# Patient Record
Sex: MALE | Race: WHITE | NOT HISPANIC OR LATINO | Employment: OTHER | URBAN - METROPOLITAN AREA
[De-identification: names, ages, dates, MRNs, and addresses within clinical notes are randomized per-mention and may not be internally consistent; named-entity substitution may affect disease eponyms.]

---

## 2017-01-05 ENCOUNTER — ONCOLOGY VISIT (OUTPATIENT)
Dept: ONCOLOGY | Facility: CLINIC | Age: 57
End: 2017-01-05
Attending: INTERNAL MEDICINE
Payer: MEDICARE

## 2017-01-05 VITALS
OXYGEN SATURATION: 96 % | WEIGHT: 188.8 LBS | HEART RATE: 89 BPM | DIASTOLIC BLOOD PRESSURE: 74 MMHG | TEMPERATURE: 97.9 F | RESPIRATION RATE: 17 BRPM | BODY MASS INDEX: 23.72 KG/M2 | SYSTOLIC BLOOD PRESSURE: 103 MMHG

## 2017-01-05 DIAGNOSIS — C34.90 NON-SMALL CELL CARCINOMA OF LUNG, STAGE 3, UNSPECIFIED LATERALITY (H): Primary | ICD-10-CM

## 2017-01-05 PROCEDURE — 99213 OFFICE O/P EST LOW 20 MIN: CPT | Performed by: INTERNAL MEDICINE

## 2017-01-05 PROCEDURE — 99211 OFF/OP EST MAY X REQ PHY/QHP: CPT

## 2017-01-05 ASSESSMENT — PAIN SCALES - GENERAL: PAINLEVEL: NO PAIN (0)

## 2017-01-05 NOTE — PATIENT INSTRUCTIONS
1.  RTC MD three months  2.  CT scan chest abd omen and pelvis before next visit.   3.  Every 4-6 week port flushes

## 2017-01-05 NOTE — PROGRESS NOTES
[facility] PHYSICIANS  HEMATOLOGY ONCOLOGY    ONCOLOGY FOLLOWUP NOTE      DIAGNOSIS:     1.  T4 NX M0 adenocarcinoma of the lung.   2.  History of stroke and alcohol abuse.      TREATMENT:  6/9/16 Concurrent chemoradiation with weekly carbo, Taxol. Finished radiation 7/30/16. Followed by 2 cycles of consolidation Carbo/Taxol.      SUBJECTIVE:  The patient was seen as a followup today. He has been feeling well. No new complaints today.     REVIEW OF SYSTEMS:  A complete review of systems was performed and found to be negative other than pertinent positives mentioned in history of present illness.     Past medical, social histories reviewed.    Meds- Reviewed.     PHYSICAL EXAMINATION:   VITAL SIGNS:/74 mmHg  Pulse 89  Temp(Src) 97.9  F (36.6  C) (Oral)  Resp 17  Wt 85.639 kg (188 lb 12.8 oz)  SpO2 96%  GENERAL: Sitting comfortably.   HEENT: Pupils are equal. Oropharynx is clear.   NECK: No cervical or supraclavicular lymphadenopathy.   LUNGS: Clear bilaterally.   HEART: S1, S2, regular.   ABDOMEN: Soft, nontender, nondistended, no hepatosplenomegaly.   EXTREMITIES: Warm, well perfused.   NEUROLOGIC: Alert, awake.   SKIN: No rash.   LYMPHATICS: No edema.      DATA:    Results for orders placed or performed during the hospital encounter of 12/29/16   CT Chest/Abdomen/Pelvis w Contrast    Narrative    CT CHEST/ABDOMEN/PELVIS WITH CONTRAST 12/29/2016 3:04 PM     HISTORY: Follow up lung cancer. Malignant neoplasm of unspecified part  of unspecified bronchus or lung.    COMPARISON: CT chest, abdomen and pelvis 9/8/2016.    TECHNIQUE: Axial images from the thoracic inlet to the symphysis are  performed with additional coronal reformatted images. 91 mL of Isovue  370 are given intravenously.  Radiation dose for this scan was reduced  using automated exposure control, adjustment of the mA and/or kV  according to patient size, or iterative reconstruction technique. Oral  contrast is also  given.    FINDINGS:     Chest: Scarring is noted at the right lung apex which is stable.  Paramediastinal infiltrate is noted bilaterally with the inflammation  in the anterior left upper lobe on prior exam appearing more fibrotic  in nature. New changes in the paramediastinal right upper and right  lower lung are also noted and probably reflect postradiation changes.  The remainder of each lung is clear. No mass or infiltrate is  otherwise evident. No pleural or pericardial fluid. Heart is normal in  size. The esophagus demonstrates mild wall thickening, possibly  therapy related. No obvious mass. Thyroid gland where imaged appears  normal in size. Right chest port is in good position with catheter tip  near the SVC right atrium junction. No enlarged axillary lymph nodes.  Previously noted subcarinal soft tissue mass is stable if not even  smaller measuring only 0.6 cm on image 27 of series 3. No additional  soft tissue mass or lymph node enlargement is appreciated. Coronary  artery calcifications are present. Thoracic aortic aneurysm measures 4  cm in diameter at the level of the right main pulmonary artery which  has diminished from 4.2 cm on the prior exam. No evidence of  dissection.    Abdomen: The liver, spleen, gallbladder, pancreas, adrenal glands and  kidneys are unremarkable. No hydronephrosis. There are no enlarged  abdominal lymph nodes. Aorta is calcified without aneurysm or  dissection. The bowel is normal in caliber without obstruction.  Appendix is normal. No diverticulitis. Mild stranding is noted in the  small bowel mesentery, but no enlarged lymph nodes or mass is  otherwise appreciated. Therapy-related changes are possible. Previous  mesenteritis  remains in the differential.    Pelvis: The bladder, prostate and rectum are unremarkable. No enlarged  pelvic lymph nodes or free fluid. Degenerative spine changes are  noted.      Impression    IMPRESSION:  1. Probable postradiation therapy changes  in the paramediastinal right  and left lung as described. This is predominantly in the mid to upper  chest. No enlarged lymph nodes or residual pericarinal mass. Lungs are  otherwise clear.  2. No evidence of metastatic disease in the abdomen or pelvis.  Haziness in the small bowel mesentery is stable. Findings could  represent therapy-related changes versus old bout of mesenteritis.    DANY ZACARIAS MD     ECOG PS: 2     ASSESSMENT:  Kt Rasmussen is a 55-year-old gentleman who initially presented with near obstructing large mass coming off the cheikh and likely the posterior wall on a bronchoscopy performed 05/18/2016.  He had a followup rigid bronchoscopy and corning out of endotracheal 05/05/2016.  Pathology result was consistent with invasive squamous cell carcinoma.  It was moderately differentiated and focally keratinizing.  He had significant bleeding during the procedure.  Tumor was completely debulked in both main stem bronchi and distal trachea.  PET/CT scan 05/23/2016, showed evidence of residual tumor decreased endobronchial mass.  There was indeterminate, mildly hypermetabolic mesentery with prominent lymph nodes and area of enhancement of the right hepatic lobe.  His case was discussed at the multidisciplinary tumor conference and was recommended to proceed with concurrent chemoradiation. He completed concurrent chemoradiation.  - CT scan 12/29/16 results were reviewed with the patient- stable findings and no convincing evidence of recurrence.     PLAN:   1.  RTC MD three months  2.  CT scan chest abd omen and pelvis before next visit.     OTIS GARY MD    1/5/2017    cc: Oneil Hayden MD

## 2017-01-05 NOTE — PROGRESS NOTES
"Kt Rasmussen is a 56 year old male who presents for:  Chief Complaint   Patient presents with     Oncology Clinic Visit     Ret Non small cell carcinoma of lung        Initial Vitals:  /74 mmHg  Pulse 89  Temp(Src) 97.9  F (36.6  C) (Oral)  Resp 17  Wt 85.639 kg (188 lb 12.8 oz)  SpO2 96% Estimated body mass index is 23.72 kg/(m^2) as calculated from the following:    Height as of 9/22/16: 1.9 m (6' 2.8\").    Weight as of this encounter: 85.639 kg (188 lb 12.8 oz).. There is no height on file to calculate BSA. BP completed using cuff size: regular  No Pain (0) No LMP for male patient. Allergies and medications reviewed.     Medications: Medication refills not needed today.  Pharmacy name entered into EPIC:    [facility], [place] - [address] [facility] [place], [place] - [address]    Comments: Takes a cholesterol med that he wants refilled but doesn't know the name and it is not on his med list.    6 minutes for nursing intake (face to face time)   Margaret Horton CMA    DISCHARGE PLAN:  Next appointments: See patient instruction section-- Gladys  Departure Mode: Ambulatory with cane  Accompanied by: friend  4 minutes for nursing discharge (face to face time)   Sandi Góemz, JAYASHREE    1.  RTC MD three months  2.  CT scan chest abd omen and pelvis before next visit.   3.  Every 4-6 week port flushes      2/2/2017 Thu   1:30 P 60 [facility] [[id number]] [facility] CHAIR 4 [[id number]] LEVEL 1 [663] Port Flush       3/2/2017 Thu   2:00 P 60 [facility] [[id number]] [facility] CHAIR 8 [[id number]] LEVEL 1 [663] Port Flush       4/4/2017 Tue   2:00 P 30 [facility]CT [[id number]] SHCT1 [[id number]] CT CHEST/ABDOMEN/PELVIS W [[id number]] Epic Order  Client has a port  [phone]  CT CHEST/ABDOMEN/PELVIS W CONTRAST [[id number]]     4/6/2017 Thu   2:00 P 60 [facility] [[id number]] [facility] 4 [[id number]] LEVEL 1 [663] Port Flush       4/6/2017 Thu   2:30 P 15 [facility] [[id number]] OTIS GARY " [[id number]] RETURN [657] Return- Non-small cell carcinoma of lung, stage 3, unspecified laterality. Schedule port flushes

## 2017-01-05 NOTE — MR AVS SNAPSHOT
After Visit Summary   1/5/2017    Kt Rasmussen    MRN: [id number]           Patient Information     Date Of Birth          1960        Visit Information        Provider Department      1/5/2017 2:30 PM Wiliam Gonzalez MD [facility]        Today's Diagnoses     Non-small cell carcinoma of lung, stage 3, unspecified laterality (H)    -  1       Care Instructions    1.  RTC MD three months  2.  CT scan chest abd omen and pelvis before next visit.         Follow-ups after your visit        Your next 10 appointments already scheduled     Feb 02, 2017  1:30 PM   Level 1 with NORTH CHAIR 4   [facility] ([facility])    [facility] [place]  [address]  [place] [address]   [phone]            Mar 02, 2017  2:00 PM   Level 1 with NORTH CHAIR 8   [facility]fusion [facility] ([facility])    [facility] [place]  [address]  [place] [address]   [phone]            Apr 04, 2017  2:00 PM   CT CHEST/ABDOMEN/PELVIS W CONTRAST with SHCT1   [facility] CT ([facility])    [address] [facility] [address]   [phone]           Please bring any scans or X-rays taken at other hospitals, if similar tests were done. Also bring a list of your medicines, including vitamins, minerals and over-the-counter drugs. It is safest to leave personal items at home.  Be sure to tell your doctor:   If you have any allergies.   If there s any chance you are pregnant.   If you are breastfeeding.   If you have any special needs.  You may have contrast for this exam. To prepare:   Do not eat or drink for 2 hours before your exam. If you need to take medicine, you may take it with small sips of water. (We may ask you to take liquid medicine as well.)   The day before your exam, drink extra fluids at least six 8-ounce glasses (unless your doctor  tells you to restrict your fluids).  Patients over 70 or patients with diabetes or kidney problems:   If you haven t had a blood test (creatinine test) within the last 30 days, go to your clinic or Diagnostic Imaging Department for this test.  If you have diabetes:   If your kidney function is normal, continue taking your metformin (Avandamet, Glucophage, Glucovance, Metaglip) on the day of your exam.   If your kidney function is abnormal, wait 48 hours before restarting this medicine.  You will have oral contrast for this exam:   You will drink the contrast at home. Get this from your clinic or Diagnostic Imaging Department. Please follow the directions given.  Please wear loose clothing, such as a sweat suit or jogging clothes. Avoid snaps, zippers and other metal. We may ask you to undress and put on a hospital gown.  If you have any questions, please call the Imaging Department where you will have your exam.            Apr 06, 2017  2:00 PM   Level 1 with NORTH CHAIR 4   [facility] ([facility])    [facility]  [address]  [facility] [address]   [phone]            Apr 06, 2017  2:30 PM   Return Visit with Wiliam Gonzalez MD   [facility] ([facility])    [facility]  [address]  [facility] [address]   [phone]              Future tests that were ordered for you today     Open Future Orders        Priority Expected Expires Ordered    CT Chest/Abdomen/Pelvis w Contrast Routine  1/5/2018 1/5/2017            Who to contact     If you have questions or need follow up information about today's clinic visit or your schedule please contact [facility] directly at [phone].  Normal or non-critical lab and imaging results will be communicated to you by MyChart, letter or phone within 4 business days after the clinic has received the results. If you do not hear  "from us within 7 days, please contact the clinic through [employer] or phone. If you have a critical or abnormal lab result, we will notify you by phone as soon as possible.  Submit refill requests through [employer] or call your pharmacy and they will forward the refill request to us. Please allow 3 business days for your refill to be completed.          Additional Information About Your Visit        [employer] Information     [employer] lets you send messages to your doctor, view your test results, renew your prescriptions, schedule appointments and more. To sign up, go to [email or web address] . Click on \"Log in\" on the left side of the screen, which will take you to the Welcome page. Then click on \"Sign up Now\" on the right side of the page.     You will be asked to enter the access code listed below, as well as some personal information. Please follow the directions to create your username and password.     Your access code is: [email or web address]  Expires: 2017  3:03 PM     Your access code will  in 90 days. If you need help or a new code, please call your [place] clinic or [phone].        Care EveryWhere ID     This is your Care EveryWhere ID. This could be used by other organizations to access your [place] medical records  [id number]        Your Vitals Were     Pulse Temperature Respirations Pulse Oximetry          89 97.9  F (36.6  C) (Oral) 17 96%         Blood Pressure from Last 3 Encounters:   17 103/74   16 106/61   16 106/61    Weight from Last 3 Encounters:   17 85.639 kg (188 lb 12.8 oz)   16 84.006 kg (185 lb 3.2 oz)   16 84.006 kg (185 lb 3.2 oz)               Primary Care Provider Office Phone # Fax #    Thomas Berry [phone]       [facility] [address] [place] [address]        Thank you!     Thank you for choosing [facility]  for your care. Our goal is always to provide you with excellent care. " Hearing back from our patients is one way we can continue to improve our services. Please take a few minutes to complete the written survey that you may receive in the mail after your visit with us. Thank you!             Your Updated Medication List - Protect others around you: Learn how to safely use, store and throw away your medicines at www.disposemymeds.org.          This list is accurate as of: 1/5/17  3:04 PM.  Always use your most recent med list.                   Brand Name Dispense Instructions for use    acetaminophen 325 MG tablet    TYLENOL    100 tablet    Take 2 tablets (650 mg) by mouth every 4 hours as needed for mild pain or fever       aspirin 81 MG tablet     100    ONE DAILY       folic acid 1 MG tablet    FOLVITE    100 tablet    Take 1 tablet (1 mg) by mouth daily Take 1 tablet by mouth daily. Will also need to continue for 21days after stopping Alimta.       ondansetron 8 MG tablet    ZOFRAN    10 tablet    Take 1 tablet (8 mg) by mouth every 8 hours as needed for nausea       prochlorperazine 10 MG tablet    COMPAZINE    30 tablet    Take 1 tablet (10 mg) by mouth every 6 hours as needed (Nausea/Vomiting)       ZOCOR 40 MG tablet   Generic drug:  simvastatin     3 MONTHS    ONE TABLET DAILY

## 2017-02-02 ENCOUNTER — INFUSION THERAPY VISIT (OUTPATIENT)
Dept: INFUSION THERAPY | Facility: CLINIC | Age: 57
End: 2017-02-02
Attending: INTERNAL MEDICINE
Payer: MEDICARE

## 2017-02-02 DIAGNOSIS — C34.90 NON-SMALL CELL CARCINOMA OF LUNG, STAGE 3, UNSPECIFIED LATERALITY (H): Primary | ICD-10-CM

## 2017-02-02 PROCEDURE — 96523 IRRIG DRUG DELIVERY DEVICE: CPT

## 2017-02-02 PROCEDURE — 25000128 H RX IP 250 OP 636: Performed by: INTERNAL MEDICINE

## 2017-02-02 RX ORDER — HEPARIN SODIUM (PORCINE) LOCK FLUSH IV SOLN 100 UNIT/ML 100 UNIT/ML
5 SOLUTION INTRAVENOUS ONCE
Status: COMPLETED | OUTPATIENT
Start: 2017-02-02 | End: 2017-02-02

## 2017-02-02 RX ADMIN — SODIUM CHLORIDE, PRESERVATIVE FREE 5 ML: 5 INJECTION INTRAVENOUS at 13:19

## 2017-02-02 NOTE — PROGRESS NOTES
Infusion Nursing Note:  Kt Rasmussen presents today for port flush.    Patient seen by provider today: No   present during visit today: Not Applicable.    Note: N/A.    Intravenous Access:  Implanted Port.    Treatment Conditions:  Not Applicable.      Post Infusion Assessment:  Patient tolerated injection without incident.  Site patent and intact, free from redness, edema or discomfort.  No evidence of extravasations.  Access discontinued per protocol.    Discharge Plan:   Patient declined prescription refills.  Discharge instructions reviewed with: Patient.  Patient and/or family verbalized understanding of discharge instructions and all questions answered.  Copy of AVS reviewed with patient and/or family.  Patient will return 1 months for next appointment.  Patient discharged in stable condition accompanied by: friend.  Departure Mode: Ambulatory.    Adore Day RN

## 2017-02-02 NOTE — MR AVS SNAPSHOT
After Visit Summary   2/2/2017    Kt Rasmussen    MRN: [id number]           Patient Information     Date Of Birth          1960        Visit Information        Provider Department      2/2/2017 1:30 PM NORTH CHAIR [address] [place] [address] [facility]        Today's Diagnoses     Non-small cell carcinoma of lung, stage 3, unspecified laterality (H)    -  1        Follow-ups after your visit        Your next 10 appointments already scheduled     Feb 02, 2017  1:30 PM   Level 1 with NORTH CHAIR 4   [facility] ([facility])    [facility]  [address]  [place] [address]   [phone]            Mar 02, 2017  2:00 PM   Level 1 with NORTH CHAIR 8   [facility] and Infusion Center ([facility])    [facility]  [address]  [place] [address]   [phone]            Apr 04, 2017  2:00 PM   CT CHEST/ABDOMEN/PELVIS W CONTRAST with SHCT1   [facility] CT ([facility])    [address] [facility] [address]   [phone]           Please bring any scans or X-rays taken at other hospitals, if similar tests were done. Also bring a list of your medicines, including vitamins, minerals and over-the-counter drugs. It is safest to leave personal items at home.  Be sure to tell your doctor:   If you have any allergies.   If there s any chance you are pregnant.   If you are breastfeeding.   If you have any special needs.  You may have contrast for this exam. To prepare:   Do not eat or drink for 2 hours before your exam. If you need to take medicine, you may take it with small sips of water. (We may ask you to take liquid medicine as well.)   The day before your exam, drink extra fluids at least six 8-ounce glasses (unless your doctor tells you to restrict your fluids).  Patients over 70 or patients with diabetes or kidney  problems:   If you haven t had a blood test (creatinine test) within the last 30 days, go to your clinic or Diagnostic Imaging Department for this test.  If you have diabetes:   If your kidney function is normal, continue taking your metformin (Avandamet, Glucophage, Glucovance, Metaglip) on the day of your exam.   If your kidney function is abnormal, wait 48 hours before restarting this medicine.  You will have oral contrast for this exam:   You will drink the contrast at home. Get this from your clinic or Diagnostic Imaging Department. Please follow the directions given.  Please wear loose clothing, such as a sweat suit or jogging clothes. Avoid snaps, zippers and other metal. We may ask you to undress and put on a hospital gown.  If you have any questions, please call the Imaging Department where you will have your exam.            Apr 06, 2017  2:00 PM   Level 1 with NORTH CHAIR 4   [facility] ([facility])    [facility]  [address]  [facility] [address]   [phone]            Apr 06, 2017  2:30 PM   Return Visit with Wiliam Gonzalez MD   [facility] ([facility])    [facility]  [address]  [facility] [address]   [phone]              Who to contact     If you have questions or need follow up information about today's clinic visit or your schedule please contact [facility] INF[facility]ION C[facility] directly at [phone].  Normal or non-critical lab and imaging results will be communicated to you by MyChart, letter or phone within 4 business days after the clinic has received the results. If you do not hear from us within 7 days, please contact the clinic through MyChart or phone. If you have a critical or abnormal lab result, we will notify you by phone as soon as possible.  Submit refill requests through [employer] or call your pharmacy and they will forward  "the refill request to us. Please allow 3 business days for your refill to be completed.          Additional Information About Your Visit        [employer] Information     [employer] lets you send messages to your doctor, view your test results, renew your prescriptions, schedule appointments and more. To sign up, go to [email or web address] . Click on \"Log in\" on the left side of the screen, which will take you to the Welcome page. Then click on \"Sign up Now\" on the right side of the page.     You will be asked to enter the access code listed below, as well as some personal information. Please follow the directions to create your username and password.     Your access code is: [email or web address]  Expires: 2017  3:03 PM     Your access code will  in 90 days. If you need help or a new code, please call your [place] clinic or [phone].        Care EveryWhere ID     This is your Care EveryWhere ID. This could be used by other organizations to access your [place] medical records  [id number]         Blood Pressure from Last 3 Encounters:   17 103/74   16 106/61   16 106/61    Weight from Last 3 Encounters:   17 85.639 kg (188 lb 12.8 oz)   16 84.006 kg (185 lb 3.2 oz)   16 84.006 kg (185 lb 3.2 oz)              Today, you had the following     No orders found for display       Primary Care Provider Office Phone # Fax #    Thomas Berry [phone]       [facility] [address] [place] [address]        Thank you!     Thank you for choosing [facility]  for your care. Our goal is always to provide you with excellent care. Hearing back from our patients is one way we can continue to improve our services. Please take a few minutes to complete the written survey that you may receive in the mail after your visit with us. Thank you!             Your Updated Medication List - Protect others around you: Learn how " to safely use, store and throw away your medicines at www.disposemymeds.org.          This list is accurate as of: 2/2/17  1:23 PM.  Always use your most recent med list.                   Brand Name Dispense Instructions for use    acetaminophen 325 MG tablet    TYLENOL    100 tablet    Take 2 tablets (650 mg) by mouth every 4 hours as needed for mild pain or fever       aspirin 81 MG tablet     100    ONE DAILY       folic acid 1 MG tablet    FOLVITE    100 tablet    Take 1 tablet (1 mg) by mouth daily Take 1 tablet by mouth daily. Will also need to continue for 21days after stopping Alimta.       ondansetron 8 MG tablet    ZOFRAN    10 tablet    Take 1 tablet (8 mg) by mouth every 8 hours as needed for nausea       prochlorperazine 10 MG tablet    COMPAZINE    30 tablet    Take 1 tablet (10 mg) by mouth every 6 hours as needed (Nausea/Vomiting)       ZOCOR 40 MG tablet   Generic drug:  simvastatin     3 MONTHS    ONE TABLET DAILY

## 2017-03-01 ENCOUNTER — INFUSION THERAPY VISIT (OUTPATIENT)
Dept: INFUSION THERAPY | Facility: CLINIC | Age: 57
End: 2017-03-01
Attending: INTERNAL MEDICINE
Payer: MEDICARE

## 2017-03-01 DIAGNOSIS — C34.90 NON-SMALL CELL CARCINOMA OF LUNG, STAGE 3, UNSPECIFIED LATERALITY (H): Primary | ICD-10-CM

## 2017-03-01 PROCEDURE — 25000128 H RX IP 250 OP 636: Performed by: INTERNAL MEDICINE

## 2017-03-01 PROCEDURE — 96523 IRRIG DRUG DELIVERY DEVICE: CPT

## 2017-03-01 RX ORDER — HEPARIN SODIUM (PORCINE) LOCK FLUSH IV SOLN 100 UNIT/ML 100 UNIT/ML
5 SOLUTION INTRAVENOUS ONCE
Status: CANCELLED
Start: 2017-03-01 | End: 2017-03-01

## 2017-03-01 RX ORDER — HEPARIN SODIUM (PORCINE) LOCK FLUSH IV SOLN 100 UNIT/ML 100 UNIT/ML
5 SOLUTION INTRAVENOUS ONCE
Status: COMPLETED | OUTPATIENT
Start: 2017-03-01 | End: 2017-03-01

## 2017-03-01 RX ADMIN — SODIUM CHLORIDE, PRESERVATIVE FREE 5 ML: 5 INJECTION INTRAVENOUS at 12:50

## 2017-03-01 NOTE — PROGRESS NOTES
Infusion Nursing Note:  Kt Rasmussen presents today for port flush.    Patient seen by provider today: No   present during visit today: Not Applicable.    Note: N/A.    Intravenous Access:  Implanted Port.    Treatment Conditions:  Not Applicable.      Post Infusion Assessment:  Patient tolerated port flush without incident.  Blood return noted pre and post infusion.  Site patent and intact, free from redness, edema or discomfort.  No evidence of extravasations.  Access discontinued per protocol.    Discharge Plan:   Patient declined prescription refills.  Discharge instructions reviewed with: Patient.  Patient and/or family verbalized understanding of discharge instructions and all questions answered.  Copy of AVS reviewed with patient and/or family.  Patient will return 4/6/17 for next appointment.  Patient discharged in stable condition accompanied by: self.  Departure Mode: Ambulatory.    Adore Day RN

## 2017-03-01 NOTE — MR AVS SNAPSHOT
After Visit Summary   3/1/2017    Kt Rasmussen    MRN: [id number]           Patient Information     Date Of Birth          1960        Visit Information        Provider Department      3/1/2017 1:00 PM  INFUSION CHAIR 11 [facility]        Today's Diagnoses     Non-small cell carcinoma of lung, stage 3, unspecified laterality (H)    -  1       Follow-ups after your visit        Your next 10 appointments already scheduled     Mar 01, 2017  1:00 PM CST   Level 1 with  INFUSION CHAIR 11   [facility] ([facility])    [facility]  [address]  [facility] [address]   [phone]            Apr 04, 2017  2:00 PM CDT   CT CHEST/ABDOMEN/PELVIS W CONTRAST with SHCT1   [facility] CT ([facility])    [address] [facility] [address] [phone]           Please bring any scans or X-rays taken at other hospitals, if similar tests were done. Also bring a list of your medicines, including vitamins, minerals and over-the-counter drugs. It is safest to leave personal items at home.  Be sure to tell your doctor:   If you have any allergies.   If there s any chance you are pregnant.   If you are breastfeeding.   If you have any special needs.  You may have contrast for this exam. To prepare:   Do not eat or drink for 2 hours before your exam. If you need to take medicine, you may take it with small sips of water. (We may ask you to take liquid medicine as well.)   The day before your exam, drink extra fluids at least six 8-ounce glasses (unless your doctor tells you to restrict your fluids).  Patients over 70 or patients with diabetes or kidney problems:   If you haven t had a blood test (creatinine test) within the last 30 days, go to your clinic or Diagnostic Imaging Department for this test.  If you have diabetes:   If your kidney function is normal,  continue taking your metformin (Avandamet, Glucophage, Glucovance, Metaglip) on the day of your exam.   If your kidney function is abnormal, wait 48 hours before restarting this medicine.  You will have oral contrast for this exam:   You will drink the contrast at home. Get this from your clinic or Diagnostic Imaging Department. Please follow the directions given.  Please wear loose clothing, such as a sweat suit or jogging clothes. Avoid snaps, zippers and other metal. We may ask you to undress and put on a hospital gown.  If you have any questions, please call the Imaging Department where you will have your exam.            Apr 06, 2017  2:00 PM CDT   Level 1 with  INFUSION CHAIR 4   [facility] ([facility])    [facility]  [address]  [facility] [address]   [phone]            Apr 06, 2017  2:30 PM CDT   Return Visit with Wiliam Gonzalez MD   [facility] ([facility])    [facility]  [address]  [facility] [address]   [phone]              Who to contact     If you have questions or need follow up information about today's clinic visit or your schedule please contact [facility] directly at [phone].  Normal or non-critical lab and imaging results will be communicated to you by AnTuTuhart, letter or phone within 4 business days after the clinic has received the results. If you do not hear from us within 7 days, please contact the clinic through Visible World[employer] or phone. If you have a critical or abnormal lab result, we will notify you by phone as soon as possible.  Submit refill requests through [employer] or call your pharmacy and they will forward the refill request to us. Please allow 3 business days for your refill to be completed.          Additional Information About Your Visit        [employer] Information     [employer] lets you send messages  "to your doctor, view your test results, renew your prescriptions, schedule appointments and more. To sign up, go to [email or web address] . Click on \"Log in\" on the left side of the screen, which will take you to the Welcome page. Then click on \"Sign up Now\" on the right side of the page.     You will be asked to enter the access code listed below, as well as some personal information. Please follow the directions to create your username and password.     Your access code is: [email or web address]  Expires: 2017  3:03 PM     Your access code will  in 90 days. If you need help or a new code, please call your [place] clinic or [phone].        Care EveryWhere ID     This is your Care EveryWhere ID. This could be used by other organizations to access your [place] medical records  [id number]         Blood Pressure from Last 3 Encounters:   17 103/74   16 106/61   16 106/61    Weight from Last 3 Encounters:   17 85.6 kg (188 lb 12.8 oz)   16 84 kg (185 lb 3.2 oz)   16 84 kg (185 lb 3.2 oz)              Today, you had the following     No orders found for display       Primary Care Provider Office Phone # Fax #    Thomas Berry [phone]       [facility] [address] [place] [address]        Thank you!     Thank you for choosing [facility]  for your care. Our goal is always to provide you with excellent care. Hearing back from our patients is one way we can continue to improve our services. Please take a few minutes to complete the written survey that you may receive in the mail after your visit with us. Thank you!             Your Updated Medication List - Protect others around you: Learn how to safely use, store and throw away your medicines at www.disposemymeds.org.          This list is accurate as of: 3/1/17 12:55 PM.  Always use your most recent med list.                   Brand Name Dispense " Instructions for use    acetaminophen 325 MG tablet    TYLENOL    100 tablet    Take 2 tablets (650 mg) by mouth every 4 hours as needed for mild pain or fever       aspirin 81 MG tablet     100    ONE DAILY       folic acid 1 MG tablet    FOLVITE    100 tablet    Take 1 tablet (1 mg) by mouth daily Take 1 tablet by mouth daily. Will also need to continue for 21days after stopping Alimta.       ondansetron 8 MG tablet    ZOFRAN    10 tablet    Take 1 tablet (8 mg) by mouth every 8 hours as needed for nausea       prochlorperazine 10 MG tablet    COMPAZINE    30 tablet    Take 1 tablet (10 mg) by mouth every 6 hours as needed (Nausea/Vomiting)       ZOCOR 40 MG tablet   Generic drug:  simvastatin     3 MONTHS    ONE TABLET DAILY

## 2017-04-04 ENCOUNTER — HOSPITAL ENCOUNTER (OUTPATIENT)
Facility: CLINIC | Age: 57
Discharge: HOME | End: 2017-04-04
Attending: INTERNAL MEDICINE | Admitting: INTERNAL MEDICINE
Payer: MEDICARE

## 2017-04-04 ENCOUNTER — HOSPITAL ENCOUNTER (OUTPATIENT)
Dept: CT IMAGING | Facility: CLINIC | Age: 57
End: 2017-04-04
Attending: INTERNAL MEDICINE | Admitting: INTERNAL MEDICINE
Payer: MEDICARE

## 2017-04-04 DIAGNOSIS — C34.90 NON-SMALL CELL CARCINOMA OF LUNG, STAGE 3, UNSPECIFIED LATERALITY (H): ICD-10-CM

## 2017-04-04 PROCEDURE — 40000863 ZZH STATISTIC RADIOLOGY XRAY, US, CT, MAR, NM

## 2017-04-04 PROCEDURE — 74177 CT ABD & PELVIS W/CONTRAST: CPT

## 2017-04-04 PROCEDURE — 25000125 ZZHC RX 250: Performed by: INTERNAL MEDICINE

## 2017-04-04 PROCEDURE — 25500064 ZZH RX 255 OP 636: Performed by: INTERNAL MEDICINE

## 2017-04-04 PROCEDURE — 25000128 H RX IP 250 OP 636: Performed by: RADIOLOGY

## 2017-04-04 PROCEDURE — 71260 CT THORAX DX C+: CPT

## 2017-04-04 RX ORDER — HEPARIN SODIUM,PORCINE 10 UNIT/ML
5-10 VIAL (ML) INTRAVENOUS EVERY 24 HOURS
Status: DISCONTINUED | OUTPATIENT
Start: 2017-04-04 | End: 2017-04-05 | Stop reason: HOSPADM

## 2017-04-04 RX ORDER — HEPARIN SODIUM (PORCINE) LOCK FLUSH IV SOLN 100 UNIT/ML 100 UNIT/ML
5 SOLUTION INTRAVENOUS
Status: DISCONTINUED | OUTPATIENT
Start: 2017-04-04 | End: 2017-04-05 | Stop reason: HOSPADM

## 2017-04-04 RX ORDER — IOPAMIDOL 755 MG/ML
92 INJECTION, SOLUTION INTRAVASCULAR ONCE
Status: COMPLETED | OUTPATIENT
Start: 2017-04-04 | End: 2017-04-04

## 2017-04-04 RX ORDER — HEPARIN SODIUM,PORCINE 10 UNIT/ML
5-10 VIAL (ML) INTRAVENOUS
Status: DISCONTINUED | OUTPATIENT
Start: 2017-04-04 | End: 2017-04-05 | Stop reason: HOSPADM

## 2017-04-04 RX ADMIN — SODIUM CHLORIDE 66 ML: 9 INJECTION, SOLUTION INTRAVENOUS at 13:58

## 2017-04-04 RX ADMIN — IOPAMIDOL 92 ML: 755 INJECTION, SOLUTION INTRAVENOUS at 13:58

## 2017-04-04 RX ADMIN — SODIUM CHLORIDE, PRESERVATIVE FREE 5 ML: 5 INJECTION INTRAVENOUS at 14:02

## 2017-04-06 ENCOUNTER — ONCOLOGY VISIT (OUTPATIENT)
Dept: ONCOLOGY | Facility: CLINIC | Age: 57
End: 2017-04-06
Attending: INTERNAL MEDICINE
Payer: MEDICARE

## 2017-04-06 ENCOUNTER — INFUSION THERAPY VISIT (OUTPATIENT)
Dept: INFUSION THERAPY | Facility: CLINIC | Age: 57
End: 2017-04-06
Attending: INTERNAL MEDICINE
Payer: MEDICARE

## 2017-04-06 VITALS
WEIGHT: 182.8 LBS | OXYGEN SATURATION: 98 % | DIASTOLIC BLOOD PRESSURE: 68 MMHG | BODY MASS INDEX: 22.97 KG/M2 | RESPIRATION RATE: 17 BRPM | SYSTOLIC BLOOD PRESSURE: 112 MMHG | HEART RATE: 81 BPM | TEMPERATURE: 98.5 F

## 2017-04-06 DIAGNOSIS — C34.90 NON-SMALL CELL CARCINOMA OF LUNG, STAGE 3, UNSPECIFIED LATERALITY (H): Primary | ICD-10-CM

## 2017-04-06 PROCEDURE — 25000128 H RX IP 250 OP 636: Performed by: INTERNAL MEDICINE

## 2017-04-06 PROCEDURE — 99214 OFFICE O/P EST MOD 30 MIN: CPT | Performed by: INTERNAL MEDICINE

## 2017-04-06 PROCEDURE — 96523 IRRIG DRUG DELIVERY DEVICE: CPT

## 2017-04-06 RX ORDER — HEPARIN SODIUM (PORCINE) LOCK FLUSH IV SOLN 100 UNIT/ML 100 UNIT/ML
5 SOLUTION INTRAVENOUS ONCE
Status: COMPLETED | OUTPATIENT
Start: 2017-04-06 | End: 2017-04-06

## 2017-04-06 RX ORDER — HEPARIN SODIUM (PORCINE) LOCK FLUSH IV SOLN 100 UNIT/ML 100 UNIT/ML
5 SOLUTION INTRAVENOUS ONCE
Status: CANCELLED
Start: 2017-04-06 | End: 2017-04-06

## 2017-04-06 RX ADMIN — SODIUM CHLORIDE, PRESERVATIVE FREE 5 ML: 5 INJECTION INTRAVENOUS at 13:45

## 2017-04-06 ASSESSMENT — PAIN SCALES - GENERAL: PAINLEVEL: NO PAIN (0)

## 2017-04-06 NOTE — MR AVS SNAPSHOT
After Visit Summary   4/6/2017    Kt Rasmussen    MRN: [id number]           Patient Information     Date Of Birth          1960        Visit Information        Provider Department      4/6/2017 2:30 PM Wiliam Gonzalez MD [facility]        Today's Diagnoses     Non-small cell carcinoma of lung, stage 3, unspecified laterality (H)    -  1      Care Instructions    1.  RTC MD three months  2.  CT scan chest before next visit.         Follow-ups after your visit        Your next 10 appointments already scheduled     May 04, 2017  1:30 PM CDT   Level 1 with  INFUSION CHAIR 16   [facility] ([facility])    [facility]  [address]  [facility] [address]   [phone]            Jun 08, 2017  1:30 PM CDT   Level 1 with  INFUSION CHAIR 6   [facility] ([facility])    [facility]  [address]  [facility] [address]   [phone]            Jul 11, 2017  2:00 PM CDT   CT CHEST W/O CONTRAST with SHCT1   [facility] CT ([facility])    [address] [facility] [address]   [phone]           Please bring any scans or X-rays taken at other hospitals, if similar tests were done. Also bring a list of your medicines, including vitamins, minerals and over-the-counter drugs. It is safest to leave personal items at home.  Be sure to tell your doctor:   If you have any allergies.   If there s any chance you are pregnant.   If you are breastfeeding.   If you have any special needs.  You do not need to do anything special to prepare.  Please wear loose clothing, such as a sweat suit or jogging clothes. Avoid snaps, zippers and other metal. We may ask you to undress and put on a hospital gown.            Jul 13, 2017  1:30 PM CDT   Return Visit with Wiliam Gonzalez MD   [facility] ([facility]  "[facility])    [facility] [place]  [address]  [place] [address]   [phone]            2017  2:00 PM CDT   Level 1 with  INFUSION CHAIR 17   [facility] ([facility])    [facility] [place]  [address]  [place] [address]   [phone]              Future tests that were ordered for you today     Open Future Orders        Priority Expected Expires Ordered    CT Chest w/o contrast Routine  2018            Who to contact     If you have questions or need follow up information about today's clinic visit or your schedule please contact [facility] directly at [phone].  Normal or non-critical lab and imaging results will be communicated to you by [employer], letter or phone within 4 business days after the clinic has received the results. If you do not hear from us within 7 days, please contact the clinic through [employer] or phone. If you have a critical or abnormal lab result, we will notify you by phone as soon as possible.  Submit refill requests through [employer]igo or call your pharmacy and they will forward the refill request to us. Please allow 3 business days for your refill to be completed.          Additional Information About Your Visit        gamigo Information     [employer]igo lets you send messages to your doctor, view your test results, renew your prescriptions, schedule appointments and more. To sign up, go to [email or web address] . Click on \"Log in\" on the left side of the screen, which will take you to the Welcome page. Then click on \"Sign up Now\" on the right side of the page.     You will be asked to enter the access code listed below, as well as some personal information. Please follow the directions to create your username and password.     Your access code is: [email or web address]  Expires: 2017  3:00 PM     Your access code will  in 90 days. If you need help or " a new code, please call your [place] clinic or [phone].        Care EveryWhere ID     This is your Care EveryWhere ID. This could be used by other organizations to access your [place] medical records  [id number]        Your Vitals Were     Pulse Temperature Respirations Pulse Oximetry BMI (Body Mass Index)       81 98.5  F (36.9  C) (Oral) 17 98% 22.97 kg/m2        Blood Pressure from Last 3 Encounters:   04/06/17 112/68   01/05/17 103/74   11/03/16 106/61    Weight from Last 3 Encounters:   04/06/17 82.9 kg (182 lb 12.8 oz)   01/05/17 85.6 kg (188 lb 12.8 oz)   11/03/16 84 kg (185 lb 3.2 oz)               Primary Care Provider Office Phone # Fax #    Thomas Berry [phone]       [facility] [address] [place] [address]        Thank you!     Thank you for choosing [facility]  for your care. Our goal is always to provide you with excellent care. Hearing back from our patients is one way we can continue to improve our services. Please take a few minutes to complete the written survey that you may receive in the mail after your visit with us. Thank you!             Your Updated Medication List - Protect others around you: Learn how to safely use, store and throw away your medicines at www.disposemymeds.org.          This list is accurate as of: 4/6/17  3:00 PM.  Always use your most recent med list.                   Brand Name Dispense Instructions for use    acetaminophen 325 MG tablet    TYLENOL    100 tablet    Take 2 tablets (650 mg) by mouth every 4 hours as needed for mild pain or fever       aspirin 81 MG tablet     100    ONE DAILY       folic acid 1 MG tablet    FOLVITE    100 tablet    Take 1 tablet (1 mg) by mouth daily Take 1 tablet by mouth daily. Will also need to continue for 21days after stopping Alimta.       ondansetron 8 MG tablet    ZOFRAN    10 tablet    Take 1 tablet (8 mg) by mouth every 8 hours as needed for nausea        prochlorperazine 10 MG tablet    COMPAZINE    30 tablet    Take 1 tablet (10 mg) by mouth every 6 hours as needed (Nausea/Vomiting)       ZOCOR 40 MG tablet   Generic drug:  simvastatin     3 MONTHS    ONE TABLET DAILY

## 2017-04-06 NOTE — PROGRESS NOTES
[facility] PHYSICIANS  HEMATOLOGY ONCOLOGY    ONCOLOGY FOLLOWUP NOTE      DIAGNOSIS:     1.  T4 NX M0 adenocarcinoma of the lung.   2.  History of stroke and alcohol abuse.      TREATMENT:  6/9/16 Concurrent chemoradiation with weekly carbo, Taxol. Finished radiation 7/30/16. Followed by 2 cycles of consolidation Carbo/Taxol.      SUBJECTIVE:  The patient was seen as a followup today. He is recovering from a URI. He had cough which is resolved. He is feeling better overall, he has gained weight. No other respiratory symptoms.     REVIEW OF SYSTEMS:  A complete review of systems was performed and found to be negative other than pertinent positives mentioned in history of present illness.     Past medical, social histories reviewed.    Meds- Reviewed.     PHYSICAL EXAMINATION:   VITAL SIGNS:/68  Pulse 81  Temp 98.5  F (36.9  C) (Oral)  Resp 17  Wt 82.9 kg (182 lb 12.8 oz)  SpO2 98%  BMI 22.97 kg/m2  GENERAL: Sitting comfortably.   HEENT: Pupils are equal. Oropharynx is clear.   NECK: No cervical or supraclavicular lymphadenopathy.   LUNGS: Clear bilaterally.   HEART: S1, S2, regular.   ABDOMEN: Soft, nontender, nondistended, no hepatosplenomegaly.   EXTREMITIES: Warm, well perfused.   NEUROLOGIC: Alert, awake.   SKIN: No rash.   LYMPHATICS: No edema.      DATA:  Recent Labs   Lab Test  11/03/16   1310  10/13/16   0845   08/17/16   1300   05/04/16 2027   NA  141  142   < >  143   < >   --    POTASSIUM  3.9  3.7   < >  3.8   < >  3.9   CHLORIDE  107  110*   < >  108   < >   --    CO2  27  26   < >  28   < >   --    ANIONGAP  7  6   < >  7   < >   --    BUN  13  13   < >  15   < >   --    CR  0.66  0.62*   < >  0.70   < >   --    GLC  96  106*   < >  117*   < >   --    BEVERLY  8.5  8.6   < >  8.9   < >   --    MAG   --   1.9   --   1.9   < >  2.3   PHOS   --    --    --    --    --   2.0*    < > = values in this interval not displayed.     Recent Labs   Lab Test  11/03/16   1310  10/13/16   0845   09/22/16   1110   WBC  4.2  3.9*  4.1   HGB  9.5*  10.3*  10.8*   PLT  99*  106*  126*   MCV  91  91  90   NEUTROPHIL  71.0  69.2  72.5     Recent Labs   Lab Test  11/03/16   1310  10/13/16   0845  09/22/16   1110   BILITOTAL  0.3  0.4  0.6   ALKPHOS  87  84  88   ALT  36  24  20   AST  23  16  14   ALBUMIN  3.5  3.3*  3.6         Results for orders placed or performed during the hospital encounter of 04/04/17   CT Chest/Abdomen/Pelvis w Contrast    Narrative    CT CHEST/ABDOMEN/PELVIS WITH CONTRAST   4/4/2017 2:02 PM     HISTORY: Follow up lung cancer. Malignant neoplasm of unspecified part  of unspecified bronchus or lung.    TECHNIQUE:  92 mL Isovue-370. Radiation dose for this scan was reduced  using automated exposure control, adjustment of the mA and/or kV  according to patient size, or iterative reconstruction technique.    COMPARISON: 12/29/2016.    FINDINGS:     Chest: Ascending thoracic aorta measures 4.1 cm, previously 4.0 cm.  Moderate coronary atherosclerosis is present. No pleural or  pericardial effusion. Hazy opacities present in the medial lungs along  the mediastinal borders bilaterally, similar to prior. There is a new  subsolid opacity in the left upper lobe that measures 10 mm (series 5,  image 31). No other pulmonary nodule or mass. Patient has a  right-sided Port-A-Cath with the tip in the superior vena cava.    Abdomen: The liver, gallbladder, spleen, pancreas, and adrenal glands  appear normal. There is no hydronephrosis or solid renal mass. Mild  haziness in the mesentery improved compared to the prior study. No  abdominal or retroperitoneal adenopathy.    Pelvis: There is sigmoid diverticulosis without evidence of  diverticulitis. No pelvic lymphadenopathy or mass.    No suspicious bony lesions.      Impression    IMPRESSION:  1. Borderline ascending thoracic aortic aneurysm.  2. Nonspecific 10 mm subsolid nodular density in the left upper lobe  medially could be related to infectious,  inflammatory, or neoplastic  etiology. Follow-up CT scan of the chest recommended within six  months.    DIANNE ARGUETA MD     ECOG PS: 2     ASSESSMENT:  Kt Rasmussen is a 55-year-old gentleman who initially presented with near obstructing large mass coming off the cheikh and likely the posterior wall on a bronchoscopy performed 05/18/2016.  He had a followup rigid bronchoscopy and corning out of endotracheal 05/05/2016.  Pathology result was consistent with invasive squamous cell carcinoma.  It was moderately differentiated and focally keratinizing.  He had significant bleeding during the procedure.  Tumor was completely debulked in both main stem bronchi and distal trachea.  PET/CT scan 05/23/2016, showed evidence of residual tumor decreased endobronchial mass.  There was indeterminate, mildly hypermetabolic mesentery with prominent lymph nodes and area of enhancement of the right hepatic lobe.  His case was discussed at the multidisciplinary tumor conference and was recommended to proceed with concurrent chemoradiation. He completed concurrent chemoradiation.  - CT scan 14/4/17 results were reviewed with the patient- I reviewed the images myself. There is a new 1 cm lesion in the left upper lobe. This is concerning, may represent an area of recurrence. He had URI symptoms lately which have resolved in last two days, so infection is a possibility as well, radiation related changes are another possibility. I think repeating another scan in three months is reasonable. If this area is growing then he will need a biopsy.      PLAN:   1.  RTC MD three months  2.  CT scan chest before next visit.     OTIS GARY MD    4/6/2017    cc: Oneil Hayden MD

## 2017-04-06 NOTE — PROGRESS NOTES
"Kt Rasmussen is a 56 year old male who presents for:  Chief Complaint   Patient presents with     Oncology Clinic Visit     Ret NSCLC        Initial Vitals:  /68  Pulse 81  Temp 98.5  F (36.9  C) (Oral)  Resp 17  Wt 82.9 kg (182 lb 12.8 oz)  SpO2 98%  BMI 22.97 kg/m2 Estimated body mass index is 22.97 kg/(m^2) as calculated from the following:    Height as of 9/22/16: 1.9 m (6' 2.8\").    Weight as of this encounter: 82.9 kg (182 lb 12.8 oz).. Body surface area is 2.09 meters squared. BP completed using cuff size: regular  No Pain (0) No LMP for male patient. Allergies and medications reviewed.     Medications: Medication refills not needed today.  Pharmacy name entered into EPIC:    [place] [place] [facility] [address] [facility] [place], [place] - [address]    Comments: Pt on cholesterol med doesn't know name.      6 minutes for nursing intake (face to face time)   Margaret Horton CMA    DISCHARGE PLAN:  Next appointments: See patient instruction section  Departure Mode: Ambulatory  Accompanied by: friend  9 minutes for nursing discharge (face to face time)   Sandi Gómez RN    1.  RTC MD three months  2.  CT scan chest before next visit.      S 5/4/2017 Thu  1:30 PM  1:30 P 60 SHCI [[id number]]  INFUSION CHAIR 16 [[id number]] LEVEL 1 [663] Port Flush      S 6/8/2017 Thu  1:30 PM  1:30 P 60 [facility]CI [[id number]] SH INFUSION CHAIR 6 [[id number]] LEVEL 1 [663] Port Flush       7/11/2017 Tue  2:00 PM  2:00 P 15 SHCT [[id number]] SHCT1 [[id number]] CT CHEST WO [[id number]] [facility] ORDER, sb [facility] [phone]  CT CHEST W/O CONTRAST [[id number]]     7/13/2017 Thu  1:30 PM  1:30 P 15 SHCC [[id number]] OTIS GARY [[id number]] RETURN [657] Return- Non-small cell carcinoma of lung, stage 3, unspecified laterality. Schedule port flushes      S 7/13/2017 Thu  2:00 PM  2:00 P 60 [facility] [[id number]]  INFUSION CHAIR 17 [[id number]] LEVEL 1 [663] Port Flush     "

## 2017-04-06 NOTE — MR AVS SNAPSHOT
After Visit Summary   4/6/2017    Kt Rasmussen    MRN: [id number]           Patient Information     Date Of Birth          1960        Visit Information        Provider Department      4/6/2017 2:00 PM  INFUSION CHAIR 4 [facility]        Today's Diagnoses     Non-small cell carcinoma of lung, stage 3, unspecified laterality (H)    -  1       Follow-ups after your visit        Your next 10 appointments already scheduled     May 04, 2017  1:30 PM CDT   Level 1 with  INFUSION CHAIR 16   [facility] ([facility])    [facility]  [address]  [place] [address]   [phone]            Jun 08, 2017  1:30 PM CDT   Level 1 with  INFUSION CHAIR 6   [facility] ([facility])    [facility]  [address]  [facility] [address]   [phone]            Jul 11, 2017  2:00 PM CDT   CT CHEST W/O CONTRAST with SHCT1   [facility] CT ([facility])    [address] [facility] [address]   [phone]           Please bring any scans or X-rays taken at other hospitals, if similar tests were done. Also bring a list of your medicines, including vitamins, minerals and over-the-counter drugs. It is safest to leave personal items at home.  Be sure to tell your doctor:   If you have any allergies.   If there s any chance you are pregnant.   If you are breastfeeding.   If you have any special needs.  You do not need to do anything special to prepare.  Please wear loose clothing, such as a sweat suit or jogging clothes. Avoid snaps, zippers and other metal. We may ask you to undress and put on a hospital gown.            Jul 13, 2017  1:30 PM CDT   Return Visit with Wiliam Gonzalez MD   [facility] ([facility])    [facility]  [address]  [place] [address]   [phone]            2017  2:00 PM CDT   Level 1 with  INFUSION CHAIR 17   [facility] ([facility])    [facility] [place]  [address] [address]   [phone]              Future tests that were ordered for you today     Open Future Orders        Priority Expected Expires Ordered    CT Chest w/o contrast Routine  2018            Who to contact     If you have questions or need follow up information about today's clinic visit or your schedule please contact [facility] C[facility] directly at [phone].  Normal or non-critical lab and imaging results will be communicated to you by Revis[employer]art, letter or phone within 4 business days after the clinic has received the results. If you do not hear from us within 7 days, please contact the clinic through Revis[employer] or phone. If you have a critical or abnormal lab result, we will notify you by phone as soon as possible.  Submit refill requests through [employer] or call your pharmacy and they will forward the refill request to us. Please allow 3 business days for your refill to be completed.          Additional Information About Your Visit        [employer] Information     [employer] lets you send messages to your doctor, view your test results, renew your prescriptions, schedule appointments and more. To sign up, go to [email or web address] . Click on \"Log in\" on the left side of the screen, which will take you to the Welcome page. Then click on \"Sign up Now\" on the right side of the page.     You will be asked to enter the access code listed below, as well as some personal information. Please follow the directions to create your username and password.     Your access code is: [email or web address]  Expires: 2017  3:00 PM     Your access code will  in 90 days. If you need help or a new code, please call your [place] clinic " or [phone].        Care EveryWhere ID     This is your Care EveryWhere ID. This could be used by other organizations to access your [place] medical records  [id number]         Blood Pressure from Last 3 Encounters:   04/06/17 112/68   01/05/17 103/74   11/03/16 106/61    Weight from Last 3 Encounters:   04/06/17 82.9 kg (182 lb 12.8 oz)   01/05/17 85.6 kg (188 lb 12.8 oz)   11/03/16 84 kg (185 lb 3.2 oz)              Today, you had the following     No orders found for display       Primary Care Provider Office Phone # Fax #    Thomas Berry [phone]       [facility] [address] [place] [address]        Thank you!     Thank you for choosing [facility] AND [facility]ENTER  for your care. Our goal is always to provide you with excellent care. Hearing back from our patients is one way we can continue to improve our services. Please take a few minutes to complete the written survey that you may receive in the mail after your visit with us. Thank you!             Your Updated Medication List - Protect others around you: Learn how to safely use, store and throw away your medicines at www.disposemymeds.org.          This list is accurate as of: 4/6/17  3:59 PM.  Always use your most recent med list.                   Brand Name Dispense Instructions for use    acetaminophen 325 MG tablet    TYLENOL    100 tablet    Take 2 tablets (650 mg) by mouth every 4 hours as needed for mild pain or fever       aspirin 81 MG tablet     100    ONE DAILY       folic acid 1 MG tablet    FOLVITE    100 tablet    Take 1 tablet (1 mg) by mouth daily Take 1 tablet by mouth daily. Will also need to continue for 21days after stopping Alimta.       ondansetron 8 MG tablet    ZOFRAN    10 tablet    Take 1 tablet (8 mg) by mouth every 8 hours as needed for nausea       prochlorperazine 10 MG tablet    COMPAZINE    30 tablet    Take 1 tablet (10 mg) by mouth every 6 hours as  needed (Nausea/Vomiting)       ZOCOR 40 MG tablet   Generic drug:  simvastatin     3 MONTHS    ONE TABLET DAILY

## 2017-04-06 NOTE — PROGRESS NOTES
Infusion Nursing Note:  Kt Rasmussen presents today for port flush.    Patient seen by provider today: No   present during visit today: Not Applicable.    Note: N/A.    Intravenous Access:  Implanted Port.    Treatment Conditions:  Not Applicable.      Post Infusion Assessment:  Patient tolerated infusion without incident.  Site patent and intact, free from redness, edema or discomfort.  No evidence of extravasations.  Access discontinued per protocol.    Discharge Plan:   Patient declined prescription refills.  Discharge instructions reviewed with: Patient.  Patient and/or family verbalized understanding of discharge instructions and all questions answered.  Copy of AVS reviewed with patient and/or family.  Patient will return tbd for next appointment.  Patient discharged in stable condition accompanied by: self.  Departure Mode: Ambulatory.    Adore Day RN

## 2017-05-04 ENCOUNTER — INFUSION THERAPY VISIT (OUTPATIENT)
Dept: INFUSION THERAPY | Facility: CLINIC | Age: 57
End: 2017-05-04
Attending: INTERNAL MEDICINE
Payer: MEDICARE

## 2017-05-04 DIAGNOSIS — C34.90 NON-SMALL CELL CARCINOMA OF LUNG, STAGE 3, UNSPECIFIED LATERALITY (H): Primary | ICD-10-CM

## 2017-05-04 PROCEDURE — 25000128 H RX IP 250 OP 636: Performed by: INTERNAL MEDICINE

## 2017-05-04 PROCEDURE — 96523 IRRIG DRUG DELIVERY DEVICE: CPT

## 2017-05-04 RX ORDER — HEPARIN SODIUM (PORCINE) LOCK FLUSH IV SOLN 100 UNIT/ML 100 UNIT/ML
5 SOLUTION INTRAVENOUS ONCE
Status: COMPLETED | OUTPATIENT
Start: 2017-05-04 | End: 2017-05-04

## 2017-05-04 RX ORDER — HEPARIN SODIUM (PORCINE) LOCK FLUSH IV SOLN 100 UNIT/ML 100 UNIT/ML
5 SOLUTION INTRAVENOUS ONCE
Status: CANCELLED
Start: 2017-05-04 | End: 2017-05-04

## 2017-05-04 RX ADMIN — SODIUM CHLORIDE, PRESERVATIVE FREE 5 ML: 5 INJECTION INTRAVENOUS at 13:32

## 2017-05-04 NOTE — MR AVS SNAPSHOT
After Visit Summary   5/4/2017    Kt Rasmussen    MRN: [id number]           Patient Information     Date Of Birth          1960        Visit Information        Provider Department      5/4/2017 1:30 PM  INFUSION CHAIR 16 [facility]        Today's Diagnoses     Non-small cell carcinoma of lung, stage 3, unspecified laterality (H)    -  1       Follow-ups after your visit        Your next 10 appointments already scheduled     Jun 08, 2017  1:30 PM CDT   Level 1 with  INFUSION CHAIR 6   [facility] [place] ([facility])    [place] [address]  [facility] [address]   [phone]            Jul 11, 2017  2:00 PM CDT   CT CHEST W/O CONTRAST with SHCT1   [facility] CT [facility])    [address] [facility] [address] [phone]           Please bring any scans or X-rays taken at other hospitals, if similar tests were done. Also bring a list of your medicines, including vitamins, minerals and over-the-counter drugs. It is safest to leave personal items at home.  Be sure to tell your doctor:   If you have any allergies.   If there s any chance you are pregnant.   If you are breastfeeding.   If you have any special needs.  You do not need to do anything special to prepare.  Please wear loose clothing, such as a sweat suit or jogging clothes. Avoid snaps, zippers and other metal. We may ask you to undress and put on a hospital gown.            Jul 13, 2017  1:30 PM CDT   Return Visit with Wiliam Gonzalez MD   [facility] ([facility])    [facility]  [address]  [facility] [address]   [phone]            Jul 13, 2017  2:00 PM CDT   Level 1 with  INFUSION CHAIR 17   [facility] [place] ([facility])    [place]  [address]  [address] [place] [address] [phone]              Who to contact     If you have questions or need follow up information about today's clinic visit or your schedule please contact [facility] AND [facility] CENTER directly at [phone].  Normal or non-critical lab and imaging results will be communicated to you by MyChart, letter or phone within 4 business days after the clinic has received the results. If you do not hear from us within 7 days, please contact the clinic through MyChart or phone. If you have a critical or abnormal lab result, we will notify you by phone as soon as possible.  Submit refill requests through [employer] or call your pharmacy and they will forward the refill request to us. Please allow 3 business days for your refill to be completed.          Additional Information About Your Visit        MyChart Information     [employer] lets you send messages to your doctor, view your test results, renew your prescriptions, schedule appointments and more. To sign up, go to [email or web address] . Click on \"Log in\" on the left side of the screen, which will take you to the Welcome page. Then click on \"Sign up Now\" on the right side of the page.     You will be asked to enter the access code listed below, as well as some personal information. Please follow the directions to create your username and password.     Your access code is: [email or web address]  Expires: 2017  3:00 PM     Your access code will  in 90 days. If you need help or a new code, please call your [place] clinic or [phone].        Care EveryWhere ID     This is your Care EveryWhere ID. This could be used by other organizations to access your [place] medical records  [id number]         Blood Pressure from Last 3 Encounters:   17 112/68   17 103/74   16 106/61    Weight from Last 3 Encounters:   17 82.9 kg (182 lb 12.8 oz)   17 85.6 kg (188 lb 12.8 oz)   16 84 kg (185 lb 3.2 oz) "              Today, you had the following     No orders found for display       Primary Care Provider Office Phone # Fax #    Thomas Berry [phone]       [facility] [address] [place] [address]        Thank you!     Thank you for choosing [facility] AND [facility]  for your care. Our goal is always to provide you with excellent care. Hearing back from our patients is one way we can continue to improve our services. Please take a few minutes to complete the written survey that you may receive in the mail after your visit with us. Thank you!             Your Updated Medication List - Protect others around you: Learn how to safely use, store and throw away your medicines at www.disposemymeds.org.          This list is accurate as of: 5/4/17  1:34 PM.  Always use your most recent med list.                   Brand Name Dispense Instructions for use    acetaminophen 325 MG tablet    TYLENOL    100 tablet    Take 2 tablets (650 mg) by mouth every 4 hours as needed for mild pain or fever       aspirin 81 MG tablet     100    ONE DAILY       folic acid 1 MG tablet    FOLVITE    100 tablet    Take 1 tablet (1 mg) by mouth daily Take 1 tablet by mouth daily. Will also need to continue for 21days after stopping Alimta.       ondansetron 8 MG tablet    ZOFRAN    10 tablet    Take 1 tablet (8 mg) by mouth every 8 hours as needed for nausea       prochlorperazine 10 MG tablet    COMPAZINE    30 tablet    Take 1 tablet (10 mg) by mouth every 6 hours as needed (Nausea/Vomiting)       ZOCOR 40 MG tablet   Generic drug:  simvastatin     3 MONTHS    ONE TABLET DAILY

## 2017-05-04 NOTE — PROGRESS NOTES
Infusion Nursing Note:  Kt Rasmussen presents today for port flush.    Patient seen by provider today: No   present during visit today: Not Applicable.    Note: N/A.    Intravenous Access:  Implanted Port.    Treatment Conditions:  Not Applicable.      Post Infusion Assessment:  Site patent and intact, free from redness, edema or discomfort.  No evidence of extravasations.  Access discontinued per protocol.    Discharge Plan:   Patient discharged in stable condition accompanied by: friend.  Departure Mode: Ambulatory.    Filipe Carr RN

## 2017-05-24 ENCOUNTER — TELEPHONE (OUTPATIENT)
Dept: ONCOLOGY | Facility: CLINIC | Age: 57
End: 2017-05-24

## 2017-06-08 ENCOUNTER — INFUSION THERAPY VISIT (OUTPATIENT)
Dept: INFUSION THERAPY | Facility: CLINIC | Age: 57
End: 2017-06-08
Attending: INTERNAL MEDICINE
Payer: MEDICARE

## 2017-06-08 DIAGNOSIS — C34.90 NON-SMALL CELL CARCINOMA OF LUNG, STAGE 3, UNSPECIFIED LATERALITY (H): Primary | ICD-10-CM

## 2017-06-08 PROCEDURE — 25000128 H RX IP 250 OP 636: Performed by: INTERNAL MEDICINE

## 2017-06-08 PROCEDURE — 96523 IRRIG DRUG DELIVERY DEVICE: CPT

## 2017-06-08 RX ORDER — HEPARIN SODIUM (PORCINE) LOCK FLUSH IV SOLN 100 UNIT/ML 100 UNIT/ML
5 SOLUTION INTRAVENOUS ONCE
Status: CANCELLED
Start: 2017-06-08 | End: 2017-06-08

## 2017-06-08 RX ORDER — HEPARIN SODIUM (PORCINE) LOCK FLUSH IV SOLN 100 UNIT/ML 100 UNIT/ML
5 SOLUTION INTRAVENOUS ONCE
Status: COMPLETED | OUTPATIENT
Start: 2017-06-08 | End: 2017-06-08

## 2017-06-08 RX ADMIN — SODIUM CHLORIDE, PRESERVATIVE FREE 5 ML: 5 INJECTION INTRAVENOUS at 13:45

## 2017-06-08 NOTE — MR AVS SNAPSHOT
After Visit Summary   6/8/2017    Kt Rasmussen    MRN: [id number]           Patient Information     Date Of Birth          1960        Visit Information        Provider Department      6/8/2017 1:30 PM  INFUSION CHAIR 6 [facility]        Today's Diagnoses     Non-small cell carcinoma of lung, stage 3, unspecified laterality (H)    -  1       Follow-ups after your visit        Your next 10 appointments already scheduled     Jul 11, 2017  2:00 PM CDT   CT CHEST W/O CONTRAST with SHCT1   [facility] CT ([facility])    [address] [facility] [address]   [phone]           Please bring any scans or X-rays taken at other hospitals, if similar tests were done. Also bring a list of your medicines, including vitamins, minerals and over-the-counter drugs. It is safest to leave personal items at home.  Be sure to tell your doctor:   If you have any allergies.   If there s any chance you are pregnant.   If you are breastfeeding.   If you have any special needs.  You do not need to do anything special to prepare.  Please wear loose clothing, such as a sweat suit or jogging clothes. Avoid snaps, zippers and other metal. We may ask you to undress and put on a hospital gown.            Jul 13, 2017  1:30 PM CDT   Return Visit with Wiliam Gonzalez MD   [facility] ([facility])    [facility]  [address]  [facility] [address]   [phone]            Jul 13, 2017  2:00 PM CDT   Level 1 with  INFUSION CHAIR 17   [facility] ([facility])    [facility]  [address]  [facility] [address]   [phone]              Who to contact     If you have questions or need follow up information about today's clinic visit or your schedule please contact [facility] directly at  "[phone].  Normal or non-critical lab and imaging results will be communicated to you by MyChart, letter or phone within 4 business days after the clinic has received the results. If you do not hear from us within 7 days, please contact the clinic through [employer] or phone. If you have a critical or abnormal lab result, we will notify you by phone as soon as possible.  Submit refill requests through [employer] or call your pharmacy and they will forward the refill request to us. Please allow 3 business days for your refill to be completed.          Additional Information About Your Visit        [employer] Information     [employer] lets you send messages to your doctor, view your test results, renew your prescriptions, schedule appointments and more. To sign up, go to [email or web address] . Click on \"Log in\" on the left side of the screen, which will take you to the Welcome page. Then click on \"Sign up Now\" on the right side of the page.     You will be asked to enter the access code listed below, as well as some personal information. Please follow the directions to create your username and password.     Your access code is: [email or web address]  Expires: 2017  3:00 PM     Your access code will  in 90 days. If you need help or a new code, please call your [place] clinic or [phone].        Care EveryWhere ID     This is your Care EveryWhere ID. This could be used by other organizations to access your [place] medical records  [id number]         Blood Pressure from Last 3 Encounters:   17 112/68   17 103/74   16 106/61    Weight from Last 3 Encounters:   17 82.9 kg (182 lb 12.8 oz)   17 85.6 kg (188 lb 12.8 oz)   16 84 kg (185 lb 3.2 oz)              Today, you had the following     No orders found for display       Primary Care Provider Office Phone # Fax #    Thomas WEAVER Jamal [phone]       [facility] [address] [place] " [address]        Thank you!     Thank you for choosing [facility] AND [facility] CENTER  for your care. Our goal is always to provide you with excellent care. Hearing back from our patients is one way we can continue to improve our services. Please take a few minutes to complete the written survey that you may receive in the mail after your visit with us. Thank you!             Your Updated Medication List - Protect others around you: Learn how to safely use, store and throw away your medicines at www.disposemymeds.org.          This list is accurate as of: 6/8/17  1:46 PM.  Always use your most recent med list.                   Brand Name Dispense Instructions for use    acetaminophen 325 MG tablet    TYLENOL    100 tablet    Take 2 tablets (650 mg) by mouth every 4 hours as needed for mild pain or fever       aspirin 81 MG tablet     100    ONE DAILY       folic acid 1 MG tablet    FOLVITE    100 tablet    Take 1 tablet (1 mg) by mouth daily Take 1 tablet by mouth daily. Will also need to continue for 21days after stopping Alimta.       ondansetron 8 MG tablet    ZOFRAN    10 tablet    Take 1 tablet (8 mg) by mouth every 8 hours as needed for nausea       prochlorperazine 10 MG tablet    COMPAZINE    30 tablet    Take 1 tablet (10 mg) by mouth every 6 hours as needed (Nausea/Vomiting)       ZOCOR 40 MG tablet   Generic drug:  simvastatin     3 MONTHS    ONE TABLET DAILY

## 2017-06-08 NOTE — PROGRESS NOTES
Infusion Nursing Note:  Kt Rasmussen presents today for port flush.    Patient seen by provider today: No   present during visit today: Not Applicable.    Note: N/A.    Intravenous Access:  Implanted Port.    Treatment Conditions:  Not Applicable.      Post Infusion Assessment:  Patient tolerated port flush without incident.  Blood return noted pre and post infusion.  Site patent and intact, free from redness, edema or discomfort.  No evidence of extravasations.  Access discontinued per protocol.    Discharge Plan:   Patient declined prescription refills.  Discharge instructions reviewed with: Patient.  Patient and/or family verbalized understanding of discharge instructions and all questions answered.  Copy of AVS reviewed with patient and/or family.  Patient will return July for next appointment.  Patient discharged in stable condition accompanied by: self.  Departure Mode: Ambulatory.    Adore Day RN

## 2017-07-11 ENCOUNTER — HOSPITAL ENCOUNTER (OUTPATIENT)
Dept: CT IMAGING | Facility: CLINIC | Age: 57
Discharge: HOME | End: 2017-07-11
Attending: INTERNAL MEDICINE | Admitting: INTERNAL MEDICINE
Payer: MEDICARE

## 2017-07-11 DIAGNOSIS — C34.90 NON-SMALL CELL CARCINOMA OF LUNG, STAGE 3, UNSPECIFIED LATERALITY (H): ICD-10-CM

## 2017-07-11 PROCEDURE — 71250 CT THORAX DX C-: CPT

## 2017-07-13 ENCOUNTER — INFUSION THERAPY VISIT (OUTPATIENT)
Dept: INFUSION THERAPY | Facility: CLINIC | Age: 57
End: 2017-07-13
Attending: INTERNAL MEDICINE
Payer: MEDICARE

## 2017-07-13 ENCOUNTER — ONCOLOGY VISIT (OUTPATIENT)
Dept: ONCOLOGY | Facility: CLINIC | Age: 57
End: 2017-07-13
Attending: INTERNAL MEDICINE
Payer: MEDICARE

## 2017-07-13 VITALS
WEIGHT: 187 LBS | DIASTOLIC BLOOD PRESSURE: 77 MMHG | OXYGEN SATURATION: 100 % | HEART RATE: 82 BPM | SYSTOLIC BLOOD PRESSURE: 128 MMHG | RESPIRATION RATE: 18 BRPM | TEMPERATURE: 98.3 F | BODY MASS INDEX: 23.5 KG/M2

## 2017-07-13 DIAGNOSIS — C34.90 NON-SMALL CELL CARCINOMA OF LUNG, STAGE 3, UNSPECIFIED LATERALITY (H): Primary | ICD-10-CM

## 2017-07-13 PROCEDURE — 25000128 H RX IP 250 OP 636: Performed by: INTERNAL MEDICINE

## 2017-07-13 PROCEDURE — 99211 OFF/OP EST MAY X REQ PHY/QHP: CPT

## 2017-07-13 PROCEDURE — 99213 OFFICE O/P EST LOW 20 MIN: CPT | Performed by: INTERNAL MEDICINE

## 2017-07-13 RX ORDER — HEPARIN SODIUM (PORCINE) LOCK FLUSH IV SOLN 100 UNIT/ML 100 UNIT/ML
5 SOLUTION INTRAVENOUS ONCE
Status: CANCELLED
Start: 2017-07-13 | End: 2017-07-13

## 2017-07-13 RX ORDER — HEPARIN SODIUM (PORCINE) LOCK FLUSH IV SOLN 100 UNIT/ML 100 UNIT/ML
5 SOLUTION INTRAVENOUS ONCE
Status: COMPLETED | OUTPATIENT
Start: 2017-07-13 | End: 2017-07-13

## 2017-07-13 RX ADMIN — SODIUM CHLORIDE, PRESERVATIVE FREE 5 ML: 5 INJECTION INTRAVENOUS at 13:55

## 2017-07-13 ASSESSMENT — PAIN SCALES - GENERAL: PAINLEVEL: NO PAIN (0)

## 2017-07-13 NOTE — MR AVS SNAPSHOT
After Visit Summary   7/13/2017    Kt Rasmussen    MRN: [id number]           Patient Information     Date Of Birth          1960        Visit Information        Provider Department      7/13/2017 1:30 PM Wiliam Gonzalez MD [facility]        Today's Diagnoses     Non-small cell carcinoma of lung, stage 3, unspecified laterality (H)    -  1      Care Instructions    1.  RTC MD 4 months  2.  CT scan chest before next visit.   3.  Schedule port removal          Follow-ups after your visit        Your next 10 appointments already scheduled     Jul 26, 2017 10:00 AM CDT   IR PORT REMOVAL RIGHT with SHIR1   [facility] Interventional Radiology ([facility])    [address] [place] [id number] [phone]           1. Your doctor will need to do a history and physical within 7 days before this procedure. 2. Your doctor decide will which medications should not be taken the morning of the exam. 3. Laboratory tests are to be obtained by your doctor prior to the exam (Basic Metabolic Panel, CBCP, PTT and INR) (No labs needed if you are having a tunneled catheter exchange or removal) 4. If you have allergies to x-ray contrast or iodine, contact your doctor or a Radiology nurse prior to the exam day for instructions. 5. Someone will need to drive you to and from the hospital. 6. If you are or may be pregnant, contact your doctor or a Radiology nurse prior to the day of the exam. 7. If you have diabetes, check with your doctor or a Radiology nurse to see if your insulin needs to be adjusted for the exam. 8. If you are taking a medication called Glucophage or Glucovance; these medications need to be held the day of the exam and for approximately 48 hours following. A blood sample must be drawn so your creatinine level can be checked before resuming this medication. 9. If you are taking Coumadin (to thin you blood) please contact your doctor or a Radiology nurse at  least 3 days before the exam for special instructions. 10. You should not have received contrast within 48 hours of this exam. 11. The day before your exam you may eat your regular diet and are encouraged to drink at least 2 quarts of clear liquids. Drink no alcoholic beverages for 24 hours prior to the exam. 12. If you have a colostomy you will need to irrigate it with tap water at 8PM the evening before and again at 6AM the morning of the exam. 13. Do not smoke for 24 hours prior to the procedure. 14. Birth to 4 years: - Breast feeding must be stopped 4 hours prior to exam - Solid food or formula must be stopped 6 hours prior to exam - Tube feedings must be stopped 6 hours prior to exam 15. 4-10 years old: - Nothing to eat or drink 6 hours prior to exam 16. 10+ years old: - Nothing to eat or drink 8 hours prior to exam 17. The morning of the exam you may brush your teeth and take medications as directed with a sip of water. 18. When discharged, you cannot drive until morning, and an adult must be with you until then. You should stay in the [facility] overnight. 19. Bring a list of all drugs you are taking; include supplements and over-the-counter medications. Wear comfortable clothes and leave your valuables at home.            Nov 08, 2017 12:45 PM CST   CT CHEST W/O CONTRAST with SHCT1   [facility] CT ([facility])    [address] [place] [address] [phone]           Please bring any scans or X-rays taken at other hospitals, if similar tests were done. Also bring a list of your medicines, including vitamins, minerals and over-the-counter drugs. It is safest to leave personal items at home.  Be sure to tell your doctor:   If you have any allergies.   If there s any chance you are pregnant.   If you are breastfeeding.   If you have any special needs.  You do not need to do anything special to prepare.  Please wear loose clothing, such as a sweat suit or jogging  "clothes. Avoid snaps, zippers and other metal. We may ask you to undress and put on a hospital gown.            Nov 15, 2017  1:30 PM CST   Return Visit with Wiliam Gonzalez MD   [facility] ([facility])    [facility]  [address]  [place] [address]   [phone]              Future tests that were ordered for you today     Open Future Orders        Priority Expected Expires Ordered    CT Chest w/o contrast Routine  2018    IR Port Removal Right Routine  2018            Who to contact     If you have questions or need follow up information about today's clinic visit or your schedule please contact [facility] directly at [phone].  Normal or non-critical lab and imaging results will be communicated to you by AIFOTEChart, letter or phone within 4 business days after the clinic has received the results. If you do not hear from us within 7 days, please contact the clinic through AIFOTEChart or phone. If you have a critical or abnormal lab result, we will notify you by phone as soon as possible.  Submit refill requests through MOGL or call your pharmacy and they will forward the refill request to us. Please allow 3 business days for your refill to be completed.          Additional Information About Your Visit        MOGL Information     MOGL lets you send messages to your doctor, view your test results, renew your prescriptions, schedule appointments and more. To sign up, go to [email or web address] . Click on \"Log in\" on the left side of the screen, which will take you to the Welcome page. Then click on \"Sign up Now\" on the right side of the page.     You will be asked to enter the access code listed below, as well as some personal information. Please follow the directions to create your username and password.     Your access code is: [email or web address]  Expires: 10/11/2017  2:01 PM     Your access code will  " in 90 days. If you need help or a new code, please call your [facility] clinic or [phone].        Care EveryWhere ID     This is your Care EveryWhere ID. This could be used by other organizations to access your [facility] medical records  [id number]        Your Vitals Were     Pulse Temperature Respirations Pulse Oximetry BMI (Body Mass Index)       82 98.3  F (36.8  C) (Oral) 18 100% 23.5 kg/m2        Blood Pressure from Last 3 Encounters:   07/13/17 128/77   04/06/17 112/68   01/05/17 103/74    Weight from Last 3 Encounters:   07/13/17 84.8 kg (187 lb)   04/06/17 82.9 kg (182 lb 12.8 oz)   01/05/17 85.6 kg (188 lb 12.8 oz)               Primary Care Provider Office Phone # Fax #    Thomas Berry [phone]       [facility] [address] [place] [address]        Equal Access to Services     KATHRYN ADAN : Hadii marco ku hadasho Soomaali, waaxda luqadaha, qaybta kaalmada adeegyada, claudia pedroza . So [facility] [phone].    ATENCIÓN: Si habla español, tiene a jung disposición servicios gratuitos de asistencia lingüística. Llame al [phone].    We comply with applicable federal civil rights laws and [place] laws. We do not discriminate on the basis of race, color, national origin, age, disability sex, sexual orientation or gender identity.            Thank you!     Thank you for choosing [facility]  for your care. Our goal is always to provide you with excellent care. Hearing back from our patients is one way we can continue to improve our services. Please take a few minutes to complete the written survey that you may receive in the mail after your visit with us. Thank you!             Your Updated Medication List - Protect others around you: Learn how to safely use, store and throw away your medicines at www.disposemymeds.org.          This list is accurate as of: 7/13/17  2:12 PM.  Always use your most recent med list.                    Brand Name Dispense Instructions for use Diagnosis    acetaminophen 325 MG tablet    TYLENOL    100 tablet    Take 2 tablets (650 mg) by mouth every 4 hours as needed for mild pain or fever    Acute post-operative pain       aspirin 81 MG tablet     100    ONE DAILY    Unspecified cardiovascular disease, Abdominal pain, other specified site       folic acid 1 MG tablet    FOLVITE    100 tablet    Take 1 tablet (1 mg) by mouth daily Take 1 tablet by mouth daily. Will also need to continue for 21days after stopping Alimta.    Non-small cell carcinoma of lung, stage 3, unspecified laterality (H)       ondansetron 8 MG tablet    ZOFRAN    10 tablet    Take 1 tablet (8 mg) by mouth every 8 hours as needed for nausea    Non-small cell carcinoma of lung, stage 3, unspecified laterality (H)       prochlorperazine 10 MG tablet    COMPAZINE    30 tablet    Take 1 tablet (10 mg) by mouth every 6 hours as needed (Nausea/Vomiting)    Non-small cell carcinoma of lung, stage 3, unspecified laterality (H)       ZOCOR 40 MG tablet   Generic drug:  simvastatin     3 MONTHS    ONE TABLET DAILY    Unspecified cardiovascular disease

## 2017-07-13 NOTE — MR AVS SNAPSHOT
"              After Visit Summary   7/13/2017    Kt Rasmussen    MRN: [id number]           Patient Information     Date Of Birth          1960        Visit Information        Provider Department      7/13/2017 2:00 PM  INFUSION CHAIR [facility]        Today's Diagnoses     Non-small cell carcinoma of lung, stage 3, unspecified laterality (H)    -  1       Follow-ups after your visit        Future tests that were ordered for you today     Open Future Orders        Priority Expected Expires Ordered    CT Chest w/o contrast Routine  7/13/2018 7/13/2017    IR Port Removal Right Routine  7/13/2018 7/13/2017            Who to contact     If you have questions or need follow up information about today's clinic visit or your schedule please contact [facility] directly at [phone].  Normal or non-critical lab and imaging results will be communicated to you by Detectenthart, letter or phone within 4 business days after the clinic has received the results. If you do not hear from us within 7 days, please contact the clinic through Detectenthart or phone. If you have a critical or abnormal lab result, we will notify you by phone as soon as possible.  Submit refill requests through Shots or call your pharmacy and they will forward the refill request to us. Please allow 3 business days for your refill to be completed.          Additional Information About Your Visit        MyChart Information     Shots lets you send messages to your doctor, view your test results, renew your prescriptions, schedule appointments and more. To sign up, go to [email or web address] . Click on \"Log in\" on the left side of the screen, which will take you to the Welcome page. Then click on \"Sign up Now\" on the right side of the page.     You will be asked to enter the access code listed below, as well as some personal information. Please follow the directions to create your " username and password.     Your access code is: [email or web address]  Expires: 10/11/2017  2:01 PM     Your access code will  in 90 days. If you need help or a new code, please call your [facility]] or [phone].        Care EveryWhere ID     This is your Care EveryWhere ID. This could be used by other organizations to access your [facility] medical records  [id number]         Blood Pressure from Last 3 Encounters:   17 128/77   17 112/68   17 103/74    Weight from Last 3 Encounters:   17 84.8 kg (187 lb)   17 82.9 kg (182 lb 12.8 oz)   17 85.6 kg (188 lb 12.8 oz)              Today, you had the following     No orders found for display       Primary Care Provider Office Phone # Fax #    Thomas Berry [phone]       [facility] [address] [place] [address]        Equal Access to Services     [facility]: Hadii aad ku hadasho Soomaali, waaxda luqadaha, qaybta kaalmada adeegyaranulfo, claudia pedroza . So [facility] [phone].    ATENCIÓN: Si habla español, tiene a jung disposición servicios gratuitos de asistencia lingüística. Llame al [phone].    We comply with applicable federal civil rights laws and [place] laws. We do not discriminate on the basis of race, color, national origin, age, disability sex, sexual orientation or gender identity.            Thank you!     Thank you for choosing [facility]  for your care. Our goal is always to provide you with excellent care. Hearing back from our patients is one way we can continue to improve our services. Please take a few minutes to complete the written survey that you may receive in the mail after your visit with us. Thank you!             Your Updated Medication List - Protect others around you: Learn how to safely use, store and throw away your medicines at www.disposemymeds.org.          This list is accurate as of: 17   2:01 PM.  Always use your most recent med list.                   Brand Name Dispense Instructions for use Diagnosis    acetaminophen 325 MG tablet    TYLENOL    100 tablet    Take 2 tablets (650 mg) by mouth every 4 hours as needed for mild pain or fever    Acute post-operative pain       aspirin 81 MG tablet     100    ONE DAILY    Unspecified cardiovascular disease, Abdominal pain, other specified site       folic acid 1 MG tablet    FOLVITE    100 tablet    Take 1 tablet (1 mg) by mouth daily Take 1 tablet by mouth daily. Will also need to continue for 21days after stopping Alimta.    Non-small cell carcinoma of lung, stage 3, unspecified laterality (H)       ondansetron 8 MG tablet    ZOFRAN    10 tablet    Take 1 tablet (8 mg) by mouth every 8 hours as needed for nausea    Non-small cell carcinoma of lung, stage 3, unspecified laterality (H)       prochlorperazine 10 MG tablet    COMPAZINE    30 tablet    Take 1 tablet (10 mg) by mouth every 6 hours as needed (Nausea/Vomiting)    Non-small cell carcinoma of lung, stage 3, unspecified laterality (H)       ZOCOR 40 MG tablet   Generic drug:  simvastatin     3 MONTHS    ONE TABLET DAILY    Unspecified cardiovascular disease

## 2017-07-13 NOTE — PROGRESS NOTES
[facility]  HEMATOLOGY ONCOLOGY    ONCOLOGY FOLLOWUP NOTE      DIAGNOSIS:     1.  T4 NX M0 adenocarcinoma of the lung.   2.  History of stroke and alcohol abuse.      TREATMENT:  6/9/16 Concurrent chemoradiation with weekly carbo, Taxol. Finished radiation 7/30/16. Followed by 2 cycles of consolidation Carbo/Taxol.      SUBJECTIVE:  The patient was seen as a followup today. He has been feeling well. No new complaints today.     REVIEW OF SYSTEMS:  A complete review of systems was performed and found to be negative other than pertinent positives mentioned in history of present illness.     Past medical, social histories reviewed.    Meds- Reviewed.     PHYSICAL EXAMINATION:   VITAL SIGNS:/77 (BP Location: Left arm, Patient Position: Chair, Cuff Size: Adult Regular)  Pulse 82  Temp 98.3  F (36.8  C) (Oral)  Resp 18  Wt 84.8 kg (187 lb)  SpO2 100%  BMI 23.5 kg/m2  CONSTITUTIONAL: Sitting comfortably.   HEENT: Pupils are equal. Oropharynx is clear.   NECK: No cervical or supraclavicular lymphadenopathy.   RESPIRATORY: Clear bilaterally.   CARD/VASC: S1, S2, regular.   GI: Soft, nontender, nondistended, no hepatosplenomegaly.   MUSKULOSKELETAL: Warm, well perfused.   NEUROLOGIC: Alert, awake.   INTEGUMENT: No rash.   LYMPHATICS: No edema.   PSYCH: Mood and affect was normal.     LABORATORY DATA AND IMAGING REVIEWED DURING THIS VISIT:  Recent Labs   Lab Test  11/03/16   1310  10/13/16   0845   08/17/16   1300   05/04/16 2027   NA  141  142   < >  143   < >   --    POTASSIUM  3.9  3.7   < >  3.8   < >  3.9   CHLORIDE  107  110*   < >  108   < >   --    CO2  27  26   < >  28   < >   --    ANIONGAP  7  6   < >  7   < >   --    BUN  13  13   < >  15   < >   --    CR  0.66  0.62*   < >  0.70   < >   --    GLC  96  106*   < >  117*   < >   --    BEVERLY  8.5  8.6   < >  8.9   < >   --    MAG   --   1.9   --   1.9   < >  2.3   PHOS   --    --    --    --    --   2.0*    < > = values in this  interval not displayed.     Recent Labs   Lab Test  11/03/16   1310  10/13/16   0845  09/22/16   1110   WBC  4.2  3.9*  4.1   HGB  9.5*  10.3*  10.8*   PLT  99*  106*  126*   MCV  91  91  90   NEUTROPHIL  71.0  69.2  72.5     Recent Labs   Lab Test  11/03/16   1310  10/13/16   0845  09/22/16   1110   BILITOTAL  0.3  0.4  0.6   ALKPHOS  87  84  88   ALT  36  24  20   AST  23  16  14   ALBUMIN  3.5  3.3*  3.6         Results for orders placed or performed during the hospital encounter of 07/11/17   CT Chest w/o contrast    Narrative    CT CHEST WITHOUT CONTRAST  7/11/2017 2:02 PM     HISTORY:  Follow up lung cancer.    TECHNIQUE:  Noncontrast images of the chest. Radiation dose for this  scan was reduced using automated exposure control, adjustment of the  mA and/or kV according to patient size, or iterative reconstruction  technique.    COMPARISON: 12/29/2016, 4/14/2017    FINDINGS: There is a right-sided Port-A-Cath, the tip of which  terminates in the proximal superior vena cava. Fibrotic changes are  noted in the medial right lung and to a lesser extent in the medial  left lung. These are similar to slightly improved compared to the  prior study. No residual soft tissue mass. Subsolid nodular opacity in  the left upper lobe identified on the prior study is no longer  present, likely indicating a resolved infectious abnormality. No new  pulmonary nodule or mass. No evidence of thoracic adenopathy. No  pleural or pericardial effusion. Aneurysmal dilation of the ascending  aorta measures 4.1 cm, unchanged. Visualized upper abdomen  unremarkable other than mildly dilated stomach.      Impression    IMPRESSION: Postradiation changes in the medial lungs similar to  prior. Subsolid nodular opacity in the left upper lobe on the prior  study has resolved.    DIANNE ARGUETA MD       ECOG PS: 2     ASSESSMENT:  Kt Rasmussen is a 55-year-old gentleman who initially presented with near obstructing large mass coming off the  cheikh and likely the posterior wall on a bronchoscopy performed 05/18/2016.  He had a followup rigid bronchoscopy and corning out of endotracheal 05/05/2016.  Pathology result was consistent with invasive squamous cell carcinoma.  It was moderately differentiated and focally keratinizing.  He had significant bleeding during the procedure.  Tumor was completely debulked in both main stem bronchi and distal trachea.  PET/CT scan 05/23/2016, showed evidence of residual tumor decreased endobronchial mass.  There was indeterminate, mildly hypermetabolic mesentery with prominent lymph nodes and area of enhancement of the right hepatic lobe.  His case was discussed at the multidisciplinary tumor conference and was recommended to proceed with concurrent chemoradiation. He completed concurrent chemoradiation.  - CT scan 7/11/17 results were reviewed with the patient- I reviewed the images myself. Stable findings.  - We will schedule for port removal.     PLAN:   1.  RTC MD 4 months  2.  CT scan chest before next visit.   3.  Schedule port removal    OTIS GARY MD    7/13/2017      cc: Oneil Hayden MD

## 2017-07-13 NOTE — PROGRESS NOTES
"Oncology Rooming Note    July 13, 2017 1:23 PM   Kt Rasmussen is a 56 year old male who presents for:    Chief Complaint   Patient presents with     Oncology Clinic Visit     Initial Vitals: /77 (BP Location: Left arm, Patient Position: Chair, Cuff Size: Adult Regular)  Pulse 82  Temp 98.3  F (36.8  C) (Oral)  Resp 18  Wt 84.8 kg (187 lb)  SpO2 100%  BMI 23.5 kg/m2 Estimated body mass index is 23.5 kg/(m^2) as calculated from the following:    Height as of 9/22/16: 1.9 m (6' 2.8\").    Weight as of this encounter: 84.8 kg (187 lb). Body surface area is 2.12 meters squared.  No Pain (0) Comment: Data Unavailable   No LMP for male patient.  Allergies reviewed: Yes  Medications reviewed: Yes    Medications: Medication refills not needed today.  Pharmacy name entered into EPIC:    [facility] [place], [place] - [address] [facility] [place], [place] - [address]    Clinical concerns: None     5 minutes for nursing intake (face to face time)     Holli Stokes CMA              "

## 2017-07-13 NOTE — PATIENT INSTRUCTIONS
1.  RTC MD 4 months   Done - Ruby  2.  CT scan chest before next visit.    Done - Ruby  3.  Schedule port removal   Done - Ruby    AVS given to patient - Ruby

## 2017-07-13 NOTE — PROGRESS NOTES
Infusion Nursing Note:  Kt Rasmussen presents today for port flush.    Patient seen by provider today: Yes: Dr. Gonzalez   present during visit today: Not Applicable.    Note: N/A.    Intravenous Access:  Implanted Port.    Treatment Conditions:  Not Applicable.      Post Infusion Assessment:  Site patent and intact, free from redness, edema or discomfort.  No evidence of extravasations.  Access discontinued per protocol.    Discharge Plan:   Copy of AVS reviewed with patient and/or family.  Patient will return as scheduled (pt heading to  now) for next appointment.  Patient discharged in stable condition accompanied by: sister.  Departure Mode: Ambulatory with cane.    Guzman Patel RN

## 2017-07-26 ENCOUNTER — HOSPITAL ENCOUNTER (OUTPATIENT)
Facility: CLINIC | Age: 57
Discharge: HOME | End: 2017-07-26
Attending: RADIOLOGY | Admitting: RADIOLOGY
Payer: MEDICARE

## 2017-07-26 ENCOUNTER — APPOINTMENT (OUTPATIENT)
Dept: INTERVENTIONAL RADIOLOGY/VASCULAR | Facility: CLINIC | Age: 57
End: 2017-07-26
Attending: INTERNAL MEDICINE
Payer: MEDICARE

## 2017-07-26 VITALS
BODY MASS INDEX: 23 KG/M2 | DIASTOLIC BLOOD PRESSURE: 66 MMHG | WEIGHT: 185 LBS | HEIGHT: 75 IN | HEART RATE: 56 BPM | SYSTOLIC BLOOD PRESSURE: 101 MMHG | RESPIRATION RATE: 16 BRPM | OXYGEN SATURATION: 96 %

## 2017-07-26 DIAGNOSIS — C34.90 NON-SMALL CELL CARCINOMA OF LUNG, STAGE 3, UNSPECIFIED LATERALITY (H): ICD-10-CM

## 2017-07-26 LAB
APTT PPP: 35 SEC (ref 22–37)
ERYTHROCYTE [DISTWIDTH] IN BLOOD BY AUTOMATED COUNT: 14.1 % (ref 10–15)
HCT VFR BLD AUTO: 37.7 % (ref 40–53)
HGB BLD-MCNC: 12.5 G/DL (ref 13.3–17.7)
INR PPP: 0.95 (ref 0.86–1.14)
MCH RBC QN AUTO: 29.7 PG (ref 26.5–33)
MCHC RBC AUTO-ENTMCNC: 33.2 G/DL (ref 31.5–36.5)
MCV RBC AUTO: 90 FL (ref 78–100)
PLATELET # BLD AUTO: 109 10E9/L (ref 150–450)
RBC # BLD AUTO: 4.21 10E12/L (ref 4.4–5.9)
WBC # BLD AUTO: 3.6 10E9/L (ref 4–11)

## 2017-07-26 PROCEDURE — 25000128 H RX IP 250 OP 636: Performed by: NURSE PRACTITIONER

## 2017-07-26 PROCEDURE — 27210905 ZZH KIT CR7

## 2017-07-26 PROCEDURE — 40000854 ZZH STATISTIC SIMPLE TUBE INSERTION/CHARGE, PORT, CATH, FISTULOGRAM

## 2017-07-26 PROCEDURE — 25000128 H RX IP 250 OP 636: Performed by: RADIOLOGY

## 2017-07-26 PROCEDURE — 85730 THROMBOPLASTIN TIME PARTIAL: CPT | Performed by: RADIOLOGY

## 2017-07-26 PROCEDURE — 99152 MOD SED SAME PHYS/QHP 5/>YRS: CPT

## 2017-07-26 PROCEDURE — 85027 COMPLETE CBC AUTOMATED: CPT | Performed by: RADIOLOGY

## 2017-07-26 PROCEDURE — 27210808 ZZH SHEATH CR7

## 2017-07-26 PROCEDURE — 25000125 ZZHC RX 250: Performed by: NURSE PRACTITIONER

## 2017-07-26 PROCEDURE — 85610 PROTHROMBIN TIME: CPT | Performed by: RADIOLOGY

## 2017-07-26 PROCEDURE — 36415 COLL VENOUS BLD VENIPUNCTURE: CPT | Performed by: RADIOLOGY

## 2017-07-26 PROCEDURE — 25000128 H RX IP 250 OP 636

## 2017-07-26 PROCEDURE — 27211193 ZZ H WOUND GLUE CR1

## 2017-07-26 RX ORDER — FLUMAZENIL 0.1 MG/ML
0.2 INJECTION, SOLUTION INTRAVENOUS
Status: DISCONTINUED | OUTPATIENT
Start: 2017-07-26 | End: 2017-07-26 | Stop reason: HOSPADM

## 2017-07-26 RX ORDER — FENTANYL CITRATE 50 UG/ML
25-50 INJECTION, SOLUTION INTRAMUSCULAR; INTRAVENOUS EVERY 5 MIN PRN
Status: DISCONTINUED | OUTPATIENT
Start: 2017-07-26 | End: 2017-07-26 | Stop reason: HOSPADM

## 2017-07-26 RX ORDER — FENTANYL CITRATE 50 UG/ML
INJECTION, SOLUTION INTRAMUSCULAR; INTRAVENOUS
Status: COMPLETED
Start: 2017-07-26 | End: 2017-07-26

## 2017-07-26 RX ORDER — CEFAZOLIN SODIUM 2 G/100ML
2 INJECTION, SOLUTION INTRAVENOUS
Status: COMPLETED | OUTPATIENT
Start: 2017-07-26 | End: 2017-07-26

## 2017-07-26 RX ORDER — LIDOCAINE HYDROCHLORIDE 10 MG/ML
1-30 INJECTION, SOLUTION EPIDURAL; INFILTRATION; INTRACAUDAL; PERINEURAL
Status: COMPLETED | OUTPATIENT
Start: 2017-07-26 | End: 2017-07-26

## 2017-07-26 RX ORDER — ACETAMINOPHEN 325 MG/1
650-975 TABLET ORAL
Status: DISCONTINUED | OUTPATIENT
Start: 2017-07-26 | End: 2017-07-26 | Stop reason: HOSPADM

## 2017-07-26 RX ORDER — LIDOCAINE 40 MG/G
CREAM TOPICAL
Status: DISCONTINUED | OUTPATIENT
Start: 2017-07-26 | End: 2017-07-26 | Stop reason: HOSPADM

## 2017-07-26 RX ORDER — NALOXONE HYDROCHLORIDE 0.4 MG/ML
.1-.4 INJECTION, SOLUTION INTRAMUSCULAR; INTRAVENOUS; SUBCUTANEOUS
Status: DISCONTINUED | OUTPATIENT
Start: 2017-07-26 | End: 2017-07-26 | Stop reason: HOSPADM

## 2017-07-26 RX ADMIN — FENTANYL CITRATE 50 MCG: 50 INJECTION, SOLUTION INTRAMUSCULAR; INTRAVENOUS at 10:15

## 2017-07-26 RX ADMIN — MIDAZOLAM HYDROCHLORIDE 1 MG: 1 INJECTION, SOLUTION INTRAMUSCULAR; INTRAVENOUS at 10:16

## 2017-07-26 RX ADMIN — FENTANYL CITRATE 25 MCG: 50 INJECTION, SOLUTION INTRAMUSCULAR; INTRAVENOUS at 10:27

## 2017-07-26 RX ADMIN — CEFAZOLIN SODIUM 2 G: 2 INJECTION, SOLUTION INTRAVENOUS at 10:00

## 2017-07-26 RX ADMIN — LIDOCAINE HYDROCHLORIDE 80 MG: 10 INJECTION, SOLUTION EPIDURAL; INFILTRATION; INTRACAUDAL; PERINEURAL at 10:36

## 2017-07-26 NOTE — PROGRESS NOTES
Interventional Radiology Pre-Procedure Sedation Assessment   Time of Assessment: 9:49 AM    Expected Level: Moderate Sedation    Indication: Sedation is required for the following type of Procedure: Right port a catheter removal     Sedation and procedural consent: Risks, benefits and alternatives were discussed with Patient     Diagnosis: T4 NX M0 adenocarcinoma of the lung (endobronchial invasive squamous cell carcinoma)     PO Intake: Appropriately NPO for procedure    ASA Class: Class 2 - MILD SYSTEMIC DISEASE, NO ACUTE PROBLEMS, NO FUNCTIONAL LIMITATIONS.    Mallampati: Grade 1:  Soft palate, uvula, tonsillar pillars, and posterior pharyngeal wall visible    Lungs: Clear on Right, Left distant    Heart: Normal heart sounds and rate    History and physical reviewed and no updates needed. I have reviewed the lab findings, diagnostic data, medications, and the plan for sedation. I have determined this patient to be an appropriate candidate for the planned sedation and procedure and have reassessed the patient IMMEDIATELY PRIOR to sedation and procedure.    Haylee Tillman, MADHAVI CNP

## 2017-07-26 NOTE — PROGRESS NOTES
Pt admitted for port removal. Procedure explained. Discharge instructions done with pt . States understanding.

## 2017-07-26 NOTE — PROGRESS NOTES
Patient sitting on edge of bed.  Ready for discharge.  Patient offered a wheelchair ride to the front door.  Patient would like to walk out to his friend Ari and be dismissed to his car in handicap.  No further questions at this time.

## 2017-07-26 NOTE — PROGRESS NOTES
Back to room post procedure. VSS. Denies pain. Right chest drsg D/I. No bleeding/swelling at site. Declines food or drink. Discharge instructions done with pt. States understanding.

## 2017-07-26 NOTE — IP AVS SNAPSHOT
[place] [address]    [place] [address]    Phone:  [phone]                                       After Visit Summary   7/26/2017    Kt Rasmussen    MRN: [id number]           After Visit Summary Signature Page     I have received my discharge instructions, and my questions have been answered. I have discussed any challenges I see with this plan with the nurse or doctor.    ..........................................................................................................................................  Patient/Patient Representative Signature      ..........................................................................................................................................  Patient Representative Print Name and Relationship to Patient    ..................................................               ................................................  Date                                            Time    ..........................................................................................................................................  Reviewed by Signature/Title    ...................................................              ..............................................  Date                                                            Time

## 2017-07-26 NOTE — DISCHARGE INSTRUCTIONS
Port Removal Discharge Instructions     After you go home:      Have an adult stay with you for the first 6 hours    You may resume your normal diet       For 24 hours - due to the sedation you received:    Relax and take it easy    Do NOT make any important or legal decisions    Do NOT drive or operate machines at home or at work    Do NOT drink alcohol    Care of Puncture Site:      Keep the dressings on your site clean & dry for 3 days. Change it only if it gets wet or dirty.    You may shower after the dressing comes off in 3 days    Do not remove the small white strips of tape, if present. Allow them to fall off on their own.     You may cover the wound with a bandaid after the dressing is removed if needed for comfort      Activity       Avoid heavy lifting (greater than 10 pounds) or the overuse of your shoulder for 3 days    Bleeding:      If you start bleeding from the incision site in your chest or have swelling in your neck, sit down and press on the site for 5-10 minutes.     If bleeding has not stopped after 10 minutes, call your provider.        Call 911 right away if you have heavy bleeding or bleeding that does not stop.      Medicines:      You may resume all medications    Resume your Warfarin/Coumadin at your regular dose today. Follow up with your provider to have your INR rechecked    Resume your Platelet Inhibitors and Aspirin tomorrow at your regular dose    For minor pain, you may take Acetaminophen (Tylenol) or Ibuprofen (Advil)          Call the provider who ordered this procedure if:      You have swelling in your neck or over your port site    The incision area is red, swollen, hot or tender    You have chills or a fever greater than 101 F (38 C)    Any questions or concerns    Call  911 or go to the Emergency Room if you have:      Severe chest pain or trouble breathing    Bleeding that you cannot control    If you have questions call:          [facility] @  [phone]    The provider who performed your procedure was Dr Chavez

## 2017-07-26 NOTE — IP AVS SNAPSHOT
MRN:[id number]                      After Visit Summary   7/26/2017    Kt Rasmussen    MRN: [id number]           Visit Information        Department      7/26/2017  9:18 AM [facility]          Review of your medicines      UNREVIEWED medicines. Ask your doctor about these medicines        Dose / Directions    acetaminophen 325 MG tablet   Commonly known as:  TYLENOL   Used for:  Acute post-operative pain        Dose:  650 mg   Take 2 tablets (650 mg) by mouth every 4 hours as needed for mild pain or fever   Quantity:  100 tablet   Refills:  0       aspirin 81 MG tablet   Used for:  Unspecified cardiovascular disease, Abdominal pain, other specified site        ONE DAILY   Quantity:  100   Refills:  3       folic acid 1 MG tablet   Commonly known as:  FOLVITE   Used for:  Non-small cell carcinoma of lung, stage 3, unspecified laterality (H)        Dose:  1 mg   Take 1 tablet (1 mg) by mouth daily Take 1 tablet by mouth daily. Will also need to continue for 21days after stopping Alimta.   Quantity:  100 tablet   Refills:  3       ondansetron 8 MG tablet   Commonly known as:  ZOFRAN   Used for:  Non-small cell carcinoma of lung, stage 3, unspecified laterality (H)        Dose:  8 mg   Take 1 tablet (8 mg) by mouth every 8 hours as needed for nausea   Quantity:  10 tablet   Refills:  3       prochlorperazine 10 MG tablet   Commonly known as:  COMPAZINE   Used for:  Non-small cell carcinoma of lung, stage 3, unspecified laterality (H)        Dose:  10 mg   Take 1 tablet (10 mg) by mouth every 6 hours as needed (Nausea/Vomiting)   Quantity:  30 tablet   Refills:  3       ZOCOR 40 MG tablet   Used for:  Unspecified cardiovascular disease   Generic drug:  simvastatin        ONE TABLET DAILY   Quantity:  3 MONTHS   Refills:  1 YEAR                Protect others around you: Learn how to safely use, store and throw away your medicines at www.disposemymeds.org.         Follow-ups after  your visit        Your next 10 appointments already scheduled     Jul 26, 2017 10:00 AM CDT   IR PORT REMOVAL RIGHT with SHIR1   [facility]iology ([facility])    [address] [place] [address] [phone]           1. Your doctor will need to do a history and physical within 7 days before this procedure. 2. Your doctor decide will which medications should not be taken the morning of the exam. 3. Laboratory tests are to be obtained by your doctor prior to the exam (Basic Metabolic Panel, CBCP, PTT and INR) (No labs needed if you are having a tunneled catheter exchange or removal) 4. If you have allergies to x-ray contrast or iodine, contact your doctor or a Radiology nurse prior to the exam day for instructions. 5. Someone will need to drive you to and from the hospital. 6. If you are or may be pregnant, contact your doctor or a Radiology nurse prior to the day of the exam. 7. If you have diabetes, check with your doctor or a Radiology nurse to see if your insulin needs to be adjusted for the exam. 8. If you are taking a medication called Glucophage or Glucovance; these medications need to be held the day of the exam and for approximately 48 hours following. A blood sample must be drawn so your creatinine level can be checked before resuming this medication. 9. If you are taking Coumadin (to thin you blood) please contact your doctor or a Radiology nurse at least 3 days before the exam for special instructions. 10. You should not have received contrast within 48 hours of this exam. 11. The day before your exam you may eat your regular diet and are encouraged to drink at least 2 quarts of clear liquids. Drink no alcoholic beverages for 24 hours prior to the exam. 12. If you have a colostomy you will need to irrigate it with tap water at 8PM the evening before and again at 6AM the morning of the exam. 13. Do not smoke for 24 hours prior to the procedure. 14.  Birth to 4 years: - Breast feeding must be stopped 4 hours prior to exam - Solid food or formula must be stopped 6 hours prior to exam - Tube feedings must be stopped 6 hours prior to exam 15. 4-10 years old: - Nothing to eat or drink 6 hours prior to exam 16. 10+ years old: - Nothing to eat or drink 8 hours prior to exam 17. The morning of the exam you may brush your teeth and take medications as directed with a sip of water. 18. When discharged, you cannot drive until morning, and an adult must be with you until then. You should stay in the [facility] overnight. 19. Bring a list of all drugs you are taking; include supplements and over-the-counter medications. Wear comfortable clothes and leave your valuables at home.            Nov 08, 2017 12:45 PM CST   CT CHEST W/O CONTRAST with SHCT1   [facility] CT ([facility])    [address] [facility] [address] [phone]           Please bring any scans or X-rays taken at other hospitals, if similar tests were done. Also bring a list of your medicines, including vitamins, minerals and over-the-counter drugs. It is safest to leave personal items at home.  Be sure to tell your doctor:   If you have any allergies.   If there s any chance you are pregnant.   If you are breastfeeding.   If you have any special needs.  You do not need to do anything special to prepare.  Please wear loose clothing, such as a sweat suit or jogging clothes. Avoid snaps, zippers and other metal. We may ask you to undress and put on a hospital gown.            Nov 15, 2017  1:30 PM CST   Return Visit with Wiliam Gonzalez MD   [facility] ([facility])    [facility]  [address]  [facility] [id number] [phone]               Care Instructions        Further instructions from your care team         Port Removal Discharge Instructions     After you go home:      Have an adult stay with you for  the first 6 hours    You may resume your normal diet       For 24 hours - due to the sedation you received:    Relax and take it easy    Do NOT make any important or legal decisions    Do NOT drive or operate machines at home or at work    Do NOT drink alcohol    Care of Puncture Site:      Keep the dressings on your site clean & dry for 3 days. Change it only if it gets wet or dirty.    You may shower after the dressing comes off in 3 days    Do not remove the small white strips of tape, if present. Allow them to fall off on their own.     You may cover the wound with a bandaid after the dressing is removed if needed for comfort      Activity       Avoid heavy lifting (greater than 10 pounds) or the overuse of your shoulder for 3 days    Bleeding:      If you start bleeding from the incision site in your chest or have swelling in your neck, sit down and press on the site for 5-10 minutes.     If bleeding has not stopped after 10 minutes, call your provider.        Call 911 right away if you have heavy bleeding or bleeding that does not stop.      Medicines:      You may resume all medications    Resume your Warfarin/Coumadin at your regular dose today. Follow up with your provider to have your INR rechecked    Resume your Platelet Inhibitors and Aspirin tomorrow at your regular dose    For minor pain, you may take Acetaminophen (Tylenol) or Ibuprofen (Advil)          Call the provider who ordered this procedure if:      You have swelling in your neck or over your port site    The incision area is red, swollen, hot or tender    You have chills or a fever greater than 101 F (38 C)    Any questions or concerns    Call  911 or go to the Emergency Room if you have:      Severe chest pain or trouble breathing    Bleeding that you cannot control    If you have questions call:          [facility] Radiology Dept @ [phone]    The provider who performed your procedure was Dr Chavez     Additional Information  "About Your Visit        [employer] Information     [employer] lets you send messages to your doctor, view your test results, renew your prescriptions, schedule appointments and more. To sign up, go to [email or web address] . Click on \"Log in\" on the left side of the screen, which will take you to the Welcome page. Then click on \"Sign up Now\" on the right side of the page.     You will be asked to enter the access code listed below, as well as some personal information. Please follow the directions to create your username and password.     Your access code is: [email or web address]  Expires: 10/11/2017  2:01 PM     Your access code will  in 90 days. If you need help or a new code, please call your [facility] clinic or [phone].        Care EveryWhere ID     This is your Care EveryWhere ID. This could be used by other organizations to access your [facility] medical records  [id number]        Your Vitals Were     Blood Pressure Pulse Respirations Height Weight Pulse Oximetry    123/79 (BP Location: Left arm) 65 18 1.905 m (6' 3\") 83.9 kg (185 lb) 100%    BMI (Body Mass Index)                   23.12 kg/m2            Primary Care Provider Office Phone # Fax #    Thomas Berry [phone]      Equal Access to Services     [facility]: Hadii aad ku hadasho Soomaali, waaxda luqadaha, qaybta kaalmada adeegyada, claudia pedroza . So [facility] [phone].    ATENCIÓN: Si habla español, tiene a jung disposición servicios gratuitos de asistencia lingüística. Llame al [phone].    We comply with applicable federal civil rights laws and [place] laws. We do not discriminate on the basis of race, color, national origin, age, disability sex, sexual orientation or gender identity.            Thank you!     Thank you for choosing [facility] for your care. Our goal is always to provide you with excellent care. Hearing back from our patients is one way we can continue to improve our services. Please " take a few minutes to complete the written survey that you may receive in the mail after you visit with us. Thank you!             Medication List: This is a list of all your medications and when to take them. Check marks below indicate your daily home schedule. Keep this list as a reference.      Medications           Morning Afternoon Evening Bedtime As Needed    acetaminophen 325 MG tablet   Commonly known as:  TYLENOL   Take 2 tablets (650 mg) by mouth every 4 hours as needed for mild pain or fever                                aspirin 81 MG tablet   ONE DAILY                                folic acid 1 MG tablet   Commonly known as:  FOLVITE   Take 1 tablet (1 mg) by mouth daily Take 1 tablet by mouth daily. Will also need to continue for 21days after stopping Alimta.                                ondansetron 8 MG tablet   Commonly known as:  ZOFRAN   Take 1 tablet (8 mg) by mouth every 8 hours as needed for nausea                                prochlorperazine 10 MG tablet   Commonly known as:  COMPAZINE   Take 1 tablet (10 mg) by mouth every 6 hours as needed (Nausea/Vomiting)                                ZOCOR 40 MG tablet   ONE TABLET DAILY   Generic drug:  simvastatin

## 2017-11-08 ENCOUNTER — HOSPITAL ENCOUNTER (OUTPATIENT)
Dept: CT IMAGING | Facility: CLINIC | Age: 57
Discharge: HOME | End: 2017-11-08
Attending: INTERNAL MEDICINE | Admitting: INTERNAL MEDICINE
Payer: MEDICARE

## 2017-11-08 DIAGNOSIS — C34.90 NON-SMALL CELL CARCINOMA OF LUNG, STAGE 3, UNSPECIFIED LATERALITY (H): ICD-10-CM

## 2017-11-08 PROCEDURE — 71250 CT THORAX DX C-: CPT

## 2017-11-09 ENCOUNTER — ONCOLOGY VISIT (OUTPATIENT)
Dept: ONCOLOGY | Facility: CLINIC | Age: 57
End: 2017-11-09
Attending: INTERNAL MEDICINE
Payer: MEDICARE

## 2017-11-09 VITALS
BODY MASS INDEX: 24.25 KG/M2 | SYSTOLIC BLOOD PRESSURE: 113 MMHG | DIASTOLIC BLOOD PRESSURE: 76 MMHG | WEIGHT: 194 LBS | OXYGEN SATURATION: 97 % | TEMPERATURE: 97.8 F | HEART RATE: 76 BPM

## 2017-11-09 DIAGNOSIS — C34.90 NON-SMALL CELL CARCINOMA OF LUNG, STAGE 3, UNSPECIFIED LATERALITY (H): Primary | ICD-10-CM

## 2017-11-09 PROCEDURE — 99211 OFF/OP EST MAY X REQ PHY/QHP: CPT

## 2017-11-09 PROCEDURE — 99213 OFFICE O/P EST LOW 20 MIN: CPT | Performed by: INTERNAL MEDICINE

## 2017-11-09 RX ORDER — ROSUVASTATIN CALCIUM 40 MG/1
40 TABLET, COATED ORAL AT BEDTIME
COMMUNITY
End: 2019-09-27

## 2017-11-09 ASSESSMENT — PAIN SCALES - GENERAL: PAINLEVEL: NO PAIN (0)

## 2017-11-09 NOTE — MR AVS SNAPSHOT
After Visit Summary   11/9/2017    Kt Rasmussen    MRN: [id number]           Patient Information     Date Of Birth          1960        Visit Information        Provider Department      11/9/2017 2:30 PM Wiliam Gonzalez MD [facility]        Today's Diagnoses     Non-small cell carcinoma of lung, stage 3, unspecified laterality (H)    -  1      Care Instructions    1.  RTC MD 4 months    Scheduled - Ruby  2.  CT scan chest before next visit.   Scheduled - Ruby    AVS given to patient          Follow-ups after your visit        Your next 10 appointments already scheduled     Mar 07, 2018  2:00 PM CST   CT CHEST W/O CONTRAST with SHCT1   [facility] CT ([facility])    [address] [facility] [id number] [phone]           Please bring any scans or X-rays taken at other hospitals, if similar tests were done. Also bring a list of your medicines, including vitamins, minerals and over-the-counter drugs. It is safest to leave personal items at home.  Be sure to tell your doctor:   If you have any allergies.   If there s any chance you are pregnant.   If you are breastfeeding.   If you have any special needs.  You do not need to do anything special to prepare.  Please wear loose clothing, such as a sweat suit or jogging clothes. Avoid snaps, zippers and other metal. We may ask you to undress and put on a hospital gown.            Mar 14, 2018  1:30 PM CDT   Return Visit with Wiliam Gonzalez MD   [facility] ([facility])    [facility]  [address] [facility] [address]  [facility] [address] [phone]              Future tests that were ordered for you today     Open Future Orders        Priority Expected Expires Ordered    CT Chest w/o contrast Routine  11/9/2018 11/9/2017            Who to contact     If you have questions or need follow up information about today's clinic visit or your schedule please contact  "[facility] directly at [phone].  Normal or non-critical lab and imaging results will be communicated to you by MyChart, letter or phone within 4 business days after the clinic has received the results. If you do not hear from us within 7 days, please contact the clinic through MyChart or phone. If you have a critical or abnormal lab result, we will notify you by phone as soon as possible.  Submit refill requests through [employer] or call your pharmacy and they will forward the refill request to us. Please allow 3 business days for your refill to be completed.          Additional Information About Your Visit        [employer] Information     [employer] lets you send messages to your doctor, view your test results, renew your prescriptions, schedule appointments and more. To sign up, go to [email or web address] . Click on \"Log in\" on the left side of the screen, which will take you to the Welcome page. Then click on \"Sign up Now\" on the right side of the page.     You will be asked to enter the access code listed below, as well as some personal information. Please follow the directions to create your username and password.     Your access code is: [email or web address]  Expires: 2018  3:01 PM     Your access code will  in 90 days. If you need help or a new code, please call your [place] clinic or [phone].        Care EveryWhere ID     This is your Care EveryWhere ID. This could be used by other organizations to access your [place] medical records  [id number]        Your Vitals Were     Pulse Temperature Pulse Oximetry BMI (Body Mass Index)          76 97.8  F (36.6  C) (Oral) 97% 24.25 kg/m2         Blood Pressure from Last 3 Encounters:   17 113/76   17 101/66   17 128/77    Weight from Last 3 Encounters:   17 88 kg (194 lb)   17 83.9 kg (185 lb)   17 84.8 kg (187 lb)                 Today's Medication Changes          These changes are accurate as of: 17 "  3:02 PM.  If you have any questions, ask your nurse or doctor.               Stop taking these medicines if you haven't already. Please contact your care team if you have questions.     acetaminophen 325 MG tablet   Commonly known as:  TYLENOL           aspirin 81 MG tablet           folic acid 1 MG tablet   Commonly known as:  FOLVITE           ondansetron 8 MG tablet   Commonly known as:  ZOFRAN           prochlorperazine 10 MG tablet   Commonly known as:  COMPAZINE           ZOCOR 40 MG tablet   Generic drug:  simvastatin                    Primary Care Provider Office Phone # Fax #    Thomas Berry [phone]       [facility] [address] [place] [address]        Equal Access to Services     KATHRYN ADAN : Hadii marco villalobos Soshun, waaxda luqadaha, qaybta kaalmada adeyasmineyada, claudia romero. So [facility] [phone].    ATENCIÓN: Si habla español, tiene a jung disposición servicios gratuitos de asistencia lingüística. Llame al [phone].    We comply with applicable federal civil rights laws and [place] laws. We do not discriminate on the basis of race, color, national origin, age, disability, sex, sexual orientation, or gender identity.            Thank you!     Thank you for choosing [facility]  for your care. Our goal is always to provide you with excellent care. Hearing back from our patients is one way we can continue to improve our services. Please take a few minutes to complete the written survey that you may receive in the mail after your visit with us. Thank you!             Your Updated Medication List - Protect others around you: Learn how to safely use, store and throw away your medicines at www.disposemymeds.org.          This list is accurate as of: 11/9/17  3:02 PM.  Always use your most recent med list.                   Brand Name Dispense Instructions for use Diagnosis    CRESTOR 40 MG tablet   Generic drug:   rosuvastatin      Take 40 mg by mouth daily

## 2017-11-09 NOTE — PROGRESS NOTES
[facility] PHYSICIANS  HEMATOLOGY ONCOLOGY    ONCOLOGY FOLLOWUP NOTE      DIAGNOSIS:     1.  T4 NX M0 adenocarcinoma of the lung.      TREATMENT:  6/9/16 Concurrent chemoradiation with weekly carbo, Taxol. Finished radiation 7/30/16. Followed by 2 cycles of consolidation Carbo/Taxol.      SUBJECTIVE:  The patient was seen as a followup today. He has been feeling well. No new complaints.     REVIEW OF SYSTEMS:  A complete review of systems was performed and found to be negative other than pertinent positives mentioned in history of present illness.     Past medical, social histories reviewed.    Meds- Reviewed.     PHYSICAL EXAMINATION:   VITAL SIGNS:/76 (BP Location: Left arm, Patient Position: Chair, Cuff Size: Adult Regular)  Pulse 76  Temp 97.8  F (36.6  C) (Oral)  Wt 88 kg (194 lb)  SpO2 97%  BMI 24.25 kg/m2  CONSTITUTIONAL: Sitting comfortably.   HEENT: Pupils are equal. Oropharynx is clear.   NECK: No cervical or supraclavicular lymphadenopathy.   RESPIRATORY: Clear bilaterally.   CARD/VASC: S1, S2, regular.   GI: Soft, nontender, nondistended, no hepatosplenomegaly.   MUSKULOSKELETAL: Warm, well perfused.   NEUROLOGIC: Alert, awake.   INTEGUMENT: No rash.   LYMPHATICS: No edema.   PSYCH: Mood and affect was normal.     LABORATORY DATA AND IMAGING REVIEWED DURING THIS VISIT:  Results for orders placed or performed during the hospital encounter of 11/08/17   CT Chest w/o contrast    Narrative    CT CHEST WITHOUT CONTRAST November 8, 2017 1:25 PM     HISTORY: Follow up lung cancer. Non-small cell carcinoma of lung,  stage 3, unspecified laterality (H).    TECHNIQUE: No IV contrast material. Radiation dose for this scan was  reduced using automated exposure control, adjustment of the mA and/or  kV according to patient size, or iterative reconstruction technique.    COMPARISON: Chest CT dated 7/11/2011.    FINDINGS: Since the previous exam, a right-sided port has been  removed. Again noted  are fibrotic changes in the medial right lung and  to a lesser extent in the medial left lung. These are not  significantly changed when compared to the previous exam. There is  scarring/fibrosis at the lung apices that is unchanged. There is no  pathologic, mediastinal or axillary lymphadenopathy. There is  osteopenia involving the right humeral head. Again noted is a 4.1 cm  ascending thoracic aortic aneurysm.      Impression    IMPRESSION:   1. No change. Probable postradiation fibrosis in the medial lungs  right greater than left.  2. 4.1 cm ascending thoracic aortic aneurysm.    SWEETIE RILEY MD       ECOG PS: 2     ASSESSMENT:  Kt Rasmussen is a 57-year-old gentleman who initially presented with near obstructing large mass coming off the cheikh and likely the posterior wall on a bronchoscopy performed 05/18/2016.  He had a followup rigid bronchoscopy and corning out of endotracheal 05/05/2016.  Pathology result was consistent with invasive squamous cell carcinoma.  It was moderately differentiated and focally keratinizing.  He had significant bleeding during the procedure.  Tumor was completely debulked in both main stem bronchi and distal trachea.  PET/CT scan 05/23/2016, showed evidence of residual tumor decreased endobronchial mass.  There was indeterminate, mildly hypermetabolic mesentery with prominent lymph nodes and area of enhancement of the right hepatic lobe.  His case was discussed at the multidisciplinary tumor conference and was recommended to proceed with concurrent chemoradiation. He completed concurrent chemoradiation.    - CT scan 11/8/17 results were reviewed with the patient- I reviewed the images myself. Stable findings.    PLAN:   1.  RTC MD 4 months  2.  CT scan chest before next visit.     OTIS GARY MD    11/9/2017    cc: Oneil Hayden MD

## 2017-11-09 NOTE — PROGRESS NOTES
"Oncology Rooming Note    November 9, 2017 2:24 PM   Kt Rasmussen is a 57 year old male who presents for:    Chief Complaint   Patient presents with     Oncology Clinic Visit     Initial Vitals: /76 (BP Location: Left arm, Patient Position: Chair, Cuff Size: Adult Regular)  Pulse 76  Temp 97.8  F (36.6  C) (Oral)  Wt 88 kg (194 lb)  SpO2 97%  BMI 24.25 kg/m2 Estimated body mass index is 24.25 kg/(m^2) as calculated from the following:    Height as of 7/26/17: 1.905 m (6' 3\").    Weight as of this encounter: 88 kg (194 lb). Body surface area is 2.16 meters squared.  No Pain (0) Comment: Data Unavailable   No LMP for male patient.  Allergies reviewed: Yes  Medications reviewed: Yes    Medications: Medication refills not needed today.  Pharmacy name entered into EPIC:    [facility] [place], [place] - [address] [facility] [place], [place] - [address]    Clinical concerns: None    5 minutes for nursing intake (face to face time)     Holli Stokes CMA              "

## 2017-11-09 NOTE — PATIENT INSTRUCTIONS
1.  RTC MD 4 months    Scheduled - Ruby  2.  CT scan chest before next visit.   Scheduled - Ruby    AVS given to patient

## 2017-11-09 NOTE — LETTER
11/9/2017         RE: Kt Rasmussen  [id number]  [place] [address]        Dear Colleague,    Thank you for referring your patient, Kt Rasmussen, to the [facility]CER [facility]. Please see a copy of my visit note below.    [facility] PHYSICIANS  HEMATOLOGY ONCOLOGY    ONCOLOGY FOLLOWUP NOTE      DIAGNOSIS:     1.  T4 NX M0 adenocarcinoma of the lung.      TREATMENT:  6/9/16 Concurrent chemoradiation with weekly carbo, Taxol. Finished radiation 7/30/16. Followed by 2 cycles of consolidation Carbo/Taxol.      SUBJECTIVE:  The patient was seen as a followup today. He has been feeling well. No new complaints.     REVIEW OF SYSTEMS:  A complete review of systems was performed and found to be negative other than pertinent positives mentioned in history of present illness.     Past medical, social histories reviewed.    Meds- Reviewed.     PHYSICAL EXAMINATION:   VITAL SIGNS:/76 (BP Location: Left arm, Patient Position: Chair, Cuff Size: Adult Regular)  Pulse 76  Temp 97.8  F (36.6  C) (Oral)  Wt 88 kg (194 lb)  SpO2 97%  BMI 24.25 kg/m2  CONSTITUTIONAL: Sitting comfortably.   HEENT: Pupils are equal. Oropharynx is clear.   NECK: No cervical or supraclavicular lymphadenopathy.   RESPIRATORY: Clear bilaterally.   CARD/VASC: S1, S2, regular.   GI: Soft, nontender, nondistended, no hepatosplenomegaly.   MUSKULOSKELETAL: Warm, well perfused.   NEUROLOGIC: Alert, awake.   INTEGUMENT: No rash.   LYMPHATICS: No edema.   PSYCH: Mood and affect was normal.     LABORATORY DATA AND IMAGING REVIEWED DURING THIS VISIT:  Results for orders placed or performed during the hospital encounter of 11/08/17   CT Chest w/o contrast    Narrative    CT CHEST WITHOUT CONTRAST November 8, 2017 1:25 PM     HISTORY: Follow up lung cancer. Non-small cell carcinoma of lung,  stage 3, unspecified laterality (H).    TECHNIQUE: No IV contrast material. Radiation dose for this scan was  reduced using automated exposure  control, adjustment of the mA and/or  kV according to patient size, or iterative reconstruction technique.    COMPARISON: Chest CT dated 7/11/2011.    FINDINGS: Since the previous exam, a right-sided port has been  removed. Again noted are fibrotic changes in the medial right lung and  to a lesser extent in the medial left lung. These are not  significantly changed when compared to the previous exam. There is  scarring/fibrosis at the lung apices that is unchanged. There is no  pathologic, mediastinal or axillary lymphadenopathy. There is  osteopenia involving the right humeral head. Again noted is a 4.1 cm  ascending thoracic aortic aneurysm.      Impression    IMPRESSION:   1. No change. Probable postradiation fibrosis in the medial lungs  right greater than left.  2. 4.1 cm ascending thoracic aortic aneurysm.    SWEETIE RILEY MD       ECOG PS: 2     ASSESSMENT:  Kt Rasmussen is a 57-year-old gentleman who initially presented with near obstructing large mass coming off the cheikh and likely the posterior wall on a bronchoscopy performed 05/18/2016.  He had a followup rigid bronchoscopy and corning out of endotracheal 05/05/2016.  Pathology result was consistent with invasive squamous cell carcinoma.  It was moderately differentiated and focally keratinizing.  He had significant bleeding during the procedure.  Tumor was completely debulked in both main stem bronchi and distal trachea.  PET/CT scan 05/23/2016, showed evidence of residual tumor decreased endobronchial mass.  There was indeterminate, mildly hypermetabolic mesentery with prominent lymph nodes and area of enhancement of the right hepatic lobe.  His case was discussed at the multidisciplinary tumor conference and was recommended to proceed with concurrent chemoradiation. He completed concurrent chemoradiation.    - CT scan 11/8/17 results were reviewed with the patient- I reviewed the images myself. Stable findings.    PLAN:   1.  RTC MD 4 months  2.   "CT scan chest before next visit.     WILIAM GONZALEZ MD    11/9/2017    cc: Oneil Hayden MD     Oncology Rooming Note    November 9, 2017 2:24 PM   Kt Rasmussen is a 57 year old male who presents for:    Chief Complaint   Patient presents with     Oncology Clinic Visit     Initial Vitals: /76 (BP Location: Left arm, Patient Position: Chair, Cuff Size: Adult Regular)  Pulse 76  Temp 97.8  F (36.6  C) (Oral)  Wt 88 kg (194 lb)  SpO2 97%  BMI 24.25 kg/m2 Estimated body mass index is 24.25 kg/(m^2) as calculated from the following:    Height as of 7/26/17: 1.905 m (6' 3\").    Weight as of this encounter: 88 kg (194 lb). Body surface area is 2.16 meters squared.  No Pain (0) Comment: Data Unavailable   No LMP for male patient.  Allergies reviewed: Yes  Medications reviewed: Yes    Medications: Medication refills not needed today.  Pharmacy name entered into EPIC:    [facility] [place] [address] [facility] [place], [place] - [address]    Clinical concerns: None    5 minutes for nursing intake (face to face time)     Holli Stokes [facility]                Again, thank you for allowing me to participate in the care of your patient.        Sincerely,        Wiliam Gonzalez MD    "

## 2018-03-07 ENCOUNTER — HOSPITAL ENCOUNTER (OUTPATIENT)
Dept: CT IMAGING | Facility: CLINIC | Age: 58
Discharge: HOME | End: 2018-03-07
Attending: INTERNAL MEDICINE | Admitting: INTERNAL MEDICINE
Payer: MEDICARE

## 2018-03-07 DIAGNOSIS — C34.90 NON-SMALL CELL CARCINOMA OF LUNG, STAGE 3, UNSPECIFIED LATERALITY (H): ICD-10-CM

## 2018-03-07 PROCEDURE — 71250 CT THORAX DX C-: CPT

## 2018-03-14 ENCOUNTER — ONCOLOGY VISIT (OUTPATIENT)
Dept: ONCOLOGY | Facility: CLINIC | Age: 58
End: 2018-03-14
Attending: INTERNAL MEDICINE
Payer: MEDICARE

## 2018-03-14 VITALS
WEIGHT: 192 LBS | RESPIRATION RATE: 16 BRPM | BODY MASS INDEX: 24 KG/M2 | SYSTOLIC BLOOD PRESSURE: 106 MMHG | OXYGEN SATURATION: 97 % | DIASTOLIC BLOOD PRESSURE: 69 MMHG | TEMPERATURE: 98.1 F | HEART RATE: 74 BPM

## 2018-03-14 DIAGNOSIS — C34.90 NON-SMALL CELL CARCINOMA OF LUNG, STAGE 3, UNSPECIFIED LATERALITY (H): Primary | ICD-10-CM

## 2018-03-14 PROCEDURE — 99213 OFFICE O/P EST LOW 20 MIN: CPT | Performed by: INTERNAL MEDICINE

## 2018-03-14 PROCEDURE — G0463 HOSPITAL OUTPT CLINIC VISIT: HCPCS

## 2018-03-14 ASSESSMENT — PAIN SCALES - GENERAL: PAINLEVEL: NO PAIN (0)

## 2018-03-14 NOTE — PROGRESS NOTES
[facility] PHYSICIANS  HEMATOLOGY ONCOLOGY    ONCOLOGY FOLLOWUP NOTE      DIAGNOSIS:     1.  T4 NX M0 adenocarcinoma of the lung.      TREATMENT:  6/9/16 Concurrent chemoradiation with weekly carbo, Taxol. Finished radiation 7/30/16. Followed by 2 cycles of consolidation Carbo/Taxol.      SUBJECTIVE:  The patient was seen as a followup today. He has been feeling well. No new complaints.     REVIEW OF SYSTEMS:  A complete review of systems was performed and found to be negative other than pertinent positives mentioned in history of present illness.     Past medical, social histories reviewed.    Meds- Reviewed.     PHYSICAL EXAMINATION:   VITAL SIGNS:/69 (BP Location: Left arm, Patient Position: Sitting, Cuff Size: Adult Regular)  Pulse 74  Temp 98.1  F (36.7  C) (Oral)  Resp 16  Wt 87.1 kg (192 lb)  SpO2 97%  BMI 24 kg/m2  CONSTITUTIONAL: Sitting comfortably.   HEENT: Pupils are equal. Oropharynx is clear.   NECK: No cervical or supraclavicular lymphadenopathy.   RESPIRATORY: Clear bilaterally.   CARD/VASC: S1, S2, regular.   GI: Soft, nontender, nondistended, no hepatosplenomegaly.   MUSKULOSKELETAL: Warm, well perfused.   NEUROLOGIC: Alert, awake.   INTEGUMENT: No rash.   LYMPHATICS: No edema.   PSYCH: Mood and affect was normal.     LABORATORY DATA AND IMAGING REVIEWED DURING THIS VISIT:    Results for orders placed or performed during the hospital encounter of 03/07/18   CT Chest w/o contrast    Narrative    CT CHEST WITHOUT CONTRAST   3/7/2018 1:41 PM     HISTORY: Follow up lung cancer. Non-small cell carcinoma of lung,  stage 3, unspecified laterality (H).    TECHNIQUE: No IV contrast material. Radiation dose for this scan was  reduced using automated exposure control, adjustment of the mA and/or  kV according to patient size, or iterative reconstruction technique.    COMPARISON: 11/8/2017    FINDINGS: Fibrosis in the medial lungs, right greater than left, is  unchanged from prior.  Apical fibrotic changes again noted. No new  pulmonary nodule or mass. No pleural or pericardial effusion. No  mediastinal, hilar, or axillary adenopathy. There is moderate coronary  atherosclerosis. Ascending aortic diameter of 4.1 cm is unchanged. The  adrenal glands are normal in appearance. No suspicious bony lesions.      Impression    IMPRESSION:  1. No evidence of progressive malignancy.  2. 4.1 cm ascending thoracic aortic aneurysm is unchanged.    DIANNE ARGUETA MD       ECOG PS: 2     ASSESSMENT:  Kt Rasmussen is a 57-year-old gentleman who initially presented with near obstructing large mass coming off the cheikh and likely the posterior wall on a bronchoscopy performed 05/18/2016.  He had a followup rigid bronchoscopy and corning out of endotracheal 05/05/2016.  Pathology result was consistent with invasive squamous cell carcinoma.  It was moderately differentiated and focally keratinizing.  He had significant bleeding during the procedure.  Tumor was completely debulked in both main stem bronchi and distal trachea.  PET/CT scan 05/23/2016, showed evidence of residual tumor decreased endobronchial mass.  There was indeterminate, mildly hypermetabolic mesentery with prominent lymph nodes and area of enhancement of the right hepatic lobe.  He completed concurrent chemoradiation.    - CT scan 3/7/18 results were reviewed with the patient- Stable findings.    PLAN:   1.  RTC MD 4 months  2.  CT scan chest before next visit.     OTIS GARY MD    3/14/2018    cc: Oneil Hayden MD

## 2018-03-14 NOTE — LETTER
"    3/14/2018         RE: Kt Rasmussen  [id number]  [place] [facility] [address]        Dear Colleague,    Thank you for referring your patient, Kt Rasmussen, to the [facility]. Please see a copy of my visit note below.    Oncology Rooming Note    March 14, 2018 1:32 PM   Kt Rasmussen is a 57 year old male who presents for:    Chief Complaint   Patient presents with     Oncology Clinic Visit     Non-small cell carcinoma of lung, stage 3, unspecified laterality      Initial Vitals: /69 (BP Location: Left arm, Patient Position: Sitting, Cuff Size: Adult Regular)  Pulse 74  Temp 98.1  F (36.7  C) (Oral)  Resp 16  Wt 87.1 kg (192 lb)  SpO2 97%  BMI 24 kg/m2 Estimated body mass index is 24 kg/(m^2) as calculated from the following:    Height as of 7/26/17: 1.905 m (6' 3\").    Weight as of this encounter: 87.1 kg (192 lb). Body surface area is 2.15 meters squared.  No Pain (0) Comment: Data Unavailable   No LMP for male patient.  Allergies reviewed: Yes  Medications reviewed: Yes    Medications: Medication refills not needed today.  Pharmacy name entered into EPIC:    [facility] [place] - [address] [facility] [place], [place] - [address]    Clinical concerns: no    5 minutes for nursing intake (face to face time)       Yaquelin Dowd MA                  [facility] PHYSICIANS  HEMATOLOGY ONCOLOGY    ONCOLOGY FOLLOWUP NOTE      DIAGNOSIS:     1.  T4 NX M0 adenocarcinoma of the lung.      TREATMENT:  6/9/16 Concurrent chemoradiation with weekly carbo, Taxol. Finished radiation 7/30/16. Followed by 2 cycles of consolidation Carbo/Taxol.      SUBJECTIVE:  The patient was seen as a followup today. He has been feeling well. No new complaints.     REVIEW OF SYSTEMS:  A complete review of systems was performed and found to be negative other than pertinent positives mentioned in history of present illness.     Past medical, " social histories reviewed.    Meds- Reviewed.     PHYSICAL EXAMINATION:   VITAL SIGNS:/69 (BP Location: Left arm, Patient Position: Sitting, Cuff Size: Adult Regular)  Pulse 74  Temp 98.1  F (36.7  C) (Oral)  Resp 16  Wt 87.1 kg (192 lb)  SpO2 97%  BMI 24 kg/m2  CONSTITUTIONAL: Sitting comfortably.   HEENT: Pupils are equal. Oropharynx is clear.   NECK: No cervical or supraclavicular lymphadenopathy.   RESPIRATORY: Clear bilaterally.   CARD/VASC: S1, S2, regular.   GI: Soft, nontender, nondistended, no hepatosplenomegaly.   MUSKULOSKELETAL: Warm, well perfused.   NEUROLOGIC: Alert, awake.   INTEGUMENT: No rash.   LYMPHATICS: No edema.   PSYCH: Mood and affect was normal.     LABORATORY DATA AND IMAGING REVIEWED DURING THIS VISIT:    Results for orders placed or performed during the hospital encounter of 03/07/18   CT Chest w/o contrast    Narrative    CT CHEST WITHOUT CONTRAST   3/7/2018 1:41 PM     HISTORY: Follow up lung cancer. Non-small cell carcinoma of lung,  stage 3, unspecified laterality (H).    TECHNIQUE: No IV contrast material. Radiation dose for this scan was  reduced using automated exposure control, adjustment of the mA and/or  kV according to patient size, or iterative reconstruction technique.    COMPARISON: 11/8/2017    FINDINGS: Fibrosis in the medial lungs, right greater than left, is  unchanged from prior. Apical fibrotic changes again noted. No new  pulmonary nodule or mass. No pleural or pericardial effusion. No  mediastinal, hilar, or axillary adenopathy. There is moderate coronary  atherosclerosis. Ascending aortic diameter of 4.1 cm is unchanged. The  adrenal glands are normal in appearance. No suspicious bony lesions.      Impression    IMPRESSION:  1. No evidence of progressive malignancy.  2. 4.1 cm ascending thoracic aortic aneurysm is unchanged.    DIANNE ARGUETA MD       ECOG PS: 2     ASSESSMENT:  Kt Rasmussen is a 57-year-old gentleman who initially presented with near  obstructing large mass coming off the cheikh and likely the posterior wall on a bronchoscopy performed 05/18/2016.  He had a followup rigid bronchoscopy and corning out of endotracheal 05/05/2016.  Pathology result was consistent with invasive squamous cell carcinoma.  It was moderately differentiated and focally keratinizing.  He had significant bleeding during the procedure.  Tumor was completely debulked in both main stem bronchi and distal trachea.  PET/CT scan 05/23/2016, showed evidence of residual tumor decreased endobronchial mass.  There was indeterminate, mildly hypermetabolic mesentery with prominent lymph nodes and area of enhancement of the right hepatic lobe.  He completed concurrent chemoradiation.    - CT scan 3/7/18 results were reviewed with the patient- Stable findings.    PLAN:   1.  RTC MD 4 months  2.  CT scan chest before next visit.     WILIAM GONZALEZ MD    3/14/2018    cc: Oneil Hayden MD     Again, thank you for allowing me to participate in the care of your patient.        Sincerely,        Wiliam Gonzalez MD

## 2018-03-14 NOTE — PATIENT INSTRUCTIONS
1.  RTC MD 4 months   Scheduled - Ruby  2.  CT scan chest before next visit.    Scheduled  For 7/11/18 - Ruby     AVS given to patient - Ruby

## 2018-03-14 NOTE — MR AVS SNAPSHOT
After Visit Summary   3/14/2018    Kt Rasmussen    MRN: [id number]           Patient Information     Date Of Birth          1960        Visit Information        Provider Department      3/14/2018 1:30 PM Wiliam Gonzalez MD [facility]        Today's Diagnoses     Non-small cell carcinoma of lung, stage 3, unspecified laterality (H)    -  1      Care Instructions    1.  RTC MD 4 months  2.  CT scan chest before next visit.           Follow-ups after your visit        Your next 10 appointments already scheduled     Jul 11, 2018  2:00 PM CDT   (Arrive by 1:45 PM)   CT CHEST W/O CONTRAST with SHCT1   [facility] CT ([facility])    [address] [facility] [id number] [phone]           Please bring any scans or X-rays taken at other hospitals, if similar tests were done. Also bring a list of your medicines, including vitamins, minerals and over-the-counter drugs. It is safest to leave personal items at home.  Be sure to tell your doctor:   If you have any allergies.   If there s any chance you are pregnant.   If you are breastfeeding.  You do not need to do anything special to prepare for this exam.  Please wear loose clothing, such as a sweat suit or jogging clothes. Avoid snaps, zippers and other metal. We may ask you to undress and put on a hospital gown.            Jul 18, 2018  1:30 PM CDT   Return Visit with Wiliam Gonzalez MD   [facility] ([facility])    [facility]  [address] [id number] [phone]              Future tests that were ordered for you today     Open Future Orders        Priority Expected Expires Ordered    CT Chest w/o contrast Routine  3/14/2019 3/14/2018            Who to contact     If you have questions or need follow up information about today's clinic visit or your schedule please contact [facility] directly at [phone].  Normal or  "non-critical lab and imaging results will be communicated to you by MyChart, letter or phone within 4 business days after the clinic has received the results. If you do not hear from us within 7 days, please contact the clinic through IIZ[employer] groupt or phone. If you have a critical or abnormal lab result, we will notify you by phone as soon as possible.  Submit refill requests through [employer] or call your pharmacy and they will forward the refill request to us. Please allow 3 business days for your refill to be completed.          Additional Information About Your Visit        SoCAT Information     SoCAT lets you send messages to your doctor, view your test results, renew your prescriptions, schedule appointments and more. To sign up, go to [email or web address] . Click on \"Log in\" on the left side of the screen, which will take you to the Welcome page. Then click on \"Sign up Now\" on the right side of the page.     You will be asked to enter the access code listed below, as well as some personal information. Please follow the directions to create your username and password.     Your access code is: [email or web address]  Expires: 2018  1:48 PM     Your access code will  in 90 days. If you need help or a new code, please call your [place] clinic or [phone].        Care EveryWhere ID     This is your Care EveryWhere ID. This could be used by other organizations to access your [place] medical records  [id number]        Your Vitals Were     Pulse Temperature Respirations Pulse Oximetry BMI (Body Mass Index)       74 98.1  F (36.7  C) (Oral) 16 97% 24 kg/m2        Blood Pressure from Last 3 Encounters:   18 106/69   17 113/76   17 101/66    Weight from Last 3 Encounters:   18 87.1 kg (192 lb)   17 88 kg (194 lb)   17 83.9 kg (185 lb)               Primary Care Provider Office Phone # Fax #    Thomas Berry [phone]       [facility] [address] [facility] [address]        Equal Access to Services     AMBERTANMAY ANTIONETTE : Hadii aad ku hadpatleslee Vieira, waevangelistada aki, qaronnellstu bojorquezvaibhavranulfo hutton, claudia hoppernasrasunitha romero. So [facility] [phone].    ATENCIÓN: Si habla español, tiene a jung disposición servicios gratuitos de asistencia lingüística. Llame al [phone].    We comply with applicable federal civil rights laws and [place] laws. We do not discriminate on the basis of race, color, national origin, age, disability, sex, sexual orientation, or gender identity.            Thank you!     Thank you for choosing [facility]  for your care. Our goal is always to provide you with excellent care. Hearing back from our patients is one way we can continue to improve our services. Please take a few minutes to complete the written survey that you may receive in the mail after your visit with us. Thank you!             Your Updated Medication List - Protect others around you: Learn how to safely use, store and throw away your medicines at www.disposemymeds.org.          This list is accurate as of 3/14/18  1:48 PM.  Always use your most recent med list.                   Brand Name Dispense Instructions for use Diagnosis    CRESTOR 40 MG tablet   Generic drug:  rosuvastatin      Take 40 mg by mouth daily

## 2018-07-11 ENCOUNTER — HOSPITAL ENCOUNTER (OUTPATIENT)
Dept: CT IMAGING | Facility: CLINIC | Age: 58
Discharge: HOME | End: 2018-07-11
Attending: INTERNAL MEDICINE | Admitting: INTERNAL MEDICINE
Payer: MEDICARE

## 2018-07-11 DIAGNOSIS — C34.90 NON-SMALL CELL CARCINOMA OF LUNG, STAGE 3, UNSPECIFIED LATERALITY (H): ICD-10-CM

## 2018-07-11 PROCEDURE — 71250 CT THORAX DX C-: CPT

## 2018-07-18 ENCOUNTER — ONCOLOGY VISIT (OUTPATIENT)
Dept: ONCOLOGY | Facility: CLINIC | Age: 58
End: 2018-07-18
Attending: INTERNAL MEDICINE
Payer: MEDICARE

## 2018-07-18 VITALS
WEIGHT: 185 LBS | DIASTOLIC BLOOD PRESSURE: 63 MMHG | RESPIRATION RATE: 16 BRPM | OXYGEN SATURATION: 98 % | BODY MASS INDEX: 23.12 KG/M2 | SYSTOLIC BLOOD PRESSURE: 97 MMHG | TEMPERATURE: 97.8 F | HEART RATE: 93 BPM

## 2018-07-18 DIAGNOSIS — I71.21 THORACIC ASCENDING AORTIC ANEURYSM (H): ICD-10-CM

## 2018-07-18 DIAGNOSIS — C34.90 NON-SMALL CELL CARCINOMA OF LUNG, STAGE 3, UNSPECIFIED LATERALITY (H): Primary | ICD-10-CM

## 2018-07-18 PROCEDURE — G0463 HOSPITAL OUTPT CLINIC VISIT: HCPCS

## 2018-07-18 PROCEDURE — 99213 OFFICE O/P EST LOW 20 MIN: CPT | Performed by: INTERNAL MEDICINE

## 2018-07-18 ASSESSMENT — PAIN SCALES - GENERAL: PAINLEVEL: NO PAIN (0)

## 2018-07-18 NOTE — MR AVS SNAPSHOT
After Visit Summary   7/18/2018    Kt Rasmussen    MRN: [id number]           Patient Information     Date Of Birth          1960        Visit Information        Provider Department      7/18/2018 1:30 PM Wilaim Gonzalez MD [facility]        Today's Diagnoses     Non-small cell carcinoma of lung, stage 3, unspecified laterality (H)    -  1    Thoracic ascending aortic aneurysm (H)          Care Instructions    1.  RTC MD 4 months  2.  CT scan chest before next visit.   3.  Refer to cardiology for ascending thoracic aneurysm          Follow-ups after your visit        Additional Services     CARDIOLOGY EVAL ADULT REFERRAL       Preferred location:  [facility] [phone]   [email or web address]    Please be aware that coverage of these services is subject to the terms and limitations of your health insurance plan.  Call member services at your health plan with any benefit or coverage questions.      Please bring the following to your appointment:  Any x-rays, CTs or MRIs which have been performed. Contact the facility where they were done to arrange for  prior to your scheduled appointment.    List of current medications  This referral request   Any documents/labs given to you for this referral                  Your next 10 appointments already scheduled     Nov 14, 2018 12:45 PM CST   CT CHEST W/O CONTRAST with SHCT1   [facility] CT ([facility])    [address] [place] [id number] [phone]           Please bring any scans or X-rays taken at other hospitals, if similar tests were done. Also bring a list of your medicines, including vitamins, minerals and over-the-counter drugs. It is safest to leave personal items at home.  Be sure to tell your doctor:   If you have any allergies.   If there s any chance you are pregnant.   If you are breastfeeding.  You do not need to  do anything special to prepare for this exam.  Please wear loose clothing, such as a sweat suit or jogging clothes. Avoid snaps, zippers and other metal. We may ask you to undress and put on a hospital gown.            Nov 21, 2018  1:00 PM CST   Return Visit with Wiliam Gonzalez MD   [facility] ([facility])    [facility] [place] [facility]  [address]  [place] [id number] [phone]              Future tests that were ordered for you today     Open Future Orders        Priority Expected Expires Ordered    CT Chest w/o contrast Routine  7/18/2019 7/18/2018            Who to contact     If you have questions or need follow up information about today's clinic visit or your schedule please contact [facility] directly at [phone].  Normal or non-critical lab and imaging results will be communicated to you by MyChart, letter or phone within 4 business days after the clinic has received the results. If you do not hear from us within 7 days, please contact the clinic through MyChart or phone. If you have a critical or abnormal lab result, we will notify you by phone as soon as possible.  Submit refill requests through [employer] or call your pharmacy and they will forward the refill request to us. Please allow 3 business days for your refill to be completed.          Additional Information About Your Visit        Care EveryWhere ID     This is your Care EveryWhere ID. This could be used by other organizations to access your [place] medical records  [id number]        Your Vitals Were     Pulse Temperature Respirations Pulse Oximetry BMI (Body Mass Index)       93 97.8  F (36.6  C) (Oral) 16 98% 23.12 kg/m2        Blood Pressure from Last 3 Encounters:   07/18/18 97/63   03/14/18 106/69   11/09/17 113/76    Weight from Last 3 Encounters:   07/18/18 83.9 kg (185 lb)   03/14/18 87.1 kg (192 lb)   11/09/17 88 kg (194 lb)              We Performed the Following      CARDIOLOGY AL ADULT REFERRAL        Primary Care Provider Office Phone # Fax #    Thomas Berry [phone]       [facility] [address] [place] [address]        Equal Access to Services     KATHRYN ADAN : Hadii aad ku hadpato Soomaali, waaxda luqadaha, qaybta kaalmada adeegyada, waxcm mkin kalynn orinyasmine seymour yovany romero. So [facility] [phone].    ATENCIÓN: Si habla español, tiene a jung disposición servicios gratuitos de asistencia lingüística. Llame al [phone].    We comply with applicable federal civil rights laws and [place] laws. We do not discriminate on the basis of race, color, national origin, age, disability, sex, sexual orientation, or gender identity.            Thank you!     Thank you for choosing [facility]  for your care. Our goal is always to provide you with excellent care. Hearing back from our patients is one way we can continue to improve our services. Please take a few minutes to complete the written survey that you may receive in the mail after your visit with us. Thank you!             Your Updated Medication List - Protect others around you: Learn how to safely use, store and throw away your medicines at www.disposemymeds.org.          This list is accurate as of 7/18/18  1:56 PM.  Always use your most recent med list.                   Brand Name Dispense Instructions for use Diagnosis    CRESTOR 40 MG tablet   Generic drug:  rosuvastatin      Take 40 mg by mouth daily

## 2018-07-18 NOTE — LETTER
"    7/18/2018         RE: Kt Rasmussen  [address]  [facility] [address]        Dear Colleague,    Thank you for referring your patient, Kt Rasmussen, to the [facility]. Please see a copy of my visit note below.    Oncology Rooming Note    July 18, 2018 1:29 PM   Kt Rasmussen is a 57 year old male who presents for:    Chief Complaint   Patient presents with     Oncology Clinic Visit     Lung tumor     Initial Vitals: BP 97/63 (BP Location: Left arm, Patient Position: Sitting, Cuff Size: Adult Regular)  Pulse 93  Temp 97.8  F (36.6  C) (Oral)  Resp 16  Wt 83.9 kg (185 lb)  SpO2 98%  BMI 23.12 kg/m2 Estimated body mass index is 23.12 kg/(m^2) as calculated from the following:    Height as of 7/26/17: 1.905 m (6' 3\").    Weight as of this encounter: 83.9 kg (185 lb). Body surface area is 2.11 meters squared.  No Pain (0) Comment: Data Unavailable   No LMP for male patient.  Allergies reviewed: Yes  Medications reviewed: Yes    Medications: Medication refills not needed today.  Pharmacy name entered into EPIC:    [facility] [place] [address] [facility] [place], [place] - [address]    Clinical concerns: no    5 minutes for nursing intake (face to face time)          Yaquelin Dowd MA                [facility] PHYSICIANS  HEMATOLOGY ONCOLOGY    ONCOLOGY FOLLOWUP NOTE      DIAGNOSIS:     1.  T4 NX M0 adenocarcinoma of the lung.      TREATMENT:  6/9/16 Concurrent chemoradiation with weekly carbo, Taxol. Finished radiation 7/30/16. Followed by 2 cycles of consolidation Carbo/Taxol.      SUBJECTIVE:  The patient was seen as a followup today. He has been at his baseline. No new symptoms.     REVIEW OF SYSTEMS:  A complete review of systems was performed and found to be negative other than pertinent positives mentioned in history of present illness.     Past medical, social histories reviewed.    Meds- Reviewed.   "   PHYSICAL EXAMINATION:   VITAL SIGNS:BP 97/63 (BP Location: Left arm, Patient Position: Sitting, Cuff Size: Adult Regular)  Pulse 93  Temp 97.8  F (36.6  C) (Oral)  Resp 16  Wt 83.9 kg (185 lb)  SpO2 98%  BMI 23.12 kg/m2  CONSTITUTIONAL: Sitting comfortably.   HEENT: Pupils are equal. Oropharynx is clear.   NECK: No cervical or supraclavicular lymphadenopathy.   RESPIRATORY: Clear bilaterally.   CARD/VASC: S1, S2, regular.   GI: Soft, nontender, nondistended, no hepatosplenomegaly.   MUSKULOSKELETAL: Warm, well perfused.   NEUROLOGIC: Alert, awake.   INTEGUMENT: No rash.   LYMPHATICS: No edema.   PSYCH: Mood and affect was normal.     LABORATORY DATA AND IMAGING REVIEWED DURING THIS VISIT:  Results for orders placed or performed during the hospital encounter of 07/11/18   CT Chest w/o contrast    Narrative    CT CHEST WITHOUT CONTRAST 7/11/2018 2:13 PM     HISTORY: Follow up lung cancer. Non-small cell carcinoma of lung,  stage III, unspecified laterality (H).    COMPARISON: 3/7/2018.    TECHNIQUE: Volumetric helical acquisition of CT images of the chest  from the clavicles to the kidneys were acquired without IV contrast.  Radiation dose for this scan was reduced using automated exposure  control, adjustment of the mA and/or kV according to patient size, or  iterative reconstruction technique.    FINDINGS:      Chest: No change in fibrosis of the medial lungs, right greater than  left. Apical fibrosis is also noted. No pleural effusion, pericardial  effusion or pneumothorax. No axillary or mediastinal lymphadenopathy.  Moderate atherosclerotic disease noted through the coronary arteries.  Prominence of the ascending aorta measuring 4.0 cm, not significantly  changed. No pulmonary nodules.    Upper abdomen: Limited evaluation of the upper abdomen shows no focal  abnormality. Adrenal glands are normal.    Bones: No suspicious bony lesions.      Impression    IMPRESSION:  1. No evidence of progressive  malignancy.  2. Aneurysmal dilatation of the ascending thoracic aorta measuring 4.0  cm, unchanged.    WAYNE MONTEZ DO     ECOG PS: 2     ASSESSMENT:  Kt Rasmussen is a 57-year-old gentleman who initially presented with near obstructing large mass coming off the cheikh and likely the posterior wall on a bronchoscopy performed 05/18/2016.  He had a followup rigid bronchoscopy and corning out of endotracheal 05/05/2016.  Pathology result was consistent with invasive squamous cell carcinoma.  It was moderately differentiated and focally keratinizing.  He had significant bleeding during the procedure.  Tumor was completely debulked in both main stem bronchi and distal trachea.  PET/CT scan 05/23/2016, showed evidence of residual tumor decreased endobronchial mass.  There was indeterminate, mildly hypermetabolic mesentery with prominent lymph nodes and area of enhancement of the right hepatic lobe.  He completed concurrent chemoradiation.    - CT scan 7/11/18 results were reviewed with the patient- Stable findings.  He has ascending thoracic aneurysm and will be referred to cardiology.    PLAN:   1.  RTC MD 4 months  2.  CT scan chest before next visit.   3.  Refer to cardiology for ascending thoracic aneurysm    WILIAM GONZALEZ MD    7/18/2018    cc: Oneil Hayden MD     Again, thank you for allowing me to participate in the care of your patient.        Sincerely,        Wiliam Gonzalez MD

## 2018-07-18 NOTE — PROGRESS NOTES
[facility] PHYSICIANS  HEMATOLOGY ONCOLOGY    ONCOLOGY FOLLOWUP NOTE      DIAGNOSIS:     1.  T4 NX M0 adenocarcinoma of the lung.      TREATMENT:  6/9/16 Concurrent chemoradiation with weekly carbo, Taxol. Finished radiation 7/30/16. Followed by 2 cycles of consolidation Carbo/Taxol.      SUBJECTIVE:  The patient was seen as a followup today. He has been at his baseline. No new symptoms.     REVIEW OF SYSTEMS:  A complete review of systems was performed and found to be negative other than pertinent positives mentioned in history of present illness.     Past medical, social histories reviewed.    Meds- Reviewed.     PHYSICAL EXAMINATION:   VITAL SIGNS:BP 97/63 (BP Location: Left arm, Patient Position: Sitting, Cuff Size: Adult Regular)  Pulse 93  Temp 97.8  F (36.6  C) (Oral)  Resp 16  Wt 83.9 kg (185 lb)  SpO2 98%  BMI 23.12 kg/m2  CONSTITUTIONAL: Sitting comfortably.   HEENT: Pupils are equal. Oropharynx is clear.   NECK: No cervical or supraclavicular lymphadenopathy.   RESPIRATORY: Clear bilaterally.   CARD/VASC: S1, S2, regular.   GI: Soft, nontender, nondistended, no hepatosplenomegaly.   MUSKULOSKELETAL: Warm, well perfused.   NEUROLOGIC: Alert, awake.   INTEGUMENT: No rash.   LYMPHATICS: No edema.   PSYCH: Mood and affect was normal.     LABORATORY DATA AND IMAGING REVIEWED DURING THIS VISIT:  Results for orders placed or performed during the hospital encounter of 07/11/18   CT Chest w/o contrast    Narrative    CT CHEST WITHOUT CONTRAST 7/11/2018 2:13 PM     HISTORY: Follow up lung cancer. Non-small cell carcinoma of lung,  stage III, unspecified laterality (H).    COMPARISON: 3/7/2018.    TECHNIQUE: Volumetric helical acquisition of CT images of the chest  from the clavicles to the kidneys were acquired without IV contrast.  Radiation dose for this scan was reduced using automated exposure  control, adjustment of the mA and/or kV according to patient size, or  iterative reconstruction  technique.    FINDINGS:      Chest: No change in fibrosis of the medial lungs, right greater than  left. Apical fibrosis is also noted. No pleural effusion, pericardial  effusion or pneumothorax. No axillary or mediastinal lymphadenopathy.  Moderate atherosclerotic disease noted through the coronary arteries.  Prominence of the ascending aorta measuring 4.0 cm, not significantly  changed. No pulmonary nodules.    Upper abdomen: Limited evaluation of the upper abdomen shows no focal  abnormality. Adrenal glands are normal.    Bones: No suspicious bony lesions.      Impression    IMPRESSION:  1. No evidence of progressive malignancy.  2. Aneurysmal dilatation of the ascending thoracic aorta measuring 4.0  cm, unchanged.    WAYNE MONTEZ DO     ECOG PS: 2     ASSESSMENT:  Kt Rasmussen is a 57-year-old gentleman who initially presented with near obstructing large mass coming off the cheikh and likely the posterior wall on a bronchoscopy performed 05/18/2016.  He had a followup rigid bronchoscopy and corning out of endotracheal 05/05/2016.  Pathology result was consistent with invasive squamous cell carcinoma.  It was moderately differentiated and focally keratinizing.  He had significant bleeding during the procedure.  Tumor was completely debulked in both main stem bronchi and distal trachea.  PET/CT scan 05/23/2016, showed evidence of residual tumor decreased endobronchial mass.  There was indeterminate, mildly hypermetabolic mesentery with prominent lymph nodes and area of enhancement of the right hepatic lobe.  He completed concurrent chemoradiation.    - CT scan 7/11/18 results were reviewed with the patient- Stable findings.  He has ascending thoracic aneurysm and will be referred to cardiology.    PLAN:   1.  RTC MD 4 months  2.  CT scan chest before next visit.   3.  Refer to cardiology for ascending thoracic aneurysm    OTIS GARY MD    7/18/2018    cc: Oneil Hayden MD

## 2018-07-18 NOTE — PATIENT INSTRUCTIONS
1.  RTC MD 4 months  Scheduled/janice  2.  CT scan chest before next visit.   Scheduled/janice  3.  Refer to cardiology for ascending thoracic aneurysm  Patient wants to see Cardiologist @ [facility].  He couldn't remember name, he will find information and call our clinic.  7/19/18- Kt called and stated he follows with Dr. Lakhani at [facility]- Thinks he was last seen 3/2018 and his next appt is 8/8/18. JAYASHREE Wiseman      AVS printed & given to patient/janice

## 2018-07-18 NOTE — PROGRESS NOTES
"Oncology Rooming Note    July 18, 2018 1:29 PM   Kt Rasmussen is a 57 year old male who presents for:    Chief Complaint   Patient presents with     Oncology Clinic Visit     Lung tumor     Initial Vitals: BP 97/63 (BP Location: Left arm, Patient Position: Sitting, Cuff Size: Adult Regular)  Pulse 93  Temp 97.8  F (36.6  C) (Oral)  Resp 16  Wt 83.9 kg (185 lb)  SpO2 98%  BMI 23.12 kg/m2 Estimated body mass index is 23.12 kg/(m^2) as calculated from the following:    Height as of 7/26/17: 1.905 m (6' 3\").    Weight as of this encounter: 83.9 kg (185 lb). Body surface area is 2.11 meters squared.  No Pain (0) Comment: Data Unavailable   No LMP for male patient.  Allergies reviewed: Yes  Medications reviewed: Yes    Medications: Medication refills not needed today.  Pharmacy name entered into EPIC:    [facility] [place] [place] [facility] [address] [facility] [place], [place] - [address]    Clinical concerns: no    5 minutes for nursing intake (face to face time)          Yaquelin Dowd MA              "

## 2018-08-08 ENCOUNTER — TRANSFERRED RECORDS (OUTPATIENT)
Dept: HEALTH INFORMATION MANAGEMENT | Facility: CLINIC | Age: 58
End: 2018-08-08

## 2018-08-22 ENCOUNTER — TRANSFERRED RECORDS (OUTPATIENT)
Dept: HEALTH INFORMATION MANAGEMENT | Facility: CLINIC | Age: 58
End: 2018-08-22

## 2018-11-14 ENCOUNTER — HOSPITAL ENCOUNTER (OUTPATIENT)
Dept: CT IMAGING | Facility: CLINIC | Age: 58
Discharge: HOME | End: 2018-11-14
Attending: INTERNAL MEDICINE | Admitting: INTERNAL MEDICINE
Payer: MEDICARE

## 2018-11-14 DIAGNOSIS — C34.90 NON-SMALL CELL CARCINOMA OF LUNG, STAGE 3, UNSPECIFIED LATERALITY (H): ICD-10-CM

## 2018-11-14 PROCEDURE — 71250 CT THORAX DX C-: CPT

## 2018-11-21 ENCOUNTER — ONCOLOGY VISIT (OUTPATIENT)
Dept: ONCOLOGY | Facility: CLINIC | Age: 58
End: 2018-11-21
Attending: INTERNAL MEDICINE
Payer: MEDICARE

## 2018-11-21 VITALS
TEMPERATURE: 97.6 F | OXYGEN SATURATION: 98 % | HEART RATE: 87 BPM | SYSTOLIC BLOOD PRESSURE: 113 MMHG | RESPIRATION RATE: 16 BRPM | WEIGHT: 185 LBS | DIASTOLIC BLOOD PRESSURE: 70 MMHG | BODY MASS INDEX: 23.12 KG/M2

## 2018-11-21 DIAGNOSIS — C34.90 NON-SMALL CELL CARCINOMA OF LUNG, STAGE 3, UNSPECIFIED LATERALITY (H): Primary | ICD-10-CM

## 2018-11-21 PROCEDURE — 99213 OFFICE O/P EST LOW 20 MIN: CPT | Performed by: INTERNAL MEDICINE

## 2018-11-21 PROCEDURE — G0463 HOSPITAL OUTPT CLINIC VISIT: HCPCS

## 2018-11-21 ASSESSMENT — PAIN SCALES - GENERAL: PAINLEVEL: NO PAIN (0)

## 2018-11-21 NOTE — MR AVS SNAPSHOT
After Visit Summary   11/21/2018    Kt Rasmussen    MRN: [id number]           Patient Information     Date Of Birth          1960        Visit Information        Provider Department      11/21/2018 1:00 PM Wiliam Gonzalez MD [facility]        Today's Diagnoses     Non-small cell carcinoma of lung, stage 3, unspecified laterality (H)    -  1      Care Instructions    1.  RTC MD 3 months scheduled/ tia  2.  CT scan chest before next visit. Scheduled /Tia        Avs printed and given to patient/ Tia          Follow-ups after your visit        Your next 10 appointments already scheduled     Feb 13, 2019 12:30 PM CST   CT CHEST W/O CONTRAST with SHCT1   [facility] CT ([facility])    [address] [place] [id number] [phone]           How do I prepare for my exam? (Food and drink instructions) No Food and Drink Restrictions.  How do I prepare for my exam? (Other instructions) You do not need to do anything special to prepare for this exam. For a sinus scan: Use your nose spray (nasal decongestant spray) as directed.  What should I wear: Please wear loose clothing, such as a sweat suit or jogging clothes. Avoid snaps, zippers and other metal. We may ask you to undress and put on a hospital gown.  How long does the exam take: Most scans take less than 20 minutes.  What should I bring: Please bring any scans or X-rays taken at other hospitals, if similar tests were done. Also bring a list of your medicines, including vitamins, minerals and over-the-counter drugs. It is safest to leave personal items at home.  Do I need a : No  is needed.  What do I need to tell my doctor? Be sure to tell your doctor: * If you have any allergies. * If there s any chance you are pregnant. * If you are breastfeeding.  What should I do after the exam: No restrictions, You may resume normal activities.  What is this test: A CT (computed tomography) scan is a  series of pictures that allows us to look inside your body. The scanner creates images of the body in cross sections, much like slices of bread. This helps us see any problems more clearly.  Who should I call with questions: If you have any questions, please call the Imaging Department where you will have your exam. Directions, parking instructions, and other information is available on our website, [email or web address].            Feb 20, 2019 12:30 PM CST   Return Visit with Wiliam Gonzalez MD   [facility] ([facility])    [facility]  [address]  [place] [id number] [phone]              Future tests that were ordered for you today     Open Future Orders        Priority Expected Expires Ordered    CT Chest w/o contrast Routine  11/21/2019 11/21/2018            Who to contact     If you have questions or need follow up information about today's clinic visit or your schedule please contact [facility] directly at [phone].  Normal or non-critical lab and imaging results will be communicated to you by MyChart, letter or phone within 4 business days after the clinic has received the results. If you do not hear from us within 7 days, please contact the clinic through MyChart or phone. If you have a critical or abnormal lab result, we will notify you by phone as soon as possible.  Submit refill requests through [employer] or call your pharmacy and they will forward the refill request to us. Please allow 3 business days for your refill to be completed.          Additional Information About Your Visit        Care EveryWhere ID     This is your Care EveryWhere ID. This could be used by other organizations to access your [place] medical records  [id number]        Your Vitals Were     Pulse Temperature Respirations Pulse Oximetry BMI (Body Mass Index)       87 97.6  F (36.4  C) (Oral) 16 98% 23.12 kg/m2        Blood Pressure from Last 3  Encounters:   11/21/18 113/70   07/18/18 97/63   03/14/18 106/69    Weight from Last 3 Encounters:   11/21/18 83.9 kg (185 lb)   07/18/18 83.9 kg (185 lb)   03/14/18 87.1 kg (192 lb)               Primary Care Provider Office Phone # Fax #    Thomas Berry [phone]       [facility] [address] [place] [address]        Equal Access to Services     KATHRYN ADAN : Hadii aad ku hadasho Soomaali, waaxda luqadaha, qaybta kaalmada adeegyada, waxcm alarcon haykaren pedroza . So [facility] [phone].    ATENCIÓN: Si habla español, tiene a jung disposición servicios gratuitos de asistencia lingüística. [facility] [phone].    We comply with applicable federal civil rights laws and [place] laws. We do not discriminate on the basis of race, color, national origin, age, disability, sex, sexual orientation, or gender identity.            Thank you!     Thank you for choosing [facility]  for your care. Our goal is always to provide you with excellent care. Hearing back from our patients is one way we can continue to improve our services. Please take a few minutes to complete the written survey that you may receive in the mail after your visit with us. Thank you!             Your Updated Medication List - Protect others around you: Learn how to safely use, store and throw away your medicines at www.disposemymeds.org.          This list is accurate as of 11/21/18  1:30 PM.  Always use your most recent med list.                   Brand Name Dispense Instructions for use Diagnosis    CRESTOR 40 MG tablet   Generic drug:  rosuvastatin      Take 40 mg by mouth daily

## 2018-11-21 NOTE — PROGRESS NOTES
"Oncology Rooming Note    November 21, 2018 12:49 PM   Kt Rasmussen is a 58 year old male who presents for:    Chief Complaint   Patient presents with     Oncology Clinic Visit     Non-small cell carcinoma of lung, stage 3, unspecified laterality (H)     Initial Vitals: /70 (BP Location: Left arm, Patient Position: Sitting, Cuff Size: Adult Regular)  Pulse 87  Temp 97.6  F (36.4  C) (Oral)  Resp 16  Wt 83.9 kg (185 lb)  SpO2 98%  BMI 23.12 kg/m2 Estimated body mass index is 23.12 kg/(m^2) as calculated from the following:    Height as of 7/26/17: 1.905 m (6' 3\").    Weight as of this encounter: 83.9 kg (185 lb). Body surface area is 2.11 meters squared.  No Pain (0) Comment: Data Unavailable   No LMP for male patient.  Allergies reviewed: Yes  Medications reviewed: Yes    Medications: Medication refills not needed today.  Pharmacy name entered into EPIC:    [facility] [place], [place] - [address] [facility] [place], [place] - [address]    Clinical concerns: no   5 minutes for nursing intake (face to face time)            Yaquelin Dowd MA        "

## 2018-11-21 NOTE — LETTER
"    11/21/2018         RE: Kt Rasmussen  [id number]  [facility] [address]        Dear Colleague,    Thank you for referring your patient, Kt Rasmussen, to the [facility]. Please see a copy of my visit note below.    Oncology Rooming Note    November 21, 2018 12:49 PM   Kt Rasmussen is a 58 year old male who presents for:    Chief Complaint   Patient presents with     Oncology Clinic Visit     Non-small cell carcinoma of lung, stage 3, unspecified laterality (H)     Initial Vitals: /70 (BP Location: Left arm, Patient Position: Sitting, Cuff Size: Adult Regular)  Pulse 87  Temp 97.6  F (36.4  C) (Oral)  Resp 16  Wt 83.9 kg (185 lb)  SpO2 98%  BMI 23.12 kg/m2 Estimated body mass index is 23.12 kg/(m^2) as calculated from the following:    Height as of 7/26/17: 1.905 m (6' 3\").    Weight as of this encounter: 83.9 kg (185 lb). Body surface area is 2.11 meters squared.  No Pain (0) Comment: Data Unavailable   No LMP for male patient.  Allergies reviewed: Yes  Medications reviewed: Yes    Medications: Medication refills not needed today.  Pharmacy name entered into EPIC:    [place], [place] - [address] [facility] [place], [place] - [address]    Clinical concerns: no   5 minutes for nursing intake (face to face time)            Yaquelin Dowd MA          [facility] PHYSICIANS  HEMATOLOGY ONCOLOGY    ONCOLOGY FOLLOWUP NOTE      DIAGNOSIS:     1.  T4 NX M0 adenocarcinoma of the lung.      TREATMENT:  6/9/16 Concurrent chemoradiation with weekly carbo, Taxol. Finished radiation 7/30/16. Followed by 2 cycles of consolidation Carbo/Taxol.      SUBJECTIVE:  The patient was seen as a followup today. He has been feeling well. No new symptoms. No fever or cough.     REVIEW OF SYSTEMS:  A complete review of systems was performed and found to be negative other than pertinent positives mentioned in history of present " illness.     Past medical, social histories reviewed.    Meds- Reviewed.     PHYSICAL EXAMINATION:   VITAL SIGNS:/70 (BP Location: Left arm, Patient Position: Sitting, Cuff Size: Adult Regular)  Pulse 87  Temp 97.6  F (36.4  C) (Oral)  Resp 16  Wt 83.9 kg (185 lb)  SpO2 98%  BMI 23.12 kg/m2  CONSTITUTIONAL: Sitting comfortably.   HEENT: Pupils are equal. Oropharynx is clear.   NECK: No cervical or supraclavicular lymphadenopathy.   RESPIRATORY: Clear bilaterally.   CARD/VASC: S1, S2, regular.   GI: Soft, nontender, nondistended, no hepatosplenomegaly.   MUSKULOSKELETAL: Warm, well perfused.   NEUROLOGIC: Alert, awake.   INTEGUMENT: No rash.   LYMPHATICS: No edema.   PSYCH: Mood and affect was normal.     LABORATORY DATA AND IMAGING REVIEWED DURING THIS VISIT:  Results for orders placed or performed during the hospital encounter of 11/14/18   CT Chest w/o contrast    Narrative    CT CHEST WITHOUT CONTRAST 11/14/2018 12:20 PM    HISTORY: 58-year-old patient with history of non-small cell carcinoma  of lung, stage III.    COMPARISON: July 11, 2018 and March 7, 2018.    TECHNIQUE: Axial and coronal noncontrast CT images obtained from the  lung apices through the lung bases. Radiation dose for this scan was  reduced using automated exposure control, adjustment of the mA and/or  kV according to patient size, or iterative reconstruction technique.    FINDINGS: The visible thyroid gland is unremarkable. No abnormally  enlarged mediastinal lymph nodes. Heart size is normal. No pericardial  or pleural effusion. The visible solid organs in the upper abdomen are  unremarkable. No acute osseous abnormality. Reticulointerstitial  pattern through the medial aspect of the right lung, especially in a  perihilar distribution. There are a cluster of nodules and opacity in  the medial aspect of the right lower lobe. One of the more prominent  of the nodules is 7 mm, though there are numerous nodules in this lung  segment,  not clearly identified on previous exam. This may relate to  further extent of radiation changes, though is a change from previous  exam, therefore warrants continued close surveillance. Remaining  reticulointerstitial pattern in a right perihilar distribution does  not appear significantly changed. Focal area of opacity, somewhat  groundglass is noted in the left lung in the lower lobe on image 31  series 4, not clearly identified on previous exam. This also warrants  close follow-up.      Impression    IMPRESSION:  1. Cluster of nodules in the medial aspect of the right lower lobe is  new since previous exam. Patchy opacity in the left lower lobe is also  new. Uncertain if these may be manifestations of evolution of scar  tissue, perhaps related to radiation, clinically correlate. However,  given is not clearly present on previous and relatively unchanged  reticulointerstitial pattern remaining segments of both lungs,  recommend short-term follow-up in three months.    JERI LINDSEY MD         ECOG PS: 2     ASSESSMENT:  Kt Rasmussen is a 58-year-old gentleman who initially presented with near obstructing large mass coming off the cheikh and likely the posterior wall on a bronchoscopy performed 05/18/2016.  He had a followup rigid bronchoscopy and corning out of endotracheal 05/05/2016.  Pathology result was consistent with invasive squamous cell carcinoma.  It was moderately differentiated and focally keratinizing.  He had significant bleeding during the procedure.  Tumor was completely debulked in both main stem bronchi and distal trachea.  PET/CT scan 05/23/2016, showed evidence of residual tumor decreased endobronchial mass.  There was indeterminate, mildly hypermetabolic mesentery with prominent lymph nodes and area of enhancement of the right hepatic lobe.  He completed concurrent chemoradiation.    - CT scan 7/11/18 results were reviewed with the patient- right lower lobe nodularity and a patchy opacity  in left lower lobe. These are subtle findings. I will repeat a CT scan in 3 months.     PLAN:    1.  RTC MD 3 months  2.  CT scan chest before next visit.     WILIAM GONZALEZ MD    11/21/2018      cc: Oneil Hayden MD     Again, thank you for allowing me to participate in the care of your patient.        Sincerely,        Wiliam Gonzalez MD

## 2018-11-21 NOTE — PATIENT INSTRUCTIONS
1.  RTC MD 3 months scheduled/ tia  2.  CT scan chest before next visit. Scheduled /Tia        Avs printed and given to patient/ Tia

## 2018-11-21 NOTE — PROGRESS NOTES
[facility] PHYSICIANS  HEMATOLOGY ONCOLOGY    ONCOLOGY FOLLOWUP NOTE      DIAGNOSIS:     1.  T4 NX M0 adenocarcinoma of the lung.      TREATMENT:  6/9/16 Concurrent chemoradiation with weekly carbo, Taxol. Finished radiation 7/30/16. Followed by 2 cycles of consolidation Carbo/Taxol.      SUBJECTIVE:  The patient was seen as a followup today. He has been feeling well. No new symptoms. No fever or cough.     REVIEW OF SYSTEMS:  A complete review of systems was performed and found to be negative other than pertinent positives mentioned in history of present illness.     Past medical, social histories reviewed.    Meds- Reviewed.     PHYSICAL EXAMINATION:   VITAL SIGNS:/70 (BP Location: Left arm, Patient Position: Sitting, Cuff Size: Adult Regular)  Pulse 87  Temp 97.6  F (36.4  C) (Oral)  Resp 16  Wt 83.9 kg (185 lb)  SpO2 98%  BMI 23.12 kg/m2  CONSTITUTIONAL: Sitting comfortably.   HEENT: Pupils are equal. Oropharynx is clear.   NECK: No cervical or supraclavicular lymphadenopathy.   RESPIRATORY: Clear bilaterally.   CARD/VASC: S1, S2, regular.   GI: Soft, nontender, nondistended, no hepatosplenomegaly.   MUSKULOSKELETAL: Warm, well perfused.   NEUROLOGIC: Alert, awake.   INTEGUMENT: No rash.   LYMPHATICS: No edema.   PSYCH: Mood and affect was normal.     LABORATORY DATA AND IMAGING REVIEWED DURING THIS VISIT:  Results for orders placed or performed during the hospital encounter of 11/14/18   CT Chest w/o contrast    Narrative    CT CHEST WITHOUT CONTRAST 11/14/2018 12:20 PM    HISTORY: 58-year-old patient with history of non-small cell carcinoma  of lung, stage III.    COMPARISON: July 11, 2018 and March 7, 2018.    TECHNIQUE: Axial and coronal noncontrast CT images obtained from the  lung apices through the lung bases. Radiation dose for this scan was  reduced using automated exposure control, adjustment of the mA and/or  kV according to patient size, or iterative reconstruction  technique.    FINDINGS: The visible thyroid gland is unremarkable. No abnormally  enlarged mediastinal lymph nodes. Heart size is normal. No pericardial  or pleural effusion. The visible solid organs in the upper abdomen are  unremarkable. No acute osseous abnormality. Reticulointerstitial  pattern through the medial aspect of the right lung, especially in a  perihilar distribution. There are a cluster of nodules and opacity in  the medial aspect of the right lower lobe. One of the more prominent  of the nodules is 7 mm, though there are numerous nodules in this lung  segment, not clearly identified on previous exam. This may relate to  further extent of radiation changes, though is a change from previous  exam, therefore warrants continued close surveillance. Remaining  reticulointerstitial pattern in a right perihilar distribution does  not appear significantly changed. Focal area of opacity, somewhat  groundglass is noted in the left lung in the lower lobe on image 31  series 4, not clearly identified on previous exam. This also warrants  close follow-up.      Impression    IMPRESSION:  1. Cluster of nodules in the medial aspect of the right lower lobe is  new since previous exam. Patchy opacity in the left lower lobe is also  new. Uncertain if these may be manifestations of evolution of scar  tissue, perhaps related to radiation, clinically correlate. However,  given is not clearly present on previous and relatively unchanged  reticulointerstitial pattern remaining segments of both lungs,  recommend short-term follow-up in three months.    JERI LINDSEY MD         ECOG PS: 2     ASSESSMENT:  Kt Rasmussen is a 58-year-old gentleman who initially presented with near obstructing large mass coming off the cheikh and likely the posterior wall on a bronchoscopy performed 05/18/2016.  He had a followup rigid bronchoscopy and corning out of endotracheal 05/05/2016.  Pathology result was consistent with invasive  squamous cell carcinoma.  It was moderately differentiated and focally keratinizing.  He had significant bleeding during the procedure.  Tumor was completely debulked in both main stem bronchi and distal trachea.  PET/CT scan 05/23/2016, showed evidence of residual tumor decreased endobronchial mass.  There was indeterminate, mildly hypermetabolic mesentery with prominent lymph nodes and area of enhancement of the right hepatic lobe.  He completed concurrent chemoradiation.    - CT scan 7/11/18 results were reviewed with the patient- right lower lobe nodularity and a patchy opacity in left lower lobe. These are subtle findings. I will repeat a CT scan in 3 months.     PLAN:    1.  RTC MD 3 months  2.  CT scan chest before next visit.     OTIS GARY MD    11/21/2018      cc: Oneil Hayden MD

## 2019-01-29 NOTE — PROCEDURES
RADIOLOGY PROCEDURE NOTE  Patient name: Kt Rasmussen  MRN: [id number]  : 1960    Pre-procedure diagnosis: completed treatment for lung cancer.   Post-procedure diagnosis: Same    Procedure Date/Time: 2017  10:30 AM  Procedure: right ij power port removal.  Estimated blood loss: None  Specimen(s) collected with description: none  The patient tolerated the procedure well with no immediate complications.  Significant findings:none    See imaging dictation for procedural details.    Provider name: Sandi Chavez  Assistant(s):None       Declines

## 2019-02-13 ENCOUNTER — HOSPITAL ENCOUNTER (OUTPATIENT)
Dept: CT IMAGING | Facility: CLINIC | Age: 59
Discharge: HOME | End: 2019-02-13
Attending: INTERNAL MEDICINE | Admitting: INTERNAL MEDICINE
Payer: MEDICARE

## 2019-02-13 DIAGNOSIS — C34.90 NON-SMALL CELL CARCINOMA OF LUNG, STAGE 3, UNSPECIFIED LATERALITY (H): ICD-10-CM

## 2019-02-13 PROCEDURE — 71250 CT THORAX DX C-: CPT

## 2019-02-28 ENCOUNTER — ONCOLOGY VISIT (OUTPATIENT)
Dept: ONCOLOGY | Facility: CLINIC | Age: 59
End: 2019-02-28
Attending: INTERNAL MEDICINE
Payer: MEDICARE

## 2019-02-28 VITALS
BODY MASS INDEX: 23.7 KG/M2 | HEART RATE: 84 BPM | WEIGHT: 194.6 LBS | HEIGHT: 76 IN | TEMPERATURE: 97.9 F | OXYGEN SATURATION: 98 % | DIASTOLIC BLOOD PRESSURE: 72 MMHG | SYSTOLIC BLOOD PRESSURE: 110 MMHG

## 2019-02-28 DIAGNOSIS — C34.90 NON-SMALL CELL CARCINOMA OF LUNG, STAGE 3, UNSPECIFIED LATERALITY (H): Primary | ICD-10-CM

## 2019-02-28 PROCEDURE — G0463 HOSPITAL OUTPT CLINIC VISIT: HCPCS

## 2019-02-28 PROCEDURE — 99213 OFFICE O/P EST LOW 20 MIN: CPT | Performed by: INTERNAL MEDICINE

## 2019-02-28 ASSESSMENT — PAIN SCALES - GENERAL: PAINLEVEL: NO PAIN (0)

## 2019-02-28 ASSESSMENT — MIFFLIN-ST. JEOR: SCORE: 1791.26

## 2019-02-28 NOTE — LETTER
"    2/28/2019         RE: Kt Rasmussen  [id number]  [place] [facility] [address]        Dear Colleague,    Thank you for referring your patient, Kt Rasmussen, to the [facility]. Please see a copy of my visit note below.    [facility] PHYSICIANS  HEMATOLOGY ONCOLOGY    ONCOLOGY FOLLOWUP NOTE      DIAGNOSIS:     1.  T4 NX M0 adenocarcinoma of the lung.      TREATMENT:  6/9/16 Concurrent chemoradiation with weekly carbo, Taxol. Finished radiation 7/30/16. Followed by 2 cycles of consolidation Carbo/Taxol.      SUBJECTIVE:  The patient was seen as a followup today. He has been feeling well. No respiratory symptoms.     REVIEW OF SYSTEMS:  A complete review of systems was performed and found to be negative other than pertinent positives mentioned in history of present illness.     Past medical, social histories reviewed.    Meds- Reviewed.     PHYSICAL EXAMINATION:   VITAL SIGNS:/72   Pulse 84   Temp 97.9  F (36.6  C) (Oral)   Ht 1.918 m (6' 3.5\")   Wt 88.3 kg (194 lb 9.6 oz)   SpO2 98%   BMI 24.00 kg/m     CONSTITUTIONAL: Sitting comfortably.   HEENT: Pupils are equal. Oropharynx is clear.   NECK: No cervical or supraclavicular lymphadenopathy.   RESPIRATORY: Clear bilaterally.   CARD/VASC: S1, S2, regular.   GI: Soft, nontender, nondistended, no hepatosplenomegaly.   MUSKULOSKELETAL: Warm, well perfused.   NEUROLOGIC: Alert, awake.   INTEGUMENT: No rash.   LYMPHATICS: No edema.   PSYCH: Mood and affect was normal.     LABORATORY DATA AND IMAGING REVIEWED DURING THIS VISIT:  Results for orders placed or performed during the hospital encounter of 02/13/19   CT Chest w/o contrast    Narrative    CT CHEST WITHOUT CONTRAST February 13, 2019 12:51 PM     HISTORY: Follow up lung cancer. Non-small cell carcinoma of lung,  stage 3, unspecified laterality (H).    COMPARISON: January 14, 2018.    TECHNIQUE: Volumetric helical acquisition of CT images of the chest  from the clavicles to the " kidneys were acquired without IV contrast.  Radiation dose for this scan was reduced using automated exposure  control, adjustment of the mA and/or kV according to patient size, or  iterative reconstruction technique.    FINDINGS: Similar-appearing right-sided perimediastinal fibrotic  change. Ground-glass changes in the central left lower lobe seen  previously have resolved. Tiny nodule in the medial right lower lobe  is stable. No definite new nodules. No pleural or pericardial  effusion. No acute findings in the visualized upper abdomen. There are  moderate coronary vascular calcifications consistent with coronary  artery disease. There are mild atherosclerotic changes of the  visualized aorta and its branches. There is no evidence of aortic  aneurysm. Survey of the visualized bony structures demonstrates no  destructive bony lesions.       Impression    IMPRESSION: Stable exam without progressive malignancy demonstrated in  the chest.    WENCESLAO OLSEN MD       ECOG PS: 2     ASSESSMENT:  Kt Rasmussen is a 58-year-old gentleman who initially presented with near obstructing large mass coming off the cheikh and likely the posterior wall on a bronchoscopy performed 05/18/2016.  He had a followup rigid bronchoscopy and corning out of endotracheal 05/05/2016.  Pathology result was consistent with invasive squamous cell carcinoma.  It was moderately differentiated and focally keratinizing.  He had significant bleeding during the procedure.  Tumor was completely debulked in both main stem bronchi and distal trachea.  PET/CT scan 05/23/2016, showed evidence of residual tumor decreased endobronchial mass.  There was indeterminate, mildly hypermetabolic mesentery with prominent lymph nodes and area of enhancement of the right hepatic lobe.  He completed concurrent chemoradiation.    - CT scan 2/13/19 results were reviewed with the patient- stable findings.     PLAN:    1.  RTC MD 6 months  2.  CT scan chest before next  visit.     WILIAM GONZALEZ MD    2/28/2019        cc: Oneil Hayden MD     Again, thank you for allowing me to participate in the care of your patient.        Sincerely,        Wiliam Gonzalez MD

## 2019-02-28 NOTE — PROGRESS NOTES
"[facility] PHYSICIANS  HEMATOLOGY ONCOLOGY    ONCOLOGY FOLLOWUP NOTE      DIAGNOSIS:     1.  T4 NX M0 adenocarcinoma of the lung.      TREATMENT:  6/9/16 Concurrent chemoradiation with weekly carbo, Taxol. Finished radiation 7/30/16. Followed by 2 cycles of consolidation Carbo/Taxol.      SUBJECTIVE:  The patient was seen as a followup today. He has been feeling well. No respiratory symptoms.     REVIEW OF SYSTEMS:  A complete review of systems was performed and found to be negative other than pertinent positives mentioned in history of present illness.     Past medical, social histories reviewed.    Meds- Reviewed.     PHYSICAL EXAMINATION:   VITAL SIGNS:/72   Pulse 84   Temp 97.9  F (36.6  C) (Oral)   Ht 1.918 m (6' 3.5\")   Wt 88.3 kg (194 lb 9.6 oz)   SpO2 98%   BMI 24.00 kg/m    CONSTITUTIONAL: Sitting comfortably.   HEENT: Pupils are equal. Oropharynx is clear.   NECK: No cervical or supraclavicular lymphadenopathy.   RESPIRATORY: Clear bilaterally.   CARD/VASC: S1, S2, regular.   GI: Soft, nontender, nondistended, no hepatosplenomegaly.   MUSKULOSKELETAL: Warm, well perfused.   NEUROLOGIC: Alert, awake.   INTEGUMENT: No rash.   LYMPHATICS: No edema.   PSYCH: Mood and affect was normal.     LABORATORY DATA AND IMAGING REVIEWED DURING THIS VISIT:  Results for orders placed or performed during the hospital encounter of 02/13/19   CT Chest w/o contrast    Narrative    CT CHEST WITHOUT CONTRAST February 13, 2019 12:51 PM     HISTORY: Follow up lung cancer. Non-small cell carcinoma of lung,  stage 3, unspecified laterality (H).    COMPARISON: January 14, 2018.    TECHNIQUE: Volumetric helical acquisition of CT images of the chest  from the clavicles to the kidneys were acquired without IV contrast.  Radiation dose for this scan was reduced using automated exposure  control, adjustment of the mA and/or kV according to patient size, or  iterative reconstruction technique.    FINDINGS: " Similar-appearing right-sided perimediastinal fibrotic  change. Ground-glass changes in the central left lower lobe seen  previously have resolved. Tiny nodule in the medial right lower lobe  is stable. No definite new nodules. No pleural or pericardial  effusion. No acute findings in the visualized upper abdomen. There are  moderate coronary vascular calcifications consistent with coronary  artery disease. There are mild atherosclerotic changes of the  visualized aorta and its branches. There is no evidence of aortic  aneurysm. Survey of the visualized bony structures demonstrates no  destructive bony lesions.       Impression    IMPRESSION: Stable exam without progressive malignancy demonstrated in  the chest.    WENCESLAO OLSEN MD       ECOG PS: 2     ASSESSMENT:  tK Rasmussen is a 58-year-old gentleman who initially presented with near obstructing large mass coming off the cheikh and likely the posterior wall on a bronchoscopy performed 05/18/2016.  He had a followup rigid bronchoscopy and corning out of endotracheal 05/05/2016.  Pathology result was consistent with invasive squamous cell carcinoma.  It was moderately differentiated and focally keratinizing.  He had significant bleeding during the procedure.  Tumor was completely debulked in both main stem bronchi and distal trachea.  PET/CT scan 05/23/2016, showed evidence of residual tumor decreased endobronchial mass.  There was indeterminate, mildly hypermetabolic mesentery with prominent lymph nodes and area of enhancement of the right hepatic lobe.  He completed concurrent chemoradiation.    - CT scan 2/13/19 results were reviewed with the patient- stable findings.     PLAN:    1.  RTC MD 6 months  2.  CT scan chest before next visit.     OTIS GARY MD    2/28/2019        cc: Oneil Hayden MD

## 2019-08-14 ENCOUNTER — HOSPITAL ENCOUNTER (OUTPATIENT)
Dept: CT IMAGING | Facility: CLINIC | Age: 59
Discharge: HOME | End: 2019-08-14
Attending: INTERNAL MEDICINE | Admitting: INTERNAL MEDICINE
Payer: MEDICARE

## 2019-08-14 DIAGNOSIS — C34.90 NON-SMALL CELL CARCINOMA OF LUNG, STAGE 3, UNSPECIFIED LATERALITY (H): ICD-10-CM

## 2019-08-14 PROCEDURE — 71250 CT THORAX DX C-: CPT

## 2019-08-22 ENCOUNTER — ONCOLOGY VISIT (OUTPATIENT)
Dept: ONCOLOGY | Facility: CLINIC | Age: 59
End: 2019-08-22
Attending: INTERNAL MEDICINE
Payer: MEDICARE

## 2019-08-22 DIAGNOSIS — R49.0 VOICE HOARSENESS: ICD-10-CM

## 2019-08-22 DIAGNOSIS — C34.90 NON-SMALL CELL CARCINOMA OF LUNG, STAGE 3, UNSPECIFIED LATERALITY (H): Primary | ICD-10-CM

## 2019-08-22 PROCEDURE — G0463 HOSPITAL OUTPT CLINIC VISIT: HCPCS

## 2019-08-22 PROCEDURE — 99214 OFFICE O/P EST MOD 30 MIN: CPT | Performed by: NURSE PRACTITIONER

## 2019-08-22 NOTE — LETTER
8/22/2019         RE: Kt Rasmussen  [id number] [address]  [facility] [address]        Dear Colleague,    Thank you for referring your patient, Kt Rasmussen, to the [facility]. Please see a copy of my visit note below.    Oncology/Hematology Visit Note  Aug 22, 2019    Reason for Visit: follow up of adenocarcinoma of the lung T4 NX M0     He was treated with chemoradiation carbo and Taxol which he finished on July 30, 2016 followed by 2 cycles of consolidation carbo and taxol   Currently on surveillance     Patient comes here today for follow-up of the CT scan    Interval History:  Patient reports he noticed voice hoarseness for about 3 to 4 months.  He denies difficulty swallowing denies mouth sores denies swollen neck cervical adenopathy, patient tolerates p.o. Well.  Denies fever chills sweats denies cough no shortness of breath denies nausea vomiting diarrhea denies abdominal pain    Review of Systems:  14 point ROS of systems including Constitutional, Eyes, Respiratory, Cardiovascular, Gastroenterology, Genitourinary, Integumentary, Muscularskeletal, Psychiatric were all negative except for pertinent positives noted in my HPI.      Current Outpatient Medications   Medication Sig Dispense Refill     rosuvastatin (CRESTOR) 40 MG tablet Take 40 mg by mouth daily         Physical Examination:  General: The patient is a pleasant male in no acute distress.  There were no vitals taken for this visit.  HEENT: EOMI, PERRL. Sclerae are anicteric. Oral mucosa is pink and moist with no lesions or thrush.   Lymph: Neck is supple with no lymphadenopathy in the cervical or supraclavicular areas.   Heart: Regular rate and rhythm.   Lungs: Clear to auscultation bilaterally.   GI: Bowel sounds present, soft, nontender with no palpable hepatosplenomegaly or masses.   Extremities: No lower extremity edema noted bilaterally.   Skin: No rashes, petechiae, or bruising noted on exposed skin.        Assessment and  Plan:    This is a 59-year-old male with    adenocarcinoma of the lung T4 NX M0   He was treated with chemoradiation carbo and Taxol which he finished on July 30, 2016 followed by 2 cycles of consolidation carbo and taxol   Currently on surveillance   -08/14/2019- Stable exam without progressive malignancy demonstrated in  the chest.  -Order CT chest in 6 months.  Schedule appointment as a new patient (previous patient of Dr. Gonzalez) with MD after the CT scan      Voice hoarseness  Refer  to ENT  Go to ER if worsening of symptoms       MADHAVI Moran CNP  Hem/Onc   [facility]     Chart documentation with Dragon Voice recognition Software. Although reviewed after completion, some words and grammatical errors may remain.          Again, thank you for allowing me to participate in the care of your patient.        Sincerely,        MADHAVI Moran CNP

## 2019-08-22 NOTE — PROGRESS NOTES
Oncology/Hematology Visit Note  Aug 22, 2019    Reason for Visit: follow up of adenocarcinoma of the lung T4 NX M0     He was treated with chemoradiation carbo and Taxol which he finished on July 30, 2016 followed by 2 cycles of consolidation carbo and taxol   Currently on surveillance     Patient comes here today for follow-up of the CT scan    Interval History:  Patient reports he noticed voice hoarseness for about 3 to 4 months.  He denies difficulty swallowing denies mouth sores denies swollen neck cervical adenopathy, patient tolerates p.o. Well.  Denies fever chills sweats denies cough no shortness of breath denies nausea vomiting diarrhea denies abdominal pain    Review of Systems:  14 point ROS of systems including Constitutional, Eyes, Respiratory, Cardiovascular, Gastroenterology, Genitourinary, Integumentary, Muscularskeletal, Psychiatric were all negative except for pertinent positives noted in my HPI.      Current Outpatient Medications   Medication Sig Dispense Refill     rosuvastatin (CRESTOR) 40 MG tablet Take 40 mg by mouth daily         Physical Examination:  General: The patient is a pleasant male in no acute distress.  There were no vitals taken for this visit.  HEENT: EOMI, PERRL. Sclerae are anicteric. Oral mucosa is pink and moist with no lesions or thrush.   Lymph: Neck is supple with no lymphadenopathy in the cervical or supraclavicular areas.   Heart: Regular rate and rhythm.   Lungs: Clear to auscultation bilaterally.   GI: Bowel sounds present, soft, nontender with no palpable hepatosplenomegaly or masses.   Extremities: No lower extremity edema noted bilaterally.   Skin: No rashes, petechiae, or bruising noted on exposed skin.        Assessment and Plan:    This is a 59-year-old male with    adenocarcinoma of the lung T4 NX M0   He was treated with chemoradiation carbo and Taxol which he finished on July 30, 2016 followed by 2 cycles of consolidation carbo and taxol   Currently on  surveillance   -08/14/2019- Stable exam without progressive malignancy demonstrated in  the chest.  -Order CT chest in 6 months.  Schedule appointment as a new patient (previous patient of Dr. Gonzalez) with MD after the CT scan      Voice hoarseness  Refer  to ENT  Go to ER if worsening of symptoms       MADHAVI Moran CNP  Hem/Onc   [facility]     Chart documentation with Dragon Voice recognition Software. Although reviewed after completion, some words and grammatical errors may remain.

## 2019-08-22 NOTE — PATIENT INSTRUCTIONS
You are scheduled with Dr Acharya  9-11-19 1:45pm  [facility] [place] [phone] [address] [facility] [address]    Please check in 15min early   Bring Insurance Card and Photo ID    Pt discharged by Shalom.  Unable to complete the rooming process, pt needed to be seen by Shalom.  Shari Schoenberger, CMA

## 2019-08-28 ENCOUNTER — DOCUMENTATION ONLY (OUTPATIENT)
Dept: OTHER | Facility: CLINIC | Age: 59
End: 2019-08-28

## 2019-09-11 ENCOUNTER — TELEPHONE (OUTPATIENT)
Dept: OTOLARYNGOLOGY | Facility: CLINIC | Age: 59
End: 2019-09-11

## 2019-09-11 NOTE — TELEPHONE ENCOUNTER
[facility] Call Center    Phone Message    May a detailed message be left on voicemail: yes    Reason for Call: Other: Pt's good friend, Ari, called in and was trying to schedule appointment for pt. Ptis being referred by Dr. Acharya at [facility] # [phone]. They are wanting him to have a biopsy. Please advise     Action Taken: Message routed to:  [facility] ([facility] ENT

## 2019-09-12 ENCOUNTER — TELEPHONE (OUTPATIENT)
Dept: OTOLARYNGOLOGY | Facility: CLINIC | Age: 59
End: 2019-09-12

## 2019-09-12 NOTE — PROGRESS NOTES
[facility]       Patient: Kt Rasmussen  MRN: [id number]    : 1960  Age/Gender: 59 year old male  Date of Service: 2019     PCP and Referring Provider(s):    PCP: Bertrand Bauer  Referring Physician: Juan Rausch    Reason for Consultation:   Dysphonia       History:    HISTORY OF PRESENT ILLNESS: Mr. Rasmussen is a 59 year old male who presents to us today with dysphonia for the past summer. It started gradually and now it is much more severe. He denies any dysphagia or dyspnea. He has a history of lung cancer for which he was treated and is in remission - he received chemoradiation carbo and Taxol which he finished on 2016. He is prior a smoker. He denies being a velarde or using his voice a lot during the day. Denies any lumps or bumps and new fevers, chills and night sweats. 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 He did not have any voice or swallow  problems at that time. He comes in with his friend today.     Currently on surveillance     PAST MEDICAL HISTORY:   Past Medical History:   Diagnosis Date     Alcohol abuse, unspecified      Cerebral infarction (H)      Lung cancer (H)      Unspecified essential hypertension          PAST SURGICAL HISTORY:   Past Surgical History:   Procedure Laterality Date     BRONCHOSCOPY FLEXIBLE AND RIGID N/A 2016    Procedure: BRONCHOSCOPY FLEXIBLE AND RIGID;  Surgeon: Tony Talbot MD;  Location:  OR      NONSPECIFIC PROCEDURE      tympanoplasty         CURRENT MEDICATIONS:   Current Outpatient Medications:      rosuvastatin (CRESTOR) 40 MG tablet, Take 40 mg by mouth daily, Disp: , Rfl:       ALLERGIES: No known drug allergies      SOCIAL HISTORY:    Social History     Socioeconomic History     Marital status: Single     Spouse name: Not on file     Number of children: Not on file     Years of education: Not on file     Highest  education level: Not on file   Occupational History     Not on file   Social Needs     Financial resource strain: Not on file     Food insecurity:     Worry: Not on file     Inability: Not on file     Transportation needs:     Medical: Not on file     Non-medical: Not on file   Tobacco Use     Smoking status: Former Smoker     Packs/day: 1.00     Types: Cigarettes     Smokeless tobacco: Never Used   Substance and Sexual Activity     Alcohol use: Yes     Drug use: No     Sexual activity: Not on file   Lifestyle     Physical activity:     Days per week: Not on file     Minutes per session: Not on file     Stress: Not on file   Relationships     Social connections:     Talks on phone: Not on file     Gets together: Not on file     Attends Jehov[employer]'s witness service: Not on file     Active member of club or organization: Not on file     Attends meetings of clubs or organizations: Not on file     Relationship status: Not on file     Intimate partner violence:     Fear of current or ex partner: Not on file     Emotionally abused: Not on file     Physically abused: Not on file     Forced sexual activity: Not on file   Other Topics Concern     Not on file   Social History Narrative     Not on file           FAMILY HISTORY: Non-contributory for problems with anesthesia      REVIEW OF SYSTEMS: The patient was asked a 14 point review of systems regarding constitutional symptoms, eye symptoms, ears, nose, mouth, throat symptoms, cardiovascular symptoms, respiratory symptoms, gastrointestinal symptoms, genitourinary symptoms, musculoskeletal symptoms, integumentary symptoms, neurological symptoms, psychiatric symptoms, endocrine symptoms, hematologic/lymphatic symptoms, and allergic/ immunologic symptoms.   The pertinent factors have been included in the HPI, and the complete list can be found in the patient's written chart.      Physical Examination:  The patient underwent a physical examination as described below. The pertinent  positive and negative findings are summarized after the description of the examination.    Constitutional: The patient's developmental and nutritional status was assessed. The patient's voice quality was assessed.  Head and Face: The head and face were inspected for deformities. The sinuses were palpated. The salivary glands were palpated. Facial muscle strength was assessed bilaterally.  Eyes: Extraocular movements and primary gaze alignment were assessed.  Ears, Nose, Mouth and Throat: The ears and nose were examined for deformities. The nasal septum, mucosa, and turbinates were inspected by anterior rhinoscopy. The lips, teeth, and gums were examined for abnormalities. The oral mucosa, tongue, palate, tonsils, lateral and posterior pharynx were inspected for the presence of asymmetry or mucosal lesions.    Neck: The tracheal position was noted, and the neck mass palpated to determine if there were any asymmetries, abnormal neck masses, thyromegally, or thyroid nodules.  Respiratory: The nature of the breathing and chest expansion/symmetry was observed.  Cardiovascular: The patient was examined to determine the presence of any edema or jugular venous distension.  Abdomen: The contour of the abdomen was noted.  Lymphatic: The patient was examined for infraclavicular lymphadenopathy.  Musculoskeletal: The patient was inspected for the presence of skeletal deformities.  Extremities: The extremities were examined for any clubbing or cyanosis.  Skin: The skin was examined for inflammatory or neoplastic conditions.  Neurologic: The patient's orientation, mood, and affect were noted. The cranial nerve  functions were examined.    Other pertinent positive and negative findings on physical examination:  Right arm paralysis  AU: clear, TM intact, no effusion  Anterior rhinoscopy: right septal deviation  OC/OP: soft palate elevates symmetrically, uvula midline, tongue midline, FOM/BOT soft  Neck: soft and flat, no LAD, no TH  space pain     All other physical examination findings were within normal limits and noncontributory.    Procedures:   Video Laryngoscopy with Stroboscopy (CPT 53348) and Behavioral & Qualitative Evaluation of Voice and Resonence (CPT 69672)     Preoperative Diagnosis:  Dysphonia and throat symptoms  Postoperative Diagnosis: Dysphonia and throat symptoms  Indication:  Patient has new or persistent dysphonia and throat symptoms.  This requires evaluation by stroboscopy to fully delineate the laryngeal functioning; especially mucosal wave assessment, ultrasharp visualization of lesions on the vocal folds, and overall functioning of the larynx.  Details of Procedure: A 70 degree rigid telescopic laryngoscope or a distal chip flexible scope was used. The lighting was obtained via a light cable connected to a stroboscopic unit and NBI at the end of the exam. The telescope was inserted either transorally or transnasally until the vocal folds could be visualized. The patient was instructed to sustain the vowel  ee  at a comfortable pitch and loudness as the voice was monitored by a microphone connected to a fundamental frequency tracker. This circuit tracked vocal periodicity, allowing the light to flash in synchrony with the glottal cycles. A setting on the stroboscope was set to change the phase of light strobing with relation to the vocal fundamental frequency, producing an image of 1 to 2 glottal cycles every second. The video images were recorded for analysis. Use of the variable speed, slow and stop scan allowed careful study of pertinent segments of laryngeal function to increase accuracy of clinical assessments of function and dysfunction.  In particular, features of glottal closure, mucosal wave on the vocal fold cover and laryngeal symmetry were analyzed. Lastly, the patient was asked to phonate speech samples and auditory/perceptual evaluation of voice and resonance were performed.  The vocal quality was  carefully evaluated for hoarseness, breathiness, loudness, phrase length and intelligibility to determine the source of dysphonia.    Findings:   A. BEHAVIORAL & QUALITATIVE EVALUATION OF VOICE AND RESONENCE   Comments: Vocal Quality: breathy and rough    Pitch Range:   severely reduced  Phrase Length: severely reduced  Vocal Loudness: severely reduced  Dysarthria no    B. LARYNGOVIDEOSTROBOSCOPY   Anatomic/Physiological Deviations:  Left vocal fold exophytic lesion extending from the vocal process towards the anterior commissure but not involving the anterior commissure    Mucosal wave: Right:  no restriction      Left: no mucosal wave observed  Bilateral Vocal Fold Vibration: asymmetric  Vocal Process: Right: no restriction   Left:  no restriction  Vocal Fold closure:  Complete     Complication(s): None  Disposition: Patient tolerated the procedure well        Laryngoscopy with Biopsy ([id number])      Pre-procedure diagnosis: Laryngeal lesion  Post-procedure diagnosis: Same  Indication for procedure: Mr. Rasmussen is a 59 year old male with a laryngeal lesion.Biopsy is being performed for diagnostic purposes.  Procedure(s): Flexible Laryngoscopy with Biopsy   Details of Procedure: After informed consent was obtained, laryngoscopy was performed transnasally. Then, topical anesthesia was achieved by spraying 4% topical lidocaine directly onto the larynx through a working channel in the endoscope or via a trans-cutaneous route.  After adequate anesthesia was achieved a biting forceps was threaded through the working channel and multiple samples of the area of concern was performed. Having completed this the operation was completed and the scope withdrawn atraumatically. The specimen was labeled and sent for pathologic examination.   Findings:   Anatomic/physiological deviations: left exophytic vocal fold lesion   Right vocal process: no restriction of mobility   Left vocal process:  no restriction of mobility  Glottal gap:   complete glottal closure   Supraglottic structures: questionable extension of the mass onto the left false vocal fold  Hypopharynx: normal    Estimated Blood Loss: minimal  Complications: none  Disposition: Patient tolerated the procedure well        Review of Relevant Clinical Data:    Radiology  CT chest 8/14/19  Stable exam without progressive malignancy demonstrated in  the chest.    Pathology:none    Labs:  Lab Results   Component Value Date    TSH 1.05 11/13/2007     Lab Results   Component Value Date     11/03/2016    CO2 27 11/03/2016    BUN 13 11/03/2016    PHOS 2.0 (L) 05/04/2016     Lab Results   Component Value Date    WBC 3.6 (L) 07/26/2017    HGB 12.5 (L) 07/26/2017    HCT 37.7 (L) 07/26/2017    MCV 90 07/26/2017     (L) 07/26/2017     No results found for: ELIZABET  No components found for: RHEUMATOIDFACTOR,  RF  Lab Results   Component Value Date    CRP 0.16 07/24/2003     No components found for: CKTOT, URICACID  No components found for: C3, C4, DSDNAAB, NDNAABIFA  No results found for: MPOAB       Impression/Plan:      IMPRESSION: Mr. Rasmussen is a 59 year old male who is being seen for dysphonia due to a left vocal fold lesion that is highly concerning for SCC. In office biopsy offered today and patient agreed. Understands that if biopsy is not diagnostic or negative will need further OR biopsy. Will call back after biopsy results.   Also called guardian with [facility]     RETURN VISIT: after biopsy results    Thank you for the kind referral and for allowing me to share in the care of Mr. Rasmussen. If you have any questions, please do not hesitate to contact me.        Lizzy Wray MD    of Otolaryngology - Head and Neck Surgery   Voice, Airway, and Swallowing Disorders   [facility] at the [facility] [address] [address]  Phone: [phone]  Fax: [phone]    Maggie Daigle  [address] [address]  Phone: [phone]  Fax: [phone]

## 2019-09-12 NOTE — TELEPHONE ENCOUNTER
FUTURE VISIT INFORMATION      FUTURE VISIT INFORMATION:    Date: 9/13/19    Time: 10:20AM    [place] [facility] [place] INFORMATION:    Referring provider:  Juan Rausch APRN CNP    Referring providers clinic:  [facility]     Reason for visit/diagnosis  neck mass and hoarseness     RECORDS REQUESTED FROM:       Clinic name Comments Records Status Imaging Status   [facility]  8/22/19 notes with Juan Rausch NP EPIC    [facility] 5/23/16 PET  5/31/16 MR [facility]ain  [facility] PACS   [facility] Office Visit-9/11/19-Dr. Bunny Acharya In drawer                        9/12/19 2:18PM sent a fax to [facility] for recs from Dr Arambula - riley   9/13/2019 8:10 am-Called clinic and they are faxing office note.  PB  9/13/2019 -8:55 AM Received records from [facility].  Put in drawer.  JULIAN

## 2019-09-13 ENCOUNTER — PRE VISIT (OUTPATIENT)
Dept: OTOLARYNGOLOGY | Facility: CLINIC | Age: 59
End: 2019-09-13

## 2019-09-13 ENCOUNTER — TELEPHONE (OUTPATIENT)
Dept: OTOLARYNGOLOGY | Facility: CLINIC | Age: 59
End: 2019-09-13

## 2019-09-13 ENCOUNTER — OFFICE VISIT (OUTPATIENT)
Dept: OTOLARYNGOLOGY | Facility: CLINIC | Age: 59
End: 2019-09-13
Payer: MEDICARE

## 2019-09-13 ENCOUNTER — OFFICE VISIT (OUTPATIENT)
Dept: OTOLARYNGOLOGY | Facility: CLINIC | Age: 59
End: 2019-09-13
Attending: NURSE PRACTITIONER
Payer: MEDICARE

## 2019-09-13 VITALS
DIASTOLIC BLOOD PRESSURE: 64 MMHG | SYSTOLIC BLOOD PRESSURE: 117 MMHG | WEIGHT: 187 LBS | HEART RATE: 74 BPM | BODY MASS INDEX: 23.06 KG/M2

## 2019-09-13 DIAGNOSIS — R49.0 DYSPHONIA: Primary | ICD-10-CM

## 2019-09-13 DIAGNOSIS — J38.3 LESION OF VOCAL FOLD: Primary | ICD-10-CM

## 2019-09-13 DIAGNOSIS — J38.3 LESION OF VOCAL FOLD: ICD-10-CM

## 2019-09-13 DIAGNOSIS — J38.3 VOCAL CORD LEUKOPLAKIA: ICD-10-CM

## 2019-09-13 DIAGNOSIS — R49.0 DYSPHONIA: ICD-10-CM

## 2019-09-13 PROCEDURE — 88305 TISSUE EXAM BY PATHOLOGIST: CPT | Performed by: OTOLARYNGOLOGY

## 2019-09-13 ASSESSMENT — PAIN SCALES - GENERAL: PAINLEVEL: NO PAIN (0)

## 2019-09-13 NOTE — PROGRESS NOTES
"[facility] VOICE CLINIC  Humberto Alvarado Jr., M.D., F.A.C.S.  Ibeth Bryan M.D., M.P.H.  Estelle Thurston, Ph.D., CCC/SLP  Jocelynn Warren M.M. (voice), M.A., CCC/SLP  Titi Romo M.M. (voice), M.A., CCC/SLP    Evaluation report    Clinician: Jocelynn Warren M.M. (voice), M.A., CCC/SLP  Seen in conjunction with: Dr. Wray  Referring physician:  Self/ Dr. Acharya  Patient: Kt Rasmussen  Date of Visit: 9/13/2019    HISTORY  Chief complaint: Kt Rasmussen is a 59 year old presenting today for evaluation of voice and throat issues.  Salient history: He has a history of T4NxM0 adenocarcinoma of the lung unsure of laterality status post chemoradiation in 2016.  He also has a Hx of tobacco use.    Onset: 1 month ago noted in ENT note; however, Mr. Rasmussen reports that his voice quality has been gradually worsening over the past 3 months.  Inciting incident: no inciting event    CURRENT SYMPTOMS INCLUDE  VOICE    Moderate hoarseness; worsening over the course of the day for the past month    Evaluated by Dr. Bunny Acharya yesterday at [facility].  Impressions were of a \"left true vocal cord with diffuse non-obstructing lesion, airway is patent\"    THROAT ISSUES    Denies significant cough, throat clearing, globus    SWALLOWING    Denies issues     RESPIRATION    Denies issues    OTHER PERTINENT HISTORY    Hx of stroke with right mary-paresis; noted some auditory comprehension issues today when provided with instruction.  However, this improved with rewording (ie: \"swallow your saliva\" vs \"swallow\")    Hx of smoking; smoked for 10 years.    Hx of ETOH    Complex medical history: please also refer to Dr. Wray's dictation.     Past Medical History:   Diagnosis Date     Alcohol abuse, unspecified      Cerebral infarction (H)      Lung cancer (H)      Unspecified essential hypertension      Past Surgical History:   Procedure Laterality Date     BRONCHOSCOPY FLEXIBLE AND RIGID N/A " "5/5/2016    Procedure: BRONCHOSCOPY FLEXIBLE AND RIGID;  Surgeon: Tony Talbot MD;  Location: [facility] OR     C NONSPECIFIC PROCEDURE      tympanoplasty       OBJECTIVE  PATIENT REPORTED MEASURE  Patient Supplied Answers To VHI Questionnaire  No flowsheet data found.    Patient Supplied Answers To CSI Questionnaire  No flowsheet data found.    Patient Supplied Answers To EAT Questionnaire  No flowsheet data found.      PERCEPTUAL EVALUATION (CPT 81663)  POSTURE / TENSION:     upper body     Right arm is limited in mobility due to Hx of stroke.    BREATHING:     appears within normal limits and adequate     LARYNGEAL PALPATION:     firm musculature    VOICE:    Roughness: Minimal    Breathiness: Severe    Strain: Severe    Loudness    Conversational speech:  Severely reduced    Projected speech:  Profoundly reduced    Pitch:    Conversational speech:  Moderately elevated    Pitch glide: limited throughout range    Resonance:    Conversational speech:  laryngeal pharyngeal resonance    Singing vs. Speech:  n/a    CAPE-V Overall Severity:  60/100    COUGH/THROAT CLEARING:    Not observed    LARYNGEAL FUNCTION STUDIES (CPT 07290)  Not completed today, but possibly helpful for perioperative care.    ORAL MOTOR EXAMINATION    Face: Normal/ Flat affect    Lip Strength: Intact    Jaw Strength: Intact    Tongue Strength: Intact    Buccal Strength: Intact    eeth:    Upper and lower intact    Alternating motion rates (AMRs): WNL    Sequential motion rates (SMRs): WNL  *It should be noted that he also had some difficulty following directions; cues were modified to facilitate accuracy during today's laryngeal exam.  For example: \"swallow your saliva\", rather than just swallow to initiate the action of a swallow.    LARYNGEAL EXAMINATION  Procedure: Flexible endoscopy with chip-tip technology with stroboscopy, left nostril; topical anesthesia with 3% Lidocaine and 0.25% phenylephrine was applied.   Performed by: Dr. Wray "   The laryngeal and pharyngeal structures were evaluated for gross appearance, mobility, function, and focal lesions / abnormalities of the associated mucosa.  Stroboscopy was warranted to evaluate closure, symmetry, and vibratory characteristics of the vocal folds.  All findings were within normal limits with the exception of the following salient features:   This exam shows:    Symmetrical elevation of the soft palate  Laryngeal and Vocal Fold Mucosa    No remarkable signs of reflux    Status of vocal fold mucosa:   o Right vocal fold appears WNL  o Left vocal fold is comprised of a hypertrophic leukoplakia mass, which does not extend to the anterior commissure.    The swellings result in an irregular glottis    Narrow Band Imaging yielded the following: highlighted the leukoplakia present.     Neurological and Functional Integrity of the Larynx    Vocal folds are mobile and meet at midline; movement is brisk and symmetric; exam is neurologically normal     Airway is adequate    Supraglottic Function and Therapy Probes    moderate four-way constriction of the supraglottic larynx during connected speech    The addition of stroboscopy allowed evaluation of the mucosal wave:    Amplitude: right: WNL; left: absent    Symmetry: associated asymmetry.    Closure pattern: irregular.     Closure plane: at glottic level.     Phase distribution: normal.       Abducted view shows that the left vocal fold mass does not extend to the anterior commissure.        Narrow band imaging view    The laryngeal exam was reviewed with Mr. Rasmussen, and I provided pertinent explanations, as well as written and oral information.    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  Dr. Wray has  discussed the highly irregular changes with Mr. Bonilla, and they agreed to proceed with an in-clinic biopsy today.    RECOMMENDATIONS:     Therapy was not warranted today, as a biopsy will be pursued.       He does not have significant amounts of talking as part of his daily activities.    He demonstrates mild difficulty following directions, which is likely related to his Hx of stroke.    Therapy should be pursued in the future, depending on the results of today's biopsy to optimize perioperative care.    A course of speech therapy is recommended to optimize vocal technique, improve voice quality, promote reduced discomfort, effort and fatigue, optimize surgical results and help reduce mucosal irritation.    He demonstrates a Good prognosis for improvement given adherence to therapeutic recommendations.     Positive indicators: diagnosis is known to respond to treatment    Negative indicators: none    DURATION / FREQUENCY: 3 bi-weekly one-hour sessions.  A total of 6-8 sessions may be necessary.    GOALS:  Patient goal:   1. To improve and maintain a healthy voice quality  2. To resolve the vocal fold lesions  3. To understand the problem and fix it as much as possible  4. To have a normal and acceptable voice quality    Short-term goal(s): Within the first 4 sessions, Mr. Rasmussen:  1. will demonstrate improved awareness of throat clearing / cough: acknowledging >75% of all cough events during session time with no clinician support  2. will be able to independently list key factors in maintenance of good vocal hygiene with 80% accuracy, and report on their use outside the therapy room.  3. will demonstrate semi-occluded vocal tract (SOVT) exercises with at least 80% accuracy with no clinician support  4. Initiate perioperative voice care    Long-term goal(s): In 6 months, Mr. Rasmussen will:  1. Report resolution of dysphonia, and use of optimal voice quality to meet personal, social, and professional needs, 80% of  the time during a typical week of vocal activities    Certification period: Certification date from: 9/13/19  Certification date to: 12/12/19    TOTAL SERVICE TIME: 60 minutes  EVALUATION OF VOICE AND RESONANCE (02414)  NO CHARGE FACILITY FEE (72023)    Jocelynn Warren M.M. (voice), M.A., CCC/SLP  Speech-Language Pathologist  [facility] Trained Vocologist  [facility]  [phone]  [email or web address]  Prounouns: she/her

## 2019-09-13 NOTE — NURSING NOTE
Chief Complaint   Patient presents with     Consult     Voice hoarseness     Blood pressure 117/64, pulse 74, weight 84.8 kg (187 lb).    Devorah Arellano EMT

## 2019-09-13 NOTE — LETTER
"9/13/2019       RE: Kt Rasmussen  [address]  [place] [address]     Dear Colleague,    Thank you for referring your patient, Kt Rasmussen, to the [facility]. Please see a copy of my visit note below.    [facility] VOICE CLINIC  Humberto Alvarado Jr., M.D., F.A.C.S.  Ibeth Bryan M.D., M.P.H.  Estelle Thurston, Ph.D., CCC/SLP  Jocelynn Warren M.M. (voice), M.A., CCC/SLP  Titi Romo M.M. (voice), M.A., CCC/SLP    Evaluation report    Clinician: Jocelynn Warren M.M. (voice), M.A., CCC/SLP  Seen in conjunction with: Dr. Wray  Referring physician:  Self/ Dr. Acharya  Patient: Kt Rasmussen  Date of Visit: 9/13/2019    HISTORY  Chief complaint: Kt Rasmussen is a 59 year old presenting today for evaluation of voice and throat issues.  Salient history: He has a history of T4NxM0 adenocarcinoma of the lung unsure of laterality status post chemoradiation in 2016.  He also has a Hx of tobacco use.    Onset: 1 month ago noted in ENT note; however, Mr. Rasmussen reports that his voice quality has been gradually worsening over the past 3 months.  Inciting incident: no inciting event    CURRENT SYMPTOMS INCLUDE  VOICE    Moderate hoarseness; worsening over the course of the day for the past month    Evaluated by Dr. Bunny Acharya yesterday at [facility].  Impressions were of a \"left true vocal cord with diffuse non-obstructing lesion, airway is patent\"    THROAT ISSUES    Denies significant cough, throat clearing, globus    SWALLOWING    Denies issues     RESPIRATION    Denies issues    OTHER PERTINENT HISTORY    Hx of stroke with right mary-paresis; noted some auditory comprehension issues today when provided with instruction.  However, this improved with rewording (ie: \"swallow your saliva\" vs \"swallow\")    Hx of smoking; smoked for 10 years.    Hx of ETOH    Complex medical history: please also refer to Dr. Wray's " "dictation.     Past Medical History:   Diagnosis Date     Alcohol abuse, unspecified      Cerebral infarction (H)      Lung cancer (H)      Unspecified essential hypertension      Past Surgical History:   Procedure Laterality Date     BRONCHOSCOPY FLEXIBLE AND RIGID N/A 5/5/2016    Procedure: BRONCHOSCOPY FLEXIBLE AND RIGID;  Surgeon: Tony Talbot MD;  Location: [facility] OR     C NONSPECIFIC PROCEDURE      tympanoplasty       OBJECTIVE  PATIENT REPORTED MEASURE  Patient Supplied Answers To VHI Questionnaire  No flowsheet data found.    Patient Supplied Answers To CSI Questionnaire  No flowsheet data found.    Patient Supplied Answers To EAT Questionnaire  No flowsheet data found.      PERCEPTUAL EVALUATION (CPT 62867)  POSTURE / TENSION:     upper body     Right arm is limited in mobility due to Hx of stroke.    BREATHING:     appears within normal limits and adequate     LARYNGEAL PALPATION:     firm musculature    VOICE:    Roughness: Minimal    Breathiness: Severe    Strain: Severe    Loudness    Conversational speech:  Severely reduced    Projected speech:  Profoundly reduced    Pitch:    Conversational speech:  Moderately elevated    Pitch glide: limited throughout range    Resonance:    Conversational speech:  laryngeal pharyngeal resonance    Singing vs. Speech:  n/a    CAPE-V Overall Severity:  60/100    COUGH/THROAT CLEARING:    Not observed    LARYNGEAL FUNCTION STUDIES (CPT 75731)  Not completed today, but possibly helpful for perioperative care.    ORAL MOTOR EXAMINATION    Face: Normal/ Flat affect    Lip Strength: Intact    Jaw Strength: Intact    Tongue Strength: Intact    Buccal Strength: Intact    eeth:    Upper and lower intact    Alternating motion rates (AMRs): WNL    Sequential motion rates (SMRs): WNL  *It should be noted that he also had some difficulty following directions; cues were modified to facilitate accuracy during today's laryngeal exam.  For example: \"swallow your saliva\", " rather than just swallow to initiate the action of a swallow.    LARYNGEAL EXAMINATION  Procedure: Flexible endoscopy with chip-tip technology with stroboscopy, left nostril; topical anesthesia with 3% Lidocaine and 0.25% phenylephrine was applied.   Performed by: Dr. Wray   The laryngeal and pharyngeal structures were evaluated for gross appearance, mobility, function, and focal lesions / abnormalities of the associated mucosa.  Stroboscopy was warranted to evaluate closure, symmetry, and vibratory characteristics of the vocal folds.  All findings were within normal limits with the exception of the following salient features:   This exam shows:    Symmetrical elevation of the soft palate  Laryngeal and Vocal Fold Mucosa    No remarkable signs of reflux    Status of vocal fold mucosa:   o Right vocal fold appears WNL  o Left vocal fold is comprised of a hypertrophic leukoplakia mass, which does not extend to the anterior commissure.    The swellings result in an irregular glottis    Narrow Band Imaging yielded the following: highlighted the leukoplakia present.     Neurological and Functional Integrity of the Larynx    Vocal folds are mobile and meet at midline; movement is brisk and symmetric; exam is neurologically normal     Airway is adequate    Supraglottic Function and Therapy Probes    moderate four-way constriction of the supraglottic larynx during connected speech    The addition of stroboscopy allowed evaluation of the mucosal wave:    Amplitude: right: WNL; left: absent    Symmetry: associated asymmetry.    Closure pattern: irregular.     Closure plane: at glottic level.     Phase distribution: normal.       Abducted view shows that the left vocal fold mass does not extend to the anterior commissure.        Narrow band imaging view    The laryngeal exam was reviewed with Mr. Rasmussen, and I provided pertinent explanations, as well as written and oral information.    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  Dr. Wray has discussed the highly irregular changes with Mr. Bonilla, and they agreed to proceed with an in-clinic biopsy today.    RECOMMENDATIONS:     Therapy was not warranted today, as a biopsy will be pursued.       He does not have significant amounts of talking as part of his daily activities.    He demonstrates mild difficulty following directions, which is likely related to his Hx of stroke.    Therapy should be pursued in the future, depending on the results of today's biopsy to optimize perioperative care.    TOTAL SERVICE TIME: 60 minutes  EVALUATION OF VOICE AND RESONANCE (34403)  NO CHARGE FACILITY FEE (96412)    Jocelynn Warren M.M. (voice), M.A., CCC/SLP  Speech-Language Pathologist  [facility] Trained Vocologist  [facility]  [phone]  [email or web address]  Prounouns: she/her                  Again, thank you for allowing me to participate in the care of your patient.      Sincerely,    Jocelynn Warren, SLP

## 2019-09-13 NOTE — LETTER
2019       RE: Kt Rasmussen  [address]  [facility] [address]     Dear Colleague,    Thank you for referring your patient, Kt Rasmussen, to the [facility] EAR NOSE AND THROAT at [facility]. Please see a copy of my visit note below.      [facility]       Patient: Kt Rasmussen  MRN: [id number]    : 1960  Age/Gender: 59 year old male  Date of Service: 2019     PCP and Referring Provider(s):    PCP: Bertrand Bauer  Referring Physician: Juan Rausch    Reason for Consultation:   Dysphonia       History:    HISTORY OF PRESENT ILLNESS: Mr. Rasmussen is a 59 year old male who presents to us today with dysphonia for the past summer. It started gradually and now it is much more severe. He denies any dysphagia or dyspnea. He has a history of lung cancer for which he was treated and is in remission - he received chemoradiation carbo and Taxol which he finished on 2016. He is prior a smoker. He denies being a velarde or using his voice a lot during the day. Denies any lumps or bumps and new fevers, chills and night sweats. 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 He did not have any voice or swallow  problems at that time. He comes in with his friend today.     Currently on surveillance     PAST MEDICAL HISTORY:   Past Medical History:   Diagnosis Date     Alcohol abuse, unspecified      Cerebral infarction (H)      Lung cancer (H)      Unspecified essential hypertension          PAST SURGICAL HISTORY:   Past Surgical History:   Procedure Laterality Date     BRONCHOSCOPY FLEXIBLE AND RIGID N/A 2016    Procedure: BRONCHOSCOPY FLEXIBLE AND RIGID;  Surgeon: Tony Talbot MD;  Location:  OR      NONSPECIFIC PROCEDURE      tympanoplasty         CURRENT MEDICATIONS:   Current Outpatient Medications:      rosuvastatin (CRESTOR) 40 MG tablet, Take 40  mg by mouth daily, Disp: , Rfl:       ALLERGIES: No known drug allergies      SOCIAL HISTORY:    Social History     Socioeconomic History     Marital status: Single     Spouse name: Not on file     Number of children: Not on file     Years of education: Not on file     Highest education level: Not on file   Occupational History     Not on file   Social Needs     Financial resource strain: Not on file     Food insecurity:     Worry: Not on file     Inability: Not on file     Transportation needs:     Medical: Not on file     Non-medical: Not on file   Tobacco Use     Smoking status: Former Smoker     Packs/day: 1.00     Types: Cigarettes     Smokeless tobacco: Never Used   Substance and Sexual Activity     Alcohol use: Yes     Drug use: No     Sexual activity: Not on file   Lifestyle     Physical activity:     Days per week: Not on file     Minutes per session: Not on file     Stress: Not on file   Relationships     Social connections:     Talks on phone: Not on file     Gets together: Not on file     Attends Mandaeism service: Not on file     Active member of club or organization: Not on file     Attends meetings of clubs or organizations: Not on file     Relationship status: Not on file     Intimate partner violence:     Fear of current or ex partner: Not on file     Emotionally abused: Not on file     Physically abused: Not on file     Forced sexual activity: Not on file   Other Topics Concern     Not on file   Social History Narrative     Not on file           FAMILY HISTORY: Non-contributory for problems with anesthesia      REVIEW OF SYSTEMS: The patient was asked a 14 point review of systems regarding constitutional symptoms, eye symptoms, ears, nose, mouth, throat symptoms, cardiovascular symptoms, respiratory symptoms, gastrointestinal symptoms, genitourinary symptoms, musculoskeletal symptoms, integumentary symptoms, neurological symptoms, psychiatric symptoms, endocrine symptoms, hematologic/lymphatic  symptoms, and allergic/ immunologic symptoms.   The pertinent factors have been included in the HPI, and the complete list can be found in the patient's written chart.      Physical Examination:  The patient underwent a physical examination as described below. The pertinent positive and negative findings are summarized after the description of the examination.    Constitutional: The patient's developmental and nutritional status was assessed. The patient's voice quality was assessed.  Head and Face: The head and face were inspected for deformities. The sinuses were palpated. The salivary glands were palpated. Facial muscle strength was assessed bilaterally.  Eyes: Extraocular movements and primary gaze alignment were assessed.  Ears, Nose, Mouth and Throat: The ears and nose were examined for deformities. The nasal septum, mucosa, and turbinates were inspected by anterior rhinoscopy. The lips, teeth, and gums were examined for abnormalities. The oral mucosa, tongue, palate, tonsils, lateral and posterior pharynx were inspected for the presence of asymmetry or mucosal lesions.    Neck: The tracheal position was noted, and the neck mass palpated to determine if there were any asymmetries, abnormal neck masses, thyromegally, or thyroid nodules.  Respiratory: The nature of the breathing and chest expansion/symmetry was observed.  Cardiovascular: The patient was examined to determine the presence of any edema or jugular venous distension.  Abdomen: The contour of the abdomen was noted.  Lymphatic: The patient was examined for infraclavicular lymphadenopathy.  Musculoskeletal: The patient was inspected for the presence of skeletal deformities.  Extremities: The extremities were examined for any clubbing or cyanosis.  Skin: The skin was examined for inflammatory or neoplastic conditions.  Neurologic: The patient's orientation, mood, and affect were noted. The cranial nerve  functions were examined.    Other pertinent  positive and negative findings on physical examination:  Right arm paralysis  AU: clear, TM intact, no effusion  Anterior rhinoscopy: right septal deviation  OC/OP: soft palate elevates symmetrically, uvula midline, tongue midline, FOM/BOT soft  Neck: soft and flat, no LAD, no TH space pain     All other physical examination findings were within normal limits and noncontributory.    Procedures:   Video Laryngoscopy with Stroboscopy (CPT 31731) and Behavioral & Qualitative Evaluation of Voice and Resonence (CPT 18997)     Preoperative Diagnosis:  Dysphonia and throat symptoms  Postoperative Diagnosis: Dysphonia and throat symptoms  Indication:  Patient has new or persistent dysphonia and throat symptoms.  This requires evaluation by stroboscopy to fully delineate the laryngeal functioning; especially mucosal wave assessment, ultrasharp visualization of lesions on the vocal folds, and overall functioning of the larynx.  Details of Procedure: A 70 degree rigid telescopic laryngoscope or a distal chip flexible scope was used. The lighting was obtained via a light cable connected to a stroboscopic unit and NBI at the end of the exam. The telescope was inserted either transorally or transnasally until the vocal folds could be visualized. The patient was instructed to sustain the vowel  ee  at a comfortable pitch and loudness as the voice was monitored by a microphone connected to a fundamental frequency tracker. This circuit tracked vocal periodicity, allowing the light to flash in synchrony with the glottal cycles. A setting on the stroboscope was set to change the phase of light strobing with relation to the vocal fundamental frequency, producing an image of 1 to 2 glottal cycles every second. The video images were recorded for analysis. Use of the variable speed, slow and stop scan allowed careful study of pertinent segments of laryngeal function to increase accuracy of clinical assessments of function and dysfunction.   In particular, features of glottal closure, mucosal wave on the vocal fold cover and laryngeal symmetry were analyzed. Lastly, the patient was asked to phonate speech samples and auditory/perceptual evaluation of voice and resonance were performed.  The vocal quality was carefully evaluated for hoarseness, breathiness, loudness, phrase length and intelligibility to determine the source of dysphonia.    Findings:   A. BEHAVIORAL & QUALITATIVE EVALUATION OF VOICE AND RESONENCE   Comments: Vocal Quality: breathy and rough    Pitch Range:   severely reduced  Phrase Length: severely reduced  Vocal Loudness: severely reduced  Dysarthria no    B. LARYNGOVIDEOSTROBOSCOPY   Anatomic/Physiological Deviations:  Left vocal fold exophytic lesion extending from the vocal process towards the anterior commissure but not involving the anterior commissure    Mucosal wave: Right:  no restriction      Left: no mucosal wave observed  Bilateral Vocal Fold Vibration: asymmetric  Vocal Process: Right: no restriction   Left:  no restriction  Vocal Fold closure:  Complete     Complication(s): None  Disposition: Patient tolerated the procedure well        Laryngoscopy with Biopsy ([id number])      Pre-procedure diagnosis: Laryngeal lesion  Post-procedure diagnosis: Same  Indication for procedure: Mr. Rasmussen is a 59 year old male with a laryngeal lesion.Biopsy is being performed for diagnostic purposes.  Procedure(s): Flexible Laryngoscopy with Biopsy   Details of Procedure: After informed consent was obtained, laryngoscopy was performed transnasally. Then, topical anesthesia was achieved by spraying 4% topical lidocaine directly onto the larynx through a working channel in the endoscope or via a trans-cutaneous route.  After adequate anesthesia was achieved a biting forceps was threaded through the working channel and multiple samples of the area of concern was performed. Having completed this the operation was completed and the scope withdrawn  atraumatically. The specimen was labeled and sent for pathologic examination.   Findings:   Anatomic/physiological deviations: left exophytic vocal fold lesion   Right vocal process: no restriction of mobility   Left vocal process:  no restriction of mobility  Glottal gap:  complete glottal closure   Supraglottic structures: questionable extension of the mass onto the left false vocal fold  Hypopharynx: normal    Estimated Blood Loss: minimal  Complications: none  Disposition: Patient tolerated the procedure well        Review of Relevant Clinical Data:    Radiology  CT chest 8/14/19  Stable exam without progressive malignancy demonstrated in  the chest.    Pathology:none    Labs:  Lab Results   Component Value Date    TSH 1.05 11/13/2007     Lab Results   Component Value Date     11/03/2016    CO2 27 11/03/2016    BUN 13 11/03/2016    PHOS 2.0 (L) 05/04/2016     Lab Results   Component Value Date    WBC 3.6 (L) 07/26/2017    HGB 12.5 (L) 07/26/2017    HCT 37.7 (L) 07/26/2017    MCV 90 07/26/2017     (L) 07/26/2017     No results found for: ELIZABET  No components found for: RHEUMATOIDFACTOR,  RF  Lab Results   Component Value Date    CRP 0.16 07/24/2003     No components found for: CKTOT, URICACID  No components found for: C3, C4, DSDNAAB, NDNAABIFA  No results found for: MPOAB       Impression/Plan:      IMPRESSION: Mr. Rasmussen is a 59 year old male who is being seen for dysphonia due to a left vocal fold lesion that is highly concerning for SCC. In office biopsy offered today and patient agreed. Understands that if biopsy is not diagnostic or negative will need further OR biopsy. Will call back after biopsy results.   Also called guardian with isaias Sanabria     RETURN VISIT: after biopsy results    Thank you for the kind referral and for allowing me to share in the care of Mr. Rasmussen. If you have any questions, please do not hesitate to contact me.        Lizzy Wray MD     of Otolaryngology - Head and Neck Surgery   Voice, Airway, and Swallowing Disorders   [facility] at [facility] [address] [address]  Phone: [phone]  Fax: [phone]    [address] [address]  Phone: [phone]  Fax: [phone]

## 2019-09-19 ENCOUNTER — TELEPHONE (OUTPATIENT)
Dept: OTOLARYNGOLOGY | Facility: CLINIC | Age: 59
End: 2019-09-19

## 2019-09-19 LAB — COPATH REPORT: NORMAL

## 2019-09-19 NOTE — TELEPHONE ENCOUNTER
Spoke with patient & his guardian about pathology results. 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  Discussed options of surgery vs radiation. Will set up radiation evaluation as well.

## 2019-09-20 ENCOUNTER — TELEPHONE (OUTPATIENT)
Dept: OTOLARYNGOLOGY | Facility: CLINIC | Age: 59
End: 2019-09-20

## 2019-09-20 NOTE — TELEPHONE ENCOUNTER
Spoke with patient about tumor board discussion that favored surgery. He is still unsure. Tried to reach guardian. Will set up radiation oncology appointment and attempt to reach guardian again to try to get a discussion treatment plan and decision going.

## 2019-09-23 ENCOUNTER — TELEPHONE (OUTPATIENT)
Dept: OTOLARYNGOLOGY | Facility: CLINIC | Age: 59
End: 2019-09-23

## 2019-09-23 DIAGNOSIS — J38.3 LESION OF VOCAL FOLD: Primary | ICD-10-CM

## 2019-09-23 NOTE — TELEPHONE ENCOUNTER
Spoke with the guardian   Ms Grace Sanabria  [phone]    She thinks Mr Rasmussen is in agreement for surgery. Will discuss with him tonight and will confirm tomorrow AM. Ok to proceed with scheduling    Procedure:   Microdirect laryngoscopy with CO2 laser excision of vocal fold lesion and steroid injection  Bronchoscopy  Esophagoscopy  Time: 60min    Location: ambulatory center  Needs anesthesia evaluation     Please call Ms Edwards for scheduling

## 2019-09-24 ENCOUNTER — PRE VISIT (OUTPATIENT)
Dept: SURGERY | Facility: CLINIC | Age: 59
End: 2019-09-24

## 2019-09-24 ENCOUNTER — PREP FOR PROCEDURE (OUTPATIENT)
Dept: OTOLARYNGOLOGY | Facility: CLINIC | Age: 59
End: 2019-09-24

## 2019-09-24 ENCOUNTER — HOSPITAL ENCOUNTER (OUTPATIENT)
Facility: AMBULATORY SURGERY CENTER | Age: 59
End: 2019-09-24
Attending: OTOLARYNGOLOGY
Payer: MEDICARE

## 2019-09-24 ENCOUNTER — TELEPHONE (OUTPATIENT)
Dept: OTOLARYNGOLOGY | Facility: CLINIC | Age: 59
End: 2019-09-24

## 2019-09-24 DIAGNOSIS — J38.3 LESION OF VOCAL FOLD: Primary | ICD-10-CM

## 2019-09-24 DIAGNOSIS — R49.0 DYSPHONIA: ICD-10-CM

## 2019-09-24 DIAGNOSIS — J38.3 LESION OF VOCAL FOLD: ICD-10-CM

## 2019-09-24 NOTE — TELEPHONE ENCOUNTER
FUTURE VISIT INFORMATION      SURGERY INFORMATION:    Date: 19    Location: [facility] OR    Surgeon:  Lizzy Johnson    Anesthesia Type:  General    RECORDS REQUESTED FROM:       Primary Care Provider: Bleet, Michael Joseph, MD- [facility]    Pertinent Medical History: lung tumor, airway obstruction    Most recent EKG+ Tracin16    Most recent ECHO: 2007    Most recent Cardiac Stress Test: 2007

## 2019-09-25 ENCOUNTER — ANESTHESIA EVENT (OUTPATIENT)
Dept: SURGERY | Facility: CLINIC | Age: 59
End: 2019-09-25
Payer: MEDICARE

## 2019-09-25 ENCOUNTER — DOCUMENTATION ONLY (OUTPATIENT)
Dept: CARE COORDINATION | Facility: CLINIC | Age: 59
End: 2019-09-25

## 2019-09-25 ENCOUNTER — OFFICE VISIT (OUTPATIENT)
Dept: SURGERY | Facility: CLINIC | Age: 59
End: 2019-09-25
Payer: MEDICARE

## 2019-09-25 VITALS
SYSTOLIC BLOOD PRESSURE: 110 MMHG | HEART RATE: 68 BPM | OXYGEN SATURATION: 97 % | TEMPERATURE: 97.1 F | DIASTOLIC BLOOD PRESSURE: 71 MMHG | WEIGHT: 190.6 LBS | RESPIRATION RATE: 20 BRPM | BODY MASS INDEX: 23.7 KG/M2 | HEIGHT: 75 IN

## 2019-09-25 DIAGNOSIS — Z01.818 PRE-OP EVALUATION: ICD-10-CM

## 2019-09-25 DIAGNOSIS — Z01.818 PRE-OP EVALUATION: Primary | ICD-10-CM

## 2019-09-25 DIAGNOSIS — J38.7 LARYNGEAL MASS: ICD-10-CM

## 2019-09-25 LAB
ANION GAP SERPL CALCULATED.3IONS-SCNC: 2 MMOL/L (ref 3–14)
BUN SERPL-MCNC: 16 MG/DL (ref 7–30)
CALCIUM SERPL-MCNC: 9 MG/DL (ref 8.5–10.1)
CHLORIDE SERPL-SCNC: 108 MMOL/L (ref 94–109)
CO2 SERPL-SCNC: 29 MMOL/L (ref 20–32)
CREAT SERPL-MCNC: 0.74 MG/DL (ref 0.66–1.25)
ERYTHROCYTE [DISTWIDTH] IN BLOOD BY AUTOMATED COUNT: 13.3 % (ref 10–15)
GFR SERPL CREATININE-BSD FRML MDRD: >90 ML/MIN/{1.73_M2}
GLUCOSE SERPL-MCNC: 115 MG/DL (ref 70–99)
HCT VFR BLD AUTO: 41.2 % (ref 40–53)
HGB BLD-MCNC: 12.9 G/DL (ref 13.3–17.7)
MCH RBC QN AUTO: 29.8 PG (ref 26.5–33)
MCHC RBC AUTO-ENTMCNC: 31.3 G/DL (ref 31.5–36.5)
MCV RBC AUTO: 95 FL (ref 78–100)
PLATELET # BLD AUTO: 123 10E9/L (ref 150–450)
POTASSIUM SERPL-SCNC: 4.5 MMOL/L (ref 3.4–5.3)
RBC # BLD AUTO: 4.33 10E12/L (ref 4.4–5.9)
SODIUM SERPL-SCNC: 140 MMOL/L (ref 133–144)
WBC # BLD AUTO: 4.6 10E9/L (ref 4–11)

## 2019-09-25 ASSESSMENT — MIFFLIN-ST. JEOR: SCORE: 1760.19

## 2019-09-25 ASSESSMENT — PAIN SCALES - GENERAL: PAINLEVEL: NO PAIN (0)

## 2019-09-25 ASSESSMENT — LIFESTYLE VARIABLES: TOBACCO_USE: 1

## 2019-09-25 NOTE — H&P
Pre-Operative H & P     CC:  Preoperative exam to assess for increased cardiopulmonary risk while undergoing surgery and anesthesia.    Date of Encounter: 9/25/2019  Primary Care Physician:  Bertrand Bauer  Associated diagnosis: laryngeal mass    HPI  Kt Rasmussen is a 59 year old male who presents for pre-operative H & P in preparation for Microdirect laryngoscopy with excision of laryngeal mass, possible open excision, CO2 laser, flexible bronchoscopy, flexible esophagoscopy, steroid injection with Dr. Johnson on 9/30/19 at [facility]. Patient is being evaluated for comorbid conditions of history of lung cancer s/p chemoradiation (2016) on surveillance, hypertension, dyslipidemia, GERD, ETOH abuse, h/o cerebral infarction with residual right sided weakness of upper extremity    Mr. Rasmussen has a history of lung cancer s/p chemoradiation in 2016. He has a 6 month history of dysphonia and was seen by ENT 9/13/19 for consultation.  At that time he reported gradually worsening dysphonia, but denied dysphagia or dyspnea. He underwent video laryngoscopy that showed left vocal exophonic lesion extending from vocal process towards the anterior commissure, but not involving anterior commissure. 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  Per records, patient and his guardian have agreed to upcoming procedure.     Guardian is not present today.  Patient presented with a friend.      History is obtained from the patient and chart review.    Past Medical History  Past Medical History:   Diagnosis Date     Alcohol abuse, unspecified      Cerebral infarction (H)      Dyslipidemia      GERD (gastroesophageal reflux disease)      Lung cancer (H)      Unspecified essential hypertension        Past Surgical History  Past Surgical History:   Procedure Laterality Date     BRONCHOSCOPY FLEXIBLE AND RIGID N/A 5/5/2016     Procedure: BRONCHOSCOPY FLEXIBLE AND RIGID;  Surgeon: Tony Talbot MD;  Location: [facility] OR     C NONSPECIFIC PROCEDURE      R tympanoplasty       Hx of Blood transfusions/reactions: denies     Hx of abnormal bleeding or anti-platelet use: denies    Steroid use in the last year: denies    Personal or FH with difficulty with Anesthesia:  Denies personal history; he is unsure of family history    Prior to Admission Medications  Current Outpatient Medications   Medication Sig Dispense Refill     rosuvastatin (CRESTOR) 40 MG tablet Take 40 mg by mouth At Bedtime          Allergies  Allergies   Allergen Reactions     No Known Drug Allergies        Social History  Social History     Socioeconomic History     Marital status: Single     Spouse name: Not on file     Number of children: Not on file     Years of education: Not on file     Highest education level: Not on file   Occupational History     Not on file   Social Needs     Financial resource strain: Not on file     Food insecurity:     Worry: Not on file     Inability: Not on file     Transportation needs:     Medical: Not on file     Non-medical: Not on file   Tobacco Use     Smoking status: Former Smoker     Packs/day: 1.00     Types: Cigarettes     Last attempt to quit: 9/25/2009     Years since quitting: 10.0     Smokeless tobacco: Never Used   Substance and Sexual Activity     Alcohol use: Not Currently     Drug use: No     Sexual activity: Not on file   Lifestyle     Physical activity:     Days per week: Not on file     Minutes per session: Not on file     Stress: Not on file   Relationships     Social connections:     Talks on phone: Not on file     Gets together: Not on file     Attends [employer] service: Not on file     Active member of club or organization: Not on file     Attends meetings of clubs or organizations: Not on file     Relationship status: Not on file     Intimate partner violence:     Fear of current or ex partner: Not on file      "Emotionally abused: Not on file     Physically abused: Not on file     Forced sexual activity: Not on file   Other Topics Concern     Not on file   Social History Narrative     Not on file       Family History  Family History   Problem Relation Age of Onset     Cancer Father        The complete review of systems is negative other than noted in the HPI or here.   ROS/MED HX  ENT/Pulmonary:     (+)tobacco use, Past use , . .    Neurologic:     (+)CVA date: 2009 with deficits- right upper extremity weakness, aphasia,     Cardiovascular:     (+) Dyslipidemia, ----. : . . . :. . Previous cardiac testing date:results:Stress Testdate:2012 results:No ischemia. Normal perfusion. EF 57%ECG reviewed date:5/2016 results:Sinus rhythm. Normal EKG date: results:         (-) taking anticoagulants/antiplatelets   METS/Exercise Tolerance: Comment: patient reports he walks everywhere.  Typically walks about 8 miles >4 METS   Hematologic:  - neg hematologic  ROS      (-) history of blood clots and History of Transfusion   Musculoskeletal:  - neg musculoskeletal ROS       GI/Hepatic:  - neg GI/hepatic ROS       Renal/Genitourinary:  - ROS Renal section negative       Endo:  - neg endo ROS       Psychiatric:     (+) psychiatric history other (comment) (h/o ETOH abuse; reports no use recently)      Infectious Disease:  - neg infectious disease ROS       Malignancy:   (+) Malignancy History of Lung  Lung CA Remission status post Chemo and Radiation.         Other:         Temp: 97.1  F (36.2  C) Temp src: Oral BP: 110/71 Pulse: 68   Resp: 20 SpO2: 97 %         190 lbs 9.6 oz  6' 3\"   Body mass index is 23.82 kg/m .       Physical Exam  Constitutional: Awake, alert, cooperative, no apparent distress, and appears stated age. Presents with friend.  Eyes: Pupils equal, round and reactive to light, extra ocular muscles intact, sclera clear, conjunctiva normal.  HENT: Normocephalic, oral pharynx with moist mucus membranes, good dentition. No " goiter appreciated.   Respiratory: Clear to auscultation bilaterally, no crackles or wheezing.  Cardiovascular: Regular rate and rhythm, normal S1 and S2, and no murmur noted.  Carotids +2, no bruits. No edema. Palpable pulses to radial arteries.   GI: Normal bowel sounds, soft, non-distended, non-tender, no masses palpated, no hepatosplenomegaly.    Lymph/Hematologic: No cervical lymphadenopathy and no supraclavicular lymphadenopathy.  Genitourinary:  Deferred  Skin: Warm and dry.   Musculoskeletal: Full ROM of neck. There is no redness, warmth, or swelling of the exposed joints. Right side weakness.  Neurologic: Awake, alert. Some delayed word finding. Cranial nerves II-XII are grossly intact.   Neuropsychiatric: Calm, cooperative. Normal affect.     Labs: (personally reviewed)  Component      Latest Ref Rng & Units 9/25/2019   Sodium      133 - 144 mmol/L 140   Potassium      3.4 - 5.3 mmol/L 4.5   Chloride      94 - 109 mmol/L 108   Carbon Dioxide      20 - 32 mmol/L 29   Anion Gap      3 - 14 mmol/L 2 (L)   Glucose      70 - 99 mg/dL 115 (H)   Urea Nitrogen      7 - 30 mg/dL 16   Creatinine      0.66 - 1.25 mg/dL 0.74   GFR Estimate      >60 mL/min/1.73:m2 >90   GFR Estimate If Black      >60 mL/min/1.73:m2 >90   Calcium      8.5 - 10.1 mg/dL 9.0   WBC      4.0 - 11.0 10e9/L 4.6   RBC Count      4.4 - 5.9 10e12/L 4.33 (L)   Hemoglobin      13.3 - 17.7 g/dL 12.9 (L)   Hematocrit      40.0 - 53.0 % 41.2   MCV      78 - 100 fl 95   MCH      26.5 - 33.0 pg 29.8   MCHC      31.5 - 36.5 g/dL 31.3 (L)   RDW      10.0 - 15.0 % 13.3   Platelet Count      150 - 450 10e9/L 123 (L)       CT chest 8/14/19  FINDINGS: Similar-appearing paramediastinal fibrotic changes. No  definite masslike area is demonstrated. Tiny nodule seen in the medial  right lower lobe previously is no longer demonstrated. No pleural or  pericardial effusion. There are extensive coronary vascular  calcifications and/or stents consistent with coronary  "artery disease.  Normal heart size. There are mild atherosclerotic changes of the  visualized aorta and its branches. There is no evidence of aortic  aneurysm. No acute findings in the visualized upper abdomen.    Imaging reviewed personally by this provider.    Outside records reviewed from: care everywhere    ASSESSMENT and PLAN  Kt Rasmussen is a 59 year old male scheduled for Microdirect laryngoscopy with excision of laryngeal mass, possible open excision, CO2 laser, flexible bronchoscopy, flexible esophagoscopy, steroid injection on 9/30/19 by Dr. Johnson in treatment of laryngeal mass.  PAC referral for risk assessment and optimization for anesthesia with comorbid conditions of history of lung cancer s/p chemoradiation (2016) on surveillance, dyslipidemia, ETOH abuse, h/o cerebral infarction with residual right sided weakness of upper extremity and aphasia:    Pre-operative considerations:  1.  Cardiac:  Functional status- METS >4- reports he walks 3-9 miles/ day. Reports history of stent placement in 2004. EKG 5/2016 showed NSR, normal EKG. Stress test 2012 showed no ischemia, normal perfusion and EF 57%. He denies recent chest pain, palpitations, edema or orthopnea.  Dyslipidemia taking statin (continue DOS).  Intermediate risk surgery with 6.6% (RCRI #- CVA and h/o ischemic heart disease) risk of major adverse cardiac event.   2.  Pulm:  Airway feasible.  MITZI risk: intermediate. History of lung cancer s/p chemoradiation (2016) on surveillance. Continuing to get chest CT every 6 months. Per oncology records \"08/14/2019- Stable exam without progressive malignancy demonstrated in the chest\". Denies any dyspnea with current mass.   3.  GI:  Risk of PONV score = 2.  If > 2, anti-emetic intervention recommended.  Dysphonia, but denies dysphagia.   4. Neuro: history of CVA in 2009 with residual R side upper extremity weakness and aphasia. Patient has legal guardian and lives in an assisted living facility. He " presented with a friend today.  5. Heme: VTE risk: 1.8% Hgb slightly low today- 12.9.  Repeat DOS    Patient would be an acceptable candidate for [facility].  Reviewing with anesthesia, we do not believe these procedures are typically completed at [facility].  I have reached out to Supriya Taveras RN from ENT, who will talk to Dr. Johnson's nurse to assess appropriate location for surgery. They will reach out to patients guardian, Rima, if the location needs to change.    Patient is optimized and is acceptable candidate for the proposed procedure.  No further diagnostic evaluation is needed.     Patient discussed with Dr. Resendiz.      Brigette Sanderson PA-C  Preoperative Assessment Center  [facility]  Clinic and Surgery Center  Phone: [phone]  Fax: [phone]

## 2019-09-25 NOTE — ANESTHESIA PREPROCEDURE EVALUATION
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]   Procedure(s):  Microdirect laryngoscopy with excision of laryngeal mass, CO2 laser, flexible bronchoscopy, flexible esophagoscopy, steroid injection     Past Medical History:   Diagnosis Date     Alcohol abuse, unspecified      Cerebral infarction (H)      Dyslipidemia      GERD (gastroesophageal reflux disease)      Lung cancer (H)      Unspecified essential hypertension       Past Surgical History:   Procedure Laterality Date     BRONCHOSCOPY FLEXIBLE AND RIGID N/A 2016    Procedure: BRONCHOSCOPY FLEXIBLE AND RIGID;  Surgeon: Tony Talbot MD;  Location: [facility] OR     C NONSPECIFIC PROCEDURE      tympanoplasty          Anesthesia Evaluation     . Pt has had prior anesthetic. Type: General    No history of anesthetic complications          ROS/MED HX    ENT/Pulmonary:     (+)tobacco use, Past use , . .    Neurologic:     (+)CVA date:  with deficits- right upper extremity weakness, aphasia,     Cardiovascular:     (+) Dyslipidemia, ----. : . . . :. . Previous cardiac testing date:results:Stress Testdate: results:No ischemia. Normal perfusion. EF 57%ECG reviewed date:2016 results:Sinus rhythm. Normal EKG date: results:         (-) taking anticoagulants/antiplatelets   METS/Exercise Tolerance: Comment: patient reports he walks everywhere.  Typically walks about 8 miles >4 METS   Hematologic:  - neg hematologic  ROS      (-) history of blood clots and History of Transfusion   Musculoskeletal:  - neg musculoskeletal ROS       GI/Hepatic:  - neg GI/hepatic ROS       Renal/Genitourinary:  - ROS Renal section negative       Endo:  - neg endo ROS       Psychiatric:     (+) psychiatric history other (comment) (h/o ETOH abuse; reports no use recently)      Infectious Disease:  - neg infectious disease ROS        Malignancy:   (+) Malignancy History of Lung  Lung CA Remission status post Chemo and Radiation.         Other:                         PHYSICAL EXAM:   Mental Status/Neuro:    Airway: Facies: Feasible  Mallampati: I  Mouth/Opening: Full  TM distance: > 6 cm  Neck ROM: Full   Respiratory: Auscultation: CTAB     Resp. Rate: Normal     Resp. Effort: Normal      CV: Rhythm: Regular  Heart: Normal Sounds  Edema: None  Pulses: Normal   Comments: Missing one tooth on lower right side     Dental: Details                LABS:  CBC:   Lab Results   Component Value Date    WBC 3.6 (L) 07/26/2017    WBC 4.2 11/03/2016    HGB 12.5 (L) 07/26/2017    HGB 9.5 (L) 11/03/2016    HCT 37.7 (L) 07/26/2017    HCT 29.3 (L) 11/03/2016     (L) 07/26/2017    PLT 99 (L) 11/03/2016     BMP:   Lab Results   Component Value Date     11/03/2016     10/13/2016    POTASSIUM 3.9 11/03/2016    POTASSIUM 3.7 10/13/2016    CHLORIDE 107 11/03/2016    CHLORIDE 110 (H) 10/13/2016    CO2 27 11/03/2016    CO2 26 10/13/2016    BUN 13 11/03/2016    BUN 13 10/13/2016    CR 0.66 11/03/2016    CR 0.62 (L) 10/13/2016    GLC 96 11/03/2016     (H) 10/13/2016     COAGS:   Lab Results   Component Value Date    PTT 35 07/26/2017    INR 0.95 07/26/2017    FIBR 761 (H) 05/04/2016     POC:   Lab Results   Component Value Date    BGM 78 05/23/2016     OTHER:   Lab Results   Component Value Date    BEVERLY 8.5 11/03/2016    PHOS 2.0 (L) 05/04/2016    MAG 1.9 10/13/2016    ALBUMIN 3.5 11/03/2016    PROTTOTAL 6.7 (L) 11/03/2016    ALT 36 11/03/2016    AST 23 11/03/2016    ALKPHOS 87 11/03/2016    BILITOTAL 0.3 11/03/2016    TSH 1.05 11/13/2007    T4 4.6 (L) 11/13/2007    CRP 0.16 07/24/2003    SED 8 11/13/2007        Preop Vitals    BP Readings from Last 3 Encounters:   09/13/19 117/64   02/28/19 110/72   11/21/18 113/70    Pulse Readings from Last 3 Encounters:   09/13/19 74   02/28/19 84   11/21/18 87      Resp Readings from Last 3 Encounters:  "  11/21/18 16   07/18/18 16   03/14/18 16    SpO2 Readings from Last 3 Encounters:   02/28/19 98%   11/21/18 98%   07/18/18 98%      Temp Readings from Last 1 Encounters:   02/28/19 97.9  F (36.6  C) (Oral)    Ht Readings from Last 1 Encounters:   02/28/19 1.918 m (6' 3.5\")      Wt Readings from Last 1 Encounters:   09/13/19 84.8 kg (187 lb)    Estimated body mass index is 23.06 kg/m  as calculated from the following:    Height as of 2/28/19: 1.918 m (6' 3.5\").    Weight as of 9/13/19: 84.8 kg (187 lb).     LDA:  Peripheral IV 05/04/16 Left;Posterior Lower forearm (Active)   Number of days: 1239       Peripheral IV 06/02/16 Left Upper forearm (Active)   Number of days: 1210        Assessment:   ASA SCORE: 3    H&P: History and physical reviewed and following examination; no interval change.   Smoking Status:  Non-Smoker/Unknown   NPO Status: NPO Appropriate     Plan:   Anes. Type:  General   Pre-Medication: None   Induction:  IV (Standard)   Airway: ETT; Oral   Access/Monitoring: PIV   Maintenance: Balanced     Postop Plan:   Postop Pain: Opioids  Postop Sedation/Airway: Not planned  Disposition: Inpatient/Admit     PONV Management:   Adult Risk Factors:, Non-Smoker, Postop Opioids     CONSENT: Direct conversation                      PAC Discussion and Assessment    ASA Classification: 2  Case is suitable for:   Anesthetic techniques and relevant risks discussed: GA  Invasive monitoring and risk discussed:   Types:   Possibility and Risk of blood transfusion discussed:   NPO instructions given:   Additional anesthetic preparation and risks discussed:   Needs early admission to pre-op area:   Other:     PAC Resident/NP Anesthesia Assessment:  Kt Rasmussen is a 59 year old male scheduled for Microdirect laryngoscopy with excision of laryngeal mass, possible open excision, CO2 laser, flexible bronchoscopy, flexible esophagoscopy, steroid injection on 9/30/19 by Dr. Johnson in treatment of laryngeal mass.  PAC referral " "for risk assessment and optimization for anesthesia with comorbid conditions of history of lung cancer s/p chemoradiation (2016) on surveillance, dyslipidemia, ETOH abuse, h/o cerebral infarction with residual right sided weakness of upper extremity and aphasia:    Pre-operative considerations:  1.  Cardiac:  Functional status- METS >4- reports he walks 3-9 miles/ day. Reports history of stent placement in 2004. EKG 5/2016 showed NSR, normal EKG. Stress test 2012 showed no ischemia, normal perfusion and EF 57%. He denies recent chest pain, palpitations, edema or orthopnea.  Dyslipidemia taking statin (continue DOS).  Intermediate risk surgery with 6.6% (RCRI #- CVA and h/o ischemic heart disease) risk of major adverse cardiac event.   2.  Pulm:  Airway feasible.  MITZI risk: intermediate. History of lung cancer s/p chemoradiation (2016) on surveillance. Continuing to get chest CT every 6 months. Per oncology records \"08/14/2019- Stable exam without progressive malignancy demonstrated in the chest\". Denies any dyspnea with current mass.   3.  GI:  Risk of PONV score = 2.  If > 2, anti-emetic intervention recommended.  Dysphonia, but denies dysphagia.   4. Neuro: history of CVA in 2009 with residual R side upper extremity weakness and aphasia. Patient has legal guardian and lives in an assisted living facility. He presented with a friend today.  5. Heme: VTE risk: 1.8%    Patient would be an acceptable candidate for [facility].  Reviewing with anesthesia, we do not believe these procedures are typically completed at [facility].  I have reached out to Supriya Taveras RN from ENT, who will talk to Dr. Johnson's nurse to assess appropriate location for surgery. They will reach out to patients guardian, Rima, if the location needs to change.    Patient is optimized and is acceptable candidate for the proposed procedure.  No further diagnostic evaluation is needed.     Patient discussed with Dr. Resendiz.    **For further details of " assessment, testing, and physical exam please see H and P completed on same date.      Brigette Sanderson PA-C        Mid-Level Provider/Resident:   Date:   Time:     Attending Anesthesiologist Anesthesia Assessment:        Anesthesiologist:   Date:   Time:   Pass/Fail:   Disposition:     PAC Pharmacist Assessment:        Pharmacist:   Date:   Time:    Brigette Sanderson PA-C

## 2019-09-25 NOTE — PATIENT INSTRUCTIONS
Preparing for Your Surgery      Name:  Kt Rasmussen   MRN:  [id number]   :  1960   Today's Date:  2019     Arriving for surgery:  Surgery date:  2019  Arrival time:  5:45 am  Please come to:     [facility]  [address] [place] [id number]     Parking is available in front of the Clinics and Surgery Center building from 5:30AM to 8:00PM.  -  Proceed to the 5th floor to check into the Ambulatory Surgery Center.              >> There will be patient concierges on the 1st and 5th floor, for assistance or an escort, if you would like.              >> Please call [phone] with any questions.    What can I eat or drink?  -  You may have solid food or milk products until 8 hours prior to your surgery.(19 at 11 pm)  -  You may have water, apple juice or 7up/Sprite until 2 hours prior to your surgery.(5 am)    Which medicines can I take?        Stop Aspirin, vitamins and supplements one week prior to surgery.      Hold Ibuprofen for 24 hours and/or Naproxen for 48 hours prior to surgery.     -  Please take these medications the day of surgery:  NONE      How do I prepare myself?  -  Take two showers: one the night before surgery; and one the morning of surgery.         Use Scrubcare or Hibiclens to wash from neck down, leave soap on your skin for up to one minute.  Do not get soap in your eyes or ears.  You may use your own shampoo and conditioner; no other hair products.   -  Do NOT use lotion, powder, deodorant, or antiperspirant the day of your surgery.  -  Do NOT wear any makeup, fingernail polish or jewelry.  - Do not bring your own medications to the hospital, except for inhalers and eye   drops.  -  Bring your ID and insurance card.      -If you are scheduled to go home the Same Day as surgery you must have a responsible adult as a  and to stay with you overnight the first 24 hours after surgery.     Questions or Concerns:    -The Ambulatory  [facility] at [phone].    -For questions after surgery please call your surgeons office.

## 2019-09-25 NOTE — PROGRESS NOTES
"[facility] VOICE CLINIC  THERAPY NOTE (CPT 40044)    Patient: Kt Rasmussen  Date of Service: 9/26/2019  Referring physician: Dr. 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  Dr. Wray has discussed the highly irregular changes with Mr. Bonilla, and they agreed to proceed with an in-clinic biopsy today.\"    INTERVAL HISTORY:  In office biopsy returned the following pathology: \"AT LEAST SQUAMOUS CELL CARCINOMA-IN SITU, cannot exclude superficial invasion (process transected at the base).\"    Patient was presented at tumor board and surgical intervention was recommended at this time.  Patient and caregiver agreed with this POC.  It was recommended that he pursue perioperative therapy in keeping with best practice.  He presents for this today.    OBJECTIVE:  ____________________________________________________________________  Laryngeal Function Studies   **Laryngeal function studies were warranted today to fully characterize baseline function prior to surgical intervention next week; however, given the patient's difficulty with accurately performing speech tasks, and concisely following direction it was not felt that they would be truly reflective.  A baseline audio recording was taken to use as a point of comparison moving forward **  ____________________________________________________________________    THERAPEUTIC ACTIVITIES    Post surgical protocol    4-6 days of total voice rest (potentially more as determined at time of surgery)    Strategies for implementation discussed    \"trial run\" recommended in advance of surgery to figure out hurdles before-hand    Use of gentle non-whispered voice, with emphasis on trying to maintain reduced effort rather than " "\"forcing\" a clear quality    Ramp up schedule following voice rest    Alternate forms of communcation    Wound-healing / pliability exercises    Semi-Occluded Vocal Tract (SOVT) exercises instructed to reduce laryngeal tension, promote vocal fold pliability, and coordinate respiration and phonation    Straw with water resistance was found to be most facilitating    Sustained phonation, and voice vs. voiceless productions used to promote easy voicing and raise awareness of laryngeal tension    Ascending and descending glides utilized to promote vocal fold pliability    A schedule for their use in conjunction with voice ramp up was presented.    Education regarding phonotraumatic behaviors and instruction of strategies and techniques to avoid their use    Coughing    Valsalva    Whispering    Voice use outside of ramp-up schedule    Vocal hygiene education    Systemic hydration    Topical hydration    A regimen for home practice was instructed.    I provided an audio recording and handouts of today's therapeutic activities to facilitate practice.    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 Mr. Rasmussen was extremely cooperative throughout the session, but did demonstrate challenges with accuracy of trials in absence of clinician support.  He was accompanied by a friend who reported understanding of therapeutic exercises, and a CD was made and handouts were provided to help bolster accuracy at home.  By the end of the session he reported good understanding of therapeutic rationales, and stated that he had no additional questions regarding postoperative therapeutic techniques.    PLAN: Mr. Rasmussen will be seen by " myself or one of my colleagues at his first postoperative evaluation with Dr. Wray.  He was also encouraged to schedule postoperative therapy sessions beginning approximately 1 week after that postoperative evaluation with approximately 2 to 3 weeks between each.  Given limited availability weight listing may be required for these sessions.      TOTAL SERVICE TIME: 60 minutes  TREATMENT (84989): 60 minutes  NO CHARGE FACILITY FEE (43400)    Ge Romo M.M., M.A., CCC-SLP  Speech-Language Pathologist  Certificate of Vocology  [phone]

## 2019-09-26 ENCOUNTER — TELEPHONE (OUTPATIENT)
Dept: OTOLARYNGOLOGY | Facility: CLINIC | Age: 59
End: 2019-09-26

## 2019-09-26 ENCOUNTER — OFFICE VISIT (OUTPATIENT)
Dept: OTOLARYNGOLOGY | Facility: CLINIC | Age: 59
End: 2019-09-26
Payer: MEDICARE

## 2019-09-26 DIAGNOSIS — R49.0 DYSPHONIA: ICD-10-CM

## 2019-09-26 DIAGNOSIS — J38.3 LESION OF VOCAL FOLD: ICD-10-CM

## 2019-09-26 NOTE — TELEPHONE ENCOUNTER
I met with patient after visit with Titi Romo, SLP.  I provided patient and Justin surgery teaching.  Patient stated he received the antiseptic soap yesterday during PAC appointment.  Per  ENT Adult Default Surgery Request.   I encouraged patient to review the check list and highlighted the following points:  1) Complete History and Physical within 30 days of surgery, either with PAC clinic or family clinic.   -PAC consult completed 09/25/2019  2) Discuss with primary care provider what medicines are safe before surgery.   Example, Coumadin, Plavix, Aspirin, Ibuprofen/Motrin, herbal products, Vitamin E and Fish oil may possibly be discontinued 7 days prior to surgery date.  3) Same-day surgery, must arrange for an adult to take you home and stay with you for the first 24 hours after surgery.  4) Stop drinking alcohol at least 24 hours before surgery.  5) Quit or at least cut down smoking as it higher your risks for infection after surgery. No chewing tobacco for at least 8 hours before surgery.  6) Take a bath or shower the night before and morning of surgery.   Use antiseptic soap.  7) No food or drink 8 hours before surgery. Only clear fluids 2 hours before surgery. Follow your surgeon s orders for eating and drinking.    Please following surgeon s instructions as if you don t, your surgery could be canceled.    I informed patient and Justin about surgery location and time change.    [facility]  Monday, September 30, 2019  2 PM, please arrive at 1200 PM.      I called and left a brief voice message encouraging Rima, guardian, to return call.  I informed Justin and patient that I will be off this Friday.  I will inform Justin in case if I am unable to get a hold of Rima.  Kt provided me verbal auth to speak with Justin.    Justin   Cell [phone]  Home [phone]

## 2019-09-26 NOTE — LETTER
"9/26/2019      RE: Kt Rasmussen  [id number]  [place] [address]       [facility] VOICE CLINIC  THERAPY NOTE (CPT 80931)    Patient: Kt Rasmussen  Date of Service: 9/26/2019  Referring physician: Dr. 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  Dr. Wray has discussed the highly irregular changes with Mr. Bonilla, and they agreed to proceed with an in-clinic biopsy today.\"    INTERVAL HISTORY:  In office biopsy returned the following pathology: \"AT LEAST SQUAMOUS CELL CARCINOMA-IN SITU, cannot exclude superficial invasion (process transected at the base).\"    Patient was presented at tumor board and surgical intervention was recommended at this time.  Patient and caregiver agreed with this POC.  It was recommended that he pursue perioperative therapy in keeping with best practice.  He presents for this today.    OBJECTIVE:  ____________________________________________________________________  Laryngeal Function Studies   **Laryngeal function studies were warranted today to fully characterize baseline function prior to surgical intervention next week; however, given the patient's difficulty with accurately performing speech tasks, and concisely following direction it was not felt that they would be truly reflective.  A baseline audio recording was taken to use as a point of comparison moving forward **  ____________________________________________________________________    THERAPEUTIC ACTIVITIES    Post surgical protocol    4-6 days of total voice rest (potentially more as determined at time of surgery)    Strategies for implementation discussed    \"trial run\" recommended in advance of surgery to figure out hurdles before-hand    Use of gentle " "non-whispered voice, with emphasis on trying to maintain reduced effort rather than \"forcing\" a clear quality    Ramp up schedule following voice rest    Alternate forms of communcation    Wound-healing / pliability exercises    Semi-Occluded Vocal Tract (SOVT) exercises instructed to reduce laryngeal tension, promote vocal fold pliability, and coordinate respiration and phonation    Straw with water resistance was found to be most facilitating    Sustained phonation, and voice vs. voiceless productions used to promote easy voicing and raise awareness of laryngeal tension    Ascending and descending glides utilized to promote vocal fold pliability    A schedule for their use in conjunction with voice ramp up was presented.    Education regarding phonotraumatic behaviors and instruction of strategies and techniques to avoid their use    Coughing    Valsalva    Whispering    Voice use outside of ramp-up schedule    Vocal hygiene education    Systemic hydration    Topical hydration    A regimen for home practice was instructed.    I provided an audio recording and handouts of today's therapeutic activities to facilitate practice.    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 Mr. Rasmussen was extremely cooperative throughout the session, but did demonstrate challenges with accuracy of trials in absence of clinician support.  He was accompanied by a friend who reported understanding of therapeutic exercises, and a CD was made and handouts were provided to help bolster accuracy at home.  By the end of the session he reported good understanding of therapeutic rationales, and stated that he had no additional questions " regarding postoperative therapeutic techniques.    PLAN: Mr. Rasmussen will be seen by myself or one of my colleagues at his first postoperative evaluation with Dr. Wray.  He was also encouraged to schedule postoperative therapy sessions beginning approximately 1 week after that postoperative evaluation with approximately 2 to 3 weeks between each.  Given limited availability weight listing may be required for these sessions.      TOTAL SERVICE TIME: 60 minutes  TREATMENT (11372): 60 minutes  NO CHARGE FACILITY FEE (24079)    Ge Romo M.M., M.A., CCC-SLP  Speech-Language Pathologist  Certificate of Vocology  [phone]

## 2019-09-27 RX ORDER — FOLIC ACID 1 MG/1
1 TABLET ORAL DAILY
COMMUNITY
End: 2020-08-20

## 2019-09-27 RX ORDER — LORAZEPAM 0.5 MG/1
0.5 TABLET ORAL EVERY 4 HOURS PRN
COMMUNITY
End: 2020-08-20

## 2019-09-27 RX ORDER — PROCHLORPERAZINE MALEATE 10 MG
10 TABLET ORAL EVERY 6 HOURS PRN
COMMUNITY
End: 2020-08-20

## 2019-09-27 RX ORDER — ONDANSETRON 8 MG/1
TABLET, FILM COATED ORAL EVERY 8 HOURS PRN
COMMUNITY
End: 2020-08-20

## 2019-09-27 NOTE — OR NURSING
Pt's living situation was not clear in chart demographics. I did pre-op call with pt who said his friend Justin will be bringing him and he asked me to call Justin. I called Justin, went though pre-op info and informed him that pt must have a resp adult to stay in the Assisted Living with him or he needs to stay with a responsible adult for 24 hrs after surgery. Justin said to call the guardian. I have tried calling the guardian, No answer. Message left on her phone that this case will not proceed unless we hear from her. I also spoke with the nurse Bety at the Assisted Living. She said the pt is planning to return home to his Assisted Living apt after surgery by himself and is refusing to stay with someone. I asked Bety to try and reach pt's guardian.

## 2019-09-27 NOTE — OR NURSING
I called Beatrice Bradley in ENT. She said she spoake with Elly Edwards, pt's legal guardian, who is aware of surgery on 9/30  and will come to hospital on DOS to sign consent per Beatrice Bradley in ENT. I told Beatrice pt has no one to stay with him 24 hrs after and Beatrice will let Dr Johnson know and she if she will admit pt.

## 2019-09-27 NOTE — OR NURSING
Beatrice Bradley from ENT called and said she Dr Johnson will change pt to a 23 hr obs status so he can stay overnight in the hospital since he has no one to stay with him. I called Bety, the nurse at Assisted Living and told her the pt will be admitted overnight but will need a ride back to Assisted Living on Tues am 10/1. Bety agreed to call Guardian and pt's friend Justin to arrange ride home.

## 2019-09-30 ENCOUNTER — ANESTHESIA (OUTPATIENT)
Dept: SURGERY | Facility: CLINIC | Age: 59
End: 2019-09-30
Payer: MEDICARE

## 2019-09-30 ENCOUNTER — HOSPITAL ENCOUNTER (OUTPATIENT)
Facility: CLINIC | Age: 59
Discharge: HOME | End: 2019-10-01
Attending: OTOLARYNGOLOGY | Admitting: OTOLARYNGOLOGY
Payer: MEDICARE

## 2019-09-30 DIAGNOSIS — C32.9 LARYNX CANCER (H): Primary | ICD-10-CM

## 2019-09-30 LAB — GLUCOSE BLDC GLUCOMTR-MCNC: 77 MG/DL (ref 70–99)

## 2019-09-30 PROCEDURE — 27210794 ZZH OR GENERAL SUPPLY STERILE: Performed by: OTOLARYNGOLOGY

## 2019-09-30 PROCEDURE — 25800030 ZZH RX IP 258 OP 636: Performed by: NURSE ANESTHETIST, CERTIFIED REGISTERED

## 2019-09-30 PROCEDURE — 40000170 ZZH STATISTIC PRE-PROCEDURE ASSESSMENT II: Performed by: OTOLARYNGOLOGY

## 2019-09-30 PROCEDURE — 25000565 ZZH ISOFLURANE, EA 15 MIN: Performed by: OTOLARYNGOLOGY

## 2019-09-30 PROCEDURE — 00000146 ZZHCL STATISTIC GLUCOSE BY METER IP

## 2019-09-30 PROCEDURE — 25000128 H RX IP 250 OP 636: Performed by: NURSE ANESTHETIST, CERTIFIED REGISTERED

## 2019-09-30 PROCEDURE — 25000128 H RX IP 250 OP 636: Performed by: STUDENT IN AN ORGANIZED HEALTH CARE EDUCATION/TRAINING PROGRAM

## 2019-09-30 PROCEDURE — 71000014 ZZH RECOVERY PHASE 1 LEVEL 2 FIRST HR: Performed by: OTOLARYNGOLOGY

## 2019-09-30 PROCEDURE — 25800030 ZZH RX IP 258 OP 636: Performed by: ANESTHESIOLOGY

## 2019-09-30 PROCEDURE — 71000015 ZZH RECOVERY PHASE 1 LEVEL 2 EA ADDTL HR: Performed by: OTOLARYNGOLOGY

## 2019-09-30 PROCEDURE — 36000062 ZZH SURGERY LEVEL 4 1ST 30 MIN - UMMC: Performed by: OTOLARYNGOLOGY

## 2019-09-30 PROCEDURE — 25000128 H RX IP 250 OP 636: Performed by: OTOLARYNGOLOGY

## 2019-09-30 PROCEDURE — 25000128 H RX IP 250 OP 636: Performed by: PHYSICIAN ASSISTANT

## 2019-09-30 PROCEDURE — 37000009 ZZH ANESTHESIA TECHNICAL FEE, EACH ADDTL 15 MIN: Performed by: OTOLARYNGOLOGY

## 2019-09-30 PROCEDURE — G0378 HOSPITAL OBSERVATION PER HR: HCPCS

## 2019-09-30 PROCEDURE — 25000125 ZZHC RX 250: Performed by: NURSE ANESTHETIST, CERTIFIED REGISTERED

## 2019-09-30 PROCEDURE — 88305 TISSUE EXAM BY PATHOLOGIST: CPT | Performed by: OTOLARYNGOLOGY

## 2019-09-30 PROCEDURE — 37000008 ZZH ANESTHESIA TECHNICAL FEE, 1ST 30 MIN: Performed by: OTOLARYNGOLOGY

## 2019-09-30 PROCEDURE — 25000125 ZZHC RX 250: Performed by: OTOLARYNGOLOGY

## 2019-09-30 PROCEDURE — 36000064 ZZH SURGERY LEVEL 4 EA 15 ADDTL MIN - UMMC: Performed by: OTOLARYNGOLOGY

## 2019-09-30 RX ORDER — PROPOFOL 10 MG/ML
INJECTION, EMULSION INTRAVENOUS PRN
Status: DISCONTINUED | OUTPATIENT
Start: 2019-09-30 | End: 2019-09-30

## 2019-09-30 RX ORDER — NALOXONE HYDROCHLORIDE 0.4 MG/ML
.1-.4 INJECTION, SOLUTION INTRAMUSCULAR; INTRAVENOUS; SUBCUTANEOUS
Status: DISCONTINUED | OUTPATIENT
Start: 2019-09-30 | End: 2019-10-01 | Stop reason: HOSPADM

## 2019-09-30 RX ORDER — DEXAMETHASONE SODIUM PHOSPHATE 4 MG/ML
INJECTION, SOLUTION INTRA-ARTICULAR; INTRALESIONAL; INTRAMUSCULAR; INTRAVENOUS; SOFT TISSUE PRN
Status: DISCONTINUED | OUTPATIENT
Start: 2019-09-30 | End: 2019-09-30

## 2019-09-30 RX ORDER — CEFAZOLIN SODIUM 1 G/3ML
1 INJECTION, POWDER, FOR SOLUTION INTRAMUSCULAR; INTRAVENOUS EVERY 8 HOURS
Status: DISCONTINUED | OUTPATIENT
Start: 2019-09-30 | End: 2019-10-01 | Stop reason: HOSPADM

## 2019-09-30 RX ORDER — ATORVASTATIN CALCIUM 40 MG/1
40 TABLET, FILM COATED ORAL DAILY
COMMUNITY
End: 2024-07-03

## 2019-09-30 RX ORDER — DEXAMETHASONE SODIUM PHOSPHATE 10 MG/ML
10 INJECTION, SOLUTION INTRAMUSCULAR; INTRAVENOUS ONCE
Status: COMPLETED | OUTPATIENT
Start: 2019-09-30 | End: 2019-09-30

## 2019-09-30 RX ORDER — ONDANSETRON 4 MG/1
4 TABLET, ORALLY DISINTEGRATING ORAL EVERY 6 HOURS PRN
Status: DISCONTINUED | OUTPATIENT
Start: 2019-09-30 | End: 2019-10-01 | Stop reason: HOSPADM

## 2019-09-30 RX ORDER — LIDOCAINE HYDROCHLORIDE AND EPINEPHRINE 10; 10 MG/ML; UG/ML
INJECTION, SOLUTION INFILTRATION; PERINEURAL PRN
Status: DISCONTINUED | OUTPATIENT
Start: 2019-09-30 | End: 2019-09-30 | Stop reason: HOSPADM

## 2019-09-30 RX ORDER — AMOXICILLIN 250 MG
1 CAPSULE ORAL 2 TIMES DAILY
Status: DISCONTINUED | OUTPATIENT
Start: 2019-09-30 | End: 2019-10-01 | Stop reason: HOSPADM

## 2019-09-30 RX ORDER — PROCHLORPERAZINE MALEATE 5 MG
10 TABLET ORAL EVERY 6 HOURS PRN
Status: DISCONTINUED | OUTPATIENT
Start: 2019-09-30 | End: 2019-10-01 | Stop reason: HOSPADM

## 2019-09-30 RX ORDER — ACETAMINOPHEN 325 MG/1
650 TABLET ORAL EVERY 4 HOURS PRN
Status: DISCONTINUED | OUTPATIENT
Start: 2019-09-30 | End: 2019-10-01 | Stop reason: HOSPADM

## 2019-09-30 RX ORDER — TRIAMCINOLONE ACETONIDE 40 MG/ML
INJECTION, SUSPENSION INTRA-ARTICULAR; INTRAMUSCULAR PRN
Status: DISCONTINUED | OUTPATIENT
Start: 2019-09-30 | End: 2019-09-30 | Stop reason: HOSPADM

## 2019-09-30 RX ORDER — OXYMETAZOLINE HYDROCHLORIDE 0.05 G/100ML
SPRAY NASAL PRN
Status: DISCONTINUED | OUTPATIENT
Start: 2019-09-30 | End: 2019-09-30 | Stop reason: HOSPADM

## 2019-09-30 RX ORDER — LABETALOL 20 MG/4 ML (5 MG/ML) INTRAVENOUS SYRINGE
PRN
Status: DISCONTINUED | OUTPATIENT
Start: 2019-09-30 | End: 2019-09-30

## 2019-09-30 RX ORDER — AMOXICILLIN 250 MG
2 CAPSULE ORAL 2 TIMES DAILY
Status: DISCONTINUED | OUTPATIENT
Start: 2019-09-30 | End: 2019-10-01 | Stop reason: HOSPADM

## 2019-09-30 RX ORDER — CEPHALEXIN 500 MG/1
500 CAPSULE ORAL 2 TIMES DAILY
Qty: 10 CAPSULE | Refills: 0 | Status: SHIPPED | OUTPATIENT
Start: 2019-09-30 | End: 2020-02-20

## 2019-09-30 RX ORDER — LIDOCAINE 40 MG/G
CREAM TOPICAL
Status: DISCONTINUED | OUTPATIENT
Start: 2019-09-30 | End: 2019-09-30 | Stop reason: HOSPADM

## 2019-09-30 RX ORDER — LIDOCAINE HYDROCHLORIDE 20 MG/ML
INJECTION, SOLUTION INFILTRATION; PERINEURAL PRN
Status: DISCONTINUED | OUTPATIENT
Start: 2019-09-30 | End: 2019-09-30

## 2019-09-30 RX ORDER — ACETAMINOPHEN 650 MG/1
650 SUPPOSITORY RECTAL EVERY 4 HOURS PRN
Status: DISCONTINUED | OUTPATIENT
Start: 2019-09-30 | End: 2019-10-01 | Stop reason: HOSPADM

## 2019-09-30 RX ORDER — FENTANYL CITRATE 50 UG/ML
INJECTION, SOLUTION INTRAMUSCULAR; INTRAVENOUS PRN
Status: DISCONTINUED | OUTPATIENT
Start: 2019-09-30 | End: 2019-09-30

## 2019-09-30 RX ORDER — PROCHLORPERAZINE 25 MG
25 SUPPOSITORY, RECTAL RECTAL EVERY 12 HOURS PRN
Status: DISCONTINUED | OUTPATIENT
Start: 2019-09-30 | End: 2019-10-01 | Stop reason: HOSPADM

## 2019-09-30 RX ORDER — AMPICILLIN AND SULBACTAM 2; 1 G/1; G/1
3 INJECTION, POWDER, FOR SOLUTION INTRAMUSCULAR; INTRAVENOUS
Status: COMPLETED | OUTPATIENT
Start: 2019-09-30 | End: 2019-09-30

## 2019-09-30 RX ORDER — ONDANSETRON 2 MG/ML
4 INJECTION INTRAMUSCULAR; INTRAVENOUS EVERY 6 HOURS PRN
Status: DISCONTINUED | OUTPATIENT
Start: 2019-09-30 | End: 2019-10-01 | Stop reason: HOSPADM

## 2019-09-30 RX ORDER — ONDANSETRON 2 MG/ML
INJECTION INTRAMUSCULAR; INTRAVENOUS PRN
Status: DISCONTINUED | OUTPATIENT
Start: 2019-09-30 | End: 2019-09-30

## 2019-09-30 RX ORDER — SODIUM CHLORIDE, SODIUM LACTATE, POTASSIUM CHLORIDE, CALCIUM CHLORIDE 600; 310; 30; 20 MG/100ML; MG/100ML; MG/100ML; MG/100ML
INJECTION, SOLUTION INTRAVENOUS CONTINUOUS
Status: DISCONTINUED | OUTPATIENT
Start: 2019-09-30 | End: 2019-09-30 | Stop reason: HOSPADM

## 2019-09-30 RX ADMIN — PROPOFOL 80 MG: 10 INJECTION, EMULSION INTRAVENOUS at 14:21

## 2019-09-30 RX ADMIN — ROCURONIUM BROMIDE 20 MG: 10 INJECTION INTRAVENOUS at 15:32

## 2019-09-30 RX ADMIN — DEXAMETHASONE SODIUM PHOSPHATE 10 MG: 10 INJECTION, SOLUTION INTRAMUSCULAR; INTRAVENOUS at 14:27

## 2019-09-30 RX ADMIN — LABETALOL 20 MG/4 ML (5 MG/ML) INTRAVENOUS SYRINGE 5 MG: at 15:09

## 2019-09-30 RX ADMIN — SODIUM CHLORIDE, POTASSIUM CHLORIDE, SODIUM LACTATE AND CALCIUM CHLORIDE: 600; 310; 30; 20 INJECTION, SOLUTION INTRAVENOUS at 14:18

## 2019-09-30 RX ADMIN — SODIUM CHLORIDE, POTASSIUM CHLORIDE, SODIUM LACTATE AND CALCIUM CHLORIDE: 600; 310; 30; 20 INJECTION, SOLUTION INTRAVENOUS at 17:04

## 2019-09-30 RX ADMIN — FENTANYL CITRATE 100 MCG: 50 INJECTION, SOLUTION INTRAMUSCULAR; INTRAVENOUS at 14:26

## 2019-09-30 RX ADMIN — LABETALOL 20 MG/4 ML (5 MG/ML) INTRAVENOUS SYRINGE 10 MG: at 14:46

## 2019-09-30 RX ADMIN — FENTANYL CITRATE 150 MCG: 50 INJECTION, SOLUTION INTRAMUSCULAR; INTRAVENOUS at 14:21

## 2019-09-30 RX ADMIN — LIDOCAINE HYDROCHLORIDE 100 MG: 20 INJECTION, SOLUTION INFILTRATION; PERINEURAL at 14:21

## 2019-09-30 RX ADMIN — ROCURONIUM BROMIDE 50 MG: 10 INJECTION INTRAVENOUS at 14:21

## 2019-09-30 RX ADMIN — PHENYLEPHRINE HYDROCHLORIDE 100 MCG: 10 INJECTION INTRAVENOUS at 14:21

## 2019-09-30 RX ADMIN — CEFAZOLIN 1 G: 1 INJECTION, POWDER, FOR SOLUTION INTRAMUSCULAR; INTRAVENOUS at 18:10

## 2019-09-30 RX ADMIN — AMPICILLIN SODIUM AND SULBACTAM SODIUM 1.5 G: 2; 1 INJECTION, POWDER, FOR SOLUTION INTRAMUSCULAR; INTRAVENOUS at 16:30

## 2019-09-30 RX ADMIN — ROCURONIUM BROMIDE 20 MG: 10 INJECTION INTRAVENOUS at 14:40

## 2019-09-30 RX ADMIN — ONDANSETRON 4 MG: 2 INJECTION INTRAMUSCULAR; INTRAVENOUS at 16:43

## 2019-09-30 RX ADMIN — HYDROMORPHONE HYDROCHLORIDE 0.5 MG: 1 INJECTION, SOLUTION INTRAMUSCULAR; INTRAVENOUS; SUBCUTANEOUS at 16:10

## 2019-09-30 RX ADMIN — FENTANYL CITRATE 100 MCG: 50 INJECTION, SOLUTION INTRAMUSCULAR; INTRAVENOUS at 14:39

## 2019-09-30 RX ADMIN — AMPICILLIN SODIUM AND SULBACTAM SODIUM 3 G: 2; 1 INJECTION, POWDER, FOR SOLUTION INTRAMUSCULAR; INTRAVENOUS at 14:30

## 2019-09-30 RX ADMIN — SUGAMMADEX 200 MG: 100 INJECTION, SOLUTION INTRAVENOUS at 16:56

## 2019-09-30 RX ADMIN — METHYLENE BLUE 2 ML: 5 INJECTION INTRAVENOUS at 14:25

## 2019-09-30 RX ADMIN — LABETALOL 20 MG/4 ML (5 MG/ML) INTRAVENOUS SYRINGE 10 MG: at 15:12

## 2019-09-30 RX ADMIN — FENTANYL CITRATE 50 MCG: 50 INJECTION, SOLUTION INTRAMUSCULAR; INTRAVENOUS at 15:38

## 2019-09-30 RX ADMIN — ROCURONIUM BROMIDE 10 MG: 10 INJECTION INTRAVENOUS at 16:24

## 2019-09-30 ASSESSMENT — MIFFLIN-ST. JEOR: SCORE: 1757.56

## 2019-09-30 NOTE — ANESTHESIA CARE TRANSFER NOTE
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    Anesthesia Type:   No value filed.     Note:  Airway :Face Mask  Patient transferred to:PACU  Handoff Report: Identifed the Patient, Identified the Reponsible Provider, Reviewed the pertinent medical history, Discussed the surgical course, Reviewed Intra-OP anesthesia mangement and issues during anesthesia, Set expectations for post-procedure period and Allowed opportunity for questions and acknowledgement of understanding      Vitals: (Last set prior to Anesthesia Care Transfer)    CRNA VITALS  9/30/2019 1639 - 9/30/2019 1712      9/30/2019             Resp Rate (observed): 12                Electronically Signed By: MADHAVI Kumar CRNA  September 30, 2019  5:12 PM

## 2019-09-30 NOTE — OR NURSING
Patient alert, oriented x4, has good understanding of procedure today. Reports he can sign his own consent. Pt with mild aphasia and right side residual from CVA in 2009. Shanna Finch, Legal Guardian, at bedside who says they can both sign consent.     Patient reports no ride home or 24hour adult supervision tonight. Notes in arranging for hospital stay tonight. Patient and guardian affirmed staying in hospital tonight plan.

## 2019-09-30 NOTE — DISCHARGE SUMMARY
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 After discussion of the risks and benefits of the above procedure,the patient elected to proceed.      Hospital Course: The patient was admitted to the hospital and underwent the above mentioned procedure. He tolerated the procedure without any intra- or leonidas-operative complications. Please see the operative report for full details of the procedure. The patient was admitted for post-operative monitoring. His postoperative course was uneventful. At discharge, the patient's pain was well controlled, the patient was voiding on his own, and he was ambulating and tolerating a regular diet.     Discharge Exam:  Vitals:    09/30/19 1208 09/30/19 1715 09/30/19 1718 09/30/19 1730   BP: 125/82 138/75 119/74 114/71   BP Location: Left arm      Pulse: 76  75 68   Resp: 19 10     Temp: 98.4  F (36.9  C) 97.5  F (36.4  C)     TempSrc: Oral Oral     SpO2: 99% 100% 100% 100%   Weight: 85.4 kg (188 lb 4.4 oz)      Height: 1.918 m (6' 3.5\")          General: A&O x 3, No acute distress, raspy voice  HEENT: PERRL, EOMI without spontaneous or gaze evoked nystagmus. Hoarse voice as expected. No neck pain or swelling.    Respiratory: Breathing non-labored on room air, no stridor, no accessory muscle use.     Discharge Medications:   Kt Rasmussen   Home Medication Instructions SAMIR:[id number]    Printed on:09/30/19 3315   Medication Information         "              atorvastatin (LIPITOR) 40 MG tablet  Take 40 mg by mouth daily             cephALEXin (KEFLEX) 500 MG capsule  Take 1 capsule (500 mg) by mouth 2 times daily for 5 days             folic acid (FOLVITE) 1 MG tablet  Take 1 mg by mouth daily             LORazepam (ATIVAN) 0.5 MG tablet  Take 0.5 mg by mouth every 4 hours as needed for anxiety             ondansetron (ZOFRAN) 8 MG tablet  Take by mouth every 8 hours as needed for nausea             prochlorperazine (COMPAZINE) 10 MG tablet  Take 10 mg by mouth every 6 hours as needed for nausea or vomiting                 Discharge Procedure Orders   Reason for your hospital stay   Order Comments: Vocal cord surgery     Activity   Order Comments: Your activity upon discharge: activity as tolerated  Voice Rest:   Try to talk as little as possible for the next 3 days.     Order Specific Question Answer Comments   Is discharge order? Yes      Discharge Instructions     When to contact your care team   Order Comments: Call the ENT clinic if you have any of the following:  increased shortness of breath, increased drainage, increased swelling or increased pain.     Diet   Order Comments: Follow this diet upon discharge: Regular     Order Specific Question Answer Comments   Is discharge order? Yes      Home with :   - Keflex 5 day course  - Tylenol pain   - Zofran prn for nausea    Follow-up:   - 10/10/19 with ENT Dr. Johnson    Dispo: To home in good condition. All of the patient's questions/concerns have been addressed at this time.     Pino Hassan, PGY-2  Otolaryngology-Head & Neck Surgery  Please contact ENT by dialing [phone] and entering job code [phone].

## 2019-09-30 NOTE — BRIEF OP NOTE
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]  Post-operative diagnosis * No post-op diagnosis entered *  Procedure: Procedure(s):  Microdirect laryngoscopy with excision of laryngeal mass, CO2 laser  flexible esophagoscopy  steroid injection  Surgeon: Surgeon(s) and Role:  Panel 1:     * Lizzy Johnson MD - Primary     * Pino Hassan MD - Resident - Assisting  Panel 2:     * Lizzy Johnson MD - Primary  Anesthesia: General   Estimated blood loss: Minimal  Drains: None  Specimens:   ID Type Source Tests Collected by Time Destination   A : left vocal fold lesion Tissue Other SURGICAL PATHOLOGY EXAM Lizzy Johnson MD 9/30/2019  4:28 PM    B : anterior margin Tissue Other SURGICAL PATHOLOGY EXAM Lizzy Johnson MD 9/30/2019  4:33 PM    C : posterior margin Tissue Other SURGICAL PATHOLOGY EXAM Lizzy Johnson MD 9/30/2019  4:34 PM    D : lateral margin Tissue Other SURGICAL PATHOLOGY EXAM Lizzy Johnson MD 9/30/2019  4:34 PM    E : deep margin Tissue Other SURGICAL PATHOLOGY EXAM Lizzy Johnson MD 9/30/2019  4:34 PM      Findings:   Mass near anterior commissure extending posteriorly to vocal process on left.   Complications: None.  Implants:  * No implants in log *

## 2019-09-30 NOTE — ANESTHESIA POSTPROCEDURE EVALUATION
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       Anesthesia Type:  Not documented  No value filed.    Reportable Event: NO     PAIN: Uncomplicated   Sign Out status: Comfortable, Well controlled pain     PONV: No PONV   Sign Out status:  No Nausea or Vomiting     Neuro/Psych: Uneventful perioperative course   Sign Out Status: Preoperative baseline; Age appropriate mentation     Airway/Resp.: Uneventful perioperative course   Sign Out Status: Non labored breathing, age appropriate RR; Resp. Status within EXPECTED Parameters     CV: Uneventful perioperative course   Sign Out status: Appropriate BP and perfusion indices; Appropriate HR/Rhythm     Disposition:   Sign Out in:  PACU  Disposition:  Floor  Recovery Course: Uneventful  Follow-Up: Not required           Last Anesthesia Record Vitals:  CRNA VITALS  2019 1639 - 2019 1739      2019             Resp Rate (observed):  (!) 1          Last PACU Vitals:  Vitals Value Taken Time   /70 2019  6:00 PM   Temp 36.4  C (97.5  F) 2019  5:15 PM   Pulse 68 2019  6:00 PM   Resp 10 2019  5:15 PM   SpO2 98 % 2019  6:07 PM   Temp src     NIBP     Pulse     SpO2     Resp     Temp     Ht Rate     Temp 2     Vitals shown include unvalidated device data.      Electronically Signed By: Ruthy Galo MD, 2019, 6:08 PM

## 2019-10-01 VITALS
HEART RATE: 67 BPM | OXYGEN SATURATION: 96 % | SYSTOLIC BLOOD PRESSURE: 104 MMHG | BODY MASS INDEX: 22.93 KG/M2 | DIASTOLIC BLOOD PRESSURE: 59 MMHG | WEIGHT: 188.27 LBS | TEMPERATURE: 98.1 F | RESPIRATION RATE: 16 BRPM | HEIGHT: 76 IN

## 2019-10-01 PROBLEM — Z98.890 POST-OPERATIVE STATE: Status: ACTIVE | Noted: 2019-10-01

## 2019-10-01 PROCEDURE — 25000128 H RX IP 250 OP 636: Performed by: STUDENT IN AN ORGANIZED HEALTH CARE EDUCATION/TRAINING PROGRAM

## 2019-10-01 PROCEDURE — G0378 HOSPITAL OBSERVATION PER HR: HCPCS

## 2019-10-01 RX ADMIN — CEFAZOLIN 1 G: 1 INJECTION, POWDER, FOR SOLUTION INTRAMUSCULAR; INTRAVENOUS at 09:06

## 2019-10-01 RX ADMIN — CEFAZOLIN 1 G: 1 INJECTION, POWDER, FOR SOLUTION INTRAMUSCULAR; INTRAVENOUS at 01:34

## 2019-10-01 NOTE — PROGRESS NOTES
Outpatient/Observation goals to be met before discharge home:    --Pain controlled with oral medications: YES     --Wean supplemental oxygen as tolerated: PENDING, 1L overnight    --Post-operative nausea/vomiting controlled: YES

## 2019-10-01 NOTE — PROVIDER NOTIFICATION
"Provider text paged RE: \"Please input observation goals when able. No need to call back thanks\"  "

## 2019-10-01 NOTE — PROGRESS NOTES
Patient seen and evaluated at bedside in 2 occasions. Monday @8pm and Tuesday at 8am    Doing well. Denies complaints. No sob. No neck pain. No tongue numbness. Tolerating Pi    NAD  AAOx3  Neck soft and flat  No crepitus     A/P: s/p MDL with CO2 laser cordectomy  1. Voice rest 3 days  2. Diet as tolerated   3. Tylenol for pain  4. Keflex abx  5. Ok for discharge

## 2019-10-01 NOTE — PROGRESS NOTES
Outpatient/Observation goals to be met before discharge home:     --Pain controlled with oral medications: YES     --Wean supplemental oxygen as tolerated: YES    --Post-operative nausea/vomiting controlled: YES

## 2019-10-01 NOTE — PROGRESS NOTES
"Pt denies pain at this time and is tolerating regular diet well. Voice is horse sounding but denies, SOB chest pain, and VSS. Patient resting comfortably at this time. Will continue to monitor.     /59 (BP Location: Left arm)   Pulse 67   Temp 98.1  F (36.7  C) (Oral)   Resp 16   Ht 1.918 m (6' 3.5\")   Wt 85.4 kg (188 lb 4.4 oz)   SpO2 96%   BMI 23.22 kg/m  '  "

## 2019-10-01 NOTE — PROGRESS NOTES
Discharge instruction reviewed. Patient verbalized understanding. PIV removed, patient ambulated to the main lobby. Friend awaiting at main lobby. Patient discharged.

## 2019-10-01 NOTE — OP NOTE
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 Transoral laser microsurgery with endoscopic frontolateral left vertical partial laryngectomy for squamous cell carcinoma of the larynx. (Tpye Type III - transmuscular cordectomy Resection per ELS classification).  2. Microsuspension direct laryngoscopy with Injection of steroid.  3. Esophagoscopy    ANESTHESIA  Type: General  ETT: 6.0 laser    SURGEON:   Lizzy Wray MD    ASSISTANT SURGEON(S):   Pino Hassan    INDICATIONS FOR PROCEDURE:   The patient is a 59 year old male with a recently diagnosed laryngeal mass. This was consistent with laryngeal cancer, and he is being brought to the Operating Room for resection of this cancer. Risks, benefits, alternatives, and rationale for the procedure(s) listed above were discussed with the patient, and patient & guardian wish to proceed with surgery and has signed an informed consent.     FINDINGS:   1. Exposure was achieved with an Ossof pilling and Ernesto laryngoscope due to minimal neck extension and anterior position of the larynx  2. Left vocal fold exophytic lesion with further posterior and exophytic growth since office visit extending from the vocal process close but not involving the anterior commissure superficially but much closer inferiorly  3. Esophagoscopy without lesions     DESCRIPTION OF PROCEDURE:   The patient was brought into the operating room and placed supine on the operating room table. A time-out was performed. General anesthesia was induced. The patient was easily mask ventilated. Then the patient was intubated with a glydescope and 6.0 laser ETT.     Then the patient was turned 90 degrees from the anesthesiologist. The head was drapped in the usual fashion. Then the dentition and mucosa were inspected  prior to start. A reinforced tooth guard was placed on the upper dentition. The dedo laryngoscope was carefully introduced into the oral cavity and gently passed to the oropharynx. Here the larynx was exposed and the patient was placed in suspension. The patient had minimal extension of the neck and a very anteriorly positioned larynx therefore multiple maneuvers were performed by placing a shoulder ramp and using the Ernesto laryngoscope to fully evaluate the anterior vocal folds. Placing anterior pressure to the thyroid cartilage did not help.    This revealed the tumor to be much larger and exophytic than the office evaluation. The contralateral vocal fold was not involved. Initial photodocumentation was performed with a 0 degree Roberts arturo telescope.    Laser time out was performed and the patient was appropriately protected, the safety protocols were confirmed and the FiO2 was brought down to a minimal setting. Saline pledgets were placed in the subglottis.    Subsequently the microscope was bought in and the CO2 laser micromanipulator was used to start the resection. The resection was started beginning posteriorly and the lesion was resected in one piece. Care was taken to ensure a 2-3 mm margin around the tumor. Anteriorly the tumor extended to close to the anterior commissure with a 2-3mm margin superiorly but inferiorly it was bulky and came up right to the anterior commissure. Posteriorly the tumor extended to vocal process. Laterally the tumor extended past the vocal ligament. Part of the false vocal fold was resected. The ventricle and false vocal fold were not involve. Inferiorly the tumor extended especially anteriorly close to the undersurface of the vocal fold. Hemostasis was achieved with afrin soaked pledgets and the pamela suction bovie.     Multiple margins were sent for permanent histopathologic evaluation.     Then 0.3 ml of kenalog 40 steroid  was injected to the anterior commissure to  reduce chance of web and scar formation.    Photodocumentation was again performed with a 0 and 70 degree Roberts arturo telescope.     Then the larynx was re-suspended using a laryngoscope to expose the posterior commissure.  A flexible esophagoscope was inserted through the oral cavity to the cricopharyngeus muscle.  The findings were as follows: no web or CP bar.  Subsequently the esophagoscope was inserted further to the body of the esophagus, then to the lower esophageal sphincter.  The stomach was visualized upon passing through the LES.  The findings were as follows: no lesions, Z line irregularity, shot segment Angel's or hiatal hernia. Esophagoscopy was performed one more time to ensure there was no complications such as a false tract.     This was the end of our procedure. Afrin soaked pledgets were applied to the surgical site for hemostasis. The surgical site was then inspected and no residual bleeding was seen. The patient was taken out of suspension. The instrumentation was carefully removed, examining the mucosa and dentition on the way it. The dental guard was removed, and the mucosa and dentition were seen to be in their preoperative state at the conclusion of the procedure. The patient was then handed back to the care of anesthesia who awoke and extubated the patient without complication.    COMPLICATIONS:   None.  .   ESTIMATED BLOOD LOSS:   Less than 10cc    DISPOSITION:   PACU.    SPECIMENS:  ID Type Source Tests Collected by Time Destination   A : left vocal fold lesion Tissue Other SURGICAL PATHOLOGY EXAM Lizzy Johnson MD 9/30/2019  4:28 PM    B : anterior margin Tissue Other SURGICAL PATHOLOGY EXAM Lizzy Johnson MD 9/30/2019  4:33 PM    C : posterior margin Tissue Other SURGICAL PATHOLOGY EXAM Lizzy Johnson MD 9/30/2019  4:34 PM    D : lateral margin Tissue Other SURGICAL PATHOLOGY EXAM Lizzy Johnson MD 9/30/2019  4:34 PM    E : deep margin Tissue Other SURGICAL PATHOLOGY EXAM  Lizzy Johnson MD 9/30/2019  4:34 PM           PHOTODOCUMENTATION:  After the posterior resection was completed:    Post resection      In the vestibule    Inferior anterior commissure

## 2019-10-03 ENCOUNTER — TELEPHONE (OUTPATIENT)
Dept: OTOLARYNGOLOGY | Facility: CLINIC | Age: 59
End: 2019-10-03

## 2019-10-03 LAB — COPATH REPORT: NORMAL

## 2019-10-03 NOTE — TELEPHONE ENCOUNTER
Spoke with patient about pathology results. Margins negative. Breathy on the phone. Will see him in follow up.

## 2019-10-10 ENCOUNTER — OFFICE VISIT (OUTPATIENT)
Dept: OTOLARYNGOLOGY | Facility: CLINIC | Age: 59
End: 2019-10-10
Payer: MEDICARE

## 2019-10-10 VITALS
BODY MASS INDEX: 23.06 KG/M2 | SYSTOLIC BLOOD PRESSURE: 118 MMHG | HEART RATE: 82 BPM | DIASTOLIC BLOOD PRESSURE: 63 MMHG | WEIGHT: 187 LBS

## 2019-10-10 DIAGNOSIS — J38.3 LESION OF VOCAL FOLD: Primary | ICD-10-CM

## 2019-10-10 ASSESSMENT — PAIN SCALES - GENERAL: PAINLEVEL: NO PAIN (0)

## 2019-10-10 NOTE — PATIENT INSTRUCTIONS
You were seen in the ENT Clinic today by Dr. Johnson  If you have any questions or concerns after your appointment, please call   -  ENT Clinic: [phone] scheduling option 1 and nurse advice option 3.  -  Please follow up with Dr. Johnson in approximately 1 month         Thank you for choosing [facility] and allowing us to be apart of your care team!  Monica Bull LPN

## 2019-10-10 NOTE — LETTER
10/10/2019       RE: Kt Rasmussen  [id number]  [facility] [address]     Dear Colleague,    Thank you for referring your patient, Kt Rasmussen, to the [facility] EAR [facility]ROAT at [facility]. Please see a copy of my visit note below.      [facility]       Patient: Kt Rasmussen  MRN: [id number]    : 1960  Age/Gender: 59 year old male  Date of Service: 2019     Chief Complaint   T1 Left vocal fold SCC s/p resection 19    Interval History     HISTORY OF PRESENT ILLNESS: Mr. Rasmussen is a 59 year old male is being followed for evaluation after left vocal fold resection of T1 lesion on 19. He denies any pain or dysphagia. Has dysphonia. No tongue numbness.      PAST MEDICAL HISTORY:   Past Medical History:   Diagnosis Date     Alcohol abuse, unspecified      Cerebral infarction (H)     , right side residual and aphasia     Dyslipidemia      GERD (gastroesophageal reflux disease)      Lung cancer (H)      Unspecified essential hypertension          PAST SURGICAL HISTORY:   Past Surgical History:   Procedure Laterality Date     BRONCHOSCOPY FLEXIBLE AND RIGID N/A 2016    Procedure: BRONCHOSCOPY FLEXIBLE AND RIGID;  Surgeon: Tony Talbot MD;  Location: [facility] OR     C NONSPECIFIC PROCEDURE      R tympanoplasty     ESOPHAGOSCOPY FLEXIBLE N/A 2019    Procedure: flexible esophagoscopy;  Surgeon: Lizzy Johnson MD;  Location: [facility] OR     INJECT STEROID (LOCATION) N/A 2019    Procedure: steroid injection;  Surgeon: Lizzy Johnson MD;  Location: [facility] OR     LASER CO2 LARYNGOSCOPY N/A 2019    Procedure: Microdirect laryngoscopy with excision of laryngeal mass, CO2 laser;  Surgeon: Lizzy Johnson MD;  Location: [facility] OR         CURRENT MEDICATIONS:   Current Outpatient Medications:      atorvastatin (LIPITOR) 40 MG tablet, Take 40 mg by mouth daily, Disp: , Rfl:       folic acid (FOLVITE) 1 MG tablet, Take 1 mg by mouth daily, Disp: , Rfl:      LORazepam (ATIVAN) 0.5 MG tablet, Take 0.5 mg by mouth every 4 hours as needed for anxiety, Disp: , Rfl:      ondansetron (ZOFRAN) 8 MG tablet, Take by mouth every 8 hours as needed for nausea, Disp: , Rfl:      prochlorperazine (COMPAZINE) 10 MG tablet, Take 10 mg by mouth every 6 hours as needed for nausea or vomiting, Disp: , Rfl:       ALLERGIES: No known drug allergies      SOCIAL HISTORY:    Social History     Socioeconomic History     Marital status: Single     Spouse name: Not on file     Number of children: Not on file     Years of education: Not on file     Highest education level: Not on file   Occupational History     Not on file   Social Needs     Financial resource strain: Not on file     Food insecurity:     Worry: Not on file     Inability: Not on file     Transportation needs:     Medical: Not on file     Non-medical: Not on file   Tobacco Use     Smoking status: Former Smoker     Packs/day: 1.00     Types: Cigarettes     Last attempt to quit: 9/25/2009     Years since quitting: 10.0     Smokeless tobacco: Never Used   Substance and Sexual Activity     Alcohol use: Not Currently     Drug use: No     Sexual activity: Not on file   Lifestyle     Physical activity:     Days per week: Not on file     Minutes per session: Not on file     Stress: Not on file   Relationships     Social connections:     Talks on phone: Not on file     Gets together: Not on file     Attends Oriental orthodox service: Not on file     Active member of club or organization: Not on file     Attends meetings of clubs or organizations: Not on file     Relationship status: Not on file     Intimate partner violence:     Fear of current or ex partner: Not on file     Emotionally abused: Not on file     Physically abused: Not on file     Forced sexual activity: Not on file   Other Topics Concern     Parent/sibling w/ CABG, MI or angioplasty before 65F 55M? Not  Asked   Social History Narrative     Not on file           FAMILY HISTORY: Non-contributory for problems with anesthesia      REVIEW OF SYSTEMS:   The patient was asked a 14 point review of systems regarding constitutional symptoms, eye symptoms, ears, nose, mouth, throat symptoms, cardiovascular symptoms, respiratory symptoms, gastrointestinal symptoms, genitourinary symptoms, musculoskeletal symptoms, integumentary symptoms, neurological symptoms, psychiatric symptoms, endocrine symptoms, hematologic/lymphatic symptoms, and allergic/ immunologic symptoms.   The pertinent factors have been included in the HPI and below.  Patient Supplied Answers to Review of Systems  No flowsheet data found.        Physical Examination     The patient underwent a physical examination as described below. The pertinent positive and negative findings are summarized after the description of the examination.    Constitutional: The patient's developmental and nutritional status was assessed. The patient's voice quality was assessed.  Head and Face: The head and face were inspected for deformities. The sinuses were palpated. The salivary glands were palpated. Facial muscle strength was assessed bilaterally.  Eyes: Extraocular movements and primary gaze alignment were assessed.  Ears, Nose, Mouth and Throat: The ears and nose were examined for deformities. The nasal septum, mucosa, and turbinates were inspected by anterior rhinoscopy. The lips, teeth, and gums were examined for abnormalities. The oral mucosa, tongue, palate, tonsils, lateral and posterior pharynx were inspected for the presence of asymmetry or mucosal lesions.    Neck: The tracheal position was noted, and the neck mass palpated to determine if there were any asymmetries, abnormal neck masses, thyromegally, or thyroid nodules.  Respiratory: The nature of the breathing and chest expansion/symmetry was observed.  Cardiovascular: The patient was examined to determine the  presence of any edema or jugular venous distension.  Abdomen: The contour of the abdomen was noted.  Lymphatic: The patient was examined for infraclavicular lymphadenopathy.  Musculoskeletal: The patient was inspected for the presence of skeletal deformities.  Extremities: The extremities were examined for any clubbing or cyanosis.  Skin: The skin was examined for inflammatory or neoplastic conditions.  Neurologic: The patient's orientation, mood, and affect were noted. The cranial nerve  functions were examined.    Other pertinent positive and negative findings on physical examination:   OC/OP: no lesions, uvula midline, soft palate elevates symmetrically, FOM/BOT soft  Neck: no lesions, no TH tenderness to palpation    All other physical examination findings were within normal limits and noncontributory.    Procedures   Video Laryngoscopy with Stroboscopy (CPT 56686) and Behavioral & Qualitative Evaluation of Voice and Resonence     Preoperative Diagnosis:  Dysphonia and throat symptoms  Postoperative Diagnosis: Dysphonia and throat symptoms  Indication:  Patient has new or persistent dysphonia and throat symptoms.  This requires evaluation by stroboscopy to fully delineate the laryngeal functioning; especially mucosal wave assessment, ultrasharp visualization of lesions on the vocal folds, and overall functioning of the larynx.  Details of Procedure: A 70 degree rigid telescopic laryngoscope or a distal chip flexible scope was used. The lighting was obtained via a light cable connected to a stroboscopic unit. The telescope was inserted either transorally or transnasally until the vocal folds could be visualized. The patient was instructed to sustain the vowel  ee  at a comfortable pitch and loudness as the voice was monitored by a microphone connected to a fundamental frequency tracker. This circuit tracked vocal periodicity, allowing the light to flash in synchrony with the glottal cycles. A setting on the  stroboscope was set to change the phase of light strobing with relation to the vocal fundamental frequency, producing an image of 1 to 2 glottal cycles every second. The video images were recorded for analysis. Use of the variable speed, slow and stop scan allowed careful study of pertinent segments of laryngeal function to increase accuracy of clinical assessments of function and dysfunction.  In particular, features of glottal closure, mucosal wave on the vocal fold cover and laryngeal symmetry were analyzed. Lastly, the patient was asked to phonate speech samples and auditory/perceptual evaluation of voice and resonance were performed.  The vocal quality was carefully evaluated for hoarseness, breathiness, loudness, phrase length and intelligibility to determine the source of dysphonia.    Findings:   A. BEHAVIORAL & QUALITATIVE EVALUATION OF VOICE AND RESONENCE   Comments: F0 100 MPT: 5sec  Vocal Quality: breathy    Pitch Range:  Severely reduced   Phrase Length:  Severely reduced  Vocal Loudness: Severely reduced  Dysarthria: No    B. LARYNGOVIDEOSTROBOSCOPY   Anatomic/Physiological Deviations:  Left resection healing well, no granulation tissue, no lesions     Mucosal wave: Right:  No restriction     Left: not visualized   Bilateral Vocal Fold Vibration: Asymmetric  Vocal Process: Right: No restriction    Left:  No restriction  Vocal Fold closure: {Complete glottal closure    Complication(s): None  Disposition: Patient tolerated the procedure well          Review of Relevant Clinical Data     Radiology: n/a    Pathology:   9/30/19   A. Left vocal cord mass, excision:   - Fragments of invasive squamous cell carcinoma, well differentiated and   keratinizing     B.  Anterior margin, excision:   - Benign fibrous tissue and minor salivary glands with chronic   inflammation   - Minute squamous mucosa with reactive change   - Negative for malignancy     C.  Posterior margin, excision:   - Benign respiratory mucosa  with minor salivary glands and chronic   inflammation   - Negative for malignancy     D. Lateral margin, excision:   - Benign fibrous tissue with chronic inflammation   - Negative for malignancy     E. Deep margin, excision:   - Benign fibrous tissue, negative for malignancy       Labs:  Lab Results   Component Value Date    TSH 1.05 11/13/2007     Lab Results   Component Value Date     09/25/2019    CO2 29 09/25/2019    BUN 16 09/25/2019    PHOS 2.0 (L) 05/04/2016     Lab Results   Component Value Date    WBC 4.6 09/25/2019    HGB 12.9 (L) 09/25/2019    HCT 41.2 09/25/2019    MCV 95 09/25/2019     (L) 09/25/2019     No results found for: ELIZABET  No components found for: RHEUMATOIDFACTOR,  RF  Lab Results   Component Value Date    CRP 0.16 07/24/2003     No components found for: CKTOT, URICACID  No components found for: C3, C4, DSDNAAB, NDNAABIFA  No results found for: MPOAB    Impression & Plan     IMPRESSION: Mr. Rasmussen is a 59 year old male who is s/p T1 SCC left TVF resection doing well postop. Return in 1 month.     RETURN VISIT: follow up in 1 month, or earlier as needed.     Thank you for the kind referral and for allowing me to share in the care of Mr. Rasmussen. If you have any questions, please do not hesitate to contact me.        Lizzy Wray MD    of Otolaryngology - Head and Neck Surgery   Voice, Airway, and Swallowing Disorders   [facility] at the [facility]    Clinics [place] [address]  Phone: [phone]  Fax: [phone]    [place] [address]  Phone: [phone]  Fax: [phone]        Again, thank you for allowing me to participate in the care of your patient.      Sincerely,    Lizzy Johnson MD

## 2019-10-11 NOTE — PROGRESS NOTES
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 Rasmussen is a 59 year old male is being followed for evaluation after left vocal fold resection of T1 lesion on 19. He denies any pain or dysphagia. Has dysphonia. No tongue numbness.      PAST MEDICAL HISTORY:   Past Medical History:   Diagnosis Date     Alcohol abuse, unspecified      Cerebral infarction (H)     , right side residual and aphasia     Dyslipidemia      GERD (gastroesophageal reflux disease)      Lung cancer (H)      Unspecified essential hypertension          PAST SURGICAL HISTORY:   Past Surgical History:   Procedure Laterality Date     BRONCHOSCOPY FLEXIBLE AND RIGID N/A 2016    Procedure: BRONCHOSCOPY FLEXIBLE AND RIGID;  Surgeon: Tony Talbot MD;  Location: [facility] OR     C NONSPECIFIC PROCEDURE      R tympanoplasty     ESOPHAGOSCOPY FLEXIBLE N/A 2019    Procedure: flexible esophagoscopy;  Surgeon: Lizzy Johnson MD;  Location: [facility] OR     INJECT STEROID (LOCATION) N/A 2019    Procedure: steroid injection;  Surgeon: Lizzy Johnson MD;  Location: [facility] OR     LASER CO2 LARYNGOSCOPY N/A 2019    Procedure: Microdirect laryngoscopy with excision of laryngeal mass, CO2 laser;  Surgeon: Lizzy Johnson MD;  Location: [facility] OR         CURRENT MEDICATIONS:   Current Outpatient Medications:      atorvastatin (LIPITOR) 40 MG tablet, Take 40 mg by mouth daily, Disp: , Rfl:      folic acid (FOLVITE) 1 MG tablet, Take 1 mg by mouth daily, Disp: , Rfl:      LORazepam (ATIVAN) 0.5 MG tablet, Take 0.5 mg by mouth every 4 hours as needed for anxiety, Disp: , Rfl:      ondansetron (ZOFRAN) 8 MG tablet, Take by mouth every 8 hours as needed for nausea, Disp: ,  Rfl:      prochlorperazine (COMPAZINE) 10 MG tablet, Take 10 mg by mouth every 6 hours as needed for nausea or vomiting, Disp: , Rfl:       ALLERGIES: No known drug allergies      SOCIAL HISTORY:    Social History     Socioeconomic History     Marital status: Single     Spouse name: Not on file     Number of children: Not on file     Years of education: Not on file     Highest education level: Not on file   Occupational History     Not on file   Social Needs     Financial resource strain: Not on file     Food insecurity:     Worry: Not on file     Inability: Not on file     Transportation needs:     Medical: Not on file     Non-medical: Not on file   Tobacco Use     Smoking status: Former Smoker     Packs/day: 1.00     Types: Cigarettes     Last attempt to quit: 9/25/2009     Years since quitting: 10.0     Smokeless tobacco: Never Used   Substance and Sexual Activity     Alcohol use: Not Currently     Drug use: No     Sexual activity: Not on file   Lifestyle     Physical activity:     Days per week: Not on file     Minutes per session: Not on file     Stress: Not on file   Relationships     Social connections:     Talks on phone: Not on file     Gets together: Not on file     Attends [employer] service: Not on file     Active member of club or organization: Not on file     Attends meetings of clubs or organizations: Not on file     Relationship status: Not on file     Intimate partner violence:     Fear of current or ex partner: Not on file     Emotionally abused: Not on file     Physically abused: Not on file     Forced sexual activity: Not on file   Other Topics Concern     Parent/sibling w/ CABG, MI or angioplasty before 65F 55M? Not Asked   Social History Narrative     Not on file           FAMILY HISTORY: Non-contributory for problems with anesthesia      REVIEW OF SYSTEMS:   The patient was asked a 14 point review of systems regarding constitutional symptoms, eye symptoms, ears, nose, mouth, throat symptoms,  cardiovascular symptoms, respiratory symptoms, gastrointestinal symptoms, genitourinary symptoms, musculoskeletal symptoms, integumentary symptoms, neurological symptoms, psychiatric symptoms, endocrine symptoms, hematologic/lymphatic symptoms, and allergic/ immunologic symptoms.   The pertinent factors have been included in the HPI and below.  Patient Supplied Answers to Review of Systems  No flowsheet data found.        Physical Examination     The patient underwent a physical examination as described below. The pertinent positive and negative findings are summarized after the description of the examination.    Constitutional: The patient's developmental and nutritional status was assessed. The patient's voice quality was assessed.  Head and Face: The head and face were inspected for deformities. The sinuses were palpated. The salivary glands were palpated. Facial muscle strength was assessed bilaterally.  Eyes: Extraocular movements and primary gaze alignment were assessed.  Ears, Nose, Mouth and Throat: The ears and nose were examined for deformities. The nasal septum, mucosa, and turbinates were inspected by anterior rhinoscopy. The lips, teeth, and gums were examined for abnormalities. The oral mucosa, tongue, palate, tonsils, lateral and posterior pharynx were inspected for the presence of asymmetry or mucosal lesions.    Neck: The tracheal position was noted, and the neck mass palpated to determine if there were any asymmetries, abnormal neck masses, thyromegally, or thyroid nodules.  Respiratory: The nature of the breathing and chest expansion/symmetry was observed.  Cardiovascular: The patient was examined to determine the presence of any edema or jugular venous distension.  Abdomen: The contour of the abdomen was noted.  Lymphatic: The patient was examined for infraclavicular lymphadenopathy.  Musculoskeletal: The patient was inspected for the presence of skeletal deformities.  Extremities: The  extremities were examined for any clubbing or cyanosis.  Skin: The skin was examined for inflammatory or neoplastic conditions.  Neurologic: The patient's orientation, mood, and affect were noted. The cranial nerve  functions were examined.    Other pertinent positive and negative findings on physical examination:   OC/OP: no lesions, uvula midline, soft palate elevates symmetrically, FOM/BOT soft  Neck: no lesions, no TH tenderness to palpation    All other physical examination findings were within normal limits and noncontributory.    Procedures   Video Laryngoscopy with Stroboscopy (CPT 21716) and Behavioral & Qualitative Evaluation of Voice and Resonence     Preoperative Diagnosis:  Dysphonia and throat symptoms  Postoperative Diagnosis: Dysphonia and throat symptoms  Indication:  Patient has new or persistent dysphonia and throat symptoms.  This requires evaluation by stroboscopy to fully delineate the laryngeal functioning; especially mucosal wave assessment, ultrasharp visualization of lesions on the vocal folds, and overall functioning of the larynx.  Details of Procedure: A 70 degree rigid telescopic laryngoscope or a distal chip flexible scope was used. The lighting was obtained via a light cable connected to a stroboscopic unit. The telescope was inserted either transorally or transnasally until the vocal folds could be visualized. The patient was instructed to sustain the vowel  ee  at a comfortable pitch and loudness as the voice was monitored by a microphone connected to a fundamental frequency tracker. This circuit tracked vocal periodicity, allowing the light to flash in synchrony with the glottal cycles. A setting on the stroboscope was set to change the phase of light strobing with relation to the vocal fundamental frequency, producing an image of 1 to 2 glottal cycles every second. The video images were recorded for analysis. Use of the variable speed, slow and stop scan allowed careful study of  pertinent segments of laryngeal function to increase accuracy of clinical assessments of function and dysfunction.  In particular, features of glottal closure, mucosal wave on the vocal fold cover and laryngeal symmetry were analyzed. Lastly, the patient was asked to phonate speech samples and auditory/perceptual evaluation of voice and resonance were performed.  The vocal quality was carefully evaluated for hoarseness, breathiness, loudness, phrase length and intelligibility to determine the source of dysphonia.    Findings:   A. BEHAVIORAL & QUALITATIVE EVALUATION OF VOICE AND RESONENCE   Comments: F0 100 MPT: 5sec  Vocal Quality: breathy    Pitch Range:  Severely reduced   Phrase Length:  Severely reduced  Vocal Loudness: Severely reduced  Dysarthria: No    B. LARYNGOVIDEOSTROBOSCOPY   Anatomic/Physiological Deviations:  Left resection healing well, no granulation tissue, no lesions     Mucosal wave: Right:  No restriction     Left: not visualized   Bilateral Vocal Fold Vibration: Asymmetric  Vocal Process: Right: No restriction    Left:  No restriction  Vocal Fold closure: {Complete glottal closure    Complication(s): None  Disposition: Patient tolerated the procedure well          Review of Relevant Clinical Data     Radiology: n/a    Pathology:   9/30/19   A. Left vocal cord mass, excision:   - Fragments of invasive squamous cell carcinoma, well differentiated and   keratinizing     B.  Anterior margin, excision:   - Benign fibrous tissue and minor salivary glands with chronic   inflammation   - Minute squamous mucosa with reactive change   - Negative for malignancy     C.  Posterior margin, excision:   - Benign respiratory mucosa with minor salivary glands and chronic   inflammation   - Negative for malignancy     D. Lateral margin, excision:   - Benign fibrous tissue with chronic inflammation   - Negative for malignancy     E. Deep margin, excision:   - Benign fibrous tissue, negative for malignancy        Labs:  Lab Results   Component Value Date    TSH 1.05 11/13/2007     Lab Results   Component Value Date     09/25/2019    CO2 29 09/25/2019    BUN 16 09/25/2019    PHOS 2.0 (L) 05/04/2016     Lab Results   Component Value Date    WBC 4.6 09/25/2019    HGB 12.9 (L) 09/25/2019    HCT 41.2 09/25/2019    MCV 95 09/25/2019     (L) 09/25/2019     No results found for: ELIZABET  No components found for: RHEUMATOIDFACTOR,  RF  Lab Results   Component Value Date    CRP 0.16 07/24/2003     No components found for: CKTOT, URICACID  No components found for: C3, C4, DSDNAAB, NDNAABIFA  No results found for: MPOAB    Impression & Plan     IMPRESSION: Mr. Rasmussen is a 59 year old male who is s/p T1 SCC left TVF resection doing well postop. Return in 1 month.     RETURN VISIT: follow up in 1 month, or earlier as needed.     Thank you for the kind referral and for allowing me to share in the care of Mr. Rasmussen. If you have any questions, please do not hesitate to contact me.        Lizzy Wray MD    of Otolaryngology - Head and Neck Surgery   Voice, Airway, and Swallowing Disorders   [facility]    Clinics & Surgery [address] [address]  Phone: [phone]  Fax: [phone]    [address] [address]  Phone: [phone]  Fax: [phone]

## 2019-10-15 ENCOUNTER — TELEPHONE (OUTPATIENT)
Dept: OTOLARYNGOLOGY | Facility: CLINIC | Age: 59
End: 2019-10-15

## 2019-11-05 NOTE — PROGRESS NOTES
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 Rasmussen is a 59 year old male is being followed after left vocal fold resection of T1 lesion on 19. He was healing well at the last visit on 10/10/19.      PAST MEDICAL HISTORY:   Past Medical History:   Diagnosis Date     Alcohol abuse, unspecified      Cerebral infarction (H)     , right side residual and aphasia     Dyslipidemia      GERD (gastroesophageal reflux disease)      Lung cancer (H)      Unspecified essential hypertension          PAST SURGICAL HISTORY:   Past Surgical History:   Procedure Laterality Date     BRONCHOSCOPY FLEXIBLE AND RIGID N/A 2016    Procedure: BRONCHOSCOPY FLEXIBLE AND RIGID;  Surgeon: Tony Talbot MD;  Location: [facility] OR     C NONSPECIFIC PROCEDURE      R tympanoplasty     ESOPHAGOSCOPY FLEXIBLE N/A 2019    Procedure: flexible esophagoscopy;  Surgeon: Lizzy Johnson MD;  Location: [facility] OR     INJECT STEROID (LOCATION) N/A 2019    Procedure: steroid injection;  Surgeon: Lizzy Johnson MD;  Location: [facility] OR     LASER CO2 LARYNGOSCOPY N/A 2019    Procedure: Microdirect laryngoscopy with excision of laryngeal mass, CO2 laser;  Surgeon: Lizzy Johnson MD;  Location: [facility] OR         CURRENT MEDICATIONS:   Current Outpatient Medications:      atorvastatin (LIPITOR) 40 MG tablet, Take 40 mg by mouth daily, Disp: , Rfl:      folic acid (FOLVITE) 1 MG tablet, Take 1 mg by mouth daily, Disp: , Rfl:      LORazepam (ATIVAN) 0.5 MG tablet, Take 0.5 mg by mouth every 4 hours as needed for anxiety, Disp: , Rfl:      ondansetron (ZOFRAN) 8 MG tablet, Take by mouth every 8 hours as needed for nausea, Disp: , Rfl:      prochlorperazine  (COMPAZINE) 10 MG tablet, Take 10 mg by mouth every 6 hours as needed for nausea or vomiting, Disp: , Rfl:       ALLERGIES: No known drug allergies      SOCIAL HISTORY:    Social History     Socioeconomic History     Marital status: Single     Spouse name: Not on file     Number of children: Not on file     Years of education: Not on file     Highest education level: Not on file   Occupational History     Not on file   Social Needs     Financial resource strain: Not on file     Food insecurity:     Worry: Not on file     Inability: Not on file     Transportation needs:     Medical: Not on file     Non-medical: Not on file   Tobacco Use     Smoking status: Former Smoker     Packs/day: 1.00     Types: Cigarettes     Last attempt to quit: 9/25/2009     Years since quitting: 10.1     Smokeless tobacco: Never Used   Substance and Sexual Activity     Alcohol use: Not Currently     Drug use: No     Sexual activity: Not on file   Lifestyle     Physical activity:     Days per week: Not on file     Minutes per session: Not on file     Stress: Not on file   Relationships     Social connections:     Talks on phone: Not on file     Gets together: Not on file     Attends [employer] service: Not on file     Active member of club or organization: Not on file     Attends meetings of clubs or organizations: Not on file     Relationship status: Not on file     Intimate partner violence:     Fear of current or ex partner: Not on file     Emotionally abused: Not on file     Physically abused: Not on file     Forced sexual activity: Not on file   Other Topics Concern     Parent/sibling w/ CABG, MI or angioplasty before 65F 55M? Not Asked   Social History Narrative     Not on file           FAMILY HISTORY: Non-contributory for problems with anesthesia      REVIEW OF SYSTEMS:   The patient was asked a 14 point review of systems regarding constitutional symptoms, eye symptoms, ears, nose, mouth, throat symptoms, cardiovascular symptoms,  respiratory symptoms, gastrointestinal symptoms, genitourinary symptoms, musculoskeletal symptoms, integumentary symptoms, neurological symptoms, psychiatric symptoms, endocrine symptoms, hematologic/lymphatic symptoms, and allergic/ immunologic symptoms.   The pertinent factors have been included in the HPI and below.  Patient Supplied Answers to Review of Systems  No flowsheet data found.        Physical Examination     The patient underwent a physical examination as described below. The pertinent positive and negative findings are summarized after the description of the examination.    Constitutional: The patient's developmental and nutritional status was assessed. The patient's voice quality was assessed.  Head and Face: The head and face were inspected for deformities. The sinuses were palpated. The salivary glands were palpated. Facial muscle strength was assessed bilaterally.  Eyes: Extraocular movements and primary gaze alignment were assessed.  Ears, Nose, Mouth and Throat: The ears and nose were examined for deformities. The nasal septum, mucosa, and turbinates were inspected by anterior rhinoscopy. The lips, teeth, and gums were examined for abnormalities. The oral mucosa, tongue, palate, tonsils, lateral and posterior pharynx were inspected for the presence of asymmetry or mucosal lesions.    Neck: The tracheal position was noted, and the neck mass palpated to determine if there were any asymmetries, abnormal neck masses, thyromegally, or thyroid nodules.  Respiratory: The nature of the breathing and chest expansion/symmetry was observed.  Cardiovascular: The patient was examined to determine the presence of any edema or jugular venous distension.  Abdomen: The contour of the abdomen was noted.  Lymphatic: The patient was examined for infraclavicular lymphadenopathy.  Musculoskeletal: The patient was inspected for the presence of skeletal deformities.  Extremities: The extremities were examined for any  clubbing or cyanosis.  Skin: The skin was examined for inflammatory or neoplastic conditions.  Neurologic: The patient's orientation, mood, and affect were noted. The cranial nerve  functions were examined.    Other pertinent positive and negative findings on physical examination:   OC/OP: no lesions, uvula midline, soft palate elevates symmetrically, FOM/BOT soft  Neck: no lesions, no TH tenderness to palpation    All other physical examination findings were within normal limits and noncontributory.    Procedures   Video Laryngoscopy with Stroboscopy (CPT 73091) and Behavioral & Qualitative Evaluation of Voice and Resonence     Preoperative Diagnosis:  Dysphonia and throat symptoms  Postoperative Diagnosis: Dysphonia and throat symptoms  Indication:  Patient has new or persistent dysphonia and throat symptoms.  This requires evaluation by stroboscopy to fully delineate the laryngeal functioning; especially mucosal wave assessment, ultrasharp visualization of lesions on the vocal folds, and overall functioning of the larynx.  Details of Procedure: A 70 degree rigid telescopic laryngoscope or a distal chip flexible scope was used. The lighting was obtained via a light cable connected to a stroboscopic unit. The telescope was inserted either transorally or transnasally until the vocal folds could be visualized. The patient was instructed to sustain the vowel  ee  at a comfortable pitch and loudness as the voice was monitored by a microphone connected to a fundamental frequency tracker. This circuit tracked vocal periodicity, allowing the light to flash in synchrony with the glottal cycles. A setting on the stroboscope was set to change the phase of light strobing with relation to the vocal fundamental frequency, producing an image of 1 to 2 glottal cycles every second. The video images were recorded for analysis. Use of the variable speed, slow and stop scan allowed careful study of pertinent segments of laryngeal  function to increase accuracy of clinical assessments of function and dysfunction.  In particular, features of glottal closure, mucosal wave on the vocal fold cover and laryngeal symmetry were analyzed. Lastly, the patient was asked to phonate speech samples and auditory/perceptual evaluation of voice and resonance were performed.  The vocal quality was carefully evaluated for hoarseness, breathiness, loudness, phrase length and intelligibility to determine the source of dysphonia.    Findings:   A. BEHAVIORAL & QUALITATIVE EVALUATION OF VOICE AND RESONENCE   Comments: F0 167 MPT: 3sec  Vocal Quality: breathy    Pitch Range:  Severely reduced   Phrase Length:  Severely reduced  Vocal Loudness: Severely reduced  Dysarthria: No    B. LARYNGOVIDEOSTROBOSCOPY   Anatomic/Physiological Deviations:  No recurrence at the left vocal fold, large gap  Mucosal wave: Right:  No restriction     Left: No wave  Bilateral Vocal Fold Vibration: asymmetric  Vocal Process: Right: No restriction    Left:  No restriction  Vocal Fold closure: Glottal gap    Complication(s): None  Disposition: Patient tolerated the procedure well            Fiberoptic Endoscopic Evaluation of Swallowing (CPT 62576)  and Interpretation of Swallowing (CPT 12703)    Indications: See above notes for complete history and physical. Patient with suggestion on history and endoscopic exam of the presence of dysphagia causing medical complaints.  Swallowing evaluation is being performed to assess the presence and degree of dysphagia, and to recommend a safe diet.     Pulmonary Status:  No PNA   Current Diet:              regular                                        thin liquids      Consistency Amounts:  Thin Liquid: 1 tsp   Puree: 1 tsp  Solid: cookies            Positioning: upright in a chair  Oral Peripheral Exam: See physical exam section.  Anatomic Notes: See Videostroboscopy report for assessment of anatomy and laryngeal functioning  Pharyngeal secretions  prior to administration of liquid or food: No  Oral Phase Abnormal Findings: No abnormal behavior observed  Behavioral Adaptations: No abnormal behavior observed  Pharyngeal Phase Abnormal Findings: penetration during swallow indicated by laryngeal vestibule residue with thin liquids and nectar thick liquids, improved with honey thick liquids       Recommended Diet:  regular                                        thick liquids         Review of Relevant Clinical Data     Notes: n/a    Radiology: n/a    Pathology: n/a    Procedures: n/a    Labs:  Lab Results   Component Value Date    TSH 1.05 11/13/2007     Lab Results   Component Value Date     09/25/2019    CO2 29 09/25/2019    BUN 16 09/25/2019    PHOS 2.0 (L) 05/04/2016     Lab Results   Component Value Date    WBC 4.6 09/25/2019    HGB 12.9 (L) 09/25/2019    HCT 41.2 09/25/2019    MCV 95 09/25/2019     (L) 09/25/2019     No results found for: ELIZABET  No components found for: RHEUMATOIDFACTOR,  RF  Lab Results   Component Value Date    CRP 0.16 07/24/2003     No components found for: CKTOT, URICACID  No components found for: C3, C4, DSDNAAB, NDNAABIFA  No results found for: MPOAB    Patient reported Quality of Life (QOL) Measures     Patient Supplied Answers To VHI Questionnaire  Voice Handicap Index (VHI-10) 11/7/2019   My voice makes it difficult for people to hear me 2   People have difficulty understanding me in a noisy room 2   My voice difficulties restrict my personal and social life.  2   I feel left out of conversations because of my voice 2   My voice problem causes me to lose income 0   I feel as though I have to strain to produce voice 2   The clarity of my voice is unpredictable 2   My voice problem upsets me 4   VHI-10 16         Patient Supplied Answers To EAT Questionnaire  Eating Assessment Tool (EAT-10) 11/7/2019   My swallowing problem has caused me to lose weight 0   My swallowing problem interferes with my ability to go out for  meals 1   Swallowing liquids takes extra effort 1   Swallowing solids takes extra effort 1   Swallowing pills takes extra effort 0   Swallowing is painful 0   The pleasure of eating is affected by my swallowing 1   When I swallow food sticks in my throat 2   I cough when I eat 2   Swallowing is stressful 3   EAT-10 11       Impression & Plan     IMPRESSION: Mr. Rasmussen is a 59 year old male who is being seen for the followin. 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 Dysphonia  - large gap with adduction due to resolution of swelling of swelling on the left side   - continue voice exercises  - discussed again expectation of at least 6 months for voice return     3. Dysphagia  - has penetration with thin liquids  - will start honey thick liquids   - swallow therapy     RETURN VISIT: follow up in 1 months, or earlier as needed.     Thank you for the kind referral and for allowing me to share in the care of Mr. Rasmussen. If you have any questions, please do not hesitate to contact me.        Lizzy Wray MD    of Otolaryngology - Head and Neck Surgery   Voice, Airway, and Swallowing Disorders   [facility] at [facility]    Clinics & Surgery Center  [address] [place] [address]  Phone: [phone]  Fax: [phone]    [place] [address]  Phone: [phone]  Fax: [phone]

## 2019-11-07 ENCOUNTER — OFFICE VISIT (OUTPATIENT)
Dept: OTOLARYNGOLOGY | Facility: CLINIC | Age: 59
End: 2019-11-07
Payer: MEDICARE

## 2019-11-07 VITALS — HEIGHT: 76 IN | WEIGHT: 188 LBS | BODY MASS INDEX: 22.89 KG/M2

## 2019-11-07 DIAGNOSIS — R13.10 DYSPHAGIA: ICD-10-CM

## 2019-11-07 DIAGNOSIS — R49.0 DYSPHONIA: Primary | ICD-10-CM

## 2019-11-07 DIAGNOSIS — Z85.21 HX OF LARYNGEAL CANCER: ICD-10-CM

## 2019-11-07 DIAGNOSIS — J38.3 GLOTTIC INSUFFICIENCY: ICD-10-CM

## 2019-11-07 DIAGNOSIS — R13.14 PHARYNGOESOPHAGEAL DYSPHAGIA: ICD-10-CM

## 2019-11-07 ASSESSMENT — PAIN SCALES - GENERAL: PAINLEVEL: NO PAIN (0)

## 2019-11-07 ASSESSMENT — MIFFLIN-ST. JEOR: SCORE: 1756.51

## 2019-11-07 NOTE — PATIENT INSTRUCTIONS
For the time being:  Good oral care!!!  - Brush your teeth well and often!  o Once when you get up, and then after each meal, and before bed too!  Thickened liquids  - Solid food went down ok, though it had some pieces that stuck around on the back of your throat  - Thin liquids (water, soda, coffee) had a tendency to sneak down the wrong pipe at the back of your throat  - Today on exam, we saw that happen with everything but HONEY THICK LIQUIDS.  For right now, until we feel like you are safe I want you to continue to use HONEY THICK LIQUIDS.  o If you use the powder that is 2 packets for 4 ounces of water  Exercises  - We aren t doing many of these at this point, but I want you to do a couple of them 3-5 times per day to try to reduce the pieces that got stuck near the base of your tongue and back of your throat with solid food  - Effortful swallow and Tongue Hold   o See the other handout for details!

## 2019-11-07 NOTE — LETTER
2019       RE: Kt Rasmussen  [address]  [facility] [address]     Dear Colleague,    Thank you for referring your patient, Kt Rasmussen, to the [facility] EAR NOSE AND THROAT at [facility]. Please see a copy of my visit note below.      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 Rasmussen is a 59 year old male is being followed after left vocal fold resection of T1 lesion on 19. He was healing well at the last visit on 10/10/19.      PAST MEDICAL HISTORY:   Past Medical History:   Diagnosis Date     Alcohol abuse, unspecified      Cerebral infarction (H)     , right side residual and aphasia     Dyslipidemia      GERD (gastroesophageal reflux disease)      Lung cancer (H)      Unspecified essential hypertension          PAST SURGICAL HISTORY:   Past Surgical History:   Procedure Laterality Date     BRONCHOSCOPY FLEXIBLE AND RIGID N/A 2016    Procedure: BRONCHOSCOPY FLEXIBLE AND RIGID;  Surgeon: Tony Talbot MD;  Location: [facility] OR     C NONSPECIFIC PROCEDURE      R tympanoplasty     ESOPHAGOSCOPY FLEXIBLE N/A 2019    Procedure: flexible esophagoscopy;  Surgeon: Lizzy Johnson MD;  Location: [facility] OR     INJECT STEROID (LOCATION) N/A 2019    Procedure: steroid injection;  Surgeon: Lizzy Johnson MD;  Location: [facility] OR     LASER CO2 LARYNGOSCOPY N/A 2019    Procedure: Microdirect laryngoscopy with excision of laryngeal mass, CO2 laser;  Surgeon: Lizzy Johnson MD;  Location: [facility] OR         CURRENT MEDICATIONS:   Current Outpatient Medications:      atorvastatin (LIPITOR) 40 MG tablet, Take 40 mg by mouth daily, Disp: , Rfl:      folic acid (FOLVITE) 1 MG  tablet, Take 1 mg by mouth daily, Disp: , Rfl:      LORazepam (ATIVAN) 0.5 MG tablet, Take 0.5 mg by mouth every 4 hours as needed for anxiety, Disp: , Rfl:      ondansetron (ZOFRAN) 8 MG tablet, Take by mouth every 8 hours as needed for nausea, Disp: , Rfl:      prochlorperazine (COMPAZINE) 10 MG tablet, Take 10 mg by mouth every 6 hours as needed for nausea or vomiting, Disp: , Rfl:       ALLERGIES: No known drug allergies      SOCIAL HISTORY:    Social History     Socioeconomic History     Marital status: Single     Spouse name: Not on file     Number of children: Not on file     Years of education: Not on file     Highest education level: Not on file   Occupational History     Not on file   Social Needs     Financial resource strain: Not on file     Food insecurity:     Worry: Not on file     Inability: Not on file     Transportation needs:     Medical: Not on file     Non-medical: Not on file   Tobacco Use     Smoking status: Former Smoker     Packs/day: 1.00     Types: Cigarettes     Last attempt to quit: 9/25/2009     Years since quitting: 10.1     Smokeless tobacco: Never Used   Substance and Sexual Activity     Alcohol use: Not Currently     Drug use: No     Sexual activity: Not on file   Lifestyle     Physical activity:     Days per week: Not on file     Minutes per session: Not on file     Stress: Not on file   Relationships     Social connections:     Talks on phone: Not on file     Gets together: Not on file     Attends Taoist service: Not on file     Active member of club or organization: Not on file     Attends meetings of clubs or organizations: Not on file     Relationship status: Not on file     Intimate partner violence:     Fear of current or ex partner: Not on file     Emotionally abused: Not on file     Physically abused: Not on file     Forced sexual activity: Not on file   Other Topics Concern     Parent/sibling w/ CABG, MI or angioplasty before 65F 55M? Not Asked   Social History  Narrative     Not on file           FAMILY HISTORY: Non-contributory for problems with anesthesia      REVIEW OF SYSTEMS:   The patient was asked a 14 point review of systems regarding constitutional symptoms, eye symptoms, ears, nose, mouth, throat symptoms, cardiovascular symptoms, respiratory symptoms, gastrointestinal symptoms, genitourinary symptoms, musculoskeletal symptoms, integumentary symptoms, neurological symptoms, psychiatric symptoms, endocrine symptoms, hematologic/lymphatic symptoms, and allergic/ immunologic symptoms.   The pertinent factors have been included in the HPI and below.  Patient Supplied Answers to Review of Systems  No flowsheet data found.        Physical Examination     The patient underwent a physical examination as described below. The pertinent positive and negative findings are summarized after the description of the examination.    Constitutional: The patient's developmental and nutritional status was assessed. The patient's voice quality was assessed.  Head and Face: The head and face were inspected for deformities. The sinuses were palpated. The salivary glands were palpated. Facial muscle strength was assessed bilaterally.  Eyes: Extraocular movements and primary gaze alignment were assessed.  Ears, Nose, Mouth and Throat: The ears and nose were examined for deformities. The nasal septum, mucosa, and turbinates were inspected by anterior rhinoscopy. The lips, teeth, and gums were examined for abnormalities. The oral mucosa, tongue, palate, tonsils, lateral and posterior pharynx were inspected for the presence of asymmetry or mucosal lesions.    Neck: The tracheal position was noted, and the neck mass palpated to determine if there were any asymmetries, abnormal neck masses, thyromegally, or thyroid nodules.  Respiratory: The nature of the breathing and chest expansion/symmetry was observed.  Cardiovascular: The patient was examined to determine the presence of any edema or  jugular venous distension.  Abdomen: The contour of the abdomen was noted.  Lymphatic: The patient was examined for infraclavicular lymphadenopathy.  Musculoskeletal: The patient was inspected for the presence of skeletal deformities.  Extremities: The extremities were examined for any clubbing or cyanosis.  Skin: The skin was examined for inflammatory or neoplastic conditions.  Neurologic: The patient's orientation, mood, and affect were noted. The cranial nerve  functions were examined.    Other pertinent positive and negative findings on physical examination:   OC/OP: no lesions, uvula midline, soft palate elevates symmetrically, FOM/BOT soft  Neck: no lesions, no TH tenderness to palpation    All other physical examination findings were within normal limits and noncontributory.    Procedures   Video Laryngoscopy with Stroboscopy (CPT 45631) and Behavioral & Qualitative Evaluation of Voice and Resonence     Preoperative Diagnosis:  Dysphonia and throat symptoms  Postoperative Diagnosis: Dysphonia and throat symptoms  Indication:  Patient has new or persistent dysphonia and throat symptoms.  This requires evaluation by stroboscopy to fully delineate the laryngeal functioning; especially mucosal wave assessment, ultrasharp visualization of lesions on the vocal folds, and overall functioning of the larynx.  Details of Procedure: A 70 degree rigid telescopic laryngoscope or a distal chip flexible scope was used. The lighting was obtained via a light cable connected to a stroboscopic unit. The telescope was inserted either transorally or transnasally until the vocal folds could be visualized. The patient was instructed to sustain the vowel  ee  at a comfortable pitch and loudness as the voice was monitored by a microphone connected to a fundamental frequency tracker. This circuit tracked vocal periodicity, allowing the light to flash in synchrony with the glottal cycles. A setting on the stroboscope was set to change  the phase of light strobing with relation to the vocal fundamental frequency, producing an image of 1 to 2 glottal cycles every second. The video images were recorded for analysis. Use of the variable speed, slow and stop scan allowed careful study of pertinent segments of laryngeal function to increase accuracy of clinical assessments of function and dysfunction.  In particular, features of glottal closure, mucosal wave on the vocal fold cover and laryngeal symmetry were analyzed. Lastly, the patient was asked to phonate speech samples and auditory/perceptual evaluation of voice and resonance were performed.  The vocal quality was carefully evaluated for hoarseness, breathiness, loudness, phrase length and intelligibility to determine the source of dysphonia.    Findings:   A. BEHAVIORAL & QUALITATIVE EVALUATION OF VOICE AND RESONENCE   Comments: F0 167 MPT: 3sec  Vocal Quality: breathy    Pitch Range:  Severely reduced   Phrase Length:  Severely reduced  Vocal Loudness: Severely reduced  Dysarthria: No    B. LARYNGOVIDEOSTROBOSCOPY   Anatomic/Physiological Deviations:  No recurrence at the left vocal fold, large gap  Mucosal wave: Right:  No restriction     Left: No wave  Bilateral Vocal Fold Vibration: asymmetric  Vocal Process: Right: No restriction    Left:  No restriction  Vocal Fold closure: Glottal gap    Complication(s): None  Disposition: Patient tolerated the procedure well            Fiberoptic Endoscopic Evaluation of Swallowing (CPT 30677)  and Interpretation of Swallowing (CPT 59194)    Indications: See above notes for complete history and physical. Patient with suggestion on history and endoscopic exam of the presence of dysphagia causing medical complaints.  Swallowing evaluation is being performed to assess the presence and degree of dysphagia, and to recommend a safe diet.     Pulmonary Status:  No PNA   Current Diet:              regular                                        thin liquids       Consistency Amounts:  Thin Liquid: 1 tsp   Puree: 1 tsp  Solid: cookies            Positioning: upright in a chair  Oral Peripheral Exam: See physical exam section.  Anatomic Notes: See Videostroboscopy report for assessment of anatomy and laryngeal functioning  Pharyngeal secretions prior to administration of liquid or food: No  Oral Phase Abnormal Findings: No abnormal behavior observed  Behavioral Adaptations: No abnormal behavior observed  Pharyngeal Phase Abnormal Findings: penetration during swallow indicated by laryngeal vestibule residue with thin liquids and nectar thick liquids, improved with honey thick liquids       Recommended Diet:  regular                                        thick liquids         Review of Relevant Clinical Data     Notes: n/a    Radiology: n/a    Pathology: n/a    Procedures: n/a    Labs:  Lab Results   Component Value Date    TSH 1.05 11/13/2007     Lab Results   Component Value Date     09/25/2019    CO2 29 09/25/2019    BUN 16 09/25/2019    PHOS 2.0 (L) 05/04/2016     Lab Results   Component Value Date    WBC 4.6 09/25/2019    HGB 12.9 (L) 09/25/2019    HCT 41.2 09/25/2019    MCV 95 09/25/2019     (L) 09/25/2019     No results found for: ELIZABET  No components found for: RHEUMATOIDFACTOR,  RF  Lab Results   Component Value Date    CRP 0.16 07/24/2003     No components found for: CKTOT, URICACID  No components found for: C3, C4, DSDNAAB, NDNAABIFA  No results found for: MPOAB    Patient reported Quality of Life (QOL) Measures     Patient Supplied Answers To VHI Questionnaire  Voice Handicap Index (VHI-10) 11/7/2019   My voice makes it difficult for people to hear me 2   People have difficulty understanding me in a noisy room 2   My voice difficulties restrict my personal and social life.  2   I feel left out of conversations because of my voice 2   My voice problem causes me to lose income 0   I feel as though I have to strain to produce voice 2   The clarity of my  voice is unpredictable 2   My voice problem upsets me 4   VHI-10 16         Patient Supplied Answers To EAT Questionnaire  Eating Assessment Tool (EAT-10) 2019   My swallowing problem has caused me to lose weight 0   My swallowing problem interferes with my ability to go out for meals 1   Swallowing liquids takes extra effort 1   Swallowing solids takes extra effort 1   Swallowing pills takes extra effort 0   Swallowing is painful 0   The pleasure of eating is affected by my swallowing 1   When I swallow food sticks in my throat 2   I cough when I eat 2   Swallowing is stressful 3   EAT-10 11       Impression & Plan     IMPRESSION: Mr. Rasmussen is a 59 year old male who is being seen for the followin. 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 Dysphonia  - large gap with adduction due to resolution of swelling of swelling on the left side   - continue voice exercises  - discussed again expectation of at least 6 months for voice return     3. Dysphagia  - has penetration with thin liquids  - will start honey thick liquids   - swallow therapy     RETURN VISIT: follow up in 1 months, or earlier as needed.     Thank you for the kind referral and for allowing me to share in the care of Mr. Rasmussen. If you have any questions, please do not hesitate to contact me.        Lizzy Wray MD    of Otolaryngology - Head and Neck Surgery   Voice, Airway, and Swallowing Disorders   [facility]    Clinics & Surgery Center  [address] [place] [address]  Phone: [phone]  Fax: [phone]    [place] [address]  Phone: [phone]  Fax: [phone]           Again, thank you for allowing me to participate in the care of your patient.      Sincerely,    Lizzy Johnson MD

## 2019-11-07 NOTE — NURSING NOTE
"Chief Complaint   Patient presents with     RECHECK     2 month follow up     Height 1.918 m (6' 3.51\"), weight 85.3 kg (188 lb).    Mala Champion, EMT  "

## 2019-11-07 NOTE — LETTER
11/7/2019       RE: Kt Rasmussen  [email or web address]  [facility] [address]     Dear Colleague,    Thank you for referring your patient, Kt Rasmussen, to the [facility] VOICE at [facility]. Please see a copy of my visit note below.    [facility]    Patient: Kt Rasmussen  Date of Visit: 11/7/2019    CHIEF COMPLAINT: Voice quality, Swallowing difficulties    HISTORY  Kt Rasmussen is a 59 year old year old gentleman, who was seen for follow-up evaluation in conjunction with a visit to Dr. Wray.  He has a history of stroke and subsequent right hemiparesis.  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  In clinic biopsy was performed, and based on those findings OR excisional biopsy was recommended.  Patient underwent a single session of preoperative speech therapy with myself on 9/26/2019.  Patient was cooperative throughout, but demonstrated difficulty with therapeutic recommendations within the session.  OR biopsy was performed on 9/30/2019 and pathology demonstrated squamous cell carcinoma which was able to be excised with negative margins.  He returned on 10/10/2019 with adequate but still ongoing healing as expected.  He returns today for one-month follow-up.    INTERIM HISTORY:    Did well with voice rest    Used therapy exercises initially, but stopped use after a time    Feels that his voice is improved but still weak, but continuing to get better    He reports coughing with eating    OBJECTIVE  PATIENT REPORTED MEASURES  Patient Supplied Answers To Last 2 VHI Questionnaires  Voice Handicap Index (VHI-10) 11/7/2019   My voice makes it difficult for people to hear me 2   People have difficulty understanding me in a noisy room 2   My voice difficulties restrict my personal and social life.  2   I feel left out of conversations because of my voice 2   My voice problem causes me to lose income 0   I feel as though I have  to strain to produce voice 2   The clarity of my voice is unpredictable 2   My voice problem upsets me 4   VHI-10 16      Patient Supplied Answers To EAT Questionnaire  Eating Assessment Tool (EAT-10) 11/7/2019   My swallowing problem has caused me to lose weight 0   My swallowing problem interferes with my ability to go out for meals 1   Swallowing liquids takes extra effort 1   Swallowing solids takes extra effort 1   Swallowing pills takes extra effort 0   Swallowing is painful 0   The pleasure of eating is affected by my swallowing 1   When I swallow food sticks in my throat 2   I cough when I eat 2   Swallowing is stressful 3   EAT-10 11     PERCEPTUAL EVALUATION (CPT 56861)  POSTURE / TENSION:     neck and shoulders    BREATHING:     appears within normal limits and adequate     VOICE:    Roughness: Mild Consistent    Breathiness: Moderate to severe Consistent    Strain: Moderate Consistent    Loudness    Conversational speech:  WNL    Pitch:    Conversational speech:  Moderately elevated    Pitch glide:     Improved voicing with elevated pitch    Resonance:    Conversational speech:  Difficult to accurately assess due to the degree of breathiness    Singing vs. Speech:  Consistent across contexts    CAPE-V Overall Severity:  77/100    COUGH/THROAT CLEARING:    Patient is unable to achieve crisp strong cough    THERAPY PROBES: Modest to no improvement was elicited with use of forward resonant stimuli, coordination of respiration and phonation and use of glottic coup to promote vocal fold closure    LARYNGEAL EXAMINATION  Procedure: Flexible endoscopy with chip-tip technology with stroboscopy, right nostril; topical anesthesia with 3% Lidocaine and 0.25% phenylephrine was applied.   Performed by: Dr. Wray   The laryngeal and pharyngeal structures were evaluated for gross appearance, mobility, function, and focal lesions / abnormalities of the associated mucosa.  Stroboscopy was warranted to evaluate closure,  symmetry, and vibratory characteristics of the vocal folds.  All findings were within normal limits with the exception of the following salient features:     Left true vocal fold vibratory margin is severely concave     No focal lesions or mucosal changes noted    With attempts at phonation there is severe supraglottic recruitment though expected left ventricular recruitment is absent    There is a large persistent glottal gap as a result of left vocal fold concavity.  This is present with all attempts at glottic closure both voice and cough    No improvement in glottic closure is seen with breath-hold    Vibratory function of the right true vocal fold appears intact though it is unable to be fully evaluated due to the degree of glottic insufficiency and limited airflow resistance      LTVF concavity with adequate healing      Glottic insufficiency during phonation    The laryngeal exam was reviewed with Mr. Rasmussen, and I provided pertinent explanations, as well as written and oral information.    FIBEROPTIC ENDOSCOPIC EVALUATION OF SWALLOWING (FEES)  Procedure: Flexible endoscopy with chip-tip technology without stroboscopy, right nostril; topical anesthesia with 3% Lidocaine and 0.25% phenylephrine was applied.  . Anesthetization spray was applied during laryngeal exam, but not reapplied during FEES.   Performed by: Dr. Wray  Indications for FEES:  See above notes for complete history. Patient complains of dysphagia and/or there is suggestion on history of the presence of dysphagia causing medical complaints. Therefore, Dr. Wray deemed it medically necessary to proceed with the FEES screening protocol. PO trials were evaluated under fiberoptic endoscopy completed by Dr. Wray.    I provided the PO trials, the protocol of instructions, and therapy probes for the patient. I also provided skilled evaluation of the swallow, and feedback/explanation to the patient.    Swallowing evaluation is being performed to assess  the presence and degree of dysphagia, and to recommend a safe diet.    Pre-Swallow Secretions:    Location and Amount: minimal to mild presence of bubbly secretions in the valleculae    Thin Liquid Trials:    Amount and Mode of Presentation: self-administered, cup and straw    Premature Spillage/Delayed Swallow: variable   - Trial 1 - Normal initiation with mildly reduced white-out  - Trial 2 - premature spillage to the laryngeal surface of the epiglottis    Penetration/Aspiration:   - Trial 1 - WFL  - Trial 2 - Posterior commissure contrast with immediate cough response after the initial swallow    Residual Location and Amount:   - Trial 1 - mild residue in the right valleculae, mild to moderate in the right piriform, mild post cricoid  - Trial 2 - moderate post-cricoid residue    Therapy Probes and Efficacy: Residue cleared with secondary swallow    Nectar Thick Liquid Trials:    Amount and Mode of Presentation: self-administered, cup and straw    Premature Spillage/Delayed Swallow: Normal initiation with adequate whiteout    Penetration/Aspiration: post cricoid contrast extending anteriorly to the right vocal process.  No cough.    Residual Location and Amount: mild residue in the valleculae and near the apex of the epiglottis    Therapy Probes and Efficacy: residue cleared with second swallow, minimal efficacy of left head turn    Honey Thick Trials:    Amount and Mode of Presentation: self-administered, cup and straw    Premature Spillage/Delayed Swallow: Normal initiation with adequate whiteout    Penetration/Aspiration: no signs of penetration or aspiration    Residual Location and Amount: scattered diffuse near the apex of the epiglottis and right posterior pharyngeal wall    Therapy Probes and Efficacy: minimal improvement in residue with left head turn or chin tuck    Puree Texture Trails:    Amount and Mode of Presentation: spoon, clinician administered    Premature Spillage/Delayed Swallow:   - Trial 1 -  WNL  - Trial 2 - normal timing, reduced whiteout     Penetration/Aspiration: no signs of penetration or aspiration    Residual Location and Amount:   - Trial 1 - WNL  - Trial 2 - mild to moderate bilaterally in the valleculae and right piriform    Therapy Probes and Efficacy: improved but not cleared with second swallow     Solid Texture Trials:    Amount and Mode of Presentation: self-administerd    Premature Spillage/Delayed Swallow: WNL    Penetration/Aspiration: no signs of penetration or aspiration    Residual Location and Amount: scattered residue throughout the posterior pharyngeal wall, apex of the epiglottis and BOT    Therapy Probes and Efficacy:  improved but not cleared with second swallow, no improvement with a left head turn    Diet Recommendations: Honey thick liquids, regular solids    Recommended Feeding/Eating Techniques: maintain upright posture for 30 minutes following a meal, avoid sequential swallow, good oral cares.    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  Laryngeal evaluation demonstrates adequate healing of the left true vocal fold following surgical excision, but with an increasing glottal gap as postoperative swelling resolved.  With regard to voicing at this point there is minimal concern for impact disrupting postoperative healing; however, there is similarly minimal response to behavioral probes to improve vocal fold entrainment as a result of the significant glottal insufficiency.  Brief FEES evaluation performed by Dr. Wray was concerning for aspiration on thin liquids with visible contrast in the posterior commissure extending down to the right vocal process.  Patient demonstrated modest response to basic therapeutic cues with regard to swallowing, but increased viscosity (honey thick liquids) was able  to eliminate signs and symptoms of aspiration for the time being.  As was noted in his preoperative voice therapy he had some difficulty accurately following instructions with physical tasks, and this likely contributed to modest response.     RECOMMENDATIONS:     Voice focused to speech therapy is no longer warranted at this time, though as postoperative healing continues there may be a role for optimizing through behavioral means.  Patient will be seen by myself or 1 of my colleagues in conjunction with future appointments to Dr. Wray to determine when and if this becomes needed. He is discharged from voice therapy at this time.    With regards to the patient's swallow formal FEES evaluation with 1 of my dysphagia specializing colleagues is recommended, and the patient was scheduled for this 1 week from today.    An informal session of swallowing focused education was completed today clarifying these recommendations, and provide basic strategies for reducing base of tongue and pharyngeal residue.  Please see associated note below.      Additional therapy may be required as recommended following the upcoming evaluation    In the interim a diet adjustment to honey thick liquids was recommended to reduce the potential for aspiration, with maintenance of good oral hygiene, and upright posture during and 30 minutes post mealtime.     This treatment plan was developed with the patient who agreed with the recommendations.    _______________________________________________________________________  Date of Service: 11/7/2019    SUBJECTIVE / OBJECTIVE:  Please refer to my evaluation report from today's encounter for full details regarding subjective data, patient reported measures, and diagnostic findings.    Counseling and Education    The patient and his friend asked many questions about the nature of his symptoms, and I answered all of these thoroughly.    Dietary recommendations    Education and instruction in the use  of thickened liquids was provided with the assistance of my colleague Manjula Jacobsen CFY-SLP    Recommendations and timing for good oral cares    Therapeutic regimen    Based on the position of pharyngeal and base of tongue residue a set of 2 strengthening exercises were instructed including the Sydnee maneuver and the effortful swallow    Patient was able to demonstrate these exercises with adequate accuracy following clinician instruction though intermittent cueing was required to maintain accuracy over the course of multiple repetitions    He was encouraged to perform these exercises in sets of 10, 3-5 times per day    Timing for when they could be completed based on his daily routines was discussed      I provided handouts of today's therapeutic activities to facilitate practice.    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 Mr. Rasmussen and his friend reported good understanding of therapeutic rationales and with practice he was able to demonstrate therapeutic exercises with adequate accuracy.    PLAN: Mr. Rasmussen will be seen by my dysphagia specializing colleague Tata Westfall during the upcoming week for formal FEES evaluation at which time the need and timing for further dysphagia specific intervention will be determined.  He was encouraged to maintain contact in the interim and reach out if he needs assistance coordinating care.     TOTAL SERVICE TIME: 90 minutes  EVALUATION OF VOICE AND RESONANCE (52307)  NO CHARGE FACILITY FEE (25560)    Ge Romo M.M., M.A., CCC-SLP  Speech-Language Pathologist  Certificate of Vocology  [phone]    *this report was created in part through the use of computerized dictation software, and though reviewed following completion, some typographic errors may persist.  If there is confusion regarding any of  this notes contents, please contact me for clarification.*      Again, thank you for allowing me to participate in the care of your patient.      Sincerely,    Titi Romo, SLP

## 2019-11-07 NOTE — PATIENT INSTRUCTIONS
You were seen in the ENT Clinic today by Dr. Wray  If you have any questions or concerns after your appointment, please call   -  ENT Clinic: [phone] scheduling option 1 and nurse advice option 3.    -  Recommendations: Use thickened liquids to honey consistency for now while you are healing.  Use good dental hygiene,  brush at least 2 times a day if not more.  Continue with speech therapy      -  Please follow up with Dr. Wray in approximately 1 month         Thank you for choosing [facility] and allowing us to be apart of your care team!  Monica Bull LPN        Patient Education   Thickening Your Liquids   When You Go Home   Your care team has asked you to thicken your liquids to reduce your risk for aspiration pneumonia. It is vital that you thicken all of your liquids using the consistency we gave you. Continue to thicken your liquids until your follow-up visit.   Below is a list of thickening agents and where to find them. Please follow directions on the package to ensure correct thickening of your liquids.  Thickening powders  Thick It: www.DelaGet.Game [employer]; [employer] and Chegong[employer]ang   Thicken Up: [employer] FX;   wwwThinkature; Chegongfang   Thick and Easy: TradeTools FX;   wwwFantazzle Fantasy Sports Games   Pre-packaged thickened coffee and tea powder for nectar and honey: www.Pixspan  Thickening gel  Simply Thick: www.[employer] Sound;   1-923.335.2643  Pre-thickened liquids  Resource (nectar and honey):   www.BestBoy Keyboard   Flavors: orange, apple, cranberry, milk (vanilla or original), lemon-flavored water  Thick & Easy (nectar and honey):   www.Pixspan; [employer]Tools FX; [employer]ire  Flavors: orange, kiwi strawberry, apple, cranberry, milk, hydrolyte water, coffee, tea, prune  Magic Cup: www.[employer]span  Flavors: chocolate, vanilla, sugar-free vanilla, wild berry, orange  Aquacare H20: [employer] FX; [employer]ire  A variety of pre-thickened liquids  includes coffee, juices (cranberry, apple, orange) and water that can be used in cooking  For informational purposes only. Not to replace the advice of your health care provider. Copyright   2010 [facility]. All rights reserved. [employer] [id number] - REV 11/17.

## 2019-11-07 NOTE — PROGRESS NOTES
[facility] VOICE CLINIC    Patient: Kt Rasmussen  Date of Visit: 11/7/2019    CHIEF COMPLAINT: Voice quality, Swallowing difficulties    HISTORY  Kt Rasmussen is a 59 year old year old gentleman, who was seen for follow-up evaluation in conjunction with a visit to Dr. Wray.  He has a history of stroke and subsequent right hemiparesis.  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  In clinic biopsy was performed, and based on those findings OR excisional biopsy was recommended.  Patient underwent a single session of preoperative speech therapy with myself on 9/26/2019.  Patient was cooperative throughout, but demonstrated difficulty with therapeutic recommendations within the session.  OR biopsy was performed on 9/30/2019 and pathology demonstrated squamous cell carcinoma which was able to be excised with negative margins.  He returned on 10/10/2019 with adequate but still ongoing healing as expected.  He returns today for one-month follow-up.    INTERIM HISTORY:    Did well with voice rest    Used therapy exercises initially, but stopped use after a time    Feels that his voice is improved but still weak, but continuing to get better    He reports coughing with eating    OBJECTIVE  PATIENT REPORTED MEASURES  Patient Supplied Answers To Last 2 VHI Questionnaires  Voice Handicap Index (VHI-10) 11/7/2019   My voice makes it difficult for people to hear me 2   People have difficulty understanding me in a noisy room 2   My voice difficulties restrict my personal and social life.  2   I feel left out of conversations because of my voice 2   My voice problem causes me to lose income 0   I feel as though I have to strain to produce voice 2   The clarity of my voice is unpredictable 2   My voice problem upsets me 4   VHI-10 16      Patient Supplied Answers To EAT Questionnaire  Eating Assessment Tool (EAT-10) 11/7/2019   My swallowing problem has caused me to lose weight 0   My  swallowing problem interferes with my ability to go out for meals 1   Swallowing liquids takes extra effort 1   Swallowing solids takes extra effort 1   Swallowing pills takes extra effort 0   Swallowing is painful 0   The pleasure of eating is affected by my swallowing 1   When I swallow food sticks in my throat 2   I cough when I eat 2   Swallowing is stressful 3   EAT-10 11     PERCEPTUAL EVALUATION (CPT 29307)  POSTURE / TENSION:     neck and shoulders    BREATHING:     appears within normal limits and adequate     VOICE:    Roughness: Mild Consistent    Breathiness: Moderate to severe Consistent    Strain: Moderate Consistent    Loudness    Conversational speech:  WNL    Pitch:    Conversational speech:  Moderately elevated    Pitch glide:     Improved voicing with elevated pitch    Resonance:    Conversational speech:  Difficult to accurately assess due to the degree of breathiness    Singing vs. Speech:  Consistent across contexts    CAPE-V Overall Severity:  77/100    COUGH/THROAT CLEARING:    Patient is unable to achieve crisp strong cough    THERAPY PROBES: Modest to no improvement was elicited with use of forward resonant stimuli, coordination of respiration and phonation and use of glottic coup to promote vocal fold closure    LARYNGEAL EXAMINATION  Procedure: Flexible endoscopy with chip-tip technology with stroboscopy, right nostril; topical anesthesia with 3% Lidocaine and 0.25% phenylephrine was applied.   Performed by: Dr. Wray   The laryngeal and pharyngeal structures were evaluated for gross appearance, mobility, function, and focal lesions / abnormalities of the associated mucosa.  Stroboscopy was warranted to evaluate closure, symmetry, and vibratory characteristics of the vocal folds.  All findings were within normal limits with the exception of the following salient features:     Left true vocal fold vibratory margin is severely concave     No focal lesions or mucosal changes noted    With  attempts at phonation there is severe supraglottic recruitment though expected left ventricular recruitment is absent    There is a large persistent glottal gap as a result of left vocal fold concavity.  This is present with all attempts at glottic closure both voice and cough    No improvement in glottic closure is seen with breath-hold    Vibratory function of the right true vocal fold appears intact though it is unable to be fully evaluated due to the degree of glottic insufficiency and limited airflow resistance      LTVF concavity with adequate healing      Glottic insufficiency during phonation    The laryngeal exam was reviewed with Mr. Rasmussen, and I provided pertinent explanations, as well as written and oral information.    FIBEROPTIC ENDOSCOPIC EVALUATION OF SWALLOWING (FEES)  Procedure: Flexible endoscopy with chip-tip technology without stroboscopy, right nostril; topical anesthesia with 3% Lidocaine and 0.25% phenylephrine was applied.  . Anesthetization spray was applied during laryngeal exam, but not reapplied during FEES.   Performed by: Dr. Wray  Indications for FEES:  See above notes for complete history. Patient complains of dysphagia and/or there is suggestion on history of the presence of dysphagia causing medical complaints. Therefore, Dr. Wray deemed it medically necessary to proceed with the FEES screening protocol. PO trials were evaluated under fiberoptic endoscopy completed by Dr. Wray.    I provided the PO trials, the protocol of instructions, and therapy probes for the patient. I also provided skilled evaluation of the swallow, and feedback/explanation to the patient.    Swallowing evaluation is being performed to assess the presence and degree of dysphagia, and to recommend a safe diet.    Pre-Swallow Secretions:    Location and Amount: minimal to mild presence of bubbly secretions in the valleculae    Thin Liquid Trials:    Amount and Mode of Presentation: self-administered, cup and  straw    Premature Spillage/Delayed Swallow: variable   - Trial 1 - Normal initiation with mildly reduced white-out  - Trial 2 - premature spillage to the laryngeal surface of the epiglottis    Penetration/Aspiration:   - Trial 1 - WFL  - Trial 2 - Posterior commissure contrast with immediate cough response after the initial swallow    Residual Location and Amount:   - Trial 1 - mild residue in the right valleculae, mild to moderate in the right piriform, mild post cricoid  - Trial 2 - moderate post-cricoid residue    Therapy Probes and Efficacy: Residue cleared with secondary swallow    Nectar Thick Liquid Trials:    Amount and Mode of Presentation: self-administered, cup and straw    Premature Spillage/Delayed Swallow: Normal initiation with adequate whiteout    Penetration/Aspiration: post cricoid contrast extending anteriorly to the right vocal process.  No cough.    Residual Location and Amount: mild residue in the valleculae and near the apex of the epiglottis    Therapy Probes and Efficacy: residue cleared with second swallow, minimal efficacy of left head turn    Honey Thick Trials:    Amount and Mode of Presentation: self-administered, cup and straw    Premature Spillage/Delayed Swallow: Normal initiation with adequate whiteout    Penetration/Aspiration: no signs of penetration or aspiration    Residual Location and Amount: scattered diffuse near the apex of the epiglottis and right posterior pharyngeal wall    Therapy Probes and Efficacy: minimal improvement in residue with left head turn or chin tuck    Puree Texture Trails:    Amount and Mode of Presentation: spoon, clinician administered    Premature Spillage/Delayed Swallow:   - Trial 1 - WNL  - Trial 2 - normal timing, reduced whiteout     Penetration/Aspiration: no signs of penetration or aspiration    Residual Location and Amount:   - Trial 1 - WNL  - Trial 2 - mild to moderate bilaterally in the valleculae and right piriform    Therapy Probes and  Efficacy: improved but not cleared with second swallow     Solid Texture Trials:    Amount and Mode of Presentation: self-administerd    Premature Spillage/Delayed Swallow: WNL    Penetration/Aspiration: no signs of penetration or aspiration    Residual Location and Amount: scattered residue throughout the posterior pharyngeal wall, apex of the epiglottis and BOT    Therapy Probes and Efficacy:  improved but not cleared with second swallow, no improvement with a left head turn    Diet Recommendations: Honey thick liquids, regular solids    Recommended Feeding/Eating Techniques: maintain upright posture for 30 minutes following a meal, avoid sequential swallow, good oral cares.    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  Laryngeal evaluation demonstrates adequate healing of the left true vocal fold following surgical excision, but with an increasing glottal gap as postoperative swelling resolved.  With regard to voicing at this point there is minimal concern for impact disrupting postoperative healing; however, there is similarly minimal response to behavioral probes to improve vocal fold entrainment as a result of the significant glottal insufficiency.  Brief FEES evaluation performed by Dr. Wray was concerning for aspiration on thin liquids with visible contrast in the posterior commissure extending down to the right vocal process.  Patient demonstrated modest response to basic therapeutic cues with regard to swallowing, but increased viscosity (honey thick liquids) was able to eliminate signs and symptoms of aspiration for the time being.  As was noted in his preoperative voice therapy he had some difficulty accurately following instructions with physical tasks, and this likely contributed to modest response.     RECOMMENDATIONS:      Voice focused to speech therapy is no longer warranted at this time, though as postoperative healing continues there may be a role for optimizing through behavioral means.  Patient will be seen by myself or 1 of my colleagues in conjunction with future appointments to Dr. Wray to determine when and if this becomes needed. He is discharged from voice therapy at this time.    With regards to the patient's swallow formal FEES evaluation with 1 of my dysphagia specializing colleagues is recommended, and the patient was scheduled for this 1 week from today.    An informal session of swallowing focused education was completed today clarifying these recommendations, and provide basic strategies for reducing base of tongue and pharyngeal residue.  Please see associated note below.      Additional therapy may be required as recommended following the upcoming evaluation    In the interim a diet adjustment to honey thick liquids was recommended to reduce the potential for aspiration, with maintenance of good oral hygiene, and upright posture during and 30 minutes post mealtime.     This treatment plan was developed with the patient who agreed with the recommendations.    _______________________________________________________________________  Date of Service: 11/7/2019    SUBJECTIVE / OBJECTIVE:  Please refer to my evaluation report from today's encounter for full details regarding subjective data, patient reported measures, and diagnostic findings.    Counseling and Education    The patient and his friend asked many questions about the nature of his symptoms, and I answered all of these thoroughly.    Dietary recommendations    Education and instruction in the use of thickened liquids was provided with the assistance of my colleague Manjula Jacobsen CFY-SLP    Recommendations and timing for good oral cares    Therapeutic regimen    Based on the position of pharyngeal and base of tongue residue a set of 2 strengthening  exercises were instructed including the Sydnee maneuver and the effortful swallow    Patient was able to demonstrate these exercises with adequate accuracy following clinician instruction though intermittent cueing was required to maintain accuracy over the course of multiple repetitions    He was encouraged to perform these exercises in sets of 10, 3-5 times per day    Timing for when they could be completed based on his daily routines was discussed      I provided handouts of today's therapeutic activities to facilitate practice.    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 Mr. Rasmussen and his friend reported good understanding of therapeutic rationales and with practice he was able to demonstrate therapeutic exercises with adequate accuracy.    PLAN: Mr. Rasmussen will be seen by my dysphagia specializing colleague Tata Westfall during the upcoming week for formal FEES evaluation at which time the need and timing for further dysphagia specific intervention will be determined.  He was encouraged to maintain contact in the interim and reach out if he needs assistance coordinating care.     TOTAL SERVICE TIME: 90 minutes  EVALUATION OF VOICE AND RESONANCE (05962)  NO CHARGE FACILITY FEE (86460)    Ge Romo M.M., M.A., CCC-SLP  Speech-Language Pathologist  Certificate of Vocology  [phone]    *this report was created in part through the use of computerized dictation software, and though reviewed following completion, some typographic errors may persist.  If there is confusion regarding any of this notes contents, please contact me for clarification.*

## 2019-11-14 ENCOUNTER — OFFICE VISIT (OUTPATIENT)
Dept: SPEECH THERAPY | Facility: CLINIC | Age: 59
End: 2019-11-14
Payer: MEDICARE

## 2019-11-14 DIAGNOSIS — R13.12 OROPHARYNGEAL DYSPHAGIA: Primary | ICD-10-CM

## 2019-11-14 DIAGNOSIS — R13.10 DYSPHAGIA: Primary | ICD-10-CM

## 2019-11-15 ENCOUNTER — TELEPHONE (OUTPATIENT)
Dept: OTOLARYNGOLOGY | Facility: CLINIC | Age: 59
End: 2019-11-15

## 2019-11-15 NOTE — PROGRESS NOTES
11/14/19 0900   General Information   Type Of Visit Initial   Start Of Care Date 11/14/19   Referring Physician Laura MCLEOD    Orders Evaluate And Treat   Orders Comment FEES    Medical Diagnosis Dysphagia    Precautions/limitations Swallowing Precautions   Hearing WFL for conversational speech production    Pertinent History of Current Problem/OT: Additional Occupational Profile Info Pt is a 58 y/o male with a PMH significant for stroke, lung CA (2016) s/p chemoradiation, and GERD.  Pt had a T1 excision of the L VF on 9/30/2019 due to a diagnosis of SCCa and was seen for f/u in ENT clinic in conjunction with [facility] on 11/7/2019.  At that time, it was noted that the pt was recovering well but was exhibiting intermittent coughing with thin liquids which prompted a FEES screening procedure.  Pt exhibited L VF concavity with a persistent, large glottal gap.  Pt was found to be at risk for aspiration with penetration observed on thin and nectar thick liquid consistencies and a modified diet of regular textures and honey thick liquids was recommended.  A referral for a full FEES evaluation was made by Titi Romo, [facility] provider, and Dr. Elizabeth Wray.  Pt presents this date for this evaluation with a friend.  Pt reported to be thickening his liquids to nectar consistencies since his ENT clinic f/u.  Pt reports no difficulty with swallow function and reports no sense of difficulty with swallow function.     Respiratory Status Room air   Prior Level Of Function Swallowing   Prior Level Of Function Comment Regular textures and thin liquids    Living Environment House/[facility]   General Observations Pt pleasant and cooperative; receptive aphasia noted.    Patient/family Goals Goals not formally stated at the time of evaluation.    Pain Assessment   Pain Reported No   Clinical Swallow Evaluation   Oral Musculature anomalies present  (s/p stroke )   Structural Abnormalities none present    Dentition present and adequate   Mucosal Quality good   Mandibular Strength and Mobility intact   Oral Labial Strength and Mobility impaired coordination;other (see comments)  (Flat nasolabial fold on the R )   Lingual Strength and Mobility impaired coordination;other (see comments)  (Lingual deviation to the R upon protrousion )   Buccal Strength and Mobility intact   Laryngeal Function Swallow  (Voice aphonic, non-productive cough and throat clear )   Additional Documentation Yes   Clinical Swallow Eval: Thin Liquid Texture Trial   Mode of Presentation, Thin Liquids cup;self-fed   Volume of Liquid or Food Presented 2 oz thin liquid    Oral Phase of Swallow WFL   Pharyngeal Phase of Swallow coughing/choking   Diagnostic Statement Intermittent coughing behavior.     FEES Evaluation   Additional Documentation Yes   FEES Eval   Signs/Symptoms Noted Aphonic voicing, FEES screening completed in clinic    Type of Scope Adult   Scope Serial Number [id number]   Nares Entry nostril entered using no topical anesthetic   Nares Passage Scope passed through left nares   Asymmetry of Anatomy upon entry;upon command  (L VF concavity consistent with anatomy s/p L VF excision )   Location of Secretions Valleculae and pyriforms   (Arytenoid tap: immediate response on R, absent response on L)   Amount of Secretions Minimal to mild    FEES Eval: Thin Liquid Texture Trial   Mode of Presentation, Thin Liquid cup;straw;self-fed   Pre-swallow secretions noted Valleculae;Pyriforms   Pre-spillage Yes   Location of Pre-spillage Unilaterally around epiglottis into left pyriforms  (Spillage on L side )   Penetration? Yes   Penetration Occurence During swallow;After swallow  (Assumed )   Location of Penetration Through interarytenoid space   Residuals   (Cleared with spontaneous second swallow )   Aspiration? Yes   Aspiration Occurrence During swallow  (Assumed )   Aspiration Location Evident in trachea  (Following swallow )   Patient  Response to Aspiration Immediate  (Pt eliciting weak cough response )   Epiglottic inversion Yes   Post swallow residuals Pyriforms;Other (see comments)  (Increased on R vs. L )   Residuals in laryngeal vestibule Yes   Location of residuals Spillage through interarytenoid space   FEES Eval: Nectar Thick Liquid Texture Trial   Mode of Presentation, Nectar cup;self-fed   Pre-swallow secretions noted Pyriforms;Valleculae;Laryngeal vestibule   Pre-spillage Yes   Location of Pre-spillage Valleculae   Penetration? No   Residuals Remain after patient cued to clear   Aspiration? No   Epiglottic inversion Yes   Post swallow residuals Pyriforms;Other (see comments)  (Minimal amounts )   Residuals in laryngeal vestibule No   FEES Eval: Honey Thick Liquid Texture Trial   Mode of Presentation, Honey cup;self-fed   Pre-spillage No   Penetration? No   Aspiration? No   Epiglottic inversion Yes   Post swallow residuals Pyriforms;Posterior pharygeal  (Mild amounts )   Residuals in laryngeal vestibule No   FEES Eval: Puree Solid Texture Trial   Mode of Presentation, Puree spoon;self-fed   Pre-swallow secretions noted Pyriforms   Pre-spillage Yes   Location of Pre-spillage Valleculae   Penetration? No   Residuals Remain after patient cued to clear   Aspiration? No   Epiglottic inversion Yes   Post swallow residuals Pyriforms;Other (see comments)  (Mild )   Residuals in laryngeal vestibule No   FEES Eval: Solid Texture Trial   Mode of Presentation, Solid. self-fed   Pre-spillage No   Penetration? No   Residuals Remain after patient cued to clear   Aspiration? No   Epiglottic inversion Yes   Post swallow residuals Pyriforms  (Mild; Increased on R vs. L )   Residuals in laryngeal vestibule No   Swallow Compensations   Swallow Compensations Pacing;Reduce amounts;Chin tuck   Educational Assessment   Barriers to Learning Language   General Therapy Interventions   Planned Therapy Interventions Dysphagia Treatment   Dysphagia treatment  Instruction of safe swallow strategies;Compensatory strategies for swallowing;Modified diet education;Oropharyngeal exercise training   Swallow Eval: Clinical Impressions   Skilled Criteria for Therapy Intervention Skilled criteria met.  Treatment indicated.   Functional Assessment Scale (FAS) 5   Treatment Diagnosis Mild oropharyngeal dysphagia    Diet texture recommendations Regular diet;Nectar thick liquids   Recommended Feeding/Eating Techniques alternate between small bites and sips of food/liquid;maintain upright posture during/after eating for 30 mins;small sips/bites;tuck chin during every swallow   Rehab Potential good, to achieve stated therapy goals   Therapy Frequency other (see comments)  (1-2x/month )   Predicted Duration of Therapy Intervention (days/wks) 3 months    Anticipated Discharge Disposition home   Risks and Benefits of Treatment have been explained. Yes   Patient, family and/or staff in agreement with Plan of Care Yes   Clinical Impression Comments Pt is presenting with mild oropharyngeal dysphagia characterized by weakness and reduced airway protection in the setting of recent VF excision for SCCa.  Oral mechanism examination marked for flat nasolabial fold on the R, lingual deviation to the R upon protrusion, and reduced coordination.    Scope passed uneventfully via L nare with no topical anesthetic applied.  Pt is exhibiting L VF concavity consistent with excision of VF.  A large, persistent glottal gap observed; closure was not achieved with phonation, coughing, throat clearing, or head turn behaviors.  Minimal to mild thin secretions were present upon entry.  Absent response noted on the L side upon arytenoid tap; immediate response on the R side.  Upon formal trials, oral skills were observed to be functional.  Pharyngeal swallow response marked for premature spillage/delayed swallow and pharyngeal residue.  Spillage to the L pyriform observed on thin liquid trials.  Spillage reduced  upon items of increased viscosity.  Intermittent penetration observed on thin liquid trials with likely aspiration during the swallow response with evidence of residue in the trachea x1.  Pt noted to elicit spontaneous cough response.  Residue remained in the pyriforms which spilled through the interarytenoid space to the laryngeal vestibule with pt eliciting no response.  No penetration or aspiration observed on items of increased viscosity; however, minimal to mild amounts of pharyngeal residue remained and was not consistently cleared with subsequent swallows.  Trials of the chin tuck and head turn to the L strategies were not effective in increasing glottal closure but were effective in reducing depth of spillage and penetration of thin liquids.  At this time, pt is at increased risk for aspiration before, during, and after the pharyngeal swallow response.  Pt would benefit from continuing to modify his liquids to nectar consistencies with the plan to train consistent implementation of the chin tuck strategy prior to liquid consistency advancement.  Regular textures are recommended.  Pt should also take small sips at a slow pace, swallow x2 per bite/sip, and alternate consistencies.  ST plans to f/u for strategy training, oropharyngeal strengthening exercises, and education.  Pt is in agreement with this plan for intervention.                   Swallow Goals   SLP Swallow Goals 1;2   Swallow Goal 1   Goal Identifier 1   Goal Description Pt will tolerate regular textures and thin liquids with <1 coughing episode per day when implementing aspiration precautions and safe swallow strategies    Target Date 02/12/20   Swallow Goal 2   Goal Identifier 2   Goal Description Pt will complete oropharyngeal strengthening exercises with independence as perscribed by the treating SLP.    Target Date 02/12/20   Total Session Time   SLP Eval: oral/pharyngeal swallow function, clinical minutes (60484) 15   SLP Eval: Flexible  Fiberoptic Endoscopic Evaluation of Swallowing by Cine or Video Recording Minutes (91827) 15   Total Evaluation Time 30   Therapy Certification   Certification date from 11/14/19   Certification date to 02/12/20   Medical Diagnosis Dysphagia    Certification I certify the need for these services furnished under this plan of treatment and while under my care.  (Physician co-signature of this document indicates review and certification of the therapy plan).     Thank you for the referral of Kt Rasmussen.  If you have any questions about this report, please contact me using the information below.       Tata Westfall M.S. CCC-SLP  Speech Language Pathologist   [facility]   Department of Otoolaryngology, D&T- 4th Floor / [id number]  Phone: [phone]  Pager: [phone]  Email: [email or web address]

## 2019-11-15 NOTE — PROGRESS NOTES
OUTPATIENT SWALLOW  EVALUATION  PLAN OF TREATMENT FOR OUTPATIENT REHABILITATION  (COMPLETE FOR INITIAL CLAIMS ONLY)  Patient's Last Name, First Name, M.I.  YOB: 1960  Kt Rasmussen     Provider's Name   Tata Westfall, SLP   Medical Record No.  [id number]     Start of Care Date:  11/14/19   Onset Date:  11/14/2019    Type:     ___PT   ____OT  ___X_SLP Medical Diagnosis:  Dysphagia      Treatment Diagnosis:  Mild oropharyngeal dysphagia  Visits from SOC:  1     _________________________________________________________________________________  Plan of Treatment/Functional Goals:  Planned Therapy Interventions: Dysphagia Treatment  Dysphagia treatment: Instruction of safe swallow strategies, Compensatory strategies for swallowing, Modified diet education, Oropharyngeal exercise training       Goals   1. Goal Identifier: 1       Goal Description: Pt will tolerate regular textures and thin liquids with <1 coughing episode per day when implementing aspiration precautions and safe swallow strategies        Target Date: 02/12/20           2. Goal Identifier: 2       Goal Description: Pt will complete oropharyngeal strengthening exercises with independence as perscribed by the treating SLP.        Target Date: 02/12/20                Therapy Frequency: other (see comments)(1-2x/month )  Predicted Duration of Therapy Intervention (days/wks): 3 months     Tata Westfall, SLP       I CERTIFY THE NEED FOR THESE SERVICES FURNISHED UNDER        THIS PLAN OF TREATMENT AND WHILE UNDER MY CARE     (Physician attestation of this document indicates review and certification of the therapy plan).                  Certification date from: 11/14/19 Certification date to: 02/12/20          Referring Physician: Laura CMLEOD     Initial Assessment        See Epic Evaluation Start Of Care Date: 11/14/19

## 2019-11-15 NOTE — TELEPHONE ENCOUNTER
Lizzy Johnson MD Yang, Der, RN; Monica Bull LPN             Can you call his guardian to let him know that thin liquids go the wrong way and he needs to thicken his liquids to prevent that while he is healing from the surgery.  This might need to happen for a couple weeks or even a couple months depending on how he feels.  He has no evidence of recurrence of the cancer the area is healing well he just lost the swelling that he had before so now there is a big gap that we have to wait for scar to fill in.  His guardian is Rima and she is super nice he can leave her voicemail if she does not .      I called to discuss above, left a brief voice message to return call.     Staff at [facility] patient is not having any swallowing problems noted at this time.  Staff will monitor and call us or Rima.

## 2019-11-20 NOTE — TELEPHONE ENCOUNTER
Called left a detailed message for gavino as it has been 5 days without a return call. Asked that she call me back on direct line to confirm she got message and to assist in answering any questions she may have.

## 2019-11-22 NOTE — TELEPHONE ENCOUNTER
Received a zainab back from Rima, we reviewed below information she said she had talked with patient last week and he told her about the thicken liquids and he assured her he was doing it.  She asked is that all he can have then, Informed her no he can eat as normal just the liquids need to be thicken to not go down the wrong tube and it is only temporarily while he is healing. She was under the impression that he had given him the thickener,  Informed her that is something he would purchase on his own, she said she will have to check with his friend who brings him to appointments to make sure they did in fact  needed supplies. Informed her good oral hygiene is also important during this time, she said patient had mentioned that he said was brushing 3-4 times a day. Rima said will call patient again and talk with him to make sure he has everything he needs and is using the thickener as prescribed.

## 2019-12-05 ENCOUNTER — OFFICE VISIT (OUTPATIENT)
Dept: OTOLARYNGOLOGY | Facility: CLINIC | Age: 59
End: 2019-12-05
Payer: MEDICARE

## 2019-12-05 ENCOUNTER — THERAPY VISIT (OUTPATIENT)
Dept: SPEECH THERAPY | Facility: CLINIC | Age: 59
End: 2019-12-05
Payer: MEDICARE

## 2019-12-05 DIAGNOSIS — Z85.21 HX OF LARYNGEAL CANCER: ICD-10-CM

## 2019-12-05 DIAGNOSIS — R13.14 PHARYNGOESOPHAGEAL DYSPHAGIA: Primary | ICD-10-CM

## 2019-12-05 DIAGNOSIS — R13.12 OROPHARYNGEAL DYSPHAGIA: Primary | ICD-10-CM

## 2019-12-05 DIAGNOSIS — J38.3 LESION OF VOCAL FOLD: ICD-10-CM

## 2019-12-05 DIAGNOSIS — J38.3 GLOTTIC INSUFFICIENCY: ICD-10-CM

## 2019-12-05 DIAGNOSIS — C32.9 LARYNX CANCER (H): ICD-10-CM

## 2019-12-05 DIAGNOSIS — R49.0 DYSPHONIA: ICD-10-CM

## 2019-12-05 ASSESSMENT — PAIN SCALES - GENERAL: PAINLEVEL: NO PAIN (0)

## 2019-12-05 NOTE — NURSING NOTE
Chief Complaint   Patient presents with     RECHECK     3 month follow up     Did not obtain vitals.    Mala Champion, EMT

## 2019-12-05 NOTE — LETTER
2019       RE: Kt Rasmussen  [id number] [address]  [facility] [address]     Dear Colleague,    Thank you for referring your patient, Kt Rasmussen, to the [facility] EAR NOSE AND THROAT at [facility]. Please see a copy of my visit note below.        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 Continues to report dysphonia. No neck pain.     Dysphagia: Patient reports dysphagia. This is stable   Had penetration with thin liquids at the last visit - was referred for FEES and therapy which he started. Has been doing the nectar thick liquids up until a few days ago when he stopped because he felt like he has been swallowing better.    Dyspnea: Patient denies dyspnea. This is stable     PAST MEDICAL HISTORY:   Past Medical History:   Diagnosis Date     Alcohol abuse, unspecified      Cerebral infarction (H)     , right side residual and aphasia     Dyslipidemia      GERD (gastroesophageal reflux disease)      Lung cancer (H)      Unspecified essential hypertension          PAST SURGICAL HISTORY:   Past Surgical History:   Procedure Laterality Date     BRONCHOSCOPY FLEXIBLE AND RIGID N/A 2016    Procedure: BRONCHOSCOPY FLEXIBLE AND RIGID;  Surgeon: Tony Talbot MD;  Location: [facility] OR     C NONSPECIFIC PROCEDURE      R tympanoplasty     ESOPHAGOSCOPY FLEXIBLE N/A 2019    Procedure: flexible esophagoscopy;  Surgeon: Lizzy Johnson MD;  Location: [facility] OR     INJECT STEROID (LOCATION) N/A 2019    Procedure: steroid injection;  Surgeon: Lizzy Johnson MD;   Location: [facility] OR     LASER CO2 LARYNGOSCOPY N/A 9/30/2019    Procedure: Microdirect laryngoscopy with excision of laryngeal mass, CO2 laser;  Surgeon: Lizzy Johnson MD;  Location:  OR         CURRENT MEDICATIONS:   Current Outpatient Medications:      atorvastatin (LIPITOR) 40 MG tablet, Take 40 mg by mouth daily, Disp: , Rfl:      folic acid (FOLVITE) 1 MG tablet, Take 1 mg by mouth daily, Disp: , Rfl:      LORazepam (ATIVAN) 0.5 MG tablet, Take 0.5 mg by mouth every 4 hours as needed for anxiety, Disp: , Rfl:      ondansetron (ZOFRAN) 8 MG tablet, Take by mouth every 8 hours as needed for nausea, Disp: , Rfl:      prochlorperazine (COMPAZINE) 10 MG tablet, Take 10 mg by mouth every 6 hours as needed for nausea or vomiting, Disp: , Rfl:       ALLERGIES: No known drug allergies      SOCIAL HISTORY:    Social History     Socioeconomic History     Marital status: Single     Spouse name: Not on file     Number of children: Not on file     Years of education: Not on file     Highest education level: Not on file   Occupational History     Not on file   Social Needs     Financial resource strain: Not on file     Food insecurity:     Worry: Not on file     Inability: Not on file     Transportation needs:     Medical: Not on file     Non-medical: Not on file   Tobacco Use     Smoking status: Former Smoker     Packs/day: 1.00     Types: Cigarettes     Last attempt to quit: 9/25/2009     Years since quitting: 10.2     Smokeless tobacco: Never Used   Substance and Sexual Activity     Alcohol use: Not Currently     Drug use: No     Sexual activity: Not on file   Lifestyle     Physical activity:     Days per week: Not on file     Minutes per session: Not on file     Stress: Not on file   Relationships     Social connections:     Talks on phone: Not on file     Gets together: Not on file     Attends [employer] service: Not on file     Active member of club or organization: Not on file     Attends meetings of clubs or  organizations: Not on file     Relationship status: Not on file     Intimate partner violence:     Fear of current or ex partner: Not on file     Emotionally abused: Not on file     Physically abused: Not on file     Forced sexual activity: Not on file   Other Topics Concern     Parent/sibling w/ CABG, MI or angioplasty before 65F 55M? Not Asked   Social History Narrative     Not on file           FAMILY HISTORY: Non-contributory for problems with anesthesia      REVIEW OF SYSTEMS:   The patient was asked a 14 point review of systems regarding constitutional symptoms, eye symptoms, ears, nose, mouth, throat symptoms, cardiovascular symptoms, respiratory symptoms, gastrointestinal symptoms, genitourinary symptoms, musculoskeletal symptoms, integumentary symptoms, neurological symptoms, psychiatric symptoms, endocrine symptoms, hematologic/lymphatic symptoms, and allergic/ immunologic symptoms.   The pertinent factors have been included in the HPI and below.  Patient Supplied Answers to Review of Systems  No flowsheet data found.        Physical Examination     The patient underwent a physical examination as described below. The pertinent positive and negative findings are summarized after the description of the examination.    Constitutional: The patient's developmental and nutritional status was assessed. The patient's voice quality was assessed.  Head and Face: The head and face were inspected for deformities. The sinuses were palpated. The salivary glands were palpated. Facial muscle strength was assessed bilaterally.  Eyes: Extraocular movements and primary gaze alignment were assessed.  Ears, Nose, Mouth and Throat: The ears and nose were examined for deformities. The nasal septum, mucosa, and turbinates were inspected by anterior rhinoscopy. The lips, teeth, and gums were examined for abnormalities. The oral mucosa, tongue, palate, tonsils, lateral and posterior pharynx were inspected for the presence of  asymmetry or mucosal lesions.    Neck: The tracheal position was noted, and the neck mass palpated to determine if there were any asymmetries, abnormal neck masses, thyromegally, or thyroid nodules.  Respiratory: The nature of the breathing and chest expansion/symmetry was observed.  Cardiovascular: The patient was examined to determine the presence of any edema or jugular venous distension.  Abdomen: The contour of the abdomen was noted.  Lymphatic: The patient was examined for infraclavicular lymphadenopathy.  Musculoskeletal: The patient was inspected for the presence of skeletal deformities.  Extremities: The extremities were examined for any clubbing or cyanosis.  Skin: The skin was examined for inflammatory or neoplastic conditions.  Neurologic: The patient's orientation, mood, and affect were noted. The cranial nerve  functions were examined.    Other pertinent positive and negative findings on physical examination:   OC/OP: no lesions, uvula midline, soft palate elevates symmetrically, FOM/BOT soft  Neck: no lesions, no TH tenderness to palpation  Left arm weakness    All other physical examination findings were within normal limits and noncontributory.    Procedures   Video Laryngoscopy with Stroboscopy (CPT 18194) and Behavioral & Qualitative Evaluation of Voice and Resonence     Preoperative Diagnosis:  Dysphonia and throat symptoms  Postoperative Diagnosis: Dysphonia and throat symptoms  Indication:  Patient has new or persistent dysphonia and throat symptoms.  This requires evaluation by stroboscopy to fully delineate the laryngeal functioning; especially mucosal wave assessment, ultrasharp visualization of lesions on the vocal folds, and overall functioning of the larynx.  Details of Procedure: A 70 degree rigid telescopic laryngoscope or a distal chip flexible scope was used. The lighting was obtained via a light cable connected to a stroboscopic unit. The telescope was inserted either transorally or  transnasally until the vocal folds could be visualized. The patient was instructed to sustain the vowel  ee  at a comfortable pitch and loudness as the voice was monitored by a microphone connected to a fundamental frequency tracker. This circuit tracked vocal periodicity, allowing the light to flash in synchrony with the glottal cycles. A setting on the stroboscope was set to change the phase of light strobing with relation to the vocal fundamental frequency, producing an image of 1 to 2 glottal cycles every second. The video images were recorded for analysis. Use of the variable speed, slow and stop scan allowed careful study of pertinent segments of laryngeal function to increase accuracy of clinical assessments of function and dysfunction.  In particular, features of glottal closure, mucosal wave on the vocal fold cover and laryngeal symmetry were analyzed. Lastly, the patient was asked to phonate speech samples and auditory/perceptual evaluation of voice and resonance were performed.  The vocal quality was carefully evaluated for hoarseness, breathiness, loudness, phrase length and intelligibility to determine the source of dysphonia.    Findings:   A. BEHAVIORAL & QUALITATIVE EVALUATION OF VOICE AND RESONENCE   Comments: F0 180 MPT: 4s  Vocal Quality: breathy    Pitch Range:  Severely reduced   Phrase Length:  Severely reduced  Vocal Loudness: Severely reduced  Dysarthria: No    B. LARYNGOVIDEOSTROBOSCOPY   Anatomic/Physiological Deviations:  No evidence of recurrence  Glottal gap  Mucosal wave: Right:  No restriction     Left: no wave   Bilateral Vocal Fold Vibration: Asymmetric  Vocal Process: Right: No restriction    Left:  No restriction  Vocal Fold closure: Glottal gap    Complication(s): None  Disposition: Patient tolerated the procedure well              Fiberoptic Endoscopic Evaluation of Swallowing (CPT 06983)  and Interpretation of Swallowing (CPT 98668)    Indications: See above notes for complete  history and physical. Patient complains of dysphagia to liquids and/or there is suggestion on history and endoscopic exam of the presence of dysphagia causing medical complaints.  Swallowing evaluation is being performed to assess the presence and degree of dysphagia, and to recommend a safe diet.     Pulmonary Status:  No PNA   Current Diet:              regular                                        thick liquids      Consistency Amounts:  Thin Liquid: 1 tsp + honey and nectar thick liquids  Puree: 1 tsp  Solid: cookies            Positioning: upright in a chair  Oral Peripheral Exam: See physical exam section.  Anatomic Notes: See Videostroboscopy report for assessment of anatomy and laryngeal functioning  Pharyngeal secretions prior to administration of liquid or food: No  Oral Phase Abnormal Findings: No abnormal behavior observed  Behavioral Adaptations: No abnormal behavior observed  Pharyngeal Phase Abnormal Findings: penetration and aspiration with thin and honey thick liquids, improved from prior FEES      Recommended Diet:  regular                                        thick liquids            Review of Relevant Clinical Data       Labs:  Lab Results   Component Value Date    TSH 1.05 11/13/2007     Lab Results   Component Value Date     09/25/2019    CO2 29 09/25/2019    BUN 16 09/25/2019    PHOS 2.0 (L) 05/04/2016     Lab Results   Component Value Date    WBC 4.6 09/25/2019    HGB 12.9 (L) 09/25/2019    HCT 41.2 09/25/2019    MCV 95 09/25/2019     (L) 09/25/2019     Lab Results   Component Value Date    PTT 35 07/26/2017    INR 0.95 07/26/2017     No results found for: ELIZABET  No components found for: RHEUMATOIDFACTOR,  RF  Lab Results   Component Value Date    CRP 0.16 07/24/2003     No components found for: CKTOT, URICACID  No components found for: C3, C4, DSDNAAB, NDNAABIFA  No results found for: MPOAB    Patient reported Quality of Life (QOL) Measures     Patient Supplied Answers To [facility]  "Questionnaire  Voice Handicap Index (VHI-10) 2019   My voice makes it difficult for people to hear me 0   People have difficulty understanding me in a noisy room 0   My voice difficulties restrict my personal and social life.  0   I feel left out of conversations because of my voice 0   My voice problem causes me to lose income 0   I feel as though I have to strain to produce voice 0   The clarity of my voice is unpredictable 0   My voice problem upsets me 0   My voice makes me feel handicapped 0   People ask, \"What's wrong with your voice?\" 0   VHI-10 0         Patient Supplied Answers To EAT Questionnaire  Eating Assessment Tool (EAT-10) 2019   My swallowing problem has caused me to lose weight 0   My swallowing problem interferes with my ability to go out for meals 0   Swallowing liquids takes extra effort 0   Swallowing solids takes extra effort 0   Swallowing pills takes extra effort 0   Swallowing is painful 0   The pleasure of eating is affected by my swallowing 0   When I swallow food sticks in my throat 0   I cough when I eat 0   Swallowing is stressful 0   EAT-10 0       Reflux Symptom Index  Within the last month, how did the following problems affect the patient?  0 = no problem; 5= severe problem  Hoarseness or a problem with your voice 3   Clearing your throat  0   Excess throat mucous or postnasal drip 0   Difficulty swallowing food, liquid or pills 0   Coughing after you ate or after lying down  1   Breathing difficulties or choking episodes 0   Troublesome or annoying cough 0   Sensations of something sticking in your throat or a lump in your throat  0   Heartburn, chest pain, indigestion, or stomach acid coming up 0         Total: 4     Impression & Plan     IMPRESSION: Mr. Rasmussen is a 59 year old male who is being seen for the followin. T1 Left vocal fold SCC s/p resection 19  - no evidence of recurrence   - return in 1 month for surveillance     2. Dysphonia  - large gap " with adduction due to resolution of swelling of swelling on the left side   - continue voice exercises  - discussed again expectation of at least 6 months for voice return   - will consider possibility of IL at the next visit    3. Dysphagia  - improved swallow today with less penetration and aspiration  - continue honey thick liquids and swallow therapy  with chin tuck    RETURN VISIT: follow up 1 month, or earlier as needed.     Thank you for the kind referral and for allowing me to share in the care of Mr. Rasmussen. If you have any questions, please do not hesitate to contact me.    Lizzy Wray MD    of Otolaryngology - Head and Neck Surgery   Voice, Airway, and Swallowing Disorders   [facility] at the [facility]    Clinics & Surgery [address] [address]  Phone: [phone]  Fax: [phone]    [address] [address]  Phone: [phone]  Fax: [phone]

## 2019-12-05 NOTE — PATIENT INSTRUCTIONS
You were seen in the ENT Clinic today by Dr. Wray  If you have any questions or concerns after your appointment, please call   -  ENT Clinic: [phone] scheduling option 1 and nurse advice option 3.    -  Recommendations: Continue to follow up with Speech therapy.   Use thicken liquids with Necator consistency and practice tucking chin d own when swallowing every time. Continue with exercises.     -  Please follow up with Dr. Wray in approximately 1st-2nd week in January.         Thank you for choosing [facility] and allowing us to be apart of your care team!  Monica Bull LPN

## 2019-12-05 NOTE — PROGRESS NOTES
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 Continues to report dysphonia. No neck pain.     Dysphagia: Patient reports dysphagia. This is stable   Had penetration with thin liquids at the last visit - was referred for FEES and therapy which he started. Has been doing the nectar thick liquids up until a few days ago when he stopped because he felt like he has been swallowing better.    Dyspnea: Patient denies dyspnea. This is stable     PAST MEDICAL HISTORY:   Past Medical History:   Diagnosis Date     Alcohol abuse, unspecified      Cerebral infarction (H)     , right side residual and aphasia     Dyslipidemia      GERD (gastroesophageal reflux disease)      Lung cancer (H)      Unspecified essential hypertension          PAST SURGICAL HISTORY:   Past Surgical History:   Procedure Laterality Date     BRONCHOSCOPY FLEXIBLE AND RIGID N/A 2016    Procedure: BRONCHOSCOPY FLEXIBLE AND RIGID;  Surgeon: Tony Talbot MD;  Location: [facility] OR     C NONSPECIFIC PROCEDURE      R tympanoplasty     ESOPHAGOSCOPY FLEXIBLE N/A 2019    Procedure: flexible esophagoscopy;  Surgeon: Lizzy Johnson MD;  Location: [facility] OR     INJECT STEROID (LOCATION) N/A 2019    Procedure: steroid injection;  Surgeon: Lizzy Johnson MD;  Location: [facility] OR     LASER CO2 LARYNGOSCOPY N/A 2019    Procedure: Microdirect laryngoscopy with excision of laryngeal mass, CO2 laser;  Surgeon: Lizzy Johnson MD;  Location: [facility] OR         CURRENT MEDICATIONS:   Current Outpatient Medications:      atorvastatin (LIPITOR) 40 MG  tablet, Take 40 mg by mouth daily, Disp: , Rfl:      folic acid (FOLVITE) 1 MG tablet, Take 1 mg by mouth daily, Disp: , Rfl:      LORazepam (ATIVAN) 0.5 MG tablet, Take 0.5 mg by mouth every 4 hours as needed for anxiety, Disp: , Rfl:      ondansetron (ZOFRAN) 8 MG tablet, Take by mouth every 8 hours as needed for nausea, Disp: , Rfl:      prochlorperazine (COMPAZINE) 10 MG tablet, Take 10 mg by mouth every 6 hours as needed for nausea or vomiting, Disp: , Rfl:       ALLERGIES: No known drug allergies      SOCIAL HISTORY:    Social History     Socioeconomic History     Marital status: Single     Spouse name: Not on file     Number of children: Not on file     Years of education: Not on file     Highest education level: Not on file   Occupational History     Not on file   Social Needs     Financial resource strain: Not on file     Food insecurity:     Worry: Not on file     Inability: Not on file     Transportation needs:     Medical: Not on file     Non-medical: Not on file   Tobacco Use     Smoking status: Former Smoker     Packs/day: 1.00     Types: Cigarettes     Last attempt to quit: 9/25/2009     Years since quitting: 10.2     Smokeless tobacco: Never Used   Substance and Sexual Activity     Alcohol use: Not Currently     Drug use: No     Sexual activity: Not on file   Lifestyle     Physical activity:     Days per week: Not on file     Minutes per session: Not on file     Stress: Not on file   Relationships     Social connections:     Talks on phone: Not on file     Gets together: Not on file     Attends Jain service: Not on file     Active member of club or organization: Not on file     Attends meetings of clubs or organizations: Not on file     Relationship status: Not on file     Intimate partner violence:     Fear of current or ex partner: Not on file     Emotionally abused: Not on file     Physically abused: Not on file     Forced sexual activity: Not on file   Other Topics Concern     Parent/sibling  w/ CABG, MI or angioplasty before 65F 55M? Not Asked   Social History Narrative     Not on file           FAMILY HISTORY: Non-contributory for problems with anesthesia      REVIEW OF SYSTEMS:   The patient was asked a 14 point review of systems regarding constitutional symptoms, eye symptoms, ears, nose, mouth, throat symptoms, cardiovascular symptoms, respiratory symptoms, gastrointestinal symptoms, genitourinary symptoms, musculoskeletal symptoms, integumentary symptoms, neurological symptoms, psychiatric symptoms, endocrine symptoms, hematologic/lymphatic symptoms, and allergic/ immunologic symptoms.   The pertinent factors have been included in the HPI and below.  Patient Supplied Answers to Review of Systems  No flowsheet data found.        Physical Examination     The patient underwent a physical examination as described below. The pertinent positive and negative findings are summarized after the description of the examination.    Constitutional: The patient's developmental and nutritional status was assessed. The patient's voice quality was assessed.  Head and Face: The head and face were inspected for deformities. The sinuses were palpated. The salivary glands were palpated. Facial muscle strength was assessed bilaterally.  Eyes: Extraocular movements and primary gaze alignment were assessed.  Ears, Nose, Mouth and Throat: The ears and nose were examined for deformities. The nasal septum, mucosa, and turbinates were inspected by anterior rhinoscopy. The lips, teeth, and gums were examined for abnormalities. The oral mucosa, tongue, palate, tonsils, lateral and posterior pharynx were inspected for the presence of asymmetry or mucosal lesions.    Neck: The tracheal position was noted, and the neck mass palpated to determine if there were any asymmetries, abnormal neck masses, thyromegally, or thyroid nodules.  Respiratory: The nature of the breathing and chest expansion/symmetry was observed.  Cardiovascular:  The patient was examined to determine the presence of any edema or jugular venous distension.  Abdomen: The contour of the abdomen was noted.  Lymphatic: The patient was examined for infraclavicular lymphadenopathy.  Musculoskeletal: The patient was inspected for the presence of skeletal deformities.  Extremities: The extremities were examined for any clubbing or cyanosis.  Skin: The skin was examined for inflammatory or neoplastic conditions.  Neurologic: The patient's orientation, mood, and affect were noted. The cranial nerve  functions were examined.    Other pertinent positive and negative findings on physical examination:   OC/OP: no lesions, uvula midline, soft palate elevates symmetrically, FOM/BOT soft  Neck: no lesions, no TH tenderness to palpation  Left arm weakness    All other physical examination findings were within normal limits and noncontributory.    Procedures   Video Laryngoscopy with Stroboscopy (CPT 72991) and Behavioral & Qualitative Evaluation of Voice and Resonence     Preoperative Diagnosis:  Dysphonia and throat symptoms  Postoperative Diagnosis: Dysphonia and throat symptoms  Indication:  Patient has new or persistent dysphonia and throat symptoms.  This requires evaluation by stroboscopy to fully delineate the laryngeal functioning; especially mucosal wave assessment, ultrasharp visualization of lesions on the vocal folds, and overall functioning of the larynx.  Details of Procedure: A 70 degree rigid telescopic laryngoscope or a distal chip flexible scope was used. The lighting was obtained via a light cable connected to a stroboscopic unit. The telescope was inserted either transorally or transnasally until the vocal folds could be visualized. The patient was instructed to sustain the vowel  ee  at a comfortable pitch and loudness as the voice was monitored by a microphone connected to a fundamental frequency tracker. This circuit tracked vocal periodicity, allowing the light to flash  in synchrony with the glottal cycles. A setting on the stroboscope was set to change the phase of light strobing with relation to the vocal fundamental frequency, producing an image of 1 to 2 glottal cycles every second. The video images were recorded for analysis. Use of the variable speed, slow and stop scan allowed careful study of pertinent segments of laryngeal function to increase accuracy of clinical assessments of function and dysfunction.  In particular, features of glottal closure, mucosal wave on the vocal fold cover and laryngeal symmetry were analyzed. Lastly, the patient was asked to phonate speech samples and auditory/perceptual evaluation of voice and resonance were performed.  The vocal quality was carefully evaluated for hoarseness, breathiness, loudness, phrase length and intelligibility to determine the source of dysphonia.    Findings:   A. BEHAVIORAL & QUALITATIVE EVALUATION OF VOICE AND RESONENCE   Comments: F0 180 MPT: 4s  Vocal Quality: breathy    Pitch Range:  Severely reduced   Phrase Length:  Severely reduced  Vocal Loudness: Severely reduced  Dysarthria: No    B. LARYNGOVIDEOSTROBOSCOPY   Anatomic/Physiological Deviations:  No evidence of recurrence  Glottal gap  Mucosal wave: Right:  No restriction     Left: no wave   Bilateral Vocal Fold Vibration: Asymmetric  Vocal Process: Right: No restriction    Left:  No restriction  Vocal Fold closure: Glottal gap    Complication(s): None  Disposition: Patient tolerated the procedure well              Fiberoptic Endoscopic Evaluation of Swallowing (CPT 63228)  and Interpretation of Swallowing (CPT 07264)    Indications: See above notes for complete history and physical. Patient complains of dysphagia to liquids and/or there is suggestion on history and endoscopic exam of the presence of dysphagia causing medical complaints.  Swallowing evaluation is being performed to assess the presence and degree of dysphagia, and to recommend a safe diet.      Pulmonary Status:  No PNA   Current Diet:              regular                                        thick liquids      Consistency Amounts:  Thin Liquid: 1 tsp + honey and nectar thick liquids  Puree: 1 tsp  Solid: cookies            Positioning: upright in a chair  Oral Peripheral Exam: See physical exam section.  Anatomic Notes: See Videostroboscopy report for assessment of anatomy and laryngeal functioning  Pharyngeal secretions prior to administration of liquid or food: No  Oral Phase Abnormal Findings: No abnormal behavior observed  Behavioral Adaptations: No abnormal behavior observed  Pharyngeal Phase Abnormal Findings: penetration and aspiration with thin and honey thick liquids, improved from prior FEES      Recommended Diet:  regular                                        thick liquids            Review of Relevant Clinical Data       Labs:  Lab Results   Component Value Date    TSH 1.05 11/13/2007     Lab Results   Component Value Date     09/25/2019    CO2 29 09/25/2019    BUN 16 09/25/2019    PHOS 2.0 (L) 05/04/2016     Lab Results   Component Value Date    WBC 4.6 09/25/2019    HGB 12.9 (L) 09/25/2019    HCT 41.2 09/25/2019    MCV 95 09/25/2019     (L) 09/25/2019     Lab Results   Component Value Date    PTT 35 07/26/2017    INR 0.95 07/26/2017     No results found for: ELIZABET  No components found for: RHEUMATOIDFACTOR,  RF  Lab Results   Component Value Date    CRP 0.16 07/24/2003     No components found for: CKTOT, URICACID  No components found for: C3, C4, DSDNAAB, NDNAABIFA  No results found for: MPOAB    Patient reported Quality of Life (QOL) Measures     Patient Supplied Answers To VHI Questionnaire  Voice Handicap Index (VHI-10) 12/5/2019   My voice makes it difficult for people to hear me 0   People have difficulty understanding me in a noisy room 0   My voice difficulties restrict my personal and social life.  0   I feel left out of conversations because of my voice 0   My voice  "problem causes me to lose income 0   I feel as though I have to strain to produce voice 0   The clarity of my voice is unpredictable 0   My voice problem upsets me 0   My voice makes me feel handicapped 0   People ask, \"What's wrong with your voice?\" 0   VHI-10 0         Patient Supplied Answers To EAT Questionnaire  Eating Assessment Tool (EAT-10) 2019   My swallowing problem has caused me to lose weight 0   My swallowing problem interferes with my ability to go out for meals 0   Swallowing liquids takes extra effort 0   Swallowing solids takes extra effort 0   Swallowing pills takes extra effort 0   Swallowing is painful 0   The pleasure of eating is affected by my swallowing 0   When I swallow food sticks in my throat 0   I cough when I eat 0   Swallowing is stressful 0   EAT-10 0       Reflux Symptom Index  Within the last month, how did the following problems affect the patient?  0 = no problem; 5= severe problem  Hoarseness or a problem with your voice 3   Clearing your throat  0   Excess throat mucous or postnasal drip 0   Difficulty swallowing food, liquid or pills 0   Coughing after you ate or after lying down  1   Breathing difficulties or choking episodes 0   Troublesome or annoying cough 0   Sensations of something sticking in your throat or a lump in your throat  0   Heartburn, chest pain, indigestion, or stomach acid coming up 0         Total: 4     Impression & Plan     IMPRESSION: Mr. Rasmussen is a 59 year old male who is being seen for the followin. T1 Left vocal fold SCC s/p resection 19  - no evidence of recurrence   - return in 1 month for surveillance     2. Dysphonia  - large gap with adduction due to resolution of swelling of swelling on the left side   - continue voice exercises  - discussed again expectation of at least 6 months for voice return   - will consider possibility of IL at the next visit    3. Dysphagia  - improved swallow today with less penetration and " aspiration  - continue honey thick liquids and swallow therapy with chin tuck    RETURN VISIT: follow up 1 month, or earlier as needed.     Thank you for the kind referral and for allowing me to share in the care of Mr. Rasmussen. If you have any questions, please do not hesitate to contact me.        Lizzy Wray MD    of Otolaryngology - Head and Neck Surgery   Voice, Airway, and Swallowing Disorders   [facility] at the [facility]    Clinics  Surgery [address] [address]  Phone: [phone]  Fax: [phone]    [address] [address]  Phone: [phone]  Fax: [phone]

## 2020-01-03 ENCOUNTER — PRE VISIT (OUTPATIENT)
Dept: OTOLARYNGOLOGY | Facility: CLINIC | Age: 60
End: 2020-01-03

## 2020-01-03 NOTE — PROGRESS NOTES
Outpatient Speech Language Therapy Evaluation  PLAN OF TREATMENT FOR OUTPATIENT REHABILITATION  (COMPLETE FOR INITIAL CLAIMS ONLY)  Patient's Last Name, First Name, M.I.  YOB: 1960  Kt Rasmussen                        Provider's Name  Jocelynn ZAK Warren, SLP Medical Record No.  [id number]                               Onset Date:  9/13/19   Start of Care Date: 9/13/19     Type: Speech Language Therapy Medical Diagnosis: Dysphonia [R49.0]                        Therapy Diagnosis:  Dysphonia [R49.0]   Visits from SOC:  1   _________________________________________________________________________________  Plan of Treatment:   Speech therapy    DURATION/FREQUENCY OF TREATMENT  Six weekly, one-hour sessions, with two monthly one-hour follow-up sessions    PROGNOSIS  Good prognosis for voice improvement with speech therapy and regular practice of therapeutic activities.    BARRIERS TO LEARNING/TEACHING AND LEARNING NEEDS  None/Unremarkable      GOALS:  Patient goal:   1. To improve and maintain a healthy voice quality  2. To resolve the vocal fold lesions  3. To understand the problem and fix it as much as possible  4. To have a normal and acceptable voice quality    Short-term goal(s): Within the first 4 sessions, Mr. Rasmussen:  1. will demonstrate improved awareness of throat clearing / cough: acknowledging >75% of all cough events during session time with no clinician support  2. will be able to independently list key factors in maintenance of good vocal hygiene with 80% accuracy, and report on their use outside the therapy room.  3. will demonstrate semi-occluded vocal tract (SOVT) exercises with at least 80% accuracy with no clinician support  4. Initiate perioperative voice care    Long-term goal(s): In 6 months, Mr. Rasmussen will:  1. Report resolution of dysphonia, and use of optimal voice quality to meet personal,  social, and professional needs, 80% of the time during a typical week of vocal activities  _________________________________________________________________________________    I CERTIFY THE NEED FOR THESE SERVICES FURNISHED UNDER        THIS PLAN OF TREATMENT AND WHILE UNDER MY CARE     (Physician attestation of this document indicates review and certification of the therapy plan).     Certification date from: 9/13/19  Certification date to: 12/12/19    Referring Provider: Referred Self

## 2020-01-03 NOTE — TELEPHONE ENCOUNTER
Previsit Call Details  Reason for previsit call: MyChart Activation  Number of days until appointment: 7  Call attempt number: 1  Call outcome: Unsuccessful - Patient Unavailable  Notes: LM requesting patient sign up for my chart, so they can then do echeck-in for appointments. Informed him does have an activation code but we can send a new activation code either through email or text messaging if needed, informed him if he would like to get signed up to give us a call or we can help him sign up in clinic next week.

## 2020-01-09 ENCOUNTER — OFFICE VISIT (OUTPATIENT)
Dept: OTOLARYNGOLOGY | Facility: CLINIC | Age: 60
End: 2020-01-09
Payer: MEDICARE

## 2020-01-09 ENCOUNTER — THERAPY VISIT (OUTPATIENT)
Dept: SPEECH THERAPY | Facility: CLINIC | Age: 60
End: 2020-01-09
Payer: MEDICARE

## 2020-01-09 VITALS — WEIGHT: 193 LBS | BODY MASS INDEX: 23.5 KG/M2 | HEIGHT: 76 IN

## 2020-01-09 DIAGNOSIS — R13.12 OROPHARYNGEAL DYSPHAGIA: Primary | ICD-10-CM

## 2020-01-09 DIAGNOSIS — R13.10 DYSPHAGIA, UNSPECIFIED TYPE: Primary | ICD-10-CM

## 2020-01-09 DIAGNOSIS — J38.3 LESION OF VOCAL FOLD: ICD-10-CM

## 2020-01-09 ASSESSMENT — PAIN SCALES - GENERAL: PAINLEVEL: NO PAIN (0)

## 2020-01-09 ASSESSMENT — MIFFLIN-ST. JEOR: SCORE: 1779.19

## 2020-01-09 NOTE — LETTER
2020       RE: Kt Rasmussen  [address] [address]  [facility] [address]     Dear Colleague,    Thank you for referring your patient, Kt Rasmussen, to the [facility] EAR NOSE AND THROAT at [facility]. Please see a copy of my visit note below.        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  His last visit was on 19 at which time he had no evidence of disease and continued dysphonia and dysphagia. Today he reports persistent dysphonia. He is able to manage and it does not bother him.     Dysphagia: Patient reports dysphagia. This is stable. He no longer wishes to continue with therapy and thinks his swallowing is much improved.    Dyspnea: Patient denies dyspnea. This is stable     PAST MEDICAL HISTORY:   Past Medical History:   Diagnosis Date     Alcohol abuse, unspecified      Cerebral infarction (H)     , right side residual and aphasia     Dyslipidemia      GERD (gastroesophageal reflux disease)      Lung cancer (H)      Unspecified essential hypertension          PAST SURGICAL HISTORY:   Past Surgical History:   Procedure Laterality Date     BRONCHOSCOPY FLEXIBLE AND RIGID N/A 2016    Procedure: BRONCHOSCOPY FLEXIBLE AND RIGID;  Surgeon: Tony Talbot MD;  Location: [facility] OR     C NONSPECIFIC PROCEDURE      R tympanoplasty     ESOPHAGOSCOPY FLEXIBLE N/A 2019    Procedure: flexible esophagoscopy;  Surgeon: Lizzy Johnson MD;  Location: [facility] OR     INJECT STEROID (LOCATION) N/A 2019    Procedure: steroid injection;  Surgeon:  Lizzy Johnson MD;  Location:  OR     LASER CO2 LARYNGOSCOPY N/A 9/30/2019    Procedure: Microdirect laryngoscopy with excision of laryngeal mass, CO2 laser;  Surgeon: Lizzy Johnson MD;  Location:  OR         CURRENT MEDICATIONS:   Current Outpatient Medications:      atorvastatin (LIPITOR) 40 MG tablet, Take 40 mg by mouth daily, Disp: , Rfl:      folic acid (FOLVITE) 1 MG tablet, Take 1 mg by mouth daily, Disp: , Rfl:      LORazepam (ATIVAN) 0.5 MG tablet, Take 0.5 mg by mouth every 4 hours as needed for anxiety, Disp: , Rfl:      ondansetron (ZOFRAN) 8 MG tablet, Take by mouth every 8 hours as needed for nausea, Disp: , Rfl:      prochlorperazine (COMPAZINE) 10 MG tablet, Take 10 mg by mouth every 6 hours as needed for nausea or vomiting, Disp: , Rfl:       ALLERGIES: No known drug allergies      SOCIAL HISTORY:    Social History     Socioeconomic History     Marital status: Single     Spouse name: Not on file     Number of children: Not on file     Years of education: Not on file     Highest education level: Not on file   Occupational History     Not on file   Social Needs     Financial resource strain: Not on file     Food insecurity:     Worry: Not on file     Inability: Not on file     Transportation needs:     Medical: Not on file     Non-medical: Not on file   Tobacco Use     Smoking status: Former Smoker     Packs/day: 1.00     Types: Cigarettes     Last attempt to quit: 9/25/2009     Years since quitting: 10.3     Smokeless tobacco: Never Used   Substance and Sexual Activity     Alcohol use: Not Currently     Drug use: No     Sexual activity: Not on file   Lifestyle     Physical activity:     Days per week: Not on file     Minutes per session: Not on file     Stress: Not on file   Relationships     Social connections:     Talks on phone: Not on file     Gets together: Not on file     Attends Mos[employer] service: Not on file     Active member of club or organization: Not on file     Attends meetings  of clubs or organizations: Not on file     Relationship status: Not on file     Intimate partner violence:     Fear of current or ex partner: Not on file     Emotionally abused: Not on file     Physically abused: Not on file     Forced sexual activity: Not on file   Other Topics Concern     Parent/sibling w/ CABG, MI or angioplasty before 65F 55M? Not Asked   Social History Narrative     Not on file           FAMILY HISTORY:   Family History   Problem Relation Age of Onset     Cancer Father       Non-contributory for problems with anesthesia      REVIEW OF SYSTEMS:   The patient was asked a 14 point review of systems regarding constitutional symptoms, eye symptoms, ears, nose, mouth, throat symptoms, cardiovascular symptoms, respiratory symptoms, gastrointestinal symptoms, genitourinary symptoms, musculoskeletal symptoms, integumentary symptoms, neurological symptoms, psychiatric symptoms, endocrine symptoms, hematologic/lymphatic symptoms, and allergic/ immunologic symptoms.   The pertinent factors have been included in the HPI and below.  Patient Supplied Answers to Review of Systems  No flowsheet data found.        Physical Examination     The patient underwent a physical examination as described below. The pertinent positive and negative findings are summarized after the description of the examination.    Constitutional: The patient's developmental and nutritional status was assessed. The patient's voice quality was assessed.  Head and Face: The head and face were inspected for deformities. The sinuses were palpated. The salivary glands were palpated. Facial muscle strength was assessed bilaterally.  Eyes: Extraocular movements and primary gaze alignment were assessed.  Ears, Nose, Mouth and Throat: The ears and nose were examined for deformities. The nasal septum, mucosa, and turbinates were inspected by anterior rhinoscopy. The lips, teeth, and gums were examined for abnormalities. The oral mucosa, tongue,  palate, tonsils, lateral and posterior pharynx were inspected for the presence of asymmetry or mucosal lesions.    Neck: The tracheal position was noted, and the neck mass palpated to determine if there were any asymmetries, abnormal neck masses, thyromegally, or thyroid nodules.  Respiratory: The nature of the breathing and chest expansion/symmetry was observed.  Cardiovascular: The patient was examined to determine the presence of any edema or jugular venous distension.  Abdomen: The contour of the abdomen was noted.  Lymphatic: The patient was examined for infraclavicular lymphadenopathy.  Musculoskeletal: The patient was inspected for the presence of skeletal deformities.  Extremities: The extremities were examined for any clubbing or cyanosis.  Skin: The skin was examined for inflammatory or neoplastic conditions.  Neurologic: The patient's orientation, mood, and affect were noted. The cranial nerve  functions were examined.    Other pertinent positive and negative findings on physical examination:   OC/OP: no lesions, uvula midline, soft palate elevates symmetrically, FOM/BOT soft  Neck: no lesions, no TH tenderness to palpation    All other physical examination findings were within normal limits and noncontributory.    Procedures     Reviewed scope exam done by Tata MORAES  No evidence of recurrence              Review of Relevant Clinical Data     Labs:  Lab Results   Component Value Date    TSH 1.05 11/13/2007     Lab Results   Component Value Date     09/25/2019    CO2 29 09/25/2019    BUN 16 09/25/2019    PHOS 2.0 (L) 05/04/2016     Lab Results   Component Value Date    WBC 4.6 09/25/2019    HGB 12.9 (L) 09/25/2019    HCT 41.2 09/25/2019    MCV 95 09/25/2019     (L) 09/25/2019     Lab Results   Component Value Date    PTT 35 07/26/2017    INR 0.95 07/26/2017     No results found for: ELIZABET  No components found for: RHEUMATOIDFACTOR,  RF  Lab Results   Component Value Date    CRP 0.16  2003     No components found for: CKTOT, URICACID  No components found for: C3, C4, DSDNAAB, NDNAABIFA  No results found for: MPOAB     Impression & Plan     IMPRESSION: Mr. Rasmussen is a 59 year old male who is being seen for the followin. 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 Dysphonia  - large gap with adduction due to resolution of swelling of swelling on the left side   - continue voice exercises  - discussed again expectation of at least 6 months for voice return   - discussed option of [place] but he is not interested at this pint     3. Dysphagia  - improved swallow today with less pharyngeal residue on evaluation of SLP performed FEES  - discussed reason for thickening liquids    RETURN VISIT: follow up in one month or earlier as needed.     I, Carmine Farnsworth, am serving as a scribe to document services personally performed by Lizzy Wray MD at this visit, based upon the provider's statements to me. All documentation has been reviewed by the aforementioned provider prior to being entered into the official medical record.  '    The documentation recorded by the scribe accurately reflects the services I personally performed and the decisions made by me.    Thank you for the kind referral and for allowing me to share in the care of Mr. Rasmussen. If you have any questions, please do not hesitate to contact me.        Lizzy Wray MD    of Otolaryngology - Head and Neck Surgery   Voice, Airway, and Swallowing Disorders   [facility] at the [facility]    Clinics & Surgery [address] [address]  Phone: [phone]  Fax: [phone]    [place] [address]  Phone: [phone]  Fax: [phone]

## 2020-01-09 NOTE — PROGRESS NOTES
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 His last visit was on 19 at which time he had no evidence of disease and continued dysphonia and dysphagia. Today he reports persistent dysphonia. He is able to manage and it does not bother him.     Dysphagia: Patient reports dysphagia. This is stable. He no longer wishes to continue with therapy and thinks his swallowing is much improved.    Dyspnea: Patient denies dyspnea. This is stable     PAST MEDICAL HISTORY:   Past Medical History:   Diagnosis Date     Alcohol abuse, unspecified      Cerebral infarction (H)     , right side residual and aphasia     Dyslipidemia      GERD (gastroesophageal reflux disease)      Lung cancer (H)      Unspecified essential hypertension          PAST SURGICAL HISTORY:   Past Surgical History:   Procedure Laterality Date     BRONCHOSCOPY FLEXIBLE AND RIGID N/A 2016    Procedure: BRONCHOSCOPY FLEXIBLE AND RIGID;  Surgeon: Tony Talbot MD;  Location: [facility] OR     C NONSPECIFIC PROCEDURE      R tympanoplasty     ESOPHAGOSCOPY FLEXIBLE N/A 2019    Procedure: flexible esophagoscopy;  Surgeon: Lizzy Johnson MD;  Location: [facility] OR     INJECT STEROID (LOCATION) N/A 2019    Procedure: steroid injection;  Surgeon: Lizzy Johnson MD;  Location: [facility] OR     LASER CO2 LARYNGOSCOPY N/A 2019    Procedure: Microdirect laryngoscopy with excision of laryngeal mass, CO2 laser;  Surgeon: Lizzy Johnson MD;  Location: [facility] OR         CURRENT MEDICATIONS:   Current Outpatient Medications:       atorvastatin (LIPITOR) 40 MG tablet, Take 40 mg by mouth daily, Disp: , Rfl:      folic acid (FOLVITE) 1 MG tablet, Take 1 mg by mouth daily, Disp: , Rfl:      LORazepam (ATIVAN) 0.5 MG tablet, Take 0.5 mg by mouth every 4 hours as needed for anxiety, Disp: , Rfl:      ondansetron (ZOFRAN) 8 MG tablet, Take by mouth every 8 hours as needed for nausea, Disp: , Rfl:      prochlorperazine (COMPAZINE) 10 MG tablet, Take 10 mg by mouth every 6 hours as needed for nausea or vomiting, Disp: , Rfl:       ALLERGIES: No known drug allergies      SOCIAL HISTORY:    Social History     Socioeconomic History     Marital status: Single     Spouse name: Not on file     Number of children: Not on file     Years of education: Not on file     Highest education level: Not on file   Occupational History     Not on file   Social Needs     Financial resource strain: Not on file     Food insecurity:     Worry: Not on file     Inability: Not on file     Transportation needs:     Medical: Not on file     Non-medical: Not on file   Tobacco Use     Smoking status: Former Smoker     Packs/day: 1.00     Types: Cigarettes     Last attempt to quit: 9/25/2009     Years since quitting: 10.3     Smokeless tobacco: Never Used   Substance and Sexual Activity     Alcohol use: Not Currently     Drug use: No     Sexual activity: Not on file   Lifestyle     Physical activity:     Days per week: Not on file     Minutes per session: Not on file     Stress: Not on file   Relationships     Social connections:     Talks on phone: Not on file     Gets together: Not on file     Attends [employer] service: Not on file     Active member of club or organization: Not on file     Attends meetings of clubs or organizations: Not on file     Relationship status: Not on file     Intimate partner violence:     Fear of current or ex partner: Not on file     Emotionally abused: Not on file     Physically abused: Not on file     Forced sexual activity: Not on file   Other  Topics Concern     Parent/sibling w/ CABG, MI or angioplasty before 65F 55M? Not Asked   Social History Narrative     Not on file           FAMILY HISTORY:   Family History   Problem Relation Age of Onset     Cancer Father       Non-contributory for problems with anesthesia      REVIEW OF SYSTEMS:   The patient was asked a 14 point review of systems regarding constitutional symptoms, eye symptoms, ears, nose, mouth, throat symptoms, cardiovascular symptoms, respiratory symptoms, gastrointestinal symptoms, genitourinary symptoms, musculoskeletal symptoms, integumentary symptoms, neurological symptoms, psychiatric symptoms, endocrine symptoms, hematologic/lymphatic symptoms, and allergic/ immunologic symptoms.   The pertinent factors have been included in the HPI and below.  Patient Supplied Answers to Review of Systems  No flowsheet data found.        Physical Examination     The patient underwent a physical examination as described below. The pertinent positive and negative findings are summarized after the description of the examination.    Constitutional: The patient's developmental and nutritional status was assessed. The patient's voice quality was assessed.  Head and Face: The head and face were inspected for deformities. The sinuses were palpated. The salivary glands were palpated. Facial muscle strength was assessed bilaterally.  Eyes: Extraocular movements and primary gaze alignment were assessed.  Ears, Nose, Mouth and Throat: The ears and nose were examined for deformities. The nasal septum, mucosa, and turbinates were inspected by anterior rhinoscopy. The lips, teeth, and gums were examined for abnormalities. The oral mucosa, tongue, palate, tonsils, lateral and posterior pharynx were inspected for the presence of asymmetry or mucosal lesions.    Neck: The tracheal position was noted, and the neck mass palpated to determine if there were any asymmetries, abnormal neck masses, thyromegally, or thyroid  nodules.  Respiratory: The nature of the breathing and chest expansion/symmetry was observed.  Cardiovascular: The patient was examined to determine the presence of any edema or jugular venous distension.  Abdomen: The contour of the abdomen was noted.  Lymphatic: The patient was examined for infraclavicular lymphadenopathy.  Musculoskeletal: The patient was inspected for the presence of skeletal deformities.  Extremities: The extremities were examined for any clubbing or cyanosis.  Skin: The skin was examined for inflammatory or neoplastic conditions.  Neurologic: The patient's orientation, mood, and affect were noted. The cranial nerve  functions were examined.    Other pertinent positive and negative findings on physical examination:   OC/OP: no lesions, uvula midline, soft palate elevates symmetrically, FOM/BOT soft  Neck: no lesions, no TH tenderness to palpation    All other physical examination findings were within normal limits and noncontributory.    Procedures     Reviewed scope exam done by Tata MORAES  No evidence of recurrence              Review of Relevant Clinical Data     Labs:  Lab Results   Component Value Date    TSH 1.05 2007     Lab Results   Component Value Date     2019    CO2 29 2019    BUN 16 2019    PHOS 2.0 (L) 2016     Lab Results   Component Value Date    WBC 4.6 2019    HGB 12.9 (L) 2019    HCT 41.2 2019    MCV 95 2019     (L) 2019     Lab Results   Component Value Date    PTT 35 2017    INR 0.95 2017     No results found for: ELIZABET  No components found for: RHEUMATOIDFACTOR,  RF  Lab Results   Component Value Date    CRP 0.16 2003     No components found for: CKTOT, URICACID  No components found for: C3, C4, DSDNAAB, NDNAABIFA  No results found for: MPOAB     Impression & Plan     IMPRESSION: Mr. Rasmussen is a 59 year old male who is being seen for the followin. T1 Left vocal fold  SCC s/p resection 9/30/19  - no evidence of recurrence   - return in 1 month for surveillance    2. Dysphonia  - large gap with adduction due to resolution of swelling of swelling on the left side   - continue voice exercises  - discussed again expectation of at least 6 months for voice return   - discussed option of [place] but he is not interested at this pint     3. Dysphagia  - improved swallow today with less pharyngeal residue on evaluation of SLP performed FEES  - discussed reason for thickening liquids    RETURN VISIT: follow up in one month or earlier as needed.     I, Carmine Farnsworth, am serving as a scribe to document services personally performed by Lizzy Wray MD at this visit, based upon the provider's statements to me. All documentation has been reviewed by the aforementioned provider prior to being entered into the official medical record. '    The documentation recorded by the scribe accurately reflects the services I personally performed and the decisions made by me.    Thank you for the kind referral and for allowing me to share in the care of Mr. Rasmussen. If you have any questions, please do not hesitate to contact me.        Lizzy Wray MD    of Otolaryngology - Head and Neck Surgery   Voice, Airway, and Swallowing Disorders   [facility] at the [facility]    Clinics & Surgery Center  [address] [place] [address]  Phone: [phone]  Fax: [phone]    [place] [address]  Phone: [phone]  Fax: [phone]

## 2020-01-09 NOTE — NURSING NOTE
"Chief Complaint   Patient presents with     RECHECK     Swallow check     Height 1.918 m (6' 3.51\"), weight 87.5 kg (193 lb).    Mala Champion, EMT  "

## 2020-01-09 NOTE — PATIENT INSTRUCTIONS
Thank you for choosing  [facility].  Please follow up with Dr. Wray in approximately 1 month or sooner if symptoms worsens or does not improve.    [phone] appointment scheduling option 1 and nurse advice option 3.

## 2020-01-13 NOTE — PROGRESS NOTES
OUTPATIENT SWALLOW  EVALUATION  PLAN OF TREATMENT FOR OUTPATIENT REHABILITATION  (COMPLETE FOR INITIAL CLAIMS ONLY)  Patient's Last Name, First Name, M.I.  YOB: 1960  Kt Rasmussen     Provider's Name   Tata Westfall, SLP   Medical Record No.  [id number]     Start of Care Date:  11/14/19   Onset Date:      Type:     ___PT   ____OT  ___X_SLP Medical Diagnosis:  Dysphagia      Treatment Diagnosis:  Mild oropharyngeal dysphagia  Visits from SOC:  1     _________________________________________________________________________________  Plan of Treatment/Functional Goals:  Planned Therapy Interventions: Dysphagia Treatment  Dysphagia treatment: Instruction of safe swallow strategies, Compensatory strategies for swallowing, Modified diet education, Oropharyngeal exercise training                     Goals   1. Goal Identifier: 1       Goal Description: Pt will tolerate regular textures and thin liquids with <1 coughing episode per day when implementing aspiration precautions and safe swallow strategies        Target Date: 02/12/20           2. Goal Identifier: 2       Goal Description: Pt will complete oropharyngeal strengthening exercises with independence as perscribed by the treating SLP.        Target Date: 02/12/20           Therapy Frequency: other (see comments)(F/u upon return to ENT clinic )  Predicted Duration of Therapy Intervention (days/wks): 3 months     Tata Westfall, SLP       I CERTIFY THE NEED FOR THESE SERVICES FURNISHED UNDER        THIS PLAN OF TREATMENT AND WHILE UNDER MY CARE     (Physician attestation of this document indicates review and certification of the therapy plan).                  Certification date from: 01/09/20 Certification date to: 04/08/20          Referring Physician: Laura MCLEOD     Initial Assessment        See Epic Evaluation Start Of Care Date: 11/14/19                   01/09/20 1130   General Information   Type Of Visit Initial   Start Of Care Date 11/14/19   Referring Physician Laura MCLEOD    Orders Evaluate And Treat   Orders Comment FEES    Medical Diagnosis Dysphagia    Precautions/limitations Swallowing Precautions   Hearing WFL for conversational speech production    Pertinent History of Current Problem/OT: Additional Occupational Profile Info Pt is a 60 y/o male with a PMH significant for stroke, lung CA (2016) s/p chemoradiation, and GERD.  Pt had a T1 excision of the L VF on 9/30/2019 due to a diagnosis of SCCa and was seen for f/u in ENT clinic in conjunction with on's Voice Team on 11/7/2019.  At that time, it was noted that the pt was recovering well but was exhibiting intermittent coughing with thin liquids which prompted a FEES screening procedure.  Pt exhibited L VF concavity with a persistent, large glottal gap.  Pt was found to be at risk for aspiration with penetration observed on thin and nectar thick liquid consistencies and a modified diet of regular textures and honey thick liquids was recommended.  A referral for a full FEES was completed on 11/14/2019 and resulted in the recommendation for a regular texture diet and nectar thick liquids.  Pt was seen in conjunction with his ENT clinic appointment on 12/5/2019; at that time demonstrated ongoing aspiration and penetration with liquids.  It was recommended that pt continue a regular texture diet/nectar thick liquids with chin tuck/head turn as well as oropharyngeal strengthening exercises and f/u with SLP.  Pt subsequently cancelled his f/u SLP appointment and presents this date to ENT clinic.  Pt reported to have completed his swallow exercises 3x over the course of the month and self-liberalized his liquids.  Pt reported to be 'all done' with thickened liquids and swallowing exercises.  Pt reported that he is having no difficulty with swallowing but endorses intermittent coughing and throat  clearing with liquid consistencies.  Dr. Wray in agreement with repeat FEES procedure to assess pharyngeal functioning and risk related to aspiration.     Respiratory Status Room air   Prior Level Of Function Swallowing   Prior Level Of Function Comment Regular textures and thin liquids    Living Environment House/[facility]   General Observations Pt pleasant and cooperative; receptive aphasia noted.    Patient/family Goals Goals not formally stated at the time of evaluation.    Pain Assessment   Pain Reported No   Clinical Swallow Evaluation   Oral Musculature anomalies present  (s/p stroke )   Structural Abnormalities none present   Dentition present and adequate   Mucosal Quality good   Mandibular Strength and Mobility intact   Oral Labial Strength and Mobility impaired coordination;other (see comments)  (Flat nasolabial fold on the R )   Lingual Strength and Mobility impaired coordination;other (see comments)  (Lingual deviation to the R upon protrousion )   Buccal Strength and Mobility intact   Laryngeal Function Swallow  (Voice aphonic, non-productive cough and throat clear )   FEES Evaluation   Additional Documentation Yes   FEES Eval   Signs/Symptoms Noted Aphonic voicing, concern for pharyngeal phase, concern for aspiration    Type of Scope Adult   Nares Entry nostril entered using no topical anesthetic   Nares Passage Scope passed through left nares   Asymmetry of Anatomy upon entry;upon command  (L VF concavity consistent with anatomy s/p L VF excision )   Amount of Secretions No excessive secretions   FEES Eval: Thin Liquid Texture Trial   Mode of Presentation, Thin Liquid cup;self-fed   Pre-spillage Yes   Location of Pre-spillage Superior epiglottis into posterior side   Penetration? Yes   Penetration Occurence During swallow;After swallow  (Assumed )   Location of Penetration   (Evident on cord )   Residuals   (Residue cleared with from pyriforms with second swallow )   Aspiration? Yes   Aspiration  Occurrence During swallow  (Assumed )   Aspiration Location Evident in trachea  (After larger sip via cup)   Patient Response to Aspiration Remains in trachea with attempt to clear   Epiglottic inversion Incomplete   Post swallow residuals Other (see comments);Pyriforms  (Cleared with second swallow )   Residuals in laryngeal vestibule Yes   Location of residuals   (Evidence on R cord and within the laryngeal vestibule )   FEES Eval: Nectar Thick Liquid Texture Trial   Mode of Presentation, Nectar cup;self-fed   Pre-spillage Yes   Location of Pre-spillage Valleculae   Penetration? Yes   Aspiration? Yes   Epiglottic inversion Yes   Post swallow residuals Pyriforms;Valleculae   Residuals in laryngeal vestibule No   Penetration Occurrence During swallow;After swallow  (Assumed )   Aspiration Location Evident in trachea  (Suspect residual from thin liquid trial )   FEES Eval: Puree Solid Texture Trial   Mode of Presentation, Puree spoon;self-fed   Pre-spillage Yes   Location of Pre-spillage Valleculae   Penetration? No   Residuals All residuals cleared with cueing  (Cleared with spontaneous second swallow )   Aspiration? No   Epiglottic inversion Incomplete   Post swallow residuals Pyriforms;Posterior pharygeal  (Mild; cleared with a second swallow )   Residuals in laryngeal vestibule No   FEES Eval: Solid Texture Trial   Mode of Presentation, Solid. self-fed   Pre-spillage No   Penetration? No   Residuals All residuals cleared with cueing  (Residue cleared with a spontaneous second swallow )   Aspiration? No   Epiglottic inversion Incomplete   Post swallow residuals Pyriforms  (Mild; Increased on R vs. L )   Residuals in laryngeal vestibule No   Swallow Compensations   Swallow Compensations Pacing;Reduce amounts;Chin tuck   Educational Assessment   Barriers to Learning Language   General Therapy Interventions   Planned Therapy Interventions Dysphagia Treatment   Dysphagia treatment Instruction of safe swallow  strategies;Compensatory strategies for swallowing;Modified diet education;Oropharyngeal exercise training   Swallow Eval: Clinical Impressions   Skilled Criteria for Therapy Intervention Skilled criteria met.  Treatment indicated.   Functional Assessment Scale (FAS) 5   Treatment Diagnosis Mild oropharyngeal dysphagia    Diet texture recommendations Regular diet;Thin liquids (per pt)   Recommended Feeding/Eating Techniques alternate between small bites and sips of food/liquid;maintain upright posture during/after eating for 30 mins;small sips/bites   Rehab Potential good, to achieve stated therapy goals   Therapy Frequency other (see comments)  (F/u upon return to ENT clinic )   Predicted Duration of Therapy Intervention (days/wks) 3 months    Anticipated Discharge Disposition home   Risks and Benefits of Treatment have been explained. Yes   Patient, family and/or staff in agreement with Plan of Care Yes   Clinical Impression Comments Pt is presenting with mild to moderate oropharyngeal dysphagia characterized by weakness and reduced airway protection in the setting of recent VF excision for SCCa.  Scope was passed uneventfully via L nare with no topical anesthetic applied.  Pt is exhibiting L VF concavity consistent with excision of VF.  Excessive secretions were not observed.  Premature spillage/delayed swallow response observed upon liquid consistencies and pureed items.  Epiglottic inversion was incomplete and the constriction of the pharyngeal wall was reduced.  Penetration observed on thin liquid trials with likely aspiration during the swallow response as evidence of blue dye was in the trachea.  Evidence of blue dye in the trachea was also present after nectar thick liquid trials; however, question if this was residuals from prior thin liquid trial.  Pt had no response to aspiration.  Residue remained in the pyriforms which was increased with nectar consistencies.  Pt cleared residue with a spontaneous  second swallow.  No penetration or aspiration observed on purees or solid textures.  Pt was unable to functionally implement chin tuck or head turn strategies due to baseline language/cognitive impairment.      From an efficiency standpoint, pt is exhibited improvement with less pharyngeal residuals remaining when compared to prior FEES procedures.  From a safety standpoint, pt continues to be at increased risk for aspiration with an open airway and evidence of aspiration.  Pt would benefit from continuing to modify his liquids to thickened consistencies; however, pt is opposed to this recommendation due to displeasure with taste.  Regular textures are recommended.      Pt instructed taking small sips, pacing self, and swallowing x2 with intake.  Pt educated on the risks related to aspiration and rationale for recommendations but is insistent on liberalizing diet and is not interested in returning for dysphagia intervention beyond that received at ENT clinic.  Further instruction provided on completing through oral cares.  Case discussed with treating MD for consideration of a VF injection; ST plans to f/u with pt upon return to ENT clinic in 1 months' time.     Swallow Goals   SLP Swallow Goals 1;2   Swallow Goal 1   Goal Identifier 1   Goal Description Pt will tolerate regular textures and thin liquids with <1 coughing episode per day when implementing aspiration precautions and safe swallow strategies    Target Date 02/12/20   Swallow Goal 2   Goal Identifier 2   Goal Description Pt will complete oropharyngeal strengthening exercises with independence as perscribed by the treating SLP.    Target Date 02/12/20   Total Session Time   SLP Eval: oral/pharyngeal swallow function, clinical minutes (99775) 15   SLP Eval: Flexible Fiberoptic Endoscopic Evaluation of Swallowing by Cine or Video Recording Minutes (25676) 15   Total Evaluation Time 30   Therapy Certification   Certification date from 01/09/20    Certification date to 04/08/20   Medical Diagnosis Dysphagia    Certification I certify the need for these services furnished under this plan of treatment and while under my care.  (Physician co-signature of this document indicates review and certification of the therapy plan).     Thank you for the referral of Kt Rasmussen.  If you have any questions about this report, please contact me using the information below.       Tata Westfall M.S. CCC-SLP  Speech Language Pathologist   [facility]   Department of Otoolaryngology, D&T- 4th Floor / [address]  Phone: [phone]  Pager: [phone]  Email: [email or web address]

## 2020-01-27 ENCOUNTER — TELEPHONE (OUTPATIENT)
Dept: OTOLARYNGOLOGY | Facility: CLINIC | Age: 60
End: 2020-01-27

## 2020-01-27 NOTE — TELEPHONE ENCOUNTER
Left message for guardian Shanna Edwards with update  No evidence of tumor recurrence  Recommend thickener of liquids to prevent pneumonia  Recommend improved dental hygiene

## 2020-02-13 ENCOUNTER — HOSPITAL ENCOUNTER (OUTPATIENT)
Dept: CT IMAGING | Facility: CLINIC | Age: 60
Discharge: HOME | End: 2020-02-13
Attending: NURSE PRACTITIONER | Admitting: NURSE PRACTITIONER
Payer: MEDICARE

## 2020-02-13 ENCOUNTER — TELEPHONE (OUTPATIENT)
Dept: OTOLARYNGOLOGY | Facility: CLINIC | Age: 60
End: 2020-02-13

## 2020-02-13 DIAGNOSIS — C34.90 NON-SMALL CELL CARCINOMA OF LUNG (H): ICD-10-CM

## 2020-02-13 PROCEDURE — 71250 CT THORAX DX C-: CPT

## 2020-02-13 NOTE — TELEPHONE ENCOUNTER
MADHAVI Frausto, CNP, called to inform me that patient's guardian Rima requested her to contact me.  She is unsure to the reasoning.  After reviewing patient's chart and discussion, she informs me that she is patient's primary care provider.  Patient established care with her February 4, 2020.    She provided me her contact information and encouraged Dr. Wray to call if she has any questions or concerns.    Will not update care team till confirming with patient or guardian.  I did not provide any confidential information at this time.    Will route to Dr. Wray to review.    MADHAVI Frausto, CNP  [facility]  [phone]

## 2020-02-20 ENCOUNTER — ONCOLOGY VISIT (OUTPATIENT)
Dept: ONCOLOGY | Facility: CLINIC | Age: 60
End: 2020-02-20
Attending: INTERNAL MEDICINE
Payer: MEDICARE

## 2020-02-20 VITALS
WEIGHT: 193.2 LBS | SYSTOLIC BLOOD PRESSURE: 111 MMHG | BODY MASS INDEX: 23.82 KG/M2 | RESPIRATION RATE: 18 BRPM | OXYGEN SATURATION: 94 % | DIASTOLIC BLOOD PRESSURE: 75 MMHG | TEMPERATURE: 97.6 F | HEART RATE: 88 BPM

## 2020-02-20 DIAGNOSIS — R13.12 OROPHARYNGEAL DYSPHAGIA: ICD-10-CM

## 2020-02-20 DIAGNOSIS — C34.90 NON-SMALL CELL CARCINOMA OF LUNG (H): Primary | ICD-10-CM

## 2020-02-20 DIAGNOSIS — R49.0 DYSPHONIA: ICD-10-CM

## 2020-02-20 DIAGNOSIS — C32.9 LARYNX CANCER (H): ICD-10-CM

## 2020-02-20 PROCEDURE — 99214 OFFICE O/P EST MOD 30 MIN: CPT | Performed by: INTERNAL MEDICINE

## 2020-02-20 PROCEDURE — G0463 HOSPITAL OUTPT CLINIC VISIT: HCPCS

## 2020-02-20 ASSESSMENT — PAIN SCALES - GENERAL: PAINLEVEL: NO PAIN (0)

## 2020-02-20 NOTE — PROGRESS NOTES
[facility]    Hematology/Oncology Established Patient Follow-up Note      Today's Date: 02/20/20    Reason for Follow-up: Lung squamous cell carcinoma.  Transfer of care. Previous care by Dr. Wiliam Gonzalez.    HISTORY OF PRESENT ILLNESS: Kt Rasmussen is a 59 year old male who presents with the following oncologic history:  1. 5/05/2016: Presented with near-obstructing large mass coming off the cheikh and likely the posterior wall, seen on bronchoscopy. Biopsy of the mass showed invasive squamous cell carcinoma, moderately differentiating and focally keratinizing.  Procedure was complicated by significant bleeding.  Tumor was completely debulked (via bronchoscopy) in both main stem bronchi and distal trachea. NGS showed no mutations in EGFR, KRAS, BRAF, NRAS, HRAS, PIK3CA, ERBB2, MET, or JAK2.  2. 5/23/2016: PET/CT scan showed evidence of residual tumor with decreased endobronchial mass. Indeterminate, mildly hypermetabolic mesentery with prominent lymph nodes and area of enhancement of the right hepatic lobe present.   Felt to have T4-NX-M0 disease.  3. 6/09/2016: Started concurrent chemoradiation with weekly paclitaxel and carboplatin.  4. 7/30/2016: Completed radiation.  Subsequently received 2 cycles of consolidation paclitaxel and carboplatin.   5. 9/13/2019: Presented with 6-month history of dysphonia and left vocal exophytic lesion. Left true vocal fold biopsy showed at least squamous cell carcinoma in situ, cannot exclude superficial invasion.  6. 9/30/2019: Excision of left vocal cord mass showed fragments of invasive squamous cell carcinoma, well differentiated and keratinizing.  Margins negative for malignancy.    INTERIM HISTORY:  Kt Rasmussen reports feeling overall relatively well.  He feels his swallowing is reasonable.  He denies any recent fevers, chills, night sweats.  He has no new shortness of breath or chest pain.  His dysphonia has been stable.      REVIEW OF SYSTEMS:   14  point ROS was reviewed and is negative other than as noted above in HPI.       HOME MEDICATIONS:  Current Outpatient Medications   Medication Sig Dispense Refill     atorvastatin (LIPITOR) 40 MG tablet Take 40 mg by mouth daily       folic acid (FOLVITE) 1 MG tablet Take 1 mg by mouth daily       LORazepam (ATIVAN) 0.5 MG tablet Take 0.5 mg by mouth every 4 hours as needed for anxiety       ondansetron (ZOFRAN) 8 MG tablet Take by mouth every 8 hours as needed for nausea       prochlorperazine (COMPAZINE) 10 MG tablet Take 10 mg by mouth every 6 hours as needed for nausea or vomiting           ALLERGIES:  Allergies   Allergen Reactions     No Known Drug Allergies          PAST MEDICAL HISTORY:  Past Medical History:   Diagnosis Date     Alcohol abuse, unspecified      Cerebral infarction (H)     2009, right side residual and aphasia     Dyslipidemia      GERD (gastroesophageal reflux disease)      Lung cancer (H)      Unspecified essential hypertension          PAST SURGICAL HISTORY:  Past Surgical History:   Procedure Laterality Date     BRONCHOSCOPY FLEXIBLE AND RIGID N/A 5/5/2016    Procedure: BRONCHOSCOPY FLEXIBLE AND RIGID;  Surgeon: Tony Talbot MD;  Location: [facility] OR     C NONSPECIFIC PROCEDURE      R tympanoplasty     ESOPHAGOSCOPY FLEXIBLE N/A 9/30/2019    Procedure: flexible esophagoscopy;  Surgeon: Lizzy Johnson MD;  Location: [facility] OR     INJECT STEROID (LOCATION) N/A 9/30/2019    Procedure: steroid injection;  Surgeon: Lizzy Johnson MD;  Location: [facility] OR     LASER CO2 LARYNGOSCOPY N/A 9/30/2019    Procedure: Microdirect laryngoscopy with excision of laryngeal mass, CO2 laser;  Surgeon: Lizzy Johnson MD;  Location: [facility] OR         SOCIAL HISTORY:  Social History     Socioeconomic History     Marital status: Single     Spouse name: Not on file     Number of children: Not on file     Years of education: Not on file     Highest education level: Not on file   Occupational History     Not on  file   Social Needs     Financial resource strain: Not on file     Food insecurity:     Worry: Not on file     Inability: Not on file     Transportation needs:     Medical: Not on file     Non-medical: Not on file   Tobacco Use     Smoking status: Former Smoker     Packs/day: 1.00     Types: Cigarettes     Last attempt to quit: 2009     Years since quitting: 10.4     Smokeless tobacco: Never Used   Substance and Sexual Activity     Alcohol use: Not Currently     Drug use: No     Sexual activity: Not on file   Lifestyle     Physical activity:     Days per week: Not on file     Minutes per session: Not on file     Stress: Not on file   Relationships     Social connections:     Talks on phone: Not on file     Gets together: Not on file     Attends Hoahaoism service: Not on file     Active member of club or organization: Not on file     Attends meetings of clubs or organizations: Not on file     Relationship status: Not on file     Intimate partner violence:     Fear of current or ex partner: Not on file     Emotionally abused: Not on file     Physically abused: Not on file     Forced sexual activity: Not on file   Other Topics Concern     Parent/sibling w/ CABG, MI or angioplasty before 65F 55M? Not Asked   Social History Narrative     Not on file         FAMILY HISTORY:  Family History   Problem Relation Age of Onset     Cancer Father          PHYSICAL EXAM:  Vital signs:  /75   Pulse 88   Temp 97.6  F (36.4  C) (Oral)   Resp 18   Wt 87.6 kg (193 lb 3.2 oz)   SpO2 94%   BMI 23.82 kg/m     ECO  GENERAL/CONSTITUTIONAL: No acute distress.  EYES: Extraocular movements intact.  No scleral icterus.  ENT/MOUTH: Neck supple. Oropharynx clear, no mucositis.  Uvula midline.  Tongue midline.  LYMPH: No cervical, supraclavicular, axillary  adenopathy.   RESPIRATORY: Clear to auscultation bilaterally. No crackles or wheezing.   CARDIOVASCULAR: Regular rate and rhythm without murmurs, gallops, or  rubs.  GASTROINTESTINAL: No hepatosplenomegaly, masses, or tenderness. No guarding.  No distention.  MUSCULOSKELETAL: Warm and well-perfused, no cyanosis, clubbing, or edema.  NEUROLOGIC: Cranial nerves II-XII are groslly intact. Alert, oriented, answers questions appropriately. No  strength on right. Decrease upper and lower extremity strength on right.  INTEGUMENTARY: No rashes or jaundice.  GAIT: Limp to right; uses cane.      LABS:  CBC RESULTS:   Recent Labs   Lab Test 19  1552   WBC 4.6   RBC 4.33*   HGB 12.9*   HCT 41.2   MCV 95   MCH 29.8   MCHC 31.3*   RDW 13.3   *       Recent Labs   Lab Test 19  1552 16  1310    141   POTASSIUM 4.5 3.9   CHLORIDE 108 107   CO2 29 27   ANIONGAP 2* 7   * 96   BUN 16 13   CR 0.74 0.66   BEVERLY 9.0 8.5         PATHOLOGY:  Reviewed as per HPI.    IMAGIN2020: Chest CT without contrast showed posttreatment changes in the paramediastinal lungs, no new pulmonary nodules or lymphadenopathy.    ASSESSMENT/PLAN:  Kt Rasmussen is a 59 year old male with the following issues:  1. 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  -I recommended a repeat chest CT scan in 6 months.    2. Left vocal cord squamous cell carcinoma, T1 lesion  3. Dysphonia  4. Dysphagia  -Kt has undergone excision of a left vocal cord SCC and has persistent dysphonia as a result of the lesion and excision.  He also has dysphagia but this is stable and swallowing is manageable.  -Last scope by ENT 2020 showed no evidence for recurrence.  -He will continue follow-up with ENT for ongoing surveillance.    Return in 6 months.    Sangita John MD  Hematology/Oncology  [facility]    I spent a total of 25 minutes with the patient, with greater than 50% of the time in counseling and coordination of care.

## 2020-02-20 NOTE — LETTER
2/20/2020         RE: Kt Rasmussen  [id number] [facility] [address]        Dear Colleague,    Thank you for referring your patient, Kt Rasmussen, to the [facility]. Please see a copy of my visit note below.    [facility]    Hematology/Oncology Established Patient Follow-up Note      Today's Date: 02/20/20    Reason for Follow-up: Lung squamous cell carcinoma.  Transfer of care. Previous care by Dr. Wiliam Gonzalez.    HISTORY OF PRESENT ILLNESS: Kt Rasmussen is a 59 year old male who presents with the following oncologic history:  1. 5/05/2016: Presented with near-obstructing large mass coming off the cheikh and likely the posterior wall, seen on bronchoscopy. Biopsy of the mass showed invasive squamous cell carcinoma, moderately differentiating and focally keratinizing.  Procedure was complicated by significant bleeding.  Tumor was completely debulked (via bronchoscopy) in both main stem bronchi and distal trachea. NGS showed no mutations in EGFR, KRAS, BRAF, NRAS, HRAS, PIK3CA, ERBB2, MET, or JAK2.  2. 5/23/2016: PET/CT scan showed evidence of residual tumor with decreased endobronchial mass. Indeterminate, mildly hypermetabolic mesentery with prominent lymph nodes and area of enhancement of the right hepatic lobe present.   Felt to have T4-NX-M0 disease.  3. 6/09/2016: Started concurrent chemoradiation with weekly paclitaxel and carboplatin.  4. 7/30/2016: Completed radiation.  Subsequently received 2 cycles of consolidation paclitaxel and carboplatin.   5. 9/13/2019: Presented with 6-month history of dysphonia and left vocal exophytic lesion. Left true vocal fold biopsy showed at least squamous cell carcinoma in situ, cannot exclude superficial invasion.  6. 9/30/2019: Excision of left vocal cord mass showed fragments of invasive squamous cell carcinoma, well differentiated and keratinizing.  Margins negative for malignancy.    INTERIM HISTORY:  Kt Rasmussen reports  feeling overall relatively well.  He feels his swallowing is reasonable.  He denies any recent fevers, chills, night sweats.  He has no new shortness of breath or chest pain.  His dysphonia has been stable.      REVIEW OF SYSTEMS:   14 point ROS was reviewed and is negative other than as noted above in HPI.       HOME MEDICATIONS:  Current Outpatient Medications   Medication Sig Dispense Refill     atorvastatin (LIPITOR) 40 MG tablet Take 40 mg by mouth daily       folic acid (FOLVITE) 1 MG tablet Take 1 mg by mouth daily       LORazepam (ATIVAN) 0.5 MG tablet Take 0.5 mg by mouth every 4 hours as needed for anxiety       ondansetron (ZOFRAN) 8 MG tablet Take by mouth every 8 hours as needed for nausea       prochlorperazine (COMPAZINE) 10 MG tablet Take 10 mg by mouth every 6 hours as needed for nausea or vomiting           ALLERGIES:  Allergies   Allergen Reactions     No Known Drug Allergies          PAST MEDICAL HISTORY:  Past Medical History:   Diagnosis Date     Alcohol abuse, unspecified      Cerebral infarction (H)     2009, right side residual and aphasia     Dyslipidemia      GERD (gastroesophageal reflux disease)      Lung cancer (H)      Unspecified essential hypertension          PAST SURGICAL HISTORY:  Past Surgical History:   Procedure Laterality Date     BRONCHOSCOPY FLEXIBLE AND RIGID N/A 5/5/2016    Procedure: BRONCHOSCOPY FLEXIBLE AND RIGID;  Surgeon: Tony Talbot MD;  Location: [facility]     C NONSPECIFIC PROCEDURE      R tympanoplasty     ESOPHAGOSCOPY FLEXIBLE N/A 9/30/2019    Procedure: flexible esophagoscopy;  Surgeon: Lizzy Johnson MD;  Location:  OR     INJECT STEROID (LOCATION) N/A 9/30/2019    Procedure: steroid injection;  Surgeon: Lizzy Johnson MD;  Location:  OR     LASER CO2 LARYNGOSCOPY N/A 9/30/2019    Procedure: Microdirect laryngoscopy with excision of laryngeal mass, CO2 laser;  Surgeon: Lizzy Johnson MD;  Location:  OR         SOCIAL HISTORY:  Social  History     Socioeconomic History     Marital status: Single     Spouse name: Not on file     Number of children: Not on file     Years of education: Not on file     Highest education level: Not on file   Occupational History     Not on file   Social Needs     Financial resource strain: Not on file     Food insecurity:     Worry: Not on file     Inability: Not on file     Transportation needs:     Medical: Not on file     Non-medical: Not on file   Tobacco Use     Smoking status: Former Smoker     Packs/day: 1.00     Types: Cigarettes     Last attempt to quit: 2009     Years since quitting: 10.4     Smokeless tobacco: Never Used   Substance and Sexual Activity     Alcohol use: Not Currently     Drug use: No     Sexual activity: Not on file   Lifestyle     Physical activity:     Days per week: Not on file     Minutes per session: Not on file     Stress: Not on file   Relationships     Social connections:     Talks on phone: Not on file     Gets together: Not on file     Attends Restorationist service: Not on file     Active member of club or organization: Not on file     Attends meetings of clubs or organizations: Not on file     Relationship status: Not on file     Intimate partner violence:     Fear of current or ex partner: Not on file     Emotionally abused: Not on file     Physically abused: Not on file     Forced sexual activity: Not on file   Other Topics Concern     Parent/sibling w/ CABG, MI or angioplasty before 65F 55M? Not Asked   Social History Narrative     Not on file         FAMILY HISTORY:  Family History   Problem Relation Age of Onset     Cancer Father          PHYSICAL EXAM:  Vital signs:  /75   Pulse 88   Temp 97.6  F (36.4  C) (Oral)   Resp 18   Wt 87.6 kg (193 lb 3.2 oz)   SpO2 94%   BMI 23.82 kg/m      ECO  GENERAL/CONSTITUTIONAL: No acute distress.  EYES: Extraocular movements intact.  No scleral icterus.  ENT/MOUTH: Neck supple. Oropharynx clear, no mucositis.  Uvula midline.   Tongue midline.  LYMPH: No cervical, supraclavicular, axillary  adenopathy.   RESPIRATORY: Clear to auscultation bilaterally. No crackles or wheezing.   CARDIOVASCULAR: Regular rate and rhythm without murmurs, gallops, or rubs.  GASTROINTESTINAL: No hepatosplenomegaly, masses, or tenderness. No guarding.  No distention.  MUSCULOSKELETAL: Warm and well-perfused, no cyanosis, clubbing, or edema.  NEUROLOGIC: Cranial nerves II-XII are groslly intact. Alert, oriented, answers questions appropriately. No  strength on right. Decrease upper and lower extremity strength on right.  INTEGUMENTARY: No rashes or jaundice.  GAIT: Limp to right; uses cane.      LABS:  CBC RESULTS:   Recent Labs   Lab Test 19  1552   WBC 4.6   RBC 4.33*   HGB 12.9*   HCT 41.2   MCV 95   MCH 29.8   MCHC 31.3*   RDW 13.3   *       Recent Labs   Lab Test 19  1552 16  1310    141   POTASSIUM 4.5 3.9   CHLORIDE 108 107   CO2 29 27   ANIONGAP 2* 7   * 96   BUN 16 13   CR 0.74 0.66   BEVERLY 9.0 8.5         PATHOLOGY:  Reviewed as per HPI.    IMAGIN2020: Chest CT without contrast showed posttreatment changes in the paramediastinal lungs, no new pulmonary nodules or lymphadenopathy.    ASSESSMENT/PLAN:  Kt Rasmussen is a 59 year old male with the following issues:  1. 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  -I recommended a repeat chest CT scan in 6 months.    2. Left vocal cord squamous cell carcinoma, T1 lesion  3. Dysphonia  4. Dysphagia  -Kt has undergone excision of a left vocal cord SCC and has persistent dysphonia as a result of the lesion and excision.  He also has dysphagia but this is stable and swallowing is manageable.  -Last  "scope by ENT 1/09/2020 showed no evidence for recurrence.  -He will continue follow-up with ENT for ongoing surveillance.    Return in 6 months.    Sangita John MD  Hematology/Oncology  [facility]    I spent a total of 25 minutes with the patient, with greater than 50% of the time in counseling and coordination of care.    Oncology Rooming Note    February 20, 2020 1:59 PM   Kt Rasmussen is a 59 year old male who presents for:    Chief Complaint   Patient presents with     Oncology Clinic Visit     Initial Vitals: There were no vitals taken for this visit. Estimated body mass index is 23.8 kg/m  as calculated from the following:    Height as of 1/9/20: 1.918 m (6' 3.51\").    Weight as of 1/9/20: 87.5 kg (193 lb). There is no height or weight on file to calculate BSA.  Data Unavailable Comment: Data Unavailable   No LMP for male patient.  Allergies reviewed: Yes  Medications reviewed: Yes    Medications: Medication refills not needed today.  [place]arm[place] into EPIC[place] [address] [facility] [place], [place] - [address] [address]    Clinical concerns: no       Shari J. Schoenberger, [facility]              Again, thank you for allowing me to participate in the care of your patient.        Sincerely,        Sangita John MD    "

## 2020-02-20 NOTE — PROGRESS NOTES
"Oncology Rooming Note    February 20, 2020 1:59 PM   Kt Rasmussen is a 59 year old male who presents for:    Chief Complaint   Patient presents with     Oncology Clinic Visit     Initial Vitals: There were no vitals taken for this visit. Estimated body mass index is 23.8 kg/m  as calculated from the following:    Height as of 1/9/20: 1.918 m (6' 3.51\").    Weight as of 1/9/20: 87.5 kg (193 lb). There is no height or weight on file to calculate BSA.  Data Unavailable Comment: Data Unavailable   No LMP for male patient.  Allergies reviewed: Yes  Medications reviewed: Yes    Medications: Medication refills not needed today.  Pharmacy name entered into EPIC:    [facility] [place] [facility] [address] [facility], [place] - [address] [address]    Clinical concerns: no       Shari J. Schoenberger, CMA            "

## 2020-02-27 ENCOUNTER — TELEPHONE (OUTPATIENT)
Dept: OTOLARYNGOLOGY | Facility: CLINIC | Age: 60
End: 2020-02-27

## 2020-02-27 NOTE — TELEPHONE ENCOUNTER
[facility] Call Center    Phone Message    May a detailed message be left on voicemail: yes     Reason for Call: Other: Ari called back again stating he'd like any Wed or Thursday.  He is also not able to take pt the week of March 9th thru the 13th.  If anything he can come next week or after he gets back.     Action Taken: Message routed to:  [facility] Ent    Travel Screening: Not Applicable

## 2020-02-27 NOTE — TELEPHONE ENCOUNTER
[facility] Call Center    Phone Message    May a detailed message be left on voicemail: yes     Reason for Call: Other: Ari, patient's transporation, called to reschedule patient's two appts of 3/20/20 to late morning or early afternoon.  (Writer was unsure due to time of 2nd appt - confusing.)  Please follow up with Ari at [phone] (cell) or [phone] (home).  Thank you!     Action Taken: Message routed to:  [facility]    Travel Screening: Not Applicable

## 2020-02-27 NOTE — TELEPHONE ENCOUNTER
Called Rima back.    Informed her that, unfortunately, Dr. Wray does not have availability those days. Scheduled patient for 4/20 with Dr. Wray and Manjula. She stated understanding.

## 2020-02-27 NOTE — TELEPHONE ENCOUNTER
"Message from Augusto RN: \"I received a voice message from di Abarca requesting a call back to re-schedule appointment.   Could you please contact patient's guardian to offer/reschedule patient's appointment?\"    Called and spoke with Rima. Offered 3/20 afternoon. She stated that they could make it in on morning of 3/5 or 3/19. Informed her that I will reach out to Dr. Wray's clinic team to see if she can see patient in am on one of these days. She stated understanding.    Message routed to Augusto Medeiros to see if Dr. Wray can make 3/5 or 3/19 am work.      "

## 2020-03-03 ENCOUNTER — DOCUMENTATION ONLY (OUTPATIENT)
Dept: CARE COORDINATION | Facility: CLINIC | Age: 60
End: 2020-03-03

## 2020-04-22 ENCOUNTER — TELEPHONE (OUTPATIENT)
Dept: OTOLARYNGOLOGY | Facility: CLINIC | Age: 60
End: 2020-04-22

## 2020-04-22 NOTE — TELEPHONE ENCOUNTER
Attempted to call guardian regarding the need to reschedule pts appointment, unable to leave a message as mailbox was full.

## 2020-07-03 ENCOUNTER — OFFICE VISIT (OUTPATIENT)
Dept: OTOLARYNGOLOGY | Facility: CLINIC | Age: 60
End: 2020-07-03
Payer: MEDICARE

## 2020-07-03 VITALS
HEIGHT: 76 IN | OXYGEN SATURATION: 98 % | HEART RATE: 69 BPM | WEIGHT: 185 LBS | TEMPERATURE: 98.1 F | BODY MASS INDEX: 22.53 KG/M2

## 2020-07-03 DIAGNOSIS — J38.3 GLOTTIC INSUFFICIENCY: ICD-10-CM

## 2020-07-03 DIAGNOSIS — R13.14 PHARYNGOESOPHAGEAL DYSPHAGIA: ICD-10-CM

## 2020-07-03 DIAGNOSIS — Z85.21 HX OF LARYNGEAL CANCER: ICD-10-CM

## 2020-07-03 DIAGNOSIS — R49.0 DYSPHONIA: Primary | ICD-10-CM

## 2020-07-03 ASSESSMENT — PAIN SCALES - GENERAL: PAINLEVEL: NO PAIN (0)

## 2020-07-03 ASSESSMENT — MIFFLIN-ST. JEOR: SCORE: 1742.71

## 2020-07-03 NOTE — LETTER
7/3/2020       RE: Kt Rasmussen  [id number]  [facility] [address]     Dear Colleague,    Thank you for referring your patient, Kt Rasmussen, to the [facility] EAR NOSE AND THROAT at [facility]. Please see a copy of my visit note below.              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 The following visit was on 19 at which time he had no evidence of disease and continued dysphonia and dysphagia.  The next visit was on 20 and he reports persistent dysphonia. He is able to manage and it does not bother him. He had no evidence of recurrence. Recommended to continue thickened liquid diet.       Today he reports he is doing well  His voice is stable  He has no new pain  He denies dysphagia. He states he is not using the thickener for his liquid. Denies PNA.  Dyspnea: Patient denies dyspnea. This is stable        PAST MEDICAL HISTORY:   Past Medical History:   Diagnosis Date     Alcohol abuse, unspecified      Cerebral infarction (H)     , right side residual and aphasia     Dyslipidemia      GERD (gastroesophageal reflux disease)      Lung cancer (H)      Unspecified essential hypertension        PAST SURGICAL HISTORY:   Past Surgical History:   Procedure Laterality Date     BRONCHOSCOPY FLEXIBLE AND RIGID N/A 2016    Procedure: BRONCHOSCOPY FLEXIBLE AND RIGID;  Surgeon: Tony Talbot MD;  Location: [facility] OR     C NONSPECIFIC PROCEDURE      R tympanoplasty     ESOPHAGOSCOPY FLEXIBLE N/A 2019    Procedure: flexible esophagoscopy;   Surgeon: Lizzy Johnson MD;  Location: [facility] OR     INJECT STEROID (LOCATION) N/A 9/30/2019    Procedure: steroid injection;  Surgeon: Lizzy Johnson MD;  Location: [facility] OR     LASER CO2 LARYNGOSCOPY N/A 9/30/2019    Procedure: Microdirect laryngoscopy with excision of laryngeal mass, CO2 laser;  Surgeon: Lizzy Johnson MD;  Location: [facility] OR       CURRENT MEDICATIONS:   Current Outpatient Medications:      atorvastatin (LIPITOR) 40 MG tablet, Take 40 mg by mouth daily, Disp: , Rfl:      folic acid (FOLVITE) 1 MG tablet, Take 1 mg by mouth daily, Disp: , Rfl:      LORazepam (ATIVAN) 0.5 MG tablet, Take 0.5 mg by mouth every 4 hours as needed for anxiety, Disp: , Rfl:      ondansetron (ZOFRAN) 8 MG tablet, Take by mouth every 8 hours as needed for nausea, Disp: , Rfl:      prochlorperazine (COMPAZINE) 10 MG tablet, Take 10 mg by mouth every 6 hours as needed for nausea or vomiting, Disp: , Rfl:     ALLERGIES: No known drug allergies    SOCIAL HISTORY:    Social History     Socioeconomic History     Marital status: Single     Spouse name: Not on file     Number of children: Not on file     Years of education: Not on file     Highest education level: Not on file   Occupational History     Not on file   Social Needs     Financial resource strain: Not on file     Food insecurity     Worry: Not on file     Inability: Not on file     Transportation needs     Medical: Not on file     Non-medical: Not on file   Tobacco Use     Smoking status: Former Smoker     Packs/day: 1.00     Types: Cigarettes     Last attempt to quit: 9/25/2009     Years since quitting: 10.7     Smokeless tobacco: Never Used   Substance and Sexual Activity     Alcohol use: Not Currently     Drug use: No     Sexual activity: Not on file   Lifestyle     Physical activity     Days per week: Not on file     Minutes per session: Not on file     Stress: Not on file   Relationships     Social connections     Talks on phone: Not on file     Gets together: Not  on file     Attends Jain service: Not on file     Active member of club or organization: Not on file     Attends meetings of clubs or organizations: Not on file     Relationship status: Not on file     Intimate partner violence     Fear of current or ex partner: Not on file     Emotionally abused: Not on file     Physically abused: Not on file     Forced sexual activity: Not on file   Other Topics Concern     Parent/sibling w/ CABG, MI or angioplasty before 65F 55M? Not Asked   Social History Narrative     Not on file         FAMILY HISTORY:   Family History   Problem Relation Age of Onset     Cancer Father       Non-contributory for problems with anesthesia    REVIEW OF SYSTEMS:   The patient was asked a 14 point review of systems regarding constitutional symptoms, eye symptoms, ears, nose, mouth, throat symptoms, cardiovascular symptoms, respiratory symptoms, gastrointestinal symptoms, genitourinary symptoms, musculoskeletal symptoms, integumentary symptoms, neurological symptoms, psychiatric symptoms, endocrine symptoms, hematologic/lymphatic symptoms, and allergic/ immunologic symptoms.   The pertinent factors have been included in the HPI and below.  Patient Supplied Answers to Review of Systems  No flowsheet data found.    Physical Examination   The patient underwent a physical examination as described below. The pertinent positive and negative findings are summarized after the description of the examination.  Constitutional: The patient's developmental and nutritional status was assessed. The patient's voice quality was assessed.  Head and Face: The head and face were inspected for deformities. The sinuses were palpated. The salivary glands were palpated. Facial muscle strength was assessed bilaterally.  Eyes: Extraocular movements and primary gaze alignment were assessed.  Ears, Nose, Mouth and Throat: The ears and nose were examined for deformities. The nasal septum, mucosa, and turbinates were  inspected by anterior rhinoscopy. The lips, teeth, and gums were examined for abnormalities. The oral mucosa, tongue, palate, tonsils, lateral and posterior pharynx were inspected for the presence of asymmetry or mucosal lesions.    Neck: The tracheal position was noted, and the neck mass palpated to determine if there were any asymmetries, abnormal neck masses, thyromegally, or thyroid nodules.  Respiratory: The nature of the breathing and chest expansion/symmetry was observed.  Cardiovascular: The patient was examined to determine the presence of any edema or jugular venous distension.  Abdomen: The contour of the abdomen was noted.  Lymphatic: The patient was examined for infraclavicular lymphadenopathy.  Musculoskeletal: The patient was inspected for the presence of skeletal deformities.  Extremities: The extremities were examined for any clubbing or cyanosis.  Skin: The skin was examined for inflammatory or neoplastic conditions.  Neurologic: The patient's orientation, mood, and affect were noted. The cranial nerve  functions were examined.  Other pertinent positive and negative findings on physical examination:   OC/OP: no lesions, uvula midline, soft palate elevates symmetrically, FOM/BOT soft  Neck: no lesions, no TH tenderness to palpation  Uses a cane  Has right hand weakness  All other physical examination findings were within normal limits and noncontributory.    Procedures   Flexible laryngoscopy (CPT 85151)      Pre-procedure diagnosis: history of left vocal fold scc  Post-procedure diagnosis: same as above  Indication for procedure: Mr. Rasmussen is a 59 year old male with see above  Procedure(s): Fiberoptic Laryngoscopy    Details of Procedure: After informed consent was obtained, the patient was seated in the examination chair.  The areas of the nasopharynx as well as the hypopharynx were anesthetized with topical 4% lidocaine with 0.25% phenylephrine atomizer.  Examination of the base of tongue was  performed first.  Attention was directed to any evidence of masses in the area or evidence of leukoplakia or candidal infection.  Attention was directed to the epiglottis where its size and position was determined and its movement on phonation of the vowel  e .  The piriform sinuses were then inspected for any mass lesions or pooling of secretions.  Attention was then directed to the larynx. The vocal folds were inspected for infection or any areas of leukoplakia, for masses, polypoid degeneration, or hemorrhage.  Having done this, the arytenoids and vocal processes were inspected for erythema or evidence of granuloma formation.  The posterior commissure was then inspected for evidence of inflammatory changes in the mucosa and heaping up of mucosal tissue. The patient was then instructed to say the vowel  e .  Adduction of vocal folds to the midline was observed for any evidence of paresis or paralysis of the larynx or asymmetry in rotation of the larynx to the left or right. The patient was asked to breathe and the degree of abduction was noted bilaterally.  Subglottic view of the larynx was obtained for any additional mass lesions or mucosal changes.  Finally the post cricoid was examined for evidence of pooling of secretions, as well as the pharyngeal wall mucosa.   Anesthesia type: 0.25% phenylephrine    Findings:  Anatomic/physiological deviations: left vocal fold scar, no recurrence, filled in more inferiorly though still with glottic gap   Right vocal process: No restriction of mobility   Left vocal process: No restriction of mobility  Glottal gap: Glottal gap  Supraglottic structures: Normal  Hypopharynx: Normal     Estimated Blood Loss: minimal  Complications: None  Disposition: Patient tolerated the procedure well              Fiberoptic Endoscopic Evaluation of Swallowing (CPT 69945)  and Interpretation of Swallowing (CPT 26675)    Indications: See above notes for complete history and physical. Patient  complains of dysphagia to liquids and/or there is suggestion on history and endoscopic exam of the presence of dysphagia causing medical complaints.  Swallowing evaluation is being performed to assess the presence and degree of dysphagia, and to recommend a safe diet.     Pulmonary Status:  No PNA   Current Diet:              regular                                        thin liquids      Consistency Amounts:  Thin Liquid: sip   Puree: n/a  Solid: n/a            Positioning: upright in a chair  Oral Peripheral Exam: See physical exam section.  Anatomic Notes: See Videostroboscopy report for assessment of anatomy and laryngeal functioning  Pharyngeal secretions prior to administration of liquid or food: No  Oral Phase Abnormal Findings: No abnormal behavior observed  Behavioral Adaptations: No abnormal behavior observed  Pharyngeal Phase Abnormal Findings: aspiration with thin liquids without cough    Recommended Diet:  regular                                        thick liquids         Review of Relevant Clinical Data     Labs:  Lab Results   Component Value Date    TSH 1.05 11/13/2007     Lab Results   Component Value Date     09/25/2019    CO2 29 09/25/2019    BUN 16 09/25/2019    PHOS 2.0 (L) 05/04/2016     Lab Results   Component Value Date    WBC 4.6 09/25/2019    HGB 12.9 (L) 09/25/2019    HCT 41.2 09/25/2019    MCV 95 09/25/2019     (L) 09/25/2019     Lab Results   Component Value Date    PTT 35 07/26/2017    INR 0.95 07/26/2017     No results found for: ELIZABET  No components found for: RHEUMATOIDFACTOR,  RF  Lab Results   Component Value Date    CRP 0.16 07/24/2003     No components found for: CKTOT, URICACID  No components found for: C3, C4, DSDNAAB, NDNAABIFA  No results found for: MPOAB    Patient reported Quality of Life (QOL) Measures   Patient Supplied Answers To VHI Questionnaire  Voice Handicap Index (VHI-10) 12/5/2019   My voice makes it difficult for people to hear me 0   People have  "difficulty understanding me in a noisy room 0   My voice difficulties restrict my personal and social life.  0   I feel left out of conversations because of my voice 0   My voice problem causes me to lose income 0   I feel as though I have to strain to produce voice 0   The clarity of my voice is unpredictable 0   My voice problem upsets me 0   My voice makes me feel handicapped 0   People ask, \"What's wrong with your voice?\" 0   VHI-10 0         Patient Supplied Answers To EAT Questionnaire  Eating Assessment Tool (EAT-10) 2019   My swallowing problem has caused me to lose weight 0   My swallowing problem interferes with my ability to go out for meals 0   Swallowing liquids takes extra effort 0   Swallowing solids takes extra effort 0   Swallowing pills takes extra effort 0   Swallowing is painful 0   The pleasure of eating is affected by my swallowing 0   When I swallow food sticks in my throat 0   I cough when I eat 0   Swallowing is stressful 0   EAT-10 0         Impression & Plan     IMPRESSION: Mr. Rasmussen is a 59 year old male who is being seen for the followin. T1 Left vocal fold SCC  -  s/p endoscopic CO2 laser resection 19  - no evidence of recurrence   Plan  - observation  - return in 6 weeks for office exam      2. 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 Dysphagia  - improved swallow today with less pharyngeal residue on evaluation of SLP performed FEES on 20 though continued recommendation of thickened liquids  - today screening FEES with liquids shows again aspiration  - he reports no PNAs, though he also states he is not thickening his liquids  - can consider IL for closure of glottic gap though unclear if much benefit with aspiration given he has no laryngeal sensation  - discussed risk of " aspiration and pneumonia   Plan  - discussed with Shanna - she will talk to group home about chin tuck vs thickening liquids  - discuss with SLP regarding IL benefit       RETURN VISIT: office visit without SLP in 6 weeks.    Lizzy Wray MD    Laryngology    [facility]  Department of  Otolaryngology - Head and Neck Surgery    [facility]  [address] [place] [id number]  Appointment line: [phone]  Fax: [phone]  [place] [id number]  Appointment line: [phone]  Fax: [phone]       Again, thank you for allowing me to participate in the care of your patient.      Sincerely,    Lizzy Wray MD

## 2020-07-03 NOTE — PROGRESS NOTES
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 The following visit was on 19 at which time he had no evidence of disease and continued dysphonia and dysphagia.  The next visit was on 20 and he reports persistent dysphonia. He is able to manage and it does not bother him. He had no evidence of recurrence. Recommended to continue thickened liquid diet.       Today he reports he is doing well  His voice is stable  He has no new pain  He denies dysphagia. He states he is not using the thickener for his liquid. Denies PNA.  Dyspnea: Patient denies dyspnea. This is stable        PAST MEDICAL HISTORY:   Past Medical History:   Diagnosis Date     Alcohol abuse, unspecified      Cerebral infarction (H)     , right side residual and aphasia     Dyslipidemia      GERD (gastroesophageal reflux disease)      Lung cancer (H)      Unspecified essential hypertension        PAST SURGICAL HISTORY:   Past Surgical History:   Procedure Laterality Date     BRONCHOSCOPY FLEXIBLE AND RIGID N/A 2016    Procedure: BRONCHOSCOPY FLEXIBLE AND RIGID;  Surgeon: Tony Talbot MD;  Location: [facility] OR     C NONSPECIFIC PROCEDURE      R tympanoplasty     ESOPHAGOSCOPY FLEXIBLE N/A 2019    Procedure: flexible esophagoscopy;  Surgeon: Lizzy Johnson MD;  Location:  OR     INJECT STEROID (LOCATION) N/A 2019    Procedure: steroid injection;  Surgeon: Lizzy Johnson MD;  Location: [facility] OR     LASER CO2 LARYNGOSCOPY N/A 2019    Procedure: Microdirect laryngoscopy with excision of laryngeal mass,  CO2 laser;  Surgeon: Lizzy Johnson MD;  Location: [facility] OR       CURRENT MEDICATIONS:   Current Outpatient Medications:      atorvastatin (LIPITOR) 40 MG tablet, Take 40 mg by mouth daily, Disp: , Rfl:      folic acid (FOLVITE) 1 MG tablet, Take 1 mg by mouth daily, Disp: , Rfl:      LORazepam (ATIVAN) 0.5 MG tablet, Take 0.5 mg by mouth every 4 hours as needed for anxiety, Disp: , Rfl:      ondansetron (ZOFRAN) 8 MG tablet, Take by mouth every 8 hours as needed for nausea, Disp: , Rfl:      prochlorperazine (COMPAZINE) 10 MG tablet, Take 10 mg by mouth every 6 hours as needed for nausea or vomiting, Disp: , Rfl:     ALLERGIES: No known drug allergies    SOCIAL HISTORY:    Social History     Socioeconomic History     Marital status: Single     Spouse name: Not on file     Number of children: Not on file     Years of education: Not on file     Highest education level: Not on file   Occupational History     Not on file   Social Needs     Financial resource strain: Not on file     Food insecurity     Worry: Not on file     Inability: Not on file     Transportation needs     Medical: Not on file     Non-medical: Not on file   Tobacco Use     Smoking status: Former Smoker     Packs/day: 1.00     Types: Cigarettes     Last attempt to quit: 9/25/2009     Years since quitting: 10.7     Smokeless tobacco: Never Used   Substance and Sexual Activity     Alcohol use: Not Currently     Drug use: No     Sexual activity: Not on file   Lifestyle     Physical activity     Days per week: Not on file     Minutes per session: Not on file     Stress: Not on file   Relationships     Social connections     Talks on phone: Not on file     Gets together: Not on file     Attends [employer] service: Not on file     Active member of club or organization: Not on file     Attends meetings of clubs or organizations: Not on file     Relationship status: Not on file     Intimate partner violence     Fear of current or ex partner: Not on file      Emotionally abused: Not on file     Physically abused: Not on file     Forced sexual activity: Not on file   Other Topics Concern     Parent/sibling w/ CABG, MI or angioplasty before 65F 55M? Not Asked   Social History Narrative     Not on file         FAMILY HISTORY:   Family History   Problem Relation Age of Onset     Cancer Father       Non-contributory for problems with anesthesia    REVIEW OF SYSTEMS:   The patient was asked a 14 point review of systems regarding constitutional symptoms, eye symptoms, ears, nose, mouth, throat symptoms, cardiovascular symptoms, respiratory symptoms, gastrointestinal symptoms, genitourinary symptoms, musculoskeletal symptoms, integumentary symptoms, neurological symptoms, psychiatric symptoms, endocrine symptoms, hematologic/lymphatic symptoms, and allergic/ immunologic symptoms.   The pertinent factors have been included in the HPI and below.  Patient Supplied Answers to Review of Systems  No flowsheet data found.    Physical Examination   The patient underwent a physical examination as described below. The pertinent positive and negative findings are summarized after the description of the examination.  Constitutional: The patient's developmental and nutritional status was assessed. The patient's voice quality was assessed.  Head and Face: The head and face were inspected for deformities. The sinuses were palpated. The salivary glands were palpated. Facial muscle strength was assessed bilaterally.  Eyes: Extraocular movements and primary gaze alignment were assessed.  Ears, Nose, Mouth and Throat: The ears and nose were examined for deformities. The nasal septum, mucosa, and turbinates were inspected by anterior rhinoscopy. The lips, teeth, and gums were examined for abnormalities. The oral mucosa, tongue, palate, tonsils, lateral and posterior pharynx were inspected for the presence of asymmetry or mucosal lesions.    Neck: The tracheal position was noted, and the neck mass  palpated to determine if there were any asymmetries, abnormal neck masses, thyromegally, or thyroid nodules.  Respiratory: The nature of the breathing and chest expansion/symmetry was observed.  Cardiovascular: The patient was examined to determine the presence of any edema or jugular venous distension.  Abdomen: The contour of the abdomen was noted.  Lymphatic: The patient was examined for infraclavicular lymphadenopathy.  Musculoskeletal: The patient was inspected for the presence of skeletal deformities.  Extremities: The extremities were examined for any clubbing or cyanosis.  Skin: The skin was examined for inflammatory or neoplastic conditions.  Neurologic: The patient's orientation, mood, and affect were noted. The cranial nerve  functions were examined.  Other pertinent positive and negative findings on physical examination:   OC/OP: no lesions, uvula midline, soft palate elevates symmetrically, FOM/BOT soft  Neck: no lesions, no TH tenderness to palpation  Uses a cane  Has right hand weakness  All other physical examination findings were within normal limits and noncontributory.    Procedures   Flexible laryngoscopy (CPT 89366)      Pre-procedure diagnosis: history of left vocal fold scc  Post-procedure diagnosis: same as above  Indication for procedure: Mr. Rasmussen is a 59 year old male with see above  Procedure(s): Fiberoptic Laryngoscopy    Details of Procedure: After informed consent was obtained, the patient was seated in the examination chair.  The areas of the nasopharynx as well as the hypopharynx were anesthetized with topical 4% lidocaine with 0.25% phenylephrine atomizer.  Examination of the base of tongue was performed first.  Attention was directed to any evidence of masses in the area or evidence of leukoplakia or candidal infection.  Attention was directed to the epiglottis where its size and position was determined and its movement on phonation of the vowel  e .  The piriform sinuses were  then inspected for any mass lesions or pooling of secretions.  Attention was then directed to the larynx. The vocal folds were inspected for infection or any areas of leukoplakia, for masses, polypoid degeneration, or hemorrhage.  Having done this, the arytenoids and vocal processes were inspected for erythema or evidence of granuloma formation.  The posterior commissure was then inspected for evidence of inflammatory changes in the mucosa and heaping up of mucosal tissue. The patient was then instructed to say the vowel  e .  Adduction of vocal folds to the midline was observed for any evidence of paresis or paralysis of the larynx or asymmetry in rotation of the larynx to the left or right. The patient was asked to breathe and the degree of abduction was noted bilaterally.  Subglottic view of the larynx was obtained for any additional mass lesions or mucosal changes.  Finally the post cricoid was examined for evidence of pooling of secretions, as well as the pharyngeal wall mucosa.   Anesthesia type: 0.25% phenylephrine    Findings:  Anatomic/physiological deviations: left vocal fold scar, no recurrence, filled in more inferiorly though still with glottic gap   Right vocal process: No restriction of mobility   Left vocal process: No restriction of mobility  Glottal gap: Glottal gap  Supraglottic structures: Normal  Hypopharynx: Normal     Estimated Blood Loss: minimal  Complications: None  Disposition: Patient tolerated the procedure well              Fiberoptic Endoscopic Evaluation of Swallowing (CPT 17018)  and Interpretation of Swallowing (CPT 33694)    Indications: See above notes for complete history and physical. Patient complains of dysphagia to liquids and/or there is suggestion on history and endoscopic exam of the presence of dysphagia causing medical complaints.  Swallowing evaluation is being performed to assess the presence and degree of dysphagia, and to recommend a safe diet.     Pulmonary Status:   No PNA   Current Diet:              regular                                        thin liquids      Consistency Amounts:  Thin Liquid: sip   Puree: n/a  Solid: n/a            Positioning: upright in a chair  Oral Peripheral Exam: See physical exam section.  Anatomic Notes: See Videostroboscopy report for assessment of anatomy and laryngeal functioning  Pharyngeal secretions prior to administration of liquid or food: No  Oral Phase Abnormal Findings: No abnormal behavior observed  Behavioral Adaptations: No abnormal behavior observed  Pharyngeal Phase Abnormal Findings: aspiration with thin liquids without cough    Recommended Diet:  regular                                        thick liquids         Review of Relevant Clinical Data     Labs:  Lab Results   Component Value Date    TSH 1.05 11/13/2007     Lab Results   Component Value Date     09/25/2019    CO2 29 09/25/2019    BUN 16 09/25/2019    PHOS 2.0 (L) 05/04/2016     Lab Results   Component Value Date    WBC 4.6 09/25/2019    HGB 12.9 (L) 09/25/2019    HCT 41.2 09/25/2019    MCV 95 09/25/2019     (L) 09/25/2019     Lab Results   Component Value Date    PTT 35 07/26/2017    INR 0.95 07/26/2017     No results found for: ELIZABET  No components found for: RHEUMATOIDFACTOR,  RF  Lab Results   Component Value Date    CRP 0.16 07/24/2003     No components found for: CKTOT, URICACID  No components found for: C3, C4, DSDNAAB, NDNAABIFA  No results found for: MPOAB    Patient reported Quality of Life (QOL) Measures   Patient Supplied Answers To VHI Questionnaire  Voice Handicap Index (VHI-10) 12/5/2019   My voice makes it difficult for people to hear me 0   People have difficulty understanding me in a noisy room 0   My voice difficulties restrict my personal and social life.  0   I feel left out of conversations because of my voice 0   My voice problem causes me to lose income 0   I feel as though I have to strain to produce voice 0   The clarity of my  "voice is unpredictable 0   My voice problem upsets me 0   My voice makes me feel handicapped 0   People ask, \"What's wrong with your voice?\" 0   VHI-10 0         Patient Supplied Answers To EAT Questionnaire  Eating Assessment Tool (EAT-10) 2019   My swallowing problem has caused me to lose weight 0   My swallowing problem interferes with my ability to go out for meals 0   Swallowing liquids takes extra effort 0   Swallowing solids takes extra effort 0   Swallowing pills takes extra effort 0   Swallowing is painful 0   The pleasure of eating is affected by my swallowing 0   When I swallow food sticks in my throat 0   I cough when I eat 0   Swallowing is stressful 0   EAT-10 0         Impression & Plan     IMPRESSION: Mr. Rasmussen is a 59 year old male who is being seen for the followin. T1 Left vocal fold SCC  -  s/p endoscopic CO2 laser resection 19  - no evidence of recurrence   Plan  - observation  - return in 6 weeks for office exam      2. 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 Dysphagia  - improved swallow today with less pharyngeal residue on evaluation of SLP performed FEES on 20 though continued recommendation of thickened liquids  - today screening FEES with liquids shows again aspiration  - he reports no PNAs, though he also states he is not thickening his liquids  - can consider IL for closure of glottic gap though unclear if much benefit with aspiration given he has no laryngeal sensation  - discussed risk of aspiration and pneumonia   Plan  - discussed with Shanna - she will talk to group home about chin tuck vs thickening liquids  - discuss with SLP regarding IL benefit       RETURN VISIT: office visit without SLP in 6 weeks.    Lizzy Wray MD    Laryngology    [facility] " Voice Clinic  Department of  Otolaryngology - Head and Neck Surgery    [facility]  [address] [place] [id number]  Appointment line: [phone]  Fax: [phone]  [address] [address]  Appointment line: [phone]  Fax: [phone]

## 2020-07-03 NOTE — NURSING NOTE
"Chief Complaint   Patient presents with     RECHECK     Swallowing check     Pulse 69, temperature 98.1  F (36.7  C), temperature source Temporal, height 1.918 m (6' 3.5\"), weight 83.9 kg (185 lb), SpO2 98 %.    Mala Champion, EMT  "

## 2020-07-03 NOTE — Clinical Note
Vito Payton  Remember Kt   He continues to aspirate with liquids  He has not gotten sick   He is not interested in following instructions with the chin tuck nor the thickener  I wonder ... if I inject him do you think he will have less aspiration? Problem is he has no sensation there.  What do you think? I've offered him injection for his voice but he is not interested. He is also almost at 1 year in September so I dont think he will close that gap any further.    Thank you for your help,  Lizzy

## 2020-07-03 NOTE — Clinical Note
Vito Medeiros  Not urgent at all.  He needs to follow up in office in 6 weeks. Should we give him a Thursday appt since we know that's what we want to work towards for office procedure day?  Let me know what your thoughts are. I want to do it right so we dont call and change and call and change. Dontae Ball

## 2020-07-07 ENCOUNTER — TELEPHONE (OUTPATIENT)
Dept: OTOLARYNGOLOGY | Facility: CLINIC | Age: 60
End: 2020-07-07

## 2020-07-07 NOTE — TELEPHONE ENCOUNTER
LVM letting pt guardian know 6 wk in-clinic f/u appt has been added on for 8/13. Left call center number of that date and time do not work.

## 2020-07-09 DIAGNOSIS — R13.14 PHARYNGOESOPHAGEAL DYSPHAGIA: Primary | ICD-10-CM

## 2020-07-16 ENCOUNTER — TELEPHONE (OUTPATIENT)
Dept: OTOLARYNGOLOGY | Facility: CLINIC | Age: 60
End: 2020-07-16

## 2020-07-16 NOTE — TELEPHONE ENCOUNTER
[facility] Call Center    Phone Message    May a detailed message be left on voicemail: yes     Reason for Call: Other: Pt's guardian Rima returning call from [facility] regarding appts on 8/13. She understands pt has visit with Nils at 8, and then a swallow study visit at 11. Rima states she is unable to attend appts this day, as she is out of town. She states pt cannot sit alone by himself for the several hours between the two appts, and wanted to know if there was a way pt could see Dr. Wray either right before or after the swallow study at 11. Please advise.    Action Taken: Message routed to:  [facility] ([facility] E[facility]    Travel Screening: Not Applicable

## 2020-07-16 NOTE — TELEPHONE ENCOUNTER
[facility] Call Center    Phone Message    May a detailed message be left on voicemail: yes     Reason for Call: Other: Pt's guardian Rima requesting call from Dr. Wray's nurse to discuss pt's upcoming swallow study (8/13) and some medical records questions. Please call Rima when available.    Action Taken: Message routed to:  [facility] ([facility] ENT    Travel Screening: Not Applicable

## 2020-07-16 NOTE — TELEPHONE ENCOUNTER
Spoke with Shanna regarding upcoming video swallow study and appointment with Dr. Wray on 8/13. Shanna stated that when she spoke with [facility], they are unable to provide thickened liquids for patient. AL recommended homecare SLP for further dietary recommendations but Shanna wanted to check with ENT clinic before ordering this service. Will discuss with Dr. Wray.    Shanna also voiced concern with clinic appointment schedule due to unavailability of private ride for the morning appointments. Requesting appointments closer together as patient would be attending these appointments alone and does not want patient waiting a couple hours in between. Will work on coordinating appointments closer together and will call Shanna back with schedule.    Kay Neves RN, DNP.  7/16/2020 11:10 AM

## 2020-07-30 ENCOUNTER — TELEPHONE (OUTPATIENT)
Dept: OTOLARYNGOLOGY | Facility: CLINIC | Age: 60
End: 2020-07-30

## 2020-07-30 NOTE — TELEPHONE ENCOUNTER
Spoke to Rima regarding setting up home speech therapy to be done at patient's assisted living. Rima was happy to hear that this may be a possibility but felt that this plan should be discussed with patient during 8/13 visit with Dr. Wray prior to ordering. Will discuss with care team prior to 8/13 appointment to facilitate ordering appropriately.    Kay Neves RN, DNP.  7/30/2020 12:50 PM

## 2020-08-13 ENCOUNTER — ANCILLARY PROCEDURE (OUTPATIENT)
Dept: GENERAL RADIOLOGY | Facility: CLINIC | Age: 60
End: 2020-08-13
Attending: OTOLARYNGOLOGY
Payer: MEDICARE

## 2020-08-13 ENCOUNTER — THERAPY VISIT (OUTPATIENT)
Dept: SPEECH THERAPY | Facility: CLINIC | Age: 60
End: 2020-08-13
Attending: OTOLARYNGOLOGY
Payer: MEDICARE

## 2020-08-13 ENCOUNTER — HOSPITAL ENCOUNTER (OUTPATIENT)
Dept: CT IMAGING | Facility: CLINIC | Age: 60
Discharge: HOME | End: 2020-08-13
Attending: INTERNAL MEDICINE | Admitting: INTERNAL MEDICINE
Payer: MEDICARE

## 2020-08-13 ENCOUNTER — OFFICE VISIT (OUTPATIENT)
Dept: OTOLARYNGOLOGY | Facility: CLINIC | Age: 60
End: 2020-08-13
Payer: MEDICARE

## 2020-08-13 VITALS
HEIGHT: 76 IN | WEIGHT: 187 LBS | OXYGEN SATURATION: 100 % | TEMPERATURE: 99 F | BODY MASS INDEX: 22.77 KG/M2 | HEART RATE: 63 BPM

## 2020-08-13 DIAGNOSIS — R49.0 DYSPHONIA: Primary | ICD-10-CM

## 2020-08-13 DIAGNOSIS — C34.90 NON-SMALL CELL CARCINOMA OF LUNG (H): ICD-10-CM

## 2020-08-13 DIAGNOSIS — R13.12 OROPHARYNGEAL DYSPHAGIA: Primary | ICD-10-CM

## 2020-08-13 DIAGNOSIS — J38.3 LESION OF VOCAL FOLD: ICD-10-CM

## 2020-08-13 DIAGNOSIS — R13.14 PHARYNGOESOPHAGEAL DYSPHAGIA: ICD-10-CM

## 2020-08-13 DIAGNOSIS — C32.9 LARYNX CANCER (H): ICD-10-CM

## 2020-08-13 PROCEDURE — 71250 CT THORAX DX C-: CPT

## 2020-08-13 RX ORDER — BARIUM SULFATE 400 MG/ML
30 SUSPENSION ORAL ONCE
Status: COMPLETED | OUTPATIENT
Start: 2020-08-13 | End: 2020-08-13

## 2020-08-13 RX ADMIN — BARIUM SULFATE 30 ML: 400 SUSPENSION ORAL at 11:17

## 2020-08-13 ASSESSMENT — PAIN SCALES - GENERAL: PAINLEVEL: NO PAIN (0)

## 2020-08-13 ASSESSMENT — MIFFLIN-ST. JEOR: SCORE: 1746.79

## 2020-08-13 NOTE — NURSING NOTE
"Chief Complaint   Patient presents with     RECHECK     6 week follow up     Pulse 63, temperature 99  F (37.2  C), temperature source Temporal, height 1.918 m (6' 3.5\"), weight 84.8 kg (187 lb), SpO2 100 %.    Mala Champion, EMT  "

## 2020-08-13 NOTE — PROGRESS NOTES
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 The follow up visit was on 19 at which time he had no evidence of disease and continued dysphonia and dysphagia.  The next visit was on 20 and he reports persistent dysphonia. He is able to manage and it does not bother him. He had no evidence of recurrence. Recommended to continue thickened liquid diet.      Today, he presents for follow up. He reports no new complaints. Voice stable. Denies trouble swallowing.     Dysphonia: Patient reports dysphonia. This is stable      Dysphagia: Patient reports dysphagia. This is stable     Dyspnea: Patient denies dyspnea. This is stable       PAST MEDICAL HISTORY:   Past Medical History:   Diagnosis Date     Alcohol abuse, unspecified      Cerebral infarction (H)     , right side residual and aphasia     Dyslipidemia      GERD (gastroesophageal reflux disease)      Lung cancer (H)      Unspecified essential hypertension        PAST SURGICAL HISTORY:   Past Surgical History:   Procedure Laterality Date     BRONCHOSCOPY FLEXIBLE AND RIGID N/A 2016    Procedure: BRONCHOSCOPY FLEXIBLE AND RIGID;  Surgeon: Tony Talbot MD;  Location: [facility] OR     C NONSPECIFIC PROCEDURE      R tympanoplasty     ESOPHAGOSCOPY FLEXIBLE N/A 2019    Procedure: flexible esophagoscopy;  Surgeon: Lizzy Johnson MD;  Location: [facility] OR     INJECT STEROID (LOCATION) N/A 2019    Procedure: steroid injection;  Surgeon: Lizzy Johnson MD;  Location: [facility] OR     LASER CO2 LARYNGOSCOPY N/A 2019    Procedure:  Microdirect laryngoscopy with excision of laryngeal mass, CO2 laser;  Surgeon: Lizzy Johnson MD;  Location: [facility] OR       CURRENT MEDICATIONS:   Current Outpatient Medications:      atorvastatin (LIPITOR) 40 MG tablet, Take 40 mg by mouth daily, Disp: , Rfl:      folic acid (FOLVITE) 1 MG tablet, Take 1 mg by mouth daily, Disp: , Rfl:      LORazepam (ATIVAN) 0.5 MG tablet, Take 0.5 mg by mouth every 4 hours as needed for anxiety, Disp: , Rfl:      ondansetron (ZOFRAN) 8 MG tablet, Take by mouth every 8 hours as needed for nausea, Disp: , Rfl:      prochlorperazine (COMPAZINE) 10 MG tablet, Take 10 mg by mouth every 6 hours as needed for nausea or vomiting, Disp: , Rfl:     ALLERGIES: No known drug allergies    SOCIAL HISTORY:    Social History     Socioeconomic History     Marital status: Single     Spouse name: Not on file     Number of children: Not on file     Years of education: Not on file     Highest education level: Not on file   Occupational History     Not on file   Social Needs     Financial resource strain: Not on file     Food insecurity     Worry: Not on file     Inability: Not on file     Transportation needs     Medical: Not on file     Non-medical: Not on file   Tobacco Use     Smoking status: Former Smoker     Packs/day: 1.00     Types: Cigarettes     Last attempt to quit: 9/25/2009     Years since quitting: 10.8     Smokeless tobacco: Never Used   Substance and Sexual Activity     Alcohol use: Not Currently     Drug use: No     Sexual activity: Not on file   Lifestyle     Physical activity     Days per week: Not on file     Minutes per session: Not on file     Stress: Not on file   Relationships     Social connections     Talks on phone: Not on file     Gets together: Not on file     Attends [employer] service: Not on file     Active member of club or organization: Not on file     Attends meetings of clubs or organizations: Not on file     Relationship status: Not on file     Intimate partner  violence     Fear of current or ex partner: Not on file     Emotionally abused: Not on file     Physically abused: Not on file     Forced sexual activity: Not on file   Other Topics Concern     Parent/sibling w/ CABG, MI or angioplasty before 65F 55M? Not Asked   Social History Narrative     Not on file         FAMILY HISTORY:   Family History   Problem Relation Age of Onset     Cancer Father       Non-contributory for problems with anesthesia    REVIEW OF SYSTEMS:   The patient was asked a 14 point review of systems regarding constitutional symptoms, eye symptoms, ears, nose, mouth, throat symptoms, cardiovascular symptoms, respiratory symptoms, gastrointestinal symptoms, genitourinary symptoms, musculoskeletal symptoms, integumentary symptoms, neurological symptoms, psychiatric symptoms, endocrine symptoms, hematologic/lymphatic symptoms, and allergic/ immunologic symptoms.   The pertinent factors have been included in the HPI and below.  Patient Supplied Answers to Review of Systems  No flowsheet data found.    Physical Examination   The patient underwent a physical examination as described below. The pertinent positive and negative findings are summarized after the description of the examination.  Constitutional: The patient's developmental and nutritional status was assessed. The patient's voice quality was assessed.  Head and Face: The head and face were inspected for deformities. The sinuses were palpated. The salivary glands were palpated. Facial muscle strength was assessed bilaterally.  Eyes: Extraocular movements and primary gaze alignment were assessed.  Ears, Nose, Mouth and Throat: The ears and nose were examined for deformities. The nasal septum, mucosa, and turbinates were inspected by anterior rhinoscopy. The lips, teeth, and gums were examined for abnormalities. The oral mucosa, tongue, palate, tonsils, lateral and posterior pharynx were inspected for the presence of asymmetry or mucosal lesions.     Neck: The tracheal position was noted, and the neck mass palpated to determine if there were any asymmetries, abnormal neck masses, thyromegally, or thyroid nodules.  Respiratory: The nature of the breathing and chest expansion/symmetry was observed.  Cardiovascular: The patient was examined to determine the presence of any edema or jugular venous distension.  Abdomen: The contour of the abdomen was noted.  Lymphatic: The patient was examined for infraclavicular lymphadenopathy.  Musculoskeletal: The patient was inspected for the presence of skeletal deformities.  Extremities: The extremities were examined for any clubbing or cyanosis.  Skin: The skin was examined for inflammatory or neoplastic conditions.  Neurologic: The patient's orientation, mood, and affect were noted. The cranial nerve  functions were examined.  Other pertinent positive and negative findings on physical examination:   OC/OP: no lesions, uvula midline, soft palate elevates symmetrically, FOM/BOT soft  Neck: no lesions, no TH tenderness to palpation  Uses a cane  Has right hand weakness  All other physical examination findings were within normal limits and noncontributory.    Procedures   Flexible laryngoscopy (CPT 12703)      Pre-procedure diagnosis: history of left vocal fold scc  Post-procedure diagnosis: same as above  Indication for procedure: Mr. Rasmussen is a 60 year old male with see above  Procedure(s): Fiberoptic Laryngoscopy    Details of Procedure: After informed consent was obtained, the patient was seated in the examination chair.  The areas of the nasopharynx as well as the hypopharynx were anesthetized with topical 4% lidocaine with 0.25% phenylephrine atomizer.  Examination of the base of tongue was performed first.  Attention was directed to any evidence of masses in the area or evidence of leukoplakia or candidal infection.  Attention was directed to the epiglottis where its size and position was determined and its movement  on phonation of the vowel  e .  The piriform sinuses were then inspected for any mass lesions or pooling of secretions.  Attention was then directed to the larynx. The vocal folds were inspected for infection or any areas of leukoplakia, for masses, polypoid degeneration, or hemorrhage.  Having done this, the arytenoids and vocal processes were inspected for erythema or evidence of granuloma formation.  The posterior commissure was then inspected for evidence of inflammatory changes in the mucosa and heaping up of mucosal tissue. The patient was then instructed to say the vowel  e .  Adduction of vocal folds to the midline was observed for any evidence of paresis or paralysis of the larynx or asymmetry in rotation of the larynx to the left or right. The patient was asked to breathe and the degree of abduction was noted bilaterally.  Subglottic view of the larynx was obtained for any additional mass lesions or mucosal changes.  Finally the post cricoid was examined for evidence of pooling of secretions, as well as the pharyngeal wall mucosa.   Anesthesia type: 0.25% phenylephrine    Findings:  Anatomic/physiological deviations: left vocal fold scar, no recurrence, filled in more inferiorly though still with glottic gap   Right vocal process: No restriction of mobility   Left vocal process: No restriction of mobility  Glottal gap: Glottal gap  Supraglottic structures: Normal  Hypopharynx: Normal     Estimated Blood Loss: minimal  Complications: None  Disposition: Patient tolerated the procedure well                    Review of Relevant Clinical Data     Labs:  Lab Results   Component Value Date    TSH 1.05 11/13/2007     Lab Results   Component Value Date     09/25/2019    CO2 29 09/25/2019    BUN 16 09/25/2019    PHOS 2.0 (L) 05/04/2016     Lab Results   Component Value Date    WBC 4.6 09/25/2019    HGB 12.9 (L) 09/25/2019    HCT 41.2 09/25/2019    MCV 95 09/25/2019     (L) 09/25/2019     Lab Results  "  Component Value Date    PTT 35 2017    INR 0.95 2017     No results found for: ELIZABET  No components found for: RHEUMATOIDFACTOR,  RF  Lab Results   Component Value Date    CRP 0.16 2003     No components found for: CKTOT, URICACID  No components found for: C3, C4, DSDNAAB, NDNAABIFA  No results found for: MPOAB    Patient reported Quality of Life (QOL) Measures   Patient Supplied Answers To VHI Questionnaire  Voice Handicap Index (VHI-10) 2019   My voice makes it difficult for people to hear me 0   People have difficulty understanding me in a noisy room 0   My voice difficulties restrict my personal and social life.  0   I feel left out of conversations because of my voice 0   My voice problem causes me to lose income 0   I feel as though I have to strain to produce voice 0   The clarity of my voice is unpredictable 0   My voice problem upsets me 0   My voice makes me feel handicapped 0   People ask, \"What's wrong with your voice?\" 0   VHI-10 0         Patient Supplied Answers To EAT Questionnaire  Eating Assessment Tool (EAT-10) 2019   My swallowing problem has caused me to lose weight 0   My swallowing problem interferes with my ability to go out for meals 0   Swallowing liquids takes extra effort 0   Swallowing solids takes extra effort 0   Swallowing pills takes extra effort 0   Swallowing is painful 0   The pleasure of eating is affected by my swallowing 0   When I swallow food sticks in my throat 0   I cough when I eat 0   Swallowing is stressful 0   EAT-10 0         Patient Supplied Answers To CSI Questionnaire  No flowsheet data found.      Patient Supplied Answers to Dyspnea Index Questionnaire:  No flowsheet data found.    Impression & Plan     IMPRESSION: Mr. Rasmussen is a 60 year old male who is being seen for the followin. T1 Left vocal fold SCC  -  s/p endoscopic CO2 laser resection 19  - no evidence of recurrence   Plan  - observation  - return in 4-6 weeks for " office exam and then can space out to 2 months given 1 year out from procedure on 9/30/20     2. 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 Dysphagia  - improved swallow today with less pharyngeal residue on evaluation of SLP performed FEES on 1/9/20 though continued recommendation of thickened liquids  - screening FEES with liquids shows again aspiration on 7/3/20  - he reports no PNAs, though he also states he is not thickening his liquids  - can consider IL for closure of glottic gap though unclear if much benefit with aspiration given he has no laryngeal sensation  - discussed risk of aspiration and pneumonia   Plan  - Xray Video Swallow Exam to assess safety    RETURN VISIT: office evaluation without SLP in in 4-6 weeks, or earlier as needed.     I spent a total of 15minutes face to face with Kt Rasmussen during today's office visit. Over 50% of this time was spent counseling the patient and/or coordinating care regarding the history of cancer.      Lizzy Wray MD    Laryngology    [facility]  Department of  Otolaryngology - Head and Neck Surgery    Clinics & Surgery Center  [address] [place] [id number]  Appointment line: [phone]  Fax: [phone]  [address] [address]  Appointment line: [phone]  Fax: [phone]

## 2020-08-17 NOTE — PROGRESS NOTES
Speech-Language Pathology Department   EVALUATION  [facility]  Video Swallow Study       08/13/20 1100   General Information   Type Of Visit Initial   Start Of Care Date 08/13/20   Referring Physician Dr. Lizzy Wray   Orders Evaluate And Treat   Orders Comment Video Swallow Study   Medical Diagnosis Dysphagia   Hearing WFL for conversational speech production   Pertinent History of Current Problem/OT: Additional Occupational Profile Info Kt Rasmussen has a PMH significant for stroke, lung CA (2016) s/p chemoradiation, and GERD.  Pt had an excision of the L VF on 9/30/2019 due to a T1 SCC tumor. In Nov 2019, it was noted that the pt was recovering well but was exhibiting intermittent coughing with thin liquids which prompted a FEES screening procedure.  Pt exhibited L VF concavity with a persistent, large glottal gap.  Pt was found to be at risk for aspiration with penetration observed on thin and nectar thick liquid consistencies and a modified diet of regular textures and honey thick liquids was recommended.  A referral for a full FEES was completed on 11/14/2019 and resulted in the recommendation for a regular texture diet and nectar thick liquids.  Pt was seen in conjunction with his ENT clinic appointment on 12/5/2019; at that time demonstrated ongoing aspiration and penetration with liquids.  It was recommended that pt continue a regular texture diet/nectar thick liquids with chin tuck/head turn as well as oropharyngeal strengthening exercises and f/u with SLP.  Pt subsequently cancelled his f/u SLP appointment and presents this date to ENT clinic.  Pt participated in repeat FEES in January 220. He was again recommended to adhere to modification of his liquids, honey thickened. He has not thickened his liquids at all since that time. He was seen for video swallow study today to further assess swallow function during the swallow and risk for aspiration moving  forward.    Respiratory Status Room air   Prior Level Of Function Swallowing   Prior Level Of Function Comment Regular texture solids and thin liquids   [facility]   General Observations Pt pleasant and cooperative. Pt demonstrates mixed aphasia   Patient/family Goals Pt did not formally state goals.    Fall Risk Screen   Fall screen completed by SLP   Fall screen comments Pt is at increased risk for falls due to his hemiparesis however he has seen PT multiple times since his stroke.    Clinical Swallow Evaluation   Oral Musculature anomalies present  (s/p stroke)   Structural Abnormalities none present   Dentition present and adequate   Mucosal Quality good   Mandibular Strength and Mobility intact   Oral Labial Strength and Mobility impaired retraction;impaired pursing;impaired coordination  (flat nasolabial fold on the right)   Lingual Strength and Mobility impaired coordination  (Lingual deviation to the right upon protrusion)   Buccal Strength and Mobility intact   Laryngeal Function Swallow   VFSS Eval: Radiology   Radiologist Resident   Views Taken left lateral;A/P   Physical Location of Procedure [facility]   VFSS Eval: Thin Liquid Texture Trial   Mode of Presentation, Thin Liquid cup;self-fed   Order of Presentation Series 1, 2, 5, 10 (A/P)   Preparatory Phase Poor bolus control   Oral Phase, Thin Liquid Premature pharyngeal entry   Pharyngeal Phase, Thin Liquid Delayed swallow reflex;Residue in pyriform sinus;Residue in valleculae   Rosenbek's Penetration Aspiration Scale: Thin Liquid Trial Results 2 - contrast enters airway, remains above the vocal cords, no residue remains (penetration)   Diagnostic Statement Single episode of flash penetration noted on thin liquid trials. Mild residue in the valleculae and pyriform sinus which is spontaneously cleared with subsequent swallows. Pt demonstrates greater weakness on the right side.    VFSS Eval: Nectar Thick Liquid Texture  Trial   Mode of Presentation, Nectar cup;self-fed   Order of Presentation Series 3   Preparatory Phase WFL   Oral Phase, Nectar Premature pharyngeal entry   Pharyngeal Phase, Nectar Residue in valleculae;Residue in pyriform sinus   Rosenbek's Penetration Aspiration Scale: Nectar-Thick Liquid Trial Results 1 - no aspiration, contrast does not enter airway   Diagnostic Statement No aspiration/penetration noted  on nectar thick liquid trial. Pt demonstrates slight delay in swallow with trace residue in valleculae and pyriform sinus. This is cleared with subsequent dry swallow.    VFSS Eval: Puree Solid Texture Trial   Mode of Presentation, Puree self-fed;spoon   Order of Presentation Series 4, 9 (A/P)   Preparatory Phase Poor bolus control  (Most notably on the right side during A/P)   Oral Phase, Puree Premature pharyngeal entry   Pharyngeal Phase, Puree Residue in pyriform sinus   Rosenbek's Penetration Aspiration Scale: Puree Food Trial Results 1 - no aspiration, contrast does not enter airway   Diagnostic Statement No aspiration or penetration noted on puree consistency. Pt demonstrates residual in the pyriform sinus on the right side which is cleared with sip of liquid.    VFSS Eval: Solid Food Texture Trial   Mode of Presentation, Solid self-fed   Order of Presentation Series 7, barium tablet series 8   Preparatory Phase Poor bolus control   Oral Phase, Solid Premature pharyngeal entry   Pharyngeal Phase, Solid WFL   Rosenbek's Penetration Aspiration Scale: Solid Food Trial Results 1 - no aspiration, contrast does not enter airway   Diagnostic Statement No pharyngeal residue noted on solid consistency trial. No aspiration/penetration noted either.    Swallow Compensations   Swallow Compensations Alternate viscosity of consistencies   Educational Assessment   Barriers to Learning Language  (Aphasia)   Esophageal Phase of Swallow   Patient reports or presents with symptoms of esophageal dysphagia No   Swallow Eval:  Clinical Impressions   Skilled Criteria for Therapy Intervention Skilled criteria met.  Treatment indicated.   Dysphagia Outcome Severity Scale (ESTEBAN) Level 6 - ESTEBAN   Treatment Diagnosis Minimal pharyngeal dysphagia   Diet texture recommendations Regular diet;Thin liquids   Recommended Feeding/Eating Techniques maintain upright posture during/after eating for 30 mins;hard swallow w/ each bite or sip;small sips/bites;alternate between small bites and sips of food/liquid   Therapy Frequency other (see comments)   Predicted Duration of Therapy Intervention (days/wks) Follow up in conjunction with ENT clinic visist as appropriate   Risks and Benefits of Treatment have been explained. Yes   Patient, family and/or staff in agreement with Plan of Care Yes   Clinical Impression Comments Kt Rasmussen demonstrates minimal pharyngeal dysphagia as characterized by mild residue on puree consistency on the right side, pyriform sinus, which is cleared with a second swallow or sip of liquid. No aspiration noted on any consistency presented. Flash penetration noted on thin liquid trial which was cleared with the force of the swallow. Delayed initaition of the swallow noted on all consitencies. Recommend regular solids and thin liquids. Sit upright during all po intake. Encouraged small bites/sips and slow rate. Alternate bites/sips. Recommend follow up during ENT clinic visits as appropriate.    Swallow Goal 1   Goal Identifier Diet   Goal Description 1. Pt will tolerate regular consistency solids and thin liquids without overt clinical s/sx of aspiration/penetration or pulmonary compromise over 6 months time.    Target Date 11/12/20   Total Session Time   SLP Eval: VideoFluoroscopic Swallow function Minutes (43473) 35   Total Evaluation Time 35   Therapy Certification   Certification date from 08/13/20   Certification date to 11/12/20   Medical Diagnosis Dysphagia    Certification I certify the need for these services furnished  under this plan of treatment and while under my care.  (Physician co-signature of this document indicates review and certification of the therapy plan).       Thank you for the referral of Kt Rasmussen. If you have any questions about this report, please contact me using the information below.      Krista Schmitz MS, CCC-SLP  Speech-Language Pathology  [facility] [place]  Departement of Otolaryngology/D&T - 4th floor  Pager: [phone]  Phone: [phone]  Email: [email or web address]

## 2020-08-17 NOTE — PROGRESS NOTES
OUTPATIENT SWALLOW  EVALUATION  PLAN OF TREATMENT FOR OUTPATIENT REHABILITATION  (COMPLETE FOR INITIAL CLAIMS ONLY)  Patient's Last Name, First Name, M.I.  YOB: 1960  ValeryKt  DAVID     Provider's Name   ALEISHA Greco   Medical Record No.  [id number]     Start of Care Date:  08/13/20   Onset Date:      Type:     ___PT   ____OT  ___X_SLP Medical Diagnosis:  Dysphagia      Treatment Diagnosis:  Minimal pharyngeal dysphagia Visits from SOC:  1     _________________________________________________________________________________  Plan of Treatment/Functional Goals: Follow up for ongoing tolerance of po intake.             Goals   1. Goal Identifier: Diet       Goal Description: 1. Pt will tolerate regular consistency solids and thin liquids without overt clinical s/sx of aspiration/penetration or pulmonary compromise over 6 months time.        Target Date: 11/12/20             Therapy Frequency: other (see comments)  Predicted Duration of Therapy Intervention (days/wks): Follow up in conjunction with ENT clinic visist as appropriate    ALEISHA Greco       I CERTIFY THE NEED FOR THESE SERVICES FURNISHED UNDER        THIS PLAN OF TREATMENT AND WHILE UNDER MY CARE     (Physician attestation of this document indicates review and certification of the therapy plan).                  Certification date from: 08/13/20 Certification date to: 11/12/20          Referring Physician: Dr. Lizzy Wray    Initial Assessment        See Epic Evaluation Start Of Care Date: 08/13/20

## 2020-08-20 ENCOUNTER — VIRTUAL VISIT (OUTPATIENT)
Dept: ONCOLOGY | Facility: CLINIC | Age: 60
End: 2020-08-20
Attending: INTERNAL MEDICINE
Payer: MEDICARE

## 2020-08-20 VITALS
HEIGHT: 76 IN | OXYGEN SATURATION: 100 % | WEIGHT: 186.2 LBS | TEMPERATURE: 97.7 F | HEART RATE: 68 BPM | SYSTOLIC BLOOD PRESSURE: 125 MMHG | DIASTOLIC BLOOD PRESSURE: 68 MMHG | RESPIRATION RATE: 16 BRPM | BODY MASS INDEX: 22.67 KG/M2

## 2020-08-20 DIAGNOSIS — C34.90 MALIGNANT NEOPLASM OF LUNG, UNSPECIFIED LATERALITY, UNSPECIFIED PART OF LUNG (H): ICD-10-CM

## 2020-08-20 DIAGNOSIS — C34.90 NON-SMALL CELL CARCINOMA OF LUNG, STAGE 3, UNSPECIFIED LATERALITY (H): Primary | ICD-10-CM

## 2020-08-20 PROCEDURE — 99214 OFFICE O/P EST MOD 30 MIN: CPT | Performed by: NURSE PRACTITIONER

## 2020-08-20 PROCEDURE — 40001009 ZZH VIDEO/TELEPHONE VISIT; NO CHARGE

## 2020-08-20 ASSESSMENT — PAIN SCALES - GENERAL: PAINLEVEL: NO PAIN (0)

## 2020-08-20 ASSESSMENT — MIFFLIN-ST. JEOR: SCORE: 1743.16

## 2020-08-20 NOTE — PROGRESS NOTES
"Oncology/Hematology Visit Note  Aug 20, 2020    Reason for Visit: follow up of adenocarcinoma of the lung T4 NX M0     He was treated with chemoradiation carbo and Taxol which he finished on July 30, 2016 followed by 2 cycles of consolidation carbo and taxol   Currently on surveillance     Patient comes here today for follow-up of the CT scan    Interval History:  Patient reports he is feeling well.  He denies fever chills sweats denies cough no shortness of breath denies chest pain denies nausea vomiting diarrhea denies abdominal pain denies bleeding    Review of Systems:  14 point ROS of systems including Constitutional, Eyes, Respiratory, Cardiovascular, Gastroenterology, Genitourinary, Integumentary, Muscularskeletal, Psychiatric were all negative except for pertinent positives noted in my HPI.      Current Outpatient Medications   Medication Sig Dispense Refill     atorvastatin (LIPITOR) 40 MG tablet Take 40 mg by mouth daily         Physical Examination:  General: The patient is a pleasant male in no acute distress.  /68   Pulse 68   Temp 97.7  F (36.5  C) (Oral)   Resp 16   Ht 1.918 m (6' 3.5\")   Wt 84.5 kg (186 lb 3.2 oz)   SpO2 100%   BMI 22.97 kg/m    HEENT: EOMI, PERRL. Sclerae are anicteric. Oral mucosa is pink and moist with no lesions or thrush.   Lymph: Neck is supple with no lymphadenopathy in the cervical or supraclavicular areas.   Heart: Regular rate and rhythm.   Lungs: Clear to auscultation bilaterally.   GI: Bowel sounds present, soft, nontender with no palpable hepatosplenomegaly or masses.   Extremities: No lower extremity edema noted bilaterally.   Skin: No rashes, petechiae, or bruising noted on exposed skin.        Assessment and Plan:    This is a 60-year-old male with    adenocarcinoma of the lung T4 NX M0   He was treated with chemoradiation carbo and Taxol which he finished on July 30, 2016 followed by 2 cycles of consolidation carbo and taxol   Currently on surveillance "   08/13-CT scan results reviewed with patient no evidence of recurrent or metastatic disease in the chest there is a 1 cm groundglass nodular density in the left lower lobe.  - plan is to repeat CT scan in 6 months  -Follow-up with Dr. 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 Although reviewed after completion, some words and grammatical errors may remain.

## 2020-08-20 NOTE — LETTER
"    8/20/2020         RE: Kt Rasmussen  [address] [facility] [address]        Dear Colleague,    Thank you for referring your patient, Kt Rasmussen, to the [facility]. Please see a copy of my visit note below.    Oncology/Hematology Visit Note  Aug 20, 2020    Reason for Visit: follow up of adenocarcinoma of the lung T4 NX M0     He was treated with chemoradiation carbo and Taxol which he finished on July 30, 2016 followed by 2 cycles of consolidation carbo and taxol   Currently on surveillance     Patient comes here today for follow-up of the CT scan    Interval History:  Patient reports he is feeling well.  He denies fever chills sweats denies cough no shortness of breath denies chest pain denies nausea vomiting diarrhea denies abdominal pain denies bleeding    Review of Systems:  14 point ROS of systems including Constitutional, Eyes, Respiratory, Cardiovascular, Gastroenterology, Genitourinary, Integumentary, Muscularskeletal, Psychiatric were all negative except for pertinent positives noted in my HPI.      Current Outpatient Medications   Medication Sig Dispense Refill     atorvastatin (LIPITOR) 40 MG tablet Take 40 mg by mouth daily         Physical Examination:  General: The patient is a pleasant male in no acute distress.  /68   Pulse 68   Temp 97.7  F (36.5  C) (Oral)   Resp 16   Ht 1.918 m (6' 3.5\")   Wt 84.5 kg (186 lb 3.2 oz)   SpO2 100%   BMI 22.97 kg/m    HEENT: EOMI, PERRL. Sclerae are anicteric. Oral mucosa is pink and moist with no lesions or thrush.   Lymph: Neck is supple with no lymphadenopathy in the cervical or supraclavicular areas.   Heart: Regular rate and rhythm.   Lungs: Clear to auscultation bilaterally.   GI: Bowel sounds present, soft, nontender with no palpable hepatosplenomegaly or masses.   Extremities: No lower extremity edema noted bilaterally.   Skin: No rashes, petechiae, or bruising noted on exposed skin.        Assessment and Plan:    This " is a 60-year-old male with    adenocarcinoma of the lung T4 NX M0   He was treated with chemoradiation carbo and Taxol which he finished on July 30, 2016 followed by 2 cycles of consolidation carbo and taxol   Currently on surveillance   08/13-CT scan results reviewed with patient no evidence of recurrent or metastatic disease in the chest there is a 1 cm groundglass nodular density in the left lower lobe.  - plan is to repeat CT scan in 6 months  -Follow-up with Dr. John in 6 months        MADHAVI Moran CNP  Hem/Onc   [facility]     Chart documentation with Dragon Voice recognition Software. Although reviewed after completion, some words and grammatical errors may remain.          Again, thank you for allowing me to participate in the care of your patient.        Sincerely,        MADHAVI Moran CNP

## 2020-08-20 NOTE — LETTER
"    8/20/2020         RE: Kt Rasmussen  [address] [facility] [address]        Dear Colleague,    Thank you for referring your patient, Kt Rasmussen, to the [facility]. Please see a copy of my visit note below.    Oncology/Hematology Visit Note  Aug 20, 2020    Reason for Visit: follow up of adenocarcinoma of the lung T4 NX M0     He was treated with chemoradiation carbo and Taxol which he finished on July 30, 2016 followed by 2 cycles of consolidation carbo and taxol   Currently on surveillance     Patient comes here today for follow-up of the CT scan    Interval History:  Patient reports he is feeling well.  He denies fever chills sweats denies cough no shortness of breath denies chest pain denies nausea vomiting diarrhea denies abdominal pain denies bleeding    Review of Systems:  14 point ROS of systems including Constitutional, Eyes, Respiratory, Cardiovascular, Gastroenterology, Genitourinary, Integumentary, Muscularskeletal, Psychiatric were all negative except for pertinent positives noted in my HPI.      Current Outpatient Medications   Medication Sig Dispense Refill     atorvastatin (LIPITOR) 40 MG tablet Take 40 mg by mouth daily         Physical Examination:  General: The patient is a pleasant male in no acute distress.  /68   Pulse 68   Temp 97.7  F (36.5  C) (Oral)   Resp 16   Ht 1.918 m (6' 3.5\")   Wt 84.5 kg (186 lb 3.2 oz)   SpO2 100%   BMI 22.97 kg/m    HEENT: EOMI, PERRL. Sclerae are anicteric. Oral mucosa is pink and moist with no lesions or thrush.   Lymph: Neck is supple with no lymphadenopathy in the cervical or supraclavicular areas.   Heart: Regular rate and rhythm.   Lungs: Clear to auscultation bilaterally.   GI: Bowel sounds present, soft, nontender with no palpable hepatosplenomegaly or masses.   Extremities: No lower extremity edema noted bilaterally.   Skin: No rashes, petechiae, or bruising noted on exposed skin.        Assessment and Plan:    This " is a 60-year-old male with    adenocarcinoma of the lung T4 NX M0   He was treated with chemoradiation carbo and Taxol which he finished on July 30, 2016 followed by 2 cycles of consolidation carbo and taxol   Currently on surveillance   08/13-CT scan results reviewed with patient no evidence of recurrent or metastatic disease in the chest there is a 1 cm groundglass nodular density in the left lower lobe.  - plan is to repeat CT scan in 6 months  -Follow-up with Dr. John in 6 months        MADHAVI Moran CNP  Hem/Onc   [facility]     Chart documentation with Dragon Voice recognition Software. Although reviewed after completion, some words and grammatical errors may remain.          Again, thank you for allowing me to participate in the care of your patient.        Sincerely,        MADHAVI Moran CNP

## 2020-08-25 ENCOUNTER — DOCUMENTATION ONLY (OUTPATIENT)
Dept: OTOLARYNGOLOGY | Facility: CLINIC | Age: 60
End: 2020-08-25

## 2020-08-25 NOTE — PROGRESS NOTES
Video Esophagram Review Rounds  Imaging Review of MBSS conducted with attending physician Lizzy Wray and reviewed/discussed with SLP Krista Schmitz, Chelsi Del Cid, Tata Westfall and/or Manjula Jacobsen    Relevant Background: prior SCC    MBSS Date: 8/13/20    Findings:  Pharyngeal Weakness:Yes  Epiglottic dysfunction: No  Penetration: No  Aspiration: No  UES dysfunction: No  Details: safe swallow, much improved      Recommendations:  Diet: current

## 2020-09-16 NOTE — PROGRESS NOTES
[facility]   at the [facility]   Otolaryngology Clinic     Patient: Kt Rasmussen    MRN: [id number]    : 1960    Age/Gender: 60 year old male  Date of Service: 2020  Rendering Provider:   Lizzy Wray MD     Chief Complaint   T1 left VF SCC s/p resection 19  Dysphonia  Dysphagia  Interval History   HISTORY OF PRESENT ILLNESS: Mr. 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 The follow up visit was on 19 at which time he had no evidence of disease and continued dysphonia and dysphagia.  The next visit was on 20 and he reports persistent dysphonia. He is able to manage and it does not bother him. He had no evidence of recurrence. Recommended to continue thickened liquid diet.       Today, he presents for follow up for his friend. He reports he is doing well. He does have issues with his voice. He is eating well    Dysphonia: Patient reports dysphonia. This is stable      Dysphagia: Patient reports dysphagia. This is better than usual    Dyspnea: Patient denies dyspnea. This is stable     PAST MEDICAL HISTORY:   Past Medical History:   Diagnosis Date     Alcohol abuse, unspecified      Cerebral infarction (H)     , right side residual and aphasia     Dyslipidemia      GERD (gastroesophageal reflux disease)      Lung cancer (H)      Unspecified essential hypertension        PAST SURGICAL HISTORY:   Past Surgical History:   Procedure Laterality Date     BRONCHOSCOPY FLEXIBLE AND RIGID N/A 2016    Procedure: BRONCHOSCOPY FLEXIBLE AND RIGID;  Surgeon: Tony Talbot MD;  Location: [facility] OR     C NONSPECIFIC PROCEDURE      R tympanoplasty     ESOPHAGOSCOPY FLEXIBLE N/A 2019    Procedure: flexible esophagoscopy;  Surgeon: Lizzy Johnson MD;  Location: [facility] OR     INJECT STEROID (LOCATION) N/A 2019    Procedure: steroid injection;  Surgeon: Lizzy Johnson MD;  Location: [facility] OR     LASER CO2  LARYNGOSCOPY N/A 9/30/2019    Procedure: Microdirect laryngoscopy with excision of laryngeal mass, CO2 laser;  Surgeon: Lizzy Johnson MD;  Location:  OR       CURRENT MEDICATIONS:   Current Outpatient Medications:      atorvastatin (LIPITOR) 40 MG tablet, Take 40 mg by mouth daily, Disp: , Rfl:     ALLERGIES: No known drug allergies    SOCIAL HISTORY:    Social History     Socioeconomic History     Marital status: Single     Spouse name: Not on file     Number of children: Not on file     Years of education: Not on file     Highest education level: Not on file   Occupational History     Not on file   Social Needs     Financial resource strain: Not on file     Food insecurity     Worry: Not on file     Inability: Not on file     Transportation needs     Medical: Not on file     Non-medical: Not on file   Tobacco Use     Smoking status: Former Smoker     Packs/day: 1.00     Types: Cigarettes     Last attempt to quit: 9/25/2009     Years since quitting: 10.9     Smokeless tobacco: Never Used   Substance and Sexual Activity     Alcohol use: Not Currently     Drug use: No     Sexual activity: Not on file   Lifestyle     Physical activity     Days per week: Not on file     Minutes per session: Not on file     Stress: Not on file   Relationships     Social connections     Talks on phone: Not on file     Gets together: Not on file     Attends Buddhism service: Not on file     Active member of club or organization: Not on file     Attends meetings of clubs or organizations: Not on file     Relationship status: Not on file     Intimate partner violence     Fear of current or ex partner: Not on file     Emotionally abused: Not on file     Physically abused: Not on file     Forced sexual activity: Not on file   Other Topics Concern     Parent/sibling w/ CABG, MI or angioplasty before 65F 55M? Not Asked   Social History Narrative     Not on file         FAMILY HISTORY:   Family History   Problem Relation Age of Onset      Cancer Father       Non-contributory for problems with anesthesia    REVIEW OF SYSTEMS:   The patient was asked a 14 point review of systems regarding constitutional symptoms, eye symptoms, ears, nose, mouth, throat symptoms, cardiovascular symptoms, respiratory symptoms, gastrointestinal symptoms, genitourinary symptoms, musculoskeletal symptoms, integumentary symptoms, neurological symptoms, psychiatric symptoms, endocrine symptoms, hematologic/lymphatic symptoms, and allergic/ immunologic symptoms.   The pertinent factors have been included in the HPI and below.  Patient Supplied Answers to Review of Systems  No flowsheet data found.    Physical Examination   The patient underwent a physical examination as described below. The pertinent positive and negative findings are summarized after the description of the examination.  Constitutional: The patient's developmental and nutritional status was assessed. The patient's voice quality was assessed.  Head and Face: The head and face were inspected for deformities. The sinuses were palpated. The salivary glands were palpated. Facial muscle strength was assessed bilaterally.  Eyes: Extraocular movements and primary gaze alignment were assessed.  Ears, Nose, Mouth and Throat: The ears and nose were examined for deformities. The nasal septum, mucosa, and turbinates were inspected by anterior rhinoscopy. The lips, teeth, and gums were examined for abnormalities. The oral mucosa, tongue, palate, tonsils, lateral and posterior pharynx were inspected for the presence of asymmetry or mucosal lesions.    Neck: The tracheal position was noted, and the neck mass palpated to determine if there were any asymmetries, abnormal neck masses, thyromegally, or thyroid nodules.  Respiratory: The nature of the breathing and chest expansion/symmetry was observed.  Cardiovascular: The patient was examined to determine the presence of any edema or jugular venous distension.  Abdomen: The  contour of the abdomen was noted.  Lymphatic: The patient was examined for infraclavicular lymphadenopathy.  Musculoskeletal: The patient was inspected for the presence of skeletal deformities.  Extremities: The extremities were examined for any clubbing or cyanosis.  Skin: The skin was examined for inflammatory or neoplastic conditions.  Neurologic: The patient's orientation, mood, and affect were noted. The cranial nerve  functions were examined.  Other pertinent positive and negative findings on physical examination:   OC/OP: no lesions, uvula midline, soft palate elevates symmetrically, FOM/BOT soft  Neck: no lesions, no TH tenderness to palpation  All other physical examination findings were within normal limits and noncontributory.    Procedures   Flexible laryngoscopy (CPT 39899)      Pre-procedure diagnosis: h/o larynx scc  Post-procedure diagnosis: same as above  Indication for procedure: Mr. Rasmussen is a 60 year old male with see above  Procedure(s): Fiberoptic Laryngoscopy    Details of Procedure: After informed consent was obtained, the patient was seated in the examination chair.  The areas of the nasopharynx as well as the hypopharynx were anesthetized with topical 4% lidocaine with 0.25% phenylephrine atomizer.  Examination of the base of tongue was performed first.  Attention was directed to any evidence of masses in the area or evidence of leukoplakia or candidal infection.  Attention was directed to the epiglottis where its size and position was determined and its movement on phonation of the vowel  e .  The piriform sinuses were then inspected for any mass lesions or pooling of secretions.  Attention was then directed to the larynx. The vocal folds were inspected for infection or any areas of leukoplakia, for masses, polypoid degeneration, or hemorrhage.  Having done this, the arytenoids and vocal processes were inspected for erythema or evidence of granuloma formation.  The posterior commissure  was then inspected for evidence of inflammatory changes in the mucosa and heaping up of mucosal tissue. The patient was then instructed to say the vowel  e .  Adduction of vocal folds to the midline was observed for any evidence of paresis or paralysis of the larynx or asymmetry in rotation of the larynx to the left or right. The patient was asked to breathe and the degree of abduction was noted bilaterally.  Subglottic view of the larynx was obtained for any additional mass lesions or mucosal changes.  Finally the post cricoid was examined for evidence of pooling of secretions, as well as the pharyngeal wall mucosa.   Anesthesia type: 0.25% phenylephrine    Findings:  Anatomic/physiological deviations: left vocal fold scar, no recurrence, filled in more inferiorly though still with glottic gap   Right vocal process: No restriction of mobility   Left vocal process: No restriction of mobility  Glottal gap: Glottal gap  Supraglottic structures: Normal  Hypopharynx: Normal     Estimated Blood Loss: minimal  Complications: None  Disposition: Patient tolerated the procedure well            Review of Relevant Clinical Data   Radiology: Xray Video Swallow Exam 8/13/20     Labs:  Lab Results   Component Value Date    TSH 1.05 11/13/2007     Lab Results   Component Value Date     09/25/2019    CO2 29 09/25/2019    BUN 16 09/25/2019    PHOS 2.0 (L) 05/04/2016     Lab Results   Component Value Date    WBC 4.6 09/25/2019    HGB 12.9 (L) 09/25/2019    HCT 41.2 09/25/2019    MCV 95 09/25/2019     (L) 09/25/2019     Lab Results   Component Value Date    PTT 35 07/26/2017    INR 0.95 07/26/2017     No results found for: ELIZABET  No components found for: RHEUMATOIDFACTOR,  RF  Lab Results   Component Value Date    CRP 0.16 07/24/2003     No components found for: CKTOT, URICACID  No components found for: C3, C4, DSDNAAB, NDNAABIFA  No results found for: MPOAB    Patient reported Quality of Life (QOL) Measures   Patient  "Supplied Answers To VHI Questionnaire  Voice Handicap Index (VHI-10) 2019   My voice makes it difficult for people to hear me 0   People have difficulty understanding me in a noisy room 0   My voice difficulties restrict my personal and social life.  0   I feel left out of conversations because of my voice 0   My voice problem causes me to lose income 0   I feel as though I have to strain to produce voice 0   The clarity of my voice is unpredictable 0   My voice problem upsets me 0   My voice makes me feel handicapped 0   People ask, \"What's wrong with your voice?\" 0   VHI-10 0         Patient Supplied Answers To EAT Questionnaire  Eating Assessment Tool (EAT-10) 2019   My swallowing problem has caused me to lose weight 0   My swallowing problem interferes with my ability to go out for meals 0   Swallowing liquids takes extra effort 0   Swallowing solids takes extra effort 0   Swallowing pills takes extra effort 0   Swallowing is painful 0   The pleasure of eating is affected by my swallowing 0   When I swallow food sticks in my throat 0   I cough when I eat 0   Swallowing is stressful 0   EAT-10 0         Patient Supplied Answers To CSI Questionnaire  No flowsheet data found.    Patient Supplied Answers to Dyspnea Index Questionnaire:  No flowsheet data found.      Impression & Plan     IMPRESSION: Mr. Rasmussen is a 60 year old male who is being seen for the followin. T1 Left vocal fold SCC  -  s/p endoscopic CO2 laser resection 19  - no evidence of recurrence   Plan  - observation  - [id number] evaluations during year 2     2. 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 Dysphagia  - improved swallow today with less pharyngeal residue on evaluation of SLP performed FEES on 20 though continued recommendation of " thickened liquids  - screening FEES with liquids shows again aspiration on 7/3/20  - no PNAs, no changes in weight  - Xray Video Swallow Exam 8/13/20 - safe swallow, much improved  Plan  - observation    RETURN VISIT: office evaluation without   SLP in 2 month(s), or earlier as needed.     Lizzy Wray MD    Laryngology    [facility]  Department of  Otolaryngology - Head and Neck Surgery    Clinics & Surgery Center  [address] [place] [address]  Appointment line: [phone]  Fax: [phone]

## 2020-09-17 ENCOUNTER — TELEPHONE (OUTPATIENT)
Dept: OTOLARYNGOLOGY | Facility: CLINIC | Age: 60
End: 2020-09-17

## 2020-09-17 ENCOUNTER — OFFICE VISIT (OUTPATIENT)
Dept: OTOLARYNGOLOGY | Facility: CLINIC | Age: 60
End: 2020-09-17
Payer: MEDICARE

## 2020-09-17 VITALS — BODY MASS INDEX: 22.41 KG/M2 | HEART RATE: 87 BPM | OXYGEN SATURATION: 98 % | WEIGHT: 184 LBS | HEIGHT: 76 IN

## 2020-09-17 DIAGNOSIS — R49.0 DYSPHONIA: Primary | ICD-10-CM

## 2020-09-17 ASSESSMENT — MIFFLIN-ST. JEOR: SCORE: 1733.18

## 2020-09-17 ASSESSMENT — PAIN SCALES - GENERAL: PAINLEVEL: NO PAIN (0)

## 2020-09-17 NOTE — LETTER
2020       RE: Kt Rasmussen  [id number] [address] [facility]  [facility] [address]     Dear Colleague,    Thank you for referring your patient, Kt Rasmussen, to the [facility] at [facility]. Please see a copy of my visit note below.        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 The follow up visit was on 19 at which time he had no evidence of disease and continued dysphonia and dysphagia.  The next visit was on 20 and he reports persistent dysphonia. He is able to manage and it does not bother him. He had no evidence of recurrence. Recommended to continue thickened liquid diet.       Today, he presents for follow up for his friend. He reports he is doing well. He does have issues with his voice. He is eating well    Dysphonia: Patient reports dysphonia. This is stable      Dysphagia: Patient reports dysphagia. This is better than usual    Dyspnea: Patient denies dyspnea. This is stable     PAST MEDICAL HISTORY:   Past Medical History:   Diagnosis Date     Alcohol abuse, unspecified      Cerebral infarction (H)     , right side residual and aphasia     Dyslipidemia      GERD (gastroesophageal reflux disease)      Lung cancer (H)      Unspecified essential hypertension        PAST SURGICAL HISTORY:   Past Surgical History:   Procedure Laterality Date     BRONCHOSCOPY FLEXIBLE AND RIGID N/A 2016    Procedure: BRONCHOSCOPY FLEXIBLE AND RIGID;  Surgeon: Tony Talbot MD;  Location: [facility] OR     C NONSPECIFIC PROCEDURE      R  tympanoplasty     ESOPHAGOSCOPY FLEXIBLE N/A 9/30/2019    Procedure: flexible esophagoscopy;  Surgeon: Lizzy Johnson MD;  Location: [facility] OR     INJECT STEROID (LOCATION) N/A 9/30/2019    Procedure: steroid injection;  Surgeon: Lizzy Johnson MD;  Location: [facility] OR     LASER CO2 LARYNGOSCOPY N/A 9/30/2019    Procedure: Microdirect laryngoscopy with excision of laryngeal mass, CO2 laser;  Surgeon: Lizzy Johnson MD;  Location: [facility] OR       CURRENT MEDICATIONS:   Current Outpatient Medications:      atorvastatin (LIPITOR) 40 MG tablet, Take 40 mg by mouth daily, Disp: , Rfl:     ALLERGIES: No known drug allergies    SOCIAL HISTORY:    Social History     Socioeconomic History     Marital status: Single     Spouse name: Not on file     Number of children: Not on file     Years of education: Not on file     Highest education level: Not on file   Occupational History     Not on file   Social Needs     Financial resource strain: Not on file     Food insecurity     Worry: Not on file     Inability: Not on file     Transportation needs     Medical: Not on file     Non-medical: Not on file   Tobacco Use     Smoking status: Former Smoker     Packs/day: 1.00     Types: Cigarettes     Last attempt to quit: 9/25/2009     Years since quitting: 10.9     Smokeless tobacco: Never Used   Substance and Sexual Activity     Alcohol use: Not Currently     Drug use: No     Sexual activity: Not on file   Lifestyle     Physical activity     Days per week: Not on file     Minutes per session: Not on file     Stress: Not on file   Relationships     Social connections     Talks on phone: Not on file     Gets together: Not on file     Attends Rastafari service: Not on file     Active member of club or organization: Not on file     Attends meetings of clubs or organizations: Not on file     Relationship status: Not on file     Intimate partner violence     Fear of current or ex partner: Not on file     Emotionally abused: Not on file      Physically abused: Not on file     Forced sexual activity: Not on file   Other Topics Concern     Parent/sibling w/ CABG, MI or angioplasty before 65F 55M? Not Asked   Social History Narrative     Not on file         FAMILY HISTORY:   Family History   Problem Relation Age of Onset     Cancer Father       Non-contributory for problems with anesthesia    REVIEW OF SYSTEMS:   The patient was asked a 14 point review of systems regarding constitutional symptoms, eye symptoms, ears, nose, mouth, throat symptoms, cardiovascular symptoms, respiratory symptoms, gastrointestinal symptoms, genitourinary symptoms, musculoskeletal symptoms, integumentary symptoms, neurological symptoms, psychiatric symptoms, endocrine symptoms, hematologic/lymphatic symptoms, and allergic/ immunologic symptoms.   The pertinent factors have been included in the HPI and below.  Patient Supplied Answers to Review of Systems  No flowsheet data found.    Physical Examination   The patient underwent a physical examination as described below. The pertinent positive and negative findings are summarized after the description of the examination.  Constitutional: The patient's developmental and nutritional status was assessed. The patient's voice quality was assessed.  Head and Face: The head and face were inspected for deformities. The sinuses were palpated. The salivary glands were palpated. Facial muscle strength was assessed bilaterally.  Eyes: Extraocular movements and primary gaze alignment were assessed.  Ears, Nose, Mouth and Throat: The ears and nose were examined for deformities. The nasal septum, mucosa, and turbinates were inspected by anterior rhinoscopy. The lips, teeth, and gums were examined for abnormalities. The oral mucosa, tongue, palate, tonsils, lateral and posterior pharynx were inspected for the presence of asymmetry or mucosal lesions.    Neck: The tracheal position was noted, and the neck mass palpated to determine if there were  any asymmetries, abnormal neck masses, thyromegally, or thyroid nodules.  Respiratory: The nature of the breathing and chest expansion/symmetry was observed.  Cardiovascular: The patient was examined to determine the presence of any edema or jugular venous distension.  Abdomen: The contour of the abdomen was noted.  Lymphatic: The patient was examined for infraclavicular lymphadenopathy.  Musculoskeletal: The patient was inspected for the presence of skeletal deformities.  Extremities: The extremities were examined for any clubbing or cyanosis.  Skin: The skin was examined for inflammatory or neoplastic conditions.  Neurologic: The patient's orientation, mood, and affect were noted. The cranial nerve  functions were examined.  Other pertinent positive and negative findings on physical examination:   OC/OP: no lesions, uvula midline, soft palate elevates symmetrically, FOM/BOT soft  Neck: no lesions, no TH tenderness to palpation  All other physical examination findings were within normal limits and noncontributory.    Procedures   Flexible laryngoscopy (CPT 11547)      Pre-procedure diagnosis: h/o larynx scc  Post-procedure diagnosis: same as above  Indication for procedure: Mr. Rasmussen is a 60 year old male with see above  Procedure(s): Fiberoptic Laryngoscopy    Details of Procedure: After informed consent was obtained, the patient was seated in the examination chair.  The areas of the nasopharynx as well as the hypopharynx were anesthetized with topical 4% lidocaine with 0.25% phenylephrine atomizer.  Examination of the base of tongue was performed first.  Attention was directed to any evidence of masses in the area or evidence of leukoplakia or candidal infection.  Attention was directed to the epiglottis where its size and position was determined and its movement on phonation of the vowel  e .  The piriform sinuses were then inspected for any mass lesions or pooling of secretions.  Attention was then directed  to the larynx. The vocal folds were inspected for infection or any areas of leukoplakia, for masses, polypoid degeneration, or hemorrhage.  Having done this, the arytenoids and vocal processes were inspected for erythema or evidence of granuloma formation.  The posterior commissure was then inspected for evidence of inflammatory changes in the mucosa and heaping up of mucosal tissue. The patient was then instructed to say the vowel  e .  Adduction of vocal folds to the midline was observed for any evidence of paresis or paralysis of the larynx or asymmetry in rotation of the larynx to the left or right. The patient was asked to breathe and the degree of abduction was noted bilaterally.  Subglottic view of the larynx was obtained for any additional mass lesions or mucosal changes.  Finally the post cricoid was examined for evidence of pooling of secretions, as well as the pharyngeal wall mucosa.   Anesthesia type: 0.25% phenylephrine    Findings:  Anatomic/physiological deviations: left vocal fold scar, no recurrence, filled in more inferiorly though still with glottic gap   Right vocal process: No restriction of mobility   Left vocal process: No restriction of mobility  Glottal gap: Glottal gap  Supraglottic structures: Normal  Hypopharynx: Normal     Estimated Blood Loss: minimal  Complications: None  Disposition: Patient tolerated the procedure well            Review of Relevant Clinical Data   Radiology: Xray Video Swallow Exam 8/13/20     Labs:  Lab Results   Component Value Date    TSH 1.05 11/13/2007     Lab Results   Component Value Date     09/25/2019    CO2 29 09/25/2019    BUN 16 09/25/2019    PHOS 2.0 (L) 05/04/2016     Lab Results   Component Value Date    WBC 4.6 09/25/2019    HGB 12.9 (L) 09/25/2019    HCT 41.2 09/25/2019    MCV 95 09/25/2019     (L) 09/25/2019     Lab Results   Component Value Date    PTT 35 07/26/2017    INR 0.95 07/26/2017     No results found for: ELIZABET  No components  "found for: RHEUMATOIDFACTOR,  RF  Lab Results   Component Value Date    CRP 0.16 2003     No components found for: CKTOT, URICACID  No components found for: C3, C4, DSDNAAB, NDNAABIFA  No results found for: MPOAB    Patient reported Quality of Life (QOL) Measures   Patient Supplied Answers To VHI Questionnaire  Voice Handicap Index (VHI-10) 2019   My voice makes it difficult for people to hear me 0   People have difficulty understanding me in a noisy room 0   My voice difficulties restrict my personal and social life.  0   I feel left out of conversations because of my voice 0   My voice problem causes me to lose income 0   I feel as though I have to strain to produce voice 0   The clarity of my voice is unpredictable 0   My voice problem upsets me 0   My voice makes me feel handicapped 0   People ask, \"What's wrong with your voice?\" 0   VHI-10 0         Patient Supplied Answers To EAT Questionnaire  Eating Assessment Tool (EAT-10) 2019   My swallowing problem has caused me to lose weight 0   My swallowing problem interferes with my ability to go out for meals 0   Swallowing liquids takes extra effort 0   Swallowing solids takes extra effort 0   Swallowing pills takes extra effort 0   Swallowing is painful 0   The pleasure of eating is affected by my swallowing 0   When I swallow food sticks in my throat 0   I cough when I eat 0   Swallowing is stressful 0   EAT-10 0         Patient Supplied Answers To CSI Questionnaire  No flowsheet data found.    Patient Supplied Answers to Dyspnea Index Questionnaire:  No flowsheet data found.      Impression & Plan     IMPRESSION: Mr. Rasmussen is a 60 year old male who is being seen for the followin. T1 Left vocal fold SCC  -  s/p endoscopic CO2 laser resection 19  - no evidence of recurrence   Plan  - observation  - [id number] evaluations during year 2     2. 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 Dysphagia  - improved swallow today with less pharyngeal residue on evaluation of SLP performed FEES on 1/9/20 though continued recommendation of thickened liquids  - screening FEES with liquids shows again aspiration on 7/3/20  - no PNAs, no changes in weight  - Xray Video Swallow Exam 8/13/20 - safe swallow, much improved  Plan  - observation    RETURN VISIT: office evaluation without   SLP in 2 month(s), or earlier as needed.     Lizzy Wray MD    Laryngology    [facility]  Department of  Otolaryngology - Head and Neck Surgery    [facility]  [address] [place] [address]  Appointment line: [phone]  Fax: [phone]      Again, thank you for allowing me to participate in the care of your patient.      Sincerely,    Lizzy Wray MD

## 2020-09-17 NOTE — NURSING NOTE
"Chief Complaint   Patient presents with     RECHECK     Follow up       Pulse 87, height 1.918 m (6' 3.5\"), weight 83.5 kg (184 lb), SpO2 98 %.    Mala Champion, EMT  "

## 2020-09-17 NOTE — TELEPHONE ENCOUNTER
Left message with guardian, Mitzy, by request of Dr. Wray. Dr. Wray saw patient today and he is doing well. No evidence of any recurrence. Patient's most recent swallow was much improved and he is liberalized to eat what he wants   Will need q2 month follow ups with Dr. Wray. Next scheduled visit is 11/19/2020 at 1:30 pm. Clinic contact information left on message for any further questions/concerns.    Kay Neves RN, DNP.  9/17/2020 3:42 PM

## 2020-09-17 NOTE — Clinical Note
HI  Would you be able to call his guardian Mitzy and let her know he is doing well. He is 1 year out from the surgery without any recurrence.  His most recent swallow was much improved and he is liberalized to eat what he wants  He needs q2 month follow ups with me in year 2    Thank you  Lizzy

## 2020-10-13 ENCOUNTER — TELEPHONE (OUTPATIENT)
Dept: ONCOLOGY | Facility: CLINIC | Age: 60
End: 2020-10-13

## 2020-10-13 NOTE — TELEPHONE ENCOUNTER
Pt ride service called to verify his appt on 2/18 but I informed him as it is a video appt there is no ride required.  He replied that this Pt does not have the technical requirements to complete a video appt.    Please contact the Pt to determine what type of appt this can be

## 2020-11-19 ENCOUNTER — OFFICE VISIT (OUTPATIENT)
Dept: OTOLARYNGOLOGY | Facility: CLINIC | Age: 60
End: 2020-11-19
Payer: MEDICARE

## 2020-11-19 VITALS
TEMPERATURE: 97.7 F | HEART RATE: 75 BPM | HEIGHT: 76 IN | OXYGEN SATURATION: 97 % | BODY MASS INDEX: 23.26 KG/M2 | WEIGHT: 191 LBS

## 2020-11-19 DIAGNOSIS — R49.0 DYSPHONIA: Primary | ICD-10-CM

## 2020-11-19 PROCEDURE — 99212 OFFICE O/P EST SF 10 MIN: CPT | Mod: 25 | Performed by: OTOLARYNGOLOGY

## 2020-11-19 PROCEDURE — 31575 DIAGNOSTIC LARYNGOSCOPY: CPT | Performed by: OTOLARYNGOLOGY

## 2020-11-19 ASSESSMENT — MIFFLIN-ST. JEOR: SCORE: 1764.93

## 2020-11-19 ASSESSMENT — PAIN SCALES - GENERAL: PAINLEVEL: NO PAIN (0)

## 2020-11-19 NOTE — NURSING NOTE
"Chief Complaint   Patient presents with     RECHECK     Follow up       Pulse 75, temperature 97.7  F (36.5  C), height 1.918 m (6' 3.5\"), weight 86.6 kg (191 lb), SpO2 97 %.    Mala Champion, EMT  "

## 2020-11-19 NOTE — PROGRESS NOTES
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 Rasmussen is a 60 year old male is being followed for history of left TVF SCC. he was initially seen on 19. Please refer to this note for full history.       Today, he presents for follow up. he reports:  - he is doing well   - no issues with the voice  - swallowing is going well  - no pain  - no complaints    Dysphonia: Patient reports dysphonia. This is stable      Dysphagia: Patient reports dysphagia. This is stable     Dyspnea: Patient denies dyspnea. This is stable     Throat clearing/Chronic cough: Patient denies throat clearing/chronic cough. This is stable     PAST MEDICAL HISTORY:   Past Medical History:   Diagnosis Date     Alcohol abuse, unspecified      Cerebral infarction (H)     , right side residual and aphasia     Dyslipidemia      GERD (gastroesophageal reflux disease)      Lung cancer (H)      Unspecified essential hypertension        PAST SURGICAL HISTORY:   Past Surgical History:   Procedure Laterality Date     BRONCHOSCOPY FLEXIBLE AND RIGID N/A 2016    Procedure: BRONCHOSCOPY FLEXIBLE AND RIGID;  Surgeon: Tony Talbot MD;  Location: [facility] OR     ESOPHAGOSCOPY FLEXIBLE N/A 2019    Procedure: flexible esophagoscopy;  Surgeon: Lizzy Johnson MD;  Location: [facility] OR     INJECT STEROID (LOCATION) N/A 2019    Procedure: steroid injection;  Surgeon: Lizzy Johnson MD;  Location: [facility] OR     LASER CO2 LARYNGOSCOPY N/A 2019    Procedure: Microdirect laryngoscopy with excision of laryngeal mass, CO2 laser;  Surgeon: Lizzy Johnson MD;  Location: [facility] OR     ZZC NONSPECIFIC PROCEDURE      R  tympanoplasty       CURRENT MEDICATIONS:   Current Outpatient Medications:      atorvastatin (LIPITOR) 40 MG tablet, Take 40 mg by mouth daily, Disp: , Rfl:     ALLERGIES: No known drug allergies    SOCIAL HISTORY:    Social History     Socioeconomic History     Marital status: Single     Spouse name: Not on file     Number of children: Not on file     Years of education: Not on file     Highest education level: Not on file   Occupational History     Not on file   Social Needs     Financial resource strain: Not on file     Food insecurity     Worry: Not on file     Inability: Not on file     Transportation needs     Medical: Not on file     Non-medical: Not on file   Tobacco Use     Smoking status: Former Smoker     Packs/day: 1.00     Types: Cigarettes     Quit date: 2009     Years since quittin.1     Smokeless tobacco: Never Used   Substance and Sexual Activity     Alcohol use: Not Currently     Drug use: No     Sexual activity: Not on file   Lifestyle     Physical activity     Days per week: Not on file     Minutes per session: Not on file     Stress: Not on file   Relationships     Social connections     Talks on phone: Not on file     Gets together: Not on file     Attends Mu-ism service: Not on file     Active member of club or organization: Not on file     Attends meetings of clubs or organizations: Not on file     Relationship status: Not on file     Intimate partner violence     Fear of current or ex partner: Not on file     Emotionally abused: Not on file     Physically abused: Not on file     Forced sexual activity: Not on file   Other Topics Concern     Parent/sibling w/ CABG, MI or angioplasty before 65F 55M? Not Asked   Social History Narrative     Not on file         FAMILY HISTORY:   Family History   Problem Relation Age of Onset     Cancer Father       Non-contributory for problems with anesthesia    REVIEW OF SYSTEMS:   The patient was asked a 14 point review of systems regarding  constitutional symptoms, eye symptoms, ears, nose, mouth, throat symptoms, cardiovascular symptoms, respiratory symptoms, gastrointestinal symptoms, genitourinary symptoms, musculoskeletal symptoms, integumentary symptoms, neurological symptoms, psychiatric symptoms, endocrine symptoms, hematologic/lymphatic symptoms, and allergic/ immunologic symptoms.   The pertinent factors have been included in the HPI and below.  Patient Supplied Answers to Review of Systems  No flowsheet data found.    Physical Examination   The patient underwent a physical examination as described below. The pertinent positive and negative findings are summarized after the description of the examination.  Constitutional: The patient's developmental and nutritional status was assessed. The patient's voice quality was assessed.  Head and Face: The head and face were inspected for deformities. The sinuses were palpated. The salivary glands were palpated. Facial muscle strength was assessed bilaterally.  Eyes: Extraocular movements and primary gaze alignment were assessed.  Ears, Nose, Mouth and Throat: The ears and nose were examined for deformities. The nasal septum, mucosa, and turbinates were inspected by anterior rhinoscopy. The lips, teeth, and gums were examined for abnormalities. The oral mucosa, tongue, palate, tonsils, lateral and posterior pharynx were inspected for the presence of asymmetry or mucosal lesions.    Neck: The tracheal position was noted, and the neck mass palpated to determine if there were any asymmetries, abnormal neck masses, thyromegally, or thyroid nodules.  Respiratory: The nature of the breathing and chest expansion/symmetry was observed.  Cardiovascular: The patient was examined to determine the presence of any edema or jugular venous distension.  Abdomen: The contour of the abdomen was noted.  Lymphatic: The patient was examined for infraclavicular lymphadenopathy.  Musculoskeletal: The patient was inspected for  the presence of skeletal deformities.  Extremities: The extremities were examined for any clubbing or cyanosis.  Skin: The skin was examined for inflammatory or neoplastic conditions.  Neurologic: The patient's orientation, mood, and affect were noted. The cranial nerve  functions were examined.  Other pertinent positive and negative findings on physical examination:   OC/OP: no lesions, uvula midline, soft palate elevates symmetrically, FOM/BOT soft  Neck: no lesions, no TH tenderness to palpation  All other physical examination findings were within normal limits and noncontributory.    Procedures   Flexible laryngoscopy (CPT 57241)      Pre-procedure diagnosis: history of larynx scc  Post-procedure diagnosis: same as above  Indication for procedure: Mr. Rasmussen is a 60 year old male with see above  Procedure(s): Fiberoptic Laryngoscopy    Details of Procedure: After informed consent was obtained, the patient was seated in the examination chair.  The areas of the nasopharynx as well as the hypopharynx were anesthetized with topical 4% lidocaine with 0.25% phenylephrine atomizer.  Examination of the base of tongue was performed first.  Attention was directed to any evidence of masses in the area or evidence of leukoplakia or candidal infection.  Attention was directed to the epiglottis where its size and position was determined and its movement on phonation of the vowel  e .  The piriform sinuses were then inspected for any mass lesions or pooling of secretions.  Attention was then directed to the larynx. The vocal folds were inspected for infection or any areas of leukoplakia, for masses, polypoid degeneration, or hemorrhage.  Having done this, the arytenoids and vocal processes were inspected for erythema or evidence of granuloma formation.  The posterior commissure was then inspected for evidence of inflammatory changes in the mucosa and heaping up of mucosal tissue. The patient was then instructed to say the  vowel  e .  Adduction of vocal folds to the midline was observed for any evidence of paresis or paralysis of the larynx or asymmetry in rotation of the larynx to the left or right. The patient was asked to breathe and the degree of abduction was noted bilaterally.  Subglottic view of the larynx was obtained for any additional mass lesions or mucosal changes.  Finally the post cricoid was examined for evidence of pooling of secretions, as well as the pharyngeal wall mucosa.   Anesthesia type: 0.25% phenylephrine    Findings:  Anatomic/physiological deviations: well healed left vocal fold no evidence of recurrence   Right vocal process: No restriction of mobility   Left vocal process: No restriction of mobility  Glottal gap: Glottal gap  Supraglottic structures: Normal  Hypopharynx: Normal     Estimated Blood Loss: minimal  Complications: None  Disposition: Patient tolerated the procedure well              Review of Relevant Clinical Data     Labs:  Lab Results   Component Value Date    TSH 1.05 11/13/2007     Lab Results   Component Value Date     09/25/2019    CO2 29 09/25/2019    BUN 16 09/25/2019    PHOS 2.0 (L) 05/04/2016     Lab Results   Component Value Date    WBC 4.6 09/25/2019    HGB 12.9 (L) 09/25/2019    HCT 41.2 09/25/2019    MCV 95 09/25/2019     (L) 09/25/2019     Lab Results   Component Value Date    PTT 35 07/26/2017    INR 0.95 07/26/2017     No results found for: ELIZABET  No components found for: RHEUMATOIDFACTOR,  RF  Lab Results   Component Value Date    CRP 0.16 07/24/2003     No components found for: CKTOT, URICACID  No components found for: C3, C4, DSDNAAB, NDNAABIFA  No results found for: MPOAB    Patient reported Quality of Life (QOL) Measures   Patient Supplied Answers To VHI Questionnaire  Voice Handicap Index (VHI-10) 12/5/2019   My voice makes it difficult for people to hear me 0   People have difficulty understanding me in a noisy room 0   My voice difficulties restrict my  "personal and social life.  0   I feel left out of conversations because of my voice 0   My voice problem causes me to lose income 0   I feel as though I have to strain to produce voice 0   The clarity of my voice is unpredictable 0   My voice problem upsets me 0   My voice makes me feel handicapped 0   People ask, \"What's wrong with your voice?\" 0   VHI-10 0         Patient Supplied Answers To EAT Questionnaire  Eating Assessment Tool (EAT-10) 2019   My swallowing problem has caused me to lose weight 0   My swallowing problem interferes with my ability to go out for meals 0   Swallowing liquids takes extra effort 0   Swallowing solids takes extra effort 0   Swallowing pills takes extra effort 0   Swallowing is painful 0   The pleasure of eating is affected by my swallowing 0   When I swallow food sticks in my throat 0   I cough when I eat 0   Swallowing is stressful 0   EAT-10 0         Patient Supplied Answers To CSI Questionnaire  No flowsheet data found.      Patient Supplied Answers to Dyspnea Index Questionnaire:  No flowsheet data found.    Impression & Plan     IMPRESSION: Mr. Rasmussen is a 60 year old male who is being seen for the followin. T1 Left vocal fold SCC  -  s/p endoscopic CO2 laser resection 19  - no evidence of recurrence   Plan  - observation  - f0cavmu evaluations during year 2     2. 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 Dysphagia  - improved swallow today with less pharyngeal residue on evaluation of SLP performed FEES on 20 though continued recommendation of thickened liquids  - screening FEES with liquids shows again aspiration on 7/3/20  - no PNAs, no changes in weight  - Xray Video Swallow Exam 20 - safe swallow, much improved  Plan  - observation    RETURN VISIT: office " evaluation without   SLP in 2  months, or earlier as needed.     Lizzy Wray MD    Laryngology    [facility]  Department of  Otolaryngology - Head and Neck Surgery  [facility]  [address] [place] [address]  Appointment line: [phone]  Fax: [phone]

## 2020-11-19 NOTE — LETTER
2020       RE: Kt Rasmussen  [id number] [email or web address]  [facility] [address]     Dear Colleague,    Thank you for referring your patient, Kt Rasmussen, to the [facility] [place] at [facility]. Please see a copy of my visit note below.        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 Rasmussen is a 60 year old male is being followed for history of left TVF SCC. he was initially seen on 19. Please refer to this note for full history.       Today, he presents for follow up. he reports:  - he is doing well   - no issues with the voice  - swallowing is going well  - no pain  - no complaints    Dysphonia: Patient reports dysphonia. This is stable      Dysphagia: Patient reports dysphagia. This is stable     Dyspnea: Patient denies dyspnea. This is stable     Throat clearing/Chronic cough: Patient denies throat clearing/chronic cough. This is stable     PAST MEDICAL HISTORY:   Past Medical History:   Diagnosis Date     Alcohol abuse, unspecified      Cerebral infarction (H)     , right side residual and aphasia     Dyslipidemia      GERD (gastroesophageal reflux disease)      Lung cancer (H)      Unspecified essential hypertension        PAST SURGICAL HISTORY:   Past Surgical History:   Procedure Laterality Date     BRONCHOSCOPY FLEXIBLE AND RIGID N/A 2016    Procedure: BRONCHOSCOPY FLEXIBLE AND RIGID;  Surgeon: Tony Talbot MD;  Location: [facility] OR     ESOPHAGOSCOPY FLEXIBLE N/A 2019    Procedure: flexible esophagoscopy;  Surgeon: Lizzy Johnson MD;  Location: [facility] OR     INJECT STEROID  (LOCATION) N/A 2019    Procedure: steroid injection;  Surgeon: Lizzy Johnson MD;  Location: [facility] OR     LASER CO2 LARYNGOSCOPY N/A 2019    Procedure: Microdirect laryngoscopy with excision of laryngeal mass, CO2 laser;  Surgeon: Lizzy Johnson MD;  Location: [facility] OR     ZC NONSPECIFIC PROCEDURE      R tympanoplasty       CURRENT MEDICATIONS:   Current Outpatient Medications:      atorvastatin (LIPITOR) 40 MG tablet, Take 40 mg by mouth daily, Disp: , Rfl:     ALLERGIES: No known drug allergies    SOCIAL HISTORY:    Social History     Socioeconomic History     Marital status: Single     Spouse name: Not on file     Number of children: Not on file     Years of education: Not on file     Highest education level: Not on file   Occupational History     Not on file   Social Needs     Financial resource strain: Not on file     Food insecurity     Worry: Not on file     Inability: Not on file     Transportation needs     Medical: Not on file     Non-medical: Not on file   Tobacco Use     Smoking status: Former Smoker     Packs/day: 1.00     Types: Cigarettes     Quit date: 2009     Years since quittin.1     Smokeless tobacco: Never Used   Substance and Sexual Activity     Alcohol use: Not Currently     Drug use: No     Sexual activity: Not on file   Lifestyle     Physical activity     Days per week: Not on file     Minutes per session: Not on file     Stress: Not on file   Relationships     Social connections     Talks on phone: Not on file     Gets together: Not on file     Attends Yazidi service: Not on file     Active member of club or organization: Not on file     Attends meetings of clubs or organizations: Not on file     Relationship status: Not on file     Intimate partner violence     Fear of current or ex partner: Not on file     Emotionally abused: Not on file     Physically abused: Not on file     Forced sexual activity: Not on file   Other Topics Concern     Parent/sibling w/ CABG, MI or  angioplasty before 65F 55M? Not Asked   Social History Narrative     Not on file         FAMILY HISTORY:   Family History   Problem Relation Age of Onset     Cancer Father       Non-contributory for problems with anesthesia    REVIEW OF SYSTEMS:   The patient was asked a 14 point review of systems regarding constitutional symptoms, eye symptoms, ears, nose, mouth, throat symptoms, cardiovascular symptoms, respiratory symptoms, gastrointestinal symptoms, genitourinary symptoms, musculoskeletal symptoms, integumentary symptoms, neurological symptoms, psychiatric symptoms, endocrine symptoms, hematologic/lymphatic symptoms, and allergic/ immunologic symptoms.   The pertinent factors have been included in the HPI and below.  Patient Supplied Answers to Review of Systems  No flowsheet data found.    Physical Examination   The patient underwent a physical examination as described below. The pertinent positive and negative findings are summarized after the description of the examination.  Constitutional: The patient's developmental and nutritional status was assessed. The patient's voice quality was assessed.  Head and Face: The head and face were inspected for deformities. The sinuses were palpated. The salivary glands were palpated. Facial muscle strength was assessed bilaterally.  Eyes: Extraocular movements and primary gaze alignment were assessed.  Ears, Nose, Mouth and Throat: The ears and nose were examined for deformities. The nasal septum, mucosa, and turbinates were inspected by anterior rhinoscopy. The lips, teeth, and gums were examined for abnormalities. The oral mucosa, tongue, palate, tonsils, lateral and posterior pharynx were inspected for the presence of asymmetry or mucosal lesions.    Neck: The tracheal position was noted, and the neck mass palpated to determine if there were any asymmetries, abnormal neck masses, thyromegally, or thyroid nodules.  Respiratory: The nature of the breathing and chest  expansion/symmetry was observed.  Cardiovascular: The patient was examined to determine the presence of any edema or jugular venous distension.  Abdomen: The contour of the abdomen was noted.  Lymphatic: The patient was examined for infraclavicular lymphadenopathy.  Musculoskeletal: The patient was inspected for the presence of skeletal deformities.  Extremities: The extremities were examined for any clubbing or cyanosis.  Skin: The skin was examined for inflammatory or neoplastic conditions.  Neurologic: The patient's orientation, mood, and affect were noted. The cranial nerve  functions were examined.  Other pertinent positive and negative findings on physical examination:   OC/OP: no lesions, uvula midline, soft palate elevates symmetrically, FOM/BOT soft  Neck: no lesions, no TH tenderness to palpation  All other physical examination findings were within normal limits and noncontributory.    Procedures   Flexible laryngoscopy (CPT 16568)      Pre-procedure diagnosis: history of larynx scc  Post-procedure diagnosis: same as above  Indication for procedure: Mr. Rasmussen is a 60 year old male with see above  Procedure(s): Fiberoptic Laryngoscopy    Details of Procedure: After informed consent was obtained, the patient was seated in the examination chair.  The areas of the nasopharynx as well as the hypopharynx were anesthetized with topical 4% lidocaine with 0.25% phenylephrine atomizer.  Examination of the base of tongue was performed first.  Attention was directed to any evidence of masses in the area or evidence of leukoplakia or candidal infection.  Attention was directed to the epiglottis where its size and position was determined and its movement on phonation of the vowel  e .  The piriform sinuses were then inspected for any mass lesions or pooling of secretions.  Attention was then directed to the larynx. The vocal folds were inspected for infection or any areas of leukoplakia, for masses, polypoid  degeneration, or hemorrhage.  Having done this, the arytenoids and vocal processes were inspected for erythema or evidence of granuloma formation.  The posterior commissure was then inspected for evidence of inflammatory changes in the mucosa and heaping up of mucosal tissue. The patient was then instructed to say the vowel  e .  Adduction of vocal folds to the midline was observed for any evidence of paresis or paralysis of the larynx or asymmetry in rotation of the larynx to the left or right. The patient was asked to breathe and the degree of abduction was noted bilaterally.  Subglottic view of the larynx was obtained for any additional mass lesions or mucosal changes.  Finally the post cricoid was examined for evidence of pooling of secretions, as well as the pharyngeal wall mucosa.   Anesthesia type: 0.25% phenylephrine    Findings:  Anatomic/physiological deviations: well healed left vocal fold no evidence of recurrence   Right vocal process: No restriction of mobility   Left vocal process: No restriction of mobility  Glottal gap: Glottal gap  Supraglottic structures: Normal  Hypopharynx: Normal     Estimated Blood Loss: minimal  Complications: None  Disposition: Patient tolerated the procedure well              Review of Relevant Clinical Data     Labs:  Lab Results   Component Value Date    TSH 1.05 11/13/2007     Lab Results   Component Value Date     09/25/2019    CO2 29 09/25/2019    BUN 16 09/25/2019    PHOS 2.0 (L) 05/04/2016     Lab Results   Component Value Date    WBC 4.6 09/25/2019    HGB 12.9 (L) 09/25/2019    HCT 41.2 09/25/2019    MCV 95 09/25/2019     (L) 09/25/2019     Lab Results   Component Value Date    PTT 35 07/26/2017    INR 0.95 07/26/2017     No results found for: ELIZABET  No components found for: RHEUMATOIDFACTOR,  RF  Lab Results   Component Value Date    CRP 0.16 07/24/2003     No components found for: CKTOT, URICACID  No components found for: C3, C4, DSDNAAB, NDNAABIFA  No  "results found for: MPOAB    Patient reported Quality of Life (QOL) Measures   Patient Supplied Answers To VHI Questionnaire  Voice Handicap Index (VHI-10) 2019   My voice makes it difficult for people to hear me 0   People have difficulty understanding me in a noisy room 0   My voice difficulties restrict my personal and social life.  0   I feel left out of conversations because of my voice 0   My voice problem causes me to lose income 0   I feel as though I have to strain to produce voice 0   The clarity of my voice is unpredictable 0   My voice problem upsets me 0   My voice makes me feel handicapped 0   People ask, \"What's wrong with your voice?\" 0   VHI-10 0         Patient Supplied Answers To EAT Questionnaire  Eating Assessment Tool (EAT-10) 2019   My swallowing problem has caused me to lose weight 0   My swallowing problem interferes with my ability to go out for meals 0   Swallowing liquids takes extra effort 0   Swallowing solids takes extra effort 0   Swallowing pills takes extra effort 0   Swallowing is painful 0   The pleasure of eating is affected by my swallowing 0   When I swallow food sticks in my throat 0   I cough when I eat 0   Swallowing is stressful 0   EAT-10 0         Patient Supplied Answers To CSI Questionnaire  No flowsheet data found.      Patient Supplied Answers to Dyspnea Index Questionnaire:  No flowsheet data found.    Impression & Plan     IMPRESSION: Mr. Rasmussen is a 60 year old male who is being seen for the followin. T1 Left vocal fold SCC  -  s/p endoscopic CO2 laser resection 19  - no evidence of recurrence   Plan  - observation  - [id number] evaluations during year 2     2. 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 Dysphagia  - improved swallow today with less pharyngeal " residue on evaluation of SLP performed FEES on 1/9/20 though continued recommendation of thickened liquids  - screening FEES with liquids shows again aspiration on 7/3/20  - no PNAs, no changes in weight  - Xray Video Swallow Exam 8/13/20 - safe swallow, much improved  Plan  - observation    RETURN VISIT: office evaluation without   SLP in 2  months, or earlier as needed.     Lizzy Wray MD    Laryngology    [facility]  Department of  Otolaryngology - Head and Neck Surgery  Clinics & [facility] Center  [address] [place] [address]  Appointment line: [phone]  Fax: [phone]        Again, thank you for allowing me to participate in the care of your patient.      Sincerely,    Lizzy Wray MD

## 2021-01-14 ENCOUNTER — OFFICE VISIT (OUTPATIENT)
Dept: OTOLARYNGOLOGY | Facility: CLINIC | Age: 61
End: 2021-01-14
Payer: MEDICARE

## 2021-01-14 ENCOUNTER — TELEPHONE (OUTPATIENT)
Dept: OTOLARYNGOLOGY | Facility: CLINIC | Age: 61
End: 2021-01-14

## 2021-01-14 VITALS — WEIGHT: 190 LBS | BODY MASS INDEX: 23.14 KG/M2 | HEIGHT: 76 IN | TEMPERATURE: 98.7 F

## 2021-01-14 DIAGNOSIS — R49.0 DYSPHONIA: Primary | ICD-10-CM

## 2021-01-14 PROCEDURE — 31575 DIAGNOSTIC LARYNGOSCOPY: CPT | Performed by: OTOLARYNGOLOGY

## 2021-01-14 PROCEDURE — 99213 OFFICE O/P EST LOW 20 MIN: CPT | Mod: 25 | Performed by: OTOLARYNGOLOGY

## 2021-01-14 ASSESSMENT — MIFFLIN-ST. JEOR: SCORE: 1760.39

## 2021-01-14 ASSESSMENT — PAIN SCALES - GENERAL: PAINLEVEL: NO PAIN (0)

## 2021-01-14 NOTE — LETTER
2021       RE: Kt Rasmussen  [id number]  [facility] [address]     Dear Colleague,    Thank you for referring your patient, Kt Rasmussen, to the [facility] E[facility] [place] at [facility]. Please see a copy of my visit note below.        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 Rasmussen is a 60 year old male is being followed for history of left TVF SCC. he was initially seen on 19. Please refer to this note for full history.    Today, he presents for follow up. he reports:  - no pain  - voice is ok  - swallow is ok  - breathing is ok  - no complaints    Dysphonia: Patient reports dysphonia. This is stable      Dysphagia: Patient reports dysphagia. This is stable     Dyspnea: Patient denies dyspnea. This is stable     Throat clearing/Chronic cough: Patient denies throat clearing/chronic cough. This is stable     LPRD/GERD: Patient denies LPRD/GERD symptoms. This is stable     PAST MEDICAL HISTORY:   Past Medical History:   Diagnosis Date     Alcohol abuse, unspecified      Cerebral infarction (H)     , right side residual and aphasia     Dyslipidemia      GERD (gastroesophageal reflux disease)      Lung cancer (H)      Unspecified essential hypertension        PAST SURGICAL HISTORY:   Past Surgical History:   Procedure Laterality Date     BRONCHOSCOPY FLEXIBLE AND RIGID N/A 2016    Procedure: BRONCHOSCOPY FLEXIBLE AND RIGID;  Surgeon: Tony Talbot MD;  Location: [facility] OR     ESOPHAGOSCOPY FLEXIBLE N/A 2019    Procedure: flexible esophagoscopy;  Surgeon: Lizzy Johnson MD;  Location:  [facility] OR     INJECT STEROID (LOCATION) N/A 2019    Procedure: steroid injection;  Surgeon: Lizzy Johnson MD;  Location: [facility] OR     LASER CO2 LARYNGOSCOPY N/A 2019    Procedure: Microdirect laryngoscopy with excision of laryngeal mass, CO2 laser;  Surgeon: Lizzy Johnson MD;  Location: [facility] OR     ZZC NONSPECIFIC PROCEDURE      R tympanoplasty       CURRENT MEDICATIONS:   Current Outpatient Medications:      atorvastatin (LIPITOR) 40 MG tablet, Take 40 mg by mouth daily, Disp: , Rfl:     ALLERGIES: No known drug allergies    SOCIAL HISTORY:    Social History     Socioeconomic History     Marital status: Single     Spouse name: Not on file     Number of children: Not on file     Years of education: Not on file     Highest education level: Not on file   Occupational History     Not on file   Social Needs     Financial resource strain: Not on file     Food insecurity     Worry: Not on file     Inability: Not on file     Transportation needs     Medical: Not on file     Non-medical: Not on file   Tobacco Use     Smoking status: Former Smoker     Packs/day: 1.00     Types: Cigarettes     Quit date: 2009     Years since quittin.3     Smokeless tobacco: Never Used   Substance and Sexual Activity     Alcohol use: Not Currently     Drug use: No     Sexual activity: Not on file   Lifestyle     Physical activity     Days per week: Not on file     Minutes per session: Not on file     Stress: Not on file   Relationships     Social connections     Talks on phone: Not on file     Gets together: Not on file     Attends Holiness service: Not on file     Active member of club or organization: Not on file     Attends meetings of clubs or organizations: Not on file     Relationship status: Not on file     Intimate partner violence     Fear of current or ex partner: Not on file     Emotionally abused: Not on file     Physically abused: Not on file     Forced sexual activity: Not on file   Other Topics Concern      Parent/sibling w/ CABG, MI or angioplasty before 65F 55M? Not Asked   Social History Narrative     Not on file         FAMILY HISTORY:   Family History   Problem Relation Age of Onset     Cancer Father       Non-contributory for problems with anesthesia    REVIEW OF SYSTEMS:   The patient was asked a 14 point review of systems regarding constitutional symptoms, eye symptoms, ears, nose, mouth, throat symptoms, cardiovascular symptoms, respiratory symptoms, gastrointestinal symptoms, genitourinary symptoms, musculoskeletal symptoms, integumentary symptoms, neurological symptoms, psychiatric symptoms, endocrine symptoms, hematologic/lymphatic symptoms, and allergic/ immunologic symptoms.   The pertinent factors have been included in the HPI and below.  Patient Supplied Answers to Review of Systems  No flowsheet data found.    Physical Examination   The patient underwent a physical examination as described below. The pertinent positive and negative findings are summarized after the description of the examination.  Constitutional: The patient's developmental and nutritional status was assessed. The patient's voice quality was assessed.  Head and Face: The head and face were inspected for deformities. The sinuses were palpated. The salivary glands were palpated. Facial muscle strength was assessed bilaterally.  Eyes: Extraocular movements and primary gaze alignment were assessed.  Ears, Nose, Mouth and Throat: The ears and nose were examined for deformities. The nasal septum, mucosa, and turbinates were inspected by anterior rhinoscopy. The lips, teeth, and gums were examined for abnormalities. The oral mucosa, tongue, palate, tonsils, lateral and posterior pharynx were inspected for the presence of asymmetry or mucosal lesions.    Neck: The tracheal position was noted, and the neck mass palpated to determine if there were any asymmetries, abnormal neck masses, thyromegally, or thyroid nodules.  Respiratory: The nature  of the breathing and chest expansion/symmetry was observed.  Cardiovascular: The patient was examined to determine the presence of any edema or jugular venous distension.  Abdomen: The contour of the abdomen was noted.  Lymphatic: The patient was examined for infraclavicular lymphadenopathy.  Musculoskeletal: The patient was inspected for the presence of skeletal deformities.  Extremities: The extremities were examined for any clubbing or cyanosis.  Skin: The skin was examined for inflammatory or neoplastic conditions.  Neurologic: The patient's orientation, mood, and affect were noted. The cranial nerve  functions were examined.  Other pertinent positive and negative findings on physical examination:   OC/OP: no lesions, uvula midline, soft palate elevates symmetrically, FOM/BOT soft  Neck: no lesions, no TH tenderness to palpation    All other physical examination findings were within normal limits and noncontributory.    Procedures   Flexible laryngoscopy (CPT 52589)      Pre-procedure diagnosis: larynx scc  Post-procedure diagnosis: same as above  Indication for procedure: Mr. Rasmussen is a 60 year old male with see above  Procedure(s): Fiberoptic Laryngoscopy    Details of Procedure: After informed consent was obtained, the patient was seated in the examination chair.  The areas of the nasopharynx as well as the hypopharynx were anesthetized with topical 4% lidocaine with 0.25% phenylephrine atomizer.  Examination of the base of tongue was performed first.  Attention was directed to any evidence of masses in the area or evidence of leukoplakia or candidal infection.  Attention was directed to the epiglottis where its size and position was determined and its movement on phonation of the vowel  e .  The piriform sinuses were then inspected for any mass lesions or pooling of secretions.  Attention was then directed to the larynx. The vocal folds were inspected for infection or any areas of leukoplakia, for masses,  polypoid degeneration, or hemorrhage.  Having done this, the arytenoids and vocal processes were inspected for erythema or evidence of granuloma formation.  The posterior commissure was then inspected for evidence of inflammatory changes in the mucosa and heaping up of mucosal tissue. The patient was then instructed to say the vowel  e .  Adduction of vocal folds to the midline was observed for any evidence of paresis or paralysis of the larynx or asymmetry in rotation of the larynx to the left or right. The patient was asked to breathe and the degree of abduction was noted bilaterally.  Subglottic view of the larynx was obtained for any additional mass lesions or mucosal changes.  Finally the post cricoid was examined for evidence of pooling of secretions, as well as the pharyngeal wall mucosa.   Anesthesia type: 0.25% phenylephrine    Findings:  Anatomic/physiological deviations: well healed left vocal fold no evidence of recurrence   Right vocal process: No restriction of mobility   Left vocal process: No restriction of mobility  Glottal gap: Glottal gap  Supraglottic structures: Normal  Hypopharynx: Normal     Estimated Blood Loss: minimal  Complications: None  Disposition: Patient tolerated the procedure well            Review of Relevant Clinical Data      Labs:  Lab Results   Component Value Date    TSH 1.05 11/13/2007     Lab Results   Component Value Date     09/25/2019    CO2 29 09/25/2019    BUN 16 09/25/2019    PHOS 2.0 (L) 05/04/2016     Lab Results   Component Value Date    WBC 4.6 09/25/2019    HGB 12.9 (L) 09/25/2019    HCT 41.2 09/25/2019    MCV 95 09/25/2019     (L) 09/25/2019     Lab Results   Component Value Date    PTT 35 07/26/2017    INR 0.95 07/26/2017     No results found for: ELIZABET  No components found for: RHEUMATOIDFACTOR,  RF  Lab Results   Component Value Date    CRP 0.16 07/24/2003     No components found for: CKTOT, URICACID  No components found for: C3, C4, DSDNAAB,  "NDNAABIFA  No results found for: MPOAB    Patient reported Quality of Life (QOL) Measures   Patient Supplied Answers To VHI Questionnaire  Voice Handicap Index (VHI-10) 2019   My voice makes it difficult for people to hear me 0   People have difficulty understanding me in a noisy room 0   My voice difficulties restrict my personal and social life.  0   I feel left out of conversations because of my voice 0   My voice problem causes me to lose income 0   I feel as though I have to strain to produce voice 0   The clarity of my voice is unpredictable 0   My voice problem upsets me 0   My voice makes me feel handicapped 0   People ask, \"What's wrong with your voice?\" 0   VHI-10 0         Patient Supplied Answers To EAT Questionnaire  Eating Assessment Tool (EAT-10) 2019   My swallowing problem has caused me to lose weight 0   My swallowing problem interferes with my ability to go out for meals 0   Swallowing liquids takes extra effort 0   Swallowing solids takes extra effort 0   Swallowing pills takes extra effort 0   Swallowing is painful 0   The pleasure of eating is affected by my swallowing 0   When I swallow food sticks in my throat 0   I cough when I eat 0   Swallowing is stressful 0   EAT-10 0         Patient Supplied Answers To CSI Questionnaire  No flowsheet data found.      Patient Supplied Answers to Dyspnea Index Questionnaire:  No flowsheet data found.    Impression & Plan     IMPRESSION: Mr. Rasmussen is a 60 year old male who is being seen for the following:    IMPRESSION: Mr. Rasmussen is a 60 year old male who is being seen for the followin. T1 Left vocal fold SCC  -  s/p endoscopic CO2 laser resection 19  - no evidence of recurrence   Plan  - observation  - [id number] evaluations during year 2       2. 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 Dysphagia  - improved swallow today with less pharyngeal residue on evaluation of SLP performed FEES on 1/9/20 though continued recommendation of thickened liquids  - screening FEES with liquids shows again aspiration on 7/3/20  - no PNAs, no changes in weight  - Xray Video Swallow Exam 8/13/20 - safe swallow, much improved  Plan  - observation         RETURN VISIT: office evaluation without SLP 2 months  Lizzy Wray MD    Laryngology    [facility]  Department of  Otolaryngology - Head and Neck Surgery  [facility] [place] [address]  Appointment line: [phone]  Fax: [phone]          Again, thank you for allowing me to participate in the care of your patient.      Sincerely,    Lizzy Wray MD

## 2021-01-14 NOTE — TELEPHONE ENCOUNTER
LVM for patient informing that Dr Wray will be out this afternoon as she has an emergency surgery and so we will be cancelling his appointment today at 12:45pm. Informed patient that we will call patient when we find a time to get him back in and left a call back number for patient in case he has any questions.    Mala Champion, EMT

## 2021-01-14 NOTE — LETTER
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 Rasmussen is a 60 year old male is being followed for history of left TVF SCC. he was initially seen on 19. Please refer to this note for full history.    Today, he presents for follow up. he reports:  - no pain  - voice is ok  - swallow is ok  - breathing is ok  - no complaints    Dysphonia: Patient reports dysphonia. This is stable      Dysphagia: Patient reports dysphagia. This is stable     Dyspnea: Patient denies dyspnea. This is stable     Throat clearing/Chronic cough: Patient denies throat clearing/chronic cough. This is stable     LPRD/GERD: Patient denies LPRD/GERD symptoms. This is stable     PAST MEDICAL HISTORY:   Past Medical History:   Diagnosis Date     Alcohol abuse, unspecified      Cerebral infarction (H)     , right side residual and aphasia     Dyslipidemia      GERD (gastroesophageal reflux disease)      Lung cancer (H)      Unspecified essential hypertension        PAST SURGICAL HISTORY:   Past Surgical History:   Procedure Laterality Date     BRONCHOSCOPY FLEXIBLE AND RIGID N/A 2016    Procedure: BRONCHOSCOPY FLEXIBLE AND RIGID;  Surgeon: Tony Talbot MD;  Location: [facility] OR     ESOPHAGOSCOPY FLEXIBLE N/A 2019    Procedure: flexible esophagoscopy;  Surgeon: Lizzy Johnson MD;  Location: [facility] OR     INJECT STEROID (LOCATION) N/A 2019    Procedure: steroid injection;  Surgeon: Lizzy Johnson MD;  Location: [facility] OR     LASER CO2 LARYNGOSCOPY N/A 2019    Procedure: Microdirect laryngoscopy with excision of  laryngeal mass, CO2 laser;  Surgeon: Lizzy Johnson MD;  Location:  OR     [facility] NONSPECIFIC PROCEDURE      R tympanoplasty       CURRENT MEDICATIONS:   Current Outpatient Medications:      atorvastatin (LIPITOR) 40 MG tablet, Take 40 mg by mouth daily, Disp: , Rfl:     ALLERGIES: No known drug allergies    SOCIAL HISTORY:    Social History     Socioeconomic History     Marital status: Single     Spouse name: Not on file     Number of children: Not on file     Years of education: Not on file     Highest education level: Not on file   Occupational History     Not on file   Social Needs     Financial resource strain: Not on file     Food insecurity     Worry: Not on file     Inability: Not on file     Transportation needs     Medical: Not on file     Non-medical: Not on file   Tobacco Use     Smoking status: Former Smoker     Packs/day: 1.00     Types: Cigarettes     Quit date: 2009     Years since quittin.3     Smokeless tobacco: Never Used   Substance and Sexual Activity     Alcohol use: Not Currently     Drug use: No     Sexual activity: Not on file   Lifestyle     Physical activity     Days per week: Not on file     Minutes per session: Not on file     Stress: Not on file   Relationships     Social connections     Talks on phone: Not on file     Gets together: Not on file     Attends Scientologist service: Not on file     Active member of club or organization: Not on file     Attends meetings of clubs or organizations: Not on file     Relationship status: Not on file     Intimate partner violence     Fear of current or ex partner: Not on file     Emotionally abused: Not on file     Physically abused: Not on file     Forced sexual activity: Not on file   Other Topics Concern     Parent/sibling w/ CABG, MI or angioplasty before 65F 55M? Not Asked   Social History Narrative     Not on file         FAMILY HISTORY:   Family History   Problem Relation Age of Onset     Cancer Father       Non-contributory for  problems with anesthesia    REVIEW OF SYSTEMS:   The patient was asked a 14 point review of systems regarding constitutional symptoms, eye symptoms, ears, nose, mouth, throat symptoms, cardiovascular symptoms, respiratory symptoms, gastrointestinal symptoms, genitourinary symptoms, musculoskeletal symptoms, integumentary symptoms, neurological symptoms, psychiatric symptoms, endocrine symptoms, hematologic/lymphatic symptoms, and allergic/ immunologic symptoms.   The pertinent factors have been included in the HPI and below.  Patient Supplied Answers to Review of Systems  No flowsheet data found.    Physical Examination   The patient underwent a physical examination as described below. The pertinent positive and negative findings are summarized after the description of the examination.  Constitutional: The patient's developmental and nutritional status was assessed. The patient's voice quality was assessed.  Head and Face: The head and face were inspected for deformities. The sinuses were palpated. The salivary glands were palpated. Facial muscle strength was assessed bilaterally.  Eyes: Extraocular movements and primary gaze alignment were assessed.  Ears, Nose, Mouth and Throat: The ears and nose were examined for deformities. The nasal septum, mucosa, and turbinates were inspected by anterior rhinoscopy. The lips, teeth, and gums were examined for abnormalities. The oral mucosa, tongue, palate, tonsils, lateral and posterior pharynx were inspected for the presence of asymmetry or mucosal lesions.    Neck: The tracheal position was noted, and the neck mass palpated to determine if there were any asymmetries, abnormal neck masses, thyromegally, or thyroid nodules.  Respiratory: The nature of the breathing and chest expansion/symmetry was observed.  Cardiovascular: The patient was examined to determine the presence of any edema or jugular venous distension.  Abdomen: The contour of the abdomen was noted.  Lymphatic:  The patient was examined for infraclavicular lymphadenopathy.  Musculoskeletal: The patient was inspected for the presence of skeletal deformities.  Extremities: The extremities were examined for any clubbing or cyanosis.  Skin: The skin was examined for inflammatory or neoplastic conditions.  Neurologic: The patient's orientation, mood, and affect were noted. The cranial nerve  functions were examined.  Other pertinent positive and negative findings on physical examination:   OC/OP: no lesions, uvula midline, soft palate elevates symmetrically, FOM/BOT soft  Neck: no lesions, no TH tenderness to palpation    All other physical examination findings were within normal limits and noncontributory.    Procedures   Flexible laryngoscopy (CPT 26182)      Pre-procedure diagnosis: larynx scc  Post-procedure diagnosis: same as above  Indication for procedure: Mr. Rasmussen is a 60 year old male with see above  Procedure(s): Fiberoptic Laryngoscopy    Details of Procedure: After informed consent was obtained, the patient was seated in the examination chair.  The areas of the nasopharynx as well as the hypopharynx were anesthetized with topical 4% lidocaine with 0.25% phenylephrine atomizer.  Examination of the base of tongue was performed first.  Attention was directed to any evidence of masses in the area or evidence of leukoplakia or candidal infection.  Attention was directed to the epiglottis where its size and position was determined and its movement on phonation of the vowel  e .  The piriform sinuses were then inspected for any mass lesions or pooling of secretions.  Attention was then directed to the larynx. The vocal folds were inspected for infection or any areas of leukoplakia, for masses, polypoid degeneration, or hemorrhage.  Having done this, the arytenoids and vocal processes were inspected for erythema or evidence of granuloma formation.  The posterior commissure was then inspected for evidence of inflammatory  changes in the mucosa and heaping up of mucosal tissue. The patient was then instructed to say the vowel  e .  Adduction of vocal folds to the midline was observed for any evidence of paresis or paralysis of the larynx or asymmetry in rotation of the larynx to the left or right. The patient was asked to breathe and the degree of abduction was noted bilaterally.  Subglottic view of the larynx was obtained for any additional mass lesions or mucosal changes.  Finally the post cricoid was examined for evidence of pooling of secretions, as well as the pharyngeal wall mucosa.   Anesthesia type: 0.25% phenylephrine    Findings:  Anatomic/physiological deviations: well healed left vocal fold no evidence of recurrence   Right vocal process: No restriction of mobility   Left vocal process: No restriction of mobility  Glottal gap: Glottal gap  Supraglottic structures: Normal  Hypopharynx: Normal     Estimated Blood Loss: minimal  Complications: None  Disposition: Patient tolerated the procedure well            Review of Relevant Clinical Data      Labs:  Lab Results   Component Value Date    TSH 1.05 11/13/2007     Lab Results   Component Value Date     09/25/2019    CO2 29 09/25/2019    BUN 16 09/25/2019    PHOS 2.0 (L) 05/04/2016     Lab Results   Component Value Date    WBC 4.6 09/25/2019    HGB 12.9 (L) 09/25/2019    HCT 41.2 09/25/2019    MCV 95 09/25/2019     (L) 09/25/2019     Lab Results   Component Value Date    PTT 35 07/26/2017    INR 0.95 07/26/2017     No results found for: ELIZABET  No components found for: RHEUMATOIDFACTOR,  RF  Lab Results   Component Value Date    CRP 0.16 07/24/2003     No components found for: CKTOT, URICACID  No components found for: C3, C4, DSDNAAB, NDNAABIFA  No results found for: MPOAB    Patient reported Quality of Life (QOL) Measures   Patient Supplied Answers To VHI Questionnaire  Voice Handicap Index (VHI-10) 12/5/2019   My voice makes it difficult for people to hear me 0  "  People have difficulty understanding me in a noisy room 0   My voice difficulties restrict my personal and social life.  0   I feel left out of conversations because of my voice 0   My voice problem causes me to lose income 0   I feel as though I have to strain to produce voice 0   The clarity of my voice is unpredictable 0   My voice problem upsets me 0   My voice makes me feel handicapped 0   People ask, \"What's wrong with your voice?\" 0   VHI-10 0         Patient Supplied Answers To EAT Questionnaire  Eating Assessment Tool (EAT-10) 2019   My swallowing problem has caused me to lose weight 0   My swallowing problem interferes with my ability to go out for meals 0   Swallowing liquids takes extra effort 0   Swallowing solids takes extra effort 0   Swallowing pills takes extra effort 0   Swallowing is painful 0   The pleasure of eating is affected by my swallowing 0   When I swallow food sticks in my throat 0   I cough when I eat 0   Swallowing is stressful 0   EAT-10 0         Patient Supplied Answers To CSI Questionnaire  No flowsheet data found.      Patient Supplied Answers to Dyspnea Index Questionnaire:  No flowsheet data found.    Impression & Plan     IMPRESSION: Mr. Rasmussen is a 60 year old male who is being seen for the following:    IMPRESSION: Mr. Rasmussen is a 60 year old male who is being seen for the followin. T1 Left vocal fold SCC  -  s/p endoscopic CO2 laser resection 19  - no evidence of recurrence   Plan  - observation  - [id number] evaluations during year 2       2. 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 Dysphagia  - improved swallow today with less pharyngeal residue on evaluation of SLP performed FEES on 20 though continued recommendation of thickened liquids  - screening FEES with " liquids shows again aspiration on 7/3/20  - no PNAs, no changes in weight  - Xray Video Swallow Exam 8/13/20 - safe swallow, much improved  Plan  - observation         RETURN VISIT: office evaluation without SLP 2 months  Lizzy Wray MD    Laryngology    [facility]  Department of  Otolaryngology - Head and Neck Surgery  Clinics [facility] Surgery Center  [address] [place] [id number]  Appointment line: [phone]  Fax: [phone]          Lizzy Wray MD

## 2021-01-14 NOTE — PATIENT INSTRUCTIONS
1.  You were seen in the ENT Clinic today by . If you have any questions or concerns after your appointment, please call [phone]. Press option #1 for scheduling related needs. Press option #3 for Nurse advice.    2.   Plan is to return to clinic in 2 months      Kay Sommers LPN  [facility]

## 2021-02-13 NOTE — PROGRESS NOTES
"[facility]    Hematology/Oncology Established Patient Follow-up Note      Today's Date: 02/18/2021    Reason for Follow-up: Lung squamous cell carcinoma.    Kt Rasmussen is a 60 year old male who is being evaluated via a billable telephone visit.      The patient has been notified of following:     \"This telephone visit will be conducted via a call between you and your physician/provider. We have found that certain health care needs can be provided without the need for a physical exam.  This service lets us provide the care you need with a short phone conversation during the COVID-19 pandemic.  If a prescription is necessary we can send it directly to your pharmacy.  If lab work is needed we can place an order for that and you can then stop by our lab to have the test done at a later time.    Telephone visits are billed at different rates depending on your insurance coverage. During this emergency period, for some insurers they may be billed the same as an in-person visit.  Please reach out to your insurance provider with any questions.    If during the course of the call the physician/provider feels a telephone visit is not appropriate, you will not be charged for this service.\"    Patient has given verbal consent for Telephone visit?  Yes    How would you like to obtain your AVS? Mail a copy    Phone visit duration: 8 minutes     HISTORY OF PRESENT ILLNESS: Kt Rasmussen is a 60 year old male who presents with the following oncologic history:  1. 5/05/2016: Presented with near-obstructing large mass coming off the cheikh and likely the posterior wall, seen on bronchoscopy. Biopsy of the mass showed invasive squamous cell carcinoma, moderately differentiating and focally keratinizing.  Procedure was complicated by significant bleeding.  Tumor was completely debulked (via bronchoscopy) in both main stem bronchi and distal trachea. NGS showed no mutations in EGFR, KRAS, BRAF, NRAS, HRAS, PIK3CA, " ERBB2, MET, or JAK2.  2. 5/23/2016: PET/CT scan showed evidence of residual tumor with decreased endobronchial mass. Indeterminate, mildly hypermetabolic mesentery with prominent lymph nodes and area of enhancement of the right hepatic lobe present.   Felt to have T4-NX-M0 disease.  3. 6/09/2016: Started concurrent chemoradiation with weekly paclitaxel and carboplatin.  4. 7/30/2016: Completed radiation.  Subsequently received 2 cycles of consolidation paclitaxel and carboplatin.   5. 9/13/2019: Presented with 6-month history of dysphonia and left vocal exophytic lesion. Left true vocal fold biopsy showed at least squamous cell carcinoma in situ, cannot exclude superficial invasion.  6. 9/30/2019: Excision of left vocal cord mass showed fragments of invasive squamous cell carcinoma, well differentiated and keratinizing.  Margins negative for malignancy.  7. 2/16/2021: CT chest w/o contrast showed thin-walled cavity in left lower lobe with 2 solid peripheral nodules measuring 9 mm each. No lymphadenopathy.    INTERIM HISTORY:  Kt reports feeling well.  He feels his swallowing is reasonable.  He denies any recent fevers, chills, night sweats.  He has no new shortness of breath, hemoptysis, or chest pain.  His dysphonia has been stable.      REVIEW OF SYSTEMS:   14 point ROS was reviewed and is negative other than as noted above in HPI.       HOME MEDICATIONS:  Current Outpatient Medications   Medication Sig Dispense Refill     atorvastatin (LIPITOR) 40 MG tablet Take 40 mg by mouth daily           ALLERGIES:  Allergies   Allergen Reactions     No Known Drug Allergies          PAST MEDICAL HISTORY:  Past Medical History:   Diagnosis Date     Alcohol abuse, unspecified      Cerebral infarction (H)     2009, right side residual and aphasia     Dyslipidemia      GERD (gastroesophageal reflux disease)      Lung cancer (H)      Unspecified essential hypertension          PAST SURGICAL HISTORY:  Past Surgical History:    Procedure Laterality Date     BRONCHOSCOPY FLEXIBLE AND RIGID N/A 2016    Procedure: BRONCHOSCOPY FLEXIBLE AND RIGID;  Surgeon: Tony Talbot MD;  Location: [facility] OR     ESOPHAGOSCOPY FLEXIBLE N/A 2019    Procedure: flexible esophagoscopy;  Surgeon: Lizzy Johnson MD;  Location: [facility] OR     INJECT STEROID (LOCATION) N/A 2019    Procedure: steroid injection;  Surgeon: Lizzy Johnson MD;  Location: [facility] OR     LASER CO2 LARYNGOSCOPY N/A 2019    Procedure: Microdirect laryngoscopy with excision of laryngeal mass, CO2 laser;  Surgeon: Lizzy Johnson MD;  Location: [facility] OR     ZZC NONSPECIFIC PROCEDURE      R tympanoplasty         SOCIAL HISTORY:  Social History     Socioeconomic History     Marital status: Single     Spouse name: Not on file     Number of children: Not on file     Years of education: Not on file     Highest education level: Not on file   Occupational History     Not on file   Social Needs     Financial resource strain: Not on file     Food insecurity     Worry: Not on file     Inability: Not on file     Transportation needs     Medical: Not on file     Non-medical: Not on file   Tobacco Use     Smoking status: Former Smoker     Packs/day: 1.00     Types: Cigarettes     Quit date: 2009     Years since quittin.3     Smokeless tobacco: Never Used   Substance and Sexual Activity     Alcohol use: Not Currently     Drug use: No     Sexual activity: Not on file   Lifestyle     Physical activity     Days per week: Not on file     Minutes per session: Not on file     Stress: Not on file   Relationships     Social connections     Talks on phone: Not on file     Gets together: Not on file     Attends [employer] service: Not on file     Active member of club or organization: Not on file     Attends meetings of clubs or organizations: Not on file     Relationship status: Not on file     Intimate partner violence     Fear of current or ex partner: Not on file     Emotionally  abused: Not on file     Physically abused: Not on file     Forced sexual activity: Not on file   Other Topics Concern     Parent/sibling w/ CABG, MI or angioplasty before 65F 55M? Not Asked   Social History Narrative     Not on file         FAMILY HISTORY:  Family History   Problem Relation Age of Onset     Cancer Father          PHYSICAL EXAM:  Vital signs:  There were no vitals taken for this visit.   Not performed as this was a telephone visit.      LABS:  CBC RESULTS:   Recent Labs   Lab Test 09/25/19  1552   WBC 4.6   RBC 4.33*   HGB 12.9*   HCT 41.2   MCV 95   MCH 29.8   MCHC 31.3*   RDW 13.3   *       Recent Labs   Lab Test 09/25/19  1552 11/03/16  1310    141   POTASSIUM 4.5 3.9   CHLORIDE 108 107   CO2 29 27   ANIONGAP 2* 7   * 96   BUN 16 13   CR 0.74 0.66   BEVERLY 9.0 8.5         PATHOLOGY:  None new since prior visit.    IMAGING:  Reviewed as per HPI.    ASSESSMENT/PLAN:  Kt Rasmussen is a 60 year old male with the following issues:  1. 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 New left lung nodules  -I personally reviewed the chest CT scan from 2/16/2021 and discussed with Kt that unfortunately he has 2 new nodules and a thin walled cavity in the left lower lobe of the lung that have developed and concerning for recurrent malignancy.  -I recommended a PET/CT scan for further evaluation -- if there are no signs of distant metastatic disease and the nodules are FDG avid, I would recommend referral to thoracic surgery for consideration of resection.  Discussed that if he is not a candidate for resection, that I would recommend biopsy and if confirmed lung cancer, consider stereotactic radiation.    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 Dysphonia  5. Dysphagia  -Kt underwent excision of a left vocal cord SCC on 9/30/2019 and has persistent dysphonia as a result of the lesion and  excision.  He also has dysphagia but this is stable and swallowing is manageable.  -Last scope by ENT 1/14/2021 showed no evidence for recurrence.  -He will continue follow-up with ENT for ongoing surveillance.    Sangita John MD  Hematology/Oncology  [facility]

## 2021-02-16 ENCOUNTER — HOSPITAL ENCOUNTER (OUTPATIENT)
Dept: CT IMAGING | Facility: CLINIC | Age: 61
Discharge: HOME | End: 2021-02-16
Attending: NURSE PRACTITIONER | Admitting: NURSE PRACTITIONER
Payer: MEDICARE

## 2021-02-16 DIAGNOSIS — C34.90 NON-SMALL CELL CARCINOMA OF LUNG, STAGE 3, UNSPECIFIED LATERALITY (H): ICD-10-CM

## 2021-02-16 PROCEDURE — 71250 CT THORAX DX C-: CPT | Mod: MG

## 2021-02-18 ENCOUNTER — VIRTUAL VISIT (OUTPATIENT)
Dept: ONCOLOGY | Facility: CLINIC | Age: 61
End: 2021-02-18
Attending: INTERNAL MEDICINE
Payer: MEDICARE

## 2021-02-18 DIAGNOSIS — R49.0 DYSPHONIA: ICD-10-CM

## 2021-02-18 DIAGNOSIS — C32.9 LARYNX CANCER (H): ICD-10-CM

## 2021-02-18 DIAGNOSIS — C34.90 NON-SMALL CELL CARCINOMA OF LUNG, STAGE 3, UNSPECIFIED LATERALITY (H): Primary | ICD-10-CM

## 2021-02-18 DIAGNOSIS — R13.12 OROPHARYNGEAL DYSPHAGIA: ICD-10-CM

## 2021-02-18 DIAGNOSIS — R91.8 PULMONARY NODULES: ICD-10-CM

## 2021-02-18 PROCEDURE — 999N001193 HC VIDEO/TELEPHONE VISIT; NO CHARGE

## 2021-02-18 PROCEDURE — 99442 PR PHYSICIAN TELEPHONE EVALUATION 11-20 MIN: CPT | Mod: 95 | Performed by: INTERNAL MEDICINE

## 2021-02-18 ASSESSMENT — PAIN SCALES - GENERAL: PAINLEVEL: NO PAIN (0)

## 2021-02-18 NOTE — LETTER
"    2/18/2021         RE: Kt Rasmussen  [id number]  [facility] [address]        Dear Colleague,    Thank you for referring your patient, Kt Rasmussen, to the [facility]. Please see a copy of my visit note below.    [facility] [place] [facility]    Hematology/Oncology Established Patient Follow-up Note      Today's Date: 02/18/2021    Reason for Follow-up: Lung squamous cell carcinoma.    Kt Rasmussen is a 60 year old male who is being evaluated via a billable telephone visit.      The patient has been notified of following:     \"This telephone visit will be conducted via a call between you and your physician/provider. We have found that certain health care needs can be provided without the need for a physical exam.  This service lets us provide the care you need with a short phone conversation during the COVID-19 pandemic.  If a prescription is necessary we can send it directly to your pharmacy.  If lab work is needed we can place an order for that and you can then stop by our lab to have the test done at a later time.    Telephone visits are billed at different rates depending on your insurance coverage. During this emergency period, for some insurers they may be billed the same as an in-person visit.  Please reach out to your insurance provider with any questions.    If during the course of the call the physician/provider feels a telephone visit is not appropriate, you will not be charged for this service.\"    Patient has given verbal consent for Telephone visit?  Yes    How would you like to obtain your AVS? Mail a copy    Phone visit duration: 8 minutes     HISTORY OF PRESENT ILLNESS: Kt Rasmussen is a 60 year old male who presents with the following oncologic history:  1. 5/05/2016: Presented with near-obstructing large mass coming off the cheikh and likely the posterior wall, seen on bronchoscopy. Biopsy of the mass showed invasive squamous cell carcinoma, moderately " differentiating and focally keratinizing.  Procedure was complicated by significant bleeding.  Tumor was completely debulked (via bronchoscopy) in both main stem bronchi and distal trachea. NGS showed no mutations in EGFR, KRAS, BRAF, NRAS, HRAS, PIK3CA, ERBB2, MET, or JAK2.  2. 5/23/2016: PET/CT scan showed evidence of residual tumor with decreased endobronchial mass. Indeterminate, mildly hypermetabolic mesentery with prominent lymph nodes and area of enhancement of the right hepatic lobe present.   Felt to have T4-NX-M0 disease.  3. 6/09/2016: Started concurrent chemoradiation with weekly paclitaxel and carboplatin.  4. 7/30/2016: Completed radiation.  Subsequently received 2 cycles of consolidation paclitaxel and carboplatin.   5. 9/13/2019: Presented with 6-month history of dysphonia and left vocal exophytic lesion. Left true vocal fold biopsy showed at least squamous cell carcinoma in situ, cannot exclude superficial invasion.  6. 9/30/2019: Excision of left vocal cord mass showed fragments of invasive squamous cell carcinoma, well differentiated and keratinizing.  Margins negative for malignancy.  7. 2/16/2021: CT chest w/o contrast showed thin-walled cavity in left lower lobe with 2 solid peripheral nodules measuring 9 mm each. No lymphadenopathy.    INTERIM HISTORY:  Kt reports feeling well.  He feels his swallowing is reasonable.  He denies any recent fevers, chills, night sweats.  He has no new shortness of breath, hemoptysis, or chest pain.  His dysphonia has been stable.      REVIEW OF SYSTEMS:   14 point ROS was reviewed and is negative other than as noted above in HPI.       HOME MEDICATIONS:  Current Outpatient Medications   Medication Sig Dispense Refill     atorvastatin (LIPITOR) 40 MG tablet Take 40 mg by mouth daily           ALLERGIES:  Allergies   Allergen Reactions     No Known Drug Allergies          PAST MEDICAL HISTORY:  Past Medical History:   Diagnosis Date     Alcohol abuse,  unspecified      Cerebral infarction (H)     2009, right side residual and aphasia     Dyslipidemia      GERD (gastroesophageal reflux disease)      Lung cancer (H)      Unspecified essential hypertension          PAST SURGICAL HISTORY:  Past Surgical History:   Procedure Laterality Date     BRONCHOSCOPY FLEXIBLE AND RIGID N/A 2016    Procedure: BRONCHOSCOPY FLEXIBLE AND RIGID;  Surgeon: Tony Talbot MD;  Location: [facility]     ESOPHAGOSCOPY FLEXIBLE N/A 2019    Procedure: flexible esophagoscopy;  Surgeon: Lizzy Johnson MD;  Location:  OR     INJECT STEROID (LOCATION) N/A 2019    Procedure: steroid injection;  Surgeon: Lizzy Johnson MD;  [facility]     LASER CO2 LARYNGOSCOPY N/A 2019    Procedure: Microdirect laryngoscopy with excision of laryngeal mass, CO2 laser;  Surgeon: Lizzy Johnson MD;  Location:  OR     ZZC NONSPECIFIC PROCEDURE      R tympanoplasty         SOCIAL HISTORY:  Social History     Socioeconomic History     Marital status: Single     Spouse name: Not on file     Number of children: Not on file     Years of education: Not on file     Highest education level: Not on file   Occupational History     Not on file   Social Needs     Financial resource strain: Not on file     Food insecurity     Worry: Not on file     Inability: Not on file     Transportation needs     Medical: Not on file     Non-medical: Not on file   Tobacco Use     Smoking status: Former Smoker     Packs/day: 1.00     Types: Cigarettes     Quit date: 2009     Years since quittin.3     Smokeless tobacco: Never Used   Substance and Sexual Activity     Alcohol use: Not Currently     Drug use: No     Sexual activity: Not on file   Lifestyle     Physical activity     Days per week: Not on file     Minutes per session: Not on file     Stress: Not on file   Relationships     Social connections     Talks on phone: Not on file     Gets together: Not on file     Attends Hoahaoism service:  Not on file     Active member of club or organization: Not on file     Attends meetings of clubs or organizations: Not on file     Relationship status: Not on file     Intimate partner violence     Fear of current or ex partner: Not on file     Emotionally abused: Not on file     Physically abused: Not on file     Forced sexual activity: Not on file   Other Topics Concern     Parent/sibling w/ CABG, MI or angioplasty before 65F 55M? Not Asked   Social History Narrative     Not on file         FAMILY HISTORY:  Family History   Problem Relation Age of Onset     Cancer Father          PHYSICAL EXAM:  Vital signs:  There were no vitals taken for this visit.   Not performed as this was a telephone visit.      LABS:  CBC RESULTS:   Recent Labs   Lab Test 09/25/19  1552   WBC 4.6   RBC 4.33*   HGB 12.9*   HCT 41.2   MCV 95   MCH 29.8   MCHC 31.3*   RDW 13.3   *       Recent Labs   Lab Test 09/25/19  1552 11/03/16  1310    141   POTASSIUM 4.5 3.9   CHLORIDE 108 107   CO2 29 27   ANIONGAP 2* 7   * 96   BUN 16 13   CR 0.74 0.66   BEVERLY 9.0 8.5         PATHOLOGY:  None new since prior visit.    IMAGING:  Reviewed as per HPI.    ASSESSMENT/PLAN:  Kt Rasmussen is a 60 year old male with the following issues:  1. 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 New left lung nodules  -I personally reviewed the chest CT scan from 2/16/2021 and discussed with Kt that unfortunately he has 2 new nodules and a thin walled cavity in the left lower lobe of the lung that have developed and concerning for recurrent malignancy.  -I recommended a PET/CT scan for further evaluation -- if there are no signs of distant metastatic disease and the nodules are FDG avid, I would recommend referral to thoracic surgery for consideration of resection.  Discussed that if he is not a candidate for resection, that I would recommend biopsy and if confirmed  lung cancer, consider stereotactic radiation.    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 Dysphonia  5. Dysphagia  -Kt underwent excision of a left vocal cord SCC on 9/30/2019 and has persistent dysphonia as a result of the lesion and excision.  He also has dysphagia but this is stable and swallowing is manageable.  -Last scope by ENT 1/14/2021 showed no evidence for recurrence.  -He will continue follow-up with ENT for ongoing surveillance.    Sangita John MD  Hematology/Oncology  [facility]    Kt is a 60 year old who is being evaluated via a billable telephone visit.      What phone number would you like to be contacted at? [phone]    How would you like to obtain your AVS? Mail a copy  Phone call duration: 5 minutes          Again, thank you for allowing me to participate in the care of your patient.        Sincerely,        Sangita John MD

## 2021-02-18 NOTE — PROGRESS NOTES
Kt is a 60 year old who is being evaluated via a billable telephone visit.      What phone number would you like to be contacted at? [phone]    How would you like to obtain your AVS? Mail a copy  Phone call duration: 5 minutes

## 2021-02-18 NOTE — LETTER
"    2/18/2021         RE: Kt Rasmussen  [id number]  [facility] [address]        Dear Colleague,    Thank you for referring your patient, Kt Rasmussen, to the [facility] CANCER [facility]. Please see a copy of my visit note below.    [facility]    Hematology/Oncology Established Patient Follow-up Note      Today's Date: 02/18/2021    Reason for Follow-up: Lung squamous cell carcinoma.    Kt Rasmussen is a 60 year old male who is being evaluated via a billable telephone visit.      The patient has been notified of following:     \"This telephone visit will be conducted via a call between you and your physician/provider. We have found that certain health care needs can be provided without the need for a physical exam.  This service lets us provide the care you need with a short phone conversation during the COVID-19 pandemic.  If a prescription is necessary we can send it directly to your pharmacy.  If lab work is needed we can place an order for that and you can then stop by our lab to have the test done at a later time.    Telephone visits are billed at different rates depending on your insurance coverage. During this emergency period, for some insurers they may be billed the same as an in-person visit.  Please reach out to your insurance provider with any questions.    If during the course of the call the physician/provider feels a telephone visit is not appropriate, you will not be charged for this service.\"    Patient has given verbal consent for Telephone visit?  Yes    How would you like to obtain your AVS? Mail a copy    Phone visit duration: 8 minutes     HISTORY OF PRESENT ILLNESS: Kt Rasmussen is a 60 year old male who presents with the following oncologic history:  1. 5/05/2016: Presented with near-obstructing large mass coming off the cheikh and likely the posterior wall, seen on bronchoscopy. Biopsy of the mass showed invasive squamous cell carcinoma, moderately " differentiating and focally keratinizing.  Procedure was complicated by significant bleeding.  Tumor was completely debulked (via bronchoscopy) in both main stem bronchi and distal trachea. NGS showed no mutations in EGFR, KRAS, BRAF, NRAS, HRAS, PIK3CA, ERBB2, MET, or JAK2.  2. 5/23/2016: PET/CT scan showed evidence of residual tumor with decreased endobronchial mass. Indeterminate, mildly hypermetabolic mesentery with prominent lymph nodes and area of enhancement of the right hepatic lobe present.   Felt to have T4-NX-M0 disease.  3. 6/09/2016: Started concurrent chemoradiation with weekly paclitaxel and carboplatin.  4. 7/30/2016: Completed radiation.  Subsequently received 2 cycles of consolidation paclitaxel and carboplatin.   5. 9/13/2019: Presented with 6-month history of dysphonia and left vocal exophytic lesion. Left true vocal fold biopsy showed at least squamous cell carcinoma in situ, cannot exclude superficial invasion.  6. 9/30/2019: Excision of left vocal cord mass showed fragments of invasive squamous cell carcinoma, well differentiated and keratinizing.  Margins negative for malignancy.  7. 2/16/2021: CT chest w/o contrast showed thin-walled cavity in left lower lobe with 2 solid peripheral nodules measuring 9 mm each. No lymphadenopathy.    INTERIM HISTORY:  Kt reports feeling well.  He feels his swallowing is reasonable.  He denies any recent fevers, chills, night sweats.  He has no new shortness of breath, hemoptysis, or chest pain.  His dysphonia has been stable.      REVIEW OF SYSTEMS:   14 point ROS was reviewed and is negative other than as noted above in HPI.       HOME MEDICATIONS:  Current Outpatient Medications   Medication Sig Dispense Refill     atorvastatin (LIPITOR) 40 MG tablet Take 40 mg by mouth daily           ALLERGIES:  Allergies   Allergen Reactions     No Known Drug Allergies          PAST MEDICAL HISTORY:  Past Medical History:   Diagnosis Date     Alcohol abuse,  unspecified      Cerebral infarction (H)     2009, right side residual and aphasia     Dyslipidemia      GERD (gastroesophageal reflux disease)      Lung cancer (H)      Unspecified essential hypertension          PAST SURGICAL HISTORY:  Past Surgical History:   Procedure Laterality Date     BRONCHOSCOPY FLEXIBLE AND RIGID N/A 2016    Procedure: BRONCHOSCOPY FLEXIBLE AND RIGID;  Surgeon: Tony Talbot MD;  Location: [facility] OR     ESOPHAGOSCOPY FLEXIBLE N/A 2019    Procedure: flexible esophagoscopy;  Surgeon: Lizzy Johnson MD;  Location:  OR     INJECT STEROID (LOCATION) N/A 2019    Procedure: steroid injection;  Surgeon: Lizzy Johnson MD;  [facility]     LASER CO2 LARYNGOSCOPY N/A 2019    Procedure: Microdirect laryngoscopy with excision of laryngeal mass, CO2 laser;  Surgeon: Lizzy Johnson MD;  Location:  OR     ZZC NONSPECIFIC PROCEDURE      R tympanoplasty         SOCIAL HISTORY:  Social History     Socioeconomic History     Marital status: Single     Spouse name: Not on file     Number of children: Not on file     Years of education: Not on file     Highest education level: Not on file   Occupational History     Not on file   Social Needs     Financial resource strain: Not on file     Food insecurity     Worry: Not on file     Inability: Not on file     Transportation needs     Medical: Not on file     Non-medical: Not on file   Tobacco Use     Smoking status: Former Smoker     Packs/day: 1.00     Types: Cigarettes     Quit date: 2009     Years since quittin.3     Smokeless tobacco: Never Used   Substance and Sexual Activity     Alcohol use: Not Currently     Drug use: No     Sexual activity: Not on file   Lifestyle     Physical activity     Days per week: Not on file     Minutes per session: Not on file     Stress: Not on file   Relationships     Social connections     Talks on phone: Not on file     Gets together: Not on file     Attends Religion service:  Not on file     Active member of club or organization: Not on file     Attends meetings of clubs or organizations: Not on file     Relationship status: Not on file     Intimate partner violence     Fear of current or ex partner: Not on file     Emotionally abused: Not on file     Physically abused: Not on file     Forced sexual activity: Not on file   Other Topics Concern     Parent/sibling w/ CABG, MI or angioplasty before 65F 55M? Not Asked   Social History Narrative     Not on file         FAMILY HISTORY:  Family History   Problem Relation Age of Onset     Cancer Father          PHYSICAL EXAM:  Vital signs:  There were no vitals taken for this visit.   Not performed as this was a telephone visit.      LABS:  CBC RESULTS:   Recent Labs   Lab Test 09/25/19  1552   WBC 4.6   RBC 4.33*   HGB 12.9*   HCT 41.2   MCV 95   MCH 29.8   MCHC 31.3*   RDW 13.3   *       Recent Labs   Lab Test 09/25/19  1552 11/03/16  1310    141   POTASSIUM 4.5 3.9   CHLORIDE 108 107   CO2 29 27   ANIONGAP 2* 7   * 96   BUN 16 13   CR 0.74 0.66   BEVERLY 9.0 8.5         PATHOLOGY:  None new since prior visit.    IMAGING:  Reviewed as per HPI.    ASSESSMENT/PLAN:  Kt Rasmussen is a 60 year old male with the following issues:  1. 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 New left lung nodules  -I personally reviewed the chest CT scan from 2/16/2021 and discussed with Kt that unfortunately he has 2 new nodules and a thin walled cavity in the left lower lobe of the lung that have developed and concerning for recurrent malignancy.  -I recommended a PET/CT scan for further evaluation -- if there are no signs of distant metastatic disease and the nodules are FDG avid, I would recommend referral to thoracic surgery for consideration of resection.  Discussed that if he is not a candidate for resection, that I would recommend biopsy and if confirmed  lung cancer, consider stereotactic radiation.    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 Dysphonia  5. Dysphagia  -Kt underwent excision of a left vocal cord SCC on 9/30/2019 and has persistent dysphonia as a result of the lesion and excision.  He also has dysphagia but this is stable and swallowing is manageable.  -Last scope by ENT 1/14/2021 showed no evidence for recurrence.  -He will continue follow-up with ENT for ongoing surveillance.    Sangita John MD  Hematology/Oncology  [facility]    Kt is a 60 year old who is being evaluated via a billable telephone visit.      What phone number would you like to be contacted at? [phone]    How would you like to obtain your AVS? Mail a copy  Phone call duration: 5 minutes          Again, thank you for allowing me to participate in the care of your patient.        Sincerely,        Sangita John MD

## 2021-02-27 NOTE — PROGRESS NOTES
"[facility]    Hematology/Oncology Established Patient Follow-up Note      Today's Date: 3/4/2021    Reason for Follow-up: Lung squamous cell carcinoma.    Kt Rasmussen is a 60 year old male who is being evaluated via a billable telephone visit.      The patient has been notified of following:     \"This telephone visit will be conducted via a call between you and your physician/provider. We have found that certain health care needs can be provided without the need for a physical exam.  This service lets us provide the care you need with a short phone conversation during the COVID-19 pandemic.  If a prescription is necessary we can send it directly to your pharmacy.  If lab work is needed we can place an order for that and you can then stop by our lab to have the test done at a later time.    Telephone visits are billed at different rates depending on your insurance coverage. During this emergency period, for some insurers they may be billed the same as an in-person visit.  Please reach out to your insurance provider with any questions.    If during the course of the call the physician/provider feels a telephone visit is not appropriate, you will not be charged for this service.\"    Patient has given verbal consent for Telephone visit?  Yes    How would you like to obtain your AVS? Mail a copy    Phone visit duration: 7 minutes     HISTORY OF PRESENT ILLNESS: Kt Rasmussen is a 60 year old male who presents with the following oncologic history:  1. 5/05/2016: Presented with near-obstructing large mass coming off the cheikh and likely the posterior wall, seen on bronchoscopy. Biopsy of the mass showed invasive squamous cell carcinoma, moderately differentiating and focally keratinizing.  Procedure was complicated by significant bleeding.  Tumor was completely debulked (via bronchoscopy) in both main stem bronchi and distal trachea. NGS showed no mutations in EGFR, KRAS, BRAF, NRAS, HRAS, PIK3CA, " ERBB2, MET, or JAK2.  2. 5/23/2016: PET/CT scan showed evidence of residual tumor with decreased endobronchial mass. Indeterminate, mildly hypermetabolic mesentery with prominent lymph nodes and area of enhancement of the right hepatic lobe present.   Felt to have T4-NX-M0 disease.  3. 6/09/2016: Started concurrent chemoradiation with weekly paclitaxel and carboplatin.  4. 7/30/2016: Completed radiation.  Subsequently received 2 cycles of consolidation paclitaxel and carboplatin.   5. 9/13/2019: Presented with 6-month history of dysphonia and left vocal exophytic lesion. Left true vocal fold biopsy showed at least squamous cell carcinoma in situ, cannot exclude superficial invasion.  6. 9/30/2019: Excision of left vocal cord mass showed fragments of invasive squamous cell carcinoma, well differentiated and keratinizing.  Margins negative for malignancy.  7. 2/16/2021: CT chest w/o contrast showed thin-walled cavity in left lower lobe with 2 solid peripheral nodules measuring 9 mm each. No lymphadenopathy.  8. 3/01/2021 PET scan showed hypermetabolic nodules in left lower lobe and right lung, consistent with metastases; hypermetabolic adenopathy in chest, abdomen, pelvis; nonspecific uptake at tongue; hypermetabolic intraosseous lesions in spine and pelvis consistent with metastatic disease; left axillary hypermetabolic lymph nodes related to COVID-19 vaccination.    INTERIM HISTORY:  Kt has no new complaints since last recent visit.    REVIEW OF SYSTEMS:   14 point ROS was reviewed and is negative other than as noted above in HPI.       HOME MEDICATIONS:  Current Outpatient Medications   Medication Sig Dispense Refill     atorvastatin (LIPITOR) 40 MG tablet Take 40 mg by mouth daily           ALLERGIES:  Allergies   Allergen Reactions     No Known Drug Allergies          PAST MEDICAL HISTORY:  Past Medical History:   Diagnosis Date     Alcohol abuse, unspecified      Cerebral infarction (H)     2009, right side  residual and aphasia     Dyslipidemia      GERD (gastroesophageal reflux disease)      Lung cancer (H)      Unspecified essential hypertension          PAST SURGICAL HISTORY:  Past Surgical History:   Procedure Laterality Date     BRONCHOSCOPY FLEXIBLE AND RIGID N/A 2016    Procedure: BRONCHOSCOPY FLEXIBLE AND RIGID;  Surgeon: Tony Talbot MD;  Location: [facility]     ESOPHAGOSCOPY FLEXIBLE N/A 2019    Procedure: flexible esophagoscopy;  Surgeon: Lizzy Johnson MD;  Location: [facility] OR     INJECT STEROID (LOCATION) N/A 2019    Procedure: steroid injection;  Surgeon: Lizzy Johnson MD;  Location: [facility] OR     LASER CO2 LARYNGOSCOPY N/A 2019    Procedure: Microdirect laryngoscopy with excision of laryngeal mass, CO2 laser;  Surgeon: Lizzy Johnson MD;  [facility]     ZZC NONSPECIFIC PROCEDURE      R tympanoplasty         SOCIAL HISTORY:  Social History     Socioeconomic History     Marital status: Single     Spouse name: Not on file     Number of children: Not on file     Years of education: Not on file     Highest education level: Not on file   Occupational History     Not on file   Social Needs     Financial resource strain: Not on file     Food insecurity     Worry: Not on file     Inability: Not on file     Transportation needs     Medical: Not on file     Non-medical: Not on file   Tobacco Use     Smoking status: Former Smoker     Packs/day: 1.00     Types: Cigarettes     Quit date: 2009     Years since quittin.4     Smokeless tobacco: Never Used   Substance and Sexual Activity     Alcohol use: Not Currently     Drug use: No     Sexual activity: Not on file   Lifestyle     Physical activity     Days per week: Not on file     Minutes per session: Not on file     Stress: Not on file   Relationships     Social connections     Talks on phone: Not on file     Gets together: Not on file     Attends Church service: Not on file     Active member of club or organization: Not on  file     Attends meetings of clubs or organizations: Not on file     Relationship status: Not on file     Intimate partner violence     Fear of current or ex partner: Not on file     Emotionally abused: Not on file     Physically abused: Not on file     Forced sexual activity: Not on file   Other Topics Concern     Parent/sibling w/ CABG, MI or angioplasty before 65F 55M? Not Asked   Social History Narrative     Not on file         FAMILY HISTORY:  Family History   Problem Relation Age of Onset     Cancer Father          PHYSICAL EXAM:  Vital signs:  There were no vitals taken for this visit.   Not performed as this was a telephone visit.      LABS:  None new since last visit.    PATHOLOGY:  None new since prior visit.    IMAGING:  Reviewed as per HPI.    ASSESSMENT/PLAN:  Kt Rasmussen is a 60 year old male with the following issues:  1. 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  -Ultimately this represents incurable metastatic disease but likely treatable.  He appears to be remarkably currently asymptomatic with respect to his widely metastatic disease.  It is not yet clear if this is recurrent metastatic disease from his lung cancer or larynx cancer but could also be from a new cancer from unknown primary.  -I recommended obtaining biopsy from an accessible FDG-avid lesion that will allow us enough tissue to do specialized testing if needed.  Will arrange for IR-guided biopsy. Discussed that possible types of systemic treatment would be chemotherapy +/- immunotherapy.    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 Dysphonia  5. Dysphagia  -Kt underwent excision of a left vocal cord SCC on 9/30/2019 and has persistent dysphonia as a result of the lesion and excision.  He also has dysphagia but this is stable and swallowing is manageable.  -Last scope by ENT 1/14/2021 showed no evidence for recurrence.    Return after biopsy.    Sangita John MD  Hematology/Oncology  [facility]

## 2021-03-01 ENCOUNTER — HOSPITAL ENCOUNTER (OUTPATIENT)
Dept: PET IMAGING | Facility: CLINIC | Age: 61
Discharge: HOME | End: 2021-03-01
Attending: INTERNAL MEDICINE | Admitting: INTERNAL MEDICINE
Payer: MEDICARE

## 2021-03-01 DIAGNOSIS — C34.90 NON-SMALL CELL CARCINOMA OF LUNG, STAGE 3, UNSPECIFIED LATERALITY (H): ICD-10-CM

## 2021-03-01 DIAGNOSIS — R91.8 PULMONARY NODULES: ICD-10-CM

## 2021-03-01 DIAGNOSIS — C32.9 LARYNX CANCER (H): ICD-10-CM

## 2021-03-01 PROCEDURE — 999N000040 HC STATISTIC CONSULT NO CHARGE VASC ACCESS

## 2021-03-01 PROCEDURE — 999N000128 HC STATISTIC PERIPHERAL IV START W/O US GUIDANCE

## 2021-03-01 PROCEDURE — 71260 CT THORAX DX C+: CPT | Mod: 26 | Performed by: RADIOLOGY

## 2021-03-01 PROCEDURE — 78816 PET IMAGE W/CT FULL BODY: CPT | Mod: PS,ME

## 2021-03-01 PROCEDURE — 74177 CT ABD & PELVIS W/CONTRAST: CPT | Mod: 26 | Performed by: RADIOLOGY

## 2021-03-01 PROCEDURE — 71260 CT THORAX DX C+: CPT

## 2021-03-01 PROCEDURE — 250N000011 HC RX IP 250 OP 636: Performed by: INTERNAL MEDICINE

## 2021-03-01 PROCEDURE — A9552 F18 FDG: HCPCS | Performed by: INTERNAL MEDICINE

## 2021-03-01 PROCEDURE — 343N000001 HC RX 343: Performed by: INTERNAL MEDICINE

## 2021-03-01 PROCEDURE — G1004 CDSM NDSC: HCPCS | Mod: GC | Performed by: RADIOLOGY

## 2021-03-01 PROCEDURE — 78816 PET IMAGE W/CT FULL BODY: CPT | Mod: 26 | Performed by: RADIOLOGY

## 2021-03-01 RX ORDER — IOPAMIDOL 755 MG/ML
10-135 INJECTION, SOLUTION INTRAVASCULAR ONCE
Status: COMPLETED | OUTPATIENT
Start: 2021-03-01 | End: 2021-03-01

## 2021-03-01 RX ADMIN — IOPAMIDOL 116 ML: 755 INJECTION, SOLUTION INTRAVENOUS at 11:52

## 2021-03-01 RX ADMIN — FLUDEOXYGLUCOSE F-18 10.39 MCI.: 500 INJECTION, SOLUTION INTRAVENOUS at 11:33

## 2021-03-04 ENCOUNTER — DOCUMENTATION ONLY (OUTPATIENT)
Dept: CARE COORDINATION | Facility: CLINIC | Age: 61
End: 2021-03-04

## 2021-03-04 ENCOUNTER — VIRTUAL VISIT (OUTPATIENT)
Dept: ONCOLOGY | Facility: CLINIC | Age: 61
End: 2021-03-04
Attending: INTERNAL MEDICINE
Payer: MEDICARE

## 2021-03-04 VITALS — BODY MASS INDEX: 23.8 KG/M2 | WEIGHT: 193 LBS

## 2021-03-04 DIAGNOSIS — C78.02 MALIGNANT NEOPLASM METASTATIC TO BOTH LUNGS (H): ICD-10-CM

## 2021-03-04 DIAGNOSIS — C34.90 NON-SMALL CELL CARCINOMA OF LUNG, STAGE 3, UNSPECIFIED LATERALITY (H): ICD-10-CM

## 2021-03-04 DIAGNOSIS — R59.1 LYMPHADENOPATHY: Primary | ICD-10-CM

## 2021-03-04 DIAGNOSIS — C78.01 MALIGNANT NEOPLASM METASTATIC TO BOTH LUNGS (H): ICD-10-CM

## 2021-03-04 DIAGNOSIS — C32.9 LARYNX CANCER (H): ICD-10-CM

## 2021-03-04 DIAGNOSIS — C79.51 CANCER, METASTATIC TO BONE (H): ICD-10-CM

## 2021-03-04 PROCEDURE — 999N001193 HC VIDEO/TELEPHONE VISIT; NO CHARGE

## 2021-03-04 PROCEDURE — 99214 OFFICE O/P EST MOD 30 MIN: CPT | Mod: 95 | Performed by: INTERNAL MEDICINE

## 2021-03-04 ASSESSMENT — PAIN SCALES - GENERAL: PAINLEVEL: NO PAIN (0)

## 2021-03-04 NOTE — LETTER
"    3/4/2021         RE: Kt Rasmussen  [id number]  [place] [address]        Dear Colleague,    Thank you for referring your patient, Kt Rasmussen, to the [facility]. Please see a copy of my visit note below.    [facility]    Hematology/Oncology Established Patient Follow-up Note      Today's Date: 3/4/2021    Reason for Follow-up: Lung squamous cell carcinoma.    Kt Rasmussen is a 60 year old male who is being evaluated via a billable telephone visit.      The patient has been notified of following:     \"This telephone visit will be conducted via a call between you and your physician/provider. We have found that certain health care needs can be provided without the need for a physical exam.  This service lets us provide the care you need with a short phone conversation during the COVID-19 pandemic.  If a prescription is necessary we can send it directly to your pharmacy.  If lab work is needed we can place an order for that and you can then stop by our lab to have the test done at a later time.    Telephone visits are billed at different rates depending on your insurance coverage. During this emergency period, for some insurers they may be billed the same as an in-person visit.  Please reach out to your insurance provider with any questions.    If during the course of the call the physician/provider feels a telephone visit is not appropriate, you will not be charged for this service.\"    Patient has given verbal consent for Telephone visit?  Yes    How would you like to obtain your AVS? Mail a copy    Phone visit duration: 7 minutes     HISTORY OF PRESENT ILLNESS: Kt Rasmussen is a 60 year old male who presents with the following oncologic history:  1. 5/05/2016: Presented with near-obstructing large mass coming off the cheikh and likely the posterior wall, seen on bronchoscopy. Biopsy of the mass showed invasive squamous cell carcinoma, moderately " differentiating and focally keratinizing.  Procedure was complicated by significant bleeding.  Tumor was completely debulked (via bronchoscopy) in both main stem bronchi and distal trachea. NGS showed no mutations in EGFR, KRAS, BRAF, NRAS, HRAS, PIK3CA, ERBB2, MET, or JAK2.  2. 5/23/2016: PET/CT scan showed evidence of residual tumor with decreased endobronchial mass. Indeterminate, mildly hypermetabolic mesentery with prominent lymph nodes and area of enhancement of the right hepatic lobe present.   Felt to have T4-NX-M0 disease.  3. 6/09/2016: Started concurrent chemoradiation with weekly paclitaxel and carboplatin.  4. 7/30/2016: Completed radiation.  Subsequently received 2 cycles of consolidation paclitaxel and carboplatin.   5. 9/13/2019: Presented with 6-month history of dysphonia and left vocal exophytic lesion. Left true vocal fold biopsy showed at least squamous cell carcinoma in situ, cannot exclude superficial invasion.  6. 9/30/2019: Excision of left vocal cord mass showed fragments of invasive squamous cell carcinoma, well differentiated and keratinizing.  Margins negative for malignancy.  7. 2/16/2021: CT chest w/o contrast showed thin-walled cavity in left lower lobe with 2 solid peripheral nodules measuring 9 mm each. No lymphadenopathy.  8. 3/01/2021 PET scan showed hypermetabolic nodules in left lower lobe and right lung, consistent with metastases; hypermetabolic adenopathy in chest, abdomen, pelvis; nonspecific uptake at tongue; hypermetabolic intraosseous lesions in spine and pelvis consistent with metastatic disease; left axillary hypermetabolic lymph nodes related to COVID-19 vaccination.    INTERIM HISTORY:  Kt has no new complaints since last recent visit.    REVIEW OF SYSTEMS:   14 point ROS was reviewed and is negative other than as noted above in HPI.       HOME MEDICATIONS:  Current Outpatient Medications   Medication Sig Dispense Refill     atorvastatin (LIPITOR) 40 MG tablet Take  40 mg by mouth daily           ALLERGIES:  Allergies   Allergen Reactions     No Known Drug Allergies          PAST MEDICAL HISTORY:  Past Medical History:   Diagnosis Date     Alcohol abuse, unspecified      Cerebral infarction (H)     2009, right side residual and aphasia     Dyslipidemia      GERD (gastroesophageal reflux disease)      Lung cancer (H)      Unspecified essential hypertension          PAST SURGICAL HISTORY:  Past Surgical History:   Procedure Laterality Date     BRONCHOSCOPY FLEXIBLE AND RIGID N/A 2016    Procedure: BRONCHOSCOPY FLEXIBLE AND RIGID;  Surgeon: Tony Talbot MD;  Location: [facility] OR     ESOPHAGOSCOPY FLEXIBLE N/A 2019    Procedure: flexible esophagoscopy;  Surgeon: Lizzy Johnson MD;  Location: [facility]     INJECT STEROID (LOCATION) N/A 2019    Procedure: steroid injection;  Surgeon: Lizzy Johnson MD;  Location: [facility]     LASER CO2 LARYNGOSCOPY N/A 2019    Procedure: Microdirect laryngoscopy with excision of laryngeal mass, CO2 laser;  Surgeon: Lizzy Johnson MD;  Location: [facility] OR     ZZC NONSPECIFIC PROCEDURE      R tympanoplasty         SOCIAL HISTORY:  Social History     Socioeconomic History     Marital status: Single     Spouse name: Not on file     Number of children: Not on file     Years of education: Not on file     Highest education level: Not on file   Occupational History     Not on file   Social Needs     Financial resource strain: Not on file     Food insecurity     Worry: Not on file     Inability: Not on file     Transportation needs     Medical: Not on file     Non-medical: Not on file   Tobacco Use     Smoking status: Former Smoker     Packs/day: 1.00     Types: Cigarettes     Quit date: 2009     Years since quittin.4     Smokeless tobacco: Never Used   Substance and Sexual Activity     Alcohol use: Not Currently     Drug use: No     Sexual activity: Not on file   Lifestyle     Physical activity     Days per week: Not on file      Minutes per session: Not on file     Stress: Not on file   Relationships     Social connections     Talks on phone: Not on file     Gets together: Not on file     Attends Taoism service: Not on file     Active member of club or organization: Not on file     Attends meetings of clubs or organizations: Not on file     Relationship status: Not on file     Intimate partner violence     Fear of current or ex partner: Not on file     Emotionally abused: Not on file     Physically abused: Not on file     Forced sexual activity: Not on file   Other Topics Concern     Parent/sibling w/ CABG, MI or angioplasty before 65F 55M? Not Asked   Social History Narrative     Not on file         FAMILY HISTORY:  Family History   Problem Relation Age of Onset     Cancer Father          PHYSICAL EXAM:  Vital signs:  There were no vitals taken for this visit.   Not performed as this was a telephone visit.      LABS:  None new since last visit.    PATHOLOGY:  None new since prior visit.    IMAGING:  Reviewed as per HPI.    ASSESSMENT/PLAN:  Kt Rasmussen is a 60 year old male with the following issues:  1. 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  -Ultimately this represents incurable metastatic disease but likely treatable.  He appears to be remarkably currently asymptomatic with respect to his widely metastatic disease.  It is not yet clear if this is  recurrent metastatic disease from his lung cancer or larynx cancer but could also be from a new cancer from unknown primary.  -I recommended obtaining biopsy from an accessible FDG-avid lesion that will allow us enough tissue to do specialized testing if needed. Will arrange for IR-guided biopsy. Discussed that possible types of systemic treatment would be chemotherapy +/- immunotherapy.    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 Dysphonia  5. Dysphagia  -Kt underwent excision of a left vocal cord SCC on 9/30/2019 and has persistent dysphonia as a result of the lesion and excision.  He also has dysphagia but this is stable and swallowing is manageable.  -Last scope by ENT 1/14/2021 showed no evidence for recurrence.    Return after biopsy.    Sangita John MD  Hematology/Oncology  [facility]    Kt is a 60 year old who is being evaluated via a billable telephone visit.      What phone number would you like to be contacted at? [phone]  How would you like to obtain your AVS? Mail a copy       Subjective   Kt is a 60 year old who presents for the following health issues      Objective    Vitals - Patient Reported  Pain Score: No Pain (0)         Phone call duration: 5    Yaquelin Dowd CMA        Again, thank you for allowing me to participate in the care of your patient.        Sincerely,        Sangita John MD

## 2021-03-04 NOTE — LETTER
"    3/4/2021         RE: Kt Rasmussen  [id number]  [facility] [address]        Dear Colleague,    Thank you for referring your patient, Kt Rasmussen, to the [facility]. Please see a copy of my visit note below.    [facility]    Hematology/Oncology Established Patient Follow-up Note      Today's Date: 3/4/2021    Reason for Follow-up: Lung squamous cell carcinoma.    Kt Rasmussen is a 60 year old male who is being evaluated via a billable telephone visit.      The patient has been notified of following:     \"This telephone visit will be conducted via a call between you and your physician/provider. We have found that certain health care needs can be provided without the need for a physical exam.  This service lets us provide the care you need with a short phone conversation during the COVID-19 pandemic.  If a prescription is necessary we can send it directly to your pharmacy.  If lab work is needed we can place an order for that and you can then stop by our lab to have the test done at a later time.    Telephone visits are billed at different rates depending on your insurance coverage. During this emergency period, for some insurers they may be billed the same as an in-person visit.  Please reach out to your insurance provider with any questions.    If during the course of the call the physician/provider feels a telephone visit is not appropriate, you will not be charged for this service.\"    Patient has given verbal consent for Telephone visit?  Yes    How would you like to obtain your AVS? Mail a copy    Phone visit duration: 7 minutes     HISTORY OF PRESENT ILLNESS: Kt Rasmussen is a 60 year old male who presents with the following oncologic history:  1. 5/05/2016: Presented with near-obstructing large mass coming off the cheikh and likely the posterior wall, seen on bronchoscopy. Biopsy of the mass showed invasive squamous cell carcinoma, moderately " differentiating and focally keratinizing.  Procedure was complicated by significant bleeding.  Tumor was completely debulked (via bronchoscopy) in both main stem bronchi and distal trachea. NGS showed no mutations in EGFR, KRAS, BRAF, NRAS, HRAS, PIK3CA, ERBB2, MET, or JAK2.  2. 5/23/2016: PET/CT scan showed evidence of residual tumor with decreased endobronchial mass. Indeterminate, mildly hypermetabolic mesentery with prominent lymph nodes and area of enhancement of the right hepatic lobe present.   Felt to have T4-NX-M0 disease.  3. 6/09/2016: Started concurrent chemoradiation with weekly paclitaxel and carboplatin.  4. 7/30/2016: Completed radiation.  Subsequently received 2 cycles of consolidation paclitaxel and carboplatin.   5. 9/13/2019: Presented with 6-month history of dysphonia and left vocal exophytic lesion. Left true vocal fold biopsy showed at least squamous cell carcinoma in situ, cannot exclude superficial invasion.  6. 9/30/2019: Excision of left vocal cord mass showed fragments of invasive squamous cell carcinoma, well differentiated and keratinizing.  Margins negative for malignancy.  7. 2/16/2021: CT chest w/o contrast showed thin-walled cavity in left lower lobe with 2 solid peripheral nodules measuring 9 mm each. No lymphadenopathy.  8. 3/01/2021 PET scan showed hypermetabolic nodules in left lower lobe and right lung, consistent with metastases; hypermetabolic adenopathy in chest, abdomen, pelvis; nonspecific uptake at tongue; hypermetabolic intraosseous lesions in spine and pelvis consistent with metastatic disease; left axillary hypermetabolic lymph nodes related to COVID-19 vaccination.    INTERIM HISTORY:  Kt has no new complaints since last recent visit.    REVIEW OF SYSTEMS:   14 point ROS was reviewed and is negative other than as noted above in HPI.       HOME MEDICATIONS:  Current Outpatient Medications   Medication Sig Dispense Refill     atorvastatin (LIPITOR) 40 MG tablet Take  40 mg by mouth daily           ALLERGIES:  Allergies   Allergen Reactions     No Known Drug Allergies          PAST MEDICAL HISTORY:  Past Medical History:   Diagnosis Date     Alcohol abuse, unspecified      Cerebral infarction (H)     2009, right side residual and aphasia     Dyslipidemia      GERD (gastroesophageal reflux disease)      Lung cancer (H)      Unspecified essential hypertension          PAST SURGICAL HISTORY:  Past Surgical History:   Procedure Laterality Date     BRONCHOSCOPY FLEXIBLE AND RIGID N/A 2016    Procedure: BRONCHOSCOPY FLEXIBLE AND RIGID;  Surgeon: Tony Talbot MD;  Location: [facility] OR     ESOPHAGOSCOPY FLEXIBLE N/A 2019    Procedure: flexible esophagoscopy;  Surgeon: Lizzy Johnson MD;  Location: [facility] OR     INJECT STEROID (LOCATION) N/A 2019    Procedure: steroid injection;  Surgeon: Lizzy Johnson MD;  Location: [facility] OR     LASER CO2 LARYNGOSCOPY N/A 2019    Procedure: Microdirect laryngoscopy with excision of laryngeal mass, CO2 laser;  Surgeon: Lizzy Johnson MD;  Location: [facility] OR     ZZC NONSPECIFIC PROCEDURE      R tympanoplasty         SOCIAL HISTORY:  Social History     Socioeconomic History     Marital status: Single     Spouse name: Not on file     Number of children: Not on file     Years of education: Not on file     Highest education level: Not on file   Occupational History     Not on file   Social Needs     Financial resource strain: Not on file     Food insecurity     Worry: Not on file     Inability: Not on file     Transportation needs     Medical: Not on file     Non-medical: Not on file   Tobacco Use     Smoking status: Former Smoker     Packs/day: 1.00     Types: Cigarettes     Quit date: 2009     Years since quittin.4     Smokeless tobacco: Never Used   Substance and Sexual Activity     Alcohol use: Not Currently     Drug use: No     Sexual activity: Not on file   Lifestyle     Physical activity     Days per week: Not on file      Minutes per session: Not on file     Stress: Not on file   Relationships     Social connections     Talks on phone: Not on file     Gets together: Not on file     Attends Holiness service: Not on file     Active member of club or organization: Not on file     Attends meetings of clubs or organizations: Not on file     Relationship status: Not on file     Intimate partner violence     Fear of current or ex partner: Not on file     Emotionally abused: Not on file     Physically abused: Not on file     Forced sexual activity: Not on file   Other Topics Concern     Parent/sibling w/ CABG, MI or angioplasty before 65F 55M? Not Asked   Social History Narrative     Not on file         FAMILY HISTORY:  Family History   Problem Relation Age of Onset     Cancer Father          PHYSICAL EXAM:  Vital signs:  There were no vitals taken for this visit.   Not performed as this was a telephone visit.      LABS:  None new since last visit.    PATHOLOGY:  None new since prior visit.    IMAGING:  Reviewed as per HPI.    ASSESSMENT/PLAN:  Kt Rasmussen is a 60 year old male with the following issues:  1. 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  -Ultimately this represents incurable metastatic disease but likely treatable.  He appears to be remarkably currently asymptomatic with respect to his widely metastatic disease.  It is not yet clear if this is  recurrent metastatic disease from his lung cancer or larynx cancer but could also be from a new cancer from unknown primary.  -I recommended obtaining biopsy from an accessible FDG-avid lesion that will allow us enough tissue to do specialized testing if needed. Will arrange for IR-guided biopsy. Discussed that possible types of systemic treatment would be chemotherapy +/- immunotherapy.    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 Dysphonia  5. Dysphagia  -Kt underwent excision of a left vocal cord SCC on 9/30/2019 and has persistent dysphonia as a result of the lesion and excision.  He also has dysphagia but this is stable and swallowing is manageable.  -Last scope by ENT 1/14/2021 showed no evidence for recurrence.    Return after biopsy.    Sangita John MD  Hematology/Oncology  [facility]    Kt is a 60 year old who is being evaluated via a billable telephone visit.      What phone number would you like to be contacted at? [phone]  How would you like to obtain your AVS? Mail a copy       Subjective   Kt is a 60 year old who presents for the following health issues      Objective    Vitals - Patient Reported  Pain Score: No Pain (0)         Phone call duration: 5    Yaquelin Dowd CMA        Again, thank you for allowing me to participate in the care of your patient.        Sincerely,        Sangita John MD

## 2021-03-04 NOTE — PROGRESS NOTES
Kt is a 60 year old who is being evaluated via a billable telephone visit.      What phone number would you like to be contacted at? [phone]  How would you like to obtain your AVS? Mail a copy       Subjective   Kt is a 60 year old who presents for the following health issues      Objective    Vitals - Patient Reported  Pain Score: No Pain (0)         Phone call duration: 5    Yaquelin Dowd [facility]

## 2021-03-05 ENCOUNTER — TELEPHONE (OUTPATIENT)
Dept: ONCOLOGY | Facility: CLINIC | Age: 61
End: 2021-03-05

## 2021-03-05 ENCOUNTER — TELEPHONE (OUTPATIENT)
Dept: INTERVENTIONAL RADIOLOGY/VASCULAR | Facility: CLINIC | Age: 61
End: 2021-03-05

## 2021-03-05 DIAGNOSIS — C34.90 NON-SMALL CELL CARCINOMA OF LUNG, STAGE 3, UNSPECIFIED LATERALITY (H): Primary | ICD-10-CM

## 2021-03-05 NOTE — TELEPHONE ENCOUNTER
Dr Whitmore approved a CT guided left lower lobe lung biopsy to be done at [facility]. Needs labs drawn and will require sedation. See order please. Pt had several virtual visits which are in [facility], will need heart and lung assessment on day of procedure.

## 2021-03-05 NOTE — PATIENT INSTRUCTIONS
Telephone call made to [facility] schedulers - Biopsy has been sent for review; they will call patient to schedule- Martha will watch for scheduled bx and schedule video return with Dr John.

## 2021-03-05 NOTE — TELEPHONE ENCOUNTER
Spoke with patient, informed of biopsy review process. He will be expecting a call from Radiology Schedulers when approved.  I will call patient to schedule follow up once biopsy scheduled.

## 2021-03-08 DIAGNOSIS — Z11.59 ENCOUNTER FOR SCREENING FOR OTHER VIRAL DISEASES: Primary | ICD-10-CM

## 2021-03-08 DIAGNOSIS — Z11.59 ENCOUNTER FOR SCREENING FOR OTHER VIRAL DISEASES: ICD-10-CM

## 2021-03-08 LAB
SARS-COV-2 RNA RESP QL NAA+PROBE: NORMAL
SPECIMEN SOURCE: NORMAL

## 2021-03-08 PROCEDURE — U0003 INFECTIOUS AGENT DETECTION BY NUCLEIC ACID (DNA OR RNA); SEVERE ACUTE RESPIRATORY SYNDROME CORONAVIRUS 2 (SARS-COV-2) (CORONAVIRUS DISEASE [COVID-19]), AMPLIFIED PROBE TECHNIQUE, MAKING USE OF HIGH THROUGHPUT TECHNOLOGIES AS DESCRIBED BY CMS-2020-01-R: HCPCS | Performed by: SPECIALIST

## 2021-03-08 PROCEDURE — U0005 INFEC AGEN DETEC AMPLI PROBE: HCPCS | Performed by: SPECIALIST

## 2021-03-09 LAB
LABORATORY COMMENT REPORT: NORMAL
SARS-COV-2 RNA RESP QL NAA+PROBE: NEGATIVE
SPECIMEN SOURCE: NORMAL

## 2021-03-10 ENCOUNTER — TELEPHONE (OUTPATIENT)
Dept: MEDSURG UNIT | Facility: CLINIC | Age: 61
End: 2021-03-10

## 2021-03-10 NOTE — TELEPHONE ENCOUNTER
Pre-Procedure Negative COVID Test Results    Results Reviewed  The patient has a negative COVID test result within the required timeframe for the scheduled procedure.     No COVID pre-call needed.

## 2021-03-11 ENCOUNTER — PATIENT OUTREACH (OUTPATIENT)
Dept: ONCOLOGY | Facility: CLINIC | Age: 61
End: 2021-03-11

## 2021-03-11 ENCOUNTER — TELEPHONE (OUTPATIENT)
Dept: CT IMAGING | Facility: CLINIC | Age: 61
End: 2021-03-11

## 2021-03-11 NOTE — PROGRESS NOTES
Consulted with Dr. Harper in clinic as Dr. John not available regarding biopsy of bone rather than lung. Dr. Harper stated that lung biopsy would be preferred secondary to possible need of next generation sequencing that would possibly need to be done which cannot be done on a bone biopsy. He stated that if lung biopsy is not safe then a bone biopsy can be done.     Called Monica back in radiology with this information. Aylin Us RN,BSN,OCN

## 2021-03-11 NOTE — TELEPHONE ENCOUNTER
Left message with Dr. John's office that Dr. Chavez would like to change biopsy from a lung biopsy to a left iliac bone biopsy, will await return phone call.

## 2021-03-11 NOTE — PROGRESS NOTES
Writer received a call from Monica of  radiology calling on behalf od radiologist Dr. Chavez regarding changing lung biopsy from lung to left ileac bone biopsy due to the risk of procedure.      Please call Monica back to confirm change of plan is approved by Dr. John.     Monica rad  [phone]    Message routed.     Sujey Augustin RN

## 2021-03-12 ENCOUNTER — HOSPITAL ENCOUNTER (OUTPATIENT)
Facility: CLINIC | Age: 61
Discharge: HOME | End: 2021-03-12
Admitting: RADIOLOGY
Payer: MEDICARE

## 2021-03-12 ENCOUNTER — HOSPITAL ENCOUNTER (OUTPATIENT)
Dept: CT IMAGING | Facility: CLINIC | Age: 61
End: 2021-03-12
Attending: INTERNAL MEDICINE
Payer: MEDICARE

## 2021-03-12 ENCOUNTER — APPOINTMENT (OUTPATIENT)
Dept: GENERAL RADIOLOGY | Facility: CLINIC | Age: 61
End: 2021-03-12
Payer: MEDICARE

## 2021-03-12 VITALS
TEMPERATURE: 96.7 F | HEART RATE: 51 BPM | DIASTOLIC BLOOD PRESSURE: 71 MMHG | SYSTOLIC BLOOD PRESSURE: 116 MMHG | RESPIRATION RATE: 16 BRPM | OXYGEN SATURATION: 99 %

## 2021-03-12 DIAGNOSIS — C34.90 NON-SMALL CELL CARCINOMA OF LUNG, STAGE 3, UNSPECIFIED LATERALITY (H): ICD-10-CM

## 2021-03-12 LAB
INR PPP: 1.02 (ref 0.86–1.14)
PLATELET # BLD AUTO: 153 10E9/L (ref 150–450)

## 2021-03-12 PROCEDURE — 88172 CYTP DX EVAL FNA 1ST EA SITE: CPT | Mod: 26 | Performed by: PATHOLOGY

## 2021-03-12 PROCEDURE — 88173 CYTOPATH EVAL FNA REPORT: CPT | Mod: TC | Performed by: RADIOLOGY

## 2021-03-12 PROCEDURE — 85610 PROTHROMBIN TIME: CPT

## 2021-03-12 PROCEDURE — 272N000431 CT LUNG MEDIASTINUM BIOPSY: Mod: MG

## 2021-03-12 PROCEDURE — 88341 IMHCHEM/IMCYTCHM EA ADD ANTB: CPT | Mod: 26 | Performed by: PATHOLOGY

## 2021-03-12 PROCEDURE — 250N000011 HC RX IP 250 OP 636: Performed by: PHYSICIAN ASSISTANT

## 2021-03-12 PROCEDURE — 88305 TISSUE EXAM BY PATHOLOGIST: CPT | Mod: 26 | Performed by: PATHOLOGY

## 2021-03-12 PROCEDURE — 36415 COLL VENOUS BLD VENIPUNCTURE: CPT

## 2021-03-12 PROCEDURE — 999N001020 HC STATISTIC H-SEND OUTS PREP: Performed by: RADIOLOGY

## 2021-03-12 PROCEDURE — 88172 CYTP DX EVAL FNA 1ST EA SITE: CPT | Mod: TC,XU | Performed by: RADIOLOGY

## 2021-03-12 PROCEDURE — 88342 IMHCHEM/IMCYTCHM 1ST ANTB: CPT | Mod: TC,XU | Performed by: RADIOLOGY

## 2021-03-12 PROCEDURE — 272N000518 CT LUNG MEDIASTINUM BIOPSY: Mod: MG

## 2021-03-12 PROCEDURE — 88360 TUMOR IMMUNOHISTOCHEM/MANUAL: CPT | Mod: 26 | Performed by: PATHOLOGY

## 2021-03-12 PROCEDURE — 88341 IMHCHEM/IMCYTCHM EA ADD ANTB: CPT | Mod: TC | Performed by: RADIOLOGY

## 2021-03-12 PROCEDURE — 85049 AUTOMATED PLATELET COUNT: CPT

## 2021-03-12 PROCEDURE — 88161 CYTOPATH SMEAR OTHER SOURCE: CPT | Mod: TC | Performed by: RADIOLOGY

## 2021-03-12 PROCEDURE — 999N000065 XR CHEST 1 VW

## 2021-03-12 PROCEDURE — 88360 TUMOR IMMUNOHISTOCHEM/MANUAL: CPT | Mod: TC | Performed by: RADIOLOGY

## 2021-03-12 PROCEDURE — 999N000154 HC STATISTIC RADIOLOGY XRAY, US, CT, MAR, NM

## 2021-03-12 PROCEDURE — 250N000009 HC RX 250: Performed by: PHYSICIAN ASSISTANT

## 2021-03-12 PROCEDURE — 88305 TISSUE EXAM BY PATHOLOGIST: CPT | Mod: TC | Performed by: RADIOLOGY

## 2021-03-12 PROCEDURE — 88342 IMHCHEM/IMCYTCHM 1ST ANTB: CPT | Mod: 26 | Performed by: PATHOLOGY

## 2021-03-12 PROCEDURE — 88161 CYTOPATH SMEAR OTHER SOURCE: CPT | Mod: 26 | Performed by: PATHOLOGY

## 2021-03-12 PROCEDURE — 88173 CYTOPATH EVAL FNA REPORT: CPT | Mod: 26 | Performed by: PATHOLOGY

## 2021-03-12 RX ORDER — NALOXONE HYDROCHLORIDE 0.4 MG/ML
0.2 INJECTION, SOLUTION INTRAMUSCULAR; INTRAVENOUS; SUBCUTANEOUS
Status: DISCONTINUED | OUTPATIENT
Start: 2021-03-12 | End: 2021-03-12 | Stop reason: HOSPADM

## 2021-03-12 RX ORDER — LIDOCAINE 40 MG/G
CREAM TOPICAL
Status: DISCONTINUED | OUTPATIENT
Start: 2021-03-12 | End: 2021-03-12 | Stop reason: HOSPADM

## 2021-03-12 RX ORDER — NALOXONE HYDROCHLORIDE 0.4 MG/ML
0.4 INJECTION, SOLUTION INTRAMUSCULAR; INTRAVENOUS; SUBCUTANEOUS
Status: DISCONTINUED | OUTPATIENT
Start: 2021-03-12 | End: 2021-03-12 | Stop reason: HOSPADM

## 2021-03-12 RX ORDER — FLUMAZENIL 0.1 MG/ML
0.2 INJECTION, SOLUTION INTRAVENOUS
Status: DISCONTINUED | OUTPATIENT
Start: 2021-03-12 | End: 2021-03-12 | Stop reason: HOSPADM

## 2021-03-12 RX ORDER — FENTANYL CITRATE 50 UG/ML
25-50 INJECTION, SOLUTION INTRAMUSCULAR; INTRAVENOUS EVERY 5 MIN PRN
Status: DISCONTINUED | OUTPATIENT
Start: 2021-03-12 | End: 2021-03-12 | Stop reason: HOSPADM

## 2021-03-12 RX ADMIN — LIDOCAINE HYDROCHLORIDE 6 ML: 10 INJECTION, SOLUTION EPIDURAL; INFILTRATION; INTRACAUDAL; PERINEURAL at 09:30

## 2021-03-12 RX ADMIN — MIDAZOLAM HYDROCHLORIDE 1 MG: 1 INJECTION, SOLUTION INTRAMUSCULAR; INTRAVENOUS at 09:40

## 2021-03-12 RX ADMIN — FENTANYL CITRATE 50 MCG: 50 INJECTION, SOLUTION INTRAMUSCULAR; INTRAVENOUS at 09:40

## 2021-03-12 NOTE — PROCEDURES
[facility]    Procedure: Left lung biopsy.    Date/Time: 3/12/2021 9:57 AM  Performed by: Sandi Chavez DO  Authorized by: Sandi Chavez DO     UNIVERSAL PROTOCOL   Site Marked: Yes  Prior Images Obtained and Reviewed:  Yes  Required items: Required blood products, implants, devices and special equipment available    Patient identity confirmed:  Verbally with patient, arm band, provided demographic data and hospital-assigned identification number  Patient was reevaluated immediately before administering moderate or deep sedation or anesthesia  Confirmation Checklist:  Patient's identity using two indicators, relevant allergies, procedure was appropriate and matched the consent or emergent situation and correct equipment/implants were available  Time out: Immediately prior to the procedure a time out was called    Universal Protocol: the Joint Commission Universal Protocol was followed    Preparation: Patient was prepped and draped in usual sterile fashion           ANESTHESIA    Anesthesia: Local infiltration  Local Anesthetic:  Lidocaine 1% without epinephrine      SEDATION    Patient Sedated: Yes    Sedation Type:  Moderate (conscious) sedation  Vital signs: Vital signs monitored during sedation    See dictated procedure note for full details.  Findings: Left lung biopsy.     Specimens: none and core needle biopsy specimens sent for pathological analysis    Complications: None    Condition: Stable    PROCEDURE   Patient Tolerance:  Patient tolerated the procedure well with no immediate complications    Length of time physician/provider present for 1:1 monitoring during sedation: 15

## 2021-03-12 NOTE — PROGRESS NOTES
Care Suites Discharge Nursing Note    Patient Information  Name: tK Rasmussen  Age: 60 year old    Discharge Education:  Discharge instructions reviewed: Yes  Additional education/resources provided: No   Patient/patient representative verbalizes understanding: Yes  Patient discharging on new medications: No  Medication education completed: N/A    Discharge Plans:   Discharge location: home  Discharge ride contacted: Yes  Approximate discharge time: 1310    Discharge Criteria:  Discharge criteria met and vital signs stable: Yes    Patient Belongs:  Patient belongings returned to patient: Yes    Melissa Phipps RN

## 2021-03-12 NOTE — PROGRESS NOTES
Care Suites Procedure Nursing Note    Patient Information  Name: Kt Rasmussen  Age: 60 year old    Procedure: Biopsy: PA obtained consent and completed sedation exam. Patient taken via cart to CT for image guided lung biopsy. MD arrive to explain procedure to pt and answer any questions. Time Out done at 0938. Pt was monitored with BP and O2 sats throughout procedure. Total sedation given - 50 mcg Fentanyl and 1 mg Versed. O2 2L NC in place while sedated. Multiple cores obtained & sent to lab for Quick Path results. Bandaid applied to site after cleaning- C/D/I. 1000- Pt back to Care Suites 10 per cart.  Procedure start time: 0910  Procedure complete time: 0953  Concerns/abnormal assessment: small pneumo, CXR ordered for 2 hours post procedure  If abnormal assessment, provider notified: Yes  Plan/Other: obtain results of CXR before discharge    Melissa Phipps RN

## 2021-03-12 NOTE — PRE-PROCEDURE
GENERAL PRE-PROCEDURE:   Procedure:  Image guided lung biopsy with procedural sedation  Date/Time:  3/12/2021 8:59 AM    Written consent obtained?: Yes    Risks and benefits: Risks, benefits and alternatives were discussed    Consent given by:  Patient  Patient states understanding of procedure being performed: Yes    Patient's understanding of procedure matches consent: Yes    Procedure consent matches procedure scheduled: Yes    Expected level of sedation:  Moderate  Appropriately NPO:  Yes  ASA Class:  Class 2- mild systemic disease, no acute problems, no functional limitations  Mallampati  :  Grade 2- soft palate, base of uvula, tonsillar pillars, and portion of posterior pharyngeal wall visible  Lungs:  Lungs clear with good breath sounds bilaterally  Heart:  Normal heart sounds and rate  History & Physical reviewed:  History and physical reviewed and no updates needed  Statement of review:  I have reviewed the lab findings, diagnostic data, medications, and the plan for sedation

## 2021-03-12 NOTE — PROGRESS NOTES
Care Suites Admission Nursing Note    Patient Information  Name: Kt Rasmussen  Age: 60 year old  Reason for admission: Biopsy  Care Suites arrival time: 0725    Patient Admission/Assessment   Pre-procedure assessment complete: Yes  If abnormal assessment/labs, provider notified: N/A  NPO: Yes  Medications held per instructions/orders: Yes  Consent: deferred  If applicable, pregnancy test status: deferred  Patient oriented to room: Yes  Education/questions answered: Yes  Plan/other: To CT for planned biopsy    Discharge Planning  Discharge name/phone number: Ari [phone]  Overnight post sedation caregiver: same  Discharge location: home    PATIENT/VISITOR WELLNESS SCREENING    Step 1 Patient Screening    1. In the last month, have you been in contact with someone who was confirmed or suspected to have Coronavirus/COVID-19? No    2. Do you have the following symptoms?  Fever/Chills? No   Cough? No   Shortness of breath? No   New loss of taste or smell? No  Sore throat? No  Muscle or body aches? No  Headaches? No  Fatigue? No  Vomiting or diarrhea? No    Melissa Phipps, JAYASHREE

## 2021-03-12 NOTE — DISCHARGE INSTRUCTIONS
Lung Biopsy Discharge Instructions     After you go home:      You may resume your normal diet    Have an adult stay with you for 6 hours if you received sedation       For 24 hours - due to the sedation you received:    Relax and take it easy    Do NOT make any important or legal decisions    Do NOT drive or operate machines at home or at work    Do NOT drink alcohol    Care of Puncture Site:      For the first 48 hrs, check your puncture site every couple hours while you are awake     You may remove/change the bandaid tomorrow    You may shower tomorrow    No tub baths, whirlpools or swimming until your puncture site has fully healed    Activity       You may go back to normal activity in 24 hours     Wait 48 hours before lifting, straining, exercise or other strenuous activity    Medicines:      You may resume all medications    For minor pain, you may take Acetaminophen (Tylenol) or Ibuprofen (Advil)            Call the provider who ordered this test if:      Increased pain or a large or growing hard lump around the site    Blood or fluid is draining from the site    The site is red, swollen, hot or tender    Chills or a fever greater than 101 F (38 C)    Pain that is getting worse    Shortness of breath    Any questions or concerns    Call  911 or go to the Emergency Room if:      Severe chest pain or trouble breathing    Increased blood in your sputum (phlegm)    Bleeding that you cannot control        If you have questions call:          [facility] Radiology Dept @ [phone]      The provider who performed your procedure was _Dr Chavez________________.

## 2021-03-17 ENCOUNTER — TELEPHONE (OUTPATIENT)
Dept: ONCOLOGY | Facility: CLINIC | Age: 61
End: 2021-03-17

## 2021-03-17 NOTE — TELEPHONE ENCOUNTER
Kt's pathology is not back yet. Dr. John contacted pathologist Dr. Lewis and pathology is expected back later this afternoon. Per Dr. Alvaro Meraz's virtual appointment to discuss pathology will be moved to 3/18/21 at 0110 PM. Called Kt and he is aware. Message will be forwarded to scheduling so that appointment can be changed.

## 2021-03-18 ENCOUNTER — VIRTUAL VISIT (OUTPATIENT)
Dept: ONCOLOGY | Facility: CLINIC | Age: 61
End: 2021-03-18
Attending: INTERNAL MEDICINE
Payer: MEDICARE

## 2021-03-18 DIAGNOSIS — C79.51 CANCER, METASTATIC TO BONE (H): ICD-10-CM

## 2021-03-18 DIAGNOSIS — C78.02 MALIGNANT NEOPLASM METASTATIC TO BOTH LUNGS (H): ICD-10-CM

## 2021-03-18 DIAGNOSIS — C34.90 NON-SMALL CELL CARCINOMA OF LUNG, STAGE 3, UNSPECIFIED LATERALITY (H): Primary | ICD-10-CM

## 2021-03-18 DIAGNOSIS — R59.1 LYMPHADENOPATHY: ICD-10-CM

## 2021-03-18 DIAGNOSIS — C78.01 MALIGNANT NEOPLASM METASTATIC TO BOTH LUNGS (H): ICD-10-CM

## 2021-03-18 DIAGNOSIS — C32.9 LARYNX CANCER (H): ICD-10-CM

## 2021-03-18 PROCEDURE — 99213 OFFICE O/P EST LOW 20 MIN: CPT | Mod: 95 | Performed by: INTERNAL MEDICINE

## 2021-03-18 PROCEDURE — 999N001193 HC VIDEO/TELEPHONE VISIT; NO CHARGE

## 2021-03-18 ASSESSMENT — PAIN SCALES - GENERAL: PAINLEVEL: NO PAIN (0)

## 2021-03-18 NOTE — LETTER
"    3/18/2021         RE: Kt Rasmussen  [id number]  [facility] [address]        Dear Colleague,    Thank you for referring your patient, Kt Rasmussen, to the [facility]. Please see a copy of my visit note below.    Kt is a 60 year old who is being evaluated via a billable telephone visit.      What phone number would you like to be contacted at? [phone]    How would you like to obtain your AVS? Mail a copy  Phone call duration: 5 minutes      [facility]    Hematology/Oncology Established Patient Follow-up Note      Today's Date: 3/18/2021    Reason for Follow-up: Lung squamous cell carcinoma.    Kt Rasmussen is a 60 year old male who is being evaluated via a billable telephone visit.      The patient has been notified of following:     \"This telephone visit will be conducted via a call between you and your physician/provider. We have found that certain health care needs can be provided without the need for a physical exam.  This service lets us provide the care you need with a short phone conversation during the COVID-19 pandemic.  If a prescription is necessary we can send it directly to your pharmacy.  If lab work is needed we can place an order for that and you can then stop by our lab to have the test done at a later time.    Telephone visits are billed at different rates depending on your insurance coverage. During this emergency period, for some insurers they may be billed the same as an in-person visit.  Please reach out to your insurance provider with any questions.    If during the course of the call the physician/provider feels a telephone visit is not appropriate, you will not be charged for this service.\"    Patient has given verbal consent for Telephone visit?  Yes    How would you like to obtain your AVS? Mail a copy    Phone visit duration: 6 minutes     HISTORY OF PRESENT ILLNESS: Kt Rasmussen is a 60 year old male who presents with " the following oncologic history:  1. 5/05/2016: Presented with near-obstructing large mass coming off the cheikh and likely the posterior wall, seen on bronchoscopy. Biopsy of the mass showed invasive squamous cell carcinoma, moderately differentiating and focally keratinizing.  Procedure was complicated by significant bleeding.  Tumor was completely debulked (via bronchoscopy) in both main stem bronchi and distal trachea. NGS showed no mutations in EGFR, KRAS, BRAF, NRAS, HRAS, PIK3CA, ERBB2, MET, or JAK2.  2. 5/23/2016: PET/CT scan showed evidence of residual tumor with decreased endobronchial mass. Indeterminate, mildly hypermetabolic mesentery with prominent lymph nodes and area of enhancement of the right hepatic lobe present.   Felt to have T4-NX-M0 disease.  3. 6/09/2016: Started concurrent chemoradiation with weekly paclitaxel and carboplatin.  4. 7/30/2016: Completed radiation.  Subsequently received 2 cycles of consolidation paclitaxel and carboplatin.   5. 9/13/2019: Presented with 6-month history of dysphonia and left vocal exophytic lesion. Left true vocal fold biopsy showed at least squamous cell carcinoma in situ, cannot exclude superficial invasion.  6. 9/30/2019: Excision of left vocal cord mass showed fragments of invasive squamous cell carcinoma, well differentiated and keratinizing.  Margins negative for malignancy.  7. 2/16/2021: CT chest w/o contrast showed thin-walled cavity in left lower lobe with 2 solid peripheral nodules measuring 9 mm each. No lymphadenopathy.  8. 3/01/2021: PET scan showed hypermetabolic nodules in left lower lobe and right lung, consistent with metastases; hypermetabolic adenopathy in chest, abdomen, pelvis; nonspecific uptake at tongue; hypermetabolic intraosseous lesions in spine and pelvis consistent with metastatic disease; left axillary hypermetabolic lymph nodes related to COVID-19 vaccination.  9. 3/12/2021: CT-guided lung biopsy showed preliminary primary lung  carcinoma (not squamous), possibly neuroendocrine carcinoma, stains pending.    INTERIM HISTORY:  Kt has no new complaints since last recent visit. He coughs once in a while, but this is per his usual.    REVIEW OF SYSTEMS:   14 point ROS was reviewed and is negative other than as noted above in HPI.       HOME MEDICATIONS:  Current Outpatient Medications   Medication Sig Dispense Refill     atorvastatin (LIPITOR) 40 MG tablet Take 40 mg by mouth daily           ALLERGIES:  Allergies   Allergen Reactions     No Known Drug Allergies          PAST MEDICAL HISTORY:  Past Medical History:   Diagnosis Date     Alcohol abuse, unspecified      Cerebral infarction (H)     2009, right side residual and aphasia     Dyslipidemia      GERD (gastroesophageal reflux disease)      Lung cancer (H)      Unspecified essential hypertension          PAST SURGICAL HISTORY:  Past Surgical History:   Procedure Laterality Date     BRONCHOSCOPY FLEXIBLE AND RIGID N/A 5/5/2016    Procedure: BRONCHOSCOPY FLEXIBLE AND RIGID;  Surgeon: Tony Talbot MD;  Location: [facility] OR     ESOPHAGOSCOPY FLEXIBLE N/A 9/30/2019    Procedure: flexible esophagoscopy;  Surgeon: Lizzy Johnson MD;  Location: [facility] OR     INJECT STEROID (LOCATION) N/A 9/30/2019    Procedure: steroid injection;  Surgeon: Lizzy Johnson MD;  Location: [facility] OR     LASER CO2 LARYNGOSCOPY N/A 9/30/2019    Procedure: Microdirect laryngoscopy with excision of laryngeal mass, CO2 laser;  Surgeon: Lizzy Johnson MD;  Location: [facility] OR     ZZC NONSPECIFIC PROCEDURE      R tympanoplasty         SOCIAL HISTORY:  Social History     Socioeconomic History     Marital status: Single     Spouse name: Not on file     Number of children: Not on file     Years of education: Not on file     Highest education level: Not on file   Occupational History     Not on file   Social Needs     Financial resource strain: Not on file     Food insecurity     Worry: Not on file     Inability: Not on file      Transportation needs     Medical: Not on file     Non-medical: Not on file   Tobacco Use     Smoking status: Former Smoker     Packs/day: 1.00     Types: Cigarettes     Quit date: 2009     Years since quittin.4     Smokeless tobacco: Never Used   Substance and Sexual Activity     Alcohol use: Not Currently     Drug use: No     Sexual activity: Not on file   Lifestyle     Physical activity     Days per week: Not on file     Minutes per session: Not on file     Stress: Not on file   Relationships     Social connections     Talks on phone: Not on file     Gets together: Not on file     Attends Restorationism service: Not on file     Active member of club or organization: Not on file     Attends meetings of clubs or organizations: Not on file     Relationship status: Not on file     Intimate partner violence     Fear of current or ex partner: Not on file     Emotionally abused: Not on file     Physically abused: Not on file     Forced sexual activity: Not on file   Other Topics Concern     Parent/sibling w/ CABG, MI or angioplasty before 65F 55M? Not Asked   Social History Narrative     Not on file         FAMILY HISTORY:  Family History   Problem Relation Age of Onset     Cancer Father          PHYSICAL EXAM:  Vital signs:  There were no vitals taken for this visit.   Not performed as this was a telephone visit.      LABS:  None new since last visit.    PATHOLOGY:  Reviewed as per HPI.    IMAGING:  None new since prior visit.    ASSESSMENT/PLAN:  Kt Rasmussen is a 60 year old male with the following issues:  1. 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 Metastases to lymph nodes, bones, and bilateral lungs, suspect lung primary  -PET scan  from 3/1/2021 showed hypermetabolic nodules in left lower lobe and right lung, consistent with metastases; hypermetabolic adenopathy in chest, abdomen, pelvis; nonspecific uptake at tongue;  hypermetabolic intraosseous lesions in spine and pelvis consistent with metastatic disease; left axillary hypermetabolic lymph nodes related to COVID-19 vaccination.  -I reiterated to Kt today that this represents incurable metastatic disease but likely treatable.  He is remarkably currently asymptomatic with respect to his widely metastatic disease.    -After multiple discussions with pathologist, I discussed that his preliminary biopsy pathology is likely a primary lung tumor, favor neuroendocrine carcinoma, but not yet definitive.  This was already forwarded to pathology at the  and no definite consensus yet on the histologic subtype.  Therefore, the case has been forwarded to [facility] pathology.  Discussed with Kt that I would recommend waiting for the [place] pathology read before proceeding with any therapy.  In addition, if confirmed a primary lung cancer, then I would recommend biomarker testing for EGFR, ALK, ROS1, BRAF, NTRK1/2/3, METex14 skipping, RET, and PD-L1. Discussed that if any of these biomarkers come back positive, that he would be a candidate for effective targeted therapy or immunotherapy.    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 Dysphonia  5. Dysphagia  -Kt underwent excision of a left vocal cord SCC on 9/30/2019 and has persistent dysphonia as a result of the lesion and excision.  He also has dysphagia but this is stable and swallowing is manageable.  -Last scope by ENT 1/14/2021 showed no evidence for recurrence.    Sangita John MD  Hematology/Oncology  [facility]      Again, thank you for allowing me to participate in the care of your patient.        Sincerely,        Sangita John MD

## 2021-03-18 NOTE — PROGRESS NOTES
"[facility]    Hematology/Oncology Established Patient Follow-up Note      Today's Date: 3/18/2021    Reason for Follow-up: Lung squamous cell carcinoma.    Kt Rasmussen is a 60 year old male who is being evaluated via a billable telephone visit.      The patient has been notified of following:     \"This telephone visit will be conducted via a call between you and your physician/provider. We have found that certain health care needs can be provided without the need for a physical exam.  This service lets us provide the care you need with a short phone conversation during the COVID-19 pandemic.  If a prescription is necessary we can send it directly to your pharmacy.  If lab work is needed we can place an order for that and you can then stop by our lab to have the test done at a later time.    Telephone visits are billed at different rates depending on your insurance coverage. During this emergency period, for some insurers they may be billed the same as an in-person visit.  Please reach out to your insurance provider with any questions.    If during the course of the call the physician/provider feels a telephone visit is not appropriate, you will not be charged for this service.\"    Patient has given verbal consent for Telephone visit?  Yes    How would you like to obtain your AVS? Mail a copy    Phone visit duration: 6 minutes     HISTORY OF PRESENT ILLNESS: Kt Rasmussen is a 60 year old male who presents with the following oncologic history:  1. 5/05/2016: Presented with near-obstructing large mass coming off the cheikh and likely the posterior wall, seen on bronchoscopy. Biopsy of the mass showed invasive squamous cell carcinoma, moderately differentiating and focally keratinizing.  Procedure was complicated by significant bleeding.  Tumor was completely debulked (via bronchoscopy) in both main stem bronchi and distal trachea. NGS showed no mutations in EGFR, KRAS, BRAF, NRAS, HRAS, PIK3CA, " ERBB2, MET, or JAK2.  2. 5/23/2016: PET/CT scan showed evidence of residual tumor with decreased endobronchial mass. Indeterminate, mildly hypermetabolic mesentery with prominent lymph nodes and area of enhancement of the right hepatic lobe present.   Felt to have T4-NX-M0 disease.  3. 6/09/2016: Started concurrent chemoradiation with weekly paclitaxel and carboplatin.  4. 7/30/2016: Completed radiation.  Subsequently received 2 cycles of consolidation paclitaxel and carboplatin.   5. 9/13/2019: Presented with 6-month history of dysphonia and left vocal exophytic lesion. Left true vocal fold biopsy showed at least squamous cell carcinoma in situ, cannot exclude superficial invasion.  6. 9/30/2019: Excision of left vocal cord mass showed fragments of invasive squamous cell carcinoma, well differentiated and keratinizing.  Margins negative for malignancy.  7. 2/16/2021: CT chest w/o contrast showed thin-walled cavity in left lower lobe with 2 solid peripheral nodules measuring 9 mm each. No lymphadenopathy.  8. 3/01/2021: PET scan showed hypermetabolic nodules in left lower lobe and right lung, consistent with metastases; hypermetabolic adenopathy in chest, abdomen, pelvis; nonspecific uptake at tongue; hypermetabolic intraosseous lesions in spine and pelvis consistent with metastatic disease; left axillary hypermetabolic lymph nodes related to COVID-19 vaccination.  9. 3/12/2021: CT-guided lung biopsy showed preliminary primary lung carcinoma (not squamous), possibly neuroendocrine carcinoma, stains pending.    INTERIM HISTORY:  Kt has no new complaints since last recent visit. He coughs once in a while, but this is per his usual.    REVIEW OF SYSTEMS:   14 point ROS was reviewed and is negative other than as noted above in HPI.       HOME MEDICATIONS:  Current Outpatient Medications   Medication Sig Dispense Refill     atorvastatin (LIPITOR) 40 MG tablet Take 40 mg by mouth daily           ALLERGIES:  Allergies    Allergen Reactions     No Known Drug Allergies          PAST MEDICAL HISTORY:  Past Medical History:   Diagnosis Date     Alcohol abuse, unspecified      Cerebral infarction (H)     , right side residual and aphasia     Dyslipidemia      GERD (gastroesophageal reflux disease)      Lung cancer (H)      Unspecified essential hypertension          PAST SURGICAL HISTORY:  Past Surgical History:   Procedure Laterality Date     BRONCHOSCOPY FLEXIBLE AND RIGID N/A 2016    Procedure: BRONCHOSCOPY FLEXIBLE AND RIGID;  Surgeon: Tony Talbot MD;  Location: [facility] OR     ESOPHAGOSCOPY FLEXIBLE N/A 2019    Procedure: flexible esophagoscopy;  Surgeon: Lizzy Johnson MD;  Location: [facility] OR     INJECT STEROID (LOCATION) N/A 2019    Procedure: steroid injection;  Surgeon: Lizzy Johnson MD;  Location: [facility] OR     LASER CO2 LARYNGOSCOPY N/A 2019    Procedure: Microdirect laryngoscopy with excision of laryngeal mass, CO2 laser;  Surgeon: Lizzy Johnson MD;  Location: [facility] OR     ZZC NONSPECIFIC PROCEDURE      R tympanoplasty         SOCIAL HISTORY:  Social History     Socioeconomic History     Marital status: Single     Spouse name: Not on file     Number of children: Not on file     Years of education: Not on file     Highest education level: Not on file   Occupational History     Not on file   Social Needs     Financial resource strain: Not on file     Food insecurity     Worry: Not on file     Inability: Not on file     Transportation needs     Medical: Not on file     Non-medical: Not on file   Tobacco Use     Smoking status: Former Smoker     Packs/day: 1.00     Types: Cigarettes     Quit date: 2009     Years since quittin.4     Smokeless tobacco: Never Used   Substance and Sexual Activity     Alcohol use: Not Currently     Drug use: No     Sexual activity: Not on file   Lifestyle     Physical activity     Days per week: Not on file     Minutes per session: Not on file     Stress: Not  on file   Relationships     Social connections     Talks on phone: Not on file     Gets together: Not on file     Attends Latter-day service: Not on file     Active member of club or organization: Not on file     Attends meetings of clubs or organizations: Not on file     Relationship status: Not on file     Intimate partner violence     Fear of current or ex partner: Not on file     Emotionally abused: Not on file     Physically abused: Not on file     Forced sexual activity: Not on file   Other Topics Concern     Parent/sibling w/ CABG, MI or angioplasty before 65F 55M? Not Asked   Social History Narrative     Not on file         FAMILY HISTORY:  Family History   Problem Relation Age of Onset     Cancer Father          PHYSICAL EXAM:  Vital signs:  There were no vitals taken for this visit.   Not performed as this was a telephone visit.      LABS:  None new since last visit.    PATHOLOGY:  Reviewed as per HPI.    IMAGING:  None new since prior visit.    ASSESSMENT/PLAN:  Kt Rasmussen is a 60 year old male with the following issues:  1. 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 Metastases to lymph nodes, bones, and bilateral lungs, suspect lung primary  -PET scan  from 3/1/2021 showed hypermetabolic nodules in left lower lobe and right lung, consistent with metastases; hypermetabolic adenopathy in chest, abdomen, pelvis; nonspecific uptake at tongue; hypermetabolic intraosseous lesions in spine and pelvis consistent with metastatic disease; left axillary hypermetabolic lymph nodes related to COVID-19 vaccination.  -I reiterated to Kt today that this represents incurable metastatic disease but likely treatable.  He is remarkably currently asymptomatic with respect to his widely metastatic disease.    -After multiple discussions with pathologist, I discussed that his preliminary biopsy pathology is likely a primary lung tumor,  favor neuroendocrine carcinoma, but not yet definitive.  This was already forwarded to pathology at the  and no definite consensus yet on the histologic subtype.  Therefore, the case has been forwarded to [facility] pathology.  Discussed with Kt that I would recommend waiting for the [place] pathology read before proceeding with any therapy.  In addition, if confirmed a primary lung cancer, then I would recommend biomarker testing for EGFR, ALK, ROS1, BRAF, NTRK1/2/3, METex14 skipping, RET, and PD-L1. Discussed that if any of these biomarkers come back positive, that he would be a candidate for effective targeted therapy or immunotherapy.    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 Dysphonia  5. Dysphagia  -Kt underwent excision of a left vocal cord SCC on 9/30/2019 and has persistent dysphonia as a result of the lesion and excision.  He also has dysphagia but this is stable and swallowing is manageable.  -Last scope by ENT 1/14/2021 showed no evidence for recurrence.    Sangita John MD  Hematology/Oncology  [facility]

## 2021-03-18 NOTE — LETTER
"    3/18/2021         RE: Kt Rasmussen  [id number]  [facility] [address]        Dear Colleague,    Thank you for referring your patient, Kt Rasmussen, to the [facility]. Please see a copy of my visit note below.    Kt is a 60 year old who is being evaluated via a billable telephone visit.      What phone number would you like to be contacted at? [phone]    How would you like to obtain your AVS? Mail a copy  Phone call duration: 5 minutes      [facility]    Hematology/Oncology Established Patient Follow-up Note      Today's Date: 3/18/2021    Reason for Follow-up: Lung squamous cell carcinoma.    Kt Rasmussen is a 60 year old male who is being evaluated via a billable telephone visit.      The patient has been notified of following:     \"This telephone visit will be conducted via a call between you and your physician/provider. We have found that certain health care needs can be provided without the need for a physical exam.  This service lets us provide the care you need with a short phone conversation during the COVID-19 pandemic.  If a prescription is necessary we can send it directly to your pharmacy.  If lab work is needed we can place an order for that and you can then stop by our lab to have the test done at a later time.    Telephone visits are billed at different rates depending on your insurance coverage. During this emergency period, for some insurers they may be billed the same as an in-person visit.  Please reach out to your insurance provider with any questions.    If during the course of the call the physician/provider feels a telephone visit is not appropriate, you will not be charged for this service.\"    Patient has given verbal consent for Telephone visit?  Yes    How would you like to obtain your AVS? Mail a copy    Phone visit duration: 6 minutes     HISTORY OF PRESENT ILLNESS: Kt Rasmussen is a 60 year old male who presents with " the following oncologic history:  1. 5/05/2016: Presented with near-obstructing large mass coming off the cheikh and likely the posterior wall, seen on bronchoscopy. Biopsy of the mass showed invasive squamous cell carcinoma, moderately differentiating and focally keratinizing.  Procedure was complicated by significant bleeding.  Tumor was completely debulked (via bronchoscopy) in both main stem bronchi and distal trachea. NGS showed no mutations in EGFR, KRAS, BRAF, NRAS, HRAS, PIK3CA, ERBB2, MET, or JAK2.  2. 5/23/2016: PET/CT scan showed evidence of residual tumor with decreased endobronchial mass. Indeterminate, mildly hypermetabolic mesentery with prominent lymph nodes and area of enhancement of the right hepatic lobe present.   Felt to have T4-NX-M0 disease.  3. 6/09/2016: Started concurrent chemoradiation with weekly paclitaxel and carboplatin.  4. 7/30/2016: Completed radiation.  Subsequently received 2 cycles of consolidation paclitaxel and carboplatin.   5. 9/13/2019: Presented with 6-month history of dysphonia and left vocal exophytic lesion. Left true vocal fold biopsy showed at least squamous cell carcinoma in situ, cannot exclude superficial invasion.  6. 9/30/2019: Excision of left vocal cord mass showed fragments of invasive squamous cell carcinoma, well differentiated and keratinizing.  Margins negative for malignancy.  7. 2/16/2021: CT chest w/o contrast showed thin-walled cavity in left lower lobe with 2 solid peripheral nodules measuring 9 mm each. No lymphadenopathy.  8. 3/01/2021: PET scan showed hypermetabolic nodules in left lower lobe and right lung, consistent with metastases; hypermetabolic adenopathy in chest, abdomen, pelvis; nonspecific uptake at tongue; hypermetabolic intraosseous lesions in spine and pelvis consistent with metastatic disease; left axillary hypermetabolic lymph nodes related to COVID-19 vaccination.  9. 3/12/2021: CT-guided lung biopsy showed preliminary primary lung  carcinoma (not squamous), possibly neuroendocrine carcinoma, stains pending.    INTERIM HISTORY:  Kt has no new complaints since last recent visit. He coughs once in a while, but this is per his usual.    REVIEW OF SYSTEMS:   14 point ROS was reviewed and is negative other than as noted above in HPI.       HOME MEDICATIONS:  Current Outpatient Medications   Medication Sig Dispense Refill     atorvastatin (LIPITOR) 40 MG tablet Take 40 mg by mouth daily           ALLERGIES:  Allergies   Allergen Reactions     No Known Drug Allergies          PAST MEDICAL HISTORY:  Past Medical History:   Diagnosis Date     Alcohol abuse, unspecified      Cerebral infarction (H)     2009, right side residual and aphasia     Dyslipidemia      GERD (gastroesophageal reflux disease)      Lung cancer (H)      Unspecified essential hypertension          PAST SURGICAL HISTORY:  Past Surgical History:   Procedure Laterality Date     BRONCHOSCOPY FLEXIBLE AND RIGID N/A 5/5/2016    Procedure: BRONCHOSCOPY FLEXIBLE AND RIGID;  Surgeon: Tony Talbot MD;  Location: [facility] OR     ESOPHAGOSCOPY FLEXIBLE N/A 9/30/2019    Procedure: flexible esophagoscopy;  Surgeon: Lizzy Johnson MD;  Location: [facility] OR     INJECT STEROID (LOCATION) N/A 9/30/2019    Procedure: steroid injection;  Surgeon: Lizzy Johnson MD;  Location: [facility] OR     LASER CO2 LARYNGOSCOPY N/A 9/30/2019    Procedure: Microdirect laryngoscopy with excision of laryngeal mass, CO2 laser;  Surgeon: Lizzy Johnson MD;  Location: [facility] OR     ZZC NONSPECIFIC PROCEDURE      R tympanoplasty         SOCIAL HISTORY:  Social History     Socioeconomic History     Marital status: Single     Spouse name: Not on file     Number of children: Not on file     Years of education: Not on file     Highest education level: Not on file   Occupational History     Not on file   Social Needs     Financial resource strain: Not on file     Food insecurity     Worry: Not on file     Inability: Not on file      Transportation needs     Medical: Not on file     Non-medical: Not on file   Tobacco Use     Smoking status: Former Smoker     Packs/day: 1.00     Types: Cigarettes     Quit date: 2009     Years since quittin.4     Smokeless tobacco: Never Used   Substance and Sexual Activity     Alcohol use: Not Currently     Drug use: No     Sexual activity: Not on file   Lifestyle     Physical activity     Days per week: Not on file     Minutes per session: Not on file     Stress: Not on file   Relationships     Social connections     Talks on phone: Not on file     Gets together: Not on file     Attends [employer] service: Not on file     Active member of club or organization: Not on file     Attends meetings of clubs or organizations: Not on file     Relationship status: Not on file     Intimate partner violence     Fear of current or ex partner: Not on file     Emotionally abused: Not on file     Physically abused: Not on file     Forced sexual activity: Not on file   Other Topics Concern     Parent/sibling w/ CABG, MI or angioplasty before 65F 55M? Not Asked   Social History Narrative     Not on file         FAMILY HISTORY:  Family History   Problem Relation Age of Onset     Cancer Father          PHYSICAL EXAM:  Vital signs:  There were no vitals taken for this visit.   Not performed as this was a telephone visit.      LABS:  None new since last visit.    PATHOLOGY:  Reviewed as per HPI.    IMAGING:  None new since prior visit.    ASSESSMENT/PLAN:  Kt Rasmussen is a 60 year old male with the following issues:  1. 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 Metastases to lymph nodes, bones, and bilateral lungs, suspect lung primary  -PET scan  from 3/1/2021 showed hypermetabolic nodules in left lower lobe and right lung, consistent with metastases; hypermetabolic adenopathy in chest, abdomen, pelvis; nonspecific uptake at tongue;  hypermetabolic intraosseous lesions in spine and pelvis consistent with metastatic disease; left axillary hypermetabolic lymph nodes related to COVID-19 vaccination.  -I reiterated to Kt today that this represents incurable metastatic disease but likely treatable.  He is remarkably currently asymptomatic with respect to his widely metastatic disease.    -After multiple discussions with pathologist, I discussed that his preliminary biopsy pathology is likely a primary lung tumor, favor neuroendocrine carcinoma, but not yet definitive.  This was already forwarded to pathology at the  and no definite consensus yet on the histologic subtype.  Therefore, the case has been forwarded to [facility] pathology.  Discussed with Kt that I would recommend waiting for the [place] pathology read before proceeding with any therapy.  In addition, if confirmed a primary lung cancer, then I would recommend biomarker testing for EGFR, ALK, ROS1, BRAF, NTRK1/2/3, METex14 skipping, RET, and PD-L1. Discussed that if any of these biomarkers come back positive, that he would be a candidate for effective targeted therapy or immunotherapy.    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 Dysphonia  5. Dysphagia  -Kt underwent excision of a left vocal cord SCC on 9/30/2019 and has persistent dysphonia as a result of the lesion and excision.  He also has dysphagia but this is stable and swallowing is manageable.  -Last scope by ENT 1/14/2021 showed no evidence for recurrence.    Sangita John MD  Hematology/Oncology  [facility]      Again, thank you for allowing me to participate in the care of your patient.        Sincerely,        Sangita John MD

## 2021-03-18 NOTE — PROGRESS NOTES
Kt is a 60 year old who is being evaluated via a billable telephone visit.      What phone number would you like to be contacted at? [phone]    How would you like to obtain your AVS? Mail a copy  Phone call duration: 5 minutes

## 2021-03-31 ENCOUNTER — TELEPHONE (OUTPATIENT)
Dept: ONCOLOGY | Facility: CLINIC | Age: 61
End: 2021-03-31

## 2021-03-31 DIAGNOSIS — C34.90 STAGE 4 MALIGNANT NEOPLASM OF LUNG, UNSPECIFIED LATERALITY (H): Primary | ICD-10-CM

## 2021-03-31 NOTE — TELEPHONE ENCOUNTER
Sangita John MD Woods, Carrie, RN             Let's reschedule Kt for the following week or 2 and in meantime also obtain brain MRI to complete his staging.     Thanks!   Sangita    Previous Messages    ----- Message -----   From: Trini Morales RN   Sent: 3/31/2021   1:37 PM CDT   To: Sangita John MD   Subject: PDL-1 and  ngs/ Appointment tomorrow             Hello-   I just spoke to pathology. They are hoping that the PDL-1 will be resulted tomorrow, The other will not be.   Would you like Kt to keep his appointment?     Trini

## 2021-04-01 PROCEDURE — 81445 SO NEO GSAP 5-50DNA/DNA&RNA: CPT | Mod: GZ | Performed by: INTERNAL MEDICINE

## 2021-04-01 PROCEDURE — G0452 MOLECULAR PATHOLOGY INTERPR: HCPCS | Mod: 26 | Performed by: PATHOLOGY

## 2021-04-01 NOTE — TELEPHONE ENCOUNTER
Patient has been scheduled for a brain MRI on 4/7 and follow-up with Dr. John rescheduled on 4/15.    NAOMI SmithN, RN, OCN  Oncology Care Coordinator  [facility]

## 2021-04-06 LAB — COPATH REPORT: NORMAL

## 2021-04-07 ENCOUNTER — HOSPITAL ENCOUNTER (OUTPATIENT)
Dept: MRI IMAGING | Facility: CLINIC | Age: 61
Discharge: HOME | End: 2021-04-07
Attending: INTERNAL MEDICINE | Admitting: INTERNAL MEDICINE
Payer: MEDICARE

## 2021-04-07 DIAGNOSIS — C34.90 STAGE 4 MALIGNANT NEOPLASM OF LUNG, UNSPECIFIED LATERALITY (H): ICD-10-CM

## 2021-04-07 LAB
COPATH REPORT: NORMAL
PD-L1 BY IMMUNOHISTOCHEMISTRY: NORMAL

## 2021-04-07 PROCEDURE — 70553 MRI BRAIN STEM W/O & W/DYE: CPT

## 2021-04-07 PROCEDURE — A9585 GADOBUTROL INJECTION: HCPCS | Performed by: INTERNAL MEDICINE

## 2021-04-07 PROCEDURE — 255N000002 HC RX 255 OP 636: Performed by: INTERNAL MEDICINE

## 2021-04-07 RX ORDER — GADOBUTROL 604.72 MG/ML
9 INJECTION INTRAVENOUS ONCE
Status: COMPLETED | OUTPATIENT
Start: 2021-04-07 | End: 2021-04-07

## 2021-04-07 RX ADMIN — GADOBUTROL 9 ML: 604.72 INJECTION INTRAVENOUS at 18:42

## 2021-04-09 LAB — COPATH REPORT: NORMAL

## 2021-04-09 NOTE — PROGRESS NOTES
"[facility]    Hematology/Oncology Established Patient Follow-up Note      Today's Date: 4/15/2021    Reason for Follow-up: Metastatic non-small cell lung carcinoma.    Kt Rasmussen is a 60 year old male who is being evaluated via a billable telephone visit.      The patient has been notified of following:     \"This telephone visit will be conducted via a call between you and your physician/provider. We have found that certain health care needs can be provided without the need for a physical exam.  This service lets us provide the care you need with a short phone conversation during the COVID-19 pandemic.  If a prescription is necessary we can send it directly to your pharmacy.  If lab work is needed we can place an order for that and you can then stop by our lab to have the test done at a later time.    Telephone visits are billed at different rates depending on your insurance coverage. During this emergency period, for some insurers they may be billed the same as an in-person visit.  Please reach out to your insurance provider with any questions.    If during the course of the call the physician/provider feels a telephone visit is not appropriate, you will not be charged for this service.\"    Patient has given verbal consent for Telephone visit?  Yes    How would you like to obtain your AVS? Mail a copy    Phone visit duration: 10 minutes     HISTORY OF PRESENT ILLNESS: Kt Rasmussen is a 60 year old male who presents with the following oncologic history:  1. 5/05/2016: Presented with near-obstructing large mass coming off the cheikh and likely the posterior wall, seen on bronchoscopy. Biopsy of the mass showed invasive squamous cell carcinoma, moderately differentiating and focally keratinizing.  Procedure was complicated by significant bleeding.  Tumor was completely debulked (via bronchoscopy) in both main stem bronchi and distal trachea. NGS showed no mutations in EGFR, KRAS, BRAF, NRAS, " HRAS, PIK3CA, ERBB2, MET, or JAK2.  2. 5/23/2016: PET/CT scan showed evidence of residual tumor with decreased endobronchial mass. Indeterminate, mildly hypermetabolic mesentery with prominent lymph nodes and area of enhancement of the right hepatic lobe present.   Felt to have T4-NX-M0 disease.  3. 6/09/2016: Started concurrent chemoradiation with weekly paclitaxel and carboplatin.  4. 7/30/2016: Completed radiation.  Subsequently received 2 cycles of consolidation paclitaxel and carboplatin.   5. 9/13/2019: Presented with 6-month history of dysphonia and left vocal exophytic lesion. Left true vocal fold biopsy showed at least squamous cell carcinoma in situ, cannot exclude superficial invasion.  6. 9/30/2019: Excision of left vocal cord mass showed fragments of invasive squamous cell carcinoma, well differentiated and keratinizing.  Margins negative for malignancy.  7. 2/16/2021: CT chest w/o contrast showed thin-walled cavity in left lower lobe with 2 solid peripheral nodules measuring 9 mm each. No lymphadenopathy.  8. 3/01/2021: PET scan showed hypermetabolic nodules in left lower lobe and right lung, consistent with metastases; hypermetabolic adenopathy in chest, abdomen, pelvis; nonspecific uptake at tongue; hypermetabolic intraosseous lesions in spine and pelvis consistent with metastatic disease; left axillary hypermetabolic lymph nodes related to COVID-19 vaccination.  9. 3/12/2021: CT-guided lung biopsy showed non-small cell carcinoma, not otherwise specified -- with opinion by [facility] pathologist.  10. 3/18/2021: Lung NGS panel negative for mutations in BRAF, EGFR, ERBB2, IDH1, IDH2, KRAS, MET, NRAS, RET. PD-L1 expression negative (TPS <1%). Due to minimal amount of DNA obtained from specimen, other biomarkers could not be analyzed.  11. 4/7/2021: MRI brain negative for brain metastases.    INTERIM HISTORY:  Kt has no new complaints since last visit.    REVIEW OF SYSTEMS:   14 point ROS was  reviewed and is negative other than as noted above in HPI.       HOME MEDICATIONS:  Current Outpatient Medications   Medication Sig Dispense Refill     atorvastatin (LIPITOR) 40 MG tablet Take 40 mg by mouth daily           ALLERGIES:  Allergies   Allergen Reactions     No Known Drug Allergies          PAST MEDICAL HISTORY:  Past Medical History:   Diagnosis Date     Alcohol abuse, unspecified      Cerebral infarction (H)     , right side residual and aphasia     Dyslipidemia      GERD (gastroesophageal reflux disease)      Lung cancer (H)      Unspecified essential hypertension          PAST SURGICAL HISTORY:  Past Surgical History:   Procedure Laterality Date     BRONCHOSCOPY FLEXIBLE AND RIGID N/A 2016    Procedure: BRONCHOSCOPY FLEXIBLE AND RIGID;  Surgeon: Tony Talbot MD;  Location: [facility] OR     ESOPHAGOSCOPY FLEXIBLE N/A 2019    Procedure: flexible esophagoscopy;  Surgeon: Lizzy Johnson MD;  Location: [facility] OR     INJECT STEROID (LOCATION) N/A 2019    Procedure: steroid injection;  Surgeon: Lizzy Johnson MD;  Location: [facility] OR     LASER CO2 LARYNGOSCOPY N/A 2019    Procedure: Microdirect laryngoscopy with excision of laryngeal mass, CO2 laser;  Surgeon: Lizzy Johnson MD;  Location: [facility] OR     ZZC NONSPECIFIC PROCEDURE      R tympanoplasty         SOCIAL HISTORY:  Social History     Socioeconomic History     Marital status: Single     Spouse name: Not on file     Number of children: Not on file     Years of education: Not on file     Highest education level: Not on file   Occupational History     Not on file   Social Needs     Financial resource strain: Not on file     Food insecurity     Worry: Not on file     Inability: Not on file     Transportation needs     Medical: Not on file     Non-medical: Not on file   Tobacco Use     Smoking status: Former Smoker     Packs/day: 1.00     Types: Cigarettes     Quit date: 2009     Years since quittin.5     Smokeless  tobacco: Never Used   Substance and Sexual Activity     Alcohol use: Not Currently     Drug use: No     Sexual activity: Not on file   Lifestyle     Physical activity     Days per week: Not on file     Minutes per session: Not on file     Stress: Not on file   Relationships     Social connections     Talks on phone: Not on file     Gets together: Not on file     Attends Religion service: Not on file     Active member of club or organization: Not on file     Attends meetings of clubs or organizations: Not on file     Relationship status: Not on file     Intimate partner violence     Fear of current or ex partner: Not on file     Emotionally abused: Not on file     Physically abused: Not on file     Forced sexual activity: Not on file   Other Topics Concern     Parent/sibling w/ CABG, MI or angioplasty before 65F 55M? Not Asked   Social History Narrative     Not on file         FAMILY HISTORY:  Family History   Problem Relation Age of Onset     Cancer Father          PHYSICAL EXAM:  Vital signs:  There were no vitals taken for this visit.   Not performed as this was a telephone visit.      LABS:  None new since last visit.    PATHOLOGY:  Reviewed as per HPI.    IMAGING:  Reviewed as per HPI.    ASSESSMENT/PLAN:  Kt Rasmussen is a 60 year old male with the following issues:  1. Metastatic non-small cell lung carcinoma with metastases to lymph nodes, bones, and bilateral lungs  2. History of locally advanced endobronchial invasive squamous cell carcinoma diagnosed 5/2016, status post debulking and chemoradiation   -PET scan from 3/1/2021 showed hypermetabolic nodules in left lower lobe and right lung, consistent with metastases; hypermetabolic adenopathy in chest, abdomen, pelvis; nonspecific uptake at tongue; hypermetabolic intraosseous lesions in spine and pelvis consistent with metastatic disease.  -Discussed with Kt that the second opinion read of the lung biopsy pathology by [facility] showed metastatic  non-small cell lung carcinoma.   -Further discussed that his lung NGS biomarker testing were all negative and PD-L1 expression was negative. ROS1 and NTRK biomarker analysis could not be performed due to limited amount of DNA extracted from the specimen.  Will consider a repeat biopsy in the future to obtain testing for these biomarkers after 1st line therapy.  -Brain MRI was negative for metastases.  -Discussed 1st line metastatic therapy with paclitaxel, carboplatin, bevacizumab, and atezolizumab and side effects, including but not limited to: pancytopenia, alopecia, peripheral neuropathy, fatigue, fevers, chills, increased risk for infection, weakness, blood clots, bleeding, GI perforation, autoimmune toxicities on any organ in the body from immunotherapy, diarrhea, constipation, high blood pressure, infusion reactions.  Kt agrees to proceed.  Will provide full drug adverse effect information to him.  -Plan to repeat PET scan after first 4 cycles of chemotherapy.    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 Dysphonia  5. Dysphagia  -Kt underwent excision of a left vocal cord SCC on 9/30/2019 and has persistent dysphonia as a result of the lesion and excision.  He also has dysphagia but this is stable and swallowing is manageable.  -Last scope by ENT 1/14/2021 showed no evidence for recurrence.    Sangita John MD  Hematology/Oncology  [facility]    Total time spent: 30 minutes in review of labs, patient evaluation, discussion of plan of care, chemo orders, and documentation.

## 2021-04-15 ENCOUNTER — TELEPHONE (OUTPATIENT)
Dept: ONCOLOGY | Facility: CLINIC | Age: 61
End: 2021-04-15

## 2021-04-15 ENCOUNTER — VIRTUAL VISIT (OUTPATIENT)
Dept: ONCOLOGY | Facility: CLINIC | Age: 61
End: 2021-04-15
Attending: INTERNAL MEDICINE
Payer: MEDICARE

## 2021-04-15 DIAGNOSIS — C78.01 MALIGNANT NEOPLASM METASTATIC TO BOTH LUNGS (H): ICD-10-CM

## 2021-04-15 DIAGNOSIS — C78.02 MALIGNANT NEOPLASM METASTATIC TO BOTH LUNGS (H): ICD-10-CM

## 2021-04-15 DIAGNOSIS — R59.1 LYMPHADENOPATHY: ICD-10-CM

## 2021-04-15 DIAGNOSIS — Z51.11 ENCOUNTER FOR ANTINEOPLASTIC CHEMOTHERAPY: ICD-10-CM

## 2021-04-15 DIAGNOSIS — C79.51 NON-SMALL CELL LUNG CANCER METASTATIC TO BONE (H): Primary | ICD-10-CM

## 2021-04-15 DIAGNOSIS — C34.90 NON-SMALL CELL LUNG CANCER METASTATIC TO BONE (H): Primary | ICD-10-CM

## 2021-04-15 PROCEDURE — 99443 PR PHYSICIAN TELEPHONE EVALUATION 21-30 MIN: CPT | Mod: 95 | Performed by: INTERNAL MEDICINE

## 2021-04-15 ASSESSMENT — PAIN SCALES - GENERAL: PAINLEVEL: NO PAIN (0)

## 2021-04-15 NOTE — PROGRESS NOTES
Kt is a 60 year old who is being evaluated via a billable telephone visit.      What phone number would you like to be contacted at? [phone]    How would you like to obtain your AVS? Mail a copy  Phone call duration: 5 minutes

## 2021-04-15 NOTE — TELEPHONE ENCOUNTER
Kt had a visit with Dr. John to review his next generation sequencing results. Dr. John is recommending port placement, and chemotherapy/Immunotherapy medication  Tecentiq, Avastin, Taxol, and Carboplatin.     Writer contacted patient's guardian Rima Edwards at [phone] and also contacted Kt at his home number listed to get e mail address so that chemotherapy/immunotherapy information,  port information can  E mailed and a time set up to do chemotherapy education. Requested call back at both phone numbers. Writer will follow up on education.

## 2021-04-15 NOTE — PATIENT INSTRUCTIONS
Please mail AVS. Shari Schoenberger, NASIMA    1. Arrange for port placement and lab draw.  2. Arrange for paclitaxel, carboplatin, bevacizumab, atezolizumab every 3 weeks x 5 cycles with lab draw each cycle.  3. RTC NP with cycles 1, 3 , 4  4. RTC MD with cycles 2 and 5.  5. Arrange for PET scan prior to cycle 5 (prior to MD visit).

## 2021-04-15 NOTE — LETTER
"    4/15/2021         RE: Kt Rasmussen  [id number]  [facility] [address]        Dear Colleague,    Thank you for referring your patient, Kt Rasmussen, to the [facility]CER [facility]. Please see a copy of my visit note below.    [facility]    Hematology/Oncology Established Patient Follow-up Note      Today's Date: 4/15/2021    Reason for Follow-up: Metastatic non-small cell lung carcinoma.    Kt Rasmussen is a 60 year old male who is being evaluated via a billable telephone visit.      The patient has been notified of following:     \"This telephone visit will be conducted via a call between you and your physician/provider. We have found that certain health care needs can be provided without the need for a physical exam.  This service lets us provide the care you need with a short phone conversation during the COVID-19 pandemic.  If a prescription is necessary we can send it directly to your pharmacy.  If lab work is needed we can place an order for that and you can then stop by our lab to have the test done at a later time.    Telephone visits are billed at different rates depending on your insurance coverage. During this emergency period, for some insurers they may be billed the same as an in-person visit.  Please reach out to your insurance provider with any questions.    If during the course of the call the physician/provider feels a telephone visit is not appropriate, you will not be charged for this service.\"    Patient has given verbal consent for Telephone visit?  Yes    How would you like to obtain your AVS? Mail a copy    Phone visit duration: 10 minutes     HISTORY OF PRESENT ILLNESS: Kt Rasmussen is a 60 year old male who presents with the following oncologic history:  1. 5/05/2016: Presented with near-obstructing large mass coming off the cheikh and likely the posterior wall, seen on bronchoscopy. Biopsy of the mass showed invasive squamous cell carcinoma, " moderately differentiating and focally keratinizing.  Procedure was complicated by significant bleeding.  Tumor was completely debulked (via bronchoscopy) in both main stem bronchi and distal trachea. NGS showed no mutations in EGFR, KRAS, BRAF, NRAS, HRAS, PIK3CA, ERBB2, MET, or JAK2.  2. 5/23/2016: PET/CT scan showed evidence of residual tumor with decreased endobronchial mass. Indeterminate, mildly hypermetabolic mesentery with prominent lymph nodes and area of enhancement of the right hepatic lobe present.   Felt to have T4-NX-M0 disease.  3. 6/09/2016: Started concurrent chemoradiation with weekly paclitaxel and carboplatin.  4. 7/30/2016: Completed radiation.  Subsequently received 2 cycles of consolidation paclitaxel and carboplatin.   5. 9/13/2019: Presented with 6-month history of dysphonia and left vocal exophytic lesion. Left true vocal fold biopsy showed at least squamous cell carcinoma in situ, cannot exclude superficial invasion.  6. 9/30/2019: Excision of left vocal cord mass showed fragments of invasive squamous cell carcinoma, well differentiated and keratinizing.  Margins negative for malignancy.  7. 2/16/2021: CT chest w/o contrast showed thin-walled cavity in left lower lobe with 2 solid peripheral nodules measuring 9 mm each. No lymphadenopathy.  8. 3/01/2021: PET scan showed hypermetabolic nodules in left lower lobe and right lung, consistent with metastases; hypermetabolic adenopathy in chest, abdomen, pelvis; nonspecific uptake at tongue; hypermetabolic intraosseous lesions in spine and pelvis consistent with metastatic disease; left axillary hypermetabolic lymph nodes related to COVID-19 vaccination.  9. 3/12/2021: CT-guided lung biopsy showed non-small cell carcinoma, not otherwise specified -- [facility].  10. 3/18/2021: Lung NGS panel negative for mutations in BRAF, EGFR, ERBB2, IDH1, IDH2, KRAS, MET, NRAS, RET. PD-L1 expression negative (TPS <1%). Due to  minimal amount of DNA obtained from specimen, other biomarkers could not be analyzed.  11. 4/7/2021: MRI brain negative for brain metastases.    INTERIM HISTORY:  Kt has no new complaints since last visit.    REVIEW OF SYSTEMS:   14 point ROS was reviewed and is negative other than as noted above in HPI.       HOME MEDICATIONS:  Current Outpatient Medications   Medication Sig Dispense Refill     atorvastatin (LIPITOR) 40 MG tablet Take 40 mg by mouth daily           ALLERGIES:  Allergies   Allergen Reactions     No Known Drug Allergies          PAST MEDICAL HISTORY:  Past Medical History:   Diagnosis Date     Alcohol abuse, unspecified      Cerebral infarction (H)     2009, right side residual and aphasia     Dyslipidemia      GERD (gastroesophageal reflux disease)      Lung cancer (H)      Unspecified essential hypertension          PAST SURGICAL HISTORY:  Past Surgical History:   Procedure Laterality Date     BRONCHOSCOPY FLEXIBLE AND RIGID N/A 5/5/2016    Procedure: BRONCHOSCOPY FLEXIBLE AND RIGID;  Surgeon: Tony Talbot MD;  Location: [facility] OR     ESOPHAGOSCOPY FLEXIBLE N/A 9/30/2019    Procedure: flexible esophagoscopy;  Surgeon: Lizzy Johnson MD;  Location: [facility] OR     INJECT STEROID (LOCATION) N/A 9/30/2019    Procedure: steroid injection;  Surgeon: Lizzy Johnson MD;  Location: [facility] OR     LASER CO2 LARYNGOSCOPY N/A 9/30/2019    Procedure: Microdirect laryngoscopy with excision of laryngeal mass, CO2 laser;  Surgeon: Lizzy Johnson MD;  Location: [facility] OR     ZZC NONSPECIFIC PROCEDURE      R tympanoplasty         SOCIAL HISTORY:  Social History     Socioeconomic History     Marital status: Single     Spouse name: Not on file     Number of children: Not on file     Years of education: Not on file     Highest education level: Not on file   Occupational History     Not on file   Social Needs     Financial resource strain: Not on file     Food insecurity     Worry: Not on file     Inability: Not on  file     Transportation needs     Medical: Not on file     Non-medical: Not on file   Tobacco Use     Smoking status: Former Smoker     Packs/day: 1.00     Types: Cigarettes     Quit date: 2009     Years since quittin.5     Smokeless tobacco: Never Used   Substance and Sexual Activity     Alcohol use: Not Currently     Drug use: No     Sexual activity: Not on file   Lifestyle     Physical activity     Days per week: Not on file     Minutes per session: Not on file     Stress: Not on file   Relationships     Social connections     Talks on phone: Not on file     Gets together: Not on file     Attends Anabaptist service: Not on file     Active member of club or organization: Not on file     Attends meetings of clubs or organizations: Not on file     Relationship status: Not on file     Intimate partner violence     Fear of current or ex partner: Not on file     Emotionally abused: Not on file     Physically abused: Not on file     Forced sexual activity: Not on file   Other Topics Concern     Parent/sibling w/ CABG, MI or angioplasty before 65F 55M? Not Asked   Social History Narrative     Not on file         FAMILY HISTORY:  Family History   Problem Relation Age of Onset     Cancer Father          PHYSICAL EXAM:  Vital signs:  There were no vitals taken for this visit.   Not performed as this was a telephone visit.      LABS:  None new since last visit.    PATHOLOGY:  Reviewed as per HPI.    IMAGING:  Reviewed as per HPI.    ASSESSMENT/PLAN:  Kt Rasmussen is a 60 year old male with the following issues:  1. Metastatic non-small cell lung carcinoma with metastases to lymph nodes, bones, and bilateral lungs  2. History of locally advanced endobronchial invasive squamous cell carcinoma diagnosed 2016, status post debulking and chemoradiation   -PET scan from 3/1/2021 showed hypermetabolic nodules in left lower lobe and right lung, consistent with metastases; hypermetabolic adenopathy in chest, abdomen,  pelvis; nonspecific uptake at tongue; hypermetabolic intraosseous lesions in spine and pelvis consistent with metastatic disease.  -Discussed with Kt that the second opinion read of the lung biopsy pathology by [facility] showed metastatic non-small cell lung carcinoma.   -Further discussed that his lung NGS biomarker testing were all negative and PD-L1 expression was negative. ROS1 and NTRK biomarker analysis could not be performed due to limited amount of DNA extracted from the specimen.  Will consider a repeat biopsy in the future to obtain testing for these biomarkers after 1st line therapy.  -Brain MRI was negative for metastases.  -Discussed 1st line metastatic therapy with paclitaxel, carboplatin, bevacizumab, and atezolizumab and side effects, including but not limited to: pancytopenia, alopecia, peripheral neuropathy, fatigue, fevers, chills, increased risk for infection, weakness, blood clots, bleeding, GI perforation, autoimmune toxicities on any organ in the body from immunotherapy, diarrhea, constipation, high blood pressure, infusion reactions.  Kt agrees to proceed.  Will provide full drug adverse effect information to him.  -Plan to repeat PET scan after first 4 cycles of chemotherapy.    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 Dysphonia  5. Dysphagia  -Kt underwent excision of a left vocal cord SCC on 9/30/2019 and has persistent dysphonia as a result of the lesion and excision.  He also has dysphagia but this is stable and swallowing is manageable.  -Last scope by ENT 1/14/2021 showed no evidence for recurrence.    Sangita John MD  Hematology/Oncology  [facility]    Total time spent: 30 minutes in review of labs, patient evaluation, discussion of plan of care, chemo orders, and documentation.    Kt is a 60 year old who is being evaluated via a billable telephone visit.      What phone number would you like to be contacted at? [phone]    How  would you like to obtain your AVS? Mail a copy  Phone call duration: 5 minutes        Again, thank you for allowing me to participate in the care of your patient.        Sincerely,        Sangita John MD

## 2021-04-15 NOTE — LETTER
"    4/15/2021         RE: Kt Rasmussen  [id number]  [facility] [id number]        Dear Colleague,    Thank you for referring your patient, Kt Rasmussen, to the [facility]. Please see a copy of my visit note below.    [facility]    Hematology/Oncology Established Patient Follow-up Note      Today's Date: 4/15/2021    Reason for Follow-up: Metastatic non-small cell lung carcinoma.    Kt Rasmussen is a 60 year old male who is being evaluated via a billable telephone visit.      The patient has been notified of following:     \"This telephone visit will be conducted via a call between you and your physician/provider. We have found that certain health care needs can be provided without the need for a physical exam.  This service lets us provide the care you need with a short phone conversation during the COVID-19 pandemic.  If a prescription is necessary we can send it directly to your pharmacy.  If lab work is needed we can place an order for that and you can then stop by our lab to have the test done at a later time.    Telephone visits are billed at different rates depending on your insurance coverage. During this emergency period, for some insurers they may be billed the same as an in-person visit.  Please reach out to your insurance provider with any questions.    If during the course of the call the physician/provider feels a telephone visit is not appropriate, you will not be charged for this service.\"    Patient has given verbal consent for Telephone visit?  Yes    How would you like to obtain your AVS? Mail a copy    Phone visit duration: 10 minutes     HISTORY OF PRESENT ILLNESS: Kt Rasmussen is a 60 year old male who presents with the following oncologic history:  1. 5/05/2016: Presented with near-obstructing large mass coming off the cheikh and likely the posterior wall, seen on bronchoscopy. Biopsy of the mass showed invasive squamous cell carcinoma, " moderately differentiating and focally keratinizing.  Procedure was complicated by significant bleeding.  Tumor was completely debulked (via bronchoscopy) in both main stem bronchi and distal trachea. NGS showed no mutations in EGFR, KRAS, BRAF, NRAS, HRAS, PIK3CA, ERBB2, MET, or JAK2.  2. 5/23/2016: PET/CT scan showed evidence of residual tumor with decreased endobronchial mass. Indeterminate, mildly hypermetabolic mesentery with prominent lymph nodes and area of enhancement of the right hepatic lobe present.   Felt to have T4-NX-M0 disease.  3. 6/09/2016: Started concurrent chemoradiation with weekly paclitaxel and carboplatin.  4. 7/30/2016: Completed radiation.  Subsequently received 2 cycles of consolidation paclitaxel and carboplatin.   5. 9/13/2019: Presented with 6-month history of dysphonia and left vocal exophytic lesion. Left true vocal fold biopsy showed at least squamous cell carcinoma in situ, cannot exclude superficial invasion.  6. 9/30/2019: Excision of left vocal cord mass showed fragments of invasive squamous cell carcinoma, well differentiated and keratinizing.  Margins negative for malignancy.  7. 2/16/2021: CT chest w/o contrast showed thin-walled cavity in left lower lobe with 2 solid peripheral nodules measuring 9 mm each. No lymphadenopathy.  8. 3/01/2021: PET scan showed hypermetabolic nodules in left lower lobe and right lung, consistent with metastases; hypermetabolic adenopathy in chest, abdomen, pelvis; nonspecific uptake at tongue; hypermetabolic intraosseous lesions in spine and pelvis consistent with metastatic disease; left axillary hypermetabolic lymph nodes related to COVID-19 vaccination.  9. 3/12/2021: CT-guided lung biopsy showed non-small cell carcinoma, not otherwise specified -- with opinion by [facility] pathologist.  10. 3/18/2021: Lung NGS panel negative for mutations in BRAF, EGFR, ERBB2, IDH1, IDH2, KRAS, MET, NRAS, RET. PD-L1 expression negative (TPS <1%). Due to  minimal amount of DNA obtained from specimen, other biomarkers could not be analyzed.  11. 4/7/2021: MRI brain negative for brain metastases.    INTERIM HISTORY:  Kt has no new complaints since last visit.    REVIEW OF SYSTEMS:   14 point ROS was reviewed and is negative other than as noted above in HPI.       HOME MEDICATIONS:  Current Outpatient Medications   Medication Sig Dispense Refill     atorvastatin (LIPITOR) 40 MG tablet Take 40 mg by mouth daily           ALLERGIES:  Allergies   Allergen Reactions     No Known Drug Allergies          PAST MEDICAL HISTORY:  Past Medical History:   Diagnosis Date     Alcohol abuse, unspecified      Cerebral infarction (H)     2009, right side residual and aphasia     Dyslipidemia      GERD (gastroesophageal reflux disease)      Lung cancer (H)      Unspecified essential hypertension          PAST SURGICAL HISTORY:  Past Surgical History:   Procedure Laterality Date     BRONCHOSCOPY FLEXIBLE AND RIGID N/A 5/5/2016    Procedure: BRONCHOSCOPY FLEXIBLE AND RIGID;  Surgeon: Tony Talbot MD;  Location: [facility] OR     ESOPHAGOSCOPY FLEXIBLE N/A 9/30/2019    Procedure: flexible esophagoscopy;  Surgeon: Lizzy Johnson MD;  Location: [facility] OR     INJECT STEROID (LOCATION) N/A 9/30/2019    Procedure: steroid injection;  Surgeon: Lizzy Johnson MD;  Location: [facility] OR     LASER CO2 LARYNGOSCOPY N/A 9/30/2019    Procedure: Microdirect laryngoscopy with excision of laryngeal mass, CO2 laser;  Surgeon: Lizzy Johnson MD;  Location: [facility] OR     ZZC NONSPECIFIC PROCEDURE      R tympanoplasty         SOCIAL HISTORY:  Social History     Socioeconomic History     Marital status: Single     Spouse name: Not on file     Number of children: Not on file     Years of education: Not on file     Highest education level: Not on file   Occupational History     Not on file   Social Needs     Financial resource strain: Not on file     Food insecurity     Worry: Not on file     Inability: Not on  file     Transportation needs     Medical: Not on file     Non-medical: Not on file   Tobacco Use     Smoking status: Former Smoker     Packs/day: 1.00     Types: Cigarettes     Quit date: 2009     Years since quittin.5     Smokeless tobacco: Never Used   Substance and Sexual Activity     Alcohol use: Not Currently     Drug use: No     Sexual activity: Not on file   Lifestyle     Physical activity     Days per week: Not on file     Minutes per session: Not on file     Stress: Not on file   Relationships     Social connections     Talks on phone: Not on file     Gets together: Not on file     Attends Bahai service: Not on file     Active member of club or organization: Not on file     Attends meetings of clubs or organizations: Not on file     Relationship status: Not on file     Intimate partner violence     Fear of current or ex partner: Not on file     Emotionally abused: Not on file     Physically abused: Not on file     Forced sexual activity: Not on file   Other Topics Concern     Parent/sibling w/ CABG, MI or angioplasty before 65F 55M? Not Asked   Social History Narrative     Not on file         FAMILY HISTORY:  Family History   Problem Relation Age of Onset     Cancer Father          PHYSICAL EXAM:  Vital signs:  There were no vitals taken for this visit.   Not performed as this was a telephone visit.      LABS:  None new since last visit.    PATHOLOGY:  Reviewed as per HPI.    IMAGING:  Reviewed as per HPI.    ASSESSMENT/PLAN:  Kt Rasmussen is a 60 year old male with the following issues:  1. Metastatic non-small cell lung carcinoma with metastases to lymph nodes, bones, and bilateral lungs  2. History of locally advanced endobronchial invasive squamous cell carcinoma diagnosed 2016, status post debulking and chemoradiation   -PET scan from 3/1/2021 showed hypermetabolic nodules in left lower lobe and right lung, consistent with metastases; hypermetabolic adenopathy in chest, abdomen,  pelvis; nonspecific uptake at tongue; hypermetabolic intraosseous lesions in spine and pelvis consistent with metastatic disease.  -Discussed with Kt that the second opinion read of the lung biopsy pathology by [facility] showed metastatic non-small cell lung carcinoma.   -Further discussed that his lung NGS biomarker testing were all negative and PD-L1 expression was negative. ROS1 and NTRK biomarker analysis could not be performed due to limited amount of DNA extracted from the specimen.  Will consider a repeat biopsy in the future to obtain testing for these biomarkers after 1st line therapy.  -Brain MRI was negative for metastases.  -Discussed 1st line metastatic therapy with paclitaxel, carboplatin, bevacizumab, and atezolizumab and side effects, including but not limited to: pancytopenia, alopecia, peripheral neuropathy, fatigue, fevers, chills, increased risk for infection, weakness, blood clots, bleeding, GI perforation, autoimmune toxicities on any organ in the body from immunotherapy, diarrhea, constipation, high blood pressure, infusion reactions.  Kt agrees to proceed.  Will provide full drug adverse effect information to him.  -Plan to repeat PET scan after first 4 cycles of chemotherapy.    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 Dysphonia  5. Dysphagia  -Kt underwent excision of a left vocal cord SCC on 9/30/2019 and has persistent dysphonia as a result of the lesion and excision.  He also has dysphagia but this is stable and swallowing is manageable.  -Last scope by ENT 1/14/2021 showed no evidence for recurrence.    Sangita John MD  Hematology/Oncology  [facility]    Total time spent: 30 minutes in review of labs, patient evaluation, discussion of plan of care, chemo orders, and documentation.    Kt is a 60 year old who is being evaluated via a billable telephone visit.      What phone number would you like to be contacted at? [phone]    How  would you like to obtain your AVS? Mail a copy  Phone call duration: 5 minutes        Again, thank you for allowing me to participate in the care of your patient.        Sincerely,        Sangita John MD

## 2021-04-16 NOTE — TELEPHONE ENCOUNTER
Writer called to both Rima (guardian) and Kt to discuss where to send chemo teaching sheets, answer questions and set up teaching time. Have not received call back at this time.      Tari Clarke  Clinic & Infusion RN  [facility]

## 2021-04-19 ENCOUNTER — TELEPHONE (OUTPATIENT)
Dept: ONCOLOGY | Facility: CLINIC | Age: 61
End: 2021-04-19

## 2021-04-19 DIAGNOSIS — C79.51 NON-SMALL CELL LUNG CANCER METASTATIC TO BONE (H): Primary | ICD-10-CM

## 2021-04-19 DIAGNOSIS — C34.90 NON-SMALL CELL LUNG CANCER METASTATIC TO BONE (H): Primary | ICD-10-CM

## 2021-04-19 RX ORDER — LORAZEPAM 0.5 MG/1
0.5 TABLET ORAL EVERY 4 HOURS PRN
Qty: 30 TABLET | Refills: 3 | Status: SHIPPED | OUTPATIENT
Start: 2021-04-21 | End: 2021-10-13

## 2021-04-19 RX ORDER — DEXAMETHASONE 4 MG/1
4 TABLET ORAL 2 TIMES DAILY WITH MEALS
Qty: 6 TABLET | Refills: 3 | Status: SHIPPED | OUTPATIENT
Start: 2021-04-21 | End: 2021-10-13

## 2021-04-19 RX ORDER — PROCHLORPERAZINE MALEATE 10 MG
10 TABLET ORAL EVERY 6 HOURS PRN
Qty: 30 TABLET | Refills: 3 | Status: SHIPPED | OUTPATIENT
Start: 2021-04-21 | End: 2021-10-13

## 2021-04-19 NOTE — TELEPHONE ENCOUNTER
Chemo Teach  Re: Tecentriq, Avastin,Taxol, Carboplatin  treatment plan for diagnosis: lung cancer   -See Pt Education documentation - Chemo Effects (Adult)  -Reviewed treatment schedule including cycle length, lab monitoring and take home medications.    Verbal and written instruction provided using:     [facility] Tecentriq, avastin, taxol. Carboplatin  Drug Information   -Reviewed What Immunotherapy/chemotherapy Is Used For, How Immunotherapy /chemotherapy  is Given, Side Effects, When to Contact Your Doctor of Health Care Provider and Self Care Tips sections.    -Patient was mailed information to his home address on Tecentriq,Avastin. Taxol,Carboplatin. Patient did not wish to have it e mailed to him.   [place] literature:  Information  will be place in folder and patient will be given folder with his blood draw   -Reviewed Getting Ready for Chemotherapy: What to Expect Before, During and After Your Treatment   -Reviewed Tips for Increasing Protein and Calories  -Reviewed Vascular Access Port Implantation with Power Port teaching book/model

## 2021-04-19 NOTE — TELEPHONE ENCOUNTER
Contacted Kt, he requested that his chemotherapy education be mailed to him rather than e mail. Medication information will be mailed to his home address. Kt will be contacted at 0300 PM for chemotherapy education. Aylin Us RN,BSN,OCN

## 2021-04-19 NOTE — TELEPHONE ENCOUNTER
-take home medications were sent to [facility] in [place]. Patient was instructed to  medications and bring them on his first day of chemotherapy.     Aylin Us RN,BSN,OCN

## 2021-04-21 ENCOUNTER — TELEPHONE (OUTPATIENT)
Dept: ONCOLOGY | Facility: CLINIC | Age: 61
End: 2021-04-21

## 2021-04-21 NOTE — TELEPHONE ENCOUNTER
Patient called back and I understood him to say he was aware of appointments. His voice was very hoarse and difficult to understand. He hung up. I did mention the port placement for tomorrow 4/22. Mailed an AVS to patient.

## 2021-04-22 ENCOUNTER — HOSPITAL ENCOUNTER (OUTPATIENT)
Facility: CLINIC | Age: 61
Discharge: HOME | End: 2021-04-22
Attending: RADIOLOGY | Admitting: RADIOLOGY
Payer: MEDICARE

## 2021-04-22 ENCOUNTER — DOCUMENTATION ONLY (OUTPATIENT)
Dept: OTHER | Facility: CLINIC | Age: 61
End: 2021-04-22

## 2021-04-22 ENCOUNTER — APPOINTMENT (OUTPATIENT)
Dept: INTERVENTIONAL RADIOLOGY/VASCULAR | Facility: CLINIC | Age: 61
End: 2021-04-22
Attending: INTERNAL MEDICINE
Payer: MEDICARE

## 2021-04-22 VITALS
TEMPERATURE: 96.3 F | RESPIRATION RATE: 16 BRPM | SYSTOLIC BLOOD PRESSURE: 123 MMHG | OXYGEN SATURATION: 95 % | DIASTOLIC BLOOD PRESSURE: 74 MMHG | HEART RATE: 82 BPM

## 2021-04-22 DIAGNOSIS — C79.51 NON-SMALL CELL LUNG CANCER METASTATIC TO BONE (H): ICD-10-CM

## 2021-04-22 DIAGNOSIS — C34.90 NON-SMALL CELL LUNG CANCER METASTATIC TO BONE (H): ICD-10-CM

## 2021-04-22 LAB
ERYTHROCYTE [DISTWIDTH] IN BLOOD BY AUTOMATED COUNT: 14.1 % (ref 10–15)
HCT VFR BLD AUTO: 39.2 % (ref 40–53)
HGB BLD-MCNC: 12.2 G/DL (ref 13.3–17.7)
INR PPP: 1.02 (ref 0.86–1.14)
LABORATORY COMMENT REPORT: NORMAL
MCH RBC QN AUTO: 28.1 PG (ref 26.5–33)
MCHC RBC AUTO-ENTMCNC: 31.1 G/DL (ref 31.5–36.5)
MCV RBC AUTO: 90 FL (ref 78–100)
PLATELET # BLD AUTO: 141 10E9/L (ref 150–450)
RBC # BLD AUTO: 4.34 10E12/L (ref 4.4–5.9)
SARS-COV-2 RNA RESP QL NAA+PROBE: NEGATIVE
SPECIMEN SOURCE: NORMAL
WBC # BLD AUTO: 4.2 10E9/L (ref 4–11)

## 2021-04-22 PROCEDURE — 250N000011 HC RX IP 250 OP 636: Performed by: RADIOLOGY

## 2021-04-22 PROCEDURE — 87635 SARS-COV-2 COVID-19 AMP PRB: CPT | Performed by: PHYSICIAN ASSISTANT

## 2021-04-22 PROCEDURE — 99152 MOD SED SAME PHYS/QHP 5/>YRS: CPT

## 2021-04-22 PROCEDURE — 36415 COLL VENOUS BLD VENIPUNCTURE: CPT

## 2021-04-22 PROCEDURE — 999N000163 HC STATISTIC SIMPLE TUBE INSERTION/CHARGE, PORT, CATH, FISTULOGRAM

## 2021-04-22 PROCEDURE — 85610 PROTHROMBIN TIME: CPT | Mod: GZ | Performed by: PHYSICIAN ASSISTANT

## 2021-04-22 PROCEDURE — 250N000009 HC RX 250: Performed by: PHYSICIAN ASSISTANT

## 2021-04-22 PROCEDURE — 85027 COMPLETE CBC AUTOMATED: CPT | Performed by: PHYSICIAN ASSISTANT

## 2021-04-22 PROCEDURE — 272N000602 HC WOUND GLUE CR1

## 2021-04-22 PROCEDURE — 272N000196 HC ACCESSORY CR5

## 2021-04-22 PROCEDURE — C1788 PORT, INDWELLING, IMP: HCPCS

## 2021-04-22 PROCEDURE — 36561 INSERT TUNNELED CV CATH: CPT

## 2021-04-22 PROCEDURE — 250N000009 HC RX 250: Performed by: RADIOLOGY

## 2021-04-22 PROCEDURE — 250N000011 HC RX IP 250 OP 636: Performed by: PHYSICIAN ASSISTANT

## 2021-04-22 RX ORDER — LIDOCAINE 40 MG/G
CREAM TOPICAL
Status: DISCONTINUED | OUTPATIENT
Start: 2021-04-22 | End: 2021-04-22 | Stop reason: HOSPADM

## 2021-04-22 RX ORDER — FLUMAZENIL 0.1 MG/ML
0.2 INJECTION, SOLUTION INTRAVENOUS
Status: DISCONTINUED | OUTPATIENT
Start: 2021-04-22 | End: 2021-04-22 | Stop reason: HOSPADM

## 2021-04-22 RX ORDER — NALOXONE HYDROCHLORIDE 0.4 MG/ML
0.4 INJECTION, SOLUTION INTRAMUSCULAR; INTRAVENOUS; SUBCUTANEOUS
Status: DISCONTINUED | OUTPATIENT
Start: 2021-04-22 | End: 2021-04-22 | Stop reason: HOSPADM

## 2021-04-22 RX ORDER — NALOXONE HYDROCHLORIDE 0.4 MG/ML
0.2 INJECTION, SOLUTION INTRAMUSCULAR; INTRAVENOUS; SUBCUTANEOUS
Status: DISCONTINUED | OUTPATIENT
Start: 2021-04-22 | End: 2021-04-22 | Stop reason: HOSPADM

## 2021-04-22 RX ORDER — CEFAZOLIN SODIUM 2 G/100ML
2 INJECTION, SOLUTION INTRAVENOUS
Status: COMPLETED | OUTPATIENT
Start: 2021-04-22 | End: 2021-04-22

## 2021-04-22 RX ORDER — HEPARIN SODIUM (PORCINE) LOCK FLUSH IV SOLN 100 UNIT/ML 100 UNIT/ML
500 SOLUTION INTRAVENOUS ONCE
Status: COMPLETED | OUTPATIENT
Start: 2021-04-22 | End: 2021-04-22

## 2021-04-22 RX ORDER — FENTANYL CITRATE 50 UG/ML
25-50 INJECTION, SOLUTION INTRAMUSCULAR; INTRAVENOUS EVERY 5 MIN PRN
Status: DISCONTINUED | OUTPATIENT
Start: 2021-04-22 | End: 2021-04-22 | Stop reason: HOSPADM

## 2021-04-22 RX ORDER — HEPARIN SODIUM 200 [USP'U]/100ML
1 INJECTION, SOLUTION INTRAVENOUS CONTINUOUS PRN
Status: DISCONTINUED | OUTPATIENT
Start: 2021-04-22 | End: 2021-04-22 | Stop reason: HOSPADM

## 2021-04-22 RX ADMIN — HEPARIN SODIUM (PORCINE) LOCK FLUSH IV SOLN 100 UNIT/ML 400 UNITS: 100 SOLUTION at 16:40

## 2021-04-22 RX ADMIN — FENTANYL CITRATE 50 MCG: 50 INJECTION, SOLUTION INTRAMUSCULAR; INTRAVENOUS at 16:33

## 2021-04-22 RX ADMIN — LIDOCAINE HYDROCHLORIDE 9 ML: 10 INJECTION, SOLUTION INFILTRATION; PERINEURAL at 16:36

## 2021-04-22 RX ADMIN — MIDAZOLAM HYDROCHLORIDE 1 MG: 1 INJECTION, SOLUTION INTRAMUSCULAR; INTRAVENOUS at 16:34

## 2021-04-22 RX ADMIN — CEFAZOLIN SODIUM 2 G: 2 INJECTION, SOLUTION INTRAVENOUS at 15:59

## 2021-04-22 RX ADMIN — LIDOCAINE HYDROCHLORIDE 7 ML: 10; .005 INJECTION, SOLUTION EPIDURAL; INFILTRATION; INTRACAUDAL; PERINEURAL at 16:36

## 2021-04-22 RX ADMIN — SODIUM CHLORIDE 500 ML: 9 INJECTION, SOLUTION INTRAVENOUS at 16:39

## 2021-04-22 NOTE — PROCEDURES
[facility]    Procedure: Port Placement    Date/Time: 4/22/2021 4:53 PM  Performed by: Koko Whitmore MD  Authorized by: Koko Whitmore MD     UNIVERSAL PROTOCOL   Site Marked: Yes  Prior Images Obtained and Reviewed:  Yes  Required items: Required blood products, implants, devices and special equipment available    Patient identity confirmed:  Verbally with patient  Patient was reevaluated immediately before administering moderate or deep sedation or anesthesia  Confirmation Checklist:  Patient's identity using two indicators, relevant allergies, procedure was appropriate and matched the consent or emergent situation and correct equipment/implants were available  Time out: Immediately prior to the procedure a time out was called    Universal Protocol: the Joint Commission Universal Protocol was followed    Preparation: Patient was prepped and draped in usual sterile fashion    ESBL (mL):  5         ANESTHESIA    Anesthesia: Local infiltration  Local Anesthetic:  Lidocaine 1% without epinephrine and lidocaine 1% with epinephrine  Anesthetic Total (mL):  16      SEDATION    Patient Sedated: Yes    Sedation Type:  Moderate (conscious) sedation  Sedation:  Fentanyl, midazolam and see MAR for details  Vital signs: Vital signs monitored during sedation    See dictated procedure note for full details.  PROCEDURE   Patient Tolerance:  Patient tolerated the procedure well with no immediate complications  Describe Procedure: Successful port placement via right IJV.  Port is flushed with heparin and tip is at the cavoatrial junction.  Port is ready to use.    Length of time physician/provider present for 1:1 monitoring during sedation: 20

## 2021-04-22 NOTE — DISCHARGE INSTRUCTIONS
Port Insertion Discharge Instructions     After you go home:      Have an adult stay with you for the first 6 hours    You may resume your normal diet       For 24 hours - due to the sedation you received:    Relax and take it easy    Do NOT make any important or legal decisions    Do NOT drive or operate machines at home or at work    Do NOT drink alcohol    Care of Incision sites:      You have a liquid adhesive covering on your incisions. Do not remove the adhesive residue. It will come off on it's own.    You may shower tomorrow.    You may cover the wound with a bandaid if needed for comfort.      Activity       Avoid heavy lifting (greater than 10 pounds) or the overuse of your shoulder for 3 days    Bleeding:      If you start bleeding from the incision sites in your chest or neck - or have swelling in your neck, sit down and press on the site for 5-10 minutes.     If bleeding has not stopped after 10 minutes, call your provider.        Call 911 right away if you have heavy bleeding or bleeding that does not stop.      Medicines:      You may resume all medications    Resume your Warfarin/Coumadin at your regular dose today. Follow up with your provider to have your INR rechecked    Resume your Platelet Inhibitors and Aspirin tomorrow at your regular dose    For minor pain, you may take Acetaminophen (Tylenol) or Ibuprofen (Advil)    Call the provider who ordered this procedure if:      You have swelling in your neck or over your port site    The incision area is red, swollen, hot or tender    You have chills or a fever greater than 101 F (38 C)    Any questions or concerns    Call  911 or go to the Emergency Room if you have:      Severe chest pain or trouble breathing    Bleeding that you cannot control    Additional Information:      Your port may be accessed right away.     You will need to have your port flushed every 30 days or after each use.      If you have questions call:          [facility]  Radiology Dept @ [phone]      The provider who performed your procedure was _________________.

## 2021-04-22 NOTE — PROGRESS NOTES
Care Suites Post Procedure Note    Patient Information  Name: Kt Rasmussen  Age: 60 year old    Post Procedure  Time patient returned to Care Suites: 1700  Concerns/abnormal assessment: No immediate  If abnormal assessment, provider notified: N/A  Plan/Other: Continue post procedure plan of care.  Port a cath site: dressing CDI.  VS stable.  Taking po fluids well.  Anticipate discharge after 1800 today.  Reinforce discharge instruction, all questions are answered.  Report given to Angelique to monitor pt prior to discharge.    Michael Medina RN     Care Suites Discharge Nursing Note    Patient Information  Name: Kt Rasmussen  Age: 60 year old    Discharge Education:  Discharge instructions reviewed: Yes  Additional education/resources provided: NA  Patient/patient representative verbalizes understanding: Yes  Patient discharging on new medications: No  Medication education completed: N/A    Discharge Plans:   Discharge location: assisted living.  Discharge ride contacted: Yes  Approximate discharge time: 1800     Discharge Criteria:  Discharge criteria met and vital signs stable: Yes    Patient Belongs:  Patient belongings returned to patient: Yes    Michael Medina RN

## 2021-04-22 NOTE — IR NOTE
Interventional Radiology Intra-procedural Nursing Note    Patient Name: Kt Rasmussen  Medical Record Number: [id number]  Today's Date: April 22, 2021    Start Time: 1632  End of procedure time: 1650  Procedure: Port Placement  Report given to: ELVIA Lopez RN  Time pt departs:  1652  : no    Other Notes:  Pt came to IR suite 1 on a cart.  Pt was moved over to the table and was placed in supine position.  Pt was draped and prepped with chlorhexidine soap.  Pt was given fentanyl and versed for comfort.      MEDICATIONS: Last Dose 1634    Fentanyl 50 mcg  Versed 1 mg  Lidocaine 9 ml's  Lido w/EPI 7 ml's  Heparin 500 units    Anatoliy Craig RN

## 2021-04-22 NOTE — PROGRESS NOTES
PATIENT/VISITOR WELLNESS SCREENING    Step 1 Patient Screening    1. In the last month, have you been in contact with someone who was confirmed or suspected to have Coronavirus/COVID-19? No    2. Do you have the following symptoms?  Fever/Chills? No   Cough? No   Shortness of breath? No   New loss of taste or smell? No  Sore throat? No  Muscle or body aches? No  Headaches? No  Fatigue? No  Vomiting or diarrhea? No      Sent rapid Covid test

## 2021-04-22 NOTE — TELEPHONE ENCOUNTER
Called Kt to reming him to  his take home medications at [facility]. He stated that he will pick them up tomorrow. He is having his port placed today. Writer will follow up with him on Monday 4/26/21 to give him his chemotherapy packet. Aylin Us RN,BSN,OCN

## 2021-04-22 NOTE — PROGRESS NOTES
Care Suites Admission Nursing Note    Patient Information  Name: Kt Rasmussen  Age: 60 year old  Reason for admission: Port placement.  Attempting to contact legal guardian who will need to do consent.   Care Suites arrival time: 1230      Patient Admission/Assessment   Pre-procedure assessment complete: Yes  If abnormal assessment/labs, provider notified: pending  NPO: Yes  Medications held per instructions/orders: N/A  Consent: deferred  If applicable, pregnancy test status: n/a  Patient oriented to room: Yes  Education/questions answered: Yes  Plan/other: waiting labs    Discharge Planning  Discharge name/phone number: Ari-friend, confirmed that someone will be with pt after d/c [phone]   post sedation caregiver: Ari  Discharge location: home--assisted living    Ciara Kam RN

## 2021-04-26 ENCOUNTER — VIRTUAL VISIT (OUTPATIENT)
Dept: ONCOLOGY | Facility: CLINIC | Age: 61
End: 2021-04-26
Attending: INTERNAL MEDICINE
Payer: MEDICARE

## 2021-04-26 ENCOUNTER — HOSPITAL ENCOUNTER (OUTPATIENT)
Facility: CLINIC | Age: 61
Setting detail: SPECIMEN
Discharge: HOME | End: 2021-04-26
Attending: INTERNAL MEDICINE | Admitting: INTERNAL MEDICINE
Payer: MEDICARE

## 2021-04-26 ENCOUNTER — INFUSION THERAPY VISIT (OUTPATIENT)
Dept: INFUSION THERAPY | Facility: CLINIC | Age: 61
End: 2021-04-26
Attending: NURSE PRACTITIONER
Payer: MEDICARE

## 2021-04-26 ENCOUNTER — TELEPHONE (OUTPATIENT)
Dept: ONCOLOGY | Facility: CLINIC | Age: 61
End: 2021-04-26

## 2021-04-26 DIAGNOSIS — C78.02 MALIGNANT NEOPLASM METASTATIC TO BOTH LUNGS (H): Primary | ICD-10-CM

## 2021-04-26 DIAGNOSIS — C79.51 NON-SMALL CELL LUNG CANCER METASTATIC TO BONE (H): ICD-10-CM

## 2021-04-26 DIAGNOSIS — Z51.11 ENCOUNTER FOR ANTINEOPLASTIC CHEMOTHERAPY: ICD-10-CM

## 2021-04-26 DIAGNOSIS — C34.90 NON-SMALL CELL CARCINOMA OF LUNG, STAGE 3, UNSPECIFIED LATERALITY (H): ICD-10-CM

## 2021-04-26 DIAGNOSIS — C34.90 NON-SMALL CELL LUNG CANCER METASTATIC TO BONE (H): Primary | ICD-10-CM

## 2021-04-26 DIAGNOSIS — C34.90 NON-SMALL CELL LUNG CANCER METASTATIC TO BONE (H): ICD-10-CM

## 2021-04-26 DIAGNOSIS — C78.01 MALIGNANT NEOPLASM METASTATIC TO BOTH LUNGS (H): Primary | ICD-10-CM

## 2021-04-26 DIAGNOSIS — C79.51 NON-SMALL CELL LUNG CANCER METASTATIC TO BONE (H): Primary | ICD-10-CM

## 2021-04-26 LAB
ALBUMIN SERPL-MCNC: 3.7 G/DL (ref 3.4–5)
ALP SERPL-CCNC: 76 U/L (ref 40–150)
ALT SERPL W P-5'-P-CCNC: 27 U/L (ref 0–70)
ANION GAP SERPL CALCULATED.3IONS-SCNC: 5 MMOL/L (ref 3–14)
AST SERPL W P-5'-P-CCNC: 23 U/L (ref 0–45)
BASOPHILS # BLD AUTO: 0 10E9/L (ref 0–0.2)
BASOPHILS NFR BLD AUTO: 0.5 %
BILIRUB SERPL-MCNC: 0.9 MG/DL (ref 0.2–1.3)
BUN SERPL-MCNC: 18 MG/DL (ref 7–30)
CALCIUM SERPL-MCNC: 8.7 MG/DL (ref 8.5–10.1)
CHLORIDE SERPL-SCNC: 107 MMOL/L (ref 94–109)
CO2 SERPL-SCNC: 28 MMOL/L (ref 20–32)
CREAT SERPL-MCNC: 0.85 MG/DL (ref 0.66–1.25)
DIFFERENTIAL METHOD BLD: ABNORMAL
EOSINOPHIL # BLD AUTO: 0.3 10E9/L (ref 0–0.7)
EOSINOPHIL NFR BLD AUTO: 5.8 %
ERYTHROCYTE [DISTWIDTH] IN BLOOD BY AUTOMATED COUNT: 14.3 % (ref 10–15)
GFR SERPL CREATININE-BSD FRML MDRD: >90 ML/MIN/{1.73_M2}
GLUCOSE SERPL-MCNC: 93 MG/DL (ref 70–99)
HCT VFR BLD AUTO: 40.6 % (ref 40–53)
HGB BLD-MCNC: 12.9 G/DL (ref 13.3–17.7)
IMM GRANULOCYTES # BLD: 0 10E9/L (ref 0–0.4)
IMM GRANULOCYTES NFR BLD: 0.2 %
LYMPHOCYTES # BLD AUTO: 0.9 10E9/L (ref 0.8–5.3)
LYMPHOCYTES NFR BLD AUTO: 20.9 %
MCH RBC QN AUTO: 28.6 PG (ref 26.5–33)
MCHC RBC AUTO-ENTMCNC: 31.8 G/DL (ref 31.5–36.5)
MCV RBC AUTO: 90 FL (ref 78–100)
MONOCYTES # BLD AUTO: 0.4 10E9/L (ref 0–1.3)
MONOCYTES NFR BLD AUTO: 10 %
NEUTROPHILS # BLD AUTO: 2.7 10E9/L (ref 1.6–8.3)
NEUTROPHILS NFR BLD AUTO: 62.6 %
NRBC # BLD AUTO: 0 10*3/UL
NRBC BLD AUTO-RTO: 0 /100
PLATELET # BLD AUTO: 141 10E9/L (ref 150–450)
POTASSIUM SERPL-SCNC: 4 MMOL/L (ref 3.4–5.3)
PROT SERPL-MCNC: 6.9 G/DL (ref 6.8–8.8)
RBC # BLD AUTO: 4.51 10E12/L (ref 4.4–5.9)
SODIUM SERPL-SCNC: 140 MMOL/L (ref 133–144)
TSH SERPL DL<=0.005 MIU/L-ACNC: 2.04 MU/L (ref 0.4–4)
WBC # BLD AUTO: 4.3 10E9/L (ref 4–11)

## 2021-04-26 PROCEDURE — 99443 PR PHYSICIAN TELEPHONE EVALUATION 21-30 MIN: CPT | Mod: 95 | Performed by: NURSE PRACTITIONER

## 2021-04-26 PROCEDURE — 80053 COMPREHEN METABOLIC PANEL: CPT | Performed by: INTERNAL MEDICINE

## 2021-04-26 PROCEDURE — 85025 COMPLETE CBC W/AUTO DIFF WBC: CPT | Performed by: INTERNAL MEDICINE

## 2021-04-26 PROCEDURE — 84443 ASSAY THYROID STIM HORMONE: CPT | Mod: GZ | Performed by: INTERNAL MEDICINE

## 2021-04-26 PROCEDURE — 250N000011 HC RX IP 250 OP 636: Performed by: INTERNAL MEDICINE

## 2021-04-26 PROCEDURE — 999N001193 HC VIDEO/TELEPHONE VISIT; NO CHARGE

## 2021-04-26 PROCEDURE — 36591 DRAW BLOOD OFF VENOUS DEVICE: CPT

## 2021-04-26 RX ORDER — SODIUM CHLORIDE 9 MG/ML
1000 INJECTION, SOLUTION INTRAVENOUS CONTINUOUS PRN
Status: CANCELLED
Start: 2021-04-26

## 2021-04-26 RX ORDER — PALONOSETRON 0.05 MG/ML
0.25 INJECTION, SOLUTION INTRAVENOUS ONCE
Status: CANCELLED
Start: 2021-04-26

## 2021-04-26 RX ORDER — LORAZEPAM 2 MG/ML
0.5 INJECTION INTRAMUSCULAR EVERY 4 HOURS PRN
Status: CANCELLED
Start: 2021-04-26

## 2021-04-26 RX ORDER — EPINEPHRINE 1 MG/ML
0.3 INJECTION, SOLUTION, CONCENTRATE INTRAVENOUS EVERY 5 MIN PRN
Status: CANCELLED | OUTPATIENT
Start: 2021-04-26

## 2021-04-26 RX ORDER — NALOXONE HYDROCHLORIDE 0.4 MG/ML
.1-.4 INJECTION, SOLUTION INTRAMUSCULAR; INTRAVENOUS; SUBCUTANEOUS
Status: CANCELLED | OUTPATIENT
Start: 2021-04-26

## 2021-04-26 RX ORDER — HEPARIN SODIUM,PORCINE 10 UNIT/ML
5 VIAL (ML) INTRAVENOUS
Status: CANCELLED | OUTPATIENT
Start: 2021-04-26

## 2021-04-26 RX ORDER — ALBUTEROL SULFATE 0.83 MG/ML
2.5 SOLUTION RESPIRATORY (INHALATION)
Status: CANCELLED | OUTPATIENT
Start: 2021-04-26

## 2021-04-26 RX ORDER — HEPARIN SODIUM,PORCINE 10 UNIT/ML
5 VIAL (ML) INTRAVENOUS
Status: DISCONTINUED | OUTPATIENT
Start: 2021-04-26 | End: 2021-04-26 | Stop reason: HOSPADM

## 2021-04-26 RX ORDER — ALBUTEROL SULFATE 90 UG/1
1-2 AEROSOL, METERED RESPIRATORY (INHALATION)
Status: CANCELLED
Start: 2021-04-26

## 2021-04-26 RX ORDER — MEPERIDINE HYDROCHLORIDE 25 MG/ML
25 INJECTION INTRAMUSCULAR; INTRAVENOUS; SUBCUTANEOUS EVERY 30 MIN PRN
Status: CANCELLED | OUTPATIENT
Start: 2021-04-26

## 2021-04-26 RX ORDER — HEPARIN SODIUM (PORCINE) LOCK FLUSH IV SOLN 100 UNIT/ML 100 UNIT/ML
5 SOLUTION INTRAVENOUS
Status: CANCELLED | OUTPATIENT
Start: 2021-04-26

## 2021-04-26 RX ORDER — DIPHENHYDRAMINE HYDROCHLORIDE 50 MG/ML
50 INJECTION INTRAMUSCULAR; INTRAVENOUS
Status: CANCELLED
Start: 2021-04-26

## 2021-04-26 RX ORDER — HEPARIN SODIUM (PORCINE) LOCK FLUSH IV SOLN 100 UNIT/ML 100 UNIT/ML
5 SOLUTION INTRAVENOUS
Status: DISCONTINUED | OUTPATIENT
Start: 2021-04-26 | End: 2021-04-26 | Stop reason: HOSPADM

## 2021-04-26 RX ADMIN — Medication 5 ML: at 07:37

## 2021-04-26 NOTE — PROGRESS NOTES
Nursing Note:  Kt Rasmussen presents today for port labs.    Patient seen by provider today: Yes: Juan   present during visit today: Not Applicable.    Note: N/A.    Intravenous Access:  Labs drawn without difficulty.  Implanted Port.    Discharge Plan:   Patient was sent to [facility] for clinic appointment.    Chelsi Doe RN

## 2021-04-26 NOTE — LETTER
"    4/26/2021         RE: Kt Rasmussen  [id number]  [place] [address]        Dear Colleague,    Thank you for referring your patient, Kt Rasmussen, to the [facility]. Please see a copy of my visit note below.    Kt is a 60 year old who is being evaluated via a billable telephone visit.      What phone number would you like to be contacted at?     [phone]    How would you like to obtain your AVS? Mail a copy      The patient has been notified of following:      \"This telephone visit will be conducted via a call between you and your physician/provider. We have found that certain health care needs can be provided without the need for a physical exam.  This service lets us provide the care you need with a short phone conversation during the COVID-19 pandemic.  If a prescription is necessary we can send it directly to your pharmacy.  If lab work is needed we can place an order for that and you can then stop by our lab to have the test done at a later time.     Telephone visits are billed at different rates depending on your insurance coverage. During this emergency period, for some insurers they may be billed the same as an in-person visit.  Please reach out to your insurance provider with any questions.     If during the course of the call the physician/provider feels a telephone visit is not appropriate, you will not be charged for this service.\"     Patient has given verbal consent for Telephone visit?  Yes       Telephone visit 25 minutes        Oncology/Hematology Visit Note  Apr 26, 2021    Reason for Visit: follow up of metastatic non-small cell lung carcinoma  Metastatic to lymph nodes bones in bilateral lungs  Brain MRI negative for mets    Patient met with Dr. John who recommended treatment with palliative intent with paclitaxel carboplatin Avastin and atezolizumab-treatment to start on 04/27      Interval History:  Patient reports he is feeling well.  He denies " fever chills sweats cough shortness of breath chest pain nausea vomiting diarrhea abdominal pain bleeding  Patient reports he has  difficulty swallowing however his reports is stable and swallowing is manageable      Review of Systems:  14 point ROS of systems including Constitutional, Eyes, Respiratory, Cardiovascular, Gastroenterology, Genitourinary, Integumentary, Muscularskeletal, Psychiatric were all negative except for pertinent positives noted in my HPI.    Physical Examination:  Telephone visit.  Patient answers all questions appropriately no audible wheezing or cough    Laboratory Data:  Results for orders placed or performed in visit on 04/26/21 (from the past 24 hour(s))   CBC with platelets differential   Result Value Ref Range    WBC 4.3 4.0 - 11.0 10e9/L    RBC Count 4.51 4.4 - 5.9 10e12/L    Hemoglobin 12.9 (L) 13.3 - 17.7 g/dL    Hematocrit 40.6 40.0 - 53.0 %    MCV 90 78 - 100 fl    MCH 28.6 26.5 - 33.0 pg    MCHC 31.8 31.5 - 36.5 g/dL    RDW 14.3 10.0 - 15.0 %    Platelet Count 141 (L) 150 - 450 10e9/L    Diff Method Automated Method     % Neutrophils 62.6 %    % Lymphocytes 20.9 %    % Monocytes 10.0 %    % Eosinophils 5.8 %    % Basophils 0.5 %    % Immature Granulocytes 0.2 %    Nucleated RBCs 0 0 /100    Absolute Neutrophil 2.7 1.6 - 8.3 10e9/L    Absolute Lymphocytes 0.9 0.8 - 5.3 10e9/L    Absolute Monocytes 0.4 0.0 - 1.3 10e9/L    Absolute Eosinophils 0.3 0.0 - 0.7 10e9/L    Absolute Basophils 0.0 0.0 - 0.2 10e9/L    Abs Immature Granulocytes 0.0 0 - 0.4 10e9/L    Absolute Nucleated RBC 0.0    Comprehensive metabolic panel   Result Value Ref Range    Sodium 140 133 - 144 mmol/L    Potassium 4.0 3.4 - 5.3 mmol/L    Chloride 107 94 - 109 mmol/L    Carbon Dioxide 28 20 - 32 mmol/L    Anion Gap 5 3 - 14 mmol/L    Glucose 93 70 - 99 mg/dL    Urea Nitrogen 18 7 - 30 mg/dL    Creatinine 0.85 0.66 - 1.25 mg/dL    GFR Estimate >90 >60 mL/min/[1.73_m2]    GFR Estimate If Black >90 >60 mL/min/[1.73_m2]     Calcium 8.7 8.5 - 10.1 mg/dL    Bilirubin Total 0.9 0.2 - 1.3 mg/dL    Albumin 3.7 3.4 - 5.0 g/dL    Protein Total 6.9 6.8 - 8.8 g/dL    Alkaline Phosphatase 76 40 - 150 U/L    ALT 27 0 - 70 U/L    AST 23 0 - 45 U/L   TSH with free T4 reflex   Result Value Ref Range    TSH 2.04 0.40 - 4.00 mU/L         Assessment and Plan:      This is a 60-year-old male with    metastatic non-small cell lung carcinoma  Metastatic to lymph nodes bones in bilateral lungs  Brain MRI negative for mets  Patient met with Dr. John who recommended treatment with palliative intent with paclitaxel carboplatin Avastin and atezolizumab-treatment to start on 04/27  Regimen side effects of treatment discussed in detail.  Patient verbalized understanding and agrees to proceed with treatment  Labs reviewed okay to proceed with treatment  Patient has follow-up appointment with Dr. John next cycle  Plan for PET scan after 4 cycles of treatment      Bone mets  We will ask Dr. 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 Although reviewed after completion, some words and grammatical errors may remain.            Again, thank you for allowing me to participate in the care of your patient.        Sincerely,        MADHAVI Moran CNP

## 2021-04-26 NOTE — PROGRESS NOTES
Kt is a 60 year old who is being evaluated via a billable telephone visit.      What phone number would you like to be contacted at?     [phone]    How would you like to obtain your AVS? Mail a copy

## 2021-04-26 NOTE — PROGRESS NOTES
"The patient has been notified of following:      \"This telephone visit will be conducted via a call between you and your physician/provider. We have found that certain health care needs can be provided without the need for a physical exam.  This service lets us provide the care you need with a short phone conversation during the COVID-19 pandemic.  If a prescription is necessary we can send it directly to your pharmacy.  If lab work is needed we can place an order for that and you can then stop by our lab to have the test done at a later time.     Telephone visits are billed at different rates depending on your insurance coverage. During this emergency period, for some insurers they may be billed the same as an in-person visit.  Please reach out to your insurance provider with any questions.     If during the course of the call the physician/provider feels a telephone visit is not appropriate, you will not be charged for this service.\"     Patient has given verbal consent for Telephone visit?  Yes       Telephone visit 25 minutes        Oncology/Hematology Visit Note  Apr 26, 2021    Reason for Visit: follow up of metastatic non-small cell lung carcinoma  Metastatic to lymph nodes bones in bilateral lungs  Brain MRI negative for mets    Patient met with Dr. John who recommended treatment with palliative intent with paclitaxel carboplatin Avastin and atezolizumab-treatment to start on 04/27      Interval History:  Patient reports he is feeling well.  He denies fever chills sweats cough shortness of breath chest pain nausea vomiting diarrhea abdominal pain bleeding  Patient reports he has  difficulty swallowing however his reports is stable and swallowing is manageable      Review of Systems:  14 point ROS of systems including Constitutional, Eyes, Respiratory, Cardiovascular, Gastroenterology, Genitourinary, Integumentary, Muscularskeletal, Psychiatric were all negative except for pertinent positives noted in my " HPI.    Physical Examination:  Telephone visit.  Patient answers all questions appropriately no audible wheezing or cough    Laboratory Data:  Results for orders placed or performed in visit on 04/26/21 (from the past 24 hour(s))   CBC with platelets differential   Result Value Ref Range    WBC 4.3 4.0 - 11.0 10e9/L    RBC Count 4.51 4.4 - 5.9 10e12/L    Hemoglobin 12.9 (L) 13.3 - 17.7 g/dL    Hematocrit 40.6 40.0 - 53.0 %    MCV 90 78 - 100 fl    MCH 28.6 26.5 - 33.0 pg    MCHC 31.8 31.5 - 36.5 g/dL    RDW 14.3 10.0 - 15.0 %    Platelet Count 141 (L) 150 - 450 10e9/L    Diff Method Automated Method     % Neutrophils 62.6 %    % Lymphocytes 20.9 %    % Monocytes 10.0 %    % Eosinophils 5.8 %    % Basophils 0.5 %    % Immature Granulocytes 0.2 %    Nucleated RBCs 0 0 /100    Absolute Neutrophil 2.7 1.6 - 8.3 10e9/L    Absolute Lymphocytes 0.9 0.8 - 5.3 10e9/L    Absolute Monocytes 0.4 0.0 - 1.3 10e9/L    Absolute Eosinophils 0.3 0.0 - 0.7 10e9/L    Absolute Basophils 0.0 0.0 - 0.2 10e9/L    Abs Immature Granulocytes 0.0 0 - 0.4 10e9/L    Absolute Nucleated RBC 0.0    Comprehensive metabolic panel   Result Value Ref Range    Sodium 140 133 - 144 mmol/L    Potassium 4.0 3.4 - 5.3 mmol/L    Chloride 107 94 - 109 mmol/L    Carbon Dioxide 28 20 - 32 mmol/L    Anion Gap 5 3 - 14 mmol/L    Glucose 93 70 - 99 mg/dL    Urea Nitrogen 18 7 - 30 mg/dL    Creatinine 0.85 0.66 - 1.25 mg/dL    GFR Estimate >90 >60 mL/min/[1.73_m2]    GFR Estimate If Black >90 >60 mL/min/[1.73_m2]    Calcium 8.7 8.5 - 10.1 mg/dL    Bilirubin Total 0.9 0.2 - 1.3 mg/dL    Albumin 3.7 3.4 - 5.0 g/dL    Protein Total 6.9 6.8 - 8.8 g/dL    Alkaline Phosphatase 76 40 - 150 U/L    ALT 27 0 - 70 U/L    AST 23 0 - 45 U/L   TSH with free T4 reflex   Result Value Ref Range    TSH 2.04 0.40 - 4.00 mU/L         Assessment and Plan:      This is a 60-year-old male with    metastatic non-small cell lung carcinoma  Metastatic to lymph nodes bones in bilateral  lungs  Brain MRI negative for mets  Patient met with Dr. John who recommended treatment with palliative intent with paclitaxel carboplatin Avastin and atezolizumab-treatment to start on 04/27  Regimen side effects of treatment discussed in detail.  Patient verbalized understanding and agrees to proceed with treatment  Labs reviewed okay to proceed with treatment  Patient has follow-up appointment with Dr. John next cycle  Plan for PET scan after 4 cycles of treatment      Bone mets  We will ask Dr. 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 John -no Zometa to high risk for jaw osteonecrosis       Patient is advised to call our clinic or go to ER in the event of fever chills sweats cough shortness of breath chest pain nausea vomiting diarrhea abdominal pain bleeding edema or any changes in health    MADHAVI Moran CNP  [facility]     Chart documentation with Dragon Voice recognition Software. Although reviewed after completion, some words and grammatical errors may remain.

## 2021-04-27 ENCOUNTER — INFUSION THERAPY VISIT (OUTPATIENT)
Dept: INFUSION THERAPY | Facility: CLINIC | Age: 61
End: 2021-04-27
Attending: INTERNAL MEDICINE
Payer: MEDICARE

## 2021-04-27 VITALS
HEIGHT: 76 IN | DIASTOLIC BLOOD PRESSURE: 66 MMHG | TEMPERATURE: 97.6 F | HEART RATE: 69 BPM | BODY MASS INDEX: 23.5 KG/M2 | SYSTOLIC BLOOD PRESSURE: 102 MMHG | RESPIRATION RATE: 16 BRPM | WEIGHT: 193 LBS

## 2021-04-27 DIAGNOSIS — C34.90 NON-SMALL CELL LUNG CANCER METASTATIC TO BONE (H): ICD-10-CM

## 2021-04-27 DIAGNOSIS — C79.51 NON-SMALL CELL LUNG CANCER METASTATIC TO BONE (H): ICD-10-CM

## 2021-04-27 DIAGNOSIS — Z51.11 ENCOUNTER FOR ANTINEOPLASTIC CHEMOTHERAPY: Primary | ICD-10-CM

## 2021-04-27 PROCEDURE — 96413 CHEMO IV INFUSION 1 HR: CPT

## 2021-04-27 PROCEDURE — 96417 CHEMO IV INFUS EACH ADDL SEQ: CPT

## 2021-04-27 PROCEDURE — 96415 CHEMO IV INFUSION ADDL HR: CPT

## 2021-04-27 PROCEDURE — 250N000011 HC RX IP 250 OP 636: Performed by: INTERNAL MEDICINE

## 2021-04-27 PROCEDURE — 96375 TX/PRO/DX INJ NEW DRUG ADDON: CPT

## 2021-04-27 PROCEDURE — 96367 TX/PROPH/DG ADDL SEQ IV INF: CPT

## 2021-04-27 PROCEDURE — 258N000003 HC RX IP 258 OP 636: Performed by: INTERNAL MEDICINE

## 2021-04-27 RX ORDER — PALONOSETRON 0.05 MG/ML
0.25 INJECTION, SOLUTION INTRAVENOUS ONCE
Status: COMPLETED | OUTPATIENT
Start: 2021-04-27 | End: 2021-04-27

## 2021-04-27 RX ORDER — HEPARIN SODIUM,PORCINE 10 UNIT/ML
5 VIAL (ML) INTRAVENOUS
Status: DISCONTINUED | OUTPATIENT
Start: 2021-04-27 | End: 2021-04-27 | Stop reason: HOSPADM

## 2021-04-27 RX ORDER — HEPARIN SODIUM (PORCINE) LOCK FLUSH IV SOLN 100 UNIT/ML 100 UNIT/ML
5 SOLUTION INTRAVENOUS
Status: DISCONTINUED | OUTPATIENT
Start: 2021-04-27 | End: 2021-04-27 | Stop reason: HOSPADM

## 2021-04-27 RX ADMIN — SODIUM CHLORIDE 250 ML: 9 INJECTION, SOLUTION INTRAVENOUS at 07:49

## 2021-04-27 RX ADMIN — PACLITAXEL 432 MG: 6 INJECTION, SOLUTION INTRAVENOUS at 10:23

## 2021-04-27 RX ADMIN — CARBOPLATIN 800 MG: 10 INJECTION, SOLUTION INTRAVENOUS at 13:30

## 2021-04-27 RX ADMIN — Medication 5 ML: at 14:22

## 2021-04-27 RX ADMIN — BEVACIZUMAB-AWWB 1300 MG: 400 INJECTION, SOLUTION INTRAVENOUS at 08:45

## 2021-04-27 RX ADMIN — PALONOSETRON 0.25 MG: 0.05 INJECTION, SOLUTION INTRAVENOUS at 07:49

## 2021-04-27 RX ADMIN — FOSAPREPITANT: 150 INJECTION, POWDER, LYOPHILIZED, FOR SOLUTION INTRAVENOUS at 08:13

## 2021-04-27 RX ADMIN — ATEZOLIZUMAB 1200 MG: 1200 INJECTION, SOLUTION INTRAVENOUS at 09:19

## 2021-04-27 ASSESSMENT — MIFFLIN-ST. JEOR: SCORE: 1774

## 2021-04-27 NOTE — PROGRESS NOTES
Infusion Nursing Note:  Kt Rasmussen presents today for taxol, carbo, tecentriq, MVASI.    Patient seen by provider: Yes: VV with Juan yesterday   present during visit today: Not Applicable.    Note: C1D1 carbo/taxol/mvasi/tecentriq today, though pt has had carbo/taxol prior. Today is lifetime carbo dose #10.  Patient did not meet criteria for an asymptomatic covid-19 PCR test in infusion today.  Intravenous Access:  Implanted Port.    Treatment Conditions:  Lab Results   Component Value Date    HGB 12.9 04/26/2021     Lab Results   Component Value Date    WBC 4.3 04/26/2021      Lab Results   Component Value Date    ANEU 2.7 04/26/2021     Lab Results   Component Value Date     04/26/2021      Lab Results   Component Value Date     04/26/2021                   Lab Results   Component Value Date    POTASSIUM 4.0 04/26/2021           Lab Results   Component Value Date    MAG 1.9 10/13/2016            Lab Results   Component Value Date    CR 0.85 04/26/2021                   Lab Results   Component Value Date    BEVERLY 8.7 04/26/2021                Lab Results   Component Value Date    BILITOTAL 0.9 04/26/2021           Lab Results   Component Value Date    ALBUMIN 3.7 04/26/2021                    Lab Results   Component Value Date    ALT 27 04/26/2021           Lab Results   Component Value Date    AST 23 04/26/2021       Results reviewed, labs MET treatment parameters, ok to proceed with treatment.      Post Infusion Assessment:  Patient tolerated infusion without incident.  Blood return noted pre and post infusion.  Site patent and intact, free from redness, edema or discomfort.  No evidence of extravasations.  Access discontinued per protocol.       Discharge Plan:   Discharge instructions reviewed with: Patient.  Patient and/or family verbalized understanding of discharge instructions and all questions answered.  Copy of AVS reviewed with patient and/or family.  Patient will return in 3  weeks for next appointment.  Patient discharged in stable condition accompanied by: self.  Departure Mode: Ambulatory.    Chelsi Doe RN

## 2021-04-28 ENCOUNTER — TELEPHONE (OUTPATIENT)
Dept: ONCOLOGY | Facility: CLINIC | Age: 61
End: 2021-04-28

## 2021-04-28 NOTE — TELEPHONE ENCOUNTER
Called Kt to see how he is feeling post-chemotherapy .     Nausea/Vomiting-He denies any nausea/vomiting and states that he is eating/drinking.  He is aware to take his oral Decadron twice daily for the next 3 days.     Diarrhea-Kt denies any diarrhea at this time.    Shortness of Breath-Informed Kt to call clinic right away if he experiences any shortness of breath.     Fatigue-Denies at this time, stated that taking steroids may give him some energy but make it harder to sleep.     Aylin Us RN,BSN,OCN

## 2021-05-04 NOTE — TELEPHONE ENCOUNTER
Called Kt, he stated that he is feeling well. Denies any nausea,vomiting, diarrhea, shortness of breath. He is aware to call clinic if he experiences any issues. Aylin Us RN,BSN,OCN

## 2021-05-11 NOTE — PROGRESS NOTES
"[facility]    Hematology/Oncology Established Patient Follow-up Note      Today's Date: 5/17/2021    Reason for Follow-up: Metastatic non-small cell lung carcinoma.    Kt Rasmussen is a 60 year old male who is being evaluated via a billable telephone visit.      The patient has been notified of following:     \"This telephone visit will be conducted via a call between you and your physician/provider. We have found that certain health care needs can be provided without the need for a physical exam.  This service lets us provide the care you need with a short phone conversation during the COVID-19 pandemic.  If a prescription is necessary we can send it directly to your pharmacy.  If lab work is needed we can place an order for that and you can then stop by our lab to have the test done at a later time.    Telephone visits are billed at different rates depending on your insurance coverage. During this emergency period, for some insurers they may be billed the same as an in-person visit.  Please reach out to your insurance provider with any questions.    If during the course of the call the physician/provider feels a telephone visit is not appropriate, you will not be charged for this service.\"    Patient has given verbal consent for Telephone visit?  Yes    How would you like to obtain your AVS? Mail a copy    Phone visit duration: 7 minutes     HISTORY OF PRESENT ILLNESS: Kt Rasmussen is a 60 year old male who presents with the following oncologic history:  1. 5/05/2016: Presented with near-obstructing large mass coming off the cheikh and likely the posterior wall, seen on bronchoscopy. Biopsy of the mass showed invasive squamous cell carcinoma, moderately differentiating and focally keratinizing.  Procedure was complicated by significant bleeding.  Tumor was completely debulked (via bronchoscopy) in both main stem bronchi and distal trachea. NGS showed no mutations in EGFR, KRAS, BRAF, NRAS, " HRAS, PIK3CA, ERBB2, MET, or JAK2.  2. 5/23/2016: PET/CT scan showed evidence of residual tumor with decreased endobronchial mass. Indeterminate, mildly hypermetabolic mesentery with prominent lymph nodes and area of enhancement of the right hepatic lobe present.   Felt to have T4-NX-M0 disease.  3. 6/09/2016: Started concurrent chemoradiation with weekly paclitaxel and carboplatin.  4. 7/30/2016: Completed radiation.  Subsequently received 2 cycles of consolidation paclitaxel and carboplatin.   5. 9/13/2019: Presented with 6-month history of dysphonia and left vocal exophytic lesion. Left true vocal fold biopsy showed at least squamous cell carcinoma in situ, cannot exclude superficial invasion.  6. 9/30/2019: Excision of left vocal cord mass showed fragments of invasive squamous cell carcinoma, well differentiated and keratinizing.  Margins negative for malignancy.  7. 2/16/2021: CT chest w/o contrast showed thin-walled cavity in left lower lobe with 2 solid peripheral nodules measuring 9 mm each. No lymphadenopathy.  8. 3/01/2021: PET scan showed hypermetabolic nodules in left lower lobe and right lung, consistent with metastases; hypermetabolic adenopathy in chest, abdomen, pelvis; nonspecific uptake at tongue; hypermetabolic intraosseous lesions in spine and pelvis consistent with metastatic disease; left axillary hypermetabolic lymph nodes related to COVID-19 vaccination.  9. 3/12/2021: CT-guided lung biopsy showed non-small cell carcinoma, not otherwise specified -- with opinion by [facility] pathologist.  10. 3/18/2021: Lung NGS panel negative for mutations in BRAF, EGFR, ERBB2, IDH1, IDH2, KRAS, MET, NRAS, RET. PD-L1 expression negative (TPS <1%). Due to minimal amount of DNA obtained from specimen, other biomarkers could not be analyzed.  11. 4/7/2021: MRI brain negative for brain metastases.  12. 4/27/2021: Started 1st line metastatic therapy with carboplatin, paclitaxel, bevacizumab, and atezolizumab  for metastatic non-small cell lung carcinoma.    INTERIM HISTORY:  Kt reports good energy levels and denies any paresthesias or nausea. He denies any epistaxis, hematochezia, or melena. He denies rashes.      REVIEW OF SYSTEMS:   14 point ROS was reviewed and is negative other than as noted above in HPI.       HOME MEDICATIONS:  Current Outpatient Medications   Medication Sig Dispense Refill     atorvastatin (LIPITOR) 40 MG tablet Take 40 mg by mouth daily       dexamethasone (DECADRON) 4 MG tablet Take 1 tablet (4 mg) by mouth 2 times daily (with meals) Take 2 tablets (8 mg) by mouth daily for three days. Start on Day 2 of each cycle. 6 tablet 3     LORazepam (ATIVAN) 0.5 MG tablet Take 1 tablet (0.5 mg) by mouth every 4 hours as needed (Anxiety, Nausea/Vomiting or Sleep) 30 tablet 3     prochlorperazine (COMPAZINE) 10 MG tablet Take 1 tablet (10 mg) by mouth every 6 hours as needed (Nausea/Vomiting) 30 tablet 3         ALLERGIES:  Allergies   Allergen Reactions     No Known Drug Allergies          PAST MEDICAL HISTORY:  Past Medical History:   Diagnosis Date     Alcohol abuse, unspecified      Cerebral infarction (H)     2009, right side residual and aphasia     Dyslipidemia      GERD (gastroesophageal reflux disease)      Lung cancer (H)      Unspecified essential hypertension          PAST SURGICAL HISTORY:  Past Surgical History:   Procedure Laterality Date     BRONCHOSCOPY FLEXIBLE AND RIGID N/A 5/5/2016    Procedure: BRONCHOSCOPY FLEXIBLE AND RIGID;  Surgeon: Tony Talbot MD;  Location: [facility] OR     ESOPHAGOSCOPY FLEXIBLE N/A 9/30/2019    Procedure: flexible esophagoscopy;  Surgeon: Lizzy Johnson MD;  Location: [facility] OR     INJECT STEROID (LOCATION) N/A 9/30/2019    Procedure: steroid injection;  Surgeon: Lizzy Johnson MD;  Location: [facility] OR     IR CHEST PORT PLACEMENT > 5 YRS OF AGE  4/22/2021     LASER CO2 LARYNGOSCOPY N/A 9/30/2019    Procedure: Microdirect laryngoscopy with excision of  laryngeal mass, CO2 laser;  Surgeon: Lizzy Johnson MD;  [facility]     [facility] NONSPECIFIC PROCEDURE      R tympanoplasty         SOCIAL HISTORY:  Social History     Socioeconomic History     Marital status: Single     Spouse name: Not on file     Number of children: Not on file     Years of education: Not on file     Highest education level: Not on file   Occupational History     Not on file   Social Needs     Financial resource strain: Not on file     Food insecurity     Worry: Not on file     Inability: Not on file     Transportation needs     Medical: Not on file     Non-medical: Not on file   Tobacco Use     Smoking status: Former Smoker     Packs/day: 1.00     Types: Cigarettes     Quit date: 2009     Years since quittin.6     Smokeless tobacco: Never Used   Substance and Sexual Activity     Alcohol use: Not Currently     Drug use: No     Sexual activity: Not on file   Lifestyle     Physical activity     Days per week: Not on file     Minutes per session: Not on file     Stress: Not on file   Relationships     Social connections     Talks on phone: Not on file     Gets together: Not on file     Attends Rest[employer] service: Not on file     Active member of club or organization: Not on file     Attends meetings of clubs or organizations: Not on file     Relationship status: Not on file     Intimate partner violence     Fear of current or ex partner: Not on file     Emotionally abused: Not on file     Physically abused: Not on file     Forced sexual activity: Not on file   Other Topics Concern     Parent/sibling w/ CABG, MI or angioplasty before 65F 55M? Not Asked   Social History Narrative     Not on file         FAMILY HISTORY:  Family History   Problem Relation Age of Onset     Cancer Father          PHYSICAL EXAM:  Vital signs:  There were no vitals taken for this visit.   Not performed as this was a telephone visit.      LABS:  None new since last visit.    PATHOLOGY:  Reviewed as per  HPI.    IMAGING:  Reviewed as per HPI.    ASSESSMENT/PLAN:  Kt Rasmussen is a 60 year old male with the following issues:  1. Metastatic non-small cell lung carcinoma with metastases to lymph nodes, bones, and bilateral lungs  2. History of locally advanced endobronchial invasive squamous cell carcinoma diagnosed 5/2016, status post debulking and chemoradiation   -PET scan from 3/1/2021 showed hypermetabolic nodules in left lower lobe and right lung, consistent with metastases; hypermetabolic adenopathy in chest, abdomen, pelvis; nonspecific uptake at tongue; hypermetabolic intraosseous lesions in spine and pelvis consistent with metastatic disease.  -Lung NGS biomarker testing were all negative and PD-L1 expression was negative. ROS1 and NTRK biomarker analysis could not be performed due to limited amount of DNA extracted from the specimen.  Will consider a repeat biopsy in the future to obtain testing for these biomarkers if he progresses on 1st line therapy.  -Brain MRI was negative for metastases.  -He is currently on 1st line metastatic therapy with paclitaxel, carboplatin, bevacizumab, and atezolizumab, tolerating this regimen relatively well with exception of thrombocytopenia after his first cycle.  -Given his thrombocytopenia, I recommended going ahead and reducing his carboplatin dose to AUC = 4 from cycle 2 through 6 but may need to omit carboplatin altogether sooner than that due to anticipated marginal blood count recovery.  -Plan to repeat PET scan after first 4 cycles of chemotherapy.    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 Dysphonia  5. Dysphagia  -Kt underwent excision of a left vocal cord SCC on 9/30/2019 and has persistent dysphonia as a result of the lesion and excision.  He also has dysphagia but this is stable and swallowing is manageable.  -Last scope by ENT 1/14/2021 showed no evidence for recurrence.    Sangita John MD  Hematology/Oncology  [facility]  Physicians

## 2021-05-14 RX ORDER — LORAZEPAM 2 MG/ML
0.5 INJECTION INTRAMUSCULAR EVERY 4 HOURS PRN
Status: CANCELLED
Start: 2021-05-14

## 2021-05-14 RX ORDER — PALONOSETRON 0.05 MG/ML
0.25 INJECTION, SOLUTION INTRAVENOUS ONCE
Status: CANCELLED
Start: 2021-05-14

## 2021-05-14 RX ORDER — ALBUTEROL SULFATE 90 UG/1
1-2 AEROSOL, METERED RESPIRATORY (INHALATION)
Status: CANCELLED
Start: 2021-05-14

## 2021-05-14 RX ORDER — DIPHENHYDRAMINE HYDROCHLORIDE 50 MG/ML
50 INJECTION INTRAMUSCULAR; INTRAVENOUS
Status: CANCELLED
Start: 2021-05-14

## 2021-05-14 RX ORDER — ALBUTEROL SULFATE 0.83 MG/ML
2.5 SOLUTION RESPIRATORY (INHALATION)
Status: CANCELLED | OUTPATIENT
Start: 2021-05-14

## 2021-05-14 RX ORDER — MEPERIDINE HYDROCHLORIDE 25 MG/ML
25 INJECTION INTRAMUSCULAR; INTRAVENOUS; SUBCUTANEOUS EVERY 30 MIN PRN
Status: CANCELLED | OUTPATIENT
Start: 2021-05-14

## 2021-05-14 RX ORDER — NALOXONE HYDROCHLORIDE 0.4 MG/ML
.1-.4 INJECTION, SOLUTION INTRAMUSCULAR; INTRAVENOUS; SUBCUTANEOUS
Status: CANCELLED | OUTPATIENT
Start: 2021-05-14

## 2021-05-14 RX ORDER — SODIUM CHLORIDE 9 MG/ML
1000 INJECTION, SOLUTION INTRAVENOUS CONTINUOUS PRN
Status: CANCELLED
Start: 2021-05-14

## 2021-05-14 RX ORDER — EPINEPHRINE 1 MG/ML
0.3 INJECTION, SOLUTION, CONCENTRATE INTRAVENOUS EVERY 5 MIN PRN
Status: CANCELLED | OUTPATIENT
Start: 2021-05-14

## 2021-05-14 RX ORDER — HEPARIN SODIUM,PORCINE 10 UNIT/ML
5 VIAL (ML) INTRAVENOUS
Status: CANCELLED | OUTPATIENT
Start: 2021-05-14

## 2021-05-14 RX ORDER — HEPARIN SODIUM (PORCINE) LOCK FLUSH IV SOLN 100 UNIT/ML 100 UNIT/ML
5 SOLUTION INTRAVENOUS
Status: CANCELLED | OUTPATIENT
Start: 2021-05-14

## 2021-05-17 ENCOUNTER — VIRTUAL VISIT (OUTPATIENT)
Dept: ONCOLOGY | Facility: CLINIC | Age: 61
End: 2021-05-17
Attending: INTERNAL MEDICINE
Payer: MEDICARE

## 2021-05-17 ENCOUNTER — INFUSION THERAPY VISIT (OUTPATIENT)
Dept: INFUSION THERAPY | Facility: CLINIC | Age: 61
End: 2021-05-17
Attending: NURSE PRACTITIONER
Payer: MEDICARE

## 2021-05-17 ENCOUNTER — HOSPITAL ENCOUNTER (OUTPATIENT)
Facility: CLINIC | Age: 61
Setting detail: SPECIMEN
Discharge: HOME | End: 2021-05-17
Attending: NURSE PRACTITIONER | Admitting: INTERNAL MEDICINE
Payer: MEDICARE

## 2021-05-17 DIAGNOSIS — C34.90 NON-SMALL CELL LUNG CANCER METASTATIC TO BONE (H): Primary | ICD-10-CM

## 2021-05-17 DIAGNOSIS — C34.90 NON-SMALL CELL CARCINOMA OF LUNG, STAGE 3, UNSPECIFIED LATERALITY (H): ICD-10-CM

## 2021-05-17 DIAGNOSIS — C78.01 MALIGNANT NEOPLASM METASTATIC TO BOTH LUNGS (H): ICD-10-CM

## 2021-05-17 DIAGNOSIS — Z51.11 ENCOUNTER FOR ANTINEOPLASTIC CHEMOTHERAPY: ICD-10-CM

## 2021-05-17 DIAGNOSIS — C78.02 MALIGNANT NEOPLASM METASTATIC TO BOTH LUNGS (H): ICD-10-CM

## 2021-05-17 DIAGNOSIS — C79.51 NON-SMALL CELL LUNG CANCER METASTATIC TO BONE (H): Primary | ICD-10-CM

## 2021-05-17 DIAGNOSIS — R49.0 DYSPHONIA: ICD-10-CM

## 2021-05-17 LAB
ALBUMIN SERPL-MCNC: 3.3 G/DL (ref 3.4–5)
ALP SERPL-CCNC: 74 U/L (ref 40–150)
ALT SERPL W P-5'-P-CCNC: 39 U/L (ref 0–70)
ANION GAP SERPL CALCULATED.3IONS-SCNC: 1 MMOL/L (ref 3–14)
AST SERPL W P-5'-P-CCNC: 27 U/L (ref 0–45)
BASOPHILS # BLD AUTO: 0 10E9/L (ref 0–0.2)
BASOPHILS NFR BLD AUTO: 0.2 %
BILIRUB SERPL-MCNC: 0.3 MG/DL (ref 0.2–1.3)
BUN SERPL-MCNC: 24 MG/DL (ref 7–30)
CALCIUM SERPL-MCNC: 9 MG/DL (ref 8.5–10.1)
CHLORIDE SERPL-SCNC: 112 MMOL/L (ref 94–109)
CO2 SERPL-SCNC: 29 MMOL/L (ref 20–32)
CREAT SERPL-MCNC: 0.98 MG/DL (ref 0.66–1.25)
DIFFERENTIAL METHOD BLD: ABNORMAL
EOSINOPHIL # BLD AUTO: 0.1 10E9/L (ref 0–0.7)
EOSINOPHIL NFR BLD AUTO: 2.1 %
ERYTHROCYTE [DISTWIDTH] IN BLOOD BY AUTOMATED COUNT: 13.9 % (ref 10–15)
GFR SERPL CREATININE-BSD FRML MDRD: 82 ML/MIN/{1.73_M2}
GLUCOSE SERPL-MCNC: 99 MG/DL (ref 70–99)
HCT VFR BLD AUTO: 34.1 % (ref 40–53)
HGB BLD-MCNC: 11.2 G/DL (ref 13.3–17.7)
IMM GRANULOCYTES # BLD: 0 10E9/L (ref 0–0.4)
IMM GRANULOCYTES NFR BLD: 0.9 %
LYMPHOCYTES # BLD AUTO: 0.8 10E9/L (ref 0.8–5.3)
LYMPHOCYTES NFR BLD AUTO: 18 %
MCH RBC QN AUTO: 28.9 PG (ref 26.5–33)
MCHC RBC AUTO-ENTMCNC: 32.8 G/DL (ref 31.5–36.5)
MCV RBC AUTO: 88 FL (ref 78–100)
MONOCYTES # BLD AUTO: 0.5 10E9/L (ref 0–1.3)
MONOCYTES NFR BLD AUTO: 10.5 %
NEUTROPHILS # BLD AUTO: 3.2 10E9/L (ref 1.6–8.3)
NEUTROPHILS NFR BLD AUTO: 68.3 %
NRBC # BLD AUTO: 0 10*3/UL
NRBC BLD AUTO-RTO: 0 /100
PLATELET # BLD AUTO: 92 10E9/L (ref 150–450)
POTASSIUM SERPL-SCNC: 4.2 MMOL/L (ref 3.4–5.3)
PROT SERPL-MCNC: 6.6 G/DL (ref 6.8–8.8)
RBC # BLD AUTO: 3.88 10E12/L (ref 4.4–5.9)
SODIUM SERPL-SCNC: 142 MMOL/L (ref 133–144)
TSH SERPL DL<=0.005 MIU/L-ACNC: 2.18 MU/L (ref 0.4–4)
WBC # BLD AUTO: 4.7 10E9/L (ref 4–11)

## 2021-05-17 PROCEDURE — 80053 COMPREHEN METABOLIC PANEL: CPT | Performed by: INTERNAL MEDICINE

## 2021-05-17 PROCEDURE — 99441 PR PHYSICIAN TELEPHONE EVALUATION 5-10 MIN: CPT | Mod: 95 | Performed by: INTERNAL MEDICINE

## 2021-05-17 PROCEDURE — 84443 ASSAY THYROID STIM HORMONE: CPT | Performed by: INTERNAL MEDICINE

## 2021-05-17 PROCEDURE — 250N000011 HC RX IP 250 OP 636: Performed by: INTERNAL MEDICINE

## 2021-05-17 PROCEDURE — 36593 DECLOT VASCULAR DEVICE: CPT

## 2021-05-17 PROCEDURE — 85025 COMPLETE CBC W/AUTO DIFF WBC: CPT | Performed by: INTERNAL MEDICINE

## 2021-05-17 PROCEDURE — 250N000011 HC RX IP 250 OP 636: Performed by: NURSE PRACTITIONER

## 2021-05-17 RX ORDER — HEPARIN SODIUM (PORCINE) LOCK FLUSH IV SOLN 100 UNIT/ML 100 UNIT/ML
5 SOLUTION INTRAVENOUS
Status: DISCONTINUED | OUTPATIENT
Start: 2021-05-17 | End: 2021-05-17 | Stop reason: HOSPADM

## 2021-05-17 RX ORDER — HEPARIN SODIUM,PORCINE 10 UNIT/ML
5 VIAL (ML) INTRAVENOUS
Status: CANCELLED | OUTPATIENT
Start: 2021-05-17

## 2021-05-17 RX ORDER — HEPARIN SODIUM (PORCINE) LOCK FLUSH IV SOLN 100 UNIT/ML 100 UNIT/ML
5 SOLUTION INTRAVENOUS
Status: CANCELLED | OUTPATIENT
Start: 2021-05-17

## 2021-05-17 RX ADMIN — ALTEPLASE 2 MG: 2.2 INJECTION, POWDER, LYOPHILIZED, FOR SOLUTION INTRAVENOUS at 13:59

## 2021-05-17 RX ADMIN — Medication 5 ML: at 15:05

## 2021-05-17 NOTE — LETTER
"    5/17/2021         RE: Kt Rasmussen  [id number]  [place] [address]        Dear Colleague,    Thank you for referring your patient, Kt Rasmussen, to the [facility]. Please see a copy of my visit note below.    [facility]    Hematology/Oncology Established Patient Follow-up Note      Today's Date: 5/17/2021    Reason for Follow-up: Metastatic non-small cell lung carcinoma.    Kt Rasmussen is a 60 year old male who is being evaluated via a billable telephone visit.      The patient has been notified of following:     \"This telephone visit will be conducted via a call between you and your physician/provider. We have found that certain health care needs can be provided without the need for a physical exam.  This service lets us provide the care you need with a short phone conversation during the COVID-19 pandemic.  If a prescription is necessary we can send it directly to your pharmacy.  If lab work is needed we can place an order for that and you can then stop by our lab to have the test done at a later time.    Telephone visits are billed at different rates depending on your insurance coverage. During this emergency period, for some insurers they may be billed the same as an in-person visit.  Please reach out to your insurance provider with any questions.    If during the course of the call the physician/provider feels a telephone visit is not appropriate, you will not be charged for this service.\"    Patient has given verbal consent for Telephone visit?  Yes    How would you like to obtain your AVS? Mail a copy    Phone visit duration: 7 minutes     HISTORY OF PRESENT ILLNESS: Kt Rasmussen is a 60 year old male who presents with the following oncologic history:  1. 5/05/2016: Presented with near-obstructing large mass coming off the cheikh and likely the posterior wall, seen on bronchoscopy. Biopsy of the mass showed invasive squamous cell carcinoma, " moderately differentiating and focally keratinizing.  Procedure was complicated by significant bleeding.  Tumor was completely debulked (via bronchoscopy) in both main stem bronchi and distal trachea. NGS showed no mutations in EGFR, KRAS, BRAF, NRAS, HRAS, PIK3CA, ERBB2, MET, or JAK2.  2. 5/23/2016: PET/CT scan showed evidence of residual tumor with decreased endobronchial mass. Indeterminate, mildly hypermetabolic mesentery with prominent lymph nodes and area of enhancement of the right hepatic lobe present.   Felt to have T4-NX-M0 disease.  3. 6/09/2016: Started concurrent chemoradiation with weekly paclitaxel and carboplatin.  4. 7/30/2016: Completed radiation.  Subsequently received 2 cycles of consolidation paclitaxel and carboplatin.   5. 9/13/2019: Presented with 6-month history of dysphonia and left vocal exophytic lesion. Left true vocal fold biopsy showed at least squamous cell carcinoma in situ, cannot exclude superficial invasion.  6. 9/30/2019: Excision of left vocal cord mass showed fragments of invasive squamous cell carcinoma, well differentiated and keratinizing.  Margins negative for malignancy.  7. 2/16/2021: CT chest w/o contrast showed thin-walled cavity in left lower lobe with 2 solid peripheral nodules measuring 9 mm each. No lymphadenopathy.  8. 3/01/2021: PET scan showed hypermetabolic nodules in left lower lobe and right lung, consistent with metastases; hypermetabolic adenopathy in chest, abdomen, pelvis; nonspecific uptake at tongue; hypermetabolic intraosseous lesions in spine and pelvis consistent with metastatic disease; left axillary hypermetabolic lymph nodes related to COVID-19 vaccination.  9. 3/12/2021: CT-guided lung biopsy showed non-small cell carcinoma, not otherwise specified -- with opinion by [facility] pathologist.  10. 3/18/2021: Lung NGS panel negative for mutations in BRAF, EGFR, ERBB2, IDH1, IDH2, KRAS, MET, NRAS, RET. PD-L1 expression negative (TPS <1%). Due to  minimal amount of DNA obtained from specimen, other biomarkers could not be analyzed.  11. 4/7/2021: MRI brain negative for brain metastases.  12. 4/27/2021: Started 1st line metastatic therapy with carboplatin, paclitaxel, bevacizumab, and atezolizumab for metastatic non-small cell lung carcinoma.    INTERIM HISTORY:  Kt reports good energy levels and denies any paresthesias or nausea. He denies any epistaxis, hematochezia, or melena. He denies rashes.      REVIEW OF SYSTEMS:   14 point ROS was reviewed and is negative other than as noted above in HPI.       HOME MEDICATIONS:  Current Outpatient Medications   Medication Sig Dispense Refill     atorvastatin (LIPITOR) 40 MG tablet Take 40 mg by mouth daily       dexamethasone (DECADRON) 4 MG tablet Take 1 tablet (4 mg) by mouth 2 times daily (with meals) Take 2 tablets (8 mg) by mouth daily for three days. Start on Day 2 of each cycle. 6 tablet 3     LORazepam (ATIVAN) 0.5 MG tablet Take 1 tablet (0.5 mg) by mouth every 4 hours as needed (Anxiety, Nausea/Vomiting or Sleep) 30 tablet 3     prochlorperazine (COMPAZINE) 10 MG tablet Take 1 tablet (10 mg) by mouth every 6 hours as needed (Nausea/Vomiting) 30 tablet 3         ALLERGIES:  Allergies   Allergen Reactions     No Known Drug Allergies          PAST MEDICAL HISTORY:  Past Medical History:   Diagnosis Date     Alcohol abuse, unspecified      Cerebral infarction (H)     2009, right side residual and aphasia     Dyslipidemia      GERD (gastroesophageal reflux disease)      Lung cancer (H)      Unspecified essential hypertension          PAST SURGICAL HISTORY:  Past Surgical History:   Procedure Laterality Date     BRONCHOSCOPY FLEXIBLE AND RIGID N/A 5/5/2016    Procedure: BRONCHOSCOPY FLEXIBLE AND RIGID;  Surgeon: Tony Talbot MD;  Location: [facility] OR     ESOPHAGOSCOPY FLEXIBLE N/A 9/30/2019    Procedure: flexible esophagoscopy;  Surgeon: Lizzy Johnson MD;  Location: [facility] OR     INJECT STEROID  (LOCATION) N/A 2019    Procedure: steroid injection;  Surgeon: Lizzy Johnson MD;  Location: [facility] OR     IR CHEST PORT PLACEMENT > 5 YRS OF AGE  2021     LASER CO2 LARYNGOSCOPY N/A 2019    Procedure: Microdirect laryngoscopy with excision of laryngeal mass, CO2 laser;  Surgeon: Lizzy Johnson MD;  Location: [facility] OR     [facility] NONSPECIFIC PROCEDURE      R tympanoplasty         SOCIAL HISTORY:  Social History     Socioeconomic History     Marital status: Single     Spouse name: Not on file     Number of children: Not on file     Years of education: Not on file     Highest education level: Not on file   Occupational History     Not on file   Social Needs     Financial resource strain: Not on file     Food insecurity     Worry: Not on file     Inability: Not on file     Transportation needs     Medical: Not on file     Non-medical: Not on file   Tobacco Use     Smoking status: Former Smoker     Packs/day: 1.00     Types: Cigarettes     Quit date: 2009     Years since quittin.6     Smokeless tobacco: Never Used   Substance and Sexual Activity     Alcohol use: Not Currently     Drug use: No     Sexual activity: Not on file   Lifestyle     Physical activity     Days per week: Not on file     Minutes per session: Not on file     Stress: Not on file   Relationships     Social connections     Talks on phone: Not on file     Gets together: Not on file     Attends Jehovah's witness service: Not on file     Active member of club or organization: Not on file     Attends meetings of clubs or organizations: Not on file     Relationship status: Not on file     Intimate partner violence     Fear of current or ex partner: Not on file     Emotionally abused: Not on file     Physically abused: Not on file     Forced sexual activity: Not on file   Other Topics Concern     Parent/sibling w/ CABG, MI or angioplasty before 65F 55M? Not Asked   Social History Narrative     Not on file         FAMILY HISTORY:  Family History    Problem Relation Age of Onset     Cancer Father          PHYSICAL EXAM:  Vital signs:  There were no vitals taken for this visit.   Not performed as this was a telephone visit.      LABS:  None new since last visit.    PATHOLOGY:  Reviewed as per HPI.    IMAGING:  Reviewed as per HPI.    ASSESSMENT/PLAN:  Kt Rasmussen is a 60 year old male with the following issues:  1. Metastatic non-small cell lung carcinoma with metastases to lymph nodes, bones, and bilateral lungs  2. History of locally advanced endobronchial invasive squamous cell carcinoma diagnosed 5/2016, status post debulking and chemoradiation   -PET scan from 3/1/2021 showed hypermetabolic nodules in left lower lobe and right lung, consistent with metastases; hypermetabolic adenopathy in chest, abdomen, pelvis; nonspecific uptake at tongue; hypermetabolic intraosseous lesions in spine and pelvis consistent with metastatic disease.  -Lung NGS biomarker testing were all negative and PD-L1 expression was negative. ROS1 and NTRK biomarker analysis could not be performed due to limited amount of DNA extracted from the specimen.  Will consider a repeat biopsy in the future to obtain testing for these biomarkers if he progresses on 1st line therapy.  -Brain MRI was negative for metastases.  -He is currently on 1st line metastatic therapy with paclitaxel, carboplatin, bevacizumab, and atezolizumab, tolerating this regimen relatively well with exception of thrombocytopenia after his first cycle.  -Given his thrombocytopenia, I recommended going ahead and reducing his carboplatin dose to AUC = 4 from cycle 2 through 6 but may need to omit carboplatin altogether sooner than that due to anticipated marginal blood count recovery.  -Plan to repeat PET scan after first 4 cycles of chemotherapy.    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 Dysphonia  5. Dysphagia  -Kt underwent excision of a left vocal cord SCC on 9/30/2019 and has persistent  dysphonia as a result of the lesion and excision.  He also has dysphagia but this is stable and swallowing is manageable.  -Last scope by ENT 1/14/2021 showed no evidence for recurrence.    Sangita John MD  Hematology/Oncology  [facility]    Kt is a 60 year old who is being evaluated via a billable telephone visit.      What phone number would you like to be contacted at? [phone]  How would you like to obtain your AVS? MyChart  Phone call duration: 5 minutes      Again, thank you for allowing me to participate in the care of your patient.        Sincerely,        Sangita John MD

## 2021-05-17 NOTE — PROGRESS NOTES
Infusion Nursing Note:  Kt Rasmussen presents today for port labs.    Patient seen by provider today: No   present during visit today: Not Applicable.    Note: Unable to obtain blood return from port despite multiple position changes and NS flushes. Patient agreeable to stay for TPA instillation. TPA ordered and instilled for 1 hour per protocol. Excellent blood return noted after instillation, labs drawn with ease.    Intravenous Access:  Labs drawn without difficulty.  Implanted Port.    Treatment Conditions:  Not Applicable. Labs pending at time of discharge.      Post Infusion Assessment:  Site patent and intact, free from redness, edema or discomfort.  No evidence of extravasations.  Access discontinued per protocol.       Discharge Plan:   Patient discharged in stable condition accompanied by: self.  Departure Mode: Ambulatory.  Face to Face time: 20 minutes.    Mackenzie Campa RN

## 2021-05-17 NOTE — PROGRESS NOTES
Kt is a 60 year old who is being evaluated via a billable telephone visit.      What phone number would you like to be contacted at? [phone]  How would you like to obtain your AVS? Misael  Phone call duration: 5 minutes

## 2021-05-17 NOTE — LETTER
"    5/17/2021         RE: Kt Rasmussen  [address]  [facility] [address]        Dear Colleague,    Thank you for referring your patient, Kt Rasmussen, to the [facility]. Please see a copy of my visit note below.    [facility]    Hematology/Oncology Established Patient Follow-up Note      Today's Date: 5/17/2021    Reason for Follow-up: Metastatic non-small cell lung carcinoma.    Kt Rasmussen is a 60 year old male who is being evaluated via a billable telephone visit.      The patient has been notified of following:     \"This telephone visit will be conducted via a call between you and your physician/provider. We have found that certain health care needs can be provided without the need for a physical exam.  This service lets us provide the care you need with a short phone conversation during the COVID-19 pandemic.  If a prescription is necessary we can send it directly to your pharmacy.  If lab work is needed we can place an order for that and you can then stop by our lab to have the test done at a later time.    Telephone visits are billed at different rates depending on your insurance coverage. During this emergency period, for some insurers they may be billed the same as an in-person visit.  Please reach out to your insurance provider with any questions.    If during the course of the call the physician/provider feels a telephone visit is not appropriate, you will not be charged for this service.\"    Patient has given verbal consent for Telephone visit?  Yes    How would you like to obtain your AVS? Mail a copy    Phone visit duration: 7 minutes     HISTORY OF PRESENT ILLNESS: Kt Rasmussen is a 60 year old male who presents with the following oncologic history:  1. 5/05/2016: Presented with near-obstructing large mass coming off the cheikh and likely the posterior wall, seen on bronchoscopy. Biopsy of the mass showed invasive squamous cell carcinoma, " moderately differentiating and focally keratinizing.  Procedure was complicated by significant bleeding.  Tumor was completely debulked (via bronchoscopy) in both main stem bronchi and distal trachea. NGS showed no mutations in EGFR, KRAS, BRAF, NRAS, HRAS, PIK3CA, ERBB2, MET, or JAK2.  2. 5/23/2016: PET/CT scan showed evidence of residual tumor with decreased endobronchial mass. Indeterminate, mildly hypermetabolic mesentery with prominent lymph nodes and area of enhancement of the right hepatic lobe present.   Felt to have T4-NX-M0 disease.  3. 6/09/2016: Started concurrent chemoradiation with weekly paclitaxel and carboplatin.  4. 7/30/2016: Completed radiation.  Subsequently received 2 cycles of consolidation paclitaxel and carboplatin.   5. 9/13/2019: Presented with 6-month history of dysphonia and left vocal exophytic lesion. Left true vocal fold biopsy showed at least squamous cell carcinoma in situ, cannot exclude superficial invasion.  6. 9/30/2019: Excision of left vocal cord mass showed fragments of invasive squamous cell carcinoma, well differentiated and keratinizing.  Margins negative for malignancy.  7. 2/16/2021: CT chest w/o contrast showed thin-walled cavity in left lower lobe with 2 solid peripheral nodules measuring 9 mm each. No lymphadenopathy.  8. 3/01/2021: PET scan showed hypermetabolic nodules in left lower lobe and right lung, consistent with metastases; hypermetabolic adenopathy in chest, abdomen, pelvis; nonspecific uptake at tongue; hypermetabolic intraosseous lesions in spine and pelvis consistent with metastatic disease; left axillary hypermetabolic lymph nodes related to COVID-19 vaccination.  9. 3/12/2021: CT-guided lung biopsy showed non-small cell carcinoma, not otherwise specified -- with opinion by [facility] pathologist.  10. 3/18/2021: Lung NGS panel negative for mutations in BRAF, EGFR, ERBB2, IDH1, IDH2, KRAS, MET, NRAS, RET. PD-L1 expression negative (TPS <1%). Due to  minimal amount of DNA obtained from specimen, other biomarkers could not be analyzed.  11. 4/7/2021: MRI brain negative for brain metastases.  12. 4/27/2021: Started 1st line metastatic therapy with carboplatin, paclitaxel, bevacizumab, and atezolizumab for metastatic non-small cell lung carcinoma.    INTERIM HISTORY:  Kt reports good energy levels and denies any paresthesias or nausea. He denies any epistaxis, hematochezia, or melena. He denies rashes.      REVIEW OF SYSTEMS:   14 point ROS was reviewed and is negative other than as noted above in HPI.       HOME MEDICATIONS:  Current Outpatient Medications   Medication Sig Dispense Refill     atorvastatin (LIPITOR) 40 MG tablet Take 40 mg by mouth daily       dexamethasone (DECADRON) 4 MG tablet Take 1 tablet (4 mg) by mouth 2 times daily (with meals) Take 2 tablets (8 mg) by mouth daily for three days. Start on Day 2 of each cycle. 6 tablet 3     LORazepam (ATIVAN) 0.5 MG tablet Take 1 tablet (0.5 mg) by mouth every 4 hours as needed (Anxiety, Nausea/Vomiting or Sleep) 30 tablet 3     prochlorperazine (COMPAZINE) 10 MG tablet Take 1 tablet (10 mg) by mouth every 6 hours as needed (Nausea/Vomiting) 30 tablet 3         ALLERGIES:  Allergies   Allergen Reactions     No Known Drug Allergies          PAST MEDICAL HISTORY:  Past Medical History:   Diagnosis Date     Alcohol abuse, unspecified      Cerebral infarction (H)     2009, right side residual and aphasia     Dyslipidemia      GERD (gastroesophageal reflux disease)      Lung cancer (H)      Unspecified essential hypertension          PAST SURGICAL HISTORY:  Past Surgical History:   Procedure Laterality Date     BRONCHOSCOPY FLEXIBLE AND RIGID N/A 5/5/2016    Procedure: BRONCHOSCOPY FLEXIBLE AND RIGID;  Surgeon: Tony Talbot MD;  Location: [facility] OR     ESOPHAGOSCOPY FLEXIBLE N/A 9/30/2019    Procedure: flexible esophagoscopy;  Surgeon: Lizzy Johnson MD;  Location: [facility] OR     INJECT STEROID  (LOCATION) N/A 2019    Procedure: steroid injection;  Surgeon: Lizzy Johnson MD;  Location: [facility] OR     IR CHEST PORT PLACEMENT > 5 YRS OF AGE  2021     LASER CO2 LARYNGOSCOPY N/A 2019    Procedure: Microdirect laryngoscopy with excision of laryngeal mass, CO2 laser;  Surgeon: Lizzy Johnson MD;  Location: [facility] OR     ZZC NONSPECIFIC PROCEDURE      R tympanoplasty         SOCIAL HISTORY:  Social History     Socioeconomic History     Marital status: Single     Spouse name: Not on file     Number of children: Not on file     Years of education: Not on file     Highest education level: Not on file   Occupational History     Not on file   Social Needs     Financial resource strain: Not on file     Food insecurity     Worry: Not on file     Inability: Not on file     Transportation needs     Medical: Not on file     Non-medical: Not on file   Tobacco Use     Smoking status: Former Smoker     Packs/day: 1.00     Types: Cigarettes     Quit date: 2009     Years since quittin.6     Smokeless tobacco: Never Used   Substance and Sexual Activity     Alcohol use: Not Currently     Drug use: No     Sexual activity: Not on file   Lifestyle     Physical activity     Days per week: Not on file     Minutes per session: Not on file     Stress: Not on file   Relationships     Social connections     Talks on phone: Not on file     Gets together: Not on file     Attends Shinto service: Not on file     Active member of club or organization: Not on file     Attends meetings of clubs or organizations: Not on file     Relationship status: Not on file     Intimate partner violence     Fear of current or ex partner: Not on file     Emotionally abused: Not on file     Physically abused: Not on file     Forced sexual activity: Not on file   Other Topics Concern     Parent/sibling w/ CABG, MI or angioplasty before 65F 55M? Not Asked   Social History Narrative     Not on file         FAMILY HISTORY:  Family History    Problem Relation Age of Onset     Cancer Father          PHYSICAL EXAM:  Vital signs:  There were no vitals taken for this visit.   Not performed as this was a telephone visit.      LABS:  None new since last visit.    PATHOLOGY:  Reviewed as per HPI.    IMAGING:  Reviewed as per HPI.    ASSESSMENT/PLAN:  Kt Rasmussen is a 60 year old male with the following issues:  1. Metastatic non-small cell lung carcinoma with metastases to lymph nodes, bones, and bilateral lungs  2. History of locally advanced endobronchial invasive squamous cell carcinoma diagnosed 5/2016, status post debulking and chemoradiation   -PET scan from 3/1/2021 showed hypermetabolic nodules in left lower lobe and right lung, consistent with metastases; hypermetabolic adenopathy in chest, abdomen, pelvis; nonspecific uptake at tongue; hypermetabolic intraosseous lesions in spine and pelvis consistent with metastatic disease.  -Lung NGS biomarker testing were all negative and PD-L1 expression was negative. ROS1 and NTRK biomarker analysis could not be performed due to limited amount of DNA extracted from the specimen.  Will consider a repeat biopsy in the future to obtain testing for these biomarkers if he progresses on 1st line therapy.  -Brain MRI was negative for metastases.  -He is currently on 1st line metastatic therapy with paclitaxel, carboplatin, bevacizumab, and atezolizumab, tolerating this regimen relatively well with exception of thrombocytopenia after his first cycle.  -Given his thrombocytopenia, I recommended going ahead and reducing his carboplatin dose to AUC = 4 from cycle 2 through 6 but may need to omit carboplatin altogether sooner than that due to anticipated marginal blood count recovery.  -Plan to repeat PET scan after first 4 cycles of chemotherapy.    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 Dysphonia  5. Dysphagia  -Kt underwent excision of a left vocal cord SCC on 9/30/2019 and has persistent  dysphonia as a result of the lesion and excision.  He also has dysphagia but this is stable and swallowing is manageable.  -Last scope by ENT 1/14/2021 showed no evidence for recurrence.    Sangita John MD  Hematology/Oncology  [facility]    Kt is a 60 year old who is being evaluated via a billable telephone visit.      What phone number would you like to be contacted at? [phone]  How would you like to obtain your AVS? MyChart  Phone call duration: 5 minutes      Again, thank you for allowing me to participate in the care of your patient.        Sincerely,        Sangita John MD

## 2021-05-18 ENCOUNTER — INFUSION THERAPY VISIT (OUTPATIENT)
Dept: INFUSION THERAPY | Facility: CLINIC | Age: 61
End: 2021-05-18
Attending: INTERNAL MEDICINE
Payer: MEDICARE

## 2021-05-18 ENCOUNTER — HOSPITAL ENCOUNTER (OUTPATIENT)
Facility: CLINIC | Age: 61
Setting detail: SPECIMEN
Discharge: HOME | End: 2021-05-18
Attending: INTERNAL MEDICINE | Admitting: INTERNAL MEDICINE
Payer: MEDICARE

## 2021-05-18 VITALS
BODY MASS INDEX: 23.68 KG/M2 | TEMPERATURE: 97.5 F | DIASTOLIC BLOOD PRESSURE: 88 MMHG | WEIGHT: 192 LBS | SYSTOLIC BLOOD PRESSURE: 148 MMHG | RESPIRATION RATE: 18 BRPM | HEART RATE: 68 BPM

## 2021-05-18 DIAGNOSIS — C34.90 NON-SMALL CELL LUNG CANCER METASTATIC TO BONE (H): ICD-10-CM

## 2021-05-18 DIAGNOSIS — Z51.11 ENCOUNTER FOR ANTINEOPLASTIC CHEMOTHERAPY: Primary | ICD-10-CM

## 2021-05-18 DIAGNOSIS — C79.51 NON-SMALL CELL LUNG CANCER METASTATIC TO BONE (H): ICD-10-CM

## 2021-05-18 LAB — ALBUMIN UR-MCNC: NEGATIVE MG/DL

## 2021-05-18 PROCEDURE — 96367 TX/PROPH/DG ADDL SEQ IV INF: CPT

## 2021-05-18 PROCEDURE — 96415 CHEMO IV INFUSION ADDL HR: CPT

## 2021-05-18 PROCEDURE — 81003 URINALYSIS AUTO W/O SCOPE: CPT | Performed by: INTERNAL MEDICINE

## 2021-05-18 PROCEDURE — 258N000003 HC RX IP 258 OP 636: Performed by: INTERNAL MEDICINE

## 2021-05-18 PROCEDURE — 96375 TX/PRO/DX INJ NEW DRUG ADDON: CPT

## 2021-05-18 PROCEDURE — 250N000011 HC RX IP 250 OP 636: Performed by: INTERNAL MEDICINE

## 2021-05-18 PROCEDURE — 96413 CHEMO IV INFUSION 1 HR: CPT

## 2021-05-18 PROCEDURE — 96417 CHEMO IV INFUS EACH ADDL SEQ: CPT

## 2021-05-18 RX ORDER — PALONOSETRON 0.05 MG/ML
0.25 INJECTION, SOLUTION INTRAVENOUS ONCE
Status: COMPLETED | OUTPATIENT
Start: 2021-05-18 | End: 2021-05-18

## 2021-05-18 RX ORDER — HEPARIN SODIUM (PORCINE) LOCK FLUSH IV SOLN 100 UNIT/ML 100 UNIT/ML
5 SOLUTION INTRAVENOUS
Status: DISCONTINUED | OUTPATIENT
Start: 2021-05-18 | End: 2021-05-18 | Stop reason: HOSPADM

## 2021-05-18 RX ADMIN — PALONOSETRON 0.25 MG: 0.05 INJECTION, SOLUTION INTRAVENOUS at 08:35

## 2021-05-18 RX ADMIN — Medication 5 ML: at 14:01

## 2021-05-18 RX ADMIN — FOSAPREPITANT DIMEGLUMINE: 150 INJECTION, POWDER, LYOPHILIZED, FOR SOLUTION INTRAVENOUS at 08:37

## 2021-05-18 RX ADMIN — CARBOPLATIN 500 MG: 10 INJECTION, SOLUTION INTRAVENOUS at 12:51

## 2021-05-18 RX ADMIN — PACLITAXEL 432 MG: 6 INJECTION, SOLUTION INTRAVENOUS at 09:45

## 2021-05-18 RX ADMIN — SODIUM CHLORIDE 250 ML: 9 INJECTION, SOLUTION INTRAVENOUS at 08:35

## 2021-05-18 RX ADMIN — ATEZOLIZUMAB 1200 MG: 1200 INJECTION, SOLUTION INTRAVENOUS at 09:12

## 2021-05-18 RX ADMIN — BEVACIZUMAB-AWWB 1300 MG: 400 INJECTION, SOLUTION INTRAVENOUS at 13:31

## 2021-05-18 ASSESSMENT — PAIN SCALES - GENERAL: PAINLEVEL: NO PAIN (0)

## 2021-05-18 NOTE — PROGRESS NOTES
Infusion Nursing Note:  Kt DAVID Rasmussen presents today for C2D.   Taxol, carbo, tecentriq, avastin  Patient seen by provider today: No VV with Dr Sow yesterday   present during visit today: Not Applicable.    Note: N/A.  Patient  did/did not meet criteria for an asymptomatic covid-19 PCR test in infusion today. Patient  accepted/declined the covid-19 test.    Intravenous Access:  Implanted Port.    Treatment Conditions:  Lab Results   Component Value Date    HGB 11.2 05/17/2021     Lab Results   Component Value Date    WBC 4.7 05/17/2021      Lab Results   Component Value Date    ANEU 3.2 05/17/2021     Lab Results   Component Value Date    PLT 92 05/17/2021      Lab Results   Component Value Date     05/17/2021                   Lab Results   Component Value Date    POTASSIUM 4.2 05/17/2021           Lab Results   Component Value Date    MAG 1.9 10/13/2016            Lab Results   Component Value Date    CR 0.98 05/17/2021                   Lab Results   Component Value Date    BEVERLY 9.0 05/17/2021                Lab Results   Component Value Date    BILITOTAL 0.3 05/17/2021           Lab Results   Component Value Date    ALBUMIN 3.3 05/17/2021                    Lab Results   Component Value Date    ALT 39 05/17/2021           Lab Results   Component Value Date    AST 27 05/17/2021       Results reviewed, labs MET treatment parameters, ok to proceed with treatment.  Urine protein negative.      Post Infusion Assessment:  Patient tolerated infusion without incident.  Blood return noted pre and post infusion.  Site patent and intact, free from redness, edema or discomfort.  No evidence of extravasations.  Access discontinued per protocol.       Discharge Plan:   Discharge instructions reviewed with: Patient.  Patient and/or family verbalized understanding of discharge instructions and all questions answered.  Patient discharged in stable condition accompanied by: self.  Departure Mode: Ambulatory w  shar.    Swetha Parkinson RN

## 2021-05-19 NOTE — PROGRESS NOTES
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 Rasmussen is a 60 year old male is being followed for history of left TVF SCC. he was initially seen on 19. Please refer to this note for full history.        Today, he presents for follow up. he reports:  - no issues, voice and swallow is stable  - does report he started chemo    Dysphonia: Patient reports dysphonia. This is stable       Dysphagia: Patient reports dysphagia. This is stable      Dyspnea: Patient denies dyspnea. This is stable      Throat clearing/Chronic cough: Patient denies throat clearing/chronic cough. This is stable      LPRD/GERD: Patient denies LPRD/GERD symptoms. This is stable   PAST MEDICAL HISTORY:   Past Medical History:   Diagnosis Date     Alcohol abuse, unspecified      Cerebral infarction (H)     , right side residual and aphasia     Dyslipidemia      GERD (gastroesophageal reflux disease)      Lung cancer (H)      Unspecified essential hypertension        PAST SURGICAL HISTORY:   Past Surgical History:   Procedure Laterality Date     BRONCHOSCOPY FLEXIBLE AND RIGID N/A 2016    Procedure: BRONCHOSCOPY FLEXIBLE AND RIGID;  Surgeon: Tony Talbot MD;  Location: [facility] OR     ESOPHAGOSCOPY FLEXIBLE N/A 2019    Procedure: flexible esophagoscopy;  Surgeon: Lizzy Johnson MD;  Location: [facility] OR     INJECT STEROID (LOCATION) N/A 2019    Procedure: steroid injection;  Surgeon: Lizzy Johnson MD;  Location: [facility] OR     IR CHEST PORT PLACEMENT > 5 YRS OF AGE  2021     LASER CO2 LARYNGOSCOPY N/A 2019    Procedure: Microdirect laryngoscopy with excision of laryngeal mass, CO2 laser;   Surgeon: Lizzy Johnson MD;  Location:  OR     [facility] NONSPECIFIC PROCEDURE      R tympanoplasty       CURRENT MEDICATIONS:   Current Outpatient Medications:      atorvastatin (LIPITOR) 40 MG tablet, Take 40 mg by mouth daily, Disp: , Rfl:      dexamethasone (DECADRON) 4 MG tablet, Take 1 tablet (4 mg) by mouth 2 times daily (with meals) Take 2 tablets (8 mg) by mouth daily for three days. Start on Day 2 of each cycle., Disp: 6 tablet, Rfl: 3     LORazepam (ATIVAN) 0.5 MG tablet, Take 1 tablet (0.5 mg) by mouth every 4 hours as needed (Anxiety, Nausea/Vomiting or Sleep), Disp: 30 tablet, Rfl: 3     prochlorperazine (COMPAZINE) 10 MG tablet, Take 1 tablet (10 mg) by mouth every 6 hours as needed (Nausea/Vomiting), Disp: 30 tablet, Rfl: 3  No current facility-administered medications for this visit.     ALLERGIES: No known drug allergies    SOCIAL HISTORY:    Social History     Socioeconomic History     Marital status: Single     Spouse name: Not on file     Number of children: Not on file     Years of education: Not on file     Highest education level: Not on file   Occupational History     Not on file   Social Needs     Financial resource strain: Not on file     Food insecurity     Worry: Not on file     Inability: Not on file     Transportation needs     Medical: Not on file     Non-medical: Not on file   Tobacco Use     Smoking status: Former Smoker     Packs/day: 1.00     Types: Cigarettes     Quit date: 2009     Years since quittin.6     Smokeless tobacco: Never Used   Substance and Sexual Activity     Alcohol use: Not Currently     Drug use: No     Sexual activity: Not on file   Lifestyle     Physical activity     Days per week: Not on file     Minutes per session: Not on file     Stress: Not on file   Relationships     Social connections     Talks on phone: Not on file     Gets together: Not on file     Attends Gnosticist service: Not on file     Active member of club or organization: Not on file      Attends meetings of clubs or organizations: Not on file     Relationship status: Not on file     Intimate partner violence     Fear of current or ex partner: Not on file     Emotionally abused: Not on file     Physically abused: Not on file     Forced sexual activity: Not on file   Other Topics Concern     Parent/sibling w/ CABG, MI or angioplasty before 65F 55M? Not Asked   Social History Narrative     Not on file         FAMILY HISTORY:   Family History   Problem Relation Age of Onset     Cancer Father       Non-contributory for problems with anesthesia    REVIEW OF SYSTEMS:   The patient was asked a 14 point review of systems regarding constitutional symptoms, eye symptoms, ears, nose, mouth, throat symptoms, cardiovascular symptoms, respiratory symptoms, gastrointestinal symptoms, genitourinary symptoms, musculoskeletal symptoms, integumentary symptoms, neurological symptoms, psychiatric symptoms, endocrine symptoms, hematologic/lymphatic symptoms, and allergic/ immunologic symptoms.   The pertinent factors have been included in the HPI and below.  Patient Supplied Answers to Review of Systems  No flowsheet data found.    Physical Examination   The patient underwent a physical examination as described below. The pertinent positive and negative findings are summarized after the description of the examination.  Constitutional: The patient's developmental and nutritional status was assessed. The patient's voice quality was assessed.  Head and Face: The head and face were inspected for deformities. The sinuses were palpated. The salivary glands were palpated. Facial muscle strength was assessed bilaterally.  Eyes: Extraocular movements and primary gaze alignment were assessed.  Ears, Nose, Mouth and Throat: The ears and nose were examined for deformities. The nasal septum, mucosa, and turbinates were inspected by anterior rhinoscopy. The lips, teeth, and gums were examined for abnormalities. The oral mucosa, tongue,  palate, tonsils, lateral and posterior pharynx were inspected for the presence of asymmetry or mucosal lesions.    Neck: The tracheal position was noted, and the neck mass palpated to determine if there were any asymmetries, abnormal neck masses, thyromegally, or thyroid nodules.  Respiratory: The nature of the breathing and chest expansion/symmetry was observed.  Cardiovascular: The patient was examined to determine the presence of any edema or jugular venous distension.  Abdomen: The contour of the abdomen was noted.  Lymphatic: The patient was examined for infraclavicular lymphadenopathy.  Musculoskeletal: The patient was inspected for the presence of skeletal deformities.  Extremities: The extremities were examined for any clubbing or cyanosis.  Skin: The skin was examined for inflammatory or neoplastic conditions.  Neurologic: The patient's orientation, mood, and affect were noted. The cranial nerve  functions were examined.  Other pertinent positive and negative findings on physical examination:   OC/OP: no lesions, uvula midline, soft palate elevates symmetrically, FOM/BOT soft  Neck: no lesions, no TH tenderness to palpation    All other physical examination findings were within normal limits and noncontributory.    Procedures   Flexible laryngoscopy (CPT 28505)      Pre-procedure diagnosis: dysphonia  Post-procedure diagnosis: same as above  Indication for procedure: Mr. Rasmussen is a 60 year old male with see above  Procedure(s): Fiberoptic Laryngoscopy    Details of Procedure: After informed consent was obtained, the patient was seated in the examination chair.  The areas of the nasopharynx as well as the hypopharynx were anesthetized with topical 4% lidocaine with 0.25% phenylephrine atomizer.  Examination of the base of tongue was performed first.  Attention was directed to any evidence of masses in the area or evidence of leukoplakia or candidal infection.  Attention was directed to the epiglottis where  its size and position was determined and its movement on phonation of the vowel  e .  The piriform sinuses were then inspected for any mass lesions or pooling of secretions.  Attention was then directed to the larynx. The vocal folds were inspected for infection or any areas of leukoplakia, for masses, polypoid degeneration, or hemorrhage.  Having done this, the arytenoids and vocal processes were inspected for erythema or evidence of granuloma formation.  The posterior commissure was then inspected for evidence of inflammatory changes in the mucosa and heaping up of mucosal tissue. The patient was then instructed to say the vowel  e .  Adduction of vocal folds to the midline was observed for any evidence of paresis or paralysis of the larynx or asymmetry in rotation of the larynx to the left or right. The patient was asked to breathe and the degree of abduction was noted bilaterally.  Subglottic view of the larynx was obtained for any additional mass lesions or mucosal changes.  Finally the post cricoid was examined for evidence of pooling of secretions, as well as the pharyngeal wall mucosa.   Anesthesia type: 0.25% phenylephrine    Findings:  Anatomic/physiological deviations: well healed left vocal fold no evidence of recurrence   Right vocal process: No restriction of mobility   Left vocal process: No restriction of mobility  Glottal gap: Glottal gap  Supraglottic structures: Normal  Hypopharynx: Normal     Estimated Blood Loss: minimal  Complications: None  Disposition: Patient tolerated the procedure well                  Review of Relevant Clinical Data   Notes: Sangita John 5/17/21  -PET scan from 3/1/2021 showed hypermetabolic nodules in left lower lobe and right lung, consistent with metastases; hypermetabolic adenopathy in chest, abdomen, pelvis; nonspecific uptake at tongue; hypermetabolic intraosseous lesions in spine and pelvis consistent with metastatic disease.  -Plan to repeat PET scan after  "first 4 cycles of chemotherapy.     Labs:  Lab Results   Component Value Date    TSH 2.18 05/17/2021     Lab Results   Component Value Date     05/17/2021    CO2 29 05/17/2021    BUN 24 05/17/2021    PHOS 2.0 (L) 05/04/2016     Lab Results   Component Value Date    WBC 4.7 05/17/2021    HGB 11.2 (L) 05/17/2021    HCT 34.1 (L) 05/17/2021    MCV 88 05/17/2021    PLT 92 (L) 05/17/2021     Lab Results   Component Value Date    PTT 35 07/26/2017    INR 1.02 04/22/2021     No results found for: ELIZABET  No components found for: RHEUMATOIDFACTOR,  RF  Lab Results   Component Value Date    CRP 0.16 07/24/2003     No components found for: CKTOT, URICACID  No components found for: C3, C4, DSDNAAB, NDNAABIFA  No results found for: MPOAB    Patient reported Quality of Life (QOL) Measures   Patient Supplied Answers To VHI Questionnaire  Voice Handicap Index (VHI-10) 12/5/2019   My voice makes it difficult for people to hear me 0   People have difficulty understanding me in a noisy room 0   My voice difficulties restrict my personal and social life.  0   I feel left out of conversations because of my voice 0   My voice problem causes me to lose income 0   I feel as though I have to strain to produce voice 0   The clarity of my voice is unpredictable 0   My voice problem upsets me 0   My voice makes me feel handicapped 0   People ask, \"What's wrong with your voice?\" 0   VHI-10 0         Patient Supplied Answers To EAT Questionnaire  Eating Assessment Tool (EAT-10) 12/5/2019   My swallowing problem has caused me to lose weight 0   My swallowing problem interferes with my ability to go out for meals 0   Swallowing liquids takes extra effort 0   Swallowing solids takes extra effort 0   Swallowing pills takes extra effort 0   Swallowing is painful 0   The pleasure of eating is affected by my swallowing 0   When I swallow food sticks in my throat 0   I cough when I eat 0   Swallowing is stressful 0   EAT-10 0         Patient Supplied " Answers To CSI Questionnaire  No flowsheet data found.      Patient Supplied Answers to Dyspnea Index Questionnaire:  No flowsheet data found.    Impression & Plan     IMPRESSION: Mr. Rasmussen is a 60 year old male who is being seen for the followin. T1 Left vocal fold SCC  -  s/p endoscopic CO2 laser resection 19  - no evidence of recurrence   - now has metastatic nonsmall cell lung cancer undergoing treatment with Dr Sangita John  - stable laryngeal exam,  VIKRAM  Plan  - observation  - [id number] evaluations during year 2 until 2021        2. 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 Dysphagia  - improved swallow today with less pharyngeal residue on evaluation of SLP performed FEES on 20 though continued recommendation of thickened liquids  - screening FEES with liquids shows again aspiration on 7/3/20  - no PNAs, no changes in weight  - Xray Video Swallow Exam 20 - safe swallow, much improved  Plan  - observation      RETURN VISIT: 2 months    Lizzy Wray MD    Laryngology    [facility]  Department of  Otolaryngology - Head and Neck Surgery  Clinics & Surgery Center  [address] [place] [address]  Appointment line: [phone]  Fax: [phone]  [email or web address]

## 2021-05-20 ENCOUNTER — OFFICE VISIT (OUTPATIENT)
Dept: OTOLARYNGOLOGY | Facility: CLINIC | Age: 61
End: 2021-05-20
Payer: MEDICARE

## 2021-05-20 VITALS — TEMPERATURE: 99.3 F | WEIGHT: 193.4 LBS | OXYGEN SATURATION: 97 % | BODY MASS INDEX: 23.85 KG/M2 | HEART RATE: 99 BPM

## 2021-05-20 DIAGNOSIS — R49.0 DYSPHONIA: Primary | ICD-10-CM

## 2021-05-20 PROCEDURE — 31575 DIAGNOSTIC LARYNGOSCOPY: CPT | Performed by: OTOLARYNGOLOGY

## 2021-05-20 PROCEDURE — 99207 PR NO CHARGE LOS: CPT | Performed by: OTOLARYNGOLOGY

## 2021-05-20 ASSESSMENT — PAIN SCALES - GENERAL: PAINLEVEL: NO PAIN (0)

## 2021-05-20 NOTE — LETTER
2021       RE: Kt Rasmussen  [id number] [address]  [facility] [address]     Dear Colleague,    Thank you for referring your patient, Kt Rasmussen, to the [facility] [place] [facility]. Please see a copy of my visit note below.        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 Rasmussen is a 60 year old male is being followed for history of left TVF SCC. he was initially seen on 19. Please refer to this note for full history.        Today, he presents for follow up. he reports:  - no issues, voice and swallow is stable  - does report he started chemo    Dysphonia: Patient reports dysphonia. This is stable       Dysphagia: Patient reports dysphagia. This is stable      Dyspnea: Patient denies dyspnea. This is stable      Throat clearing/Chronic cough: Patient denies throat clearing/chronic cough. This is stable      LPRD/GERD: Patient denies LPRD/GERD symptoms. This is stable   PAST MEDICAL HISTORY:   Past Medical History:   Diagnosis Date     Alcohol abuse, unspecified      Cerebral infarction (H)     , right side residual and aphasia     Dyslipidemia      GERD (gastroesophageal reflux disease)      Lung cancer (H)      Unspecified essential hypertension        PAST SURGICAL HISTORY:   Past Surgical History:   Procedure Laterality Date     BRONCHOSCOPY FLEXIBLE AND RIGID N/A 2016    Procedure: BRONCHOSCOPY FLEXIBLE AND RIGID;  Surgeon: Tony Talbot MD;  Location: [facility] OR     ESOPHAGOSCOPY FLEXIBLE N/A 2019    Procedure: flexible esophagoscopy;  Surgeon: Lizzy Johnson MD;   Location: [facility] OR     INJECT STEROID (LOCATION) N/A 2019    Procedure: steroid injection;  Surgeon: Lizzy Johnson MD;  Location: [facility] OR     IR CHEST PORT PLACEMENT > 5 YRS OF AGE  2021     LASER CO2 LARYNGOSCOPY N/A 2019    Procedure: Microdirect laryngoscopy with excision of laryngeal mass, CO2 laser;  Surgeon: Lizzy Johnson MD;  Location: [facility] OR     ZZC NONSPECIFIC PROCEDURE      R tympanoplasty       CURRENT MEDICATIONS:   Current Outpatient Medications:      atorvastatin (LIPITOR) 40 MG tablet, Take 40 mg by mouth daily, Disp: , Rfl:      dexamethasone (DECADRON) 4 MG tablet, Take 1 tablet (4 mg) by mouth 2 times daily (with meals) Take 2 tablets (8 mg) by mouth daily for three days. Start on Day 2 of each cycle., Disp: 6 tablet, Rfl: 3     LORazepam (ATIVAN) 0.5 MG tablet, Take 1 tablet (0.5 mg) by mouth every 4 hours as needed (Anxiety, Nausea/Vomiting or Sleep), Disp: 30 tablet, Rfl: 3     prochlorperazine (COMPAZINE) 10 MG tablet, Take 1 tablet (10 mg) by mouth every 6 hours as needed (Nausea/Vomiting), Disp: 30 tablet, Rfl: 3  No current facility-administered medications for this visit.     ALLERGIES: No known drug allergies    SOCIAL HISTORY:    Social History     Socioeconomic History     Marital status: Single     Spouse name: Not on file     Number of children: Not on file     Years of education: Not on file     Highest education level: Not on file   Occupational History     Not on file   Social Needs     Financial resource strain: Not on file     Food insecurity     Worry: Not on file     Inability: Not on file     Transportation needs     Medical: Not on file     Non-medical: Not on file   Tobacco Use     Smoking status: Former Smoker     Packs/day: 1.00     Types: Cigarettes     Quit date: 2009     Years since quittin.6     Smokeless tobacco: Never Used   Substance and Sexual Activity     Alcohol use: Not Currently     Drug use: No     Sexual activity: Not on file    Lifestyle     Physical activity     Days per week: Not on file     Minutes per session: Not on file     Stress: Not on file   Relationships     Social connections     Talks on phone: Not on file     Gets together: Not on file     Attends Orthodox service: Not on file     Active member of club or organization: Not on file     Attends meetings of clubs or organizations: Not on file     Relationship status: Not on file     Intimate partner violence     Fear of current or ex partner: Not on file     Emotionally abused: Not on file     Physically abused: Not on file     Forced sexual activity: Not on file   Other Topics Concern     Parent/sibling w/ CABG, MI or angioplasty before 65F 55M? Not Asked   Social History Narrative     Not on file         FAMILY HISTORY:   Family History   Problem Relation Age of Onset     Cancer Father       Non-contributory for problems with anesthesia    REVIEW OF SYSTEMS:   The patient was asked a 14 point review of systems regarding constitutional symptoms, eye symptoms, ears, nose, mouth, throat symptoms, cardiovascular symptoms, respiratory symptoms, gastrointestinal symptoms, genitourinary symptoms, musculoskeletal symptoms, integumentary symptoms, neurological symptoms, psychiatric symptoms, endocrine symptoms, hematologic/lymphatic symptoms, and allergic/ immunologic symptoms.   The pertinent factors have been included in the HPI and below.  Patient Supplied Answers to Review of Systems  No flowsheet data found.    Physical Examination   The patient underwent a physical examination as described below. The pertinent positive and negative findings are summarized after the description of the examination.  Constitutional: The patient's developmental and nutritional status was assessed. The patient's voice quality was assessed.  Head and Face: The head and face were inspected for deformities. The sinuses were palpated. The salivary glands were palpated. Facial muscle strength was  assessed bilaterally.  Eyes: Extraocular movements and primary gaze alignment were assessed.  Ears, Nose, Mouth and Throat: The ears and nose were examined for deformities. The nasal septum, mucosa, and turbinates were inspected by anterior rhinoscopy. The lips, teeth, and gums were examined for abnormalities. The oral mucosa, tongue, palate, tonsils, lateral and posterior pharynx were inspected for the presence of asymmetry or mucosal lesions.    Neck: The tracheal position was noted, and the neck mass palpated to determine if there were any asymmetries, abnormal neck masses, thyromegally, or thyroid nodules.  Respiratory: The nature of the breathing and chest expansion/symmetry was observed.  Cardiovascular: The patient was examined to determine the presence of any edema or jugular venous distension.  Abdomen: The contour of the abdomen was noted.  Lymphatic: The patient was examined for infraclavicular lymphadenopathy.  Musculoskeletal: The patient was inspected for the presence of skeletal deformities.  Extremities: The extremities were examined for any clubbing or cyanosis.  Skin: The skin was examined for inflammatory or neoplastic conditions.  Neurologic: The patient's orientation, mood, and affect were noted. The cranial nerve  functions were examined.  Other pertinent positive and negative findings on physical examination:   OC/OP: no lesions, uvula midline, soft palate elevates symmetrically, FOM/BOT soft  Neck: no lesions, no TH tenderness to palpation    All other physical examination findings were within normal limits and noncontributory.    Procedures   Flexible laryngoscopy (CPT 13263)      Pre-procedure diagnosis: dysphonia  Post-procedure diagnosis: same as above  Indication for procedure: Mr. Rasmussen is a 60 year old male with see above  Procedure(s): Fiberoptic Laryngoscopy    Details of Procedure: After informed consent was obtained, the patient was seated in the examination chair.  The areas of  the nasopharynx as well as the hypopharynx were anesthetized with topical 4% lidocaine with 0.25% phenylephrine atomizer.  Examination of the base of tongue was performed first.  Attention was directed to any evidence of masses in the area or evidence of leukoplakia or candidal infection.  Attention was directed to the epiglottis where its size and position was determined and its movement on phonation of the vowel  e .  The piriform sinuses were then inspected for any mass lesions or pooling of secretions.  Attention was then directed to the larynx. The vocal folds were inspected for infection or any areas of leukoplakia, for masses, polypoid degeneration, or hemorrhage.  Having done this, the arytenoids and vocal processes were inspected for erythema or evidence of granuloma formation.  The posterior commissure was then inspected for evidence of inflammatory changes in the mucosa and heaping up of mucosal tissue. The patient was then instructed to say the vowel  e .  Adduction of vocal folds to the midline was observed for any evidence of paresis or paralysis of the larynx or asymmetry in rotation of the larynx to the left or right. The patient was asked to breathe and the degree of abduction was noted bilaterally.  Subglottic view of the larynx was obtained for any additional mass lesions or mucosal changes.  Finally the post cricoid was examined for evidence of pooling of secretions, as well as the pharyngeal wall mucosa.   Anesthesia type: 0.25% phenylephrine    Findings:  Anatomic/physiological deviations: well healed left vocal fold no evidence of recurrence   Right vocal process: No restriction of mobility   Left vocal process: No restriction of mobility  Glottal gap: Glottal gap  Supraglottic structures: Normal  Hypopharynx: Normal     Estimated Blood Loss: minimal  Complications: None  Disposition: Patient tolerated the procedure well                  Review of Relevant Clinical Data   Notes: Sangita John  "5/17/21  -PET scan from 3/1/2021 showed hypermetabolic nodules in left lower lobe and right lung, consistent with metastases; hypermetabolic adenopathy in chest, abdomen, pelvis; nonspecific uptake at tongue; hypermetabolic intraosseous lesions in spine and pelvis consistent with metastatic disease.  -Plan to repeat PET scan after first 4 cycles of chemotherapy.     Labs:  Lab Results   Component Value Date    TSH 2.18 05/17/2021     Lab Results   Component Value Date     05/17/2021    CO2 29 05/17/2021    BUN 24 05/17/2021    PHOS 2.0 (L) 05/04/2016     Lab Results   Component Value Date    WBC 4.7 05/17/2021    HGB 11.2 (L) 05/17/2021    HCT 34.1 (L) 05/17/2021    MCV 88 05/17/2021    PLT 92 (L) 05/17/2021     Lab Results   Component Value Date    PTT 35 07/26/2017    INR 1.02 04/22/2021     No results found for: ELIZABET  No components found for: RHEUMATOIDFACTOR,  RF  Lab Results   Component Value Date    CRP 0.16 07/24/2003     No components found for: CKTOT, URICACID  No components found for: C3, C4, DSDNAAB, NDNAABIFA  No results found for: MPOAB    Patient reported Quality of Life (QOL) Measures   Patient Supplied Answers To VHI Questionnaire  Voice Handicap Index (VHI-10) 12/5/2019   My voice makes it difficult for people to hear me 0   People have difficulty understanding me in a noisy room 0   My voice difficulties restrict my personal and social life.  0   I feel left out of conversations because of my voice 0   My voice problem causes me to lose income 0   I feel as though I have to strain to produce voice 0   The clarity of my voice is unpredictable 0   My voice problem upsets me 0   My voice makes me feel handicapped 0   People ask, \"What's wrong with your voice?\" 0   VHI-10 0         Patient Supplied Answers To EAT Questionnaire  Eating Assessment Tool (EAT-10) 12/5/2019   My swallowing problem has caused me to lose weight 0   My swallowing problem interferes with my ability to go out for meals 0 "   Swallowing liquids takes extra effort 0   Swallowing solids takes extra effort 0   Swallowing pills takes extra effort 0   Swallowing is painful 0   The pleasure of eating is affected by my swallowing 0   When I swallow food sticks in my throat 0   I cough when I eat 0   Swallowing is stressful 0   EAT-10 0         Patient Supplied Answers To CSI Questionnaire  No flowsheet data found.      Patient Supplied Answers to Dyspnea Index Questionnaire:  No flowsheet data found.    Impression & Plan     IMPRESSION: Mr. Rasmussen is a 60 year old male who is being seen for the followin. T1 Left vocal fold SCC  -  s/p endoscopic CO2 laser resection 19  - no evidence of recurrence   - now has metastatic nonsmall cell lung cancer undergoing treatment with Dr Sangita John  - stable laryngeal exam,  VIKRAM  Plan  - observation  - [id number] evaluations during year 2 until 2021        2. 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 Dysphagia  - improved swallow today with less pharyngeal residue on evaluation of SLP performed FEES on 20 though continued recommendation of thickened liquids  - screening FEES with liquids shows again aspiration on 7/3/20  - no PNAs, no changes in weight  - Xray Video Swallow Exam 20 - safe swallow, much improved  Plan  - observation      RETURN VISIT: 2 months    Lizzy Wray MD    Laryngology    [facility]  Department of  Otolaryngology - Head and Neck Surgery  Clinics & Surgery Center  [address] [place] [id number]  Appointment line: [phone]  Fax: [phone]  [email or web address]

## 2021-05-20 NOTE — PATIENT INSTRUCTIONS
1.  You were seen in the ENT Clinic today by . If you have any questions or concerns after your appointment, please call [phone]. Press option #1 for scheduling related needs. Press option #3 for Nurse advice.    2.  Plan is to return to clinic in 2 months      Kay Sommers LPN  [phone]  [facility] Otolaryngology

## 2021-06-07 RX ORDER — MEPERIDINE HYDROCHLORIDE 25 MG/ML
25 INJECTION INTRAMUSCULAR; INTRAVENOUS; SUBCUTANEOUS EVERY 30 MIN PRN
Status: CANCELLED | OUTPATIENT
Start: 2021-06-08

## 2021-06-07 RX ORDER — DIPHENHYDRAMINE HYDROCHLORIDE 50 MG/ML
50 INJECTION INTRAMUSCULAR; INTRAVENOUS
Status: CANCELLED
Start: 2021-06-08

## 2021-06-07 RX ORDER — PALONOSETRON 0.05 MG/ML
0.25 INJECTION, SOLUTION INTRAVENOUS ONCE
Status: CANCELLED
Start: 2021-06-08

## 2021-06-07 RX ORDER — SODIUM CHLORIDE 9 MG/ML
1000 INJECTION, SOLUTION INTRAVENOUS CONTINUOUS PRN
Status: CANCELLED
Start: 2021-06-08

## 2021-06-07 RX ORDER — EPINEPHRINE 1 MG/ML
0.3 INJECTION, SOLUTION, CONCENTRATE INTRAVENOUS EVERY 5 MIN PRN
Status: CANCELLED | OUTPATIENT
Start: 2021-06-08

## 2021-06-07 RX ORDER — ALBUTEROL SULFATE 90 UG/1
1-2 AEROSOL, METERED RESPIRATORY (INHALATION)
Status: CANCELLED
Start: 2021-06-08

## 2021-06-07 RX ORDER — LORAZEPAM 2 MG/ML
0.5 INJECTION INTRAMUSCULAR EVERY 4 HOURS PRN
Status: CANCELLED
Start: 2021-06-08

## 2021-06-07 RX ORDER — ALBUTEROL SULFATE 0.83 MG/ML
2.5 SOLUTION RESPIRATORY (INHALATION)
Status: CANCELLED | OUTPATIENT
Start: 2021-06-08

## 2021-06-07 RX ORDER — NALOXONE HYDROCHLORIDE 0.4 MG/ML
.1-.4 INJECTION, SOLUTION INTRAMUSCULAR; INTRAVENOUS; SUBCUTANEOUS
Status: CANCELLED | OUTPATIENT
Start: 2021-06-08

## 2021-06-07 RX ORDER — HEPARIN SODIUM (PORCINE) LOCK FLUSH IV SOLN 100 UNIT/ML 100 UNIT/ML
5 SOLUTION INTRAVENOUS
Status: CANCELLED | OUTPATIENT
Start: 2021-06-08

## 2021-06-07 RX ORDER — HEPARIN SODIUM,PORCINE 10 UNIT/ML
5 VIAL (ML) INTRAVENOUS
Status: CANCELLED | OUTPATIENT
Start: 2021-06-08

## 2021-06-08 ENCOUNTER — INFUSION THERAPY VISIT (OUTPATIENT)
Dept: INFUSION THERAPY | Facility: CLINIC | Age: 61
End: 2021-06-08
Attending: INTERNAL MEDICINE
Payer: MEDICARE

## 2021-06-08 ENCOUNTER — VIRTUAL VISIT (OUTPATIENT)
Dept: ONCOLOGY | Facility: CLINIC | Age: 61
End: 2021-06-08
Attending: INTERNAL MEDICINE
Payer: MEDICARE

## 2021-06-08 ENCOUNTER — HOSPITAL ENCOUNTER (OUTPATIENT)
Facility: CLINIC | Age: 61
Setting detail: SPECIMEN
Discharge: HOME | End: 2021-06-08
Attending: INTERNAL MEDICINE | Admitting: INTERNAL MEDICINE
Payer: MEDICARE

## 2021-06-08 DIAGNOSIS — C79.51 NON-SMALL CELL LUNG CANCER METASTATIC TO BONE (H): Primary | ICD-10-CM

## 2021-06-08 DIAGNOSIS — C34.90 NON-SMALL CELL LUNG CANCER METASTATIC TO BONE (H): Primary | ICD-10-CM

## 2021-06-08 DIAGNOSIS — Z51.11 ENCOUNTER FOR ANTINEOPLASTIC CHEMOTHERAPY: ICD-10-CM

## 2021-06-08 DIAGNOSIS — C34.90 NON-SMALL CELL CARCINOMA OF LUNG, STAGE 3, UNSPECIFIED LATERALITY (H): ICD-10-CM

## 2021-06-08 LAB
ALBUMIN SERPL-MCNC: 3.6 G/DL (ref 3.4–5)
ALP SERPL-CCNC: 74 U/L (ref 40–150)
ALT SERPL W P-5'-P-CCNC: 28 U/L (ref 0–70)
ANION GAP SERPL CALCULATED.3IONS-SCNC: 3 MMOL/L (ref 3–14)
AST SERPL W P-5'-P-CCNC: 21 U/L (ref 0–45)
BASOPHILS # BLD AUTO: 0 10E9/L (ref 0–0.2)
BASOPHILS NFR BLD AUTO: 0.4 %
BILIRUB SERPL-MCNC: 0.4 MG/DL (ref 0.2–1.3)
BUN SERPL-MCNC: 23 MG/DL (ref 7–30)
CALCIUM SERPL-MCNC: 9 MG/DL (ref 8.5–10.1)
CHLORIDE SERPL-SCNC: 111 MMOL/L (ref 94–109)
CO2 SERPL-SCNC: 26 MMOL/L (ref 20–32)
CREAT SERPL-MCNC: 0.93 MG/DL (ref 0.66–1.25)
DIFFERENTIAL METHOD BLD: ABNORMAL
EOSINOPHIL # BLD AUTO: 0.1 10E9/L (ref 0–0.7)
EOSINOPHIL NFR BLD AUTO: 1.3 %
ERYTHROCYTE [DISTWIDTH] IN BLOOD BY AUTOMATED COUNT: 14.5 % (ref 10–15)
GFR SERPL CREATININE-BSD FRML MDRD: 88 ML/MIN/{1.73_M2}
GLUCOSE SERPL-MCNC: 83 MG/DL (ref 70–99)
HCT VFR BLD AUTO: 33.9 % (ref 40–53)
HGB BLD-MCNC: 11.1 G/DL (ref 13.3–17.7)
IMM GRANULOCYTES # BLD: 0 10E9/L (ref 0–0.4)
IMM GRANULOCYTES NFR BLD: 0.4 %
LYMPHOCYTES # BLD AUTO: 1.2 10E9/L (ref 0.8–5.3)
LYMPHOCYTES NFR BLD AUTO: 24.2 %
MCH RBC QN AUTO: 29.2 PG (ref 26.5–33)
MCHC RBC AUTO-ENTMCNC: 32.7 G/DL (ref 31.5–36.5)
MCV RBC AUTO: 89 FL (ref 78–100)
MONOCYTES # BLD AUTO: 0.6 10E9/L (ref 0–1.3)
MONOCYTES NFR BLD AUTO: 12.6 %
NEUTROPHILS # BLD AUTO: 2.9 10E9/L (ref 1.6–8.3)
NEUTROPHILS NFR BLD AUTO: 61.1 %
NRBC # BLD AUTO: 0 10*3/UL
NRBC BLD AUTO-RTO: 0 /100
PLATELET # BLD AUTO: 137 10E9/L (ref 150–450)
POTASSIUM SERPL-SCNC: 4.3 MMOL/L (ref 3.4–5.3)
PROT SERPL-MCNC: 7.3 G/DL (ref 6.8–8.8)
RBC # BLD AUTO: 3.8 10E12/L (ref 4.4–5.9)
SODIUM SERPL-SCNC: 140 MMOL/L (ref 133–144)
TSH SERPL DL<=0.005 MIU/L-ACNC: 1.95 MU/L (ref 0.4–4)
WBC # BLD AUTO: 4.8 10E9/L (ref 4–11)

## 2021-06-08 PROCEDURE — 250N000011 HC RX IP 250 OP 636: Performed by: INTERNAL MEDICINE

## 2021-06-08 PROCEDURE — 84443 ASSAY THYROID STIM HORMONE: CPT | Performed by: INTERNAL MEDICINE

## 2021-06-08 PROCEDURE — 99443 PR PHYSICIAN TELEPHONE EVALUATION 21-30 MIN: CPT | Mod: 95 | Performed by: NURSE PRACTITIONER

## 2021-06-08 PROCEDURE — 80053 COMPREHEN METABOLIC PANEL: CPT | Performed by: INTERNAL MEDICINE

## 2021-06-08 PROCEDURE — 85025 COMPLETE CBC W/AUTO DIFF WBC: CPT | Performed by: INTERNAL MEDICINE

## 2021-06-08 PROCEDURE — 36591 DRAW BLOOD OFF VENOUS DEVICE: CPT

## 2021-06-08 RX ORDER — HEPARIN SODIUM,PORCINE 10 UNIT/ML
5 VIAL (ML) INTRAVENOUS
Status: CANCELLED | OUTPATIENT
Start: 2021-06-08

## 2021-06-08 RX ORDER — HEPARIN SODIUM (PORCINE) LOCK FLUSH IV SOLN 100 UNIT/ML 100 UNIT/ML
5 SOLUTION INTRAVENOUS
Status: CANCELLED | OUTPATIENT
Start: 2021-06-08

## 2021-06-08 RX ORDER — HEPARIN SODIUM (PORCINE) LOCK FLUSH IV SOLN 100 UNIT/ML 100 UNIT/ML
5 SOLUTION INTRAVENOUS
Status: DISCONTINUED | OUTPATIENT
Start: 2021-06-08 | End: 2021-06-08 | Stop reason: HOSPADM

## 2021-06-08 RX ADMIN — Medication 5 ML: at 14:01

## 2021-06-08 ASSESSMENT — PAIN SCALES - GENERAL: PAINLEVEL: NO PAIN (0)

## 2021-06-08 NOTE — PROGRESS NOTES
Nursing Note:  Kt Rasmussen presents today for port labs.    Patient seen by provider today: No   present during visit today: Not Applicable.    Note: N/A.    Intravenous Access:  Labs drawn without difficulty.  Implanted Port.    Discharge Plan:   Patient was sent to lobby for discharge.     Guzman Patel RN

## 2021-06-08 NOTE — LETTER
"    6/8/2021         RE: Kt Rasmussen  [address]  [facility] [address]        Dear Colleague,    Thank you for referring your patient, Kt Rasmussen, to the [facility] [place]. Please see a copy of my visit note below.    Kt is a 60 year old who is being evaluated via a billable telephone visit.      What phone number would you like to be contacted at? [phone]    How would you like to obtain your AVS? Mail a copy  Phone call duration: 5 minutes      The patient has been notified of following:      \"This telephone visit will be conducted via a call between you and your physician/provider. We have found that certain health care needs can be provided without the need for a physical exam.  This service lets us provide the care you need with a short phone conversation during the COVID-19 pandemic.  If a prescription is necessary we can send it directly to your pharmacy.  If lab work is needed we can place an order for that and you can then stop by our lab to have the test done at a later time.     Telephone visits are billed at different rates depending on your insurance coverage. During this emergency period, for some insurers they may be billed the same as an in-person visit.  Please reach out to your insurance provider with any questions.     If during the course of the call the physician/provider feels a telephone visit is not appropriate, you will not be charged for this service.\"     Patient has given verbal consent for Telephone visit?  Yes       Telephone visit 25 minutes      The patient has been notified of following:      \"This telephone visit will be conducted via a call between you and your physician/provider. We have found that certain health care needs can be provided without the need for a physical exam.  This service lets us provide the care you need with a short phone conversation during the COVID-19 pandemic.  If a prescription is necessary we can send it directly to " "your pharmacy.  If lab work is needed we can place an order for that and you can then stop by our lab to have the test done at a later time.     Telephone visits are billed at different rates depending on your insurance coverage. During this emergency period, for some insurers they may be billed the same as an in-person visit.  Please reach out to your insurance provider with any questions.     If during the course of the call the physician/provider feels a telephone visit is not appropriate, you will not be charged for this service.\"     Patient has given verbal consent for Telephone visit?  Yes        Telephone visit 20 min         Oncology/Hematology Visit Note  Jun 8, 2021    Reason for Visit: follow up of metastatic non-small cell lung carcinoma  Metastatic to lymph nodes bones in bilateral lungs  Brain MRI negative for mets    Patient met with Dr. John who recommended treatment with palliative intent with paclitaxel carboplatin Avastin and atezolizumab-treatment started on April 27, 2021  -Due to thrombocytopenia carboplatin AUC reduced to 4    Interval History:  Reports he is feeling well.  Reports he has been tolerating treatment well no side effects.  Denies fever chills sweats cough shortness of breath chest pain nausea vomiting diarrhea abdominal pain bleeding  Good energy      Review of Systems:  14 point ROS of systems including Constitutional, Eyes, Respiratory, Cardiovascular, Gastroenterology, Genitourinary, Integumentary, Muscularskeletal, Psychiatric were all negative except for pertinent positives noted in my HPI.    Physical Examination:  Telephone visit.  Patient answers all questions appropriately no audible wheezing or cough    Laboratory Data:  Results for orders placed or performed in visit on 06/08/21 (from the past 24 hour(s))   CBC with platelets differential   Result Value Ref Range    WBC 4.8 4.0 - 11.0 10e9/L    RBC Count 3.80 (L) 4.4 - 5.9 10e12/L    Hemoglobin 11.1 (L) 13.3 - 17.7 " g/dL    Hematocrit 33.9 (L) 40.0 - 53.0 %    MCV 89 78 - 100 fl    MCH 29.2 26.5 - 33.0 pg    MCHC 32.7 31.5 - 36.5 g/dL    RDW 14.5 10.0 - 15.0 %    Platelet Count 137 (L) 150 - 450 10e9/L    Diff Method Automated Method     % Neutrophils 61.1 %    % Lymphocytes 24.2 %    % Monocytes 12.6 %    % Eosinophils 1.3 %    % Basophils 0.4 %    % Immature Granulocytes 0.4 %    Nucleated RBCs 0 0 /100    Absolute Neutrophil 2.9 1.6 - 8.3 10e9/L    Absolute Lymphocytes 1.2 0.8 - 5.3 10e9/L    Absolute Monocytes 0.6 0.0 - 1.3 10e9/L    Absolute Eosinophils 0.1 0.0 - 0.7 10e9/L    Absolute Basophils 0.0 0.0 - 0.2 10e9/L    Abs Immature Granulocytes 0.0 0 - 0.4 10e9/L    Absolute Nucleated RBC 0.0    Comprehensive metabolic panel   Result Value Ref Range    Sodium 140 133 - 144 mmol/L    Potassium 4.3 3.4 - 5.3 mmol/L    Chloride 111 (H) 94 - 109 mmol/L    Carbon Dioxide 26 20 - 32 mmol/L    Anion Gap 3 3 - 14 mmol/L    Glucose 83 70 - 99 mg/dL    Urea Nitrogen 23 7 - 30 mg/dL    Creatinine 0.93 0.66 - 1.25 mg/dL    GFR Estimate 88 >60 mL/min/[1.73_m2]    GFR Estimate If Black >90 >60 mL/min/[1.73_m2]    Calcium 9.0 8.5 - 10.1 mg/dL    Bilirubin Total 0.4 0.2 - 1.3 mg/dL    Albumin 3.6 3.4 - 5.0 g/dL    Protein Total 7.3 6.8 - 8.8 g/dL    Alkaline Phosphatase 74 40 - 150 U/L    ALT 28 0 - 70 U/L    AST 21 0 - 45 U/L   TSH with free T4 reflex   Result Value Ref Range    TSH 1.95 0.40 - 4.00 mU/L         Assessment and Plan:      This is a 60-year-old male with    metastatic non-small cell lung carcinoma  Metastatic to lymph nodes bones in bilateral lungs  Brain MRI negative for mets  Patient met with Dr. John who recommended treatment with palliative intent with paclitaxel carboplatin Avastin and atezolizumab-treatment to start on 04/27  -Due to thrombocytopenia carboplatin dose reduced to AUC 4  So far patient has been tolerating treatment well, labs reviewed abnormalities discussed  Okay to proceed with treatment  Patient has  follow-up appointment with me with next cycle of treatment  Plan for PET scan after 4 cycles of treatment      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  01/2021-scope done by ENT no evidence of recurrence  Continue close follow-up by ENT  Patient has dysphonia and some dysphagia           Patient is advised to call our clinic or go to ER in the event of fever chills sweats cough shortness of breath chest pain nausea vomiting diarrhea abdominal pain bleeding edema or any changes in health    MADHAVI Moran CNP  [facility]     Chart documentation with Dragon Voice recognition Software. Although reviewed after completion, some words and grammatical errors may remain.            Again, thank you for allowing me to participate in the care of your patient.        Sincerely,        MADHAVI Moran CNP

## 2021-06-08 NOTE — PROGRESS NOTES
Kt is a 60 year old who is being evaluated via a billable telephone visit.      What phone number would you like to be contacted at? [phone]    How would you like to obtain your AVS? Mail a copy  Phone call duration: 5 minutes

## 2021-06-08 NOTE — LETTER
"    6/8/2021         RE: Kt Rasmussen  [id number] [employer]  [facility] [address]        Dear Colleague,    Thank you for referring your patient, Kt Rasmussen, to the [facility] [place]. Please see a copy of my visit note below.    Kt is a 60 year old who is being evaluated via a billable telephone visit.      What phone number would you like to be contacted at? [phone]    How would you like to obtain your AVS? Mail a copy  Phone call duration: 5 minutes      The patient has been notified of following:      \"This telephone visit will be conducted via a call between you and your physician/provider. We have found that certain health care needs can be provided without the need for a physical exam.  This service lets us provide the care you need with a short phone conversation during the COVID-19 pandemic.  If a prescription is necessary we can send it directly to your pharmacy.  If lab work is needed we can place an order for that and you can then stop by our lab to have the test done at a later time.     Telephone visits are billed at different rates depending on your insurance coverage. During this emergency period, for some insurers they may be billed the same as an in-person visit.  Please reach out to your insurance provider with any questions.     If during the course of the call the physician/provider feels a telephone visit is not appropriate, you will not be charged for this service.\"     Patient has given verbal consent for Telephone visit?  Yes       Telephone visit 25 minutes      The patient has been notified of following:      \"This telephone visit will be conducted via a call between you and your physician/provider. We have found that certain health care needs can be provided without the need for a physical exam.  This service lets us provide the care you need with a short phone conversation during the COVID-19 pandemic.  If a prescription is necessary we can send it directly to " "your pharmacy.  If lab work is needed we can place an order for that and you can then stop by our lab to have the test done at a later time.     Telephone visits are billed at different rates depending on your insurance coverage. During this emergency period, for some insurers they may be billed the same as an in-person visit.  Please reach out to your insurance provider with any questions.     If during the course of the call the physician/provider feels a telephone visit is not appropriate, you will not be charged for this service.\"     Patient has given verbal consent for Telephone visit?  Yes        Telephone visit 20 min         Oncology/Hematology Visit Note  Jun 8, 2021    Reason for Visit: follow up of metastatic non-small cell lung carcinoma  Metastatic to lymph nodes bones in bilateral lungs  Brain MRI negative for mets    Patient met with Dr. John who recommended treatment with palliative intent with paclitaxel carboplatin Avastin and atezolizumab-treatment started on April 27, 2021  -Due to thrombocytopenia carboplatin AUC reduced to 4    Interval History:  Reports he is feeling well.  Reports he has been tolerating treatment well no side effects.  Denies fever chills sweats cough shortness of breath chest pain nausea vomiting diarrhea abdominal pain bleeding  Good energy      Review of Systems:  14 point ROS of systems including Constitutional, Eyes, Respiratory, Cardiovascular, Gastroenterology, Genitourinary, Integumentary, Muscularskeletal, Psychiatric were all negative except for pertinent positives noted in my HPI.    Physical Examination:  Telephone visit.  Patient answers all questions appropriately no audible wheezing or cough    Laboratory Data:  Results for orders placed or performed in visit on 06/08/21 (from the past 24 hour(s))   CBC with platelets differential   Result Value Ref Range    WBC 4.8 4.0 - 11.0 10e9/L    RBC Count 3.80 (L) 4.4 - 5.9 10e12/L    Hemoglobin 11.1 (L) 13.3 - 17.7 " g/dL    Hematocrit 33.9 (L) 40.0 - 53.0 %    MCV 89 78 - 100 fl    MCH 29.2 26.5 - 33.0 pg    MCHC 32.7 31.5 - 36.5 g/dL    RDW 14.5 10.0 - 15.0 %    Platelet Count 137 (L) 150 - 450 10e9/L    Diff Method Automated Method     % Neutrophils 61.1 %    % Lymphocytes 24.2 %    % Monocytes 12.6 %    % Eosinophils 1.3 %    % Basophils 0.4 %    % Immature Granulocytes 0.4 %    Nucleated RBCs 0 0 /100    Absolute Neutrophil 2.9 1.6 - 8.3 10e9/L    Absolute Lymphocytes 1.2 0.8 - 5.3 10e9/L    Absolute Monocytes 0.6 0.0 - 1.3 10e9/L    Absolute Eosinophils 0.1 0.0 - 0.7 10e9/L    Absolute Basophils 0.0 0.0 - 0.2 10e9/L    Abs Immature Granulocytes 0.0 0 - 0.4 10e9/L    Absolute Nucleated RBC 0.0    Comprehensive metabolic panel   Result Value Ref Range    Sodium 140 133 - 144 mmol/L    Potassium 4.3 3.4 - 5.3 mmol/L    Chloride 111 (H) 94 - 109 mmol/L    Carbon Dioxide 26 20 - 32 mmol/L    Anion Gap 3 3 - 14 mmol/L    Glucose 83 70 - 99 mg/dL    Urea Nitrogen 23 7 - 30 mg/dL    Creatinine 0.93 0.66 - 1.25 mg/dL    GFR Estimate 88 >60 mL/min/[1.73_m2]    GFR Estimate If Black >90 >60 mL/min/[1.73_m2]    Calcium 9.0 8.5 - 10.1 mg/dL    Bilirubin Total 0.4 0.2 - 1.3 mg/dL    Albumin 3.6 3.4 - 5.0 g/dL    Protein Total 7.3 6.8 - 8.8 g/dL    Alkaline Phosphatase 74 40 - 150 U/L    ALT 28 0 - 70 U/L    AST 21 0 - 45 U/L   TSH with free T4 reflex   Result Value Ref Range    TSH 1.95 0.40 - 4.00 mU/L         Assessment and Plan:      This is a 60-year-old male with    metastatic non-small cell lung carcinoma  Metastatic to lymph nodes bones in bilateral lungs  Brain MRI negative for mets  Patient met with Dr. 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 Although reviewed after completion, some words and grammatical errors may remain.            Again, thank you for allowing me to participate in the care of your patient.        Sincerely,        MADHAVI Moran CNP

## 2021-06-08 NOTE — PROGRESS NOTES
"The patient has been notified of following:      \"This telephone visit will be conducted via a call between you and your physician/provider. We have found that certain health care needs can be provided without the need for a physical exam.  This service lets us provide the care you need with a short phone conversation during the COVID-19 pandemic.  If a prescription is necessary we can send it directly to your pharmacy.  If lab work is needed we can place an order for that and you can then stop by our lab to have the test done at a later time.     Telephone visits are billed at different rates depending on your insurance coverage. During this emergency period, for some insurers they may be billed the same as an in-person visit.  Please reach out to your insurance provider with any questions.     If during the course of the call the physician/provider feels a telephone visit is not appropriate, you will not be charged for this service.\"     Patient has given verbal consent for Telephone visit?  Yes       Telephone visit 25 minutes      The patient has been notified of following:      \"This telephone visit will be conducted via a call between you and your physician/provider. We have found that certain health care needs can be provided without the need for a physical exam.  This service lets us provide the care you need with a short phone conversation during the COVID-19 pandemic.  If a prescription is necessary we can send it directly to your pharmacy.  If lab work is needed we can place an order for that and you can then stop by our lab to have the test done at a later time.     Telephone visits are billed at different rates depending on your insurance coverage. During this emergency period, for some insurers they may be billed the same as an in-person visit.  Please reach out to your insurance provider with any questions.     If during the course of the call the physician/provider feels a telephone visit is not " "appropriate, you will not be charged for this service.\"     Patient has given verbal consent for Telephone visit?  Yes        Telephone visit 20 min         Oncology/Hematology Visit Note  Jun 8, 2021    Reason for Visit: follow up of metastatic non-small cell lung carcinoma  Metastatic to lymph nodes bones in bilateral lungs  Brain MRI negative for mets    Patient met with Dr. John who recommended treatment with palliative intent with paclitaxel carboplatin Avastin and atezolizumab-treatment started on April 27, 2021  -Due to thrombocytopenia carboplatin AUC reduced to 4    Interval History:  Reports he is feeling well.  Reports he has been tolerating treatment well no side effects.  Denies fever chills sweats cough shortness of breath chest pain nausea vomiting diarrhea abdominal pain bleeding  Good energy      Review of Systems:  14 point ROS of systems including Constitutional, Eyes, Respiratory, Cardiovascular, Gastroenterology, Genitourinary, Integumentary, Muscularskeletal, Psychiatric were all negative except for pertinent positives noted in my HPI.    Physical Examination:  Telephone visit.  Patient answers all questions appropriately no audible wheezing or cough    Laboratory Data:  Results for orders placed or performed in visit on 06/08/21 (from the past 24 hour(s))   CBC with platelets differential   Result Value Ref Range    WBC 4.8 4.0 - 11.0 10e9/L    RBC Count 3.80 (L) 4.4 - 5.9 10e12/L    Hemoglobin 11.1 (L) 13.3 - 17.7 g/dL    Hematocrit 33.9 (L) 40.0 - 53.0 %    MCV 89 78 - 100 fl    MCH 29.2 26.5 - 33.0 pg    MCHC 32.7 31.5 - 36.5 g/dL    RDW 14.5 10.0 - 15.0 %    Platelet Count 137 (L) 150 - 450 10e9/L    Diff Method Automated Method     % Neutrophils 61.1 %    % Lymphocytes 24.2 %    % Monocytes 12.6 %    % Eosinophils 1.3 %    % Basophils 0.4 %    % Immature Granulocytes 0.4 %    Nucleated RBCs 0 0 /100    Absolute Neutrophil 2.9 1.6 - 8.3 10e9/L    Absolute Lymphocytes 1.2 0.8 - 5.3 10e9/L "    Absolute Monocytes 0.6 0.0 - 1.3 10e9/L    Absolute Eosinophils 0.1 0.0 - 0.7 10e9/L    Absolute Basophils 0.0 0.0 - 0.2 10e9/L    Abs Immature Granulocytes 0.0 0 - 0.4 10e9/L    Absolute Nucleated RBC 0.0    Comprehensive metabolic panel   Result Value Ref Range    Sodium 140 133 - 144 mmol/L    Potassium 4.3 3.4 - 5.3 mmol/L    Chloride 111 (H) 94 - 109 mmol/L    Carbon Dioxide 26 20 - 32 mmol/L    Anion Gap 3 3 - 14 mmol/L    Glucose 83 70 - 99 mg/dL    Urea Nitrogen 23 7 - 30 mg/dL    Creatinine 0.93 0.66 - 1.25 mg/dL    GFR Estimate 88 >60 mL/min/[1.73_m2]    GFR Estimate If Black >90 >60 mL/min/[1.73_m2]    Calcium 9.0 8.5 - 10.1 mg/dL    Bilirubin Total 0.4 0.2 - 1.3 mg/dL    Albumin 3.6 3.4 - 5.0 g/dL    Protein Total 7.3 6.8 - 8.8 g/dL    Alkaline Phosphatase 74 40 - 150 U/L    ALT 28 0 - 70 U/L    AST 21 0 - 45 U/L   TSH with free T4 reflex   Result Value Ref Range    TSH 1.95 0.40 - 4.00 mU/L         Assessment and Plan:      This is a 60-year-old male with    metastatic non-small cell lung carcinoma  Metastatic to lymph nodes bones in bilateral lungs  Brain MRI negative for mets  Patient met with Dr. 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 Although reviewed after completion, some words and grammatical errors may remain.

## 2021-06-09 ENCOUNTER — HOSPITAL ENCOUNTER (OUTPATIENT)
Facility: CLINIC | Age: 61
Setting detail: SPECIMEN
Discharge: HOME | End: 2021-06-09
Attending: INTERNAL MEDICINE | Admitting: INTERNAL MEDICINE
Payer: MEDICARE

## 2021-06-09 ENCOUNTER — INFUSION THERAPY VISIT (OUTPATIENT)
Dept: INFUSION THERAPY | Facility: CLINIC | Age: 61
End: 2021-06-09
Attending: INTERNAL MEDICINE
Payer: MEDICARE

## 2021-06-09 VITALS
DIASTOLIC BLOOD PRESSURE: 86 MMHG | TEMPERATURE: 97.6 F | HEART RATE: 69 BPM | WEIGHT: 185.6 LBS | OXYGEN SATURATION: 99 % | SYSTOLIC BLOOD PRESSURE: 121 MMHG | RESPIRATION RATE: 20 BRPM | BODY MASS INDEX: 22.89 KG/M2

## 2021-06-09 DIAGNOSIS — C79.51 NON-SMALL CELL LUNG CANCER METASTATIC TO BONE (H): Primary | ICD-10-CM

## 2021-06-09 DIAGNOSIS — C34.90 NON-SMALL CELL LUNG CANCER METASTATIC TO BONE (H): Primary | ICD-10-CM

## 2021-06-09 DIAGNOSIS — Z51.11 ENCOUNTER FOR ANTINEOPLASTIC CHEMOTHERAPY: ICD-10-CM

## 2021-06-09 LAB — ALBUMIN UR-MCNC: NEGATIVE MG/DL

## 2021-06-09 PROCEDURE — 250N000011 HC RX IP 250 OP 636: Performed by: INTERNAL MEDICINE

## 2021-06-09 PROCEDURE — 96367 TX/PROPH/DG ADDL SEQ IV INF: CPT

## 2021-06-09 PROCEDURE — 258N000003 HC RX IP 258 OP 636: Performed by: INTERNAL MEDICINE

## 2021-06-09 PROCEDURE — 96417 CHEMO IV INFUS EACH ADDL SEQ: CPT

## 2021-06-09 PROCEDURE — 96413 CHEMO IV INFUSION 1 HR: CPT

## 2021-06-09 PROCEDURE — 81003 URINALYSIS AUTO W/O SCOPE: CPT | Performed by: INTERNAL MEDICINE

## 2021-06-09 PROCEDURE — 96415 CHEMO IV INFUSION ADDL HR: CPT

## 2021-06-09 PROCEDURE — 96375 TX/PRO/DX INJ NEW DRUG ADDON: CPT

## 2021-06-09 RX ORDER — PALONOSETRON 0.05 MG/ML
0.25 INJECTION, SOLUTION INTRAVENOUS ONCE
Status: COMPLETED | OUTPATIENT
Start: 2021-06-09 | End: 2021-06-09

## 2021-06-09 RX ORDER — HEPARIN SODIUM (PORCINE) LOCK FLUSH IV SOLN 100 UNIT/ML 100 UNIT/ML
5 SOLUTION INTRAVENOUS
Status: DISCONTINUED | OUTPATIENT
Start: 2021-06-09 | End: 2021-06-09 | Stop reason: HOSPADM

## 2021-06-09 RX ADMIN — PALONOSETRON 0.25 MG: 0.05 INJECTION, SOLUTION INTRAVENOUS at 08:30

## 2021-06-09 RX ADMIN — Medication 5 ML: at 13:38

## 2021-06-09 RX ADMIN — FOSAPREPITANT: 150 INJECTION, POWDER, LYOPHILIZED, FOR SOLUTION INTRAVENOUS at 08:33

## 2021-06-09 RX ADMIN — ATEZOLIZUMAB 1200 MG: 1200 INJECTION, SOLUTION INTRAVENOUS at 09:33

## 2021-06-09 RX ADMIN — BEVACIZUMAB-AWWB 1300 MG: 400 INJECTION, SOLUTION INTRAVENOUS at 08:56

## 2021-06-09 RX ADMIN — SODIUM CHLORIDE 250 ML: 9 INJECTION, SOLUTION INTRAVENOUS at 08:30

## 2021-06-09 RX ADMIN — PACLITAXEL 432 MG: 6 INJECTION, SOLUTION INTRAVENOUS at 10:08

## 2021-06-09 RX ADMIN — CARBOPLATIN 500 MG: 10 INJECTION, SOLUTION INTRAVENOUS at 13:02

## 2021-06-09 ASSESSMENT — PAIN SCALES - GENERAL: PAINLEVEL: NO PAIN (0)

## 2021-06-09 NOTE — PROGRESS NOTES
Infusion Nursing Note:  Kt Rasmussen presents today for Cycle 3 Day 1 MVASI, Taxol, Carboplatin, Tecentriq.  Patient seen by provider today: No   present during visit today: Not Applicable.    Note: N/A.    Intravenous Access:  Implanted Port.    Treatment Conditions:  Lab Results   Component Value Date    HGB 11.1 06/08/2021     Lab Results   Component Value Date    WBC 4.8 06/08/2021      Lab Results   Component Value Date    ANEU 2.9 06/08/2021     Lab Results   Component Value Date     06/08/2021      Lab Results   Component Value Date     06/08/2021                   Lab Results   Component Value Date    POTASSIUM 4.3 06/08/2021           Lab Results   Component Value Date    MAG 1.9 10/13/2016            Lab Results   Component Value Date    CR 0.93 06/08/2021                   Lab Results   Component Value Date    BEVERLY 9.0 06/08/2021                Lab Results   Component Value Date    BILITOTAL 0.4 06/08/2021           Lab Results   Component Value Date    ALBUMIN 3.6 06/08/2021                    Lab Results   Component Value Date    ALT 28 06/08/2021           Lab Results   Component Value Date    AST 21 06/08/2021       Urine Protein: Negative.      Post Infusion Assessment:  Patient tolerated infusion without incident.  Blood return noted pre and post infusion.  Site patent and intact, free from redness, edema or discomfort.  No evidence of extravasations.  Access discontinued per protocol.       Discharge Plan:   Patient declined prescription refills.  Discharge instructions reviewed with: Patient.  Patient verbalized understanding of discharge instructions and all questions answered.  Copy of AVS reviewed with patient.  Patient will return 6/30/21 for next appointment.  Patient discharged in stable condition accompanied by: self.  Departure Mode: Ambulatory.      Sandi Sanches RN

## 2021-06-27 RX ORDER — LORAZEPAM 2 MG/ML
0.5 INJECTION INTRAMUSCULAR EVERY 4 HOURS PRN
Status: CANCELLED
Start: 2021-06-29

## 2021-06-27 RX ORDER — HEPARIN SODIUM,PORCINE 10 UNIT/ML
5 VIAL (ML) INTRAVENOUS
Status: CANCELLED | OUTPATIENT
Start: 2021-06-29

## 2021-06-27 RX ORDER — PALONOSETRON 0.05 MG/ML
0.25 INJECTION, SOLUTION INTRAVENOUS ONCE
Status: CANCELLED
Start: 2021-06-29

## 2021-06-27 RX ORDER — EPINEPHRINE 1 MG/ML
0.3 INJECTION, SOLUTION, CONCENTRATE INTRAVENOUS EVERY 5 MIN PRN
Status: CANCELLED | OUTPATIENT
Start: 2021-06-29

## 2021-06-27 RX ORDER — ALBUTEROL SULFATE 0.83 MG/ML
2.5 SOLUTION RESPIRATORY (INHALATION)
Status: CANCELLED | OUTPATIENT
Start: 2021-06-29

## 2021-06-27 RX ORDER — NALOXONE HYDROCHLORIDE 0.4 MG/ML
.1-.4 INJECTION, SOLUTION INTRAMUSCULAR; INTRAVENOUS; SUBCUTANEOUS
Status: CANCELLED | OUTPATIENT
Start: 2021-06-29

## 2021-06-27 RX ORDER — SODIUM CHLORIDE 9 MG/ML
1000 INJECTION, SOLUTION INTRAVENOUS CONTINUOUS PRN
Status: CANCELLED
Start: 2021-06-29

## 2021-06-27 RX ORDER — MEPERIDINE HYDROCHLORIDE 25 MG/ML
25 INJECTION INTRAMUSCULAR; INTRAVENOUS; SUBCUTANEOUS EVERY 30 MIN PRN
Status: CANCELLED | OUTPATIENT
Start: 2021-06-29

## 2021-06-27 RX ORDER — ALBUTEROL SULFATE 90 UG/1
1-2 AEROSOL, METERED RESPIRATORY (INHALATION)
Status: CANCELLED
Start: 2021-06-29

## 2021-06-27 RX ORDER — HEPARIN SODIUM (PORCINE) LOCK FLUSH IV SOLN 100 UNIT/ML 100 UNIT/ML
5 SOLUTION INTRAVENOUS
Status: CANCELLED | OUTPATIENT
Start: 2021-06-29

## 2021-06-27 RX ORDER — DIPHENHYDRAMINE HYDROCHLORIDE 50 MG/ML
50 INJECTION INTRAMUSCULAR; INTRAVENOUS
Status: CANCELLED
Start: 2021-06-29

## 2021-06-29 ENCOUNTER — INFUSION THERAPY VISIT (OUTPATIENT)
Dept: INFUSION THERAPY | Facility: CLINIC | Age: 61
End: 2021-06-29
Attending: INTERNAL MEDICINE
Payer: MEDICARE

## 2021-06-29 ENCOUNTER — ONCOLOGY VISIT (OUTPATIENT)
Dept: ONCOLOGY | Facility: CLINIC | Age: 61
End: 2021-06-29
Attending: NURSE PRACTITIONER
Payer: MEDICARE

## 2021-06-29 ENCOUNTER — HOSPITAL ENCOUNTER (OUTPATIENT)
Facility: CLINIC | Age: 61
Setting detail: SPECIMEN
Discharge: HOME | End: 2021-06-29
Attending: INTERNAL MEDICINE | Admitting: INTERNAL MEDICINE
Payer: MEDICARE

## 2021-06-29 VITALS
DIASTOLIC BLOOD PRESSURE: 90 MMHG | BODY MASS INDEX: 22.72 KG/M2 | RESPIRATION RATE: 18 BRPM | HEART RATE: 83 BPM | WEIGHT: 184.2 LBS | OXYGEN SATURATION: 99 % | SYSTOLIC BLOOD PRESSURE: 144 MMHG

## 2021-06-29 DIAGNOSIS — D70.1 CHEMOTHERAPY-INDUCED NEUTROPENIA (H): Primary | ICD-10-CM

## 2021-06-29 DIAGNOSIS — T45.1X5A CHEMOTHERAPY-INDUCED NEUTROPENIA (H): Primary | ICD-10-CM

## 2021-06-29 DIAGNOSIS — Z51.11 ENCOUNTER FOR ANTINEOPLASTIC CHEMOTHERAPY: ICD-10-CM

## 2021-06-29 DIAGNOSIS — C34.90 NON-SMALL CELL CARCINOMA OF LUNG, STAGE 3, UNSPECIFIED LATERALITY (H): ICD-10-CM

## 2021-06-29 DIAGNOSIS — C34.90 NON-SMALL CELL LUNG CANCER METASTATIC TO BONE (H): Primary | ICD-10-CM

## 2021-06-29 DIAGNOSIS — C79.51 NON-SMALL CELL LUNG CANCER METASTATIC TO BONE (H): Primary | ICD-10-CM

## 2021-06-29 LAB
ALBUMIN SERPL-MCNC: 3.5 G/DL (ref 3.4–5)
ALP SERPL-CCNC: 73 U/L (ref 40–150)
ALT SERPL W P-5'-P-CCNC: 34 U/L (ref 0–70)
ANION GAP SERPL CALCULATED.3IONS-SCNC: 4 MMOL/L (ref 3–14)
AST SERPL W P-5'-P-CCNC: 26 U/L (ref 0–45)
BASOPHILS # BLD AUTO: 0 10E9/L (ref 0–0.2)
BASOPHILS NFR BLD AUTO: 0.6 %
BILIRUB SERPL-MCNC: 0.3 MG/DL (ref 0.2–1.3)
BUN SERPL-MCNC: 17 MG/DL (ref 7–30)
CALCIUM SERPL-MCNC: 8.7 MG/DL (ref 8.5–10.1)
CHLORIDE SERPL-SCNC: 111 MMOL/L (ref 94–109)
CO2 SERPL-SCNC: 25 MMOL/L (ref 20–32)
CREAT SERPL-MCNC: 0.84 MG/DL (ref 0.66–1.25)
DIFFERENTIAL METHOD BLD: ABNORMAL
EOSINOPHIL # BLD AUTO: 0.1 10E9/L (ref 0–0.7)
EOSINOPHIL NFR BLD AUTO: 1.5 %
ERYTHROCYTE [DISTWIDTH] IN BLOOD BY AUTOMATED COUNT: 15.4 % (ref 10–15)
GFR SERPL CREATININE-BSD FRML MDRD: >90 ML/MIN/{1.73_M2}
GLUCOSE SERPL-MCNC: 85 MG/DL (ref 70–99)
HCT VFR BLD AUTO: 34.5 % (ref 40–53)
HGB BLD-MCNC: 11.1 G/DL (ref 13.3–17.7)
IMM GRANULOCYTES # BLD: 0 10E9/L (ref 0–0.4)
IMM GRANULOCYTES NFR BLD: 0.6 %
LYMPHOCYTES # BLD AUTO: 1 10E9/L (ref 0.8–5.3)
LYMPHOCYTES NFR BLD AUTO: 31.4 %
MCH RBC QN AUTO: 28.5 PG (ref 26.5–33)
MCHC RBC AUTO-ENTMCNC: 32.2 G/DL (ref 31.5–36.5)
MCV RBC AUTO: 89 FL (ref 78–100)
MONOCYTES # BLD AUTO: 0.6 10E9/L (ref 0–1.3)
MONOCYTES NFR BLD AUTO: 19.1 %
NEUTROPHILS # BLD AUTO: 1.5 10E9/L (ref 1.6–8.3)
NEUTROPHILS NFR BLD AUTO: 46.8 %
NRBC # BLD AUTO: 0 10*3/UL
NRBC BLD AUTO-RTO: 0 /100
PLATELET # BLD AUTO: 166 10E9/L (ref 150–450)
POTASSIUM SERPL-SCNC: 4.3 MMOL/L (ref 3.4–5.3)
PROT SERPL-MCNC: 7.1 G/DL (ref 6.8–8.8)
RBC # BLD AUTO: 3.9 10E12/L (ref 4.4–5.9)
SODIUM SERPL-SCNC: 140 MMOL/L (ref 133–144)
TSH SERPL DL<=0.005 MIU/L-ACNC: 2.66 MU/L (ref 0.4–4)
WBC # BLD AUTO: 3.3 10E9/L (ref 4–11)

## 2021-06-29 PROCEDURE — 99214 OFFICE O/P EST MOD 30 MIN: CPT | Performed by: NURSE PRACTITIONER

## 2021-06-29 PROCEDURE — G0463 HOSPITAL OUTPT CLINIC VISIT: HCPCS

## 2021-06-29 PROCEDURE — 84443 ASSAY THYROID STIM HORMONE: CPT | Performed by: INTERNAL MEDICINE

## 2021-06-29 PROCEDURE — 80053 COMPREHEN METABOLIC PANEL: CPT | Performed by: INTERNAL MEDICINE

## 2021-06-29 PROCEDURE — 85025 COMPLETE CBC W/AUTO DIFF WBC: CPT | Performed by: INTERNAL MEDICINE

## 2021-06-29 PROCEDURE — 81003 URINALYSIS AUTO W/O SCOPE: CPT | Performed by: INTERNAL MEDICINE

## 2021-06-29 PROCEDURE — 250N000011 HC RX IP 250 OP 636: Performed by: INTERNAL MEDICINE

## 2021-06-29 PROCEDURE — 36591 DRAW BLOOD OFF VENOUS DEVICE: CPT

## 2021-06-29 RX ORDER — HEPARIN SODIUM (PORCINE) LOCK FLUSH IV SOLN 100 UNIT/ML 100 UNIT/ML
5 SOLUTION INTRAVENOUS
Status: CANCELLED | OUTPATIENT
Start: 2021-06-29

## 2021-06-29 RX ORDER — HEPARIN SODIUM (PORCINE) LOCK FLUSH IV SOLN 100 UNIT/ML 100 UNIT/ML
5 SOLUTION INTRAVENOUS
Status: DISCONTINUED | OUTPATIENT
Start: 2021-06-29 | End: 2021-06-29 | Stop reason: HOSPADM

## 2021-06-29 RX ORDER — HEPARIN SODIUM,PORCINE 10 UNIT/ML
5 VIAL (ML) INTRAVENOUS
Status: CANCELLED | OUTPATIENT
Start: 2021-06-29

## 2021-06-29 RX ADMIN — Medication 5 ML: at 09:36

## 2021-06-29 ASSESSMENT — PAIN SCALES - GENERAL: PAINLEVEL: NO PAIN (0)

## 2021-06-29 NOTE — PROGRESS NOTES
"Oncology Rooming Note    June 29, 2021 10:17 AM   Kt Rasmussen is a 60 year old male who presents for:    Chief Complaint   Patient presents with     Oncology Clinic Visit     Initial Vitals: BP (!) 144/90   Pulse 83   Resp 18   Wt 83.6 kg (184 lb 3.2 oz)   SpO2 99%   BMI 22.72 kg/m   Estimated body mass index is 22.72 kg/m  as calculated from the following:    Height as of 4/27/21: 1.918 m (6' 3.5\").    Weight as of this encounter: 83.6 kg (184 lb 3.2 oz). Body surface area is 2.11 meters squared.  No Pain (0) Comment: Data Unavailable   No LMP for male patient.  Allergies reviewed: Yes  Medications reviewed: Yes    Medications: Medication refills not needed today.  Pharmacy name entered into EPIC:    [facility] [place] [address] [facility] [place], [place] - [address] [id number] - [facility] [address] [facility]    Clinical concerns: no      Keke More CMA            "

## 2021-06-29 NOTE — PROGRESS NOTES
Oncology/Hematology Visit Note  Jun 29, 2021    Reason for Visit: follow up of metastatic non-small cell lung carcinoma  Metastatic to lymph nodes bones in bilateral lungs  Brain MRI negative for mets    Patient met with Dr. John who recommended treatment with palliative intent with paclitaxel carboplatin Avastin and atezolizumab-treatment started on April 27, 2021  -Due to thrombocytopenia carboplatin AUC reduced to 4 with cycle 2      Interval History:  Patient reports so far he has been tolerating treatment well.  He denies fever, chills, sweats, cough ,shortness of breath, chest pain. Denies  nausea ,vomiting ,diarrhea, abdominal pain or  Bleeding.         Review of Systems:  14 point ROS of systems including Constitutional, Eyes, Respiratory, Cardiovascular, Gastroenterology, Genitourinary, Integumentary, Muscularskeletal, Psychiatric were all negative except for pertinent positives noted in my HPI.    Physical Examination:  Physical Exam  HENT:      Head: Normocephalic.      Nose: Nose normal.      Mouth/Throat:      Mouth: Mucous membranes are moist.   Eyes:      Pupils: Pupils are equal, round, and reactive to light.   Neck:      Musculoskeletal: Normal range of motion.   Cardiovascular:      Rate and Rhythm: Normal rate.   Pulmonary:      Effort: Pulmonary effort is normal.   Musculoskeletal: Normal range of motion.   Skin:     General: Skin is warm.   Neurological:      General: No focal deficit present.      Mental Status: He is alert.   Psychiatric:         Mood and Affect: Mood normal.       Laboratory Data:  Results for orders placed or performed in visit on 06/29/21 (from the past 24 hour(s))   CBC with platelets differential   Result Value Ref Range    WBC 3.3 (L) 4.0 - 11.0 10e9/L    RBC Count 3.90 (L) 4.4 - 5.9 10e12/L    Hemoglobin 11.1 (L) 13.3 - 17.7 g/dL    Hematocrit 34.5 (L) 40.0 - 53.0 %    MCV 89 78 - 100 fl    MCH 28.5 26.5 - 33.0 pg    MCHC 32.2 31.5 - 36.5 g/dL    RDW 15.4 (H) 10.0 -  15.0 %    Platelet Count 166 150 - 450 10e9/L    Diff Method Automated Method     % Neutrophils 46.8 %    % Lymphocytes 31.4 %    % Monocytes 19.1 %    % Eosinophils 1.5 %    % Basophils 0.6 %    % Immature Granulocytes 0.6 %    Nucleated RBCs 0 0 /100    Absolute Neutrophil 1.5 (L) 1.6 - 8.3 10e9/L    Absolute Lymphocytes 1.0 0.8 - 5.3 10e9/L    Absolute Monocytes 0.6 0.0 - 1.3 10e9/L    Absolute Eosinophils 0.1 0.0 - 0.7 10e9/L    Absolute Basophils 0.0 0.0 - 0.2 10e9/L    Abs Immature Granulocytes 0.0 0 - 0.4 10e9/L    Absolute Nucleated RBC 0.0    Comprehensive metabolic panel   Result Value Ref Range    Sodium 140 133 - 144 mmol/L    Potassium 4.3 3.4 - 5.3 mmol/L    Chloride 111 (H) 94 - 109 mmol/L    Carbon Dioxide 25 20 - 32 mmol/L    Anion Gap 4 3 - 14 mmol/L    Glucose 85 70 - 99 mg/dL    Urea Nitrogen 17 7 - 30 mg/dL    Creatinine 0.84 0.66 - 1.25 mg/dL    GFR Estimate >90 >60 mL/min/[1.73_m2]    GFR Estimate If Black >90 >60 mL/min/[1.73_m2]    Calcium 8.7 8.5 - 10.1 mg/dL    Bilirubin Total 0.3 0.2 - 1.3 mg/dL    Albumin 3.5 3.4 - 5.0 g/dL    Protein Total 7.1 6.8 - 8.8 g/dL    Alkaline Phosphatase 73 40 - 150 U/L    ALT 34 0 - 70 U/L    AST 26 0 - 45 U/L   TSH with free T4 reflex   Result Value Ref Range    TSH 2.66 0.40 - 4.00 mU/L         Assessment and Plan:      This is a 60-year-old male with    metastatic non-small cell lung carcinoma  Metastatic to lymph nodes bones in bilateral lungs  Brain MRI negative for mets  Patient met with Dr. John who recommended treatment with palliative intent with paclitaxel carboplatin Avastin and atezolizumab-treatment to start on 04/27  -Due to thrombocytopenia carboplatin dose reduced to AUC 4 with cycle 2  Labs reviewed with patient abnormalities discussed  For mild neutropenia of ANC 1.5 I will add Neulasta with each cycle okay to proceed with treatment tomorrow,  Patient is followed appointment with Dr. 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 Although reviewed after completion, some words and grammatical errors may remain.

## 2021-06-29 NOTE — LETTER
"    6/29/2021         RE: Kt Rasmussen  [id number]  [facility] [address]        Dear Colleague,    Thank you for referring your patient, Kt Rasmussen, to the [facility]. Please see a copy of my visit note below.    Oncology Rooming Note    June 29, 2021 10:17 AM   Kt Rasmussen is a 60 year old male who presents for:    Chief Complaint   Patient presents with     Oncology Clinic Visit     Initial Vitals: BP (!) 144/90   Pulse 83   Resp 18   Wt 83.6 kg (184 lb 3.2 oz)   SpO2 99%   BMI 22.72 kg/m   Estimated body mass index is 22.72 kg/m  as calculated from the following:    Height as of 4/27/21: 1.918 m (6' 3.5\").    Weight as of this encounter: 83.6 kg (184 lb 3.2 oz). Body surface area is 2.11 meters squared.  No Pain (0) Comment: Data Unavailable   No LMP for male patient.  Allergies reviewed: Yes  Medications reviewed: Yes    Medications: Medication refills not needed today.  Pharmacy name entered into EPIC:    [place] - [address] [facility] [place], [place] - [address] [id number] - [place] [id number] AT [facility] [address]    Clinical concerns: no      Keke More CMA                Oncology/Hematology Visit Note  Jun 29, 2021    Reason for Visit: follow up of metastatic non-small cell lung carcinoma  Metastatic to lymph nodes bones in bilateral lungs  Brain MRI negative for mets    Patient met with Dr. John who recommended treatment with palliative intent with paclitaxel carboplatin Avastin and atezolizumab-treatment started on April 27, 2021  -Due to thrombocytopenia carboplatin AUC reduced to 4 with cycle 2      Interval History:  Patient reports so far he has been tolerating treatment well.  He denies fever, chills, sweats, cough ,shortness of breath, chest pain. Denies  nausea ,vomiting ,diarrhea, abdominal pain or  Bleeding.   "       Review of Systems:  14 point ROS of systems including Constitutional, Eyes, Respiratory, Cardiovascular, Gastroenterology, Genitourinary, Integumentary, Muscularskeletal, Psychiatric were all negative except for pertinent positives noted in my HPI.    Physical Examination:  Physical Exam  HENT:      Head: Normocephalic.      Nose: Nose normal.      Mouth/Throat:      Mouth: Mucous membranes are moist.   Eyes:      Pupils: Pupils are equal, round, and reactive to light.   Neck:      Musculoskeletal: Normal range of motion.   Cardiovascular:      Rate and Rhythm: Normal rate.   Pulmonary:      Effort: Pulmonary effort is normal.   Musculoskeletal: Normal range of motion.   Skin:     General: Skin is warm.   Neurological:      General: No focal deficit present.      Mental Status: He is alert.   Psychiatric:         Mood and Affect: Mood normal.       Laboratory Data:  Results for orders placed or performed in visit on 06/29/21 (from the past 24 hour(s))   CBC with platelets differential   Result Value Ref Range    WBC 3.3 (L) 4.0 - 11.0 10e9/L    RBC Count 3.90 (L) 4.4 - 5.9 10e12/L    Hemoglobin 11.1 (L) 13.3 - 17.7 g/dL    Hematocrit 34.5 (L) 40.0 - 53.0 %    MCV 89 78 - 100 fl    MCH 28.5 26.5 - 33.0 pg    MCHC 32.2 31.5 - 36.5 g/dL    RDW 15.4 (H) 10.0 - 15.0 %    Platelet Count 166 150 - 450 10e9/L    Diff Method Automated Method     % Neutrophils 46.8 %    % Lymphocytes 31.4 %    % Monocytes 19.1 %    % Eosinophils 1.5 %    % Basophils 0.6 %    % Immature Granulocytes 0.6 %    Nucleated RBCs 0 0 /100    Absolute Neutrophil 1.5 (L) 1.6 - 8.3 10e9/L    Absolute Lymphocytes 1.0 0.8 - 5.3 10e9/L    Absolute Monocytes 0.6 0.0 - 1.3 10e9/L    Absolute Eosinophils 0.1 0.0 - 0.7 10e9/L    Absolute Basophils 0.0 0.0 - 0.2 10e9/L    Abs Immature Granulocytes 0.0 0 - 0.4 10e9/L    Absolute Nucleated RBC 0.0    Comprehensive metabolic panel   Result Value Ref Range    Sodium 140 133 - 144 mmol/L    Potassium 4.3 3.4 -  5.3 mmol/L    Chloride 111 (H) 94 - 109 mmol/L    Carbon Dioxide 25 20 - 32 mmol/L    Anion Gap 4 3 - 14 mmol/L    Glucose 85 70 - 99 mg/dL    Urea Nitrogen 17 7 - 30 mg/dL    Creatinine 0.84 0.66 - 1.25 mg/dL    GFR Estimate >90 >60 mL/min/[1.73_m2]    GFR Estimate If Black >90 >60 mL/min/[1.73_m2]    Calcium 8.7 8.5 - 10.1 mg/dL    Bilirubin Total 0.3 0.2 - 1.3 mg/dL    Albumin 3.5 3.4 - 5.0 g/dL    Protein Total 7.1 6.8 - 8.8 g/dL    Alkaline Phosphatase 73 40 - 150 U/L    ALT 34 0 - 70 U/L    AST 26 0 - 45 U/L   TSH with free T4 reflex   Result Value Ref Range    TSH 2.66 0.40 - 4.00 mU/L         Assessment and Plan:      This is a 60-year-old male with    metastatic non-small cell lung carcinoma  Metastatic to lymph nodes bones in bilateral lungs  Brain MRI negative for mets  Patient met with Dr. John who recommended treatment with palliative intent with paclitaxel carboplatin Avastin and atezolizumab-treatment to start on 04/27  -Due to thrombocytopenia carboplatin dose reduced to AUC 4 with cycle 2  Labs reviewed with patient abnormalities discussed  For mild neutropenia of ANC 1.5 I will add Neulasta with each cycle okay to proceed with treatment tomorrow,  Patient is followed appointment with Dr. 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 Although reviewed after completion, some words and grammatical errors may remain.            Again, thank you for allowing me to participate in the care of your patient.        Sincerely,        MADHAVI Moran CNP

## 2021-06-29 NOTE — PROGRESS NOTES
Nursing Note:  Kt Rasmussen presents today for port labs+urine for tx 6/30.    Patient seen by provider today: Yes: Juan Rausch NP   present during visit today: Not Applicable.    Note: N/A.    Intravenous Access:  Labs drawn without difficulty.  Implanted Port.    Discharge Plan:   Patient was sent to [facility] for RUSSEL Martinez appointment.    Adore Day RN

## 2021-06-30 ENCOUNTER — INFUSION THERAPY VISIT (OUTPATIENT)
Dept: INFUSION THERAPY | Facility: CLINIC | Age: 61
End: 2021-06-30
Attending: INTERNAL MEDICINE
Payer: MEDICARE

## 2021-06-30 VITALS
BODY MASS INDEX: 22.73 KG/M2 | RESPIRATION RATE: 20 BRPM | OXYGEN SATURATION: 97 % | DIASTOLIC BLOOD PRESSURE: 94 MMHG | WEIGHT: 184.3 LBS | TEMPERATURE: 97.6 F | HEART RATE: 72 BPM | SYSTOLIC BLOOD PRESSURE: 136 MMHG

## 2021-06-30 DIAGNOSIS — C79.51 NON-SMALL CELL LUNG CANCER METASTATIC TO BONE (H): ICD-10-CM

## 2021-06-30 DIAGNOSIS — D70.1 CHEMOTHERAPY-INDUCED NEUTROPENIA (H): Primary | ICD-10-CM

## 2021-06-30 DIAGNOSIS — T45.1X5A CHEMOTHERAPY-INDUCED NEUTROPENIA (H): Primary | ICD-10-CM

## 2021-06-30 DIAGNOSIS — Z51.11 ENCOUNTER FOR ANTINEOPLASTIC CHEMOTHERAPY: ICD-10-CM

## 2021-06-30 DIAGNOSIS — C34.90 NON-SMALL CELL LUNG CANCER METASTATIC TO BONE (H): ICD-10-CM

## 2021-06-30 LAB — ALBUMIN UR-MCNC: NEGATIVE MG/DL

## 2021-06-30 PROCEDURE — 258N000003 HC RX IP 258 OP 636: Performed by: INTERNAL MEDICINE

## 2021-06-30 PROCEDURE — 96415 CHEMO IV INFUSION ADDL HR: CPT

## 2021-06-30 PROCEDURE — 96367 TX/PROPH/DG ADDL SEQ IV INF: CPT

## 2021-06-30 PROCEDURE — 250N000011 HC RX IP 250 OP 636: Performed by: INTERNAL MEDICINE

## 2021-06-30 PROCEDURE — 96413 CHEMO IV INFUSION 1 HR: CPT

## 2021-06-30 PROCEDURE — 96375 TX/PRO/DX INJ NEW DRUG ADDON: CPT

## 2021-06-30 PROCEDURE — 96417 CHEMO IV INFUS EACH ADDL SEQ: CPT

## 2021-06-30 RX ORDER — PALONOSETRON 0.05 MG/ML
0.25 INJECTION, SOLUTION INTRAVENOUS ONCE
Status: COMPLETED | OUTPATIENT
Start: 2021-06-30 | End: 2021-06-30

## 2021-06-30 RX ORDER — HEPARIN SODIUM (PORCINE) LOCK FLUSH IV SOLN 100 UNIT/ML 100 UNIT/ML
5 SOLUTION INTRAVENOUS
Status: DISCONTINUED | OUTPATIENT
Start: 2021-06-30 | End: 2021-06-30 | Stop reason: HOSPADM

## 2021-06-30 RX ADMIN — ATEZOLIZUMAB 1200 MG: 1200 INJECTION, SOLUTION INTRAVENOUS at 09:47

## 2021-06-30 RX ADMIN — FOSAPREPITANT: 150 INJECTION, POWDER, LYOPHILIZED, FOR SOLUTION INTRAVENOUS at 08:46

## 2021-06-30 RX ADMIN — PALONOSETRON 0.25 MG: 0.05 INJECTION, SOLUTION INTRAVENOUS at 08:44

## 2021-06-30 RX ADMIN — SODIUM CHLORIDE 250 ML: 9 INJECTION, SOLUTION INTRAVENOUS at 08:30

## 2021-06-30 RX ADMIN — Medication 5 ML: at 14:02

## 2021-06-30 RX ADMIN — CARBOPLATIN 500 MG: 10 INJECTION, SOLUTION INTRAVENOUS at 13:29

## 2021-06-30 RX ADMIN — SODIUM CHLORIDE 1300 MG: 9 INJECTION, SOLUTION INTRAVENOUS at 09:09

## 2021-06-30 RX ADMIN — PACLITAXEL 432 MG: 6 INJECTION, SOLUTION INTRAVENOUS at 10:24

## 2021-06-30 ASSESSMENT — PAIN SCALES - GENERAL: PAINLEVEL: NO PAIN (0)

## 2021-06-30 NOTE — PROGRESS NOTES
Infusion Nursing Note:  Kt Rasmussen presents today for Cycle 4 Day 1 MVASI, Taxol, Carboplatin, Tecentriq.    Patient seen by provider today: Yes: Juan Rausch NP on 6/29/21.   present during visit today: Not Applicable.    Note: N/A.    Intravenous Access:  Implanted Port.    Treatment Conditions:  Lab Results   Component Value Date    HGB 11.1 06/29/2021     Lab Results   Component Value Date    WBC 3.3 06/29/2021      Lab Results   Component Value Date    ANEU 1.5 06/29/2021     Lab Results   Component Value Date     06/29/2021      Lab Results   Component Value Date     06/29/2021                   Lab Results   Component Value Date    POTASSIUM 4.3 06/29/2021           Lab Results   Component Value Date    MAG 1.9 10/13/2016            Lab Results   Component Value Date    CR 0.84 06/29/2021                   Lab Results   Component Value Date    BEVERLY 8.7 06/29/2021                Lab Results   Component Value Date    BILITOTAL 0.3 06/29/2021           Lab Results   Component Value Date    ALBUMIN 3.5 06/29/2021                    Lab Results   Component Value Date    ALT 34 06/29/2021           Lab Results   Component Value Date    AST 26 06/29/2021       Results reviewed, labs MET treatment parameters, ok to proceed with treatment.  Urine Protein: Negative.      Post Infusion Assessment:  Patient tolerated infusion without incident.  Blood return noted pre and post infusion.  Site patent and intact, free from redness, edema or discomfort.  No evidence of extravasations.  Access discontinued per protocol.       Discharge Plan:   Patient declined prescription refills.  Discharge instructions reviewed with: Patient.  Patient verbalized understanding of discharge instructions and all questions answered.  Copy of AVS reviewed with patient.  Patient will return 7/1/21 for next appointment.  Patient discharged in stable condition accompanied by: self.  Departure Mode:  Ambulatory.      Sandi Sanches RN

## 2021-07-01 ENCOUNTER — INFUSION THERAPY VISIT (OUTPATIENT)
Dept: INFUSION THERAPY | Facility: CLINIC | Age: 61
End: 2021-07-01
Attending: INTERNAL MEDICINE
Payer: MEDICARE

## 2021-07-01 VITALS
RESPIRATION RATE: 20 BRPM | TEMPERATURE: 97.9 F | DIASTOLIC BLOOD PRESSURE: 79 MMHG | SYSTOLIC BLOOD PRESSURE: 149 MMHG | OXYGEN SATURATION: 100 % | HEART RATE: 73 BPM

## 2021-07-01 DIAGNOSIS — C79.51 NON-SMALL CELL LUNG CANCER METASTATIC TO BONE (H): ICD-10-CM

## 2021-07-01 DIAGNOSIS — D70.1 CHEMOTHERAPY-INDUCED NEUTROPENIA (H): Primary | ICD-10-CM

## 2021-07-01 DIAGNOSIS — T45.1X5A CHEMOTHERAPY-INDUCED NEUTROPENIA (H): Primary | ICD-10-CM

## 2021-07-01 DIAGNOSIS — C34.90 NON-SMALL CELL LUNG CANCER METASTATIC TO BONE (H): ICD-10-CM

## 2021-07-01 PROCEDURE — 250N000011 HC RX IP 250 OP 636: Performed by: NURSE PRACTITIONER

## 2021-07-01 PROCEDURE — 96372 THER/PROPH/DIAG INJ SC/IM: CPT | Performed by: NURSE PRACTITIONER

## 2021-07-01 RX ADMIN — PEGFILGRASTIM 6 MG: 6 INJECTION SUBCUTANEOUS at 15:50

## 2021-07-01 ASSESSMENT — PAIN SCALES - GENERAL: PAINLEVEL: NO PAIN (0)

## 2021-07-01 NOTE — PROGRESS NOTES
Nursing Note:  Kt Rasmussen presents today for Neulasta.    Patient seen by provider today: No   present during visit today: Not Applicable.    Note: Patient tolerated injection left arm without issue.     Intravenous Access:  No Intravenous access/labs at this visit.    Discharge Plan:   Patient was discharged to home.    Sandi Sanches RN

## 2021-07-08 ENCOUNTER — HOSPITAL ENCOUNTER (OUTPATIENT)
Facility: CLINIC | Age: 61
Setting detail: SPECIMEN
Discharge: HOME | End: 2021-07-08
Attending: INTERNAL MEDICINE | Admitting: NURSE PRACTITIONER
Payer: MEDICARE

## 2021-07-08 ENCOUNTER — INFUSION THERAPY VISIT (OUTPATIENT)
Dept: INFUSION THERAPY | Facility: CLINIC | Age: 61
End: 2021-07-08
Attending: INTERNAL MEDICINE
Payer: MEDICARE

## 2021-07-08 DIAGNOSIS — C79.51 NON-SMALL CELL LUNG CANCER METASTATIC TO BONE (H): Primary | ICD-10-CM

## 2021-07-08 DIAGNOSIS — C34.90 NON-SMALL CELL CARCINOMA OF LUNG, STAGE 3, UNSPECIFIED LATERALITY (H): ICD-10-CM

## 2021-07-08 DIAGNOSIS — C34.90 NON-SMALL CELL LUNG CANCER METASTATIC TO BONE (H): Primary | ICD-10-CM

## 2021-07-08 LAB
BASOPHILS # BLD AUTO: 0.1 10E9/L (ref 0–0.2)
BASOPHILS NFR BLD AUTO: 1 %
DIFFERENTIAL METHOD BLD: ABNORMAL
EOSINOPHIL # BLD AUTO: 0 10E9/L (ref 0–0.7)
EOSINOPHIL NFR BLD AUTO: 0 %
ERYTHROCYTE [DISTWIDTH] IN BLOOD BY AUTOMATED COUNT: 14.8 % (ref 10–15)
HCT VFR BLD AUTO: 32.9 % (ref 40–53)
HGB BLD-MCNC: 10.5 G/DL (ref 13.3–17.7)
LYMPHOCYTES # BLD AUTO: 2.2 10E9/L (ref 0.8–5.3)
LYMPHOCYTES NFR BLD AUTO: 20 %
MCH RBC QN AUTO: 28.8 PG (ref 26.5–33)
MCHC RBC AUTO-ENTMCNC: 31.9 G/DL (ref 31.5–36.5)
MCV RBC AUTO: 90 FL (ref 78–100)
METAMYELOCYTES # BLD: 0.2 10E9/L
METAMYELOCYTES NFR BLD MANUAL: 2 %
MONOCYTES # BLD AUTO: 0.8 10E9/L (ref 0–1.3)
MONOCYTES NFR BLD AUTO: 7 %
MYELOCYTES # BLD: 0.1 10E9/L
MYELOCYTES NFR BLD MANUAL: 1 %
NEUTROPHILS # BLD AUTO: 7.7 10E9/L (ref 1.6–8.3)
NEUTROPHILS NFR BLD AUTO: 69 %
OVALOCYTES BLD QL SMEAR: SLIGHT
PLATELET # BLD AUTO: 65 10E9/L (ref 150–450)
PLATELET # BLD EST: ABNORMAL 10*3/UL
RBC # BLD AUTO: 3.65 10E12/L (ref 4.4–5.9)
WBC # BLD AUTO: 11.1 10E9/L (ref 4–11)

## 2021-07-08 PROCEDURE — 85025 COMPLETE CBC W/AUTO DIFF WBC: CPT | Performed by: NURSE PRACTITIONER

## 2021-07-08 PROCEDURE — 250N000011 HC RX IP 250 OP 636: Performed by: INTERNAL MEDICINE

## 2021-07-08 PROCEDURE — 36591 DRAW BLOOD OFF VENOUS DEVICE: CPT

## 2021-07-08 RX ORDER — HEPARIN SODIUM (PORCINE) LOCK FLUSH IV SOLN 100 UNIT/ML 100 UNIT/ML
5 SOLUTION INTRAVENOUS
Status: DISCONTINUED | OUTPATIENT
Start: 2021-07-08 | End: 2021-07-08 | Stop reason: HOSPADM

## 2021-07-08 RX ORDER — HEPARIN SODIUM (PORCINE) LOCK FLUSH IV SOLN 100 UNIT/ML 100 UNIT/ML
5 SOLUTION INTRAVENOUS
Status: CANCELLED | OUTPATIENT
Start: 2021-07-08

## 2021-07-08 RX ORDER — HEPARIN SODIUM,PORCINE 10 UNIT/ML
5 VIAL (ML) INTRAVENOUS
Status: CANCELLED | OUTPATIENT
Start: 2021-07-08

## 2021-07-08 RX ADMIN — Medication 5 ML: at 13:14

## 2021-07-12 ENCOUNTER — HOSPITAL ENCOUNTER (OUTPATIENT)
Dept: PET IMAGING | Facility: CLINIC | Age: 61
Discharge: HOME | End: 2021-07-12
Attending: INTERNAL MEDICINE | Admitting: INTERNAL MEDICINE
Payer: MEDICARE

## 2021-07-12 DIAGNOSIS — C78.02 MALIGNANT NEOPLASM METASTATIC TO BOTH LUNGS (H): ICD-10-CM

## 2021-07-12 DIAGNOSIS — C34.90 NON-SMALL CELL LUNG CANCER METASTATIC TO BONE (H): ICD-10-CM

## 2021-07-12 DIAGNOSIS — C79.51 NON-SMALL CELL LUNG CANCER METASTATIC TO BONE (H): ICD-10-CM

## 2021-07-12 DIAGNOSIS — C78.01 MALIGNANT NEOPLASM METASTATIC TO BOTH LUNGS (H): ICD-10-CM

## 2021-07-12 PROCEDURE — 78816 PET IMAGE W/CT FULL BODY: CPT | Mod: 26 | Performed by: RADIOLOGY

## 2021-07-12 PROCEDURE — G1004 CDSM NDSC: HCPCS | Mod: GC | Performed by: RADIOLOGY

## 2021-07-12 PROCEDURE — A9552 F18 FDG: HCPCS | Performed by: INTERNAL MEDICINE

## 2021-07-12 PROCEDURE — 78816 PET IMAGE W/CT FULL BODY: CPT | Mod: PS,MG

## 2021-07-12 PROCEDURE — 74177 CT ABD & PELVIS W/CONTRAST: CPT | Mod: 26 | Performed by: RADIOLOGY

## 2021-07-12 PROCEDURE — 71260 CT THORAX DX C+: CPT | Mod: 26 | Performed by: RADIOLOGY

## 2021-07-12 PROCEDURE — 343N000001 HC RX 343: Performed by: INTERNAL MEDICINE

## 2021-07-12 RX ADMIN — FLUDEOXYGLUCOSE F-18 11.15 MCI.: 500 INJECTION, SOLUTION INTRAVENOUS at 10:18

## 2021-07-13 NOTE — PROGRESS NOTES
[facility]    Hematology/Oncology Established Patient Follow-up Note      Today's Date: 7/21/2021    Reason for Follow-up: Metastatic non-small cell lung carcinoma.    HISTORY OF PRESENT ILLNESS: Kt Rasmussen is a 60 year old male who presents with the following oncologic history:  1. 5/05/2016: Presented with near-obstructing large mass coming off the cheikh and likely the posterior wall, seen on bronchoscopy. Biopsy of the mass showed invasive squamous cell carcinoma, moderately differentiating and focally keratinizing.  Procedure was complicated by significant bleeding.  Tumor was completely debulked (via bronchoscopy) in both main stem bronchi and distal trachea. NGS showed no mutations in EGFR, KRAS, BRAF, NRAS, HRAS, PIK3CA, ERBB2, MET, or JAK2.  2. 5/23/2016: PET/CT scan showed evidence of residual tumor with decreased endobronchial mass. Indeterminate, mildly hypermetabolic mesentery with prominent lymph nodes and area of enhancement of the right hepatic lobe present.   Felt to have T4-NX-M0 disease.  3. 6/09/2016: Started concurrent chemoradiation with weekly paclitaxel and carboplatin.  4. 7/30/2016: Completed radiation.  Subsequently received 2 cycles of consolidation paclitaxel and carboplatin.   5. 9/13/2019: Presented with 6-month history of dysphonia and left vocal exophytic lesion. Left true vocal fold biopsy showed at least squamous cell carcinoma in situ, cannot exclude superficial invasion.  6. 9/30/2019: Excision of left vocal cord mass showed fragments of invasive squamous cell carcinoma, well differentiated and keratinizing.  Margins negative for malignancy.  7. 2/16/2021: CT chest w/o contrast showed thin-walled cavity in left lower lobe with 2 solid peripheral nodules measuring 9 mm each. No lymphadenopathy.  8. 3/01/2021: PET scan showed hypermetabolic nodules in left lower lobe and right lung, consistent with metastases; hypermetabolic adenopathy in chest, abdomen,  pelvis; nonspecific uptake at tongue; hypermetabolic intraosseous lesions in spine and pelvis consistent with metastatic disease; left axillary hypermetabolic lymph nodes related to COVID-19 vaccination.  9. 3/12/2021: CT-guided lung biopsy showed non-small cell carcinoma, not otherwise specified -- with opinion by [facility] pathologist.  10. 3/18/2021: Lung NGS panel negative for mutations in BRAF, EGFR, ERBB2, IDH1, IDH2, KRAS, MET, NRAS, RET. PD-L1 expression negative (TPS <1%). Due to minimal amount of DNA obtained from specimen, other biomarkers could not be analyzed.  11. 4/7/2021: MRI brain negative for brain metastases.  12. 4/27/2021: Started 1st line metastatic therapy with carboplatin, paclitaxel, bevacizumab, and atezolizumab for metastatic non-small cell lung carcinoma.  13. 7/12/2021: PET/CT showed resolved mural nodules with increased cystic cavity in left lower lobe with no significant FDG uptake, less likely metastases; no enlarged hypermetabolic mediastinal, hilar, or axillary lymph nodes, decreased metabolic activity of intraosseous lesion in spine and pelvis; no new bone lesions.    INTERIM HISTORY:  Kt reports no new paresthesias.  He denies any nausea, epistaxis, hematochezia, or melena. He has no chronic diarrhea. He has occasional cough but denies shortness of breath.      REVIEW OF SYSTEMS:   14 point ROS was reviewed and is negative other than as noted above in HPI.       HOME MEDICATIONS:  Current Outpatient Medications   Medication Sig Dispense Refill     atorvastatin (LIPITOR) 40 MG tablet Take 40 mg by mouth daily       dexamethasone (DECADRON) 4 MG tablet Take 1 tablet (4 mg) by mouth 2 times daily (with meals) Take 2 tablets (8 mg) by mouth daily for three days. Start on Day 2 of each cycle. 6 tablet 3     LORazepam (ATIVAN) 0.5 MG tablet Take 1 tablet (0.5 mg) by mouth every 4 hours as needed (Anxiety, Nausea/Vomiting or Sleep) (Patient not taking: Reported on 6/29/2021) 30  tablet 3     prochlorperazine (COMPAZINE) 10 MG tablet Take 1 tablet (10 mg) by mouth every 6 hours as needed (Nausea/Vomiting) (Patient not taking: Reported on 2021) 30 tablet 3         ALLERGIES:  Allergies   Allergen Reactions     No Known Drug Allergies          PAST MEDICAL HISTORY:  Past Medical History:   Diagnosis Date     Alcohol abuse, unspecified      Cerebral infarction (H)     , right side residual and aphasia     Dyslipidemia      GERD (gastroesophageal reflux disease)      Lung cancer (H)      Unspecified essential hypertension          PAST SURGICAL HISTORY:  Past Surgical History:   Procedure Laterality Date     BRONCHOSCOPY FLEXIBLE AND RIGID N/A 2016    Procedure: BRONCHOSCOPY FLEXIBLE AND RIGID;  Surgeon: Tony Talbot MD;  Location: [facility] OR     ESOPHAGOSCOPY FLEXIBLE N/A 2019    Procedure: flexible esophagoscopy;  Surgeon: Lizzy Johnson MD;  Location: [facility] OR     INJECT STEROID (LOCATION) N/A 2019    Procedure: steroid injection;  Surgeon: Lizzy Johnson MD;  Location: [facility] OR     IR CHEST PORT PLACEMENT > 5 YRS OF AGE  2021     LASER CO2 LARYNGOSCOPY N/A 2019    Procedure: Microdirect laryngoscopy with excision of laryngeal mass, CO2 laser;  Surgeon: Lizzy Johnson MD;  Location: [facility] OR     ZZC NONSPECIFIC PROCEDURE      R tympanoplasty         SOCIAL HISTORY:  Social History     Socioeconomic History     Marital status: Single     Spouse name: Not on file     Number of children: Not on file     Years of education: Not on file     Highest education level: Not on file   Occupational History     Not on file   Tobacco Use     Smoking status: Former Smoker     Packs/day: 1.00     Types: Cigarettes     Quit date: 2009     Years since quittin.8     Smokeless tobacco: Never Used   Substance and Sexual Activity     Alcohol use: Not Currently     Drug use: No     Sexual activity: Not on file   Other Topics Concern     Parent/sibling w/ CABG, MI or  "angioplasty before 65F 55M? Not Asked   Social History Narrative     Not on file     Social Determinants of Health     Financial Resource Strain:      Difficulty of Paying Living Expenses:    Food Insecurity:      Worried About Running Out of Food in the Last Year:      Ran Out of Food in the Last Year:    Transportation Needs:      Lack of Transportation (Medical):      Lack of Transportation (Non-Medical):    Physical Activity:      Days of Exercise per Week:      Minutes of Exercise per Session:    Stress:      Feeling of Stress :    Social Connections:      Frequency of Communication with Friends and Family:      Frequency of Social Gatherings with Friends and Family:      Attends Christianity Services:      Active Member of Clubs or Organizations:      Attends Club or Organization Meetings:      Marital Status:    Intimate Partner Violence:      Fear of Current or Ex-Partner:      Emotionally Abused:      Physically Abused:      Sexually Abused:          FAMILY HISTORY:  Family History   Problem Relation Age of Onset     Cancer Father          PHYSICAL EXAM:  Vital signs:  BP (!) 143/82   Pulse 81   Temp 97.5  F (36.4  C)   Resp 16   Ht 1.918 m (6' 3.5\")   Wt 82.3 kg (181 lb 6.4 oz)   SpO2 99%   BMI 22.37 kg/m     ECO  GENERAL/CONSTITUTIONAL: No acute distress.  EYES: No erythema or scleral icterus.  LYMPH: No cervical, supraclavicular, axillary adenopathy.   RESPIRATORY: Clear to auscultation bilaterally. No crackles or wheezing.   CARDIOVASCULAR: Regular rate and rhythm without murmurs.  GASTROINTESTINAL: No hepatosplenomegaly, masses, or tenderness. No guarding.  No distention.  MUSCULOSKELETAL: Warm and well-perfused, no cyanosis, clubbing, or edema.  NEUROLOGIC: Residual right arm and right leg reduced strength due to prior stroke. Alert, oriented, answers questions appropriately. Dysphonia present.  INTEGUMENTARY: No rashes or jaundice.  GAIT: Limping present; uses cane.      LABS:  CBC RESULTS: " Recent Labs   Lab Test 07/20/21  1303   WBC 7.0   RBC 3.61*   HGB 10.2*   HCT 32.8*   MCV 91   MCH 28.3   MCHC 31.1*   RDW 15.7*        Last Comprehensive Metabolic Panel:  Sodium   Date Value Ref Range Status   07/20/2021 142 133 - 144 mmol/L Final   06/29/2021 140 133 - 144 mmol/L Final     Potassium   Date Value Ref Range Status   07/20/2021 4.2 3.4 - 5.3 mmol/L Final   06/29/2021 4.3 3.4 - 5.3 mmol/L Final     Chloride   Date Value Ref Range Status   07/20/2021 112 (H) 94 - 109 mmol/L Final   06/29/2021 111 (H) 94 - 109 mmol/L Final     Carbon Dioxide   Date Value Ref Range Status   06/29/2021 25 20 - 32 mmol/L Final     Carbon Dioxide (CO2)   Date Value Ref Range Status   07/20/2021 25 20 - 32 mmol/L Final     Anion Gap   Date Value Ref Range Status   07/20/2021 5 3 - 14 mmol/L Final   06/29/2021 4 3 - 14 mmol/L Final     Glucose   Date Value Ref Range Status   07/20/2021 88 70 - 99 mg/dL Final   06/29/2021 85 70 - 99 mg/dL Final     Urea Nitrogen   Date Value Ref Range Status   07/20/2021 19 7 - 30 mg/dL Final   06/29/2021 17 7 - 30 mg/dL Final     Creatinine   Date Value Ref Range Status   07/20/2021 0.77 0.66 - 1.25 mg/dL Final   06/29/2021 0.84 0.66 - 1.25 mg/dL Final     GFR Estimate   Date Value Ref Range Status   07/20/2021 >90 >60 mL/min/1.73m2 Final     Comment:     As of July 11, 2021, eGFR is calculated by the CKD-EPI creatinine equation, without race adjustment. eGFR can be influenced by muscle mass, exercise, and diet. The reported eGFR is an estimation only and is only applicable if the renal function is stable.   06/29/2021 >90 >60 mL/min/[1.73_m2] Final     Comment:     Non  GFR Calc  Starting 12/18/2018, serum creatinine based estimated GFR (eGFR) will be   calculated using the Chronic Kidney Disease Epidemiology Collaboration   (CKD-EPI) equation.       Calcium   Date Value Ref Range Status   07/20/2021 8.9 8.5 - 10.1 mg/dL Final   06/29/2021 8.7 8.5 - 10.1 mg/dL Final      Bilirubin Total   Date Value Ref Range Status   07/20/2021 0.5 0.2 - 1.3 mg/dL Final   06/29/2021 0.3 0.2 - 1.3 mg/dL Final     Alkaline Phosphatase   Date Value Ref Range Status   07/20/2021 85 40 - 150 U/L Final   06/29/2021 73 40 - 150 U/L Final     ALT   Date Value Ref Range Status   07/20/2021 26 0 - 70 U/L Final   06/29/2021 34 0 - 70 U/L Final     AST   Date Value Ref Range Status   07/20/2021 19 0 - 45 U/L Final   06/29/2021 26 0 - 45 U/L Final       PATHOLOGY:  None new.    IMAGING:  Reviewed as per HPI.    ASSESSMENT/PLAN:  Kt Rasmussen is a 60 year old male with the following issues:  1. Metastatic non-small cell lung carcinoma with metastases to lymph nodes, bones, and bilateral lungs  2. History of locally advanced endobronchial invasive squamous cell carcinoma diagnosed 5/2016, status post debulking and chemoradiation   --I reviewed PET scan from 7/12/2021 which showed excellent partial response. Discussed with Kt.   --Prior lung NGS biomarker testing were all negative and PD-L1 expression was negative. ROS1 and NTRK biomarker analysis could not be performed due to limited amount of DNA extracted from the specimen.  Will consider a repeat biopsy in the future to obtain testing for these biomarkers if he progresses on 1st line therapy.  --I recommended to Kt he continue with 1st line metastatic therapy with paclitaxel, bevacizumab, and atezolizumab, and omitting the carboplatin going forward. Omitting this should help improve his anemia, thrombocytopenia and neutropenia.  --Plan to repeat PET scan in 3 months in 10/2021.    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 Dysphonia  5. Dysphagia  -Kt underwent excision of a left vocal cord SCC on 9/30/2019 and has persistent dysphonia as a result of the lesion and excision.  He also has dysphagia but this is stable and swallowing is manageable.  -Last scope by ENT 1/14/2021 showed no evidence for recurrence.    Sangita John,  MD  Hematology/Oncology  [facility]

## 2021-07-20 ENCOUNTER — HOSPITAL ENCOUNTER (OUTPATIENT)
Facility: CLINIC | Age: 61
Discharge: HOME | End: 2021-07-20
Attending: INTERNAL MEDICINE | Admitting: INTERNAL MEDICINE
Payer: MEDICARE

## 2021-07-20 ENCOUNTER — TELEPHONE (OUTPATIENT)
Dept: OTOLARYNGOLOGY | Facility: CLINIC | Age: 61
End: 2021-07-20

## 2021-07-20 ENCOUNTER — LAB (OUTPATIENT)
Dept: INFUSION THERAPY | Facility: CLINIC | Age: 61
End: 2021-07-20
Attending: INTERNAL MEDICINE
Payer: MEDICARE

## 2021-07-20 DIAGNOSIS — C34.90 NON-SMALL CELL CARCINOMA OF LUNG, STAGE 3, UNSPECIFIED LATERALITY (H): Primary | ICD-10-CM

## 2021-07-20 DIAGNOSIS — C34.90 NON-SMALL CELL LUNG CANCER METASTATIC TO BONE (H): ICD-10-CM

## 2021-07-20 DIAGNOSIS — Z51.11 ENCOUNTER FOR ANTINEOPLASTIC CHEMOTHERAPY: ICD-10-CM

## 2021-07-20 DIAGNOSIS — T45.1X5A CHEMOTHERAPY-INDUCED NEUTROPENIA (H): ICD-10-CM

## 2021-07-20 DIAGNOSIS — D70.1 CHEMOTHERAPY-INDUCED NEUTROPENIA (H): ICD-10-CM

## 2021-07-20 DIAGNOSIS — C79.51 NON-SMALL CELL LUNG CANCER METASTATIC TO BONE (H): ICD-10-CM

## 2021-07-20 LAB
ALBUMIN SERPL-MCNC: 3.5 G/DL (ref 3.4–5)
ALP SERPL-CCNC: 85 U/L (ref 40–150)
ALT SERPL W P-5'-P-CCNC: 26 U/L (ref 0–70)
ANION GAP SERPL CALCULATED.3IONS-SCNC: 5 MMOL/L (ref 3–14)
AST SERPL W P-5'-P-CCNC: 19 U/L (ref 0–45)
BASOPHILS # BLD AUTO: 0 10E3/UL (ref 0–0.2)
BASOPHILS NFR BLD AUTO: 0 %
BILIRUB SERPL-MCNC: 0.5 MG/DL (ref 0.2–1.3)
BUN SERPL-MCNC: 19 MG/DL (ref 7–30)
CALCIUM SERPL-MCNC: 8.9 MG/DL (ref 8.5–10.1)
CHLORIDE BLD-SCNC: 112 MMOL/L (ref 94–109)
CO2 SERPL-SCNC: 25 MMOL/L (ref 20–32)
CREAT SERPL-MCNC: 0.77 MG/DL (ref 0.66–1.25)
EOSINOPHIL # BLD AUTO: 0.1 10E3/UL (ref 0–0.7)
EOSINOPHIL NFR BLD AUTO: 1 %
ERYTHROCYTE [DISTWIDTH] IN BLOOD BY AUTOMATED COUNT: 15.7 % (ref 10–15)
GFR SERPL CREATININE-BSD FRML MDRD: >90 ML/MIN/1.73M2
GLUCOSE BLD-MCNC: 88 MG/DL (ref 70–99)
HCT VFR BLD AUTO: 32.8 % (ref 40–53)
HGB BLD-MCNC: 10.2 G/DL (ref 13.3–17.7)
IMM GRANULOCYTES # BLD: 0 10E3/UL
IMM GRANULOCYTES NFR BLD: 1 %
LYMPHOCYTES # BLD AUTO: 1.2 10E3/UL (ref 0.8–5.3)
LYMPHOCYTES NFR BLD AUTO: 17 %
MCH RBC QN AUTO: 28.3 PG (ref 26.5–33)
MCHC RBC AUTO-ENTMCNC: 31.1 G/DL (ref 31.5–36.5)
MCV RBC AUTO: 91 FL (ref 78–100)
MONOCYTES # BLD AUTO: 0.7 10E3/UL (ref 0–1.3)
MONOCYTES NFR BLD AUTO: 10 %
NEUTROPHILS # BLD AUTO: 4.9 10E3/UL (ref 1.6–8.3)
NEUTROPHILS NFR BLD AUTO: 71 %
NRBC # BLD AUTO: 0 10E3/UL
NRBC BLD AUTO-RTO: 0 /100
PLATELET # BLD AUTO: 171 10E3/UL (ref 150–450)
POTASSIUM BLD-SCNC: 4.2 MMOL/L (ref 3.4–5.3)
PROT SERPL-MCNC: 7.2 G/DL (ref 6.8–8.8)
RBC # BLD AUTO: 3.61 10E6/UL (ref 4.4–5.9)
SODIUM SERPL-SCNC: 142 MMOL/L (ref 133–144)
TSH SERPL DL<=0.005 MIU/L-ACNC: 2.24 MU/L (ref 0.4–4)
WBC # BLD AUTO: 7 10E3/UL (ref 4–11)

## 2021-07-20 PROCEDURE — 250N000011 HC RX IP 250 OP 636: Performed by: INTERNAL MEDICINE

## 2021-07-20 PROCEDURE — 80053 COMPREHEN METABOLIC PANEL: CPT | Performed by: INTERNAL MEDICINE

## 2021-07-20 PROCEDURE — 85025 COMPLETE CBC W/AUTO DIFF WBC: CPT | Performed by: INTERNAL MEDICINE

## 2021-07-20 PROCEDURE — 84443 ASSAY THYROID STIM HORMONE: CPT | Performed by: INTERNAL MEDICINE

## 2021-07-20 PROCEDURE — 36591 DRAW BLOOD OFF VENOUS DEVICE: CPT | Performed by: INTERNAL MEDICINE

## 2021-07-20 RX ORDER — HEPARIN SODIUM,PORCINE 10 UNIT/ML
5 VIAL (ML) INTRAVENOUS
Status: CANCELLED | OUTPATIENT
Start: 2021-07-20

## 2021-07-20 RX ORDER — HEPARIN SODIUM (PORCINE) LOCK FLUSH IV SOLN 100 UNIT/ML 100 UNIT/ML
5 SOLUTION INTRAVENOUS
Status: CANCELLED | OUTPATIENT
Start: 2021-07-20

## 2021-07-20 RX ORDER — HEPARIN SODIUM (PORCINE) LOCK FLUSH IV SOLN 100 UNIT/ML 100 UNIT/ML
5 SOLUTION INTRAVENOUS
Status: DISCONTINUED | OUTPATIENT
Start: 2021-07-20 | End: 2021-07-20 | Stop reason: HOSPADM

## 2021-07-20 RX ADMIN — Medication 5 ML: at 13:14

## 2021-07-20 NOTE — PROGRESS NOTES
Nursing Note:  Kt HERNANDEZ Valery presents today for port labs for tx 7/21.    Patient seen by provider today: No   present during visit today: Not Applicable.    Note: Patient unable to leave urine sample today, will collect tomorrow morning prior to chemo.    Intravenous Access:  Labs drawn without difficulty.  Implanted Port.    Discharge Plan:   Patient was sent home.    Filipe Carr RN

## 2021-07-20 NOTE — PROGRESS NOTES
[facility]   at the [facility]   Otolaryngology Clinic     Patient: Kt Rasmussen    MRN: [id number]    : 1960    Age/Gender: 60 year old male  Date of Service: 2021  Rendering Provider:   Lizzy Wray MD     Chief Complaint   T1 left TVF SCC s/p resection 19  Dysphonia  Dysphagia  Interval History   HISTORY OF PRESENT ILLNESS: Mr. Rasmussen is a 60 year old male is being followed for history of left TVF SCC. he was initially seen on 19. Please refer to this note for full history.     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 he reports:  - stable symptoms   PAST MEDICAL HISTORY:   Past Medical History:   Diagnosis Date     Alcohol abuse, unspecified      Cerebral infarction (H)     , right side residual and aphasia     Dyslipidemia      GERD (gastroesophageal reflux disease)      Lung cancer (H)      Unspecified essential hypertension        PAST SURGICAL HISTORY:   Past Surgical History:   Procedure Laterality Date     BRONCHOSCOPY FLEXIBLE AND RIGID N/A 2016    Procedure: BRONCHOSCOPY FLEXIBLE AND RIGID;  Surgeon: Tony Talbot MD;  Location: [facility] OR     ESOPHAGOSCOPY FLEXIBLE N/A 2019    Procedure: flexible esophagoscopy;  Surgeon: Lizzy Johnson MD;  Location: [facility] OR     INJECT STEROID (LOCATION) N/A 2019    Procedure: steroid injection;  Surgeon: Lizzy Johnson MD;  Location: [facility] OR     IR CHEST PORT PLACEMENT > 5 YRS OF AGE  2021     LASER CO2 LARYNGOSCOPY N/A 2019    Procedure: Microdirect laryngoscopy with excision of laryngeal mass, CO2 laser;  Surgeon: Lizzy Johnson MD;  Location:  OR     [facility] NONSPECIFIC PROCEDURE      R tympanoplasty       CURRENT MEDICATIONS:   Current Outpatient Medications:      atorvastatin (LIPITOR) 40 MG tablet, Take 40 mg by mouth daily, Disp: , Rfl:       dexamethasone (DECADRON) 4 MG tablet, Take 1 tablet (4 mg) by mouth 2 times daily (with meals) Take 2 tablets (8 mg) by mouth daily for three days. Start on Day 2 of each cycle., Disp: 6 tablet, Rfl: 3     LORazepam (ATIVAN) 0.5 MG tablet, Take 1 tablet (0.5 mg) by mouth every 4 hours as needed (Anxiety, Nausea/Vomiting or Sleep) (Patient not taking: Reported on 2021), Disp: 30 tablet, Rfl: 3     prochlorperazine (COMPAZINE) 10 MG tablet, Take 1 tablet (10 mg) by mouth every 6 hours as needed (Nausea/Vomiting) (Patient not taking: Reported on 2021), Disp: 30 tablet, Rfl: 3    ALLERGIES: No known drug allergies    SOCIAL HISTORY:    Social History     Socioeconomic History     Marital status: Single     Spouse name: Not on file     Number of children: Not on file     Years of education: Not on file     Highest education level: Not on file   Occupational History     Not on file   Tobacco Use     Smoking status: Former Smoker     Packs/day: 1.00     Types: Cigarettes     Quit date: 2009     Years since quittin.8     Smokeless tobacco: Never Used   Substance and Sexual Activity     Alcohol use: Not Currently     Drug use: No     Sexual activity: Not on file   Other Topics Concern     Parent/sibling w/ CABG, MI or angioplasty before 65F 55M? Not Asked   Social History Narrative     Not on file     Social Determinants of Health     Financial Resource Strain:      Difficulty of Paying Living Expenses:    Food Insecurity:      Worried About Running Out of Food in the Last Year:      Ran Out of Food in the Last Year:    Transportation Needs:      Lack of Transportation (Medical):      Lack of Transportation (Non-Medical):    Physical Activity:      Days of Exercise per Week:      Minutes of Exercise per Session:    Stress:      Feeling of Stress :    Social Connections:      Frequency of Communication with Friends and Family:      Frequency of Social Gatherings with Friends and Family:      Attends  Orthodoxy Services:      Active Member of Clubs or Organizations:      Attends Club or Organization Meetings:      Marital Status:    Intimate Partner Violence:      Fear of Current or Ex-Partner:      Emotionally Abused:      Physically Abused:      Sexually Abused:          FAMILY HISTORY:   Family History   Problem Relation Age of Onset     Cancer Father       Non-contributory for problems with anesthesia    REVIEW OF SYSTEMS:   The patient was asked a 14 point review of systems regarding constitutional symptoms, eye symptoms, ears, nose, mouth, throat symptoms, cardiovascular symptoms, respiratory symptoms, gastrointestinal symptoms, genitourinary symptoms, musculoskeletal symptoms, integumentary symptoms, neurological symptoms, psychiatric symptoms, endocrine symptoms, hematologic/lymphatic symptoms, and allergic/ immunologic symptoms.   The pertinent factors have been included in the HPI and below.  Patient Supplied Answers to Review of Systems  No flowsheet data found.    Physical Examination   The patient underwent a physical examination as described below. The pertinent positive and negative findings are summarized after the description of the examination.  Constitutional: The patient's developmental and nutritional status was assessed. The patient's voice quality was assessed.  Head and Face: The head and face were inspected for deformities. The sinuses were palpated. The salivary glands were palpated. Facial muscle strength was assessed bilaterally.  Eyes: Extraocular movements and primary gaze alignment were assessed.  Ears, Nose, Mouth and Throat: The ears and nose were examined for deformities. The nasal septum, mucosa, and turbinates were inspected by anterior rhinoscopy. The lips, teeth, and gums were examined for abnormalities. The oral mucosa, tongue, palate, tonsils, lateral and posterior pharynx were inspected for the presence of asymmetry or mucosal lesions.    Neck: The tracheal position was  noted, and the neck mass palpated to determine if there were any asymmetries, abnormal neck masses, thyromegally, or thyroid nodules.  Respiratory: The nature of the breathing and chest expansion/symmetry was observed.  Cardiovascular: The patient was examined to determine the presence of any edema or jugular venous distension.  Abdomen: The contour of the abdomen was noted.  Lymphatic: The patient was examined for infraclavicular lymphadenopathy.  Musculoskeletal: The patient was inspected for the presence of skeletal deformities.  Extremities: The extremities were examined for any clubbing or cyanosis.  Skin: The skin was examined for inflammatory or neoplastic conditions.  Neurologic: The patient's orientation, mood, and affect were noted. The cranial nerve  functions were examined.  Other pertinent positive and negative findings on physical examination:   OC/OP: no lesions, uvula midline, soft palate elevates symmetrically, FOM/BOT soft  Neck: no lesions, no TH tenderness to palpation    All other physical examination findings were within normal limits and noncontributory.    Procedures   Flexible laryngoscopy (CPT 89872)      Pre-procedure diagnosis: dysphonia  Post-procedure diagnosis: same as above  Indication for procedure: Mr. Rasmussen is a 60 year old male with see above  Procedure(s): Fiberoptic Laryngoscopy    Details of Procedure: After informed consent was obtained, the patient was seated in the examination chair.  The areas of the nasopharynx as well as the hypopharynx were anesthetized with topical 4% lidocaine with 0.25% phenylephrine atomizer.  Examination of the base of tongue was performed first.  Attention was directed to any evidence of masses in the area or evidence of leukoplakia or candidal infection.  Attention was directed to the epiglottis where its size and position was determined and its movement on phonation of the vowel  e .  The piriform sinuses were then inspected for any mass  lesions or pooling of secretions.  Attention was then directed to the larynx. The vocal folds were inspected for infection or any areas of leukoplakia, for masses, polypoid degeneration, or hemorrhage.  Having done this, the arytenoids and vocal processes were inspected for erythema or evidence of granuloma formation.  The posterior commissure was then inspected for evidence of inflammatory changes in the mucosa and heaping up of mucosal tissue. The patient was then instructed to say the vowel  e .  Adduction of vocal folds to the midline was observed for any evidence of paresis or paralysis of the larynx or asymmetry in rotation of the larynx to the left or right. The patient was asked to breathe and the degree of abduction was noted bilaterally.  Subglottic view of the larynx was obtained for any additional mass lesions or mucosal changes.  Finally the post cricoid was examined for evidence of pooling of secretions, as well as the pharyngeal wall mucosa.   Anesthesia type: 0.25% phenylephrine    Findings:  Anatomic/physiological deviations: well healed left vocal fold no evidence of recurrence   Right vocal process: No restriction of mobility   Left vocal process: No restriction of mobility  Glottal gap: Glottal gap  Supraglottic structures: Normal  Hypopharynx: Normal     Estimated Blood Loss: minimal  Complications: None  Disposition: Patient tolerated the procedure well              Review of Relevant Clinical Data   Notes: Juan Rausch 6/29/21  Radiology: PET-CT 7/12/21 -      Labs:  Lab Results   Component Value Date    TSH 2.24 07/20/2021     Lab Results   Component Value Date     07/20/2021    CO2 25 07/20/2021    BUN 19 07/20/2021    PHOS 2.0 (L) 05/04/2016     Lab Results   Component Value Date    WBC 7.0 07/20/2021    HGB 10.2 (L) 07/20/2021    HCT 32.8 (L) 07/20/2021    MCV 91 07/20/2021     07/20/2021     Lab Results   Component Value Date    PTT 35 07/26/2017    INR 1.02 04/22/2021     No  "results found for: ELIZABET  No components found for: RHEUMATOIDFACTOR,  RF  Lab Results   Component Value Date    CRP 0.16 2003     No components found for: CKTOT, URICACID  No components found for: C3, C4, DSDNAAB, NDNAABIFA  No results found for: MPOAB    Patient reported Quality of Life (QOL) Measures   Patient Supplied Answers To VHI Questionnaire  Voice Handicap Index (VHI-10) 2019   My voice makes it difficult for people to hear me 0   People have difficulty understanding me in a noisy room 0   My voice difficulties restrict my personal and social life.  0   I feel left out of conversations because of my voice 0   My voice problem causes me to lose income 0   I feel as though I have to strain to produce voice 0   The clarity of my voice is unpredictable 0   My voice problem upsets me 0   My voice makes me feel handicapped 0   People ask, \"What's wrong with your voice?\" 0   VHI-10 0         Patient Supplied Answers To EAT Questionnaire  Eating Assessment Tool (EAT-10) 2019   My swallowing problem has caused me to lose weight 0   My swallowing problem interferes with my ability to go out for meals 0   Swallowing liquids takes extra effort 0   Swallowing solids takes extra effort 0   Swallowing pills takes extra effort 0   Swallowing is painful 0   The pleasure of eating is affected by my swallowing 0   When I swallow food sticks in my throat 0   I cough when I eat 0   Swallowing is stressful 0   EAT-10 0         Patient Supplied Answers To CSI Questionnaire  No flowsheet data found.      Patient Supplied Answers to Dyspnea Index Questionnaire:  No flowsheet data found.    Impression & Plan     IMPRESSION: Mr. Rasmussen is a 60 year old male who is being seen for the followin. T1 Left vocal fold SCC  -  s/p endoscopic CO2 laser resection 19  - no evidence of recurrence   - now has metastatic nonsmall cell lung cancer undergoing treatment with Dr Sangita John  - stable laryngeal exam,  " VIKRAM  Plan  - observation  - [id number] evaluations during year 2 until 9/30/2021        2. 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 Dysphagia  - improved swallow today with less pharyngeal residue on evaluation of SLP performed FEES on 1/9/20 though continued recommendation of thickened liquids  - screening FEES with liquids shows again aspiration on 7/3/20  - no PNAs, no changes in weight  - Xray Video Swallow Exam 8/13/20 - safe swallow, much improved  Plan  - observation     RETURN VISIT:2 months    Lizzy Wray MD    Laryngology    [facility] Voice [facility]  Department of  Otolaryngology - Head and Neck Surgery  Clinics & Surgery Center  [address] [place] [address]  Appointment line: [phone]  Fax: [phone]  [email or web address]      independent

## 2021-07-20 NOTE — TELEPHONE ENCOUNTER
Writer called patients current place of residence ( [facility] ). Writer left a voicemail with the nursing staff regarding the confirmation of the patients appointment 7//22/21 10:30 AM.     Nick Vidales, EMT

## 2021-07-21 ENCOUNTER — INFUSION THERAPY VISIT (OUTPATIENT)
Dept: INFUSION THERAPY | Facility: CLINIC | Age: 61
End: 2021-07-21
Attending: INTERNAL MEDICINE
Payer: MEDICARE

## 2021-07-21 ENCOUNTER — ONCOLOGY VISIT (OUTPATIENT)
Dept: ONCOLOGY | Facility: CLINIC | Age: 61
End: 2021-07-21
Attending: INTERNAL MEDICINE
Payer: MEDICARE

## 2021-07-21 ENCOUNTER — HOSPITAL ENCOUNTER (OUTPATIENT)
Facility: CLINIC | Age: 61
Discharge: HOME | End: 2021-07-21
Attending: INTERNAL MEDICINE | Admitting: INTERNAL MEDICINE
Payer: MEDICARE

## 2021-07-21 VITALS
SYSTOLIC BLOOD PRESSURE: 143 MMHG | OXYGEN SATURATION: 99 % | DIASTOLIC BLOOD PRESSURE: 82 MMHG | BODY MASS INDEX: 22.09 KG/M2 | HEIGHT: 76 IN | TEMPERATURE: 97.5 F | RESPIRATION RATE: 16 BRPM | HEART RATE: 81 BPM | WEIGHT: 181.4 LBS

## 2021-07-21 DIAGNOSIS — C78.01 MALIGNANT NEOPLASM METASTATIC TO BOTH LUNGS (H): ICD-10-CM

## 2021-07-21 DIAGNOSIS — C34.90 NON-SMALL CELL LUNG CANCER METASTATIC TO BONE (H): ICD-10-CM

## 2021-07-21 DIAGNOSIS — Z51.11 ENCOUNTER FOR ANTINEOPLASTIC CHEMOTHERAPY: ICD-10-CM

## 2021-07-21 DIAGNOSIS — C34.90 NON-SMALL CELL LUNG CANCER METASTATIC TO BONE (H): Primary | ICD-10-CM

## 2021-07-21 DIAGNOSIS — D69.6 THROMBOCYTOPENIA (H): ICD-10-CM

## 2021-07-21 DIAGNOSIS — C78.02 MALIGNANT NEOPLASM METASTATIC TO BOTH LUNGS (H): ICD-10-CM

## 2021-07-21 DIAGNOSIS — C79.51 NON-SMALL CELL LUNG CANCER METASTATIC TO BONE (H): Primary | ICD-10-CM

## 2021-07-21 DIAGNOSIS — D70.1 CHEMOTHERAPY-INDUCED NEUTROPENIA (H): ICD-10-CM

## 2021-07-21 DIAGNOSIS — T45.1X5A CHEMOTHERAPY-INDUCED NEUTROPENIA (H): ICD-10-CM

## 2021-07-21 DIAGNOSIS — C79.51 NON-SMALL CELL LUNG CANCER METASTATIC TO BONE (H): ICD-10-CM

## 2021-07-21 DIAGNOSIS — C34.90 NON-SMALL CELL CARCINOMA OF LUNG, STAGE 3, UNSPECIFIED LATERALITY (H): Primary | ICD-10-CM

## 2021-07-21 LAB — ALBUMIN UR-MCNC: 10 MG/DL

## 2021-07-21 PROCEDURE — 96413 CHEMO IV INFUSION 1 HR: CPT

## 2021-07-21 PROCEDURE — 96415 CHEMO IV INFUSION ADDL HR: CPT

## 2021-07-21 PROCEDURE — G0463 HOSPITAL OUTPT CLINIC VISIT: HCPCS | Mod: 25

## 2021-07-21 PROCEDURE — 250N000011 HC RX IP 250 OP 636: Performed by: INTERNAL MEDICINE

## 2021-07-21 PROCEDURE — 258N000003 HC RX IP 258 OP 636: Performed by: INTERNAL MEDICINE

## 2021-07-21 PROCEDURE — 99215 OFFICE O/P EST HI 40 MIN: CPT | Performed by: INTERNAL MEDICINE

## 2021-07-21 PROCEDURE — 96367 TX/PROPH/DG ADDL SEQ IV INF: CPT

## 2021-07-21 PROCEDURE — 96375 TX/PRO/DX INJ NEW DRUG ADDON: CPT

## 2021-07-21 PROCEDURE — G0463 HOSPITAL OUTPT CLINIC VISIT: HCPCS

## 2021-07-21 PROCEDURE — 96417 CHEMO IV INFUS EACH ADDL SEQ: CPT

## 2021-07-21 PROCEDURE — 81003 URINALYSIS AUTO W/O SCOPE: CPT

## 2021-07-21 RX ORDER — MEPERIDINE HYDROCHLORIDE 25 MG/ML
25 INJECTION INTRAMUSCULAR; INTRAVENOUS; SUBCUTANEOUS EVERY 30 MIN PRN
Status: CANCELLED | OUTPATIENT
Start: 2021-07-21

## 2021-07-21 RX ORDER — ALBUTEROL SULFATE 0.83 MG/ML
2.5 SOLUTION RESPIRATORY (INHALATION)
Status: CANCELLED | OUTPATIENT
Start: 2021-07-21

## 2021-07-21 RX ORDER — HEPARIN SODIUM,PORCINE 10 UNIT/ML
5 VIAL (ML) INTRAVENOUS
Status: CANCELLED | OUTPATIENT
Start: 2021-07-21

## 2021-07-21 RX ORDER — NALOXONE HYDROCHLORIDE 0.4 MG/ML
.1-.4 INJECTION, SOLUTION INTRAMUSCULAR; INTRAVENOUS; SUBCUTANEOUS
Status: CANCELLED | OUTPATIENT
Start: 2021-07-21

## 2021-07-21 RX ORDER — PALONOSETRON 0.05 MG/ML
0.25 INJECTION, SOLUTION INTRAVENOUS ONCE
Status: CANCELLED
Start: 2021-07-21

## 2021-07-21 RX ORDER — METHYLPREDNISOLONE SODIUM SUCCINATE 125 MG/2ML
125 INJECTION, POWDER, LYOPHILIZED, FOR SOLUTION INTRAMUSCULAR; INTRAVENOUS
Status: CANCELLED
Start: 2021-07-21

## 2021-07-21 RX ORDER — HEPARIN SODIUM (PORCINE) LOCK FLUSH IV SOLN 100 UNIT/ML 100 UNIT/ML
5 SOLUTION INTRAVENOUS
Status: DISCONTINUED | OUTPATIENT
Start: 2021-07-21 | End: 2021-07-21 | Stop reason: HOSPADM

## 2021-07-21 RX ORDER — ALBUTEROL SULFATE 90 UG/1
1-2 AEROSOL, METERED RESPIRATORY (INHALATION)
Status: CANCELLED
Start: 2021-07-21

## 2021-07-21 RX ORDER — PALONOSETRON 0.05 MG/ML
0.25 INJECTION, SOLUTION INTRAVENOUS ONCE
Status: COMPLETED | OUTPATIENT
Start: 2021-07-21 | End: 2021-07-21

## 2021-07-21 RX ORDER — EPINEPHRINE 1 MG/ML
0.3 INJECTION, SOLUTION INTRAMUSCULAR; SUBCUTANEOUS EVERY 5 MIN PRN
Status: CANCELLED | OUTPATIENT
Start: 2021-07-21

## 2021-07-21 RX ORDER — SODIUM CHLORIDE 9 MG/ML
1000 INJECTION, SOLUTION INTRAVENOUS CONTINUOUS PRN
Status: CANCELLED
Start: 2021-07-21

## 2021-07-21 RX ORDER — DIPHENHYDRAMINE HYDROCHLORIDE 50 MG/ML
50 INJECTION INTRAMUSCULAR; INTRAVENOUS
Status: CANCELLED
Start: 2021-07-21

## 2021-07-21 RX ORDER — HEPARIN SODIUM (PORCINE) LOCK FLUSH IV SOLN 100 UNIT/ML 100 UNIT/ML
5 SOLUTION INTRAVENOUS
Status: CANCELLED | OUTPATIENT
Start: 2021-07-21

## 2021-07-21 RX ORDER — LORAZEPAM 2 MG/ML
0.5 INJECTION INTRAMUSCULAR EVERY 4 HOURS PRN
Status: CANCELLED
Start: 2021-07-21

## 2021-07-21 RX ADMIN — SODIUM CHLORIDE 250 ML: 9 INJECTION, SOLUTION INTRAVENOUS at 09:47

## 2021-07-21 RX ADMIN — SODIUM CHLORIDE 1300 MG: 9 INJECTION, SOLUTION INTRAVENOUS at 10:34

## 2021-07-21 RX ADMIN — PALONOSETRON 0.25 MG: 0.25 INJECTION, SOLUTION INTRAVENOUS at 09:47

## 2021-07-21 RX ADMIN — PACLITAXEL 432 MG: 6 INJECTION, SOLUTION INTRAVENOUS at 11:43

## 2021-07-21 RX ADMIN — FOSAPREPITANT: 150 INJECTION, POWDER, LYOPHILIZED, FOR SOLUTION INTRAVENOUS at 10:12

## 2021-07-21 RX ADMIN — Medication 5 ML: at 14:54

## 2021-07-21 RX ADMIN — ATEZOLIZUMAB 1200 MG: 1200 INJECTION, SOLUTION INTRAVENOUS at 11:09

## 2021-07-21 ASSESSMENT — PAIN SCALES - GENERAL: PAINLEVEL: NO PAIN (0)

## 2021-07-21 ASSESSMENT — MIFFLIN-ST. JEOR: SCORE: 1721.39

## 2021-07-21 NOTE — PATIENT INSTRUCTIONS
1. Paclitaxel, bevacizumab, atezolizumab every 3 weeks with lab draw every 3 weeks for next 3 months.  2. RTC NP in 3 weeks, 6 weeks, and 9 weeks.  3. RTC MD in 3 months with PET scan prior to visit.

## 2021-07-21 NOTE — LETTER
7/21/2021         RE: Kt Rasmussen  [id number]  [facility] [address]        Dear Colleague,    Thank you for referring your patient, Kt Rasmussen, to the [facility]. Please see a copy of my visit note below.    [facility]    Hematology/Oncology Established Patient Follow-up Note      Today's Date: 7/21/2021    Reason for Follow-up: Metastatic non-small cell lung carcinoma.    HISTORY OF PRESENT ILLNESS: Kt Rasmussen is a 60 year old male who presents with the following oncologic history:  1. 5/05/2016: Presented with near-obstructing large mass coming off the cheikh and likely the posterior wall, seen on bronchoscopy. Biopsy of the mass showed invasive squamous cell carcinoma, moderately differentiating and focally keratinizing.  Procedure was complicated by significant bleeding.  Tumor was completely debulked (via bronchoscopy) in both main stem bronchi and distal trachea. NGS showed no mutations in EGFR, KRAS, BRAF, NRAS, HRAS, PIK3CA, ERBB2, MET, or JAK2.  2. 5/23/2016: PET/CT scan showed evidence of residual tumor with decreased endobronchial mass. Indeterminate, mildly hypermetabolic mesentery with prominent lymph nodes and area of enhancement of the right hepatic lobe present.   Felt to have T4-NX-M0 disease.  3. 6/09/2016: Started concurrent chemoradiation with weekly paclitaxel and carboplatin.  4. 7/30/2016: Completed radiation.  Subsequently received 2 cycles of consolidation paclitaxel and carboplatin.   5. 9/13/2019: Presented with 6-month history of dysphonia and left vocal exophytic lesion. Left true vocal fold biopsy showed at least squamous cell carcinoma in situ, cannot exclude superficial invasion.  6. 9/30/2019: Excision of left vocal cord mass showed fragments of invasive squamous cell carcinoma, well differentiated and keratinizing.  Margins negative for malignancy.  7. 2/16/2021: CT chest w/o contrast showed thin-walled cavity in left  lower lobe with 2 solid peripheral nodules measuring 9 mm each. No lymphadenopathy.  8. 3/01/2021: PET scan showed hypermetabolic nodules in left lower lobe and right lung, consistent with metastases; hypermetabolic adenopathy in chest, abdomen, pelvis; nonspecific uptake at tongue; hypermetabolic intraosseous lesions in spine and pelvis consistent with metastatic disease; left axillary hypermetabolic lymph nodes related to COVID-19 vaccination.  9. 3/12/2021: CT-guided lung biopsy showed non-small cell carcinoma, not otherwise specified -- with opinion by [facility] pathologist.  10. 3/18/2021: Lung NGS panel negative for mutations in BRAF, EGFR, ERBB2, IDH1, IDH2, KRAS, MET, NRAS, RET. PD-L1 expression negative (TPS <1%). Due to minimal amount of DNA obtained from specimen, other biomarkers could not be analyzed.  11. 4/7/2021: MRI brain negative for brain metastases.  12. 4/27/2021: Started 1st line metastatic therapy with carboplatin, paclitaxel, bevacizumab, and atezolizumab for metastatic non-small cell lung carcinoma.  13. 7/12/2021: PET/CT showed resolved mural nodules with increased cystic cavity in left lower lobe with no significant FDG uptake, less likely metastases; no enlarged hypermetabolic mediastinal, hilar, or axillary lymph nodes, decreased metabolic activity of intraosseous lesion in spine and pelvis; no new bone lesions.    INTERIM HISTORY:  Kt reports no new paresthesias.  He denies any nausea, epistaxis, hematochezia, or melena. He has no chronic diarrhea. He has occasional cough but denies shortness of breath.      REVIEW OF SYSTEMS:   14 point ROS was reviewed and is negative other than as noted above in HPI.       HOME MEDICATIONS:  Current Outpatient Medications   Medication Sig Dispense Refill     atorvastatin (LIPITOR) 40 MG tablet Take 40 mg by mouth daily       dexamethasone (DECADRON) 4 MG tablet Take 1 tablet (4 mg) by mouth 2 times daily (with meals) Take 2 tablets (8 mg) by  mouth daily for three days. Start on Day 2 of each cycle. 6 tablet 3     LORazepam (ATIVAN) 0.5 MG tablet Take 1 tablet (0.5 mg) by mouth every 4 hours as needed (Anxiety, Nausea/Vomiting or Sleep) (Patient not taking: Reported on 6/29/2021) 30 tablet 3     prochlorperazine (COMPAZINE) 10 MG tablet Take 1 tablet (10 mg) by mouth every 6 hours as needed (Nausea/Vomiting) (Patient not taking: Reported on 6/29/2021) 30 tablet 3         ALLERGIES:  Allergies   Allergen Reactions     No Known Drug Allergies          PAST MEDICAL HISTORY:  Past Medical History:   Diagnosis Date     Alcohol abuse, unspecified      Cerebral infarction (H)     2009, right side residual and aphasia     Dyslipidemia      GERD (gastroesophageal reflux disease)      Lung cancer (H)      Unspecified essential hypertension          PAST SURGICAL HISTORY:  Past Surgical History:   Procedure Laterality Date     BRONCHOSCOPY FLEXIBLE AND RIGID N/A 5/5/2016    Procedure: BRONCHOSCOPY FLEXIBLE AND RIGID;  Surgeon: Tony Talbot MD;  Location: [facility] OR     ESOPHAGOSCOPY FLEXIBLE N/A 9/30/2019    Procedure: flexible esophagoscopy;  Surgeon: Lizzy Johnson MD;  Location: [facility] OR     INJECT STEROID (LOCATION) N/A 9/30/2019    Procedure: steroid injection;  Surgeon: Lizzy Johnson MD;  Location: [facility] OR     IR CHEST PORT PLACEMENT > 5 YRS OF AGE  4/22/2021     LASER CO2 LARYNGOSCOPY N/A 9/30/2019    Procedure: Microdirect laryngoscopy with excision of laryngeal mass, CO2 laser;  Surgeon: Lizzy Johnson MD;  Location: [facility] OR     ZZC NONSPECIFIC PROCEDURE      R tympanoplasty         SOCIAL HISTORY:  Social History     Socioeconomic History     Marital status: Single     Spouse name: Not on file     Number of children: Not on file     Years of education: Not on file     Highest education level: Not on file   Occupational History     Not on file   Tobacco Use     Smoking status: Former Smoker     Packs/day: 1.00     Types: Cigarettes     Quit date:  "2009     Years since quittin.8     Smokeless tobacco: Never Used   Substance and Sexual Activity     Alcohol use: Not Currently     Drug use: No     Sexual activity: Not on file   Other Topics Concern     Parent/sibling w/ CABG, MI or angioplasty before 65F 55M? Not Asked   Social History Narrative     Not on file     Social Determinants of Health     Financial Resource Strain:      Difficulty of Paying Living Expenses:    Food Insecurity:      Worried About Running Out of Food in the Last Year:      Ran Out of Food in the Last Year:    Transportation Needs:      Lack of Transportation (Medical):      Lack of Transportation (Non-Medical):    Physical Activity:      Days of Exercise per Week:      Minutes of Exercise per Session:    Stress:      Feeling of Stress :    Social Connections:      Frequency of Communication with Friends and Family:      Frequency of Social Gatherings with Friends and Family:      Attends Shinto Services:      Active Member of Clubs or Organizations:      Attends Club or Organization Meetings:      Marital Status:    Intimate Partner Violence:      Fear of Current or Ex-Partner:      Emotionally Abused:      Physically Abused:      Sexually Abused:          FAMILY HISTORY:  Family History   Problem Relation Age of Onset     Cancer Father          PHYSICAL EXAM:  Vital signs:  BP (!) 143/82   Pulse 81   Temp 97.5  F (36.4  C)   Resp 16   Ht 1.918 m (6' 3.5\")   Wt 82.3 kg (181 lb 6.4 oz)   SpO2 99%   BMI 22.37 kg/m     ECO  GENERAL/CONSTITUTIONAL: No acute distress.  EYES: No erythema or scleral icterus.  LYMPH: No cervical, supraclavicular, axillary adenopathy.   RESPIRATORY: Clear to auscultation bilaterally. No crackles or wheezing.   CARDIOVASCULAR: Regular rate and rhythm without murmurs.  GASTROINTESTINAL: No hepatosplenomegaly, masses, or tenderness. No guarding.  No distention.  MUSCULOSKELETAL: Warm and well-perfused, no cyanosis, clubbing, or " edema.  NEUROLOGIC: Residual right arm and right leg reduced strength due to prior stroke. Alert, oriented, answers questions appropriately. Dysphonia present.  INTEGUMENTARY: No rashes or jaundice.  GAIT: Limping present; uses cane.      LABS:  CBC RESULTS: Recent Labs   Lab Test 07/20/21  1303   WBC 7.0   RBC 3.61*   HGB 10.2*   HCT 32.8*   MCV 91   MCH 28.3   MCHC 31.1*   RDW 15.7*        Last Comprehensive Metabolic Panel:  Sodium   Date Value Ref Range Status   07/20/2021 142 133 - 144 mmol/L Final   06/29/2021 140 133 - 144 mmol/L Final     Potassium   Date Value Ref Range Status   07/20/2021 4.2 3.4 - 5.3 mmol/L Final   06/29/2021 4.3 3.4 - 5.3 mmol/L Final     Chloride   Date Value Ref Range Status   07/20/2021 112 (H) 94 - 109 mmol/L Final   06/29/2021 111 (H) 94 - 109 mmol/L Final     Carbon Dioxide   Date Value Ref Range Status   06/29/2021 25 20 - 32 mmol/L Final     Carbon Dioxide (CO2)   Date Value Ref Range Status   07/20/2021 25 20 - 32 mmol/L Final     Anion Gap   Date Value Ref Range Status   07/20/2021 5 3 - 14 mmol/L Final   06/29/2021 4 3 - 14 mmol/L Final     Glucose   Date Value Ref Range Status   07/20/2021 88 70 - 99 mg/dL Final   06/29/2021 85 70 - 99 mg/dL Final     Urea Nitrogen   Date Value Ref Range Status   07/20/2021 19 7 - 30 mg/dL Final   06/29/2021 17 7 - 30 mg/dL Final     Creatinine   Date Value Ref Range Status   07/20/2021 0.77 0.66 - 1.25 mg/dL Final   06/29/2021 0.84 0.66 - 1.25 mg/dL Final     GFR Estimate   Date Value Ref Range Status   07/20/2021 >90 >60 mL/min/1.73m2 Final     Comment:     As of July 11, 2021, eGFR is calculated by the CKD-EPI creatinine equation, without race adjustment. eGFR can be influenced by muscle mass, exercise, and diet. The reported eGFR is an estimation only and is only applicable if the renal function is stable.   06/29/2021 >90 >60 mL/min/[1.73_m2] Final     Comment:     Non  GFR Calc  Starting 12/18/2018, serum  creatinine based estimated GFR (eGFR) will be   calculated using the Chronic Kidney Disease Epidemiology Collaboration   (CKD-EPI) equation.       Calcium   Date Value Ref Range Status   07/20/2021 8.9 8.5 - 10.1 mg/dL Final   06/29/2021 8.7 8.5 - 10.1 mg/dL Final     Bilirubin Total   Date Value Ref Range Status   07/20/2021 0.5 0.2 - 1.3 mg/dL Final   06/29/2021 0.3 0.2 - 1.3 mg/dL Final     Alkaline Phosphatase   Date Value Ref Range Status   07/20/2021 85 40 - 150 U/L Final   06/29/2021 73 40 - 150 U/L Final     ALT   Date Value Ref Range Status   07/20/2021 26 0 - 70 U/L Final   06/29/2021 34 0 - 70 U/L Final     AST   Date Value Ref Range Status   07/20/2021 19 0 - 45 U/L Final   06/29/2021 26 0 - 45 U/L Final       PATHOLOGY:  None new.    IMAGING:  Reviewed as per HPI.    ASSESSMENT/PLAN:  Kt Rasmussen is a 60 year old male with the following issues:  1. Metastatic non-small cell lung carcinoma with metastases to lymph nodes, bones, and bilateral lungs  2. History of locally advanced endobronchial invasive squamous cell carcinoma diagnosed 5/2016, status post debulking and chemoradiation   --I reviewed PET scan from 7/12/2021 which showed excellent partial response. Discussed with Kt.   --Prior lung NGS biomarker testing were all negative and PD-L1 expression was negative. ROS1 and NTRK biomarker analysis could not be performed due to limited amount of DNA extracted from the specimen.  Will consider a repeat biopsy in the future to obtain testing for these biomarkers if he progresses on 1st line therapy.  --I recommended to Kt he continue with 1st line metastatic therapy with paclitaxel, bevacizumab, and atezolizumab, and omitting the carboplatin going forward. Omitting this should help improve his anemia, thrombocytopenia and neutropenia.  --Plan to repeat PET scan in 3 months in 10/2021.    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 Dysphonia  5. Dysphagia  -Kt underwent excision of  "a left vocal cord SCC on 9/30/2019 and has persistent dysphonia as a result of the lesion and excision.  He also has dysphagia but this is stable and swallowing is manageable.  -Last scope by ENT 1/14/2021 showed no evidence for recurrence.    Sangita John MD  Hematology/Oncology  [facility]    Oncology Rooming Note    July 21, 2021 8:35 AM   Kt Rasmussen is a 60 year old male who presents for:    Chief Complaint   Patient presents with     Oncology Clinic Visit     Initial Vitals: There were no vitals taken for this visit. Estimated body mass index is 22.73 kg/m  as calculated from the following:    Height as of 4/27/21: 1.918 m (6' 3.5\").    Weight as of 6/30/21: 83.6 kg (184 lb 4.9 oz). There is no height or weight on file to calculate BSA.  Data Unavailable Comment: Data Unavailable   No LMP for male patient.  Allergies reviewed: Yes  Medications reviewed: Yes    Medications: Medication refills not needed today.  Pharmacy name entered into EPIC:    [place] [facility], [place] - [address] [facility], [place] - [address] [id number] [address] [address] AT [place] [address]    Clinical concerns:  doctor was notified.      Floridalma Nieves MA                Again, thank you for allowing me to participate in the care of your patient.        Sincerely,        Sangita John MD    "

## 2021-07-21 NOTE — PROGRESS NOTES
"Oncology Rooming Note    July 21, 2021 8:35 AM   Kt Rasmussen is a 60 year old male who presents for:    Chief Complaint   Patient presents with     Oncology Clinic Visit     Initial Vitals: There were no vitals taken for this visit. Estimated body mass index is 22.73 kg/m  as calculated from the following:    Height as of 4/27/21: 1.918 m (6' 3.5\").    Weight as of 6/30/21: 83.6 kg (184 lb 4.9 oz). There is no height or weight on file to calculate BSA.  Data Unavailable Comment: Data Unavailable   No LMP for male patient.  Allergies reviewed: Yes  Medications reviewed: Yes    Medications: Medication refills not needed today.  Pharmacy name entered into EPIC:    [place] [facility], [place] - [address] [facility] [place], [place] - [address] [id number] - [facility] [address] [facility]    Clinical concerns:  doctor was notified.      Floridalma Nieves MA            "

## 2021-07-21 NOTE — PROGRESS NOTES
Infusion Nursing Note:  Kt HERNANDEZ Rasmussen presents today for Cycle 5 Day 1 .    Patient seen by provider today: Yes: Dr. John   present during visit today: Not Applicable.    Note: N/A.    Intravenous Access:  Implanted Port.    Treatment Conditions:  Lab Results   Component Value Date    HGB 10.2 07/20/2021    HGB 10.5 07/08/2021     Lab Results   Component Value Date    WBC 7.0 07/20/2021    WBC 11.1 07/08/2021      Lab Results   Component Value Date    ANEU 7.7 07/08/2021     Lab Results   Component Value Date     07/20/2021    PLT 65 07/08/2021      Lab Results   Component Value Date     07/20/2021     06/29/2021                   Lab Results   Component Value Date    POTASSIUM 4.2 07/20/2021    POTASSIUM 4.3 06/29/2021           Lab Results   Component Value Date    MAG 1.9 10/13/2016            Lab Results   Component Value Date    CR 0.77 07/20/2021    CR 0.84 06/29/2021                   Lab Results   Component Value Date    BEVERLY 8.9 07/20/2021    BEVERLY 8.7 06/29/2021                Lab Results   Component Value Date    BILITOTAL 0.5 07/20/2021    BILITOTAL 0.3 06/29/2021           Lab Results   Component Value Date    ALBUMIN 3.5 07/20/2021    ALBUMIN 3.5 06/29/2021                    Lab Results   Component Value Date    ALT 26 07/20/2021    ALT 34 06/29/2021           Lab Results   Component Value Date    AST 19 07/20/2021    AST 26 06/29/2021       Results reviewed, labs MET treatment parameters, ok to proceed with treatment.  Urine Protein: Negative.      Post Infusion Assessment:  Patient tolerated infusion without incident.  Blood return noted pre and post infusion.  Site patent and intact, free from redness, edema or discomfort.  No evidence of extravasations.  Access discontinued per protocol.       Discharge Plan:   Patient declined prescription refills.  Discharge instructions reviewed with: Patient.  Patient verbalized understanding of discharge instructions and all  questions answered.  AVS to patient via [place].  Patient will return 7/22/21 for next appointment.   Patient discharged in stable condition accompanied by: self.  Departure Mode: Ambulatory.      Sandi Sanches RN

## 2021-07-22 ENCOUNTER — INFUSION THERAPY VISIT (OUTPATIENT)
Dept: INFUSION THERAPY | Facility: CLINIC | Age: 61
End: 2021-07-22
Attending: INTERNAL MEDICINE
Payer: MEDICARE

## 2021-07-22 ENCOUNTER — OFFICE VISIT (OUTPATIENT)
Dept: OTOLARYNGOLOGY | Facility: CLINIC | Age: 61
End: 2021-07-22
Payer: MEDICARE

## 2021-07-22 VITALS
HEIGHT: 75 IN | TEMPERATURE: 97.9 F | HEART RATE: 96 BPM | BODY MASS INDEX: 22.5 KG/M2 | OXYGEN SATURATION: 98 % | WEIGHT: 181 LBS

## 2021-07-22 VITALS
WEIGHT: 182.4 LBS | RESPIRATION RATE: 16 BRPM | DIASTOLIC BLOOD PRESSURE: 79 MMHG | SYSTOLIC BLOOD PRESSURE: 139 MMHG | BODY MASS INDEX: 22.8 KG/M2 | TEMPERATURE: 98.1 F | HEART RATE: 82 BPM

## 2021-07-22 DIAGNOSIS — C34.90 NON-SMALL CELL LUNG CANCER METASTATIC TO BONE (H): ICD-10-CM

## 2021-07-22 DIAGNOSIS — D70.1 CHEMOTHERAPY-INDUCED NEUTROPENIA (H): Primary | ICD-10-CM

## 2021-07-22 DIAGNOSIS — Z51.11 ENCOUNTER FOR ANTINEOPLASTIC CHEMOTHERAPY: ICD-10-CM

## 2021-07-22 DIAGNOSIS — C79.51 NON-SMALL CELL LUNG CANCER METASTATIC TO BONE (H): ICD-10-CM

## 2021-07-22 DIAGNOSIS — R49.0 DYSPHONIA: Primary | ICD-10-CM

## 2021-07-22 DIAGNOSIS — T45.1X5A CHEMOTHERAPY-INDUCED NEUTROPENIA (H): Primary | ICD-10-CM

## 2021-07-22 PROCEDURE — 31575 DIAGNOSTIC LARYNGOSCOPY: CPT | Performed by: OTOLARYNGOLOGY

## 2021-07-22 PROCEDURE — 99212 OFFICE O/P EST SF 10 MIN: CPT | Mod: 25 | Performed by: OTOLARYNGOLOGY

## 2021-07-22 PROCEDURE — 250N000011 HC RX IP 250 OP 636: Performed by: INTERNAL MEDICINE

## 2021-07-22 PROCEDURE — 96372 THER/PROPH/DIAG INJ SC/IM: CPT | Performed by: INTERNAL MEDICINE

## 2021-07-22 RX ADMIN — PEGFILGRASTIM 6 MG: 6 INJECTION SUBCUTANEOUS at 15:05

## 2021-07-22 ASSESSMENT — MIFFLIN-ST. JEOR: SCORE: 1711.64

## 2021-07-22 ASSESSMENT — PAIN SCALES - GENERAL: PAINLEVEL: NO PAIN (0)

## 2021-07-22 NOTE — NURSING NOTE
"Chief Complaint   Patient presents with     RECHECK     2 month follow up       Pulse 96, temperature 97.9  F (36.6  C), temperature source Temporal, height 1.905 m (6' 3\"), weight 82.1 kg (181 lb), SpO2 98 %.    Nick Vidales, EMT  "

## 2021-07-22 NOTE — LETTER
2021       RE: Kt Rasmussen  [id number] [address]  [facility] [address]     Dear Colleague,    Thank you for referring your patient, Kt Rasmussen, to the [facility] E[facility]SE AND [facility]ROAT [facility]INIC [place] at [facility]. Please see a copy of my visit note below.        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 Rasmussen is a 60 year old male is being followed for history of left TVF SCC. he was initially seen on 19. Please refer to this note for full history.     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 he reports:  - stable symptoms   PAST MEDICAL HISTORY:   Past Medical History:   Diagnosis Date     Alcohol abuse, unspecified      Cerebral infarction (H)     , right side residual and aphasia     Dyslipidemia      GERD (gastroesophageal reflux disease)      Lung cancer (H)      Unspecified essential hypertension        PAST SURGICAL HISTORY:   Past Surgical History:   Procedure Laterality Date     BRONCHOSCOPY FLEXIBLE AND RIGID N/A 2016    Procedure: BRONCHOSCOPY FLEXIBLE AND RIGID;  Surgeon: Tony Talbot MD;  Location: [facility] OR     ESOPHAGOSCOPY FLEXIBLE N/A 2019    Procedure: flexible esophagoscopy;  Surgeon: Lizzy Johnson MD;  Location: [facility] OR     INJECT STEROID (LOCATION) N/A 2019    Procedure: steroid injection;  Surgeon: Lizzy Johnson MD;  Location: [facility] OR     IR CHEST PORT PLACEMENT > 5 YRS OF AGE  2021     LASER CO2 LARYNGOSCOPY N/A 2019    Procedure:  Microdirect laryngoscopy with excision of laryngeal mass, CO2 laser;  Surgeon: Lizzy Johnson MD;  Location:  OR     [facility] NONSPECIFIC PROCEDURE      R tympanoplasty       CURRENT MEDICATIONS:   Current Outpatient Medications:      atorvastatin (LIPITOR) 40 MG tablet, Take 40 mg by mouth daily, Disp: , Rfl:      dexamethasone (DECADRON) 4 MG tablet, Take 1 tablet (4 mg) by mouth 2 times daily (with meals) Take 2 tablets (8 mg) by mouth daily for three days. Start on Day 2 of each cycle., Disp: 6 tablet, Rfl: 3     LORazepam (ATIVAN) 0.5 MG tablet, Take 1 tablet (0.5 mg) by mouth every 4 hours as needed (Anxiety, Nausea/Vomiting or Sleep) (Patient not taking: Reported on 2021), Disp: 30 tablet, Rfl: 3     prochlorperazine (COMPAZINE) 10 MG tablet, Take 1 tablet (10 mg) by mouth every 6 hours as needed (Nausea/Vomiting) (Patient not taking: Reported on 2021), Disp: 30 tablet, Rfl: 3    ALLERGIES: No known drug allergies    SOCIAL HISTORY:    Social History     Socioeconomic History     Marital status: Single     Spouse name: Not on file     Number of children: Not on file     Years of education: Not on file     Highest education level: Not on file   Occupational History     Not on file   Tobacco Use     Smoking status: Former Smoker     Packs/day: 1.00     Types: Cigarettes     Quit date: 2009     Years since quittin.8     Smokeless tobacco: Never Used   Substance and Sexual Activity     Alcohol use: Not Currently     Drug use: No     Sexual activity: Not on file   Other Topics Concern     Parent/sibling w/ CABG, MI or angioplasty before 65F 55M? Not Asked   Social History Narrative     Not on file     Social Determinants of Health     Financial Resource Strain:      Difficulty of Paying Living Expenses:    Food Insecurity:      Worried About Running Out of Food in the Last Year:      Ran Out of Food in the Last Year:    Transportation Needs:      Lack of Transportation (Medical):      Lack of  Transportation (Non-Medical):    Physical Activity:      Days of Exercise per Week:      Minutes of Exercise per Session:    Stress:      Feeling of Stress :    Social Connections:      Frequency of Communication with Friends and Family:      Frequency of Social Gatherings with Friends and Family:      Attends Restorationism Services:      Active Member of Clubs or Organizations:      Attends Club or Organization Meetings:      Marital Status:    Intimate Partner Violence:      Fear of Current or Ex-Partner:      Emotionally Abused:      Physically Abused:      Sexually Abused:          FAMILY HISTORY:   Family History   Problem Relation Age of Onset     Cancer Father       Non-contributory for problems with anesthesia    REVIEW OF SYSTEMS:   The patient was asked a 14 point review of systems regarding constitutional symptoms, eye symptoms, ears, nose, mouth, throat symptoms, cardiovascular symptoms, respiratory symptoms, gastrointestinal symptoms, genitourinary symptoms, musculoskeletal symptoms, integumentary symptoms, neurological symptoms, psychiatric symptoms, endocrine symptoms, hematologic/lymphatic symptoms, and allergic/ immunologic symptoms.   The pertinent factors have been included in the HPI and below.  Patient Supplied Answers to Review of Systems  No flowsheet data found.    Physical Examination   The patient underwent a physical examination as described below. The pertinent positive and negative findings are summarized after the description of the examination.  Constitutional: The patient's developmental and nutritional status was assessed. The patient's voice quality was assessed.  Head and Face: The head and face were inspected for deformities. The sinuses were palpated. The salivary glands were palpated. Facial muscle strength was assessed bilaterally.  Eyes: Extraocular movements and primary gaze alignment were assessed.  Ears, Nose, Mouth and Throat: The ears and nose were examined for deformities.  The nasal septum, mucosa, and turbinates were inspected by anterior rhinoscopy. The lips, teeth, and gums were examined for abnormalities. The oral mucosa, tongue, palate, tonsils, lateral and posterior pharynx were inspected for the presence of asymmetry or mucosal lesions.    Neck: The tracheal position was noted, and the neck mass palpated to determine if there were any asymmetries, abnormal neck masses, thyromegally, or thyroid nodules.  Respiratory: The nature of the breathing and chest expansion/symmetry was observed.  Cardiovascular: The patient was examined to determine the presence of any edema or jugular venous distension.  Abdomen: The contour of the abdomen was noted.  Lymphatic: The patient was examined for infraclavicular lymphadenopathy.  Musculoskeletal: The patient was inspected for the presence of skeletal deformities.  Extremities: The extremities were examined for any clubbing or cyanosis.  Skin: The skin was examined for inflammatory or neoplastic conditions.  Neurologic: The patient's orientation, mood, and affect were noted. The cranial nerve  functions were examined.  Other pertinent positive and negative findings on physical examination:   OC/OP: no lesions, uvula midline, soft palate elevates symmetrically, FOM/BOT soft  Neck: no lesions, no TH tenderness to palpation    All other physical examination findings were within normal limits and noncontributory.    Procedures   Flexible laryngoscopy (CPT 35158)      Pre-procedure diagnosis: dysphonia  Post-procedure diagnosis: same as above  Indication for procedure: Mr. Rasmussen is a 60 year old male with see above  Procedure(s): Fiberoptic Laryngoscopy    Details of Procedure: After informed consent was obtained, the patient was seated in the examination chair.  The areas of the nasopharynx as well as the hypopharynx were anesthetized with topical 4% lidocaine with 0.25% phenylephrine atomizer.  Examination of the base of tongue was performed  first.  Attention was directed to any evidence of masses in the area or evidence of leukoplakia or candidal infection.  Attention was directed to the epiglottis where its size and position was determined and its movement on phonation of the vowel  e .  The piriform sinuses were then inspected for any mass lesions or pooling of secretions.  Attention was then directed to the larynx. The vocal folds were inspected for infection or any areas of leukoplakia, for masses, polypoid degeneration, or hemorrhage.  Having done this, the arytenoids and vocal processes were inspected for erythema or evidence of granuloma formation.  The posterior commissure was then inspected for evidence of inflammatory changes in the mucosa and heaping up of mucosal tissue. The patient was then instructed to say the vowel  e .  Adduction of vocal folds to the midline was observed for any evidence of paresis or paralysis of the larynx or asymmetry in rotation of the larynx to the left or right. The patient was asked to breathe and the degree of abduction was noted bilaterally.  Subglottic view of the larynx was obtained for any additional mass lesions or mucosal changes.  Finally the post cricoid was examined for evidence of pooling of secretions, as well as the pharyngeal wall mucosa.   Anesthesia type: 0.25% phenylephrine    Findings:  Anatomic/physiological deviations: well healed left vocal fold no evidence of recurrence   Right vocal process: No restriction of mobility   Left vocal process: No restriction of mobility  Glottal gap: Glottal gap  Supraglottic structures: Normal  Hypopharynx: Normal     Estimated Blood Loss: minimal  Complications: None  Disposition: Patient tolerated the procedure well              Review of Relevant Clinical Data   Notes: Juan Rausch 6/29/21  Radiology: PET-CT 7/12/21 -      Labs:  Lab Results   Component Value Date    TSH 2.24 07/20/2021     Lab Results   Component Value Date     07/20/2021    CO2 25  "07/20/2021    BUN 19 07/20/2021    PHOS 2.0 (L) 05/04/2016     Lab Results   Component Value Date    WBC 7.0 07/20/2021    HGB 10.2 (L) 07/20/2021    HCT 32.8 (L) 07/20/2021    MCV 91 07/20/2021     07/20/2021     Lab Results   Component Value Date    PTT 35 07/26/2017    INR 1.02 04/22/2021     No results found for: ELIZABET  No components found for: RHEUMATOIDFACTOR,  RF  Lab Results   Component Value Date    CRP 0.16 07/24/2003     No components found for: CKTOT, URICACID  No components found for: C3, C4, DSDNAAB, NDNAABIFA  No results found for: MPOAB    Patient reported Quality of Life (QOL) Measures   Patient Supplied Answers To VHI Questionnaire  Voice Handicap Index (VHI-10) 12/5/2019   My voice makes it difficult for people to hear me 0   People have difficulty understanding me in a noisy room 0   My voice difficulties restrict my personal and social life.  0   I feel left out of conversations because of my voice 0   My voice problem causes me to lose income 0   I feel as though I have to strain to produce voice 0   The clarity of my voice is unpredictable 0   My voice problem upsets me 0   My voice makes me feel handicapped 0   People ask, \"What's wrong with your voice?\" 0   VHI-10 0         Patient Supplied Answers To EAT Questionnaire  Eating Assessment Tool (EAT-10) 12/5/2019   My swallowing problem has caused me to lose weight 0   My swallowing problem interferes with my ability to go out for meals 0   Swallowing liquids takes extra effort 0   Swallowing solids takes extra effort 0   Swallowing pills takes extra effort 0   Swallowing is painful 0   The pleasure of eating is affected by my swallowing 0   When I swallow food sticks in my throat 0   I cough when I eat 0   Swallowing is stressful 0   EAT-10 0         Patient Supplied Answers To CSI Questionnaire  No flowsheet data found.      Patient Supplied Answers to Dyspnea Index Questionnaire:  No flowsheet data found.    Impression & Plan "     IMPRESSION: Mr. Rasmussen is a 60 year old male who is being seen for the followin. T1 Left vocal fold SCC  -  s/p endoscopic CO2 laser resection 19  - no evidence of recurrence   - now has metastatic nonsmall cell lung cancer undergoing treatment with Dr Sangita John  - stable laryngeal exam,  VIKRAM  Plan  - observation  - [id number] evaluations during year 2 until 2021        2. 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 Dysphagia  - improved swallow today with less pharyngeal residue on evaluation of SLP performed FEES on 20 though continued recommendation of thickened liquids  - screening FEES with liquids shows again aspiration on 7/3/20  - no PNAs, no changes in weight  - Xray Video Swallow Exam 20 - safe swallow, much improved  Plan  - observation     RETURN VISIT:2 months    Lizzy Wray MD    Laryngology    [facility]  Department of  Otolaryngology - Head and Neck Surgery  [facility] [place] [address]  Appointment line: [phone]  Fax: [phone]  [email or web address]         Again, thank you for allowing me to participate in the care of your patient.      Sincerely,    Lizzy Wray MD

## 2021-07-22 NOTE — PROGRESS NOTES
Infusion Nursing Note:  Kt Rasmussen presents today for neulasta.    Patient seen by provider today: No   present during visit today: Not Applicable.    Note: N/A.    Intravenous Access:  No Intravenous access/labs at this visit.    Treatment Conditions:  Not Applicable.      Post Infusion Assessment:  Patient tolerated injection without incident.       Discharge Plan:   Discharge instructions reviewed with: Patient.  Patient and/or family verbalized understanding of discharge instructions and all questions answered.  Patient discharged in stable condition accompanied by: self.  Departure Mode: Ambulatory.      Chelsi Doe RN

## 2021-07-22 NOTE — PATIENT INSTRUCTIONS
1.  You were seen in the ENT Clinic today by . If you have any questions or concerns after your appointment, please call [phone]. Press option #1 for scheduling related needs. Press option #3 for Nurse advice.    2. Plan is to return to clinic in 2 months      Kay Sommers LPN  [phone]  [facility] Otolaryngology

## 2021-08-10 ENCOUNTER — LAB (OUTPATIENT)
Dept: INFUSION THERAPY | Facility: CLINIC | Age: 61
End: 2021-08-10
Attending: INTERNAL MEDICINE
Payer: MEDICARE

## 2021-08-10 DIAGNOSIS — Z51.11 ENCOUNTER FOR ANTINEOPLASTIC CHEMOTHERAPY: ICD-10-CM

## 2021-08-10 DIAGNOSIS — C79.51 NON-SMALL CELL LUNG CANCER METASTATIC TO BONE (H): ICD-10-CM

## 2021-08-10 DIAGNOSIS — D70.1 CHEMOTHERAPY-INDUCED NEUTROPENIA (H): Primary | ICD-10-CM

## 2021-08-10 DIAGNOSIS — C34.90 NON-SMALL CELL CARCINOMA OF LUNG, STAGE 3, UNSPECIFIED LATERALITY (H): ICD-10-CM

## 2021-08-10 DIAGNOSIS — T45.1X5A CHEMOTHERAPY-INDUCED NEUTROPENIA (H): Primary | ICD-10-CM

## 2021-08-10 DIAGNOSIS — C34.90 NON-SMALL CELL LUNG CANCER METASTATIC TO BONE (H): ICD-10-CM

## 2021-08-10 LAB
ALBUMIN SERPL-MCNC: 3.7 G/DL (ref 3.4–5)
ALP SERPL-CCNC: 83 U/L (ref 40–150)
ALT SERPL W P-5'-P-CCNC: 29 U/L (ref 0–70)
ANION GAP SERPL CALCULATED.3IONS-SCNC: 3 MMOL/L (ref 3–14)
AST SERPL W P-5'-P-CCNC: 20 U/L (ref 0–45)
BASOPHILS # BLD AUTO: 0 10E3/UL (ref 0–0.2)
BASOPHILS NFR BLD AUTO: 0 %
BILIRUB SERPL-MCNC: 0.4 MG/DL (ref 0.2–1.3)
BUN SERPL-MCNC: 17 MG/DL (ref 7–30)
CALCIUM SERPL-MCNC: 9.1 MG/DL (ref 8.5–10.1)
CHLORIDE BLD-SCNC: 112 MMOL/L (ref 94–109)
CO2 SERPL-SCNC: 25 MMOL/L (ref 20–32)
CREAT SERPL-MCNC: 0.79 MG/DL (ref 0.66–1.25)
EOSINOPHIL # BLD AUTO: 0.1 10E3/UL (ref 0–0.7)
EOSINOPHIL NFR BLD AUTO: 1 %
ERYTHROCYTE [DISTWIDTH] IN BLOOD BY AUTOMATED COUNT: 16.1 % (ref 10–15)
GFR SERPL CREATININE-BSD FRML MDRD: >90 ML/MIN/1.73M2
GLUCOSE BLD-MCNC: 88 MG/DL (ref 70–99)
HCT VFR BLD AUTO: 34.2 % (ref 40–53)
HGB BLD-MCNC: 10.8 G/DL (ref 13.3–17.7)
IMM GRANULOCYTES # BLD: 0 10E3/UL
IMM GRANULOCYTES NFR BLD: 0 %
LYMPHOCYTES # BLD AUTO: 1.3 10E3/UL (ref 0.8–5.3)
LYMPHOCYTES NFR BLD AUTO: 17 %
MCH RBC QN AUTO: 29.2 PG (ref 26.5–33)
MCHC RBC AUTO-ENTMCNC: 31.6 G/DL (ref 31.5–36.5)
MCV RBC AUTO: 92 FL (ref 78–100)
MONOCYTES # BLD AUTO: 0.6 10E3/UL (ref 0–1.3)
MONOCYTES NFR BLD AUTO: 8 %
NEUTROPHILS # BLD AUTO: 5.7 10E3/UL (ref 1.6–8.3)
NEUTROPHILS NFR BLD AUTO: 74 %
NRBC # BLD AUTO: 0 10E3/UL
NRBC BLD AUTO-RTO: 0 /100
PLATELET # BLD AUTO: 129 10E3/UL (ref 150–450)
POTASSIUM BLD-SCNC: 4.2 MMOL/L (ref 3.4–5.3)
PROT SERPL-MCNC: 7.2 G/DL (ref 6.8–8.8)
RBC # BLD AUTO: 3.7 10E6/UL (ref 4.4–5.9)
SODIUM SERPL-SCNC: 140 MMOL/L (ref 133–144)
TSH SERPL DL<=0.005 MIU/L-ACNC: 1.73 MU/L (ref 0.4–4)
WBC # BLD AUTO: 7.7 10E3/UL (ref 4–11)

## 2021-08-10 PROCEDURE — 85025 COMPLETE CBC W/AUTO DIFF WBC: CPT | Performed by: INTERNAL MEDICINE

## 2021-08-10 PROCEDURE — 82040 ASSAY OF SERUM ALBUMIN: CPT | Performed by: INTERNAL MEDICINE

## 2021-08-10 PROCEDURE — 84443 ASSAY THYROID STIM HORMONE: CPT | Performed by: INTERNAL MEDICINE

## 2021-08-10 PROCEDURE — 36591 DRAW BLOOD OFF VENOUS DEVICE: CPT

## 2021-08-10 PROCEDURE — 250N000011 HC RX IP 250 OP 636: Performed by: INTERNAL MEDICINE

## 2021-08-10 RX ORDER — HEPARIN SODIUM (PORCINE) LOCK FLUSH IV SOLN 100 UNIT/ML 100 UNIT/ML
5 SOLUTION INTRAVENOUS
Status: CANCELLED | OUTPATIENT
Start: 2021-08-10

## 2021-08-10 RX ORDER — HEPARIN SODIUM (PORCINE) LOCK FLUSH IV SOLN 100 UNIT/ML 100 UNIT/ML
5 SOLUTION INTRAVENOUS
Status: ACTIVE | OUTPATIENT
Start: 2021-08-10

## 2021-08-10 RX ORDER — HEPARIN SODIUM,PORCINE 10 UNIT/ML
5 VIAL (ML) INTRAVENOUS
Status: CANCELLED | OUTPATIENT
Start: 2021-08-10

## 2021-08-10 RX ORDER — HEPARIN SODIUM,PORCINE 10 UNIT/ML
5 VIAL (ML) INTRAVENOUS
Status: ACTIVE | OUTPATIENT
Start: 2021-08-10

## 2021-08-10 RX ADMIN — Medication 5 ML: at 13:46

## 2021-08-10 NOTE — PROGRESS NOTES
Nursing Note:  Kt Rasmussen presents today for port labs.    Patient seen by provider today: No   present during visit today: Not Applicable.    Note: Unable to urinate. Pt sent with sterile cup. Instructed to refrigerate and give to MAs upon arrival tomorrow.    Intravenous Access:  Labs drawn without difficulty.  Implanted Port.    Discharge Plan:   Patient was sent to home for tomorrow's appointment.    Chelsi Doe RN

## 2021-08-11 ENCOUNTER — INFUSION THERAPY VISIT (OUTPATIENT)
Dept: INFUSION THERAPY | Facility: CLINIC | Age: 61
End: 2021-08-11
Attending: INTERNAL MEDICINE
Payer: MEDICARE

## 2021-08-11 ENCOUNTER — ONCOLOGY VISIT (OUTPATIENT)
Dept: ONCOLOGY | Facility: CLINIC | Age: 61
End: 2021-08-11
Attending: NURSE PRACTITIONER
Payer: MEDICARE

## 2021-08-11 VITALS
SYSTOLIC BLOOD PRESSURE: 142 MMHG | HEART RATE: 80 BPM | OXYGEN SATURATION: 99 % | WEIGHT: 177 LBS | RESPIRATION RATE: 16 BRPM | DIASTOLIC BLOOD PRESSURE: 87 MMHG | BODY MASS INDEX: 22.12 KG/M2 | TEMPERATURE: 97.5 F

## 2021-08-11 DIAGNOSIS — D70.1 CHEMOTHERAPY-INDUCED NEUTROPENIA (H): ICD-10-CM

## 2021-08-11 DIAGNOSIS — C34.90 NON-SMALL CELL LUNG CANCER METASTATIC TO BONE (H): ICD-10-CM

## 2021-08-11 DIAGNOSIS — C79.51 NON-SMALL CELL LUNG CANCER METASTATIC TO BONE (H): ICD-10-CM

## 2021-08-11 DIAGNOSIS — T45.1X5A CHEMOTHERAPY-INDUCED NEUTROPENIA (H): ICD-10-CM

## 2021-08-11 DIAGNOSIS — Z51.11 ENCOUNTER FOR ANTINEOPLASTIC CHEMOTHERAPY: Primary | ICD-10-CM

## 2021-08-11 LAB — ALBUMIN UR-MCNC: NEGATIVE MG/DL

## 2021-08-11 PROCEDURE — 96415 CHEMO IV INFUSION ADDL HR: CPT

## 2021-08-11 PROCEDURE — 96375 TX/PRO/DX INJ NEW DRUG ADDON: CPT

## 2021-08-11 PROCEDURE — 81003 URINALYSIS AUTO W/O SCOPE: CPT | Performed by: INTERNAL MEDICINE

## 2021-08-11 PROCEDURE — 96413 CHEMO IV INFUSION 1 HR: CPT

## 2021-08-11 PROCEDURE — 258N000003 HC RX IP 258 OP 636: Performed by: NURSE PRACTITIONER

## 2021-08-11 PROCEDURE — 96367 TX/PROPH/DG ADDL SEQ IV INF: CPT

## 2021-08-11 PROCEDURE — G0463 HOSPITAL OUTPT CLINIC VISIT: HCPCS | Mod: 25

## 2021-08-11 PROCEDURE — 99214 OFFICE O/P EST MOD 30 MIN: CPT | Performed by: NURSE PRACTITIONER

## 2021-08-11 PROCEDURE — 250N000011 HC RX IP 250 OP 636: Performed by: NURSE PRACTITIONER

## 2021-08-11 PROCEDURE — 96417 CHEMO IV INFUS EACH ADDL SEQ: CPT

## 2021-08-11 RX ORDER — ALBUTEROL SULFATE 0.83 MG/ML
2.5 SOLUTION RESPIRATORY (INHALATION)
Status: CANCELLED | OUTPATIENT
Start: 2021-08-11

## 2021-08-11 RX ORDER — DIPHENHYDRAMINE HYDROCHLORIDE 50 MG/ML
50 INJECTION INTRAMUSCULAR; INTRAVENOUS
Status: CANCELLED
Start: 2021-08-11

## 2021-08-11 RX ORDER — METHYLPREDNISOLONE SODIUM SUCCINATE 125 MG/2ML
125 INJECTION, POWDER, LYOPHILIZED, FOR SOLUTION INTRAMUSCULAR; INTRAVENOUS
Status: CANCELLED
Start: 2021-08-11

## 2021-08-11 RX ORDER — HEPARIN SODIUM (PORCINE) LOCK FLUSH IV SOLN 100 UNIT/ML 100 UNIT/ML
5 SOLUTION INTRAVENOUS
Status: DISCONTINUED | OUTPATIENT
Start: 2021-08-11 | End: 2021-08-11 | Stop reason: HOSPADM

## 2021-08-11 RX ORDER — EPINEPHRINE 1 MG/ML
0.3 INJECTION, SOLUTION INTRAMUSCULAR; SUBCUTANEOUS EVERY 5 MIN PRN
Status: CANCELLED | OUTPATIENT
Start: 2021-08-11

## 2021-08-11 RX ORDER — HEPARIN SODIUM,PORCINE 10 UNIT/ML
5 VIAL (ML) INTRAVENOUS
Status: CANCELLED | OUTPATIENT
Start: 2021-08-11

## 2021-08-11 RX ORDER — NALOXONE HYDROCHLORIDE 0.4 MG/ML
.1-.4 INJECTION, SOLUTION INTRAMUSCULAR; INTRAVENOUS; SUBCUTANEOUS
Status: CANCELLED | OUTPATIENT
Start: 2021-08-11

## 2021-08-11 RX ORDER — ALBUTEROL SULFATE 90 UG/1
1-2 AEROSOL, METERED RESPIRATORY (INHALATION)
Status: CANCELLED
Start: 2021-08-11

## 2021-08-11 RX ORDER — MEPERIDINE HYDROCHLORIDE 25 MG/ML
25 INJECTION INTRAMUSCULAR; INTRAVENOUS; SUBCUTANEOUS EVERY 30 MIN PRN
Status: CANCELLED | OUTPATIENT
Start: 2021-08-11

## 2021-08-11 RX ORDER — PALONOSETRON 0.05 MG/ML
0.25 INJECTION, SOLUTION INTRAVENOUS ONCE
Status: CANCELLED
Start: 2021-08-11

## 2021-08-11 RX ORDER — LORAZEPAM 2 MG/ML
0.5 INJECTION INTRAMUSCULAR EVERY 4 HOURS PRN
Status: CANCELLED
Start: 2021-08-11

## 2021-08-11 RX ORDER — PALONOSETRON 0.05 MG/ML
0.25 INJECTION, SOLUTION INTRAVENOUS ONCE
Status: COMPLETED | OUTPATIENT
Start: 2021-08-11 | End: 2021-08-11

## 2021-08-11 RX ORDER — HEPARIN SODIUM (PORCINE) LOCK FLUSH IV SOLN 100 UNIT/ML 100 UNIT/ML
5 SOLUTION INTRAVENOUS
Status: CANCELLED | OUTPATIENT
Start: 2021-08-11

## 2021-08-11 RX ORDER — SODIUM CHLORIDE 9 MG/ML
1000 INJECTION, SOLUTION INTRAVENOUS CONTINUOUS PRN
Status: CANCELLED
Start: 2021-08-11

## 2021-08-11 RX ADMIN — SODIUM CHLORIDE 250 ML: 9 INJECTION, SOLUTION INTRAVENOUS at 11:09

## 2021-08-11 RX ADMIN — SODIUM CHLORIDE 1300 MG: 9 INJECTION, SOLUTION INTRAVENOUS at 11:55

## 2021-08-11 RX ADMIN — PACLITAXEL 432 MG: 6 INJECTION, SOLUTION INTRAVENOUS at 13:07

## 2021-08-11 RX ADMIN — PALONOSETRON 0.25 MG: 0.25 INJECTION, SOLUTION INTRAVENOUS at 11:25

## 2021-08-11 RX ADMIN — Medication 5 ML: at 16:16

## 2021-08-11 RX ADMIN — ATEZOLIZUMAB 1200 MG: 1200 INJECTION, SOLUTION INTRAVENOUS at 12:33

## 2021-08-11 RX ADMIN — FOSAPREPITANT: 150 INJECTION, POWDER, LYOPHILIZED, FOR SOLUTION INTRAVENOUS at 11:27

## 2021-08-11 ASSESSMENT — PAIN SCALES - GENERAL: PAINLEVEL: NO PAIN (0)

## 2021-08-11 NOTE — PROGRESS NOTES
Oncology/Hematology Visit Note  Aug 11, 2021    Reason for Visit: follow up of metastatic non-small cell lung carcinoma  Metastatic to lymph nodes bones in bilateral lungs  Brain MRI negative for mets    Patient met with Dr. John who recommended treatment with palliative intent with paclitaxel carboplatin Avastin and atezolizumab-treatment started on April 27, 2021  -Due to thrombocytopenia carboplatin AUC reduced to 4 with cycle 2  Due to pancytopenia carboplatin discontinued with cycle 5    7/12/2021: PET/CT showed resolved mural nodules with increased cystic cavity in left lower lobe with no significant FDG uptake, less likely metastases; no enlarged hypermetabolic mediastinal, hilar, or axillary lymph nodes, decreased metabolic activity of intraosseous lesion in spine and pelvis; no new bone lesions    -Patient now getting treatment with paclitaxel, Avastin and atezolizumab.        Interval History:  Reports he is tolerating treatment well denies fever chills sweats cough shortness of breath chest pain nausea vomiting diarrhea abdominal pain bleeding.  Reports good energy and appetite   patient reports he is feeling well.         Review of Systems:  14 point ROS of systems including Constitutional, Eyes, Respiratory, Cardiovascular, Gastroenterology, Genitourinary, Integumentary, Muscularskeletal, Psychiatric were all negative except for pertinent positives noted in my HPI.    Physical Examination:  Physical Exam  HENT:      Head: Normocephalic.      Nose: Nose normal.      Mouth/Throat:      Mouth: Mucous membranes are moist.   Eyes:      Pupils: Pupils are equal, round, and reactive to light.   Cardiovascular:      Rate and Rhythm: Normal rate.   Pulmonary:      Effort: Pulmonary effort is normal.   Musculoskeletal:         General: Normal range of motion.      Cervical back: Normal range of motion.   Skin:     General: Skin is warm.   Neurological:      General: No focal deficit present.      Mental  Status: He is alert.   Psychiatric:         Mood and Affect: Mood normal.       Laboratory Data:  CBC CMP results reviewed    Assessment and Plan:      This is a 61-year-old male with    metastatic non-small cell lung carcinoma  Metastatic to lymph nodes bones in bilateral lungs  Brain MRI negative for mets  Patient met with Dr. John who recommended treatment with palliative intent with paclitaxel carboplatin Avastin and atezolizumab-treatment to start on 04/27  Due to pancytopenia carboplatin discontinued with cycle 5  Patient now getting palliative treatment with paclitaxel, Avastin and atezolizumab.  07/2021-PET scan reveals partial response  Per Dr. John's note continue with the same treatment for now plan for PET scan in 10/2021  Labs reviewed abnormalities discussed okay to proceed with treatment    Anemia  Patient denies bleeding  Overall stable hemoglobin  Continue to monitor    Thrombocytopenia secondary to treatment  Patient denies bleeding  Patient advised to go to ER in event of bleeding fall or injury    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  01/2021-scope done by ENT no evidence of recurrence  Continue close follow-up by ENT  Patient has dysphonia and some dysphagia       Hypertension  Asymptomatic  Follow-up with primary care physician      Patient is advised to call our clinic or go to ER in the event of fever chills sweats cough shortness of breath chest pain nausea vomiting diarrhea abdominal pain bleeding edema or any changes in health    MADHAVI Moran [facility]     Chart documentation with Dragon Voice recognition Software. Although reviewed after completion, some words and grammatical errors may remain.

## 2021-08-11 NOTE — LETTER
"    8/11/2021         RE: Kt Rasmussen  [id number]  [facility] [address]        Dear Colleague,    Thank you for referring your patient, Kt Rasmussen, to the [facility]. Please see a copy of my visit note below.    Oncology Rooming Note    August 11, 2021 10:41 AM   Kt Rasmussen is a 61 year old male who presents for:    Chief Complaint   Patient presents with     Oncology Clinic Visit     Initial Vitals: Wt 80.3 kg (177 lb)   BMI 22.12 kg/m   Estimated body mass index is 22.12 kg/m  as calculated from the following:    Height as of 7/22/21: 1.905 m (6' 3\").    Weight as of this encounter: 80.3 kg (177 lb). Body surface area is 2.06 meters squared.  No Pain (0) Comment: Data Unavailable   No LMP for male patient.  Allergies reviewed: Yes  Medications reviewed: Yes    Medications: Medication refills not needed today.  Pharmacy name entered into EPIC:    [place] [address] [facility] [place], [place] - [address] [id number] - [place], [place] - [address] [facility] [address]    Clinical concerns:  NP was notified.      Julisa Whelan CMA                Oncology/Hematology Visit Note  Aug 11, 2021    Reason for Visit: follow up of metastatic non-small cell lung carcinoma  Metastatic to lymph nodes bones in bilateral lungs  Brain MRI negative for mets    Patient met with Dr. John who recommended treatment with palliative intent with paclitaxel carboplatin Avastin and atezolizumab-treatment started on April 27, 2021  -Due to thrombocytopenia carboplatin AUC reduced to 4 with cycle 2  Due to pancytopenia carboplatin discontinued with cycle 5    7/12/2021: PET/CT showed resolved mural nodules with increased cystic cavity in left lower lobe with no significant FDG uptake, less likely metastases; no enlarged hypermetabolic mediastinal, hilar, or axillary lymph nodes, " decreased metabolic activity of intraosseous lesion in spine and pelvis; no new bone lesions    -Patient now getting treatment with paclitaxel, Avastin and atezolizumab.        Interval History:  Reports he is tolerating treatment well denies fever chills sweats cough shortness of breath chest pain nausea vomiting diarrhea abdominal pain bleeding.  Reports good energy and appetite   patient reports he is feeling well.         Review of Systems:  14 point ROS of systems including Constitutional, Eyes, Respiratory, Cardiovascular, Gastroenterology, Genitourinary, Integumentary, Muscularskeletal, Psychiatric were all negative except for pertinent positives noted in my HPI.    Physical Examination:  Physical Exam  HENT:      Head: Normocephalic.      Nose: Nose normal.      Mouth/Throat:      Mouth: Mucous membranes are moist.   Eyes:      Pupils: Pupils are equal, round, and reactive to light.   Cardiovascular:      Rate and Rhythm: Normal rate.   Pulmonary:      Effort: Pulmonary effort is normal.   Musculoskeletal:         General: Normal range of motion.      Cervical back: Normal range of motion.   Skin:     General: Skin is warm.   Neurological:      General: No focal deficit present.      Mental Status: He is alert.   Psychiatric:         Mood and Affect: Mood normal.       Laboratory Data:  CBC CMP results reviewed    Assessment and Plan:      This is a 61-year-old male with    metastatic non-small cell lung carcinoma  Metastatic to lymph nodes bones in bilateral lungs  Brain MRI negative for mets  Patient met with Dr. John who recommended treatment with palliative intent with paclitaxel carboplatin Avastin and atezolizumab-treatment to start on 04/27  Due to pancytopenia carboplatin discontinued with cycle 5  Patient now getting palliative treatment with paclitaxel, Avastin and atezolizumab.  07/2021-PET scan reveals partial response  Per Dr. John's note continue with the same treatment for now plan for PET  scan in 10/2021  Labs reviewed abnormalities discussed okay to proceed with treatment    Anemia  Patient denies bleeding  Overall stable hemoglobin  Continue to monitor    Thrombocytopenia secondary to treatment  Patient denies bleeding  Patient advised to go to ER in event of bleeding fall or injury    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  01/2021-scope done by ENT no evidence of recurrence  Continue close follow-up by ENT  Patient has dysphonia and some dysphagia       Hypertension  Asymptomatic  Follow-up with primary care physician      Patient is advised to call our clinic or go to ER in the event of fever chills sweats cough shortness of breath chest pain nausea vomiting diarrhea abdominal pain bleeding edema or any changes in health    MADHAVI Moran CNP  [facility]     Chart documentation with Dragon Voice recognition Software. Although reviewed after completion, some words and grammatical errors may remain.            Again, thank you for allowing me to participate in the care of your patient.        Sincerely,        MADHAVI Moran CNP

## 2021-08-11 NOTE — PROGRESS NOTES
Infusion Nursing Note:  Kt HERNANDEZ Rasmussen presents today for C6D1 zirabev, taxol and tecentriq   Patient seen by provider today: Yes: RUSSEL Martinez   present during visit today: Not Applicable.    Note: N/A.      Intravenous Access:  Implanted Port.    Treatment Conditions:  Lab Results   Component Value Date    HGB 10.8 08/10/2021    HGB 10.5 07/08/2021     Lab Results   Component Value Date    WBC 7.7 08/10/2021    WBC 11.1 07/08/2021      Lab Results   Component Value Date    ANEU 7.7 07/08/2021     Lab Results   Component Value Date     08/10/2021    PLT 65 07/08/2021      Lab Results   Component Value Date     08/10/2021     06/29/2021                   Lab Results   Component Value Date    POTASSIUM 4.2 08/10/2021    POTASSIUM 4.3 06/29/2021           Lab Results   Component Value Date    MAG 1.9 10/13/2016            Lab Results   Component Value Date    CR 0.79 08/10/2021    CR 0.84 06/29/2021                   Lab Results   Component Value Date    BEVERLY 9.1 08/10/2021    BEVERLY 8.7 06/29/2021                Lab Results   Component Value Date    BILITOTAL 0.4 08/10/2021    BILITOTAL 0.3 06/29/2021           Lab Results   Component Value Date    ALBUMIN 3.7 08/10/2021    ALBUMIN 3.5 06/29/2021                    Lab Results   Component Value Date    ALT 29 08/10/2021    ALT 34 06/29/2021           Lab Results   Component Value Date    AST 20 08/10/2021    AST 26 06/29/2021       Results reviewed, labs MET treatment parameters, ok to proceed with treatment.  Urine negative.      Post Infusion Assessment:  Patient tolerated infusion without incident.  Blood return noted pre and post infusion.  Site patent and intact, free from redness, edema or discomfort.  No evidence of extravasations.  Access discontinued per protocol.       Discharge Plan:   AVS to patient via MYCHART.  Patient will return as [facility] for next appointment.   Patient discharged in stable condition accompanied by:  self.  Departure Mode: Ambulatory.      Guzman Patel RN

## 2021-08-11 NOTE — PROGRESS NOTES
"Oncology Rooming Note    August 11, 2021 10:41 AM   Kt Rasmussen is a 61 year old male who presents for:    Chief Complaint   Patient presents with     Oncology Clinic Visit     Initial Vitals: Wt 80.3 kg (177 lb)   BMI 22.12 kg/m   Estimated body mass index is 22.12 kg/m  as calculated from the following:    Height as of 7/22/21: 1.905 m (6' 3\").    Weight as of this encounter: 80.3 kg (177 lb). Body surface area is 2.06 meters squared.  No Pain (0) Comment: Data Unavailable   No LMP for male patient.  Allergies reviewed: Yes  Medications reviewed: Yes    Medications: Medication refills not needed today.  Pharmacy name entered into EPIC:    [facility] [place] [facility] [facility] [place] [address] [facility] [place], [place] - [address] [id number] - [facility] [address] AT [facility] & [facility] 7    Clinical concerns:  NP was notified.      Julisa Whelan CMA            "

## 2021-08-12 ENCOUNTER — INFUSION THERAPY VISIT (OUTPATIENT)
Dept: INFUSION THERAPY | Facility: CLINIC | Age: 61
End: 2021-08-12
Attending: INTERNAL MEDICINE
Payer: MEDICARE

## 2021-08-12 VITALS
OXYGEN SATURATION: 99 % | SYSTOLIC BLOOD PRESSURE: 127 MMHG | HEART RATE: 78 BPM | TEMPERATURE: 97.7 F | DIASTOLIC BLOOD PRESSURE: 62 MMHG | RESPIRATION RATE: 16 BRPM

## 2021-08-12 DIAGNOSIS — Z51.11 ENCOUNTER FOR ANTINEOPLASTIC CHEMOTHERAPY: ICD-10-CM

## 2021-08-12 DIAGNOSIS — C79.51 NON-SMALL CELL LUNG CANCER METASTATIC TO BONE (H): ICD-10-CM

## 2021-08-12 DIAGNOSIS — D70.1 CHEMOTHERAPY-INDUCED NEUTROPENIA (H): Primary | ICD-10-CM

## 2021-08-12 DIAGNOSIS — T45.1X5A CHEMOTHERAPY-INDUCED NEUTROPENIA (H): Primary | ICD-10-CM

## 2021-08-12 DIAGNOSIS — C34.90 NON-SMALL CELL LUNG CANCER METASTATIC TO BONE (H): ICD-10-CM

## 2021-08-12 PROCEDURE — 250N000011 HC RX IP 250 OP 636: Performed by: NURSE PRACTITIONER

## 2021-08-12 PROCEDURE — 96372 THER/PROPH/DIAG INJ SC/IM: CPT | Performed by: NURSE PRACTITIONER

## 2021-08-12 RX ADMIN — PEGFILGRASTIM 6 MG: 6 INJECTION SUBCUTANEOUS at 14:16

## 2021-08-12 ASSESSMENT — PAIN SCALES - GENERAL: PAINLEVEL: NO PAIN (0)

## 2021-08-12 NOTE — PROGRESS NOTES
Infusion Nursing Note:  Kt Rasmussen presents today for neulasta.    Patient seen by provider today: No   present during visit today: Not Applicable.    Note: N/A.      Intravenous Access:  No Intravenous access/labs at this visit.    Treatment Conditions:  Not Applicable.      Post Infusion Assessment:  Patient tolerated injection without incident.  Site patent and intact, free from redness, edema or discomfort.       Discharge Plan:   Discharge instructions reviewed with: Patient.  Patient and/or family verbalized understanding of discharge instructions and all questions answered.  Patient discharged in stable condition accompanied by: self.  Departure Mode: Ambulatory with personal cane.      Magdalena Davila RN

## 2021-08-31 ENCOUNTER — LAB (OUTPATIENT)
Dept: INFUSION THERAPY | Facility: CLINIC | Age: 61
End: 2021-08-31
Attending: INTERNAL MEDICINE
Payer: MEDICARE

## 2021-08-31 DIAGNOSIS — C34.90 NON-SMALL CELL CARCINOMA OF LUNG, STAGE 3, UNSPECIFIED LATERALITY (H): ICD-10-CM

## 2021-08-31 DIAGNOSIS — T45.1X5A CHEMOTHERAPY-INDUCED NEUTROPENIA (H): Primary | ICD-10-CM

## 2021-08-31 DIAGNOSIS — D70.1 CHEMOTHERAPY-INDUCED NEUTROPENIA (H): Primary | ICD-10-CM

## 2021-08-31 DIAGNOSIS — Z51.11 ENCOUNTER FOR ANTINEOPLASTIC CHEMOTHERAPY: ICD-10-CM

## 2021-08-31 DIAGNOSIS — C34.90 NON-SMALL CELL LUNG CANCER METASTATIC TO BONE (H): ICD-10-CM

## 2021-08-31 DIAGNOSIS — C79.51 NON-SMALL CELL LUNG CANCER METASTATIC TO BONE (H): ICD-10-CM

## 2021-08-31 LAB
ALBUMIN SERPL-MCNC: 3.6 G/DL (ref 3.4–5)
ALP SERPL-CCNC: 84 U/L (ref 40–150)
ALT SERPL W P-5'-P-CCNC: 30 U/L (ref 0–70)
ANION GAP SERPL CALCULATED.3IONS-SCNC: 3 MMOL/L (ref 3–14)
AST SERPL W P-5'-P-CCNC: 23 U/L (ref 0–45)
BILIRUB SERPL-MCNC: 0.3 MG/DL (ref 0.2–1.3)
BUN SERPL-MCNC: 18 MG/DL (ref 7–30)
CALCIUM SERPL-MCNC: 9.1 MG/DL (ref 8.5–10.1)
CHLORIDE BLD-SCNC: 111 MMOL/L (ref 94–109)
CO2 SERPL-SCNC: 26 MMOL/L (ref 20–32)
CREAT SERPL-MCNC: 0.71 MG/DL (ref 0.66–1.25)
GFR SERPL CREATININE-BSD FRML MDRD: >90 ML/MIN/1.73M2
GLUCOSE BLD-MCNC: 87 MG/DL (ref 70–99)
POTASSIUM BLD-SCNC: 4.2 MMOL/L (ref 3.4–5.3)
PROT SERPL-MCNC: 7 G/DL (ref 6.8–8.8)
SODIUM SERPL-SCNC: 140 MMOL/L (ref 133–144)
TSH SERPL DL<=0.005 MIU/L-ACNC: 2.29 MU/L (ref 0.4–4)

## 2021-08-31 PROCEDURE — 250N000011 HC RX IP 250 OP 636: Performed by: INTERNAL MEDICINE

## 2021-08-31 PROCEDURE — 80053 COMPREHEN METABOLIC PANEL: CPT | Performed by: INTERNAL MEDICINE

## 2021-08-31 PROCEDURE — 84443 ASSAY THYROID STIM HORMONE: CPT | Performed by: INTERNAL MEDICINE

## 2021-08-31 PROCEDURE — 36591 DRAW BLOOD OFF VENOUS DEVICE: CPT

## 2021-08-31 PROCEDURE — 81003 URINALYSIS AUTO W/O SCOPE: CPT | Performed by: INTERNAL MEDICINE

## 2021-08-31 RX ORDER — NALOXONE HYDROCHLORIDE 0.4 MG/ML
.1-.4 INJECTION, SOLUTION INTRAMUSCULAR; INTRAVENOUS; SUBCUTANEOUS
Status: CANCELLED | OUTPATIENT
Start: 2021-09-01

## 2021-08-31 RX ORDER — MEPERIDINE HYDROCHLORIDE 25 MG/ML
25 INJECTION INTRAMUSCULAR; INTRAVENOUS; SUBCUTANEOUS EVERY 30 MIN PRN
Status: CANCELLED | OUTPATIENT
Start: 2021-09-01

## 2021-08-31 RX ORDER — HEPARIN SODIUM (PORCINE) LOCK FLUSH IV SOLN 100 UNIT/ML 100 UNIT/ML
5 SOLUTION INTRAVENOUS
Status: CANCELLED | OUTPATIENT
Start: 2021-09-01

## 2021-08-31 RX ORDER — HEPARIN SODIUM (PORCINE) LOCK FLUSH IV SOLN 100 UNIT/ML 100 UNIT/ML
5 SOLUTION INTRAVENOUS
Status: DISCONTINUED | OUTPATIENT
Start: 2021-08-31 | End: 2021-08-31 | Stop reason: HOSPADM

## 2021-08-31 RX ORDER — HEPARIN SODIUM,PORCINE 10 UNIT/ML
5 VIAL (ML) INTRAVENOUS
Status: CANCELLED | OUTPATIENT
Start: 2021-08-31

## 2021-08-31 RX ORDER — EPINEPHRINE 1 MG/ML
0.3 INJECTION, SOLUTION INTRAMUSCULAR; SUBCUTANEOUS EVERY 5 MIN PRN
Status: CANCELLED | OUTPATIENT
Start: 2021-09-01

## 2021-08-31 RX ORDER — ALBUTEROL SULFATE 0.83 MG/ML
2.5 SOLUTION RESPIRATORY (INHALATION)
Status: CANCELLED | OUTPATIENT
Start: 2021-09-01

## 2021-08-31 RX ORDER — HEPARIN SODIUM,PORCINE 10 UNIT/ML
5 VIAL (ML) INTRAVENOUS
Status: CANCELLED | OUTPATIENT
Start: 2021-09-01

## 2021-08-31 RX ORDER — ALBUTEROL SULFATE 90 UG/1
1-2 AEROSOL, METERED RESPIRATORY (INHALATION)
Status: CANCELLED
Start: 2021-09-01

## 2021-08-31 RX ORDER — LORAZEPAM 2 MG/ML
0.5 INJECTION INTRAMUSCULAR EVERY 4 HOURS PRN
Status: CANCELLED
Start: 2021-09-01

## 2021-08-31 RX ORDER — DIPHENHYDRAMINE HYDROCHLORIDE 50 MG/ML
50 INJECTION INTRAMUSCULAR; INTRAVENOUS
Status: CANCELLED
Start: 2021-09-01

## 2021-08-31 RX ORDER — SODIUM CHLORIDE 9 MG/ML
1000 INJECTION, SOLUTION INTRAVENOUS CONTINUOUS PRN
Status: CANCELLED
Start: 2021-09-01

## 2021-08-31 RX ORDER — HEPARIN SODIUM (PORCINE) LOCK FLUSH IV SOLN 100 UNIT/ML 100 UNIT/ML
5 SOLUTION INTRAVENOUS
Status: CANCELLED | OUTPATIENT
Start: 2021-08-31

## 2021-08-31 RX ORDER — METHYLPREDNISOLONE SODIUM SUCCINATE 125 MG/2ML
125 INJECTION, POWDER, LYOPHILIZED, FOR SOLUTION INTRAMUSCULAR; INTRAVENOUS
Status: CANCELLED
Start: 2021-09-01

## 2021-08-31 RX ADMIN — Medication 5 ML: at 10:00

## 2021-08-31 NOTE — PROGRESS NOTES
Nursing Note:  Kt Rasmussen presents today for port labs.    Patient seen by provider today: No   present during visit today: Not Applicable.    Note: N/A.    Intravenous Access:  Implanted Port.    Discharge Plan:   Patient was sent home for tomorrow's appointment.    Arin Keith RN

## 2021-09-01 ENCOUNTER — INFUSION THERAPY VISIT (OUTPATIENT)
Dept: INFUSION THERAPY | Facility: CLINIC | Age: 61
End: 2021-09-01
Attending: INTERNAL MEDICINE
Payer: MEDICARE

## 2021-09-01 ENCOUNTER — ONCOLOGY VISIT (OUTPATIENT)
Dept: ONCOLOGY | Facility: CLINIC | Age: 61
End: 2021-09-01
Attending: INTERNAL MEDICINE
Payer: MEDICARE

## 2021-09-01 VITALS — BODY MASS INDEX: 21.32 KG/M2 | WEIGHT: 175.04 LBS | HEIGHT: 76 IN

## 2021-09-01 VITALS
SYSTOLIC BLOOD PRESSURE: 128 MMHG | HEART RATE: 86 BPM | DIASTOLIC BLOOD PRESSURE: 85 MMHG | WEIGHT: 175 LBS | RESPIRATION RATE: 14 BRPM | TEMPERATURE: 97.6 F | BODY MASS INDEX: 21.87 KG/M2 | OXYGEN SATURATION: 99 %

## 2021-09-01 DIAGNOSIS — C79.51 NON-SMALL CELL LUNG CANCER METASTATIC TO BONE (H): ICD-10-CM

## 2021-09-01 DIAGNOSIS — C34.90 NON-SMALL CELL CARCINOMA OF LUNG, STAGE 3, UNSPECIFIED LATERALITY (H): Primary | ICD-10-CM

## 2021-09-01 DIAGNOSIS — D70.1 CHEMOTHERAPY-INDUCED NEUTROPENIA (H): ICD-10-CM

## 2021-09-01 DIAGNOSIS — C34.90 NON-SMALL CELL LUNG CANCER METASTATIC TO BONE (H): ICD-10-CM

## 2021-09-01 DIAGNOSIS — T45.1X5A CHEMOTHERAPY-INDUCED NEUTROPENIA (H): ICD-10-CM

## 2021-09-01 DIAGNOSIS — Z51.11 ENCOUNTER FOR ANTINEOPLASTIC CHEMOTHERAPY: Primary | ICD-10-CM

## 2021-09-01 LAB
ALBUMIN UR-MCNC: NEGATIVE MG/DL
BASOPHILS # BLD AUTO: 0 10E3/UL (ref 0–0.2)
BASOPHILS NFR BLD AUTO: 0 %
EOSINOPHIL # BLD AUTO: 0.1 10E3/UL (ref 0–0.7)
EOSINOPHIL NFR BLD AUTO: 1 %
ERYTHROCYTE [DISTWIDTH] IN BLOOD BY AUTOMATED COUNT: 15.8 % (ref 10–15)
HCT VFR BLD AUTO: 34.1 % (ref 40–53)
HGB BLD-MCNC: 10.6 G/DL (ref 13.3–17.7)
IMM GRANULOCYTES # BLD: 0 10E3/UL
IMM GRANULOCYTES NFR BLD: 0 %
LYMPHOCYTES # BLD AUTO: 1.4 10E3/UL (ref 0.8–5.3)
LYMPHOCYTES NFR BLD AUTO: 20 %
MCH RBC QN AUTO: 29 PG (ref 26.5–33)
MCHC RBC AUTO-ENTMCNC: 31.1 G/DL (ref 31.5–36.5)
MCV RBC AUTO: 93 FL (ref 78–100)
MONOCYTES # BLD AUTO: 0.6 10E3/UL (ref 0–1.3)
MONOCYTES NFR BLD AUTO: 8 %
NEUTROPHILS # BLD AUTO: 5 10E3/UL (ref 1.6–8.3)
NEUTROPHILS NFR BLD AUTO: 71 %
NRBC # BLD AUTO: 0 10E3/UL
NRBC BLD AUTO-RTO: 0 /100
PLATELET # BLD AUTO: 132 10E3/UL (ref 150–450)
RBC # BLD AUTO: 3.65 10E6/UL (ref 4.4–5.9)
WBC # BLD AUTO: 7 10E3/UL (ref 4–11)

## 2021-09-01 PROCEDURE — 85025 COMPLETE CBC W/AUTO DIFF WBC: CPT | Performed by: NURSE PRACTITIONER

## 2021-09-01 PROCEDURE — 258N000003 HC RX IP 258 OP 636: Performed by: NURSE PRACTITIONER

## 2021-09-01 PROCEDURE — 96417 CHEMO IV INFUS EACH ADDL SEQ: CPT

## 2021-09-01 PROCEDURE — 96367 TX/PROPH/DG ADDL SEQ IV INF: CPT

## 2021-09-01 PROCEDURE — 96413 CHEMO IV INFUSION 1 HR: CPT

## 2021-09-01 PROCEDURE — 96415 CHEMO IV INFUSION ADDL HR: CPT

## 2021-09-01 PROCEDURE — 99214 OFFICE O/P EST MOD 30 MIN: CPT | Performed by: NURSE PRACTITIONER

## 2021-09-01 PROCEDURE — 250N000011 HC RX IP 250 OP 636: Performed by: NURSE PRACTITIONER

## 2021-09-01 PROCEDURE — G0463 HOSPITAL OUTPT CLINIC VISIT: HCPCS | Mod: 25

## 2021-09-01 RX ORDER — HEPARIN SODIUM (PORCINE) LOCK FLUSH IV SOLN 100 UNIT/ML 100 UNIT/ML
5 SOLUTION INTRAVENOUS
Status: DISCONTINUED | OUTPATIENT
Start: 2021-09-01 | End: 2021-09-01 | Stop reason: HOSPADM

## 2021-09-01 RX ADMIN — Medication 5 ML: at 15:30

## 2021-09-01 RX ADMIN — SODIUM CHLORIDE 1300 MG: 9 INJECTION, SOLUTION INTRAVENOUS at 10:43

## 2021-09-01 RX ADMIN — PACLITAXEL 432 MG: 6 INJECTION, SOLUTION INTRAVENOUS at 11:57

## 2021-09-01 RX ADMIN — SODIUM CHLORIDE 250 ML: 9 INJECTION, SOLUTION INTRAVENOUS at 10:43

## 2021-09-01 RX ADMIN — ATEZOLIZUMAB 1200 MG: 1200 INJECTION, SOLUTION INTRAVENOUS at 11:21

## 2021-09-01 ASSESSMENT — PAIN SCALES - GENERAL: PAINLEVEL: NO PAIN (0)

## 2021-09-01 ASSESSMENT — MIFFLIN-ST. JEOR: SCORE: 1687.75

## 2021-09-01 NOTE — PROGRESS NOTES
Infusion Nursing Note:  Kt Rasmussen presents today for C7D1 Zirabev, Taxol, Tecentriq.    Patient seen by provider today: Yes: Juan Rausch NP   present during visit today: Not Applicable.    Note: N/A.      Intravenous Access:  Implanted Port.    Treatment Conditions:  Lab Results   Component Value Date    HGB 10.6 (L) 09/01/2021    WBC 7.0 09/01/2021    ANEU 7.7 07/08/2021    ANEUTAUTO 5.0 09/01/2021     (L) 09/01/2021      Lab Results   Component Value Date     08/31/2021    POTASSIUM 4.2 08/31/2021    MAG 1.9 10/13/2016    CR 0.71 08/31/2021    BEVERLY 9.1 08/31/2021    BILITOTAL 0.3 08/31/2021    ALBUMIN 3.6 08/31/2021    ALT 30 08/31/2021    AST 23 08/31/2021     Results reviewed, labs MET treatment parameters, ok to proceed with treatment.  Urine protein negative.  BSA 2.06.      Post Infusion Assessment:  Patient tolerated infusion without incident.  Blood return noted pre and post infusion.  Site patent and intact, free from redness, edema or discomfort.  No evidence of extravasations.  Access discontinued per protocol.       Discharge Plan:   Discharge instructions reviewed with: Patient.  Patient and/or family verbalized understanding of discharge instructions and all questions answered.  Copy of AVS reviewed with patient and/or family.  Patient will return tomorrow for next appointment.  Patient discharged in stable condition accompanied by: self.  Departure Mode: Ambulatory with cane.      Olga Rahman RN

## 2021-09-01 NOTE — PROGRESS NOTES
Oncology/Hematology Visit Note  Sep 1, 2021    Reason for Visit: follow up of metastatic non-small cell lung carcinoma  Metastatic to lymph nodes bones in bilateral lungs  Brain MRI negative for mets    Patient met with Dr. Jhon who recommended treatment with palliative intent with paclitaxel carboplatin Avastin and atezolizumab-treatment started on April 27, 2021  -Due to thrombocytopenia carboplatin AUC reduced to 4 with cycle 2  Due to pancytopenia carboplatin discontinued with cycle 5    7/12/2021: PET/CT showed resolved mural nodules with increased cystic cavity in left lower lobe with no significant FDG uptake, less likely metastases; no enlarged hypermetabolic mediastinal, hilar, or axillary lymph nodes, decreased metabolic activity of intraosseous lesion in spine and pelvis; no new bone lesions    -Patient now getting treatment with paclitaxel, Avastin and atezolizumab.        Interval History:  Patient reports he is feeling well.  Reports he has been tolerating treatment well denies any side effects from the treatment   he denies fever chills sweats cough shortness of breath nausea vomiting diarrhea abdominal pain bleeding      Review of Systems:  14 point ROS of systems including Constitutional, Eyes, Respiratory, Cardiovascular, Gastroenterology, Genitourinary, Integumentary, Muscularskeletal, Psychiatric were all negative except for pertinent positives noted in my HPI.    Physical Examination:    Vital Signs 9/1/2021   Systolic 128   Diastolic 85   Pulse 86   Temperature 97.6   Respirations 14   Weight (LB) 175 lb       Physical Exam  HENT:      Head: Normocephalic.      Nose: Nose normal.      Mouth/Throat:      Mouth: Mucous membranes are moist.   Eyes:      Pupils: Pupils are equal, round, and reactive to light.   Cardiovascular:      Rate and Rhythm: Normal rate.   Pulmonary:      Effort: Pulmonary effort is normal.   Musculoskeletal:         General: Normal range of motion.      Cervical back:  Normal range of motion.   Skin:     General: Skin is warm.   Neurological:      General: No focal deficit present.      Mental Status: He is alert.   Psychiatric:         Mood and Affect: Mood normal.       Laboratory Data:  CBC CMP results reviewed    Assessment and Plan:      This is a 61-year-old male with    metastatic non-small cell lung carcinoma  Metastatic to lymph nodes bones in bilateral lungs  Brain MRI negative for mets  Patient met with Dr. John who recommended treatment with palliative intent with paclitaxel carboplatin Avastin and atezolizumab-treatment to start on 04/27  Due to pancytopenia carboplatin discontinued with cycle 5  Patient now getting palliative treatment with paclitaxel, Avastin and atezolizumab.  07/2021-PET scan reveals partial response  Per Dr. 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 Although reviewed after completion, some words and grammatical errors may remain.

## 2021-09-01 NOTE — PROGRESS NOTES
"Oncology Rooming Note    September 1, 2021 8:49 AM   Kt Rasmussen is a 61 year old male who presents for:    Chief Complaint   Patient presents with     Oncology Clinic Visit     Initial Vitals: /85   Pulse 86   Temp 97.6  F (36.4  C) (Oral)   Resp 14   Wt 79.4 kg (175 lb)   SpO2 99%   BMI 21.87 kg/m   Estimated body mass index is 21.87 kg/m  as calculated from the following:    Height as of 7/22/21: 1.905 m (6' 3\").    Weight as of this encounter: 79.4 kg (175 lb). Body surface area is 2.05 meters squared.  No Pain (0) Comment: Data Unavailable   No LMP for male patient.  Allergies reviewed: Yes  Medications reviewed: Yes    Medications: Medication refills not needed today.  Pharmacy name entered into EPIC:    [place] - [address] [facility], [place] - [address] [id number] - [place], [place] - [address] AT [facility] 7    Clinical concerns: no       Shari J. Schoenberger, CMA            "

## 2021-09-01 NOTE — LETTER
"    9/1/2021         RE: Kt Rasmussen  [id number]  [place] [address]        Dear Colleague,    Thank you for referring your patient, Kt Rasmussen, to the [facility]. Please see a copy of my visit note below.    Oncology Rooming Note    September 1, 2021 8:49 AM   Kt Rasmussen is a 61 year old male who presents for:    Chief Complaint   Patient presents with     Oncology Clinic Visit     Initial Vitals: /85   Pulse 86   Temp 97.6  F (36.4  C) (Oral)   Resp 14   Wt 79.4 kg (175 lb)   SpO2 99%   BMI 21.87 kg/m   Estimated body mass index is 21.87 kg/m  as calculated from the following:    Height as of 7/22/21: 1.905 m (6' 3\").    Weight as of this encounter: 79.4 kg (175 lb). Body surface area is 2.05 meters squared.  No Pain (0) Comment: Data Unavailable   No LMP for male patient.  Allergies reviewed: Yes  Medications reviewed: Yes    Medications: Medication refills not needed today.  Pharm[facility] entered into EPIC:    [facility] [place] [facility] [address] [facility], [place] - [address] [facility][id number] [place], [place] - [address] [facility]    Clinical concerns: no       Shari J. Schoenberger, CMA                Oncology/Hematology Visit Note  Sep 1, 2021    Reason for Visit: follow up of metastatic non-small cell lung carcinoma  Metastatic to lymph nodes bones in bilateral lungs  Brain MRI negative for mets    Patient met with Dr. John who recommended treatment with palliative intent with paclitaxel carboplatin Avastin and atezolizumab-treatment started on April 27, 2021  -Due to thrombocytopenia carboplatin AUC reduced to 4 with cycle 2  Due to pancytopenia carboplatin discontinued with cycle 5    7/12/2021: PET/CT showed resolved mural nodules with increased cystic cavity in left lower lobe with no significant FDG uptake, less likely metastases; no " enlarged hypermetabolic mediastinal, hilar, or axillary lymph nodes, decreased metabolic activity of intraosseous lesion in spine and pelvis; no new bone lesions    -Patient now getting treatment with paclitaxel, Avastin and atezolizumab.        Interval History:  Patient reports he is feeling well.  Reports he has been tolerating treatment well denies any side effects from the treatment   he denies fever chills sweats cough shortness of breath nausea vomiting diarrhea abdominal pain bleeding      Review of Systems:  14 point ROS of systems including Constitutional, Eyes, Respiratory, Cardiovascular, Gastroenterology, Genitourinary, Integumentary, Muscularskeletal, Psychiatric were all negative except for pertinent positives noted in my HPI.    Physical Examination:    Vital Signs 9/1/2021   Systolic 128   Diastolic 85   Pulse 86   Temperature 97.6   Respirations 14   Weight (LB) 175 lb       Physical Exam  HENT:      Head: Normocephalic.      Nose: Nose normal.      Mouth/Throat:      Mouth: Mucous membranes are moist.   Eyes:      Pupils: Pupils are equal, round, and reactive to light.   Cardiovascular:      Rate and Rhythm: Normal rate.   Pulmonary:      Effort: Pulmonary effort is normal.   Musculoskeletal:         General: Normal range of motion.      Cervical back: Normal range of motion.   Skin:     General: Skin is warm.   Neurological:      General: No focal deficit present.      Mental Status: He is alert.   Psychiatric:         Mood and Affect: Mood normal.       Laboratory Data:  CBC CMP results reviewed    Assessment and Plan:      This is a 61-year-old male with    metastatic non-small cell lung carcinoma  Metastatic to lymph nodes bones in bilateral lungs  Brain MRI negative for mets  Patient met with Dr. John who recommended treatment with palliative intent with paclitaxel carboplatin Avastin and atezolizumab-treatment to start on 04/27  Due to pancytopenia carboplatin discontinued with cycle  5  Patient now getting palliative treatment with paclitaxel, Avastin and atezolizumab.  07/2021-PET scan reveals partial response  Per Dr. 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 Although reviewed after completion, some words and grammatical errors may remain.            Again, thank you for allowing me to participate in the care of your patient.        Sincerely,        MADHAVI Moran CNP

## 2021-09-02 ENCOUNTER — INFUSION THERAPY VISIT (OUTPATIENT)
Dept: INFUSION THERAPY | Facility: CLINIC | Age: 61
End: 2021-09-02
Attending: INTERNAL MEDICINE
Payer: MEDICARE

## 2021-09-02 VITALS
SYSTOLIC BLOOD PRESSURE: 135 MMHG | DIASTOLIC BLOOD PRESSURE: 74 MMHG | OXYGEN SATURATION: 100 % | RESPIRATION RATE: 16 BRPM | HEART RATE: 84 BPM | TEMPERATURE: 97.8 F

## 2021-09-02 DIAGNOSIS — C34.90 NON-SMALL CELL LUNG CANCER METASTATIC TO BONE (H): ICD-10-CM

## 2021-09-02 DIAGNOSIS — D70.1 CHEMOTHERAPY-INDUCED NEUTROPENIA (H): Primary | ICD-10-CM

## 2021-09-02 DIAGNOSIS — T45.1X5A CHEMOTHERAPY-INDUCED NEUTROPENIA (H): Primary | ICD-10-CM

## 2021-09-02 DIAGNOSIS — C79.51 NON-SMALL CELL LUNG CANCER METASTATIC TO BONE (H): ICD-10-CM

## 2021-09-02 DIAGNOSIS — Z51.11 ENCOUNTER FOR ANTINEOPLASTIC CHEMOTHERAPY: ICD-10-CM

## 2021-09-02 PROCEDURE — 96372 THER/PROPH/DIAG INJ SC/IM: CPT | Performed by: NURSE PRACTITIONER

## 2021-09-02 PROCEDURE — 250N000011 HC RX IP 250 OP 636: Performed by: NURSE PRACTITIONER

## 2021-09-02 RX ADMIN — PEGFILGRASTIM 6 MG: 6 INJECTION SUBCUTANEOUS at 14:03

## 2021-09-02 ASSESSMENT — PAIN SCALES - GENERAL: PAINLEVEL: NO PAIN (0)

## 2021-09-02 NOTE — PROGRESS NOTES
Infusion Nursing Note:  Kt Rasmussen presents today for Neulasta injection.    Patient seen by provider today: No   present during visit today: Not Applicable.    Note: N/A.      Intravenous Access:  No Intravenous access/labs at this visit.    Treatment Conditions:  Not Applicable.      Post Infusion Assessment:  Patient tolerated injection without incident.       Discharge Plan:   Discharge instructions reviewed with: Patient.  Patient and/or family verbalized understanding of discharge instructions and all questions answered.  Patient discharged in stable condition accompanied by: self.  Departure Mode: Ambulatory.      Olga Rahman RN

## 2021-09-14 ENCOUNTER — PATIENT OUTREACH (OUTPATIENT)
Dept: ONCOLOGY | Facility: CLINIC | Age: 61
End: 2021-09-14

## 2021-09-14 NOTE — PROGRESS NOTES
Writer received a call from Primary Care Team, Varsha JOSEPH, who  Would aida a return call to make sure when it is ok for setting up a 3rd COVID vaccine      Please call Varsha Lowery back at [phone]    Sujey Augustin RN

## 2021-09-14 NOTE — PROGRESS NOTES
Called Varsha back to inform her that he can receive the third booster covid shot . Stated that it would be best to give it early next week just prior to his next chemotherapy. Aylin Us RN,BSN,OCN

## 2021-09-21 ENCOUNTER — LAB (OUTPATIENT)
Dept: INFUSION THERAPY | Facility: CLINIC | Age: 61
End: 2021-09-21
Attending: INTERNAL MEDICINE
Payer: MEDICARE

## 2021-09-21 DIAGNOSIS — T45.1X5A CHEMOTHERAPY-INDUCED NEUTROPENIA (H): Primary | ICD-10-CM

## 2021-09-21 DIAGNOSIS — D70.1 CHEMOTHERAPY-INDUCED NEUTROPENIA (H): Primary | ICD-10-CM

## 2021-09-21 DIAGNOSIS — Z51.11 ENCOUNTER FOR ANTINEOPLASTIC CHEMOTHERAPY: ICD-10-CM

## 2021-09-21 DIAGNOSIS — C34.90 NON-SMALL CELL CARCINOMA OF LUNG, STAGE 3, UNSPECIFIED LATERALITY (H): ICD-10-CM

## 2021-09-21 DIAGNOSIS — C34.90 NON-SMALL CELL LUNG CANCER METASTATIC TO BONE (H): ICD-10-CM

## 2021-09-21 DIAGNOSIS — C79.51 NON-SMALL CELL LUNG CANCER METASTATIC TO BONE (H): ICD-10-CM

## 2021-09-21 LAB
ALBUMIN SERPL-MCNC: 3.4 G/DL (ref 3.4–5)
ALP SERPL-CCNC: 80 U/L (ref 40–150)
ALT SERPL W P-5'-P-CCNC: 24 U/L (ref 0–70)
ANION GAP SERPL CALCULATED.3IONS-SCNC: 4 MMOL/L (ref 3–14)
AST SERPL W P-5'-P-CCNC: 21 U/L (ref 0–45)
BASOPHILS # BLD AUTO: 0 10E3/UL (ref 0–0.2)
BASOPHILS NFR BLD AUTO: 0 %
BILIRUB SERPL-MCNC: 0.9 MG/DL (ref 0.2–1.3)
BUN SERPL-MCNC: 18 MG/DL (ref 7–30)
CALCIUM SERPL-MCNC: 8.8 MG/DL (ref 8.5–10.1)
CHLORIDE BLD-SCNC: 110 MMOL/L (ref 94–109)
CO2 SERPL-SCNC: 26 MMOL/L (ref 20–32)
CREAT SERPL-MCNC: 0.69 MG/DL (ref 0.66–1.25)
EOSINOPHIL # BLD AUTO: 0.2 10E3/UL (ref 0–0.7)
EOSINOPHIL NFR BLD AUTO: 3 %
ERYTHROCYTE [DISTWIDTH] IN BLOOD BY AUTOMATED COUNT: 15.5 % (ref 10–15)
GFR SERPL CREATININE-BSD FRML MDRD: >90 ML/MIN/1.73M2
GLUCOSE BLD-MCNC: 91 MG/DL (ref 70–99)
HCT VFR BLD AUTO: 34 % (ref 40–53)
HGB BLD-MCNC: 10.6 G/DL (ref 13.3–17.7)
IMM GRANULOCYTES # BLD: 0 10E3/UL
IMM GRANULOCYTES NFR BLD: 0 %
LYMPHOCYTES # BLD AUTO: 1.4 10E3/UL (ref 0.8–5.3)
LYMPHOCYTES NFR BLD AUTO: 22 %
MCH RBC QN AUTO: 29 PG (ref 26.5–33)
MCHC RBC AUTO-ENTMCNC: 31.2 G/DL (ref 31.5–36.5)
MCV RBC AUTO: 93 FL (ref 78–100)
MONOCYTES # BLD AUTO: 0.5 10E3/UL (ref 0–1.3)
MONOCYTES NFR BLD AUTO: 8 %
NEUTROPHILS # BLD AUTO: 4.1 10E3/UL (ref 1.6–8.3)
NEUTROPHILS NFR BLD AUTO: 67 %
NRBC # BLD AUTO: 0 10E3/UL
NRBC BLD AUTO-RTO: 0 /100
PLATELET # BLD AUTO: 159 10E3/UL (ref 150–450)
POTASSIUM BLD-SCNC: 4.5 MMOL/L (ref 3.4–5.3)
PROT SERPL-MCNC: 6.9 G/DL (ref 6.8–8.8)
RBC # BLD AUTO: 3.66 10E6/UL (ref 4.4–5.9)
SODIUM SERPL-SCNC: 140 MMOL/L (ref 133–144)
TSH SERPL DL<=0.005 MIU/L-ACNC: 2.26 MU/L (ref 0.4–4)
WBC # BLD AUTO: 6.1 10E3/UL (ref 4–11)

## 2021-09-21 PROCEDURE — 250N000011 HC RX IP 250 OP 636: Performed by: INTERNAL MEDICINE

## 2021-09-21 PROCEDURE — 84443 ASSAY THYROID STIM HORMONE: CPT | Performed by: INTERNAL MEDICINE

## 2021-09-21 PROCEDURE — 82040 ASSAY OF SERUM ALBUMIN: CPT | Performed by: INTERNAL MEDICINE

## 2021-09-21 PROCEDURE — 85004 AUTOMATED DIFF WBC COUNT: CPT | Performed by: INTERNAL MEDICINE

## 2021-09-21 RX ORDER — MEPERIDINE HYDROCHLORIDE 25 MG/ML
25 INJECTION INTRAMUSCULAR; INTRAVENOUS; SUBCUTANEOUS EVERY 30 MIN PRN
Status: CANCELLED | OUTPATIENT
Start: 2021-09-22

## 2021-09-21 RX ORDER — HEPARIN SODIUM,PORCINE 10 UNIT/ML
5 VIAL (ML) INTRAVENOUS
Status: CANCELLED | OUTPATIENT
Start: 2021-09-21

## 2021-09-21 RX ORDER — DIPHENHYDRAMINE HYDROCHLORIDE 50 MG/ML
50 INJECTION INTRAMUSCULAR; INTRAVENOUS
Status: CANCELLED
Start: 2021-09-22

## 2021-09-21 RX ORDER — HEPARIN SODIUM (PORCINE) LOCK FLUSH IV SOLN 100 UNIT/ML 100 UNIT/ML
5 SOLUTION INTRAVENOUS
Status: DISCONTINUED | OUTPATIENT
Start: 2021-09-21 | End: 2021-09-21 | Stop reason: HOSPADM

## 2021-09-21 RX ORDER — LORAZEPAM 2 MG/ML
0.5 INJECTION INTRAMUSCULAR EVERY 4 HOURS PRN
Status: CANCELLED
Start: 2021-09-22

## 2021-09-21 RX ORDER — SODIUM CHLORIDE 9 MG/ML
1000 INJECTION, SOLUTION INTRAVENOUS CONTINUOUS PRN
Status: CANCELLED
Start: 2021-09-22

## 2021-09-21 RX ORDER — HEPARIN SODIUM (PORCINE) LOCK FLUSH IV SOLN 100 UNIT/ML 100 UNIT/ML
5 SOLUTION INTRAVENOUS
Status: CANCELLED | OUTPATIENT
Start: 2021-09-22

## 2021-09-21 RX ORDER — EPINEPHRINE 1 MG/ML
0.3 INJECTION, SOLUTION INTRAMUSCULAR; SUBCUTANEOUS EVERY 5 MIN PRN
Status: CANCELLED | OUTPATIENT
Start: 2021-09-22

## 2021-09-21 RX ORDER — HEPARIN SODIUM,PORCINE 10 UNIT/ML
5 VIAL (ML) INTRAVENOUS
Status: CANCELLED | OUTPATIENT
Start: 2021-09-22

## 2021-09-21 RX ORDER — ALBUTEROL SULFATE 0.83 MG/ML
2.5 SOLUTION RESPIRATORY (INHALATION)
Status: CANCELLED | OUTPATIENT
Start: 2021-09-22

## 2021-09-21 RX ORDER — ALBUTEROL SULFATE 90 UG/1
1-2 AEROSOL, METERED RESPIRATORY (INHALATION)
Status: CANCELLED
Start: 2021-09-22

## 2021-09-21 RX ORDER — NALOXONE HYDROCHLORIDE 0.4 MG/ML
.1-.4 INJECTION, SOLUTION INTRAMUSCULAR; INTRAVENOUS; SUBCUTANEOUS
Status: CANCELLED | OUTPATIENT
Start: 2021-09-22

## 2021-09-21 RX ORDER — HEPARIN SODIUM (PORCINE) LOCK FLUSH IV SOLN 100 UNIT/ML 100 UNIT/ML
5 SOLUTION INTRAVENOUS
Status: CANCELLED | OUTPATIENT
Start: 2021-09-21

## 2021-09-21 RX ORDER — METHYLPREDNISOLONE SODIUM SUCCINATE 125 MG/2ML
125 INJECTION, POWDER, LYOPHILIZED, FOR SOLUTION INTRAMUSCULAR; INTRAVENOUS
Status: CANCELLED
Start: 2021-09-22

## 2021-09-21 RX ADMIN — Medication 5 ML: at 10:23

## 2021-09-21 NOTE — PROGRESS NOTES
Nursing Note:  Kt Rasmussen presents today for port labs+ urine.    Patient seen by provider today: No   present during visit today: Not Applicable.    Note: N/A.    Intravenous Access:  Labs drawn without difficulty.  Implanted Port.    Discharge Plan:   Patient was sent to home for next appointment 9/22/21.    Adore Day RN

## 2021-09-22 ENCOUNTER — INFUSION THERAPY VISIT (OUTPATIENT)
Dept: INFUSION THERAPY | Facility: CLINIC | Age: 61
End: 2021-09-22
Attending: INTERNAL MEDICINE
Payer: MEDICARE

## 2021-09-22 ENCOUNTER — ONCOLOGY VISIT (OUTPATIENT)
Dept: ONCOLOGY | Facility: CLINIC | Age: 61
End: 2021-09-22
Attending: INTERNAL MEDICINE
Payer: MEDICARE

## 2021-09-22 VITALS
RESPIRATION RATE: 18 BRPM | SYSTOLIC BLOOD PRESSURE: 122 MMHG | WEIGHT: 173.6 LBS | HEART RATE: 75 BPM | TEMPERATURE: 96.8 F | DIASTOLIC BLOOD PRESSURE: 83 MMHG | BODY MASS INDEX: 21.41 KG/M2 | OXYGEN SATURATION: 97 %

## 2021-09-22 DIAGNOSIS — C79.51 NON-SMALL CELL LUNG CANCER METASTATIC TO BONE (H): ICD-10-CM

## 2021-09-22 DIAGNOSIS — C34.90 NON-SMALL CELL CARCINOMA OF LUNG, STAGE 3, UNSPECIFIED LATERALITY (H): ICD-10-CM

## 2021-09-22 DIAGNOSIS — Z51.11 ENCOUNTER FOR ANTINEOPLASTIC CHEMOTHERAPY: Primary | ICD-10-CM

## 2021-09-22 DIAGNOSIS — C32.9 LARYNX CANCER (H): Primary | ICD-10-CM

## 2021-09-22 DIAGNOSIS — C34.90 NON-SMALL CELL LUNG CANCER METASTATIC TO BONE (H): ICD-10-CM

## 2021-09-22 DIAGNOSIS — D70.1 CHEMOTHERAPY-INDUCED NEUTROPENIA (H): ICD-10-CM

## 2021-09-22 DIAGNOSIS — T45.1X5A CHEMOTHERAPY-INDUCED NEUTROPENIA (H): ICD-10-CM

## 2021-09-22 LAB — ALBUMIN UR-MCNC: NEGATIVE MG/DL

## 2021-09-22 PROCEDURE — 96367 TX/PROPH/DG ADDL SEQ IV INF: CPT

## 2021-09-22 PROCEDURE — 96415 CHEMO IV INFUSION ADDL HR: CPT

## 2021-09-22 PROCEDURE — 96413 CHEMO IV INFUSION 1 HR: CPT

## 2021-09-22 PROCEDURE — 258N000003 HC RX IP 258 OP 636: Performed by: NURSE PRACTITIONER

## 2021-09-22 PROCEDURE — 96417 CHEMO IV INFUS EACH ADDL SEQ: CPT

## 2021-09-22 PROCEDURE — G0463 HOSPITAL OUTPT CLINIC VISIT: HCPCS

## 2021-09-22 PROCEDURE — 250N000011 HC RX IP 250 OP 636: Performed by: NURSE PRACTITIONER

## 2021-09-22 PROCEDURE — 99214 OFFICE O/P EST MOD 30 MIN: CPT | Performed by: NURSE PRACTITIONER

## 2021-09-22 PROCEDURE — 81003 URINALYSIS AUTO W/O SCOPE: CPT | Performed by: NURSE PRACTITIONER

## 2021-09-22 RX ORDER — HEPARIN SODIUM (PORCINE) LOCK FLUSH IV SOLN 100 UNIT/ML 100 UNIT/ML
5 SOLUTION INTRAVENOUS
Status: DISCONTINUED | OUTPATIENT
Start: 2021-09-22 | End: 2021-09-22 | Stop reason: HOSPADM

## 2021-09-22 RX ADMIN — SODIUM CHLORIDE 250 ML: 9 INJECTION, SOLUTION INTRAVENOUS at 10:39

## 2021-09-22 RX ADMIN — PACLITAXEL 432 MG: 6 INJECTION, SOLUTION INTRAVENOUS at 11:17

## 2021-09-22 RX ADMIN — Medication 5 ML: at 14:57

## 2021-09-22 RX ADMIN — ATEZOLIZUMAB 1200 MG: 1200 INJECTION, SOLUTION INTRAVENOUS at 14:23

## 2021-09-22 RX ADMIN — SODIUM CHLORIDE 1200 MG: 9 INJECTION, SOLUTION INTRAVENOUS at 10:40

## 2021-09-22 ASSESSMENT — PAIN SCALES - GENERAL: PAINLEVEL: NO PAIN (0)

## 2021-09-22 NOTE — PROGRESS NOTES
"Oncology Rooming Note    September 22, 2021 8:58 AM   Kt Rasmussen is a 61 year old male who presents for:    Chief Complaint   Patient presents with     Oncology Clinic Visit     Initial Vitals: /83   Pulse 75   Temp 96.8  F (36  C) (Axillary)   Resp 18   Wt 78.7 kg (173 lb 9.6 oz)   SpO2 97%   BMI 21.41 kg/m   Estimated body mass index is 21.41 kg/m  as calculated from the following:    Height as of 9/1/21: 1.918 m (6' 3.51\").    Weight as of this encounter: 78.7 kg (173 lb 9.6 oz). Body surface area is 2.05 meters squared.  No Pain (0) Comment: Data Unavailable   No LMP for male patient.  Allergies reviewed: Yes  Medications reviewed: Yes    Medications: Medication refills not needed today.  Pharmacy name entered into EPIC:    [facility] [place] [facility] - [address] [facility], [place] - [address] [facility][id number] - [place], [place] - [address] AT [facility] 7    Clinical concerns: How many more txs left? GK was notified.      Shari J. Schoenberger, [facility]            "

## 2021-09-22 NOTE — LETTER
"    9/22/2021         RE: Kt Rasmussen  [address]  [place] [address]        Dear Colleague,    Thank you for referring your patient, Kt Rasmussen, to the [facility]. Please see a copy of my visit note below.    Oncology Rooming Note    September 22, 2021 8:58 AM   Kt Rasmussen is a 61 year old male who presents for:    Chief Complaint   Patient presents with     Oncology Clinic Visit     Initial Vitals: /83   Pulse 75   Temp 96.8  F (36  C) (Axillary)   Resp 18   Wt 78.7 kg (173 lb 9.6 oz)   SpO2 97%   BMI 21.41 kg/m   Estimated body mass index is 21.41 kg/m  as calculated from the following:    Height as of 9/1/21: 1.918 m (6' 3.51\").    Weight as of this encounter: 78.7 kg (173 lb 9.6 oz). Body surface area is 2.05 meters squared.  No Pain (0) Comment: Data Unavailable   No LMP for male patient.  Allergies reviewed: Yes  Medications reviewed: Yes    Medications: Medication refills not needed today.  Pharmacy name entered into EPIC:    [facility], [place] - [address] [facility], [place] - [address] [id number] - [place], [place] - [address] [facility]    Clinical concerns: How many more txs left?  was notified.      Shari J. Schoenberger, CMA                Oncology/Hematology Visit Note  Sep 22, 2021    Reason for Visit: follow up of metastatic non-small cell lung carcinoma  Metastatic to lymph nodes bones in bilateral lungs  Brain MRI negative for mets    Patient met with Dr. John who recommended treatment with palliative intent with paclitaxel carboplatin Avastin and atezolizumab-treatment started on April 27, 2021  -Due to thrombocytopenia carboplatin AUC reduced to 4 with cycle 2  Due to pancytopenia carboplatin discontinued with cycle 5    7/12/2021: PET/CT showed resolved mural nodules with increased cystic cavity in left lower lobe " with no significant FDG uptake, less likely metastases; no enlarged hypermetabolic mediastinal, hilar, or axillary lymph nodes, decreased metabolic activity of intraosseous lesion in spine and pelvis; no new bone lesions    -Patient now getting treatment with paclitaxel, Avastin and atezolizumab.        Interval History:  Patient reports he has been tolerating treatment well.  Denies fever chills sweats cough shortness of breath chest pain nausea vomiting diarrhea abdominal pain bleeding        Review of Systems:  14 point ROS of systems including Constitutional, Eyes, Respiratory, Cardiovascular, Gastroenterology, Genitourinary, Integumentary, Muscularskeletal, Psychiatric were all negative except for pertinent positives noted in my HPI.    Physical Examination:  /83   Pulse 75   Temp 96.8  F (36  C) (Axillary)   Resp 18   Wt 78.7 kg (173 lb 9.6 oz)   SpO2 97%   BMI 21.41 kg/m        Physical Exam  HENT:      Head: Normocephalic.      Nose: Nose normal.      Mouth/Throat:      Mouth: Mucous membranes are moist.   Eyes:      Pupils: Pupils are equal, round, and reactive to light.   Cardiovascular:      Rate and Rhythm: Normal rate.   Pulmonary:      Effort: Pulmonary effort is normal.   Musculoskeletal:         General: Normal range of motion.      Cervical back: Normal range of motion.   Skin:     General: Skin is warm.   Neurological:      General: No focal deficit present.      Mental Status: He is alert.   Psychiatric:         Mood and Affect: Mood normal.       Laboratory Data:  CBC CMP results reviewed    Assessment and Plan:      This is a 61-year-old male with    metastatic non-small cell lung carcinoma  Metastatic to lymph nodes bones in bilateral lungs  Brain MRI negative for mets  Patient met with Dr. John who recommended treatment with palliative intent with paclitaxel carboplatin Avastin and atezolizumab-treatment to start on 04/27  Due to pancytopenia carboplatin discontinued with cycle  5  Patient now getting palliative treatment with paclitaxel, Avastin and atezolizumab.  07/2021-PET scan reveals partial response  Per Dr. 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 Although reviewed after completion, some words and grammatical errors may remain.            Again, thank you for allowing me to participate in the care of your patient.        Sincerely,        MADHAVI Moran CNP

## 2021-09-22 NOTE — PROGRESS NOTES
Infusion Nursing Note:  Kt Rasmussen presents today for C8D1 Bevacizumab-bvzr, Taxol, Tecentriq .    Patient seen by provider today: Yes, Juan Rausch   present during visit today: Not Applicable.    Note: Patient tolerated infusion without issues today.      Intravenous Access:  Implanted Port.    Treatment Conditions:  Lab Results   Component Value Date    HGB 10.6 (L) 09/21/2021    WBC 6.1 09/21/2021    ANEU 7.7 07/08/2021    ANEUTAUTO 4.1 09/21/2021     09/21/2021      Lab Results   Component Value Date     09/21/2021    POTASSIUM 4.5 09/21/2021    MAG 1.9 10/13/2016    CR 0.69 09/21/2021    BEVERLY 8.8 09/21/2021    BILITOTAL 0.9 09/21/2021    ALBUMIN 3.4 09/21/2021    ALT 24 09/21/2021    AST 21 09/21/2021     Results reviewed, labs MET treatment parameters, ok to proceed with treatment.      Post Infusion Assessment:  Patient tolerated infusion without incident.  Blood return noted pre and post infusion.  Site patent and intact, free from redness, edema or discomfort.  No evidence of extravasations.  Access discontinued per protocol.       Discharge Plan:   Patient declined prescription refills.  Discharge instructions reviewed with: Patient.  Patient and/or family verbalized understanding of discharge instructions and all questions answered.  Copy of AVS reviewed with patient and/or family.  Patient will return 9/23/21 for Neulasta injection for next appointment.  Patient discharged in stable condition accompanied by: self.  Departure Mode: Ambulatory with cane.      Devorah Stovall RN

## 2021-09-22 NOTE — PROGRESS NOTES
Oncology/Hematology Visit Note  Sep 22, 2021    Reason for Visit: follow up of metastatic non-small cell lung carcinoma  Metastatic to lymph nodes bones in bilateral lungs  Brain MRI negative for mets    Patient met with Dr. John who recommended treatment with palliative intent with paclitaxel carboplatin Avastin and atezolizumab-treatment started on April 27, 2021  -Due to thrombocytopenia carboplatin AUC reduced to 4 with cycle 2  Due to pancytopenia carboplatin discontinued with cycle 5    7/12/2021: PET/CT showed resolved mural nodules with increased cystic cavity in left lower lobe with no significant FDG uptake, less likely metastases; no enlarged hypermetabolic mediastinal, hilar, or axillary lymph nodes, decreased metabolic activity of intraosseous lesion in spine and pelvis; no new bone lesions    -Patient now getting treatment with paclitaxel, Avastin and atezolizumab.        Interval History:  Patient reports he has been tolerating treatment well.  Denies fever chills sweats cough shortness of breath chest pain nausea vomiting diarrhea abdominal pain bleeding        Review of Systems:  14 point ROS of systems including Constitutional, Eyes, Respiratory, Cardiovascular, Gastroenterology, Genitourinary, Integumentary, Muscularskeletal, Psychiatric were all negative except for pertinent positives noted in my HPI.    Physical Examination:  /83   Pulse 75   Temp 96.8  F (36  C) (Axillary)   Resp 18   Wt 78.7 kg (173 lb 9.6 oz)   SpO2 97%   BMI 21.41 kg/m        Physical Exam  HENT:      Head: Normocephalic.      Nose: Nose normal.      Mouth/Throat:      Mouth: Mucous membranes are moist.   Eyes:      Pupils: Pupils are equal, round, and reactive to light.   Cardiovascular:      Rate and Rhythm: Normal rate.   Pulmonary:      Effort: Pulmonary effort is normal.   Musculoskeletal:         General: Normal range of motion.      Cervical back: Normal range of motion.   Skin:     General: Skin is  warm.   Neurological:      General: No focal deficit present.      Mental Status: He is alert.   Psychiatric:         Mood and Affect: Mood normal.       Laboratory Data:  CBC CMP results reviewed    Assessment and Plan:      This is a 61-year-old male with    metastatic non-small cell lung carcinoma  Metastatic to lymph nodes bones in bilateral lungs  Brain MRI negative for mets  Patient met with Dr. John who recommended treatment with palliative intent with paclitaxel carboplatin Avastin and atezolizumab-treatment to start on 04/27  Due to pancytopenia carboplatin discontinued with cycle 5  Patient now getting palliative treatment with paclitaxel, Avastin and atezolizumab.  07/2021-PET scan reveals partial response  Per Dr. 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 Although reviewed after completion, some words and grammatical errors may remain.

## 2021-09-22 NOTE — PROGRESS NOTES
[facility]   at the [facility]   Otolaryngology Clinic     Patient: Kt Rasmussen    MRN: [id number]    : 1960    Age/Gender: 61 year old male  Date of Service: 2021  Rendering Provider:   Lizzy Wray MD     Chief Complaint   T1 left TVF SCC s/p resection 19  Dysphonia  Dysphagia  Interval History   HISTORY OF PRESENT ILLNESS:Mr. Rasmussen is a 60 year old male is being followed for history of left TVF SCC. he was initially seen on 19. Please refer to this note for full history.      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 he reports:  - doing well     PAST MEDICAL HISTORY:   Past Medical History:   Diagnosis Date     Alcohol abuse, unspecified      Cerebral infarction (H)     , right side residual and aphasia     Dyslipidemia      GERD (gastroesophageal reflux disease)      Lung cancer (H)      Unspecified essential hypertension        PAST SURGICAL HISTORY:   Past Surgical History:   Procedure Laterality Date     BRONCHOSCOPY FLEXIBLE AND RIGID N/A 2016    Procedure: BRONCHOSCOPY FLEXIBLE AND RIGID;  Surgeon: Tony Talbot MD;  Location: [facility] OR     ESOPHAGOSCOPY FLEXIBLE N/A 2019    Procedure: flexible esophagoscopy;  Surgeon: Lizzy Johnson MD;  Location: [facility] OR     INJECT STEROID (LOCATION) N/A 2019    Procedure: steroid injection;  Surgeon: Lizzy Johnson MD;  Location: [facility] OR     IR CHEST PORT PLACEMENT > 5 YRS OF AGE  2021     LASER CO2 LARYNGOSCOPY N/A 2019    Procedure: Microdirect laryngoscopy with excision of laryngeal mass, CO2 laser;  Surgeon: Lizzy Johnson MD;  Location:  OR     [facility] NONSPECIFIC PROCEDURE      R tympanoplasty       CURRENT MEDICATIONS:   Current Outpatient Medications:      atorvastatin (LIPITOR) 40 MG tablet, Take 40 mg by mouth daily, Disp: , Rfl:      dexamethasone  (DECADRON) 4 MG tablet, Take 1 tablet (4 mg) by mouth 2 times daily (with meals) Take 2 tablets (8 mg) by mouth daily for three days. Start on Day 2 of each cycle., Disp: 6 tablet, Rfl: 3     LORazepam (ATIVAN) 0.5 MG tablet, Take 1 tablet (0.5 mg) by mouth every 4 hours as needed (Anxiety, Nausea/Vomiting or Sleep), Disp: 30 tablet, Rfl: 3     prochlorperazine (COMPAZINE) 10 MG tablet, Take 1 tablet (10 mg) by mouth every 6 hours as needed (Nausea/Vomiting), Disp: 30 tablet, Rfl: 3  No current facility-administered medications for this visit.    Facility-Administered Medications Ordered in Other Visits:      atezolizumab (TECENTRIQ) 1,200 mg in sodium chloride 0.9 % 295 mL infusion, 1,200 mg, Intravenous, Once, Juan Rausch APRN CNP     bevacizumab-bvzr (ZIRABEV) 1,200 mg in sodium chloride 0.9 % 158 mL infusion, 15 mg/kg (Treatment Plan Recorded), Intravenous, Once, Juan Rausch APRN CNP, Last Rate: 316 mL/hr at 09/22/21 1040, 1,200 mg at 09/22/21 1040     heparin 100 UNIT/ML injection 5 mL, 5 mL, Intracatheter, Once PRNShalom Goran, APRN CNP     heparin 100 UNIT/ML injection 5 mL, 5 mL, Intracatheter, Once PRN, Sangita John MD, 5 mL at 08/10/21 1346     heparin lock flush 10 UNIT/ML injection 5 mL, 5 mL, Intracatheter, Q1H PRNAlvaro Jocelin, MD     PACLitaxel (TAXOL) 432 mg in sodium chloride 0.9 % 622 mL infusion, 200 mg/m2 (Treatment Plan Recorded), Intravenous, Once, Juan Rausch APRN CNP     sodium chloride (PF) 0.9% PF flush 3-20 mL, 3-20 mL, Intracatheter, Q1H PRNShalom Goran, APRN CNP     sodium chloride (PF) 0.9% PF flush 3-20 mL, 3-20 mL, Intracatheter, Q1H PRNAlvaro Jocelin, MD, 20 mL at 08/10/21 1346    ALLERGIES: No known drug allergies    SOCIAL HISTORY:    Social History     Socioeconomic History     Marital status: Single     Spouse name: Not on file     Number of children: Not on file     Years of education: Not on file     Highest education level: Not on file   Occupational History      Not on file   Tobacco Use     Smoking status: Former Smoker     Packs/day: 1.00     Types: Cigarettes     Quit date: 2009     Years since quittin.0     Smokeless tobacco: Never Used   Substance and Sexual Activity     Alcohol use: Not Currently     Drug use: No     Sexual activity: Not on file   Other Topics Concern     Parent/sibling w/ CABG, MI or angioplasty before 65F 55M? Not Asked   Social History Narrative     Not on file     Social Determinants of Health     Financial Resource Strain:      Difficulty of Paying Living Expenses:    Food Insecurity:      Worried About Running Out of Food in the Last Year:      Ran Out of Food in the Last Year:    Transportation Needs:      Lack of Transportation (Medical):      Lack of Transportation (Non-Medical):    Physical Activity:      Days of Exercise per Week:      Minutes of Exercise per Session:    Stress:      Feeling of Stress :    Social Connections:      Frequency of Communication with Friends and Family:      Frequency of Social Gatherings with Friends and Family:      Attends [employer] Services:      Active Member of Clubs or Organizations:      Attends Club or Organization Meetings:      Marital Status:    Intimate Partner Violence:      Fear of Current or Ex-Partner:      Emotionally Abused:      Physically Abused:      Sexually Abused:          FAMILY HISTORY:   Family History   Problem Relation Age of Onset     Cancer Father       Non-contributory for problems with anesthesia    REVIEW OF SYSTEMS:   The patient was asked a 14 point review of systems regarding constitutional symptoms, eye symptoms, ears, nose, mouth, throat symptoms, cardiovascular symptoms, respiratory symptoms, gastrointestinal symptoms, genitourinary symptoms, musculoskeletal symptoms, integumentary symptoms, neurological symptoms, psychiatric symptoms, endocrine symptoms, hematologic/lymphatic symptoms, and allergic/ immunologic symptoms.   The pertinent factors have been  included in the HPI and below.  Patient Supplied Answers to Review of Systems  No flowsheet data found.    Physical Examination   The patient underwent a physical examination as described below. The pertinent positive and negative findings are summarized after the description of the examination.  Constitutional: The patient's developmental and nutritional status was assessed. The patient's voice quality was assessed.  Head and Face: The head and face were inspected for deformities. The sinuses were palpated. The salivary glands were palpated. Facial muscle strength was assessed bilaterally.  Eyes: Extraocular movements and primary gaze alignment were assessed.  Ears, Nose, Mouth and Throat: The ears and nose were examined for deformities. The nasal septum, mucosa, and turbinates were inspected by anterior rhinoscopy. The lips, teeth, and gums were examined for abnormalities. The oral mucosa, tongue, palate, tonsils, lateral and posterior pharynx were inspected for the presence of asymmetry or mucosal lesions.    Neck: The tracheal position was noted, and the neck mass palpated to determine if there were any asymmetries, abnormal neck masses, thyromegally, or thyroid nodules.  Respiratory: The nature of the breathing and chest expansion/symmetry was observed.  Cardiovascular: The patient was examined to determine the presence of any edema or jugular venous distension.  Abdomen: The contour of the abdomen was noted.  Lymphatic: The patient was examined for infraclavicular lymphadenopathy.  Musculoskeletal: The patient was inspected for the presence of skeletal deformities.  Extremities: The extremities were examined for any clubbing or cyanosis.  Skin: The skin was examined for inflammatory or neoplastic conditions.  Neurologic: The patient's orientation, mood, and affect were noted. The cranial nerve  functions were examined.  Other pertinent positive and negative findings on physical examination:   OC/OP: no lesions    Neck: no lesions   All other physical examination findings were within normal limits and noncontributory.    Procedures   Flexible laryngoscopy (CPT 99792)      Pre-procedure diagnosis: larynx scc  Post-procedure diagnosis: same as above  Indication for procedure: Mr. Rasmussen is a 61 year old male with see above  Procedure(s): Fiberoptic Laryngoscopy    Details of Procedure: After informed consent was obtained, the patient was seated in the examination chair.  The areas of the nasopharynx as well as the hypopharynx were anesthetized with topical 4% lidocaine with 0.25% phenylephrine atomizer.  Examination of the base of tongue was performed first.  Attention was directed to any evidence of masses in the area or evidence of leukoplakia or candidal infection.  Attention was directed to the epiglottis where its size and position was determined and its movement on phonation of the vowel  e .  The piriform sinuses were then inspected for any mass lesions or pooling of secretions.  Attention was then directed to the larynx. The vocal folds were inspected for infection or any areas of leukoplakia, for masses, polypoid degeneration, or hemorrhage.  Having done this, the arytenoids and vocal processes were inspected for erythema or evidence of granuloma formation.  The posterior commissure was then inspected for evidence of inflammatory changes in the mucosa and heaping up of mucosal tissue. The patient was then instructed to say the vowel  e .  Adduction of vocal folds to the midline was observed for any evidence of paresis or paralysis of the larynx or asymmetry in rotation of the larynx to the left or right. The patient was asked to breathe and the degree of abduction was noted bilaterally.  Subglottic view of the larynx was obtained for any additional mass lesions or mucosal changes.  Finally the post cricoid was examined for evidence of pooling of secretions, as well as the pharyngeal wall mucosa.   Anesthesia type:  "0.25% phenylephrine    Findings:  Anatomic/physiological deviations: well healed left vocal fold no evidence of recurrence   Right vocal process: No restriction of mobility   Left vocal process: No restriction of mobility  Glottal gap: Complete glottal closure  Supraglottic structures: Normal  Hypopharynx: Normal     Estimated Blood Loss: minimal  Complications: None  Disposition: Patient tolerated the procedure well                    Review of Relevant Clinical Data      Labs:  Lab Results   Component Value Date    TSH 2.26 09/21/2021     Lab Results   Component Value Date     09/21/2021    CO2 26 09/21/2021    BUN 18 09/21/2021    PHOS 2.0 (L) 05/04/2016     Lab Results   Component Value Date    WBC 6.1 09/21/2021    HGB 10.6 (L) 09/21/2021    HCT 34.0 (L) 09/21/2021    MCV 93 09/21/2021     09/21/2021     Lab Results   Component Value Date    PTT 35 07/26/2017    INR 1.02 04/22/2021     No results found for: ELIZABET  No components found for: RHEUMATOIDFACTOR,  RF  Lab Results   Component Value Date    CRP 0.16 07/24/2003     No components found for: CKTOT, URICACID  No components found for: C3, C4, DSDNAAB, NDNAABIFA  No results found for: MPOAB    Patient reported Quality of Life (QOL) Measures   Patient Supplied Answers To VHI Questionnaire  Voice Handicap Index (VHI-10) 12/5/2019   My voice makes it difficult for people to hear me 0   People have difficulty understanding me in a noisy room 0   My voice difficulties restrict my personal and social life.  0   I feel left out of conversations because of my voice 0   My voice problem causes me to lose income 0   I feel as though I have to strain to produce voice 0   The clarity of my voice is unpredictable 0   My voice problem upsets me 0   My voice makes me feel handicapped 0   People ask, \"What's wrong with your voice?\" 0   VHI-10 0         Patient Supplied Answers To EAT Questionnaire  Eating Assessment Tool (EAT-10) 12/5/2019   My swallowing problem " has caused me to lose weight 0   My swallowing problem interferes with my ability to go out for meals 0   Swallowing liquids takes extra effort 0   Swallowing solids takes extra effort 0   Swallowing pills takes extra effort 0   Swallowing is painful 0   The pleasure of eating is affected by my swallowing 0   When I swallow food sticks in my throat 0   I cough when I eat 0   Swallowing is stressful 0   EAT-10 0         Patient Supplied Answers To CSI Questionnaire  No flowsheet data found.      Patient Supplied Answers to Dyspnea Index Questionnaire:  No flowsheet data found.    Impression & Plan     IMPRESSION: Mr. Rasmussen is a 61 year old male who is being seen for the followin. T1 Left vocal fold SCC  -  s/p endoscopic CO2 laser resection 19  - no evidence of recurrence   - now has metastatic nonsmall cell lung cancer undergoing treatment with Dr Sangita John  - stable laryngeal exam,  VIKRAM  Plan  - observation  - [id number] evaluations during year 3 until 2022        2. 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 Dysphagia  - improved swallow today with less pharyngeal residue on evaluation of SLP performed FEES on 20 though continued recommendation of thickened liquids  - screening FEES with liquids shows again aspiration on 7/3/20  - no PNAs, no changes in weight  - Xray Video Swallow Exam 20 - safe swallow, much improved  Plan  - observation        RETURN VISIT: 3 months - January    [facility] Marysol Wray MD    Laryngology    [facility]  Department of  Otolaryngology - Head and Neck Surgery  Clinics & Surgery Center  [address] [place] [address]  Appointment line: [phone]  Fax: [phone]  [email or web address]

## 2021-09-23 ENCOUNTER — INFUSION THERAPY VISIT (OUTPATIENT)
Dept: INFUSION THERAPY | Facility: CLINIC | Age: 61
End: 2021-09-23
Attending: INTERNAL MEDICINE
Payer: MEDICARE

## 2021-09-23 ENCOUNTER — OFFICE VISIT (OUTPATIENT)
Dept: OTOLARYNGOLOGY | Facility: CLINIC | Age: 61
End: 2021-09-23
Payer: MEDICARE

## 2021-09-23 VITALS
OXYGEN SATURATION: 98 % | HEART RATE: 85 BPM | TEMPERATURE: 98.2 F | SYSTOLIC BLOOD PRESSURE: 128 MMHG | RESPIRATION RATE: 16 BRPM | DIASTOLIC BLOOD PRESSURE: 76 MMHG

## 2021-09-23 VITALS
TEMPERATURE: 98.2 F | BODY MASS INDEX: 21.51 KG/M2 | OXYGEN SATURATION: 100 % | WEIGHT: 173 LBS | HEIGHT: 75 IN | HEART RATE: 105 BPM

## 2021-09-23 DIAGNOSIS — C34.90 NON-SMALL CELL LUNG CANCER METASTATIC TO BONE (H): ICD-10-CM

## 2021-09-23 DIAGNOSIS — C79.51 NON-SMALL CELL LUNG CANCER METASTATIC TO BONE (H): ICD-10-CM

## 2021-09-23 DIAGNOSIS — Z51.11 ENCOUNTER FOR ANTINEOPLASTIC CHEMOTHERAPY: ICD-10-CM

## 2021-09-23 DIAGNOSIS — R49.0 DYSPHONIA: Primary | ICD-10-CM

## 2021-09-23 DIAGNOSIS — T45.1X5A CHEMOTHERAPY-INDUCED NEUTROPENIA (H): Primary | ICD-10-CM

## 2021-09-23 DIAGNOSIS — Z85.21 HX OF LARYNGEAL CANCER: ICD-10-CM

## 2021-09-23 DIAGNOSIS — D70.1 CHEMOTHERAPY-INDUCED NEUTROPENIA (H): Primary | ICD-10-CM

## 2021-09-23 PROCEDURE — 250N000011 HC RX IP 250 OP 636: Performed by: NURSE PRACTITIONER

## 2021-09-23 PROCEDURE — 31575 DIAGNOSTIC LARYNGOSCOPY: CPT | Performed by: OTOLARYNGOLOGY

## 2021-09-23 PROCEDURE — 96372 THER/PROPH/DIAG INJ SC/IM: CPT | Performed by: NURSE PRACTITIONER

## 2021-09-23 RX ADMIN — PEGFILGRASTIM 6 MG: 6 INJECTION SUBCUTANEOUS at 14:51

## 2021-09-23 ASSESSMENT — MIFFLIN-ST. JEOR: SCORE: 1670.35

## 2021-09-23 ASSESSMENT — PAIN SCALES - GENERAL: PAINLEVEL: NO PAIN (0)

## 2021-09-23 NOTE — PROGRESS NOTES
Infusion Nursing Note:  Kt Rasmussen presents today for neulasta.    Patient seen by provider today: No   present during visit today: Not Applicable.    Note: Patient reports feeling well after his infusion yesterday and denies any new concerns today.      Intravenous Access:  No Intravenous access/labs at this visit.    Treatment Conditions:  Not Applicable.      Post Infusion Assessment:  Patient tolerated one Neulasta injection without incident to his right arm.       Discharge Plan:   Patient declined prescription refills.  Discharge instructions reviewed with: Patient.  Patient and/or family verbalized understanding of discharge instructions and all questions answered.  Copy of AVS reviewed with patient and/or family.  Patient will return 10/12/21 for labs for next appointment.  Patient discharged in stable condition accompanied by: self.  Departure Mode: Ambulatory.      Devorah Stovall RN

## 2021-09-23 NOTE — NURSING NOTE
"Chief Complaint   Patient presents with     RECHECK     2 month follow up       Pulse 105, temperature 98.2  F (36.8  C), temperature source Temporal, height 1.905 m (6' 3\"), weight 78.5 kg (173 lb), SpO2 100 %.    Nick Vidales, EMT  "

## 2021-09-23 NOTE — PATIENT INSTRUCTIONS
1.  You were seen in the ENT Clinic today by . If you have any questions or concerns after your appointment, please call [phone]. Press option #1 for scheduling related needs. Press option #3 for Nurse advice.    2.  Plan is to return to clinic 1/6/22 at 1:00 pm      Kay Sommers LPN  [phone]  [facility] - Otolaryngology

## 2021-09-23 NOTE — LETTER
2021       RE: Kt Rasmussen  [id number] [place]  [facility] [address]     Dear Colleague,    Thank you for referring your patient, Kt Rasmussen, to the [facility] EAR NOSE AND THROAT CLINIC [place] at [facility]. Please see a copy of my visit note below.        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 Rasmussen is a 60 year old male is being followed for history of left TVF SCC. he was initially seen on 19. Please refer to this note for full history.      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 he reports:  - doing well     PAST MEDICAL HISTORY:   Past Medical History:   Diagnosis Date     Alcohol abuse, unspecified      Cerebral infarction (H)     , right side residual and aphasia     Dyslipidemia      GERD (gastroesophageal reflux disease)      Lung cancer (H)      Unspecified essential hypertension        PAST SURGICAL HISTORY:   Past Surgical History:   Procedure Laterality Date     BRONCHOSCOPY FLEXIBLE AND RIGID N/A 2016    Procedure: BRONCHOSCOPY FLEXIBLE AND RIGID;  Surgeon: Tony Talbot MD;  Location: [facility] OR     ESOPHAGOSCOPY FLEXIBLE N/A 2019    Procedure: flexible esophagoscopy;  Surgeon: Lizzy Johnson MD;  Location: [facility] OR     INJECT STEROID (LOCATION) N/A 2019    Procedure: steroid injection;  Surgeon: Lizzy Johnson MD;  Location: [facility] OR     IR CHEST PORT PLACEMENT > 5 YRS OF AGE  2021     LASER CO2 LARYNGOSCOPY N/A 2019    Procedure:  Microdirect laryngoscopy with excision of laryngeal mass, CO2 laser;  Surgeon: Lizzy Johnson MD;  Location:  OR     [facility] NONSPECIFIC PROCEDURE      R tympanoplasty       CURRENT MEDICATIONS:   Current Outpatient Medications:      atorvastatin (LIPITOR) 40 MG tablet, Take 40 mg by mouth daily, Disp: , Rfl:      dexamethasone (DECADRON) 4 MG tablet, Take 1 tablet (4 mg) by mouth 2 times daily (with meals) Take 2 tablets (8 mg) by mouth daily for three days. Start on Day 2 of each cycle., Disp: 6 tablet, Rfl: 3     LORazepam (ATIVAN) 0.5 MG tablet, Take 1 tablet (0.5 mg) by mouth every 4 hours as needed (Anxiety, Nausea/Vomiting or Sleep), Disp: 30 tablet, Rfl: 3     prochlorperazine (COMPAZINE) 10 MG tablet, Take 1 tablet (10 mg) by mouth every 6 hours as needed (Nausea/Vomiting), Disp: 30 tablet, Rfl: 3  No current facility-administered medications for this visit.    Facility-Administered Medications Ordered in Other Visits:      atezolizumab (TECENTRIQ) 1,200 mg in sodium chloride 0.9 % 295 mL infusion, 1,200 mg, Intravenous, Once, Juan Rausch APRN CNP     bevacizumab-bvzr (ZIRABEV) 1,200 mg in sodium chloride 0.9 % 158 mL infusion, 15 mg/kg (Treatment Plan Recorded), Intravenous, Once, Juan Rausch APRN CNP, Last Rate: 316 mL/hr at 09/22/21 1040, 1,200 mg at 09/22/21 1040     heparin 100 UNIT/ML injection 5 mL, 5 mL, Intracatheter, Once PRNShalom Goran, APRN CNP     heparin 100 UNIT/ML injection 5 mL, 5 mL, Intracatheter, Once PRN, Sangita John MD, 5 mL at 08/10/21 1346     heparin lock flush 10 UNIT/ML injection 5 mL, 5 mL, Intracatheter, Q1H PRNAlvaro Jocelin, MD     PACLitaxel (TAXOL) 432 mg in sodium chloride 0.9 % 622 mL infusion, 200 mg/m2 (Treatment Plan Recorded), Intravenous, Once, Juan Rausch APRN CNP     sodium chloride (PF) 0.9% PF flush 3-20 mL, 3-20 mL, Intracatheter, Q1H PRN, Juan Rausch, MADHAVI CNP     sodium chloride (PF) 0.9% PF flush 3-20 mL, 3-20 mL, Intracatheter, Q1H PRN, Alvaro  MD Sangita, 20 mL at 08/10/21 1346    ALLERGIES: No known drug allergies    SOCIAL HISTORY:    Social History     Socioeconomic History     Marital status: Single     Spouse name: Not on file     Number of children: Not on file     Years of education: Not on file     Highest education level: Not on file   Occupational History     Not on file   Tobacco Use     Smoking status: Former Smoker     Packs/day: 1.00     Types: Cigarettes     Quit date: 2009     Years since quittin.0     Smokeless tobacco: Never Used   Substance and Sexual Activity     Alcohol use: Not Currently     Drug use: No     Sexual activity: Not on file   Other Topics Concern     Parent/sibling w/ CABG, MI or angioplasty before 65F 55M? Not Asked   Social History Narrative     Not on file     Social Determinants of Health     Financial Resource Strain:      Difficulty of Paying Living Expenses:    Food Insecurity:      Worried About Running Out of Food in the Last Year:      Ran Out of Food in the Last Year:    Transportation Needs:      Lack of Transportation (Medical):      Lack of Transportation (Non-Medical):    Physical Activity:      Days of Exercise per Week:      Minutes of Exercise per Session:    Stress:      Feeling of Stress :    Social Connections:      Frequency of Communication with Friends and Family:      Frequency of Social Gatherings with Friends and Family:      Attends Gnosticist Services:      Active Member of Clubs or Organizations:      Attends Club or Organization Meetings:      Marital Status:    Intimate Partner Violence:      Fear of Current or Ex-Partner:      Emotionally Abused:      Physically Abused:      Sexually Abused:          FAMILY HISTORY:   Family History   Problem Relation Age of Onset     Cancer Father       Non-contributory for problems with anesthesia    REVIEW OF SYSTEMS:   The patient was asked a 14 point review of systems regarding constitutional symptoms, eye symptoms, ears, nose, mouth,  throat symptoms, cardiovascular symptoms, respiratory symptoms, gastrointestinal symptoms, genitourinary symptoms, musculoskeletal symptoms, integumentary symptoms, neurological symptoms, psychiatric symptoms, endocrine symptoms, hematologic/lymphatic symptoms, and allergic/ immunologic symptoms.   The pertinent factors have been included in the HPI and below.  Patient Supplied Answers to Review of Systems  No flowsheet data found.    Physical Examination   The patient underwent a physical examination as described below. The pertinent positive and negative findings are summarized after the description of the examination.  Constitutional: The patient's developmental and nutritional status was assessed. The patient's voice quality was assessed.  Head and Face: The head and face were inspected for deformities. The sinuses were palpated. The salivary glands were palpated. Facial muscle strength was assessed bilaterally.  Eyes: Extraocular movements and primary gaze alignment were assessed.  Ears, Nose, Mouth and Throat: The ears and nose were examined for deformities. The nasal septum, mucosa, and turbinates were inspected by anterior rhinoscopy. The lips, teeth, and gums were examined for abnormalities. The oral mucosa, tongue, palate, tonsils, lateral and posterior pharynx were inspected for the presence of asymmetry or mucosal lesions.    Neck: The tracheal position was noted, and the neck mass palpated to determine if there were any asymmetries, abnormal neck masses, thyromegally, or thyroid nodules.  Respiratory: The nature of the breathing and chest expansion/symmetry was observed.  Cardiovascular: The patient was examined to determine the presence of any edema or jugular venous distension.  Abdomen: The contour of the abdomen was noted.  Lymphatic: The patient was examined for infraclavicular lymphadenopathy.  Musculoskeletal: The patient was inspected for the presence of skeletal deformities.  Extremities: The  extremities were examined for any clubbing or cyanosis.  Skin: The skin was examined for inflammatory or neoplastic conditions.  Neurologic: The patient's orientation, mood, and affect were noted. The cranial nerve  functions were examined.  Other pertinent positive and negative findings on physical examination:   OC/OP: no lesions   Neck: no lesions   All other physical examination findings were within normal limits and noncontributory.    Procedures   Flexible laryngoscopy (CPT 02010)      Pre-procedure diagnosis: larynx scc  Post-procedure diagnosis: same as above  Indication for procedure: Mr. Rasmussen is a 61 year old male with see above  Procedure(s): Fiberoptic Laryngoscopy    Details of Procedure: After informed consent was obtained, the patient was seated in the examination chair.  The areas of the nasopharynx as well as the hypopharynx were anesthetized with topical 4% lidocaine with 0.25% phenylephrine atomizer.  Examination of the base of tongue was performed first.  Attention was directed to any evidence of masses in the area or evidence of leukoplakia or candidal infection.  Attention was directed to the epiglottis where its size and position was determined and its movement on phonation of the vowel  e .  The piriform sinuses were then inspected for any mass lesions or pooling of secretions.  Attention was then directed to the larynx. The vocal folds were inspected for infection or any areas of leukoplakia, for masses, polypoid degeneration, or hemorrhage.  Having done this, the arytenoids and vocal processes were inspected for erythema or evidence of granuloma formation.  The posterior commissure was then inspected for evidence of inflammatory changes in the mucosa and heaping up of mucosal tissue. The patient was then instructed to say the vowel  e .  Adduction of vocal folds to the midline was observed for any evidence of paresis or paralysis of the larynx or asymmetry in rotation of the larynx to  the left or right. The patient was asked to breathe and the degree of abduction was noted bilaterally.  Subglottic view of the larynx was obtained for any additional mass lesions or mucosal changes.  Finally the post cricoid was examined for evidence of pooling of secretions, as well as the pharyngeal wall mucosa.   Anesthesia type: 0.25% phenylephrine    Findings:  Anatomic/physiological deviations: well healed left vocal fold no evidence of recurrence   Right vocal process: No restriction of mobility   Left vocal process: No restriction of mobility  Glottal gap: Complete glottal closure  Supraglottic structures: Normal  Hypopharynx: Normal     Estimated Blood Loss: minimal  Complications: None  Disposition: Patient tolerated the procedure well                    Review of Relevant Clinical Data      Labs:  Lab Results   Component Value Date    TSH 2.26 09/21/2021     Lab Results   Component Value Date     09/21/2021    CO2 26 09/21/2021    BUN 18 09/21/2021    PHOS 2.0 (L) 05/04/2016     Lab Results   Component Value Date    WBC 6.1 09/21/2021    HGB 10.6 (L) 09/21/2021    HCT 34.0 (L) 09/21/2021    MCV 93 09/21/2021     09/21/2021     Lab Results   Component Value Date    PTT 35 07/26/2017    INR 1.02 04/22/2021     No results found for: ELIZABET  No components found for: RHEUMATOIDFACTOR,  RF  Lab Results   Component Value Date    CRP 0.16 07/24/2003     No components found for: CKTOT, URICACID  No components found for: C3, C4, DSDNAAB, NDNAABIFA  No results found for: MPOAB    Patient reported Quality of Life (QOL) Measures   Patient Supplied Answers To VHI Questionnaire  Voice Handicap Index (VHI-10) 12/5/2019   My voice makes it difficult for people to hear me 0   People have difficulty understanding me in a noisy room 0   My voice difficulties restrict my personal and social life.  0   I feel left out of conversations because of my voice 0   My voice problem causes me to lose income 0   I feel as  "though I have to strain to produce voice 0   The clarity of my voice is unpredictable 0   My voice problem upsets me 0   My voice makes me feel handicapped 0   People ask, \"What's wrong with your voice?\" 0   VHI-10 0         Patient Supplied Answers To EAT Questionnaire  Eating Assessment Tool (EAT-10) 2019   My swallowing problem has caused me to lose weight 0   My swallowing problem interferes with my ability to go out for meals 0   Swallowing liquids takes extra effort 0   Swallowing solids takes extra effort 0   Swallowing pills takes extra effort 0   Swallowing is painful 0   The pleasure of eating is affected by my swallowing 0   When I swallow food sticks in my throat 0   I cough when I eat 0   Swallowing is stressful 0   EAT-10 0         Patient Supplied Answers To CSI Questionnaire  No flowsheet data found.      Patient Supplied Answers to Dyspnea Index Questionnaire:  No flowsheet data found.    Impression & Plan     IMPRESSION: Mr. Rasmussen is a 61 year old male who is being seen for the followin. T1 Left vocal fold SCC  -  s/p endoscopic CO2 laser resection 19  - no evidence of recurrence   - now has metastatic nonsmall cell lung cancer undergoing treatment with Dr Sangita John  - stable laryngeal exam,  VIKRAM  Plan  - observation  - [id number] evaluations during year 3 until 2022        2. 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 Dysphagia  - improved swallow today with less pharyngeal residue on evaluation of SLP performed FEES on 20 though continued recommendation of thickened liquids  - screening FEES with liquids shows again aspiration on 7/3/20  - no PNAs, no changes in weight  - Xray Video Swallow Exam 20 - safe swallow, much improved  Plan  - observation        RETURN VISIT: 3 " months - January    Lizzy Wray MD    Laryngology    [facility]  Department of  Otolaryngology - Head and Neck Surgery  [facility] [place] [address]  Appointment line: [phone]  Fax: [phone]  [email or web address]

## 2021-10-04 NOTE — PROGRESS NOTES
"Oncology Rooming Note    March 14, 2018 1:32 PM   Kt Rasmussen is a 57 year old male who presents for:    Chief Complaint   Patient presents with     Oncology Clinic Visit     Non-small cell carcinoma of lung, stage 3, unspecified laterality      Initial Vitals: /69 (BP Location: Left arm, Patient Position: Sitting, Cuff Size: Adult Regular)  Pulse 74  Temp 98.1  F (36.7  C) (Oral)  Resp 16  Wt 87.1 kg (192 lb)  SpO2 97%  BMI 24 kg/m2 Estimated body mass index is 24 kg/(m^2) as calculated from the following:    Height as of 7/26/17: 1.905 m (6' 3\").    Weight as of this encounter: 87.1 kg (192 lb). Body surface area is 2.15 meters squared.  No Pain (0) Comment: Data Unavailable   No LMP for male patient.  Allergies reviewed: Yes  Medications reviewed: Yes    Medications: Medication refills not needed today.  Pharmacy name entered into EPIC:    [facility] [place], [place] - [address] [facility] [place], [place] - [address]    Clinical concerns: no    5 minutes for nursing intake (face to face time)       Yaquelin Dowd MA                " Patient presents for IOLMaster for cataract surgery scheduled for right eye 10- or left eye 10- with Dr. March at [facility].  Testing done without complication.  Difference in Axial length corresponds to chart notes, as does the high cylinder readings in both eyes.  Patient has Alternating ET which makes getting scleral reading difficult.  Consents read, reviewed with patient and wife (Elenita), patient signed all consents.  Results given to Dr. March for review and Calcs for IOL each eye.

## 2021-10-10 NOTE — PROGRESS NOTES
[facility]    Hematology/Oncology Established Patient Follow-up Note      Today's Date: 10/13/2021    Reason for Follow-up: Metastatic non-small cell lung carcinoma.    HISTORY OF PRESENT ILLNESS: Kt Rasmussen is a 61 year old male who presents with the following oncologic history:  1. 5/05/2016: Presented with near-obstructing large mass coming off the cheikh and likely the posterior wall, seen on bronchoscopy. Biopsy of the mass showed invasive squamous cell carcinoma, moderately differentiating and focally keratinizing.  Procedure was complicated by significant bleeding.  Tumor was completely debulked (via bronchoscopy) in both main stem bronchi and distal trachea. NGS showed no mutations in EGFR, KRAS, BRAF, NRAS, HRAS, PIK3CA, ERBB2, MET, or JAK2.  2. 5/23/2016: PET/CT scan showed evidence of residual tumor with decreased endobronchial mass. Indeterminate, mildly hypermetabolic mesentery with prominent lymph nodes and area of enhancement of the right hepatic lobe present.   Felt to have T4-NX-M0 disease.  3. 6/09/2016: Started concurrent chemoradiation with weekly paclitaxel and carboplatin.  4. 7/30/2016: Completed radiation.  Subsequently received 2 cycles of consolidation paclitaxel and carboplatin.   5. 9/13/2019: Presented with 6-month history of dysphonia and left vocal exophytic lesion. Left true vocal fold biopsy showed at least squamous cell carcinoma in situ, cannot exclude superficial invasion.  6. 9/30/2019: Excision of left vocal cord mass showed fragments of invasive squamous cell carcinoma, well differentiated and keratinizing.  Margins negative for malignancy.  7. 2/16/2021: CT chest w/o contrast showed thin-walled cavity in left lower lobe with 2 solid peripheral nodules measuring 9 mm each. No lymphadenopathy.  8. 3/01/2021: PET scan showed hypermetabolic nodules in left lower lobe and right lung, consistent with metastases; hypermetabolic adenopathy in chest, abdomen,  pelvis; nonspecific uptake at tongue; hypermetabolic intraosseous lesions in spine and pelvis consistent with metastatic disease; left axillary hypermetabolic lymph nodes related to COVID-19 vaccination.  9. 3/12/2021: CT-guided lung biopsy showed non-small cell carcinoma, not otherwise specified -- with opinion by [facility] pathologist.  10. 3/18/2021: Lung NGS panel negative for mutations in BRAF, EGFR, ERBB2, IDH1, IDH2, KRAS, MET, NRAS, RET. PD-L1 expression negative (TPS <1%). Due to minimal amount of DNA obtained from specimen, other biomarkers could not be analyzed.  11. 4/7/2021: MRI brain negative for brain metastases.  12. 4/27/2021: Started 1st line metastatic therapy with carboplatin, paclitaxel, bevacizumab, and atezolizumab for metastatic non-small cell lung carcinoma.  13. 7/12/2021: PET/CT showed resolved mural nodules with increased cystic cavity in left lower lobe with no significant FDG uptake, less likely metastases; no enlarged hypermetabolic mediastinal, hilar, or axillary lymph nodes, decreased metabolic activity of intraosseous lesion in spine and pelvis; no new bone lesions.  14. 10/11/2021: PET/CT showed slight interval increase in intensity of metabolic activity of right pubic bone and left ischium with new focal uptake in L2 spinous process; resolved uptake at previous ground glass opacity along right medial lower lobe; unchanged cystic cavities/nodules in left lower lobe and right apical upper lobe; no pathologically enlarged hypermetabolic mediastinal, hilar, or axillary lymph nodes.    INTERIM HISTORY:  Kt reports mild peripheral neuropathy.  He denies any nausea, epistaxis, hematochezia, or melena. He has occasional cough but denies shortness of breath. He has lost about 10-11 pounds since starting chemo.      REVIEW OF SYSTEMS:   14 point ROS was reviewed and is negative other than as noted above in HPI.       HOME MEDICATIONS:  Current Outpatient Medications   Medication Sig  Dispense Refill     atorvastatin (LIPITOR) 40 MG tablet Take 40 mg by mouth daily       dexamethasone (DECADRON) 4 MG tablet Take 1 tablet (4 mg) by mouth 2 times daily (with meals) Take 2 tablets (8 mg) by mouth daily for three days. Start on Day 2 of each cycle. 6 tablet 3     LORazepam (ATIVAN) 0.5 MG tablet Take 1 tablet (0.5 mg) by mouth every 4 hours as needed (Anxiety, Nausea/Vomiting or Sleep) 30 tablet 3     prochlorperazine (COMPAZINE) 10 MG tablet Take 1 tablet (10 mg) by mouth every 6 hours as needed (Nausea/Vomiting) 30 tablet 3         ALLERGIES:  Allergies   Allergen Reactions     No Known Drug Allergies          PAST MEDICAL HISTORY:  Past Medical History:   Diagnosis Date     Alcohol abuse, unspecified      Cerebral infarction (H)     2009, right side residual and aphasia     Dyslipidemia      GERD (gastroesophageal reflux disease)      Lung cancer (H)      Unspecified essential hypertension          PAST SURGICAL HISTORY:  Past Surgical History:   Procedure Laterality Date     BRONCHOSCOPY FLEXIBLE AND RIGID N/A 5/5/2016    Procedure: BRONCHOSCOPY FLEXIBLE AND RIGID;  Surgeon: Tony Talbot MD;  Location: [facility] OR     ESOPHAGOSCOPY FLEXIBLE N/A 9/30/2019    Procedure: flexible esophagoscopy;  Surgeon: Lizzy Johnson MD;  Location: [facility] OR     INJECT STEROID (LOCATION) N/A 9/30/2019    Procedure: steroid injection;  Surgeon: Lizzy Johnson MD;  Location: [facility] OR     IR CHEST PORT PLACEMENT > 5 YRS OF AGE  4/22/2021     LASER CO2 LARYNGOSCOPY N/A 9/30/2019    Procedure: Microdirect laryngoscopy with excision of laryngeal mass, CO2 laser;  Surgeon: Lizzy Johnson MD;  Location: [facility] OR     [facility] NONSPECIFIC PROCEDURE      R tympanoplasty         SOCIAL HISTORY:  Social History     Socioeconomic History     Marital status: Single     Spouse name: Not on file     Number of children: Not on file     Years of education: Not on file     Highest education level: Not on file   Occupational History      Not on file   Tobacco Use     Smoking status: Former Smoker     Packs/day: 1.00     Types: Cigarettes     Quit date: 2009     Years since quittin.0     Smokeless tobacco: Never Used   Substance and Sexual Activity     Alcohol use: Not Currently     Drug use: No     Sexual activity: Not on file   Other Topics Concern     Parent/sibling w/ CABG, MI or angioplasty before 65F 55M? Not Asked   Social History Narrative     Not on file     Social Determinants of Health     Financial Resource Strain:      Difficulty of Paying Living Expenses:    Food Insecurity:      Worried About Running Out of Food in the Last Year:      Ran Out of Food in the Last Year:    Transportation Needs:      Lack of Transportation (Medical):      Lack of Transportation (Non-Medical):    Physical Activity:      Days of Exercise per Week:      Minutes of Exercise per Session:    Stress:      Feeling of Stress :    Social Connections:      Frequency of Communication with Friends and Family:      Frequency of Social Gatherings with Friends and Family:      Attends Faith Services:      Active Member of Clubs or Organizations:      Attends Club or Organization Meetings:      Marital Status:    Intimate Partner Violence:      Fear of Current or Ex-Partner:      Emotionally Abused:      Physically Abused:      Sexually Abused:          FAMILY HISTORY:  Family History   Problem Relation Age of Onset     Cancer Father          PHYSICAL EXAM:  Vital signs:  /80   Pulse 89   Temp 97.9  F (36.6  C) (Oral)   Resp 14   Wt 76.9 kg (169 lb 9.6 oz)   SpO2 100%   BMI 21.20 kg/m     ECO  GENERAL/CONSTITUTIONAL: No acute distress.  EYES: No erythema or scleral icterus.  LYMPH: No cervical, supraclavicular, axillary adenopathy.   RESPIRATORY: Clear to auscultation bilaterally. No crackles or wheezing.   CARDIOVASCULAR: Regular rate and rhythm without murmurs.  GASTROINTESTINAL: No hepatosplenomegaly, masses, or tenderness. No guarding.   No distention.  MUSCULOSKELETAL: Warm and well-perfused, no cyanosis, clubbing, or edema.  NEUROLOGIC: Residual right arm and right leg reduced strength due to prior stroke. Alert, oriented, answers questions appropriately. Dysphonia present.  INTEGUMENTARY: No rashes or jaundice.  GAIT: Limping present; uses cane.      LABS:  CBC RESULTS: Recent Labs   Lab Test 10/12/21  1028   WBC 7.1   RBC 3.79*   HGB 10.9*   HCT 35.1*   MCV 93   MCH 28.8   MCHC 31.1*   RDW 15.5*        Last Comprehensive Metabolic Panel:  Sodium   Date Value Ref Range Status   10/12/2021 139 133 - 144 mmol/L Final   06/29/2021 140 133 - 144 mmol/L Final     Potassium   Date Value Ref Range Status   10/12/2021 4.5 3.4 - 5.3 mmol/L Final   06/29/2021 4.3 3.4 - 5.3 mmol/L Final     Chloride   Date Value Ref Range Status   10/12/2021 106 94 - 109 mmol/L Final   06/29/2021 111 (H) 94 - 109 mmol/L Final     Carbon Dioxide   Date Value Ref Range Status   06/29/2021 25 20 - 32 mmol/L Final     Carbon Dioxide (CO2)   Date Value Ref Range Status   10/12/2021 26 20 - 32 mmol/L Final     Anion Gap   Date Value Ref Range Status   10/12/2021 7 3 - 14 mmol/L Final   06/29/2021 4 3 - 14 mmol/L Final     Glucose   Date Value Ref Range Status   10/12/2021 90 70 - 99 mg/dL Final   06/29/2021 85 70 - 99 mg/dL Final     Urea Nitrogen   Date Value Ref Range Status   10/12/2021 24 7 - 30 mg/dL Final   06/29/2021 17 7 - 30 mg/dL Final     Creatinine   Date Value Ref Range Status   10/12/2021 0.85 0.66 - 1.25 mg/dL Final   06/29/2021 0.84 0.66 - 1.25 mg/dL Final     GFR Estimate   Date Value Ref Range Status   10/12/2021 >90 >60 mL/min/1.73m2 Final     Comment:     As of July 11, 2021, eGFR is calculated by the CKD-EPI creatinine equation, without race adjustment. eGFR can be influenced by muscle mass, exercise, and diet. The reported eGFR is an estimation only and is only applicable if the renal function is stable.   06/29/2021 >90 >60 mL/min/[1.73_m2] Final      Comment:     Non  GFR Calc  Starting 12/18/2018, serum creatinine based estimated GFR (eGFR) will be   calculated using the Chronic Kidney Disease Epidemiology Collaboration   (CKD-EPI) equation.       Calcium   Date Value Ref Range Status   10/12/2021 9.1 8.5 - 10.1 mg/dL Final   06/29/2021 8.7 8.5 - 10.1 mg/dL Final     Bilirubin Total   Date Value Ref Range Status   10/12/2021 0.4 0.2 - 1.3 mg/dL Final   06/29/2021 0.3 0.2 - 1.3 mg/dL Final     Alkaline Phosphatase   Date Value Ref Range Status   10/12/2021 88 40 - 150 U/L Final   06/29/2021 73 40 - 150 U/L Final     ALT   Date Value Ref Range Status   10/12/2021 26 0 - 70 U/L Final   06/29/2021 34 0 - 70 U/L Final     AST   Date Value Ref Range Status   10/12/2021 22 0 - 45 U/L Final   06/29/2021 26 0 - 45 U/L Final       PATHOLOGY:  None new.    IMAGING:  Reviewed as per HPI.    ASSESSMENT/PLAN:  Kt Rasmussen is a 61 year old male with the following issues:  1. Metastatic non-small cell lung carcinoma with metastases to lymph nodes, bones, and bilateral lungs  2. History of locally advanced endobronchial invasive squamous cell carcinoma diagnosed 5/2016, status post debulking and chemoradiation   --I reviewed PET scan from 10/11/2021 which showed slight interval increase in couple pelvic bony areas and new focal uptake at L2 but remaining scan shows no residual or progressive disease in lungs or lymph nodes. Discussed in detail with Kt. If more definite or significant disease progression on next scan, will discuss switching to 2nd metastatic therapy.  --Prior lung NGS biomarker testing were all negative and PD-L1 expression was negative. ROS1 and NTRK biomarker analysis could not be performed due to limited amount of DNA extracted from the specimen.  Will consider a repeat biopsy in the future to obtain testing for these biomarkers if he progresses on 1st line therapy.  --I recommended to Kt he continue with 1st line metastatic therapy  with paclitaxel, bevacizumab, and atezolizumab.  --Plan to repeat PET scan in 3 months in 1/2022.    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 Dysphonia  5. Dysphagia  -Kt underwent excision of a left vocal cord SCC on 9/30/2019 and has persistent dysphonia as a result of the lesion and excision.  He also has dysphagia but this is stable and swallowing is manageable.  -Last scope by ENT 9/23/2021 showed no evidence for recurrence.    6. Weight loss  --I offered nutrition consult and he declined this.  --I recommended increasing his intake of higher caloric nutrient dense foods and beverages.    Sangita John MD  Hematology/Oncology  [facility]    Total time spent: 50 minutes in patient evaluation, counseling, documentation, and coordination of care.

## 2021-10-11 ENCOUNTER — HOSPITAL ENCOUNTER (OUTPATIENT)
Dept: PET IMAGING | Facility: CLINIC | Age: 61
Discharge: HOME | End: 2021-10-11
Attending: INTERNAL MEDICINE | Admitting: INTERNAL MEDICINE
Payer: MEDICARE

## 2021-10-11 DIAGNOSIS — C79.51 NON-SMALL CELL LUNG CANCER METASTATIC TO BONE (H): ICD-10-CM

## 2021-10-11 DIAGNOSIS — C78.01 MALIGNANT NEOPLASM METASTATIC TO BOTH LUNGS (H): ICD-10-CM

## 2021-10-11 DIAGNOSIS — C78.02 MALIGNANT NEOPLASM METASTATIC TO BOTH LUNGS (H): ICD-10-CM

## 2021-10-11 DIAGNOSIS — C34.90 NON-SMALL CELL LUNG CANCER METASTATIC TO BONE (H): ICD-10-CM

## 2021-10-11 PROCEDURE — 78816 PET IMAGE W/CT FULL BODY: CPT | Mod: 26 | Performed by: RADIOLOGY

## 2021-10-11 PROCEDURE — 78816 PET IMAGE W/CT FULL BODY: CPT | Mod: PI,MG

## 2021-10-11 PROCEDURE — 343N000001 HC RX 343: Performed by: INTERNAL MEDICINE

## 2021-10-11 PROCEDURE — G1004 CDSM NDSC: HCPCS | Mod: GC | Performed by: RADIOLOGY

## 2021-10-11 PROCEDURE — A9552 F18 FDG: HCPCS | Performed by: INTERNAL MEDICINE

## 2021-10-11 RX ADMIN — FLUDEOXYGLUCOSE F-18 12.02 MCI.: 500 INJECTION, SOLUTION INTRAVENOUS at 10:50

## 2021-10-12 ENCOUNTER — LAB (OUTPATIENT)
Dept: INFUSION THERAPY | Facility: CLINIC | Age: 61
End: 2021-10-12
Attending: INTERNAL MEDICINE
Payer: MEDICARE

## 2021-10-12 DIAGNOSIS — C79.51 NON-SMALL CELL LUNG CANCER METASTATIC TO BONE (H): ICD-10-CM

## 2021-10-12 DIAGNOSIS — D70.1 CHEMOTHERAPY-INDUCED NEUTROPENIA (H): Primary | ICD-10-CM

## 2021-10-12 DIAGNOSIS — Z51.11 ENCOUNTER FOR ANTINEOPLASTIC CHEMOTHERAPY: ICD-10-CM

## 2021-10-12 DIAGNOSIS — C34.90 NON-SMALL CELL CARCINOMA OF LUNG, STAGE 3, UNSPECIFIED LATERALITY (H): ICD-10-CM

## 2021-10-12 DIAGNOSIS — C34.90 NON-SMALL CELL LUNG CANCER METASTATIC TO BONE (H): ICD-10-CM

## 2021-10-12 DIAGNOSIS — T45.1X5A CHEMOTHERAPY-INDUCED NEUTROPENIA (H): Primary | ICD-10-CM

## 2021-10-12 LAB
ALBUMIN SERPL-MCNC: 3.7 G/DL (ref 3.4–5)
ALP SERPL-CCNC: 88 U/L (ref 40–150)
ALT SERPL W P-5'-P-CCNC: 26 U/L (ref 0–70)
ANION GAP SERPL CALCULATED.3IONS-SCNC: 7 MMOL/L (ref 3–14)
AST SERPL W P-5'-P-CCNC: 22 U/L (ref 0–45)
BASOPHILS # BLD AUTO: 0 10E3/UL (ref 0–0.2)
BASOPHILS NFR BLD AUTO: 0 %
BILIRUB SERPL-MCNC: 0.4 MG/DL (ref 0.2–1.3)
BUN SERPL-MCNC: 24 MG/DL (ref 7–30)
CALCIUM SERPL-MCNC: 9.1 MG/DL (ref 8.5–10.1)
CHLORIDE BLD-SCNC: 106 MMOL/L (ref 94–109)
CO2 SERPL-SCNC: 26 MMOL/L (ref 20–32)
CREAT SERPL-MCNC: 0.85 MG/DL (ref 0.66–1.25)
EOSINOPHIL # BLD AUTO: 0.1 10E3/UL (ref 0–0.7)
EOSINOPHIL NFR BLD AUTO: 2 %
ERYTHROCYTE [DISTWIDTH] IN BLOOD BY AUTOMATED COUNT: 15.5 % (ref 10–15)
GFR SERPL CREATININE-BSD FRML MDRD: >90 ML/MIN/1.73M2
GLUCOSE BLD-MCNC: 90 MG/DL (ref 70–99)
HCT VFR BLD AUTO: 35.1 % (ref 40–53)
HGB BLD-MCNC: 10.9 G/DL (ref 13.3–17.7)
IMM GRANULOCYTES # BLD: 0 10E3/UL
IMM GRANULOCYTES NFR BLD: 0 %
LYMPHOCYTES # BLD AUTO: 1.4 10E3/UL (ref 0.8–5.3)
LYMPHOCYTES NFR BLD AUTO: 20 %
MCH RBC QN AUTO: 28.8 PG (ref 26.5–33)
MCHC RBC AUTO-ENTMCNC: 31.1 G/DL (ref 31.5–36.5)
MCV RBC AUTO: 93 FL (ref 78–100)
MONOCYTES # BLD AUTO: 0.5 10E3/UL (ref 0–1.3)
MONOCYTES NFR BLD AUTO: 8 %
NEUTROPHILS # BLD AUTO: 4.9 10E3/UL (ref 1.6–8.3)
NEUTROPHILS NFR BLD AUTO: 70 %
NRBC # BLD AUTO: 0 10E3/UL
NRBC BLD AUTO-RTO: 0 /100
PLATELET # BLD AUTO: 179 10E3/UL (ref 150–450)
POTASSIUM BLD-SCNC: 4.5 MMOL/L (ref 3.4–5.3)
PROT SERPL-MCNC: 7.4 G/DL (ref 6.8–8.8)
RBC # BLD AUTO: 3.79 10E6/UL (ref 4.4–5.9)
SODIUM SERPL-SCNC: 139 MMOL/L (ref 133–144)
TSH SERPL DL<=0.005 MIU/L-ACNC: 2.7 MU/L (ref 0.4–4)
WBC # BLD AUTO: 7.1 10E3/UL (ref 4–11)

## 2021-10-12 PROCEDURE — 250N000011 HC RX IP 250 OP 636: Performed by: INTERNAL MEDICINE

## 2021-10-12 PROCEDURE — 84443 ASSAY THYROID STIM HORMONE: CPT | Performed by: INTERNAL MEDICINE

## 2021-10-12 PROCEDURE — 85025 COMPLETE CBC W/AUTO DIFF WBC: CPT | Performed by: INTERNAL MEDICINE

## 2021-10-12 PROCEDURE — 80053 COMPREHEN METABOLIC PANEL: CPT | Performed by: INTERNAL MEDICINE

## 2021-10-12 PROCEDURE — 36591 DRAW BLOOD OFF VENOUS DEVICE: CPT

## 2021-10-12 RX ORDER — HEPARIN SODIUM (PORCINE) LOCK FLUSH IV SOLN 100 UNIT/ML 100 UNIT/ML
5 SOLUTION INTRAVENOUS
Status: DISCONTINUED | OUTPATIENT
Start: 2021-10-12 | End: 2021-10-12 | Stop reason: HOSPADM

## 2021-10-12 RX ORDER — HEPARIN SODIUM,PORCINE 10 UNIT/ML
5 VIAL (ML) INTRAVENOUS
Status: CANCELLED | OUTPATIENT
Start: 2021-10-12

## 2021-10-12 RX ORDER — HEPARIN SODIUM (PORCINE) LOCK FLUSH IV SOLN 100 UNIT/ML 100 UNIT/ML
5 SOLUTION INTRAVENOUS
Status: CANCELLED | OUTPATIENT
Start: 2021-10-12

## 2021-10-12 RX ADMIN — Medication 5 ML: at 10:39

## 2021-10-12 NOTE — PROGRESS NOTES
Nursing Note:  Kt Rasmussen presents today for port labs for tx 10/13.    Patient seen by provider today: No   present during visit today: Not Applicable.    Note: Patient unable to leave urine specimen today.  Patient sent home with urine cup and will bring sample to his appt tomorrow.    Intravenous Access:  Labs drawn without difficulty.  Implanted Port.    Discharge Plan:   Patient was sent home.    Filipe Carr RN

## 2021-10-13 ENCOUNTER — INFUSION THERAPY VISIT (OUTPATIENT)
Dept: INFUSION THERAPY | Facility: CLINIC | Age: 61
End: 2021-10-13
Attending: INTERNAL MEDICINE
Payer: MEDICARE

## 2021-10-13 ENCOUNTER — ONCOLOGY VISIT (OUTPATIENT)
Dept: ONCOLOGY | Facility: CLINIC | Age: 61
End: 2021-10-13
Attending: INTERNAL MEDICINE
Payer: MEDICARE

## 2021-10-13 VITALS
DIASTOLIC BLOOD PRESSURE: 80 MMHG | RESPIRATION RATE: 14 BRPM | HEART RATE: 89 BPM | TEMPERATURE: 97.9 F | SYSTOLIC BLOOD PRESSURE: 117 MMHG | BODY MASS INDEX: 21.2 KG/M2 | OXYGEN SATURATION: 100 % | WEIGHT: 169.6 LBS

## 2021-10-13 DIAGNOSIS — T45.1X5A CHEMOTHERAPY-INDUCED NEUTROPENIA (H): ICD-10-CM

## 2021-10-13 DIAGNOSIS — C78.02 MALIGNANT NEOPLASM METASTATIC TO BOTH LUNGS (H): ICD-10-CM

## 2021-10-13 DIAGNOSIS — C79.51 NON-SMALL CELL LUNG CANCER METASTATIC TO BONE (H): ICD-10-CM

## 2021-10-13 DIAGNOSIS — C34.90 NON-SMALL CELL LUNG CANCER METASTATIC TO BONE (H): ICD-10-CM

## 2021-10-13 DIAGNOSIS — C32.9 LARYNX CANCER (H): ICD-10-CM

## 2021-10-13 DIAGNOSIS — D70.1 CHEMOTHERAPY-INDUCED NEUTROPENIA (H): ICD-10-CM

## 2021-10-13 DIAGNOSIS — C78.01 MALIGNANT NEOPLASM METASTATIC TO BOTH LUNGS (H): ICD-10-CM

## 2021-10-13 DIAGNOSIS — Z51.11 ENCOUNTER FOR ANTINEOPLASTIC CHEMOTHERAPY: Primary | ICD-10-CM

## 2021-10-13 DIAGNOSIS — Z51.11 ENCOUNTER FOR ANTINEOPLASTIC CHEMOTHERAPY: ICD-10-CM

## 2021-10-13 DIAGNOSIS — C79.51 NON-SMALL CELL LUNG CANCER METASTATIC TO BONE (H): Primary | ICD-10-CM

## 2021-10-13 DIAGNOSIS — C34.90 NON-SMALL CELL LUNG CANCER METASTATIC TO BONE (H): Primary | ICD-10-CM

## 2021-10-13 LAB — ALBUMIN UR-MCNC: NEGATIVE MG/DL

## 2021-10-13 PROCEDURE — G0463 HOSPITAL OUTPT CLINIC VISIT: HCPCS

## 2021-10-13 PROCEDURE — 96417 CHEMO IV INFUS EACH ADDL SEQ: CPT

## 2021-10-13 PROCEDURE — 99215 OFFICE O/P EST HI 40 MIN: CPT | Performed by: INTERNAL MEDICINE

## 2021-10-13 PROCEDURE — 81003 URINALYSIS AUTO W/O SCOPE: CPT | Performed by: INTERNAL MEDICINE

## 2021-10-13 PROCEDURE — 250N000011 HC RX IP 250 OP 636: Performed by: INTERNAL MEDICINE

## 2021-10-13 PROCEDURE — 258N000003 HC RX IP 258 OP 636: Performed by: INTERNAL MEDICINE

## 2021-10-13 PROCEDURE — 96413 CHEMO IV INFUSION 1 HR: CPT

## 2021-10-13 PROCEDURE — 96367 TX/PROPH/DG ADDL SEQ IV INF: CPT

## 2021-10-13 RX ORDER — HEPARIN SODIUM (PORCINE) LOCK FLUSH IV SOLN 100 UNIT/ML 100 UNIT/ML
5 SOLUTION INTRAVENOUS
Status: DISCONTINUED | OUTPATIENT
Start: 2021-10-13 | End: 2021-10-13 | Stop reason: HOSPADM

## 2021-10-13 RX ORDER — ALBUTEROL SULFATE 0.83 MG/ML
2.5 SOLUTION RESPIRATORY (INHALATION)
Status: CANCELLED | OUTPATIENT
Start: 2021-10-13

## 2021-10-13 RX ORDER — DIPHENHYDRAMINE HYDROCHLORIDE 50 MG/ML
50 INJECTION INTRAMUSCULAR; INTRAVENOUS
Status: CANCELLED
Start: 2021-10-13

## 2021-10-13 RX ORDER — LORAZEPAM 2 MG/ML
0.5 INJECTION INTRAMUSCULAR EVERY 4 HOURS PRN
Status: CANCELLED
Start: 2021-10-13

## 2021-10-13 RX ORDER — EPINEPHRINE 1 MG/ML
0.3 INJECTION, SOLUTION INTRAMUSCULAR; SUBCUTANEOUS EVERY 5 MIN PRN
Status: CANCELLED | OUTPATIENT
Start: 2021-10-13

## 2021-10-13 RX ORDER — HEPARIN SODIUM (PORCINE) LOCK FLUSH IV SOLN 100 UNIT/ML 100 UNIT/ML
5 SOLUTION INTRAVENOUS
Status: CANCELLED | OUTPATIENT
Start: 2021-10-13

## 2021-10-13 RX ORDER — MEPERIDINE HYDROCHLORIDE 25 MG/ML
25 INJECTION INTRAMUSCULAR; INTRAVENOUS; SUBCUTANEOUS EVERY 30 MIN PRN
Status: CANCELLED | OUTPATIENT
Start: 2021-10-13

## 2021-10-13 RX ORDER — NALOXONE HYDROCHLORIDE 0.4 MG/ML
.1-.4 INJECTION, SOLUTION INTRAMUSCULAR; INTRAVENOUS; SUBCUTANEOUS
Status: CANCELLED | OUTPATIENT
Start: 2021-10-13

## 2021-10-13 RX ORDER — HEPARIN SODIUM,PORCINE 10 UNIT/ML
5 VIAL (ML) INTRAVENOUS
Status: CANCELLED | OUTPATIENT
Start: 2021-10-13

## 2021-10-13 RX ORDER — SODIUM CHLORIDE 9 MG/ML
1000 INJECTION, SOLUTION INTRAVENOUS CONTINUOUS PRN
Status: CANCELLED
Start: 2021-10-13

## 2021-10-13 RX ORDER — METHYLPREDNISOLONE SODIUM SUCCINATE 125 MG/2ML
125 INJECTION, POWDER, LYOPHILIZED, FOR SOLUTION INTRAMUSCULAR; INTRAVENOUS
Status: CANCELLED
Start: 2021-10-13

## 2021-10-13 RX ORDER — ALBUTEROL SULFATE 90 UG/1
1-2 AEROSOL, METERED RESPIRATORY (INHALATION)
Status: CANCELLED
Start: 2021-10-13

## 2021-10-13 RX ADMIN — SODIUM CHLORIDE 250 ML: 9 INJECTION, SOLUTION INTRAVENOUS at 12:36

## 2021-10-13 RX ADMIN — Medication 5 ML: at 16:52

## 2021-10-13 RX ADMIN — PACLITAXEL 410 MG: 6 INJECTION, SOLUTION INTRAVENOUS at 13:48

## 2021-10-13 RX ADMIN — ATEZOLIZUMAB 1200 MG: 1200 INJECTION, SOLUTION INTRAVENOUS at 12:36

## 2021-10-13 RX ADMIN — SODIUM CHLORIDE 1200 MG: 9 INJECTION, SOLUTION INTRAVENOUS at 13:14

## 2021-10-13 ASSESSMENT — PAIN SCALES - GENERAL: PAINLEVEL: NO PAIN (0)

## 2021-10-13 NOTE — PROGRESS NOTES
"Oncology Rooming Note    October 13, 2021 11:01 AM   Kt Rasmussen is a 61 year old male who presents for:    Chief Complaint   Patient presents with     Oncology Clinic Visit     Initial Vitals: /80   Pulse 89   Temp 97.9  F (36.6  C) (Oral)   Resp 14   Wt 76.9 kg (169 lb 9.6 oz)   SpO2 100%   BMI 21.20 kg/m   Estimated body mass index is 21.2 kg/m  as calculated from the following:    Height as of 9/23/21: 1.905 m (6' 3\").    Weight as of this encounter: 76.9 kg (169 lb 9.6 oz). Body surface area is 2.02 meters squared.  No Pain (0) Comment: Data Unavailable   No LMP for male patient.  Allergies reviewed: Yes  Medications reviewed: Yes    Medications: Medication refills not needed today.  Pharmacy name entered into EPIC:    [facility] [place] [address] [facility] [place] - [address] [id number] - [place], [place] - [address] AT [facility] [address]    Clinical concerns: no       Shari J. Schoenberger, CMA            "

## 2021-10-13 NOTE — PATIENT INSTRUCTIONS
1. Proceed with chemo today.  2. Arrange for Taxol, atezolizumab, and bevacizumab every 3 weeks with labs every 3 weeks.  3. RTC NP every 3 weeks x 3.  4. RTC MD in 12 weeks with PET/CT prior to visit.

## 2021-10-13 NOTE — LETTER
10/13/2021         RE: Kt Rasmussen  [id number]  [facility] [address]        Dear Colleague,    Thank you for referring your patient, Kt Rasmussen, to the [facility]. Please see a copy of my visit note below.    [facility]    Hematology/Oncology Established Patient Follow-up Note      Today's Date: 10/13/2021    Reason for Follow-up: Metastatic non-small cell lung carcinoma.    HISTORY OF PRESENT ILLNESS: Kt Rasmussen is a 61 year old male who presents with the following oncologic history:  1. 5/05/2016: Presented with near-obstructing large mass coming off the cheikh and likely the posterior wall, seen on bronchoscopy. Biopsy of the mass showed invasive squamous cell carcinoma, moderately differentiating and focally keratinizing.  Procedure was complicated by significant bleeding.  Tumor was completely debulked (via bronchoscopy) in both main stem bronchi and distal trachea. NGS showed no mutations in EGFR, KRAS, BRAF, NRAS, HRAS, PIK3CA, ERBB2, MET, or JAK2.  2. 5/23/2016: PET/CT scan showed evidence of residual tumor with decreased endobronchial mass. Indeterminate, mildly hypermetabolic mesentery with prominent lymph nodes and area of enhancement of the right hepatic lobe present.   Felt to have T4-NX-M0 disease.  3. 6/09/2016: Started concurrent chemoradiation with weekly paclitaxel and carboplatin.  4. 7/30/2016: Completed radiation.  Subsequently received 2 cycles of consolidation paclitaxel and carboplatin.   5. 9/13/2019: Presented with 6-month history of dysphonia and left vocal exophytic lesion. Left true vocal fold biopsy showed at least squamous cell carcinoma in situ, cannot exclude superficial invasion.  6. 9/30/2019: Excision of left vocal cord mass showed fragments of invasive squamous cell carcinoma, well differentiated and keratinizing.  Margins negative for malignancy.  7. 2/16/2021: CT chest w/o contrast showed thin-walled cavity in  left lower lobe with 2 solid peripheral nodules measuring 9 mm each. No lymphadenopathy.  8. 3/01/2021: PET scan showed hypermetabolic nodules in left lower lobe and right lung, consistent with metastases; hypermetabolic adenopathy in chest, abdomen, pelvis; nonspecific uptake at tongue; hypermetabolic intraosseous lesions in spine and pelvis consistent with metastatic disease; left axillary hypermetabolic lymph nodes related to COVID-19 vaccination.  9. 3/12/2021: CT-guided lung biopsy showed non-small cell carcinoma, not otherwise specified -- with opinion by [facility] pathologist.  10. 3/18/2021: Lung NGS panel negative for mutations in BRAF, EGFR, ERBB2, IDH1, IDH2, KRAS, MET, NRAS, RET. PD-L1 expression negative (TPS <1%). Due to minimal amount of DNA obtained from specimen, other biomarkers could not be analyzed.  11. 4/7/2021: MRI brain negative for brain metastases.  12. 4/27/2021: Started 1st line metastatic therapy with carboplatin, paclitaxel, bevacizumab, and atezolizumab for metastatic non-small cell lung carcinoma.  13. 7/12/2021: PET/CT showed resolved mural nodules with increased cystic cavity in left lower lobe with no significant FDG uptake, less likely metastases; no enlarged hypermetabolic mediastinal, hilar, or axillary lymph nodes, decreased metabolic activity of intraosseous lesion in spine and pelvis; no new bone lesions.  14. 10/11/2021: PET/CT showed slight interval increase in intensity of metabolic activity of right pubic bone and left ischium with new focal uptake in L2 spinous process; resolved uptake at previous ground glass opacity along right medial lower lobe; unchanged cystic cavities/nodules in left lower lobe and right apical upper lobe; no pathologically enlarged hypermetabolic mediastinal, hilar, or axillary lymph nodes.    INTERIM HISTORY:  Kt reports mild peripheral neuropathy.  He denies any nausea, epistaxis, hematochezia, or melena. He has occasional cough but denies  shortness of breath. He has lost about 10-11 pounds since starting chemo.      REVIEW OF SYSTEMS:   14 point ROS was reviewed and is negative other than as noted above in HPI.       HOME MEDICATIONS:  Current Outpatient Medications   Medication Sig Dispense Refill     atorvastatin (LIPITOR) 40 MG tablet Take 40 mg by mouth daily       dexamethasone (DECADRON) 4 MG tablet Take 1 tablet (4 mg) by mouth 2 times daily (with meals) Take 2 tablets (8 mg) by mouth daily for three days. Start on Day 2 of each cycle. 6 tablet 3     LORazepam (ATIVAN) 0.5 MG tablet Take 1 tablet (0.5 mg) by mouth every 4 hours as needed (Anxiety, Nausea/Vomiting or Sleep) 30 tablet 3     prochlorperazine (COMPAZINE) 10 MG tablet Take 1 tablet (10 mg) by mouth every 6 hours as needed (Nausea/Vomiting) 30 tablet 3         ALLERGIES:  Allergies   Allergen Reactions     No Known Drug Allergies          PAST MEDICAL HISTORY:  Past Medical History:   Diagnosis Date     Alcohol abuse, unspecified      Cerebral infarction (H)     2009, right side residual and aphasia     Dyslipidemia      GERD (gastroesophageal reflux disease)      Lung cancer (H)      Unspecified essential hypertension          PAST SURGICAL HISTORY:  Past Surgical History:   Procedure Laterality Date     BRONCHOSCOPY FLEXIBLE AND RIGID N/A 5/5/2016    Procedure: BRONCHOSCOPY FLEXIBLE AND RIGID;  Surgeon: Tony Talbot MD;  Location: [facility] OR     ESOPHAGOSCOPY FLEXIBLE N/A 9/30/2019    Procedure: flexible esophagoscopy;  Surgeon: Lizzy Johnson MD;  Location: [facility] OR     INJECT STEROID (LOCATION) N/A 9/30/2019    Procedure: steroid injection;  Surgeon: Lizzy Johnson MD;  Location: [facility] OR     IR CHEST PORT PLACEMENT > 5 YRS OF AGE  4/22/2021     LASER CO2 LARYNGOSCOPY N/A 9/30/2019    Procedure: Microdirect laryngoscopy with excision of laryngeal mass, CO2 laser;  Surgeon: Lizzy Johnson MD;  Location:  OR     [facility] NONSPECIFIC PROCEDURE      R tympanoplasty          SOCIAL HISTORY:  Social History     Socioeconomic History     Marital status: Single     Spouse name: Not on file     Number of children: Not on file     Years of education: Not on file     Highest education level: Not on file   Occupational History     Not on file   Tobacco Use     Smoking status: Former Smoker     Packs/day: 1.00     Types: Cigarettes     Quit date: 2009     Years since quittin.0     Smokeless tobacco: Never Used   Substance and Sexual Activity     Alcohol use: Not Currently     Drug use: No     Sexual activity: Not on file   Other Topics Concern     Parent/sibling w/ CABG, MI or angioplasty before 65F 55M? Not Asked   Social History Narrative     Not on file     Social Determinants of Health     Financial Resource Strain:      Difficulty of Paying Living Expenses:    Food Insecurity:      Worried About Running Out of Food in the Last Year:      Ran Out of Food in the Last Year:    Transportation Needs:      Lack of Transportation (Medical):      Lack of Transportation (Non-Medical):    Physical Activity:      Days of Exercise per Week:      Minutes of Exercise per Session:    Stress:      Feeling of Stress :    Social Connections:      Frequency of Communication with Friends and Family:      Frequency of Social Gatherings with Friends and Family:      Attends [employer] Services:      Active Member of Clubs or Organizations:      Attends Club or Organization Meetings:      Marital Status:    Intimate Partner Violence:      Fear of Current or Ex-Partner:      Emotionally Abused:      Physically Abused:      Sexually Abused:          FAMILY HISTORY:  Family History   Problem Relation Age of Onset     Cancer Father          PHYSICAL EXAM:  Vital signs:  /80   Pulse 89   Temp 97.9  F (36.6  C) (Oral)   Resp 14   Wt 76.9 kg (169 lb 9.6 oz)   SpO2 100%   BMI 21.20 kg/m     ECO  GENERAL/CONSTITUTIONAL: No acute distress.  EYES: No erythema or scleral icterus.  LYMPH: No  cervical, supraclavicular, axillary adenopathy.   RESPIRATORY: Clear to auscultation bilaterally. No crackles or wheezing.   CARDIOVASCULAR: Regular rate and rhythm without murmurs.  GASTROINTESTINAL: No hepatosplenomegaly, masses, or tenderness. No guarding.  No distention.  MUSCULOSKELETAL: Warm and well-perfused, no cyanosis, clubbing, or edema.  NEUROLOGIC: Residual right arm and right leg reduced strength due to prior stroke. Alert, oriented, answers questions appropriately. Dysphonia present.  INTEGUMENTARY: No rashes or jaundice.  GAIT: Limping present; uses cane.      LABS:  CBC RESULTS: Recent Labs   Lab Test 10/12/21  1028   WBC 7.1   RBC 3.79*   HGB 10.9*   HCT 35.1*   MCV 93   MCH 28.8   MCHC 31.1*   RDW 15.5*        Last Comprehensive Metabolic Panel:  Sodium   Date Value Ref Range Status   10/12/2021 139 133 - 144 mmol/L Final   06/29/2021 140 133 - 144 mmol/L Final     Potassium   Date Value Ref Range Status   10/12/2021 4.5 3.4 - 5.3 mmol/L Final   06/29/2021 4.3 3.4 - 5.3 mmol/L Final     Chloride   Date Value Ref Range Status   10/12/2021 106 94 - 109 mmol/L Final   06/29/2021 111 (H) 94 - 109 mmol/L Final     Carbon Dioxide   Date Value Ref Range Status   06/29/2021 25 20 - 32 mmol/L Final     Carbon Dioxide (CO2)   Date Value Ref Range Status   10/12/2021 26 20 - 32 mmol/L Final     Anion Gap   Date Value Ref Range Status   10/12/2021 7 3 - 14 mmol/L Final   06/29/2021 4 3 - 14 mmol/L Final     Glucose   Date Value Ref Range Status   10/12/2021 90 70 - 99 mg/dL Final   06/29/2021 85 70 - 99 mg/dL Final     Urea Nitrogen   Date Value Ref Range Status   10/12/2021 24 7 - 30 mg/dL Final   06/29/2021 17 7 - 30 mg/dL Final     Creatinine   Date Value Ref Range Status   10/12/2021 0.85 0.66 - 1.25 mg/dL Final   06/29/2021 0.84 0.66 - 1.25 mg/dL Final     GFR Estimate   Date Value Ref Range Status   10/12/2021 >90 >60 mL/min/1.73m2 Final     Comment:     As of July 11, 2021, eGFR is calculated by  the CKD-EPI creatinine equation, without race adjustment. eGFR can be influenced by muscle mass, exercise, and diet. The reported eGFR is an estimation only and is only applicable if the renal function is stable.   06/29/2021 >90 >60 mL/min/[1.73_m2] Final     Comment:     Non  GFR Calc  Starting 12/18/2018, serum creatinine based estimated GFR (eGFR) will be   calculated using the Chronic Kidney Disease Epidemiology Collaboration   (CKD-EPI) equation.       Calcium   Date Value Ref Range Status   10/12/2021 9.1 8.5 - 10.1 mg/dL Final   06/29/2021 8.7 8.5 - 10.1 mg/dL Final     Bilirubin Total   Date Value Ref Range Status   10/12/2021 0.4 0.2 - 1.3 mg/dL Final   06/29/2021 0.3 0.2 - 1.3 mg/dL Final     Alkaline Phosphatase   Date Value Ref Range Status   10/12/2021 88 40 - 150 U/L Final   06/29/2021 73 40 - 150 U/L Final     ALT   Date Value Ref Range Status   10/12/2021 26 0 - 70 U/L Final   06/29/2021 34 0 - 70 U/L Final     AST   Date Value Ref Range Status   10/12/2021 22 0 - 45 U/L Final   06/29/2021 26 0 - 45 U/L Final       PATHOLOGY:  None new.    IMAGING:  Reviewed as per HPI.    ASSESSMENT/PLAN:  Kt Rasmussen is a 61 year old male with the following issues:  1. Metastatic non-small cell lung carcinoma with metastases to lymph nodes, bones, and bilateral lungs  2. History of locally advanced endobronchial invasive squamous cell carcinoma diagnosed 5/2016, status post debulking and chemoradiation   --I reviewed PET scan from 10/11/2021 which showed slight interval increase in couple pelvic bony areas and new focal uptake at L2 but remaining scan shows no residual or progressive disease in lungs or lymph nodes. Discussed in detail with Kt. If more definite or significant disease progression on next scan, will discuss switching to 2nd metastatic therapy.  --Prior lung NGS biomarker testing were all negative and PD-L1 expression was negative. ROS1 and NTRK biomarker analysis could not be  "performed due to limited amount of DNA extracted from the specimen.  Will consider a repeat biopsy in the future to obtain testing for these biomarkers if he progresses on 1st line therapy.  --I recommended to Kt he continue with 1st line metastatic therapy with paclitaxel, bevacizumab, and atezolizumab.  --Plan to repeat PET scan in 3 months in 1/2022.    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 Dysphonia  5. Dysphagia  -Kt underwent excision of a left vocal cord SCC on 9/30/2019 and has persistent dysphonia as a result of the lesion and excision.  He also has dysphagia but this is stable and swallowing is manageable.  -Last scope by ENT 9/23/2021 showed no evidence for recurrence.    6. Weight loss  --I offered nutrition consult and he declined this.  --I recommended increasing his intake of higher caloric nutrient dense foods and beverages.    Sangita John MD  Hematology/Oncology  [facility]    Total time spent: 50 minutes in patient evaluation, counseling, documentation, and coordination of care.    Oncology Rooming Note    October 13, 2021 11:01 AM   Kt Rasmussen is a 61 year old male who presents for:    Chief Complaint   Patient presents with     Oncology Clinic Visit     Initial Vitals: /80   Pulse 89   Temp 97.9  F (36.6  C) (Oral)   Resp 14   Wt 76.9 kg (169 lb 9.6 oz)   SpO2 100%   BMI 21.20 kg/m   Estimated body mass index is 21.2 kg/m  as calculated from the following:    Height as of 9/23/21: 1.905 m (6' 3\").    Weight as of this encounter: 76.9 kg (169 lb 9.6 oz). Body surface area is 2.02 meters squared.  No Pain (0) Comment: Data Unavailable   No LMP for male patient.  Allergies reviewed: Yes  Medications reviewed: Yes    Medications: Medication refills not needed today.  Pharmacy name entered into EPIC:    [facility] [place], [place] - [address] [facility] [place] - [place], [place] - [id number] - [place], [place] - [address] [facility] [address]    Clinical concerns: no       Shari J. Schoenberger, [facility]                Again, thank you for allowing me to participate in the care of your patient.        Sincerely,        Sangita John MD

## 2021-10-13 NOTE — PROGRESS NOTES
Infusion Nursing Note:  Kt DAVID Rasmussen presents today for C9D1 Zirabev, Taxol, Tecentriq    Patient seen by provider today: Yes: Dr John   present during visit today: Not Applicable.    Note: N/A.      Intravenous Access:  Implanted Port.    Treatment Conditions:  Lab Results   Component Value Date    HGB 10.9 (L) 10/12/2021    WBC 7.1 10/12/2021    ANEU 7.7 07/08/2021    ANEUTAUTO 4.9 10/12/2021     10/12/2021      Lab Results   Component Value Date     10/12/2021    POTASSIUM 4.5 10/12/2021    MAG 1.9 10/13/2016    CR 0.85 10/12/2021    BEVERLY 9.1 10/12/2021    BILITOTAL 0.4 10/12/2021    ALBUMIN 3.7 10/12/2021    ALT 26 10/12/2021    AST 22 10/12/2021     Results reviewed, labs MET treatment parameters, ok to proceed with treatment.  Urine protein negative.  BSA 2.02.      Post Infusion Assessment:  Patient tolerated infusion without incident.  Blood return noted pre and post infusion.  Site patent and intact, free from redness, edema or discomfort.  No evidence of extravasations.  Access discontinued per protocol.       Discharge Plan:   Discharge instructions reviewed with: Patient.  Patient and/or family verbalized understanding of discharge instructions and all questions answered.  Copy of AVS reviewed with patient and/or family.  Patient will return 10/14/21 for next appointment.  Patient discharged in stable condition accompanied by: self.  Departure Mode: Ambulatory.      Olga Rahman RN

## 2021-10-14 ENCOUNTER — ALLIED HEALTH/NURSE VISIT (OUTPATIENT)
Dept: INFUSION THERAPY | Facility: CLINIC | Age: 61
End: 2021-10-14
Attending: INTERNAL MEDICINE
Payer: MEDICARE

## 2021-10-14 VITALS
HEART RATE: 99 BPM | DIASTOLIC BLOOD PRESSURE: 71 MMHG | TEMPERATURE: 97.4 F | OXYGEN SATURATION: 100 % | RESPIRATION RATE: 18 BRPM | SYSTOLIC BLOOD PRESSURE: 119 MMHG

## 2021-10-14 DIAGNOSIS — C79.51 NON-SMALL CELL LUNG CANCER METASTATIC TO BONE (H): ICD-10-CM

## 2021-10-14 DIAGNOSIS — D70.1 CHEMOTHERAPY-INDUCED NEUTROPENIA (H): Primary | ICD-10-CM

## 2021-10-14 DIAGNOSIS — T45.1X5A CHEMOTHERAPY-INDUCED NEUTROPENIA (H): Primary | ICD-10-CM

## 2021-10-14 DIAGNOSIS — Z51.11 ENCOUNTER FOR ANTINEOPLASTIC CHEMOTHERAPY: ICD-10-CM

## 2021-10-14 DIAGNOSIS — C34.90 NON-SMALL CELL LUNG CANCER METASTATIC TO BONE (H): ICD-10-CM

## 2021-10-14 PROCEDURE — 250N000011 HC RX IP 250 OP 636: Performed by: INTERNAL MEDICINE

## 2021-10-14 PROCEDURE — 96372 THER/PROPH/DIAG INJ SC/IM: CPT | Performed by: INTERNAL MEDICINE

## 2021-10-14 RX ADMIN — PEGFILGRASTIM 6 MG: 6 INJECTION SUBCUTANEOUS at 13:50

## 2021-10-14 NOTE — PROGRESS NOTES
Nursing Note:  Kt Rasmussen presents today for Neulasta.    Patient seen by provider today: No   present during visit today: Not Applicable.    Note: Patient reported some increase in fatigue since his treatment yesterday.  No other new concerns to report.  Tolerated one Neulasta injection to left arm without issue.    Intravenous Access:  No Intravenous access/labs at this visit.    Discharge Plan:   Patient was sent to home in stable condition.    Devorah Stovall RN

## 2021-11-02 ENCOUNTER — LAB (OUTPATIENT)
Dept: INFUSION THERAPY | Facility: CLINIC | Age: 61
End: 2021-11-02
Attending: INTERNAL MEDICINE
Payer: MEDICARE

## 2021-11-02 DIAGNOSIS — C79.51 NON-SMALL CELL LUNG CANCER METASTATIC TO BONE (H): ICD-10-CM

## 2021-11-02 DIAGNOSIS — C34.90 NON-SMALL CELL CARCINOMA OF LUNG, STAGE 3, UNSPECIFIED LATERALITY (H): ICD-10-CM

## 2021-11-02 DIAGNOSIS — C34.90 NON-SMALL CELL LUNG CANCER METASTATIC TO BONE (H): ICD-10-CM

## 2021-11-02 DIAGNOSIS — T45.1X5A CHEMOTHERAPY-INDUCED NEUTROPENIA (H): Primary | ICD-10-CM

## 2021-11-02 DIAGNOSIS — Z51.11 ENCOUNTER FOR ANTINEOPLASTIC CHEMOTHERAPY: ICD-10-CM

## 2021-11-02 DIAGNOSIS — D70.1 CHEMOTHERAPY-INDUCED NEUTROPENIA (H): Primary | ICD-10-CM

## 2021-11-02 LAB
ALBUMIN SERPL-MCNC: 3.2 G/DL (ref 3.4–5)
ALP SERPL-CCNC: 85 U/L (ref 40–150)
ALT SERPL W P-5'-P-CCNC: 26 U/L (ref 0–70)
ANION GAP SERPL CALCULATED.3IONS-SCNC: 2 MMOL/L (ref 3–14)
AST SERPL W P-5'-P-CCNC: 23 U/L (ref 0–45)
BASOPHILS # BLD AUTO: 0 10E3/UL (ref 0–0.2)
BASOPHILS NFR BLD AUTO: 0 %
BILIRUB SERPL-MCNC: 0.4 MG/DL (ref 0.2–1.3)
BUN SERPL-MCNC: 20 MG/DL (ref 7–30)
CALCIUM SERPL-MCNC: 8.4 MG/DL (ref 8.5–10.1)
CHLORIDE BLD-SCNC: 110 MMOL/L (ref 94–109)
CO2 SERPL-SCNC: 26 MMOL/L (ref 20–32)
CREAT SERPL-MCNC: 0.66 MG/DL (ref 0.66–1.25)
EOSINOPHIL # BLD AUTO: 0.1 10E3/UL (ref 0–0.7)
EOSINOPHIL NFR BLD AUTO: 2 %
ERYTHROCYTE [DISTWIDTH] IN BLOOD BY AUTOMATED COUNT: 15.6 % (ref 10–15)
GFR SERPL CREATININE-BSD FRML MDRD: >90 ML/MIN/1.73M2
GLUCOSE BLD-MCNC: 132 MG/DL (ref 70–99)
HCT VFR BLD AUTO: 30.6 % (ref 40–53)
HGB BLD-MCNC: 9.5 G/DL (ref 13.3–17.7)
IMM GRANULOCYTES # BLD: 0 10E3/UL
IMM GRANULOCYTES NFR BLD: 0 %
LYMPHOCYTES # BLD AUTO: 1.3 10E3/UL (ref 0.8–5.3)
LYMPHOCYTES NFR BLD AUTO: 20 %
MCH RBC QN AUTO: 28.7 PG (ref 26.5–33)
MCHC RBC AUTO-ENTMCNC: 31 G/DL (ref 31.5–36.5)
MCV RBC AUTO: 92 FL (ref 78–100)
MONOCYTES # BLD AUTO: 0.5 10E3/UL (ref 0–1.3)
MONOCYTES NFR BLD AUTO: 7 %
NEUTROPHILS # BLD AUTO: 4.8 10E3/UL (ref 1.6–8.3)
NEUTROPHILS NFR BLD AUTO: 71 %
NRBC # BLD AUTO: 0 10E3/UL
NRBC BLD AUTO-RTO: 0 /100
PLATELET # BLD AUTO: 191 10E3/UL (ref 150–450)
POTASSIUM BLD-SCNC: 4.1 MMOL/L (ref 3.4–5.3)
PROT SERPL-MCNC: 7 G/DL (ref 6.8–8.8)
RBC # BLD AUTO: 3.31 10E6/UL (ref 4.4–5.9)
SODIUM SERPL-SCNC: 138 MMOL/L (ref 133–144)
TSH SERPL DL<=0.005 MIU/L-ACNC: 2.28 MU/L (ref 0.4–4)
WBC # BLD AUTO: 6.8 10E3/UL (ref 4–11)

## 2021-11-02 PROCEDURE — 84075 ASSAY ALKALINE PHOSPHATASE: CPT | Performed by: INTERNAL MEDICINE

## 2021-11-02 PROCEDURE — 85004 AUTOMATED DIFF WBC COUNT: CPT | Performed by: INTERNAL MEDICINE

## 2021-11-02 PROCEDURE — 81003 URINALYSIS AUTO W/O SCOPE: CPT | Performed by: INTERNAL MEDICINE

## 2021-11-02 PROCEDURE — 250N000011 HC RX IP 250 OP 636: Performed by: INTERNAL MEDICINE

## 2021-11-02 PROCEDURE — 84450 TRANSFERASE (AST) (SGOT): CPT | Performed by: INTERNAL MEDICINE

## 2021-11-02 PROCEDURE — 84443 ASSAY THYROID STIM HORMONE: CPT | Performed by: INTERNAL MEDICINE

## 2021-11-02 PROCEDURE — 36591 DRAW BLOOD OFF VENOUS DEVICE: CPT

## 2021-11-02 RX ORDER — HEPARIN SODIUM (PORCINE) LOCK FLUSH IV SOLN 100 UNIT/ML 100 UNIT/ML
5 SOLUTION INTRAVENOUS
Status: DISCONTINUED | OUTPATIENT
Start: 2021-11-02 | End: 2021-11-02 | Stop reason: HOSPADM

## 2021-11-02 RX ORDER — HEPARIN SODIUM,PORCINE 10 UNIT/ML
5 VIAL (ML) INTRAVENOUS
Status: CANCELLED | OUTPATIENT
Start: 2021-11-02

## 2021-11-02 RX ORDER — HEPARIN SODIUM (PORCINE) LOCK FLUSH IV SOLN 100 UNIT/ML 100 UNIT/ML
5 SOLUTION INTRAVENOUS
Status: CANCELLED | OUTPATIENT
Start: 2021-11-02

## 2021-11-02 RX ADMIN — Medication 5 ML: at 13:21

## 2021-11-02 NOTE — PROGRESS NOTES
Nursing Note:  Kt Rasmussen presents today for port labs+ urine.    Patient seen by provider today: No   present during visit today: Not Applicable.    Note: pt unable to leave urine specimen today- will return tomorrow.    Intravenous Access:  Labs drawn without difficulty.  Implanted Port.    Discharge Plan:   Patient was sent to home for 11/3 appointment.    Adore Day RN

## 2021-11-03 ENCOUNTER — ONCOLOGY VISIT (OUTPATIENT)
Dept: ONCOLOGY | Facility: CLINIC | Age: 61
End: 2021-11-03
Attending: NURSE PRACTITIONER
Payer: MEDICARE

## 2021-11-03 ENCOUNTER — INFUSION THERAPY VISIT (OUTPATIENT)
Dept: INFUSION THERAPY | Facility: CLINIC | Age: 61
End: 2021-11-03
Attending: NURSE PRACTITIONER
Payer: MEDICARE

## 2021-11-03 VITALS
BODY MASS INDEX: 20.7 KG/M2 | WEIGHT: 170 LBS | OXYGEN SATURATION: 99 % | SYSTOLIC BLOOD PRESSURE: 124 MMHG | DIASTOLIC BLOOD PRESSURE: 83 MMHG | TEMPERATURE: 97.7 F | HEIGHT: 76 IN | HEART RATE: 90 BPM | RESPIRATION RATE: 16 BRPM

## 2021-11-03 VITALS — SYSTOLIC BLOOD PRESSURE: 160 MMHG | TEMPERATURE: 97.7 F | DIASTOLIC BLOOD PRESSURE: 77 MMHG | HEART RATE: 84 BPM

## 2021-11-03 DIAGNOSIS — C79.51 NON-SMALL CELL LUNG CANCER METASTATIC TO BONE (H): ICD-10-CM

## 2021-11-03 DIAGNOSIS — C79.51 NON-SMALL CELL LUNG CANCER METASTATIC TO BONE (H): Primary | ICD-10-CM

## 2021-11-03 DIAGNOSIS — C34.90 NON-SMALL CELL LUNG CANCER METASTATIC TO BONE (H): ICD-10-CM

## 2021-11-03 DIAGNOSIS — C34.90 NON-SMALL CELL LUNG CANCER METASTATIC TO BONE (H): Primary | ICD-10-CM

## 2021-11-03 DIAGNOSIS — C34.90 NON-SMALL CELL CARCINOMA OF LUNG, STAGE 3, UNSPECIFIED LATERALITY (H): ICD-10-CM

## 2021-11-03 DIAGNOSIS — D70.1 CHEMOTHERAPY-INDUCED NEUTROPENIA (H): ICD-10-CM

## 2021-11-03 DIAGNOSIS — Z51.11 ENCOUNTER FOR ANTINEOPLASTIC CHEMOTHERAPY: Primary | ICD-10-CM

## 2021-11-03 DIAGNOSIS — T45.1X5A CHEMOTHERAPY-INDUCED NEUTROPENIA (H): ICD-10-CM

## 2021-11-03 LAB — ALBUMIN UR-MCNC: NEGATIVE MG/DL

## 2021-11-03 PROCEDURE — 96417 CHEMO IV INFUS EACH ADDL SEQ: CPT

## 2021-11-03 PROCEDURE — 96413 CHEMO IV INFUSION 1 HR: CPT

## 2021-11-03 PROCEDURE — 250N000011 HC RX IP 250 OP 636: Performed by: INTERNAL MEDICINE

## 2021-11-03 PROCEDURE — 258N000003 HC RX IP 258 OP 636: Performed by: NURSE PRACTITIONER

## 2021-11-03 PROCEDURE — 96415 CHEMO IV INFUSION ADDL HR: CPT

## 2021-11-03 PROCEDURE — 99214 OFFICE O/P EST MOD 30 MIN: CPT | Performed by: NURSE PRACTITIONER

## 2021-11-03 PROCEDURE — 250N000011 HC RX IP 250 OP 636: Performed by: NURSE PRACTITIONER

## 2021-11-03 PROCEDURE — 96367 TX/PROPH/DG ADDL SEQ IV INF: CPT

## 2021-11-03 PROCEDURE — G0463 HOSPITAL OUTPT CLINIC VISIT: HCPCS | Mod: 25

## 2021-11-03 PROCEDURE — 96375 TX/PRO/DX INJ NEW DRUG ADDON: CPT

## 2021-11-03 RX ORDER — NALOXONE HYDROCHLORIDE 0.4 MG/ML
.1-.4 INJECTION, SOLUTION INTRAMUSCULAR; INTRAVENOUS; SUBCUTANEOUS
Status: CANCELLED | OUTPATIENT
Start: 2021-11-03

## 2021-11-03 RX ORDER — HEPARIN SODIUM (PORCINE) LOCK FLUSH IV SOLN 100 UNIT/ML 100 UNIT/ML
5 SOLUTION INTRAVENOUS
Status: DISCONTINUED | OUTPATIENT
Start: 2021-11-03 | End: 2021-11-03 | Stop reason: HOSPADM

## 2021-11-03 RX ORDER — EPINEPHRINE 1 MG/ML
0.3 INJECTION, SOLUTION INTRAMUSCULAR; SUBCUTANEOUS EVERY 5 MIN PRN
Status: CANCELLED | OUTPATIENT
Start: 2021-11-03

## 2021-11-03 RX ORDER — ONDANSETRON 2 MG/ML
8 INJECTION INTRAMUSCULAR; INTRAVENOUS ONCE
Status: CANCELLED
Start: 2021-11-03 | End: 2021-11-03

## 2021-11-03 RX ORDER — ALBUTEROL SULFATE 0.83 MG/ML
2.5 SOLUTION RESPIRATORY (INHALATION)
Status: CANCELLED | OUTPATIENT
Start: 2021-11-03

## 2021-11-03 RX ORDER — HEPARIN SODIUM,PORCINE 10 UNIT/ML
5 VIAL (ML) INTRAVENOUS
Status: CANCELLED | OUTPATIENT
Start: 2021-11-03

## 2021-11-03 RX ORDER — HEPARIN SODIUM (PORCINE) LOCK FLUSH IV SOLN 100 UNIT/ML 100 UNIT/ML
5 SOLUTION INTRAVENOUS
Status: CANCELLED | OUTPATIENT
Start: 2021-11-03

## 2021-11-03 RX ORDER — MEPERIDINE HYDROCHLORIDE 25 MG/ML
25 INJECTION INTRAMUSCULAR; INTRAVENOUS; SUBCUTANEOUS EVERY 30 MIN PRN
Status: CANCELLED | OUTPATIENT
Start: 2021-11-03

## 2021-11-03 RX ORDER — ALBUTEROL SULFATE 90 UG/1
1-2 AEROSOL, METERED RESPIRATORY (INHALATION)
Status: CANCELLED
Start: 2021-11-03

## 2021-11-03 RX ORDER — SODIUM CHLORIDE 9 MG/ML
1000 INJECTION, SOLUTION INTRAVENOUS CONTINUOUS PRN
Status: CANCELLED
Start: 2021-11-03

## 2021-11-03 RX ORDER — METHYLPREDNISOLONE SODIUM SUCCINATE 125 MG/2ML
125 INJECTION, POWDER, LYOPHILIZED, FOR SOLUTION INTRAMUSCULAR; INTRAVENOUS
Status: CANCELLED
Start: 2021-11-03

## 2021-11-03 RX ORDER — ONDANSETRON 2 MG/ML
8 INJECTION INTRAMUSCULAR; INTRAVENOUS ONCE
Status: COMPLETED | OUTPATIENT
Start: 2021-11-03 | End: 2021-11-03

## 2021-11-03 RX ORDER — LORAZEPAM 2 MG/ML
0.5 INJECTION INTRAMUSCULAR EVERY 4 HOURS PRN
Status: CANCELLED
Start: 2021-11-03

## 2021-11-03 RX ORDER — DIPHENHYDRAMINE HYDROCHLORIDE 50 MG/ML
50 INJECTION INTRAMUSCULAR; INTRAVENOUS
Status: CANCELLED
Start: 2021-11-03

## 2021-11-03 RX ADMIN — Medication 5 ML: at 16:28

## 2021-11-03 RX ADMIN — SODIUM CHLORIDE 250 ML: 9 INJECTION, SOLUTION INTRAVENOUS at 11:52

## 2021-11-03 RX ADMIN — ONDANSETRON 8 MG: 2 INJECTION INTRAMUSCULAR; INTRAVENOUS at 15:08

## 2021-11-03 RX ADMIN — SODIUM CHLORIDE 1200 MG: 9 INJECTION, SOLUTION INTRAVENOUS at 11:52

## 2021-11-03 RX ADMIN — PACLITAXEL 410 MG: 6 INJECTION, SOLUTION INTRAVENOUS at 13:03

## 2021-11-03 RX ADMIN — ATEZOLIZUMAB 1200 MG: 1200 INJECTION, SOLUTION INTRAVENOUS at 12:29

## 2021-11-03 ASSESSMENT — MIFFLIN-ST. JEOR: SCORE: 1664.67

## 2021-11-03 ASSESSMENT — PAIN SCALES - GENERAL: PAINLEVEL: NO PAIN (0)

## 2021-11-03 NOTE — PROGRESS NOTES
"  Oncology/Hematology Visit Note  Nov 3, 2021    Reason for Visit: follow up of metastatic non-small cell lung carcinoma  Metastatic to lymph nodes bones in bilateral lungs  Brain MRI negative for mets    Patient met with Dr. John who recommended treatment with palliative intent with paclitaxel carboplatin Avastin and atezolizumab-treatment started on April 27, 2021  -Due to thrombocytopenia carboplatin AUC reduced to 4 with cycle 2  Due to pancytopenia carboplatin discontinued with cycle 5    7/12/2021: PET/CT showed resolved mural nodules with increased cystic cavity in left lower lobe with no significant FDG uptake, less likely metastases; no enlarged hypermetabolic mediastinal, hilar, or axillary lymph nodes, decreased metabolic activity of intraosseous lesion in spine and pelvis; no new bone lesions    -Patient now getting treatment with paclitaxel, Avastin and atezolizumab.  10/11/2021-PET scan revealed   slight interval increase in couple pelvic bony areas and new focal uptake at L2 but remaining scan shows no residual or progressive disease in lungs or lymph nodes  Per Dr. John note continue with the same treatment for now    Interval History:  Patient reports overall he has been feeling well.  His friend with like me to refer to discuss therapy patient and nutritionist.  Patient denies fever chills sweats cough shortness of breath chest pain nausea vomiting diarrhea abdominal pain bleeding denies bone pain    Review of Systems:  14 point ROS of systems including Constitutional, Eyes, Respiratory, Cardiovascular, Gastroenterology, Genitourinary, Integumentary, Muscularskeletal, Psychiatric were all negative except for pertinent positives noted in my HPI.    Physical Examination:  /83   Pulse 90   Temp 97.7  F (36.5  C)   Resp 16   Ht 1.918 m (6' 3.5\")   Wt 77.1 kg (170 lb)   SpO2 99%   BMI 20.97 kg/m        Physical Exam  HENT:      Head: Normocephalic.      Nose: Nose normal.      " Mouth/Throat:      Mouth: Mucous membranes are moist.   Eyes:      Pupils: Pupils are equal, round, and reactive to light.   Cardiovascular:      Rate and Rhythm: Normal rate.   Pulmonary:      Effort: Pulmonary effort is normal.   Musculoskeletal:         General: Normal range of motion.      Cervical back: Normal range of motion.   Skin:     General: Skin is warm.   Neurological:      General: No focal deficit present.      Mental Status: He is alert.   Psychiatric:         Mood and Affect: Mood normal.       Laboratory Data:  CBC CMP results reviewed    Assessment and Plan:      This is a 61-year-old male with    metastatic non-small cell lung carcinoma  Metastatic to lymph nodes bones in bilateral lungs  Brain MRI negative for mets  Patient met with Dr. John who recommended treatment with palliative intent with paclitaxel carboplatin Avastin and atezolizumab-treatment to start on 04/27  Due to pancytopenia carboplatin discontinued with cycle 5  Patient now getting palliative treatment with paclitaxel, Avastin and atezolizumab.  07/2021-PET scan reveals partial response  10/11/2021- PET slight interval increase in couple pelvic bony areas and new focal uptake at L2 but remaining scan shows no residual or progressive disease in lungs or lymph nodes  Per Dr. John's note continue with the same treatment paclitaxel Avastin and atezolizumab  Plan for repeat PET scan in January 2022  Labs reviewed okay to proceed with treatment  Schedule chemo throughout January  Schedule with me with next cycle of treatment  Schedule with Dr. John in January after the PET scan to review the results      Anemia  Patient denies bleeding  Overall stable hemoglobin  Continue to monitor      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  01/2021-scope done by ENT no evidence of recurrence  Continue close follow-up by ENT  Patient has dysphonia and some dysphagia       Weight loss  Refer patient to nutritionist  High caloric food  discussed    Weakness  History of stroke  Refer to physical therapy      Patient is advised to call our clinic or go to ER in the event of fever chills sweats cough shortness of breath chest pain nausea vomiting diarrhea abdominal pain bleeding edema or any changes in health    MADHAVI Moran CNP [facility]     Chart documentation with Dragon Voice recognition Software. Although reviewed after completion, some words and grammatical errors may remain.

## 2021-11-03 NOTE — PROGRESS NOTES
"Oncology Rooming Note    November 3, 2021 10:24 AM   Kt Rasmussen is a 61 year old male who presents for:    Chief Complaint   Patient presents with     Oncology Clinic Visit     Initial Vitals: There were no vitals taken for this visit. Estimated body mass index is 21.2 kg/m  as calculated from the following:    Height as of 9/23/21: 1.905 m (6' 3\").    Weight as of 10/13/21: 76.9 kg (169 lb 9.6 oz). There is no height or weight on file to calculate BSA.  Data Unavailable Comment: Data Unavailable   No LMP for male patient.  Allergies reviewed: Yes  Medications reviewed: Yes    Medications: Medication refills not needed today.  Pharmacy name entered into EPIC:    [facility] [place] [address] [facility] [place], [place] - [address] [id number] - [facility] [address] AT [facility]    Clinical concerns:  doctor was notified.      Floridalma Nieves MA            "

## 2021-11-03 NOTE — PROGRESS NOTES
1445: Patient called RN over stating he was going throw up, had one episode of moderate-large emesis. Taxol infusion stopped, NS free flowing. BP mildly elevated, otherwise vital signs wnl. Patient's infusion RN updated and took over care.     Mackenzie Campa, RN

## 2021-11-03 NOTE — LETTER
"    11/3/2021         RE: Kt Rasmussen  [id number]  [facility] [address]        Dear Colleague,    Thank you for referring your patient, Kt Rasmussen, to the [facility]. Please see a copy of my visit note below.    Oncology Rooming Note    November 3, 2021 10:24 AM   Kt Rasmussen is a 61 year old male who presents for:    Chief Complaint   Patient presents with     Oncology Clinic Visit     Initial Vitals: There were no vitals taken for this visit. Estimated body mass index is 21.2 kg/m  as calculated from the following:    Height as of 9/23/21: 1.905 m (6' 3\").    Weight as of 10/13/21: 76.9 kg (169 lb 9.6 oz). There is no height or weight on file to calculate BSA.  Data Unavailable Comment: Data Unavailable   No LMP for male patient.  Allergies reviewed: Yes  Medications reviewed: Yes    Medications: Medication refills not needed today.  Pharmacy name entered into EPIC:    [place], [place] - [address] [facility] [place], [place] - [address] [id number] - [facility] [address] AT [facility] & [facility] 7    Clinical concerns:  doctor was notified.      Floridalma Nieves MA                Oncology/Hematology Visit Note  Nov 3, 2021    Reason for Visit: follow up of metastatic non-small cell lung carcinoma  Metastatic to lymph nodes bones in bilateral lungs  Brain MRI negative for mets    Patient met with Dr. John who recommended treatment with palliative intent with paclitaxel carboplatin Avastin and atezolizumab-treatment started on April 27, 2021  -Due to thrombocytopenia carboplatin AUC reduced to 4 with cycle 2  Due to pancytopenia carboplatin discontinued with cycle 5    7/12/2021: PET/CT showed resolved mural nodules with increased cystic cavity in left lower lobe with no significant FDG uptake, less likely metastases; no enlarged hypermetabolic mediastinal, hilar, or " "axillary lymph nodes, decreased metabolic activity of intraosseous lesion in spine and pelvis; no new bone lesions    -Patient now getting treatment with paclitaxel, Avastin and atezolizumab.  10/11/2021-PET scan revealed   slight interval increase in couple pelvic bony areas and new focal uptake at L2 but remaining scan shows no residual or progressive disease in lungs or lymph nodes  Per Dr. John note continue with the same treatment for now    Interval History:  Patient reports overall he has been feeling well.  His friend with like me to refer to discuss therapy patient and nutritionist.  Patient denies fever chills sweats cough shortness of breath chest pain nausea vomiting diarrhea abdominal pain bleeding denies bone pain    Review of Systems:  14 point ROS of systems including Constitutional, Eyes, Respiratory, Cardiovascular, Gastroenterology, Genitourinary, Integumentary, Muscularskeletal, Psychiatric were all negative except for pertinent positives noted in my HPI.    Physical Examination:  /83   Pulse 90   Temp 97.7  F (36.5  C)   Resp 16   Ht 1.918 m (6' 3.5\")   Wt 77.1 kg (170 lb)   SpO2 99%   BMI 20.97 kg/m        Physical Exam  HENT:      Head: Normocephalic.      Nose: Nose normal.      Mouth/Throat:      Mouth: Mucous membranes are moist.   Eyes:      Pupils: Pupils are equal, round, and reactive to light.   Cardiovascular:      Rate and Rhythm: Normal rate.   Pulmonary:      Effort: Pulmonary effort is normal.   Musculoskeletal:         General: Normal range of motion.      Cervical back: Normal range of motion.   Skin:     General: Skin is warm.   Neurological:      General: No focal deficit present.      Mental Status: He is alert.   Psychiatric:         Mood and Affect: Mood normal.       Laboratory Data:  CBC CMP results reviewed    Assessment and Plan:      This is a 61-year-old male with    metastatic non-small cell lung carcinoma  Metastatic to lymph nodes bones in bilateral " lungs  Brain MRI negative for mets  Patient met with Dr. John who recommended treatment with palliative intent with paclitaxel carboplatin Avastin and atezolizumab-treatment to start on 04/27  Due to pancytopenia carboplatin discontinued with cycle 5  Patient now getting palliative treatment with paclitaxel, Avastin and atezolizumab.  07/2021-PET scan reveals partial response  10/11/2021- PET slight interval increase in couple pelvic bony areas and new focal uptake at L2 but remaining scan shows no residual or progressive disease in lungs or lymph nodes  Per Dr. John's note continue with the same treatment paclitaxel Avastin and atezolizumab  Plan for repeat PET scan in January 2022  Labs reviewed okay to proceed with treatment  Schedule chemo throughout January  Schedule with me with next cycle of treatment  Schedule with Dr. 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 Although reviewed after completion, some words and grammatical errors may remain.            Again, thank you for allowing me to participate in the care of your patient.        Sincerely,        MADHAVI Moran CNP

## 2021-11-03 NOTE — PROGRESS NOTES
Infusion Nursing Note:  Kt Rasmussen presents today for C10D1 Bevacizumab-bvzr, Taxol, Tecentriq.    Patient seen by provider today: Yes: Juan Rausch NP   present during visit today: Not Applicable.    Note: Patient reports feeling well and offers up no new issues since seeing Juan Rausch NP today.     1 hour and 45 minutes into patient's Taxol infusion, he reported sudden onset nausea with followed by one large emesis.  Taxol infusion stopped, vitals collected.  Patient denied experiencing any other symptoms with the nausea. Juan Rausch NP made aware, assessed patient in infusion and ordered patient to receive 8 mg IV Zofran and to proceed with Taxol infusion. Taxol restarted and able to be competed without further issue.      Intravenous Access:  Implanted Port.    Treatment Conditions:  Lab Results   Component Value Date    HGB 9.5 (L) 11/02/2021    WBC 6.8 11/02/2021    ANEU 7.7 07/08/2021    ANEUTAUTO 4.8 11/02/2021     11/02/2021      Lab Results   Component Value Date     11/02/2021    POTASSIUM 4.1 11/02/2021    MAG 1.9 10/13/2016    CR 0.66 11/02/2021    BEVERYL 8.4 (L) 11/02/2021    BILITOTAL 0.4 11/02/2021    ALBUMIN 3.2 (L) 11/02/2021    ALT 26 11/02/2021    AST 23 11/02/2021     Results reviewed, labs MET treatment parameters, ok to proceed with treatment.  Urine protein: negative.      Post Infusion Assessment:  Patient tolerated infusion without incident.  Blood return noted pre and post infusion.  Site patent and intact, free from redness, edema or discomfort.  No evidence of extravasations.  Access discontinued per protocol.       Discharge Plan:   Patient declined prescription refills.  Discharge instructions reviewed with: Patient.  Patient and/or family verbalized understanding of discharge instructions and all questions answered.  Copy of AVS reviewed with patient and/or family.  Patient will return 11/4/21 for Neulasta for next appointment.  Patient discharged in stable  condition accompanied by: self.  Departure Mode: Ambulatory.      Devorah Stovall RN

## 2021-11-04 ENCOUNTER — INFUSION THERAPY VISIT (OUTPATIENT)
Dept: INFUSION THERAPY | Facility: CLINIC | Age: 61
End: 2021-11-04
Attending: NURSE PRACTITIONER
Payer: MEDICARE

## 2021-11-04 VITALS
TEMPERATURE: 97.5 F | SYSTOLIC BLOOD PRESSURE: 138 MMHG | HEART RATE: 90 BPM | RESPIRATION RATE: 18 BRPM | DIASTOLIC BLOOD PRESSURE: 78 MMHG

## 2021-11-04 DIAGNOSIS — D70.1 CHEMOTHERAPY-INDUCED NEUTROPENIA (H): Primary | ICD-10-CM

## 2021-11-04 DIAGNOSIS — Z51.11 ENCOUNTER FOR ANTINEOPLASTIC CHEMOTHERAPY: ICD-10-CM

## 2021-11-04 DIAGNOSIS — T45.1X5A CHEMOTHERAPY-INDUCED NEUTROPENIA (H): Primary | ICD-10-CM

## 2021-11-04 DIAGNOSIS — C79.51 NON-SMALL CELL LUNG CANCER METASTATIC TO BONE (H): ICD-10-CM

## 2021-11-04 DIAGNOSIS — C34.90 NON-SMALL CELL LUNG CANCER METASTATIC TO BONE (H): ICD-10-CM

## 2021-11-04 PROCEDURE — 250N000011 HC RX IP 250 OP 636: Performed by: NURSE PRACTITIONER

## 2021-11-04 PROCEDURE — 96372 THER/PROPH/DIAG INJ SC/IM: CPT | Performed by: NURSE PRACTITIONER

## 2021-11-04 RX ADMIN — PEGFILGRASTIM 6 MG: 6 INJECTION SUBCUTANEOUS at 14:10

## 2021-11-04 NOTE — PROGRESS NOTES
"Infusion Nursing Note:  Kt DAVID Rasmussen presents today for neulsata.    Patient seen by provider today: No   present during visit today: Not Applicable.    Note: Pt reports vomiting. Says he has antiemetics at home and does not need refills. Denies need for IVF or intervention. Says he's eating and drinking ok. States \"everything's all good.\"    Intravenous Access:  No Intravenous access/labs at this visit.    Treatment Conditions:  Not Applicable.      Post Infusion Assessment:  Patient tolerated injection without incident.       Discharge Plan:   Discharge instructions reviewed with: Patient.  Patient and/or family verbalized understanding of discharge instructions and all questions answered.  Patient discharged in stable condition accompanied by: self.  Departure Mode: Ambulatory.      Chelsi Doe RN                      "

## 2021-11-29 RX ORDER — LORAZEPAM 2 MG/ML
0.5 INJECTION INTRAMUSCULAR EVERY 4 HOURS PRN
Status: CANCELLED
Start: 2021-12-01

## 2021-11-29 RX ORDER — ALBUTEROL SULFATE 90 UG/1
1-2 AEROSOL, METERED RESPIRATORY (INHALATION)
Status: CANCELLED
Start: 2021-12-01

## 2021-11-29 RX ORDER — ALBUTEROL SULFATE 0.83 MG/ML
2.5 SOLUTION RESPIRATORY (INHALATION)
Status: CANCELLED | OUTPATIENT
Start: 2021-12-01

## 2021-11-29 RX ORDER — MEPERIDINE HYDROCHLORIDE 25 MG/ML
25 INJECTION INTRAMUSCULAR; INTRAVENOUS; SUBCUTANEOUS EVERY 30 MIN PRN
Status: CANCELLED | OUTPATIENT
Start: 2021-12-01

## 2021-11-29 RX ORDER — HEPARIN SODIUM,PORCINE 10 UNIT/ML
5 VIAL (ML) INTRAVENOUS
Status: CANCELLED | OUTPATIENT
Start: 2021-12-01

## 2021-11-29 RX ORDER — SODIUM CHLORIDE 9 MG/ML
1000 INJECTION, SOLUTION INTRAVENOUS CONTINUOUS PRN
Status: CANCELLED
Start: 2021-12-01

## 2021-11-29 RX ORDER — NALOXONE HYDROCHLORIDE 0.4 MG/ML
.1-.4 INJECTION, SOLUTION INTRAMUSCULAR; INTRAVENOUS; SUBCUTANEOUS
Status: CANCELLED | OUTPATIENT
Start: 2021-12-01

## 2021-11-29 RX ORDER — DIPHENHYDRAMINE HYDROCHLORIDE 50 MG/ML
50 INJECTION INTRAMUSCULAR; INTRAVENOUS
Status: CANCELLED
Start: 2021-12-01

## 2021-11-29 RX ORDER — HEPARIN SODIUM (PORCINE) LOCK FLUSH IV SOLN 100 UNIT/ML 100 UNIT/ML
5 SOLUTION INTRAVENOUS
Status: CANCELLED | OUTPATIENT
Start: 2021-12-01

## 2021-11-29 RX ORDER — EPINEPHRINE 1 MG/ML
0.3 INJECTION, SOLUTION, CONCENTRATE INTRAVENOUS EVERY 5 MIN PRN
Status: CANCELLED | OUTPATIENT
Start: 2021-12-01

## 2021-11-30 ENCOUNTER — LAB (OUTPATIENT)
Dept: INFUSION THERAPY | Facility: CLINIC | Age: 61
End: 2021-11-30
Attending: INTERNAL MEDICINE
Payer: MEDICARE

## 2021-11-30 DIAGNOSIS — C34.90 NON-SMALL CELL CARCINOMA OF LUNG, STAGE 3, UNSPECIFIED LATERALITY (H): ICD-10-CM

## 2021-11-30 DIAGNOSIS — D70.1 CHEMOTHERAPY-INDUCED NEUTROPENIA (H): Primary | ICD-10-CM

## 2021-11-30 DIAGNOSIS — C34.90 NON-SMALL CELL LUNG CANCER METASTATIC TO BONE (H): ICD-10-CM

## 2021-11-30 DIAGNOSIS — C79.51 NON-SMALL CELL LUNG CANCER METASTATIC TO BONE (H): ICD-10-CM

## 2021-11-30 DIAGNOSIS — Z51.11 ENCOUNTER FOR ANTINEOPLASTIC CHEMOTHERAPY: ICD-10-CM

## 2021-11-30 DIAGNOSIS — T45.1X5A CHEMOTHERAPY-INDUCED NEUTROPENIA (H): Primary | ICD-10-CM

## 2021-11-30 LAB
ALBUMIN SERPL-MCNC: 3.6 G/DL (ref 3.4–5)
ALP SERPL-CCNC: 85 U/L (ref 40–150)
ALT SERPL W P-5'-P-CCNC: 29 U/L (ref 0–70)
ANION GAP SERPL CALCULATED.3IONS-SCNC: 5 MMOL/L (ref 3–14)
AST SERPL W P-5'-P-CCNC: 23 U/L (ref 0–45)
BASOPHILS # BLD AUTO: 0 10E3/UL (ref 0–0.2)
BASOPHILS NFR BLD AUTO: 0 %
BILIRUB SERPL-MCNC: 0.5 MG/DL (ref 0.2–1.3)
BUN SERPL-MCNC: 23 MG/DL (ref 7–30)
CALCIUM SERPL-MCNC: 9.1 MG/DL (ref 8.5–10.1)
CHLORIDE BLD-SCNC: 111 MMOL/L (ref 94–109)
CO2 SERPL-SCNC: 26 MMOL/L (ref 20–32)
CREAT SERPL-MCNC: 0.77 MG/DL (ref 0.66–1.25)
EOSINOPHIL # BLD AUTO: 0.3 10E3/UL (ref 0–0.7)
EOSINOPHIL NFR BLD AUTO: 4 %
ERYTHROCYTE [DISTWIDTH] IN BLOOD BY AUTOMATED COUNT: 15.9 % (ref 10–15)
GFR SERPL CREATININE-BSD FRML MDRD: >90 ML/MIN/1.73M2
GLUCOSE BLD-MCNC: 119 MG/DL (ref 70–99)
HCT VFR BLD AUTO: 33.5 % (ref 40–53)
HGB BLD-MCNC: 10 G/DL (ref 13.3–17.7)
IMM GRANULOCYTES # BLD: 0 10E3/UL
IMM GRANULOCYTES NFR BLD: 1 %
LYMPHOCYTES # BLD AUTO: 1 10E3/UL (ref 0.8–5.3)
LYMPHOCYTES NFR BLD AUTO: 17 %
MCH RBC QN AUTO: 27.5 PG (ref 26.5–33)
MCHC RBC AUTO-ENTMCNC: 29.9 G/DL (ref 31.5–36.5)
MCV RBC AUTO: 92 FL (ref 78–100)
MONOCYTES # BLD AUTO: 0.4 10E3/UL (ref 0–1.3)
MONOCYTES NFR BLD AUTO: 7 %
NEUTROPHILS # BLD AUTO: 4.3 10E3/UL (ref 1.6–8.3)
NEUTROPHILS NFR BLD AUTO: 71 %
NRBC # BLD AUTO: 0 10E3/UL
NRBC BLD AUTO-RTO: 0 /100
PLATELET # BLD AUTO: 174 10E3/UL (ref 150–450)
POTASSIUM BLD-SCNC: 4.2 MMOL/L (ref 3.4–5.3)
PROT SERPL-MCNC: 7.2 G/DL (ref 6.8–8.8)
RBC # BLD AUTO: 3.64 10E6/UL (ref 4.4–5.9)
SODIUM SERPL-SCNC: 142 MMOL/L (ref 133–144)
TSH SERPL DL<=0.005 MIU/L-ACNC: 2.19 MU/L (ref 0.4–4)
WBC # BLD AUTO: 6.1 10E3/UL (ref 4–11)

## 2021-11-30 PROCEDURE — 250N000011 HC RX IP 250 OP 636: Performed by: INTERNAL MEDICINE

## 2021-11-30 PROCEDURE — 84443 ASSAY THYROID STIM HORMONE: CPT | Performed by: INTERNAL MEDICINE

## 2021-11-30 PROCEDURE — 85025 COMPLETE CBC W/AUTO DIFF WBC: CPT | Performed by: INTERNAL MEDICINE

## 2021-11-30 PROCEDURE — 36591 DRAW BLOOD OFF VENOUS DEVICE: CPT

## 2021-11-30 PROCEDURE — 80053 COMPREHEN METABOLIC PANEL: CPT | Performed by: INTERNAL MEDICINE

## 2021-11-30 RX ORDER — HEPARIN SODIUM (PORCINE) LOCK FLUSH IV SOLN 100 UNIT/ML 100 UNIT/ML
5 SOLUTION INTRAVENOUS
Status: CANCELLED | OUTPATIENT
Start: 2021-11-30

## 2021-11-30 RX ORDER — HEPARIN SODIUM,PORCINE 10 UNIT/ML
5 VIAL (ML) INTRAVENOUS
Status: CANCELLED | OUTPATIENT
Start: 2021-11-30

## 2021-11-30 RX ORDER — HEPARIN SODIUM (PORCINE) LOCK FLUSH IV SOLN 100 UNIT/ML 100 UNIT/ML
5 SOLUTION INTRAVENOUS
Status: DISCONTINUED | OUTPATIENT
Start: 2021-11-30 | End: 2021-11-30 | Stop reason: HOSPADM

## 2021-11-30 RX ADMIN — Medication 5 ML: at 13:42

## 2021-11-30 NOTE — PROGRESS NOTES
Nursing Note:  Kt Rasmussen presents today for port labs for tx 12/1.    Patient seen by provider today: No   present during visit today: Not Applicable.    Note: Patient unable to leave urine specimen today.  Patient given urine cup to take home and bring back tomorrow for his infusion visit.    Intravenous Access:  Labs drawn without difficulty.  Implanted Port.    Discharge Plan:   Patient was sent home.    Filipe Carr RN

## 2021-12-01 ENCOUNTER — INFUSION THERAPY VISIT (OUTPATIENT)
Dept: INFUSION THERAPY | Facility: CLINIC | Age: 61
End: 2021-12-01
Attending: INTERNAL MEDICINE
Payer: MEDICARE

## 2021-12-01 VITALS
DIASTOLIC BLOOD PRESSURE: 74 MMHG | RESPIRATION RATE: 18 BRPM | OXYGEN SATURATION: 98 % | BODY MASS INDEX: 20.72 KG/M2 | TEMPERATURE: 97.5 F | HEART RATE: 85 BPM | WEIGHT: 168 LBS | SYSTOLIC BLOOD PRESSURE: 113 MMHG

## 2021-12-01 DIAGNOSIS — T45.1X5A CHEMOTHERAPY-INDUCED NEUTROPENIA (H): ICD-10-CM

## 2021-12-01 DIAGNOSIS — C34.90 NON-SMALL CELL LUNG CANCER METASTATIC TO BONE (H): ICD-10-CM

## 2021-12-01 DIAGNOSIS — C79.51 NON-SMALL CELL LUNG CANCER METASTATIC TO BONE (H): ICD-10-CM

## 2021-12-01 DIAGNOSIS — Z51.11 ENCOUNTER FOR ANTINEOPLASTIC CHEMOTHERAPY: Primary | ICD-10-CM

## 2021-12-01 DIAGNOSIS — D70.1 CHEMOTHERAPY-INDUCED NEUTROPENIA (H): ICD-10-CM

## 2021-12-01 LAB — ALBUMIN UR-MCNC: NEGATIVE MG/DL

## 2021-12-01 PROCEDURE — 250N000011 HC RX IP 250 OP 636: Performed by: INTERNAL MEDICINE

## 2021-12-01 PROCEDURE — 81003 URINALYSIS AUTO W/O SCOPE: CPT | Performed by: INTERNAL MEDICINE

## 2021-12-01 PROCEDURE — 96413 CHEMO IV INFUSION 1 HR: CPT

## 2021-12-01 PROCEDURE — 258N000003 HC RX IP 258 OP 636: Performed by: INTERNAL MEDICINE

## 2021-12-01 PROCEDURE — 96417 CHEMO IV INFUS EACH ADDL SEQ: CPT

## 2021-12-01 PROCEDURE — 96415 CHEMO IV INFUSION ADDL HR: CPT

## 2021-12-01 PROCEDURE — 96367 TX/PROPH/DG ADDL SEQ IV INF: CPT

## 2021-12-01 RX ORDER — HEPARIN SODIUM (PORCINE) LOCK FLUSH IV SOLN 100 UNIT/ML 100 UNIT/ML
5 SOLUTION INTRAVENOUS
Status: DISCONTINUED | OUTPATIENT
Start: 2021-12-01 | End: 2021-12-01 | Stop reason: HOSPADM

## 2021-12-01 RX ADMIN — ATEZOLIZUMAB 1200 MG: 1200 INJECTION, SOLUTION INTRAVENOUS at 09:25

## 2021-12-01 RX ADMIN — Medication 5 ML: at 13:39

## 2021-12-01 RX ADMIN — SODIUM CHLORIDE 1200 MG: 9 INJECTION, SOLUTION INTRAVENOUS at 09:58

## 2021-12-01 RX ADMIN — PACLITAXEL 410 MG: 6 INJECTION, SOLUTION INTRAVENOUS at 10:33

## 2021-12-01 RX ADMIN — SODIUM CHLORIDE 250 ML: 9 INJECTION, SOLUTION INTRAVENOUS at 09:25

## 2021-12-01 ASSESSMENT — PAIN SCALES - GENERAL: PAINLEVEL: NO PAIN (0)

## 2021-12-01 NOTE — PROGRESS NOTES
Infusion Nursing Note:  Kt Rasmussen presents today for C11 D1 Zirabev/Taxol/Tecentriq.    Patient seen by provider today: No   present during visit today: Not Applicable.    Note: no new concerns.      Intravenous Access:  Implanted Port.    Treatment Conditions:  Lab Results   Component Value Date    HGB 10.0 (L) 11/30/2021    WBC 6.1 11/30/2021    ANEU 7.7 07/08/2021    ANEUTAUTO 4.3 11/30/2021     11/30/2021      Lab Results   Component Value Date     11/30/2021    POTASSIUM 4.2 11/30/2021    MAG 1.9 10/13/2016    CR 0.77 11/30/2021    BEVERLY 9.1 11/30/2021    BILITOTAL 0.5 11/30/2021    ALBUMIN 3.6 11/30/2021    ALT 29 11/30/2021    AST 23 11/30/2021     Results reviewed, labs MET treatment parameters, ok to proceed with treatment.  Urine protein Negative.      Post Infusion Assessment:  Patient tolerated infusion without incident.  Blood return noted pre and post infusion.  Site patent and intact, free from redness, edema or discomfort.  No evidence of extravasations.  Access discontinued per protocol.       Discharge Plan:   Discharge instructions reviewed with: Patient.  Patient and/or family verbalized understanding of discharge instructions and all questions answered.  AVS to patient via [employer].  Patient will return 12/2 for next appointment.   Patient discharged in stable condition accompanied by: self.  Departure Mode: Ambulatory.      Cass Shoemaker RN

## 2021-12-02 ENCOUNTER — INFUSION THERAPY VISIT (OUTPATIENT)
Dept: INFUSION THERAPY | Facility: CLINIC | Age: 61
End: 2021-12-02
Attending: INTERNAL MEDICINE
Payer: MEDICARE

## 2021-12-02 VITALS
TEMPERATURE: 98 F | DIASTOLIC BLOOD PRESSURE: 78 MMHG | OXYGEN SATURATION: 99 % | RESPIRATION RATE: 16 BRPM | SYSTOLIC BLOOD PRESSURE: 120 MMHG | HEART RATE: 88 BPM

## 2021-12-02 DIAGNOSIS — C34.90 NON-SMALL CELL LUNG CANCER METASTATIC TO BONE (H): ICD-10-CM

## 2021-12-02 DIAGNOSIS — D70.1 CHEMOTHERAPY-INDUCED NEUTROPENIA (H): Primary | ICD-10-CM

## 2021-12-02 DIAGNOSIS — C79.51 NON-SMALL CELL LUNG CANCER METASTATIC TO BONE (H): ICD-10-CM

## 2021-12-02 DIAGNOSIS — Z51.11 ENCOUNTER FOR ANTINEOPLASTIC CHEMOTHERAPY: ICD-10-CM

## 2021-12-02 DIAGNOSIS — T45.1X5A CHEMOTHERAPY-INDUCED NEUTROPENIA (H): Primary | ICD-10-CM

## 2021-12-02 PROCEDURE — 96372 THER/PROPH/DIAG INJ SC/IM: CPT | Performed by: INTERNAL MEDICINE

## 2021-12-02 PROCEDURE — 250N000011 HC RX IP 250 OP 636: Performed by: INTERNAL MEDICINE

## 2021-12-02 RX ADMIN — PEGFILGRASTIM 6 MG: 6 INJECTION SUBCUTANEOUS at 14:28

## 2021-12-02 ASSESSMENT — PAIN SCALES - GENERAL: PAINLEVEL: NO PAIN (0)

## 2021-12-02 NOTE — PROGRESS NOTES
Infusion Nursing Note:  Kt Rasmussen presents today for Neulasta injection.    Patient seen by provider today: No   present during visit today: Not Applicable.    Note: no concerns today.      Intravenous Access:  No Intravenous access/labs at this visit.    Treatment Conditions:  Not Applicable.      Post Infusion Assessment:  Patient tolerated injection without incident.       Discharge Plan:   Discharge instructions reviewed with: Patient.  Patient and/or family verbalized understanding of discharge instructions and all questions answered.  AVS to patient via [employer].  Patient will return 12/22 for next appointment.   Patient discharged in stable condition accompanied by: self.  Departure Mode: Ambulatory.      Cass Shoemaker RN

## 2021-12-21 ENCOUNTER — LAB (OUTPATIENT)
Dept: INFUSION THERAPY | Facility: CLINIC | Age: 61
End: 2021-12-21
Attending: INTERNAL MEDICINE
Payer: MEDICARE

## 2021-12-21 DIAGNOSIS — C79.51 NON-SMALL CELL LUNG CANCER METASTATIC TO BONE (H): ICD-10-CM

## 2021-12-21 DIAGNOSIS — Z51.11 ENCOUNTER FOR ANTINEOPLASTIC CHEMOTHERAPY: ICD-10-CM

## 2021-12-21 DIAGNOSIS — C34.90 NON-SMALL CELL CARCINOMA OF LUNG, STAGE 3, UNSPECIFIED LATERALITY (H): ICD-10-CM

## 2021-12-21 DIAGNOSIS — C34.90 NON-SMALL CELL LUNG CANCER METASTATIC TO BONE (H): ICD-10-CM

## 2021-12-21 DIAGNOSIS — D70.1 CHEMOTHERAPY-INDUCED NEUTROPENIA (H): Primary | ICD-10-CM

## 2021-12-21 DIAGNOSIS — T45.1X5A CHEMOTHERAPY-INDUCED NEUTROPENIA (H): Primary | ICD-10-CM

## 2021-12-21 LAB
ALBUMIN SERPL-MCNC: 3.2 G/DL (ref 3.4–5)
ALP SERPL-CCNC: 81 U/L (ref 40–150)
ALT SERPL W P-5'-P-CCNC: 23 U/L (ref 0–70)
ANION GAP SERPL CALCULATED.3IONS-SCNC: 3 MMOL/L (ref 3–14)
AST SERPL W P-5'-P-CCNC: 14 U/L (ref 0–45)
BASOPHILS # BLD AUTO: 0 10E3/UL (ref 0–0.2)
BASOPHILS NFR BLD AUTO: 0 %
BILIRUB SERPL-MCNC: 0.4 MG/DL (ref 0.2–1.3)
BUN SERPL-MCNC: 19 MG/DL (ref 7–30)
CALCIUM SERPL-MCNC: 9.3 MG/DL (ref 8.5–10.1)
CHLORIDE BLD-SCNC: 107 MMOL/L (ref 94–109)
CO2 SERPL-SCNC: 27 MMOL/L (ref 20–32)
CREAT SERPL-MCNC: 0.71 MG/DL (ref 0.66–1.25)
EOSINOPHIL # BLD AUTO: 0.2 10E3/UL (ref 0–0.7)
EOSINOPHIL NFR BLD AUTO: 2 %
ERYTHROCYTE [DISTWIDTH] IN BLOOD BY AUTOMATED COUNT: 16.1 % (ref 10–15)
GFR SERPL CREATININE-BSD FRML MDRD: >90 ML/MIN/1.73M2
GLUCOSE BLD-MCNC: 154 MG/DL (ref 70–99)
HCT VFR BLD AUTO: 30.9 % (ref 40–53)
HGB BLD-MCNC: 9 G/DL (ref 13.3–17.7)
IMM GRANULOCYTES # BLD: 0 10E3/UL
IMM GRANULOCYTES NFR BLD: 0 %
LYMPHOCYTES # BLD AUTO: 1.1 10E3/UL (ref 0.8–5.3)
LYMPHOCYTES NFR BLD AUTO: 15 %
MCH RBC QN AUTO: 26.9 PG (ref 26.5–33)
MCHC RBC AUTO-ENTMCNC: 29.1 G/DL (ref 31.5–36.5)
MCV RBC AUTO: 92 FL (ref 78–100)
MONOCYTES # BLD AUTO: 0.4 10E3/UL (ref 0–1.3)
MONOCYTES NFR BLD AUTO: 6 %
NEUTROPHILS # BLD AUTO: 5.7 10E3/UL (ref 1.6–8.3)
NEUTROPHILS NFR BLD AUTO: 77 %
NRBC # BLD AUTO: 0 10E3/UL
NRBC BLD AUTO-RTO: 0 /100
PLATELET # BLD AUTO: 196 10E3/UL (ref 150–450)
POTASSIUM BLD-SCNC: 3.9 MMOL/L (ref 3.4–5.3)
PROT SERPL-MCNC: 7 G/DL (ref 6.8–8.8)
RBC # BLD AUTO: 3.35 10E6/UL (ref 4.4–5.9)
SODIUM SERPL-SCNC: 137 MMOL/L (ref 133–144)
TSH SERPL DL<=0.005 MIU/L-ACNC: 1.64 MU/L (ref 0.4–4)
WBC # BLD AUTO: 7.4 10E3/UL (ref 4–11)

## 2021-12-21 PROCEDURE — 250N000011 HC RX IP 250 OP 636: Performed by: INTERNAL MEDICINE

## 2021-12-21 PROCEDURE — 84443 ASSAY THYROID STIM HORMONE: CPT | Performed by: INTERNAL MEDICINE

## 2021-12-21 PROCEDURE — 80053 COMPREHEN METABOLIC PANEL: CPT | Performed by: INTERNAL MEDICINE

## 2021-12-21 PROCEDURE — 36591 DRAW BLOOD OFF VENOUS DEVICE: CPT

## 2021-12-21 PROCEDURE — 85025 COMPLETE CBC W/AUTO DIFF WBC: CPT | Performed by: INTERNAL MEDICINE

## 2021-12-21 PROCEDURE — 96523 IRRIG DRUG DELIVERY DEVICE: CPT

## 2021-12-21 RX ORDER — HEPARIN SODIUM (PORCINE) LOCK FLUSH IV SOLN 100 UNIT/ML 100 UNIT/ML
5 SOLUTION INTRAVENOUS
Status: DISCONTINUED | OUTPATIENT
Start: 2021-12-21 | End: 2021-12-21 | Stop reason: HOSPADM

## 2021-12-21 RX ORDER — HEPARIN SODIUM (PORCINE) LOCK FLUSH IV SOLN 100 UNIT/ML 100 UNIT/ML
5 SOLUTION INTRAVENOUS
Status: CANCELLED | OUTPATIENT
Start: 2021-12-21

## 2021-12-21 RX ORDER — HEPARIN SODIUM,PORCINE 10 UNIT/ML
5 VIAL (ML) INTRAVENOUS
Status: CANCELLED | OUTPATIENT
Start: 2021-12-21

## 2021-12-21 RX ADMIN — Medication 5 ML: at 13:39

## 2021-12-21 NOTE — PROGRESS NOTES
Nursing Note:  Kt Rasmussen presents today for port labs and urine.    Patient seen by provider today: No   present during visit today: Not Applicable.    Note: Pt unable to provide urine sample. Provided supplies for pt to collect at home tmrw prior to provider and infusion appts.     Intravenous Access:  Implanted Port.    Discharge Plan:   Patient was sent to home for 12/22/21 appointment.    Tari Clarke RN

## 2021-12-22 ENCOUNTER — ONCOLOGY VISIT (OUTPATIENT)
Dept: ONCOLOGY | Facility: CLINIC | Age: 61
End: 2021-12-22
Attending: INTERNAL MEDICINE
Payer: MEDICARE

## 2021-12-22 ENCOUNTER — INFUSION THERAPY VISIT (OUTPATIENT)
Dept: INFUSION THERAPY | Facility: CLINIC | Age: 61
End: 2021-12-22
Attending: INTERNAL MEDICINE
Payer: MEDICARE

## 2021-12-22 VITALS
WEIGHT: 170 LBS | OXYGEN SATURATION: 99 % | HEART RATE: 97 BPM | BODY MASS INDEX: 20.97 KG/M2 | RESPIRATION RATE: 16 BRPM | SYSTOLIC BLOOD PRESSURE: 126 MMHG | TEMPERATURE: 98 F | DIASTOLIC BLOOD PRESSURE: 69 MMHG

## 2021-12-22 DIAGNOSIS — C34.90 NON-SMALL CELL LUNG CANCER METASTATIC TO BONE (H): Primary | ICD-10-CM

## 2021-12-22 DIAGNOSIS — Z51.11 ENCOUNTER FOR ANTINEOPLASTIC CHEMOTHERAPY: Primary | ICD-10-CM

## 2021-12-22 DIAGNOSIS — C34.90 NON-SMALL CELL LUNG CANCER METASTATIC TO BONE (H): ICD-10-CM

## 2021-12-22 DIAGNOSIS — D70.1 CHEMOTHERAPY-INDUCED NEUTROPENIA (H): ICD-10-CM

## 2021-12-22 DIAGNOSIS — C79.51 NON-SMALL CELL LUNG CANCER METASTATIC TO BONE (H): ICD-10-CM

## 2021-12-22 DIAGNOSIS — C79.51 NON-SMALL CELL LUNG CANCER METASTATIC TO BONE (H): Primary | ICD-10-CM

## 2021-12-22 DIAGNOSIS — T45.1X5A CHEMOTHERAPY-INDUCED NEUTROPENIA (H): ICD-10-CM

## 2021-12-22 LAB — ALBUMIN UR-MCNC: NEGATIVE MG/DL

## 2021-12-22 PROCEDURE — 96415 CHEMO IV INFUSION ADDL HR: CPT

## 2021-12-22 PROCEDURE — 250N000011 HC RX IP 250 OP 636: Performed by: NURSE PRACTITIONER

## 2021-12-22 PROCEDURE — G0463 HOSPITAL OUTPT CLINIC VISIT: HCPCS | Mod: 25

## 2021-12-22 PROCEDURE — 81003 URINALYSIS AUTO W/O SCOPE: CPT | Performed by: INTERNAL MEDICINE

## 2021-12-22 PROCEDURE — 258N000003 HC RX IP 258 OP 636: Performed by: NURSE PRACTITIONER

## 2021-12-22 PROCEDURE — 99214 OFFICE O/P EST MOD 30 MIN: CPT | Performed by: NURSE PRACTITIONER

## 2021-12-22 PROCEDURE — 96413 CHEMO IV INFUSION 1 HR: CPT

## 2021-12-22 PROCEDURE — 96417 CHEMO IV INFUS EACH ADDL SEQ: CPT

## 2021-12-22 RX ORDER — ALBUTEROL SULFATE 0.83 MG/ML
2.5 SOLUTION RESPIRATORY (INHALATION)
Status: CANCELLED | OUTPATIENT
Start: 2021-12-22

## 2021-12-22 RX ORDER — HEPARIN SODIUM (PORCINE) LOCK FLUSH IV SOLN 100 UNIT/ML 100 UNIT/ML
5 SOLUTION INTRAVENOUS
Status: DISCONTINUED | OUTPATIENT
Start: 2021-12-22 | End: 2021-12-22 | Stop reason: HOSPADM

## 2021-12-22 RX ORDER — LORAZEPAM 2 MG/ML
0.5 INJECTION INTRAMUSCULAR EVERY 4 HOURS PRN
Status: CANCELLED
Start: 2021-12-22

## 2021-12-22 RX ORDER — HEPARIN SODIUM (PORCINE) LOCK FLUSH IV SOLN 100 UNIT/ML 100 UNIT/ML
5 SOLUTION INTRAVENOUS
Status: CANCELLED | OUTPATIENT
Start: 2021-12-22

## 2021-12-22 RX ORDER — METHYLPREDNISOLONE SODIUM SUCCINATE 125 MG/2ML
125 INJECTION, POWDER, LYOPHILIZED, FOR SOLUTION INTRAMUSCULAR; INTRAVENOUS
Status: CANCELLED
Start: 2021-12-22

## 2021-12-22 RX ORDER — DIPHENHYDRAMINE HYDROCHLORIDE 50 MG/ML
50 INJECTION INTRAMUSCULAR; INTRAVENOUS
Status: CANCELLED
Start: 2021-12-22

## 2021-12-22 RX ORDER — ALBUTEROL SULFATE 90 UG/1
1-2 AEROSOL, METERED RESPIRATORY (INHALATION)
Status: CANCELLED
Start: 2021-12-22

## 2021-12-22 RX ORDER — NALOXONE HYDROCHLORIDE 0.4 MG/ML
.1-.4 INJECTION, SOLUTION INTRAMUSCULAR; INTRAVENOUS; SUBCUTANEOUS
Status: CANCELLED | OUTPATIENT
Start: 2021-12-22

## 2021-12-22 RX ORDER — EPINEPHRINE 1 MG/ML
0.3 INJECTION, SOLUTION INTRAMUSCULAR; SUBCUTANEOUS EVERY 5 MIN PRN
Status: CANCELLED | OUTPATIENT
Start: 2021-12-22

## 2021-12-22 RX ORDER — MEPERIDINE HYDROCHLORIDE 25 MG/ML
25 INJECTION INTRAMUSCULAR; INTRAVENOUS; SUBCUTANEOUS EVERY 30 MIN PRN
Status: CANCELLED | OUTPATIENT
Start: 2021-12-22

## 2021-12-22 RX ORDER — HEPARIN SODIUM,PORCINE 10 UNIT/ML
5 VIAL (ML) INTRAVENOUS
Status: CANCELLED | OUTPATIENT
Start: 2021-12-22

## 2021-12-22 RX ORDER — SODIUM CHLORIDE 9 MG/ML
1000 INJECTION, SOLUTION INTRAVENOUS CONTINUOUS PRN
Status: CANCELLED
Start: 2021-12-22

## 2021-12-22 RX ADMIN — SODIUM CHLORIDE 250 ML: 9 INJECTION, SOLUTION INTRAVENOUS at 11:21

## 2021-12-22 RX ADMIN — Medication 5 ML: at 15:36

## 2021-12-22 RX ADMIN — ATEZOLIZUMAB 1200 MG: 1200 INJECTION, SOLUTION INTRAVENOUS at 11:21

## 2021-12-22 RX ADMIN — PACLITAXEL 410 MG: 6 INJECTION, SOLUTION INTRAVENOUS at 12:35

## 2021-12-22 RX ADMIN — SODIUM CHLORIDE 1200 MG: 9 INJECTION, SOLUTION INTRAVENOUS at 11:56

## 2021-12-22 ASSESSMENT — PAIN SCALES - GENERAL: PAINLEVEL: NO PAIN (0)

## 2021-12-22 NOTE — PROGRESS NOTES
Infusion Nursing Note:  Kt HERNANDEZ Rasmussen presents today for C12D1 tecentriq, zirabev, and taxol   Patient seen by provider today: Yes: RUSSEL Martinez   present during visit today: Not Applicable.    Note: N/A.      Intravenous Access:  Implanted Port.    Treatment Conditions:  Lab Results   Component Value Date    HGB 9.0 (L) 12/21/2021    WBC 7.4 12/21/2021    ANEU 7.7 07/08/2021    ANEUTAUTO 5.7 12/21/2021     12/21/2021      Lab Results   Component Value Date     12/21/2021    POTASSIUM 3.9 12/21/2021    MAG 1.9 10/13/2016    CR 0.71 12/21/2021    BEVERLY 9.3 12/21/2021    BILITOTAL 0.4 12/21/2021    ALBUMIN 3.2 (L) 12/21/2021    ALT 23 12/21/2021    AST 14 12/21/2021     Results reviewed, labs MET treatment parameters, ok to proceed with treatment.      Post Infusion Assessment:  Patient tolerated infusion without incident.  Blood return noted pre and post infusion.  Site patent and intact, free from redness, edema or discomfort.  No evidence of extravasations.  Access discontinued per protocol.       Discharge Plan:   Patient declined prescription refills.  Discharge instructions reviewed with: Patient.  Patient and/or family verbalized understanding of discharge instructions and all questions answered.  Copy of AVS reviewed with patient and/or family.  Patient will return 12/23/21 for next appointment.  Patient discharged in stable condition accompanied by: self.  Departure Mode: Ambulatory.      Guzman Patel RN

## 2021-12-22 NOTE — PROGRESS NOTES
"Oncology Rooming Note    December 22, 2021 10:06 AM   Kt Rasmussen is a 61 year old male who presents for:    Chief Complaint   Patient presents with     Oncology Clinic Visit     Initial Vitals: There were no vitals taken for this visit. Estimated body mass index is 20.72 kg/m  as calculated from the following:    Height as of 11/3/21: 1.918 m (6' 3.5\").    Weight as of 12/1/21: 76.2 kg (168 lb). There is no height or weight on file to calculate BSA.  Data Unavailable Comment: Data Unavailable   No LMP for male patient.  Allergies reviewed: Yes  Medications reviewed: Yes    Medications: Medication refills not needed today.  Pharmacy name entered into EPIC:    [place] [facility], [place] - [address] [facility], [place] - [address] [id number] [place], [place] - [address] [facility]    Clinical concerns: feeling dizzy      Keke More, NASIMA            "

## 2021-12-22 NOTE — PROGRESS NOTES
Oncology/Hematology Visit Note  Dec 22, 2021    Reason for Visit: follow up of metastatic non-small cell lung carcinoma  Metastatic to lymph nodes bones in bilateral lungs  Brain MRI negative for mets    Patient met with Dr. John who recommended treatment with palliative intent with paclitaxel carboplatin Avastin and atezolizumab-treatment started on April 27, 2021  -Due to thrombocytopenia carboplatin AUC reduced to 4 with cycle 2  Due to pancytopenia carboplatin discontinued with cycle 5    7/12/2021: PET/CT showed resolved mural nodules with increased cystic cavity in left lower lobe with no significant FDG uptake, less likely metastases; no enlarged hypermetabolic mediastinal, hilar, or axillary lymph nodes, decreased metabolic activity of intraosseous lesion in spine and pelvis; no new bone lesions    -Patient now getting treatment with paclitaxel, Avastin and atezolizumab.  10/11/2021-PET scan revealed   slight interval increase in couple pelvic bony areas and new focal uptake at L2 but remaining scan shows no residual or progressive disease in lungs or lymph nodes  Per Dr. John note continue with the same treatment for now    Interval History:  Patient reports he has been tolerating treatment well.  Denies fever chills sweats cough shortness of breath chest pain nausea vomiting diarrhea abdominal pain bleeding    Review of Systems:  14 point ROS of systems including Constitutional, Eyes, Respiratory, Cardiovascular, Gastroenterology, Genitourinary, Integumentary, Muscularskeletal, Psychiatric were all negative except for pertinent positives noted in my HPI.    Physical Examination:  /69   Pulse 97   Temp 98  F (36.7  C) (Oral)   Resp 16   Wt 77.1 kg (170 lb)   SpO2 99%   BMI 20.97 kg/m        Physical Exam  HENT:      Head: Normocephalic.      Nose: Nose normal.      Mouth/Throat:      Mouth: Mucous membranes are moist.   Eyes:      Pupils: Pupils are equal, round, and reactive to light.    Cardiovascular:      Rate and Rhythm: Normal rate.   Pulmonary:      Effort: Pulmonary effort is normal.   Musculoskeletal:         General: Normal range of motion.      Cervical back: Normal range of motion.   Skin:     General: Skin is warm.   Neurological:      General: No focal deficit present.      Mental Status: He is alert.   Psychiatric:         Mood and Affect: Mood normal.       Laboratory Data:  CBC CMP results reviewed    Assessment and Plan:      This is a 61-year-old male with    metastatic non-small cell lung carcinoma  Metastatic to lymph nodes bones in bilateral lungs  Brain MRI negative for mets  Patient met with Dr. John who recommended treatment with palliative intent with paclitaxel carboplatin Avastin and atezolizumab-treatment to start on 04/27  Due to pancytopenia carboplatin discontinued with cycle 5  Patient now getting palliative treatment with paclitaxel, Avastin and atezolizumab.  07/2021-PET scan reveals partial response  10/11/2021- PET slight interval increase in couple pelvic bony areas and new focal uptake at L2 but remaining scan shows no residual or progressive disease in lungs or lymph nodes  Per Dr. John's note continue with the same treatment paclitaxel Avastin and atezolizumab  Plan for repeat PET scan in January 2022  Labs reviewed okay to proceed with treatment  Schedule chemo throughout January  Schedule with Dr. 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     Chart documentation with Dragon Voice recognition Software. Although reviewed after completion, some words and grammatical errors may remain.

## 2021-12-22 NOTE — LETTER
"    12/22/2021         RE: Kt Rasmussen  [address]  [facility] [address]        Dear Colleague,    Thank you for referring your patient, Kt Rasmussen, to the [facility]. Please see a copy of my visit note below.    Oncology Rooming Note    December 22, 2021 10:06 AM   Kt Rasmussen is a 61 year old male who presents for:    Chief Complaint   Patient presents with     Oncology Clinic Visit     Initial Vitals: There were no vitals taken for this visit. Estimated body mass index is 20.72 kg/m  as calculated from the following:    Height as of 11/3/21: 1.918 m (6' 3.5\").    Weight as of 12/1/21: 76.2 kg (168 lb). There is no height or weight on file to calculate BSA.  Data Unavailable Comment: Data Unavailable   No LMP for male patient.  Allergies reviewed: Yes  Medications reviewed: Yes    Medications: Medication refills not needed today.  Pharmacy name entered into EPIC:    [facility], [place] - [address] [facility], [place] - [address] [id number] - [place], [place] - [facility] [address] [facility]    Clinical concerns: feeling dizzy      Keke More [facility]                Oncology/Hematology Visit Note  Dec 22, 2021    Reason for Visit: follow up of metastatic non-small cell lung carcinoma  Metastatic to lymph nodes bones in bilateral lungs  Brain MRI negative for mets    Patient met with Dr. John who recommended treatment with palliative intent with paclitaxel carboplatin Avastin and atezolizumab-treatment started on April 27, 2021  -Due to thrombocytopenia carboplatin AUC reduced to 4 with cycle 2  Due to pancytopenia carboplatin discontinued with cycle 5    7/12/2021: PET/CT showed resolved mural nodules with increased cystic cavity in left lower lobe with no significant FDG uptake, less likely metastases; no enlarged hypermetabolic mediastinal, hilar, or axillary " lymph nodes, decreased metabolic activity of intraosseous lesion in spine and pelvis; no new bone lesions    -Patient now getting treatment with paclitaxel, Avastin and atezolizumab.  10/11/2021-PET scan revealed   slight interval increase in couple pelvic bony areas and new focal uptake at L2 but remaining scan shows no residual or progressive disease in lungs or lymph nodes  Per Dr. John note continue with the same treatment for now    Interval History:  Patient reports he has been tolerating treatment well.  Denies fever chills sweats cough shortness of breath chest pain nausea vomiting diarrhea abdominal pain bleeding    Review of Systems:  14 point ROS of systems including Constitutional, Eyes, Respiratory, Cardiovascular, Gastroenterology, Genitourinary, Integumentary, Muscularskeletal, Psychiatric were all negative except for pertinent positives noted in my HPI.    Physical Examination:  /69   Pulse 97   Temp 98  F (36.7  C) (Oral)   Resp 16   Wt 77.1 kg (170 lb)   SpO2 99%   BMI 20.97 kg/m        Physical Exam  HENT:      Head: Normocephalic.      Nose: Nose normal.      Mouth/Throat:      Mouth: Mucous membranes are moist.   Eyes:      Pupils: Pupils are equal, round, and reactive to light.   Cardiovascular:      Rate and Rhythm: Normal rate.   Pulmonary:      Effort: Pulmonary effort is normal.   Musculoskeletal:         General: Normal range of motion.      Cervical back: Normal range of motion.   Skin:     General: Skin is warm.   Neurological:      General: No focal deficit present.      Mental Status: He is alert.   Psychiatric:         Mood and Affect: Mood normal.       Laboratory Data:  CBC CMP results reviewed    Assessment and Plan:      This is a 61-year-old male with    metastatic non-small cell lung carcinoma  Metastatic to lymph nodes bones in bilateral lungs  Brain MRI negative for mets  Patient met with Dr. John who recommended treatment with palliative intent with paclitaxel  carboplatin Avastin and atezolizumab-treatment to start on 04/27  Due to pancytopenia carboplatin discontinued with cycle 5  Patient now getting palliative treatment with paclitaxel, Avastin and atezolizumab.  07/2021-PET scan reveals partial response  10/11/2021- PET slight interval increase in couple pelvic bony areas and new focal uptake at L2 but remaining scan shows no residual or progressive disease in lungs or lymph nodes  Per Dr. John's note continue with the same treatment paclitaxel Avastin and atezolizumab  Plan for repeat PET scan in January 2022  Labs reviewed okay to proceed with treatment  Schedule chemo throughout January  Schedule with Dr. 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 Although reviewed after completion, some words and grammatical errors may remain.            Again, thank you for allowing me to participate in the care of your patient.        Sincerely,        MADHAVI Moran CNP

## 2021-12-23 ENCOUNTER — ALLIED HEALTH/NURSE VISIT (OUTPATIENT)
Dept: INFUSION THERAPY | Facility: CLINIC | Age: 61
End: 2021-12-23
Attending: INTERNAL MEDICINE
Payer: MEDICARE

## 2021-12-23 VITALS
SYSTOLIC BLOOD PRESSURE: 139 MMHG | HEART RATE: 99 BPM | TEMPERATURE: 98.3 F | RESPIRATION RATE: 16 BRPM | DIASTOLIC BLOOD PRESSURE: 70 MMHG

## 2021-12-23 DIAGNOSIS — C79.51 NON-SMALL CELL LUNG CANCER METASTATIC TO BONE (H): ICD-10-CM

## 2021-12-23 DIAGNOSIS — T45.1X5A CHEMOTHERAPY-INDUCED NEUTROPENIA (H): Primary | ICD-10-CM

## 2021-12-23 DIAGNOSIS — Z51.11 ENCOUNTER FOR ANTINEOPLASTIC CHEMOTHERAPY: ICD-10-CM

## 2021-12-23 DIAGNOSIS — C34.90 NON-SMALL CELL LUNG CANCER METASTATIC TO BONE (H): ICD-10-CM

## 2021-12-23 DIAGNOSIS — D70.1 CHEMOTHERAPY-INDUCED NEUTROPENIA (H): Primary | ICD-10-CM

## 2021-12-23 PROCEDURE — 96372 THER/PROPH/DIAG INJ SC/IM: CPT | Performed by: NURSE PRACTITIONER

## 2021-12-23 PROCEDURE — 96377 APPLICATON ON-BODY INJECTOR: CPT

## 2021-12-23 PROCEDURE — 250N000011 HC RX IP 250 OP 636: Performed by: NURSE PRACTITIONER

## 2021-12-23 RX ADMIN — PEGFILGRASTIM 6 MG: 6 INJECTION SUBCUTANEOUS at 14:29

## 2021-12-23 ASSESSMENT — PAIN SCALES - GENERAL: PAINLEVEL: NO PAIN (0)

## 2021-12-23 NOTE — PROGRESS NOTES
Infusion Nursing Note:  Kt Rasmussen presents today for neulasta.    Patient seen by provider today: No   present during visit today: Not Applicable.    Note: N/A.      Intravenous Access:  No Intravenous access/labs at this visit.    Treatment Conditions:  Not Applicable.      Post Infusion Assessment:  Patient tolerated injection without incident.       Discharge Plan:   Patient declined prescription refills.  Discharge instructions reviewed with: Patient.  Patient and/or family verbalized understanding of discharge instructions and all questions answered.  Copy of AVS reviewed with patient and/or family.  Patient will return 1/10/22 for next appointment.  Patient discharged in stable condition accompanied by: self.  Departure Mode: Ambulatory.      Adore Day RN

## 2022-01-04 NOTE — PROGRESS NOTES
[facility]    Hematology/Oncology Established Patient Follow-up Note      Today's Date: 1/12/2022    Reason for Follow-up: Metastatic non-small cell lung carcinoma.    HISTORY OF PRESENT ILLNESS: Kt Rasmussen is a 61 year old male who presents with the following oncologic history:  1. 5/05/2016: Presented with near-obstructing large mass coming off the cheikh and likely the posterior wall, seen on bronchoscopy. Biopsy of the mass showed invasive squamous cell carcinoma, moderately differentiating and focally keratinizing.  Procedure was complicated by significant bleeding.  Tumor was completely debulked (via bronchoscopy) in both main stem bronchi and distal trachea. NGS showed no mutations in EGFR, KRAS, BRAF, NRAS, HRAS, PIK3CA, ERBB2, MET, or JAK2.  2. 5/23/2016: PET/CT scan showed evidence of residual tumor with decreased endobronchial mass. Indeterminate, mildly hypermetabolic mesentery with prominent lymph nodes and area of enhancement of the right hepatic lobe present.   Felt to have T4-NX-M0 disease.  3. 6/09/2016: Started concurrent chemoradiation with weekly paclitaxel and carboplatin.  4. 7/30/2016: Completed radiation.  Subsequently received 2 cycles of consolidation paclitaxel and carboplatin.   5. 9/13/2019: Presented with 6-month history of dysphonia and left vocal exophytic lesion. Left true vocal fold biopsy showed at least squamous cell carcinoma in situ, cannot exclude superficial invasion.  6. 9/30/2019: Excision of left vocal cord mass showed fragments of invasive squamous cell carcinoma, well differentiated and keratinizing.  Margins negative for malignancy.  7. 2/16/2021: CT chest w/o contrast showed thin-walled cavity in left lower lobe with 2 solid peripheral nodules measuring 9 mm each. No lymphadenopathy.  8. 3/01/2021: PET scan showed hypermetabolic nodules in left lower lobe and right lung, consistent with metastases; hypermetabolic adenopathy in chest, abdomen,  pelvis; nonspecific uptake at tongue; hypermetabolic intraosseous lesions in spine and pelvis consistent with metastatic disease; left axillary hypermetabolic lymph nodes related to COVID-19 vaccination.  9. 3/12/2021: CT-guided lung biopsy showed non-small cell carcinoma, not otherwise specified -- with opinion by [facility] pathologist.  10. 3/18/2021: Lung NGS panel negative for mutations in BRAF, EGFR, ERBB2, IDH1, IDH2, KRAS, MET, NRAS, RET. PD-L1 expression negative (TPS <1%). Due to minimal amount of DNA obtained from specimen, other biomarkers could not be analyzed.  11. 4/7/2021: MRI brain negative for brain metastases.  12. 4/27/2021: Started 1st line metastatic therapy with carboplatin, paclitaxel, bevacizumab, and atezolizumab for metastatic non-small cell lung carcinoma, NOS.  13. 7/12/2021: PET/CT showed resolved mural nodules with increased cystic cavity in left lower lobe with no significant FDG uptake, less likely metastases; no enlarged hypermetabolic mediastinal, hilar, or axillary lymph nodes, decreased metabolic activity of intraosseous lesion in spine and pelvis; no new bone lesions.  14. 10/11/2021: PET/CT showed slight interval increase in intensity of metabolic activity of right pubic bone and left ischium with new focal uptake in L2 spinous process; resolved uptake at previous ground glass opacity along right medial lower lobe; unchanged cystic cavities/nodules in left lower lobe and right apical upper lobe; no pathologically enlarged hypermetabolic mediastinal, hilar, or axillary lymph nodes.  15. 1/10/2022: PET/CT showed new foci of abnormal skeletal FGD uptake in the thoracic and lumbar spine. Mild interval increase in intensity of previously demonstrated hypermetabolic skeletal lesions involving the pelvis and lumbar spine. Findings are consistent with progressive osseous metastatic disease.    INTERIM HISTORY:  Kt reports dizziness and having had 5 falls. He reports nausea  throughout all of chemotherapy, resulting in 10-20 pound weight loss.  He denies nausea today.      REVIEW OF SYSTEMS:   14 point ROS was reviewed and is negative other than as noted above in HPI.       HOME MEDICATIONS:  Current Outpatient Medications   Medication Sig Dispense Refill     atorvastatin (LIPITOR) 40 MG tablet Take 40 mg by mouth daily           ALLERGIES:  Allergies   Allergen Reactions     No Known Drug Allergies          PAST MEDICAL HISTORY:  Past Medical History:   Diagnosis Date     Alcohol abuse, unspecified      Cerebral infarction (H)     , right side residual and aphasia     Dyslipidemia      GERD (gastroesophageal reflux disease)      Lung cancer (H)      Unspecified essential hypertension          PAST SURGICAL HISTORY:  Past Surgical History:   Procedure Laterality Date     BRONCHOSCOPY FLEXIBLE AND RIGID N/A 2016    Procedure: BRONCHOSCOPY FLEXIBLE AND RIGID;  Surgeon: Tony Talbot MD;  Location: [facility] OR     ESOPHAGOSCOPY FLEXIBLE N/A 2019    Procedure: flexible esophagoscopy;  Surgeon: Lizzy Johnson MD;  Location: [facility] OR     INJECT STEROID (LOCATION) N/A 2019    Procedure: steroid injection;  Surgeon: Lizzy Johnson MD;  Location: [facility] OR     IR CHEST PORT PLACEMENT > 5 YRS OF AGE  2021     LASER CO2 LARYNGOSCOPY N/A 2019    Procedure: Microdirect laryngoscopy with excision of laryngeal mass, CO2 laser;  Surgeon: Lizzy Johnson MD;  Location: [facility] OR     ZC NONSPECIFIC PROCEDURE      R tympanoplasty         SOCIAL HISTORY:  Social History     Socioeconomic History     Marital status: Single     Spouse name: Not on file     Number of children: Not on file     Years of education: Not on file     Highest education level: Not on file   Occupational History     Not on file   Tobacco Use     Smoking status: Former Smoker     Packs/day: 1.00     Types: Cigarettes     Quit date: 2009     Years since quittin.2     Smokeless tobacco: Never Used    Substance and Sexual Activity     Alcohol use: Not Currently     Drug use: No     Sexual activity: Not on file   Other Topics Concern     Parent/sibling w/ CABG, MI or angioplasty before 65F 55M? Not Asked   Social History Narrative     Not on file     Social Determinants of Health     Financial Resource Strain: Not on file   Food Insecurity: Not on file   Transportation Needs: Not on file   Physical Activity: Not on file   Stress: Not on file   Social Connections: Not on file   Intimate Partner Violence: Not on file   Housing Stability: Not on file   Resides at assisted living.      FAMILY HISTORY:  Family History   Problem Relation Age of Onset     Cancer Father          PHYSICAL EXAM:  Vital signs:  /72   Pulse 95   Temp 97.7  F (36.5  C) (Oral)   Resp 16   SpO2 97%    ECO  GENERAL/CONSTITUTIONAL: No acute distress.  EYES: No erythema or scleral icterus.  LYMPH: No cervical, supraclavicular, axillary adenopathy.   RESPIRATORY: Clear to auscultation bilaterally. No crackles or wheezing.   CARDIOVASCULAR: Regular rate and rhythm without murmurs.  GASTROINTESTINAL: No hepatosplenomegaly, masses, or tenderness. No guarding.  No distention.  MUSCULOSKELETAL: Warm and well-perfused, no cyanosis, clubbing, or edema.  NEUROLOGIC: Residual right arm and right leg reduced strength due to prior stroke. Alert, oriented, answers questions appropriately. Dysphonia present.  INTEGUMENTARY: No rashes or jaundice.  GAIT: Limping present; uses cane.       LABS:  CBC RESULTS: Recent Labs   Lab Test 22  1003   WBC 7.1   RBC 3.52*   HGB 9.5*   HCT 32.2*   MCV 92   MCH 27.0   MCHC 29.5*   RDW 17.4*        Last Comprehensive Metabolic Panel:  Sodium   Date Value Ref Range Status   2022 140 133 - 144 mmol/L Final   2021 140 133 - 144 mmol/L Final     Potassium   Date Value Ref Range Status   2022 4.3 3.4 - 5.3 mmol/L Final   2021 4.3 3.4 - 5.3 mmol/L Final     Chloride   Date Value  Ref Range Status   01/12/2022 109 94 - 109 mmol/L Final   06/29/2021 111 (H) 94 - 109 mmol/L Final     Carbon Dioxide   Date Value Ref Range Status   06/29/2021 25 20 - 32 mmol/L Final     Carbon Dioxide (CO2)   Date Value Ref Range Status   01/12/2022 27 20 - 32 mmol/L Final     Anion Gap   Date Value Ref Range Status   01/12/2022 4 3 - 14 mmol/L Final   06/29/2021 4 3 - 14 mmol/L Final     Glucose   Date Value Ref Range Status   01/12/2022 91 70 - 99 mg/dL Final   06/29/2021 85 70 - 99 mg/dL Final     Urea Nitrogen   Date Value Ref Range Status   01/12/2022 21 7 - 30 mg/dL Final   06/29/2021 17 7 - 30 mg/dL Final     Creatinine   Date Value Ref Range Status   01/12/2022 0.68 0.66 - 1.25 mg/dL Final   06/29/2021 0.84 0.66 - 1.25 mg/dL Final     GFR Estimate   Date Value Ref Range Status   01/12/2022 >90 >60 mL/min/1.73m2 Final     Comment:     Effective December 21, 2021 eGFRcr in adults is calculated using the 2021 CKD-EPI creatinine equation which includes age and gender (Ceci et al., NE, DOI: 10.1056/UPKWcd4919193)   06/29/2021 >90 >60 mL/min/[1.73_m2] Final     Comment:     Non  GFR Calc  Starting 12/18/2018, serum creatinine based estimated GFR (eGFR) will be   calculated using the Chronic Kidney Disease Epidemiology Collaboration   (CKD-EPI) equation.       Calcium   Date Value Ref Range Status   01/12/2022 9.1 8.5 - 10.1 mg/dL Final   06/29/2021 8.7 8.5 - 10.1 mg/dL Final     Bilirubin Total   Date Value Ref Range Status   01/12/2022 0.4 0.2 - 1.3 mg/dL Final   06/29/2021 0.3 0.2 - 1.3 mg/dL Final     Alkaline Phosphatase   Date Value Ref Range Status   01/12/2022 88 40 - 150 U/L Final   06/29/2021 73 40 - 150 U/L Final     ALT   Date Value Ref Range Status   01/12/2022 37 0 - 70 U/L Final   06/29/2021 34 0 - 70 U/L Final     AST   Date Value Ref Range Status   01/12/2022 27 0 - 45 U/L Final   06/29/2021 26 0 - 45 U/L Final       PATHOLOGY:  None new.    IMAGING:  Reviewed as per  HPI.    ASSESSMENT/PLAN:  Kt Rasmussen is a 61 year old male with the following issues:  1. Metastatic non-small cell lung carcinoma with metastases to lymph nodes, bones, and bilateral lungs  2. History of locally advanced endobronchial invasive squamous cell carcinoma diagnosed 5/2016, status post debulking and chemoradiation   --I reviewed PET scan from 1/10/2022 which showed new progressive disease in the thoracic and lumbar spine. Discussed that asymmetric lateral tongue enhancement is likely related to some inflammation from last week's laryngoscopy, which showed no evidence for recurrent larynx cancer.  --Due to worsening performance status, I recommended a break from chemotherapy for 1 month.  He does not feel he could endure chemotherapy right now but would like to be considered for 2nd line therapy after recovering further.  I discussed switching to 2nd metastatic therapy with pemetrexed and carboplatin every 3 weeks if his performance status improves.  --Prior lung NGS biomarker testing were all negative and PD-L1 expression was negative. ROS1 and NTRK biomarker analysis could not be performed due to limited amount of DNA extracted from the specimen.  Will consider a repeat biopsy in the future to obtain testing for these biomarkers.    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 Dysphonia  5. Dysphagia  -Kt underwent excision of a left vocal cord SCC on 9/30/2019 and has persistent dysphonia as a result of the lesion and excision.  He also has dysphagia but this is stable and swallowing is manageable.  -Last scope by ENT 9/23/2021 showed no evidence for recurrence.    6. Weight loss  --I offered nutrition consult and he declined this.  --I recommended increasing his intake of higher caloric nutrient dense foods and beverages.    7. Multiple falls and dizziness  --Today's CBC shows improvement in anemia, normal WBC and platelets, normal creatinine, electrolytes, and liver enzymes.  --I  discussed sending him to ED for admission for further assessment.  The hospital is not accepting direct admissions due to limited bed availability.  He declines going to the ED or hospitalization.  I offered IV fluid hydration.  He denies this.  He wishes to return home.  I advised him to go to ED if his overall condition worsens.  --Will notify social work for follow-up on assisted living situation given his falls.    Return in 1 month.    Sangita John MD  Hematology/Oncology  [facility]    Total time spent: 40 minutes in patient evaluation, counseling, documentation, and coordination of care.

## 2022-01-06 ENCOUNTER — OFFICE VISIT (OUTPATIENT)
Dept: OTOLARYNGOLOGY | Facility: CLINIC | Age: 62
End: 2022-01-06
Payer: MEDICARE

## 2022-01-06 VITALS — HEART RATE: 101 BPM | WEIGHT: 168 LBS | BODY MASS INDEX: 20.89 KG/M2 | OXYGEN SATURATION: 99 % | HEIGHT: 75 IN

## 2022-01-06 DIAGNOSIS — Z85.21 HX OF LARYNGEAL CANCER: ICD-10-CM

## 2022-01-06 DIAGNOSIS — R49.0 DYSPHONIA: Primary | ICD-10-CM

## 2022-01-06 PROCEDURE — 31575 DIAGNOSTIC LARYNGOSCOPY: CPT | Performed by: OTOLARYNGOLOGY

## 2022-01-06 ASSESSMENT — MIFFLIN-ST. JEOR: SCORE: 1647.67

## 2022-01-06 ASSESSMENT — PAIN SCALES - GENERAL: PAINLEVEL: NO PAIN (0)

## 2022-01-06 NOTE — NURSING NOTE
"Chief Complaint   Patient presents with     RECHECK     Follow up       Pulse 101, height 1.905 m (6' 3\"), weight 76.2 kg (168 lb), SpO2 99 %.    Nick Vidales, EMT  "

## 2022-01-06 NOTE — LETTER
2022       RE: Kt Rasmussen  [id number] [address]  [facility] [address]     Dear Colleague,    Thank you for referring your patient, Kt Rasmussen, to the [facility] [place] at [facility]. Please see a copy of my visit note below.        [facility]   at the [facility]   Otolaryngology Clinic     Patient: Kt Rasmussen    MRN: [id number]    : 1960    Age/Gender: 61 year old male  Date of Service: 2022  Rendering Provider:   Lizzy Wray MD     Chief Complaint   T1 left TVF SCC s/p resection 19  Dysphonia  Dysphagia  Interval History   HISTORY OF PRESENT ILLNESS: Mr. Rasmussen is a 61 year old male is being followed for history of left TVF SCC. he was initially seen on 19. Please refer to this note for full history.      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 he reports:  - doing well     PAST MEDICAL HISTORY:   Past Medical History:   Diagnosis Date     Alcohol abuse, unspecified      Cerebral infarction (H)     , right side residual and aphasia     Dyslipidemia      GERD (gastroesophageal reflux disease)      Lung cancer (H)      Unspecified essential hypertension        PAST SURGICAL HISTORY:   Past Surgical History:   Procedure Laterality Date     BRONCHOSCOPY FLEXIBLE AND RIGID N/A 2016    Procedure: BRONCHOSCOPY FLEXIBLE AND RIGID;  Surgeon: Tony Talbot MD;  Location: [facility] OR     ESOPHAGOSCOPY FLEXIBLE N/A 2019    Procedure: flexible esophagoscopy;  Surgeon: Lizzy Johnson MD;  Location: [facility] OR     INJECT STEROID (LOCATION) N/A 2019    Procedure: steroid injection;  Surgeon: Lizzy Johnson MD;  Location: [facility] OR     IR CHEST PORT PLACEMENT > 5 YRS OF AGE  2021     LASER CO2 LARYNGOSCOPY N/A 2019    Procedure: Microdirect  laryngoscopy with excision of laryngeal mass, CO2 laser;  Surgeon: Lizzy Johnson MD;  Location:  OR     [facility] NONSPECIFIC PROCEDURE      R tympanoplasty       CURRENT MEDICATIONS:   Current Outpatient Medications:      atorvastatin (LIPITOR) 40 MG tablet, Take 40 mg by mouth daily, Disp: , Rfl:   No current facility-administered medications for this visit.    Facility-Administered Medications Ordered in Other Visits:      heparin 100 UNIT/ML injection 5 mL, 5 mL, Intracatheter, Once PRN, Sangita John MD, 5 mL at 08/10/21 1346     heparin lock flush 10 UNIT/ML injection 5 mL, 5 mL, Intracatheter, Q1H PRN, Sangita John MD     sodium chloride (PF) 0.9% PF flush 3-20 mL, 3-20 mL, Intracatheter, Q1H PRN, Sangita John MD, 20 mL at 08/10/21 1346    ALLERGIES: No known drug allergies    SOCIAL HISTORY:    Social History     Socioeconomic History     Marital status: Single     Spouse name: Not on file     Number of children: Not on file     Years of education: Not on file     Highest education level: Not on file   Occupational History     Not on file   Tobacco Use     Smoking status: Former Smoker     Packs/day: 1.00     Types: Cigarettes     Quit date: 2009     Years since quittin.2     Smokeless tobacco: Never Used   Substance and Sexual Activity     Alcohol use: Not Currently     Drug use: No     Sexual activity: Not on file   Other Topics Concern     Parent/sibling w/ CABG, MI or angioplasty before 65F 55M? Not Asked   Social History Narrative     Not on file     Social Determinants of Health     Financial Resource Strain: Not on file   Food Insecurity: Not on file   Transportation Needs: Not on file   Physical Activity: Not on file   Stress: Not on file   Social Connections: Not on file   Intimate Partner Violence: Not on file   Housing Stability: Not on file         FAMILY HISTORY:   Family History   Problem Relation Age of Onset     Cancer Father       Non-contributory for problems with  anesthesia    REVIEW OF SYSTEMS:   The patient was asked a 14 point review of systems regarding constitutional symptoms, eye symptoms, ears, nose, mouth, throat symptoms, cardiovascular symptoms, respiratory symptoms, gastrointestinal symptoms, genitourinary symptoms, musculoskeletal symptoms, integumentary symptoms, neurological symptoms, psychiatric symptoms, endocrine symptoms, hematologic/lymphatic symptoms, and allergic/ immunologic symptoms.   The pertinent factors have been included in the HPI and below.  Patient Supplied Answers to Review of Systems  No flowsheet data found.    Physical Examination   The patient underwent a physical examination as described below. The pertinent positive and negative findings are summarized after the description of the examination.  Constitutional: The patient's developmental and nutritional status was assessed. The patient's voice quality was assessed.  Head and Face: The head and face were inspected for deformities. The sinuses were palpated. The salivary glands were palpated. Facial muscle strength was assessed bilaterally.  Eyes: Extraocular movements and primary gaze alignment were assessed.  Ears, Nose, Mouth and Throat: The ears and nose were examined for deformities. The nasal septum, mucosa, and turbinates were inspected by anterior rhinoscopy. The lips, teeth, and gums were examined for abnormalities. The oral mucosa, tongue, palate, tonsils, lateral and posterior pharynx were inspected for the presence of asymmetry or mucosal lesions.    Neck: The tracheal position was noted, and the neck mass palpated to determine if there were any asymmetries, abnormal neck masses, thyromegally, or thyroid nodules.  Respiratory: The nature of the breathing and chest expansion/symmetry was observed.  Cardiovascular: The patient was examined to determine the presence of any edema or jugular venous distension.  Abdomen: The contour of the abdomen was noted.  Lymphatic: The patient  was examined for infraclavicular lymphadenopathy.  Musculoskeletal: The patient was inspected for the presence of skeletal deformities.  Extremities: The extremities were examined for any clubbing or cyanosis.  Skin: The skin was examined for inflammatory or neoplastic conditions.  Neurologic: The patient's orientation, mood, and affect were noted. The cranial nerve  functions were examined.  Other pertinent positive and negative findings on physical examination:   OC/OP: no lesions, uvula midline, soft palate elevates symmetrically  Neck: no lesions, no TH tenderness to palpation     All other physical examination findings were within normal limits and noncontributory.    Procedures   Flexible laryngoscopy (CPT 34907)      Pre-procedure diagnosis: larynx scc   Post-procedure diagnosis: same as above  Indication for procedure: Mr. Rasmussen is a 61 year old male with see above  Procedure(s): Fiberoptic Laryngoscopy    Details of Procedure: After informed consent was obtained, the patient was seated in the examination chair.  The areas of the nasopharynx as well as the hypopharynx were anesthetized with topical 4% lidocaine with 0.25% phenylephrine atomizer.  Examination of the base of tongue was performed first.  Attention was directed to any evidence of masses in the area or evidence of leukoplakia or candidal infection.  Attention was directed to the epiglottis where its size and position was determined and its movement on phonation of the vowel  e .  The piriform sinuses were then inspected for any mass lesions or pooling of secretions.  Attention was then directed to the larynx. The vocal folds were inspected for infection or any areas of leukoplakia, for masses, polypoid degeneration, or hemorrhage.  Having done this, the arytenoids and vocal processes were inspected for erythema or evidence of granuloma formation.  The posterior commissure was then inspected for evidence of inflammatory changes in the mucosa and  heaping up of mucosal tissue. The patient was then instructed to say the vowel  e .  Adduction of vocal folds to the midline was observed for any evidence of paresis or paralysis of the larynx or asymmetry in rotation of the larynx to the left or right. The patient was asked to breathe and the degree of abduction was noted bilaterally.  Subglottic view of the larynx was obtained for any additional mass lesions or mucosal changes.  Finally the post cricoid was examined for evidence of pooling of secretions, as well as the pharyngeal wall mucosa.   Anesthesia type: 0.25% phenylephrine    Findings:  Anatomic/physiological deviations: via L NC, well healed left vocal fold no evidence of recurrence   Right vocal process: No restriction of mobility   Left vocal process: No restriction of mobility  Glottal gap: Complete glottal closure  Supraglottic structures: Normal  Hypopharynx: Normal     Estimated Blood Loss: minimal  Complications: None  Disposition: Patient tolerated the procedure well              Review of Relevant Clinical Data   I personally reviewed:  Radiology: PET oncology whole body 10/11/2021  In this patient with a history of squamous cell carcinoma  of the larynx and lung, continued evidence of partial response to  treatment:  1. Slight interval increase in intensity of metabolic activity of  right pubic bone and left ischium and new focal uptake in L2 spinous  process since PET/CT 7/12/2021, suspicious for progressed osseous  metastatic disease.   2. Resolved abnormal FDG uptake to ground glass opacity along the  right medial lower lobe, likely represented acute post-radiation  changes versus infectious/inflammatory in origin.   3. Unchanged cystic cavities/nodules in the left lower lobe and right  apical upper lobe without suspicious mural nodules and abnormal FDG  uptake.  4. No pathologically enlarged hypermetabolic mediastinal, hilar or  axillary lymph nodes.  Labs:  Lab Results   Component Value  "Date    TSH 1.64 12/21/2021     Lab Results   Component Value Date     12/21/2021    CO2 27 12/21/2021    BUN 19 12/21/2021    PHOS 2.0 (L) 05/04/2016     Lab Results   Component Value Date    WBC 7.4 12/21/2021    HGB 9.0 (L) 12/21/2021    HCT 30.9 (L) 12/21/2021    MCV 92 12/21/2021     12/21/2021     Lab Results   Component Value Date    PTT 35 07/26/2017    INR 1.02 04/22/2021     No results found for: ELIZABET  No components found for: RHEUMATOIDFACTOR,  RF  Lab Results   Component Value Date    CRP 0.16 07/24/2003     No components found for: CKTOT, URICACID  No components found for: C3, C4, DSDNAAB, NDNAABIFA  No results found for: MPOAB    Patient reported Quality of Life (QOL) Measures   Patient Supplied Answers To VHI Questionnaire  Voice Handicap Index (VHI-10) 12/5/2019   My voice makes it difficult for people to hear me 0   People have difficulty understanding me in a noisy room 0   My voice difficulties restrict my personal and social life.  0   I feel left out of conversations because of my voice 0   My voice problem causes me to lose income 0   I feel as though I have to strain to produce voice 0   The clarity of my voice is unpredictable 0   My voice problem upsets me 0   My voice makes me feel handicapped 0   People ask, \"What's wrong with your voice?\" 0   VHI-10 0         Patient Supplied Answers To EAT Questionnaire  Eating Assessment Tool (EAT-10) 12/5/2019   My swallowing problem has caused me to lose weight 0   My swallowing problem interferes with my ability to go out for meals 0   Swallowing liquids takes extra effort 0   Swallowing solids takes extra effort 0   Swallowing pills takes extra effort 0   Swallowing is painful 0   The pleasure of eating is affected by my swallowing 0   When I swallow food sticks in my throat 0   I cough when I eat 0   Swallowing is stressful 0   EAT-10 0         Patient Supplied Answers To CSI Questionnaire  No flowsheet data found.      Patient Supplied " Answers to Dyspnea Index Questionnaire:  No flowsheet data found.    Impression & Plan     IMPRESSION: Mr. Rasmussen is a 61 year old male who is being seen for the followin. T1 Left vocal fold SCC  -  s/p endoscopic CO2 laser resection 19  - no evidence of recurrence   - now has metastatic nonsmall cell lung cancer undergoing treatment with Dr Sangita John  - stable laryngeal exam,  VIKRAM  - symptoms today 2022 are stable  - scope today 2022 shows well healed left vocal fold no evidence of recurrence  Plan  - observation  - [id number] evaluations during year 3 until 2022  - recommended nasal saline spray for moisture        2. 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 Dysphagia  - improved swallow today with less pharyngeal residue on evaluation of SLP performed FEES on 20 though continued recommendation of thickened liquids  - screening FEES with liquids shows again aspiration on 7/3/20  - no PNAs, no changes in weight  - Xray Video Swallow Exam 20 - safe swallow, much improved  Plan  - observation      RETURN VISIT: 3 months    Lizzy Wray MD    Laryngology    [facility]  Department of  Otolaryngology - Head and Neck Surgery  Clinics & Surgery Center  [address] [place] [address]  Appointment line: [phone]  Fax: [phone]  [email or web address]    Scribe Disclosure:  POLO, Jeanna Martinez am serving as a scribe to document services personally performed by Lizzy Wray MD at this visit, based upon the provider's statements to me. All documentation has been reviewed by the aforementioned provider prior to being entered into the official medical record.       Again, thank you for allowing me to participate in  the care of your patient.      Sincerely,    Lizzy Wray MD

## 2022-01-06 NOTE — PATIENT INSTRUCTIONS
1.  You were seen in the ENT Clinic today by . If you have any questions or concerns after your appointment, please call [phone]. Press option #1 for scheduling related needs. Press option #3 for Nurse advice.    2.  Plan is to return to clinic 4/7/22 at 1:00 pm      Kay Sommers LPN  [phone]  [facility] - Otolaryngology

## 2022-01-06 NOTE — PROGRESS NOTES
[facility]   at the [facility]   Otolaryngology Clinic     Patient: Kt Rasmussen    MRN: [id number]    : 1960    Age/Gender: 61 year old male  Date of Service: 2022  Rendering Provider:   Lizzy Wray MD     Chief Complaint   T1 left TVF SCC s/p resection 19  Dysphonia  Dysphagia  Interval History   HISTORY OF PRESENT ILLNESS: Mr. Rasmussen is a 61 year old male is being followed for history of left TVF SCC. he was initially seen on 19. Please refer to this note for full history.      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 he reports:  - doing well     PAST MEDICAL HISTORY:   Past Medical History:   Diagnosis Date     Alcohol abuse, unspecified      Cerebral infarction (H)     , right side residual and aphasia     Dyslipidemia      GERD (gastroesophageal reflux disease)      Lung cancer (H)      Unspecified essential hypertension        PAST SURGICAL HISTORY:   Past Surgical History:   Procedure Laterality Date     BRONCHOSCOPY FLEXIBLE AND RIGID N/A 2016    Procedure: BRONCHOSCOPY FLEXIBLE AND RIGID;  Surgeon: Tony Talbot MD;  Location: [facility] OR     ESOPHAGOSCOPY FLEXIBLE N/A 2019    Procedure: flexible esophagoscopy;  Surgeon: Lizzy Johnson MD;  Location: [facility] OR     INJECT STEROID (LOCATION) N/A 2019    Procedure: steroid injection;  Surgeon: Lizzy Johnson MD;  Location: [facility] OR     IR CHEST PORT PLACEMENT > 5 YRS OF AGE  2021     LASER CO2 LARYNGOSCOPY N/A 2019    Procedure: Microdirect laryngoscopy with excision of laryngeal mass, CO2 laser;  Surgeon: Lizzy Johnson MD;  Location:  OR     [facility] NONSPECIFIC PROCEDURE      R tympanoplasty       CURRENT MEDICATIONS:   Current Outpatient Medications:      atorvastatin (LIPITOR) 40 MG tablet, Take 40 mg by mouth daily, Disp: , Rfl:   No current  facility-administered medications for this visit.    Facility-Administered Medications Ordered in Other Visits:      heparin 100 UNIT/ML injection 5 mL, 5 mL, Intracatheter, Once PRN, Sangita John MD, 5 mL at 08/10/21 1346     heparin lock flush 10 UNIT/ML injection 5 mL, 5 mL, Intracatheter, Q1H PRN, Sangita John MD     sodium chloride (PF) 0.9% PF flush 3-20 mL, 3-20 mL, Intracatheter, Q1H PRN, Sangita John MD, 20 mL at 08/10/21 1346    ALLERGIES: No known drug allergies    SOCIAL HISTORY:    Social History     Socioeconomic History     Marital status: Single     Spouse name: Not on file     Number of children: Not on file     Years of education: Not on file     Highest education level: Not on file   Occupational History     Not on file   Tobacco Use     Smoking status: Former Smoker     Packs/day: 1.00     Types: Cigarettes     Quit date: 2009     Years since quittin.2     Smokeless tobacco: Never Used   Substance and Sexual Activity     Alcohol use: Not Currently     Drug use: No     Sexual activity: Not on file   Other Topics Concern     Parent/sibling w/ CABG, MI or angioplasty before 65F 55M? Not Asked   Social History Narrative     Not on file     Social Determinants of Health     Financial Resource Strain: Not on file   Food Insecurity: Not on file   Transportation Needs: Not on file   Physical Activity: Not on file   Stress: Not on file   Social Connections: Not on file   Intimate Partner Violence: Not on file   Housing Stability: Not on file         FAMILY HISTORY:   Family History   Problem Relation Age of Onset     Cancer Father       Non-contributory for problems with anesthesia    REVIEW OF SYSTEMS:   The patient was asked a 14 point review of systems regarding constitutional symptoms, eye symptoms, ears, nose, mouth, throat symptoms, cardiovascular symptoms, respiratory symptoms, gastrointestinal symptoms, genitourinary symptoms, musculoskeletal symptoms, integumentary symptoms,  neurological symptoms, psychiatric symptoms, endocrine symptoms, hematologic/lymphatic symptoms, and allergic/ immunologic symptoms.   The pertinent factors have been included in the HPI and below.  Patient Supplied Answers to Review of Systems  No flowsheet data found.    Physical Examination   The patient underwent a physical examination as described below. The pertinent positive and negative findings are summarized after the description of the examination.  Constitutional: The patient's developmental and nutritional status was assessed. The patient's voice quality was assessed.  Head and Face: The head and face were inspected for deformities. The sinuses were palpated. The salivary glands were palpated. Facial muscle strength was assessed bilaterally.  Eyes: Extraocular movements and primary gaze alignment were assessed.  Ears, Nose, Mouth and Throat: The ears and nose were examined for deformities. The nasal septum, mucosa, and turbinates were inspected by anterior rhinoscopy. The lips, teeth, and gums were examined for abnormalities. The oral mucosa, tongue, palate, tonsils, lateral and posterior pharynx were inspected for the presence of asymmetry or mucosal lesions.    Neck: The tracheal position was noted, and the neck mass palpated to determine if there were any asymmetries, abnormal neck masses, thyromegally, or thyroid nodules.  Respiratory: The nature of the breathing and chest expansion/symmetry was observed.  Cardiovascular: The patient was examined to determine the presence of any edema or jugular venous distension.  Abdomen: The contour of the abdomen was noted.  Lymphatic: The patient was examined for infraclavicular lymphadenopathy.  Musculoskeletal: The patient was inspected for the presence of skeletal deformities.  Extremities: The extremities were examined for any clubbing or cyanosis.  Skin: The skin was examined for inflammatory or neoplastic conditions.  Neurologic: The patient's  orientation, mood, and affect were noted. The cranial nerve  functions were examined.  Other pertinent positive and negative findings on physical examination:   OC/OP: no lesions, uvula midline, soft palate elevates symmetrically  Neck: no lesions, no TH tenderness to palpation     All other physical examination findings were within normal limits and noncontributory.    Procedures   Flexible laryngoscopy (CPT 93568)      Pre-procedure diagnosis: larynx scc   Post-procedure diagnosis: same as above  Indication for procedure: Mr. Rasmussen is a 61 year old male with see above  Procedure(s): Fiberoptic Laryngoscopy    Details of Procedure: After informed consent was obtained, the patient was seated in the examination chair.  The areas of the nasopharynx as well as the hypopharynx were anesthetized with topical 4% lidocaine with 0.25% phenylephrine atomizer.  Examination of the base of tongue was performed first.  Attention was directed to any evidence of masses in the area or evidence of leukoplakia or candidal infection.  Attention was directed to the epiglottis where its size and position was determined and its movement on phonation of the vowel  e .  The piriform sinuses were then inspected for any mass lesions or pooling of secretions.  Attention was then directed to the larynx. The vocal folds were inspected for infection or any areas of leukoplakia, for masses, polypoid degeneration, or hemorrhage.  Having done this, the arytenoids and vocal processes were inspected for erythema or evidence of granuloma formation.  The posterior commissure was then inspected for evidence of inflammatory changes in the mucosa and heaping up of mucosal tissue. The patient was then instructed to say the vowel  e .  Adduction of vocal folds to the midline was observed for any evidence of paresis or paralysis of the larynx or asymmetry in rotation of the larynx to the left or right. The patient was asked to breathe and the degree of  abduction was noted bilaterally.  Subglottic view of the larynx was obtained for any additional mass lesions or mucosal changes.  Finally the post cricoid was examined for evidence of pooling of secretions, as well as the pharyngeal wall mucosa.   Anesthesia type: 0.25% phenylephrine    Findings:  Anatomic/physiological deviations: via L NC, well healed left vocal fold no evidence of recurrence   Right vocal process: No restriction of mobility   Left vocal process: No restriction of mobility  Glottal gap: Complete glottal closure  Supraglottic structures: Normal  Hypopharynx: Normal     Estimated Blood Loss: minimal  Complications: None  Disposition: Patient tolerated the procedure well              Review of Relevant Clinical Data   I personally reviewed:  Radiology: PET oncology whole body 10/11/2021  In this patient with a history of squamous cell carcinoma  of the larynx and lung, continued evidence of partial response to  treatment:  1. Slight interval increase in intensity of metabolic activity of  right pubic bone and left ischium and new focal uptake in L2 spinous  process since PET/CT 7/12/2021, suspicious for progressed osseous  metastatic disease.   2. Resolved abnormal FDG uptake to ground glass opacity along the  right medial lower lobe, likely represented acute post-radiation  changes versus infectious/inflammatory in origin.   3. Unchanged cystic cavities/nodules in the left lower lobe and right  apical upper lobe without suspicious mural nodules and abnormal FDG  uptake.  4. No pathologically enlarged hypermetabolic mediastinal, hilar or  axillary lymph nodes.  Labs:  Lab Results   Component Value Date    TSH 1.64 12/21/2021     Lab Results   Component Value Date     12/21/2021    CO2 27 12/21/2021    BUN 19 12/21/2021    PHOS 2.0 (L) 05/04/2016     Lab Results   Component Value Date    WBC 7.4 12/21/2021    HGB 9.0 (L) 12/21/2021    HCT 30.9 (L) 12/21/2021    MCV 92 12/21/2021      "2021     Lab Results   Component Value Date    PTT 35 2017    INR 1.02 2021     No results found for: ELIZABET  No components found for: RHEUMATOIDFACTOR,  RF  Lab Results   Component Value Date    CRP 0.16 2003     No components found for: CKTOT, URICACID  No components found for: C3, C4, DSDNAAB, NDNAABIFA  No results found for: MPOAB    Patient reported Quality of Life (QOL) Measures   Patient Supplied Answers To VHI Questionnaire  Voice Handicap Index (VHI-10) 2019   My voice makes it difficult for people to hear me 0   People have difficulty understanding me in a noisy room 0   My voice difficulties restrict my personal and social life.  0   I feel left out of conversations because of my voice 0   My voice problem causes me to lose income 0   I feel as though I have to strain to produce voice 0   The clarity of my voice is unpredictable 0   My voice problem upsets me 0   My voice makes me feel handicapped 0   People ask, \"What's wrong with your voice?\" 0   VHI-10 0         Patient Supplied Answers To EAT Questionnaire  Eating Assessment Tool (EAT-10) 2019   My swallowing problem has caused me to lose weight 0   My swallowing problem interferes with my ability to go out for meals 0   Swallowing liquids takes extra effort 0   Swallowing solids takes extra effort 0   Swallowing pills takes extra effort 0   Swallowing is painful 0   The pleasure of eating is affected by my swallowing 0   When I swallow food sticks in my throat 0   I cough when I eat 0   Swallowing is stressful 0   EAT-10 0         Patient Supplied Answers To CSI Questionnaire  No flowsheet data found.      Patient Supplied Answers to Dyspnea Index Questionnaire:  No flowsheet data found.    Impression & Plan     IMPRESSION: Mr. Rasmussen is a 61 year old male who is being seen for the followin. T1 Left vocal fold SCC  -  s/p endoscopic CO2 laser resection 19  - no evidence of recurrence   - now has metastatic " nonsmall cell lung cancer undergoing treatment with Dr Sangita John  - stable laryngeal exam,  VIKRAM  - symptoms today 01/06/2022 are stable  - scope today 01/06/2022 shows well healed left vocal fold no evidence of recurrence  Plan  - observation  - [id number] evaluations during year 3 until 9/30/2022  - recommended nasal saline spray for moisture        2. 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 Dysphagia  - improved swallow today with less pharyngeal residue on evaluation of SLP performed FEES on 1/9/20 though continued recommendation of thickened liquids  - screening FEES with liquids shows again aspiration on 7/3/20  - no PNAs, no changes in weight  - Xray Video Swallow Exam 8/13/20 - safe swallow, much improved  Plan  - observation      RETURN VISIT: 3 months    Lizzy Wray MD    Laryngology    [facility]  Department of  Otolaryngology - Head and Neck Surgery  Clinics & Surgery Center  [address] [place] [address]  Appointment line: [phone]  Fax: [phone]  [email or web address]    Scribe Disclosure:  Jeanna CUELLAR am serving as a scribe to document services personally performed by Lizzy Wray MD at this visit, based upon the provider's statements to me. All documentation has been reviewed by the aforementioned provider prior to being entered into the official medical record.

## 2022-01-10 ENCOUNTER — HOSPITAL ENCOUNTER (OUTPATIENT)
Dept: PET IMAGING | Facility: CLINIC | Age: 62
Discharge: HOME | End: 2022-01-10
Attending: INTERNAL MEDICINE | Admitting: INTERNAL MEDICINE
Payer: MEDICARE

## 2022-01-10 DIAGNOSIS — C78.01 MALIGNANT NEOPLASM METASTATIC TO BOTH LUNGS (H): ICD-10-CM

## 2022-01-10 DIAGNOSIS — C34.90 NON-SMALL CELL LUNG CANCER METASTATIC TO BONE (H): ICD-10-CM

## 2022-01-10 DIAGNOSIS — C79.51 NON-SMALL CELL LUNG CANCER METASTATIC TO BONE (H): ICD-10-CM

## 2022-01-10 DIAGNOSIS — C78.02 MALIGNANT NEOPLASM METASTATIC TO BOTH LUNGS (H): ICD-10-CM

## 2022-01-10 PROCEDURE — 250N000011 HC RX IP 250 OP 636: Performed by: INTERNAL MEDICINE

## 2022-01-10 PROCEDURE — A9552 F18 FDG: HCPCS | Performed by: INTERNAL MEDICINE

## 2022-01-10 PROCEDURE — 71260 CT THORAX DX C+: CPT | Mod: 26 | Performed by: RADIOLOGY

## 2022-01-10 PROCEDURE — G1004 CDSM NDSC: HCPCS | Mod: GC | Performed by: RADIOLOGY

## 2022-01-10 PROCEDURE — 343N000001 HC RX 343: Performed by: INTERNAL MEDICINE

## 2022-01-10 PROCEDURE — 74177 CT ABD & PELVIS W/CONTRAST: CPT | Mod: 26 | Performed by: RADIOLOGY

## 2022-01-10 PROCEDURE — 78816 PET IMAGE W/CT FULL BODY: CPT | Mod: 26 | Performed by: RADIOLOGY

## 2022-01-10 PROCEDURE — 74177 CT ABD & PELVIS W/CONTRAST: CPT | Mod: 59,MG,PS

## 2022-01-10 RX ORDER — HEPARIN SODIUM (PORCINE) LOCK FLUSH IV SOLN 100 UNIT/ML 100 UNIT/ML
500 SOLUTION INTRAVENOUS ONCE
Status: COMPLETED | OUTPATIENT
Start: 2022-01-10 | End: 2022-01-10

## 2022-01-10 RX ORDER — IOPAMIDOL 755 MG/ML
50-135 INJECTION, SOLUTION INTRAVASCULAR ONCE
Status: COMPLETED | OUTPATIENT
Start: 2022-01-10 | End: 2022-01-10

## 2022-01-10 RX ADMIN — IOPAMIDOL 103 ML: 755 INJECTION, SOLUTION INTRAVENOUS at 10:41

## 2022-01-10 RX ADMIN — Medication 500 UNITS: at 10:42

## 2022-01-10 RX ADMIN — FLUDEOXYGLUCOSE F-18 15.4 MCI.: 500 INJECTION, SOLUTION INTRAVENOUS at 10:23

## 2022-01-11 ENCOUNTER — TELEPHONE (OUTPATIENT)
Dept: ONCOLOGY | Facility: CLINIC | Age: 62
End: 2022-01-11
Payer: MEDICARE

## 2022-01-11 LAB — RADIOLOGIST FLAGS: ABNORMAL

## 2022-01-11 NOTE — TELEPHONE ENCOUNTER
Urgent finding from [facility] received at 12:58.    Urgent Finding: from PET scan on 1/10    neck CT or pelvic MRI is recommended for  indeterminate asymmetric uptake in the right lateral tongue.    Provider notified at 1314, and is aware.    Per Dr. John: findings are likely from inflammation due to ENT scope from 1/6. No evidence of disease.    Pt has apt with Dr. John tomorrow 1/12 with labs.

## 2022-01-12 ENCOUNTER — LAB (OUTPATIENT)
Dept: INFUSION THERAPY | Facility: CLINIC | Age: 62
End: 2022-01-12
Attending: INTERNAL MEDICINE
Payer: MEDICARE

## 2022-01-12 ENCOUNTER — ONCOLOGY VISIT (OUTPATIENT)
Dept: ONCOLOGY | Facility: CLINIC | Age: 62
End: 2022-01-12
Attending: INTERNAL MEDICINE
Payer: MEDICARE

## 2022-01-12 VITALS
SYSTOLIC BLOOD PRESSURE: 106 MMHG | RESPIRATION RATE: 16 BRPM | DIASTOLIC BLOOD PRESSURE: 72 MMHG | OXYGEN SATURATION: 97 % | HEART RATE: 95 BPM | TEMPERATURE: 97.7 F

## 2022-01-12 DIAGNOSIS — C78.02 MALIGNANT NEOPLASM METASTATIC TO BOTH LUNGS (H): ICD-10-CM

## 2022-01-12 DIAGNOSIS — C78.01 MALIGNANT NEOPLASM METASTATIC TO BOTH LUNGS (H): ICD-10-CM

## 2022-01-12 DIAGNOSIS — C79.51 NON-SMALL CELL LUNG CANCER METASTATIC TO BONE (H): ICD-10-CM

## 2022-01-12 DIAGNOSIS — C32.9 LARYNX CANCER (H): ICD-10-CM

## 2022-01-12 DIAGNOSIS — T45.1X5A CHEMOTHERAPY-INDUCED NEUTROPENIA (H): Primary | ICD-10-CM

## 2022-01-12 DIAGNOSIS — C34.90 NON-SMALL CELL LUNG CANCER METASTATIC TO BONE (H): ICD-10-CM

## 2022-01-12 DIAGNOSIS — C34.90 NON-SMALL CELL LUNG CANCER METASTATIC TO BONE (H): Primary | ICD-10-CM

## 2022-01-12 DIAGNOSIS — D70.1 CHEMOTHERAPY-INDUCED NEUTROPENIA (H): Primary | ICD-10-CM

## 2022-01-12 DIAGNOSIS — Z51.11 ENCOUNTER FOR ANTINEOPLASTIC CHEMOTHERAPY: ICD-10-CM

## 2022-01-12 DIAGNOSIS — C79.51 BONE METASTASIS: ICD-10-CM

## 2022-01-12 DIAGNOSIS — C79.51 NON-SMALL CELL LUNG CANCER METASTATIC TO BONE (H): Primary | ICD-10-CM

## 2022-01-12 LAB
ALBUMIN SERPL-MCNC: 3.4 G/DL (ref 3.4–5)
ALP SERPL-CCNC: 88 U/L (ref 40–150)
ALT SERPL W P-5'-P-CCNC: 37 U/L (ref 0–70)
ANION GAP SERPL CALCULATED.3IONS-SCNC: 4 MMOL/L (ref 3–14)
AST SERPL W P-5'-P-CCNC: 27 U/L (ref 0–45)
BASOPHILS # BLD AUTO: 0 10E3/UL (ref 0–0.2)
BASOPHILS NFR BLD AUTO: 0 %
BILIRUB SERPL-MCNC: 0.4 MG/DL (ref 0.2–1.3)
BUN SERPL-MCNC: 21 MG/DL (ref 7–30)
CALCIUM SERPL-MCNC: 9.1 MG/DL (ref 8.5–10.1)
CHLORIDE BLD-SCNC: 109 MMOL/L (ref 94–109)
CO2 SERPL-SCNC: 27 MMOL/L (ref 20–32)
CREAT SERPL-MCNC: 0.68 MG/DL (ref 0.66–1.25)
EOSINOPHIL # BLD AUTO: 0.2 10E3/UL (ref 0–0.7)
EOSINOPHIL NFR BLD AUTO: 2 %
ERYTHROCYTE [DISTWIDTH] IN BLOOD BY AUTOMATED COUNT: 17.4 % (ref 10–15)
GFR SERPL CREATININE-BSD FRML MDRD: >90 ML/MIN/1.73M2
GLUCOSE BLD-MCNC: 91 MG/DL (ref 70–99)
HCT VFR BLD AUTO: 32.2 % (ref 40–53)
HGB BLD-MCNC: 9.5 G/DL (ref 13.3–17.7)
IMM GRANULOCYTES # BLD: 0 10E3/UL
IMM GRANULOCYTES NFR BLD: 0 %
LYMPHOCYTES # BLD AUTO: 1 10E3/UL (ref 0.8–5.3)
LYMPHOCYTES NFR BLD AUTO: 13 %
MCH RBC QN AUTO: 27 PG (ref 26.5–33)
MCHC RBC AUTO-ENTMCNC: 29.5 G/DL (ref 31.5–36.5)
MCV RBC AUTO: 92 FL (ref 78–100)
MONOCYTES # BLD AUTO: 0.5 10E3/UL (ref 0–1.3)
MONOCYTES NFR BLD AUTO: 8 %
NEUTROPHILS # BLD AUTO: 5.4 10E3/UL (ref 1.6–8.3)
NEUTROPHILS NFR BLD AUTO: 77 %
NRBC # BLD AUTO: 0 10E3/UL
NRBC BLD AUTO-RTO: 0 /100
PLATELET # BLD AUTO: 221 10E3/UL (ref 150–450)
POTASSIUM BLD-SCNC: 4.3 MMOL/L (ref 3.4–5.3)
PROT SERPL-MCNC: 7.2 G/DL (ref 6.8–8.8)
RBC # BLD AUTO: 3.52 10E6/UL (ref 4.4–5.9)
SODIUM SERPL-SCNC: 140 MMOL/L (ref 133–144)
WBC # BLD AUTO: 7.1 10E3/UL (ref 4–11)

## 2022-01-12 PROCEDURE — 99215 OFFICE O/P EST HI 40 MIN: CPT | Performed by: INTERNAL MEDICINE

## 2022-01-12 PROCEDURE — 36591 DRAW BLOOD OFF VENOUS DEVICE: CPT | Performed by: INTERNAL MEDICINE

## 2022-01-12 PROCEDURE — G0463 HOSPITAL OUTPT CLINIC VISIT: HCPCS

## 2022-01-12 PROCEDURE — 85025 COMPLETE CBC W/AUTO DIFF WBC: CPT | Performed by: INTERNAL MEDICINE

## 2022-01-12 PROCEDURE — 82040 ASSAY OF SERUM ALBUMIN: CPT | Performed by: INTERNAL MEDICINE

## 2022-01-12 PROCEDURE — 80053 COMPREHEN METABOLIC PANEL: CPT | Performed by: INTERNAL MEDICINE

## 2022-01-12 ASSESSMENT — PAIN SCALES - GENERAL: PAINLEVEL: NO PAIN (0)

## 2022-01-12 NOTE — PROGRESS NOTES
"Oncology Rooming Note    January 12, 2022 9:07 AM   Kt Rasmussen is a 61 year old male who presents for:    Chief Complaint   Patient presents with     Oncology Clinic Visit     Larynx cancer (H)     Initial Vitals: /72   Pulse 95   Temp 97.7  F (36.5  C) (Oral)   Resp 16   SpO2 97%  Estimated body mass index is 21 kg/m  as calculated from the following:    Height as of 1/6/22: 1.905 m (6' 3\").    Weight as of 1/6/22: 76.2 kg (168 lb). There is no height or weight on file to calculate BSA.  No Pain (0) Comment: Data Unavailable   No LMP for male patient.  Allergies reviewed: Yes  Medications reviewed: Yes    Medications: Medication refills not needed today.  Pharmacy name entered into EPIC:    [facility] [place] [facility] [address] [facility] - [place], [place] - [address] [id number] - [place], [place] - [address] [facility] [address]    Clinical concerns: no       Shari J. Schoenberger, NASIMA            "

## 2022-01-12 NOTE — PROGRESS NOTES
LABS NOT DRAWN, PT REFUSED.  Patient sent to clinic to see Dr. John.

## 2022-01-12 NOTE — LETTER
1/12/2022         RE: Kt Rasmussen  [id number] [address]  [facility] [address]        Dear Colleague,    Thank you for referring your patient, Kt Rasmussen, to the [facility]. Please see a copy of my visit note below.    [facility]    Hematology/Oncology Established Patient Follow-up Note      Today's Date: 1/12/2022    Reason for Follow-up: Metastatic non-small cell lung carcinoma.    HISTORY OF PRESENT ILLNESS: Kt Rasmussen is a 61 year old male who presents with the following oncologic history:  1. 5/05/2016: Presented with near-obstructing large mass coming off the cheikh and likely the posterior wall, seen on bronchoscopy. Biopsy of the mass showed invasive squamous cell carcinoma, moderately differentiating and focally keratinizing.  Procedure was complicated by significant bleeding.  Tumor was completely debulked (via bronchoscopy) in both main stem bronchi and distal trachea. NGS showed no mutations in EGFR, KRAS, BRAF, NRAS, HRAS, PIK3CA, ERBB2, MET, or JAK2.  2. 5/23/2016: PET/CT scan showed evidence of residual tumor with decreased endobronchial mass. Indeterminate, mildly hypermetabolic mesentery with prominent lymph nodes and area of enhancement of the right hepatic lobe present.   Felt to have T4-NX-M0 disease.  3. 6/09/2016: Started concurrent chemoradiation with weekly paclitaxel and carboplatin.  4. 7/30/2016: Completed radiation.  Subsequently received 2 cycles of consolidation paclitaxel and carboplatin.   5. 9/13/2019: Presented with 6-month history of dysphonia and left vocal exophytic lesion. Left true vocal fold biopsy showed at least squamous cell carcinoma in situ, cannot exclude superficial invasion.  6. 9/30/2019: Excision of left vocal cord mass showed fragments of invasive squamous cell carcinoma, well differentiated and keratinizing.  Margins negative for malignancy.  7. 2/16/2021: CT chest w/o contrast showed thin-walled cavity in left  lower lobe with 2 solid peripheral nodules measuring 9 mm each. No lymphadenopathy.  8. 3/01/2021: PET scan showed hypermetabolic nodules in left lower lobe and right lung, consistent with metastases; hypermetabolic adenopathy in chest, abdomen, pelvis; nonspecific uptake at tongue; hypermetabolic intraosseous lesions in spine and pelvis consistent with metastatic disease; left axillary hypermetabolic lymph nodes related to COVID-19 vaccination.  9. 3/12/2021: CT-guided lung biopsy showed non-small cell carcinoma, not otherwise specified -- with opinion by [facility] pathologist.  10. 3/18/2021: Lung NGS panel negative for mutations in BRAF, EGFR, ERBB2, IDH1, IDH2, KRAS, MET, NRAS, RET. PD-L1 expression negative (TPS <1%). Due to minimal amount of DNA obtained from specimen, other biomarkers could not be analyzed.  11. 4/7/2021: MRI brain negative for brain metastases.  12. 4/27/2021: Started 1st line metastatic therapy with carboplatin, paclitaxel, bevacizumab, and atezolizumab for metastatic non-small cell lung carcinoma, NOS.  13. 7/12/2021: PET/CT showed resolved mural nodules with increased cystic cavity in left lower lobe with no significant FDG uptake, less likely metastases; no enlarged hypermetabolic mediastinal, hilar, or axillary lymph nodes, decreased metabolic activity of intraosseous lesion in spine and pelvis; no new bone lesions.  14. 10/11/2021: PET/CT showed slight interval increase in intensity of metabolic activity of right pubic bone and left ischium with new focal uptake in L2 spinous process; resolved uptake at previous ground glass opacity along right medial lower lobe; unchanged cystic cavities/nodules in left lower lobe and right apical upper lobe; no pathologically enlarged hypermetabolic mediastinal, hilar, or axillary lymph nodes.  15. 1/10/2022: PET/CT showed new foci of abnormal skeletal FGD uptake in the thoracic and lumbar spine. Mild interval increase in intensity of previously  demonstrated hypermetabolic skeletal lesions involving the pelvis and lumbar spine. Findings are consistent with progressive osseous metastatic disease.    INTERIM HISTORY:  Kt reports dizziness and having had 5 falls. He reports nausea throughout all of chemotherapy, resulting in 10-20 pound weight loss.  He denies nausea today.      REVIEW OF SYSTEMS:   14 point ROS was reviewed and is negative other than as noted above in HPI.       HOME MEDICATIONS:  Current Outpatient Medications   Medication Sig Dispense Refill     atorvastatin (LIPITOR) 40 MG tablet Take 40 mg by mouth daily           ALLERGIES:  Allergies   Allergen Reactions     No Known Drug Allergies          PAST MEDICAL HISTORY:  Past Medical History:   Diagnosis Date     Alcohol abuse, unspecified      Cerebral infarction (H)     2009, right side residual and aphasia     Dyslipidemia      GERD (gastroesophageal reflux disease)      Lung cancer (H)      Unspecified essential hypertension          PAST SURGICAL HISTORY:  Past Surgical History:   Procedure Laterality Date     BRONCHOSCOPY FLEXIBLE AND RIGID N/A 5/5/2016    Procedure: BRONCHOSCOPY FLEXIBLE AND RIGID;  Surgeon: Tony Talbot MD;  Location: [facility] OR     ESOPHAGOSCOPY FLEXIBLE N/A 9/30/2019    Procedure: flexible esophagoscopy;  Surgeon: Lizzy Johnson MD;  Location: [facility] OR     INJECT STEROID (LOCATION) N/A 9/30/2019    Procedure: steroid injection;  Surgeon: Lizzy Johnson MD;  Location: [facility] OR     IR CHEST PORT PLACEMENT > 5 YRS OF AGE  4/22/2021     LASER CO2 LARYNGOSCOPY N/A 9/30/2019    Procedure: Microdirect laryngoscopy with excision of laryngeal mass, CO2 laser;  Surgeon: Lizzy Johnson MD;  Location: [facility] OR     [facility] NONSPECIFIC PROCEDURE      R tympanoplasty         SOCIAL HISTORY:  Social History     Socioeconomic History     Marital status: Single     Spouse name: Not on file     Number of children: Not on file     Years of education: Not on file     Highest education  level: Not on file   Occupational History     Not on file   Tobacco Use     Smoking status: Former Smoker     Packs/day: 1.00     Types: Cigarettes     Quit date: 2009     Years since quittin.2     Smokeless tobacco: Never Used   Substance and Sexual Activity     Alcohol use: Not Currently     Drug use: No     Sexual activity: Not on file   Other Topics Concern     Parent/sibling w/ CABG, MI or angioplasty before 65F 55M? Not Asked   Social History Narrative     Not on file     Social Determinants of Health     Financial Resource Strain: Not on file   Food Insecurity: Not on file   Transportation Needs: Not on file   Physical Activity: Not on file   Stress: Not on file   Social Connections: Not on file   Intimate Partner Violence: Not on file   Housing Stability: Not on file   Resides at assisted living.      FAMILY HISTORY:  Family History   Problem Relation Age of Onset     Cancer Father          PHYSICAL EXAM:  Vital signs:  /72   Pulse 95   Temp 97.7  F (36.5  C) (Oral)   Resp 16   SpO2 97%    ECO  GENERAL/CONSTITUTIONAL: No acute distress.  EYES: No erythema or scleral icterus.  LYMPH: No cervical, supraclavicular, axillary adenopathy.   RESPIRATORY: Clear to auscultation bilaterally. No crackles or wheezing.   CARDIOVASCULAR: Regular rate and rhythm without murmurs.  GASTROINTESTINAL: No hepatosplenomegaly, masses, or tenderness. No guarding.  No distention.  MUSCULOSKELETAL: Warm and well-perfused, no cyanosis, clubbing, or edema.  NEUROLOGIC: Residual right arm and right leg reduced strength due to prior stroke. Alert, oriented, answers questions appropriately. Dysphonia present.  INTEGUMENTARY: No rashes or jaundice.  GAIT: Limping present; uses cane.       LABS:  CBC RESULTS: Recent Labs   Lab Test 22  1003   WBC 7.1   RBC 3.52*   HGB 9.5*   HCT 32.2*   MCV 92   MCH 27.0   MCHC 29.5*   RDW 17.4*        Last Comprehensive Metabolic Panel:  Sodium   Date Value Ref Range  Status   01/12/2022 140 133 - 144 mmol/L Final   06/29/2021 140 133 - 144 mmol/L Final     Potassium   Date Value Ref Range Status   01/12/2022 4.3 3.4 - 5.3 mmol/L Final   06/29/2021 4.3 3.4 - 5.3 mmol/L Final     Chloride   Date Value Ref Range Status   01/12/2022 109 94 - 109 mmol/L Final   06/29/2021 111 (H) 94 - 109 mmol/L Final     Carbon Dioxide   Date Value Ref Range Status   06/29/2021 25 20 - 32 mmol/L Final     Carbon Dioxide (CO2)   Date Value Ref Range Status   01/12/2022 27 20 - 32 mmol/L Final     Anion Gap   Date Value Ref Range Status   01/12/2022 4 3 - 14 mmol/L Final   06/29/2021 4 3 - 14 mmol/L Final     Glucose   Date Value Ref Range Status   01/12/2022 91 70 - 99 mg/dL Final   06/29/2021 85 70 - 99 mg/dL Final     Urea Nitrogen   Date Value Ref Range Status   01/12/2022 21 7 - 30 mg/dL Final   06/29/2021 17 7 - 30 mg/dL Final     Creatinine   Date Value Ref Range Status   01/12/2022 0.68 0.66 - 1.25 mg/dL Final   06/29/2021 0.84 0.66 - 1.25 mg/dL Final     GFR Estimate   Date Value Ref Range Status   01/12/2022 >90 >60 mL/min/1.73m2 Final     Comment:     Effective December 21, 2021 eGFRcr in adults is calculated using the 2021 CKD-EPI creatinine equation which includes age and gender (Ceci et al., NEJM, DOI: 10.1056/IYQGhj3050821)   06/29/2021 >90 >60 mL/min/[1.73_m2] Final     Comment:     Non  GFR Calc  Starting 12/18/2018, serum creatinine based estimated GFR (eGFR) will be   calculated using the Chronic Kidney Disease Epidemiology Collaboration   (CKD-EPI) equation.       Calcium   Date Value Ref Range Status   01/12/2022 9.1 8.5 - 10.1 mg/dL Final   06/29/2021 8.7 8.5 - 10.1 mg/dL Final     Bilirubin Total   Date Value Ref Range Status   01/12/2022 0.4 0.2 - 1.3 mg/dL Final   06/29/2021 0.3 0.2 - 1.3 mg/dL Final     Alkaline Phosphatase   Date Value Ref Range Status   01/12/2022 88 40 - 150 U/L Final   06/29/2021 73 40 - 150 U/L Final     ALT   Date Value Ref Range Status    01/12/2022 37 0 - 70 U/L Final   06/29/2021 34 0 - 70 U/L Final     AST   Date Value Ref Range Status   01/12/2022 27 0 - 45 U/L Final   06/29/2021 26 0 - 45 U/L Final       PATHOLOGY:  None new.    IMAGING:  Reviewed as per HPI.    ASSESSMENT/PLAN:  Kt Rasmussen is a 61 year old male with the following issues:  1. Metastatic non-small cell lung carcinoma with metastases to lymph nodes, bones, and bilateral lungs  2. History of locally advanced endobronchial invasive squamous cell carcinoma diagnosed 5/2016, status post debulking and chemoradiation   --I reviewed PET scan from 1/10/2022 which showed new progressive disease in the thoracic and lumbar spine. Discussed that asymmetric lateral tongue enhancement is likely related to some inflammation from last week's laryngoscopy, which showed no evidence for recurrent larynx cancer.  --Due to worsening performance status, I recommended a break from chemotherapy for 1 month.  He does not feel he could endure chemotherapy right now but would like to be considered for 2nd line therapy after recovering further.  I discussed switching to 2nd metastatic therapy with pemetrexed and carboplatin every 3 weeks if his performance status improves.  --Prior lung NGS biomarker testing were all negative and PD-L1 expression was negative. ROS1 and NTRK biomarker analysis could not be performed due to limited amount of DNA extracted from the specimen.  Will consider a repeat biopsy in the future to obtain testing for these biomarkers.    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 Dysphonia  5. Dysphagia  -Kt underwent excision of a left vocal cord SCC on 9/30/2019 and has persistent dysphonia as a result of the lesion and excision.  He also has dysphagia but this is stable and swallowing is manageable.  -Last scope by ENT 9/23/2021 showed no evidence for recurrence.    6. Weight loss  --I offered nutrition consult and he declined this.  --I recommended increasing  "his intake of higher caloric nutrient dense foods and beverages.    7. Multiple falls and dizziness  --Today's CBC shows improvement in anemia, normal WBC and platelets, normal creatinine, electrolytes, and liver enzymes.  --I discussed sending him to ED for admission for further assessment.  The hospital is not accepting direct admissions due to limited bed availability.  He declines going to the ED or hospitalization.  I offered IV fluid hydration.  He denies this.  He wishes to return home.  I advised him to go to ED if his overall condition worsens.  --Will notify social work for follow-up on assisted living situation given his falls.    Return in 1 month.    Sangita John MD  Hematology/Oncology  [facility]    Total time spent: 40 minutes in patient evaluation, counseling, documentation, and coordination of care.    Oncology Rooming Note    January 12, 2022 9:07 AM   Kt Rasmussen is a 61 year old male who presents for:    Chief Complaint   Patient presents with     Oncology Clinic Visit     Larynx cancer (H)     Initial Vitals: /72   Pulse 95   Temp 97.7  F (36.5  C) (Oral)   Resp 16   SpO2 97%  Estimated body mass index is 21 kg/m  as calculated from the following:    Height as of 1/6/22: 1.905 m (6' 3\").    Weight as of 1/6/22: 76.2 kg (168 lb). There is no height or weight on file to calculate BSA.  No Pain (0) Comment: Data Unavailable   No LMP for male patient.  Allergies reviewed: Yes  Medications reviewed: Yes    Medications: Medication refills not needed today.  Pharmacy name entered into EPIC:    [facility] [place] [facility] [place] [address] [facility] [place], [place] - [address] [id number] - [facility] [address] [facility] [address]    Clinical concerns: no       Shari J. Schoenberger, [facility]                Again, thank you for allowing me to participate in the care of " your patient.        Sincerely,        Sangita John MD

## 2022-01-13 ENCOUNTER — TELEPHONE (OUTPATIENT)
Dept: ONCOLOGY | Facility: CLINIC | Age: 62
End: 2022-01-13
Payer: MEDICARE

## 2022-01-13 ENCOUNTER — PATIENT OUTREACH (OUTPATIENT)
Dept: ONCOLOGY | Facility: CLINIC | Age: 62
End: 2022-01-13
Payer: MEDICARE

## 2022-01-13 ENCOUNTER — DOCUMENTATION ONLY (OUTPATIENT)
Dept: ONCOLOGY | Facility: CLINIC | Age: 62
End: 2022-01-13
Payer: MEDICARE

## 2022-01-13 DIAGNOSIS — R42 DIZZINESS: ICD-10-CM

## 2022-01-13 DIAGNOSIS — C34.90 NON-SMALL CELL LUNG CANCER METASTATIC TO BONE (H): Primary | ICD-10-CM

## 2022-01-13 DIAGNOSIS — C79.51 NON-SMALL CELL LUNG CANCER METASTATIC TO BONE (H): Primary | ICD-10-CM

## 2022-01-13 NOTE — TELEPHONE ENCOUNTER
Left message for nurse at the [facility] ( voicemail said Melissa Liu).  Requesting call back regarding patient's appointment yesterday 1/12/22 with Dr. John.

## 2022-01-13 NOTE — PROGRESS NOTES
I called Sky Arce, staff at Kt's assisted living facility.  I discussed that Kt has had his friend Justin accompany him at his appointments but a guardian has not been present.  Sky stated to me that he and his co-worker, Josep Kaufman, would typically accompany their residents to medical appointments but that Mr. Kaufman was out sick due to COVID-19 infection, and that they have 85 residents to attend to.  I told Sky that I was informed by our  Gabriela that a guardian needs to be present in meeting, in person, or by phone, OR be called immediately after medical appointments to formulate plan of care.  Sky stated to me that he would be unable to be present in-person or by phone for every medical appointment.  I replied to him that I and other medical providers who are seeing Kt can call him after each appointment to discuss plan of care.    I further discussed with Sky that Kt had a visit with me yesterday at the clinic and that Kt has not been doing well due to continuous nausea from chemotherapy, resulting weight loss, dizziness, and had 5 falls.  Sky stated to me that he was not aware of reports of repeated falls.  He informed me that his family members visited Kt last month and expressed concern about his dizziness and had a discussion about a comfort care approach.  I told Sky that Kt agreed and preferred a one month break from chemotherapy and that Kt expressed his desire for more palliative chemotherapy if he is able to recuperate over the next month.  I told Sky that I would update him after Kt sees me again in 1 month.    Sangita John MD  Hematology/Oncology  [facility]    
Patent

## 2022-01-13 NOTE — TELEPHONE ENCOUNTER
Spoke to Kt to see how he was doing and to ask him who came with him to his appointment yesterday 1/12/22 with Dr. John.  He said his friend Justin typically brings him to his appointments since he is out of work after having a stroke and that is who was with him yesterday.  I asked Kt about his authorized guardians Sky Arce and Josep Kaufman and if he recognized either of those names?  He said he knows Sky Arce.  I asked the last time he had seen Sky and he thought it had been at least a few months.  I asked Kt if he had everything he needed where he is living or if there was anything I could help him with but he said he felt he had what he needed.  I am waiting to hear back from the nursing staff from the [facility] to inquire about Kt's recent 5 falls and weight loss.

## 2022-01-13 NOTE — PROGRESS NOTES
Social Work Progress Note      Data/Intervention:  Patient Name:  Kt Rasmussen  /Age:  1960 (61 year old)    Reason for Follow-Up:  Kt is a 61-year-old gentleman with a diagnosis of metastatic non-small cell lung carcinoma who is followed by Dr. John at [facility]. This clinician received referral from MD/RNCC yesterday with safety concerns given multiple falls.     Intervention:   This clinician reviewed medical chart, and learned the following information:    This patient has guardianship paperwork on file, which was put into effect 2020, that designates that [facility] (an agency) as Kt s guardian. In this paperwork, it clearly communicates that Kt does not have the capacity to,  provide for his basic necessities: food, clothing, shelter, medical care etc  and is incapable,  to give necessary consent to enable, or withhold consent for the Respondent to receive necessary medical or other professional care, , treatment, or service.     Upon further review, I noticed that Kt had completed Consent for Services paperwork (2022) and Consent to Communicate paperwork (10/29/21) independently, which is illegal for him to do so given the guardianship paperwork in effect.    This clinician collaborated with JAYASHREE Blankenship, who reported that Kt was accompanied by friend Justin yesterday, who CMA Oly reports typically helps Kt get to his appointments. Per documentation in chart, Justin is not a legal decision maker for Kt. Per Pattie, she asked Kt if he recognized the names Sky Arce or Josep Kaufman (workers at [facility] designated 3/29/21 as able to speak with medical providers on Kt's behalf), and he endorsed that he did recognize the name Sky. Per Pattie's report, Kt reported he last remembered contact with Sky a few months ago.     This clinician spoke with Pattie about findings from chart review, and  wrote message to Dr. John (oncologist), Juan Rausch (nurse practitioner), Pattie Marino (RNCC), and Pedrito Vázquez (nurse supervisor) that whenever a decision as to Kt's medical care/treatment is being made, his guardian must be 1) Present in the meeting, in person or by phone OR 2) Called immediately afterwards, to formulate a plan for care.    This clinician made the following recommendations of treatment team:  1) Dr. John call the patient s guardian and discuss the content of discussion yesterday ([facility], staff Sky Arce or Josep Kaufman at [phone]). Please document this engagement after you speak with them.  2) Pattie update nurse at Assisted Living Facility with concerns about falls, and to inquire about additional steps that can be taken to safeguard the patient.  3) Moving forward, we explicitly document guardianship involvement in all encounters where decisions are being made, who was present in meeting, who consented.    This clinician felt necessary to file a vulnerable adult case with the state [facility] as there appears to be a lack of medical oversight in this patient's serious medical condition. [facility] #[id number], Thu Jan 13 2022 14:05:47     Plan:  1) This clinician routed this note Dr. Lizzy Wray (assigned surgical provider) for their awareness and to update their team as to appropriate policy to take to most safely support this patient with a guardian in place.   2) Oncology social worker remains available for ongoing psychosocial concerns or needs.     Please call or page if needs or concerns arise.     KEVIN Charlton, [facility]  Direct Phone: [phone]  Pager: [phone]

## 2022-01-14 ENCOUNTER — DOCUMENTATION ONLY (OUTPATIENT)
Dept: OTHER | Facility: CLINIC | Age: 62
End: 2022-01-14
Payer: MEDICARE

## 2022-01-14 ENCOUNTER — PATIENT OUTREACH (OUTPATIENT)
Dept: ONCOLOGY | Facility: CLINIC | Age: 62
End: 2022-01-14
Payer: MEDICARE

## 2022-01-14 NOTE — PROGRESS NOTES
Received call from [employer] regarding case filed. CAROLE discussed history of engagement with our clinic. [employer] worker advised that this clinician call PCP office at [facility] and inform of guardianship paperwork, which this clinician has done. [facility] aware and has Sky as listed contact in their system.     CAROLE discussed with [employer] ease of seeing guardian in chart. Chart updated.     KEVIN Charlton, LICSW  Phone: [phone]  [facility]: M, Thu  *every other Tue, 8am-4:30pm  [facility]: W, F, *every other Tue, 8am-4:30pm

## 2022-01-18 ENCOUNTER — PATIENT OUTREACH (OUTPATIENT)
Dept: ONCOLOGY | Facility: CLINIC | Age: 62
End: 2022-01-18
Payer: MEDICARE

## 2022-01-18 NOTE — PROGRESS NOTES
"This clinician received update from my RNCC Pattie Marino that our clinic had received a call from Melissa (RN at [facility] clinician spoke with Melissa today who reported that a resident came to a staff member reporting that they thought Kt had fallen 1/17/22, and that he was \"blocking the entrance\" and was seen by the LPN that day and sent to [facility] for evaluation. Melissa reports that he had a fracture of his leg, and is scheduled for surgery today (ORIF). Per Melissa, she had received call from my nurse Pattie with concerns 1/13/22 about his recent falls and she had changed the status of his condition within their system requesting additional safety checks and a call light/pendent. Melissa reported that she called the guardian (who is different than the guardian that we have on file in our system), she called someone named Rima Edwards to update as to status of fall. This clinician updated Melissa with guardianship paperwork that [place] has on file designating Sky Arce as guardian, and provided her with their phone number. Melissa reported that she would call Sky today to update. This clinician then called [facility] and transferred to ortho floor and spoke with Peter. This clinician updated Peter that Kt has a guardian on file and that communication should be had with Sky. Peter reported that team has been in touch with Sky today.      Despite safety updates that had been made within assisted living community as of 1/14/22, Kt still had a fall that resulted in a leg fracture. Due to this, this clinician felt an additional Vulnerable Adult case filing was warranted.     [facility] # [id number] Tue Jan 18 2022 16:09:14    KEVIN Charlton, [facility] CenterSW  Phone: [phone]  [place] [facility]: M, Thu  *every other Tue, 8am-4:30pm  [facility] [place]: W, F, *every other Tue, 8am-4:30pm [facility], [phone]) that Kt had a fall 1/17/22.       "

## 2022-01-25 ENCOUNTER — TELEPHONE (OUTPATIENT)
Dept: ONCOLOGY | Facility: CLINIC | Age: 62
End: 2022-01-25
Payer: MEDICARE

## 2022-01-25 NOTE — TELEPHONE ENCOUNTER
Nurse from [facility] called regarding this patient. Nurse states patient fell and broke his Femur. He is in patient right now. MRI for 1/27/22 cancelled.

## 2022-02-03 ENCOUNTER — TELEPHONE (OUTPATIENT)
Dept: ONCOLOGY | Facility: CLINIC | Age: 62
End: 2022-02-03
Payer: MEDICARE

## 2022-02-03 NOTE — TELEPHONE ENCOUNTER
Kt is staying in Room 111 at the [facility] ( main number [phone]) .  I have requested Dr. Paul Bloomberg's last note from visit with Kt to be faxed over to our clinic [phone].  Kt's brother Don has been to visit him and there is concern that his speech has worsened significantly - very garbled.  Prior to Kt breaking his femur we had him scheduled for MRI of his brain to rule out any cancer spread to the brain.  Explained to Rakesh that this is what I am trying to get rescheduled and to ensure [facility] can be set up for transportation from the TCU.  Currently at [facility] first available MRI is Sunday 2/13 checking in at 1030 am for 11 am MRI.  Rakesh will be following up on any discharge planning they have on Kt to see if he will still be at the TCU at that time or he will be back to his normal living facility.  Will follow up with Rakesh to make good follow up plan.  I also spoke to Sky - one of Kt's guardians to inform him this is the plan so that at [facility]'s follow up appt with Dr. John on 2/16 they can discuss the MRI and if treatment versus hospice would be appropriate.

## 2022-02-04 NOTE — TELEPHONE ENCOUNTER
Left message for Rakesh the discharge coordinator at [facility] ([phone]) that Kt's brain MRI has been moved to Monday 2/14 checking in at 3:30 pm so that they can set up transportation and he also has appt with Dr. John in clinic on 2/16 at noon that he will need transportation for.  I am able to fax over these appointments just need fax number for the [facility]- requested call back

## 2022-02-14 ENCOUNTER — HOSPITAL ENCOUNTER (OUTPATIENT)
Dept: MRI IMAGING | Facility: CLINIC | Age: 62
Discharge: HOME | End: 2022-02-14
Attending: INTERNAL MEDICINE | Admitting: INTERNAL MEDICINE
Payer: MEDICARE

## 2022-02-14 DIAGNOSIS — C34.90 NON-SMALL CELL LUNG CANCER METASTATIC TO BONE (H): ICD-10-CM

## 2022-02-14 DIAGNOSIS — R42 DIZZINESS: ICD-10-CM

## 2022-02-14 DIAGNOSIS — C79.51 NON-SMALL CELL LUNG CANCER METASTATIC TO BONE (H): ICD-10-CM

## 2022-02-14 PROCEDURE — G1004 CDSM NDSC: HCPCS

## 2022-02-16 ENCOUNTER — VIRTUAL VISIT (OUTPATIENT)
Dept: ONCOLOGY | Facility: CLINIC | Age: 62
End: 2022-02-16
Attending: INTERNAL MEDICINE
Payer: MEDICARE

## 2022-02-16 DIAGNOSIS — C79.51 NON-SMALL CELL LUNG CANCER METASTATIC TO BONE (H): ICD-10-CM

## 2022-02-16 DIAGNOSIS — C78.01 MALIGNANT NEOPLASM METASTATIC TO BOTH LUNGS (H): ICD-10-CM

## 2022-02-16 DIAGNOSIS — C34.90 NON-SMALL CELL LUNG CANCER METASTATIC TO BONE (H): ICD-10-CM

## 2022-02-16 DIAGNOSIS — C78.02 MALIGNANT NEOPLASM METASTATIC TO BOTH LUNGS (H): ICD-10-CM

## 2022-02-16 PROCEDURE — 99214 OFFICE O/P EST MOD 30 MIN: CPT | Mod: 95 | Performed by: INTERNAL MEDICINE

## 2022-02-16 NOTE — LETTER
2/16/2022         RE: Kt Rasmussen  [id number]  [place] [id number]        Dear Colleague,    Thank you for referring your patient, Kt Rasmussen, to the [facility]. Please see a copy of my visit note below.    Kt is a 61 year old who is being evaluated via a billable telephone visit.      What phone number would you like to be contacted at? [phone]  How would you like to obtain your AVS? Mail a copy     Rima Juaquin MOISE    Phone call duration: 18 minutes    [facility]    Hematology/Oncology Established Patient Follow-up Note      Today's Date: 2/16/2022    Reason for Follow-up: Metastatic non-small cell lung carcinoma.    HISTORY OF PRESENT ILLNESS: Kt Rasmussen is a 61 year old male who presents with the following oncologic history:  1. 5/05/2016: Presented with near-obstructing large mass coming off the cheikh and likely the posterior wall, seen on bronchoscopy. Biopsy of the mass showed invasive squamous cell carcinoma, moderately differentiating and focally keratinizing.  Procedure was complicated by significant bleeding.  Tumor was completely debulked (via bronchoscopy) in both main stem bronchi and distal trachea. NGS showed no mutations in EGFR, KRAS, BRAF, NRAS, HRAS, PIK3CA, ERBB2, MET, or JAK2.  2. 5/23/2016: PET/CT scan showed evidence of residual tumor with decreased endobronchial mass. Indeterminate, mildly hypermetabolic mesentery with prominent lymph nodes and area of enhancement of the right hepatic lobe present.   Felt to have T4-NX-M0 disease.  3. 6/09/2016: Started concurrent chemoradiation with weekly paclitaxel and carboplatin.  4. 7/30/2016: Completed radiation.  Subsequently received 2 cycles of consolidation paclitaxel and carboplatin.   5. 9/13/2019: Presented with 6-month history of dysphonia and left vocal exophytic lesion. Left true vocal fold biopsy showed at least squamous cell carcinoma in situ, cannot exclude  superficial invasion.  6. 9/30/2019: Excision of left vocal cord mass showed fragments of invasive squamous cell carcinoma, well differentiated and keratinizing.  Margins negative for malignancy.  7. 2/16/2021: CT chest w/o contrast showed thin-walled cavity in left lower lobe with 2 solid peripheral nodules measuring 9 mm each. No lymphadenopathy.  8. 3/01/2021: PET scan showed hypermetabolic nodules in left lower lobe and right lung, consistent with metastases; hypermetabolic adenopathy in chest, abdomen, pelvis; nonspecific uptake at tongue; hypermetabolic intraosseous lesions in spine and pelvis consistent with metastatic disease; left axillary hypermetabolic lymph nodes related to COVID-19 vaccination.  9. 3/12/2021: CT-guided lung biopsy showed non-small cell carcinoma, not otherwise specified -- with opinion by [facility] pathologist.  10. 3/18/2021: Lung NGS panel negative for mutations in BRAF, EGFR, ERBB2, IDH1, IDH2, KRAS, MET, NRAS, RET. PD-L1 expression negative (TPS <1%). Due to minimal amount of DNA obtained from specimen, other biomarkers could not be analyzed.  11. 4/7/2021: MRI brain negative for brain metastases.  12. 4/27/2021: Started 1st line metastatic therapy with carboplatin, paclitaxel, bevacizumab, and atezolizumab for metastatic non-small cell lung carcinoma, NOS.  13. 7/12/2021: PET/CT showed resolved mural nodules with increased cystic cavity in left lower lobe with no significant FDG uptake, less likely metastases; no enlarged hypermetabolic mediastinal, hilar, or axillary lymph nodes, decreased metabolic activity of intraosseous lesion in spine and pelvis; no new bone lesions.  14. 10/11/2021: PET/CT showed slight interval increase in intensity of metabolic activity of right pubic bone and left ischium with new focal uptake in L2 spinous process; resolved uptake at previous ground glass opacity along right medial lower lobe; unchanged cystic cavities/nodules in left lower lobe and  right apical upper lobe; no pathologically enlarged hypermetabolic mediastinal, hilar, or axillary lymph nodes.  15. 1/10/2022: PET/CT showed new foci of abnormal skeletal FGD uptake in the thoracic and lumbar spine. Mild interval increase in intensity of previously demonstrated hypermetabolic skeletal lesions involving the pelvis and lumbar spine. Findings are consistent with progressive osseous metastatic disease. Subsequently off chemo for 1 month due to poor performance status.  16. 1/25/2022: Patient fell and fractured his femur. This was surgically repaired and he was subsequently cared for at a rehab unit.  17. 2/14/2022: Brain MRI without contrast (contrast not given due to inability to obtain IV access) showed no brain metastases.    INTERIM HISTORY:  Kt had a fall sustaining a femoral fracture and underwent rehab.  He denies dizziness and states feeling fine. He is using a wheelchair.    REVIEW OF SYSTEMS:   14 point ROS was reviewed and is negative other than as noted above in HPI.       HOME MEDICATIONS:  Current Outpatient Medications   Medication Sig Dispense Refill     atorvastatin (LIPITOR) 40 MG tablet Take 40 mg by mouth daily           ALLERGIES:  Allergies   Allergen Reactions     No Known Drug Allergies          PAST MEDICAL HISTORY:  Past Medical History:   Diagnosis Date     Alcohol abuse, unspecified      Cerebral infarction (H)     2009, right side residual and aphasia     Dyslipidemia      GERD (gastroesophageal reflux disease)      Lung cancer (H)      Unspecified essential hypertension          PAST SURGICAL HISTORY:  Past Surgical History:   Procedure Laterality Date     BRONCHOSCOPY FLEXIBLE AND RIGID N/A 5/5/2016    Procedure: BRONCHOSCOPY FLEXIBLE AND RIGID;  Surgeon: Tony Talbot MD;  Location: [facility] OR     ESOPHAGOSCOPY FLEXIBLE N/A 9/30/2019    Procedure: flexible esophagoscopy;  Surgeon: Lizzy Johnson MD;  Location: [facility] OR     INJECT STEROID (LOCATION) N/A 9/30/2019     Procedure: steroid injection;  Surgeon: Lizzy Johnson MD;  Location: [facility] OR     IR CHEST PORT PLACEMENT > 5 YRS OF AGE  2021     LASER CO2 LARYNGOSCOPY N/A 2019    Procedure: Microdirect laryngoscopy with excision of laryngeal mass, CO2 laser;  Surgeon: Lizzy Johnson MD;  Location:  OR     [facility] NONSPECIFIC PROCEDURE      R tympanoplasty         SOCIAL HISTORY:  Social History     Socioeconomic History     Marital status: Single     Spouse name: Not on file     Number of children: Not on file     Years of education: Not on file     Highest education level: Not on file   Occupational History     Not on file   Tobacco Use     Smoking status: Former Smoker     Packs/day: 1.00     Types: Cigarettes     Quit date: 2009     Years since quittin.4     Smokeless tobacco: Never Used   Substance and Sexual Activity     Alcohol use: Not Currently     Drug use: No     Sexual activity: Not on file   Other Topics Concern     Parent/sibling w/ CABG, MI or angioplasty before 65F 55M? Not Asked   Social History Narrative     Not on file     Social Determinants of Health     Financial Resource Strain: Not on file   Food Insecurity: Not on file   Transportation Needs: Not on file   Physical Activity: Not on file   Stress: Not on file   Social Connections: Not on file   Intimate Partner Violence: Not on file   Housing Stability: Not on file   Resides at assisted living.      FAMILY HISTORY:  Family History   Problem Relation Age of Onset     Cancer Father          PHYSICAL EXAM:  Vital signs:  There were no vitals taken for this visit.   Not performed as this was a telephone visit.      LABS:  CBC RESULTS: Recent Labs   Lab Test 22  1003   WBC 7.1   RBC 3.52*   HGB 9.5*   HCT 32.2*   MCV 92   MCH 27.0   MCHC 29.5*   RDW 17.4*        Last Comprehensive Metabolic Panel:  Sodium   Date Value Ref Range Status   2022 140 133 - 144 mmol/L Final   2021 140 133 - 144 mmol/L Final      Potassium   Date Value Ref Range Status   01/12/2022 4.3 3.4 - 5.3 mmol/L Final   06/29/2021 4.3 3.4 - 5.3 mmol/L Final     Chloride   Date Value Ref Range Status   01/12/2022 109 94 - 109 mmol/L Final   06/29/2021 111 (H) 94 - 109 mmol/L Final     Carbon Dioxide   Date Value Ref Range Status   06/29/2021 25 20 - 32 mmol/L Final     Carbon Dioxide (CO2)   Date Value Ref Range Status   01/12/2022 27 20 - 32 mmol/L Final     Anion Gap   Date Value Ref Range Status   01/12/2022 4 3 - 14 mmol/L Final   06/29/2021 4 3 - 14 mmol/L Final     Glucose   Date Value Ref Range Status   01/12/2022 91 70 - 99 mg/dL Final   06/29/2021 85 70 - 99 mg/dL Final     Urea Nitrogen   Date Value Ref Range Status   01/12/2022 21 7 - 30 mg/dL Final   06/29/2021 17 7 - 30 mg/dL Final     Creatinine   Date Value Ref Range Status   01/12/2022 0.68 0.66 - 1.25 mg/dL Final   06/29/2021 0.84 0.66 - 1.25 mg/dL Final     GFR Estimate   Date Value Ref Range Status   01/12/2022 >90 >60 mL/min/1.73m2 Final     Comment:     Effective December 21, 2021 eGFRcr in adults is calculated using the 2021 CKD-EPI creatinine equation which includes age and gender (Ceci brown al., [place], DOI: 10.1056/KVDAfu0554716)   06/29/2021 >90 >60 mL/min/[1.73_m2] Final     Comment:     Non  GFR Calc  Starting 12/18/2018, serum creatinine based estimated GFR (eGFR) will be   calculated using the Chronic Kidney Disease Epidemiology Collaboration   (CKD-EPI) equation.       Calcium   Date Value Ref Range Status   01/12/2022 9.1 8.5 - 10.1 mg/dL Final   06/29/2021 8.7 8.5 - 10.1 mg/dL Final     Bilirubin Total   Date Value Ref Range Status   01/12/2022 0.4 0.2 - 1.3 mg/dL Final   06/29/2021 0.3 0.2 - 1.3 mg/dL Final     Alkaline Phosphatase   Date Value Ref Range Status   01/12/2022 88 40 - 150 U/L Final   06/29/2021 73 40 - 150 U/L Final     ALT   Date Value Ref Range Status   01/12/2022 37 0 - 70 U/L Final   06/29/2021 34 0 - 70 U/L Final     AST   Date Value  Ref Range Status   01/12/2022 27 0 - 45 U/L Final   06/29/2021 26 0 - 45 U/L Final       PATHOLOGY:  None new.    IMAGING:  Reviewed as per HPI.    ASSESSMENT/PLAN:  Kt Rasmussen is a 61 year old male with the following issues:  1. Metastatic non-small (non-squamous) cell lung carcinoma with metastases to lymph nodes, bones, and bilateral lungs  2. History of locally advanced endobronchial invasive squamous cell carcinoma diagnosed 5/2016, status post debulking and chemoradiation   --PET scan from 1/10/2022 showed new progressive disease in the thoracic and lumbar spine.   --Due to worsening performance status, Kt took 1 month break from chemotherapy and was unable to resume for past 2 months due to a fall sustaining fractured in his femur.  --Prior lung NGS biomarker testing were all negative and PD-L1 expression was negative. ROS1 and NTRK biomarker analysis could not be performed due to limited amount of DNA extracted from the specimen.  Will check [employer] by blood sample in attempt to obtain testing for these biomarkers.  -- I discussed switching to 2nd metastatic therapy with every 3-week pemetrexed and carboplatin if his biomarkers are negative.    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 Dysphonia  5. Dysphagia  -Kt underwent excision of a left vocal cord SCC on 9/30/2019 and has persistent dysphonia as a result of the lesion and excision.  He also has dysphagia but this is stable and swallowing is manageable.  -Last scope by ENT 9/23/2021 showed no evidence for recurrence.    6. Weight loss  --I offered nutrition consult and he declined this.  --I recommended increasing his intake of higher caloric nutrient dense foods and beverages.    7. Multiple falls and dizziness  --SW and guardian were notified of multiple falls.  --Brain MRI required rescheduling multiple times. This was done on 2/14 and showed no brain metastases. However this was a suboptimal exam due to inability to  administer contrast.    Sangita John MD  Hematology/Oncology  [facility]    Total time spent: 30 minutes in patient evaluation, counseling, documentation, and coordination of care.      Again, thank you for allowing me to participate in the care of your patient.        Sincerely,        Sangita John MD

## 2022-02-16 NOTE — LETTER
2/16/2022         RE: Kt Rasmussen  [id number]  [facility] [id number]        Dear Colleague,    Thank you for referring your patient, Kt Rasmussen, to the [facility]. Please see a copy of my visit note below.    Kt is a 61 year old who is being evaluated via a billable telephone visit.      What phone number would you like to be contacted at? [phone]  How would you like to obtain your AVS? Mail a copy     Irma Juaquin MOISE    Phone call duration: 18 minutes    [facility]    Hematology/Oncology Established Patient Follow-up Note      Today's Date: 2/16/2022    Reason for Follow-up: Metastatic non-small cell lung carcinoma.    HISTORY OF PRESENT ILLNESS: Kt Rasmussen is a 61 year old male who presents with the following oncologic history:  1. 5/05/2016: Presented with near-obstructing large mass coming off the cheikh and likely the posterior wall, seen on bronchoscopy. Biopsy of the mass showed invasive squamous cell carcinoma, moderately differentiating and focally keratinizing.  Procedure was complicated by significant bleeding.  Tumor was completely debulked (via bronchoscopy) in both main stem bronchi and distal trachea. NGS showed no mutations in EGFR, KRAS, BRAF, NRAS, HRAS, PIK3CA, ERBB2, MET, or JAK2.  2. 5/23/2016: PET/CT scan showed evidence of residual tumor with decreased endobronchial mass. Indeterminate, mildly hypermetabolic mesentery with prominent lymph nodes and area of enhancement of the right hepatic lobe present.   Felt to have T4-NX-M0 disease.  3. 6/09/2016: Started concurrent chemoradiation with weekly paclitaxel and carboplatin.  4. 7/30/2016: Completed radiation.  Subsequently received 2 cycles of consolidation paclitaxel and carboplatin.   5. 9/13/2019: Presented with 6-month history of dysphonia and left vocal exophytic lesion. Left true vocal fold biopsy showed at least squamous cell carcinoma in situ, cannot exclude  superficial invasion.  6. 9/30/2019: Excision of left vocal cord mass showed fragments of invasive squamous cell carcinoma, well differentiated and keratinizing.  Margins negative for malignancy.  7. 2/16/2021: CT chest w/o contrast showed thin-walled cavity in left lower lobe with 2 solid peripheral nodules measuring 9 mm each. No lymphadenopathy.  8. 3/01/2021: PET scan showed hypermetabolic nodules in left lower lobe and right lung, consistent with metastases; hypermetabolic adenopathy in chest, abdomen, pelvis; nonspecific uptake at tongue; hypermetabolic intraosseous lesions in spine and pelvis consistent with metastatic disease; left axillary hypermetabolic lymph nodes related to COVID-19 vaccination.  9. 3/12/2021: CT-guided lung biopsy showed non-small cell carcinoma, not otherwise specified -- with opinion by [facility] pathologist.  10. 3/18/2021: Lung NGS panel negative for mutations in BRAF, EGFR, ERBB2, IDH1, IDH2, KRAS, MET, NRAS, RET. PD-L1 expression negative (TPS <1%). Due to minimal amount of DNA obtained from specimen, other biomarkers could not be analyzed.  11. 4/7/2021: MRI brain negative for brain metastases.  12. 4/27/2021: Started 1st line metastatic therapy with carboplatin, paclitaxel, bevacizumab, and atezolizumab for metastatic non-small cell lung carcinoma, NOS.  13. 7/12/2021: PET/CT showed resolved mural nodules with increased cystic cavity in left lower lobe with no significant FDG uptake, less likely metastases; no enlarged hypermetabolic mediastinal, hilar, or axillary lymph nodes, decreased metabolic activity of intraosseous lesion in spine and pelvis; no new bone lesions.  14. 10/11/2021: PET/CT showed slight interval increase in intensity of metabolic activity of right pubic bone and left ischium with new focal uptake in L2 spinous process; resolved uptake at previous ground glass opacity along right medial lower lobe; unchanged cystic cavities/nodules in left lower lobe and  right apical upper lobe; no pathologically enlarged hypermetabolic mediastinal, hilar, or axillary lymph nodes.  15. 1/10/2022: PET/CT showed new foci of abnormal skeletal FGD uptake in the thoracic and lumbar spine. Mild interval increase in intensity of previously demonstrated hypermetabolic skeletal lesions involving the pelvis and lumbar spine. Findings are consistent with progressive osseous metastatic disease. Subsequently off chemo for 1 month due to poor performance status.  16. 1/25/2022: Patient fell and fractured his femur. This was surgically repaired and he was subsequently cared for at a rehab unit.  17. 2/14/2022: Brain MRI without contrast (contrast not given due to inability to obtain IV access) showed no brain metastases.    INTERIM HISTORY:  Kt had a fall sustaining a femoral fracture and underwent rehab.  He denies dizziness and states feeling fine. He is using a wheelchair.    REVIEW OF SYSTEMS:   14 point ROS was reviewed and is negative other than as noted above in HPI.       HOME MEDICATIONS:  Current Outpatient Medications   Medication Sig Dispense Refill     atorvastatin (LIPITOR) 40 MG tablet Take 40 mg by mouth daily           ALLERGIES:  Allergies   Allergen Reactions     No Known Drug Allergies          PAST MEDICAL HISTORY:  Past Medical History:   Diagnosis Date     Alcohol abuse, unspecified      Cerebral infarction (H)     2009, right side residual and aphasia     Dyslipidemia      GERD (gastroesophageal reflux disease)      Lung cancer (H)      Unspecified essential hypertension          PAST SURGICAL HISTORY:  Past Surgical History:   Procedure Laterality Date     BRONCHOSCOPY FLEXIBLE AND RIGID N/A 5/5/2016    Procedure: BRONCHOSCOPY FLEXIBLE AND RIGID;  Surgeon: Tony Talbot MD;  Location: [facility]     ESOPHAGOSCOPY FLEXIBLE N/A 9/30/2019    Procedure: flexible esophagoscopy;  Surgeon: Lizzy Johnson MD;  Location: [facility] OR     INJECT STEROID (LOCATION) N/A 9/30/2019     Procedure: steroid injection;  Surgeon: Lizzy Johnson MD;  Location: [facility] OR     IR CHEST PORT PLACEMENT > 5 YRS OF AGE  2021     LASER CO2 LARYNGOSCOPY N/A 2019    Procedure: Microdirect laryngoscopy with excision of laryngeal mass, CO2 laser;  Surgeon: Lizzy Johnson MD;  Location:  OR     [facility] NONSPECIFIC PROCEDURE      R tympanoplasty         SOCIAL HISTORY:  Social History     Socioeconomic History     Marital status: Single     Spouse name: Not on file     Number of children: Not on file     Years of education: Not on file     Highest education level: Not on file   Occupational History     Not on file   Tobacco Use     Smoking status: Former Smoker     Packs/day: 1.00     Types: Cigarettes     Quit date: 2009     Years since quittin.4     Smokeless tobacco: Never Used   Substance and Sexual Activity     Alcohol use: Not Currently     Drug use: No     Sexual activity: Not on file   Other Topics Concern     Parent/sibling w/ CABG, MI or angioplasty before 65F 55M? Not Asked   Social History Narrative     Not on file     Social Determinants of Health     Financial Resource Strain: Not on file   Food Insecurity: Not on file   Transportation Needs: Not on file   Physical Activity: Not on file   Stress: Not on file   Social Connections: Not on file   Intimate Partner Violence: Not on file   Housing Stability: Not on file   Resides at assisted living.      FAMILY HISTORY:  Family History   Problem Relation Age of Onset     Cancer Father          PHYSICAL EXAM:  Vital signs:  There were no vitals taken for this visit.   Not performed as this was a telephone visit.      LABS:  CBC RESULTS: Recent Labs   Lab Test 22  1003   WBC 7.1   RBC 3.52*   HGB 9.5*   HCT 32.2*   MCV 92   MCH 27.0   MCHC 29.5*   RDW 17.4*        Last Comprehensive Metabolic Panel:  Sodium   Date Value Ref Range Status   2022 140 133 - 144 mmol/L Final   2021 140 133 - 144 mmol/L Final      Potassium   Date Value Ref Range Status   01/12/2022 4.3 3.4 - 5.3 mmol/L Final   06/29/2021 4.3 3.4 - 5.3 mmol/L Final     Chloride   Date Value Ref Range Status   01/12/2022 109 94 - 109 mmol/L Final   06/29/2021 111 (H) 94 - 109 mmol/L Final     Carbon Dioxide   Date Value Ref Range Status   06/29/2021 25 20 - 32 mmol/L Final     Carbon Dioxide (CO2)   Date Value Ref Range Status   01/12/2022 27 20 - 32 mmol/L Final     Anion Gap   Date Value Ref Range Status   01/12/2022 4 3 - 14 mmol/L Final   06/29/2021 4 3 - 14 mmol/L Final     Glucose   Date Value Ref Range Status   01/12/2022 91 70 - 99 mg/dL Final   06/29/2021 85 70 - 99 mg/dL Final     Urea Nitrogen   Date Value Ref Range Status   01/12/2022 21 7 - 30 mg/dL Final   06/29/2021 17 7 - 30 mg/dL Final     Creatinine   Date Value Ref Range Status   01/12/2022 0.68 0.66 - 1.25 mg/dL Final   06/29/2021 0.84 0.66 - 1.25 mg/dL Final     GFR Estimate   Date Value Ref Range Status   01/12/2022 >90 >60 mL/min/1.73m2 Final     Comment:     Effective December 21, 2021 eGFRcr in adults is calculated using the 2021 CKD-EPI creatinine equation which includes age and gender (Ceci brown al., NE, DOI: 10.1056/LGTGyj3077030)   06/29/2021 >90 >60 mL/min/[1.73_m2] Final     Comment:     Non  GFR Calc  Starting 12/18/2018, serum creatinine based estimated GFR (eGFR) will be   calculated using the Chronic Kidney Disease Epidemiology Collaboration   (CKD-EPI) equation.       Calcium   Date Value Ref Range Status   01/12/2022 9.1 8.5 - 10.1 mg/dL Final   06/29/2021 8.7 8.5 - 10.1 mg/dL Final     Bilirubin Total   Date Value Ref Range Status   01/12/2022 0.4 0.2 - 1.3 mg/dL Final   06/29/2021 0.3 0.2 - 1.3 mg/dL Final     Alkaline Phosphatase   Date Value Ref Range Status   01/12/2022 88 40 - 150 U/L Final   06/29/2021 73 40 - 150 U/L Final     ALT   Date Value Ref Range Status   01/12/2022 37 0 - 70 U/L Final   06/29/2021 34 0 - 70 U/L Final     AST   Date Value  Ref Range Status   01/12/2022 27 0 - 45 U/L Final   06/29/2021 26 0 - 45 U/L Final       PATHOLOGY:  None new.    IMAGING:  Reviewed as per HPI.    ASSESSMENT/PLAN:  Kt Rasmussen is a 61 year old male with the following issues:  1. Metastatic non-small (non-squamous) cell lung carcinoma with metastases to lymph nodes, bones, and bilateral lungs  2. History of locally advanced endobronchial invasive squamous cell carcinoma diagnosed 5/2016, status post debulking and chemoradiation   --PET scan from 1/10/2022 showed new progressive disease in the thoracic and lumbar spine.   --Due to worsening performance status, Kt took 1 month break from chemotherapy and was unable to resume for past 2 months due to a fall sustaining fractured in his femur.  --Prior lung NGS biomarker testing were all negative and PD-L1 expression was negative. ROS1 and NTRK biomarker analysis could not be performed due to limited amount of DNA extracted from the specimen.  Will check Guardant 360 by blood sample in attempt to obtain testing for these biomarkers.  -- I discussed switching to 2nd metastatic therapy with every 3-week pemetrexed and carboplatin if his biomarkers are negative.    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 Dysphonia  5. Dysphagia  -Kt underwent excision of a left vocal cord SCC on 9/30/2019 and has persistent dysphonia as a result of the lesion and excision.  He also has dysphagia but this is stable and swallowing is manageable.  -Last scope by ENT 9/23/2021 showed no evidence for recurrence.    6. Weight loss  --I offered nutrition consult and he declined this.  --I recommended increasing his intake of higher caloric nutrient dense foods and beverages.    7. Multiple falls and dizziness  --SW and guardian were notified of multiple falls.  --Brain MRI required rescheduling multiple times. This was done on 2/14 and showed no brain metastases. However this was a suboptimal exam due to inability to  administer contrast.    Sangita John MD  Hematology/Oncology  [facility]    Total time spent: 30 minutes in patient evaluation, counseling, documentation, and coordination of care.      Again, thank you for allowing me to participate in the care of your patient.        Sincerely,        Sangita John MD

## 2022-02-16 NOTE — PROGRESS NOTES
Kt is a 61 year old who is being evaluated via a billable telephone visit.      What phone number would you like to be contacted at? [phone]  How would you like to obtain your AVS? Mail a copy     Rima MOISE    Phone call duration: 18 minutes    [facility]    Hematology/Oncology Established Patient Follow-up Note      Today's Date: 2/16/2022    Reason for Follow-up: Metastatic non-small cell lung carcinoma.    HISTORY OF PRESENT ILLNESS: Kt Rasmussen is a 61 year old male who presents with the following oncologic history:  1. 5/05/2016: Presented with near-obstructing large mass coming off the cheikh and likely the posterior wall, seen on bronchoscopy. Biopsy of the mass showed invasive squamous cell carcinoma, moderately differentiating and focally keratinizing.  Procedure was complicated by significant bleeding.  Tumor was completely debulked (via bronchoscopy) in both main stem bronchi and distal trachea. NGS showed no mutations in EGFR, KRAS, BRAF, NRAS, HRAS, PIK3CA, ERBB2, MET, or JAK2.  2. 5/23/2016: PET/CT scan showed evidence of residual tumor with decreased endobronchial mass. Indeterminate, mildly hypermetabolic mesentery with prominent lymph nodes and area of enhancement of the right hepatic lobe present.   Felt to have T4-NX-M0 disease.  3. 6/09/2016: Started concurrent chemoradiation with weekly paclitaxel and carboplatin.  4. 7/30/2016: Completed radiation.  Subsequently received 2 cycles of consolidation paclitaxel and carboplatin.   5. 9/13/2019: Presented with 6-month history of dysphonia and left vocal exophytic lesion. Left true vocal fold biopsy showed at least squamous cell carcinoma in situ, cannot exclude superficial invasion.  6. 9/30/2019: Excision of left vocal cord mass showed fragments of invasive squamous cell carcinoma, well differentiated and keratinizing.  Margins negative for malignancy.  7. 2/16/2021: CT chest w/o contrast showed thin-walled  cavity in left lower lobe with 2 solid peripheral nodules measuring 9 mm each. No lymphadenopathy.  8. 3/01/2021: PET scan showed hypermetabolic nodules in left lower lobe and right lung, consistent with metastases; hypermetabolic adenopathy in chest, abdomen, pelvis; nonspecific uptake at tongue; hypermetabolic intraosseous lesions in spine and pelvis consistent with metastatic disease; left axillary hypermetabolic lymph nodes related to COVID-19 vaccination.  9. 3/12/2021: CT-guided lung biopsy showed non-small cell carcinoma, not otherwise specified -- with opinion by [facility] pathologist.  10. 3/18/2021: Lung NGS panel negative for mutations in BRAF, EGFR, ERBB2, IDH1, IDH2, KRAS, MET, NRAS, RET. PD-L1 expression negative (TPS <1%). Due to minimal amount of DNA obtained from specimen, other biomarkers could not be analyzed.  11. 4/7/2021: MRI brain negative for brain metastases.  12. 4/27/2021: Started 1st line metastatic therapy with carboplatin, paclitaxel, bevacizumab, and atezolizumab for metastatic non-small cell lung carcinoma, NOS.  13. 7/12/2021: PET/CT showed resolved mural nodules with increased cystic cavity in left lower lobe with no significant FDG uptake, less likely metastases; no enlarged hypermetabolic mediastinal, hilar, or axillary lymph nodes, decreased metabolic activity of intraosseous lesion in spine and pelvis; no new bone lesions.  14. 10/11/2021: PET/CT showed slight interval increase in intensity of metabolic activity of right pubic bone and left ischium with new focal uptake in L2 spinous process; resolved uptake at previous ground glass opacity along right medial lower lobe; unchanged cystic cavities/nodules in left lower lobe and right apical upper lobe; no pathologically enlarged hypermetabolic mediastinal, hilar, or axillary lymph nodes.  15. 1/10/2022: PET/CT showed new foci of abnormal skeletal FGD uptake in the thoracic and lumbar spine. Mild interval increase in intensity  of previously demonstrated hypermetabolic skeletal lesions involving the pelvis and lumbar spine. Findings are consistent with progressive osseous metastatic disease. Subsequently off chemo for 1 month due to poor performance status.  16. 1/25/2022: Patient fell and fractured his femur. This was surgically repaired and he was subsequently cared for at a rehab unit.  17. 2/14/2022: Brain MRI without contrast (contrast not given due to inability to obtain IV access) showed no brain metastases.    INTERIM HISTORY:  Kt had a fall sustaining a femoral fracture and underwent rehab.  He denies dizziness and states feeling fine. He is using a wheelchair.    REVIEW OF SYSTEMS:   14 point ROS was reviewed and is negative other than as noted above in HPI.       HOME MEDICATIONS:  Current Outpatient Medications   Medication Sig Dispense Refill     atorvastatin (LIPITOR) 40 MG tablet Take 40 mg by mouth daily           ALLERGIES:  Allergies   Allergen Reactions     No Known Drug Allergies          PAST MEDICAL HISTORY:  Past Medical History:   Diagnosis Date     Alcohol abuse, unspecified      Cerebral infarction (H)     2009, right side residual and aphasia     Dyslipidemia      GERD (gastroesophageal reflux disease)      Lung cancer (H)      Unspecified essential hypertension          PAST SURGICAL HISTORY:  Past Surgical History:   Procedure Laterality Date     BRONCHOSCOPY FLEXIBLE AND RIGID N/A 5/5/2016    Procedure: BRONCHOSCOPY FLEXIBLE AND RIGID;  Surgeon: Tony Talbot MD;  Location: [facility] OR     ESOPHAGOSCOPY FLEXIBLE N/A 9/30/2019    Procedure: flexible esophagoscopy;  Surgeon: Lizzy Johnson MD;  Location: [facility] OR     INJECT STEROID (LOCATION) N/A 9/30/2019    Procedure: steroid injection;  Surgeon: Lizzy Johnson MD;  Location: [facility] OR     IR CHEST PORT PLACEMENT > 5 YRS OF AGE  4/22/2021     LASER CO2 LARYNGOSCOPY N/A 9/30/2019    Procedure: Microdirect laryngoscopy with excision of laryngeal mass, CO2  laser;  Surgeon: Lizzy Johnson MD;  Location:  OR     [facility] NONSPECIFIC PROCEDURE      R tympanoplasty         SOCIAL HISTORY:  Social History     Socioeconomic History     Marital status: Single     Spouse name: Not on file     Number of children: Not on file     Years of education: Not on file     Highest education level: Not on file   Occupational History     Not on file   Tobacco Use     Smoking status: Former Smoker     Packs/day: 1.00     Types: Cigarettes     Quit date: 2009     Years since quittin.4     Smokeless tobacco: Never Used   Substance and Sexual Activity     Alcohol use: Not Currently     Drug use: No     Sexual activity: Not on file   Other Topics Concern     Parent/sibling w/ CABG, MI or angioplasty before 65F 55M? Not Asked   Social History Narrative     Not on file     Social Determinants of Health     Financial Resource Strain: Not on file   Food Insecurity: Not on file   Transportation Needs: Not on file   Physical Activity: Not on file   Stress: Not on file   Social Connections: Not on file   Intimate Partner Violence: Not on file   Housing Stability: Not on file   Resides at assisted living.      FAMILY HISTORY:  Family History   Problem Relation Age of Onset     Cancer Father          PHYSICAL EXAM:  Vital signs:  There were no vitals taken for this visit.   Not performed as this was a telephone visit.      LABS:  CBC RESULTS: Recent Labs   Lab Test 22  1003   WBC 7.1   RBC 3.52*   HGB 9.5*   HCT 32.2*   MCV 92   MCH 27.0   MCHC 29.5*   RDW 17.4*        Last Comprehensive Metabolic Panel:  Sodium   Date Value Ref Range Status   2022 140 133 - 144 mmol/L Final   2021 140 133 - 144 mmol/L Final     Potassium   Date Value Ref Range Status   2022 4.3 3.4 - 5.3 mmol/L Final   2021 4.3 3.4 - 5.3 mmol/L Final     Chloride   Date Value Ref Range Status   2022 109 94 - 109 mmol/L Final   2021 111 (H) 94 - 109 mmol/L Final     Carbon  Dioxide   Date Value Ref Range Status   06/29/2021 25 20 - 32 mmol/L Final     Carbon Dioxide (CO2)   Date Value Ref Range Status   01/12/2022 27 20 - 32 mmol/L Final     Anion Gap   Date Value Ref Range Status   01/12/2022 4 3 - 14 mmol/L Final   06/29/2021 4 3 - 14 mmol/L Final     Glucose   Date Value Ref Range Status   01/12/2022 91 70 - 99 mg/dL Final   06/29/2021 85 70 - 99 mg/dL Final     Urea Nitrogen   Date Value Ref Range Status   01/12/2022 21 7 - 30 mg/dL Final   06/29/2021 17 7 - 30 mg/dL Final     Creatinine   Date Value Ref Range Status   01/12/2022 0.68 0.66 - 1.25 mg/dL Final   06/29/2021 0.84 0.66 - 1.25 mg/dL Final     GFR Estimate   Date Value Ref Range Status   01/12/2022 >90 >60 mL/min/1.73m2 Final     Comment:     Effective December 21, 2021 eGFRcr in adults is calculated using the 2021 CKD-EPI creatinine equation which includes age and gender (Ceci et al., NEJM, DOI: 10.1056/HHAKze3503467)   06/29/2021 >90 >60 mL/min/[1.73_m2] Final     Comment:     Non  GFR Calc  Starting 12/18/2018, serum creatinine based estimated GFR (eGFR) will be   calculated using the Chronic Kidney Disease Epidemiology Collaboration   (CKD-EPI) equation.       Calcium   Date Value Ref Range Status   01/12/2022 9.1 8.5 - 10.1 mg/dL Final   06/29/2021 8.7 8.5 - 10.1 mg/dL Final     Bilirubin Total   Date Value Ref Range Status   01/12/2022 0.4 0.2 - 1.3 mg/dL Final   06/29/2021 0.3 0.2 - 1.3 mg/dL Final     Alkaline Phosphatase   Date Value Ref Range Status   01/12/2022 88 40 - 150 U/L Final   06/29/2021 73 40 - 150 U/L Final     ALT   Date Value Ref Range Status   01/12/2022 37 0 - 70 U/L Final   06/29/2021 34 0 - 70 U/L Final     AST   Date Value Ref Range Status   01/12/2022 27 0 - 45 U/L Final   06/29/2021 26 0 - 45 U/L Final       PATHOLOGY:  None new.    IMAGING:  Reviewed as per HPI.    ASSESSMENT/PLAN:  Kt Rasmussen is a 61 year old male with the following issues:  1. Metastatic non-small  (non-squamous) cell lung carcinoma with metastases to lymph nodes, bones, and bilateral lungs  2. History of locally advanced endobronchial invasive squamous cell carcinoma diagnosed 5/2016, status post debulking and chemoradiation   --PET scan from 1/10/2022 showed new progressive disease in the thoracic and lumbar spine.   --Due to worsening performance status, Kt took 1 month break from chemotherapy and was unable to resume for past 2 months due to a fall sustaining fractured in his femur.  --Prior lung NGS biomarker testing were all negative and PD-L1 expression was negative. ROS1 and NTRK biomarker analysis could not be performed due to limited amount of DNA extracted from the specimen.  Will check [employer] 360 by blood sample in attempt to obtain testing for these biomarkers.  -- I discussed switching to 2nd metastatic therapy with every 3-week pemetrexed and carboplatin if his biomarkers are negative.    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 Dysphonia  5. Dysphagia  -Kt underwent excision of a left vocal cord SCC on 9/30/2019 and has persistent dysphonia as a result of the lesion and excision.  He also has dysphagia but this is stable and swallowing is manageable.  -Last scope by ENT 9/23/2021 showed no evidence for recurrence.    6. Weight loss  --I offered nutrition consult and he declined this.  --I recommended increasing his intake of higher caloric nutrient dense foods and beverages.    7. Multiple falls and dizziness  --SW and guardian were notified of multiple falls.  --Brain MRI required rescheduling multiple times. This was done on 2/14 and showed no brain metastases. However this was a suboptimal exam due to inability to administer contrast.    Sangita John MD  Hematology/Oncology  [facility]    Total time spent: 30 minutes in patient evaluation, counseling, documentation, and coordination of care.

## 2022-02-18 ENCOUNTER — TELEPHONE (OUTPATIENT)
Dept: ONCOLOGY | Facility: CLINIC | Age: 62
End: 2022-02-18
Payer: MEDICARE

## 2022-02-18 NOTE — TELEPHONE ENCOUNTER
Left message for Melissa BLANC at assisted living that Kt is set up for lab draw on Wed 2/23 at 1130 am so to call back and confirm this will work and he can be set up for a ride.

## 2022-02-21 ENCOUNTER — TELEPHONE (OUTPATIENT)
Dept: ONCOLOGY | Facility: CLINIC | Age: 62
End: 2022-02-21
Payer: MEDICARE

## 2022-02-21 NOTE — TELEPHONE ENCOUNTER
Left 2nd message for Melissa RN at [facility] assisted living about needing to get Kt in for lab draw this Wed 2/23 at 1130 am then have him come back to clinic once those results are in.  I will now call Kt directly to convey this plan and see how to ensure he can get a ride to these appointments.

## 2022-02-22 ENCOUNTER — PATIENT OUTREACH (OUTPATIENT)
Dept: ONCOLOGY | Facility: CLINIC | Age: 62
End: 2022-02-22
Payer: MEDICARE

## 2022-02-22 NOTE — TELEPHONE ENCOUNTER
Left message for Kt to let him know I am working with Social work and we have left a message for Sky his guardian to get him set up for a ride to get labs done at the [facility]. Once we have figured it out I will call him back with the details.

## 2022-02-22 NOTE — PROGRESS NOTES
"Social Work Progress Note      Data/Intervention:  Patient Name:  Kt Rasmussen  /Age:  1960 (61 year old)    Reason for Follow-Up:  Kt is a 61-year-old gentleman with a diagnosis of metastatic non-small cell lung carcinoma who is followed by Dr. John at [facility]. This clinician received referral from JAYASHREE Blankenship with transportation concern for upcoming appointment 22 at 1pm.     Intervention:   This clinician spoke briefly with Kt on phone, informing him that he likely has transportation support available through [place] health [place]. He appeared to be very confused about what this clinician was informing him of, continuing to repeat, \"I'm not sure\" and \"I can't remember.\" CAROLE reported to Kt that I would be in touch with Assisted Living and ensure that someone able to assist with setting up needed medical ride.     This clinician spoke with di Swanson ([phone]). Sky reported that he understood that plan was to have Kt transition from TCU to Assisted Living yesterday. Sky in agreement that Kt cannot organize his own medical appointments and reported that he would outreach today to see if Kt is medical stable enough to come in for lab appointment Thursday, and will let our team know if that needs to be cancelled. Sky reported that he will coordinate with Assisted Living facility and formalize a process moving forward with them to be more involved in scheduling of appointments as he perceives, \"Kt is really declining and not able to do that at this time.\" Sky feels that Assisted Living is at best position to set-up transportation as they have best understanding of Kt's physical needs. Sky reports that they have used [employer] in the past for transportation, and that Kt has  services available to him. Sky to explore if  services are available for 22 appointment.      CAROLE spoke with Melissa ([phone]) at Assisted Living " Facility. CAROLE detailed appointment 2/24/22 and that he will have another appointment in 2-3 weeks. Melissa reported that she will coordinate with Sky, and is happy to help schedule all transportation for Kt moving forward. Encouraged team to contact her when appointment in 2-3 weeks is formalized.     Plan:  This clinician forwarded note to update RNGUIDO Marino of transportation plan moving forward.     Please call or page if needs or concerns arise.     KEVIN Charlton, [facility] CenterSW  Direct Phone: [phone]  Pager: [phone]

## 2022-02-24 ENCOUNTER — LAB (OUTPATIENT)
Dept: INFUSION THERAPY | Facility: CLINIC | Age: 62
End: 2022-02-24
Attending: INTERNAL MEDICINE
Payer: MEDICARE

## 2022-02-24 DIAGNOSIS — C78.01 MALIGNANT NEOPLASM METASTATIC TO BOTH LUNGS (H): ICD-10-CM

## 2022-02-24 DIAGNOSIS — C78.02 MALIGNANT NEOPLASM METASTATIC TO BOTH LUNGS (H): ICD-10-CM

## 2022-02-24 DIAGNOSIS — C34.90 NON-SMALL CELL LUNG CANCER METASTATIC TO BONE (H): ICD-10-CM

## 2022-02-24 DIAGNOSIS — C79.51 NON-SMALL CELL LUNG CANCER METASTATIC TO BONE (H): ICD-10-CM

## 2022-02-24 LAB
ALBUMIN SERPL-MCNC: 3.7 G/DL (ref 3.4–5)
ALP SERPL-CCNC: 95 U/L (ref 40–150)
ALT SERPL W P-5'-P-CCNC: 32 U/L (ref 0–70)
ANION GAP SERPL CALCULATED.3IONS-SCNC: 3 MMOL/L (ref 3–14)
AST SERPL W P-5'-P-CCNC: 24 U/L (ref 0–45)
BASOPHILS # BLD AUTO: 0 10E3/UL (ref 0–0.2)
BASOPHILS NFR BLD AUTO: 0 %
BILIRUB SERPL-MCNC: 0.5 MG/DL (ref 0.2–1.3)
BUN SERPL-MCNC: 17 MG/DL (ref 7–30)
CALCIUM SERPL-MCNC: 9.5 MG/DL (ref 8.5–10.1)
CHLORIDE BLD-SCNC: 107 MMOL/L (ref 94–109)
CO2 SERPL-SCNC: 27 MMOL/L (ref 20–32)
CREAT SERPL-MCNC: 0.6 MG/DL (ref 0.66–1.25)
EOSINOPHIL # BLD AUTO: 0.1 10E3/UL (ref 0–0.7)
EOSINOPHIL NFR BLD AUTO: 3 %
ERYTHROCYTE [DISTWIDTH] IN BLOOD BY AUTOMATED COUNT: 17 % (ref 10–15)
GFR SERPL CREATININE-BSD FRML MDRD: >90 ML/MIN/1.73M2
GLUCOSE BLD-MCNC: 91 MG/DL (ref 70–99)
HCT VFR BLD AUTO: 35.6 % (ref 40–53)
HGB BLD-MCNC: 10.5 G/DL (ref 13.3–17.7)
IMM GRANULOCYTES # BLD: 0 10E3/UL
IMM GRANULOCYTES NFR BLD: 0 %
LYMPHOCYTES # BLD AUTO: 0.9 10E3/UL (ref 0.8–5.3)
LYMPHOCYTES NFR BLD AUTO: 17 %
MCH RBC QN AUTO: 26.9 PG (ref 26.5–33)
MCHC RBC AUTO-ENTMCNC: 29.5 G/DL (ref 31.5–36.5)
MCV RBC AUTO: 91 FL (ref 78–100)
MONOCYTES # BLD AUTO: 0.4 10E3/UL (ref 0–1.3)
MONOCYTES NFR BLD AUTO: 7 %
NEUTROPHILS # BLD AUTO: 3.8 10E3/UL (ref 1.6–8.3)
NEUTROPHILS NFR BLD AUTO: 73 %
NRBC # BLD AUTO: 0 10E3/UL
NRBC BLD AUTO-RTO: 0 /100
PLATELET # BLD AUTO: 135 10E3/UL (ref 150–450)
POTASSIUM BLD-SCNC: 4.1 MMOL/L (ref 3.4–5.3)
PROT SERPL-MCNC: 7 G/DL (ref 6.8–8.8)
RBC # BLD AUTO: 3.9 10E6/UL (ref 4.4–5.9)
SODIUM SERPL-SCNC: 137 MMOL/L (ref 133–144)
TSH SERPL DL<=0.005 MIU/L-ACNC: 1.79 MU/L (ref 0.4–4)
WBC # BLD AUTO: 5.3 10E3/UL (ref 4–11)

## 2022-02-24 PROCEDURE — 85025 COMPLETE CBC W/AUTO DIFF WBC: CPT | Performed by: INTERNAL MEDICINE

## 2022-02-24 PROCEDURE — 80053 COMPREHEN METABOLIC PANEL: CPT | Performed by: INTERNAL MEDICINE

## 2022-02-24 PROCEDURE — 84443 ASSAY THYROID STIM HORMONE: CPT | Performed by: INTERNAL MEDICINE

## 2022-02-24 PROCEDURE — 36591 DRAW BLOOD OFF VENOUS DEVICE: CPT

## 2022-02-24 NOTE — PROGRESS NOTES
Medical Assistant Note:  Kt Rasmussen presents today for Lab draw.    Patient seen by provider today: No.   present during visit today: Not Applicable.    Concerns: No Concerns.    Procedure:  Lab draw site: LAC, Needle type: Butterfly, Gauge: 23.    Post Assessment:  Labs drawn without difficulty: Yes.    Discharge Plan:  Departure Mode: Ambulatory.    Face to Face Time: 4 min.    Labs drawn by Adore Day RN

## 2022-03-04 ENCOUNTER — TELEPHONE (OUTPATIENT)
Dept: ONCOLOGY | Facility: CLINIC | Age: 62
End: 2022-03-04
Payer: MEDICARE

## 2022-03-04 NOTE — TELEPHONE ENCOUNTER
Representative from [facility] left message concerning Kt's testing. Just needed to update them with Dr. John's information- they will fax over report should be ready this weekend

## 2022-03-14 LAB — SCANNED LAB RESULT: NORMAL

## 2022-03-17 ENCOUNTER — TELEPHONE (OUTPATIENT)
Dept: ONCOLOGY | Facility: CLINIC | Age: 62
End: 2022-03-17
Payer: MEDICARE

## 2022-03-17 NOTE — TELEPHONE ENCOUNTER
Dorina olsen [facility] transportation  For 3/24 appointment will be through [employer]  at 10:30 with return at 1pm  Phone [phone]

## 2022-03-23 NOTE — PROGRESS NOTES
[facility]    Hematology/Oncology Established Patient Follow-up Note    Treatment Summary:      Today's Date: 3/24/22    Reason for Follow-up: Metastatic non-small cell lung carcinoma.      HISTORY OF PRESENT ILLNESS:Kt Rasmussen is a 61 year old male who presents with the following oncologic history:  1. 5/05/2016: Presented with near-obstructing large mass coming off the cheikh and likely the posterior wall, seen on bronchoscopy. Biopsy of the mass showed invasive squamous cell carcinoma, moderately differentiating and focally keratinizing.  Procedure was complicated by significant bleeding.  Tumor was completely debulked (via bronchoscopy) in both main stem bronchi and distal trachea. NGS showed no mutations in EGFR, KRAS, BRAF, NRAS, HRAS, PIK3CA, ERBB2, MET, or JAK2.  2. 5/23/2016: PET/CT scan showed evidence of residual tumor with decreased endobronchial mass. Indeterminate, mildly hypermetabolic mesentery with prominent lymph nodes and area of enhancement of the right hepatic lobe present.   Felt to have T4-NX-M0 disease.  3. 6/09/2016: Started concurrent chemoradiation with weekly paclitaxel and carboplatin.  4. 7/30/2016: Completed radiation.  Subsequently received 2 cycles of consolidation paclitaxel and carboplatin.   5. 9/13/2019: Presented with 6-month history of dysphonia and left vocal exophytic lesion. Left true vocal fold biopsy showed at least squamous cell carcinoma in situ, cannot exclude superficial invasion.  6. 9/30/2019: Excision of left vocal cord mass showed fragments of invasive squamous cell carcinoma, well differentiated and keratinizing.  Margins negative for malignancy.  7. 2/16/2021: CT chest w/o contrast showed thin-walled cavity in left lower lobe with 2 solid peripheral nodules measuring 9 mm each. No lymphadenopathy.  8. 3/01/2021: PET scan showed hypermetabolic nodules in left lower lobe and right lung, consistent with metastases; hypermetabolic  adenopathy in chest, abdomen, pelvis; nonspecific uptake at tongue; hypermetabolic intraosseous lesions in spine and pelvis consistent with metastatic disease; left axillary hypermetabolic lymph nodes related to COVID-19 vaccination.  9. 3/12/2021: CT-guided lung biopsy showed non-small cell carcinoma, not otherwise specified -- with opinion by [facility] pathologist.  10. 3/18/2021: Lung NGS panel negative for mutations in BRAF, EGFR, ERBB2, IDH1, IDH2, KRAS, MET, NRAS, RET. PD-L1 expression negative (TPS <1%). Due to minimal amount of DNA obtained from specimen, other biomarkers could not be analyzed.  11. 4/7/2021: MRI brain negative for brain metastases.  12. 4/27/2021: Started 1st line metastatic therapy with carboplatin, paclitaxel, bevacizumab, and atezolizumab for metastatic non-small cell lung carcinoma, NOS.  13. 7/12/2021: PET/CT showed resolved mural nodules with increased cystic cavity in left lower lobe with no significant FDG uptake, less likely metastases; no enlarged hypermetabolic mediastinal, hilar, or axillary lymph nodes, decreased metabolic activity of intraosseous lesion in spine and pelvis; no new bone lesions.  14. 10/11/2021: PET/CT showed slight interval increase in intensity of metabolic activity of right pubic bone and left ischium with new focal uptake in L2 spinous process; resolved uptake at previous ground glass opacity along right medial lower lobe; unchanged cystic cavities/nodules in left lower lobe and right apical upper lobe; no pathologically enlarged hypermetabolic mediastinal, hilar, or axillary lymph nodes.  15. 1/10/2022: PET/CT showed new foci of abnormal skeletal FGD uptake in the thoracic and lumbar spine. Mild interval increase in intensity of previously demonstrated hypermetabolic skeletal lesions involving the pelvis and lumbar spine. Findings are consistent with progressive osseous metastatic disease. Subsequently off chemo for 1 month due to poor performance  status.  16. 1/25/2022: Patient fell and fractured his femur. This was surgically repaired and he was subsequently cared for at a rehab unit.  17. 2/14/2022: Brain MRI without contrast (contrast not given due to inability to obtain IV access) showed no brain metastases.    INTERIM HISTORY:  Last seen on 2/16/22: Kt had a fall sustaining a femoral fracture and underwent rehab.     Stable appetite, energy levels, and weight. No recent falls. Still getting physical therapy in the assisted living facility.    REVIEW OF SYSTEMS:   A 14 point ROS was reviewed with pertinent positives and negatives in the HPI.       HOME MEDICATIONS:  Current Outpatient Medications   Medication Sig Dispense Refill     atorvastatin (LIPITOR) 40 MG tablet Take 40 mg by mouth daily           ALLERGIES:  Allergies   Allergen Reactions     No Known Drug Allergies          PAST MEDICAL HISTORY:  Past Medical History:   Diagnosis Date     Alcohol abuse, unspecified      Cerebral infarction (H)     2009, right side residual and aphasia     Dyslipidemia      GERD (gastroesophageal reflux disease)      Lung cancer (H)      Unspecified essential hypertension          PAST SURGICAL HISTORY:  Past Surgical History:   Procedure Laterality Date     BRONCHOSCOPY FLEXIBLE AND RIGID N/A 5/5/2016    Procedure: BRONCHOSCOPY FLEXIBLE AND RIGID;  Surgeon: Tony Talbot MD;  Location: [facility] OR     ESOPHAGOSCOPY FLEXIBLE N/A 9/30/2019    Procedure: flexible esophagoscopy;  Surgeon: Lizzy Johnson MD;  [facility]     INJECT STEROID (LOCATION) N/A 9/30/2019    Procedure: steroid injection;  Surgeon: Lizzy Johnson MD;  Location: [facility] OR     IR CHEST PORT PLACEMENT > 5 YRS OF AGE  4/22/2021     LASER CO2 LARYNGOSCOPY N/A 9/30/2019    Procedure: Microdirect laryngoscopy with excision of laryngeal mass, CO2 laser;  Surgeon: Lizzy Johnson MD;  Location:  OR     [facility] NONSPECIFIC PROCEDURE      R tympanoplasty         SOCIAL HISTORY:  Social History      Socioeconomic History     Marital status: Single     Spouse name: Not on file     Number of children: Not on file     Years of education: Not on file     Highest education level: Not on file   Occupational History     Not on file   Tobacco Use     Smoking status: Former Smoker     Packs/day: 1.00     Types: Cigarettes     Quit date: 2009     Years since quittin.4     Smokeless tobacco: Never Used   Substance and Sexual Activity     Alcohol use: Not Currently     Drug use: No     Sexual activity: Not on file   Other Topics Concern     Parent/sibling w/ CABG, MI or angioplasty before 65F 55M? Not Asked   Social History Narrative     Not on file     Social Determinants of Health     Financial Resource Strain: Not on file   Food Insecurity: Not on file   Transportation Needs: Not on file   Physical Activity: Not on file   Stress: Not on file   Social Connections: Not on file   Intimate Partner Violence: Not on file   Housing Stability: Not on file         FAMILY HISTORY:  Family History   Problem Relation Age of Onset     Cancer Father          PHYSICAL EXAM:  Vital signs:  /75   Pulse 82   Temp 97.6  F (36.4  C) (Oral)   Resp 16   Wt 79.4 kg (175 lb)   SpO2 99%   BMI 21.87 kg/m     ECO-1.5  GENERAL/CONSTITUTIONAL: No acute distress.  EYES: Pupils are equal, round, and react to light and accommodation. Extraocular movements intact.  No scleral icterus.  ENT/MOUTH: Neck supple. Oropharynx clear, no mucositis.  LYMPH: No anterior cervical, posterior cervical, supraclavicular, axillary or inguinal adenopathy.   RESPIRATORY: Clear to auscultation bilaterally. No crackles or wheezing.   CARDIOVASCULAR: Regular rate and rhythm without murmurs, gallops, or rubs.  GASTROINTESTINAL: No hepatosplenomegaly, masses, or tenderness. The patient has normal bowel sounds. No guarding.  No distention.  MUSCULOSKELETAL: Warm and well-perfused, no cyanosis, clubbing, or edema.  NEUROLOGIC: Cranial nerves  II-XII are intact. Alert, oriented, answers questions appropriately.  INTEGUMENTARY: No rashes or jaundice.  GAIT: Steady, does not use assistive device      LABS:  CBC RESULTS:   Recent Labs   Lab Test 02/24/22  1401   WBC 5.3   RBC 3.90*   HGB 10.5*   HCT 35.6*   MCV 91   MCH 26.9   MCHC 29.5*   RDW 17.0*   *       Recent Labs   Lab Test 02/24/22  1401 01/12/22  1003    140   POTASSIUM 4.1 4.3   CHLORIDE 107 109   CO2 27 27   ANIONGAP 3 4   GLC 91 91   BUN 17 21   CR 0.60* 0.68   BEVERLY 9.5 9.1         PATHOLOGY:  Guardant 360 3/4/22:   Detected alterations in EZH2 Y646N (0.1%), TP53 R283P (0.3%), NRAS A146T (0.1%), TP53 R248W 1.8%), MSI-high not detected.     Not Detected- EGFR T790M and others, ALK, ROS1, BRARF, MET, ERBB2(HER2), RET, NTRK, KRAS.    IMAGING:      ASSESSMENT/PLAN:  Kt Rasmussen is a 61 year old male with the following issues:  1. Metastatic non-small (non-squamous) cell lung carcinoma with metastases to lymph nodes, bones, and bilateral lungs  2. History of locally advanced endobronchial invasive squamous cell carcinoma diagnosed 5/2016, status post debulking and chemoradiation   --PET scan from 1/10/2022 showed new progressive disease in the thoracic and lumbar spine.   --Due to worsening performance status, Kt took 1 month break from chemotherapy and was unable to resume for past 2 months due to a fall sustaining fractured in his femur.  --Prior lung NGS biomarker testing were all negative and PD-L1 expression was negative. ROS1 and NTRK biomarker analysis could not be performed due to limited amount of DNA extracted from the specimen. Guardant 360 negative for targetable mutations.   -- I discussed pemetrezed and carboplatin every 21 day cycles until intolerance or disease progression with the patient. We discussed the need for dexamethasone as pre-medication, as well as Vitamin B12 and folic acid. We discussed possible side effect with treatment including fatigue,  nausea/vomiting, marrow suppression with possible need for hospitalization for neutropenic fever, blood/platelet transfusion, and MARIAA/dehydration. Patient with ECOG 1-1.5. Will use carboplatin AUC 5 instead of AUC 6.    21 day cycle until disease progression or intolerance:  -Pemetrexed 500 mg/m2 IV on Day 1  -Carboplatin AUC 5 on Day 1    -B12 injection given 3/24/22, then as per protocol.  -Premeds including dexamethasone, folic acid sent to pharmacy.      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 Dysphonia  5. Dysphagia  -Kt underwent excision of a left vocal cord SCC on 9/30/2019 and has persistent dysphonia as a result of the lesion and excision.  He also has dysphagia but this is stable and swallowing is manageable.  -Last scope by ENT 9/23/2021 showed no evidence for recurrence.     6. Weight loss  --I offered nutrition consult and he declined this.  --I recommended increasing his intake of higher caloric nutrient dense foods and beverages.     7. Multiple falls and dizziness  --SW and guardian were notified of multiple falls.  --Brain MRI required rescheduling multiple times. This was done on 2/14 and showed no brain metastases. However this was a suboptimal exam due to inability to administer contrast.    8. Follow up in clinic in 1 week with Juan Rausch for C1D1 of therapy.     Julissa Shoemaker DO  Hematology/Oncology  [facility]

## 2022-03-24 ENCOUNTER — PATIENT OUTREACH (OUTPATIENT)
Dept: ONCOLOGY | Facility: CLINIC | Age: 62
End: 2022-03-24

## 2022-03-24 ENCOUNTER — ONCOLOGY VISIT (OUTPATIENT)
Dept: ONCOLOGY | Facility: CLINIC | Age: 62
End: 2022-03-24
Attending: INTERNAL MEDICINE
Payer: MEDICARE

## 2022-03-24 VITALS
BODY MASS INDEX: 21.87 KG/M2 | TEMPERATURE: 97.6 F | RESPIRATION RATE: 16 BRPM | HEART RATE: 82 BPM | SYSTOLIC BLOOD PRESSURE: 118 MMHG | OXYGEN SATURATION: 99 % | WEIGHT: 175 LBS | DIASTOLIC BLOOD PRESSURE: 75 MMHG

## 2022-03-24 DIAGNOSIS — C79.51 NON-SMALL CELL LUNG CANCER METASTATIC TO BONE (H): Primary | ICD-10-CM

## 2022-03-24 DIAGNOSIS — D70.1 CHEMOTHERAPY-INDUCED NEUTROPENIA (H): ICD-10-CM

## 2022-03-24 DIAGNOSIS — T45.1X5A CHEMOTHERAPY-INDUCED NEUTROPENIA (H): ICD-10-CM

## 2022-03-24 DIAGNOSIS — C34.90 NON-SMALL CELL LUNG CANCER METASTATIC TO BONE (H): Primary | ICD-10-CM

## 2022-03-24 DIAGNOSIS — Z51.11 ENCOUNTER FOR ANTINEOPLASTIC CHEMOTHERAPY: ICD-10-CM

## 2022-03-24 PROCEDURE — 96372 THER/PROPH/DIAG INJ SC/IM: CPT | Performed by: INTERNAL MEDICINE

## 2022-03-24 PROCEDURE — G0463 HOSPITAL OUTPT CLINIC VISIT: HCPCS | Mod: 25

## 2022-03-24 PROCEDURE — 250N000011 HC RX IP 250 OP 636: Performed by: INTERNAL MEDICINE

## 2022-03-24 PROCEDURE — 99215 OFFICE O/P EST HI 40 MIN: CPT | Performed by: INTERNAL MEDICINE

## 2022-03-24 RX ORDER — MEPERIDINE HYDROCHLORIDE 25 MG/ML
25 INJECTION INTRAMUSCULAR; INTRAVENOUS; SUBCUTANEOUS EVERY 30 MIN PRN
Status: CANCELLED | OUTPATIENT
Start: 2022-04-21

## 2022-03-24 RX ORDER — CYANOCOBALAMIN 1000 UG/ML
1000 INJECTION, SOLUTION INTRAMUSCULAR; SUBCUTANEOUS ONCE
Status: COMPLETED | OUTPATIENT
Start: 2022-03-24 | End: 2022-03-24

## 2022-03-24 RX ORDER — HEPARIN SODIUM,PORCINE 10 UNIT/ML
5 VIAL (ML) INTRAVENOUS
Status: CANCELLED | OUTPATIENT
Start: 2022-04-21

## 2022-03-24 RX ORDER — ALBUTEROL SULFATE 90 UG/1
1-2 AEROSOL, METERED RESPIRATORY (INHALATION)
Status: CANCELLED
Start: 2022-04-21

## 2022-03-24 RX ORDER — LORAZEPAM 2 MG/ML
0.5 INJECTION INTRAMUSCULAR EVERY 4 HOURS PRN
Status: CANCELLED | OUTPATIENT
Start: 2022-04-21

## 2022-03-24 RX ORDER — EPINEPHRINE 1 MG/ML
0.3 INJECTION, SOLUTION INTRAMUSCULAR; SUBCUTANEOUS EVERY 5 MIN PRN
Status: CANCELLED | OUTPATIENT
Start: 2022-04-21

## 2022-03-24 RX ORDER — FOLIC ACID 1 MG/1
1000 TABLET ORAL DAILY
Qty: 30 TABLET | Refills: 3 | Status: SHIPPED | OUTPATIENT
Start: 2022-03-24 | End: 2022-06-02

## 2022-03-24 RX ORDER — HEPARIN SODIUM (PORCINE) LOCK FLUSH IV SOLN 100 UNIT/ML 100 UNIT/ML
5 SOLUTION INTRAVENOUS
Status: CANCELLED | OUTPATIENT
Start: 2022-04-21

## 2022-03-24 RX ORDER — METHYLPREDNISOLONE SODIUM SUCCINATE 125 MG/2ML
125 INJECTION, POWDER, LYOPHILIZED, FOR SOLUTION INTRAMUSCULAR; INTRAVENOUS
Status: CANCELLED
Start: 2022-04-21

## 2022-03-24 RX ORDER — PROCHLORPERAZINE MALEATE 10 MG
10 TABLET ORAL EVERY 6 HOURS PRN
Qty: 30 TABLET | Refills: 3 | Status: SHIPPED | OUTPATIENT
Start: 2022-03-24 | End: 2022-07-25

## 2022-03-24 RX ORDER — ONDANSETRON 8 MG/1
8 TABLET, FILM COATED ORAL EVERY 8 HOURS PRN
Qty: 10 TABLET | Refills: 3 | Status: SHIPPED | OUTPATIENT
Start: 2022-03-24 | End: 2022-07-25

## 2022-03-24 RX ORDER — DIPHENHYDRAMINE HYDROCHLORIDE 50 MG/ML
50 INJECTION INTRAMUSCULAR; INTRAVENOUS
Status: CANCELLED
Start: 2022-04-21

## 2022-03-24 RX ORDER — NALOXONE HYDROCHLORIDE 0.4 MG/ML
0.2 INJECTION, SOLUTION INTRAMUSCULAR; INTRAVENOUS; SUBCUTANEOUS
Status: CANCELLED | OUTPATIENT
Start: 2022-04-21

## 2022-03-24 RX ORDER — DEXAMETHASONE 4 MG/1
4 TABLET ORAL 2 TIMES DAILY WITH MEALS
Qty: 6 TABLET | Refills: 3 | Status: SHIPPED | OUTPATIENT
Start: 2022-03-30 | End: 2022-10-26

## 2022-03-24 RX ORDER — ALBUTEROL SULFATE 0.83 MG/ML
2.5 SOLUTION RESPIRATORY (INHALATION)
Status: CANCELLED | OUTPATIENT
Start: 2022-04-21

## 2022-03-24 RX ADMIN — CYANOCOBALAMIN 1000 MCG: 1000 INJECTION, SOLUTION INTRAMUSCULAR at 12:32

## 2022-03-24 ASSESSMENT — PAIN SCALES - GENERAL: PAINLEVEL: NO PAIN (0)

## 2022-03-24 NOTE — LETTER
3/24/2022         RE: Kt Rasmussen  [id number]  [facility] [id number]        Dear Colleague,    Thank you for referring your patient, Kt Rasmussen, to the [facility]. Please see a copy of my visit note below.    [facility]    Hematology/Oncology Established Patient Follow-up Note    Treatment Summary:      Today's Date: 3/24/22    Reason for Follow-up: Metastatic non-small cell lung carcinoma.      HISTORY OF PRESENT ILLNESS:Kt Rasmussen is a 61 year old male who presents with the following oncologic history:  1. 5/05/2016: Presented with near-obstructing large mass coming off the cheikh and likely the posterior wall, seen on bronchoscopy. Biopsy of the mass showed invasive squamous cell carcinoma, moderately differentiating and focally keratinizing.  Procedure was complicated by significant bleeding.  Tumor was completely debulked (via bronchoscopy) in both main stem bronchi and distal trachea. NGS showed no mutations in EGFR, KRAS, BRAF, NRAS, HRAS, PIK3CA, ERBB2, MET, or JAK2.  2. 5/23/2016: PET/CT scan showed evidence of residual tumor with decreased endobronchial mass. Indeterminate, mildly hypermetabolic mesentery with prominent lymph nodes and area of enhancement of the right hepatic lobe present.   Felt to have T4-NX-M0 disease.  3. 6/09/2016: Started concurrent chemoradiation with weekly paclitaxel and carboplatin.  4. 7/30/2016: Completed radiation.  Subsequently received 2 cycles of consolidation paclitaxel and carboplatin.   5. 9/13/2019: Presented with 6-month history of dysphonia and left vocal exophytic lesion. Left true vocal fold biopsy showed at least squamous cell carcinoma in situ, cannot exclude superficial invasion.  6. 9/30/2019: Excision of left vocal cord mass showed fragments of invasive squamous cell carcinoma, well differentiated and keratinizing.  Margins negative for malignancy.  7. 2/16/2021: CT chest w/o contrast showed  thin-walled cavity in left lower lobe with 2 solid peripheral nodules measuring 9 mm each. No lymphadenopathy.  8. 3/01/2021: PET scan showed hypermetabolic nodules in left lower lobe and right lung, consistent with metastases; hypermetabolic adenopathy in chest, abdomen, pelvis; nonspecific uptake at tongue; hypermetabolic intraosseous lesions in spine and pelvis consistent with metastatic disease; left axillary hypermetabolic lymph nodes related to COVID-19 vaccination.  9. 3/12/2021: CT-guided lung biopsy showed non-small cell carcinoma, not otherwise specified -- with opinion by [facility] pathologist.  10. 3/18/2021: Lung NGS panel negative for mutations in BRAF, EGFR, ERBB2, IDH1, IDH2, KRAS, MET, NRAS, RET. PD-L1 expression negative (TPS <1%). Due to minimal amount of DNA obtained from specimen, other biomarkers could not be analyzed.  11. 4/7/2021: MRI brain negative for brain metastases.  12. 4/27/2021: Started 1st line metastatic therapy with carboplatin, paclitaxel, bevacizumab, and atezolizumab for metastatic non-small cell lung carcinoma, NOS.  13. 7/12/2021: PET/CT showed resolved mural nodules with increased cystic cavity in left lower lobe with no significant FDG uptake, less likely metastases; no enlarged hypermetabolic mediastinal, hilar, or axillary lymph nodes, decreased metabolic activity of intraosseous lesion in spine and pelvis; no new bone lesions.  14. 10/11/2021: PET/CT showed slight interval increase in intensity of metabolic activity of right pubic bone and left ischium with new focal uptake in L2 spinous process; resolved uptake at previous ground glass opacity along right medial lower lobe; unchanged cystic cavities/nodules in left lower lobe and right apical upper lobe; no pathologically enlarged hypermetabolic mediastinal, hilar, or axillary lymph nodes.  15. 1/10/2022: PET/CT showed new foci of abnormal skeletal FGD uptake in the thoracic and lumbar spine. Mild interval increase in  intensity of previously demonstrated hypermetabolic skeletal lesions involving the pelvis and lumbar spine. Findings are consistent with progressive osseous metastatic disease. Subsequently off chemo for 1 month due to poor performance status.  16. 1/25/2022: Patient fell and fractured his femur. This was surgically repaired and he was subsequently cared for at a rehab unit.  17. 2/14/2022: Brain MRI without contrast (contrast not given due to inability to obtain IV access) showed no brain metastases.    INTERIM HISTORY:  Last seen on 2/16/22: Kt had a fall sustaining a femoral fracture and underwent rehab.     Stable appetite, energy levels, and weight. No recent falls. Still getting physical therapy in the assisted living facility.    REVIEW OF SYSTEMS:   A 14 point ROS was reviewed with pertinent positives and negatives in the HPI.       HOME MEDICATIONS:  Current Outpatient Medications   Medication Sig Dispense Refill     atorvastatin (LIPITOR) 40 MG tablet Take 40 mg by mouth daily           ALLERGIES:  Allergies   Allergen Reactions     No Known Drug Allergies          PAST MEDICAL HISTORY:  Past Medical History:   Diagnosis Date     Alcohol abuse, unspecified      Cerebral infarction (H)     2009, right side residual and aphasia     Dyslipidemia      GERD (gastroesophageal reflux disease)      Lung cancer (H)      Unspecified essential hypertension          PAST SURGICAL HISTORY:  Past Surgical History:   Procedure Laterality Date     BRONCHOSCOPY FLEXIBLE AND RIGID N/A 5/5/2016    Procedure: BRONCHOSCOPY FLEXIBLE AND RIGID;  Surgeon: Tony Talbot MD;  Location: [facility] OR     ESOPHAGOSCOPY FLEXIBLE N/A 9/30/2019    Procedure: flexible esophagoscopy;  Surgeon: Lizzy Johnson MD;  Location: [facility] OR     INJECT STEROID (LOCATION) N/A 9/30/2019    Procedure: steroid injection;  Surgeon: Lizzy Johnson MD;  Location: [facility] OR     IR CHEST PORT PLACEMENT > 5 YRS OF AGE  4/22/2021     LASER CO2 LARYNGOSCOPY  N/A 2019    Procedure: Microdirect laryngoscopy with excision of laryngeal mass, CO2 laser;  Surgeon: Lizzy Johnson MD;  Location:  OR     [facility] NONSPECIFIC PROCEDURE      R tympanoplasty         SOCIAL HISTORY:  Social History     Socioeconomic History     Marital status: Single     Spouse name: Not on file     Number of children: Not on file     Years of education: Not on file     Highest education level: Not on file   Occupational History     Not on file   Tobacco Use     Smoking status: Former Smoker     Packs/day: 1.00     Types: Cigarettes     Quit date: 2009     Years since quittin.4     Smokeless tobacco: Never Used   Substance and Sexual Activity     Alcohol use: Not Currently     Drug use: No     Sexual activity: Not on file   Other Topics Concern     Parent/sibling w/ CABG, MI or angioplasty before 65F 55M? Not Asked   Social History Narrative     Not on file     Social Determinants of Health     Financial Resource Strain: Not on file   Food Insecurity: Not on file   Transportation Needs: Not on file   Physical Activity: Not on file   Stress: Not on file   Social Connections: Not on file   Intimate Partner Violence: Not on file   Housing Stability: Not on file         FAMILY HISTORY:  Family History   Problem Relation Age of Onset     Cancer Father          PHYSICAL EXAM:  Vital signs:  /75   Pulse 82   Temp 97.6  F (36.4  C) (Oral)   Resp 16   Wt 79.4 kg (175 lb)   SpO2 99%   BMI 21.87 kg/m     ECO-1.5  GENERAL/CONSTITUTIONAL: No acute distress.  EYES: Pupils are equal, round, and react to light and accommodation. Extraocular movements intact.  No scleral icterus.  ENT/MOUTH: Neck supple. Oropharynx clear, no mucositis.  LYMPH: No anterior cervical, posterior cervical, supraclavicular, axillary or inguinal adenopathy.   RESPIRATORY: Clear to auscultation bilaterally. No crackles or wheezing.   CARDIOVASCULAR: Regular rate and rhythm without murmurs, gallops, or  rubs.  GASTROINTESTINAL: No hepatosplenomegaly, masses, or tenderness. The patient has normal bowel sounds. No guarding.  No distention.  MUSCULOSKELETAL: Warm and well-perfused, no cyanosis, clubbing, or edema.  NEUROLOGIC: Cranial nerves II-XII are intact. Alert, oriented, answers questions appropriately.  INTEGUMENTARY: No rashes or jaundice.  GAIT: Steady, does not use assistive device      LABS:  CBC RESULTS:   Recent Labs   Lab Test 02/24/22  1401   WBC 5.3   RBC 3.90*   HGB 10.5*   HCT 35.6*   MCV 91   MCH 26.9   MCHC 29.5*   RDW 17.0*   *       Recent Labs   Lab Test 02/24/22  1401 01/12/22  1003    140   POTASSIUM 4.1 4.3   CHLORIDE 107 109   CO2 27 27   ANIONGAP 3 4   GLC 91 91   BUN 17 21   CR 0.60* 0.68   BEVERLY 9.5 9.1         PATHOLOGY:  Guardant 360 3/4/22:   Detected alterations in EZH2 Y646N (0.1%), TP53 R283P (0.3%), NRAS A146T (0.1%), TP53 R248W 1.8%), MSI-high not detected.     Not Detected- EGFR T790M and others, ALK, ROS1, BRARF, MET, ERBB2(HER2), RET, NTRK, KRAS.    IMAGING:      ASSESSMENT/PLAN:  Kt Rasmussen is a 61 year old male with the following issues:  1. Metastatic non-small (non-squamous) cell lung carcinoma with metastases to lymph nodes, bones, and bilateral lungs  2. History of locally advanced endobronchial invasive squamous cell carcinoma diagnosed 5/2016, status post debulking and chemoradiation   --PET scan from 1/10/2022 showed new progressive disease in the thoracic and lumbar spine.   --Due to worsening performance status, Kt took 1 month break from chemotherapy and was unable to resume for past 2 months due to a fall sustaining fractured in his femur.  --Prior lung NGS biomarker testing were all negative and PD-L1 expression was negative. ROS1 and NTRK biomarker analysis could not be performed due to limited amount of DNA extracted from the specimen. Guardant 360 negative for targetable mutations.   -- I discussed pemetrezed and carboplatin every 21 day cycles  until intolerance or disease progression with the patient. We discussed the need for dexamethasone as pre-medication, as well as Vitamin B12 and folic acid. We discussed possible side effect with treatment including fatigue, nausea/vomiting, marrow suppression with possible need for hospitalization for neutropenic fever, blood/platelet transfusion, and MARIAA/dehydration. Patient with ECOG 1-1.5. Will use carboplatin AUC 5 instead of AUC 6.    21 day cycle until disease progression or intolerance:  -Pemetrexed 500 mg/m2 IV on Day 1  -Carboplatin AUC 5 on Day 1    -B12 injection given 3/24/22, then as per protocol.  -Premeds including dexamethasone, folic acid sent to pharmacy.      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 Dysphonia  5. Dysphagia  -Kt underwent excision of a left vocal cord SCC on 9/30/2019 and has persistent dysphonia as a result of the lesion and excision.  He also has dysphagia but this is stable and swallowing is manageable.  -Last scope by ENT 9/23/2021 showed no evidence for recurrence.     6. Weight loss  --I offered nutrition consult and he declined this.  --I recommended increasing his intake of higher caloric nutrient dense foods and beverages.     7. Multiple falls and dizziness  --SW and guardian were notified of multiple falls.  --Brain MRI required rescheduling multiple times. This was done on 2/14 and showed no brain metastases. However this was a suboptimal exam due to inability to administer contrast.    8. Follow up in clinic in 1 week with Juan Rausch for C1D1 of therapy.     Julissa Shoemaker DO  Hematology/Oncology  [facility]        Oncology Rooming Note    March 24, 2022 11:23 AM   Kt DAVID Rasmussen is a 61 year old male who presents for:    Chief Complaint   Patient presents with     Oncology Clinic Visit     Initial Vitals: Wt 79.4 kg (175 lb)   BMI 21.87 kg/m   Estimated body mass index is 21.87 kg/m  as calculated from the following:     "Height as of 1/6/22: 1.905 m (6' 3\").    Weight as of this encounter: 79.4 kg (175 lb). Body surface area is 2.05 meters squared.  No Pain (0) Comment: Data Unavailable   No LMP for male patient.  Allergies reviewed: Yes  Medications reviewed: Yes    Medications: Medication refills not needed today.  Pharmacy name entered into EPIC:    [facility] [place] [address] [facility] [place], [place] - [address] [id number] [address] [facility]    Clinical concerns:  doctor was notified.      Julisa Whelan [facility]                Again, thank you for allowing me to participate in the care of your patient.        Sincerely,        Julissa Shoemaker, DO    "

## 2022-03-24 NOTE — PROGRESS NOTES
"Oncology Rooming Note    March 24, 2022 11:23 AM   Kt Rasmussen is a 61 year old male who presents for:    Chief Complaint   Patient presents with     Oncology Clinic Visit     Initial Vitals: Wt 79.4 kg (175 lb)   BMI 21.87 kg/m   Estimated body mass index is 21.87 kg/m  as calculated from the following:    Height as of 1/6/22: 1.905 m (6' 3\").    Weight as of this encounter: 79.4 kg (175 lb). Body surface area is 2.05 meters squared.  No Pain (0) Comment: Data Unavailable   No LMP for male patient.  Allergies reviewed: Yes  Medications reviewed: Yes    Medications: Medication refills not needed today.  Pharmacy name entered into EPIC:    [facility] [place] - [address] [facility] [place], [place] - [address] [facility] [id number] - [facility] [address] AT [facility] & [facility] 7    Clinical concerns:  doctor was notified.      Julisa Whelan CMA            "

## 2022-03-24 NOTE — PROGRESS NOTES
Chemo Teach  re: Alimta/Carboplatin  treatment plan for diagnosis: Lung Cancer   -See Pt Education documentation - Chemo Effects (Adult)  -Reviewed treatment schedule including cycle length, lab monitoring and take home medications.    Verbal and written instruction provided using:     [facility] Carboplatin/Alimta  Drug Information   -Reviewed What Chemotherapy/Immunotherapy Is Used For, How Chemotherapy/Immunotherapy  is Given, Side Effects, When to Contact Your Doctor of Health Care Provider and Self Care Tips sections.      [place] literature: -Patient has been given before did not give packet again   -Reviewed Getting Ready for Chemotherapy: What to Expect Before, During and After Your Treatment   -Reviewed Tips for Increasing Protein and Calories  -Patient already has port     Reviewed with Kt that he will receive vitamin B12 shots q 9 weeks. He will need to take daily Folic Acid, and Decadron day prior, day off, and day after Alimta/Carboplatin treatments.   Called left message with RN at [facility] of change in Kt's treatment plan.   Aylin Us RN,BSN,OCN,CBCN

## 2022-03-29 ENCOUNTER — TELEPHONE (OUTPATIENT)
Dept: ONCOLOGY | Facility: CLINIC | Age: 62
End: 2022-03-29
Payer: MEDICARE

## 2022-03-29 NOTE — TELEPHONE ENCOUNTER
Left voicemail message for Dorina detailing scheduled appointments for Kt. I asked her to call back to confirm.    ----- Message from Cristina Marino RN sent at 3/28/2022  4:06 PM CDT -----  Regarding: RE: Urgent - with in week- new chemo  Great Thanks - I think 4/6/22 is actually good then Dr. John will be back that day - Can someone maybe reach out to Dorina BLANC to arrange rides for Kt but can be done later this week?    Thanks    Pattie Marino RN  ----- Message -----  From: Pepper Fulton  Sent: 3/28/2022   3:54 PM CDT  To: Cristina Marino RN  Subject: RE: Urgent - with in week- new chemo             Hi Pattie,      This patient is scheduled for 4/6/22, first available infusion date and time. The Charge Nurse tomorrow will look at possible openings for this week, but currently we have no infusion chair time this week. I will keep you informed.    Thank you  Pepper    ----- Message -----  From: Cristina Marino RN  Sent: 3/28/2022   2:46 PM CDT  To: [facility]   Subject: Urgent - with in week- new chemo                 Hi-    This patient saw Dr. Shoemaker on 3/24 and her recommendations are to start C1D1 Carbo Alimta which would require labs - appt with Juan and infusion.    Patient also has port that is not working so I will place order to have chest port evaluated and possibly replaced.    Please see what we can set up in the next week to get labs - Juan and infusion - keep in mind he lives in assisted living and needs to get transportation set up through them - Dorina BLANC who is who set up transportation call her directly at [phone]     Let me know what we can do    Thanks    Pattie Marino RN

## 2022-04-04 ENCOUNTER — TELEPHONE (OUTPATIENT)
Dept: INTERVENTIONAL RADIOLOGY/VASCULAR | Facility: CLINIC | Age: 62
End: 2022-04-04
Payer: MEDICARE

## 2022-04-04 ENCOUNTER — DOCUMENTATION ONLY (OUTPATIENT)
Dept: PHARMACY | Facility: CLINIC | Age: 62
End: 2022-04-04
Payer: MEDICARE

## 2022-04-06 ENCOUNTER — TELEPHONE (OUTPATIENT)
Dept: ONCOLOGY | Facility: CLINIC | Age: 62
End: 2022-04-06
Payer: MEDICARE

## 2022-04-06 NOTE — PROGRESS NOTES
[facility]   at the [facility]   Otolaryngology Clinic     Patient: Kt Rasmussen    MRN: [id number]    : 1960    Age/Gender: 61 year old male  Date of Service: 2022  Rendering Provider:   Lizzy Wray MD     Chief Complaint   T1 left TVF SCC s/p resection 19  Interval History   HISTORY OF PRESENT ILLNESS: Mr. Rasmussen is a 61 year old male is being followed for history of left TVF SCC. he was initially seen on 19. Please refer to this note for full history.      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 he reports:  - doing well     PAST MEDICAL HISTORY:   Past Medical History:   Diagnosis Date     Alcohol abuse, unspecified      Cerebral infarction (H)     , right side residual and aphasia     Dyslipidemia      GERD (gastroesophageal reflux disease)      Lung cancer (H)      Unspecified essential hypertension        PAST SURGICAL HISTORY:   Past Surgical History:   Procedure Laterality Date     BRONCHOSCOPY FLEXIBLE AND RIGID N/A 2016    Procedure: BRONCHOSCOPY FLEXIBLE AND RIGID;  Surgeon: Tony Talbot MD;  Location: [facility] OR     ESOPHAGOSCOPY FLEXIBLE N/A 2019    Procedure: flexible esophagoscopy;  Surgeon: Lizzy Johnson MD;  Location: [facility] OR     INJECT STEROID (LOCATION) N/A 2019    Procedure: steroid injection;  Surgeon: Lizzy Johnson MD;  Location: [facility] OR     IR CHEST PORT PLACEMENT > 5 YRS OF AGE  2021     LASER CO2 LARYNGOSCOPY N/A 2019    Procedure: Microdirect laryngoscopy with excision of laryngeal mass, CO2 laser;  Surgeon: Lizzy Johnson MD;  Location:  OR     [facility] NONSPECIFIC PROCEDURE      R tympanoplasty       CURRENT MEDICATIONS:   Current Outpatient Medications:      atorvastatin (LIPITOR) 40 MG tablet, Take 40 mg by mouth daily, Disp: , Rfl:      dexamethasone (DECADRON) 4 MG tablet,  Take 1 tablet (4 mg) by mouth 2 times daily (with meals) for 3 days Start one day prior to Pemetrexed (Alimta), continue on the day treatment, and the day following Pemetrexed., Disp: 6 tablet, Rfl: 3     folic acid (FOLVITE) 1 MG tablet, Take 1 tablet (1,000 mcg) by mouth daily Begin 7 days before Pemetrexed (Alimta) starts and continue for 21 days after Pemetrexed is discontinued., Disp: 30 tablet, Rfl: 3     ondansetron (ZOFRAN) 8 MG tablet, Take 1 tablet (8 mg) by mouth every 8 hours as needed for nausea, Disp: 10 tablet, Rfl: 3     prochlorperazine (COMPAZINE) 10 MG tablet, Take 1 tablet (10 mg) by mouth every 6 hours as needed (Nausea/Vomiting), Disp: 30 tablet, Rfl: 3  No current facility-administered medications for this visit.    Facility-Administered Medications Ordered in Other Visits:      heparin 100 UNIT/ML injection 5 mL, 5 mL, Intracatheter, Once PRN, Sangita John MD, 5 mL at 08/10/21 1346     heparin lock flush 10 UNIT/ML injection 5 mL, 5 mL, Intracatheter, Q1H PRN, Sangita John MD     sodium chloride (PF) 0.9% PF flush 3-20 mL, 3-20 mL, Intracatheter, Q1H PRN, Sangita John MD, 20 mL at 08/10/21 1346    ALLERGIES: No known drug allergies    SOCIAL HISTORY:    Social History     Socioeconomic History     Marital status: Single     Spouse name: Not on file     Number of children: Not on file     Years of education: Not on file     Highest education level: Not on file   Occupational History     Not on file   Tobacco Use     Smoking status: Former Smoker     Packs/day: 1.00     Types: Cigarettes     Quit date: 2009     Years since quittin.5     Smokeless tobacco: Never Used   Substance and Sexual Activity     Alcohol use: Not Currently     Drug use: No     Sexual activity: Not on file   Other Topics Concern     Parent/sibling w/ CABG, MI or angioplasty before 65F 55M? Not Asked   Social History Narrative     Not on file     Social Determinants of Health     Financial Resource Strain:  Not on file   Food Insecurity: Not on file   Transportation Needs: Not on file   Physical Activity: Not on file   Stress: Not on file   Social Connections: Not on file   Intimate Partner Violence: Not on file   Housing Stability: Not on file         FAMILY HISTORY:   Family History   Problem Relation Age of Onset     Cancer Father       Non-contributory for problems with anesthesia    REVIEW OF SYSTEMS:   The patient was asked a 14 point review of systems regarding constitutional symptoms, eye symptoms, ears, nose, mouth, throat symptoms, cardiovascular symptoms, respiratory symptoms, gastrointestinal symptoms, genitourinary symptoms, musculoskeletal symptoms, integumentary symptoms, neurological symptoms, psychiatric symptoms, endocrine symptoms, hematologic/lymphatic symptoms, and allergic/ immunologic symptoms.   The pertinent factors have been included in the HPI and below.  Patient Supplied Answers to Review of Systems  No flowsheet data found.    Physical Examination   The patient underwent a physical examination as described below. The pertinent positive and negative findings are summarized after the description of the examination.  Constitutional: The patient's developmental and nutritional status was assessed. The patient's voice quality was assessed.  Head and Face: The head and face were inspected for deformities. The sinuses were palpated. The salivary glands were palpated. Facial muscle strength was assessed bilaterally.  Eyes: Extraocular movements and primary gaze alignment were assessed.  Ears, Nose, Mouth and Throat: The ears and nose were examined for deformities. The nasal septum, mucosa, and turbinates were inspected by anterior rhinoscopy. The lips, teeth, and gums were examined for abnormalities. The oral mucosa, tongue, palate, tonsils, lateral and posterior pharynx were inspected for the presence of asymmetry or mucosal lesions.    Neck: The tracheal position was noted, and the neck mass  palpated to determine if there were any asymmetries, abnormal neck masses, thyromegally, or thyroid nodules.  Respiratory: The nature of the breathing and chest expansion/symmetry was observed.  Cardiovascular: The patient was examined to determine the presence of any edema or jugular venous distension.  Abdomen: The contour of the abdomen was noted.  Lymphatic: The patient was examined for infraclavicular lymphadenopathy.  Musculoskeletal: The patient was inspected for the presence of skeletal deformities.  Extremities: The extremities were examined for any clubbing or cyanosis.  Skin: The skin was examined for inflammatory or neoplastic conditions.  Neurologic: The patient's orientation, mood, and affect were noted. The cranial nerve  functions were examined.  Other pertinent positive and negative findings on physical examination:   OC/OP: no lesions, uvula midline, soft palate elevates symmetrically   Neck: no lesions, no TH tenderness to palpation     All other physical examination findings were within normal limits and noncontributory.    Procedures   Flexible laryngoscopy (CPT 09295)      Pre-procedure diagnosis: dysphonia  Post-procedure diagnosis: same as above  Indication for procedure: Mr. Rasmussen is a 61 year old male with see above  Procedure(s): Fiberoptic Laryngoscopy    Details of Procedure: After informed consent was obtained, the patient was seated in the examination chair.  The areas of the nasopharynx as well as the hypopharynx were anesthetized with topical 4% lidocaine with 0.25% phenylephrine atomizer.  Examination of the base of tongue was performed first.  Attention was directed to any evidence of masses in the area or evidence of leukoplakia or candidal infection.  Attention was directed to the epiglottis where its size and position was determined and its movement on phonation of the vowel  e .  The piriform sinuses were then inspected for any mass lesions or pooling of secretions.   Attention was then directed to the larynx. The vocal folds were inspected for infection or any areas of leukoplakia, for masses, polypoid degeneration, or hemorrhage.  Having done this, the arytenoids and vocal processes were inspected for erythema or evidence of granuloma formation.  The posterior commissure was then inspected for evidence of inflammatory changes in the mucosa and heaping up of mucosal tissue. The patient was then instructed to say the vowel  e .  Adduction of vocal folds to the midline was observed for any evidence of paresis or paralysis of the larynx or asymmetry in rotation of the larynx to the left or right. The patient was asked to breathe and the degree of abduction was noted bilaterally.  Subglottic view of the larynx was obtained for any additional mass lesions or mucosal changes.  Finally the post cricoid was examined for evidence of pooling of secretions, as well as the pharyngeal wall mucosa.   Anesthesia type: 0.25% phenylephrine    Findings:  Anatomic/physiological deviations: via L NC, well healed left vocal fold no evidence of recurrence   Right vocal process: No restriction of mobility   Left vocal process: No restriction of mobility  Glottal gap: Glottal gap  Supraglottic structures: Normal  Hypopharynx: Normal     Estimated Blood Loss: minimal  Complications: None  Disposition: Patient tolerated the procedure well                Review of Relevant Clinical Data   I personally reviewed:  Notes: Julissa Shoemaker 3/24/22- --PET scan from 1/10/2022 showed new progressive disease in the thoracic and lumbar spine.      Labs:  Lab Results   Component Value Date    TSH 1.79 02/24/2022     Lab Results   Component Value Date     02/24/2022    CO2 27 02/24/2022    BUN 17 02/24/2022    PHOS 2.0 (L) 05/04/2016     Lab Results   Component Value Date    WBC 5.3 02/24/2022    HGB 10.5 (L) 02/24/2022    HCT 35.6 (L) 02/24/2022    MCV 91 02/24/2022     (L) 02/24/2022     Lab Results  "  Component Value Date    PTT 35 2017    INR 1.02 2021     No results found for: ELIZABET  No components found for: RHEUMATOIDFACTOR,  RF  Lab Results   Component Value Date    CRP 0.16 2003     No components found for: CKTOT, URICACID  No components found for: C3, C4, DSDNAAB, NDNAABIFA  No results found for: MPOAB    Patient reported Quality of Life (QOL) Measures   Patient Supplied Answers To VHI Questionnaire  Voice Handicap Index (VHI-10) 2019   My voice makes it difficult for people to hear me 0   People have difficulty understanding me in a noisy room 0   My voice difficulties restrict my personal and social life.  0   I feel left out of conversations because of my voice 0   My voice problem causes me to lose income 0   I feel as though I have to strain to produce voice 0   The clarity of my voice is unpredictable 0   My voice problem upsets me 0   My voice makes me feel handicapped 0   People ask, \"What's wrong with your voice?\" 0   VHI-10 0         Patient Supplied Answers To EAT Questionnaire  Eating Assessment Tool (EAT-10) 2019   My swallowing problem has caused me to lose weight 0   My swallowing problem interferes with my ability to go out for meals 0   Swallowing liquids takes extra effort 0   Swallowing solids takes extra effort 0   Swallowing pills takes extra effort 0   Swallowing is painful 0   The pleasure of eating is affected by my swallowing 0   When I swallow food sticks in my throat 0   I cough when I eat 0   Swallowing is stressful 0   EAT-10 0         Patient Supplied Answers To CSI Questionnaire  No flowsheet data found.      Patient Supplied Answers to Dyspnea Index Questionnaire:  No flowsheet data found.    Impression & Plan     IMPRESSION: Mr. Rasmussen is a 61 year old male who is being seen for the followin. T1 Left vocal fold SCC  -  s/p endoscopic CO2 laser resection 19  - no evidence of recurrence   - now has metastatic nonsmall cell lung cancer " undergoing treatment with Dr Sangita John  - stable laryngeal exam,  VIKRAM  - symptoms today 01/06/2022 are stable  - scope today 01/06/2022 shows well healed left vocal fold no evidence of recurrence  - VIKRAM and given active chemo, will stretch follow up to his 3rd year anniversary in September   Plan  - observation  - follow up in 9/30/2022  - recommended nasal saline spray for moisture      RETURN VISIT: September, 2022    Lizzy Wray MD    Laryngology    [facility]  Department of  Otolaryngology - Head and Neck Surgery  Clinics & Surgery Center  [address] [place] [address]  Appointment line: [phone]  Fax: [phone]  [email or web address]

## 2022-04-07 ENCOUNTER — OFFICE VISIT (OUTPATIENT)
Dept: OTOLARYNGOLOGY | Facility: CLINIC | Age: 62
End: 2022-04-07
Payer: MEDICARE

## 2022-04-07 VITALS — HEIGHT: 75 IN | WEIGHT: 175 LBS | BODY MASS INDEX: 21.76 KG/M2 | TEMPERATURE: 98.4 F

## 2022-04-07 DIAGNOSIS — R49.0 DYSPHONIA: Primary | ICD-10-CM

## 2022-04-07 PROCEDURE — 99207 PR NO CHARGE LOS: CPT | Performed by: OTOLARYNGOLOGY

## 2022-04-07 PROCEDURE — 31575 DIAGNOSTIC LARYNGOSCOPY: CPT | Performed by: OTOLARYNGOLOGY

## 2022-04-07 ASSESSMENT — PAIN SCALES - GENERAL: PAINLEVEL: NO PAIN (0)

## 2022-04-07 NOTE — NURSING NOTE
"Chief Complaint   Patient presents with     RECHECK     Follow up       Temperature 98.4  F (36.9  C), temperature source Temporal, height 1.905 m (6' 3\"), weight 79.4 kg (175 lb).    Nick Vidales, EMT  "

## 2022-04-07 NOTE — PATIENT INSTRUCTIONS
1.  You were seen in the ENT Clinic today by . If you have any questions or concerns after your appointment, please call [phone]. Press option #1 for scheduling related needs. Press option #3 for Nurse advice.    2.    Plan is to return to clinic in sept.      Kay Sommers LPN  [phone]  [facility] - Otolaryngology

## 2022-04-07 NOTE — LETTER
2022       RE: Kt Rasmussen  [address]  [facility] [address]     Dear Colleague,    Thank you for referring your patient, Kt Rasmussen, to the [facility] EAR NOSE AND THROAT CLINIC [place] at [facility]. Please see a copy of my visit note below.        [facility]   at the [facility]   Otolaryngology Clinic     Patient: Kt Rasmussen    MRN: [id number]    : 1960    Age/Gender: 61 year old male  Date of Service: 2022  Rendering Provider:   Lizzy Wray MD     Chief Complaint   T1 left TVF SCC s/p resection 19  Interval History   HISTORY OF PRESENT ILLNESS: Mr. Rasmussen is a 61 year old male is being followed for history of left TVF SCC. he was initially seen on 19. Please refer to this note for full history.      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 he reports:  - doing well     PAST MEDICAL HISTORY:   Past Medical History:   Diagnosis Date     Alcohol abuse, unspecified      Cerebral infarction (H)     , right side residual and aphasia     Dyslipidemia      GERD (gastroesophageal reflux disease)      Lung cancer (H)      Unspecified essential hypertension        PAST SURGICAL HISTORY:   Past Surgical History:   Procedure Laterality Date     BRONCHOSCOPY FLEXIBLE AND RIGID N/A 2016    Procedure: BRONCHOSCOPY FLEXIBLE AND RIGID;  Surgeon: Tony Talbot MD;  Location: [facility] OR     ESOPHAGOSCOPY FLEXIBLE N/A 2019    Procedure: flexible esophagoscopy;  Surgeon: Lizzy Johnson MD;  Location: [facility] OR     INJECT STEROID (LOCATION) N/A 2019    Procedure: steroid injection;  Surgeon: Lizzy Johnson MD;  Location: [facility] OR     IR CHEST PORT PLACEMENT > 5 YRS OF AGE  2021     LASER CO2 LARYNGOSCOPY N/A 2019    Procedure: Microdirect laryngoscopy with  excision of laryngeal mass, CO2 laser;  Surgeon: Lizzy Johnson MD;  Location:  OR     [facility] NONSPECIFIC PROCEDURE      R tympanoplasty       CURRENT MEDICATIONS:   Current Outpatient Medications:      atorvastatin (LIPITOR) 40 MG tablet, Take 40 mg by mouth daily, Disp: , Rfl:      dexamethasone (DECADRON) 4 MG tablet, Take 1 tablet (4 mg) by mouth 2 times daily (with meals) for 3 days Start one day prior to Pemetrexed (Alimta), continue on the day treatment, and the day following Pemetrexed., Disp: 6 tablet, Rfl: 3     folic acid (FOLVITE) 1 MG tablet, Take 1 tablet (1,000 mcg) by mouth daily Begin 7 days before Pemetrexed (Alimta) starts and continue for 21 days after Pemetrexed is discontinued., Disp: 30 tablet, Rfl: 3     ondansetron (ZOFRAN) 8 MG tablet, Take 1 tablet (8 mg) by mouth every 8 hours as needed for nausea, Disp: 10 tablet, Rfl: 3     prochlorperazine (COMPAZINE) 10 MG tablet, Take 1 tablet (10 mg) by mouth every 6 hours as needed (Nausea/Vomiting), Disp: 30 tablet, Rfl: 3  No current facility-administered medications for this visit.    Facility-Administered Medications Ordered in Other Visits:      heparin 100 UNIT/ML injection 5 mL, 5 mL, Intracatheter, Once PRN, Sangita John MD, 5 mL at 08/10/21 1346     heparin lock flush 10 UNIT/ML injection 5 mL, 5 mL, Intracatheter, Q1H PRN, Sangita John MD     sodium chloride (PF) 0.9% PF flush 3-20 mL, 3-20 mL, Intracatheter, Q1H PRN, Sangita John MD, 20 mL at 08/10/21 1346    ALLERGIES: No known drug allergies    SOCIAL HISTORY:    Social History     Socioeconomic History     Marital status: Single     Spouse name: Not on file     Number of children: Not on file     Years of education: Not on file     Highest education level: Not on file   Occupational History     Not on file   Tobacco Use     Smoking status: Former Smoker     Packs/day: 1.00     Types: Cigarettes     Quit date: 2009     Years since quittin.5     Smokeless tobacco:  Never Used   Substance and Sexual Activity     Alcohol use: Not Currently     Drug use: No     Sexual activity: Not on file   Other Topics Concern     Parent/sibling w/ CABG, MI or angioplasty before 65F 55M? Not Asked   Social History Narrative     Not on file     Social Determinants of Health     Financial Resource Strain: Not on file   Food Insecurity: Not on file   Transportation Needs: Not on file   Physical Activity: Not on file   Stress: Not on file   Social Connections: Not on file   Intimate Partner Violence: Not on file   Housing Stability: Not on file         FAMILY HISTORY:   Family History   Problem Relation Age of Onset     Cancer Father       Non-contributory for problems with anesthesia    REVIEW OF SYSTEMS:   The patient was asked a 14 point review of systems regarding constitutional symptoms, eye symptoms, ears, nose, mouth, throat symptoms, cardiovascular symptoms, respiratory symptoms, gastrointestinal symptoms, genitourinary symptoms, musculoskeletal symptoms, integumentary symptoms, neurological symptoms, psychiatric symptoms, endocrine symptoms, hematologic/lymphatic symptoms, and allergic/ immunologic symptoms.   The pertinent factors have been included in the HPI and below.  Patient Supplied Answers to Review of Systems  No flowsheet data found.    Physical Examination   The patient underwent a physical examination as described below. The pertinent positive and negative findings are summarized after the description of the examination.  Constitutional: The patient's developmental and nutritional status was assessed. The patient's voice quality was assessed.  Head and Face: The head and face were inspected for deformities. The sinuses were palpated. The salivary glands were palpated. Facial muscle strength was assessed bilaterally.  Eyes: Extraocular movements and primary gaze alignment were assessed.  Ears, Nose, Mouth and Throat: The ears and nose were examined for deformities. The nasal  septum, mucosa, and turbinates were inspected by anterior rhinoscopy. The lips, teeth, and gums were examined for abnormalities. The oral mucosa, tongue, palate, tonsils, lateral and posterior pharynx were inspected for the presence of asymmetry or mucosal lesions.    Neck: The tracheal position was noted, and the neck mass palpated to determine if there were any asymmetries, abnormal neck masses, thyromegally, or thyroid nodules.  Respiratory: The nature of the breathing and chest expansion/symmetry was observed.  Cardiovascular: The patient was examined to determine the presence of any edema or jugular venous distension.  Abdomen: The contour of the abdomen was noted.  Lymphatic: The patient was examined for infraclavicular lymphadenopathy.  Musculoskeletal: The patient was inspected for the presence of skeletal deformities.  Extremities: The extremities were examined for any clubbing or cyanosis.  Skin: The skin was examined for inflammatory or neoplastic conditions.  Neurologic: The patient's orientation, mood, and affect were noted. The cranial nerve  functions were examined.  Other pertinent positive and negative findings on physical examination:   OC/OP: no lesions, uvula midline, soft palate elevates symmetrically   Neck: no lesions, no TH tenderness to palpation     All other physical examination findings were within normal limits and noncontributory.    Procedures   Flexible laryngoscopy (CPT 13107)      Pre-procedure diagnosis: dysphonia  Post-procedure diagnosis: same as above  Indication for procedure: Mr. Rasmussen is a 61 year old male with see above  Procedure(s): Fiberoptic Laryngoscopy    Details of Procedure: After informed consent was obtained, the patient was seated in the examination chair.  The areas of the nasopharynx as well as the hypopharynx were anesthetized with topical 4% lidocaine with 0.25% phenylephrine atomizer.  Examination of the base of tongue was performed first.  Attention was  directed to any evidence of masses in the area or evidence of leukoplakia or candidal infection.  Attention was directed to the epiglottis where its size and position was determined and its movement on phonation of the vowel  e .  The piriform sinuses were then inspected for any mass lesions or pooling of secretions.  Attention was then directed to the larynx. The vocal folds were inspected for infection or any areas of leukoplakia, for masses, polypoid degeneration, or hemorrhage.  Having done this, the arytenoids and vocal processes were inspected for erythema or evidence of granuloma formation.  The posterior commissure was then inspected for evidence of inflammatory changes in the mucosa and heaping up of mucosal tissue. The patient was then instructed to say the vowel  e .  Adduction of vocal folds to the midline was observed for any evidence of paresis or paralysis of the larynx or asymmetry in rotation of the larynx to the left or right. The patient was asked to breathe and the degree of abduction was noted bilaterally.  Subglottic view of the larynx was obtained for any additional mass lesions or mucosal changes.  Finally the post cricoid was examined for evidence of pooling of secretions, as well as the pharyngeal wall mucosa.   Anesthesia type: 0.25% phenylephrine    Findings:  Anatomic/physiological deviations: via L NC, well healed left vocal fold no evidence of recurrence   Right vocal process: No restriction of mobility   Left vocal process: No restriction of mobility  Glottal gap: Glottal gap  Supraglottic structures: Normal  Hypopharynx: Normal     Estimated Blood Loss: minimal  Complications: None  Disposition: Patient tolerated the procedure well                Review of Relevant Clinical Data   I personally reviewed:  Notes: Julissa Shoemaker 3/24/22- --PET scan from 1/10/2022 showed new progressive disease in the thoracic and lumbar spine.      Labs:  Lab Results   Component Value Date    TSH 1.79  "02/24/2022     Lab Results   Component Value Date     02/24/2022    CO2 27 02/24/2022    BUN 17 02/24/2022    PHOS 2.0 (L) 05/04/2016     Lab Results   Component Value Date    WBC 5.3 02/24/2022    HGB 10.5 (L) 02/24/2022    HCT 35.6 (L) 02/24/2022    MCV 91 02/24/2022     (L) 02/24/2022     Lab Results   Component Value Date    PTT 35 07/26/2017    INR 1.02 04/22/2021     No results found for: ELIZABET  No components found for: RHEUMATOIDFACTOR,  RF  Lab Results   Component Value Date    CRP 0.16 07/24/2003     No components found for: CKTOT, URICACID  No components found for: C3, C4, DSDNAAB, NDNAABIFA  No results found for: MPOAB    Patient reported Quality of Life (QOL) Measures   Patient Supplied Answers To VHI Questionnaire  Voice Handicap Index (VHI-10) 12/5/2019   My voice makes it difficult for people to hear me 0   People have difficulty understanding me in a noisy room 0   My voice difficulties restrict my personal and social life.  0   I feel left out of conversations because of my voice 0   My voice problem causes me to lose income 0   I feel as though I have to strain to produce voice 0   The clarity of my voice is unpredictable 0   My voice problem upsets me 0   My voice makes me feel handicapped 0   People ask, \"What's wrong with your voice?\" 0   VHI-10 0         Patient Supplied Answers To EAT Questionnaire  Eating Assessment Tool (EAT-10) 12/5/2019   My swallowing problem has caused me to lose weight 0   My swallowing problem interferes with my ability to go out for meals 0   Swallowing liquids takes extra effort 0   Swallowing solids takes extra effort 0   Swallowing pills takes extra effort 0   Swallowing is painful 0   The pleasure of eating is affected by my swallowing 0   When I swallow food sticks in my throat 0   I cough when I eat 0   Swallowing is stressful 0   EAT-10 0         Patient Supplied Answers To CSI Questionnaire  No flowsheet data found.      Patient Supplied Answers to " Dyspnea Index Questionnaire:  No flowsheet data found.    Impression & Plan     IMPRESSION: Mr. Rasmussen is a 61 year old male who is being seen for the followin. T1 Left vocal fold SCC  -  s/p endoscopic CO2 laser resection 19  - no evidence of recurrence   - now has metastatic nonsmall cell lung cancer undergoing treatment with Dr Sangita John  - stable laryngeal exam,  VIKRAM  - symptoms today 2022 are stable  - scope today 2022 shows well healed left vocal fold no evidence of recurrence  - VIKRAM and given active chemo, will stretch follow up to his 3rd year anniversary in September   Plan  - observation  - follow up in 2022  - recommended nasal saline spray for moisture      RETURN VISIT:     Lizzy Wray MD    Laryngology    [facility]  Department of  Otolaryngology - Head and Neck Surgery  Clinics & Surgery Center  [address] [place] [id number]  Appointment line: [phone]  Fax: [phone]  [email or web address]

## 2022-04-11 ENCOUNTER — APPOINTMENT (OUTPATIENT)
Dept: INTERVENTIONAL RADIOLOGY/VASCULAR | Facility: CLINIC | Age: 62
End: 2022-04-11
Attending: INTERNAL MEDICINE
Payer: MEDICARE

## 2022-04-11 ENCOUNTER — HOSPITAL ENCOUNTER (OUTPATIENT)
Facility: CLINIC | Age: 62
Discharge: HOME | End: 2022-04-11
Admitting: RADIOLOGY
Payer: MEDICARE

## 2022-04-11 VITALS
DIASTOLIC BLOOD PRESSURE: 66 MMHG | RESPIRATION RATE: 16 BRPM | OXYGEN SATURATION: 99 % | SYSTOLIC BLOOD PRESSURE: 118 MMHG | TEMPERATURE: 97.2 F | HEART RATE: 70 BPM

## 2022-04-11 DIAGNOSIS — C79.51 NON-SMALL CELL LUNG CANCER METASTATIC TO BONE (H): ICD-10-CM

## 2022-04-11 DIAGNOSIS — C34.90 NON-SMALL CELL LUNG CANCER METASTATIC TO BONE (H): ICD-10-CM

## 2022-04-11 PROCEDURE — 999N000163 HC STATISTIC SIMPLE TUBE INSERTION/CHARGE, PORT, CATH, FISTULOGRAM

## 2022-04-11 PROCEDURE — C1788 PORT, INDWELLING, IMP: HCPCS

## 2022-04-11 PROCEDURE — 272N000196 HC ACCESSORY CR5

## 2022-04-11 PROCEDURE — 272N000602 HC WOUND GLUE CR1

## 2022-04-11 PROCEDURE — 36590 REMOVAL TUNNELED CV CATH: CPT

## 2022-04-11 PROCEDURE — 250N000011 HC RX IP 250 OP 636: Performed by: PHYSICIAN ASSISTANT

## 2022-04-11 PROCEDURE — 250N000009 HC RX 250: Performed by: PHYSICIAN ASSISTANT

## 2022-04-11 PROCEDURE — 36598 INJ W/FLUOR EVAL CV DEVICE: CPT

## 2022-04-11 PROCEDURE — 250N000011 HC RX IP 250 OP 636: Performed by: RADIOLOGY

## 2022-04-11 PROCEDURE — C1769 GUIDE WIRE: HCPCS

## 2022-04-11 PROCEDURE — 99152 MOD SED SAME PHYS/QHP 5/>YRS: CPT

## 2022-04-11 RX ORDER — FENTANYL CITRATE 50 UG/ML
25-50 INJECTION, SOLUTION INTRAMUSCULAR; INTRAVENOUS EVERY 5 MIN PRN
Status: DISCONTINUED | OUTPATIENT
Start: 2022-04-11 | End: 2022-04-11 | Stop reason: HOSPADM

## 2022-04-11 RX ORDER — FLUMAZENIL 0.1 MG/ML
0.2 INJECTION, SOLUTION INTRAVENOUS
Status: DISCONTINUED | OUTPATIENT
Start: 2022-04-11 | End: 2022-04-11 | Stop reason: HOSPADM

## 2022-04-11 RX ORDER — NALOXONE HYDROCHLORIDE 0.4 MG/ML
0.2 INJECTION, SOLUTION INTRAMUSCULAR; INTRAVENOUS; SUBCUTANEOUS
Status: DISCONTINUED | OUTPATIENT
Start: 2022-04-11 | End: 2022-04-11 | Stop reason: HOSPADM

## 2022-04-11 RX ORDER — NALOXONE HYDROCHLORIDE 0.4 MG/ML
0.4 INJECTION, SOLUTION INTRAMUSCULAR; INTRAVENOUS; SUBCUTANEOUS
Status: DISCONTINUED | OUTPATIENT
Start: 2022-04-11 | End: 2022-04-11 | Stop reason: HOSPADM

## 2022-04-11 RX ORDER — CEFAZOLIN SODIUM 2 G/100ML
2 INJECTION, SOLUTION INTRAVENOUS
Status: COMPLETED | OUTPATIENT
Start: 2022-04-11 | End: 2022-04-11

## 2022-04-11 RX ORDER — HEPARIN SODIUM 200 [USP'U]/100ML
1 INJECTION, SOLUTION INTRAVENOUS CONTINUOUS PRN
Status: DISCONTINUED | OUTPATIENT
Start: 2022-04-11 | End: 2022-04-11 | Stop reason: HOSPADM

## 2022-04-11 RX ORDER — HEPARIN SODIUM (PORCINE) LOCK FLUSH IV SOLN 100 UNIT/ML 100 UNIT/ML
500 SOLUTION INTRAVENOUS ONCE
Status: COMPLETED | OUTPATIENT
Start: 2022-04-11 | End: 2022-04-11

## 2022-04-11 RX ORDER — LIDOCAINE HYDROCHLORIDE AND EPINEPHRINE 10; 10 MG/ML; UG/ML
1-20 INJECTION, SOLUTION INFILTRATION; PERINEURAL ONCE
Status: DISCONTINUED | OUTPATIENT
Start: 2022-04-11 | End: 2022-04-11 | Stop reason: HOSPADM

## 2022-04-11 RX ADMIN — HEPARIN SODIUM (PORCINE) LOCK FLUSH IV SOLN 100 UNIT/ML 500 UNITS: 100 SOLUTION at 09:59

## 2022-04-11 RX ADMIN — CEFAZOLIN SODIUM 2 G: 2 INJECTION, SOLUTION INTRAVENOUS at 08:40

## 2022-04-11 RX ADMIN — FENTANYL CITRATE 25 MCG: 50 INJECTION, SOLUTION INTRAMUSCULAR; INTRAVENOUS at 09:22

## 2022-04-11 RX ADMIN — MIDAZOLAM HYDROCHLORIDE 0.5 MG: 1 INJECTION, SOLUTION INTRAMUSCULAR; INTRAVENOUS at 09:21

## 2022-04-11 RX ADMIN — LIDOCAINE HYDROCHLORIDE 13 ML: 10 INJECTION, SOLUTION INFILTRATION; PERINEURAL at 09:47

## 2022-04-11 RX ADMIN — FENTANYL CITRATE 25 MCG: 50 INJECTION, SOLUTION INTRAMUSCULAR; INTRAVENOUS at 09:57

## 2022-04-11 NOTE — DISCHARGE SUMMARY
Care Suites Discharge Nursing Note    Patient Information  Name: Kt Rasmussen  Age: 61 year old    Discharge Education:  Discharge instructions reviewed: Yes  Additional education/resources provided: avs  Patient/patient representative verbalizes understanding: Yes  Patient discharging on new medications: N/A  Medication education completed: N/A    Discharge Plans:   Discharge location: to NH  Discharge ride contacted: Yes  Approximate discharge time: 1300    Discharge Criteria:  Discharge criteria met and vital signs stable: Yes    Patient Belongs:  Patient belongings returned to patient: Yes    Frederic Flynn RN

## 2022-04-11 NOTE — PROGRESS NOTES
Care Suites Admission Nursing Note    Patient Information  Name: Kt Rasmussen  Age: 61 year old  Reason for admission: Port Removal/Placement  Care Suites arrival time: 0730        Patient Admission/Assessment   Pre-procedure assessment complete: Yes  If abnormal assessment/labs, provider notified: N/A  NPO: Yes  Medications held per instructions/orders: Yes  Consent: deferred  If applicable, pregnancy test status: deferred  Patient oriented to room: Yes  Education/questions answered: Yes  Plan/other: Prep for procedure    Discharge Planning  Discharge name/phone number: [facility] [phone]  Overnight post sedation caregiver:[facility]  Discharge location:[facility]    Brandon Roe RN

## 2022-04-11 NOTE — DISCHARGE INSTRUCTIONS
Port Insertion Discharge Instructions     After you go home:    Have an adult stay with you for the first 6 hours  You may resume your normal diet       For 24 hours - due to the sedation you received:  Relax and take it easy  Do NOT make any important or legal decisions  Do NOT drive or operate machines at home or at work  Do NOT drink alcohol    Care of Incision sites:    You have a liquid adhesive covering on your incisions. Do not remove the adhesive residue. It will come off on it's own.  You may shower tomorrow.  You may cover the wound with a bandaid if needed for comfort.      Activity     Avoid heavy lifting (greater than 10 pounds) or the overuse of your shoulder for 3 days    Bleeding:    If you start bleeding from the incision sites in your chest or neck - or have swelling in your neck, sit down and press on the site for 5-10 minutes.   If bleeding has not stopped after 10 minutes, call your provider.        Call 911 right away if you have heavy bleeding or bleeding that does not stop.      Medicines:    You may resume all medications  Resume your Warfarin/Coumadin at your regular dose today. Follow up with your provider to have your INR rechecked  Resume your Platelet Inhibitors and Aspirin tomorrow at your regular dose  For minor pain, you may take Acetaminophen (Tylenol) or Ibuprofen (Advil)    Call the provider who ordered this procedure if:    You have swelling in your neck or over your port site  The incision area is red, swollen, hot or tender  You have chills or a fever greater than 101 F (38 C)  Any questions or concerns    Call  911 or go to the Emergency Room if you have:    Severe chest pain or trouble breathing  Bleeding that you cannot control    Additional Information:    Your port may be accessed right away.   You will need to have your port flushed every 30 days or after each use.      If you have questions call:          [facility] Radiology Dept @ [phone]

## 2022-04-11 NOTE — PRE-PROCEDURE
GENERAL PRE-PROCEDURE:   Procedure:  Right port a catheter check, possible removal and new port a catheter placement with intravenous moderate sedation   Date/Time:  4/11/2022 8:29 AM    Written consent obtained?: Yes    Risks and benefits: Risks, benefits and alternatives were discussed    Consent given by:  Patient  Patient states understanding of procedure being performed: Yes    Patient's understanding of procedure matches consent: Yes    Procedure consent matches procedure scheduled: Yes    Expected level of sedation:  Moderate  Appropriately NPO:  Yes  ASA Class:  3  Mallampati  :  Grade 2- soft palate, base of uvula, tonsillar pillars, and portion of posterior pharyngeal wall visible  Lungs:  Lungs clear with good breath sounds bilaterally  Heart:  Normal heart sounds and rate  History & Physical reviewed:  History and physical reviewed and no updates needed  Statement of review:  I have reviewed the lab findings, diagnostic data, medications, and the plan for sedation

## 2022-04-11 NOTE — PROGRESS NOTES
Care Suites Post Procedure Note    Patient Information  Name: Kt Rasmussen  Age: 61 year old    Post Procedure  Time patient returned to Care Suites: 1010  Concerns/abnormal assessment: None at this time  If abnormal assessment, provider notified: N/A  Plan/Other: Prep for discontinue.  Monitor vitals, site and sedation    Brandon Roe RN

## 2022-04-11 NOTE — PROGRESS NOTES
Care Suites Discharge Nursing Note    Patient Information  Name: Kt Rasmussen  Age: 61 year old    Discharge Education:  Discharge instructions reviewed: Yes.  Additional education/resources provided: Per radiology  Patient/patient representative verbalizes understanding: Yes  Patient discharging on new medications: No  Medication education completed: Yes    Discharge Plans:   Discharge location: home  Discharge ride contacted: Yes  Approximate discharge time: 1200    Discharge Criteria:  Discharge criteria met and vital signs stable: Yes.  CDI, Vitals stable.      Report To NH RN ( Winter)  Patient Belongs:  Patient belongings returned to patient: Yes    Brandon Roe RN

## 2022-04-11 NOTE — IR NOTE
Interventional Radiology Intra-procedural Nursing Note    Patient Name: Kt Rasmussen  Medical Record Number: [id number]  Today's Date: April 11, 2022    Procedure: Right port a catheter check, possible removal and placement with iv moderate sedation  Start time: 0939  End time: 1004  Report provided to: ELVIA RN  Patient depart time and location: 1011 to CS10    Note: Patient entered Interventional Radiology Suite number 2 via cart. Patient awake, alert and orientated. Assisted onto procedural table in supine position. Prepped and draped.  Dr. Painter in room. Time out and procedure started. Vital signs stable. Telemetry reading SR.    Procedure well tolerated by patient without complications. Procedure end with debrief by Dr. Painter.   sutured and dermabond applied to right chest interventional procedure access site.    Administered medication totals:  Lidocaine 1% 13 mL Intradermal  Heparin 500 Units packed in port  Versed 0.5 mg IVP  Fentanyl 50 mcg IVP    Last dose of sedation administered at 0921.

## 2022-04-11 NOTE — PROCEDURES
[facility]    Procedure: IR Procedure Note    Date/Time: 4/11/2022 10:09 AM  Performed by: Douglas Painter MD  Authorized by: Douglas Painter MD       UNIVERSAL PROTOCOL   Site Marked: NA  Prior Images Obtained and Reviewed:  Yes  Required items: Required blood products, implants, devices and special equipment available    Patient identity confirmed:  Verbally with patient, arm band, provided demographic data and hospital-assigned identification number  Patient was reevaluated immediately before administering moderate or deep sedation or anesthesia  Confirmation Checklist:  Patient's identity using two indicators, relevant allergies, procedure was appropriate and matched the consent or emergent situation and correct equipment/implants were available  Time out: Immediately prior to the procedure a time out was called    Universal Protocol: the Joint Commission Universal Protocol was followed    Preparation: Patient was prepped and draped in usual sterile fashion       ANESTHESIA    Anesthesia: Local infiltration  Local Anesthetic:  Lidocaine 1% without epinephrine  Anesthetic Total (mL):  15      SEDATION  Patient Sedated: Yes    Sedation Type:  Moderate (conscious) sedation  Vital signs: Vital signs monitored during sedation    Fluoroscopy Time: 1 minute(s)  See dictated procedure note for full details.  Findings: .    Specimens: none    Complications: None    Condition: Stable    Plan: Successful Replacement of right chest port.  Discharge in 1 hour.      PROCEDURE  Describe Procedure: .  Patient Tolerance:  Patient tolerated the procedure well with no immediate complications  Length of time physician/provider present for 1:1 monitoring during sedation: 30

## 2022-04-19 ENCOUNTER — TELEPHONE (OUTPATIENT)
Dept: ONCOLOGY | Facility: CLINIC | Age: 62
End: 2022-04-19
Payer: MEDICARE

## 2022-04-19 NOTE — TELEPHONE ENCOUNTER
Called and left voicemail for Sky Arce at [facility] ph. [phone] fax [phone]. Explained upcoming date of 4/21/22, time and address. Per JAYASHREE LINDA, faxed copy of appointments to Sky as he arranges for all transportation needs for patient. Called Kt back to explain Sky has been given the schedule.

## 2022-04-21 ENCOUNTER — LAB (OUTPATIENT)
Dept: LAB | Facility: CLINIC | Age: 62
End: 2022-04-21
Payer: MEDICARE

## 2022-04-21 ENCOUNTER — TELEPHONE (OUTPATIENT)
Dept: ONCOLOGY | Facility: CLINIC | Age: 62
End: 2022-04-21

## 2022-04-21 ENCOUNTER — INFUSION THERAPY VISIT (OUTPATIENT)
Dept: ONCOLOGY | Facility: CLINIC | Age: 62
End: 2022-04-21
Payer: MEDICARE

## 2022-04-21 VITALS
WEIGHT: 174.5 LBS | DIASTOLIC BLOOD PRESSURE: 69 MMHG | BODY MASS INDEX: 21.81 KG/M2 | RESPIRATION RATE: 12 BRPM | SYSTOLIC BLOOD PRESSURE: 107 MMHG | OXYGEN SATURATION: 98 % | HEART RATE: 78 BPM | TEMPERATURE: 97.7 F

## 2022-04-21 DIAGNOSIS — Z51.11 ENCOUNTER FOR ANTINEOPLASTIC CHEMOTHERAPY: ICD-10-CM

## 2022-04-21 DIAGNOSIS — C34.90 NON-SMALL CELL LUNG CANCER METASTATIC TO BONE (H): ICD-10-CM

## 2022-04-21 DIAGNOSIS — D70.1 CHEMOTHERAPY-INDUCED NEUTROPENIA (H): ICD-10-CM

## 2022-04-21 DIAGNOSIS — C78.02 MALIGNANT NEOPLASM METASTATIC TO BOTH LUNGS (H): ICD-10-CM

## 2022-04-21 DIAGNOSIS — C79.51 NON-SMALL CELL LUNG CANCER METASTATIC TO BONE (H): ICD-10-CM

## 2022-04-21 DIAGNOSIS — D70.1 CHEMOTHERAPY-INDUCED NEUTROPENIA (H): Primary | ICD-10-CM

## 2022-04-21 DIAGNOSIS — T45.1X5A CHEMOTHERAPY-INDUCED NEUTROPENIA (H): Primary | ICD-10-CM

## 2022-04-21 DIAGNOSIS — C34.90 NON-SMALL CELL LUNG CANCER METASTATIC TO BONE (H): Primary | ICD-10-CM

## 2022-04-21 DIAGNOSIS — C79.51 NON-SMALL CELL LUNG CANCER METASTATIC TO BONE (H): Primary | ICD-10-CM

## 2022-04-21 DIAGNOSIS — C78.01 MALIGNANT NEOPLASM METASTATIC TO BOTH LUNGS (H): ICD-10-CM

## 2022-04-21 DIAGNOSIS — T45.1X5A CHEMOTHERAPY-INDUCED NEUTROPENIA (H): ICD-10-CM

## 2022-04-21 LAB
ALBUMIN SERPL-MCNC: 3.8 G/DL (ref 3.4–5)
ALP SERPL-CCNC: 83 U/L (ref 40–150)
ALT SERPL W P-5'-P-CCNC: 37 U/L (ref 0–70)
ANION GAP SERPL CALCULATED.3IONS-SCNC: 9 MMOL/L (ref 3–14)
AST SERPL W P-5'-P-CCNC: 25 U/L (ref 0–45)
BASOPHILS # BLD AUTO: 0 10E3/UL (ref 0–0.2)
BASOPHILS NFR BLD AUTO: 0 %
BILIRUB SERPL-MCNC: 0.6 MG/DL (ref 0.2–1.3)
BUN SERPL-MCNC: 14 MG/DL (ref 7–30)
CALCIUM SERPL-MCNC: 9.6 MG/DL (ref 8.5–10.1)
CHLORIDE BLD-SCNC: 107 MMOL/L (ref 94–109)
CO2 SERPL-SCNC: 27 MMOL/L (ref 20–32)
CREAT SERPL-MCNC: 0.64 MG/DL (ref 0.66–1.25)
EOSINOPHIL # BLD AUTO: 0.2 10E3/UL (ref 0–0.7)
EOSINOPHIL NFR BLD AUTO: 3 %
ERYTHROCYTE [DISTWIDTH] IN BLOOD BY AUTOMATED COUNT: 16.5 % (ref 10–15)
GFR SERPL CREATININE-BSD FRML MDRD: >90 ML/MIN/1.73M2
GLUCOSE BLD-MCNC: 88 MG/DL (ref 70–99)
HCT VFR BLD AUTO: 37.3 % (ref 40–53)
HGB BLD-MCNC: 11.5 G/DL (ref 13.3–17.7)
IMM GRANULOCYTES # BLD: 0 10E3/UL
IMM GRANULOCYTES NFR BLD: 0 %
LYMPHOCYTES # BLD AUTO: 1.1 10E3/UL (ref 0.8–5.3)
LYMPHOCYTES NFR BLD AUTO: 20 %
MCH RBC QN AUTO: 26.6 PG (ref 26.5–33)
MCHC RBC AUTO-ENTMCNC: 30.8 G/DL (ref 31.5–36.5)
MCV RBC AUTO: 86 FL (ref 78–100)
MONOCYTES # BLD AUTO: 0.6 10E3/UL (ref 0–1.3)
MONOCYTES NFR BLD AUTO: 10 %
NEUTROPHILS # BLD AUTO: 3.7 10E3/UL (ref 1.6–8.3)
NEUTROPHILS NFR BLD AUTO: 67 %
NRBC # BLD AUTO: 0 10E3/UL
NRBC BLD AUTO-RTO: 0 /100
PLATELET # BLD AUTO: 110 10E3/UL (ref 150–450)
POTASSIUM BLD-SCNC: 3.9 MMOL/L (ref 3.4–5.3)
PROT SERPL-MCNC: 7.2 G/DL (ref 6.8–8.8)
RBC # BLD AUTO: 4.33 10E6/UL (ref 4.4–5.9)
SODIUM SERPL-SCNC: 143 MMOL/L (ref 133–144)
WBC # BLD AUTO: 5.5 10E3/UL (ref 4–11)

## 2022-04-21 PROCEDURE — 250N000011 HC RX IP 250 OP 636: Performed by: NURSE PRACTITIONER

## 2022-04-21 PROCEDURE — 96411 CHEMO IV PUSH ADDL DRUG: CPT

## 2022-04-21 PROCEDURE — 250N000011 HC RX IP 250 OP 636: Performed by: INTERNAL MEDICINE

## 2022-04-21 PROCEDURE — 258N000003 HC RX IP 258 OP 636: Performed by: NURSE PRACTITIONER

## 2022-04-21 PROCEDURE — 80053 COMPREHEN METABOLIC PANEL: CPT

## 2022-04-21 PROCEDURE — 258N000003 HC RX IP 258 OP 636: Performed by: INTERNAL MEDICINE

## 2022-04-21 PROCEDURE — 85025 COMPLETE CBC W/AUTO DIFF WBC: CPT | Performed by: INTERNAL MEDICINE

## 2022-04-21 PROCEDURE — 96413 CHEMO IV INFUSION 1 HR: CPT

## 2022-04-21 PROCEDURE — 36591 DRAW BLOOD OFF VENOUS DEVICE: CPT

## 2022-04-21 PROCEDURE — 96375 TX/PRO/DX INJ NEW DRUG ADDON: CPT

## 2022-04-21 RX ORDER — HEPARIN SODIUM,PORCINE 10 UNIT/ML
5 VIAL (ML) INTRAVENOUS
Status: DISCONTINUED | OUTPATIENT
Start: 2022-04-21 | End: 2022-04-21 | Stop reason: HOSPADM

## 2022-04-21 RX ORDER — HEPARIN SODIUM (PORCINE) LOCK FLUSH IV SOLN 100 UNIT/ML 100 UNIT/ML
5 SOLUTION INTRAVENOUS
Status: DISCONTINUED | OUTPATIENT
Start: 2022-04-21 | End: 2022-04-21 | Stop reason: HOSPADM

## 2022-04-21 RX ADMIN — SODIUM CHLORIDE 250 ML: 9 INJECTION, SOLUTION INTRAVENOUS at 14:50

## 2022-04-21 RX ADMIN — ONDANSETRON: 2 INJECTION INTRAMUSCULAR; INTRAVENOUS at 14:50

## 2022-04-21 RX ADMIN — Medication 5 ML: at 15:28

## 2022-04-21 RX ADMIN — SODIUM CHLORIDE 1000 MG: 9 INJECTION, SOLUTION INTRAVENOUS at 15:24

## 2022-04-21 RX ADMIN — CARBOPLATIN 750 MG: 10 INJECTION, SOLUTION INTRAVENOUS at 15:39

## 2022-04-21 ASSESSMENT — PAIN SCALES - GENERAL: PAINLEVEL: NO PAIN (0)

## 2022-04-21 NOTE — PATIENT INSTRUCTIONS
April 2022 Sunday Monday Tuesday Wednesday Thursday Friday Saturday                            1     2       3     4     5     6    INFUSION 3 HR (180 MIN)   1:00 PM   (180 min.)    CANCER INFUSION NURSE   [facility] 7    UMP RETURN VOICE  12:45 PM   (30 min.)   Lizzy Wray MD   [facility] [place]    PRE-PROCEDURE COVID PCR   2:00 PM   (15 min.)   [facility] COVID LAB   [facility] 8     9       10     11    Admission   7:24 AM   Douglas Painter MD   [facility]   (Discharge: 4/11/2022)    IR CHEST PORT PLACEMENT >5 YRS   7:30 AM   (90 min.)   [address] Interventional Radiology    IR PORT REMOVAL RIGHT   7:30 AM   (60 min.)   [address] Interventional Radiology    IR PORT CHECK RIGHT   7:30 AM   (30 min.)   [address] Interventional Radiology 12     13     14     15     16       17     18     19     20     21    TELEPHONE VISIT RETURN  12:30 PM   (60 min.)   Juan Rausch APRN CNP   [facility]    LAB PERIPHERAL   1:45 PM   (15 min.)    [facility] LAB DRAW   [facility]    ONC INFUSION 3 HR (180 MIN)   2:00 PM   (180 min.)    ONC INFUSION NURSE   [facility] 22     23       24     25     26     27    LAB PERIPHERAL   8:30 AM   (15 min.)    PIV LAB   [facility]    RETURN   9:30 AM   (60 min.)   Juan Rausch APRN CNP   [facility] 28     29     30                   May 2022      [place] Monday Tuesday Wednesday Thursday Friday Saturday   1     2     3     4     5     6     7       8     9     10     11     12    INFUSION 3 HR (180 MIN)  10:00 AM   (180 min.)    CANCER INFUSION NURSE   [facility]    RETURN  11:00 AM   (60 min.)   Juan Rausch APRN CNP   [facility]  [facility] 13     14       15     16     17     18     19     20     21       22     23     24     25     26     27     28       29     30     31                                            Recent Results (from the past 24 hour(s))   Comprehensive metabolic panel (BMP + Alb, Alk Phos, ALT, AST, Total. Bili, TP)    Collection Time: 04/21/22  2:00 PM   Result Value Ref Range    Sodium 143 133 - 144 mmol/L    Potassium 3.9 3.4 - 5.3 mmol/L    Chloride 107 94 - 109 mmol/L    Carbon Dioxide (CO2) 27 20 - 32 mmol/L    Anion Gap 9 3 - 14 mmol/L    Urea Nitrogen 14 7 - 30 mg/dL    Creatinine 0.64 (L) 0.66 - 1.25 mg/dL    Calcium 9.6 8.5 - 10.1 mg/dL    Glucose 88 70 - 99 mg/dL    Alkaline Phosphatase 83 40 - 150 U/L    AST 25 0 - 45 U/L    ALT 37 0 - 70 U/L    Protein Total 7.2 6.8 - 8.8 g/dL    Albumin 3.8 3.4 - 5.0 g/dL    Bilirubin Total 0.6 0.2 - 1.3 mg/dL    GFR Estimate >90 >60 mL/min/1.73m2   CBC with platelets and differential    Collection Time: 04/21/22  2:00 PM   Result Value Ref Range    WBC Count 5.5 4.0 - 11.0 10e3/uL    RBC Count 4.33 (L) 4.40 - 5.90 10e6/uL    Hemoglobin 11.5 (L) 13.3 - 17.7 g/dL    Hematocrit 37.3 (L) 40.0 - 53.0 %    MCV 86 78 - 100 fL    MCH 26.6 26.5 - 33.0 pg    MCHC 30.8 (L) 31.5 - 36.5 g/dL    RDW 16.5 (H) 10.0 - 15.0 %    Platelet Count 110 (L) 150 - 450 10e3/uL    % Neutrophils 67 %    % Lymphocytes 20 %    % Monocytes 10 %    % Eosinophils 3 %    % Basophils 0 %    % Immature Granulocytes 0 %    NRBCs per 100 WBC 0 <1 /100    Absolute Neutrophils 3.7 1.6 - 8.3 10e3/uL    Absolute Lymphocytes 1.1 0.8 - 5.3 10e3/uL    Absolute Monocytes 0.6 0.0 - 1.3 10e3/uL    Absolute Eosinophils 0.2 0.0 - 0.7 10e3/uL    Absolute Basophils 0.0 0.0 - 0.2 10e3/uL    Absolute Immature Granulocytes 0.0 <=0.4 10e3/uL    Absolute NRBCs 0.0 10e3/uL

## 2022-04-21 NOTE — PROGRESS NOTES
Infusion Nursing Note:  Kt Rasmussen presents today for C1D1 Alimta-Carboplatin (#15).    Patient seen by provider today: Yes: Juan Rausch NP   present during visit today: Not Applicable.    Note: Pt had provider visit prior to infusion, ok for treatment.      TORB 4/21/22 @1400 Juan Rausch NP-Maria Eugenia Echeverria RN  --OK to administer chemotherapy today  --Pattie Marino RNCC will follow up with pt tomorrow regarding at home meds    Pt did not take DEX Prep. Juan Rausch NP ok with pharmacy recommendation for IV DEX.  Pharmacy stating 12 mg IV DEX is ok and does not need to be increased.    Vitamin B12@ given 3/24/22      Intravenous Access:  Implanted Port.    Treatment Conditions:  Lab Results   Component Value Date    HGB 11.5 (L) 04/21/2022    WBC 5.5 04/21/2022    ANEU 7.7 07/08/2021    ANEUTAUTO 3.7 04/21/2022     (L) 04/21/2022      Lab Results   Component Value Date     04/21/2022    POTASSIUM 3.9 04/21/2022    MAG 1.9 10/13/2016    CR 0.64 (L) 04/21/2022    BEVERLY 9.6 04/21/2022    BILITOTAL 0.6 04/21/2022    ALBUMIN 3.8 04/21/2022    ALT 37 04/21/2022    AST 25 04/21/2022     Results reviewed, labs MET treatment parameters, ok to proceed with treatment.      Post Infusion Assessment:  Patient tolerated infusion without incident.  Blood return noted pre and post infusion.  Site patent and intact, free from redness, edema or discomfort.  No evidence of extravasations.  Access discontinued per protocol.       Discharge Plan:   Patient declined prescription refills.  Discharge instructions reviewed with: Patient.  Patient and/or family verbalized understanding of discharge instructions and all questions answered.  Copy of AVS reviewed with patient and/or family.  Patient will return 4/27 to [facility] for labs/provider visit.  Patient discharged in stable condition accompanied by: self and [place] [employer].  Departure Mode: Wheelchair.    Maria Eugenia Echeverria RN

## 2022-04-27 ENCOUNTER — PATIENT OUTREACH (OUTPATIENT)
Dept: CARE COORDINATION | Facility: CLINIC | Age: 62
End: 2022-04-27
Payer: MEDICARE

## 2022-04-27 NOTE — PROGRESS NOTES
Social Work Progress Note      Data/Intervention:  Patient Name:  Kt Rasmussen  /Age:  1960 (61 year old)    Reason for Follow-Up:  Kt is a 61-year-old gentleman with a diagnosis of metastatic non-small cell lung carcinoma who is followed by Dr. John at [facility]. This clinician received referral from RNCC and clinic supervisor regarding missed appointments.     Intervention:   Per RNCC Pattie, Kt had been scheduled last week for treatment and was taken to incorrect location. Pattie had faxed over rides to Melissa at [facility], and Sky, Kt's guardian. Kt called to cancel appointment today due to not having transportation.     This clinician called and LVM with guardian Sky ([phone]) and JAYASHREE Torres (Director of Health Services at the [facility] [address] [facility], [phone]), requesting set-up for care conference to discuss transportation plan moving forward and issues with transportation to recent appointments.     This clinician received VM from Melissa  who reported that she is responsible for setting up all rides for Kt and contacted [facility] regarding missed ride this morning to file complaint. Melissa was able to reschedule transportation, and confirmed that he will have transportation for appointment tomorrow 22 at 1:00pm.     Transit Trip: [phone]  Pickup from home 12:30pm  Will-call to return home     Melissa also confirmed in message that she had the following appointments and will schedule rides for the followin22, 22, 22.     This clinician called and LVM for Sky that Melissa has agreed to continue to schedule transportation for all appointments.     Plan:  1) RNCC Tari to continue to fax all appointments to Sky and Melissa. Melissa to schedule transportation in ongoing way. If there are any barriers to Kt's missing appointments, SW will be notified.   2) Ongoing collaboration with multidisciplinary care team.     Please  call or page if needs or concerns arise.     KEVIN Charlton, [facility] Hospital[facility]  Direct Phone: [phone]  Pager: [phone]

## 2022-04-27 NOTE — Clinical Note
Vito Marc!  Please let me know moving forward if he misses transportation for any reason. Thanks! Please see status update from today.  Sofía

## 2022-04-28 ENCOUNTER — ONCOLOGY VISIT (OUTPATIENT)
Dept: ONCOLOGY | Facility: CLINIC | Age: 62
End: 2022-04-28
Attending: INTERNAL MEDICINE
Payer: MEDICARE

## 2022-04-28 ENCOUNTER — LAB (OUTPATIENT)
Dept: INFUSION THERAPY | Facility: CLINIC | Age: 62
End: 2022-04-28
Attending: INTERNAL MEDICINE
Payer: MEDICARE

## 2022-04-28 VITALS
RESPIRATION RATE: 16 BRPM | DIASTOLIC BLOOD PRESSURE: 71 MMHG | HEART RATE: 81 BPM | OXYGEN SATURATION: 97 % | SYSTOLIC BLOOD PRESSURE: 109 MMHG | HEIGHT: 75 IN | TEMPERATURE: 98.4 F | BODY MASS INDEX: 21.81 KG/M2

## 2022-04-28 DIAGNOSIS — C78.02 MALIGNANT NEOPLASM METASTATIC TO BOTH LUNGS (H): ICD-10-CM

## 2022-04-28 DIAGNOSIS — C34.90 NON-SMALL CELL CARCINOMA OF LUNG, STAGE 3, UNSPECIFIED LATERALITY (H): ICD-10-CM

## 2022-04-28 DIAGNOSIS — Z51.11 ENCOUNTER FOR ANTINEOPLASTIC CHEMOTHERAPY: ICD-10-CM

## 2022-04-28 DIAGNOSIS — T45.1X5A CHEMOTHERAPY-INDUCED NEUTROPENIA (H): ICD-10-CM

## 2022-04-28 DIAGNOSIS — C34.90 NON-SMALL CELL LUNG CANCER METASTATIC TO BONE (H): Primary | ICD-10-CM

## 2022-04-28 DIAGNOSIS — D70.1 CHEMOTHERAPY-INDUCED NEUTROPENIA (H): ICD-10-CM

## 2022-04-28 DIAGNOSIS — C79.51 NON-SMALL CELL LUNG CANCER METASTATIC TO BONE (H): Primary | ICD-10-CM

## 2022-04-28 DIAGNOSIS — C78.01 MALIGNANT NEOPLASM METASTATIC TO BOTH LUNGS (H): ICD-10-CM

## 2022-04-28 DIAGNOSIS — C34.90 NON-SMALL CELL LUNG CANCER METASTATIC TO BONE (H): ICD-10-CM

## 2022-04-28 DIAGNOSIS — C79.51 NON-SMALL CELL LUNG CANCER METASTATIC TO BONE (H): ICD-10-CM

## 2022-04-28 LAB
ALBUMIN SERPL-MCNC: 3.5 G/DL (ref 3.4–5)
ALP SERPL-CCNC: 71 U/L (ref 40–150)
ALT SERPL W P-5'-P-CCNC: 37 U/L (ref 0–70)
ANION GAP SERPL CALCULATED.3IONS-SCNC: 3 MMOL/L (ref 3–14)
AST SERPL W P-5'-P-CCNC: 28 U/L (ref 0–45)
BASOPHILS # BLD MANUAL: 0 10E3/UL (ref 0–0.2)
BASOPHILS NFR BLD MANUAL: 0 %
BILIRUB SERPL-MCNC: 0.5 MG/DL (ref 0.2–1.3)
BUN SERPL-MCNC: 31 MG/DL (ref 7–30)
CALCIUM SERPL-MCNC: 8.9 MG/DL (ref 8.5–10.1)
CHLORIDE BLD-SCNC: 105 MMOL/L (ref 94–109)
CO2 SERPL-SCNC: 29 MMOL/L (ref 20–32)
CREAT SERPL-MCNC: 0.98 MG/DL (ref 0.66–1.25)
ELLIPTOCYTES BLD QL SMEAR: SLIGHT
EOSINOPHIL # BLD MANUAL: 0.1 10E3/UL (ref 0–0.7)
EOSINOPHIL NFR BLD MANUAL: 7 %
ERYTHROCYTE [DISTWIDTH] IN BLOOD BY AUTOMATED COUNT: 15.9 % (ref 10–15)
GFR SERPL CREATININE-BSD FRML MDRD: 87 ML/MIN/1.73M2
GLUCOSE BLD-MCNC: 108 MG/DL (ref 70–99)
HCT VFR BLD AUTO: 33.9 % (ref 40–53)
HGB BLD-MCNC: 10.5 G/DL (ref 13.3–17.7)
LYMPHOCYTES # BLD MANUAL: 0.6 10E3/UL (ref 0.8–5.3)
LYMPHOCYTES NFR BLD MANUAL: 44 %
MCH RBC QN AUTO: 27.1 PG (ref 26.5–33)
MCHC RBC AUTO-ENTMCNC: 31 G/DL (ref 31.5–36.5)
MCV RBC AUTO: 87 FL (ref 78–100)
MONOCYTES # BLD MANUAL: 0 10E3/UL (ref 0–1.3)
MONOCYTES NFR BLD MANUAL: 1 %
NEUTROPHILS # BLD MANUAL: 0.6 10E3/UL (ref 1.6–8.3)
NEUTROPHILS NFR BLD MANUAL: 48 %
PLAT MORPH BLD: ABNORMAL
PLATELET # BLD AUTO: 57 10E3/UL (ref 150–450)
POTASSIUM BLD-SCNC: 4.1 MMOL/L (ref 3.4–5.3)
PROT SERPL-MCNC: 6.6 G/DL (ref 6.8–8.8)
RBC # BLD AUTO: 3.88 10E6/UL (ref 4.4–5.9)
RBC MORPH BLD: ABNORMAL
SODIUM SERPL-SCNC: 137 MMOL/L (ref 133–144)
WBC # BLD AUTO: 1.3 10E3/UL (ref 4–11)

## 2022-04-28 PROCEDURE — 99215 OFFICE O/P EST HI 40 MIN: CPT | Performed by: NURSE PRACTITIONER

## 2022-04-28 PROCEDURE — 85027 COMPLETE CBC AUTOMATED: CPT | Performed by: INTERNAL MEDICINE

## 2022-04-28 PROCEDURE — 80053 COMPREHEN METABOLIC PANEL: CPT | Performed by: INTERNAL MEDICINE

## 2022-04-28 PROCEDURE — 250N000011 HC RX IP 250 OP 636: Performed by: NURSE PRACTITIONER

## 2022-04-28 PROCEDURE — 36415 COLL VENOUS BLD VENIPUNCTURE: CPT

## 2022-04-28 PROCEDURE — 96372 THER/PROPH/DIAG INJ SC/IM: CPT | Performed by: NURSE PRACTITIONER

## 2022-04-28 PROCEDURE — G0463 HOSPITAL OUTPT CLINIC VISIT: HCPCS | Mod: 25

## 2022-04-28 RX ORDER — HEPARIN SODIUM,PORCINE 10 UNIT/ML
5 VIAL (ML) INTRAVENOUS
Status: CANCELLED | OUTPATIENT
Start: 2022-04-28

## 2022-04-28 RX ORDER — HEPARIN SODIUM (PORCINE) LOCK FLUSH IV SOLN 100 UNIT/ML 100 UNIT/ML
5 SOLUTION INTRAVENOUS
Status: CANCELLED | OUTPATIENT
Start: 2022-04-28

## 2022-04-28 RX ORDER — CYANOCOBALAMIN 1000 UG/ML
1000 INJECTION, SOLUTION INTRAMUSCULAR; SUBCUTANEOUS ONCE
Status: COMPLETED | OUTPATIENT
Start: 2022-04-28 | End: 2022-04-28

## 2022-04-28 RX ORDER — CYANOCOBALAMIN 1000 UG/ML
1000 INJECTION, SOLUTION INTRAMUSCULAR; SUBCUTANEOUS ONCE
Status: CANCELLED
Start: 2022-04-28 | End: 2022-04-28

## 2022-04-28 RX ADMIN — PEGFILGRASTIM 6 MG: 6 INJECTION SUBCUTANEOUS at 14:40

## 2022-04-28 RX ADMIN — CYANOCOBALAMIN 1000 MCG: 1000 INJECTION, SOLUTION INTRAMUSCULAR at 14:36

## 2022-04-28 ASSESSMENT — PAIN SCALES - GENERAL: PAINLEVEL: NO PAIN (0)

## 2022-04-28 NOTE — PROGRESS NOTES
"Oncology Rooming Note    April 28, 2022 1:25 PM   Kt Rasmussen is a 61 year old male who presents for:    Chief Complaint   Patient presents with     Oncology Clinic Visit     Initial Vitals: There were no vitals taken for this visit. Estimated body mass index is 21.81 kg/m  as calculated from the following:    Height as of 4/7/22: 1.905 m (6' 3\").    Weight as of 4/21/22: 79.2 kg (174 lb 8 oz). There is no height or weight on file to calculate BSA.  Data Unavailable Comment: Data Unavailable   No LMP for male patient.  Allergies reviewed: Yes  Medications reviewed: Yes    Medications: Medication refills not needed today.  Pharmacy name entered into EPIC:    [facility] [place] [address] [facility], [place] - [address] [id number]", [place] - [address] [facility]    Clinical concerns:  NP was notified.      Floridalma Nieves MA            "

## 2022-04-28 NOTE — PROGRESS NOTES
Medical Assistant Note:  Kt Rasmussen presents today for lab draw.    Patient seen by provider today: Yes: GK.   present during visit today: Not Applicable.    Concerns: No Concerns.    Procedure:  Lab draw site: Left Arm, Needle type: Butterfly, Gauge: 23.    Post Assessment:  Labs drawn without difficulty: Yes.    Discharge Plan:  Departure Mode: Wheelchair.    Face to Face Time: 4 min    Labs drawn by Floridalma Nieves CMA      Witnessed by:  Shari Schoenberger, CMA

## 2022-04-28 NOTE — LETTER
"    4/28/2022         RE: Kt Rasmussen  [id number]  [place] [address]        Dear Colleague,    Thank you for referring your patient, Kt Rasmussen, to the [facility]. Please see a copy of my visit note below.    Oncology Rooming Note    April 28, 2022 1:25 PM   Kt Rasmussne is a 61 year old male who presents for:    Chief Complaint   Patient presents with     Oncology Clinic Visit     Initial Vitals: There were no vitals taken for this visit. Estimated body mass index is 21.81 kg/m  as calculated from the following:    Height as of 4/7/22: 1.905 m (6' 3\").    Weight as of 4/21/22: 79.2 kg (174 lb 8 oz). There is no height or weight on file to calculate BSA.  Data Unavailable Comment: Data Unavailable   No LMP for male patient.  Allergies reviewed: Yes  Medications reviewed: Yes    Medications: Medication refills not needed today.  Pharmacy name entered into EPIC:    [place] [place] [facility] [address] [facility], [place] - [address] [id number] - [place], [place] - [address] [facility]    Clinical concerns:  NP was notified.      Floridalma Nieves MA              Oncology/Hematology Visit Note  Apr 28, 2022    Reason for Visit: follow up of metastatic non-small cell lung carcinoma  Metastatic to lymph nodes bones in bilateral lungs  Brain MRI negative for mets    Patient met with Dr. John who recommended treatment with palliative intent with paclitaxel carboplatin Avastin and atezolizumab-treatment started on April 27, 2021  -Due to thrombocytopenia carboplatin AUC reduced to 4 with cycle 2  Due to pancytopenia carboplatin discontinued with cycle 5    7/12/2021: PET/CT showed resolved mural nodules with increased cystic cavity in left lower lobe with no significant FDG uptake, less likely metastases; no enlarged hypermetabolic mediastinal, hilar, or axillary " lymph nodes, decreased metabolic activity of intraosseous lesion in spine and pelvis; no new bone lesions    Treated with paclitaxel, Avastin and atezolizumab.-Last treatment given in  12/22/2021-cycle 12    1/10/2022: PET/CT showed new foci of abnormal skeletal FGD uptake in the thoracic and lumbar spine. Mild interval increase in intensity of previously demonstrated hypermetabolic skeletal lesions involving the pelvis and lumbar spine. Findings are consistent with progressive osseous metastatic disease. Subsequently off chemo for 1 month due to poor performance status.  16. 1/25/2022: Patient fell and fractured his femur. This was surgically repaired and he was subsequently cared for at a rehab unit.  17. 2/14/2022: Brain MRI without contrast (contrast not given due to inability to obtain IV access) showed no brain metastases.    Met with Dr. Shoemaker-in Dr. John's absence  -Recommendation is to change therapy   04/21/2022 -palliative Carboplatin Alimta started         Interval History:  Patient reports overall he has been tolerating carboplatin and Alimta well.  He denies nausea vomiting abdominal pain.  Denies fever chills sweats cough shortness of breath chest pain denies urinary symptoms.  Denies bleeding or bruising fall injury  Denies recent falls      Review of Systems:  14 point ROS of systems including Constitutional, Eyes, Respiratory, Cardiovascular, Gastroenterology, Genitourinary, Integumentary, Muscularskeletal, Psychiatric were all negative except for pertinent positives noted in my HPI.    Physical Examination:  Physical Exam  HENT:      Right Ear: Tympanic membrane normal.      Nose: Nose normal.      Mouth/Throat:      Mouth: Mucous membranes are moist.   Eyes:      Pupils: Pupils are equal, round, and reactive to light.   Cardiovascular:      Rate and Rhythm: Normal rate.      Pulses: Normal pulses.   Pulmonary:      Effort: Pulmonary effort is normal.   Abdominal:      General: Abdomen is flat.    Musculoskeletal:         General: Normal range of motion.      Cervical back: Normal range of motion.   Skin:     General: Skin is warm.   Neurological:      General: No focal deficit present.      Mental Status: He is alert.   Psychiatric:         Mood and Affect: Mood normal.           Laboratory Data:  CBC and CMP results reviewed    Assessment and Plan:  This is a 61-year-old male with    metastatic non-small cell lung cancer   Patient of Dr. John   Progressed on  different treatments as above  04/21/2022 started palliative treatment with carboplatin AUC 5  and Alimta  -So far patient reports he has been tolerating treatment well  Labs reviewed abnormalities discussed with patient  Pancytopenia noted  Check labs and follow-up with me next week  I will check with Dr. John if we can reduce carboplatin dose  05/12-patient is scheduled for cycle 2 of treatment and follow-up with me  Continue with folic acid  Continue with vitamin B12 every 9 weeks-patient wants vitamin B12 injection today-I am okay with giving him vitamin B12 injection sooner      Neutropenia secondary to treatment  Neutropenic precautions discussed  Complications of neutropenia discussed.  Signs and symptoms of infection reviewed  -Check temperature frequently in the event of fever chills sweats or any signs symptoms of infection patient advised to go to ER  -Give Neulasta shot today (due to transportation issues patient cannot get daily Neupogen)  Plan for Neulasta on pro with future cycles of treatment    Thrombocytopenia secondary to treatment  Complications of low platelet count reviewed  Advised patient to go to ER in the event of bleeding fall injury bruising or edema  -Patient is not on blood thinners  Transfuse for platelet count 20 or below or bleeding  Recheck platelet count next week    Anemia secondary to treatment  Patient is asymptomatic  Transfuse for hemoglobin less than 8 or symptomatic      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 Although reviewed after completion, some words and grammatical errors may remain.            Again, thank you for allowing me to participate in the care of your patient.        Sincerely,        MADHAVI Moran CNP

## 2022-04-28 NOTE — PROGRESS NOTES
Oncology/Hematology Visit Note  Apr 28, 2022    Reason for Visit: follow up of metastatic non-small cell lung carcinoma  Metastatic to lymph nodes bones in bilateral lungs  Brain MRI negative for mets    Patient met with Dr. John who recommended treatment with palliative intent with paclitaxel carboplatin Avastin and atezolizumab-treatment started on April 27, 2021  -Due to thrombocytopenia carboplatin AUC reduced to 4 with cycle 2  Due to pancytopenia carboplatin discontinued with cycle 5    7/12/2021: PET/CT showed resolved mural nodules with increased cystic cavity in left lower lobe with no significant FDG uptake, less likely metastases; no enlarged hypermetabolic mediastinal, hilar, or axillary lymph nodes, decreased metabolic activity of intraosseous lesion in spine and pelvis; no new bone lesions    Treated with paclitaxel, Avastin and atezolizumab.-Last treatment given in  12/22/2021-cycle 12    1/10/2022: PET/CT showed new foci of abnormal skeletal FGD uptake in the thoracic and lumbar spine. Mild interval increase in intensity of previously demonstrated hypermetabolic skeletal lesions involving the pelvis and lumbar spine. Findings are consistent with progressive osseous metastatic disease. Subsequently off chemo for 1 month due to poor performance status.  16. 1/25/2022: Patient fell and fractured his femur. This was surgically repaired and he was subsequently cared for at a rehab unit.  17. 2/14/2022: Brain MRI without contrast (contrast not given due to inability to obtain IV access) showed no brain metastases.    Met with Dr. Shoemaker-in Dr. John's absence  -Recommendation is to change therapy   04/21/2022 -palliative Carboplatin Alimta started         Interval History:  Patient reports overall he has been tolerating carboplatin and Alimta well.  He denies nausea vomiting abdominal pain.  Denies fever chills sweats cough shortness of breath chest pain denies urinary symptoms.  Denies bleeding or bruising  fall injury  Denies recent falls      Review of Systems:  14 point ROS of systems including Constitutional, Eyes, Respiratory, Cardiovascular, Gastroenterology, Genitourinary, Integumentary, Muscularskeletal, Psychiatric were all negative except for pertinent positives noted in my HPI.    Physical Examination:  Physical Exam  HENT:      Right Ear: Tympanic membrane normal.      Nose: Nose normal.      Mouth/Throat:      Mouth: Mucous membranes are moist.   Eyes:      Pupils: Pupils are equal, round, and reactive to light.   Cardiovascular:      Rate and Rhythm: Normal rate.      Pulses: Normal pulses.   Pulmonary:      Effort: Pulmonary effort is normal.   Abdominal:      General: Abdomen is flat.   Musculoskeletal:         General: Normal range of motion.      Cervical back: Normal range of motion.   Skin:     General: Skin is warm.   Neurological:      General: No focal deficit present.      Mental Status: He is alert.   Psychiatric:         Mood and Affect: Mood normal.           Laboratory Data:  CBC and CMP results reviewed    Assessment and Plan:  This is a 61-year-old male with    metastatic non-small cell lung cancer   Patient of Dr. John   Progressed on  different treatments as above  04/21/2022 started palliative treatment with carboplatin AUC 5  and Alimta  -So far patient reports he has been tolerating treatment well  Labs reviewed abnormalities discussed with patient  Pancytopenia noted  Check labs and follow-up with me next week  I will check with Dr. John if we can reduce carboplatin dose  05/12-patient is scheduled for cycle 2 of treatment and follow-up with me  Continue with folic acid  Continue with vitamin B12 every 9 weeks-patient wants vitamin B12 injection today-I am okay with giving him vitamin B12 injection sooner      Neutropenia secondary to treatment  Neutropenic precautions discussed  Complications of neutropenia discussed.  Signs and symptoms of infection reviewed  -Check temperature  frequently in the event of fever chills sweats or any signs symptoms of infection patient advised to go to ER  -Give Neulasta shot today (due to transportation issues patient cannot get daily Neupogen)  Plan for Neulasta on pro with future cycles of treatment    Thrombocytopenia secondary to treatment  Complications of low platelet count reviewed  Advised patient to go to ER in the event of bleeding fall injury bruising or edema  -Patient is not on blood thinners  Transfuse for platelet count 20 or below or bleeding  Recheck platelet count next week    Anemia secondary to treatment  Patient is asymptomatic  Transfuse for hemoglobin less than 8 or symptomatic      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 Alvaro Flanagan to reduce carboplatin to AUC 4          Patient is advised to call our clinic or go to ER in the event of fever chills sweats cough shortness of breath chest pain nausea vomiting diarrhea abdominal pain bleeding edema or any changes in health    MADHAVI Moran CNP  [facility]     Chart documentation with Dragon Voice recognition Software. Although reviewed after completion, some words and grammatical errors may remain.

## 2022-05-09 ENCOUNTER — TELEPHONE (OUTPATIENT)
Dept: ONCOLOGY | Facility: CLINIC | Age: 62
End: 2022-05-09
Payer: MEDICARE

## 2022-05-09 DIAGNOSIS — C79.51 NON-SMALL CELL LUNG CANCER METASTATIC TO BONE (H): ICD-10-CM

## 2022-05-09 DIAGNOSIS — T45.1X5A CHEMOTHERAPY-INDUCED NEUTROPENIA (H): Primary | ICD-10-CM

## 2022-05-09 DIAGNOSIS — D70.1 CHEMOTHERAPY-INDUCED NEUTROPENIA (H): Primary | ICD-10-CM

## 2022-05-09 DIAGNOSIS — Z51.11 ENCOUNTER FOR ANTINEOPLASTIC CHEMOTHERAPY: ICD-10-CM

## 2022-05-09 DIAGNOSIS — C34.90 NON-SMALL CELL LUNG CANCER METASTATIC TO BONE (H): ICD-10-CM

## 2022-05-09 RX ORDER — EPINEPHRINE 1 MG/ML
0.3 INJECTION, SOLUTION, CONCENTRATE INTRAVENOUS EVERY 5 MIN PRN
Status: CANCELLED | OUTPATIENT
Start: 2022-05-09

## 2022-05-09 RX ORDER — DIPHENHYDRAMINE HYDROCHLORIDE 50 MG/ML
50 INJECTION INTRAMUSCULAR; INTRAVENOUS
Status: CANCELLED
Start: 2022-05-09

## 2022-05-09 RX ORDER — MEPERIDINE HYDROCHLORIDE 25 MG/ML
25 INJECTION INTRAMUSCULAR; INTRAVENOUS; SUBCUTANEOUS EVERY 30 MIN PRN
Status: CANCELLED | OUTPATIENT
Start: 2022-05-09

## 2022-05-09 RX ORDER — LORAZEPAM 2 MG/ML
0.5 INJECTION INTRAMUSCULAR EVERY 4 HOURS PRN
Status: CANCELLED | OUTPATIENT
Start: 2022-05-09

## 2022-05-09 RX ORDER — ALBUTEROL SULFATE 0.83 MG/ML
2.5 SOLUTION RESPIRATORY (INHALATION)
Status: CANCELLED | OUTPATIENT
Start: 2022-05-09

## 2022-05-09 RX ORDER — ALBUTEROL SULFATE 90 UG/1
1-2 AEROSOL, METERED RESPIRATORY (INHALATION)
Status: CANCELLED
Start: 2022-05-09

## 2022-05-09 RX ORDER — NALOXONE HYDROCHLORIDE 0.4 MG/ML
0.2 INJECTION, SOLUTION INTRAMUSCULAR; INTRAVENOUS; SUBCUTANEOUS
Status: CANCELLED | OUTPATIENT
Start: 2022-05-09

## 2022-05-09 RX ORDER — HEPARIN SODIUM (PORCINE) LOCK FLUSH IV SOLN 100 UNIT/ML 100 UNIT/ML
5 SOLUTION INTRAVENOUS
Status: CANCELLED | OUTPATIENT
Start: 2022-05-09

## 2022-05-09 RX ORDER — HEPARIN SODIUM,PORCINE 10 UNIT/ML
5 VIAL (ML) INTRAVENOUS
Status: CANCELLED | OUTPATIENT
Start: 2022-05-09

## 2022-05-09 NOTE — TELEPHONE ENCOUNTER
- 8:47AM: PT called and stated he does not have transportation for his infusion on Thur, 05/12/22 at 10:00AM. Informed PT I'd reach out to the transportation company per the permanent comments on PT's registration.     - 8:50AM: Called Sky Arce at [email or web address] (guardianship firm) per notes, and got their voicemail. Sky is out-of-office and left alternative contact, Josep Mathew [phone].     - 8:52AM: Called Jospe Mathew and left a voicemail regarding PT's phone call for no transportation.     - 8:57AM: Called [employer] and talked to an agent. They confirmed that PT will be receiving a ride from [employer].    - 9:04AM: Called [employer] at [phone] and they stated there will NOT be transportation for PT. I inquired about it, they stated that I need to reach out to PT's insurance, and hung up.     - 9:07AM: Called [employer] back and explained the situation. The agent reached out to [employer] and put me on hold. We got disconnected while I was on hold.    - 9:17AM: Josep Mathew returned my call. I explained the situation, and he said he'd reach out to [employer] to get PT's transportation figured out. Josep requested our permanent comment regarding Sky Arce arranging all transportation for PT be removed, as the care coordinator at [facility] (PT's assisted living [place]) should make those appointments. Updated the permanent comments per Josep's request.     - 9:36AM: Josep called stating that transportation for PT is confirmed for Thur, 05/12/22 at 10:00AM.       - 9:40AM: Called PT to update him on the situation; confirmed that he will be receiving transportation for his infusion on Thur, 05/12/22 at 10:00AM

## 2022-05-12 ENCOUNTER — ONCOLOGY VISIT (OUTPATIENT)
Dept: ONCOLOGY | Facility: CLINIC | Age: 62
End: 2022-05-12
Attending: INTERNAL MEDICINE
Payer: MEDICARE

## 2022-05-12 ENCOUNTER — INFUSION THERAPY VISIT (OUTPATIENT)
Dept: INFUSION THERAPY | Facility: CLINIC | Age: 62
End: 2022-05-12
Attending: INTERNAL MEDICINE
Payer: MEDICARE

## 2022-05-12 VITALS
OXYGEN SATURATION: 100 % | BODY MASS INDEX: 21.33 KG/M2 | WEIGHT: 170.64 LBS | TEMPERATURE: 97.8 F | DIASTOLIC BLOOD PRESSURE: 78 MMHG | HEART RATE: 78 BPM | RESPIRATION RATE: 16 BRPM | SYSTOLIC BLOOD PRESSURE: 123 MMHG

## 2022-05-12 VITALS
TEMPERATURE: 97.8 F | HEART RATE: 78 BPM | WEIGHT: 170.6 LBS | OXYGEN SATURATION: 100 % | DIASTOLIC BLOOD PRESSURE: 78 MMHG | SYSTOLIC BLOOD PRESSURE: 123 MMHG | BODY MASS INDEX: 21.32 KG/M2 | RESPIRATION RATE: 16 BRPM

## 2022-05-12 DIAGNOSIS — C79.51 NON-SMALL CELL LUNG CANCER METASTATIC TO BONE (H): Primary | ICD-10-CM

## 2022-05-12 DIAGNOSIS — C79.51 NON-SMALL CELL LUNG CANCER METASTATIC TO BONE (H): ICD-10-CM

## 2022-05-12 DIAGNOSIS — D70.1 CHEMOTHERAPY-INDUCED NEUTROPENIA (H): Primary | ICD-10-CM

## 2022-05-12 DIAGNOSIS — C34.90 NON-SMALL CELL LUNG CANCER METASTATIC TO BONE (H): ICD-10-CM

## 2022-05-12 DIAGNOSIS — C34.90 NON-SMALL CELL LUNG CANCER METASTATIC TO BONE (H): Primary | ICD-10-CM

## 2022-05-12 DIAGNOSIS — T45.1X5A CHEMOTHERAPY-INDUCED NEUTROPENIA (H): Primary | ICD-10-CM

## 2022-05-12 DIAGNOSIS — Z51.11 ENCOUNTER FOR ANTINEOPLASTIC CHEMOTHERAPY: ICD-10-CM

## 2022-05-12 LAB
BASOPHILS # BLD AUTO: 0 10E3/UL (ref 0–0.2)
BASOPHILS NFR BLD AUTO: 0 %
CREAT SERPL-MCNC: 0.94 MG/DL (ref 0.66–1.25)
EOSINOPHIL # BLD AUTO: 0.1 10E3/UL (ref 0–0.7)
EOSINOPHIL NFR BLD AUTO: 2 %
ERYTHROCYTE [DISTWIDTH] IN BLOOD BY AUTOMATED COUNT: 16.7 % (ref 10–15)
GFR SERPL CREATININE-BSD FRML MDRD: >90 ML/MIN/1.73M2
HCT VFR BLD AUTO: 31.5 % (ref 40–53)
HGB BLD-MCNC: 9.7 G/DL (ref 13.3–17.7)
IMM GRANULOCYTES # BLD: 0.2 10E3/UL
IMM GRANULOCYTES NFR BLD: 2 %
LYMPHOCYTES # BLD AUTO: 1.1 10E3/UL (ref 0.8–5.3)
LYMPHOCYTES NFR BLD AUTO: 12 %
MCH RBC QN AUTO: 26.9 PG (ref 26.5–33)
MCHC RBC AUTO-ENTMCNC: 30.8 G/DL (ref 31.5–36.5)
MCV RBC AUTO: 88 FL (ref 78–100)
MONOCYTES # BLD AUTO: 0.8 10E3/UL (ref 0–1.3)
MONOCYTES NFR BLD AUTO: 8 %
NEUTROPHILS # BLD AUTO: 7.1 10E3/UL (ref 1.6–8.3)
NEUTROPHILS NFR BLD AUTO: 76 %
NRBC # BLD AUTO: 0 10E3/UL
NRBC BLD AUTO-RTO: 0 /100
PLATELET # BLD AUTO: 75 10E3/UL (ref 150–450)
RBC # BLD AUTO: 3.6 10E6/UL (ref 4.4–5.9)
WBC # BLD AUTO: 9.3 10E3/UL (ref 4–11)

## 2022-05-12 PROCEDURE — 96367 TX/PROPH/DG ADDL SEQ IV INF: CPT

## 2022-05-12 PROCEDURE — 85025 COMPLETE CBC W/AUTO DIFF WBC: CPT | Performed by: INTERNAL MEDICINE

## 2022-05-12 PROCEDURE — 96377 APPLICATON ON-BODY INJECTOR: CPT | Mod: XS

## 2022-05-12 PROCEDURE — 96372 THER/PROPH/DIAG INJ SC/IM: CPT | Performed by: INTERNAL MEDICINE

## 2022-05-12 PROCEDURE — 258N000003 HC RX IP 258 OP 636: Performed by: INTERNAL MEDICINE

## 2022-05-12 PROCEDURE — 82565 ASSAY OF CREATININE: CPT | Performed by: INTERNAL MEDICINE

## 2022-05-12 PROCEDURE — 96411 CHEMO IV PUSH ADDL DRUG: CPT

## 2022-05-12 PROCEDURE — 96413 CHEMO IV INFUSION 1 HR: CPT

## 2022-05-12 PROCEDURE — 250N000011 HC RX IP 250 OP 636: Performed by: INTERNAL MEDICINE

## 2022-05-12 PROCEDURE — 99214 OFFICE O/P EST MOD 30 MIN: CPT | Performed by: NURSE PRACTITIONER

## 2022-05-12 PROCEDURE — G0463 HOSPITAL OUTPT CLINIC VISIT: HCPCS | Mod: 25

## 2022-05-12 RX ORDER — HEPARIN SODIUM (PORCINE) LOCK FLUSH IV SOLN 100 UNIT/ML 100 UNIT/ML
5 SOLUTION INTRAVENOUS
Status: DISCONTINUED | OUTPATIENT
Start: 2022-05-12 | End: 2022-05-12 | Stop reason: HOSPADM

## 2022-05-12 RX ADMIN — PEGFILGRASTIM 6 MG: KIT SUBCUTANEOUS at 12:17

## 2022-05-12 RX ADMIN — Medication 5 ML: at 12:44

## 2022-05-12 RX ADMIN — SODIUM CHLORIDE 1000 MG: 9 INJECTION, SOLUTION INTRAVENOUS at 11:54

## 2022-05-12 RX ADMIN — SODIUM CHLORIDE 250 ML: 9 INJECTION, SOLUTION INTRAVENOUS at 11:27

## 2022-05-12 RX ADMIN — CARBOPLATIN 450 MG: 10 INJECTION, SOLUTION INTRAVENOUS at 12:09

## 2022-05-12 NOTE — PROGRESS NOTES
"Oncology Rooming Note    May 12, 2022 11:20 AM   Kt Rasmussen is a 61 year old male who presents for:    Chief Complaint   Patient presents with     Oncology Clinic Visit     Initial Vitals: /78   Pulse 78   Temp 97.8  F (36.6  C) (Oral)   Resp 16   Wt 77.4 kg (170 lb 10.2 oz)   SpO2 100%   BMI 21.33 kg/m   Estimated body mass index is 21.33 kg/m  as calculated from the following:    Height as of 4/28/22: 1.905 m (6' 3\").    Weight as of this encounter: 77.4 kg (170 lb 10.2 oz). Body surface area is 2.02 meters squared.  Data Unavailable Comment: Data Unavailable   No LMP for male patient.  Allergies reviewed: Yes  Medications reviewed: Yes    Medications: Medication refills not needed today.  Pharmacy name entered into EPIC:    [facility] [place] [address] [facility], [place] - [address] [id number] - [place], [place] - [address] AT [facility] & [facility] 7    Clinical concerns:  NP was notified.      Julisa Whelan CMA            "

## 2022-05-12 NOTE — PROGRESS NOTES
Oncology/Hematology Visit Note  May 12, 2022    Reason for Visit: follow up of metastatic non-small cell lung carcinoma  Metastatic to lymph nodes bones in bilateral lungs  Brain MRI negative for mets    Patient met with Dr. John who recommended treatment with palliative intent with paclitaxel carboplatin Avastin and atezolizumab-treatment started on April 27, 2021  -Due to thrombocytopenia carboplatin AUC reduced to 4 with cycle 2  Due to pancytopenia carboplatin discontinued with cycle 5    7/12/2021: PET/CT showed resolved mural nodules with increased cystic cavity in left lower lobe with no significant FDG uptake, less likely metastases; no enlarged hypermetabolic mediastinal, hilar, or axillary lymph nodes, decreased metabolic activity of intraosseous lesion in spine and pelvis; no new bone lesions    Treated with paclitaxel, Avastin and atezolizumab.-Last treatment given in  12/22/2021-cycle 12    1/10/2022: PET/CT showed new foci of abnormal skeletal FGD uptake in the thoracic and lumbar spine. Mild interval increase in intensity of previously demonstrated hypermetabolic skeletal lesions involving the pelvis and lumbar spine. Findings are consistent with progressive osseous metastatic disease. Subsequently off chemo for 1 month due to poor performance status.  16. 1/25/2022: Patient fell and fractured his femur. This was surgically repaired and he was subsequently cared for at a rehab unit.  17. 2/14/2022: Brain MRI without contrast (contrast not given due to inability to obtain IV access) showed no brain metastases.    Met with Dr. Shoemaker-in Dr. John's absence  -Recommendation is to change therapy   04/21/2022 -palliative Carboplatin Alimta started           Interval History:  Patient reports he has been tolerating treatment well.  He denies fever chills sweats cough shortness of breath chest pain nausea vomiting diarrhea abdominal pain bleeding bruising.  Denies fall injury denies headache  dizziness      Review of Systems:  14 point ROS of systems including Constitutional, Eyes, Respiratory, Cardiovascular, Gastroenterology, Genitourinary, Integumentary, Muscularskeletal, Psychiatric were all negative except for pertinent positives noted in my HPI.    Physical Examination:  Physical Exam  HENT:      Right Ear: Tympanic membrane normal.      Nose: Nose normal.      Mouth/Throat:      Mouth: Mucous membranes are moist.   Eyes:      Pupils: Pupils are equal, round, and reactive to light.   Cardiovascular:      Rate and Rhythm: Normal rate.      Pulses: Normal pulses.   Pulmonary:      Effort: Pulmonary effort is normal.   Abdominal:      General: Abdomen is flat.   Musculoskeletal:         General: Normal range of motion.      Cervical back: Normal range of motion.   Skin:     General: Skin is warm.   Neurological:      General: No focal deficit present.      Mental Status: He is alert.   Psychiatric:         Mood and Affect: Mood normal.           Laboratory Data:  CBC and CMP results reviewed    Assessment and Plan:  This is a 61-year-old male with    metastatic non-small cell lung cancer   Patient of Dr. John   Progressed on  different treatments as above  04/21/2022 started palliative treatment with carboplatin AUC 5  and Alimta  -Due to cytopenia reduce AUC to 4-okay per Dr John   -So far patient reports he has been tolerating treatment well  Labs reviewed abnormalities discussed with patient  Patient meets parameters for treatment today  Schedule for labs and follow-up with me next week  Patient is scheduled with Dr. 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 Although reviewed after completion, some words and grammatical errors may remain.

## 2022-05-12 NOTE — LETTER
"    5/12/2022         RE: Kt Rasmussen  [address]  [place] [address]        Dear Colleague,    Thank you for referring your patient, Kt Rasmussen, to the [facility]. Please see a copy of my visit note below.    Oncology Rooming Note    May 12, 2022 11:20 AM   Kt Rasmussen is a 61 year old male who presents for:    Chief Complaint   Patient presents with     Oncology Clinic Visit     Initial Vitals: /78   Pulse 78   Temp 97.8  F (36.6  C) (Oral)   Resp 16   Wt 77.4 kg (170 lb 10.2 oz)   SpO2 100%   BMI 21.33 kg/m   Estimated body mass index is 21.33 kg/m  as calculated from the following:    Height as of 4/28/22: 1.905 m (6' 3\").    Weight as of this encounter: 77.4 kg (170 lb 10.2 oz). Body surface area is 2.02 meters squared.  Data Unavailable Comment: Data Unavailable   No LMP for male patient.  Allergies reviewed: Yes  Medications reviewed: Yes    Medications: Medication refills not needed today.  Pharmacy name entered into EPIC:    [facility], [place] - [address] [facility] [place], [place] - [address] [id number] - [place], [place] - [facility] [facility] & [facility] 7    Clinical concerns:  NP was notified.      Julisa Whelan [facility]              Oncology/Hematology Visit Note  May 12, 2022    Reason for Visit: follow up of metastatic non-small cell lung carcinoma  Metastatic to lymph nodes bones in bilateral lungs  Brain MRI negative for mets    Patient met with Dr. John who recommended treatment with palliative intent with paclitaxel carboplatin Avastin and atezolizumab-treatment started on April 27, 2021  -Due to thrombocytopenia carboplatin AUC reduced to 4 with cycle 2  Due to pancytopenia carboplatin discontinued with cycle 5    7/12/2021: PET/CT showed resolved mural nodules with increased cystic cavity in left lower lobe with no significant FDG uptake, " less likely metastases; no enlarged hypermetabolic mediastinal, hilar, or axillary lymph nodes, decreased metabolic activity of intraosseous lesion in spine and pelvis; no new bone lesions    Treated with paclitaxel, Avastin and atezolizumab.-Last treatment given in  12/22/2021-cycle 12    1/10/2022: PET/CT showed new foci of abnormal skeletal FGD uptake in the thoracic and lumbar spine. Mild interval increase in intensity of previously demonstrated hypermetabolic skeletal lesions involving the pelvis and lumbar spine. Findings are consistent with progressive osseous metastatic disease. Subsequently off chemo for 1 month due to poor performance status.  16. 1/25/2022: Patient fell and fractured his femur. This was surgically repaired and he was subsequently cared for at a rehab unit.  17. 2/14/2022: Brain MRI without contrast (contrast not given due to inability to obtain IV access) showed no brain metastases.    Met with Dr. Shoemaker-in Dr. John's absence  -Recommendation is to change therapy   04/21/2022 -palliative Carboplatin Alimta started           Interval History:  Patient reports he has been tolerating treatment well.  He denies fever chills sweats cough shortness of breath chest pain nausea vomiting diarrhea abdominal pain bleeding bruising.  Denies fall injury denies headache dizziness      Review of Systems:  14 point ROS of systems including Constitutional, Eyes, Respiratory, Cardiovascular, Gastroenterology, Genitourinary, Integumentary, Muscularskeletal, Psychiatric were all negative except for pertinent positives noted in my HPI.    Physical Examination:  Physical Exam  HENT:      Right Ear: Tympanic membrane normal.      Nose: Nose normal.      Mouth/Throat:      Mouth: Mucous membranes are moist.   Eyes:      Pupils: Pupils are equal, round, and reactive to light.   Cardiovascular:      Rate and Rhythm: Normal rate.      Pulses: Normal pulses.   Pulmonary:      Effort: Pulmonary effort is normal.    Abdominal:      General: Abdomen is flat.   Musculoskeletal:         General: Normal range of motion.      Cervical back: Normal range of motion.   Skin:     General: Skin is warm.   Neurological:      General: No focal deficit present.      Mental Status: He is alert.   Psychiatric:         Mood and Affect: Mood normal.           Laboratory Data:  CBC and CMP results reviewed    Assessment and Plan:  This is a 61-year-old male with    metastatic non-small cell lung cancer   Patient of Dr. John   Progressed on  different treatments as above  04/21/2022 started palliative treatment with carboplatin AUC 5  and Alimta  -Due to cytopenia reduce AUC to 4-okay per Dr John   -So far patient reports he has been tolerating treatment well  Labs reviewed abnormalities discussed with patient  Patient meets parameters for treatment today  Schedule for labs and follow-up with me next week  Patient is scheduled with Dr. 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 Although reviewed after completion, some words and grammatical errors may remain.            Again, thank you for allowing me to participate in the care of your patient.        Sincerely,        MADHAVI Moran CNP

## 2022-05-12 NOTE — PATIENT INSTRUCTIONS
Your On-body Neulasta Injector was applied to your right upper arm at 12:20pm.  At approximately 3:20pm on 5/13, your On-body Injector will beep to let you know your dose delivery will begin in 2 minutes.  Your medication will be delivered over the next 45 minutes.  You can remove your Injector at 4:30PM.  Please make sure your Injector has a solid green light or has turned off prior to removing the device.  Please contact your provider at [phone] with questions or concerns.

## 2022-05-12 NOTE — PROGRESS NOTES
Infusion Nursing Note:  Kt J Valery presents today for C2D1 alimta/carbo/Onpro  Patient seen by provider today: Yes: RUSSEL Martinez   present during visit today: Not Applicable.    Note: Onpro new to patient. Did not want to watch the video. Instructed patient and written info given.      Intravenous Access:  Labs drawn without difficulty.  Implanted Port.    Treatment Conditions:  Lab Results   Component Value Date    HGB 9.7 (L) 05/12/2022    WBC 9.3 05/12/2022    ANEU 0.6 (L) 04/28/2022    ANEUTAUTO 7.1 05/12/2022    PLT 75 (L) 05/12/2022      Lab Results   Component Value Date     04/28/2022    POTASSIUM 4.1 04/28/2022    MAG 1.9 10/13/2016    CR 0.94 05/12/2022    BEVERLY 8.9 04/28/2022    BILITOTAL 0.5 04/28/2022    ALBUMIN 3.5 04/28/2022    ALT 37 04/28/2022    AST 28 04/28/2022     Results reviewed, labs MET treatment parameters, ok to proceed with treatment.    ONPRO  Was placed on patient's: back of right arm.    Was placed at 12:20 PM    Podpal used: Yes    ONPRO injector device Lot number: [id number]    Patient education included: what patient can expect after application, what colored lights mean on the device, when to remove device, when and where to call with questions or issues, all patients questions answered and that Neulasta administration will occur at 3:20pm on 5/13.    Patient tolerated administration well.        Post Infusion Assessment:  Patient tolerated infusion without incident.  Blood return noted pre and post infusion.  Site patent and intact, free from redness, edema or discomfort.  No evidence of extravasations.  Access discontinued per protocol.       Discharge Plan:   Copy of AVS reviewed with patient and/or family.  Patient will return as [facility] for next appointment.  Patient discharged in stable condition accompanied by: self.  Departure Mode: Wheelchair.      Guzman Patel RN

## 2022-05-19 ENCOUNTER — INFUSION THERAPY VISIT (OUTPATIENT)
Dept: INFUSION THERAPY | Facility: CLINIC | Age: 62
End: 2022-05-19
Attending: INTERNAL MEDICINE
Payer: MEDICARE

## 2022-05-19 ENCOUNTER — ONCOLOGY VISIT (OUTPATIENT)
Dept: ONCOLOGY | Facility: CLINIC | Age: 62
End: 2022-05-19
Attending: INTERNAL MEDICINE
Payer: MEDICARE

## 2022-05-19 VITALS
DIASTOLIC BLOOD PRESSURE: 80 MMHG | TEMPERATURE: 98.5 F | RESPIRATION RATE: 16 BRPM | OXYGEN SATURATION: 100 % | HEART RATE: 92 BPM | SYSTOLIC BLOOD PRESSURE: 136 MMHG

## 2022-05-19 DIAGNOSIS — D70.1 CHEMOTHERAPY-INDUCED NEUTROPENIA (H): ICD-10-CM

## 2022-05-19 DIAGNOSIS — C79.51 NON-SMALL CELL LUNG CANCER METASTATIC TO BONE (H): Primary | ICD-10-CM

## 2022-05-19 DIAGNOSIS — C34.90 NON-SMALL CELL CARCINOMA OF LUNG, STAGE 3, UNSPECIFIED LATERALITY (H): ICD-10-CM

## 2022-05-19 DIAGNOSIS — T45.1X5A CHEMOTHERAPY-INDUCED NEUTROPENIA (H): ICD-10-CM

## 2022-05-19 DIAGNOSIS — C34.90 NON-SMALL CELL LUNG CANCER METASTATIC TO BONE (H): Primary | ICD-10-CM

## 2022-05-19 LAB
ALBUMIN SERPL-MCNC: 3.3 G/DL (ref 3.4–5)
ALP SERPL-CCNC: 112 U/L (ref 40–150)
ALT SERPL W P-5'-P-CCNC: 69 U/L (ref 0–70)
ANION GAP SERPL CALCULATED.3IONS-SCNC: 8 MMOL/L (ref 3–14)
AST SERPL W P-5'-P-CCNC: 41 U/L (ref 0–45)
BASOPHILS # BLD MANUAL: 0 10E3/UL (ref 0–0.2)
BASOPHILS NFR BLD MANUAL: 1 %
BILIRUB SERPL-MCNC: 0.6 MG/DL (ref 0.2–1.3)
BUN SERPL-MCNC: 24 MG/DL (ref 7–30)
CALCIUM SERPL-MCNC: 8.4 MG/DL (ref 8.5–10.1)
CHLORIDE BLD-SCNC: 104 MMOL/L (ref 94–109)
CO2 SERPL-SCNC: 27 MMOL/L (ref 20–32)
CREAT SERPL-MCNC: 0.78 MG/DL (ref 0.66–1.25)
DACRYOCYTES BLD QL SMEAR: SLIGHT
ELLIPTOCYTES BLD QL SMEAR: SLIGHT
EOSINOPHIL # BLD MANUAL: 0 10E3/UL (ref 0–0.7)
EOSINOPHIL NFR BLD MANUAL: 1 %
ERYTHROCYTE [DISTWIDTH] IN BLOOD BY AUTOMATED COUNT: 16.7 % (ref 10–15)
GFR SERPL CREATININE-BSD FRML MDRD: >90 ML/MIN/1.73M2
GLUCOSE BLD-MCNC: 125 MG/DL (ref 70–99)
HCT VFR BLD AUTO: 25.8 % (ref 40–53)
HGB BLD-MCNC: 7.9 G/DL (ref 13.3–17.7)
LYMPHOCYTES # BLD MANUAL: 0.6 10E3/UL (ref 0.8–5.3)
LYMPHOCYTES NFR BLD MANUAL: 20 %
MCH RBC QN AUTO: 26.6 PG (ref 26.5–33)
MCHC RBC AUTO-ENTMCNC: 30.6 G/DL (ref 31.5–36.5)
MCV RBC AUTO: 87 FL (ref 78–100)
MONOCYTES # BLD MANUAL: 0.1 10E3/UL (ref 0–1.3)
MONOCYTES NFR BLD MANUAL: 3 %
NEUTROPHILS # BLD MANUAL: 2.4 10E3/UL (ref 1.6–8.3)
NEUTROPHILS NFR BLD MANUAL: 75 %
PLAT MORPH BLD: ABNORMAL
PLATELET # BLD AUTO: 50 10E3/UL (ref 150–450)
POTASSIUM BLD-SCNC: 3.6 MMOL/L (ref 3.4–5.3)
PROT SERPL-MCNC: 6.9 G/DL (ref 6.8–8.8)
RBC # BLD AUTO: 2.97 10E6/UL (ref 4.4–5.9)
RBC MORPH BLD: ABNORMAL
SODIUM SERPL-SCNC: 139 MMOL/L (ref 133–144)
WBC # BLD AUTO: 3.2 10E3/UL (ref 4–11)

## 2022-05-19 PROCEDURE — 96374 THER/PROPH/DIAG INJ IV PUSH: CPT

## 2022-05-19 PROCEDURE — 85027 COMPLETE CBC AUTOMATED: CPT | Performed by: NURSE PRACTITIONER

## 2022-05-19 PROCEDURE — 258N000003 HC RX IP 258 OP 636: Performed by: NURSE PRACTITIONER

## 2022-05-19 PROCEDURE — 80053 COMPREHEN METABOLIC PANEL: CPT | Performed by: NURSE PRACTITIONER

## 2022-05-19 PROCEDURE — 250N000011 HC RX IP 250 OP 636: Performed by: NURSE PRACTITIONER

## 2022-05-19 PROCEDURE — 85007 BL SMEAR W/DIFF WBC COUNT: CPT | Performed by: NURSE PRACTITIONER

## 2022-05-19 PROCEDURE — 36591 DRAW BLOOD OFF VENOUS DEVICE: CPT

## 2022-05-19 PROCEDURE — 99214 OFFICE O/P EST MOD 30 MIN: CPT | Performed by: NURSE PRACTITIONER

## 2022-05-19 PROCEDURE — 250N000011 HC RX IP 250 OP 636: Performed by: INTERNAL MEDICINE

## 2022-05-19 RX ORDER — ONDANSETRON 2 MG/ML
8 INJECTION INTRAMUSCULAR; INTRAVENOUS EVERY 6 HOURS PRN
Status: DISCONTINUED | OUTPATIENT
Start: 2022-05-19 | End: 2022-05-19 | Stop reason: HOSPADM

## 2022-05-19 RX ORDER — HEPARIN SODIUM,PORCINE 10 UNIT/ML
5 VIAL (ML) INTRAVENOUS
Status: CANCELLED | OUTPATIENT
Start: 2022-05-19

## 2022-05-19 RX ORDER — ONDANSETRON 2 MG/ML
8 INJECTION INTRAMUSCULAR; INTRAVENOUS EVERY 6 HOURS PRN
Status: CANCELLED
Start: 2022-05-19

## 2022-05-19 RX ORDER — HEPARIN SODIUM (PORCINE) LOCK FLUSH IV SOLN 100 UNIT/ML 100 UNIT/ML
5 SOLUTION INTRAVENOUS
Status: CANCELLED | OUTPATIENT
Start: 2022-05-19

## 2022-05-19 RX ORDER — HEPARIN SODIUM (PORCINE) LOCK FLUSH IV SOLN 100 UNIT/ML 100 UNIT/ML
5 SOLUTION INTRAVENOUS
Status: DISCONTINUED | OUTPATIENT
Start: 2022-05-19 | End: 2022-05-19 | Stop reason: HOSPADM

## 2022-05-19 RX ADMIN — ONDANSETRON 8 MG: 2 INJECTION INTRAMUSCULAR; INTRAVENOUS at 15:03

## 2022-05-19 RX ADMIN — SODIUM CHLORIDE 1000 ML: 9 INJECTION, SOLUTION INTRAVENOUS at 14:49

## 2022-05-19 RX ADMIN — Medication 5 ML: at 15:56

## 2022-05-19 NOTE — PROGRESS NOTES
Oncology/Hematology Visit Note  May 19, 2022    Reason for Visit: follow up of metastatic non-small cell lung carcinoma  Metastatic to lymph nodes bones in bilateral lungs  Brain MRI negative for mets    Patient met with Dr. John who recommended treatment with palliative intent with paclitaxel carboplatin Avastin and atezolizumab-treatment started on April 27, 2021  -Due to thrombocytopenia carboplatin AUC reduced to 4 with cycle 2  Due to pancytopenia carboplatin discontinued with cycle 5    7/12/2021: PET/CT showed resolved mural nodules with increased cystic cavity in left lower lobe with no significant FDG uptake, less likely metastases; no enlarged hypermetabolic mediastinal, hilar, or axillary lymph nodes, decreased metabolic activity of intraosseous lesion in spine and pelvis; no new bone lesions    Treated with paclitaxel, Avastin and atezolizumab.-Last treatment given in  12/22/2021-cycle 12    1/10/2022: PET/CT showed new foci of abnormal skeletal FGD uptake in the thoracic and lumbar spine. Mild interval increase in intensity of previously demonstrated hypermetabolic skeletal lesions involving the pelvis and lumbar spine. Findings are consistent with progressive osseous metastatic disease. Subsequently off chemo for 1 month due to poor performance status.  16. 1/25/2022: Patient fell and fractured his femur. This was surgically repaired and he was subsequently cared for at a rehab unit.  17. 2/14/2022: Brain MRI without contrast (contrast not given due to inability to obtain IV access) showed no brain metastases.    Met with Dr. Shoemaker-in Dr. John's absence  -Recommendation is to change therapy   04/21/2022 -palliative Carboplatin Alimta started   Due to cytopenia AUC reduced to 4 with cycle 2  05/12/2022-cycle 2 given        Interval History:  Patient reports he had nausea and vomited once after lunch.  He reports he has antinausea medications however did not he did not use it today  Patient denies  abdominal pain or abdominal distention reports normal bowel movements  Denies fever chills sweats cough shortness of breath chest pain.  Patient denies bleeding bruising edema.  Denies recent fall or injury      Review of Systems:  14 point ROS of systems including Constitutional, Eyes, Respiratory, Cardiovascular, Gastroenterology, Genitourinary, Integumentary, Muscularskeletal, Psychiatric were all negative except for pertinent positives noted in my HPI.    Physical Examination:  Physical Exam  HENT:      Right Ear: Tympanic membrane normal.      Nose: Nose normal.      Mouth/Throat:      Mouth: Mucous membranes are moist.   Eyes:      Pupils: Pupils are equal, round, and reactive to light.   Cardiovascular:      Rate and Rhythm: Normal rate.      Pulses: Normal pulses.   Pulmonary:      Effort: Pulmonary effort is normal.   Abdominal:      General: Abdomen is flat.   Musculoskeletal:         General: Normal range of motion.      Cervical back: Normal range of motion.   Skin:     General: Skin is warm.   Neurological:      General: No focal deficit present.      Mental Status: He is alert.   Psychiatric:         Mood and Affect: Mood normal.           Laboratory Data:  CBC and CMP results reviewed    Assessment and Plan:  This is a 61-year-old male with    metastatic non-small cell lung cancer   Patient of Dr. John   Progressed on  different treatments as above  04/21/2022 started palliative treatment with carboplatin AUC 5  and Alimta  Developed cytopenia  05/12/2022-cycle 2 given-carboplatin AUC reduced to 4 followed by Neulasta on pro  Labs reviewed today abnormalities discussed  Recheck CBC next week  Patient has follow-up appointment with Dr. John with cycle 3      Leukopenia secondary to chemo  Patient is afebrile  Check temperature frequently in the event of fever chills sweats call our clinic or go to ER  Patient is status post on pro Neulasta on 05/12  Expecting WBC to recover soon      Thrombocytopenia  secondary to treatment  Patient denies bleeding bruising  Patient platelet count 50  Complications of low platelet count reviewed  Advised patient to go to ER in the event of bleeding fall injury bruising or edema  -Patient is not on blood thinners  Transfuse for platelet count 20 or below or bleeding  Recheck platelet count next week    Anemia secondary to treatment  Patient is asymptomatic  Transfuse for hemoglobin less than 7  or symptomatic      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  01/2021-scope done by ENT no evidence of recurrence  Continue close follow-up by ENT  Patient has dysphonia and some dysphagia     History of CVA  02/14-MRI did not reveal brain metastasis      Nausea  1 episode of nausea today  We will give him IV fluids and IV Zofran  Advised patient to take antiemetics as ordered  Call our clinic if nausea returns  We will schedule for NS bolus hydration next week      Patient is advised to call our clinic or go to ER in the event of fever chills sweats cough shortness of breath chest pain nausea vomiting diarrhea abdominal pain bleeding edema or any changes in health    MADHAVI Moran CNP  M [facility] [place]     Chart documentation with Dragon Voice recognition Software. Although reviewed after completion, some words and grammatical errors may remain.

## 2022-05-19 NOTE — PROGRESS NOTES
"Oncology Rooming Note    May 19, 2022 2:50 PM   Kt Rasmussen is a 61 year old male who presents for:    Chief Complaint   Patient presents with     Oncology Clinic Visit     Initial Vitals: There were no vitals taken for this visit. Estimated body mass index is 21.33 kg/m  as calculated from the following:    Height as of 4/28/22: 1.905 m (6' 3\").    Weight as of 5/12/22: 77.4 kg (170 lb 10.2 oz). There is no height or weight on file to calculate BSA.  Data Unavailable Comment: Data Unavailable   No LMP for male patient.    Pharmacy name entered into EPIC:    [facility], [place] - [address] [facility], [place] - [address] [id number] - [place], [place] - [address] [facility] [address]    Pt seen in infusion by Goran Klak Shari J. Schoenberger, [facility]              "

## 2022-05-19 NOTE — PROGRESS NOTES
Infusion Nursing Note:  Kt HERNANDEZ Rasmussen presents today for Labs/IVF/Zofran.    Patient seen by provider today: Yes: Juan   present during visit today: Not Applicable.    Note: Patient added on for IVF/Zofran for nausea/vomiting.      Intravenous Access:  Labs drawn without difficulty.  Implanted Port.    Treatment Conditions:  Lab Results   Component Value Date    HGB 7.9 (L) 05/19/2022    WBC 3.2 (L) 05/19/2022    ANEU 0.6 (L) 04/28/2022    ANEUTAUTO 7.1 05/12/2022    PLT 50 (L) 05/19/2022      Lab Results   Component Value Date     05/19/2022    POTASSIUM 3.6 05/19/2022    MAG 1.9 10/13/2016    CR 0.78 05/19/2022    BEVERLY 8.4 (L) 05/19/2022    BILITOTAL 0.6 05/19/2022    ALBUMIN 3.3 (L) 05/19/2022    ALT 69 05/19/2022    AST 41 05/19/2022         Post Infusion Assessment:  Patient tolerated infusion without incident.  Blood return noted pre and post infusion.  Site patent and intact, free from redness, edema or discomfort.  No evidence of extravasations.  Access discontinued per protocol.       Discharge Plan:   Patient discharged in stable condition accompanied by: self.  Departure Mode: Wheelchair with  service.      Filipe Carr RN

## 2022-05-19 NOTE — LETTER
"    5/19/2022         RE: Kt Rasmussen  [id number]  [facility] [address]        Dear Colleague,    Thank you for referring your patient, Kt Rasmussen, to the [facility]. Please see a copy of my visit note below.    Oncology Rooming Note    May 19, 2022 2:50 PM   Kt Rasmussen is a 61 year old male who presents for:    Chief Complaint   Patient presents with     Oncology Clinic Visit     Initial Vitals: There were no vitals taken for this visit. Estimated body mass index is 21.33 kg/m  as calculated from the following:    Height as of 4/28/22: 1.905 m (6' 3\").    Weight as of 5/12/22: 77.4 kg (170 lb 10.2 oz). There is no height or weight on file to calculate BSA.  Data Unavailable Comment: Data Unavailable   No LMP for male patient.    Pharmacy name entered into EPIC:    [facility], [place] - [address] [facility] [place], [place] - [address] [id number] - [place], [place] - [address] [facility] [place] [facility] [address]    Pt seen in infusion by Goran Klak Shari J. Schoenberger, [facility]                Oncology/Hematology Visit Note  May 19, 2022    Reason for Visit: follow up of metastatic non-small cell lung carcinoma  Metastatic to lymph nodes bones in bilateral lungs  Brain MRI negative for mets    Patient met with Dr. John who recommended treatment with palliative intent with paclitaxel carboplatin Avastin and atezolizumab-treatment started on April 27, 2021  -Due to thrombocytopenia carboplatin AUC reduced to 4 with cycle 2  Due to pancytopenia carboplatin discontinued with cycle 5    7/12/2021: PET/CT showed resolved mural nodules with increased cystic cavity in left lower lobe with no significant FDG uptake, less likely metastases; no enlarged hypermetabolic mediastinal, hilar, or axillary lymph nodes, decreased metabolic activity of intraosseous lesion in spine and pelvis; no new bone " lesions    Treated with paclitaxel, Avastin and atezolizumab.-Last treatment given in  12/22/2021-cycle 12    1/10/2022: PET/CT showed new foci of abnormal skeletal FGD uptake in the thoracic and lumbar spine. Mild interval increase in intensity of previously demonstrated hypermetabolic skeletal lesions involving the pelvis and lumbar spine. Findings are consistent with progressive osseous metastatic disease. Subsequently off chemo for 1 month due to poor performance status.  16. 1/25/2022: Patient fell and fractured his femur. This was surgically repaired and he was subsequently cared for at a rehab unit.  17. 2/14/2022: Brain MRI without contrast (contrast not given due to inability to obtain IV access) showed no brain metastases.    Met with Dr. Shoemaker-in Dr. John's absence  -Recommendation is to change therapy   04/21/2022 -palliative Carboplatin Alimta started   Due to cytopenia AUC reduced to 4 with cycle 2  05/12/2022-cycle 2 given        Interval History:  Patient reports he had nausea and vomited once after lunch.  He reports he has antinausea medications however did not he did not use it today  Patient denies abdominal pain or abdominal distention reports normal bowel movements  Denies fever chills sweats cough shortness of breath chest pain.  Patient denies bleeding bruising edema.  Denies recent fall or injury      Review of Systems:  14 point ROS of systems including Constitutional, Eyes, Respiratory, Cardiovascular, Gastroenterology, Genitourinary, Integumentary, Muscularskeletal, Psychiatric were all negative except for pertinent positives noted in my HPI.    Physical Examination:  Physical Exam  HENT:      Right Ear: Tympanic membrane normal.      Nose: Nose normal.      Mouth/Throat:      Mouth: Mucous membranes are moist.   Eyes:      Pupils: Pupils are equal, round, and reactive to light.   Cardiovascular:      Rate and Rhythm: Normal rate.      Pulses: Normal pulses.   Pulmonary:      Effort:  Pulmonary effort is normal.   Abdominal:      General: Abdomen is flat.   Musculoskeletal:         General: Normal range of motion.      Cervical back: Normal range of motion.   Skin:     General: Skin is warm.   Neurological:      General: No focal deficit present.      Mental Status: He is alert.   Psychiatric:         Mood and Affect: Mood normal.           Laboratory Data:  CBC and CMP results reviewed    Assessment and Plan:  This is a 61-year-old male with    metastatic non-small cell lung cancer   Patient of Dr. John   Progressed on  different treatments as above  04/21/2022 started palliative treatment with carboplatin AUC 5  and Alimta  Developed cytopenia  05/12/2022-cycle 2 given-carboplatin AUC reduced to 4 followed by Neulasta on pro  Labs reviewed today abnormalities discussed  Recheck CBC next week  Patient has follow-up appointment with Dr. John with cycle 3      Leukopenia secondary to chemo  Patient is afebrile  Check temperature frequently in the event of fever chills sweats call our clinic or go to ER  Patient is status post on pro Neulasta on 05/12  Expecting WBC to recover soon      Thrombocytopenia secondary to treatment  Patient denies bleeding bruising  Patient platelet count 50  Complications of low platelet count reviewed  Advised patient to go to ER in the event of bleeding fall injury bruising or edema  -Patient is not on blood thinners  Transfuse for platelet count 20 or below or bleeding  Recheck platelet count next week    Anemia secondary to treatment  Patient is asymptomatic  Transfuse for hemoglobin less than 7  or symptomatic      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  01/2021-scope done by ENT no evidence of recurrence  Continue close follow-up by ENT  Patient has dysphonia and some dysphagia     History of CVA  02/14-MRI did not reveal brain metastasis      Nausea  1 episode of nausea today  We will give him IV fluids and IV Zofran  Advised patient to take  antiemetics as ordered  Call our clinic if nausea returns  We will schedule for NS bolus hydration next week      Patient is advised to call our clinic or go to ER in the event of fever chills sweats cough shortness of breath chest pain nausea vomiting diarrhea abdominal pain bleeding edema or any changes in health    MADHAVI Moran CNP  [facility]     Chart documentation with Dragon Voice recognition Software. Although reviewed after completion, some words and grammatical errors may remain.            Again, thank you for allowing me to participate in the care of your patient.        Sincerely,        MADHAVI Moran CNP

## 2022-05-20 NOTE — PROGRESS NOTES
[facility]    Hematology/Oncology Established Patient Follow-up Note      Today's Date: 6/2/2022    Reason for Follow-up: Metastatic non-small cell lung carcinoma.    HISTORY OF PRESENT ILLNESS: Kt Rasmussen is a 61 year old male who presents with the following oncologic history:  1. 5/05/2016: Presented with near-obstructing large mass coming off the cheikh and likely the posterior wall, seen on bronchoscopy. Biopsy of the mass showed invasive squamous cell carcinoma, moderately differentiating and focally keratinizing.  Procedure was complicated by significant bleeding.  Tumor was completely debulked (via bronchoscopy) in both main stem bronchi and distal trachea. NGS showed no mutations in EGFR, KRAS, BRAF, NRAS, HRAS, PIK3CA, ERBB2, MET, or JAK2.  2. 5/23/2016: PET/CT scan showed evidence of residual tumor with decreased endobronchial mass. Indeterminate, mildly hypermetabolic mesentery with prominent lymph nodes and area of enhancement of the right hepatic lobe present.   Felt to have T4-NX-M0 disease.  3. 6/09/2016: Started concurrent chemoradiation with weekly paclitaxel and carboplatin.  4. 7/30/2016: Completed radiation.  Subsequently received 2 cycles of consolidation paclitaxel and carboplatin.   5. 9/13/2019: Presented with 6-month history of dysphonia and left vocal exophytic lesion. Left true vocal fold biopsy showed at least squamous cell carcinoma in situ, cannot exclude superficial invasion.  6. 9/30/2019: Excision of left vocal cord mass showed fragments of invasive squamous cell carcinoma, well differentiated and keratinizing.  Margins negative for malignancy.  7. 2/16/2021: CT chest w/o contrast showed thin-walled cavity in left lower lobe with 2 solid peripheral nodules measuring 9 mm each. No lymphadenopathy.  8. 3/01/2021: PET scan showed hypermetabolic nodules in left lower lobe and right lung, consistent with metastases; hypermetabolic adenopathy in chest, abdomen,  pelvis; nonspecific uptake at tongue; hypermetabolic intraosseous lesions in spine and pelvis consistent with metastatic disease; left axillary hypermetabolic lymph nodes related to COVID-19 vaccination.  9. 3/12/2021: CT-guided lung biopsy showed non-small cell carcinoma, not otherwise specified -- with opinion by [facility] pathologist.  10. 3/18/2021: Lung NGS panel negative for mutations in BRAF, EGFR, ERBB2, IDH1, IDH2, KRAS, MET, NRAS, RET. PD-L1 expression negative (TPS <1%). Due to minimal amount of DNA obtained from specimen, other biomarkers could not be analyzed.  11. 4/7/2021: MRI brain negative for brain metastases.  12. 4/27/2021: Started 1st line metastatic therapy with carboplatin, paclitaxel, bevacizumab, and atezolizumab for metastatic non-small cell lung carcinoma, NOS.  13. 7/12/2021: PET/CT showed resolved mural nodules with increased cystic cavity in left lower lobe with no significant FDG uptake, less likely metastases; no enlarged hypermetabolic mediastinal, hilar, or axillary lymph nodes, decreased metabolic activity of intraosseous lesion in spine and pelvis; no new bone lesions.  14. 10/11/2021: PET/CT showed slight interval increase in intensity of metabolic activity of right pubic bone and left ischium with new focal uptake in L2 spinous process; resolved uptake at previous ground glass opacity along right medial lower lobe; unchanged cystic cavities/nodules in left lower lobe and right apical upper lobe; no pathologically enlarged hypermetabolic mediastinal, hilar, or axillary lymph nodes.  15. 1/10/2022: PET/CT showed new foci of abnormal skeletal FGD uptake in the thoracic and lumbar spine. Mild interval increase in intensity of previously demonstrated hypermetabolic skeletal lesions involving the pelvis and lumbar spine. Findings are consistent with progressive osseous metastatic disease. Subsequently off chemo for 1 month due to poor performance status.  16. 1/25/2022: Patient fell  and fractured his femur. This was surgically repaired and he was subsequently cared for at a rehab unit.  17. 2/14/2022: Brain MRI without contrast (contrast not given due to inability to obtain IV access) showed no brain metastases.  18. 4/21/2022: Started 2nd line metastatic therapy with pemetrexed and carboplatin. Omission of carboplatin 6/2 and forward due to worsening anemia despite dose reduction.    INTERIM HISTORY:  Kt reports intermittent dizziness. He reports his energy level is fair. He is using a wheelchair.    REVIEW OF SYSTEMS:   14 point ROS was reviewed and is negative other than as noted above in HPI.       HOME MEDICATIONS:  Current Outpatient Medications   Medication Sig Dispense Refill     atorvastatin (LIPITOR) 40 MG tablet Take 40 mg by mouth daily       dexamethasone (DECADRON) 4 MG tablet Take 1 tablet (4 mg) by mouth 2 times daily (with meals) for 3 days Start one day prior to Pemetrexed (Alimta), continue on the day treatment, and the day following Pemetrexed. 6 tablet 3     folic acid (FOLVITE) 1 MG tablet Take 1 tablet (1,000 mcg) by mouth daily Begin 7 days before Pemetrexed (Alimta) starts and continue for 21 days after Pemetrexed is discontinued. 30 tablet 3     ondansetron (ZOFRAN) 8 MG tablet Take 1 tablet (8 mg) by mouth every 8 hours as needed for nausea 10 tablet 3     prochlorperazine (COMPAZINE) 10 MG tablet Take 1 tablet (10 mg) by mouth every 6 hours as needed (Nausea/Vomiting) 30 tablet 3         ALLERGIES:  Allergies   Allergen Reactions     No Known Drug Allergies          PAST MEDICAL HISTORY:  Past Medical History:   Diagnosis Date     Alcohol abuse, unspecified      Cerebral infarction (H)     2009, right side residual and aphasia     Dyslipidemia      GERD (gastroesophageal reflux disease)      Lung cancer (H)      Unspecified essential hypertension          PAST SURGICAL HISTORY:  Past Surgical History:   Procedure Laterality Date     BRONCHOSCOPY FLEXIBLE AND RIGID  N/A 2016    Procedure: BRONCHOSCOPY FLEXIBLE AND RIGID;  Surgeon: Tony Talbot MD;  Location: [facility] OR     ESOPHAGOSCOPY FLEXIBLE N/A 2019    Procedure: flexible esophagoscopy;  Surgeon: Lizzy Johnson MD;  Location: [facility] OR     INJECT STEROID (LOCATION) N/A 2019    Procedure: steroid injection;  Surgeon: Lizzy Johnson MD;  Location: [facility] OR     IR CHEST PORT PLACEMENT > 5 YRS OF AGE  2021     IR CHEST PORT PLACEMENT > 5 YRS OF AGE  2022     IR PORT CHECK RIGHT  2022     IR PORT REMOVAL RIGHT  2022     LASER CO2 LARYNGOSCOPY N/A 2019    Procedure: Microdirect laryngoscopy with excision of laryngeal mass, CO2 laser;  Surgeon: Lizzy Johnson MD;  Location: [facility] OR     ZZC NONSPECIFIC PROCEDURE      R tympanoplasty         SOCIAL HISTORY:  Social History     Socioeconomic History     Marital status: Single     Spouse name: Not on file     Number of children: Not on file     Years of education: Not on file     Highest education level: Not on file   Occupational History     Not on file   Tobacco Use     Smoking status: Former Smoker     Packs/day: 1.00     Types: Cigarettes     Quit date: 2009     Years since quittin.6     Smokeless tobacco: Never Used   Substance and Sexual Activity     Alcohol use: Not Currently     Drug use: No     Sexual activity: Not on file   Other Topics Concern     Parent/sibling w/ CABG, MI or angioplasty before 65F 55M? Not Asked   Social History Narrative     Not on file     Social Determinants of Health     Financial Resource Strain: Not on file   Food Insecurity: Not on file   Transportation Needs: Not on file   Physical Activity: Not on file   Stress: Not on file   Social Connections: Not on file   Intimate Partner Violence: Not on file   Housing Stability: Not on file   Resides at assisted living.      FAMILY HISTORY:  Family History   Problem Relation Age of Onset     Cancer Father          PHYSICAL EXAM:  Vital signs:  /78  "  Pulse 77   Temp 98.2  F (36.8  C)   Resp 16   Ht 1.918 m (6' 3.5\")   SpO2 99%   BMI 21.05 kg/m     ECO  GENERAL/CONSTITUTIONAL: No acute distress.  EYES: No erythema or scleral icterus.  LYMPH: No cervical, supraclavicular, axillary adenopathy.   RESPIRATORY: Clear to auscultation bilaterally. No crackles or wheezing.   CARDIOVASCULAR: Regular rate and rhythm without murmurs.  GASTROINTESTINAL: No hepatosplenomegaly, masses, or tenderness. No guarding.  No distention.  MUSCULOSKELETAL: Warm and well-perfused, no cyanosis, clubbing, or edema.  NEUROLOGIC: Residual right arm and right leg reduced strength due to prior stroke. Alert, oriented, answers questions appropriately. Dysphonia present and stable.  INTEGUMENTARY: Sunburn and skin peeling changes over bilateral upper and lower extremities.  GAIT: Sitting in wheelchair.      LABS:  CBC RESULTS: Recent Labs   Lab Test 22  1110   WBC 8.1   RBC 2.84*   HGB 7.9*   HCT 25.5*   MCV 90   MCH 27.8   MCHC 31.0*   RDW 20.5*        Last Comprehensive Metabolic Panel:  Sodium   Date Value Ref Range Status   2022 139 133 - 144 mmol/L Final   2021 140 133 - 144 mmol/L Final     Potassium   Date Value Ref Range Status   2022 3.6 3.4 - 5.3 mmol/L Final   2021 4.3 3.4 - 5.3 mmol/L Final     Chloride   Date Value Ref Range Status   2022 104 94 - 109 mmol/L Final   2021 111 (H) 94 - 109 mmol/L Final     Carbon Dioxide   Date Value Ref Range Status   2021 25 20 - 32 mmol/L Final     Carbon Dioxide (CO2)   Date Value Ref Range Status   2022 27 20 - 32 mmol/L Final     Anion Gap   Date Value Ref Range Status   2022 8 3 - 14 mmol/L Final   2021 4 3 - 14 mmol/L Final     Glucose   Date Value Ref Range Status   2022 125 (H) 70 - 99 mg/dL Final   2021 85 70 - 99 mg/dL Final     Urea Nitrogen   Date Value Ref Range Status   2022 24 7 - 30 mg/dL Final   2021 17 7 - 30 mg/dL Final "     Creatinine   Date Value Ref Range Status   06/02/2022 0.82 0.66 - 1.25 mg/dL Final   06/29/2021 0.84 0.66 - 1.25 mg/dL Final     GFR Estimate   Date Value Ref Range Status   06/02/2022 >90 >60 mL/min/1.73m2 Final     Comment:     Effective December 21, 2021 eGFRcr in adults is calculated using the 2021 CKD-EPI creatinine equation which includes age and gender (Ceci et al., NE, DOI: 10.1056/FLMUxa0844328)   06/29/2021 >90 >60 mL/min/[1.73_m2] Final     Comment:     Non  GFR Calc  Starting 12/18/2018, serum creatinine based estimated GFR (eGFR) will be   calculated using the Chronic Kidney Disease Epidemiology Collaboration   (CKD-EPI) equation.       Calcium   Date Value Ref Range Status   05/19/2022 8.4 (L) 8.5 - 10.1 mg/dL Final   06/29/2021 8.7 8.5 - 10.1 mg/dL Final     Bilirubin Total   Date Value Ref Range Status   05/19/2022 0.6 0.2 - 1.3 mg/dL Final   06/29/2021 0.3 0.2 - 1.3 mg/dL Final     Alkaline Phosphatase   Date Value Ref Range Status   05/19/2022 112 40 - 150 U/L Final   06/29/2021 73 40 - 150 U/L Final     ALT   Date Value Ref Range Status   05/19/2022 69 0 - 70 U/L Final   06/29/2021 34 0 - 70 U/L Final     AST   Date Value Ref Range Status   05/19/2022 41 0 - 45 U/L Final   06/29/2021 26 0 - 45 U/L Final     PATHOLOGY:  None new.    IMAGING:  Reviewed as per HPI.    ASSESSMENT/PLAN:  Kt Rasmussen is a 61 year old male with the following issues:  1. Metastatic non-small (non-squamous) cell lung carcinoma with metastases to lymph nodes, bones, and bilateral lungs  2. 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 Chemotherapy-induced pancytopenia  --Lung NGS panel negative for mutations in BRAF, EGFR, ERBB2, IDH1, IDH2, KRAS, MET, NRAS, RET. PD-L1 expression negative (TPS <1%). Guardant 360 negative for targetable mutations.  --Kt started 2nd metastatic therapy with every 3-week  pemetrexed and carboplatin on 4/21/2022. He has had challenges with chemo-induced pancytopenia which necessitated dose reduction in carboplatin and now intermittent dizziness.  --Due to worsening pancytopenia and dizziness, will omit carboplatin going forward and continue on pemetrexed alone.  --Plan to repeat PET scan after cycle 5.       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 Dysphonia  6. Dysphagia  -Kt underwent excision of a left vocal cord SCC on 9/30/2019 and has persistent dysphonia as a result of the lesion and excision.  He also has dysphagia but this is stable and swallowing is manageable.  -Last scope by ENT 9/23/2021 showed no evidence for recurrence.    7. Weight loss  --I offered nutrition consult and he declined this.  --I recommended increasing his intake of higher caloric nutrient dense foods and beverages.    8. Dizziness and prior falls  --SW and guardian were notified of multiple falls.  --Prior brain MRI 2/14/2022 showed no brain metastases. However this was a suboptimal exam due to inability to administer contrast.  --He has not had recent falls and is using a wheelchair due to prior femoral fracture in 1/2022 but current intermittent dizziness is likely related to worsening anemia and chemotherapy. As noted above, will omit carboplatin going forward.    Sangita John MD  Hematology/Oncology  [facility]    Total time spent: 40 minutes in patient evaluation, counseling, documentation, and coordination of care.

## 2022-05-23 ENCOUNTER — TELEPHONE (OUTPATIENT)
Dept: ONCOLOGY | Facility: CLINIC | Age: 62
End: 2022-05-23

## 2022-05-23 NOTE — TELEPHONE ENCOUNTER
Patient called the clinic with concerns about transportation. He would like to be contacted please

## 2022-05-24 NOTE — TELEPHONE ENCOUNTER
LMTCB re concerns about transportation.    Appointment updates faxed and emailed to JAYASHREE Cam to arrange transportation for Kt.    Tari Clarke RN     Domenico Cisneros

## 2022-06-02 ENCOUNTER — LAB (OUTPATIENT)
Dept: INFUSION THERAPY | Facility: CLINIC | Age: 62
End: 2022-06-02
Attending: INTERNAL MEDICINE
Payer: MEDICARE

## 2022-06-02 ENCOUNTER — ONCOLOGY VISIT (OUTPATIENT)
Dept: ONCOLOGY | Facility: CLINIC | Age: 62
End: 2022-06-02
Attending: INTERNAL MEDICINE
Payer: MEDICARE

## 2022-06-02 VITALS
SYSTOLIC BLOOD PRESSURE: 123 MMHG | TEMPERATURE: 98.2 F | HEIGHT: 76 IN | OXYGEN SATURATION: 99 % | BODY MASS INDEX: 21.05 KG/M2 | HEART RATE: 77 BPM | DIASTOLIC BLOOD PRESSURE: 78 MMHG | RESPIRATION RATE: 16 BRPM

## 2022-06-02 DIAGNOSIS — Z51.11 ENCOUNTER FOR ANTINEOPLASTIC CHEMOTHERAPY: ICD-10-CM

## 2022-06-02 DIAGNOSIS — C79.51 NON-SMALL CELL LUNG CANCER METASTATIC TO BONE (H): ICD-10-CM

## 2022-06-02 DIAGNOSIS — T45.1X5A ANEMIA DUE TO CHEMOTHERAPY: ICD-10-CM

## 2022-06-02 DIAGNOSIS — C34.90 NON-SMALL CELL CARCINOMA OF LUNG, STAGE 3, UNSPECIFIED LATERALITY (H): ICD-10-CM

## 2022-06-02 DIAGNOSIS — C34.90 NON-SMALL CELL LUNG CANCER METASTATIC TO BONE (H): ICD-10-CM

## 2022-06-02 DIAGNOSIS — T45.1X5A CHEMOTHERAPY-INDUCED THROMBOCYTOPENIA: ICD-10-CM

## 2022-06-02 DIAGNOSIS — D70.1 CHEMOTHERAPY-INDUCED NEUTROPENIA (H): Primary | ICD-10-CM

## 2022-06-02 DIAGNOSIS — T45.1X5A CHEMOTHERAPY-INDUCED NEUTROPENIA (H): ICD-10-CM

## 2022-06-02 DIAGNOSIS — T45.1X5A CHEMOTHERAPY-INDUCED NEUTROPENIA (H): Primary | ICD-10-CM

## 2022-06-02 DIAGNOSIS — D70.1 CHEMOTHERAPY-INDUCED NEUTROPENIA (H): ICD-10-CM

## 2022-06-02 DIAGNOSIS — D69.59 CHEMOTHERAPY-INDUCED THROMBOCYTOPENIA: ICD-10-CM

## 2022-06-02 DIAGNOSIS — D64.81 ANEMIA DUE TO CHEMOTHERAPY: ICD-10-CM

## 2022-06-02 DIAGNOSIS — C79.51 NON-SMALL CELL LUNG CANCER METASTATIC TO BONE (H): Primary | ICD-10-CM

## 2022-06-02 DIAGNOSIS — C34.90 NON-SMALL CELL LUNG CANCER METASTATIC TO BONE (H): Primary | ICD-10-CM

## 2022-06-02 LAB
BASOPHILS # BLD AUTO: 0 10E3/UL (ref 0–0.2)
BASOPHILS NFR BLD AUTO: 0 %
CREAT SERPL-MCNC: 0.82 MG/DL (ref 0.66–1.25)
EOSINOPHIL # BLD AUTO: 0 10E3/UL (ref 0–0.7)
EOSINOPHIL NFR BLD AUTO: 1 %
ERYTHROCYTE [DISTWIDTH] IN BLOOD BY AUTOMATED COUNT: 20.5 % (ref 10–15)
GFR SERPL CREATININE-BSD FRML MDRD: >90 ML/MIN/1.73M2
HCT VFR BLD AUTO: 25.5 % (ref 40–53)
HGB BLD-MCNC: 7.9 G/DL (ref 13.3–17.7)
IMM GRANULOCYTES # BLD: 0.1 10E3/UL
IMM GRANULOCYTES NFR BLD: 2 %
LYMPHOCYTES # BLD AUTO: 1 10E3/UL (ref 0.8–5.3)
LYMPHOCYTES NFR BLD AUTO: 12 %
MCH RBC QN AUTO: 27.8 PG (ref 26.5–33)
MCHC RBC AUTO-ENTMCNC: 31 G/DL (ref 31.5–36.5)
MCV RBC AUTO: 90 FL (ref 78–100)
MONOCYTES # BLD AUTO: 0.9 10E3/UL (ref 0–1.3)
MONOCYTES NFR BLD AUTO: 11 %
NEUTROPHILS # BLD AUTO: 6 10E3/UL (ref 1.6–8.3)
NEUTROPHILS NFR BLD AUTO: 74 %
NRBC # BLD AUTO: 0 10E3/UL
NRBC BLD AUTO-RTO: 0 /100
PLATELET # BLD AUTO: 200 10E3/UL (ref 150–450)
RBC # BLD AUTO: 2.84 10E6/UL (ref 4.4–5.9)
WBC # BLD AUTO: 8.1 10E3/UL (ref 4–11)

## 2022-06-02 PROCEDURE — 258N000003 HC RX IP 258 OP 636: Performed by: INTERNAL MEDICINE

## 2022-06-02 PROCEDURE — 96413 CHEMO IV INFUSION 1 HR: CPT

## 2022-06-02 PROCEDURE — 250N000011 HC RX IP 250 OP 636: Performed by: INTERNAL MEDICINE

## 2022-06-02 PROCEDURE — 82565 ASSAY OF CREATININE: CPT | Performed by: INTERNAL MEDICINE

## 2022-06-02 PROCEDURE — G0463 HOSPITAL OUTPT CLINIC VISIT: HCPCS

## 2022-06-02 PROCEDURE — 99215 OFFICE O/P EST HI 40 MIN: CPT | Performed by: INTERNAL MEDICINE

## 2022-06-02 PROCEDURE — 258N000003 HC RX IP 258 OP 636: Performed by: NURSE PRACTITIONER

## 2022-06-02 PROCEDURE — 85025 COMPLETE CBC W/AUTO DIFF WBC: CPT | Performed by: INTERNAL MEDICINE

## 2022-06-02 PROCEDURE — 96367 TX/PROPH/DG ADDL SEQ IV INF: CPT

## 2022-06-02 RX ORDER — METHYLPREDNISOLONE SODIUM SUCCINATE 125 MG/2ML
125 INJECTION, POWDER, LYOPHILIZED, FOR SOLUTION INTRAMUSCULAR; INTRAVENOUS
Status: CANCELLED
Start: 2022-06-02

## 2022-06-02 RX ORDER — HEPARIN SODIUM,PORCINE 10 UNIT/ML
5 VIAL (ML) INTRAVENOUS
Status: CANCELLED | OUTPATIENT
Start: 2022-06-02

## 2022-06-02 RX ORDER — ALBUTEROL SULFATE 90 UG/1
1-2 AEROSOL, METERED RESPIRATORY (INHALATION)
Status: CANCELLED
Start: 2022-06-02

## 2022-06-02 RX ORDER — NALOXONE HYDROCHLORIDE 0.4 MG/ML
0.2 INJECTION, SOLUTION INTRAMUSCULAR; INTRAVENOUS; SUBCUTANEOUS
Status: CANCELLED | OUTPATIENT
Start: 2022-06-02

## 2022-06-02 RX ORDER — LORAZEPAM 2 MG/ML
0.5 INJECTION INTRAMUSCULAR EVERY 4 HOURS PRN
Status: CANCELLED | OUTPATIENT
Start: 2022-06-02

## 2022-06-02 RX ORDER — MEPERIDINE HYDROCHLORIDE 25 MG/ML
25 INJECTION INTRAMUSCULAR; INTRAVENOUS; SUBCUTANEOUS EVERY 30 MIN PRN
Status: CANCELLED | OUTPATIENT
Start: 2022-06-02

## 2022-06-02 RX ORDER — EPINEPHRINE 1 MG/ML
0.3 INJECTION, SOLUTION INTRAMUSCULAR; SUBCUTANEOUS EVERY 5 MIN PRN
Status: CANCELLED | OUTPATIENT
Start: 2022-06-02

## 2022-06-02 RX ORDER — FOLIC ACID 1 MG/1
1000 TABLET ORAL DAILY
Qty: 30 TABLET | Refills: 3 | Status: SHIPPED | OUTPATIENT
Start: 2022-06-02 | End: 2022-07-14

## 2022-06-02 RX ORDER — HEPARIN SODIUM (PORCINE) LOCK FLUSH IV SOLN 100 UNIT/ML 100 UNIT/ML
5 SOLUTION INTRAVENOUS
Status: DISCONTINUED | OUTPATIENT
Start: 2022-06-02 | End: 2022-06-02 | Stop reason: HOSPADM

## 2022-06-02 RX ORDER — ONDANSETRON 2 MG/ML
8 INJECTION INTRAMUSCULAR; INTRAVENOUS EVERY 6 HOURS PRN
Status: CANCELLED
Start: 2022-06-02

## 2022-06-02 RX ORDER — HEPARIN SODIUM (PORCINE) LOCK FLUSH IV SOLN 100 UNIT/ML 100 UNIT/ML
5 SOLUTION INTRAVENOUS
Status: CANCELLED | OUTPATIENT
Start: 2022-06-02

## 2022-06-02 RX ORDER — DIPHENHYDRAMINE HYDROCHLORIDE 50 MG/ML
50 INJECTION INTRAMUSCULAR; INTRAVENOUS
Status: CANCELLED
Start: 2022-06-02

## 2022-06-02 RX ORDER — ALBUTEROL SULFATE 0.83 MG/ML
2.5 SOLUTION RESPIRATORY (INHALATION)
Status: CANCELLED | OUTPATIENT
Start: 2022-06-02

## 2022-06-02 RX ADMIN — DEXAMETHASONE SODIUM PHOSPHATE: 10 INJECTION, SOLUTION INTRAMUSCULAR; INTRAVENOUS at 12:42

## 2022-06-02 RX ADMIN — SODIUM CHLORIDE 1000 MG: 9 INJECTION, SOLUTION INTRAVENOUS at 13:06

## 2022-06-02 RX ADMIN — Medication 5 ML: at 13:56

## 2022-06-02 RX ADMIN — SODIUM CHLORIDE 250 ML: 9 INJECTION, SOLUTION INTRAVENOUS at 12:45

## 2022-06-02 RX ADMIN — SODIUM CHLORIDE 1000 ML: 9 INJECTION, SOLUTION INTRAVENOUS at 12:30

## 2022-06-02 ASSESSMENT — PAIN SCALES - GENERAL: PAINLEVEL: NO PAIN (0)

## 2022-06-02 NOTE — PROGRESS NOTES
"Oncology Rooming Note    June 2, 2022 11:47 AM   Kt Rasmussen is a 61 year old male who presents for:    Chief Complaint   Patient presents with     Oncology Clinic Visit     Initial Vitals: There were no vitals taken for this visit. Estimated body mass index is 21.33 kg/m  as calculated from the following:    Height as of 4/28/22: 1.905 m (6' 3\").    Weight as of 5/12/22: 77.4 kg (170 lb 10.2 oz). There is no height or weight on file to calculate BSA.  Data Unavailable Comment: Data Unavailable   No LMP for male patient.  Allergies reviewed: Yes  Medications reviewed: Yes    Medications: Medication refills not needed today.  Pharmacy name entered into EPIC:    [facility] [place] [address] [facility] [place], [place] - [address] [id number] - [facility] [address] AT [facility] & [facility] [address]    Clinical concerns:  doctor was notified.      Floridalma Nieves MA            "

## 2022-06-02 NOTE — PATIENT INSTRUCTIONS
Proceed with Alimta alone today; carboplatin omitted going forward.  RTC MD 7/21 with PET scan prior to visit.

## 2022-06-02 NOTE — PROGRESS NOTES
Infusion Nursing Note:  Kt Rasmussen presents today for port labs.    Patient seen by provider today: Yes: Dr. John   present during visit today: Not Applicable.    Note: N/A.    Intravenous Access:  Labs drawn without difficulty.  Implanted Port.    Treatment Conditions:  Not Applicable. Labs pending at time of discharge.      Post Infusion Assessment:  Site patent and intact, free from redness, edema or discomfort.  No evidence of extravasations.  Port access left in place for infusion today.      Discharge Plan:   Patient discharged in stable condition accompanied by: self.  Departure Mode: Wheelchair to lobby for clinic visit.    Mackenzie Campa RN

## 2022-06-02 NOTE — LETTER
6/2/2022         RE: Kt Rasmussen  [id number]  [place] [address]        Dear Colleague,    Thank you for referring your patient, Kt Rasmussen, to the [facility]. Please see a copy of my visit note below.    [facility]    Hematology/Oncology Established Patient Follow-up Note      Today's Date: 6/2/2022    Reason for Follow-up: Metastatic non-small cell lung carcinoma.    HISTORY OF PRESENT ILLNESS: Kt Rasmussen is a 61 year old male who presents with the following oncologic history:  1. 5/05/2016: Presented with near-obstructing large mass coming off the cheikh and likely the posterior wall, seen on bronchoscopy. Biopsy of the mass showed invasive squamous cell carcinoma, moderately differentiating and focally keratinizing.  Procedure was complicated by significant bleeding.  Tumor was completely debulked (via bronchoscopy) in both main stem bronchi and distal trachea. NGS showed no mutations in EGFR, KRAS, BRAF, NRAS, HRAS, PIK3CA, ERBB2, MET, or JAK2.  2. 5/23/2016: PET/CT scan showed evidence of residual tumor with decreased endobronchial mass. Indeterminate, mildly hypermetabolic mesentery with prominent lymph nodes and area of enhancement of the right hepatic lobe present.   Felt to have T4-NX-M0 disease.  3. 6/09/2016: Started concurrent chemoradiation with weekly paclitaxel and carboplatin.  4. 7/30/2016: Completed radiation.  Subsequently received 2 cycles of consolidation paclitaxel and carboplatin.   5. 9/13/2019: Presented with 6-month history of dysphonia and left vocal exophytic lesion. Left true vocal fold biopsy showed at least squamous cell carcinoma in situ, cannot exclude superficial invasion.  6. 9/30/2019: Excision of left vocal cord mass showed fragments of invasive squamous cell carcinoma, well differentiated and keratinizing.  Margins negative for malignancy.  7. 2/16/2021: CT chest w/o contrast showed thin-walled cavity in left  lower lobe with 2 solid peripheral nodules measuring 9 mm each. No lymphadenopathy.  8. 3/01/2021: PET scan showed hypermetabolic nodules in left lower lobe and right lung, consistent with metastases; hypermetabolic adenopathy in chest, abdomen, pelvis; nonspecific uptake at tongue; hypermetabolic intraosseous lesions in spine and pelvis consistent with metastatic disease; left axillary hypermetabolic lymph nodes related to COVID-19 vaccination.  9. 3/12/2021: CT-guided lung biopsy showed non-small cell carcinoma, not otherwise specified -- with opinion by [facility] pathologist.  10. 3/18/2021: Lung NGS panel negative for mutations in BRAF, EGFR, ERBB2, IDH1, IDH2, KRAS, MET, NRAS, RET. PD-L1 expression negative (TPS <1%). Due to minimal amount of DNA obtained from specimen, other biomarkers could not be analyzed.  11. 4/7/2021: MRI brain negative for brain metastases.  12. 4/27/2021: Started 1st line metastatic therapy with carboplatin, paclitaxel, bevacizumab, and atezolizumab for metastatic non-small cell lung carcinoma, NOS.  13. 7/12/2021: PET/CT showed resolved mural nodules with increased cystic cavity in left lower lobe with no significant FDG uptake, less likely metastases; no enlarged hypermetabolic mediastinal, hilar, or axillary lymph nodes, decreased metabolic activity of intraosseous lesion in spine and pelvis; no new bone lesions.  14. 10/11/2021: PET/CT showed slight interval increase in intensity of metabolic activity of right pubic bone and left ischium with new focal uptake in L2 spinous process; resolved uptake at previous ground glass opacity along right medial lower lobe; unchanged cystic cavities/nodules in left lower lobe and right apical upper lobe; no pathologically enlarged hypermetabolic mediastinal, hilar, or axillary lymph nodes.  15. 1/10/2022: PET/CT showed new foci of abnormal skeletal FGD uptake in the thoracic and lumbar spine. Mild interval increase in intensity of previously  demonstrated hypermetabolic skeletal lesions involving the pelvis and lumbar spine. Findings are consistent with progressive osseous metastatic disease. Subsequently off chemo for 1 month due to poor performance status.  16. 1/25/2022: Patient fell and fractured his femur. This was surgically repaired and he was subsequently cared for at a rehab unit.  17. 2/14/2022: Brain MRI without contrast (contrast not given due to inability to obtain IV access) showed no brain metastases.  18. 4/21/2022: Started 2nd line metastatic therapy with pemetrexed and carboplatin. Omission of carboplatin 6/2 and forward due to worsening anemia despite dose reduction.    INTERIM HISTORY:  Kt reports intermittent dizziness. He reports his energy level is fair. He is using a wheelchair.    REVIEW OF SYSTEMS:   14 point ROS was reviewed and is negative other than as noted above in HPI.       HOME MEDICATIONS:  Current Outpatient Medications   Medication Sig Dispense Refill     atorvastatin (LIPITOR) 40 MG tablet Take 40 mg by mouth daily       dexamethasone (DECADRON) 4 MG tablet Take 1 tablet (4 mg) by mouth 2 times daily (with meals) for 3 days Start one day prior to Pemetrexed (Alimta), continue on the day treatment, and the day following Pemetrexed. 6 tablet 3     folic acid (FOLVITE) 1 MG tablet Take 1 tablet (1,000 mcg) by mouth daily Begin 7 days before Pemetrexed (Alimta) starts and continue for 21 days after Pemetrexed is discontinued. 30 tablet 3     ondansetron (ZOFRAN) 8 MG tablet Take 1 tablet (8 mg) by mouth every 8 hours as needed for nausea 10 tablet 3     prochlorperazine (COMPAZINE) 10 MG tablet Take 1 tablet (10 mg) by mouth every 6 hours as needed (Nausea/Vomiting) 30 tablet 3         ALLERGIES:  Allergies   Allergen Reactions     No Known Drug Allergies          PAST MEDICAL HISTORY:  Past Medical History:   Diagnosis Date     Alcohol abuse, unspecified      Cerebral infarction (H)     2009, right side residual and  aphasia     Dyslipidemia      GERD (gastroesophageal reflux disease)      Lung cancer (H)      Unspecified essential hypertension          PAST SURGICAL HISTORY:  Past Surgical History:   Procedure Laterality Date     BRONCHOSCOPY FLEXIBLE AND RIGID N/A 2016    Procedure: BRONCHOSCOPY FLEXIBLE AND RIGID;  Surgeon: Tony Talbot MD;  Location: [facility] OR     ESOPHAGOSCOPY FLEXIBLE N/A 2019    Procedure: flexible esophagoscopy;  Surgeon: Lizzy Johnson MD;  Location: [facility] OR     INJECT STEROID (LOCATION) N/A 2019    Procedure: steroid injection;  Surgeon: Lizzy Johnson MD;  Location: [facility] OR     IR CHEST PORT PLACEMENT > 5 YRS OF AGE  2021     IR CHEST PORT PLACEMENT > 5 YRS OF AGE  2022     IR PORT CHECK RIGHT  2022     IR PORT REMOVAL RIGHT  2022     LASER CO2 LARYNGOSCOPY N/A 2019    Procedure: Microdirect laryngoscopy with excision of laryngeal mass, CO2 laser;  Surgeon: Lizzy Johnson MD;  Location: [facility] OR     ZZC NONSPECIFIC PROCEDURE      R tympanoplasty         SOCIAL HISTORY:  Social History     Socioeconomic History     Marital status: Single     Spouse name: Not on file     Number of children: Not on file     Years of education: Not on file     Highest education level: Not on file   Occupational History     Not on file   Tobacco Use     Smoking status: Former Smoker     Packs/day: 1.00     Types: Cigarettes     Quit date: 2009     Years since quittin.6     Smokeless tobacco: Never Used   Substance and Sexual Activity     Alcohol use: Not Currently     Drug use: No     Sexual activity: Not on file   Other Topics Concern     Parent/sibling w/ CABG, MI or angioplasty before 65F 55M? Not Asked   Social History Narrative     Not on file     Social Determinants of Health     Financial Resource Strain: Not on file   Food Insecurity: Not on file   Transportation Needs: Not on file   Physical Activity: Not on file   Stress: Not on file   Social Connections:  "Not on file   Intimate Partner Violence: Not on file   Housing Stability: Not on file   Resides at assisted living.      FAMILY HISTORY:  Family History   Problem Relation Age of Onset     Cancer Father          PHYSICAL EXAM:  Vital signs:  /78   Pulse 77   Temp 98.2  F (36.8  C)   Resp 16   Ht 1.918 m (6' 3.5\")   SpO2 99%   BMI 21.05 kg/m     ECO  GENERAL/CONSTITUTIONAL: No acute distress.  EYES: No erythema or scleral icterus.  LYMPH: No cervical, supraclavicular, axillary adenopathy.   RESPIRATORY: Clear to auscultation bilaterally. No crackles or wheezing.   CARDIOVASCULAR: Regular rate and rhythm without murmurs.  GASTROINTESTINAL: No hepatosplenomegaly, masses, or tenderness. No guarding.  No distention.  MUSCULOSKELETAL: Warm and well-perfused, no cyanosis, clubbing, or edema.  NEUROLOGIC: Residual right arm and right leg reduced strength due to prior stroke. Alert, oriented, answers questions appropriately. Dysphonia present and stable.  INTEGUMENTARY: Sunburn and skin peeling changes over bilateral upper and lower extremities.  GAIT: Sitting in wheelchair.      LABS:  CBC RESULTS: Recent Labs   Lab Test 22  1110   WBC 8.1   RBC 2.84*   HGB 7.9*   HCT 25.5*   MCV 90   MCH 27.8   MCHC 31.0*   RDW 20.5*        Last Comprehensive Metabolic Panel:  Sodium   Date Value Ref Range Status   2022 139 133 - 144 mmol/L Final   2021 140 133 - 144 mmol/L Final     Potassium   Date Value Ref Range Status   2022 3.6 3.4 - 5.3 mmol/L Final   2021 4.3 3.4 - 5.3 mmol/L Final     Chloride   Date Value Ref Range Status   2022 104 94 - 109 mmol/L Final   2021 111 (H) 94 - 109 mmol/L Final     Carbon Dioxide   Date Value Ref Range Status   2021 25 20 - 32 mmol/L Final     Carbon Dioxide (CO2)   Date Value Ref Range Status   2022 27 20 - 32 mmol/L Final     Anion Gap   Date Value Ref Range Status   2022 8 3 - 14 mmol/L Final   2021 4 3 - 14 " mmol/L Final     Glucose   Date Value Ref Range Status   05/19/2022 125 (H) 70 - 99 mg/dL Final   06/29/2021 85 70 - 99 mg/dL Final     Urea Nitrogen   Date Value Ref Range Status   05/19/2022 24 7 - 30 mg/dL Final   06/29/2021 17 7 - 30 mg/dL Final     Creatinine   Date Value Ref Range Status   06/02/2022 0.82 0.66 - 1.25 mg/dL Final   06/29/2021 0.84 0.66 - 1.25 mg/dL Final     GFR Estimate   Date Value Ref Range Status   06/02/2022 >90 >60 mL/min/1.73m2 Final     Comment:     Effective December 21, 2021 eGFRcr in adults is calculated using the 2021 CKD-EPI creatinine equation which includes age and gender (Ceci et al., NEJ, DOI: 10.1056/ZDCFkr6686535)   06/29/2021 >90 >60 mL/min/[1.73_m2] Final     Comment:     Non  GFR Calc  Starting 12/18/2018, serum creatinine based estimated GFR (eGFR) will be   calculated using the Chronic Kidney Disease Epidemiology Collaboration   (CKD-EPI) equation.       Calcium   Date Value Ref Range Status   05/19/2022 8.4 (L) 8.5 - 10.1 mg/dL Final   06/29/2021 8.7 8.5 - 10.1 mg/dL Final     Bilirubin Total   Date Value Ref Range Status   05/19/2022 0.6 0.2 - 1.3 mg/dL Final   06/29/2021 0.3 0.2 - 1.3 mg/dL Final     Alkaline Phosphatase   Date Value Ref Range Status   05/19/2022 112 40 - 150 U/L Final   06/29/2021 73 40 - 150 U/L Final     ALT   Date Value Ref Range Status   05/19/2022 69 0 - 70 U/L Final   06/29/2021 34 0 - 70 U/L Final     AST   Date Value Ref Range Status   05/19/2022 41 0 - 45 U/L Final   06/29/2021 26 0 - 45 U/L Final     PATHOLOGY:  None new.    IMAGING:  Reviewed as per HPI.    ASSESSMENT/PLAN:  Kt Rasmussen is a 61 year old male with the following issues:  1. Metastatic non-small (non-squamous) cell lung carcinoma with metastases to lymph nodes, bones, and bilateral lungs  2. 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 Chemotherapy-induced  pancytopenia  --Lung NGS panel negative for mutations in BRAF, EGFR, ERBB2, IDH1, IDH2, KRAS, MET, NRAS, RET. PD-L1 expression negative (TPS <1%). Guardant 360 negative for targetable mutations.  --Kt started 2nd metastatic therapy with every 3-week pemetrexed and carboplatin on 4/21/2022. He has had challenges with chemo-induced pancytopenia which necessitated dose reduction in carboplatin and now intermittent dizziness.  --Due to worsening pancytopenia and dizziness, will omit carboplatin going forward and continue on pemetrexed alone.  --Plan to repeat PET scan after cycle 5.       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 Dysphonia  6. Dysphagia  -Kt underwent excision of a left vocal cord SCC on 9/30/2019 and has persistent dysphonia as a result of the lesion and excision.  He also has dysphagia but this is stable and swallowing is manageable.  -Last scope by ENT 9/23/2021 showed no evidence for recurrence.    7. Weight loss  --I offered nutrition consult and he declined this.  --I recommended increasing his intake of higher caloric nutrient dense foods and beverages.    8. Dizziness and prior falls  --SW and guardian were notified of multiple falls.  --Prior brain MRI 2/14/2022 showed no brain metastases. However this was a suboptimal exam due to inability to administer contrast.  --He has not had recent falls and is using a wheelchair due to prior femoral fracture in 1/2022 but current intermittent dizziness is likely related to worsening anemia and chemotherapy. As noted above, will omit carboplatin going forward.    Sangita John MD  Hematology/Oncology  [facility]    Total time spent: 40 minutes in patient evaluation, counseling, documentation, and coordination of care.    Oncology Rooming Note    June 2, 2022 11:47 AM   Kt Rasmussen is a 61 year old male who presents for:    Chief Complaint   Patient presents with     Oncology Clinic Visit     Initial Vitals:  "There were no vitals taken for this visit. Estimated body mass index is 21.33 kg/m  as calculated from the following:    Height as of 4/28/22: 1.905 m (6' 3\").    Weight as of 5/12/22: 77.4 kg (170 lb 10.2 oz). There is no height or weight on file to calculate BSA.  Data Unavailable Comment: Data Unavailable   No LMP for male patient.  Allergies reviewed: Yes  Medications reviewed: Yes    Medications: Medication refills not needed today.  Pharmacy name entered into EPIC:    [facility] [place] [facility] [address] [facility], [place] - [address] [id number] - [facility] [address] [address] AT [facility] & [facility] 7    Clinical concerns:  doctor was notified.      Floridalma Nieves MA                Again, thank you for allowing me to participate in the care of your patient.        Sincerely,        Sangita John MD    "

## 2022-06-02 NOTE — PROGRESS NOTES
Infusion Nursing Note:  Kt HERNANDEZ Valery presents today for C3D1 alimta only (carbo discontinued)  Patient seen by provider today: Yes: Dr. John   present during visit today: Not Applicable.    Note: Per Dr. John, Onpro discontinued along with carboplatin.      Intravenous Access:  Implanted Port.    Treatment Conditions:  Lab Results   Component Value Date    HGB 7.9 (L) 06/02/2022    WBC 8.1 06/02/2022    ANEU 2.4 05/19/2022    ANEUTAUTO 6.0 06/02/2022     06/02/2022      Lab Results   Component Value Date     05/19/2022    POTASSIUM 3.6 05/19/2022    MAG 1.9 10/13/2016    CR 0.82 06/02/2022    BEVERLY 8.4 (L) 05/19/2022    BILITOTAL 0.6 05/19/2022    ALBUMIN 3.3 (L) 05/19/2022    ALT 69 05/19/2022    AST 41 05/19/2022     Results reviewed, labs MET treatment parameters, ok to proceed with treatment.      Post Infusion Assessment:  Patient tolerated infusion without incident.  Blood return noted pre and post infusion.  Site patent and intact, free from redness, edema or discomfort.  No evidence of extravasations.  Access discontinued per protocol.       Discharge Plan:   Copy of AVS reviewed with patient and/or family.  Patient will return as [facility] next week for next appointment.  Patient discharged in stable condition accompanied by: self.  Departure Mode: Wheelchair.      Guzman Patel RN

## 2022-06-09 ENCOUNTER — INFUSION THERAPY VISIT (OUTPATIENT)
Dept: INFUSION THERAPY | Facility: CLINIC | Age: 62
End: 2022-06-09
Attending: NURSE PRACTITIONER
Payer: MEDICARE

## 2022-06-09 VITALS
TEMPERATURE: 98.2 F | RESPIRATION RATE: 16 BRPM | SYSTOLIC BLOOD PRESSURE: 121 MMHG | HEART RATE: 83 BPM | DIASTOLIC BLOOD PRESSURE: 74 MMHG

## 2022-06-09 DIAGNOSIS — T45.1X5A CHEMOTHERAPY-INDUCED NEUTROPENIA (H): Primary | ICD-10-CM

## 2022-06-09 DIAGNOSIS — C34.90 NON-SMALL CELL CARCINOMA OF LUNG, STAGE 3, UNSPECIFIED LATERALITY (H): ICD-10-CM

## 2022-06-09 DIAGNOSIS — D70.1 CHEMOTHERAPY-INDUCED NEUTROPENIA (H): Primary | ICD-10-CM

## 2022-06-09 PROCEDURE — 258N000003 HC RX IP 258 OP 636: Performed by: NURSE PRACTITIONER

## 2022-06-09 PROCEDURE — 96360 HYDRATION IV INFUSION INIT: CPT

## 2022-06-09 PROCEDURE — 250N000011 HC RX IP 250 OP 636: Performed by: INTERNAL MEDICINE

## 2022-06-09 RX ORDER — HEPARIN SODIUM (PORCINE) LOCK FLUSH IV SOLN 100 UNIT/ML 100 UNIT/ML
5 SOLUTION INTRAVENOUS
Status: DISCONTINUED | OUTPATIENT
Start: 2022-06-09 | End: 2022-06-09 | Stop reason: HOSPADM

## 2022-06-09 RX ORDER — HEPARIN SODIUM,PORCINE 10 UNIT/ML
5 VIAL (ML) INTRAVENOUS
Status: CANCELLED | OUTPATIENT
Start: 2022-06-09

## 2022-06-09 RX ORDER — HEPARIN SODIUM,PORCINE 10 UNIT/ML
5 VIAL (ML) INTRAVENOUS
Status: DISCONTINUED | OUTPATIENT
Start: 2022-06-09 | End: 2022-06-09 | Stop reason: HOSPADM

## 2022-06-09 RX ORDER — ONDANSETRON 2 MG/ML
8 INJECTION INTRAMUSCULAR; INTRAVENOUS EVERY 6 HOURS PRN
Status: CANCELLED
Start: 2022-06-09

## 2022-06-09 RX ORDER — HEPARIN SODIUM (PORCINE) LOCK FLUSH IV SOLN 100 UNIT/ML 100 UNIT/ML
5 SOLUTION INTRAVENOUS
Status: CANCELLED | OUTPATIENT
Start: 2022-06-09

## 2022-06-09 RX ADMIN — SODIUM CHLORIDE 1000 ML: 9 INJECTION, SOLUTION INTRAVENOUS at 13:29

## 2022-06-09 RX ADMIN — Medication 5 ML: at 14:30

## 2022-06-09 ASSESSMENT — PAIN SCALES - GENERAL: PAINLEVEL: NO PAIN (0)

## 2022-06-09 NOTE — PROGRESS NOTES
Infusion Nursing Note:  Kt Rasmussen presents today for IVF.    Patient seen by provider today: No   present during visit today: Not Applicable.    Note: pt denies any nausea or other new concerns or issues.      Intravenous Access:  Implanted Port.    Treatment Conditions:  Not Applicable.      Post Infusion Assessment:  Patient tolerated infusion without incident.       Discharge Plan:   Patient and/or family verbalized understanding of discharge instructions and all questions answered.  Copy of AVS reviewed with patient and/or family.  Patient will return 6/20 for next appointment.  Patient discharged in stable condition accompanied by: self.  Departure Mode: Ambulatory.      Arin Keith RN

## 2022-06-13 ENCOUNTER — DOCUMENTATION ONLY (OUTPATIENT)
Dept: ONCOLOGY | Facility: CLINIC | Age: 62
End: 2022-06-13
Payer: MEDICARE

## 2022-06-13 NOTE — NURSING NOTE
Faxed and emailed upcoming clinic appt schedule from 6/20/22-7/30/22 to Dorina Liu RN to arrange transportation for Kt.    Tari Clarke RN

## 2022-06-20 ENCOUNTER — INFUSION THERAPY VISIT (OUTPATIENT)
Dept: INFUSION THERAPY | Facility: CLINIC | Age: 62
End: 2022-06-20
Attending: NURSE PRACTITIONER
Payer: MEDICARE

## 2022-06-20 ENCOUNTER — ONCOLOGY VISIT (OUTPATIENT)
Dept: ONCOLOGY | Facility: CLINIC | Age: 62
End: 2022-06-20
Attending: NURSE PRACTITIONER
Payer: MEDICARE

## 2022-06-20 VITALS
SYSTOLIC BLOOD PRESSURE: 120 MMHG | TEMPERATURE: 98 F | RESPIRATION RATE: 18 BRPM | DIASTOLIC BLOOD PRESSURE: 66 MMHG | HEART RATE: 64 BPM | OXYGEN SATURATION: 99 %

## 2022-06-20 VITALS
DIASTOLIC BLOOD PRESSURE: 66 MMHG | HEART RATE: 64 BPM | TEMPERATURE: 98 F | RESPIRATION RATE: 18 BRPM | SYSTOLIC BLOOD PRESSURE: 120 MMHG

## 2022-06-20 DIAGNOSIS — Z51.11 ENCOUNTER FOR ANTINEOPLASTIC CHEMOTHERAPY: ICD-10-CM

## 2022-06-20 DIAGNOSIS — T45.1X5A CHEMOTHERAPY-INDUCED NEUTROPENIA (H): ICD-10-CM

## 2022-06-20 DIAGNOSIS — D70.1 CHEMOTHERAPY-INDUCED NEUTROPENIA (H): Primary | ICD-10-CM

## 2022-06-20 DIAGNOSIS — C34.90 NON-SMALL CELL LUNG CANCER METASTATIC TO BONE (H): ICD-10-CM

## 2022-06-20 DIAGNOSIS — C34.90 NON-SMALL CELL LUNG CANCER METASTATIC TO BONE (H): Primary | ICD-10-CM

## 2022-06-20 DIAGNOSIS — C79.51 NON-SMALL CELL LUNG CANCER METASTATIC TO BONE (H): Primary | ICD-10-CM

## 2022-06-20 DIAGNOSIS — C79.51 NON-SMALL CELL LUNG CANCER METASTATIC TO BONE (H): ICD-10-CM

## 2022-06-20 DIAGNOSIS — D70.1 CHEMOTHERAPY-INDUCED NEUTROPENIA (H): ICD-10-CM

## 2022-06-20 DIAGNOSIS — T45.1X5A CHEMOTHERAPY-INDUCED NEUTROPENIA (H): Primary | ICD-10-CM

## 2022-06-20 DIAGNOSIS — C34.90 NON-SMALL CELL CARCINOMA OF LUNG, STAGE 3, UNSPECIFIED LATERALITY (H): ICD-10-CM

## 2022-06-20 LAB
BASOPHILS # BLD AUTO: 0 10E3/UL (ref 0–0.2)
BASOPHILS NFR BLD AUTO: 0 %
CREAT SERPL-MCNC: 0.77 MG/DL (ref 0.66–1.25)
EOSINOPHIL # BLD AUTO: 0.2 10E3/UL (ref 0–0.7)
EOSINOPHIL NFR BLD AUTO: 4 %
ERYTHROCYTE [DISTWIDTH] IN BLOOD BY AUTOMATED COUNT: 21.6 % (ref 10–15)
GFR SERPL CREATININE-BSD FRML MDRD: >90 ML/MIN/1.73M2
HCT VFR BLD AUTO: 28.1 % (ref 40–53)
HGB BLD-MCNC: 8.5 G/DL (ref 13.3–17.7)
IMM GRANULOCYTES # BLD: 0 10E3/UL
IMM GRANULOCYTES NFR BLD: 1 %
LYMPHOCYTES # BLD AUTO: 0.9 10E3/UL (ref 0.8–5.3)
LYMPHOCYTES NFR BLD AUTO: 16 %
MCH RBC QN AUTO: 28.2 PG (ref 26.5–33)
MCHC RBC AUTO-ENTMCNC: 30.2 G/DL (ref 31.5–36.5)
MCV RBC AUTO: 93 FL (ref 78–100)
MONOCYTES # BLD AUTO: 0.6 10E3/UL (ref 0–1.3)
MONOCYTES NFR BLD AUTO: 11 %
NEUTROPHILS # BLD AUTO: 3.7 10E3/UL (ref 1.6–8.3)
NEUTROPHILS NFR BLD AUTO: 68 %
NRBC # BLD AUTO: 0 10E3/UL
NRBC BLD AUTO-RTO: 0 /100
PLATELET # BLD AUTO: 111 10E3/UL (ref 150–450)
RBC # BLD AUTO: 3.01 10E6/UL (ref 4.4–5.9)
WBC # BLD AUTO: 5.5 10E3/UL (ref 4–11)

## 2022-06-20 PROCEDURE — 258N000003 HC RX IP 258 OP 636: Performed by: NURSE PRACTITIONER

## 2022-06-20 PROCEDURE — 250N000011 HC RX IP 250 OP 636: Performed by: NURSE PRACTITIONER

## 2022-06-20 PROCEDURE — 96413 CHEMO IV INFUSION 1 HR: CPT

## 2022-06-20 PROCEDURE — 82565 ASSAY OF CREATININE: CPT | Performed by: INTERNAL MEDICINE

## 2022-06-20 PROCEDURE — 96372 THER/PROPH/DIAG INJ SC/IM: CPT | Performed by: NURSE PRACTITIONER

## 2022-06-20 PROCEDURE — 99214 OFFICE O/P EST MOD 30 MIN: CPT | Performed by: NURSE PRACTITIONER

## 2022-06-20 PROCEDURE — G0463 HOSPITAL OUTPT CLINIC VISIT: HCPCS | Mod: 25

## 2022-06-20 PROCEDURE — 85025 COMPLETE CBC W/AUTO DIFF WBC: CPT | Performed by: INTERNAL MEDICINE

## 2022-06-20 PROCEDURE — 250N000011 HC RX IP 250 OP 636: Performed by: INTERNAL MEDICINE

## 2022-06-20 PROCEDURE — 96367 TX/PROPH/DG ADDL SEQ IV INF: CPT

## 2022-06-20 RX ORDER — HEPARIN SODIUM (PORCINE) LOCK FLUSH IV SOLN 100 UNIT/ML 100 UNIT/ML
5 SOLUTION INTRAVENOUS
Status: CANCELLED | OUTPATIENT
Start: 2022-06-20

## 2022-06-20 RX ORDER — EPINEPHRINE 1 MG/ML
0.3 INJECTION, SOLUTION INTRAMUSCULAR; SUBCUTANEOUS EVERY 5 MIN PRN
Status: CANCELLED | OUTPATIENT
Start: 2022-06-20

## 2022-06-20 RX ORDER — NALOXONE HYDROCHLORIDE 0.4 MG/ML
0.2 INJECTION, SOLUTION INTRAMUSCULAR; INTRAVENOUS; SUBCUTANEOUS
Status: CANCELLED | OUTPATIENT
Start: 2022-06-20

## 2022-06-20 RX ORDER — HEPARIN SODIUM,PORCINE 10 UNIT/ML
5 VIAL (ML) INTRAVENOUS
Status: CANCELLED | OUTPATIENT
Start: 2022-06-20

## 2022-06-20 RX ORDER — HEPARIN SODIUM (PORCINE) LOCK FLUSH IV SOLN 100 UNIT/ML 100 UNIT/ML
5 SOLUTION INTRAVENOUS
Status: DISCONTINUED | OUTPATIENT
Start: 2022-06-20 | End: 2022-06-20 | Stop reason: HOSPADM

## 2022-06-20 RX ORDER — CYANOCOBALAMIN 1000 UG/ML
1000 INJECTION, SOLUTION INTRAMUSCULAR; SUBCUTANEOUS ONCE
Status: COMPLETED | OUTPATIENT
Start: 2022-06-20 | End: 2022-06-20

## 2022-06-20 RX ORDER — ALBUTEROL SULFATE 90 UG/1
1-2 AEROSOL, METERED RESPIRATORY (INHALATION)
Status: CANCELLED
Start: 2022-06-20

## 2022-06-20 RX ORDER — ONDANSETRON 2 MG/ML
8 INJECTION INTRAMUSCULAR; INTRAVENOUS EVERY 6 HOURS PRN
Status: CANCELLED
Start: 2022-06-20

## 2022-06-20 RX ORDER — MEPERIDINE HYDROCHLORIDE 25 MG/ML
25 INJECTION INTRAMUSCULAR; INTRAVENOUS; SUBCUTANEOUS EVERY 30 MIN PRN
Status: CANCELLED | OUTPATIENT
Start: 2022-06-20

## 2022-06-20 RX ORDER — CYANOCOBALAMIN 1000 UG/ML
1000 INJECTION, SOLUTION INTRAMUSCULAR; SUBCUTANEOUS ONCE
Status: CANCELLED
Start: 2022-06-20 | End: 2022-06-20

## 2022-06-20 RX ORDER — ALBUTEROL SULFATE 0.83 MG/ML
2.5 SOLUTION RESPIRATORY (INHALATION)
Status: CANCELLED | OUTPATIENT
Start: 2022-06-20

## 2022-06-20 RX ORDER — METHYLPREDNISOLONE SODIUM SUCCINATE 125 MG/2ML
125 INJECTION, POWDER, LYOPHILIZED, FOR SOLUTION INTRAMUSCULAR; INTRAVENOUS
Status: CANCELLED
Start: 2022-06-20

## 2022-06-20 RX ORDER — LORAZEPAM 2 MG/ML
0.5 INJECTION INTRAMUSCULAR EVERY 4 HOURS PRN
Status: CANCELLED | OUTPATIENT
Start: 2022-06-20

## 2022-06-20 RX ORDER — DIPHENHYDRAMINE HYDROCHLORIDE 50 MG/ML
50 INJECTION INTRAMUSCULAR; INTRAVENOUS
Status: CANCELLED
Start: 2022-06-20

## 2022-06-20 RX ADMIN — DEXAMETHASONE SODIUM PHOSPHATE: 10 INJECTION, SOLUTION INTRAMUSCULAR; INTRAVENOUS at 12:23

## 2022-06-20 RX ADMIN — SODIUM CHLORIDE 1000 MG: 9 INJECTION, SOLUTION INTRAVENOUS at 12:43

## 2022-06-20 RX ADMIN — Medication 5 ML: at 13:29

## 2022-06-20 RX ADMIN — CYANOCOBALAMIN 1000 MCG: 1000 INJECTION, SOLUTION INTRAMUSCULAR at 12:26

## 2022-06-20 RX ADMIN — SODIUM CHLORIDE 1000 ML: 9 INJECTION, SOLUTION INTRAVENOUS at 10:36

## 2022-06-20 ASSESSMENT — PAIN SCALES - GENERAL: PAINLEVEL: NO PAIN (0)

## 2022-06-20 NOTE — PROGRESS NOTES
Oncology/Hematology Visit Note  Jun 20, 2022    Reason for Visit: follow up of metastatic non-small cell lung carcinoma  Metastatic to lymph nodes bones in bilateral lungs  Brain MRI negative for mets    Patient met with Dr. John who recommended treatment with palliative intent with paclitaxel carboplatin Avastin and atezolizumab-treatment started on April 27, 2021  -Due to thrombocytopenia carboplatin AUC reduced to 4 with cycle 2  Due to pancytopenia carboplatin discontinued with cycle 5    7/12/2021: PET/CT showed resolved mural nodules with increased cystic cavity in left lower lobe with no significant FDG uptake, less likely metastases; no enlarged hypermetabolic mediastinal, hilar, or axillary lymph nodes, decreased metabolic activity of intraosseous lesion in spine and pelvis; no new bone lesions    Treated with paclitaxel, Avastin and atezolizumab.-Last treatment given in  12/22/2021-cycle 12    1/10/2022: PET/CT showed new foci of abnormal skeletal FGD uptake in the thoracic and lumbar spine. Mild interval increase in intensity of previously demonstrated hypermetabolic skeletal lesions involving the pelvis and lumbar spine. Findings are consistent with progressive osseous metastatic disease. Subsequently off chemo for 1 month due to poor performance status.  16. 1/25/2022: Patient fell and fractured his femur. This was surgically repaired and he was subsequently cared for at a rehab unit.  17. 2/14/2022: Brain MRI without contrast (contrast not given due to inability to obtain IV access) showed no brain metastases.    Met with Dr. Shoemaker-in Dr. John's absence  -Recommendation is to change therapy   04/21/2022 -palliative Carboplatin Alimta started   Due to cytopenia AUC reduced to 4 with cycle 2  Due to persistent pancytopenia and inability to tolerate treatment carboplatin discontinued with cycle 3-day 1    Patient is currently getting monotherapy with Alimta    Interval History:  Patient reports he is  feeling well.  Denies fever chills sweats cough shortness of breath chest pain nausea vomiting diarrhea abdominal pain or bleeding      Review of Systems:  14 point ROS of systems including Constitutional, Eyes, Respiratory, Cardiovascular, Gastroenterology, Genitourinary, Integumentary, Muscularskeletal, Psychiatric were all negative except for pertinent positives noted in my HPI.    Physical Examination:  Physical Exam  HENT:      Right Ear: Tympanic membrane normal.      Nose: Nose normal.      Mouth/Throat:      Mouth: Mucous membranes are moist.   Eyes:      Pupils: Pupils are equal, round, and reactive to light.   Cardiovascular:      Rate and Rhythm: Normal rate.      Pulses: Normal pulses.   Pulmonary:      Effort: Pulmonary effort is normal.   Abdominal:      General: Abdomen is flat.   Musculoskeletal:         General: Normal range of motion.      Cervical back: Normal range of motion.   Skin:     General: Skin is warm.   Neurological:      General: No focal deficit present.      Mental Status: He is alert.   Psychiatric:         Mood and Affect: Mood normal.           Laboratory Data:  CBC and CMP results reviewed    Assessment and Plan:  This is a 61-year-old male with    metastatic non-small cell lung cancer   Patient of Dr. John   Progressed on  different treatments as above  04/21/2022 started palliative treatment with carboplatin AUC 5  and Alimta  Developed cytopenia  05/12/2022-cycle 2 given-carboplatin AUC reduced to 4 followed by Neulasta on pro  Due to persistent pancytopenia and inability to tolerate treatment carboplatin discontinued with cycle 3-day 1  Patient is currently getting monotherapy with Alimta  Labs reviewed normalities discussed okay to proceed with Alimta  Okay to give vitamin B12 injection today  Continue with folic acid  Patient will be getting IV fluids weekly  Patient scheduled with me in the next cycle of treatment  07/18-scheduled for PET scan  [id number] with   John       Thrombocytopenia secondary to treatment  Patient denies bleeding bruising  Patient platelet count 50  Complications of low platelet count reviewed  Advised patient to go to ER in the event of bleeding fall injury bruising or edema  -Patient is not on blood thinners  Transfuse for platelet count 20 or below or bleeding  Recheck platelet count next week    Anemia secondary to treatment  Patient is asymptomatic  Transfuse for hemoglobin less than 7  or symptomatic      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  01/2021-scope done by ENT no evidence of recurrence  Continue close follow-up by ENT  Patient has dysphonia and some dysphagia     History of CVA  02/14-MRI did not reveal brain metastasis      Patient is advised to call our clinic or go to ER in the event of fever chills sweats cough shortness of breath chest pain nausea vomiting diarrhea abdominal pain bleeding edema or any changes in health    MADHAVI Moran CNP  [facility]     Chart documentation with Dragon Voice recognition Software. Although reviewed after completion, some words and grammatical errors may remain.

## 2022-06-20 NOTE — PROGRESS NOTES
Nursing Note:  Kt Rasmussen presents today for port labs.    Patient seen by provider today: Yes: Juan Rausch NP   present during visit today: Not Applicable.    Note: to infusion following exam.    Intravenous Access:  Implanted Port.    Discharge Plan:   Patient was sent to [facility] for provider appointment.    Olga Rahman RN

## 2022-06-20 NOTE — PROGRESS NOTES
"Oncology Rooming Note    June 20, 2022 11:19 AM   Kt Rasmussen is a 61 year old male who presents for:    Chief Complaint   Patient presents with     Oncology Clinic Visit     Initial Vitals: /66   Pulse 64   Temp 98  F (36.7  C) (Oral)   Resp 18   SpO2 99%  Estimated body mass index is 21.05 kg/m  as calculated from the following:    Height as of 6/2/22: 1.918 m (6' 3.5\").    Weight as of 5/12/22: 77.4 kg (170 lb 10.2 oz). There is no height or weight on file to calculate BSA.  Data Unavailable Comment: Data Unavailable   No LMP for male patient.  Allergies reviewed: Yes  Medications reviewed: Yes    Medications: Medication refills not needed today.  Pharmacy name entered into EPIC:    [facility] [facility] [place] - [address] [facility], [place] - [address] [id number] - [place] [place] [facility] [address]    Clinical concerns: no      Keke More CMA            "

## 2022-06-20 NOTE — LETTER
"    6/20/2022         RE: Kt Rasmussen  [id number] [address]  [facility] [id number]        Dear Colleague,    Thank you for referring your patient, Kt Rasmussen, to the [facility]. Please see a copy of my visit note below.    Oncology Rooming Note    June 20, 2022 11:19 AM   Kt Rasmussen is a 61 year old male who presents for:    Chief Complaint   Patient presents with     Oncology Clinic Visit     Initial Vitals: /66   Pulse 64   Temp 98  F (36.7  C) (Oral)   Resp 18   SpO2 99%  Estimated body mass index is 21.05 kg/m  as calculated from the following:    Height as of 6/2/22: 1.918 m (6' 3.5\").    Weight as of 5/12/22: 77.4 kg (170 lb 10.2 oz). There is no height or weight on file to calculate BSA.  Data Unavailable Comment: Data Unavailable   No LMP for male patient.  Allergies reviewed: Yes  Medications reviewed: Yes    Medications: Medication refills not needed today.  Pharmacy name entered into EPIC:    [facility], [place] - [address] [facility], [place] - [address] [id number] - [place], [place] - [address] AT [facility]    Clinical concerns: no      Keke More CMA              Oncology/Hematology Visit Note  Jun 20, 2022    Reason for Visit: follow up of metastatic non-small cell lung carcinoma  Metastatic to lymph nodes bones in bilateral lungs  Brain MRI negative for mets    Patient met with Dr. John who recommended treatment with palliative intent with paclitaxel carboplatin Avastin and atezolizumab-treatment started on April 27, 2021  -Due to thrombocytopenia carboplatin AUC reduced to 4 with cycle 2  Due to pancytopenia carboplatin discontinued with cycle 5    7/12/2021: PET/CT showed resolved mural nodules with increased cystic cavity in left lower lobe with no significant FDG uptake, less likely metastases; no enlarged hypermetabolic " mediastinal, hilar, or axillary lymph nodes, decreased metabolic activity of intraosseous lesion in spine and pelvis; no new bone lesions    Treated with paclitaxel, Avastin and atezolizumab.-Last treatment given in  12/22/2021-cycle 12    1/10/2022: PET/CT showed new foci of abnormal skeletal FGD uptake in the thoracic and lumbar spine. Mild interval increase in intensity of previously demonstrated hypermetabolic skeletal lesions involving the pelvis and lumbar spine. Findings are consistent with progressive osseous metastatic disease. Subsequently off chemo for 1 month due to poor performance status.  16. 1/25/2022: Patient fell and fractured his femur. This was surgically repaired and he was subsequently cared for at a rehab unit.  17. 2/14/2022: Brain MRI without contrast (contrast not given due to inability to obtain IV access) showed no brain metastases.    Met with Dr. Shoemaker-in Dr. John's absence  -Recommendation is to change therapy   04/21/2022 -palliative Carboplatin Alimta started   Due to cytopenia AUC reduced to 4 with cycle 2  Due to persistent pancytopenia and inability to tolerate treatment carboplatin discontinued with cycle 3-day 1    Patient is currently getting monotherapy with Alimta    Interval History:  Patient reports he is feeling well.  Denies fever chills sweats cough shortness of breath chest pain nausea vomiting diarrhea abdominal pain or bleeding      Review of Systems:  14 point ROS of systems including Constitutional, Eyes, Respiratory, Cardiovascular, Gastroenterology, Genitourinary, Integumentary, Muscularskeletal, Psychiatric were all negative except for pertinent positives noted in my HPI.    Physical Examination:  Physical Exam  HENT:      Right Ear: Tympanic membrane normal.      Nose: Nose normal.      Mouth/Throat:      Mouth: Mucous membranes are moist.   Eyes:      Pupils: Pupils are equal, round, and reactive to light.   Cardiovascular:      Rate and Rhythm: Normal rate.       Pulses: Normal pulses.   Pulmonary:      Effort: Pulmonary effort is normal.   Abdominal:      General: Abdomen is flat.   Musculoskeletal:         General: Normal range of motion.      Cervical back: Normal range of motion.   Skin:     General: Skin is warm.   Neurological:      General: No focal deficit present.      Mental Status: He is alert.   Psychiatric:         Mood and Affect: Mood normal.           Laboratory Data:  CBC and CMP results reviewed    Assessment and Plan:  This is a 61-year-old male with    metastatic non-small cell lung cancer   Patient of Dr. John   Progressed on  different treatments as above  04/21/2022 started palliative treatment with carboplatin AUC 5  and Alimta  Developed cytopenia  05/12/2022-cycle 2 given-carboplatin AUC reduced to 4 followed by Neulasta on pro  Due to persistent pancytopenia and inability to tolerate treatment carboplatin discontinued with cycle 3-day 1  Patient is currently getting monotherapy with Alimta  Labs reviewed normalities discussed okay to proceed with Alimta  Okay to give vitamin B12 injection today  Continue with folic acid  Patient will be getting IV fluids weekly  Patient scheduled with me in the next cycle of treatment  07/18-scheduled for PET scan  07/2559-hazgrz-no with Dr. 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 Although reviewed after completion, some words and grammatical errors may remain.            Again, thank you for allowing me to participate in the care of your patient.        Sincerely,        MADHAVI Moran CNP

## 2022-06-20 NOTE — PROGRESS NOTES
Infusion Nursing Note:  Kt Rasmussen presents today for C4D1 alimta and B12 inj. 1L  IVF  Patient seen by provider today: Yes: Juan   present during visit today: Not Applicable.    Note: N/A.    Intravenous Access:  Implanted Port.    Treatment Conditions:  Lab Results   Component Value Date    HGB 8.5 (L) 06/20/2022    WBC 5.5 06/20/2022    ANEU 2.4 05/19/2022    ANEUTAUTO 3.7 06/20/2022     (L) 06/20/2022      Lab Results   Component Value Date     05/19/2022    POTASSIUM 3.6 05/19/2022    MAG 1.9 10/13/2016    CR 0.77 06/20/2022    BEVERLY 8.4 (L) 05/19/2022    BILITOTAL 0.6 05/19/2022    ALBUMIN 3.3 (L) 05/19/2022    ALT 69 05/19/2022    AST 41 05/19/2022     Results reviewed, labs MET treatment parameters, ok to proceed with treatment.    Post Infusion Assessment:  Patient tolerated infusion without incident.  Patient tolerated injection without incident.  Blood return noted pre and post infusion.  Site patent and intact, free from redness, edema or discomfort.  No evidence of extravasations.  Access discontinued per protocol.     Discharge Plan:   Discharge instructions reviewed with: Patient.  Patient and/or family verbalized understanding of discharge instructions and all questions answered.  Patient discharged in stable condition accompanied by: self.  Departure Mode: Ambulatory.      Swetha Parkinson RN

## 2022-06-30 ENCOUNTER — INFUSION THERAPY VISIT (OUTPATIENT)
Dept: INFUSION THERAPY | Facility: CLINIC | Age: 62
End: 2022-06-30
Attending: NURSE PRACTITIONER
Payer: MEDICARE

## 2022-06-30 VITALS
RESPIRATION RATE: 18 BRPM | HEART RATE: 82 BPM | DIASTOLIC BLOOD PRESSURE: 73 MMHG | OXYGEN SATURATION: 97 % | TEMPERATURE: 98.3 F | SYSTOLIC BLOOD PRESSURE: 153 MMHG

## 2022-06-30 DIAGNOSIS — C34.90 NON-SMALL CELL CARCINOMA OF LUNG, STAGE 3, UNSPECIFIED LATERALITY (H): ICD-10-CM

## 2022-06-30 DIAGNOSIS — T45.1X5A CHEMOTHERAPY-INDUCED NEUTROPENIA (H): Primary | ICD-10-CM

## 2022-06-30 DIAGNOSIS — D70.1 CHEMOTHERAPY-INDUCED NEUTROPENIA (H): Primary | ICD-10-CM

## 2022-06-30 PROCEDURE — 258N000003 HC RX IP 258 OP 636: Performed by: NURSE PRACTITIONER

## 2022-06-30 PROCEDURE — 96360 HYDRATION IV INFUSION INIT: CPT

## 2022-06-30 PROCEDURE — 250N000011 HC RX IP 250 OP 636: Performed by: INTERNAL MEDICINE

## 2022-06-30 RX ORDER — HEPARIN SODIUM (PORCINE) LOCK FLUSH IV SOLN 100 UNIT/ML 100 UNIT/ML
5 SOLUTION INTRAVENOUS
Status: CANCELLED | OUTPATIENT
Start: 2022-06-30

## 2022-06-30 RX ORDER — HEPARIN SODIUM,PORCINE 10 UNIT/ML
5 VIAL (ML) INTRAVENOUS
Status: CANCELLED | OUTPATIENT
Start: 2022-06-30

## 2022-06-30 RX ORDER — ONDANSETRON 2 MG/ML
8 INJECTION INTRAMUSCULAR; INTRAVENOUS EVERY 6 HOURS PRN
Status: CANCELLED
Start: 2022-06-30

## 2022-06-30 RX ORDER — HEPARIN SODIUM (PORCINE) LOCK FLUSH IV SOLN 100 UNIT/ML 100 UNIT/ML
5 SOLUTION INTRAVENOUS
Status: DISCONTINUED | OUTPATIENT
Start: 2022-06-30 | End: 2022-07-01 | Stop reason: HOSPADM

## 2022-06-30 RX ADMIN — Medication 5 ML: at 15:33

## 2022-06-30 RX ADMIN — SODIUM CHLORIDE 1000 ML: 9 INJECTION, SOLUTION INTRAVENOUS at 14:31

## 2022-06-30 ASSESSMENT — PAIN SCALES - GENERAL: PAINLEVEL: NO PAIN (0)

## 2022-06-30 NOTE — PROGRESS NOTES
Infusion Nursing Note:  Kt Rasmussen presents today for IVF.    Patient seen by provider today: No   present during visit today: Not Applicable.    Note: N/A.    Intravenous Access:  Implanted Port.    Treatment Conditions:  Not Applicable.    Post Infusion Assessment:  Patient tolerated infusion without incident.  Site patent and intact, free from redness, edema or discomfort.  No evidence of extravasations.  Access discontinued per protocol.     Discharge Plan:   Copy of AVS reviewed with patient and/or family.  Patient will return 7/14/22 for next appointment.  Patient discharged in stable condition accompanied by: self.  Departure Mode: Wheelchair.      Magdalena Davila RN

## 2022-07-01 NOTE — PROGRESS NOTES
Prior to discharge from the clinic, patient notified staff that his  ride through [employer] had come at 1500 when patient was not scheduled to be complete with infusion until 1530.  Patent's ride left without patient.  Multiple nurses worked on getting ahold of [employer] for a return ride.  Initially a taxi was arranged to  patient but patient unable to transfer himself from wheelchair to cab without assistance so this ride was inappropriate for patient.  [employer] was called again by this RN and [employer] staff reported they could not send a van with wheelchair access for this patient. Another clinic nurse became involved and did finally reach another [employer] staff person who then did send the appropriate ride for patient.  Patient and staff were waiting for his ride after clinic closed.  Return ride arrived at 1645 via [employer].

## 2022-07-11 DIAGNOSIS — C34.90 NON-SMALL CELL LUNG CANCER METASTATIC TO BONE (H): ICD-10-CM

## 2022-07-11 DIAGNOSIS — Z51.11 ENCOUNTER FOR ANTINEOPLASTIC CHEMOTHERAPY: ICD-10-CM

## 2022-07-11 DIAGNOSIS — D70.1 CHEMOTHERAPY-INDUCED NEUTROPENIA (H): Primary | ICD-10-CM

## 2022-07-11 DIAGNOSIS — T45.1X5A CHEMOTHERAPY-INDUCED NEUTROPENIA (H): Primary | ICD-10-CM

## 2022-07-11 DIAGNOSIS — C79.51 NON-SMALL CELL LUNG CANCER METASTATIC TO BONE (H): ICD-10-CM

## 2022-07-11 RX ORDER — HEPARIN SODIUM,PORCINE 10 UNIT/ML
5 VIAL (ML) INTRAVENOUS
Status: CANCELLED | OUTPATIENT
Start: 2022-07-11

## 2022-07-11 RX ORDER — ALBUTEROL SULFATE 90 UG/1
1-2 AEROSOL, METERED RESPIRATORY (INHALATION)
Status: CANCELLED
Start: 2022-07-11

## 2022-07-11 RX ORDER — MEPERIDINE HYDROCHLORIDE 25 MG/ML
25 INJECTION INTRAMUSCULAR; INTRAVENOUS; SUBCUTANEOUS EVERY 30 MIN PRN
Status: CANCELLED | OUTPATIENT
Start: 2022-07-11

## 2022-07-11 RX ORDER — NALOXONE HYDROCHLORIDE 0.4 MG/ML
0.2 INJECTION, SOLUTION INTRAMUSCULAR; INTRAVENOUS; SUBCUTANEOUS
Status: CANCELLED | OUTPATIENT
Start: 2022-07-11

## 2022-07-11 RX ORDER — CYANOCOBALAMIN 1000 UG/ML
1000 INJECTION, SOLUTION INTRAMUSCULAR; SUBCUTANEOUS ONCE
Status: CANCELLED
Start: 2022-07-11 | End: 2022-07-11

## 2022-07-11 RX ORDER — ALBUTEROL SULFATE 0.83 MG/ML
2.5 SOLUTION RESPIRATORY (INHALATION)
Status: CANCELLED | OUTPATIENT
Start: 2022-07-11

## 2022-07-11 RX ORDER — HEPARIN SODIUM (PORCINE) LOCK FLUSH IV SOLN 100 UNIT/ML 100 UNIT/ML
5 SOLUTION INTRAVENOUS
Status: CANCELLED | OUTPATIENT
Start: 2022-07-11

## 2022-07-11 RX ORDER — EPINEPHRINE 1 MG/ML
0.3 INJECTION, SOLUTION, CONCENTRATE INTRAVENOUS EVERY 5 MIN PRN
Status: CANCELLED | OUTPATIENT
Start: 2022-07-11

## 2022-07-11 RX ORDER — DIPHENHYDRAMINE HYDROCHLORIDE 50 MG/ML
50 INJECTION INTRAMUSCULAR; INTRAVENOUS
Status: CANCELLED
Start: 2022-07-11

## 2022-07-11 RX ORDER — LORAZEPAM 2 MG/ML
0.5 INJECTION INTRAMUSCULAR EVERY 4 HOURS PRN
Status: CANCELLED | OUTPATIENT
Start: 2022-07-11

## 2022-07-12 NOTE — PROGRESS NOTES
[facility]    Hematology/Oncology Established Patient Follow-up Note      Today's Date: 7/25/2022    Reason for Follow-up: Metastatic non-small cell lung carcinoma.    HISTORY OF PRESENT ILLNESS: Kt Rasmussen is a 61 year old male who presents with the following oncologic history:  1. 5/05/2016: Presented with near-obstructing large mass coming off the cheikh and likely the posterior wall, seen on bronchoscopy. Biopsy of the mass showed invasive squamous cell carcinoma, moderately differentiating and focally keratinizing.  Procedure was complicated by significant bleeding.  Tumor was completely debulked (via bronchoscopy) in both main stem bronchi and distal trachea. NGS showed no mutations in EGFR, KRAS, BRAF, NRAS, HRAS, PIK3CA, ERBB2, MET, or JAK2.  2. 5/23/2016: PET/CT scan showed evidence of residual tumor with decreased endobronchial mass. Indeterminate, mildly hypermetabolic mesentery with prominent lymph nodes and area of enhancement of the right hepatic lobe present.   Felt to have T4-NX-M0 disease.  3. 6/09/2016: Started concurrent chemoradiation with weekly paclitaxel and carboplatin.  4. 7/30/2016: Completed radiation.  Subsequently received 2 cycles of consolidation paclitaxel and carboplatin.   5. 9/13/2019: Presented with 6-month history of dysphonia and left vocal exophytic lesion. Left true vocal fold biopsy showed at least squamous cell carcinoma in situ, cannot exclude superficial invasion.  6. 9/30/2019: Excision of left vocal cord mass showed fragments of invasive squamous cell carcinoma, well differentiated and keratinizing.  Margins negative for malignancy.  7. 2/16/2021: CT chest w/o contrast showed thin-walled cavity in left lower lobe with 2 solid peripheral nodules measuring 9 mm each. No lymphadenopathy.  8. 3/01/2021: PET scan showed hypermetabolic nodules in left lower lobe and right lung, consistent with metastases; hypermetabolic adenopathy in chest, abdomen,  pelvis; nonspecific uptake at tongue; hypermetabolic intraosseous lesions in spine and pelvis consistent with metastatic disease; left axillary hypermetabolic lymph nodes related to COVID-19 vaccination.  9. 3/12/2021: CT-guided lung biopsy showed non-small cell carcinoma, not otherwise specified -- with opinion by [facility] pathologist.  10. 3/18/2021: Lung NGS panel negative for mutations in BRAF, EGFR, ERBB2, IDH1, IDH2, KRAS, MET, NRAS, RET. PD-L1 expression negative (TPS <1%). Due to minimal amount of DNA obtained from specimen, other biomarkers could not be analyzed.  11. 4/7/2021: MRI brain negative for brain metastases.  12. 4/27/2021: Started 1st line metastatic therapy with carboplatin, paclitaxel, bevacizumab, and atezolizumab for metastatic non-small cell lung carcinoma, NOS.  13. 7/12/2021: PET/CT showed resolved mural nodules with increased cystic cavity in left lower lobe with no significant FDG uptake, less likely metastases; no enlarged hypermetabolic mediastinal, hilar, or axillary lymph nodes, decreased metabolic activity of intraosseous lesion in spine and pelvis; no new bone lesions.  14. 10/11/2021: PET/CT showed slight interval increase in intensity of metabolic activity of right pubic bone and left ischium with new focal uptake in L2 spinous process; resolved uptake at previous ground glass opacity along right medial lower lobe; unchanged cystic cavities/nodules in left lower lobe and right apical upper lobe; no pathologically enlarged hypermetabolic mediastinal, hilar, or axillary lymph nodes.  15. 1/10/2022: PET/CT showed new foci of abnormal skeletal FGD uptake in the thoracic and lumbar spine. Mild interval increase in intensity of previously demonstrated hypermetabolic skeletal lesions involving the pelvis and lumbar spine. Findings are consistent with progressive osseous metastatic disease. Subsequently off chemo for 1 month due to poor performance status.  16. 1/25/2022: Patient fell  and fractured his femur. This was surgically repaired and he was subsequently cared for at a rehab unit.  17. 2/14/2022: Brain MRI without contrast (contrast not given due to inability to obtain IV access) showed no brain metastases.  18. 4/21/2022: Started 2nd line metastatic therapy with pemetrexed and carboplatin. Omission of carboplatin 6/2 and forward due to worsening anemia despite dose reduction.  19. 7/18/2022: PET/CT showed enlarged and increased FDG avid skeletal metastases, increased left para-aortic retroperitoneal lymph node increased in size and FDG avidity, findings consistent with progressive disease.    INTERIM HISTORY:  Kt reports feeling relatively well.  He has been using a wheelchair. He denies recent fevers or chills or dizziness or falls. No recent weight loss.    REVIEW OF SYSTEMS:   14 point ROS was reviewed and is negative other than as noted above in HPI.       HOME MEDICATIONS:  Current Outpatient Medications   Medication Sig Dispense Refill     atorvastatin (LIPITOR) 40 MG tablet Take 40 mg by mouth daily       dexamethasone (DECADRON) 4 MG tablet Take 1 tablet (4 mg) by mouth 2 times daily (with meals) for 3 days Start one day prior to Pemetrexed (Alimta), continue on the day treatment, and the day following Pemetrexed. 6 tablet 3     folic acid (FOLVITE) 1 MG tablet Take 1 tablet (1,000 mcg) by mouth daily Begin 7 days before Pemetrexed (Alimta) starts and continue for 21 days after Pemetrexed is discontinued. 30 tablet 3     ondansetron (ZOFRAN) 8 MG tablet Take 1 tablet (8 mg) by mouth every 8 hours as needed for nausea 10 tablet 3     prochlorperazine (COMPAZINE) 10 MG tablet Take 1 tablet (10 mg) by mouth every 6 hours as needed (Nausea/Vomiting) 30 tablet 3         ALLERGIES:  Allergies   Allergen Reactions     No Known Drug Allergies          PAST MEDICAL HISTORY:  Past Medical History:   Diagnosis Date     Alcohol abuse, unspecified      Cerebral infarction (H)     2009, right  side residual and aphasia     Dyslipidemia      GERD (gastroesophageal reflux disease)      Lung cancer (H)      Unspecified essential hypertension          PAST SURGICAL HISTORY:  Past Surgical History:   Procedure Laterality Date     BRONCHOSCOPY FLEXIBLE AND RIGID N/A 2016    Procedure: BRONCHOSCOPY FLEXIBLE AND RIGID;  Surgeon: Tony Talbot MD;  Location: [facility] OR     ESOPHAGOSCOPY FLEXIBLE N/A 2019    Procedure: flexible esophagoscopy;  Surgeon: Lizzy Johnson MD;  Location: [facility] OR     INJECT STEROID (LOCATION) N/A 2019    Procedure: steroid injection;  Surgeon: Lizyz Johnson MD;  Location: [facility] OR     IR CHEST PORT PLACEMENT > 5 YRS OF AGE  2021     IR CHEST PORT PLACEMENT > 5 YRS OF AGE  2022     IR PORT CHECK RIGHT  2022     IR PORT REMOVAL RIGHT  2022     LASER CO2 LARYNGOSCOPY N/A 2019    Procedure: Microdirect laryngoscopy with excision of laryngeal mass, CO2 laser;  Surgeon: Lizzy Johnson MD;  Location: [facility] OR     ZZC NONSPECIFIC PROCEDURE      R tympanoplasty         SOCIAL HISTORY:  Social History     Socioeconomic History     Marital status: Single     Spouse name: Not on file     Number of children: Not on file     Years of education: Not on file     Highest education level: Not on file   Occupational History     Not on file   Tobacco Use     Smoking status: Former Smoker     Packs/day: 1.00     Types: Cigarettes     Quit date: 2009     Years since quittin.8     Smokeless tobacco: Never Used   Substance and Sexual Activity     Alcohol use: Not Currently     Drug use: No     Sexual activity: Not on file   Other Topics Concern     Parent/sibling w/ CABG, MI or angioplasty before 65F 55M? Not Asked   Social History Narrative     Not on file     Social Determinants of Health     Financial Resource Strain: Not on file   Food Insecurity: Not on file   Transportation Needs: Not on file   Physical Activity: Not on file   Stress: Not on file    Social Connections: Not on file   Intimate Partner Violence: Not on file   Housing Stability: Not on file   Resides at assisted living.      FAMILY HISTORY:  Family History   Problem Relation Age of Onset     Cancer Father          PHYSICAL EXAM:  Vital signs:  /76   Pulse 78   Temp 97.9  F (36.6  C) (Oral)   Resp 16   SpO2 96%    ECO  GENERAL/CONSTITUTIONAL: No acute distress.  EYES: No erythema or scleral icterus.  LYMPH: No cervical, supraclavicular, axillary adenopathy.   RESPIRATORY: Clear to auscultation bilaterally. No crackles or wheezing.   CARDIOVASCULAR: Regular rate and rhythm without murmurs.  GASTROINTESTINAL: No hepatosplenomegaly, masses, or tenderness. No guarding.  No distention.  MUSCULOSKELETAL: Warm and well-perfused, no cyanosis, clubbing, or edema.  NEUROLOGIC: Residual right arm and right leg reduced strength due to prior stroke. Alert, oriented, answers questions appropriately but has occasional word finding difficulty from prior stroke. Dysphonia present and stable.  INTEGUMENTARY: No rashes or jaundice.  GAIT: Sitting in wheelchair.      LABS:  CBC RESULTS: Recent Labs   Lab Test 22  1338   WBC 4.0   RBC 2.71*   HGB 7.9*   HCT 25.3*   MCV 93   MCH 29.2   MCHC 31.2*   RDW 17.4*   PLT 74*     Last Comprehensive Metabolic Panel:  Sodium   Date Value Ref Range Status   2022 139 133 - 144 mmol/L Final   2021 140 133 - 144 mmol/L Final     Potassium   Date Value Ref Range Status   2022 3.6 3.4 - 5.3 mmol/L Final   2021 4.3 3.4 - 5.3 mmol/L Final     Chloride   Date Value Ref Range Status   2022 104 94 - 109 mmol/L Final   2021 111 (H) 94 - 109 mmol/L Final     Carbon Dioxide   Date Value Ref Range Status   2021 25 20 - 32 mmol/L Final     Carbon Dioxide (CO2)   Date Value Ref Range Status   2022 27 20 - 32 mmol/L Final     Anion Gap   Date Value Ref Range Status   2022 8 3 - 14 mmol/L Final   2021 4 3 - 14 mmol/L  Final     Glucose   Date Value Ref Range Status   05/19/2022 125 (H) 70 - 99 mg/dL Final   06/29/2021 85 70 - 99 mg/dL Final     Urea Nitrogen   Date Value Ref Range Status   05/19/2022 24 7 - 30 mg/dL Final   06/29/2021 17 7 - 30 mg/dL Final     Creatinine   Date Value Ref Range Status   07/21/2022 0.74 0.66 - 1.25 mg/dL Final   06/29/2021 0.84 0.66 - 1.25 mg/dL Final     GFR Estimate   Date Value Ref Range Status   07/21/2022 >90 >60 mL/min/1.73m2 Final     Comment:     Effective December 21, 2021 eGFRcr in adults is calculated using the 2021 CKD-EPI creatinine equation which includes age and gender (Ceci et al., NEJ, DOI: 10.1056/ZPIYkt1408255)   06/29/2021 >90 >60 mL/min/[1.73_m2] Final     Comment:     Non  GFR Calc  Starting 12/18/2018, serum creatinine based estimated GFR (eGFR) will be   calculated using the Chronic Kidney Disease Epidemiology Collaboration   (CKD-EPI) equation.       Calcium   Date Value Ref Range Status   05/19/2022 8.4 (L) 8.5 - 10.1 mg/dL Final   06/29/2021 8.7 8.5 - 10.1 mg/dL Final     Bilirubin Total   Date Value Ref Range Status   05/19/2022 0.6 0.2 - 1.3 mg/dL Final   06/29/2021 0.3 0.2 - 1.3 mg/dL Final     Alkaline Phosphatase   Date Value Ref Range Status   05/19/2022 112 40 - 150 U/L Final   06/29/2021 73 40 - 150 U/L Final     ALT   Date Value Ref Range Status   05/19/2022 69 0 - 70 U/L Final   06/29/2021 34 0 - 70 U/L Final     AST   Date Value Ref Range Status   05/19/2022 41 0 - 45 U/L Final   06/29/2021 26 0 - 45 U/L Final       PATHOLOGY:  None new.    IMAGING:  Reviewed as per HPI.    ASSESSMENT/PLAN:  Kt Rasmussen is a 61 year old male with the following issues:  1. Metastatic non-small (non-squamous) cell lung carcinoma with metastases to lymph nodes, bones, and bilateral lungs  2. 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 Chemotherapy-induced  pancytopenia  --Lung NGS panel negative for mutations in BRAF, EGFR, ERBB2, IDH1, IDH2, KRAS, MET, NRAS, RET. PD-L1 expression negative (TPS <1%). Guardant 360 negative for targetable mutations.  --Kt started 2nd metastatic therapy with every 3-week pemetrexed and carboplatin on 4/21/2022. He has had challenges with chemo-induced pancytopenia which necessitated dose reduction in carboplatin and now intermittent dizziness.  --Due to worsening pancytopenia and dizziness, carboplatin was omitted and he was continued on pemetrexed alone.  --I personally reviewed the PET scan from 7/18/2022 and discussed the results with Kt today.  The PET unfortunately shows progressive disease in the skeletal metastases and left para-aortic lymphadenopathy.  --Offered 3rd line metastatic therapy with Taxotere monotherapy.  Discussed potential adverse effects of worsening pancytopenia, fevers, peripheral neuropathy, infusion reaction, rash, weakness, fatigue.  I also offered supportive care. We also talked about hospice. He expresses he is not ready for hospice.  --He wishes to proceed with Taxotere and consents to this chemo. Will start at 20% dose reduction due to past repeated issues with chemo-induced cytopenias. He may need additional dose reductions if his blood counts do not adequately recover.    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 Dysphonia  6. Dysphagia  -Kt underwent excision of a left vocal cord SCC on 9/30/2019 and has persistent dysphonia as a result of the lesion and excision.  He also has dysphagia but this is stable and swallowing is manageable.  -Last scope by ENT 9/23/2021 showed no evidence for recurrence.    7. Weight loss  --I had offered nutrition consult and he declined this.  --I recommended increasing his intake of higher caloric nutrient dense foods and beverages. His weight has now stabilized.    8. Dizziness and prior falls  --SW and guardian were previously notified of multiple  falls.  --Prior brain MRI 2/14/2022 showed no brain metastases. However this was a suboptimal exam due to inability to administer contrast.  --He has not had recent falls and is using a wheelchair due to prior femoral fracture in 1/2022.    Sangita John MD  Hematology/Oncology  [facility]    Total time spent: 40 minutes in patient evaluation, counseling, documentation, and coordination of care.

## 2022-07-14 ENCOUNTER — ONCOLOGY VISIT (OUTPATIENT)
Dept: ONCOLOGY | Facility: CLINIC | Age: 62
End: 2022-07-14
Attending: NURSE PRACTITIONER
Payer: MEDICARE

## 2022-07-14 ENCOUNTER — INFUSION THERAPY VISIT (OUTPATIENT)
Dept: INFUSION THERAPY | Facility: CLINIC | Age: 62
End: 2022-07-14
Attending: NURSE PRACTITIONER
Payer: MEDICARE

## 2022-07-14 VITALS — RESPIRATION RATE: 16 BRPM

## 2022-07-14 VITALS
DIASTOLIC BLOOD PRESSURE: 70 MMHG | SYSTOLIC BLOOD PRESSURE: 110 MMHG | HEART RATE: 77 BPM | OXYGEN SATURATION: 99 % | RESPIRATION RATE: 16 BRPM | TEMPERATURE: 97.8 F

## 2022-07-14 DIAGNOSIS — C34.90 NON-SMALL CELL LUNG CANCER METASTATIC TO BONE (H): ICD-10-CM

## 2022-07-14 DIAGNOSIS — C34.90 NON-SMALL CELL LUNG CANCER METASTATIC TO BONE (H): Primary | ICD-10-CM

## 2022-07-14 DIAGNOSIS — D70.1 CHEMOTHERAPY-INDUCED NEUTROPENIA (H): Primary | ICD-10-CM

## 2022-07-14 DIAGNOSIS — T45.1X5A CHEMOTHERAPY-INDUCED NEUTROPENIA (H): Primary | ICD-10-CM

## 2022-07-14 DIAGNOSIS — T45.1X5A CHEMOTHERAPY-INDUCED NEUTROPENIA (H): ICD-10-CM

## 2022-07-14 DIAGNOSIS — C79.51 NON-SMALL CELL LUNG CANCER METASTATIC TO BONE (H): Primary | ICD-10-CM

## 2022-07-14 DIAGNOSIS — Z51.11 ENCOUNTER FOR ANTINEOPLASTIC CHEMOTHERAPY: ICD-10-CM

## 2022-07-14 DIAGNOSIS — D70.1 CHEMOTHERAPY-INDUCED NEUTROPENIA (H): ICD-10-CM

## 2022-07-14 DIAGNOSIS — C34.90 NON-SMALL CELL CARCINOMA OF LUNG, STAGE 3, UNSPECIFIED LATERALITY (H): ICD-10-CM

## 2022-07-14 DIAGNOSIS — C79.51 NON-SMALL CELL LUNG CANCER METASTATIC TO BONE (H): ICD-10-CM

## 2022-07-14 LAB
BASOPHILS # BLD AUTO: 0 10E3/UL (ref 0–0.2)
BASOPHILS NFR BLD AUTO: 0 %
CREAT SERPL-MCNC: 0.7 MG/DL (ref 0.66–1.25)
EOSINOPHIL # BLD AUTO: 0.1 10E3/UL (ref 0–0.7)
EOSINOPHIL NFR BLD AUTO: 2 %
ERYTHROCYTE [DISTWIDTH] IN BLOOD BY AUTOMATED COUNT: 17.6 % (ref 10–15)
GFR SERPL CREATININE-BSD FRML MDRD: >90 ML/MIN/1.73M2
HCT VFR BLD AUTO: 26.2 % (ref 40–53)
HGB BLD-MCNC: 7.9 G/DL (ref 13.3–17.7)
IMM GRANULOCYTES # BLD: 0 10E3/UL
IMM GRANULOCYTES NFR BLD: 0 %
LYMPHOCYTES # BLD AUTO: 0.7 10E3/UL (ref 0.8–5.3)
LYMPHOCYTES NFR BLD AUTO: 16 %
MCH RBC QN AUTO: 28.6 PG (ref 26.5–33)
MCHC RBC AUTO-ENTMCNC: 30.2 G/DL (ref 31.5–36.5)
MCV RBC AUTO: 95 FL (ref 78–100)
MONOCYTES # BLD AUTO: 0.3 10E3/UL (ref 0–1.3)
MONOCYTES NFR BLD AUTO: 8 %
NEUTROPHILS # BLD AUTO: 3.1 10E3/UL (ref 1.6–8.3)
NEUTROPHILS NFR BLD AUTO: 74 %
NRBC # BLD AUTO: 0 10E3/UL
NRBC BLD AUTO-RTO: 0 /100
PLATELET # BLD AUTO: 66 10E3/UL (ref 150–450)
RBC # BLD AUTO: 2.76 10E6/UL (ref 4.4–5.9)
WBC # BLD AUTO: 4.2 10E3/UL (ref 4–11)

## 2022-07-14 PROCEDURE — 82565 ASSAY OF CREATININE: CPT | Performed by: INTERNAL MEDICINE

## 2022-07-14 PROCEDURE — 258N000003 HC RX IP 258 OP 636: Performed by: NURSE PRACTITIONER

## 2022-07-14 PROCEDURE — 96360 HYDRATION IV INFUSION INIT: CPT

## 2022-07-14 PROCEDURE — G0463 HOSPITAL OUTPT CLINIC VISIT: HCPCS | Mod: 25

## 2022-07-14 PROCEDURE — 99214 OFFICE O/P EST MOD 30 MIN: CPT | Performed by: NURSE PRACTITIONER

## 2022-07-14 PROCEDURE — 250N000011 HC RX IP 250 OP 636: Performed by: INTERNAL MEDICINE

## 2022-07-14 PROCEDURE — 85025 COMPLETE CBC W/AUTO DIFF WBC: CPT | Performed by: INTERNAL MEDICINE

## 2022-07-14 RX ORDER — HEPARIN SODIUM,PORCINE 10 UNIT/ML
5 VIAL (ML) INTRAVENOUS
Status: DISCONTINUED | OUTPATIENT
Start: 2022-07-14 | End: 2022-07-14 | Stop reason: HOSPADM

## 2022-07-14 RX ORDER — HEPARIN SODIUM (PORCINE) LOCK FLUSH IV SOLN 100 UNIT/ML 100 UNIT/ML
5 SOLUTION INTRAVENOUS
Status: CANCELLED | OUTPATIENT
Start: 2022-07-21

## 2022-07-14 RX ORDER — ALBUTEROL SULFATE 0.83 MG/ML
2.5 SOLUTION RESPIRATORY (INHALATION)
Status: CANCELLED | OUTPATIENT
Start: 2022-07-21

## 2022-07-14 RX ORDER — EPINEPHRINE 1 MG/ML
0.3 INJECTION, SOLUTION INTRAMUSCULAR; SUBCUTANEOUS EVERY 5 MIN PRN
Status: CANCELLED | OUTPATIENT
Start: 2022-07-21

## 2022-07-14 RX ORDER — MEPERIDINE HYDROCHLORIDE 25 MG/ML
25 INJECTION INTRAMUSCULAR; INTRAVENOUS; SUBCUTANEOUS EVERY 30 MIN PRN
Status: CANCELLED | OUTPATIENT
Start: 2022-07-21

## 2022-07-14 RX ORDER — ONDANSETRON 2 MG/ML
8 INJECTION INTRAMUSCULAR; INTRAVENOUS EVERY 6 HOURS PRN
Status: CANCELLED
Start: 2022-07-14

## 2022-07-14 RX ORDER — HEPARIN SODIUM (PORCINE) LOCK FLUSH IV SOLN 100 UNIT/ML 100 UNIT/ML
5 SOLUTION INTRAVENOUS
Status: CANCELLED | OUTPATIENT
Start: 2022-07-14

## 2022-07-14 RX ORDER — HEPARIN SODIUM,PORCINE 10 UNIT/ML
5 VIAL (ML) INTRAVENOUS
Status: CANCELLED | OUTPATIENT
Start: 2022-07-14

## 2022-07-14 RX ORDER — HEPARIN SODIUM (PORCINE) LOCK FLUSH IV SOLN 100 UNIT/ML 100 UNIT/ML
5 SOLUTION INTRAVENOUS
Status: DISCONTINUED | OUTPATIENT
Start: 2022-07-14 | End: 2022-07-14 | Stop reason: HOSPADM

## 2022-07-14 RX ORDER — METHYLPREDNISOLONE SODIUM SUCCINATE 125 MG/2ML
125 INJECTION, POWDER, LYOPHILIZED, FOR SOLUTION INTRAMUSCULAR; INTRAVENOUS
Status: CANCELLED
Start: 2022-07-21

## 2022-07-14 RX ORDER — LORAZEPAM 2 MG/ML
0.5 INJECTION INTRAMUSCULAR EVERY 4 HOURS PRN
Status: CANCELLED | OUTPATIENT
Start: 2022-07-21

## 2022-07-14 RX ORDER — NALOXONE HYDROCHLORIDE 0.4 MG/ML
0.2 INJECTION, SOLUTION INTRAMUSCULAR; INTRAVENOUS; SUBCUTANEOUS
Status: CANCELLED | OUTPATIENT
Start: 2022-07-21

## 2022-07-14 RX ORDER — CYANOCOBALAMIN 1000 UG/ML
1000 INJECTION, SOLUTION INTRAMUSCULAR; SUBCUTANEOUS ONCE
Status: CANCELLED
Start: 2022-07-21 | End: 2022-07-21

## 2022-07-14 RX ORDER — DIPHENHYDRAMINE HYDROCHLORIDE 50 MG/ML
50 INJECTION INTRAMUSCULAR; INTRAVENOUS
Status: CANCELLED
Start: 2022-07-21

## 2022-07-14 RX ORDER — FOLIC ACID 1 MG/1
1000 TABLET ORAL DAILY
Qty: 30 TABLET | Refills: 3 | Status: SHIPPED | OUTPATIENT
Start: 2022-07-14 | End: 2022-07-25

## 2022-07-14 RX ORDER — HEPARIN SODIUM,PORCINE 10 UNIT/ML
5 VIAL (ML) INTRAVENOUS
Status: CANCELLED | OUTPATIENT
Start: 2022-07-21

## 2022-07-14 RX ORDER — ALBUTEROL SULFATE 90 UG/1
1-2 AEROSOL, METERED RESPIRATORY (INHALATION)
Status: CANCELLED
Start: 2022-07-21

## 2022-07-14 RX ADMIN — SODIUM CHLORIDE 1000 ML: 9 INJECTION, SOLUTION INTRAVENOUS at 11:26

## 2022-07-14 RX ADMIN — Medication 5 ML: at 12:26

## 2022-07-14 RX ADMIN — HEPARIN, PORCINE (PF) 10 UNIT/ML INTRAVENOUS SYRINGE 5 ML: at 10:27

## 2022-07-14 ASSESSMENT — PAIN SCALES - GENERAL
PAINLEVEL: NO PAIN (0)
PAINLEVEL: NO PAIN (0)

## 2022-07-14 NOTE — PROGRESS NOTES
Nursing Note:  Kt Rasmussen presents today for port labs.    Patient seen by provider today: Yes: Juan Rausch NP   present during visit today: Not Applicable.    Note: Port needle remains in place for infusion today.    Intravenous Access:  Implanted Port.    Discharge Plan:   Patient was sent to [facility]  for provider appointment.    Devorah Stovall RN

## 2022-07-14 NOTE — LETTER
"    7/14/2022         RE: Kt Rasmussen  [id number]  [facility] [address]        Dear Colleague,    Thank you for referring your patient, Kt Rasmussen, to the [facility]. Please see a copy of my visit note below.    Oncology Rooming Note    July 14, 2022 10:35 AM   Kt Rasmussen is a 61 year old male who presents for:    Chief Complaint   Patient presents with     Oncology Clinic Visit     Initial Vitals: Resp 16  Estimated body mass index is 21.05 kg/m  as calculated from the following:    Height as of 6/2/22: 1.918 m (6' 3.5\").    Weight as of 5/12/22: 77.4 kg (170 lb 10.2 oz). There is no height or weight on file to calculate BSA.  No Pain (0) Comment: Data Unavailable   No LMP for male patient.  Allergies reviewed: Yes  Medications reviewed: Yes    Medications: MEDICATION REFILLS NEEDED TODAY. Provider was notified.  Pharmacy name entered into [employer]:  [facility] [place] [place] [address] [facility] [place], [place] [facility] [address] [facility] [address] [id number] [place] [address] [address] AT [facility]    Clinical concerns: no       Taylor Aguilar              Oncology/Hematology Visit Note  Jul 14, 2022    Reason for Visit: follow up of metastatic non-small cell lung carcinoma  Metastatic to lymph nodes bones in bilateral lungs  Brain MRI negative for mets    Patient met with Dr. John who recommended treatment with palliative intent with paclitaxel carboplatin Avastin and atezolizumab-treatment started on April 27, 2021  -Due to thrombocytopenia carboplatin AUC reduced to 4 with cycle 2  Due to pancytopenia carboplatin discontinued with cycle 5    7/12/2021: PET/CT showed resolved mural nodules with increased cystic cavity in left lower lobe with no significant FDG uptake, less likely metastases; no enlarged hypermetabolic mediastinal, hilar, or axillary lymph nodes, decreased " metabolic activity of intraosseous lesion in spine and pelvis; no new bone lesions    Treated with paclitaxel, Avastin and atezolizumab.-Last treatment given in  12/22/2021-cycle 12    1/10/2022: PET/CT showed new foci of abnormal skeletal FGD uptake in the thoracic and lumbar spine. Mild interval increase in intensity of previously demonstrated hypermetabolic skeletal lesions involving the pelvis and lumbar spine. Findings are consistent with progressive osseous metastatic disease. Subsequently off chemo for 1 month due to poor performance status.  16. 1/25/2022: Patient fell and fractured his femur. This was surgically repaired and he was subsequently cared for at a rehab unit.  17. 2/14/2022: Brain MRI without contrast (contrast not given due to inability to obtain IV access) showed no brain metastases.    Met with Dr. Shoemaker-in Dr. John's absence  -Recommendation is to change therapy   04/21/2022 -palliative Carboplatin Alimta started   Due to cytopenia AUC reduced to 4 with cycle 2  Due to persistent pancytopenia and inability to tolerate treatment carboplatin discontinued with cycle 3-day 1    Patient is currently getting monotherapy with Alimta    Interval History:  Patient reports he is feeling well.  Denies fever chills sweats cough shortness of breath chest pain nausea vomiting diarrhea abdominal pain or bleeding      Review of Systems:  14 point ROS of systems including Constitutional, Eyes, Respiratory, Cardiovascular, Gastroenterology, Genitourinary, Integumentary, Muscularskeletal, Psychiatric were all negative except for pertinent positives noted in my HPI.    Physical Examination:  Physical Exam  HENT:      Right Ear: Tympanic membrane normal.      Nose: Nose normal.      Mouth/Throat:      Mouth: Mucous membranes are moist.   Eyes:      Pupils: Pupils are equal, round, and reactive to light.   Cardiovascular:      Rate and Rhythm: Normal rate.      Pulses: Normal pulses.   Pulmonary:      Effort:  Pulmonary effort is normal.   Abdominal:      General: Abdomen is flat.   Musculoskeletal:         General: Normal range of motion.      Cervical back: Normal range of motion.   Skin:     General: Skin is warm.   Neurological:      General: No focal deficit present.      Mental Status: He is alert.   Psychiatric:         Mood and Affect: Mood normal.           Laboratory Data:  CBC and CMP results reviewed    Assessment and Plan:  This is a 61-year-old male with    metastatic non-small cell lung cancer   Patient of Dr. John   Progressed on  different treatments as above  04/21/2022 started palliative treatment with carboplatin AUC 5  and Alimta  Developed cytopenia  05/12/2022-cycle 2 given-carboplatin AUC reduced to 4 followed by Neulasta on pro  Due to persistent pancytopenia and inability to tolerate treatment carboplatin discontinued with cycle 3-day 1  Patient is currently getting monotherapy with Alimta  Labs reviewed normalities discussed okay to proceed with Alimta  Okay to give vitamin B12 injection today  Continue with folic acid  Patient will be getting IV fluids weekly  -Patient comes here today for cycle 5 Alimta  Labs reviewed abnormalities discussed  Platelets are 66 he does not need parameters for treatment  Hold Alimta at this week and reschedule it for next week  07/18-scheduled for PET scan  [id number] with Dr. John       Thrombocytopenia secondary to treatment  Patient denies bleeding bruising  Complications of low platelet count reviewed  Advised patient to go to ER in the event of bleeding fall injury bruising or edema  -Patient is not on blood thinners  Transfuse for platelet count 20 or below or bleeding  Recheck platelet count next week    Anemia secondary to treatment  Patient is asymptomatic  -I recommend 1 unit PRBC today patient refuses blood transfusion today and this week .  Signs and symptoms of anemia reviewed with patient.  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 Although reviewed after completion, some words and grammatical errors may remain.            Again, thank you for allowing me to participate in the care of your patient.        Sincerely,        MADHAVI Moran CNP

## 2022-07-14 NOTE — PROGRESS NOTES
Oncology/Hematology Visit Note  Jul 14, 2022    Reason for Visit: follow up of metastatic non-small cell lung carcinoma  Metastatic to lymph nodes bones in bilateral lungs  Brain MRI negative for mets    Patient met with Dr. John who recommended treatment with palliative intent with paclitaxel carboplatin Avastin and atezolizumab-treatment started on April 27, 2021  -Due to thrombocytopenia carboplatin AUC reduced to 4 with cycle 2  Due to pancytopenia carboplatin discontinued with cycle 5    7/12/2021: PET/CT showed resolved mural nodules with increased cystic cavity in left lower lobe with no significant FDG uptake, less likely metastases; no enlarged hypermetabolic mediastinal, hilar, or axillary lymph nodes, decreased metabolic activity of intraosseous lesion in spine and pelvis; no new bone lesions    Treated with paclitaxel, Avastin and atezolizumab.-Last treatment given in  12/22/2021-cycle 12    1/10/2022: PET/CT showed new foci of abnormal skeletal FGD uptake in the thoracic and lumbar spine. Mild interval increase in intensity of previously demonstrated hypermetabolic skeletal lesions involving the pelvis and lumbar spine. Findings are consistent with progressive osseous metastatic disease. Subsequently off chemo for 1 month due to poor performance status.  16. 1/25/2022: Patient fell and fractured his femur. This was surgically repaired and he was subsequently cared for at a rehab unit.  17. 2/14/2022: Brain MRI without contrast (contrast not given due to inability to obtain IV access) showed no brain metastases.    Met with Dr. Shoemaker-in Dr. John's absence  -Recommendation is to change therapy   04/21/2022 -palliative Carboplatin Alimta started   Due to cytopenia AUC reduced to 4 with cycle 2  Due to persistent pancytopenia and inability to tolerate treatment carboplatin discontinued with cycle 3-day 1    Patient is currently getting monotherapy with Alimta    Interval History:  Patient reports he is  feeling well.  Denies fever chills sweats cough shortness of breath chest pain nausea vomiting diarrhea abdominal pain or bleeding      Review of Systems:  14 point ROS of systems including Constitutional, Eyes, Respiratory, Cardiovascular, Gastroenterology, Genitourinary, Integumentary, Muscularskeletal, Psychiatric were all negative except for pertinent positives noted in my HPI.    Physical Examination:  Physical Exam  HENT:      Right Ear: Tympanic membrane normal.      Nose: Nose normal.      Mouth/Throat:      Mouth: Mucous membranes are moist.   Eyes:      Pupils: Pupils are equal, round, and reactive to light.   Cardiovascular:      Rate and Rhythm: Normal rate.      Pulses: Normal pulses.   Pulmonary:      Effort: Pulmonary effort is normal.   Abdominal:      General: Abdomen is flat.   Musculoskeletal:         General: Normal range of motion.      Cervical back: Normal range of motion.   Skin:     General: Skin is warm.   Neurological:      General: No focal deficit present.      Mental Status: He is alert.   Psychiatric:         Mood and Affect: Mood normal.           Laboratory Data:  CBC and CMP results reviewed    Assessment and Plan:  This is a 61-year-old male with    metastatic non-small cell lung cancer   Patient of Dr. John   Progressed on  different treatments as above  04/21/2022 started palliative treatment with carboplatin AUC 5  and Alimta  Developed cytopenia  05/12/2022-cycle 2 given-carboplatin AUC reduced to 4 followed by Neulasta on pro  Due to persistent pancytopenia and inability to tolerate treatment carboplatin discontinued with cycle 3-day 1  Patient is currently getting monotherapy with Alimta  Labs reviewed normalities discussed okay to proceed with Alimta  Okay to give vitamin B12 injection today  Continue with folic acid  Patient will be getting IV fluids weekly  -Patient comes here today for cycle 5 Alimta  Labs reviewed abnormalities discussed  Platelets are 66 he does not  need parameters for treatment  Hold Alimta at this week and reschedule it for next week  07/18-scheduled for PET scan  07/2503-[id number] with Dr. John       Thrombocytopenia secondary to treatment  Patient denies bleeding bruising  Complications of low platelet count reviewed  Advised patient to go to ER in the event of bleeding fall injury bruising or edema  -Patient is not on blood thinners  Transfuse for platelet count 20 or below or bleeding  Recheck platelet count next week    Anemia secondary to treatment  Patient is asymptomatic  -I recommend 1 unit PRBC today patient refuses blood transfusion today and this week .  Signs and symptoms of anemia reviewed with patient.  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 Although reviewed after completion, some words and grammatical errors may remain.

## 2022-07-14 NOTE — PROGRESS NOTES
Infusion Nursing Note:  Kt Rasmussen presents today for IVF.    Patient seen by provider today: Yes: Juan Rausch NP   present during visit today: Not Applicable.    Note: Patient is only staying for IVF today. He wants to get alimta next week with IVF. Patient qualifies for a blood transfusion, but is refusing that today.    Intravenous Access:  Implanted Port.    Treatment Conditions:  Lab Results   Component Value Date    HGB 7.9 (L) 07/14/2022    WBC 4.2 07/14/2022    ANEU 2.4 05/19/2022    ANEUTAUTO 3.1 07/14/2022    PLT 66 (L) 07/14/2022      Lab Results   Component Value Date     05/19/2022    POTASSIUM 3.6 05/19/2022    MAG 1.9 10/13/2016    CR 0.70 07/14/2022    BEVERLY 8.4 (L) 05/19/2022    BILITOTAL 0.6 05/19/2022    ALBUMIN 3.3 (L) 05/19/2022    ALT 69 05/19/2022    AST 41 05/19/2022     Results reviewed, labs did NOT meet treatment parameters: alimta deferred x 1 week.    Post Infusion Assessment:  Patient tolerated infusion without incident.  Site patent and intact, free from redness, edema or discomfort.  No evidence of extravasations.  Access discontinued per protocol.     Discharge Plan:   Copy of AVS reviewed with patient and/or family.  Patient will return 7/21/22 for next appointment.  Patient discharged in stable condition accompanied by: self.  Departure Mode: Wheelchair.      Magdalena Davila RN

## 2022-07-14 NOTE — PROGRESS NOTES
"Oncology Rooming Note    July 14, 2022 10:35 AM   Kt Rasmussen is a 61 year old male who presents for:    Chief Complaint   Patient presents with     Oncology Clinic Visit     Initial Vitals: Resp 16  Estimated body mass index is 21.05 kg/m  as calculated from the following:    Height as of 6/2/22: 1.918 m (6' 3.5\").    Weight as of 5/12/22: 77.4 kg (170 lb 10.2 oz). There is no height or weight on file to calculate BSA.  No Pain (0) Comment: Data Unavailable   No LMP for male patient.  Allergies reviewed: Yes  Medications reviewed: Yes    Medications: MEDICATION REFILLS NEEDED TODAY. Provider was notified.  Pharmacy name entered into [employer]:  [facility] [address] [facility] [place] [place] [facility] [address] [facility] [address] [id number] - [facility] [address] AT [facility]    Clinical concerns: no       Taylor Aguilar            "

## 2022-07-18 ENCOUNTER — HOSPITAL ENCOUNTER (OUTPATIENT)
Dept: PET IMAGING | Facility: CLINIC | Age: 62
Discharge: HOME | End: 2022-07-18
Attending: INTERNAL MEDICINE | Admitting: INTERNAL MEDICINE
Payer: MEDICARE

## 2022-07-18 DIAGNOSIS — C34.90 NON-SMALL CELL LUNG CANCER METASTATIC TO BONE (H): ICD-10-CM

## 2022-07-18 DIAGNOSIS — C79.51 NON-SMALL CELL LUNG CANCER METASTATIC TO BONE (H): ICD-10-CM

## 2022-07-18 PROCEDURE — 343N000001 HC RX 343: Performed by: INTERNAL MEDICINE

## 2022-07-18 PROCEDURE — 250N000011 HC RX IP 250 OP 636: Performed by: INTERNAL MEDICINE

## 2022-07-18 PROCEDURE — 999N000130 PET ONCOLOGY WHOLE BODY: Mod: PS

## 2022-07-18 PROCEDURE — A9552 F18 FDG: HCPCS | Performed by: INTERNAL MEDICINE

## 2022-07-18 RX ORDER — HEPARIN SODIUM (PORCINE) LOCK FLUSH IV SOLN 100 UNIT/ML 100 UNIT/ML
500 SOLUTION INTRAVENOUS EVERY 8 HOURS
Status: DISCONTINUED | OUTPATIENT
Start: 2022-07-18 | End: 2022-07-19 | Stop reason: HOSPADM

## 2022-07-18 RX ADMIN — Medication 500 UNITS: at 11:25

## 2022-07-18 RX ADMIN — FLUDEOXYGLUCOSE F-18 10.77 MCI.: 500 INJECTION, SOLUTION INTRAVENOUS at 11:24

## 2022-07-21 ENCOUNTER — INFUSION THERAPY VISIT (OUTPATIENT)
Dept: INFUSION THERAPY | Facility: CLINIC | Age: 62
End: 2022-07-21
Attending: NURSE PRACTITIONER
Payer: MEDICARE

## 2022-07-21 VITALS
RESPIRATION RATE: 16 BRPM | OXYGEN SATURATION: 98 % | SYSTOLIC BLOOD PRESSURE: 109 MMHG | DIASTOLIC BLOOD PRESSURE: 71 MMHG | HEART RATE: 87 BPM | TEMPERATURE: 98 F

## 2022-07-21 DIAGNOSIS — T45.1X5A CHEMOTHERAPY-INDUCED NEUTROPENIA (H): ICD-10-CM

## 2022-07-21 DIAGNOSIS — C34.90 NON-SMALL CELL CARCINOMA OF LUNG, STAGE 3, UNSPECIFIED LATERALITY (H): ICD-10-CM

## 2022-07-21 DIAGNOSIS — D70.1 CHEMOTHERAPY-INDUCED NEUTROPENIA (H): ICD-10-CM

## 2022-07-21 DIAGNOSIS — C34.90 NON-SMALL CELL LUNG CANCER METASTATIC TO BONE (H): ICD-10-CM

## 2022-07-21 DIAGNOSIS — C79.51 NON-SMALL CELL LUNG CANCER METASTATIC TO BONE (H): ICD-10-CM

## 2022-07-21 DIAGNOSIS — Z51.11 ENCOUNTER FOR ANTINEOPLASTIC CHEMOTHERAPY: Primary | ICD-10-CM

## 2022-07-21 LAB
BASOPHILS # BLD AUTO: 0 10E3/UL (ref 0–0.2)
BASOPHILS NFR BLD AUTO: 0 %
CREAT SERPL-MCNC: 0.74 MG/DL (ref 0.66–1.25)
EOSINOPHIL # BLD AUTO: 0.1 10E3/UL (ref 0–0.7)
EOSINOPHIL NFR BLD AUTO: 2 %
ERYTHROCYTE [DISTWIDTH] IN BLOOD BY AUTOMATED COUNT: 17.4 % (ref 10–15)
GFR SERPL CREATININE-BSD FRML MDRD: >90 ML/MIN/1.73M2
HCT VFR BLD AUTO: 25.3 % (ref 40–53)
HGB BLD-MCNC: 7.9 G/DL (ref 13.3–17.7)
IMM GRANULOCYTES # BLD: 0 10E3/UL
IMM GRANULOCYTES NFR BLD: 0 %
LYMPHOCYTES # BLD AUTO: 0.8 10E3/UL (ref 0.8–5.3)
LYMPHOCYTES NFR BLD AUTO: 19 %
MCH RBC QN AUTO: 29.2 PG (ref 26.5–33)
MCHC RBC AUTO-ENTMCNC: 31.2 G/DL (ref 31.5–36.5)
MCV RBC AUTO: 93 FL (ref 78–100)
MONOCYTES # BLD AUTO: 0.4 10E3/UL (ref 0–1.3)
MONOCYTES NFR BLD AUTO: 9 %
NEUTROPHILS # BLD AUTO: 2.8 10E3/UL (ref 1.6–8.3)
NEUTROPHILS NFR BLD AUTO: 70 %
NRBC # BLD AUTO: 0 10E3/UL
NRBC BLD AUTO-RTO: 0 /100
PLATELET # BLD AUTO: 74 10E3/UL (ref 150–450)
RBC # BLD AUTO: 2.71 10E6/UL (ref 4.4–5.9)
WBC # BLD AUTO: 4 10E3/UL (ref 4–11)

## 2022-07-21 PROCEDURE — 85004 AUTOMATED DIFF WBC COUNT: CPT | Performed by: NURSE PRACTITIONER

## 2022-07-21 PROCEDURE — 82565 ASSAY OF CREATININE: CPT | Performed by: NURSE PRACTITIONER

## 2022-07-21 PROCEDURE — 258N000003 HC RX IP 258 OP 636: Performed by: NURSE PRACTITIONER

## 2022-07-21 PROCEDURE — 96360 HYDRATION IV INFUSION INIT: CPT

## 2022-07-21 PROCEDURE — 250N000011 HC RX IP 250 OP 636: Performed by: INTERNAL MEDICINE

## 2022-07-21 RX ORDER — HEPARIN SODIUM,PORCINE 10 UNIT/ML
5 VIAL (ML) INTRAVENOUS
Status: CANCELLED | OUTPATIENT
Start: 2022-07-21

## 2022-07-21 RX ORDER — HEPARIN SODIUM (PORCINE) LOCK FLUSH IV SOLN 100 UNIT/ML 100 UNIT/ML
5 SOLUTION INTRAVENOUS
Status: CANCELLED | OUTPATIENT
Start: 2022-07-21

## 2022-07-21 RX ORDER — ONDANSETRON 2 MG/ML
8 INJECTION INTRAMUSCULAR; INTRAVENOUS EVERY 6 HOURS PRN
Status: CANCELLED
Start: 2022-07-21

## 2022-07-21 RX ORDER — HEPARIN SODIUM (PORCINE) LOCK FLUSH IV SOLN 100 UNIT/ML 100 UNIT/ML
5 SOLUTION INTRAVENOUS
Status: DISCONTINUED | OUTPATIENT
Start: 2022-07-21 | End: 2022-07-21 | Stop reason: HOSPADM

## 2022-07-21 RX ADMIN — SODIUM CHLORIDE 1000 ML: 9 INJECTION, SOLUTION INTRAVENOUS at 13:39

## 2022-07-21 RX ADMIN — Medication 5 ML: at 14:43

## 2022-07-21 NOTE — PROGRESS NOTES
Infusion Nursing Note:  Kt Rasmussen presents today for C5D1 Alimta.    Patient seen by provider today: No   present during visit today: Not Applicable.    Note: Pt does not meet parameters for tx today. His platelets 74K.   -hold Alimta today and defer tx by one week per Juan Rausch NP      Intravenous Access:  Labs drawn without difficulty.  Implanted Port.    Treatment Conditions:  Lab Results   Component Value Date    HGB 7.9 (L) 07/21/2022    WBC 4.0 07/21/2022    ANEU 2.4 05/19/2022    ANEUTAUTO 2.8 07/21/2022    PLT 74 (L) 07/21/2022      Lab Results   Component Value Date     05/19/2022    POTASSIUM 3.6 05/19/2022    MAG 1.9 10/13/2016    CR 0.74 07/21/2022    BEVERLY 8.4 (L) 05/19/2022    BILITOTAL 0.6 05/19/2022    ALBUMIN 3.3 (L) 05/19/2022    ALT 69 05/19/2022    AST 41 05/19/2022     Results reviewed, labs did NOT meet treatment parameters-Alimta deferred by 1 week.     Post Infusion Assessment:  Patient tolerated infusion without incident.  Blood return noted pre and post infusion.  Site patent and intact, free from redness, edema or discomfort.  Access discontinued per protocol.     Discharge Plan:   Discharge instructions reviewed with: Patient.  Patient and/or family verbalized understanding of discharge instructions and all questions answered.  AVS to patient via [employer].  Patient will return 7/28/22 for next appointment.   Patient discharged in stable condition accompanied by: self.  Departure Mode: Wheelchair.      Stacy Minor RN

## 2022-07-25 ENCOUNTER — ONCOLOGY VISIT (OUTPATIENT)
Dept: ONCOLOGY | Facility: CLINIC | Age: 62
End: 2022-07-25
Attending: INTERNAL MEDICINE
Payer: MEDICARE

## 2022-07-25 VITALS
DIASTOLIC BLOOD PRESSURE: 76 MMHG | HEART RATE: 78 BPM | TEMPERATURE: 97.9 F | SYSTOLIC BLOOD PRESSURE: 114 MMHG | RESPIRATION RATE: 16 BRPM | OXYGEN SATURATION: 96 %

## 2022-07-25 DIAGNOSIS — T45.1X5A ANEMIA DUE TO CHEMOTHERAPY: ICD-10-CM

## 2022-07-25 DIAGNOSIS — D70.1 CHEMOTHERAPY-INDUCED NEUTROPENIA (H): ICD-10-CM

## 2022-07-25 DIAGNOSIS — C79.51 NON-SMALL CELL LUNG CANCER METASTATIC TO BONE (H): Primary | ICD-10-CM

## 2022-07-25 DIAGNOSIS — Z51.11 ENCOUNTER FOR ANTINEOPLASTIC CHEMOTHERAPY: ICD-10-CM

## 2022-07-25 DIAGNOSIS — D64.81 ANEMIA DUE TO CHEMOTHERAPY: ICD-10-CM

## 2022-07-25 DIAGNOSIS — D69.59 THROMBOCYTOPENIA DUE TO DRUGS: ICD-10-CM

## 2022-07-25 DIAGNOSIS — T45.1X5A CHEMOTHERAPY-INDUCED NEUTROPENIA (H): ICD-10-CM

## 2022-07-25 DIAGNOSIS — T50.905A THROMBOCYTOPENIA DUE TO DRUGS: ICD-10-CM

## 2022-07-25 DIAGNOSIS — C34.90 NON-SMALL CELL LUNG CANCER METASTATIC TO BONE (H): Primary | ICD-10-CM

## 2022-07-25 PROCEDURE — G0463 HOSPITAL OUTPT CLINIC VISIT: HCPCS

## 2022-07-25 PROCEDURE — 99215 OFFICE O/P EST HI 40 MIN: CPT | Performed by: INTERNAL MEDICINE

## 2022-07-25 RX ORDER — HEPARIN SODIUM,PORCINE 10 UNIT/ML
5 VIAL (ML) INTRAVENOUS
Status: CANCELLED | OUTPATIENT
Start: 2022-08-03

## 2022-07-25 RX ORDER — MEPERIDINE HYDROCHLORIDE 25 MG/ML
25 INJECTION INTRAMUSCULAR; INTRAVENOUS; SUBCUTANEOUS EVERY 30 MIN PRN
Status: CANCELLED | OUTPATIENT
Start: 2022-08-03

## 2022-07-25 RX ORDER — EPINEPHRINE 1 MG/ML
0.3 INJECTION, SOLUTION INTRAMUSCULAR; SUBCUTANEOUS EVERY 5 MIN PRN
Status: CANCELLED | OUTPATIENT
Start: 2022-08-03

## 2022-07-25 RX ORDER — METHYLPREDNISOLONE SODIUM SUCCINATE 125 MG/2ML
125 INJECTION, POWDER, LYOPHILIZED, FOR SOLUTION INTRAMUSCULAR; INTRAVENOUS
Status: CANCELLED
Start: 2022-08-03

## 2022-07-25 RX ORDER — ALBUTEROL SULFATE 90 UG/1
1-2 AEROSOL, METERED RESPIRATORY (INHALATION)
Status: CANCELLED
Start: 2022-08-03

## 2022-07-25 RX ORDER — LORAZEPAM 2 MG/ML
0.5 INJECTION INTRAMUSCULAR EVERY 4 HOURS PRN
Status: CANCELLED | OUTPATIENT
Start: 2022-08-03

## 2022-07-25 RX ORDER — DIPHENHYDRAMINE HYDROCHLORIDE 50 MG/ML
50 INJECTION INTRAMUSCULAR; INTRAVENOUS
Status: CANCELLED
Start: 2022-08-03

## 2022-07-25 RX ORDER — HEPARIN SODIUM (PORCINE) LOCK FLUSH IV SOLN 100 UNIT/ML 100 UNIT/ML
5 SOLUTION INTRAVENOUS
Status: CANCELLED | OUTPATIENT
Start: 2022-08-03

## 2022-07-25 RX ORDER — ALBUTEROL SULFATE 0.83 MG/ML
2.5 SOLUTION RESPIRATORY (INHALATION)
Status: CANCELLED | OUTPATIENT
Start: 2022-08-03

## 2022-07-25 ASSESSMENT — PAIN SCALES - GENERAL: PAINLEVEL: NO PAIN (0)

## 2022-07-25 NOTE — PROGRESS NOTES
"Oncology Rooming Note    July 25, 2022 9:40 AM   Kt Rasmussen is a 61 year old male who presents for:    Chief Complaint   Patient presents with     Oncology Clinic Visit     Initial Vitals: Resp 16  Estimated body mass index is 21.05 kg/m  as calculated from the following:    Height as of 6/2/22: 1.918 m (6' 3.5\").    Weight as of 5/12/22: 77.4 kg (170 lb 10.2 oz). There is no height or weight on file to calculate BSA.  Data Unavailable Comment: Data Unavailable   No LMP for male patient.  Allergies reviewed: Yes  Medications reviewed: Yes    Medications: Medication refills not needed today.  Pharmacy name entered into EPIC:    [facility] [place] [facility] [address] [facility] [place] [place] - [address] DRUG STORE [id number] - [place], [place] - [address] AT [facility] 7    Clinical concerns: no       Taylor Aguilar            "

## 2022-07-25 NOTE — PATIENT INSTRUCTIONS
Arrange for Taxotere every 3 weeks.  Lab draw every 3 weeks.  RTC NP alternating with MD with each cycle of chemo.

## 2022-07-25 NOTE — LETTER
7/25/2022         RE: Kt Rasmussen  [id number]  [facility] [address]        Dear Colleague,    Thank you for referring your patient, Kt Rasmussen, to the [facility] CANCER [facility]. Please see a copy of my visit note below.    [facility]    Hematology/Oncology Established Patient Follow-up Note      Today's Date: 7/25/2022    Reason for Follow-up: Metastatic non-small cell lung carcinoma.    HISTORY OF PRESENT ILLNESS: Kt Rasmussen is a 61 year old male who presents with the following oncologic history:  1. 5/05/2016: Presented with near-obstructing large mass coming off the cheikh and likely the posterior wall, seen on bronchoscopy. Biopsy of the mass showed invasive squamous cell carcinoma, moderately differentiating and focally keratinizing.  Procedure was complicated by significant bleeding.  Tumor was completely debulked (via bronchoscopy) in both main stem bronchi and distal trachea. NGS showed no mutations in EGFR, KRAS, BRAF, NRAS, HRAS, PIK3CA, ERBB2, MET, or JAK2.  2. 5/23/2016: PET/CT scan showed evidence of residual tumor with decreased endobronchial mass. Indeterminate, mildly hypermetabolic mesentery with prominent lymph nodes and area of enhancement of the right hepatic lobe present.   Felt to have T4-NX-M0 disease.  3. 6/09/2016: Started concurrent chemoradiation with weekly paclitaxel and carboplatin.  4. 7/30/2016: Completed radiation.  Subsequently received 2 cycles of consolidation paclitaxel and carboplatin.   5. 9/13/2019: Presented with 6-month history of dysphonia and left vocal exophytic lesion. Left true vocal fold biopsy showed at least squamous cell carcinoma in situ, cannot exclude superficial invasion.  6. 9/30/2019: Excision of left vocal cord mass showed fragments of invasive squamous cell carcinoma, well differentiated and keratinizing.  Margins negative for malignancy.  7. 2/16/2021: CT chest w/o contrast showed thin-walled cavity in left  lower lobe with 2 solid peripheral nodules measuring 9 mm each. No lymphadenopathy.  8. 3/01/2021: PET scan showed hypermetabolic nodules in left lower lobe and right lung, consistent with metastases; hypermetabolic adenopathy in chest, abdomen, pelvis; nonspecific uptake at tongue; hypermetabolic intraosseous lesions in spine and pelvis consistent with metastatic disease; left axillary hypermetabolic lymph nodes related to COVID-19 vaccination.  9. 3/12/2021: CT-guided lung biopsy showed non-small cell carcinoma, not otherwise specified -- with opinion by [facility] pathologist.  10. 3/18/2021: Lung NGS panel negative for mutations in BRAF, EGFR, ERBB2, IDH1, IDH2, KRAS, MET, NRAS, RET. PD-L1 expression negative (TPS <1%). Due to minimal amount of DNA obtained from specimen, other biomarkers could not be analyzed.  11. 4/7/2021: MRI brain negative for brain metastases.  12. 4/27/2021: Started 1st line metastatic therapy with carboplatin, paclitaxel, bevacizumab, and atezolizumab for metastatic non-small cell lung carcinoma, NOS.  13. 7/12/2021: PET/CT showed resolved mural nodules with increased cystic cavity in left lower lobe with no significant FDG uptake, less likely metastases; no enlarged hypermetabolic mediastinal, hilar, or axillary lymph nodes, decreased metabolic activity of intraosseous lesion in spine and pelvis; no new bone lesions.  14. 10/11/2021: PET/CT showed slight interval increase in intensity of metabolic activity of right pubic bone and left ischium with new focal uptake in L2 spinous process; resolved uptake at previous ground glass opacity along right medial lower lobe; unchanged cystic cavities/nodules in left lower lobe and right apical upper lobe; no pathologically enlarged hypermetabolic mediastinal, hilar, or axillary lymph nodes.  15. 1/10/2022: PET/CT showed new foci of abnormal skeletal FGD uptake in the thoracic and lumbar spine. Mild interval increase in intensity of previously  demonstrated hypermetabolic skeletal lesions involving the pelvis and lumbar spine. Findings are consistent with progressive osseous metastatic disease. Subsequently off chemo for 1 month due to poor performance status.  16. 1/25/2022: Patient fell and fractured his femur. This was surgically repaired and he was subsequently cared for at a rehab unit.  17. 2/14/2022: Brain MRI without contrast (contrast not given due to inability to obtain IV access) showed no brain metastases.  18. 4/21/2022: Started 2nd line metastatic therapy with pemetrexed and carboplatin. Omission of carboplatin 6/2 and forward due to worsening anemia despite dose reduction.  19. 7/18/2022: PET/CT showed enlarged and increased FDG avid skeletal metastases, increased left para-aortic retroperitoneal lymph node increased in size and FDG avidity, findings consistent with progressive disease.    INTERIM HISTORY:  Kt reports feeling relatively well.  He has been using a wheelchair. He denies recent fevers or chills or dizziness or falls. No recent weight loss.    REVIEW OF SYSTEMS:   14 point ROS was reviewed and is negative other than as noted above in HPI.       HOME MEDICATIONS:  Current Outpatient Medications   Medication Sig Dispense Refill     atorvastatin (LIPITOR) 40 MG tablet Take 40 mg by mouth daily       dexamethasone (DECADRON) 4 MG tablet Take 1 tablet (4 mg) by mouth 2 times daily (with meals) for 3 days Start one day prior to Pemetrexed (Alimta), continue on the day treatment, and the day following Pemetrexed. 6 tablet 3     folic acid (FOLVITE) 1 MG tablet Take 1 tablet (1,000 mcg) by mouth daily Begin 7 days before Pemetrexed (Alimta) starts and continue for 21 days after Pemetrexed is discontinued. 30 tablet 3     ondansetron (ZOFRAN) 8 MG tablet Take 1 tablet (8 mg) by mouth every 8 hours as needed for nausea 10 tablet 3     prochlorperazine (COMPAZINE) 10 MG tablet Take 1 tablet (10 mg) by mouth every 6 hours as needed  (Nausea/Vomiting) 30 tablet 3         ALLERGIES:  Allergies   Allergen Reactions     No Known Drug Allergies          PAST MEDICAL HISTORY:  Past Medical History:   Diagnosis Date     Alcohol abuse, unspecified      Cerebral infarction (H)     2009, right side residual and aphasia     Dyslipidemia      GERD (gastroesophageal reflux disease)      Lung cancer (H)      Unspecified essential hypertension          PAST SURGICAL HISTORY:  Past Surgical History:   Procedure Laterality Date     BRONCHOSCOPY FLEXIBLE AND RIGID N/A 2016    Procedure: BRONCHOSCOPY FLEXIBLE AND RIGID;  Surgeon: Tony Talbot MD;  Location: [facility] OR     ESOPHAGOSCOPY FLEXIBLE N/A 2019    Procedure: flexible esophagoscopy;  Surgeon: Lizzy Johnson MD;  Location: [facility] OR     INJECT STEROID (LOCATION) N/A 2019    Procedure: steroid injection;  Surgeon: Lizzy Johnson MD;  Location: [facility] OR     IR CHEST PORT PLACEMENT > 5 YRS OF AGE  2021     IR CHEST PORT PLACEMENT > 5 YRS OF AGE  2022     IR PORT CHECK RIGHT  2022     IR PORT REMOVAL RIGHT  2022     LASER CO2 LARYNGOSCOPY N/A 2019    Procedure: Microdirect laryngoscopy with excision of laryngeal mass, CO2 laser;  Surgeon: Lizzy Johnson MD;  Location: [facility] OR     ZZC NONSPECIFIC PROCEDURE      R tympanoplasty         SOCIAL HISTORY:  Social History     Socioeconomic History     Marital status: Single     Spouse name: Not on file     Number of children: Not on file     Years of education: Not on file     Highest education level: Not on file   Occupational History     Not on file   Tobacco Use     Smoking status: Former Smoker     Packs/day: 1.00     Types: Cigarettes     Quit date: 2009     Years since quittin.8     Smokeless tobacco: Never Used   Substance and Sexual Activity     Alcohol use: Not Currently     Drug use: No     Sexual activity: Not on file   Other Topics Concern     Parent/sibling w/ CABG, MI or angioplasty before 65F 55M?  Not Asked   Social History Narrative     Not on file     Social Determinants of Health     Financial Resource Strain: Not on file   Food Insecurity: Not on file   Transportation Needs: Not on file   Physical Activity: Not on file   Stress: Not on file   Social Connections: Not on file   Intimate Partner Violence: Not on file   Housing Stability: Not on file   Resides at assisted living.      FAMILY HISTORY:  Family History   Problem Relation Age of Onset     Cancer Father          PHYSICAL EXAM:  Vital signs:  /76   Pulse 78   Temp 97.9  F (36.6  C) (Oral)   Resp 16   SpO2 96%    ECO  GENERAL/CONSTITUTIONAL: No acute distress.  EYES: No erythema or scleral icterus.  LYMPH: No cervical, supraclavicular, axillary adenopathy.   RESPIRATORY: Clear to auscultation bilaterally. No crackles or wheezing.   CARDIOVASCULAR: Regular rate and rhythm without murmurs.  GASTROINTESTINAL: No hepatosplenomegaly, masses, or tenderness. No guarding.  No distention.  MUSCULOSKELETAL: Warm and well-perfused, no cyanosis, clubbing, or edema.  NEUROLOGIC: Residual right arm and right leg reduced strength due to prior stroke. Alert, oriented, answers questions appropriately but has occasional word finding difficulty from prior stroke. Dysphonia present and stable.  INTEGUMENTARY: No rashes or jaundice.  GAIT: Sitting in wheelchair.      LABS:  CBC RESULTS: Recent Labs   Lab Test 22  1338   WBC 4.0   RBC 2.71*   HGB 7.9*   HCT 25.3*   MCV 93   MCH 29.2   MCHC 31.2*   RDW 17.4*   PLT 74*     Last Comprehensive Metabolic Panel:  Sodium   Date Value Ref Range Status   2022 139 133 - 144 mmol/L Final   2021 140 133 - 144 mmol/L Final     Potassium   Date Value Ref Range Status   2022 3.6 3.4 - 5.3 mmol/L Final   2021 4.3 3.4 - 5.3 mmol/L Final     Chloride   Date Value Ref Range Status   2022 104 94 - 109 mmol/L Final   2021 111 (H) 94 - 109 mmol/L Final     Carbon Dioxide   Date Value Ref  Range Status   06/29/2021 25 20 - 32 mmol/L Final     Carbon Dioxide (CO2)   Date Value Ref Range Status   05/19/2022 27 20 - 32 mmol/L Final     Anion Gap   Date Value Ref Range Status   05/19/2022 8 3 - 14 mmol/L Final   06/29/2021 4 3 - 14 mmol/L Final     Glucose   Date Value Ref Range Status   05/19/2022 125 (H) 70 - 99 mg/dL Final   06/29/2021 85 70 - 99 mg/dL Final     Urea Nitrogen   Date Value Ref Range Status   05/19/2022 24 7 - 30 mg/dL Final   06/29/2021 17 7 - 30 mg/dL Final     Creatinine   Date Value Ref Range Status   07/21/2022 0.74 0.66 - 1.25 mg/dL Final   06/29/2021 0.84 0.66 - 1.25 mg/dL Final     GFR Estimate   Date Value Ref Range Status   07/21/2022 >90 >60 mL/min/1.73m2 Final     Comment:     Effective December 21, 2021 eGFRcr in adults is calculated using the 2021 CKD-EPI creatinine equation which includes age and gender (Ceci et al., NEJ, DOI: 10.1056/BZNQlw0672265)   06/29/2021 >90 >60 mL/min/[1.73_m2] Final     Comment:     Non  GFR Calc  Starting 12/18/2018, serum creatinine based estimated GFR (eGFR) will be   calculated using the Chronic Kidney Disease Epidemiology Collaboration   (CKD-EPI) equation.       Calcium   Date Value Ref Range Status   05/19/2022 8.4 (L) 8.5 - 10.1 mg/dL Final   06/29/2021 8.7 8.5 - 10.1 mg/dL Final     Bilirubin Total   Date Value Ref Range Status   05/19/2022 0.6 0.2 - 1.3 mg/dL Final   06/29/2021 0.3 0.2 - 1.3 mg/dL Final     Alkaline Phosphatase   Date Value Ref Range Status   05/19/2022 112 40 - 150 U/L Final   06/29/2021 73 40 - 150 U/L Final     ALT   Date Value Ref Range Status   05/19/2022 69 0 - 70 U/L Final   06/29/2021 34 0 - 70 U/L Final     AST   Date Value Ref Range Status   05/19/2022 41 0 - 45 U/L Final   06/29/2021 26 0 - 45 U/L Final       PATHOLOGY:  None new.    IMAGING:  Reviewed as per HPI.    ASSESSMENT/PLAN:  Kt Rasmussen is a 61 year old male with the following issues:  1. Metastatic non-small (non-squamous) cell  lung carcinoma with metastases to lymph nodes, bones, and bilateral lungs  2. 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 Chemotherapy-induced pancytopenia  --Lung NGS panel negative for mutations in BRAF, EGFR, ERBB2, IDH1, IDH2, KRAS, MET, NRAS, RET. PD-L1 expression negative (TPS <1%). Guardant 360 negative for targetable mutations.  --Kt started 2nd metastatic therapy with every 3-week pemetrexed and carboplatin on 4/21/2022. He has had challenges with chemo-induced pancytopenia which necessitated dose reduction in carboplatin and now intermittent dizziness.  --Due to worsening pancytopenia and dizziness, carboplatin was omitted and he was continued on pemetrexed alone.  --I personally reviewed the PET scan from 7/18/2022 and discussed the results with Kt today.  The PET unfortunately shows progressive disease in the skeletal metastases and left para-aortic lymphadenopathy.  --Offered 3rd line metastatic therapy with Taxotere monotherapy.  Discussed potential adverse effects of worsening pancytopenia, fevers, peripheral neuropathy, infusion reaction, rash, weakness, fatigue.  I also offered supportive care. We also talked about hospice. He expresses he is not ready for hospice.  --He wishes to proceed with Taxotere and consents to this chemo. Will start at 20% dose reduction due to past repeated issues with chemo-induced cytopenias. He may need additional dose reductions if his blood counts do not adequately recover.    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 Dysphonia  6. Dysphagia  -Kt underwent excision of a left vocal cord SCC on 9/30/2019 and has persistent dysphonia as a result of the lesion and excision.  He also has dysphagia but this is stable and swallowing is manageable.  -Last scope by ENT 9/23/2021 showed no evidence for recurrence.    7. Weight loss  --I had offered nutrition  "consult and he declined this.  --I recommended increasing his intake of higher caloric nutrient dense foods and beverages. His weight has now stabilized.    8. Dizziness and prior falls  --SW and guardian were previously notified of multiple falls.  --Prior brain MRI 2/14/2022 showed no brain metastases. However this was a suboptimal exam due to inability to administer contrast.  --He has not had recent falls and is using a wheelchair due to prior femoral fracture in 1/2022.    Sangita John MD  Hematology/Oncology  [facility]    Total time spent: 40 minutes in patient evaluation, counseling, documentation, and coordination of care.    Oncology Rooming Note    July 25, 2022 9:40 AM   Kt Rasmussen is a 61 year old male who presents for:    Chief Complaint   Patient presents with     Oncology Clinic Visit     Initial Vitals: Resp 16  Estimated body mass index is 21.05 kg/m  as calculated from the following:    Height as of 6/2/22: 1.918 m (6' 3.5\").    Weight as of 5/12/22: 77.4 kg (170 lb 10.2 oz). There is no height or weight on file to calculate BSA.  Data Unavailable Comment: Data Unavailable   No LMP for male patient.  Allergies reviewed: Yes  Medications reviewed: Yes    Medications: Medication refills not needed today.  Pharmacy name entered into EPIC:    [facility] [place] [place] [address] [facility] [address] [id number] [place] [address] [facility]    Clinical concerns: no       Taylor Aguilar                Again, thank you for allowing me to participate in the care of your patient.        Sincerely,        Sangita John MD    "

## 2022-07-28 ENCOUNTER — INFUSION THERAPY VISIT (OUTPATIENT)
Dept: INFUSION THERAPY | Facility: CLINIC | Age: 62
End: 2022-07-28
Attending: NURSE PRACTITIONER
Payer: MEDICARE

## 2022-07-28 VITALS
DIASTOLIC BLOOD PRESSURE: 70 MMHG | RESPIRATION RATE: 20 BRPM | SYSTOLIC BLOOD PRESSURE: 124 MMHG | OXYGEN SATURATION: 98 % | TEMPERATURE: 97.5 F | HEART RATE: 74 BPM

## 2022-07-28 DIAGNOSIS — T45.1X5A CHEMOTHERAPY-INDUCED NEUTROPENIA (H): Primary | ICD-10-CM

## 2022-07-28 DIAGNOSIS — C34.90 NON-SMALL CELL CARCINOMA OF LUNG, STAGE 3, UNSPECIFIED LATERALITY (H): ICD-10-CM

## 2022-07-28 DIAGNOSIS — C34.90 NON-SMALL CELL LUNG CANCER METASTATIC TO BONE (H): ICD-10-CM

## 2022-07-28 DIAGNOSIS — D70.1 CHEMOTHERAPY-INDUCED NEUTROPENIA (H): Primary | ICD-10-CM

## 2022-07-28 DIAGNOSIS — Z51.11 ENCOUNTER FOR ANTINEOPLASTIC CHEMOTHERAPY: ICD-10-CM

## 2022-07-28 DIAGNOSIS — C79.51 NON-SMALL CELL LUNG CANCER METASTATIC TO BONE (H): ICD-10-CM

## 2022-07-28 LAB
ALBUMIN SERPL-MCNC: 3.3 G/DL (ref 3.4–5)
ALP SERPL-CCNC: 87 U/L (ref 40–150)
ALT SERPL W P-5'-P-CCNC: 22 U/L (ref 0–70)
AST SERPL W P-5'-P-CCNC: 30 U/L (ref 0–45)
BASOPHILS # BLD AUTO: 0 10E3/UL (ref 0–0.2)
BASOPHILS NFR BLD AUTO: 0 %
BILIRUB DIRECT SERPL-MCNC: 0.1 MG/DL (ref 0–0.2)
BILIRUB SERPL-MCNC: 0.4 MG/DL (ref 0.2–1.3)
EOSINOPHIL # BLD AUTO: 0.1 10E3/UL (ref 0–0.7)
EOSINOPHIL NFR BLD AUTO: 2 %
ERYTHROCYTE [DISTWIDTH] IN BLOOD BY AUTOMATED COUNT: 17.5 % (ref 10–15)
HCT VFR BLD AUTO: 27.2 % (ref 40–53)
HGB BLD-MCNC: 8.5 G/DL (ref 13.3–17.7)
IMM GRANULOCYTES # BLD: 0 10E3/UL
IMM GRANULOCYTES NFR BLD: 0 %
LYMPHOCYTES # BLD AUTO: 0.9 10E3/UL (ref 0.8–5.3)
LYMPHOCYTES NFR BLD AUTO: 25 %
MCH RBC QN AUTO: 29.5 PG (ref 26.5–33)
MCHC RBC AUTO-ENTMCNC: 31.3 G/DL (ref 31.5–36.5)
MCV RBC AUTO: 94 FL (ref 78–100)
MONOCYTES # BLD AUTO: 0.4 10E3/UL (ref 0–1.3)
MONOCYTES NFR BLD AUTO: 10 %
NEUTROPHILS # BLD AUTO: 2.2 10E3/UL (ref 1.6–8.3)
NEUTROPHILS NFR BLD AUTO: 63 %
NRBC # BLD AUTO: 0 10E3/UL
NRBC BLD AUTO-RTO: 0 /100
PLATELET # BLD AUTO: 123 10E3/UL (ref 150–450)
PROT SERPL-MCNC: 6.9 G/DL (ref 6.8–8.8)
RBC # BLD AUTO: 2.88 10E6/UL (ref 4.4–5.9)
WBC # BLD AUTO: 3.5 10E3/UL (ref 4–11)

## 2022-07-28 PROCEDURE — 258N000003 HC RX IP 258 OP 636: Performed by: NURSE PRACTITIONER

## 2022-07-28 PROCEDURE — 96360 HYDRATION IV INFUSION INIT: CPT

## 2022-07-28 PROCEDURE — 85025 COMPLETE CBC W/AUTO DIFF WBC: CPT | Performed by: INTERNAL MEDICINE

## 2022-07-28 PROCEDURE — 250N000011 HC RX IP 250 OP 636: Performed by: INTERNAL MEDICINE

## 2022-07-28 PROCEDURE — 80076 HEPATIC FUNCTION PANEL: CPT | Performed by: INTERNAL MEDICINE

## 2022-07-28 RX ORDER — HEPARIN SODIUM (PORCINE) LOCK FLUSH IV SOLN 100 UNIT/ML 100 UNIT/ML
5 SOLUTION INTRAVENOUS
Status: DISCONTINUED | OUTPATIENT
Start: 2022-07-28 | End: 2022-07-28 | Stop reason: HOSPADM

## 2022-07-28 RX ORDER — ONDANSETRON 2 MG/ML
8 INJECTION INTRAMUSCULAR; INTRAVENOUS EVERY 6 HOURS PRN
Status: CANCELLED
Start: 2022-07-28

## 2022-07-28 RX ORDER — PROCHLORPERAZINE MALEATE 10 MG
10 TABLET ORAL EVERY 6 HOURS PRN
Qty: 30 TABLET | Refills: 2 | Status: SHIPPED | OUTPATIENT
Start: 2022-07-28 | End: 2023-06-01

## 2022-07-28 RX ORDER — HEPARIN SODIUM (PORCINE) LOCK FLUSH IV SOLN 100 UNIT/ML 100 UNIT/ML
5 SOLUTION INTRAVENOUS
Status: CANCELLED | OUTPATIENT
Start: 2022-07-28

## 2022-07-28 RX ORDER — HEPARIN SODIUM,PORCINE 10 UNIT/ML
5 VIAL (ML) INTRAVENOUS
Status: CANCELLED | OUTPATIENT
Start: 2022-07-28

## 2022-07-28 RX ORDER — DEXAMETHASONE 4 MG/1
8 TABLET ORAL 2 TIMES DAILY WITH MEALS
Qty: 6 TABLET | Refills: 7 | Status: SHIPPED | OUTPATIENT
Start: 2022-07-28 | End: 2023-06-01

## 2022-07-28 RX ADMIN — SODIUM CHLORIDE 1000 ML: 9 INJECTION, SOLUTION INTRAVENOUS at 13:32

## 2022-07-28 RX ADMIN — Medication 5 ML: at 14:44

## 2022-07-28 ASSESSMENT — PAIN SCALES - GENERAL: PAINLEVEL: NO PAIN (0)

## 2022-07-28 NOTE — PROGRESS NOTES
Infusion Nursing Note:  Tk DAVID Rasmussen presents today for 1L IVF and port labs.    Patient seen by provider today: No   present during visit today: Not Applicable.    Note: N/A.    Intravenous Access:  Implanted Port.    Treatment Conditions:  Lab Results   Component Value Date    HGB 8.5 (L) 07/28/2022    WBC 3.5 (L) 07/28/2022    ANEU 2.4 05/19/2022    ANEUTAUTO 2.2 07/28/2022     (L) 07/28/2022      Lab Results   Component Value Date     05/19/2022    POTASSIUM 3.6 05/19/2022    MAG 1.9 10/13/2016    CR 0.74 07/21/2022    BEVERLY 8.4 (L) 05/19/2022    BILITOTAL 0.4 07/28/2022    ALBUMIN 3.3 (L) 07/28/2022    ALT 22 07/28/2022    AST 30 07/28/2022       Post Infusion Assessment:  Patient tolerated infusion without incident.  Blood return noted pre and post infusion.  Site patent and intact, free from redness, edema or discomfort.  No evidence of extravasations.  Access discontinued per protocol.     Discharge Plan:   Patient declined prescription refills.  Discharge instructions reviewed with: Patient.  Patient verbalized understanding of discharge instructions and all questions answered.  AVS to patient via [employer].  Patient will return 8/3/22 for next appointment.   Patient discharged in stable condition accompanied by: self.  Departure Mode: Ambulatory.      Sandi Sanches RN

## 2022-08-03 ENCOUNTER — ONCOLOGY VISIT (OUTPATIENT)
Dept: ONCOLOGY | Facility: CLINIC | Age: 62
End: 2022-08-03
Attending: INTERNAL MEDICINE
Payer: MEDICARE

## 2022-08-03 ENCOUNTER — INFUSION THERAPY VISIT (OUTPATIENT)
Dept: INFUSION THERAPY | Facility: CLINIC | Age: 62
End: 2022-08-03
Attending: INTERNAL MEDICINE
Payer: MEDICARE

## 2022-08-03 VITALS
SYSTOLIC BLOOD PRESSURE: 121 MMHG | HEART RATE: 69 BPM | BODY MASS INDEX: 20.97 KG/M2 | RESPIRATION RATE: 20 BRPM | WEIGHT: 170 LBS | DIASTOLIC BLOOD PRESSURE: 77 MMHG | OXYGEN SATURATION: 100 % | TEMPERATURE: 97.6 F

## 2022-08-03 VITALS
RESPIRATION RATE: 16 BRPM | OXYGEN SATURATION: 100 % | SYSTOLIC BLOOD PRESSURE: 121 MMHG | TEMPERATURE: 97.6 F | DIASTOLIC BLOOD PRESSURE: 77 MMHG | BODY MASS INDEX: 20.96 KG/M2 | WEIGHT: 169.97 LBS | HEART RATE: 69 BPM

## 2022-08-03 DIAGNOSIS — Z51.11 ENCOUNTER FOR ANTINEOPLASTIC CHEMOTHERAPY: Primary | ICD-10-CM

## 2022-08-03 DIAGNOSIS — C79.51 NON-SMALL CELL LUNG CANCER METASTATIC TO BONE (H): ICD-10-CM

## 2022-08-03 DIAGNOSIS — D70.1 CHEMOTHERAPY-INDUCED NEUTROPENIA (H): ICD-10-CM

## 2022-08-03 DIAGNOSIS — T45.1X5A CHEMOTHERAPY-INDUCED NEUTROPENIA (H): ICD-10-CM

## 2022-08-03 DIAGNOSIS — C34.90 NON-SMALL CELL LUNG CANCER METASTATIC TO BONE (H): ICD-10-CM

## 2022-08-03 PROCEDURE — 99214 OFFICE O/P EST MOD 30 MIN: CPT | Performed by: NURSE PRACTITIONER

## 2022-08-03 PROCEDURE — 250N000011 HC RX IP 250 OP 636: Performed by: INTERNAL MEDICINE

## 2022-08-03 PROCEDURE — 96413 CHEMO IV INFUSION 1 HR: CPT

## 2022-08-03 PROCEDURE — 96375 TX/PRO/DX INJ NEW DRUG ADDON: CPT

## 2022-08-03 PROCEDURE — 258N000003 HC RX IP 258 OP 636: Performed by: INTERNAL MEDICINE

## 2022-08-03 PROCEDURE — G0463 HOSPITAL OUTPT CLINIC VISIT: HCPCS | Mod: 25

## 2022-08-03 RX ORDER — HEPARIN SODIUM (PORCINE) LOCK FLUSH IV SOLN 100 UNIT/ML 100 UNIT/ML
5 SOLUTION INTRAVENOUS
Status: DISCONTINUED | OUTPATIENT
Start: 2022-08-03 | End: 2022-08-03 | Stop reason: HOSPADM

## 2022-08-03 RX ADMIN — DEXAMETHASONE SODIUM PHOSPHATE: 10 INJECTION, SOLUTION INTRAMUSCULAR; INTRAVENOUS at 08:42

## 2022-08-03 RX ADMIN — SODIUM CHLORIDE 122 MG: 9 INJECTION, SOLUTION INTRAVENOUS at 09:03

## 2022-08-03 RX ADMIN — SODIUM CHLORIDE 250 ML: 9 INJECTION, SOLUTION INTRAVENOUS at 08:42

## 2022-08-03 RX ADMIN — Medication 5 ML: at 10:04

## 2022-08-03 ASSESSMENT — PAIN SCALES - GENERAL
PAINLEVEL: NO PAIN (0)
PAINLEVEL: NO PAIN (0)

## 2022-08-03 NOTE — PROGRESS NOTES
Take Home Medication Teaching    Patient was educated on the following oral medications: Dexamethasone and Prochlorperazine on August 3, 2022. Teaching provided to patient included indication, dose, administration, adverse effects and side effect management. Written materials were provided and patient was given the opportunity to ask questions. Patient verbalized understanding of the information presented.     Julissa Preston  [facility] Student Pharmacist  [facility]

## 2022-08-03 NOTE — LETTER
"    8/3/2022         RE: Kt Rasmussen  [address]  [facility] [address]        Dear Colleague,    Thank you for referring your patient, Kt Rasmussen, to the [facility]. Please see a copy of my visit note below.    Oncology Rooming Note    August 3, 2022 9:28 AM   Kt Rasmussen is a 61 year old male who presents for:    Chief Complaint   Patient presents with     Oncology Clinic Visit     Initial Vitals: /77   Pulse 69   Temp 97.6  F (36.4  C) (Oral)   Resp 16   Wt 77.1 kg (169 lb 15.6 oz)   SpO2 100%   BMI 20.96 kg/m   Estimated body mass index is 20.96 kg/m  as calculated from the following:    Height as of 6/2/22: 1.918 m (6' 3.5\").    Weight as of this encounter: 77.1 kg (169 lb 15.6 oz). Body surface area is 2.03 meters squared.  No Pain (0) Comment: Data Unavailable   No LMP for male patient.  Allergies reviewed: Yes  Medications reviewed: Yes    Medications: Medication refills not needed today.  Pharmacy name entered into EPIC:    [facility] - [address] [facility] [place] [address] DR[facility] ST[id number] [place], [place] - [address] [facility]    Clinical concerns: no      Keke More CMA              Oncology/Hematology Visit Note  Aug 3, 2022    Reason for Visit: follow up of metastatic non-small cell lung carcinoma  Metastatic to lymph nodes bones in bilateral lungs  Brain MRI negative for mets    Patient met with Dr. John who recommended treatment with palliative intent with paclitaxel carboplatin Avastin and atezolizumab-treatment started on April 27, 2021  -Due to thrombocytopenia carboplatin AUC reduced to 4 with cycle 2  Due to pancytopenia carboplatin discontinued with cycle 5    7/12/2021: PET/CT showed resolved mural nodules with increased cystic cavity in left lower lobe with no significant FDG uptake, less likely " metastases; no enlarged hypermetabolic mediastinal, hilar, or axillary lymph nodes, decreased metabolic activity of intraosseous lesion in spine and pelvis; no new bone lesions    Treated with paclitaxel, Avastin and atezolizumab.-Last treatment given in  12/22/2021-cycle 12    1/10/2022: PET/CT showed new foci of abnormal skeletal FGD uptake in the thoracic and lumbar spine. Mild interval increase in intensity of previously demonstrated hypermetabolic skeletal lesions involving the pelvis and lumbar spine. Findings are consistent with progressive osseous metastatic disease. Subsequently off chemo for 1 month due to poor performance status.  16. 1/25/2022: Patient fell and fractured his femur. This was surgically repaired and he was subsequently cared for at a rehab unit.  17. 2/14/2022: Brain MRI without contrast (contrast not given due to inability to obtain IV access) showed no brain metastases.    Met with Dr. Shoemaker-in Dr. John's absence  -Recommendation is to change therapy   04/21/2022 -palliative Carboplatin Alimta started   Due to cytopenia AUC reduced to 4 with cycle 2  Due to persistent pancytopenia and inability to tolerate treatment carboplatin discontinued with cycle 3-day 1  Patient was on monotherapy with Alimta    07/18/2022-PET CT scan shows progression of the disease  08/03-started Taxotere 60 mg/m2 every 3 weeks    Interval History:  Patient denies fever chills sweats cough shortness of breath chest pain denies nausea vomiting diarrhea abdominal pain bleeding denies edema.  He reports is good appetite  Patient denies recent falls or injury denies bleeding bruising    Review of Systems:  14 point ROS of systems including Constitutional, Eyes, Respiratory, Cardiovascular, Gastroenterology, Genitourinary, Integumentary, Muscularskeletal, Psychiatric were all negative except for pertinent positives noted in my HPI.    Physical Examination:  Physical Exam  HENT:      Right Ear: Tympanic membrane normal.       Nose: Nose normal.      Mouth/Throat:      Mouth: Mucous membranes are moist.   Eyes:      Pupils: Pupils are equal, round, and reactive to light.   Cardiovascular:      Rate and Rhythm: Normal rate.      Pulses: Normal pulses.   Pulmonary:      Effort: Pulmonary effort is normal.   Abdominal:      General: Abdomen is flat.   Musculoskeletal:         General: Normal range of motion.      Cervical back: Normal range of motion.   Skin:     General: Skin is warm.   Neurological:      General: No focal deficit present.      Mental Status: He is alert.   Psychiatric:         Mood and Affect: Mood normal.           Laboratory Data:  CBC and CMP results reviewed    Assessment and Plan:  This is a 61-year-old male with    metastatic non-small cell lung cancer   Patient of Dr. John   Progressed on  different treatments as above recently was on monotherapy with Alimta  07/18/2022-PET CT scan reveals progression of disease  Patient with Dr. John who recommended changing treatment to Taxotere 60 mg/m2 every 3 weeks  -Patient comes here today for cycle 1 day 1 Taxotere.  Regimen and side effects discussed in detail with patient patient verbalized understanding and agrees to proceed with treatment  -Labs reviewed abnormalities discussed  -Monitor labs closely  Schedule with me next week with labs  when he comes for IV fluids      Thrombocytopenia secondary to treatment  Patient denies bleeding bruising  Complications of low platelet count reviewed  Patient is not on blood thinners  Transfuse for platelet count 20 or below or bleeding  Advised patient to go to ER in the event of bleeding fall injury bruising or edema    Anemia secondary to treatment  Patient is asymptomatic  Transfuse for hemoglobin less than 8 or symptomatic      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  01/2021-scope done by ENT no evidence of recurrence  Continue close follow-up by ENT  Patient has dysphonia and some dysphagia     History of  CVA  02/14-MRI did not reveal brain metastasis      Patient is advised to call our clinic or go to ER in the event of fever chills sweats cough shortness of breath chest pain nausea vomiting diarrhea abdominal pain bleeding edema or any changes in health    MADHAVI Moran CNP  [facility]     Chart documentation with Dragon Voice recognition Software. Although reviewed after completion, some words and grammatical errors may remain.            Again, thank you for allowing me to participate in the care of your patient.        Sincerely,        MADHAVI Moran CNP

## 2022-08-03 NOTE — PROGRESS NOTES
Infusion Nursing Note:  Kt DAVID Rasmussen presents today for Cycle 1 Day 1 Taxotere.    Patient seen by provider today: Yes: Juan Rausch NP.   present during visit today: Not Applicable.    Note: This RN spoke with patient about the benefits of icing hands and feet during Taxotere to reduce chances of developing neuropathy. Patient declines ice at this time.     Intravenous Access:  Implanted Port.    Treatment Conditions:  Lab Results   Component Value Date    HGB 8.5 (L) 07/28/2022    WBC 3.5 (L) 07/28/2022    ANEU 2.4 05/19/2022    ANEUTAUTO 2.2 07/28/2022     (L) 07/28/2022      Lab Results   Component Value Date     05/19/2022    POTASSIUM 3.6 05/19/2022    MAG 1.9 10/13/2016    CR 0.74 07/21/2022    BEVERLY 8.4 (L) 05/19/2022    BILITOTAL 0.4 07/28/2022    ALBUMIN 3.3 (L) 07/28/2022    ALT 22 07/28/2022    AST 30 07/28/2022     Results reviewed, labs MET treatment parameters, ok to proceed with treatment.    Post Infusion Assessment:  Patient tolerated infusion without incident.  Blood return noted pre and post infusion.  Site patent and intact, free from redness, edema or discomfort.  No evidence of extravasations.  Access discontinued per protocol.     Discharge Plan:   Patient declined prescription refills.  Discharge instructions reviewed with: Patient.  Patient verbalized understanding of discharge instructions and all questions answered.  AVS to patient via [employer].  Patient will return 8/4/22 for next appointment.   Patient discharged in stable condition accompanied by: self.  Departure Mode: Ambulatory.      Sandi Sanches RN

## 2022-08-03 NOTE — PROGRESS NOTES
"Oncology Rooming Note    August 3, 2022 9:28 AM   Kt Rasmussen is a 61 year old male who presents for:    Chief Complaint   Patient presents with     Oncology Clinic Visit     Initial Vitals: /77   Pulse 69   Temp 97.6  F (36.4  C) (Oral)   Resp 16   Wt 77.1 kg (169 lb 15.6 oz)   SpO2 100%   BMI 20.96 kg/m   Estimated body mass index is 20.96 kg/m  as calculated from the following:    Height as of 6/2/22: 1.918 m (6' 3.5\").    Weight as of this encounter: 77.1 kg (169 lb 15.6 oz). Body surface area is 2.03 meters squared.  No Pain (0) Comment: Data Unavailable   No LMP for male patient.  Allergies reviewed: Yes  Medications reviewed: Yes    Medications: Medication refills not needed today.  Pharmacy name entered into EPIC:    [facility] [address] [facility], [place] - [address] [id number] - [place], [place] - [address] [facility]    Clinical concerns: no      Keke More CMA            "

## 2022-08-03 NOTE — PROGRESS NOTES
Oncology/Hematology Visit Note  Aug 3, 2022    Reason for Visit: follow up of metastatic non-small cell lung carcinoma  Metastatic to lymph nodes bones in bilateral lungs  Brain MRI negative for mets    Patient met with Dr. John who recommended treatment with palliative intent with paclitaxel carboplatin Avastin and atezolizumab-treatment started on April 27, 2021  -Due to thrombocytopenia carboplatin AUC reduced to 4 with cycle 2  Due to pancytopenia carboplatin discontinued with cycle 5    7/12/2021: PET/CT showed resolved mural nodules with increased cystic cavity in left lower lobe with no significant FDG uptake, less likely metastases; no enlarged hypermetabolic mediastinal, hilar, or axillary lymph nodes, decreased metabolic activity of intraosseous lesion in spine and pelvis; no new bone lesions    Treated with paclitaxel, Avastin and atezolizumab.-Last treatment given in  12/22/2021-cycle 12    1/10/2022: PET/CT showed new foci of abnormal skeletal FGD uptake in the thoracic and lumbar spine. Mild interval increase in intensity of previously demonstrated hypermetabolic skeletal lesions involving the pelvis and lumbar spine. Findings are consistent with progressive osseous metastatic disease. Subsequently off chemo for 1 month due to poor performance status.  16. 1/25/2022: Patient fell and fractured his femur. This was surgically repaired and he was subsequently cared for at a rehab unit.  17. 2/14/2022: Brain MRI without contrast (contrast not given due to inability to obtain IV access) showed no brain metastases.    Met with Dr. Shoemaker-in Dr. John's absence  -Recommendation is to change therapy   04/21/2022 -palliative Carboplatin Alimta started   Due to cytopenia AUC reduced to 4 with cycle 2  Due to persistent pancytopenia and inability to tolerate treatment carboplatin discontinued with cycle 3-day 1  Patient was on monotherapy with Alimta    07/18/2022-PET CT scan shows progression of the  disease  08/03-started Taxotere 60 mg/m2 every 3 weeks    Interval History:  Patient denies fever chills sweats cough shortness of breath chest pain denies nausea vomiting diarrhea abdominal pain bleeding denies edema.  He reports is good appetite  Patient denies recent falls or injury denies bleeding bruising    Review of Systems:  14 point ROS of systems including Constitutional, Eyes, Respiratory, Cardiovascular, Gastroenterology, Genitourinary, Integumentary, Muscularskeletal, Psychiatric were all negative except for pertinent positives noted in my HPI.    Physical Examination:  Physical Exam  HENT:      Right Ear: Tympanic membrane normal.      Nose: Nose normal.      Mouth/Throat:      Mouth: Mucous membranes are moist.   Eyes:      Pupils: Pupils are equal, round, and reactive to light.   Cardiovascular:      Rate and Rhythm: Normal rate.      Pulses: Normal pulses.   Pulmonary:      Effort: Pulmonary effort is normal.   Abdominal:      General: Abdomen is flat.   Musculoskeletal:         General: Normal range of motion.      Cervical back: Normal range of motion.   Skin:     General: Skin is warm.   Neurological:      General: No focal deficit present.      Mental Status: He is alert.   Psychiatric:         Mood and Affect: Mood normal.           Laboratory Data:  CBC and CMP results reviewed    Assessment and Plan:  This is a 61-year-old male with    metastatic non-small cell lung cancer   Patient of Dr. John   Progressed on  different treatments as above recently was on monotherapy with Alimta  07/18/2022-PET CT scan reveals progression of disease  Patient with Dr. John who recommended changing treatment to Taxotere 60 mg/m2 every 3 weeks  -Patient comes here today for cycle 1 day 1 Taxotere.  Regimen and side effects discussed in detail with patient patient verbalized understanding and agrees to proceed with treatment  -Labs reviewed abnormalities discussed  -Monitor labs closely  Schedule with me next  week with labs  when he comes for IV fluids      Thrombocytopenia secondary to treatment  Patient denies bleeding bruising  Complications of low platelet count reviewed  Patient is not on blood thinners  Transfuse for platelet count 20 or below or bleeding  Advised patient to go to ER in the event of bleeding fall injury bruising or edema    Anemia secondary to treatment  Patient is asymptomatic  Transfuse for hemoglobin less than 8 or symptomatic      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 Although reviewed after completion, some words and grammatical errors may remain.

## 2022-08-04 ENCOUNTER — INFUSION THERAPY VISIT (OUTPATIENT)
Dept: INFUSION THERAPY | Facility: CLINIC | Age: 62
End: 2022-08-04
Attending: NURSE PRACTITIONER
Payer: MEDICARE

## 2022-08-04 VITALS
SYSTOLIC BLOOD PRESSURE: 113 MMHG | OXYGEN SATURATION: 100 % | DIASTOLIC BLOOD PRESSURE: 68 MMHG | HEART RATE: 66 BPM | RESPIRATION RATE: 20 BRPM | TEMPERATURE: 97.5 F

## 2022-08-04 DIAGNOSIS — T45.1X5A CHEMOTHERAPY-INDUCED NEUTROPENIA (H): Primary | ICD-10-CM

## 2022-08-04 DIAGNOSIS — D70.1 CHEMOTHERAPY-INDUCED NEUTROPENIA (H): Primary | ICD-10-CM

## 2022-08-04 DIAGNOSIS — C34.90 NON-SMALL CELL CARCINOMA OF LUNG, STAGE 3, UNSPECIFIED LATERALITY (H): ICD-10-CM

## 2022-08-04 PROCEDURE — 250N000011 HC RX IP 250 OP 636: Performed by: INTERNAL MEDICINE

## 2022-08-04 PROCEDURE — 96360 HYDRATION IV INFUSION INIT: CPT

## 2022-08-04 PROCEDURE — 258N000003 HC RX IP 258 OP 636: Performed by: NURSE PRACTITIONER

## 2022-08-04 RX ORDER — ONDANSETRON 2 MG/ML
8 INJECTION INTRAMUSCULAR; INTRAVENOUS EVERY 6 HOURS PRN
Status: CANCELLED
Start: 2022-08-04

## 2022-08-04 RX ORDER — HEPARIN SODIUM (PORCINE) LOCK FLUSH IV SOLN 100 UNIT/ML 100 UNIT/ML
5 SOLUTION INTRAVENOUS
Status: DISCONTINUED | OUTPATIENT
Start: 2022-08-04 | End: 2022-08-04 | Stop reason: HOSPADM

## 2022-08-04 RX ORDER — HEPARIN SODIUM (PORCINE) LOCK FLUSH IV SOLN 100 UNIT/ML 100 UNIT/ML
5 SOLUTION INTRAVENOUS
Status: CANCELLED | OUTPATIENT
Start: 2022-08-04

## 2022-08-04 RX ORDER — HEPARIN SODIUM,PORCINE 10 UNIT/ML
5 VIAL (ML) INTRAVENOUS
Status: CANCELLED | OUTPATIENT
Start: 2022-08-04

## 2022-08-04 RX ADMIN — SODIUM CHLORIDE 1000 ML: 9 INJECTION, SOLUTION INTRAVENOUS at 14:09

## 2022-08-04 RX ADMIN — Medication 5 ML: at 15:10

## 2022-08-04 ASSESSMENT — PAIN SCALES - GENERAL: PAINLEVEL: NO PAIN (0)

## 2022-08-04 NOTE — PROGRESS NOTES
Infusion Nursing Note:  Kt Rasmussen presents today for 1L NS.    Patient seen by provider today: No   present during visit today: Not Applicable.    Note: N/A.    Intravenous Access:  Implanted Port.    Treatment Conditions:  Not Applicable.    Post Infusion Assessment:  Patient tolerated infusion without incident.  Blood return noted pre and post infusion.  Site patent and intact, free from redness, edema or discomfort.  No evidence of extravasations.  Access discontinued per protocol.     Discharge Plan:   Patient declined prescription refills.  Discharge instructions reviewed with: Patient.  Patient verbalized understanding of discharge instructions and all questions answered.  AVS to patient via "[employer]T.  Patient will return 8/11/22 for next appointment.   Patient discharged in stable condition accompanied by: self.  Departure Mode: Wheelchair.      Sandi Sanches RN

## 2022-08-11 ENCOUNTER — ONCOLOGY VISIT (OUTPATIENT)
Dept: ONCOLOGY | Facility: CLINIC | Age: 62
End: 2022-08-11
Attending: NURSE PRACTITIONER
Payer: MEDICARE

## 2022-08-11 ENCOUNTER — INFUSION THERAPY VISIT (OUTPATIENT)
Dept: INFUSION THERAPY | Facility: CLINIC | Age: 62
End: 2022-08-11
Attending: INTERNAL MEDICINE
Payer: MEDICARE

## 2022-08-11 VITALS
SYSTOLIC BLOOD PRESSURE: 103 MMHG | DIASTOLIC BLOOD PRESSURE: 64 MMHG | HEART RATE: 81 BPM | BODY MASS INDEX: 20.7 KG/M2 | HEIGHT: 76 IN | TEMPERATURE: 98.2 F | RESPIRATION RATE: 16 BRPM | OXYGEN SATURATION: 96 % | WEIGHT: 170 LBS

## 2022-08-11 VITALS
SYSTOLIC BLOOD PRESSURE: 103 MMHG | TEMPERATURE: 98.2 F | DIASTOLIC BLOOD PRESSURE: 64 MMHG | HEART RATE: 81 BPM | OXYGEN SATURATION: 96 % | RESPIRATION RATE: 16 BRPM

## 2022-08-11 DIAGNOSIS — C79.51 NON-SMALL CELL LUNG CANCER METASTATIC TO BONE (H): Primary | ICD-10-CM

## 2022-08-11 DIAGNOSIS — C79.51 NON-SMALL CELL LUNG CANCER METASTATIC TO BONE (H): ICD-10-CM

## 2022-08-11 DIAGNOSIS — C34.90 NON-SMALL CELL LUNG CANCER METASTATIC TO BONE (H): ICD-10-CM

## 2022-08-11 DIAGNOSIS — T45.1X5A CHEMOTHERAPY-INDUCED NEUTROPENIA (H): Primary | ICD-10-CM

## 2022-08-11 DIAGNOSIS — D70.1 CHEMOTHERAPY-INDUCED NEUTROPENIA (H): Primary | ICD-10-CM

## 2022-08-11 DIAGNOSIS — C34.90 NON-SMALL CELL CARCINOMA OF LUNG, STAGE 3, UNSPECIFIED LATERALITY (H): ICD-10-CM

## 2022-08-11 DIAGNOSIS — C34.90 NON-SMALL CELL LUNG CANCER METASTATIC TO BONE (H): Primary | ICD-10-CM

## 2022-08-11 LAB
ALBUMIN SERPL-MCNC: 3.4 G/DL (ref 3.4–5)
ALP SERPL-CCNC: 89 U/L (ref 40–150)
ALT SERPL W P-5'-P-CCNC: 27 U/L (ref 0–70)
ANION GAP SERPL CALCULATED.3IONS-SCNC: 4 MMOL/L (ref 3–14)
AST SERPL W P-5'-P-CCNC: 25 U/L (ref 0–45)
BASOPHILS # BLD MANUAL: 0 10E3/UL (ref 0–0.2)
BASOPHILS NFR BLD MANUAL: 0 %
BILIRUB SERPL-MCNC: 0.6 MG/DL (ref 0.2–1.3)
BUN SERPL-MCNC: 16 MG/DL (ref 7–30)
CALCIUM SERPL-MCNC: 9.2 MG/DL (ref 8.5–10.1)
CHLORIDE BLD-SCNC: 106 MMOL/L (ref 94–109)
CO2 SERPL-SCNC: 29 MMOL/L (ref 20–32)
CREAT SERPL-MCNC: 0.78 MG/DL (ref 0.66–1.25)
EOSINOPHIL # BLD MANUAL: 0 10E3/UL (ref 0–0.7)
EOSINOPHIL NFR BLD MANUAL: 0 %
ERYTHROCYTE [DISTWIDTH] IN BLOOD BY AUTOMATED COUNT: 16.2 % (ref 10–15)
GFR SERPL CREATININE-BSD FRML MDRD: >90 ML/MIN/1.73M2
GLUCOSE BLD-MCNC: 130 MG/DL (ref 70–99)
HCT VFR BLD AUTO: 27.3 % (ref 40–53)
HGB BLD-MCNC: 8.6 G/DL (ref 13.3–17.7)
LYMPHOCYTES # BLD MANUAL: 0.6 10E3/UL (ref 0.8–5.3)
LYMPHOCYTES NFR BLD MANUAL: 47 %
MCH RBC QN AUTO: 30 PG (ref 26.5–33)
MCHC RBC AUTO-ENTMCNC: 31.5 G/DL (ref 31.5–36.5)
MCV RBC AUTO: 95 FL (ref 78–100)
MONOCYTES # BLD MANUAL: 0.1 10E3/UL (ref 0–1.3)
MONOCYTES NFR BLD MANUAL: 12 %
NEUTROPHILS # BLD MANUAL: 0.5 10E3/UL (ref 1.6–8.3)
NEUTROPHILS NFR BLD MANUAL: 41 %
PLAT MORPH BLD: ABNORMAL
PLATELET # BLD AUTO: 117 10E3/UL (ref 150–450)
POTASSIUM BLD-SCNC: 3.5 MMOL/L (ref 3.4–5.3)
PROT SERPL-MCNC: 6.7 G/DL (ref 6.8–8.8)
RBC # BLD AUTO: 2.87 10E6/UL (ref 4.4–5.9)
RBC MORPH BLD: ABNORMAL
SODIUM SERPL-SCNC: 139 MMOL/L (ref 133–144)
WBC # BLD AUTO: 1.2 10E3/UL (ref 4–11)

## 2022-08-11 PROCEDURE — 36591 DRAW BLOOD OFF VENOUS DEVICE: CPT

## 2022-08-11 PROCEDURE — 85027 COMPLETE CBC AUTOMATED: CPT | Performed by: NURSE PRACTITIONER

## 2022-08-11 PROCEDURE — 258N000003 HC RX IP 258 OP 636: Performed by: NURSE PRACTITIONER

## 2022-08-11 PROCEDURE — 250N000011 HC RX IP 250 OP 636: Performed by: NURSE PRACTITIONER

## 2022-08-11 PROCEDURE — 99214 OFFICE O/P EST MOD 30 MIN: CPT | Performed by: NURSE PRACTITIONER

## 2022-08-11 PROCEDURE — 85007 BL SMEAR W/DIFF WBC COUNT: CPT | Performed by: NURSE PRACTITIONER

## 2022-08-11 PROCEDURE — 80053 COMPREHEN METABOLIC PANEL: CPT | Performed by: NURSE PRACTITIONER

## 2022-08-11 PROCEDURE — G0463 HOSPITAL OUTPT CLINIC VISIT: HCPCS | Mod: 25

## 2022-08-11 PROCEDURE — 82040 ASSAY OF SERUM ALBUMIN: CPT | Performed by: NURSE PRACTITIONER

## 2022-08-11 PROCEDURE — 250N000011 HC RX IP 250 OP 636: Performed by: INTERNAL MEDICINE

## 2022-08-11 PROCEDURE — 96360 HYDRATION IV INFUSION INIT: CPT

## 2022-08-11 PROCEDURE — 96372 THER/PROPH/DIAG INJ SC/IM: CPT | Mod: XS | Performed by: NURSE PRACTITIONER

## 2022-08-11 PROCEDURE — 85041 AUTOMATED RBC COUNT: CPT | Performed by: NURSE PRACTITIONER

## 2022-08-11 RX ORDER — HEPARIN SODIUM (PORCINE) LOCK FLUSH IV SOLN 100 UNIT/ML 100 UNIT/ML
5 SOLUTION INTRAVENOUS
Status: CANCELLED | OUTPATIENT
Start: 2022-08-11

## 2022-08-11 RX ORDER — HEPARIN SODIUM (PORCINE) LOCK FLUSH IV SOLN 100 UNIT/ML 100 UNIT/ML
5 SOLUTION INTRAVENOUS
Status: DISCONTINUED | OUTPATIENT
Start: 2022-08-11 | End: 2022-08-11 | Stop reason: HOSPADM

## 2022-08-11 RX ORDER — ONDANSETRON 2 MG/ML
8 INJECTION INTRAMUSCULAR; INTRAVENOUS EVERY 6 HOURS PRN
Status: CANCELLED
Start: 2022-08-11

## 2022-08-11 RX ORDER — HEPARIN SODIUM,PORCINE 10 UNIT/ML
5 VIAL (ML) INTRAVENOUS
Status: CANCELLED | OUTPATIENT
Start: 2022-08-11

## 2022-08-11 RX ADMIN — FILGRASTIM 480 MCG: 480 INJECTION, SOLUTION INTRAVENOUS; SUBCUTANEOUS at 15:03

## 2022-08-11 RX ADMIN — SODIUM CHLORIDE 1000 ML: 9 INJECTION, SOLUTION INTRAVENOUS at 13:54

## 2022-08-11 RX ADMIN — Medication 5 ML: at 15:03

## 2022-08-11 ASSESSMENT — PAIN SCALES - GENERAL
PAINLEVEL: NO PAIN (0)
PAINLEVEL: NO PAIN (0)

## 2022-08-11 NOTE — PROGRESS NOTES
Oncology/Hematology Visit Note  Aug 11, 2022    Reason for Visit: follow up of metastatic non-small cell lung carcinoma  Metastatic to lymph nodes bones in bilateral lungs  Brain MRI negative for mets    Patient met with Dr. John who recommended treatment with palliative intent with paclitaxel carboplatin Avastin and atezolizumab-treatment started on April 27, 2021  -Due to thrombocytopenia carboplatin AUC reduced to 4 with cycle 2  Due to pancytopenia carboplatin discontinued with cycle 5    7/12/2021: PET/CT showed resolved mural nodules with increased cystic cavity in left lower lobe with no significant FDG uptake, less likely metastases; no enlarged hypermetabolic mediastinal, hilar, or axillary lymph nodes, decreased metabolic activity of intraosseous lesion in spine and pelvis; no new bone lesions    Treated with paclitaxel, Avastin and atezolizumab.-Last treatment given in  12/22/2021-cycle 12    1/10/2022: PET/CT showed new foci of abnormal skeletal FGD uptake in the thoracic and lumbar spine. Mild interval increase in intensity of previously demonstrated hypermetabolic skeletal lesions involving the pelvis and lumbar spine. Findings are consistent with progressive osseous metastatic disease. Subsequently off chemo for 1 month due to poor performance status.  16. 1/25/2022: Patient fell and fractured his femur. This was surgically repaired and he was subsequently cared for at a rehab unit.  17. 2/14/2022: Brain MRI without contrast (contrast not given due to inability to obtain IV access) showed no brain metastases.    Met with Dr. Shoemaker-in Dr. John's absence  -Recommendation is to change therapy   04/21/2022 -palliative Carboplatin Alimta started   Due to cytopenia AUC reduced to 4 with cycle 2  Due to persistent pancytopenia and inability to tolerate treatment carboplatin discontinued with cycle 3-day 1  Patient was on monotherapy with Alimta    07/18/2022-PET CT scan shows progression of the  disease  08/03-started Taxotere 60 mg/m2 every 3 weeks    Interval History:  Patient denies fever chills sweats cough shortness of breath chest pain denies skin rash.    Review of Systems:  14 point ROS of systems including Constitutional, Eyes, Respiratory, Cardiovascular, Gastroenterology, Genitourinary, Integumentary, Muscularskeletal, Psychiatric were all negative except for pertinent positives noted in my HPI.    Physical Examination:  Physical Exam  HENT:      Right Ear: Tympanic membrane normal.      Nose: Nose normal.      Mouth/Throat:      Mouth: Mucous membranes are moist.   Eyes:      Pupils: Pupils are equal, round, and reactive to light.   Cardiovascular:      Rate and Rhythm: Normal rate.      Pulses: Normal pulses.   Pulmonary:      Effort: Pulmonary effort is normal.   Abdominal:      General: Abdomen is flat.   Musculoskeletal:         General: Normal range of motion.      Cervical back: Normal range of motion.   Skin:     General: Skin is warm.   Neurological:      General: No focal deficit present.      Mental Status: He is alert.   Psychiatric:         Mood and Affect: Mood normal.           Laboratory Data:  CBC and CMP results reviewed    Assessment and Plan:  This is a 61-year-old male with    metastatic non-small cell lung cancer   Patient of Dr. John   Progressed on  different treatments as above recently was on monotherapy with Alimta  07/18/2022-PET CT scan reveals progression of disease  Patient with Dr. John who recommended changing treatment to Taxotere 60 mg/m2 every 3 weeks  03/03-Taxotere given  Labs reviewed abnormalities discussed  08/24-patient is scheduled for second cycle of Taxotere and follow-up with Dr. John      Neutropenia secondary to Taxotere  Complications of neutropenia reviewed with patient.  Patient is staying in a nursing home surrounded by a lot of people . I am  worried about him  getting complications from neutropenia.  He also has transportation issues.  Pt  had  different chemotherapy treatments in the past which may delay ANC recovery  -Give Neupogen shot today and tomorrow to quickly reverse neutropenia  We will add Neulasta with future treatment  Neutropenic precautions reviewed  Advised patient to check temperature frequently.  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 Although reviewed after completion, some words and grammatical errors may remain.

## 2022-08-11 NOTE — LETTER
"    8/11/2022         RE: Kt Rasmussen  [id number]  [place] [id number]        Dear Colleague,    Thank you for referring your patient, Kt Rasmussen, to the [facility]. Please see a copy of my visit note below.    Oncology Rooming Note    August 11, 2022 2:31 PM   Kt Rasmussen is a 62 year old male who presents for:    Chief Complaint   Patient presents with     Oncology Clinic Visit     Non-small cell lung cancer metastatic to bone (H)     Initial Vitals: /64   Pulse 81   Temp 98.2  F (36.8  C) (Oral)   Resp 16   Ht 1.918 m (6' 3.5\")   Wt 77.1 kg (170 lb)   SpO2 96%   BMI 20.97 kg/m   Estimated body mass index is 20.97 kg/m  as calculated from the following:    Height as of this encounter: 1.918 m (6' 3.5\").    Weight as of this encounter: 77.1 kg (170 lb). Body surface area is 2.03 meters squared.  No Pain (0) Comment: Data Unavailable   No LMP for male patient.  Allergies reviewed: Yes  Medications reviewed: Yes    Medications: Medication refills not needed today.  Pharmacy name entered into EPIC:    [facility] [place] - [address] [facility] [place], [place] - [address] [id number] [place] [address] [facility]    Clinical concerns: None       Magdalena Sheikh MA              Oncology/Hematology Visit Note  Aug 11, 2022    Reason for Visit: follow up of metastatic non-small cell lung carcinoma  Metastatic to lymph nodes bones in bilateral lungs  Brain MRI negative for mets    Patient met with Dr. John who recommended treatment with palliative intent with paclitaxel carboplatin Avastin and atezolizumab-treatment started on April 27, 2021  -Due to thrombocytopenia carboplatin AUC reduced to 4 with cycle 2  Due to pancytopenia carboplatin discontinued with cycle 5    7/12/2021: PET/CT showed resolved mural nodules with increased cystic " cavity in left lower lobe with no significant FDG uptake, less likely metastases; no enlarged hypermetabolic mediastinal, hilar, or axillary lymph nodes, decreased metabolic activity of intraosseous lesion in spine and pelvis; no new bone lesions    Treated with paclitaxel, Avastin and atezolizumab.-Last treatment given in  12/22/2021-cycle 12    1/10/2022: PET/CT showed new foci of abnormal skeletal FGD uptake in the thoracic and lumbar spine. Mild interval increase in intensity of previously demonstrated hypermetabolic skeletal lesions involving the pelvis and lumbar spine. Findings are consistent with progressive osseous metastatic disease. Subsequently off chemo for 1 month due to poor performance status.  16. 1/25/2022: Patient fell and fractured his femur. This was surgically repaired and he was subsequently cared for at a rehab unit.  17. 2/14/2022: Brain MRI without contrast (contrast not given due to inability to obtain IV access) showed no brain metastases.    Met with Dr. Shoemaker-in Dr. John's absence  -Recommendation is to change therapy   04/21/2022 -palliative Carboplatin Alimta started   Due to cytopenia AUC reduced to 4 with cycle 2  Due to persistent pancytopenia and inability to tolerate treatment carboplatin discontinued with cycle 3-day 1  Patient was on monotherapy with Alimta    07/18/2022-PET CT scan shows progression of the disease  08/03-started Taxotere 60 mg/m2 every 3 weeks    Interval History:  Patient denies fever chills sweats cough shortness of breath chest pain denies skin rash.    Review of Systems:  14 point ROS of systems including Constitutional, Eyes, Respiratory, Cardiovascular, Gastroenterology, Genitourinary, Integumentary, Muscularskeletal, Psychiatric were all negative except for pertinent positives noted in my HPI.    Physical Examination:  Physical Exam  HENT:      Right Ear: Tympanic membrane normal.      Nose: Nose normal.      Mouth/Throat:      Mouth: Mucous membranes  are moist.   Eyes:      Pupils: Pupils are equal, round, and reactive to light.   Cardiovascular:      Rate and Rhythm: Normal rate.      Pulses: Normal pulses.   Pulmonary:      Effort: Pulmonary effort is normal.   Abdominal:      General: Abdomen is flat.   Musculoskeletal:         General: Normal range of motion.      Cervical back: Normal range of motion.   Skin:     General: Skin is warm.   Neurological:      General: No focal deficit present.      Mental Status: He is alert.   Psychiatric:         Mood and Affect: Mood normal.           Laboratory Data:  CBC and CMP results reviewed    Assessment and Plan:  This is a 61-year-old male with    metastatic non-small cell lung cancer   Patient of Dr. John   Progressed on  different treatments as above recently was on monotherapy with Alimta  07/18/2022-PET CT scan reveals progression of disease  Patient with Dr. John who recommended changing treatment to Taxotere 60 mg/m2 every 3 weeks  03/03-Taxotere given  Labs reviewed abnormalities discussed  08/24-patient is scheduled for second cycle of Taxotere and follow-up with Dr. John      Neutropenia secondary to Taxotere  Complications of neutropenia reviewed with patient.  Patient is staying in a nursing home surrounded by a lot of people . I am  worried about him  getting complications from neutropenia.  He also has transportation issues.  Pt had  different chemotherapy treatments in the past which may delay ANC recovery  -Give Neupogen shot today and tomorrow to quickly reverse neutropenia  We will add Neulasta with future treatment  Neutropenic precautions reviewed  Advised patient to check temperature frequently.  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 Although reviewed after completion, some words and grammatical errors may remain.            Again, thank you for allowing me to participate in the care of your patient.        Sincerely,        MADHAVI Moran CNP

## 2022-08-11 NOTE — PROGRESS NOTES
"Oncology Rooming Note    August 11, 2022 2:31 PM   Kt Rasmussen is a 62 year old male who presents for:    Chief Complaint   Patient presents with     Oncology Clinic Visit     Non-small cell lung cancer metastatic to bone (H)     Initial Vitals: /64   Pulse 81   Temp 98.2  F (36.8  C) (Oral)   Resp 16   Ht 1.918 m (6' 3.5\")   Wt 77.1 kg (170 lb)   SpO2 96%   BMI 20.97 kg/m   Estimated body mass index is 20.97 kg/m  as calculated from the following:    Height as of this encounter: 1.918 m (6' 3.5\").    Weight as of this encounter: 77.1 kg (170 lb). Body surface area is 2.03 meters squared.  No Pain (0) Comment: Data Unavailable   No LMP for male patient.  Allergies reviewed: Yes  Medications reviewed: Yes    Medications: Medication refills not needed today.  Pharmacy name entered into EPIC:    [facility], [place] - [address] [facility], [place] - [address] [id number] - [place], [place] - [address] [facility]    Clinical concerns: None       Magdalena Sheikh MA            "

## 2022-08-11 NOTE — PROGRESS NOTES
Infusion Nursing Note:  Kt Rasmussen presents today for IVF/Labs.    Patient seen by provider today: Yes: Juan Rausch NP   present during visit today: Not Applicable.    Note: N/A.    Intravenous Access:  Labs drawn without difficulty.  Implanted Port.    Treatment Conditions:  Not Applicable.    Post Infusion Assessment:  Patient tolerated infusion without incident.  Site patent and intact, free from redness, edema or discomfort.  No evidence of extravasations.  Access discontinued per protocol.     Discharge Plan:   Patient discharged in stable condition  Patient returns tomorrow for neupogen accompanied by: self.  Departure Mode: Wheelchair.      Magdalena Davila RN

## 2022-08-12 ENCOUNTER — LAB (OUTPATIENT)
Dept: INFUSION THERAPY | Facility: CLINIC | Age: 62
End: 2022-08-12
Attending: NURSE PRACTITIONER
Payer: MEDICARE

## 2022-08-12 ENCOUNTER — PATIENT OUTREACH (OUTPATIENT)
Dept: CARE COORDINATION | Facility: CLINIC | Age: 62
End: 2022-08-12

## 2022-08-12 VITALS
OXYGEN SATURATION: 98 % | TEMPERATURE: 97.9 F | HEART RATE: 85 BPM | SYSTOLIC BLOOD PRESSURE: 108 MMHG | DIASTOLIC BLOOD PRESSURE: 69 MMHG | RESPIRATION RATE: 20 BRPM

## 2022-08-12 DIAGNOSIS — T45.1X5A CHEMOTHERAPY-INDUCED NEUTROPENIA (H): Primary | ICD-10-CM

## 2022-08-12 DIAGNOSIS — C34.90 NON-SMALL CELL CARCINOMA OF LUNG, STAGE 3, UNSPECIFIED LATERALITY (H): ICD-10-CM

## 2022-08-12 DIAGNOSIS — D70.1 CHEMOTHERAPY-INDUCED NEUTROPENIA (H): Primary | ICD-10-CM

## 2022-08-12 PROCEDURE — 96372 THER/PROPH/DIAG INJ SC/IM: CPT | Performed by: NURSE PRACTITIONER

## 2022-08-12 PROCEDURE — 250N000011 HC RX IP 250 OP 636: Performed by: NURSE PRACTITIONER

## 2022-08-12 RX ORDER — ONDANSETRON 2 MG/ML
8 INJECTION INTRAMUSCULAR; INTRAVENOUS EVERY 6 HOURS PRN
Status: CANCELLED
Start: 2022-08-12

## 2022-08-12 RX ORDER — HEPARIN SODIUM (PORCINE) LOCK FLUSH IV SOLN 100 UNIT/ML 100 UNIT/ML
5 SOLUTION INTRAVENOUS
Status: CANCELLED | OUTPATIENT
Start: 2022-08-12

## 2022-08-12 RX ORDER — HEPARIN SODIUM,PORCINE 10 UNIT/ML
5 VIAL (ML) INTRAVENOUS
Status: CANCELLED | OUTPATIENT
Start: 2022-08-12

## 2022-08-12 RX ADMIN — FILGRASTIM 480 MCG: 480 INJECTION, SOLUTION INTRAVENOUS; SUBCUTANEOUS at 12:47

## 2022-08-12 ASSESSMENT — PAIN SCALES - GENERAL: PAINLEVEL: NO PAIN (0)

## 2022-08-12 NOTE — PROGRESS NOTES
Infusion Nursing Note:  Kt Rasmussen presents today for Neupogen injection.    Patient seen by provider today: No   present during visit today: Not Applicable.    Note: N/A.    Intravenous Access:  No Intravenous access/labs at this visit.    Treatment Conditions:  Not Applicable.    Post Infusion Assessment:  Patient tolerated injection without incident.     Discharge Plan:   Discharge instructions reviewed with: Patient.  Patient and/or family verbalized understanding of discharge instructions and all questions answered.  Patient discharged in stable condition accompanied by: self.  Departure Mode: Wheelchair.      Olga Rahman RN

## 2022-08-12 NOTE — PROGRESS NOTES
[facility] Department    Hematology/Oncology Established Patient Follow-up Note      Today's Date: 9/1/2022    Reason for Follow-up: Metastatic non-small cell lung carcinoma.    HISTORY OF PRESENT ILLNESS: Kt Rasmussen is a 62 year old male who presents with the following oncologic history:  1. 5/05/2016: Presented with near-obstructing large mass coming off the cheikh and likely the posterior wall, seen on bronchoscopy. Biopsy of the mass showed invasive squamous cell carcinoma, moderately differentiating and focally keratinizing.  Procedure was complicated by significant bleeding.  Tumor was completely debulked (via bronchoscopy) in both main stem bronchi and distal trachea. NGS showed no mutations in EGFR, KRAS, BRAF, NRAS, HRAS, PIK3CA, ERBB2, MET, or JAK2.  2. 5/23/2016: PET/CT scan showed evidence of residual tumor with decreased endobronchial mass. Indeterminate, mildly hypermetabolic mesentery with prominent lymph nodes and area of enhancement of the right hepatic lobe present. Felt to have T4-NX-M0 disease.  3. 6/09/2016: Started concurrent chemoradiation with weekly paclitaxel and carboplatin.  4. 7/30/2016: Completed radiation.  Subsequently received 2 cycles of consolidation paclitaxel and carboplatin.   5. 9/13/2019: Presented with 6-month history of dysphonia and left vocal exophytic lesion. Left true vocal fold biopsy showed at least squamous cell carcinoma in situ, cannot exclude superficial invasion.  6. 9/30/2019: Excision of left vocal cord mass showed fragments of invasive squamous cell carcinoma, well differentiated and keratinizing.  Margins negative for malignancy.  7. 2/16/2021: CT chest w/o contrast showed thin-walled cavity in left lower lobe with 2 solid peripheral nodules measuring 9 mm each. No lymphadenopathy.  8. 3/01/2021: PET scan showed hypermetabolic nodules in left lower lobe and right lung, consistent with metastases; hypermetabolic adenopathy in chest, abdomen, pelvis;  nonspecific uptake at tongue; hypermetabolic intraosseous lesions in spine and pelvis consistent with metastatic disease; left axillary hypermetabolic lymph nodes related to COVID-19 vaccination.  9. 3/12/2021: CT-guided lung biopsy showed non-small cell carcinoma, not otherwise specified -- with opinion by [facility] pathologist.  10. 3/18/2021: Lung NGS panel negative for mutations in BRAF, EGFR, ERBB2, IDH1, IDH2, KRAS, MET, NRAS, RET. PD-L1 expression negative (TPS <1%). Due to minimal amount of DNA obtained from specimen, other biomarkers could not be analyzed.  11. 4/7/2021: MRI brain negative for brain metastases.  12. 4/27/2021: Started 1st line metastatic therapy with carboplatin, paclitaxel, bevacizumab, and atezolizumab for metastatic non-small cell lung carcinoma, NOS.  13. 7/12/2021: PET/CT showed resolved mural nodules with increased cystic cavity in left lower lobe with no significant FDG uptake, less likely metastases; no enlarged hypermetabolic mediastinal, hilar, or axillary lymph nodes, decreased metabolic activity of intraosseous lesion in spine and pelvis; no new bone lesions.  14. 10/11/2021: PET/CT showed slight interval increase in intensity of metabolic activity of right pubic bone and left ischium with new focal uptake in L2 spinous process; resolved uptake at previous ground glass opacity along right medial lower lobe; unchanged cystic cavities/nodules in left lower lobe and right apical upper lobe; no pathologically enlarged hypermetabolic mediastinal, hilar, or axillary lymph nodes.  15. 1/10/2022: PET/CT showed new foci of abnormal skeletal FGD uptake in the thoracic and lumbar spine. Mild interval increase in intensity of previously demonstrated hypermetabolic skeletal lesions involving the pelvis and lumbar spine. Findings are consistent with progressive osseous metastatic disease. Subsequently off chemo for 1 month due to poor performance status.  16. 1/25/2022: Patient fell and  fractured his femur. This was surgically repaired and he was subsequently cared for at a rehab unit.  17. 2/14/2022: Brain MRI without contrast (contrast not given due to inability to obtain IV access) showed no brain metastases.  18. 4/21/2022: Started 2nd line metastatic therapy with pemetrexed and carboplatin. Omission of carboplatin 6/2 and forward due to worsening anemia despite dose reduction.  19. 7/18/2022: PET/CT showed enlarged and increased FDG avid skeletal metastases, increased left para-aortic retroperitoneal lymph node increased in size and FDG avidity, findings consistent with progressive disease.  20. 8/3/2022: Started 3rd line metastatic therapy with Taxotere 60 mg/m2 every 3 weeks.    INTERIM HISTORY:  Kt reports feeling overall well.  He accidentally stubbed his toe while walking with his cane.  He denies recent fevers or chills or dizziness or falls. Appetite okay.    REVIEW OF SYSTEMS:   14 point ROS was reviewed and is negative other than as noted above in HPI.       HOME MEDICATIONS:  Current Outpatient Medications   Medication Sig Dispense Refill     atorvastatin (LIPITOR) 40 MG tablet Take 40 mg by mouth daily       folic acid (FOLVITE) 1 MG tablet Take 1 mg by mouth daily       prochlorperazine (COMPAZINE) 10 MG tablet Take 1 tablet (10 mg) by mouth every 6 hours as needed for nausea or vomiting 30 tablet 2     dexamethasone (DECADRON) 4 MG tablet Take 2 tablets (8 mg) by mouth 2 times daily (with meals) for 3 doses Start evening of Docetaxel infusion and continue for a total of 3 doses. 6 tablet 7     dexamethasone (DECADRON) 4 MG tablet Take 1 tablet (4 mg) by mouth 2 times daily (with meals) for 3 days Start one day prior to Pemetrexed (Alimta), continue on the day treatment, and the day following Pemetrexed. 6 tablet 3         ALLERGIES:  Allergies   Allergen Reactions     No Known Drug Allergies          PAST MEDICAL HISTORY:  Past Medical History:   Diagnosis Date     Alcohol  abuse, unspecified      Cerebral infarction (H)     , right side residual and aphasia     Dyslipidemia      GERD (gastroesophageal reflux disease)      Lung cancer (H)      Unspecified essential hypertension          PAST SURGICAL HISTORY:  Past Surgical History:   Procedure Laterality Date     BRONCHOSCOPY FLEXIBLE AND RIGID N/A 2016    Procedure: BRONCHOSCOPY FLEXIBLE AND RIGID;  Surgeon: Tony Talbot MD;  Location: [facility] OR     ESOPHAGOSCOPY FLEXIBLE N/A 2019    Procedure: flexible esophagoscopy;  Surgeon: Lizzy Johnson MD;  Location: [facility] OR     INJECT STEROID (LOCATION) N/A 2019    Procedure: steroid injection;  Surgeon: Lizzy Johnson MD;  Location: [facility] OR     IR CHEST PORT PLACEMENT > 5 YRS OF AGE  2021     IR CHEST PORT PLACEMENT > 5 YRS OF AGE  2022     IR PORT CHECK RIGHT  2022     IR PORT REMOVAL RIGHT  2022     LASER CO2 LARYNGOSCOPY N/A 2019    Procedure: Microdirect laryngoscopy with excision of laryngeal mass, CO2 laser;  Surgeon: Lizzy Johnson MD;  Location: [facility] OR     ZZC NONSPECIFIC PROCEDURE      R tympanoplasty         SOCIAL HISTORY:  Social History     Socioeconomic History     Marital status: Single     Spouse name: Not on file     Number of children: Not on file     Years of education: Not on file     Highest education level: Not on file   Occupational History     Not on file   Tobacco Use     Smoking status: Former Smoker     Packs/day: 1.00     Types: Cigarettes     Quit date: 2009     Years since quittin.8     Smokeless tobacco: Never Used   Substance and Sexual Activity     Alcohol use: Not Currently     Drug use: No     Sexual activity: Not on file   Other Topics Concern     Parent/sibling w/ CABG, MI or angioplasty before 65F 55M? Not Asked   Social History Narrative     Not on file     Social Determinants of Health     Financial Resource Strain: Not on file   Food Insecurity: Not on file   Transportation Needs: Not on  file   Physical Activity: Not on file   Stress: Not on file   Social Connections: Not on file   Intimate Partner Violence: Not At Risk     Fear of Current or Ex-Partner: No     Emotionally Abused: No     Physically Abused: No     Sexually Abused: No   Housing Stability: Not on file   Resides at assisted living.      FAMILY HISTORY:  Family History   Problem Relation Age of Onset     Cancer Father          PHYSICAL EXAM:  Vital signs:  /69   Pulse 78   Temp 98.1  F (36.7  C) (Oral)   Resp 16   SpO2 98%    ECO  GENERAL/CONSTITUTIONAL: No acute distress.  EYES: No erythema or scleral icterus.  LYMPH: No cervical, supraclavicular, axillary adenopathy.   RESPIRATORY: Clear to auscultation bilaterally. No crackles or wheezing.   CARDIOVASCULAR: Regular rate and rhythm without murmurs.  GASTROINTESTINAL: No hepatosplenomegaly, masses, or tenderness. No guarding.  No distention.  MUSCULOSKELETAL: Warm and well-perfused, no cyanosis, clubbing, or edema.  NEUROLOGIC: Residual right arm and right leg reduced strength due to prior stroke. Alert, oriented, answers questions appropriately but has occasional word finding difficulty from prior stroke. Dysphonia present and stable.  INTEGUMENTARY: No rashes or jaundice.  GAIT: Sitting in wheelchair.      LABS:  CBC RESULTS: Recent Labs   Lab Test 22  0742   WBC 3.7*   RBC 3.23*   HGB 9.4*   HCT 31.2*   MCV 97   MCH 29.1   MCHC 30.1*   RDW 16.9*   *     Last Comprehensive Metabolic Panel:  Sodium   Date Value Ref Range Status   2022 139 133 - 144 mmol/L Final   2021 140 133 - 144 mmol/L Final     Potassium   Date Value Ref Range Status   2022 3.5 3.4 - 5.3 mmol/L Final   2021 4.3 3.4 - 5.3 mmol/L Final     Chloride   Date Value Ref Range Status   2022 106 94 - 109 mmol/L Final   2021 111 (H) 94 - 109 mmol/L Final     Carbon Dioxide   Date Value Ref Range Status   2021 25 20 - 32 mmol/L Final     Carbon Dioxide (CO2)    Date Value Ref Range Status   08/11/2022 29 20 - 32 mmol/L Final     Anion Gap   Date Value Ref Range Status   08/11/2022 4 3 - 14 mmol/L Final   06/29/2021 4 3 - 14 mmol/L Final     Glucose   Date Value Ref Range Status   08/11/2022 130 (H) 70 - 99 mg/dL Final   06/29/2021 85 70 - 99 mg/dL Final     Urea Nitrogen   Date Value Ref Range Status   08/11/2022 16 7 - 30 mg/dL Final   06/29/2021 17 7 - 30 mg/dL Final     Creatinine   Date Value Ref Range Status   08/11/2022 0.78 0.66 - 1.25 mg/dL Final   06/29/2021 0.84 0.66 - 1.25 mg/dL Final     GFR Estimate   Date Value Ref Range Status   08/11/2022 >90 >60 mL/min/1.73m2 Final     Comment:     Effective December 21, 2021 eGFRcr in adults is calculated using the 2021 CKD-EPI creatinine equation which includes age and gender (Ceci et al., NE, DOI: 10.1056/CENGor6948440)   06/29/2021 >90 >60 mL/min/[1.73_m2] Final     Comment:     Non  GFR Calc  Starting 12/18/2018, serum creatinine based estimated GFR (eGFR) will be   calculated using the Chronic Kidney Disease Epidemiology Collaboration   (CKD-EPI) equation.       Calcium   Date Value Ref Range Status   08/11/2022 9.2 8.5 - 10.1 mg/dL Final   06/29/2021 8.7 8.5 - 10.1 mg/dL Final     Bilirubin Total   Date Value Ref Range Status   08/24/2022 0.9 0.2 - 1.3 mg/dL Final   06/29/2021 0.3 0.2 - 1.3 mg/dL Final     Alkaline Phosphatase   Date Value Ref Range Status   08/24/2022 79 40 - 150 U/L Final   06/29/2021 73 40 - 150 U/L Final     ALT   Date Value Ref Range Status   08/24/2022 29 0 - 70 U/L Final   06/29/2021 34 0 - 70 U/L Final     AST   Date Value Ref Range Status   08/24/2022 29 0 - 45 U/L Final   06/29/2021 26 0 - 45 U/L Final       PATHOLOGY:  None new.    IMAGING:  Reviewed as per HPI.    ASSESSMENT/PLAN:  Kt Rasmussen is a 61 year old male with the following issues:  1. Metastatic non-small (non-squamous) cell lung carcinoma with metastases to lymph nodes, bones, and bilateral lungs  2.  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 Chemotherapy-induced pancytopenia  --Lung NGS panel negative for mutations in BRAF, EGFR, ERBB2, IDH1, IDH2, KRAS, MET, NRAS, RET. PD-L1 expression negative (TPS <1%). Guardant 360 negative for targetable mutations.  --Kt started 3rd line metastatic therapy with every 3-week Taxotere at 20% dose reduction (60 mg/m2) on 8/3/2022 due to progressive disease.  He is tolerating this very well with minimal to no paresthesias.  --Continue Taxotere at 20% dose reduction due to past repeated issues with chemo-induced cytopenias. He may need additional dose reductions if his blood counts do not adequately recover. He would like to continue with Taxotere.  --Reviewed 8/24 labs which showed Hgb 9.4, WBC 3.7, platelets 137,000, normal liver enzymes.  --Will repeat PET scan in early 11/2022.    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 Dysphonia  6. Dysphagia  -Kt underwent excision of a left vocal cord SCC on 9/30/2019 and has persistent dysphonia as a result of the lesion and excision.  He also has dysphagia but this is stable and swallowing is manageable.  -Last scope by ENT 9/23/2021 showed no evidence for recurrence. He will see Dr. Wray again on 9/8.    7. Weight loss  --I had offered nutrition consult and he declined this.  --I recommended increasing his intake of higher caloric nutrient dense foods and beverages. His weight has now stabilized.    8. Dizziness and prior falls  --SW and guardian were previously notified of multiple falls.  --Prior brain MRI 2/14/2022 showed no brain metastases. However this was a suboptimal exam due to inability to administer contrast.  --He has not had recent falls and is using a wheelchair due to prior femoral fracture in 1/2022.    Sangita John MD  Hematology/Oncology  [facility]    Total time spent: 30  minutes in patient evaluation, counseling, documentation, and coordination of care.

## 2022-08-12 NOTE — PROGRESS NOTES
Social Work Note: Telephone Call  Oncology Clinic    Data/Intervention: 22  Patient Name:  Kt Rasmussen  /Age:  1960 (62 year old)    Reason for Call:  CAROLE received a consult to assist Kt with a ride to his appointment today. SW completed chart review and found that his [facility] staff set up all the rides to his medical appointments. SW attempted to connect with Sky, Kt's guardian, to confirm this but received no answer. SW left a message encouraging a call back. CAROLE spoke with Kt to inform him that SW will reach out to his snf to ensure a ride has not already been scheduled. CAROLE connected with Trinh at the [facility] and was informed that she is the person who sets up all the rides for Kt and was unaware of this appointment for today. Trinh will call to try to get a ride scheduled for today. Trinh will update Carole if she is unable to arrange a ride. CAROLE updated Kt and Clinic RN.     ADDENDUM  I: CAROLE spoke with Trinh who stated that [facility] could not provide a ride today. CAROLE reached out to the  with [facility] and confirmed SW could set up a ride and cover the fees through a transportation moon. SW attempted to connect with [employer] but received no answer. Carole left a message requesting a call back. CAROLE attempted again at a later time after no return call was made and again no answer. CAROLE called [employer] and arranged a wheelchair ride-confirmed 3 times (round trip) for Kt. [employer] will  at 12:15 PM. CAROLE updated Trinh at [facility].     Plan:  CAROLE will remain available for ongoing resource/support needs.     KEVIN Mondragon, SW  [facility]  Adult Oncology Clinic  Phone: [phone]

## 2022-08-14 NOTE — PROGRESS NOTES
PATIENT/VISITOR WELLNESS SCREENING    Step 1 Patient Screening    1. In the last month, have you been in contact with someone who was confirmed or suspected to have Coronavirus/COVID-19? No    2. Do you have the following symptoms?  Fever/Chills? No   Cough? No   Shortness of breath? No   New loss of taste or smell? No  Sore throat? No  Muscle or body aches? No  Headaches? No  Fatigue? No  Vomiting or diarrhea? No      If the visitor has positive symptoms, notify supervisor/manger  Per policy, the visitor will need to leave the facility     Step 3 Refer to logic grid below for actions    NO SYMPTOM(S)    ACTIONS:  1. Standard rooming process  2. Provider to assess per normal protocol  3. Implement precautions as needed and per guidelines     POSITIVE SYMPTOM(S)  If positive for ANY of the following symptoms: fever, cough, shortness of breath, rash    ACTION:  1. Continue to have the patient wear a mask   2. Room patient as soon as possible  3. Don appropriate PPE when entering room  4. Provider evaluation     FAMILY HISTORY:  No significant family history

## 2022-08-21 RX ORDER — MEPERIDINE HYDROCHLORIDE 25 MG/ML
25 INJECTION INTRAMUSCULAR; INTRAVENOUS; SUBCUTANEOUS EVERY 30 MIN PRN
Status: CANCELLED | OUTPATIENT
Start: 2022-08-24

## 2022-08-21 RX ORDER — ALBUTEROL SULFATE 0.83 MG/ML
2.5 SOLUTION RESPIRATORY (INHALATION)
Status: CANCELLED | OUTPATIENT
Start: 2022-08-24

## 2022-08-21 RX ORDER — HEPARIN SODIUM (PORCINE) LOCK FLUSH IV SOLN 100 UNIT/ML 100 UNIT/ML
5 SOLUTION INTRAVENOUS
Status: CANCELLED | OUTPATIENT
Start: 2022-08-24

## 2022-08-21 RX ORDER — LORAZEPAM 2 MG/ML
0.5 INJECTION INTRAMUSCULAR EVERY 4 HOURS PRN
Status: CANCELLED | OUTPATIENT
Start: 2022-08-24

## 2022-08-21 RX ORDER — ALBUTEROL SULFATE 90 UG/1
1-2 AEROSOL, METERED RESPIRATORY (INHALATION)
Status: CANCELLED
Start: 2022-08-24

## 2022-08-21 RX ORDER — EPINEPHRINE 1 MG/ML
0.3 INJECTION, SOLUTION, CONCENTRATE INTRAVENOUS EVERY 5 MIN PRN
Status: CANCELLED | OUTPATIENT
Start: 2022-08-24

## 2022-08-21 RX ORDER — DIPHENHYDRAMINE HYDROCHLORIDE 50 MG/ML
50 INJECTION INTRAMUSCULAR; INTRAVENOUS
Status: CANCELLED
Start: 2022-08-24

## 2022-08-21 RX ORDER — HEPARIN SODIUM,PORCINE 10 UNIT/ML
5 VIAL (ML) INTRAVENOUS
Status: CANCELLED | OUTPATIENT
Start: 2022-08-24

## 2022-08-24 ENCOUNTER — LAB (OUTPATIENT)
Dept: INFUSION THERAPY | Facility: CLINIC | Age: 62
End: 2022-08-24
Attending: INTERNAL MEDICINE
Payer: MEDICARE

## 2022-08-24 VITALS
SYSTOLIC BLOOD PRESSURE: 113 MMHG | DIASTOLIC BLOOD PRESSURE: 69 MMHG | RESPIRATION RATE: 20 BRPM | TEMPERATURE: 97.5 F | HEART RATE: 68 BPM | OXYGEN SATURATION: 100 % | WEIGHT: 169.75 LBS | BODY MASS INDEX: 20.94 KG/M2

## 2022-08-24 DIAGNOSIS — C34.90 NON-SMALL CELL LUNG CANCER METASTATIC TO BONE (H): ICD-10-CM

## 2022-08-24 DIAGNOSIS — T45.1X5A CHEMOTHERAPY-INDUCED NEUTROPENIA (H): ICD-10-CM

## 2022-08-24 DIAGNOSIS — Z51.11 ENCOUNTER FOR ANTINEOPLASTIC CHEMOTHERAPY: ICD-10-CM

## 2022-08-24 DIAGNOSIS — T45.1X5A CHEMOTHERAPY-INDUCED NEUTROPENIA (H): Primary | ICD-10-CM

## 2022-08-24 DIAGNOSIS — D70.1 CHEMOTHERAPY-INDUCED NEUTROPENIA (H): ICD-10-CM

## 2022-08-24 DIAGNOSIS — D70.1 CHEMOTHERAPY-INDUCED NEUTROPENIA (H): Primary | ICD-10-CM

## 2022-08-24 DIAGNOSIS — C79.51 NON-SMALL CELL LUNG CANCER METASTATIC TO BONE (H): ICD-10-CM

## 2022-08-24 DIAGNOSIS — Z51.11 ENCOUNTER FOR ANTINEOPLASTIC CHEMOTHERAPY: Primary | ICD-10-CM

## 2022-08-24 LAB
ALBUMIN SERPL-MCNC: 3.6 G/DL (ref 3.4–5)
ALP SERPL-CCNC: 79 U/L (ref 40–150)
ALT SERPL W P-5'-P-CCNC: 29 U/L (ref 0–70)
AST SERPL W P-5'-P-CCNC: 29 U/L (ref 0–45)
BASOPHILS # BLD AUTO: 0 10E3/UL (ref 0–0.2)
BASOPHILS NFR BLD AUTO: 1 %
BILIRUB DIRECT SERPL-MCNC: <0.1 MG/DL (ref 0–0.2)
BILIRUB SERPL-MCNC: 0.9 MG/DL (ref 0.2–1.3)
EOSINOPHIL # BLD AUTO: 0 10E3/UL (ref 0–0.7)
EOSINOPHIL NFR BLD AUTO: 1 %
ERYTHROCYTE [DISTWIDTH] IN BLOOD BY AUTOMATED COUNT: 16.9 % (ref 10–15)
HCT VFR BLD AUTO: 31.2 % (ref 40–53)
HGB BLD-MCNC: 9.4 G/DL (ref 13.3–17.7)
IMM GRANULOCYTES # BLD: 0 10E3/UL
IMM GRANULOCYTES NFR BLD: 0 %
LYMPHOCYTES # BLD AUTO: 0.8 10E3/UL (ref 0.8–5.3)
LYMPHOCYTES NFR BLD AUTO: 20 %
MCH RBC QN AUTO: 29.1 PG (ref 26.5–33)
MCHC RBC AUTO-ENTMCNC: 30.1 G/DL (ref 31.5–36.5)
MCV RBC AUTO: 97 FL (ref 78–100)
MONOCYTES # BLD AUTO: 0.3 10E3/UL (ref 0–1.3)
MONOCYTES NFR BLD AUTO: 8 %
NEUTROPHILS # BLD AUTO: 2.6 10E3/UL (ref 1.6–8.3)
NEUTROPHILS NFR BLD AUTO: 70 %
NRBC # BLD AUTO: 0 10E3/UL
NRBC BLD AUTO-RTO: 0 /100
PLATELET # BLD AUTO: 137 10E3/UL (ref 150–450)
PROT SERPL-MCNC: 6.8 G/DL (ref 6.8–8.8)
RBC # BLD AUTO: 3.23 10E6/UL (ref 4.4–5.9)
WBC # BLD AUTO: 3.7 10E3/UL (ref 4–11)

## 2022-08-24 PROCEDURE — 258N000003 HC RX IP 258 OP 636: Performed by: INTERNAL MEDICINE

## 2022-08-24 PROCEDURE — 96372 THER/PROPH/DIAG INJ SC/IM: CPT | Performed by: INTERNAL MEDICINE

## 2022-08-24 PROCEDURE — 250N000011 HC RX IP 250 OP 636: Performed by: INTERNAL MEDICINE

## 2022-08-24 PROCEDURE — 80076 HEPATIC FUNCTION PANEL: CPT | Performed by: INTERNAL MEDICINE

## 2022-08-24 PROCEDURE — 96413 CHEMO IV INFUSION 1 HR: CPT

## 2022-08-24 PROCEDURE — 85025 COMPLETE CBC W/AUTO DIFF WBC: CPT | Performed by: INTERNAL MEDICINE

## 2022-08-24 PROCEDURE — 96367 TX/PROPH/DG ADDL SEQ IV INF: CPT

## 2022-08-24 RX ORDER — HEPARIN SODIUM (PORCINE) LOCK FLUSH IV SOLN 100 UNIT/ML 100 UNIT/ML
5 SOLUTION INTRAVENOUS
Status: DISCONTINUED | OUTPATIENT
Start: 2022-08-24 | End: 2022-08-24 | Stop reason: HOSPADM

## 2022-08-24 RX ADMIN — Medication 5 ML: at 10:00

## 2022-08-24 RX ADMIN — DEXAMETHASONE SODIUM PHOSPHATE: 10 INJECTION, SOLUTION INTRAMUSCULAR; INTRAVENOUS at 08:34

## 2022-08-24 RX ADMIN — PEGFILGRASTIM 6 MG: KIT SUBCUTANEOUS at 08:55

## 2022-08-24 RX ADMIN — SODIUM CHLORIDE 250 ML: 9 INJECTION, SOLUTION INTRAVENOUS at 08:34

## 2022-08-24 RX ADMIN — SODIUM CHLORIDE 122 MG: 9 INJECTION, SOLUTION INTRAVENOUS at 08:54

## 2022-08-24 ASSESSMENT — PAIN SCALES - GENERAL: PAINLEVEL: NO PAIN (0)

## 2022-08-24 NOTE — PATIENT INSTRUCTIONS
Your On-body Neulasta Injector was applied to your right arm at 9:00 am.  At approximately 12:00 pm on 8/25, your On-body Injector will beep to let you know your dose delivery will begin in 2 minutes.  Your medication will be delivered over the next 45 minutes.  You can remove your Injector at 1:00pm on 8/25.  Please make sure your Injector has a solid green light or has turned off prior to removing the device.  Please contact your provider at [phone] with questions or concerns.

## 2022-08-24 NOTE — PROGRESS NOTES
Infusion Nursing Note:  Kt Rasmussen presents today for Cycle 2 Day 1 Taxotere.    Patient seen by provider today: No   present during visit today: Not Applicable.    Note: N/A.    Intravenous Access:  Implanted Port.    Treatment Conditions:  Lab Results   Component Value Date    HGB 9.4 (L) 08/24/2022    WBC 3.7 (L) 08/24/2022    ANEU 0.5 (L) 08/11/2022    ANEUTAUTO 2.6 08/24/2022     (L) 08/24/2022      Lab Results   Component Value Date     08/11/2022    POTASSIUM 3.5 08/11/2022    MAG 1.9 10/13/2016    CR 0.78 08/11/2022    BEVERLY 9.2 08/11/2022    BILITOTAL 0.9 08/24/2022    ALBUMIN 3.6 08/24/2022    ALT 29 08/24/2022    AST 29 08/24/2022     Results reviewed, labs MET treatment parameters, ok to proceed with treatment.    Post Infusion Assessment:  Patient tolerated infusion without incident.  Blood return noted pre and post infusion.  Site patent and intact, free from redness, edema or discomfort.  No evidence of extravasations.  Access discontinued per protocol.     ONPRO  Was placed on patient's: back of right arm.    Was placed at 0900 AM    Podpal used: Yes    ONPRO injector device Lot number: [id number]    Patient education included: what patient can expect after application, what colored lights mean on the device, when to remove device, when and where to call with questions or issues, all patients questions answered and that Neulasta administration will occur at 12:00pm on 8/25    Patient tolerated administration well.      Discharge Plan:   Patient declined prescription refills.  Discharge instructions reviewed with: Patient.  Patient verbalized understanding of discharge instructions and all questions answered.  AVS to patient via [employer].  Patient will return 8/25/22 for next appointment.   Patient discharged in stable condition accompanied by: self.  Departure Mode: Wheelchair.      Sandi Sanches RN

## 2022-08-24 NOTE — PROGRESS NOTES
Nursing Note:  Kt Rasmussen presents today for labs for treatment today.    Patient seen by provider today: No   present during visit today: Not Applicable.    Note: N/A.    Intravenous Access:  Labs drawn without difficulty.  Implanted Port.    Discharge Plan:   Patient was sent to [facility] for infusion appointment.    Guzman Patel RN

## 2022-08-25 ENCOUNTER — INFUSION THERAPY VISIT (OUTPATIENT)
Dept: INFUSION THERAPY | Facility: CLINIC | Age: 62
End: 2022-08-25
Attending: NURSE PRACTITIONER
Payer: MEDICARE

## 2022-08-25 DIAGNOSIS — T45.1X5A CHEMOTHERAPY-INDUCED NEUTROPENIA (H): Primary | ICD-10-CM

## 2022-08-25 DIAGNOSIS — D70.1 CHEMOTHERAPY-INDUCED NEUTROPENIA (H): Primary | ICD-10-CM

## 2022-08-25 DIAGNOSIS — C34.90 NON-SMALL CELL CARCINOMA OF LUNG, STAGE 3, UNSPECIFIED LATERALITY (H): ICD-10-CM

## 2022-08-25 PROCEDURE — 250N000011 HC RX IP 250 OP 636: Performed by: INTERNAL MEDICINE

## 2022-08-25 PROCEDURE — 96365 THER/PROPH/DIAG IV INF INIT: CPT

## 2022-08-25 PROCEDURE — 258N000003 HC RX IP 258 OP 636: Performed by: NURSE PRACTITIONER

## 2022-08-25 RX ORDER — HEPARIN SODIUM (PORCINE) LOCK FLUSH IV SOLN 100 UNIT/ML 100 UNIT/ML
5 SOLUTION INTRAVENOUS
Status: DISCONTINUED | OUTPATIENT
Start: 2022-08-25 | End: 2022-08-25 | Stop reason: HOSPADM

## 2022-08-25 RX ORDER — ONDANSETRON 2 MG/ML
8 INJECTION INTRAMUSCULAR; INTRAVENOUS EVERY 6 HOURS PRN
Status: CANCELLED
Start: 2022-08-25

## 2022-08-25 RX ORDER — HEPARIN SODIUM,PORCINE 10 UNIT/ML
5 VIAL (ML) INTRAVENOUS
Status: CANCELLED | OUTPATIENT
Start: 2022-08-25

## 2022-08-25 RX ORDER — HEPARIN SODIUM (PORCINE) LOCK FLUSH IV SOLN 100 UNIT/ML 100 UNIT/ML
5 SOLUTION INTRAVENOUS
Status: CANCELLED | OUTPATIENT
Start: 2022-08-25

## 2022-08-25 RX ADMIN — SODIUM CHLORIDE 1000 ML: 9 INJECTION, SOLUTION INTRAVENOUS at 12:02

## 2022-08-25 RX ADMIN — Medication 5 ML: at 13:10

## 2022-08-25 NOTE — PROGRESS NOTES
Infusion Nursing Note:  Kt HERNANDEZ Rasmussen presents today for 1 Liter IVF.    Patient seen by provider today: No   present during visit today: Not Applicable.    Note: N/A.    Intravenous Access:  Implanted Port.    Treatment Conditions:  Not Applicable.    Post Infusion Assessment:  Patient tolerated infusion without incident.  Site patent and intact, free from redness, edema or discomfort.  No evidence of extravasations.  Access discontinued per protocol.     Discharge Plan:   Copy of AVS reviewed with patient and/or family.  Patient will return 9/1/22 for next appointment.  Patient discharged in stable condition accompanied by: self.  Departure Mode: Wheelchair.      Magdalena Davila RN

## 2022-09-01 ENCOUNTER — INFUSION THERAPY VISIT (OUTPATIENT)
Dept: INFUSION THERAPY | Facility: CLINIC | Age: 62
End: 2022-09-01
Attending: INTERNAL MEDICINE
Payer: MEDICARE

## 2022-09-01 ENCOUNTER — ONCOLOGY VISIT (OUTPATIENT)
Dept: ONCOLOGY | Facility: CLINIC | Age: 62
End: 2022-09-01
Attending: INTERNAL MEDICINE
Payer: MEDICARE

## 2022-09-01 VITALS
OXYGEN SATURATION: 98 % | DIASTOLIC BLOOD PRESSURE: 69 MMHG | RESPIRATION RATE: 16 BRPM | TEMPERATURE: 98.1 F | HEART RATE: 78 BPM | SYSTOLIC BLOOD PRESSURE: 114 MMHG

## 2022-09-01 DIAGNOSIS — T45.1X5A ANEMIA DUE TO CHEMOTHERAPY: ICD-10-CM

## 2022-09-01 DIAGNOSIS — D70.1 CHEMOTHERAPY-INDUCED NEUTROPENIA (H): Primary | ICD-10-CM

## 2022-09-01 DIAGNOSIS — C79.51 NON-SMALL CELL LUNG CANCER METASTATIC TO BONE (H): Primary | ICD-10-CM

## 2022-09-01 DIAGNOSIS — C34.90 NON-SMALL CELL CARCINOMA OF LUNG, STAGE 3, UNSPECIFIED LATERALITY (H): ICD-10-CM

## 2022-09-01 DIAGNOSIS — C34.90 NON-SMALL CELL LUNG CANCER METASTATIC TO BONE (H): Primary | ICD-10-CM

## 2022-09-01 DIAGNOSIS — D70.1 CHEMOTHERAPY-INDUCED NEUTROPENIA (H): ICD-10-CM

## 2022-09-01 DIAGNOSIS — T45.1X5A CHEMOTHERAPY-INDUCED NEUTROPENIA (H): ICD-10-CM

## 2022-09-01 DIAGNOSIS — T45.1X5A CHEMOTHERAPY-INDUCED NEUTROPENIA (H): Primary | ICD-10-CM

## 2022-09-01 DIAGNOSIS — D69.59 THROMBOCYTOPENIA DUE TO DRUGS: ICD-10-CM

## 2022-09-01 DIAGNOSIS — D64.81 ANEMIA DUE TO CHEMOTHERAPY: ICD-10-CM

## 2022-09-01 DIAGNOSIS — T50.905A THROMBOCYTOPENIA DUE TO DRUGS: ICD-10-CM

## 2022-09-01 PROCEDURE — 258N000003 HC RX IP 258 OP 636: Performed by: NURSE PRACTITIONER

## 2022-09-01 PROCEDURE — 99214 OFFICE O/P EST MOD 30 MIN: CPT | Performed by: INTERNAL MEDICINE

## 2022-09-01 PROCEDURE — 250N000011 HC RX IP 250 OP 636: Performed by: INTERNAL MEDICINE

## 2022-09-01 PROCEDURE — 96360 HYDRATION IV INFUSION INIT: CPT

## 2022-09-01 PROCEDURE — G0463 HOSPITAL OUTPT CLINIC VISIT: HCPCS | Mod: 25

## 2022-09-01 RX ORDER — HEPARIN SODIUM (PORCINE) LOCK FLUSH IV SOLN 100 UNIT/ML 100 UNIT/ML
5 SOLUTION INTRAVENOUS
Status: CANCELLED | OUTPATIENT
Start: 2022-09-01

## 2022-09-01 RX ORDER — HEPARIN SODIUM (PORCINE) LOCK FLUSH IV SOLN 100 UNIT/ML 100 UNIT/ML
5 SOLUTION INTRAVENOUS
Status: DISCONTINUED | OUTPATIENT
Start: 2022-09-01 | End: 2022-09-01 | Stop reason: HOSPADM

## 2022-09-01 RX ORDER — ONDANSETRON 2 MG/ML
8 INJECTION INTRAMUSCULAR; INTRAVENOUS EVERY 6 HOURS PRN
Status: CANCELLED
Start: 2022-09-01

## 2022-09-01 RX ORDER — FOLIC ACID 1 MG/1
1 TABLET ORAL DAILY
COMMUNITY
Start: 2022-08-15 | End: 2023-06-01

## 2022-09-01 RX ORDER — HEPARIN SODIUM,PORCINE 10 UNIT/ML
5 VIAL (ML) INTRAVENOUS
Status: CANCELLED | OUTPATIENT
Start: 2022-09-01

## 2022-09-01 RX ADMIN — SODIUM CHLORIDE 1000 ML: 9 INJECTION, SOLUTION INTRAVENOUS at 09:46

## 2022-09-01 RX ADMIN — Medication 5 ML: at 10:47

## 2022-09-01 ASSESSMENT — PAIN SCALES - GENERAL: PAINLEVEL: NO PAIN (0)

## 2022-09-01 NOTE — PROGRESS NOTES
Infusion Nursing Note:  Kt DAVID Rasmussen presents today for IVF.    Patient seen by provider today: Yes: Dr. John   present during visit today: Not Applicable.    Note: N/A.    Intravenous Access:  Implanted Port.    Treatment Conditions:  Not Applicable.    Post Infusion Assessment:  Patient tolerated infusion without incident.  Blood return noted pre and post infusion.  Site patent and intact, free from redness, edema or discomfort.  Access discontinued per protocol.     Discharge Plan:   Discharge instructions reviewed with: Patient.  Patient and/or family verbalized understanding of discharge instructions and all questions answered.  Copy of AVS reviewed with patient and/or family.  Patient will return 9/8/22 for next appointment.  Patient discharged in stable condition accompanied by: self.  Departure Mode: Wheelchair.      Stacy Minor RN

## 2022-09-01 NOTE — PROGRESS NOTES
"Oncology Rooming Note    September 1, 2022 9:12 AM   Kt Rasmussen is a 62 year old male who presents for:    Chief Complaint   Patient presents with     Oncology Clinic Visit     Initial Vitals: /69   Pulse 78   Temp 98.1  F (36.7  C) (Oral)   Resp 16   SpO2 98%  Estimated body mass index is 20.94 kg/m  as calculated from the following:    Height as of 8/11/22: 1.918 m (6' 3.5\").    Weight as of 8/24/22: 77 kg (169 lb 12.1 oz). There is no height or weight on file to calculate BSA.  No Pain (0) Comment: Data Unavailable   No LMP for male patient.  Allergies reviewed: Yes  Medications reviewed: Yes    Medications: Medication refills not needed today.  Pharmacy name entered into EPIC:    [facility], [place] - [address] [facility], [place] - [address] [id number] - [place], [place] - [address] AT [facility] & [facility] [address]    Clinical concerns: no    Taylor Aguilar            "

## 2022-09-01 NOTE — LETTER
9/1/2022         RE: Kt Rasmussen  [id number]  [facility] [address]        Dear Colleague,    Thank you for referring your patient, Kt Rasmussen, to the [facility]. Please see a copy of my visit note below.    [facility]    Hematology/Oncology Established Patient Follow-up Note      Today's Date: 9/1/2022    Reason for Follow-up: Metastatic non-small cell lung carcinoma.    HISTORY OF PRESENT ILLNESS: Kt Rasmussen is a 62 year old male who presents with the following oncologic history:  1. 5/05/2016: Presented with near-obstructing large mass coming off the cheikh and likely the posterior wall, seen on bronchoscopy. Biopsy of the mass showed invasive squamous cell carcinoma, moderately differentiating and focally keratinizing.  Procedure was complicated by significant bleeding.  Tumor was completely debulked (via bronchoscopy) in both main stem bronchi and distal trachea. NGS showed no mutations in EGFR, KRAS, BRAF, NRAS, HRAS, PIK3CA, ERBB2, MET, or JAK2.  2. 5/23/2016: PET/CT scan showed evidence of residual tumor with decreased endobronchial mass. Indeterminate, mildly hypermetabolic mesentery with prominent lymph nodes and area of enhancement of the right hepatic lobe present. Felt to have T4-NX-M0 disease.  3. 6/09/2016: Started concurrent chemoradiation with weekly paclitaxel and carboplatin.  4. 7/30/2016: Completed radiation.  Subsequently received 2 cycles of consolidation paclitaxel and carboplatin.   5. 9/13/2019: Presented with 6-month history of dysphonia and left vocal exophytic lesion. Left true vocal fold biopsy showed at least squamous cell carcinoma in situ, cannot exclude superficial invasion.  6. 9/30/2019: Excision of left vocal cord mass showed fragments of invasive squamous cell carcinoma, well differentiated and keratinizing.  Margins negative for malignancy.  7. 2/16/2021: CT chest w/o contrast showed thin-walled cavity in left  lower lobe with 2 solid peripheral nodules measuring 9 mm each. No lymphadenopathy.  8. 3/01/2021: PET scan showed hypermetabolic nodules in left lower lobe and right lung, consistent with metastases; hypermetabolic adenopathy in chest, abdomen, pelvis; nonspecific uptake at tongue; hypermetabolic intraosseous lesions in spine and pelvis consistent with metastatic disease; left axillary hypermetabolic lymph nodes related to COVID-19 vaccination.  9. 3/12/2021: CT-guided lung biopsy showed non-small cell carcinoma, not otherwise specified -- with opinion by [facility] pathologist.  10. 3/18/2021: Lung NGS panel negative for mutations in BRAF, EGFR, ERBB2, IDH1, IDH2, KRAS, MET, NRAS, RET. PD-L1 expression negative (TPS <1%). Due to minimal amount of DNA obtained from specimen, other biomarkers could not be analyzed.  11. 4/7/2021: MRI brain negative for brain metastases.  12. 4/27/2021: Started 1st line metastatic therapy with carboplatin, paclitaxel, bevacizumab, and atezolizumab for metastatic non-small cell lung carcinoma, NOS.  13. 7/12/2021: PET/CT showed resolved mural nodules with increased cystic cavity in left lower lobe with no significant FDG uptake, less likely metastases; no enlarged hypermetabolic mediastinal, hilar, or axillary lymph nodes, decreased metabolic activity of intraosseous lesion in spine and pelvis; no new bone lesions.  14. 10/11/2021: PET/CT showed slight interval increase in intensity of metabolic activity of right pubic bone and left ischium with new focal uptake in L2 spinous process; resolved uptake at previous ground glass opacity along right medial lower lobe; unchanged cystic cavities/nodules in left lower lobe and right apical upper lobe; no pathologically enlarged hypermetabolic mediastinal, hilar, or axillary lymph nodes.  15. 1/10/2022: PET/CT showed new foci of abnormal skeletal FGD uptake in the thoracic and lumbar spine. Mild interval increase in intensity of previously  demonstrated hypermetabolic skeletal lesions involving the pelvis and lumbar spine. Findings are consistent with progressive osseous metastatic disease. Subsequently off chemo for 1 month due to poor performance status.  16. 1/25/2022: Patient fell and fractured his femur. This was surgically repaired and he was subsequently cared for at a rehab unit.  17. 2/14/2022: Brain MRI without contrast (contrast not given due to inability to obtain IV access) showed no brain metastases.  18. 4/21/2022: Started 2nd line metastatic therapy with pemetrexed and carboplatin. Omission of carboplatin 6/2 and forward due to worsening anemia despite dose reduction.  19. 7/18/2022: PET/CT showed enlarged and increased FDG avid skeletal metastases, increased left para-aortic retroperitoneal lymph node increased in size and FDG avidity, findings consistent with progressive disease.  20. 8/3/2022: Started 3rd line metastatic therapy with Taxotere 60 mg/m2 every 3 weeks.    INTERIM HISTORY:  Kt reports feeling overall well.  He accidentally stubbed his toe while walking with his cane.  He denies recent fevers or chills or dizziness or falls. Appetite okay.    REVIEW OF SYSTEMS:   14 point ROS was reviewed and is negative other than as noted above in HPI.       HOME MEDICATIONS:  Current Outpatient Medications   Medication Sig Dispense Refill     atorvastatin (LIPITOR) 40 MG tablet Take 40 mg by mouth daily       folic acid (FOLVITE) 1 MG tablet Take 1 mg by mouth daily       prochlorperazine (COMPAZINE) 10 MG tablet Take 1 tablet (10 mg) by mouth every 6 hours as needed for nausea or vomiting 30 tablet 2     dexamethasone (DECADRON) 4 MG tablet Take 2 tablets (8 mg) by mouth 2 times daily (with meals) for 3 doses Start evening of Docetaxel infusion and continue for a total of 3 doses. 6 tablet 7     dexamethasone (DECADRON) 4 MG tablet Take 1 tablet (4 mg) by mouth 2 times daily (with meals) for 3 days Start one day prior to Pemetrexed  (Alimta), continue on the day treatment, and the day following Pemetrexed. 6 tablet 3         ALLERGIES:  Allergies   Allergen Reactions     No Known Drug Allergies          PAST MEDICAL HISTORY:  Past Medical History:   Diagnosis Date     Alcohol abuse, unspecified      Cerebral infarction (H)     , right side residual and aphasia     Dyslipidemia      GERD (gastroesophageal reflux disease)      Lung cancer (H)      Unspecified essential hypertension          PAST SURGICAL HISTORY:  Past Surgical History:   Procedure Laterality Date     BRONCHOSCOPY FLEXIBLE AND RIGID N/A 2016    Procedure: BRONCHOSCOPY FLEXIBLE AND RIGID;  Surgeon: Tony Talbot MD;  Location: [facility] OR     ESOPHAGOSCOPY FLEXIBLE N/A 2019    Procedure: flexible esophagoscopy;  Surgeon: Lizzy Johnson MD;  Location: [facility] OR     INJECT STEROID (LOCATION) N/A 2019    Procedure: steroid injection;  Surgeon: Lizzy Johnson MD;  Location: [facility] OR     IR CHEST PORT PLACEMENT > 5 YRS OF AGE  2021     IR CHEST PORT PLACEMENT > 5 YRS OF AGE  2022     IR PORT CHECK RIGHT  2022     IR PORT REMOVAL RIGHT  2022     LASER CO2 LARYNGOSCOPY N/A 2019    Procedure: Microdirect laryngoscopy with excision of laryngeal mass, CO2 laser;  Surgeon: Lizzy Johnson MD;  Location: [facility] OR     ZZC NONSPECIFIC PROCEDURE      R tympanoplasty         SOCIAL HISTORY:  Social History     Socioeconomic History     Marital status: Single     Spouse name: Not on file     Number of children: Not on file     Years of education: Not on file     Highest education level: Not on file   Occupational History     Not on file   Tobacco Use     Smoking status: Former Smoker     Packs/day: 1.00     Types: Cigarettes     Quit date: 2009     Years since quittin.8     Smokeless tobacco: Never Used   Substance and Sexual Activity     Alcohol use: Not Currently     Drug use: No     Sexual activity: Not on file   Other Topics Concern      Parent/sibling w/ CABG, MI or angioplasty before 65F 55M? Not Asked   Social History Narrative     Not on file     Social Determinants of Health     Financial Resource Strain: Not on file   Food Insecurity: Not on file   Transportation Needs: Not on file   Physical Activity: Not on file   Stress: Not on file   Social Connections: Not on file   Intimate Partner Violence: Not At Risk     Fear of Current or Ex-Partner: No     Emotionally Abused: No     Physically Abused: No     Sexually Abused: No   Housing Stability: Not on file   Resides at assisted living.      FAMILY HISTORY:  Family History   Problem Relation Age of Onset     Cancer Father          PHYSICAL EXAM:  Vital signs:  /69   Pulse 78   Temp 98.1  F (36.7  C) (Oral)   Resp 16   SpO2 98%    ECO  GENERAL/CONSTITUTIONAL: No acute distress.  EYES: No erythema or scleral icterus.  LYMPH: No cervical, supraclavicular, axillary adenopathy.   RESPIRATORY: Clear to auscultation bilaterally. No crackles or wheezing.   CARDIOVASCULAR: Regular rate and rhythm without murmurs.  GASTROINTESTINAL: No hepatosplenomegaly, masses, or tenderness. No guarding.  No distention.  MUSCULOSKELETAL: Warm and well-perfused, no cyanosis, clubbing, or edema.  NEUROLOGIC: Residual right arm and right leg reduced strength due to prior stroke. Alert, oriented, answers questions appropriately but has occasional word finding difficulty from prior stroke. Dysphonia present and stable.  INTEGUMENTARY: No rashes or jaundice.  GAIT: Sitting in wheelchair.      LABS:  CBC RESULTS: Recent Labs   Lab Test 22  0742   WBC 3.7*   RBC 3.23*   HGB 9.4*   HCT 31.2*   MCV 97   MCH 29.1   MCHC 30.1*   RDW 16.9*   *     Last Comprehensive Metabolic Panel:  Sodium   Date Value Ref Range Status   2022 139 133 - 144 mmol/L Final   2021 140 133 - 144 mmol/L Final     Potassium   Date Value Ref Range Status   2022 3.5 3.4 - 5.3 mmol/L Final   2021 4.3 3.4 -  5.3 mmol/L Final     Chloride   Date Value Ref Range Status   08/11/2022 106 94 - 109 mmol/L Final   06/29/2021 111 (H) 94 - 109 mmol/L Final     Carbon Dioxide   Date Value Ref Range Status   06/29/2021 25 20 - 32 mmol/L Final     Carbon Dioxide (CO2)   Date Value Ref Range Status   08/11/2022 29 20 - 32 mmol/L Final     Anion Gap   Date Value Ref Range Status   08/11/2022 4 3 - 14 mmol/L Final   06/29/2021 4 3 - 14 mmol/L Final     Glucose   Date Value Ref Range Status   08/11/2022 130 (H) 70 - 99 mg/dL Final   06/29/2021 85 70 - 99 mg/dL Final     Urea Nitrogen   Date Value Ref Range Status   08/11/2022 16 7 - 30 mg/dL Final   06/29/2021 17 7 - 30 mg/dL Final     Creatinine   Date Value Ref Range Status   08/11/2022 0.78 0.66 - 1.25 mg/dL Final   06/29/2021 0.84 0.66 - 1.25 mg/dL Final     GFR Estimate   Date Value Ref Range Status   08/11/2022 >90 >60 mL/min/1.73m2 Final     Comment:     Effective December 21, 2021 eGFRcr in adults is calculated using the 2021 CKD-EPI creatinine equation which includes age and gender (Ceci et al., NEJ, DOI: 10.1056/DMULkp7626129)   06/29/2021 >90 >60 mL/min/[1.73_m2] Final     Comment:     Non  GFR Calc  Starting 12/18/2018, serum creatinine based estimated GFR (eGFR) will be   calculated using the Chronic Kidney Disease Epidemiology Collaboration   (CKD-EPI) equation.       Calcium   Date Value Ref Range Status   08/11/2022 9.2 8.5 - 10.1 mg/dL Final   06/29/2021 8.7 8.5 - 10.1 mg/dL Final     Bilirubin Total   Date Value Ref Range Status   08/24/2022 0.9 0.2 - 1.3 mg/dL Final   06/29/2021 0.3 0.2 - 1.3 mg/dL Final     Alkaline Phosphatase   Date Value Ref Range Status   08/24/2022 79 40 - 150 U/L Final   06/29/2021 73 40 - 150 U/L Final     ALT   Date Value Ref Range Status   08/24/2022 29 0 - 70 U/L Final   06/29/2021 34 0 - 70 U/L Final     AST   Date Value Ref Range Status   08/24/2022 29 0 - 45 U/L Final   06/29/2021 26 0 - 45 U/L Final        PATHOLOGY:  None new.    IMAGING:  Reviewed as per HPI.    ASSESSMENT/PLAN:  Kt Rasmussen is a 61 year old male with the following issues:  1. Metastatic non-small (non-squamous) cell lung carcinoma with metastases to lymph nodes, bones, and bilateral lungs  2. 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 Chemotherapy-induced pancytopenia  --Lung NGS panel negative for mutations in BRAF, EGFR, ERBB2, IDH1, IDH2, KRAS, MET, NRAS, RET. PD-L1 expression negative (TPS <1%). Guardant 360 negative for targetable mutations.  --Kt started 3rd line metastatic therapy with every 3-week Taxotere at 20% dose reduction (60 mg/m2) on 8/3/2022 due to progressive disease.  He is tolerating this very well with minimal to no paresthesias.  --Continue Taxotere at 20% dose reduction due to past repeated issues with chemo-induced cytopenias. He may need additional dose reductions if his blood counts do not adequately recover. He would like to continue with Taxotere.  --Reviewed 8/24 labs which showed Hgb 9.4, WBC 3.7, platelets 137,000, normal liver enzymes.  --Will repeat PET scan in early 11/2022.    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 Dysphonia  6. Dysphagia  -Kt underwent excision of a left vocal cord SCC on 9/30/2019 and has persistent dysphonia as a result of the lesion and excision.  He also has dysphagia but this is stable and swallowing is manageable.  -Last scope by ENT 9/23/2021 showed no evidence for recurrence. He will see Dr. Wray again on 9/8.    7. Weight loss  --I had offered nutrition consult and he declined this.  --I recommended increasing his intake of higher caloric nutrient dense foods and beverages. His weight has now stabilized.    8. Dizziness and prior falls  --SW and guardian were previously notified of multiple falls.  --Prior brain MRI 2/14/2022 showed no brain metastases. However  "this was a suboptimal exam due to inability to administer contrast.  --He has not had recent falls and is using a wheelchair due to prior femoral fracture in 1/2022.    Sangita John MD  Hematology/Oncology  [facility]    Total time spent: 30 minutes in patient evaluation, counseling, documentation, and coordination of care.    Oncology Rooming Note    September 1, 2022 9:12 AM   Kt Rasmussen is a 62 year old male who presents for:    Chief Complaint   Patient presents with     Oncology Clinic Visit     Initial Vitals: /69   Pulse 78   Temp 98.1  F (36.7  C) (Oral)   Resp 16   SpO2 98%  Estimated body mass index is 20.94 kg/m  as calculated from the following:    Height as of 8/11/22: 1.918 m (6' 3.5\").    Weight as of 8/24/22: 77 kg (169 lb 12.1 oz). There is no height or weight on file to calculate BSA.  No Pain (0) Comment: Data Unavailable   No LMP for male patient.  Allergies reviewed: Yes  Medications reviewed: Yes    Medications: Medication refills not needed today.  Pharmacy name entered into EPIC:    [facility] [facility] [place] [address] [facility] [place], [place] - [address] [id number] [facility]    Clinical concerns: no    Taylor Aguilar                Again, thank you for allowing me to participate in the care of your patient.        Sincerely,        Sangita John MD    "

## 2022-09-06 NOTE — PROGRESS NOTES
[facility]   at the [facility]   Otolaryngology Clinic     Patient: Kt Rasmussen    MRN: [id number]    : 1960    Age/Gender: 62 year old male  Date of Service: 2022  Rendering Provider:   Lizzy Wray MD     Chief Complaint   T1 left TVF SCC s/p resection 19  Interval History   HISTORY OF PRESENT ILLNESS: Mr. Rasmussen is a 61 year old male is being followed for history of left TVF SCC. he was initially seen on 19. Please refer to this note for full history.      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 he reports:  - doing well     PAST MEDICAL HISTORY:   Past Medical History:   Diagnosis Date     Alcohol abuse, unspecified      Cerebral infarction (H)     , right side residual and aphasia     Dyslipidemia      GERD (gastroesophageal reflux disease)      Lung cancer (H)      Unspecified essential hypertension        PAST SURGICAL HISTORY:   Past Surgical History:   Procedure Laterality Date     BRONCHOSCOPY FLEXIBLE AND RIGID N/A 2016    Procedure: BRONCHOSCOPY FLEXIBLE AND RIGID;  Surgeon: Tony Talbot MD;  Location: [facility] OR     ESOPHAGOSCOPY FLEXIBLE N/A 2019    Procedure: flexible esophagoscopy;  Surgeon: Lizzy Johnson MD;  Location: [facility] OR     INJECT STEROID (LOCATION) N/A 2019    Procedure: steroid injection;  Surgeon: Lizzy Johnson MD;  Location: [facility] OR     IR CHEST PORT PLACEMENT > 5 YRS OF AGE  2021     IR CHEST PORT PLACEMENT > 5 YRS OF AGE  2022     IR PORT CHECK RIGHT  2022     IR PORT REMOVAL RIGHT  2022     LASER CO2 LARYNGOSCOPY N/A 2019    Procedure: Microdirect laryngoscopy with excision of laryngeal mass, CO2 laser;  Surgeon: Lizzy Johnson MD;  Location: [facility] OR     [facility] NONSPECIFIC PROCEDURE      R tympanoplasty       CURRENT MEDICATIONS:   Current Outpatient  Medications:      atorvastatin (LIPITOR) 40 MG tablet, Take 40 mg by mouth daily, Disp: , Rfl:      dexamethasone (DECADRON) 4 MG tablet, Take 2 tablets (8 mg) by mouth 2 times daily (with meals) for 3 doses Start evening of Docetaxel infusion and continue for a total of 3 doses., Disp: 6 tablet, Rfl: 7     dexamethasone (DECADRON) 4 MG tablet, Take 1 tablet (4 mg) by mouth 2 times daily (with meals) for 3 days Start one day prior to Pemetrexed (Alimta), continue on the day treatment, and the day following Pemetrexed., Disp: 6 tablet, Rfl: 3     folic acid (FOLVITE) 1 MG tablet, Take 1 mg by mouth daily, Disp: , Rfl:      prochlorperazine (COMPAZINE) 10 MG tablet, Take 1 tablet (10 mg) by mouth every 6 hours as needed for nausea or vomiting, Disp: 30 tablet, Rfl: 2  No current facility-administered medications for this visit.    Facility-Administered Medications Ordered in Other Visits:      heparin 100 UNIT/ML injection 5 mL, 5 mL, Intracatheter, Once PRN, Sangita John MD, 5 mL at 08/10/21 1346     heparin lock flush 10 UNIT/ML injection 5 mL, 5 mL, Intracatheter, Q1H PRN, Sangita John MD     sodium chloride (PF) 0.9% PF flush 3-20 mL, 3-20 mL, Intracatheter, Q1H PRN, Sangita John MD, 20 mL at 08/10/21 1346    ALLERGIES: No known drug allergies    SOCIAL HISTORY:    Social History     Socioeconomic History     Marital status: Single     Spouse name: Not on file     Number of children: Not on file     Years of education: Not on file     Highest education level: Not on file   Occupational History     Not on file   Tobacco Use     Smoking status: Former Smoker     Packs/day: 1.00     Types: Cigarettes     Quit date: 2009     Years since quittin.9     Smokeless tobacco: Never Used   Substance and Sexual Activity     Alcohol use: Not Currently     Drug use: No     Sexual activity: Not on file   Other Topics Concern     Parent/sibling w/ CABG, MI or angioplasty before 65F 55M? Not Asked   Social  History Narrative     Not on file     Social Determinants of Health     Financial Resource Strain: Not on file   Food Insecurity: Not on file   Transportation Needs: Not on file   Physical Activity: Not on file   Stress: Not on file   Social Connections: Not on file   Intimate Partner Violence: Not At Risk     Fear of Current or Ex-Partner: No     Emotionally Abused: No     Physically Abused: No     Sexually Abused: No   Housing Stability: Not on file         FAMILY HISTORY:   Family History   Problem Relation Age of Onset     Cancer Father       Non-contributory for problems with anesthesia    REVIEW OF SYSTEMS:   The patient was asked a 14 point review of systems regarding constitutional symptoms, eye symptoms, ears, nose, mouth, throat symptoms, cardiovascular symptoms, respiratory symptoms, gastrointestinal symptoms, genitourinary symptoms, musculoskeletal symptoms, integumentary symptoms, neurological symptoms, psychiatric symptoms, endocrine symptoms, hematologic/lymphatic symptoms, and allergic/ immunologic symptoms.   The pertinent factors have been included in the HPI and below.  Patient Supplied Answers to Review of Systems  No flowsheet data found.    Physical Examination   The patient underwent a physical examination as described below. The pertinent positive and negative findings are summarized after the description of the examination.  Constitutional: The patient's developmental and nutritional status was assessed. The patient's voice quality was assessed.  Head and Face: The head and face were inspected for deformities. The sinuses were palpated. The salivary glands were palpated. Facial muscle strength was assessed bilaterally.  Eyes: Extraocular movements and primary gaze alignment were assessed.  Ears, Nose, Mouth and Throat: The ears and nose were examined for deformities. The nasal septum, mucosa, and turbinates were inspected by anterior rhinoscopy. The lips, teeth, and gums were examined for  abnormalities. The oral mucosa, tongue, palate, tonsils, lateral and posterior pharynx were inspected for the presence of asymmetry or mucosal lesions.    Neck: The tracheal position was noted, and the neck mass palpated to determine if there were any asymmetries, abnormal neck masses, thyromegally, or thyroid nodules.  Respiratory: The nature of the breathing and chest expansion/symmetry was observed.  Cardiovascular: The patient was examined to determine the presence of any edema or jugular venous distension.  Abdomen: The contour of the abdomen was noted.  Lymphatic: The patient was examined for infraclavicular lymphadenopathy.  Musculoskeletal: The patient was inspected for the presence of skeletal deformities.  Extremities: The extremities were examined for any clubbing or cyanosis.  Skin: The skin was examined for inflammatory or neoplastic conditions.  Neurologic: The patient's orientation, mood, and affect were noted. The cranial nerve  functions were examined.  Other pertinent positive and negative findings on physical examination:   OC/OP: no lesions, uvula midline, soft palate elevates symmetrically   Neck: no lesions, no TH tenderness to palpation    All other physical examination findings were within normal limits and noncontributory.    Procedures   Flexible laryngoscopy (CPT 56080)        Pre-procedure diagnosis: dysphonia  Post-procedure diagnosis: same as above  Indication for procedure: Mr. Rasmussen is a 61 year old male with see above  Procedure(s): Fiberoptic Laryngoscopy     Details of Procedure: After informed consent was obtained, the patient was seated in the examination chair.  The areas of the nasopharynx as well as the hypopharynx were anesthetized with topical 4% lidocaine with 0.25% phenylephrine atomizer.  Examination of the base of tongue was performed first.  Attention was directed to any evidence of masses in the area or evidence of leukoplakia or candidal infection.  Attention was  directed to the epiglottis where its size and position was determined and its movement on phonation of the vowel  e .  The piriform sinuses were then inspected for any mass lesions or pooling of secretions.  Attention was then directed to the larynx. The vocal folds were inspected for infection or any areas of leukoplakia, for masses, polypoid degeneration, or hemorrhage.  Having done this, the arytenoids and vocal processes were inspected for erythema or evidence of granuloma formation.  The posterior commissure was then inspected for evidence of inflammatory changes in the mucosa and heaping up of mucosal tissue. The patient was then instructed to say the vowel  e .  Adduction of vocal folds to the midline was observed for any evidence of paresis or paralysis of the larynx or asymmetry in rotation of the larynx to the left or right. The patient was asked to breathe and the degree of abduction was noted bilaterally.  Subglottic view of the larynx was obtained for any additional mass lesions or mucosal changes.  Finally the post cricoid was examined for evidence of pooling of secretions, as well as the pharyngeal wall mucosa.   Anesthesia type: 0.25% phenylephrine     Findings:  Anatomic/physiological deviations: LNC, well healed left vocal fold no evidence of recurrence                Right vocal process: No restriction of mobility   Left vocal process: No restriction of mobility  Glottal gap: Glottal gap  Supraglottic structures: Normal  Hypopharynx: Normal      Estimated Blood Loss: minimal  Complications: None  Disposition: Patient tolerated the procedure well          Review of Relevant Clinical Data   I personally reviewed:  Notes:   Oncology 9/1/22  ASSESSMENT/PLAN:  Kt Rasmussen is a 61 year old male with the following issues:  1. Metastatic non-small (non-squamous) cell lung carcinoma with metastases to lymph nodes, bones, and bilateral lungs  2. 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 Chemotherapy-induced pancytopenia  --Lung NGS panel negative for mutations in BRAF, EGFR, ERBB2, IDH1, IDH2, KRAS, MET, NRAS, RET. PD-L1 expression negative (TPS <1%). Guardant 360 negative for targetable mutations.  --Kt started 3rd line metastatic therapy with every 3-week Taxotere at 20% dose reduction (60 mg/m2) on 8/3/2022 due to progressive disease.  He is tolerating this very well with minimal to no paresthesias.  --Continue Taxotere at 20% dose reduction due to past repeated issues with chemo-induced cytopenias. He may need additional dose reductions if his blood counts do not adequately recover. He would like to continue with Taxotere.  --Reviewed 8/24 labs which showed Hgb 9.4, WBC 3.7, platelets 137,000, normal liver enzymes.  --Will repeat PET scan in early 11/2022.  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 Dysphonia  6. Dysphagia  -Kt underwent excision of a left vocal cord SCC on 9/30/2019 and has persistent dysphonia as a result of the lesion and excision.  He also has dysphagia but this is stable and swallowing is manageable.  -Last scope by ENT 9/23/2021 showed no evidence for recurrence. He will see Dr. Wray again on 9/8.  7. Weight loss  --I had offered nutrition consult and he declined this.  --I recommended increasing his intake of higher caloric nutrient dense foods and beverages. His weight has now stabilized.  8. Dizziness and prior falls  --SW and guardian were previously notified of multiple falls.  --Prior brain MRI 2/14/2022 showed no brain metastases. However this was a suboptimal exam due to inability to administer contrast.  --He has not had recent falls and is using a wheelchair due to prior femoral fracture in 1/2022.    Radiology:   PET whole body 7/18/22  IMPRESSION:  Progressive disease    Labs:  Lab Results   Component Value Date    TSH 1.79 02/24/2022     Lab Results   Component  "Value Date     08/11/2022    CO2 29 08/11/2022    BUN 16 08/11/2022    PHOS 2.0 (L) 05/04/2016     Lab Results   Component Value Date    WBC 3.7 (L) 08/24/2022    HGB 9.4 (L) 08/24/2022    HCT 31.2 (L) 08/24/2022    MCV 97 08/24/2022     (L) 08/24/2022     Lab Results   Component Value Date    PTT 35 07/26/2017    INR 1.02 04/22/2021     No results found for: ELIZABET  No components found for: RHEUMATOIDFACTOR,  RF  Lab Results   Component Value Date    CRP 0.16 07/24/2003     No components found for: CKTOT, URICACID  No components found for: C3, C4, DSDNAAB, NDNAABIFA  No results found for: MPOAB    Patient reported Quality of Life (QOL) Measures   Patient Supplied Answers To VHI Questionnaire  Voice Handicap Index (VHI-10) 12/5/2019   My voice makes it difficult for people to hear me 0   People have difficulty understanding me in a noisy room 0   My voice difficulties restrict my personal and social life.  0   I feel left out of conversations because of my voice 0   My voice problem causes me to lose income 0   I feel as though I have to strain to produce voice 0   The clarity of my voice is unpredictable 0   My voice problem upsets me 0   My voice makes me feel handicapped 0   People ask, \"What's wrong with your voice?\" 0   VHI-10 0         Patient Supplied Answers To EAT Questionnaire  Eating Assessment Tool (EAT-10) 12/5/2019   My swallowing problem has caused me to lose weight 0   My swallowing problem interferes with my ability to go out for meals 0   Swallowing liquids takes extra effort 0   Swallowing solids takes extra effort 0   Swallowing pills takes extra effort 0   Swallowing is painful 0   The pleasure of eating is affected by my swallowing 0   When I swallow food sticks in my throat 0   I cough when I eat 0   Swallowing is stressful 0   EAT-10 0         Patient Supplied Answers To CSI Questionnaire  No flowsheet data found.      @[email or web address]@    Impression & Plan     IMPRESSION: Mr. Rasmussen " is a 62 year old male who is being seen for the followin. T1 Left vocal fold SCC  -  s/p endoscopic CO2 laser resection 19  - no evidence of recurrence   - now has metastatic nonsmall cell lung cancer undergoing treatment with Dr Sangita John  - stable laryngeal exam,  VIKRAM  - symptoms today 2022 are stable  - scope today 2022 shows well healed left vocal fold no evidence of recurrence  - VIKRAM and given active chemo, will stretch follow up to his 3rd year anniversary in September   - symptoms 22 are stable     - scope today shows well healed left vocal fold no evidence of recurrence    Plan  - observation      RETURN VISIT: February     Scribe Disclosure:  IKarin scribe to document services personally performed by Lizzy Wray MD at this visit, based upon the provider's statements to me. All documentation has been reviewed by the aforementioned provider prior to being entered into the official medical record.     Lizzy Wray MD    Laryngology    [facility]  Department of  Otolaryngology - Head and Neck Surgery  [facility] [place] [address]  Appointment line: [phone]  Fax: [phone]  [email or web address]

## 2022-09-08 ENCOUNTER — OFFICE VISIT (OUTPATIENT)
Dept: OTOLARYNGOLOGY | Facility: CLINIC | Age: 62
End: 2022-09-08
Payer: MEDICARE

## 2022-09-08 VITALS
SYSTOLIC BLOOD PRESSURE: 126 MMHG | DIASTOLIC BLOOD PRESSURE: 78 MMHG | BODY MASS INDEX: 20.58 KG/M2 | WEIGHT: 169 LBS | HEART RATE: 76 BPM | HEIGHT: 76 IN

## 2022-09-08 DIAGNOSIS — R49.0 DYSPHONIA: Primary | ICD-10-CM

## 2022-09-08 PROCEDURE — 99207 PR NO CHARGE LOS: CPT | Performed by: OTOLARYNGOLOGY

## 2022-09-08 PROCEDURE — 31575 DIAGNOSTIC LARYNGOSCOPY: CPT | Performed by: OTOLARYNGOLOGY

## 2022-09-08 ASSESSMENT — PAIN SCALES - GENERAL: PAINLEVEL: NO PAIN (0)

## 2022-09-08 NOTE — PATIENT INSTRUCTIONS
1.  You were seen in the ENT Clinic today by . If you have any questions or concerns after your appointment, please call [phone]. Press option #1 for scheduling related needs. Press option #3 for Nurse advice.    2. Plan is to return to clinic in February      Kay Sommers LPN  [phone]  [facility] Otolaryngology

## 2022-09-08 NOTE — LETTER
2022       RE: Kt Rasmussen  [id number] [address]  [facility] [address]     Dear Colleague,    Thank you for referring your patient, Kt Rasmussen, to the [facility] E[facility] NOSE AND [facility] [place] [facility]. Please see a copy of my visit note below.        [facility]   at the [facility]   Otolaryngology Clinic     Patient: Kt Rasmussen    MRN: [id number]    : 1960    Age/Gender: 62 year old male  Date of Service: 2022  Rendering Provider:   Lizzy Wray MD     Chief Complaint   T1 left TVF SCC s/p resection 19  Interval History   HISTORY OF PRESENT ILLNESS: Mr. Rasmussen is a 61 year old male is being followed for history of left TVF SCC. he was initially seen on 19. Please refer to this note for full history.      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 he reports:  - doing well     PAST MEDICAL HISTORY:   Past Medical History:   Diagnosis Date     Alcohol abuse, unspecified      Cerebral infarction (H)     , right side residual and aphasia     Dyslipidemia      GERD (gastroesophageal reflux disease)      Lung cancer (H)      Unspecified essential hypertension        PAST SURGICAL HISTORY:   Past Surgical History:   Procedure Laterality Date     BRONCHOSCOPY FLEXIBLE AND RIGID N/A 2016    Procedure: BRONCHOSCOPY FLEXIBLE AND RIGID;  Surgeon: Tony Talbot MD;  Location: [facility] OR     ESOPHAGOSCOPY FLEXIBLE N/A 2019    Procedure: flexible esophagoscopy;  Surgeon: Lizzy Johnson MD;  Location: [facility] OR     INJECT STEROID (LOCATION) N/A 2019    Procedure: steroid injection;  Surgeon: Lizzy Johnson MD;  Location: [facility] OR     IR CHEST PORT PLACEMENT > 5 YRS OF AGE  2021     IR CHEST PORT PLACEMENT > 5 YRS OF AGE  2022     IR PORT CHECK RIGHT  2022      IR PORT REMOVAL RIGHT  4/11/2022     LASER CO2 LARYNGOSCOPY N/A 9/30/2019    Procedure: Microdirect laryngoscopy with excision of laryngeal mass, CO2 laser;  Surgeon: Lizzy Johnson MD;  Location:  OR     [facility] NONSPECIFIC PROCEDURE      R tympanoplasty       CURRENT MEDICATIONS:   Current Outpatient Medications:      atorvastatin (LIPITOR) 40 MG tablet, Take 40 mg by mouth daily, Disp: , Rfl:      dexamethasone (DECADRON) 4 MG tablet, Take 2 tablets (8 mg) by mouth 2 times daily (with meals) for 3 doses Start evening of Docetaxel infusion and continue for a total of 3 doses., Disp: 6 tablet, Rfl: 7     dexamethasone (DECADRON) 4 MG tablet, Take 1 tablet (4 mg) by mouth 2 times daily (with meals) for 3 days Start one day prior to Pemetrexed (Alimta), continue on the day treatment, and the day following Pemetrexed., Disp: 6 tablet, Rfl: 3     folic acid (FOLVITE) 1 MG tablet, Take 1 mg by mouth daily, Disp: , Rfl:      prochlorperazine (COMPAZINE) 10 MG tablet, Take 1 tablet (10 mg) by mouth every 6 hours as needed for nausea or vomiting, Disp: 30 tablet, Rfl: 2  No current facility-administered medications for this visit.    Facility-Administered Medications Ordered in Other Visits:      heparin 100 UNIT/ML injection 5 mL, 5 mL, Intracatheter, Once PRN, Sangita John MD, 5 mL at 08/10/21 1346     heparin lock flush 10 UNIT/ML injection 5 mL, 5 mL, Intracatheter, Q1H PRN, Sangita John MD     sodium chloride (PF) 0.9% PF flush 3-20 mL, 3-20 mL, Intracatheter, Q1H PRN, Sangita John MD, 20 mL at 08/10/21 1346    ALLERGIES: No known drug allergies    SOCIAL HISTORY:    Social History     Socioeconomic History     Marital status: Single     Spouse name: Not on file     Number of children: Not on file     Years of education: Not on file     Highest education level: Not on file   Occupational History     Not on file   Tobacco Use     Smoking status: Former Smoker     Packs/day: 1.00     Types: Cigarettes      Quit date: 2009     Years since quittin.9     Smokeless tobacco: Never Used   Substance and Sexual Activity     Alcohol use: Not Currently     Drug use: No     Sexual activity: Not on file   Other Topics Concern     Parent/sibling w/ CABG, MI or angioplasty before 65F 55M? Not Asked   Social History Narrative     Not on file     Social Determinants of Health     Financial Resource Strain: Not on file   Food Insecurity: Not on file   Transportation Needs: Not on file   Physical Activity: Not on file   Stress: Not on file   Social Connections: Not on file   Intimate Partner Violence: Not At Risk     Fear of Current or Ex-Partner: No     Emotionally Abused: No     Physically Abused: No     Sexually Abused: No   Housing Stability: Not on file         FAMILY HISTORY:   Family History   Problem Relation Age of Onset     Cancer Father       Non-contributory for problems with anesthesia    REVIEW OF SYSTEMS:   The patient was asked a 14 point review of systems regarding constitutional symptoms, eye symptoms, ears, nose, mouth, throat symptoms, cardiovascular symptoms, respiratory symptoms, gastrointestinal symptoms, genitourinary symptoms, musculoskeletal symptoms, integumentary symptoms, neurological symptoms, psychiatric symptoms, endocrine symptoms, hematologic/lymphatic symptoms, and allergic/ immunologic symptoms.   The pertinent factors have been included in the HPI and below.  Patient Supplied Answers to Review of Systems  No flowsheet data found.    Physical Examination   The patient underwent a physical examination as described below. The pertinent positive and negative findings are summarized after the description of the examination.  Constitutional: The patient's developmental and nutritional status was assessed. The patient's voice quality was assessed.  Head and Face: The head and face were inspected for deformities. The sinuses were palpated. The salivary glands were palpated. Facial muscle strength  was assessed bilaterally.  Eyes: Extraocular movements and primary gaze alignment were assessed.  Ears, Nose, Mouth and Throat: The ears and nose were examined for deformities. The nasal septum, mucosa, and turbinates were inspected by anterior rhinoscopy. The lips, teeth, and gums were examined for abnormalities. The oral mucosa, tongue, palate, tonsils, lateral and posterior pharynx were inspected for the presence of asymmetry or mucosal lesions.    Neck: The tracheal position was noted, and the neck mass palpated to determine if there were any asymmetries, abnormal neck masses, thyromegally, or thyroid nodules.  Respiratory: The nature of the breathing and chest expansion/symmetry was observed.  Cardiovascular: The patient was examined to determine the presence of any edema or jugular venous distension.  Abdomen: The contour of the abdomen was noted.  Lymphatic: The patient was examined for infraclavicular lymphadenopathy.  Musculoskeletal: The patient was inspected for the presence of skeletal deformities.  Extremities: The extremities were examined for any clubbing or cyanosis.  Skin: The skin was examined for inflammatory or neoplastic conditions.  Neurologic: The patient's orientation, mood, and affect were noted. The cranial nerve  functions were examined.  Other pertinent positive and negative findings on physical examination:   OC/OP: no lesions, uvula midline, soft palate elevates symmetrically   Neck: no lesions, no TH tenderness to palpation    All other physical examination findings were within normal limits and noncontributory.    Procedures   Flexible laryngoscopy (CPT 34861)        Pre-procedure diagnosis: dysphonia  Post-procedure diagnosis: same as above  Indication for procedure: Mr. Rasmussen is a 61 year old male with see above  Procedure(s): Fiberoptic Laryngoscopy     Details of Procedure: After informed consent was obtained, the patient was seated in the examination chair.  The areas of the  nasopharynx as well as the hypopharynx were anesthetized with topical 4% lidocaine with 0.25% phenylephrine atomizer.  Examination of the base of tongue was performed first.  Attention was directed to any evidence of masses in the area or evidence of leukoplakia or candidal infection.  Attention was directed to the epiglottis where its size and position was determined and its movement on phonation of the vowel  e .  The piriform sinuses were then inspected for any mass lesions or pooling of secretions.  Attention was then directed to the larynx. The vocal folds were inspected for infection or any areas of leukoplakia, for masses, polypoid degeneration, or hemorrhage.  Having done this, the arytenoids and vocal processes were inspected for erythema or evidence of granuloma formation.  The posterior commissure was then inspected for evidence of inflammatory changes in the mucosa and heaping up of mucosal tissue. The patient was then instructed to say the vowel  e .  Adduction of vocal folds to the midline was observed for any evidence of paresis or paralysis of the larynx or asymmetry in rotation of the larynx to the left or right. The patient was asked to breathe and the degree of abduction was noted bilaterally.  Subglottic view of the larynx was obtained for any additional mass lesions or mucosal changes.  Finally the post cricoid was examined for evidence of pooling of secretions, as well as the pharyngeal wall mucosa.   Anesthesia type: 0.25% phenylephrine     Findings:  Anatomic/physiological deviations: LNC, well healed left vocal fold no evidence of recurrence                Right vocal process: No restriction of mobility   Left vocal process: No restriction of mobility  Glottal gap: Glottal gap  Supraglottic structures: Normal  Hypopharynx: Normal      Estimated Blood Loss: minimal  Complications: None  Disposition: Patient tolerated the procedure well          Review of Relevant Clinical Data   I personally  reviewed:  Notes:   Oncology 9/1/22  ASSESSMENT/PLAN:  Kt Rasmussen is a 61 year old male with the following issues:  1. Metastatic non-small (non-squamous) cell lung carcinoma with metastases to lymph nodes, bones, and bilateral lungs  2. 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 Chemotherapy-induced pancytopenia  --Lung NGS panel negative for mutations in BRAF, EGFR, ERBB2, IDH1, IDH2, KRAS, MET, NRAS, RET. PD-L1 expression negative (TPS <1%). Guardant 360 negative for targetable mutations.  --Kt started 3rd line metastatic therapy with every 3-week Taxotere at 20% dose reduction (60 mg/m2) on 8/3/2022 due to progressive disease.  He is tolerating this very well with minimal to no paresthesias.  --Continue Taxotere at 20% dose reduction due to past repeated issues with chemo-induced cytopenias. He may need additional dose reductions if his blood counts do not adequately recover. He would like to continue with Taxotere.  --Reviewed 8/24 labs which showed Hgb 9.4, WBC 3.7, platelets 137,000, normal liver enzymes.  --Will repeat PET scan in early 11/2022.  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 Dysphonia  6. Dysphagia  -Kt underwent excision of a left vocal cord SCC on 9/30/2019 and has persistent dysphonia as a result of the lesion and excision.  He also has dysphagia but this is stable and swallowing is manageable.  -Last scope by ENT 9/23/2021 showed no evidence for recurrence. He will see Dr. Wray again on 9/8.  7. Weight loss  --I had offered nutrition consult and he declined this.  --I recommended increasing his intake of higher caloric nutrient dense foods and beverages. His weight has now stabilized.  8. Dizziness and prior falls  --SW and guardian were previously notified of multiple falls.  --Prior brain MRI 2/14/2022 showed no brain metastases. However this was a suboptimal exam due to  "inability to administer contrast.  --He has not had recent falls and is using a wheelchair due to prior femoral fracture in 1/2022.    Radiology:   PET whole body 7/18/22  IMPRESSION:  Progressive disease    Labs:  Lab Results   Component Value Date    TSH 1.79 02/24/2022     Lab Results   Component Value Date     08/11/2022    CO2 29 08/11/2022    BUN 16 08/11/2022    PHOS 2.0 (L) 05/04/2016     Lab Results   Component Value Date    WBC 3.7 (L) 08/24/2022    HGB 9.4 (L) 08/24/2022    HCT 31.2 (L) 08/24/2022    MCV 97 08/24/2022     (L) 08/24/2022     Lab Results   Component Value Date    PTT 35 07/26/2017    INR 1.02 04/22/2021     No results found for: ELIZABET  No components found for: RHEUMATOIDFACTOR,  RF  Lab Results   Component Value Date    CRP 0.16 07/24/2003     No components found for: CKTOT, URICACID  No components found for: C3, C4, DSDNAAB, NDNAABIFA  No results found for: MPOAB    Patient reported Quality of Life (QOL) Measures   Patient Supplied Answers To VHI Questionnaire  Voice Handicap Index (VHI-10) 12/5/2019   My voice makes it difficult for people to hear me 0   People have difficulty understanding me in a noisy room 0   My voice difficulties restrict my personal and social life.  0   I feel left out of conversations because of my voice 0   My voice problem causes me to lose income 0   I feel as though I have to strain to produce voice 0   The clarity of my voice is unpredictable 0   My voice problem upsets me 0   My voice makes me feel handicapped 0   People ask, \"What's wrong with your voice?\" 0   VHI-10 0         Patient Supplied Answers To EAT Questionnaire  Eating Assessment Tool (EAT-10) 12/5/2019   My swallowing problem has caused me to lose weight 0   My swallowing problem interferes with my ability to go out for meals 0   Swallowing liquids takes extra effort 0   Swallowing solids takes extra effort 0   Swallowing pills takes extra effort 0   Swallowing is painful 0   The " pleasure of eating is affected by my swallowing 0   When I swallow food sticks in my throat 0   I cough when I eat 0   Swallowing is stressful 0   EAT-10 0         Patient Supplied Answers To CSI Questionnaire  No flowsheet data found.      @[email or web address]@    Impression & Plan     IMPRESSION: Mr. Rasmussen is a 62 year old male who is being seen for the followin. T1 Left vocal fold SCC  -  s/p endoscopic CO2 laser resection 19  - no evidence of recurrence   - now has metastatic nonsmall cell lung cancer undergoing treatment with Dr Sangita John  - stable laryngeal exam,  VIKRAM  - symptoms today 2022 are stable  - scope today 2022 shows well healed left vocal fold no evidence of recurrence  - VIKRAM and given active chemo, will stretch follow up to his 3rd year anniversary in September   - symptoms 22 are stable     - scope today shows well healed left vocal fold no evidence of recurrence    Plan  - observation      RETURN VISIT: February     Scribe Disclosure:  IKarin a scribe to document services personally performed by Lizzy Wary MD at this visit, based upon the provider's statements to me. All documentation has been reviewed by the aforementioned provider prior to being entered into the official medical record.     Lizzy Wray MD    Laryngology    [facility]  Department of  Otolaryngology - Head and Neck Surgery  [place] [address]  Appointment line: [phone]  Fax: [phone]  [email or web address]

## 2022-09-11 RX ORDER — DIPHENHYDRAMINE HYDROCHLORIDE 50 MG/ML
50 INJECTION INTRAMUSCULAR; INTRAVENOUS
Status: CANCELLED
Start: 2022-09-14

## 2022-09-11 RX ORDER — ALBUTEROL SULFATE 0.83 MG/ML
2.5 SOLUTION RESPIRATORY (INHALATION)
Status: CANCELLED | OUTPATIENT
Start: 2022-09-14

## 2022-09-11 RX ORDER — HEPARIN SODIUM,PORCINE 10 UNIT/ML
5 VIAL (ML) INTRAVENOUS
Status: CANCELLED | OUTPATIENT
Start: 2022-09-14

## 2022-09-11 RX ORDER — HEPARIN SODIUM (PORCINE) LOCK FLUSH IV SOLN 100 UNIT/ML 100 UNIT/ML
5 SOLUTION INTRAVENOUS
Status: CANCELLED | OUTPATIENT
Start: 2022-09-14

## 2022-09-11 RX ORDER — ALBUTEROL SULFATE 90 UG/1
1-2 AEROSOL, METERED RESPIRATORY (INHALATION)
Status: CANCELLED
Start: 2022-09-14

## 2022-09-11 RX ORDER — LORAZEPAM 2 MG/ML
0.5 INJECTION INTRAMUSCULAR EVERY 4 HOURS PRN
Status: CANCELLED | OUTPATIENT
Start: 2022-09-14

## 2022-09-11 RX ORDER — MEPERIDINE HYDROCHLORIDE 25 MG/ML
25 INJECTION INTRAMUSCULAR; INTRAVENOUS; SUBCUTANEOUS EVERY 30 MIN PRN
Status: CANCELLED | OUTPATIENT
Start: 2022-09-14

## 2022-09-11 RX ORDER — EPINEPHRINE 1 MG/ML
0.3 INJECTION, SOLUTION, CONCENTRATE INTRAVENOUS EVERY 5 MIN PRN
Status: CANCELLED | OUTPATIENT
Start: 2022-09-14

## 2022-09-12 ENCOUNTER — TELEPHONE (OUTPATIENT)
Dept: ONCOLOGY | Facility: CLINIC | Age: 62
End: 2022-09-12

## 2022-09-12 NOTE — TELEPHONE ENCOUNTER
[employer] (guardianship firm) called clinic. Sky states patient has transportation arranged for all future chemotherapy appointments right now.  Patient is somewhat resistant to the new transportion service. Staff at [facility] and guardian are aware and are working with patient.

## 2022-09-12 NOTE — TELEPHONE ENCOUNTER
Left detailed voicemail for Sky Arce regarding need for transportation on 9/14/22. Patient states he does not have transportation,  has asked Sky to call back to confirm transportation arrangements.

## 2022-09-14 ENCOUNTER — INFUSION THERAPY VISIT (OUTPATIENT)
Dept: INFUSION THERAPY | Facility: CLINIC | Age: 62
End: 2022-09-14
Attending: INTERNAL MEDICINE
Payer: MEDICARE

## 2022-09-14 VITALS
DIASTOLIC BLOOD PRESSURE: 67 MMHG | OXYGEN SATURATION: 98 % | RESPIRATION RATE: 18 BRPM | SYSTOLIC BLOOD PRESSURE: 105 MMHG | TEMPERATURE: 98.1 F | HEART RATE: 72 BPM

## 2022-09-14 DIAGNOSIS — Z51.11 ENCOUNTER FOR ANTINEOPLASTIC CHEMOTHERAPY: ICD-10-CM

## 2022-09-14 DIAGNOSIS — C34.90 NON-SMALL CELL LUNG CANCER METASTATIC TO BONE (H): ICD-10-CM

## 2022-09-14 DIAGNOSIS — T45.1X5A CHEMOTHERAPY-INDUCED NEUTROPENIA (H): ICD-10-CM

## 2022-09-14 DIAGNOSIS — D70.1 CHEMOTHERAPY-INDUCED NEUTROPENIA (H): Primary | ICD-10-CM

## 2022-09-14 DIAGNOSIS — D70.1 CHEMOTHERAPY-INDUCED NEUTROPENIA (H): ICD-10-CM

## 2022-09-14 DIAGNOSIS — Z51.11 ENCOUNTER FOR ANTINEOPLASTIC CHEMOTHERAPY: Primary | ICD-10-CM

## 2022-09-14 DIAGNOSIS — C79.51 NON-SMALL CELL LUNG CANCER METASTATIC TO BONE (H): ICD-10-CM

## 2022-09-14 DIAGNOSIS — T45.1X5A CHEMOTHERAPY-INDUCED NEUTROPENIA (H): Primary | ICD-10-CM

## 2022-09-14 LAB
ALBUMIN SERPL-MCNC: 3.5 G/DL (ref 3.4–5)
ALP SERPL-CCNC: 91 U/L (ref 40–150)
ALT SERPL W P-5'-P-CCNC: 26 U/L (ref 0–70)
AST SERPL W P-5'-P-CCNC: 21 U/L (ref 0–45)
BASOPHILS # BLD AUTO: 0 10E3/UL (ref 0–0.2)
BASOPHILS NFR BLD AUTO: 0 %
BILIRUB DIRECT SERPL-MCNC: 0.2 MG/DL (ref 0–0.2)
BILIRUB SERPL-MCNC: 0.9 MG/DL (ref 0.2–1.3)
EOSINOPHIL # BLD AUTO: 0.1 10E3/UL (ref 0–0.7)
EOSINOPHIL NFR BLD AUTO: 1 %
ERYTHROCYTE [DISTWIDTH] IN BLOOD BY AUTOMATED COUNT: 17.1 % (ref 10–15)
HCT VFR BLD AUTO: 30.6 % (ref 40–53)
HGB BLD-MCNC: 9.3 G/DL (ref 13.3–17.7)
IMM GRANULOCYTES # BLD: 0 10E3/UL
IMM GRANULOCYTES NFR BLD: 0 %
LYMPHOCYTES # BLD AUTO: 0.8 10E3/UL (ref 0.8–5.3)
LYMPHOCYTES NFR BLD AUTO: 13 %
MCH RBC QN AUTO: 29.2 PG (ref 26.5–33)
MCHC RBC AUTO-ENTMCNC: 30.4 G/DL (ref 31.5–36.5)
MCV RBC AUTO: 96 FL (ref 78–100)
MONOCYTES # BLD AUTO: 0.7 10E3/UL (ref 0–1.3)
MONOCYTES NFR BLD AUTO: 11 %
NEUTROPHILS # BLD AUTO: 4.6 10E3/UL (ref 1.6–8.3)
NEUTROPHILS NFR BLD AUTO: 75 %
NRBC # BLD AUTO: 0 10E3/UL
NRBC BLD AUTO-RTO: 0 /100
PLATELET # BLD AUTO: 135 10E3/UL (ref 150–450)
PROT SERPL-MCNC: 6.7 G/DL (ref 6.8–8.8)
RBC # BLD AUTO: 3.18 10E6/UL (ref 4.4–5.9)
WBC # BLD AUTO: 6.2 10E3/UL (ref 4–11)

## 2022-09-14 PROCEDURE — 85041 AUTOMATED RBC COUNT: CPT | Performed by: INTERNAL MEDICINE

## 2022-09-14 PROCEDURE — 258N000003 HC RX IP 258 OP 636: Performed by: INTERNAL MEDICINE

## 2022-09-14 PROCEDURE — 96367 TX/PROPH/DG ADDL SEQ IV INF: CPT

## 2022-09-14 PROCEDURE — 96377 APPLICATON ON-BODY INJECTOR: CPT | Mod: XS

## 2022-09-14 PROCEDURE — 96372 THER/PROPH/DIAG INJ SC/IM: CPT | Performed by: INTERNAL MEDICINE

## 2022-09-14 PROCEDURE — 250N000011 HC RX IP 250 OP 636: Performed by: INTERNAL MEDICINE

## 2022-09-14 PROCEDURE — 80076 HEPATIC FUNCTION PANEL: CPT | Performed by: INTERNAL MEDICINE

## 2022-09-14 PROCEDURE — 96413 CHEMO IV INFUSION 1 HR: CPT

## 2022-09-14 RX ORDER — HEPARIN SODIUM (PORCINE) LOCK FLUSH IV SOLN 100 UNIT/ML 100 UNIT/ML
5 SOLUTION INTRAVENOUS
Status: DISCONTINUED | OUTPATIENT
Start: 2022-09-14 | End: 2022-09-14 | Stop reason: HOSPADM

## 2022-09-14 RX ADMIN — SODIUM CHLORIDE 250 ML: 9 INJECTION, SOLUTION INTRAVENOUS at 08:30

## 2022-09-14 RX ADMIN — PEGFILGRASTIM 6 MG: KIT SUBCUTANEOUS at 10:05

## 2022-09-14 RX ADMIN — SODIUM CHLORIDE 122 MG: 9 INJECTION, SOLUTION INTRAVENOUS at 08:43

## 2022-09-14 RX ADMIN — DEXAMETHASONE SODIUM PHOSPHATE: 10 INJECTION, SOLUTION INTRAMUSCULAR; INTRAVENOUS at 08:18

## 2022-09-14 NOTE — PROGRESS NOTES
Infusion Nursing Note:  Kt Rasmussen presents today for Taxotere.    Patient seen by provider today: Yes: Dr. Martinez   present during visit today: Not Applicable.    Note: Labs reviewed.  Dr. Martinez reviewed treatment plan.    Intravenous Access:  Implanted Port.    Treatment Conditions:  Results reviewed, labs MET treatment parameters, ok to proceed with treatment.      Post Infusion Assessment:  Patient tolerated infusion without incident.     Discharge Plan:   Patient discharged in stable condition accompanied by: group home attendant.      Adore Moreno RN

## 2022-09-14 NOTE — PROGRESS NOTES
Nursing Note:  Kt Rasmussen presents today for labs, infusion, provider visit.    Patient seen by provider today: Yes:    present during visit today: Not Applicable.    Note: Will have infusion then go to [facility].    Intravenous Access:  Lab draw site right chest, Needle type 20 power port, Gauge 3/4.  Labs drawn without difficulty.    Discharge Plan:   Patient was sent to [facility] for infusion appointment.    Saundra Sweeney RN

## 2022-10-02 RX ORDER — LORAZEPAM 2 MG/ML
0.5 INJECTION INTRAMUSCULAR EVERY 4 HOURS PRN
Status: CANCELLED | OUTPATIENT
Start: 2022-10-05

## 2022-10-02 RX ORDER — ALBUTEROL SULFATE 0.83 MG/ML
2.5 SOLUTION RESPIRATORY (INHALATION)
Status: CANCELLED | OUTPATIENT
Start: 2022-10-05

## 2022-10-02 RX ORDER — EPINEPHRINE 1 MG/ML
0.3 INJECTION, SOLUTION, CONCENTRATE INTRAVENOUS EVERY 5 MIN PRN
Status: CANCELLED | OUTPATIENT
Start: 2022-10-05

## 2022-10-02 RX ORDER — HEPARIN SODIUM (PORCINE) LOCK FLUSH IV SOLN 100 UNIT/ML 100 UNIT/ML
5 SOLUTION INTRAVENOUS
Status: CANCELLED | OUTPATIENT
Start: 2022-10-05

## 2022-10-02 RX ORDER — DIPHENHYDRAMINE HYDROCHLORIDE 50 MG/ML
50 INJECTION INTRAMUSCULAR; INTRAVENOUS
Status: CANCELLED
Start: 2022-10-05

## 2022-10-02 RX ORDER — ALBUTEROL SULFATE 90 UG/1
1-2 AEROSOL, METERED RESPIRATORY (INHALATION)
Status: CANCELLED
Start: 2022-10-05

## 2022-10-02 RX ORDER — HEPARIN SODIUM,PORCINE 10 UNIT/ML
5 VIAL (ML) INTRAVENOUS
Status: CANCELLED | OUTPATIENT
Start: 2022-10-05

## 2022-10-02 RX ORDER — MEPERIDINE HYDROCHLORIDE 25 MG/ML
25 INJECTION INTRAMUSCULAR; INTRAVENOUS; SUBCUTANEOUS EVERY 30 MIN PRN
Status: CANCELLED | OUTPATIENT
Start: 2022-10-05

## 2022-10-05 ENCOUNTER — ONCOLOGY VISIT (OUTPATIENT)
Dept: ONCOLOGY | Facility: CLINIC | Age: 62
End: 2022-10-05
Attending: INTERNAL MEDICINE
Payer: MEDICARE

## 2022-10-05 ENCOUNTER — INFUSION THERAPY VISIT (OUTPATIENT)
Dept: INFUSION THERAPY | Facility: CLINIC | Age: 62
End: 2022-10-05
Attending: INTERNAL MEDICINE
Payer: MEDICARE

## 2022-10-05 VITALS
DIASTOLIC BLOOD PRESSURE: 71 MMHG | OXYGEN SATURATION: 96 % | SYSTOLIC BLOOD PRESSURE: 107 MMHG | TEMPERATURE: 97.7 F | RESPIRATION RATE: 16 BRPM | HEART RATE: 68 BPM

## 2022-10-05 DIAGNOSIS — D70.1 CHEMOTHERAPY-INDUCED NEUTROPENIA (H): Primary | ICD-10-CM

## 2022-10-05 DIAGNOSIS — C34.90 NON-SMALL CELL LUNG CANCER METASTATIC TO BONE (H): ICD-10-CM

## 2022-10-05 DIAGNOSIS — Z51.11 ENCOUNTER FOR ANTINEOPLASTIC CHEMOTHERAPY: Primary | ICD-10-CM

## 2022-10-05 DIAGNOSIS — T45.1X5A CHEMOTHERAPY-INDUCED NEUTROPENIA (H): Primary | ICD-10-CM

## 2022-10-05 DIAGNOSIS — Z51.11 ENCOUNTER FOR ANTINEOPLASTIC CHEMOTHERAPY: ICD-10-CM

## 2022-10-05 DIAGNOSIS — T45.1X5A CHEMOTHERAPY-INDUCED NEUTROPENIA (H): ICD-10-CM

## 2022-10-05 DIAGNOSIS — D70.1 CHEMOTHERAPY-INDUCED NEUTROPENIA (H): ICD-10-CM

## 2022-10-05 DIAGNOSIS — C79.51 NON-SMALL CELL LUNG CANCER METASTATIC TO BONE (H): ICD-10-CM

## 2022-10-05 DIAGNOSIS — C79.51 NON-SMALL CELL LUNG CANCER METASTATIC TO BONE (H): Primary | ICD-10-CM

## 2022-10-05 DIAGNOSIS — C34.90 NON-SMALL CELL LUNG CANCER METASTATIC TO BONE (H): Primary | ICD-10-CM

## 2022-10-05 LAB
ALBUMIN SERPL-MCNC: 3.4 G/DL (ref 3.4–5)
ALP SERPL-CCNC: 95 U/L (ref 40–150)
ALT SERPL W P-5'-P-CCNC: 24 U/L (ref 0–70)
AST SERPL W P-5'-P-CCNC: 23 U/L (ref 0–45)
BASOPHILS # BLD AUTO: 0 10E3/UL (ref 0–0.2)
BASOPHILS NFR BLD AUTO: 1 %
BILIRUB DIRECT SERPL-MCNC: 0.2 MG/DL (ref 0–0.2)
BILIRUB SERPL-MCNC: 0.7 MG/DL (ref 0.2–1.3)
EOSINOPHIL # BLD AUTO: 0.1 10E3/UL (ref 0–0.7)
EOSINOPHIL NFR BLD AUTO: 2 %
ERYTHROCYTE [DISTWIDTH] IN BLOOD BY AUTOMATED COUNT: 17.2 % (ref 10–15)
HCT VFR BLD AUTO: 32.3 % (ref 40–53)
HGB BLD-MCNC: 9.7 G/DL (ref 13.3–17.7)
IMM GRANULOCYTES # BLD: 0 10E3/UL
IMM GRANULOCYTES NFR BLD: 1 %
LYMPHOCYTES # BLD AUTO: 0.8 10E3/UL (ref 0.8–5.3)
LYMPHOCYTES NFR BLD AUTO: 18 %
MCH RBC QN AUTO: 27.2 PG (ref 26.5–33)
MCHC RBC AUTO-ENTMCNC: 30 G/DL (ref 31.5–36.5)
MCV RBC AUTO: 91 FL (ref 78–100)
MONOCYTES # BLD AUTO: 0.4 10E3/UL (ref 0–1.3)
MONOCYTES NFR BLD AUTO: 9 %
NEUTROPHILS # BLD AUTO: 3.1 10E3/UL (ref 1.6–8.3)
NEUTROPHILS NFR BLD AUTO: 69 %
NRBC # BLD AUTO: 0 10E3/UL
NRBC BLD AUTO-RTO: 0 /100
PLATELET # BLD AUTO: 143 10E3/UL (ref 150–450)
PROT SERPL-MCNC: 6.8 G/DL (ref 6.8–8.8)
RBC # BLD AUTO: 3.57 10E6/UL (ref 4.4–5.9)
WBC # BLD AUTO: 4.4 10E3/UL (ref 4–11)

## 2022-10-05 PROCEDURE — 258N000003 HC RX IP 258 OP 636: Performed by: INTERNAL MEDICINE

## 2022-10-05 PROCEDURE — 96413 CHEMO IV INFUSION 1 HR: CPT

## 2022-10-05 PROCEDURE — 96377 APPLICATON ON-BODY INJECTOR: CPT | Mod: XS | Performed by: INTERNAL MEDICINE

## 2022-10-05 PROCEDURE — 96372 THER/PROPH/DIAG INJ SC/IM: CPT | Performed by: INTERNAL MEDICINE

## 2022-10-05 PROCEDURE — 80076 HEPATIC FUNCTION PANEL: CPT | Performed by: INTERNAL MEDICINE

## 2022-10-05 PROCEDURE — 96375 TX/PRO/DX INJ NEW DRUG ADDON: CPT

## 2022-10-05 PROCEDURE — 99214 OFFICE O/P EST MOD 30 MIN: CPT | Performed by: NURSE PRACTITIONER

## 2022-10-05 PROCEDURE — 85004 AUTOMATED DIFF WBC COUNT: CPT | Performed by: INTERNAL MEDICINE

## 2022-10-05 PROCEDURE — 250N000011 HC RX IP 250 OP 636: Performed by: INTERNAL MEDICINE

## 2022-10-05 RX ORDER — HEPARIN SODIUM,PORCINE 10 UNIT/ML
5 VIAL (ML) INTRAVENOUS
Status: DISCONTINUED | OUTPATIENT
Start: 2022-10-05 | End: 2022-10-05 | Stop reason: HOSPADM

## 2022-10-05 RX ORDER — HEPARIN SODIUM (PORCINE) LOCK FLUSH IV SOLN 100 UNIT/ML 100 UNIT/ML
5 SOLUTION INTRAVENOUS
Status: DISCONTINUED | OUTPATIENT
Start: 2022-10-05 | End: 2022-10-05 | Stop reason: HOSPADM

## 2022-10-05 RX ADMIN — Medication 5 ML: at 10:45

## 2022-10-05 RX ADMIN — DEXAMETHASONE SODIUM PHOSPHATE: 10 INJECTION, SOLUTION INTRAMUSCULAR; INTRAVENOUS at 09:30

## 2022-10-05 RX ADMIN — PEGFILGRASTIM 6 MG: KIT SUBCUTANEOUS at 09:33

## 2022-10-05 RX ADMIN — SODIUM CHLORIDE 122 MG: 9 INJECTION, SOLUTION INTRAVENOUS at 09:45

## 2022-10-05 RX ADMIN — SODIUM CHLORIDE 250 ML: 9 INJECTION, SOLUTION INTRAVENOUS at 09:32

## 2022-10-05 ASSESSMENT — PAIN SCALES - GENERAL: PAINLEVEL: NO PAIN (0)

## 2022-10-05 NOTE — PROGRESS NOTES
Nursing Note:  Kt Rasmussen presents today for port labs for tx today.    Patient seen by provider today: Yes: Juan   present during visit today: Not Applicable.    Note: N/A.    Intravenous Access:  Labs drawn without difficulty.  Implanted Port.    Discharge Plan:   Patient was sent to [facility] for provider appointment.    Filipe Carr RN

## 2022-10-05 NOTE — PROGRESS NOTES
"Oncology Rooming Note    October 5, 2022 9:04 AM   Kt Rasmussen is a 62 year old male who presents for:    Chief Complaint   Patient presents with     Oncology Clinic Visit     Initial Vitals: /71   Pulse 68   Temp 97.7  F (36.5  C) (Oral)   Resp 16   SpO2 96%  Estimated body mass index is 20.84 kg/m  as calculated from the following:    Height as of 9/8/22: 1.918 m (6' 3.5\").    Weight as of 9/8/22: 76.7 kg (169 lb). There is no height or weight on file to calculate BSA.  No Pain (0) Comment: Data Unavailable   No LMP for male patient.  Allergies reviewed: Yes  Medications reviewed: Yes    Medications: Medication refills not needed today.  Pharmacy name entered into EPIC:    [facility] - [place], [place] - [address] [facility] [address] [id number] - [place] [address] AT [facility] [address]  [facility] - [address]    Clinical concerns: no       Shari J. Schoenberger, CMA              "

## 2022-10-05 NOTE — PATIENT INSTRUCTIONS
Your On-body Neulasta Injector was applied to your left arm at 1035.  At approximately 1235 on 10/6, your On-body Injector will beep to let you know your dose delivery will begin in 2 minutes.  Your medication will be delivered over the next 45 minutes.  You can remove your Injector at 1:35pm.  Please make sure your Injector has a solid green light or has turned off prior to removing the device.  Please contact your provider at [phone] with questions or concerns.

## 2022-10-05 NOTE — LETTER
"    10/5/2022         RE: Kt Rasmussen  [address]  [facility] [address]        Dear Colleague,    Thank you for referring your patient, Kt Rasmussen, to the [facility]. Please see a copy of my visit note below.    Oncology Rooming Note    October 5, 2022 9:04 AM   Kt Rasmussen is a 62 year old male who presents for:    Chief Complaint   Patient presents with     Oncology Clinic Visit     Initial Vitals: /71   Pulse 68   Temp 97.7  F (36.5  C) (Oral)   Resp 16   SpO2 96%  Estimated body mass index is 20.84 kg/m  as calculated from the following:    Height as of 9/8/22: 1.918 m (6' 3.5\").    Weight as of 9/8/22: 76.7 kg (169 lb). There is no height or weight on file to calculate BSA.  No Pain (0) Comment: Data Unavailable   No LMP for male patient.  Allergies reviewed: Yes  Medications reviewed: Yes    Medications: Medication refills not needed today.  Pharmacy name entered into EPIC:    [facility] - [place], [place] - [address] [facility] [address] [id number] - [place] [address] [facility]  [facility] TERM [facility] - [address]    Clinical concerns: no       Shari J. Schoenberger, CMA                Oncology/Hematology Visit Note  Oct 5, 2022    Reason for Visit: follow up of metastatic non-small cell lung carcinoma  Metastatic to lymph nodes bones in bilateral lungs  Brain MRI negative for mets    Patient met with Dr. John who recommended treatment with palliative intent with paclitaxel carboplatin Avastin and atezolizumab-treatment started on April 27, 2021  -Due to thrombocytopenia carboplatin AUC reduced to 4 with cycle 2  Due to pancytopenia carboplatin discontinued with cycle 5    7/12/2021: PET/CT showed resolved mural nodules with increased cystic cavity in left lower lobe with no significant " FDG uptake, less likely metastases; no enlarged hypermetabolic mediastinal, hilar, or axillary lymph nodes, decreased metabolic activity of intraosseous lesion in spine and pelvis; no new bone lesions    Treated with paclitaxel, Avastin and atezolizumab.-Last treatment given in  12/22/2021-cycle 12    1/10/2022: PET/CT showed new foci of abnormal skeletal FGD uptake in the thoracic and lumbar spine. Mild interval increase in intensity of previously demonstrated hypermetabolic skeletal lesions involving the pelvis and lumbar spine. Findings are consistent with progressive osseous metastatic disease. Subsequently off chemo for 1 month due to poor performance status.  16. 1/25/2022: Patient fell and fractured his femur. This was surgically repaired and he was subsequently cared for at a rehab unit.  17. 2/14/2022: Brain MRI without contrast (contrast not given due to inability to obtain IV access) showed no brain metastases.    Met with Dr. Shoemaker-in Dr. John's absence  -Recommendation is to change therapy   04/21/2022 -palliative Carboplatin Alimta started   Due to cytopenia AUC reduced to 4 with cycle 2  Due to persistent pancytopenia and inability to tolerate treatment carboplatin discontinued with cycle 3-day 1  Patient was on monotherapy with Alimta    07/18/2022-PET CT scan shows progression of the disease  08/03-started Taxotere 60 mg/m2 every 3 weeks    Interval History:  Patient reports overall he has been tolerating treatment well.  Denies fever chills sweats cough shortness of breath chest pain nausea vomiting diarrhea abdominal pain bleeding denies neuropathy denies recent falls or injury    Review of Systems:  14 point ROS of systems including Constitutional, Eyes, Respiratory, Cardiovascular, Gastroenterology, Genitourinary, Integumentary, Muscularskeletal, Psychiatric were all negative except for pertinent positives noted in my HPI.    Physical Examination:  Physical Exam  HENT:      Right Ear: Tympanic  membrane normal.      Nose: Nose normal.      Mouth/Throat:      Mouth: Mucous membranes are moist.   Eyes:      Pupils: Pupils are equal, round, and reactive to light.   Cardiovascular:      Rate and Rhythm: Normal rate.      Pulses: Normal pulses.   Pulmonary:      Effort: Pulmonary effort is normal.   Abdominal:      General: Abdomen is flat.   Musculoskeletal:         General: Normal range of motion.      Cervical back: Normal range of motion.   Skin:     General: Skin is warm.   Neurological:      General: No focal deficit present.      Mental Status: He is alert.   Psychiatric:         Mood and Affect: Mood normal.           Laboratory Data:  CBC and CMP results reviewed    Assessment and Plan:  This is a 62-year-old male with    metastatic non-small cell lung cancer   Patient of Dr. John   Progressed on  different treatments as above recently was on monotherapy with Alimta  07/18/2022-PET CT scan reveals progression of disease  Patient with Dr. John who recommended changing treatment to Taxotere 60 mg/m2 every 3 weeks  -Patient reports he has been tolerating treatment well  -Labs reviewed unremarkable discussed okay to proceed with treatment  -Continue with Taxotere every 3 weeks  Patient is scheduled in November for PET scan and follow-up with Dr. 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 Although reviewed after completion, some words and grammatical errors may remain.            Again, thank you for allowing me to participate in the care of your patient.        Sincerely,        MADHAVI Moran CNP

## 2022-10-05 NOTE — PROGRESS NOTES
Infusion Nursing Note:  Kt Rasmussen presents today for taxotere, OnPro.    Patient seen by provider today: Yes: SCOOTER Rausch NP   present during visit today: Not Applicable.    Note: pt denies any changes in health or new concerns    Intravenous Access:  Implanted Port.    Treatment Conditions:  Lab Results   Component Value Date    HGB 9.7 (L) 10/05/2022    WBC 4.4 10/05/2022    ANEU 0.5 (L) 08/11/2022    ANEUTAUTO 3.1 10/05/2022     (L) 10/05/2022      Lab Results   Component Value Date     08/11/2022    POTASSIUM 3.5 08/11/2022    MAG 1.9 10/13/2016    CR 0.78 08/11/2022    BEVERLY 9.2 08/11/2022    BILITOTAL 0.7 10/05/2022    ALBUMIN 3.4 10/05/2022    ALT 24 10/05/2022    AST 23 10/05/2022     Results reviewed, labs MET treatment parameters, ok to proceed with treatment.    Post Infusion Assessment:  Patient tolerated infusion without incident.  Site patent and intact, free from redness, edema or discomfort.  No evidence of extravasations.  Access discontinued per protocol.     Discharge Plan:   Patient and/or family verbalized understanding of discharge instructions and all questions answered.  AVS to patient via [employer].  Patient will return 10/26 for next appointment.   Patient discharged in stable condition accompanied by: self.  Departure Mode: Wheelchair.      Arin Keith RN    ONPRO  Was placed on patient's: back of left arm.    Was placed at 1035 AM    Podpal used: Yes    ONPRO injector device Lot number: [id number]    Patient education included: what patient can expect after application, what colored lights mean on the device, when to remove device, when and where to call with questions or issues, all patients questions answered and that Neulasta administration will occur at 1235pm.    Patient tolerated administration well.

## 2022-10-05 NOTE — PROGRESS NOTES
Oncology/Hematology Visit Note  Oct 5, 2022    Reason for Visit: follow up of metastatic non-small cell lung carcinoma  Metastatic to lymph nodes bones in bilateral lungs  Brain MRI negative for mets    Patient met with Dr. John who recommended treatment with palliative intent with paclitaxel carboplatin Avastin and atezolizumab-treatment started on April 27, 2021  -Due to thrombocytopenia carboplatin AUC reduced to 4 with cycle 2  Due to pancytopenia carboplatin discontinued with cycle 5    7/12/2021: PET/CT showed resolved mural nodules with increased cystic cavity in left lower lobe with no significant FDG uptake, less likely metastases; no enlarged hypermetabolic mediastinal, hilar, or axillary lymph nodes, decreased metabolic activity of intraosseous lesion in spine and pelvis; no new bone lesions    Treated with paclitaxel, Avastin and atezolizumab.-Last treatment given in  12/22/2021-cycle 12    1/10/2022: PET/CT showed new foci of abnormal skeletal FGD uptake in the thoracic and lumbar spine. Mild interval increase in intensity of previously demonstrated hypermetabolic skeletal lesions involving the pelvis and lumbar spine. Findings are consistent with progressive osseous metastatic disease. Subsequently off chemo for 1 month due to poor performance status.  16. 1/25/2022: Patient fell and fractured his femur. This was surgically repaired and he was subsequently cared for at a rehab unit.  17. 2/14/2022: Brain MRI without contrast (contrast not given due to inability to obtain IV access) showed no brain metastases.    Met with Dr. Shoemaker-in Dr. John's absence  -Recommendation is to change therapy   04/21/2022 -palliative Carboplatin Alimta started   Due to cytopenia AUC reduced to 4 with cycle 2  Due to persistent pancytopenia and inability to tolerate treatment carboplatin discontinued with cycle 3-day 1  Patient was on monotherapy with Alimta    07/18/2022-PET CT scan shows progression of the  disease  08/03-started Taxotere 60 mg/m2 every 3 weeks    Interval History:  Patient reports overall he has been tolerating treatment well.  Denies fever chills sweats cough shortness of breath chest pain nausea vomiting diarrhea abdominal pain bleeding denies neuropathy denies recent falls or injury    Review of Systems:  14 point ROS of systems including Constitutional, Eyes, Respiratory, Cardiovascular, Gastroenterology, Genitourinary, Integumentary, Muscularskeletal, Psychiatric were all negative except for pertinent positives noted in my HPI.    Physical Examination:  Physical Exam  HENT:      Right Ear: Tympanic membrane normal.      Nose: Nose normal.      Mouth/Throat:      Mouth: Mucous membranes are moist.   Eyes:      Pupils: Pupils are equal, round, and reactive to light.   Cardiovascular:      Rate and Rhythm: Normal rate.      Pulses: Normal pulses.   Pulmonary:      Effort: Pulmonary effort is normal.   Abdominal:      General: Abdomen is flat.   Musculoskeletal:         General: Normal range of motion.      Cervical back: Normal range of motion.   Skin:     General: Skin is warm.   Neurological:      General: No focal deficit present.      Mental Status: He is alert.   Psychiatric:         Mood and Affect: Mood normal.           Laboratory Data:  CBC and CMP results reviewed    Assessment and Plan:  This is a 62-year-old male with    metastatic non-small cell lung cancer   Patient of Dr. John   Progressed on  different treatments as above recently was on monotherapy with Alimta  07/18/2022-PET CT scan reveals progression of disease  Patient with Dr. John who recommended changing treatment to Taxotere 60 mg/m2 every 3 weeks  -Patient reports he has been tolerating treatment well  -Labs reviewed unremarkable discussed okay to proceed with treatment  -Continue with Taxotere every 3 weeks  Patient is scheduled in November for PET scan and follow-up with Dr. 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 Although reviewed after completion, some words and grammatical errors may remain.

## 2022-10-11 ENCOUNTER — ASSISTED LIVING VISIT (OUTPATIENT)
Dept: GERIATRICS | Facility: CLINIC | Age: 62
End: 2022-10-11
Payer: MEDICARE

## 2022-10-11 DIAGNOSIS — Z86.73 HISTORY OF CVA (CEREBROVASCULAR ACCIDENT): ICD-10-CM

## 2022-10-11 DIAGNOSIS — E78.5 HYPERLIPIDEMIA LDL GOAL <100: ICD-10-CM

## 2022-10-11 DIAGNOSIS — C34.90 NON-SMALL CELL LUNG CANCER METASTATIC TO BONE (H): Primary | ICD-10-CM

## 2022-10-11 DIAGNOSIS — C79.51 NON-SMALL CELL LUNG CANCER METASTATIC TO BONE (H): Primary | ICD-10-CM

## 2022-10-11 DIAGNOSIS — C32.0 SQUAMOUS CELL CARCINOMA OF LEFT VOCAL CORD (H): ICD-10-CM

## 2022-10-11 DIAGNOSIS — Z86.2 HISTORY OF PANCYTOPENIA: ICD-10-CM

## 2022-10-11 NOTE — LETTER
10/11/2022        RE: Kt Rasmussen  [address]  [facility] [address]        [facility]  [facility] PROVID[facility] AND CLINIC:  MADHAVI Noe [facility], [address] / [place] [address]  Chief Complaint   Patient presents with     [facility] Medical Record Number:  [id number]  Place of Service where encounter took place:  [facility] ([facility]) [[id number]]    Kt Rasmussen  is a 62 year old  (1960), living in above facility and now choosing to change PCPs to [facility].     HPI:    This is a 62-year-old male, with a past medical history significant for metastatic non-small cell lung carcinoma to lymph nodes bones in bilateral lungs on palliative chemotherapy, thrombocytopenia, anemia, left vocal cord squamous cell carcinoma, history of CVA, who has chosen to change providers to [facility].     Today, patient is sitting outside in his wheelchair. Staff reports he spends a lot of time outside. Patient reports he enjoys being outside. Spent 45 years working at a [employer]. Was able to travel around the world. Difficult to say what his favorite city was. States he is overall healthy. Does not take any medications. Does not make mention of cancer or cancer treatment.    Of note, patient does not have medications administered by facility staff. Staff reports patient is on no medications, but per [facility] Epic, is on medications. Will need to clarify at next visit whether patient has medication medication by facility in order to sign on with service.    CODE STATUS/ADVANCE DIRECTIVES DISCUSSION:  Full Code    ALLERGIES:   Allergies   Allergen Reactions     No Known Drug Allergies       PAST MEDICAL HISTORY:   Past Medical History:   Diagnosis Date     Alcohol abuse, unspecified      Cerebral infarction (H)     2009, right side residual and aphasia     Dyslipidemia      GERD (gastroesophageal reflux disease)      Lung cancer (H)       Unspecified essential hypertension       PAST SURGICAL HISTORY:   has a past surgical history that includes NONSPECIFIC PROCEDURE; Bronchoscopy flexible and rigid (N/A, 5/5/2016); Laser CO2 laryngoscopy (N/A, 9/30/2019); Esophagoscopy flexible (N/A, 9/30/2019); Inject steroid (location) (N/A, 9/30/2019); IR Chest Port Placement > 5 Yrs of Age (4/22/2021); IR Port Removal Right (4/11/2022); IR Chest Port Placement > 5 Yrs of Age (4/11/2022); and IR Port Check Right (4/11/2022).  FAMILY HISTORY: family history includes Cancer in his father.  SOCIAL HISTORY:   reports that he quit smoking about 13 years ago. His smoking use included cigarettes. He smoked an average of 1 pack per day. He has never used smokeless tobacco. He reports that he does not currently use alcohol. He reports that he does not use drugs.  Patient's living condition: lives in an assisted living facility    Current Outpatient Medications   Medication Sig     atorvastatin (LIPITOR) 40 MG tablet Take 40 mg by mouth daily     dexamethasone (DECADRON) 4 MG tablet Take 2 tablets (8 mg) by mouth 2 times daily (with meals) for 3 doses Start evening of Docetaxel infusion and continue for a total of 3 doses.     dexamethasone (DECADRON) 4 MG tablet Take 1 tablet (4 mg) by mouth 2 times daily (with meals) for 3 days Start one day prior to Pemetrexed (Alimta), continue on the day treatment, and the day following Pemetrexed.     folic acid (FOLVITE) 1 MG tablet Take 1 mg by mouth daily     prochlorperazine (COMPAZINE) 10 MG tablet Take 1 tablet (10 mg) by mouth every 6 hours as needed for nausea or vomiting     No current facility-administered medications for this visit.     Facility-Administered Medications Ordered in Other Visits   Medication     heparin 100 UNIT/ML injection 5 mL     heparin lock flush 10 UNIT/ML injection 5 mL     sodium chloride (PF) 0.9% PF flush 3-20 mL     ROS:  4 point ROS including Respiratory, CV, GI and , other than that noted in  "the HPI,  is negative    Vitals:  BP (!) 145/81   Pulse 74   Temp 97  F (36.1  C) (Oral)   Ht 1.918 m (6' 3.5\")   Wt 76.7 kg (169 lb)   BMI 20.84 kg/m    Exam:  GENERAL APPEARANCE:  Alert, in no distress  ENT:  Mouth and posterior oropharynx normal, moist mucous membranes  EYES:  EOM, conjunctivae, lids, pupils and irises normal  RESP:  no respiratory distress  PSYCH:  affect and mood normal    Lab/Diagnostic data:  Labs done in SNF are in [facility]. Please refer to them using [employer] Everywhere.    ASSESSMENT/PLAN:  Metastatic Non-Small Cell Lung Carcinoma to Lymph Nodes Bones in Bilateral Lungs on Palliative Chemotherapy. Followed by Dr. John. 7/18/22 PET scan revealed disease progression and was started on Taxotere. Repeat PET scan scheduled in November. Continue Dexamethasone as ordered.    History of Pancytopenia. Chemotherapy has been held intermittently due to pancytopenia.  Recent Hemoglobin ~ 8-9. Labs to be monitored by Oncology.     Left Vocal Cord Squamous Cell Carcinoma S/P Endoscopic CO2 Laser Resection 9/30/19. Followed by ENT. Has some dysphonia and dysphagia.    History of CVA. Continue Atorvastatin as ordered.    Orders:  None    Electronically signed by:  MADHAVI Ni CNP                       Sincerely,        MADHAVI Ni CNP    "

## 2022-10-12 VITALS
HEIGHT: 76 IN | HEART RATE: 74 BPM | SYSTOLIC BLOOD PRESSURE: 145 MMHG | BODY MASS INDEX: 20.58 KG/M2 | DIASTOLIC BLOOD PRESSURE: 81 MMHG | TEMPERATURE: 97 F | WEIGHT: 169 LBS

## 2022-10-12 NOTE — PROGRESS NOTES
[facility]  [facility]:  Virginia Telles, APRN CNP, [address] [facility] / [place] [address]  Chief Complaint   Patient presents with     [facility] Medical Record Number:  [id number]  Place of Service where encounter took place:  [facility] ([facility]) [[id number]]    Kt Rasmussen  is a 62 year old  (1960), living in above facility and now choosing to change PCPs to [facility].     HPI:    This is a 62-year-old male, with a past medical history significant for metastatic non-small cell lung carcinoma to lymph nodes bones in bilateral lungs on palliative chemotherapy, thrombocytopenia, anemia, left vocal cord squamous cell carcinoma, history of CVA, who has chosen to change providers to [facility].     Today, patient is sitting outside in his wheelchair. Staff reports he spends a lot of time outside. Patient reports he enjoys being outside. Spent 45 years working at a [employer]. Was able to travel around the world. Difficult to say what his favorite city was. States he is overall healthy. Does not take any medications. Does not make mention of cancer or cancer treatment.    Of note, patient does not have medications administered by facility staff. Staff reports patient is on no medications, but per [facility] Epic, is on medications. Will need to clarify at next visit whether patient has medication medication by facility in order to sign on with service.    CODE STATUS/ADVANCE DIRECTIVES DISCUSSION:  Full Code    ALLERGIES:   Allergies   Allergen Reactions     No Known Drug Allergies       PAST MEDICAL HISTORY:   Past Medical History:   Diagnosis Date     Alcohol abuse, unspecified      Cerebral infarction (H)     2009, right side residual and aphasia     Dyslipidemia      GERD (gastroesophageal reflux disease)      Lung cancer (H)      Unspecified essential hypertension       PAST SURGICAL HISTORY:   has a past surgical history  that includes NONSPECIFIC PROCEDURE; Bronchoscopy flexible and rigid (N/A, 5/5/2016); Laser CO2 laryngoscopy (N/A, 9/30/2019); Esophagoscopy flexible (N/A, 9/30/2019); Inject steroid (location) (N/A, 9/30/2019); IR Chest Port Placement > 5 Yrs of Age (4/22/2021); IR Port Removal Right (4/11/2022); IR Chest Port Placement > 5 Yrs of Age (4/11/2022); and IR Port Check Right (4/11/2022).  FAMILY HISTORY: family history includes Cancer in his father.  SOCIAL HISTORY:   reports that he quit smoking about 13 years ago. His smoking use included cigarettes. He smoked an average of 1 pack per day. He has never used smokeless tobacco. He reports that he does not currently use alcohol. He reports that he does not use drugs.  Patient's living condition: lives in an assisted living facility    Current Outpatient Medications   Medication Sig     atorvastatin (LIPITOR) 40 MG tablet Take 40 mg by mouth daily     dexamethasone (DECADRON) 4 MG tablet Take 2 tablets (8 mg) by mouth 2 times daily (with meals) for 3 doses Start evening of Docetaxel infusion and continue for a total of 3 doses.     dexamethasone (DECADRON) 4 MG tablet Take 1 tablet (4 mg) by mouth 2 times daily (with meals) for 3 days Start one day prior to Pemetrexed (Alimta), continue on the day treatment, and the day following Pemetrexed.     folic acid (FOLVITE) 1 MG tablet Take 1 mg by mouth daily     prochlorperazine (COMPAZINE) 10 MG tablet Take 1 tablet (10 mg) by mouth every 6 hours as needed for nausea or vomiting     No current facility-administered medications for this visit.     Facility-Administered Medications Ordered in Other Visits   Medication     heparin 100 UNIT/ML injection 5 mL     heparin lock flush 10 UNIT/ML injection 5 mL     sodium chloride (PF) 0.9% PF flush 3-20 mL     ROS:  4 point ROS including Respiratory, CV, GI and , other than that noted in the HPI,  is negative    Vitals:  BP (!) 145/81   Pulse 74   Temp 97  F (36.1  C) (Oral)    "Ht 1.918 m (6' 3.5\")   Wt 76.7 kg (169 lb)   BMI 20.84 kg/m    Exam:  GENERAL APPEARANCE:  Alert, in no distress  ENT:  Mouth and posterior oropharynx normal, moist mucous membranes  EYES:  EOM, conjunctivae, lids, pupils and irises normal  RESP:  no respiratory distress  PSYCH:  affect and mood normal    Lab/Diagnostic data:  Labs done in SNF are in [facility] EPIC. Please refer to them using [employer] Everywhere.    ASSESSMENT/PLAN:  Metastatic Non-Small Cell Lung Carcinoma to Lymph Nodes Bones in Bilateral Lungs on Palliative Chemotherapy. Followed by Dr. John. 7/18/22 PET scan revealed disease progression and was started on Taxotere. Repeat PET scan scheduled in November. Continue Dexamethasone as ordered.    History of Pancytopenia. Chemotherapy has been held intermittently due to pancytopenia.  Recent Hemoglobin ~ 8-9. Labs to be monitored by Oncology.     Left Vocal Cord Squamous Cell Carcinoma S/P Endoscopic CO2 Laser Resection 9/30/19. Followed by ENT. Has some dysphonia and dysphagia.    History of CVA. Continue Atorvastatin as ordered.    Orders:  None    Electronically signed by:  MADHAVI Ni CNP                 "

## 2022-10-24 RX ORDER — ALBUTEROL SULFATE 0.83 MG/ML
2.5 SOLUTION RESPIRATORY (INHALATION)
Status: CANCELLED | OUTPATIENT
Start: 2022-10-26

## 2022-10-24 RX ORDER — DIPHENHYDRAMINE HYDROCHLORIDE 50 MG/ML
50 INJECTION INTRAMUSCULAR; INTRAVENOUS
Status: CANCELLED
Start: 2022-10-26

## 2022-10-24 RX ORDER — LORAZEPAM 2 MG/ML
0.5 INJECTION INTRAMUSCULAR EVERY 4 HOURS PRN
Status: CANCELLED | OUTPATIENT
Start: 2022-10-26

## 2022-10-24 RX ORDER — EPINEPHRINE 1 MG/ML
0.3 INJECTION, SOLUTION, CONCENTRATE INTRAVENOUS EVERY 5 MIN PRN
Status: CANCELLED | OUTPATIENT
Start: 2022-10-26

## 2022-10-24 RX ORDER — MEPERIDINE HYDROCHLORIDE 25 MG/ML
25 INJECTION INTRAMUSCULAR; INTRAVENOUS; SUBCUTANEOUS EVERY 30 MIN PRN
Status: CANCELLED | OUTPATIENT
Start: 2022-10-26

## 2022-10-24 RX ORDER — ALBUTEROL SULFATE 90 UG/1
1-2 AEROSOL, METERED RESPIRATORY (INHALATION)
Status: CANCELLED
Start: 2022-10-26

## 2022-10-24 RX ORDER — HEPARIN SODIUM,PORCINE 10 UNIT/ML
5 VIAL (ML) INTRAVENOUS
Status: CANCELLED | OUTPATIENT
Start: 2022-10-26

## 2022-10-24 RX ORDER — HEPARIN SODIUM (PORCINE) LOCK FLUSH IV SOLN 100 UNIT/ML 100 UNIT/ML
5 SOLUTION INTRAVENOUS
Status: CANCELLED | OUTPATIENT
Start: 2022-10-26

## 2022-10-26 ENCOUNTER — LAB (OUTPATIENT)
Dept: INFUSION THERAPY | Facility: CLINIC | Age: 62
End: 2022-10-26
Attending: INTERNAL MEDICINE
Payer: MEDICARE

## 2022-10-26 VITALS
TEMPERATURE: 98 F | RESPIRATION RATE: 16 BRPM | WEIGHT: 170 LBS | DIASTOLIC BLOOD PRESSURE: 63 MMHG | BODY MASS INDEX: 20.97 KG/M2 | SYSTOLIC BLOOD PRESSURE: 106 MMHG | HEART RATE: 73 BPM | OXYGEN SATURATION: 97 %

## 2022-10-26 DIAGNOSIS — C34.90 NON-SMALL CELL LUNG CANCER METASTATIC TO BONE (H): ICD-10-CM

## 2022-10-26 DIAGNOSIS — C79.51 NON-SMALL CELL LUNG CANCER METASTATIC TO BONE (H): ICD-10-CM

## 2022-10-26 DIAGNOSIS — Z51.11 ENCOUNTER FOR ANTINEOPLASTIC CHEMOTHERAPY: ICD-10-CM

## 2022-10-26 DIAGNOSIS — Z51.11 ENCOUNTER FOR ANTINEOPLASTIC CHEMOTHERAPY: Primary | ICD-10-CM

## 2022-10-26 DIAGNOSIS — D70.1 CHEMOTHERAPY-INDUCED NEUTROPENIA (H): Primary | ICD-10-CM

## 2022-10-26 DIAGNOSIS — T45.1X5A CHEMOTHERAPY-INDUCED NEUTROPENIA (H): Primary | ICD-10-CM

## 2022-10-26 DIAGNOSIS — T45.1X5A CHEMOTHERAPY-INDUCED NEUTROPENIA (H): ICD-10-CM

## 2022-10-26 DIAGNOSIS — D70.1 CHEMOTHERAPY-INDUCED NEUTROPENIA (H): ICD-10-CM

## 2022-10-26 LAB
ALBUMIN SERPL-MCNC: 3.3 G/DL (ref 3.4–5)
ALP SERPL-CCNC: 86 U/L (ref 40–150)
ALT SERPL W P-5'-P-CCNC: 31 U/L (ref 0–70)
AST SERPL W P-5'-P-CCNC: 21 U/L (ref 0–45)
BASOPHILS # BLD AUTO: 0 10E3/UL (ref 0–0.2)
BASOPHILS NFR BLD AUTO: 0 %
BILIRUB DIRECT SERPL-MCNC: 0.1 MG/DL (ref 0–0.2)
BILIRUB SERPL-MCNC: 0.4 MG/DL (ref 0.2–1.3)
EOSINOPHIL # BLD AUTO: 0.1 10E3/UL (ref 0–0.7)
EOSINOPHIL NFR BLD AUTO: 1 %
ERYTHROCYTE [DISTWIDTH] IN BLOOD BY AUTOMATED COUNT: 18.6 % (ref 10–15)
HCT VFR BLD AUTO: 33.8 % (ref 40–53)
HGB BLD-MCNC: 10.2 G/DL (ref 13.3–17.7)
IMM GRANULOCYTES # BLD: 0 10E3/UL
IMM GRANULOCYTES NFR BLD: 0 %
LYMPHOCYTES # BLD AUTO: 0.8 10E3/UL (ref 0.8–5.3)
LYMPHOCYTES NFR BLD AUTO: 13 %
MCH RBC QN AUTO: 28 PG (ref 26.5–33)
MCHC RBC AUTO-ENTMCNC: 30.2 G/DL (ref 31.5–36.5)
MCV RBC AUTO: 93 FL (ref 78–100)
MONOCYTES # BLD AUTO: 0.6 10E3/UL (ref 0–1.3)
MONOCYTES NFR BLD AUTO: 9 %
NEUTROPHILS # BLD AUTO: 4.9 10E3/UL (ref 1.6–8.3)
NEUTROPHILS NFR BLD AUTO: 77 %
NRBC # BLD AUTO: 0 10E3/UL
NRBC BLD AUTO-RTO: 0 /100
PLATELET # BLD AUTO: 144 10E3/UL (ref 150–450)
PROT SERPL-MCNC: 6.4 G/DL (ref 6.8–8.8)
RBC # BLD AUTO: 3.64 10E6/UL (ref 4.4–5.9)
WBC # BLD AUTO: 6.3 10E3/UL (ref 4–11)

## 2022-10-26 PROCEDURE — 96367 TX/PROPH/DG ADDL SEQ IV INF: CPT

## 2022-10-26 PROCEDURE — 258N000003 HC RX IP 258 OP 636: Performed by: INTERNAL MEDICINE

## 2022-10-26 PROCEDURE — 96372 THER/PROPH/DIAG INJ SC/IM: CPT | Performed by: INTERNAL MEDICINE

## 2022-10-26 PROCEDURE — 85025 COMPLETE CBC W/AUTO DIFF WBC: CPT | Performed by: INTERNAL MEDICINE

## 2022-10-26 PROCEDURE — 250N000011 HC RX IP 250 OP 636: Performed by: INTERNAL MEDICINE

## 2022-10-26 PROCEDURE — 96377 APPLICATON ON-BODY INJECTOR: CPT | Mod: XS

## 2022-10-26 PROCEDURE — 96413 CHEMO IV INFUSION 1 HR: CPT

## 2022-10-26 PROCEDURE — 80076 HEPATIC FUNCTION PANEL: CPT | Performed by: INTERNAL MEDICINE

## 2022-10-26 RX ORDER — HEPARIN SODIUM (PORCINE) LOCK FLUSH IV SOLN 100 UNIT/ML 100 UNIT/ML
5 SOLUTION INTRAVENOUS
Status: DISCONTINUED | OUTPATIENT
Start: 2022-10-26 | End: 2022-10-26 | Stop reason: HOSPADM

## 2022-10-26 RX ADMIN — SODIUM CHLORIDE 250 ML: 9 INJECTION, SOLUTION INTRAVENOUS at 08:56

## 2022-10-26 RX ADMIN — Medication 5 ML: at 10:33

## 2022-10-26 RX ADMIN — PEGFILGRASTIM 6 MG: KIT SUBCUTANEOUS at 09:29

## 2022-10-26 RX ADMIN — DEXAMETHASONE SODIUM PHOSPHATE: 10 INJECTION, SOLUTION INTRAMUSCULAR; INTRAVENOUS at 09:08

## 2022-10-26 RX ADMIN — SODIUM CHLORIDE 122 MG: 9 INJECTION, SOLUTION INTRAVENOUS at 09:25

## 2022-10-26 NOTE — PROGRESS NOTES
Infusion Nursing Note:  Kt Rasmussen presents today for C5D1 taxotere, neulasta onpro  Patient seen by provider today: No   present during visit today: Not Applicable.    Note: N/A.    Intravenous Access:  Implanted Port.    Treatment Conditions:  Lab Results   Component Value Date    HGB 10.2 (L) 10/26/2022    WBC 6.3 10/26/2022    ANEU 0.5 (L) 08/11/2022    ANEUTAUTO 4.9 10/26/2022     (L) 10/26/2022      Lab Results   Component Value Date     08/11/2022    POTASSIUM 3.5 08/11/2022    MAG 1.9 10/13/2016    CR 0.78 08/11/2022    BEVERLY 9.2 08/11/2022    BILITOTAL 0.4 10/26/2022    ALBUMIN 3.3 (L) 10/26/2022    ALT 31 10/26/2022    AST 21 10/26/2022     Results reviewed, labs MET treatment parameters, ok to proceed with treatment.    Post Infusion Assessment:  Patient tolerated infusion without incident.  Blood return noted pre and post infusion.  Site patent and intact, free from redness, edema or discomfort.  No evidence of extravasations.  Access discontinued per protocol.     ONPRO  Was placed on patient's: back of right arm.    Was placed at 9:30 AM    Podpal used: Yes    ONPRO injector device Lot number: [id number]    Patient education included: all patients questions answered and that Neulasta administration will occur at 12:30pm on 10/27.    Patient tolerated administration well.      Discharge Plan:   Copy of AVS reviewed with patient and/or family.  Patient will return as prev carlos for next appointment.  Patient discharged in stable condition accompanied by: self.  Departure Mode: Wheelchair.      Guzman Patel RN

## 2022-10-26 NOTE — PROGRESS NOTES
Nursing Note:  Kt Rasmussen presents today for port labs.    Patient seen by provider today: No   present during visit today: Not Applicable.    Note: N/A.    Intravenous Access:  Implanted Port.    Discharge Plan:   Patient was sent to lobby for next appointment.    Arin Keith RN

## 2022-10-26 NOTE — PATIENT INSTRUCTIONS
Your On-body Neulasta Injector was applied to your right upper arm at 9:30am.  At approximately 12:30pm on 10/27, your On-body Injector will beep to let you know your dose delivery will begin in 2 minutes.  Your medication will be delivered over the next 45 minutes.  You can remove your Injector at 1:30pm.  Please make sure your Injector has a solid green light or has turned off prior to removing the device.  Please contact your provider at [phone] with questions or concerns.

## 2022-11-12 NOTE — PROGRESS NOTES
[facility]    Hematology/Oncology Established Patient Follow-up Note      Today's Date: 11/28/2022    Reason for Follow-up: Metastatic non-small cell lung carcinoma.    HISTORY OF PRESENT ILLNESS: Kt Rasmussen is a 62 year old male who presents with the following oncologic history:  1. 5/05/2016: Presented with near-obstructing large mass coming off the cheikh and likely the posterior wall, seen on bronchoscopy. Biopsy of the mass showed invasive squamous cell carcinoma, moderately differentiating and focally keratinizing.  Procedure was complicated by significant bleeding.  Tumor was completely debulked (via bronchoscopy) in both main stem bronchi and distal trachea. NGS showed no mutations in EGFR, KRAS, BRAF, NRAS, HRAS, PIK3CA, ERBB2, MET, or JAK2.  2. 5/23/2016: PET/CT scan showed evidence of residual tumor with decreased endobronchial mass. Indeterminate, mildly hypermetabolic mesentery with prominent lymph nodes and area of enhancement of the right hepatic lobe present. Felt to have T4-NX-M0 disease.  3. 6/09/2016: Started concurrent chemoradiation with weekly paclitaxel and carboplatin.  4. 7/30/2016: Completed radiation.  Subsequently received 2 cycles of consolidation paclitaxel and carboplatin.   5. 9/13/2019: Presented with 6-month history of dysphonia and left vocal exophytic lesion. Left true vocal fold biopsy showed at least squamous cell carcinoma in situ, cannot exclude superficial invasion.  6. 9/30/2019: Excision of left vocal cord mass showed fragments of invasive squamous cell carcinoma, well differentiated and keratinizing.  Margins negative for malignancy.  7. 2/16/2021: CT chest w/o contrast showed thin-walled cavity in left lower lobe with 2 solid peripheral nodules measuring 9 mm each. No lymphadenopathy.  8. 3/01/2021: PET scan showed hypermetabolic nodules in left lower lobe and right lung, consistent with metastases; hypermetabolic adenopathy in chest, abdomen,  pelvis; nonspecific uptake at tongue; hypermetabolic intraosseous lesions in spine and pelvis consistent with metastatic disease; left axillary hypermetabolic lymph nodes related to COVID-19 vaccination.  9. 3/12/2021: CT-guided lung biopsy showed non-small cell carcinoma, not otherwise specified -- with opinion by [facility] pathologist.  10. 3/18/2021: Lung NGS panel negative for mutations in BRAF, EGFR, ERBB2, IDH1, IDH2, KRAS, MET, NRAS, RET. PD-L1 expression negative (TPS <1%). Due to minimal amount of DNA obtained from specimen, other biomarkers could not be analyzed.  11. 4/7/2021: MRI brain negative for brain metastases.  12. 4/27/2021: Started 1st line metastatic therapy with carboplatin, paclitaxel, bevacizumab, and atezolizumab for metastatic non-small cell lung carcinoma, NOS.  13. 7/12/2021: PET/CT showed resolved mural nodules with increased cystic cavity in left lower lobe with no significant FDG uptake, less likely metastases; no enlarged hypermetabolic mediastinal, hilar, or axillary lymph nodes, decreased metabolic activity of intraosseous lesion in spine and pelvis; no new bone lesions.  14. 10/11/2021: PET/CT showed slight interval increase in intensity of metabolic activity of right pubic bone and left ischium with new focal uptake in L2 spinous process; resolved uptake at previous ground glass opacity along right medial lower lobe; unchanged cystic cavities/nodules in left lower lobe and right apical upper lobe; no pathologically enlarged hypermetabolic mediastinal, hilar, or axillary lymph nodes.  15. 1/10/2022: PET/CT showed new foci of abnormal skeletal FGD uptake in the thoracic and lumbar spine. Mild interval increase in intensity of previously demonstrated hypermetabolic skeletal lesions involving the pelvis and lumbar spine. Findings are consistent with progressive osseous metastatic disease. Subsequently off chemo for 1 month due to poor performance status.  16. 1/25/2022: Patient fell  and fractured his femur. This was surgically repaired and he was subsequently cared for at a rehab unit.  17. 2/14/2022: Brain MRI without contrast (contrast not given due to inability to obtain IV access) showed no brain metastases.  18. 4/21/2022: Started 2nd line metastatic therapy with pemetrexed and carboplatin. Omission of carboplatin 6/2 and forward due to worsening anemia despite dose reduction.  19. 7/18/2022: PET/CT showed enlarged and increased FDG avid skeletal metastases, increased left para-aortic retroperitoneal lymph node increased in size and FDG avidity, findings consistent with progressive disease.  20. 8/3/2022: Started 3rd line metastatic therapy with Taxotere 60 mg/m2 every 3 weeks.  21. 11/21/2022: PET scan showed partial response, left para-aortic retroperitoneal lymph node has decreased from 2.6 x 1.7 cm (SUVmax 7.9) to 2.3 x 1.4 cm (SUVmax 5.7), skeletal metastases no longer demonstrate FDG activity.     INTERIM HISTORY:  Kt reports slight peripheral neuropathy since starting Taxotere. No recent falls.    REVIEW OF SYSTEMS:   14 point ROS was reviewed and is negative other than as noted above in HPI.       HOME MEDICATIONS:  Current Outpatient Medications   Medication Sig Dispense Refill     atorvastatin (LIPITOR) 40 MG tablet Take 40 mg by mouth daily       folic acid (FOLVITE) 1 MG tablet Take 1 mg by mouth daily       dexamethasone (DECADRON) 4 MG tablet Take 2 tablets (8 mg) by mouth 2 times daily (with meals) for 3 doses Start evening of Docetaxel infusion and continue for a total of 3 doses. 6 tablet 7     prochlorperazine (COMPAZINE) 10 MG tablet Take 1 tablet (10 mg) by mouth every 6 hours as needed for nausea or vomiting (Patient not taking: Reported on 11/16/2022) 30 tablet 2         ALLERGIES:  Allergies   Allergen Reactions     No Known Drug Allergies          PAST MEDICAL HISTORY:  Past Medical History:   Diagnosis Date     Alcohol abuse, unspecified      Cerebral infarction  (H)     2009, right side residual and aphasia     Dyslipidemia      GERD (gastroesophageal reflux disease)      Lung cancer (H)      Unspecified essential hypertension          PAST SURGICAL HISTORY:  Past Surgical History:   Procedure Laterality Date     BRONCHOSCOPY FLEXIBLE AND RIGID N/A 2016    Procedure: BRONCHOSCOPY FLEXIBLE AND RIGID;  Surgeon: Tony Talbot MD;  Location: [facility] OR     ESOPHAGOSCOPY FLEXIBLE N/A 2019    Procedure: flexible esophagoscopy;  Surgeon: Lizzy Johnson MD;  Location: [facility] OR     INJECT STEROID (LOCATION) N/A 2019    Procedure: steroid injection;  Surgeon: Lizzy Johnson MD;  Location: [facility] OR     IR CHEST PORT PLACEMENT > 5 YRS OF AGE  2021     IR CHEST PORT PLACEMENT > 5 YRS OF AGE  2022     IR PORT CHECK RIGHT  2022     IR PORT REMOVAL RIGHT  2022     LASER CO2 LARYNGOSCOPY N/A 2019    Procedure: Microdirect laryngoscopy with excision of laryngeal mass, CO2 laser;  Surgeon: Lizzy Johnson MD;  Location: [facility] OR     ZZC NONSPECIFIC PROCEDURE      R tympanoplasty         SOCIAL HISTORY:  Social History     Socioeconomic History     Marital status: Single     Spouse name: Not on file     Number of children: Not on file     Years of education: Not on file     Highest education level: Not on file   Occupational History     Not on file   Tobacco Use     Smoking status: Former     Packs/day: 1.00     Types: Cigarettes     Quit date: 2009     Years since quittin.1     Smokeless tobacco: Never   Substance and Sexual Activity     Alcohol use: Not Currently     Drug use: No     Sexual activity: Not on file   Other Topics Concern     Parent/sibling w/ CABG, MI or angioplasty before 65F 55M? Not Asked   Social History Narrative     Not on file     Social Determinants of Health     Financial Resource Strain: Not on file   Food Insecurity: Not on file   Transportation Needs: Not on file   Physical Activity: Not on file   Stress: Not on  "file   Social Connections: Not on file   Intimate Partner Violence: Not At Risk     Fear of Current or Ex-Partner: No     Emotionally Abused: No     Physically Abused: No     Sexually Abused: No   Housing Stability: Not on file   Resides at assisted living.      FAMILY HISTORY:  Family History   Problem Relation Age of Onset     Cancer Father          PHYSICAL EXAM:  Vital signs:  /74   Pulse 89   Resp 16   Ht 1.905 m (6' 3\")   SpO2 96%   BMI 21.50 kg/m     ECO  GENERAL/CONSTITUTIONAL: No acute distress.  EYES: No erythema or scleral icterus.  LYMPH: No cervical, supraclavicular, axillary adenopathy.   RESPIRATORY: Clear to auscultation bilaterally. No crackles or wheezing.   CARDIOVASCULAR: Regular rate and rhythm without murmurs.  GASTROINTESTINAL: No hepatosplenomegaly, masses, or tenderness. No guarding.  No distention.  MUSCULOSKELETAL: Warm and well-perfused, no cyanosis, clubbing, or edema.  NEUROLOGIC: Residual right arm and right leg reduced strength due to prior stroke. Alert, oriented, answers questions appropriately but has occasional word finding difficulty from prior stroke. Dysphonia present and stable.  INTEGUMENTARY: No rashes or jaundice.  GAIT: Sitting in wheelchair.      LABS:  CBC RESULTS: Recent Labs   Lab Test 22  1033   WBC 7.9   RBC 3.64*   HGB 10.4*   HCT 33.5*   MCV 92   MCH 28.6   MCHC 31.0*   RDW 18.6*   *     Last Comprehensive Metabolic Panel:  Sodium   Date Value Ref Range Status   2022 139 133 - 144 mmol/L Final   2021 140 133 - 144 mmol/L Final     Potassium   Date Value Ref Range Status   2022 3.5 3.4 - 5.3 mmol/L Final   2021 4.3 3.4 - 5.3 mmol/L Final     Chloride   Date Value Ref Range Status   2022 106 94 - 109 mmol/L Final   2021 111 (H) 94 - 109 mmol/L Final     Carbon Dioxide   Date Value Ref Range Status   2021 25 20 - 32 mmol/L Final     Carbon Dioxide (CO2)   Date Value Ref Range Status   2022 29 " 20 - 32 mmol/L Final     Anion Gap   Date Value Ref Range Status   08/11/2022 4 3 - 14 mmol/L Final   06/29/2021 4 3 - 14 mmol/L Final     Glucose   Date Value Ref Range Status   08/11/2022 130 (H) 70 - 99 mg/dL Final   06/29/2021 85 70 - 99 mg/dL Final     Urea Nitrogen   Date Value Ref Range Status   08/11/2022 16 7 - 30 mg/dL Final   06/29/2021 17 7 - 30 mg/dL Final     Creatinine   Date Value Ref Range Status   08/11/2022 0.78 0.66 - 1.25 mg/dL Final   06/29/2021 0.84 0.66 - 1.25 mg/dL Final     GFR Estimate   Date Value Ref Range Status   08/11/2022 >90 >60 mL/min/1.73m2 Final     Comment:     Effective December 21, 2021 eGFRcr in adults is calculated using the 2021 CKD-EPI creatinine equation which includes age and gender (Ceci brown al., NEJ, DOI: 10.1056/OORSfc0269634)   06/29/2021 >90 >60 mL/min/[1.73_m2] Final     Comment:     Non  GFR Calc  Starting 12/18/2018, serum creatinine based estimated GFR (eGFR) will be   calculated using the Chronic Kidney Disease Epidemiology Collaboration   (CKD-EPI) equation.       Calcium   Date Value Ref Range Status   08/11/2022 9.2 8.5 - 10.1 mg/dL Final   06/29/2021 8.7 8.5 - 10.1 mg/dL Final     Bilirubin Total   Date Value Ref Range Status   11/16/2022 0.5 0.2 - 1.3 mg/dL Final   06/29/2021 0.3 0.2 - 1.3 mg/dL Final     Alkaline Phosphatase   Date Value Ref Range Status   11/16/2022 95 40 - 150 U/L Final   06/29/2021 73 40 - 150 U/L Final     ALT   Date Value Ref Range Status   11/16/2022 35 0 - 70 U/L Final   06/29/2021 34 0 - 70 U/L Final     AST   Date Value Ref Range Status   11/16/2022 25 0 - 45 U/L Final   06/29/2021 26 0 - 45 U/L Final       PATHOLOGY:  None new.    IMAGING:  Reviewed as per HPI.    ASSESSMENT/PLAN:  Kt Rasmussen is a 61 year old male with the following issues:  1. Metastatic non-small (non-squamous) cell lung carcinoma with metastases to lymph nodes, bones, and bilateral lungs  2. History of locally advanced endobronchial  invasive squamous cell carcinoma diagnosed 5/2016, status post debulking and chemoradiation   3. Chemotherapy-induced pancytopenia  --Lung NGS panel negative for mutations in BRAF, EGFR, ERBB2, IDH1, IDH2, KRAS, MET, NRAS, RET. PD-L1 expression negative (TPS <1%). Guardant 360 negative for targetable mutations.  --Kt started 3rd line metastatic therapy with every 3-week Taxotere at 20% dose reduction (60 mg/m2) on 8/3/2022 due to progressive disease.  He has tolerated this very well with minimal to no paresthesias.  --I reviewed his 11/21/2022 PET scan which shows good partial response.  --Continue Taxotere at 20% dose reduction due to past repeated issues with chemo-induced cytopenias. Discussed continuation of Taxotere.  He is willing to do one more cycle of Taxotere but would like to take a chemo break thereafter.  Discussed possibility of progressive disease while off chemo.  He still wishes to take a chemo break and expresses understanding of the above.  --Reviewed 11/16 labs which showed Hgb 10.4, WBC 7.9, platelets 148,000.  --Will repeat PET scan in 2 months.    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 Dysphonia  6. Dysphagia  -Kt underwent excision of a left vocal cord SCC on 9/30/2019 and has persistent dysphonia as a result of the lesion and excision.  He also has dysphagia but this is stable and swallowing is manageable.  -Last scope by ENT 9/23/2021 showed no evidence for recurrence. He will see Dr. Wray again on 9/8.    7. Weight loss  --I had offered nutrition consult and he declined this.  --I recommended increasing his intake of higher caloric nutrient dense foods and beverages. His weight has now stabilized.    8. Dizziness and prior falls  --SW and guardian were previously notified of multiple falls.  --Prior brain MRI 2/14/2022 showed no brain metastases. However this was a suboptimal exam due to inability to administer contrast.  --He has not had recent falls and is using a  wheelchair due to prior femoral fracture in 1/2022.    Return in 2 months.    Sangita John MD  Hematology/Oncology  [facility]    Total time spent: 30 minutes in patient evaluation, counseling, documentation, and coordination of care.

## 2022-11-13 DIAGNOSIS — D70.1 CHEMOTHERAPY-INDUCED NEUTROPENIA (H): Primary | ICD-10-CM

## 2022-11-13 DIAGNOSIS — C34.90 NON-SMALL CELL LUNG CANCER METASTATIC TO BONE (H): ICD-10-CM

## 2022-11-13 DIAGNOSIS — Z51.11 ENCOUNTER FOR ANTINEOPLASTIC CHEMOTHERAPY: ICD-10-CM

## 2022-11-13 DIAGNOSIS — C79.51 NON-SMALL CELL LUNG CANCER METASTATIC TO BONE (H): ICD-10-CM

## 2022-11-13 DIAGNOSIS — T45.1X5A CHEMOTHERAPY-INDUCED NEUTROPENIA (H): Primary | ICD-10-CM

## 2022-11-13 RX ORDER — EPINEPHRINE 1 MG/ML
0.3 INJECTION, SOLUTION, CONCENTRATE INTRAVENOUS EVERY 5 MIN PRN
Status: CANCELLED | OUTPATIENT
Start: 2022-11-16

## 2022-11-13 RX ORDER — MEPERIDINE HYDROCHLORIDE 25 MG/ML
25 INJECTION INTRAMUSCULAR; INTRAVENOUS; SUBCUTANEOUS EVERY 30 MIN PRN
Status: CANCELLED | OUTPATIENT
Start: 2022-11-16

## 2022-11-13 RX ORDER — HEPARIN SODIUM (PORCINE) LOCK FLUSH IV SOLN 100 UNIT/ML 100 UNIT/ML
5 SOLUTION INTRAVENOUS
Status: CANCELLED | OUTPATIENT
Start: 2022-11-16

## 2022-11-13 RX ORDER — ALBUTEROL SULFATE 90 UG/1
1-2 AEROSOL, METERED RESPIRATORY (INHALATION)
Status: CANCELLED
Start: 2022-11-16

## 2022-11-13 RX ORDER — ALBUTEROL SULFATE 0.83 MG/ML
2.5 SOLUTION RESPIRATORY (INHALATION)
Status: CANCELLED | OUTPATIENT
Start: 2022-11-16

## 2022-11-13 RX ORDER — HEPARIN SODIUM,PORCINE 10 UNIT/ML
5 VIAL (ML) INTRAVENOUS
Status: CANCELLED | OUTPATIENT
Start: 2022-11-16

## 2022-11-13 RX ORDER — DIPHENHYDRAMINE HYDROCHLORIDE 50 MG/ML
50 INJECTION INTRAMUSCULAR; INTRAVENOUS
Status: CANCELLED
Start: 2022-11-16

## 2022-11-13 RX ORDER — LORAZEPAM 2 MG/ML
0.5 INJECTION INTRAMUSCULAR EVERY 4 HOURS PRN
Status: CANCELLED | OUTPATIENT
Start: 2022-11-16

## 2022-11-16 ENCOUNTER — INFUSION THERAPY VISIT (OUTPATIENT)
Dept: INFUSION THERAPY | Facility: CLINIC | Age: 62
End: 2022-11-16
Attending: INTERNAL MEDICINE
Payer: MEDICARE

## 2022-11-16 VITALS
WEIGHT: 172 LBS | RESPIRATION RATE: 18 BRPM | DIASTOLIC BLOOD PRESSURE: 52 MMHG | OXYGEN SATURATION: 100 % | HEIGHT: 76 IN | HEART RATE: 65 BPM | TEMPERATURE: 98.2 F | SYSTOLIC BLOOD PRESSURE: 111 MMHG | BODY MASS INDEX: 20.95 KG/M2

## 2022-11-16 DIAGNOSIS — C79.51 NON-SMALL CELL LUNG CANCER METASTATIC TO BONE (H): ICD-10-CM

## 2022-11-16 DIAGNOSIS — Z51.11 ENCOUNTER FOR ANTINEOPLASTIC CHEMOTHERAPY: ICD-10-CM

## 2022-11-16 DIAGNOSIS — D70.1 CHEMOTHERAPY-INDUCED NEUTROPENIA (H): Primary | ICD-10-CM

## 2022-11-16 DIAGNOSIS — C34.90 NON-SMALL CELL LUNG CANCER METASTATIC TO BONE (H): ICD-10-CM

## 2022-11-16 DIAGNOSIS — T45.1X5A CHEMOTHERAPY-INDUCED NEUTROPENIA (H): Primary | ICD-10-CM

## 2022-11-16 LAB
ALBUMIN SERPL-MCNC: 3.6 G/DL (ref 3.4–5)
ALP SERPL-CCNC: 95 U/L (ref 40–150)
ALT SERPL W P-5'-P-CCNC: 35 U/L (ref 0–70)
AST SERPL W P-5'-P-CCNC: 25 U/L (ref 0–45)
BASOPHILS # BLD AUTO: 0 10E3/UL (ref 0–0.2)
BASOPHILS NFR BLD AUTO: 0 %
BILIRUB DIRECT SERPL-MCNC: <0.1 MG/DL (ref 0–0.2)
BILIRUB SERPL-MCNC: 0.5 MG/DL (ref 0.2–1.3)
EOSINOPHIL # BLD AUTO: 0 10E3/UL (ref 0–0.7)
EOSINOPHIL NFR BLD AUTO: 0 %
ERYTHROCYTE [DISTWIDTH] IN BLOOD BY AUTOMATED COUNT: 18.6 % (ref 10–15)
HCT VFR BLD AUTO: 33.5 % (ref 40–53)
HGB BLD-MCNC: 10.4 G/DL (ref 13.3–17.7)
IMM GRANULOCYTES # BLD: 0 10E3/UL
IMM GRANULOCYTES NFR BLD: 1 %
LYMPHOCYTES # BLD AUTO: 0.9 10E3/UL (ref 0.8–5.3)
LYMPHOCYTES NFR BLD AUTO: 12 %
MCH RBC QN AUTO: 28.6 PG (ref 26.5–33)
MCHC RBC AUTO-ENTMCNC: 31 G/DL (ref 31.5–36.5)
MCV RBC AUTO: 92 FL (ref 78–100)
MONOCYTES # BLD AUTO: 0.6 10E3/UL (ref 0–1.3)
MONOCYTES NFR BLD AUTO: 8 %
NEUTROPHILS # BLD AUTO: 6.3 10E3/UL (ref 1.6–8.3)
NEUTROPHILS NFR BLD AUTO: 79 %
NRBC # BLD AUTO: 0 10E3/UL
NRBC BLD AUTO-RTO: 0 /100
PLATELET # BLD AUTO: 148 10E3/UL (ref 150–450)
PROT SERPL-MCNC: 6.6 G/DL (ref 6.8–8.8)
RBC # BLD AUTO: 3.64 10E6/UL (ref 4.4–5.9)
WBC # BLD AUTO: 7.9 10E3/UL (ref 4–11)

## 2022-11-16 PROCEDURE — 80076 HEPATIC FUNCTION PANEL: CPT | Performed by: INTERNAL MEDICINE

## 2022-11-16 PROCEDURE — 96413 CHEMO IV INFUSION 1 HR: CPT

## 2022-11-16 PROCEDURE — 96367 TX/PROPH/DG ADDL SEQ IV INF: CPT

## 2022-11-16 PROCEDURE — 250N000011 HC RX IP 250 OP 636: Performed by: INTERNAL MEDICINE

## 2022-11-16 PROCEDURE — 258N000003 HC RX IP 258 OP 636: Performed by: INTERNAL MEDICINE

## 2022-11-16 PROCEDURE — 85025 COMPLETE CBC W/AUTO DIFF WBC: CPT | Performed by: INTERNAL MEDICINE

## 2022-11-16 PROCEDURE — 96372 THER/PROPH/DIAG INJ SC/IM: CPT | Performed by: INTERNAL MEDICINE

## 2022-11-16 PROCEDURE — 96377 APPLICATON ON-BODY INJECTOR: CPT | Mod: XS

## 2022-11-16 RX ORDER — HEPARIN SODIUM (PORCINE) LOCK FLUSH IV SOLN 100 UNIT/ML 100 UNIT/ML
5 SOLUTION INTRAVENOUS
Status: DISCONTINUED | OUTPATIENT
Start: 2022-11-16 | End: 2022-11-16 | Stop reason: HOSPADM

## 2022-11-16 RX ADMIN — Medication 5 ML: at 12:47

## 2022-11-16 RX ADMIN — SODIUM CHLORIDE 250 ML: 9 INJECTION, SOLUTION INTRAVENOUS at 11:23

## 2022-11-16 RX ADMIN — SODIUM CHLORIDE 122 MG: 9 INJECTION, SOLUTION INTRAVENOUS at 11:41

## 2022-11-16 RX ADMIN — PEGFILGRASTIM 6 MG: KIT SUBCUTANEOUS at 11:44

## 2022-11-16 RX ADMIN — DEXAMETHASONE SODIUM PHOSPHATE: 10 INJECTION, SOLUTION INTRAMUSCULAR; INTRAVENOUS at 11:23

## 2022-11-16 ASSESSMENT — PAIN SCALES - GENERAL: PAINLEVEL: NO PAIN (0)

## 2022-11-16 NOTE — PATIENT INSTRUCTIONS
Your On-body Neulasta Injector was applied to your right arm at 1145am.  At approximately 2:45pm on 11/17/22, your On-body Injector will beep to let you know your dose delivery will begin in 2 minutes.  Your medication will be delivered over the next 45 minutes.  You can remove your Injector when the black indicator line is on empty, about 3:45pm.  Please make sure your Injector has a solid green light or has turned off prior to removing the device.  Please contact your provider at [phone] with questions or concerns.

## 2022-11-16 NOTE — PROGRESS NOTES
Infusion Nursing Note:  Kt Rasmussen presents today for C6D1 Taxotere.    Patient seen by provider today: No   present during visit today: Not Applicable.    Note: Patient states he is feeling well, denies any side effects with treatment. Denies fevers, nausea, bowel concerns or new neuropathy. Patient states he is not taking oral dexamethasone as ordered post taxotere, and is not willing to take it. Unsure is provider is aware of this, not mentioned in previous notes. Nixon message sent to Dr. John as greta.    ONPRO  Was placed on patient's: back of right arm.    Was placed at 1145 AM    Podpal used: Yes    ONPRO injector device Lot number: [id number]    Patient education included: what patient can expect after application, what colored lights mean on the device, when to remove device, when and where to call with questions or issues, all patients questions answered and that Neulasta administration will occur at 2:45PM on 11/17/22.    Patient tolerated administration well.    Intravenous Access:  Labs drawn without difficulty.  Implanted Port.    Treatment Conditions:  Lab Results   Component Value Date    HGB 10.4 (L) 11/16/2022    WBC 7.9 11/16/2022    ANEU 0.5 (L) 08/11/2022    ANEUTAUTO 6.3 11/16/2022     (L) 11/16/2022      Lab Results   Component Value Date     08/11/2022    POTASSIUM 3.5 08/11/2022    MAG 1.9 10/13/2016    CR 0.78 08/11/2022    BEVERLY 9.2 08/11/2022    BILITOTAL 0.5 11/16/2022    ALBUMIN 3.6 11/16/2022    ALT 35 11/16/2022    AST 25 11/16/2022     Results reviewed, labs MET treatment parameters, ok to proceed with treatment.    Post Infusion Assessment:  Patient tolerated infusion without incident.  Patient tolerated injection without incident.  Blood return noted pre and post infusion.  Site patent and intact, free from redness, edema or discomfort.  No evidence of extravasations.  Access discontinued per protocol.     Discharge Plan:   Patient declined prescription  refills.  Discharge instructions reviewed with: Patient.  Patient and/or family verbalized understanding of discharge instructions and all questions answered.  Copy of AVS reviewed with patient and/or family.  Patient will return 11/28 for exam with Alvaro, sent inbasket message for next chemo and labs to be scheduled for 12/7/22.  Patient discharged in stable condition accompanied by: self.  Departure Mode: Wheelchair.      Mackenzie Campa RN

## 2022-11-21 ENCOUNTER — HOSPITAL ENCOUNTER (OUTPATIENT)
Dept: PET IMAGING | Facility: CLINIC | Age: 62
Discharge: HOME | End: 2022-11-21
Attending: INTERNAL MEDICINE | Admitting: INTERNAL MEDICINE
Payer: MEDICARE

## 2022-11-21 DIAGNOSIS — C79.51 NON-SMALL CELL LUNG CANCER METASTATIC TO BONE (H): ICD-10-CM

## 2022-11-21 DIAGNOSIS — C34.90 NON-SMALL CELL LUNG CANCER METASTATIC TO BONE (H): ICD-10-CM

## 2022-11-21 PROCEDURE — A9552 F18 FDG: HCPCS | Performed by: INTERNAL MEDICINE

## 2022-11-21 PROCEDURE — 343N000001 HC RX 343: Performed by: INTERNAL MEDICINE

## 2022-11-21 PROCEDURE — 78816 PET IMAGE W/CT FULL BODY: CPT | Mod: MG,PS

## 2022-11-21 RX ADMIN — FLUDEOXYGLUCOSE F-18 10.8 MCI.: 500 INJECTION, SOLUTION INTRAVENOUS at 09:45

## 2022-11-28 ENCOUNTER — ONCOLOGY VISIT (OUTPATIENT)
Dept: ONCOLOGY | Facility: CLINIC | Age: 62
End: 2022-11-28
Attending: INTERNAL MEDICINE
Payer: MEDICARE

## 2022-11-28 VITALS
OXYGEN SATURATION: 96 % | SYSTOLIC BLOOD PRESSURE: 119 MMHG | HEART RATE: 89 BPM | DIASTOLIC BLOOD PRESSURE: 74 MMHG | BODY MASS INDEX: 21.5 KG/M2 | RESPIRATION RATE: 16 BRPM | HEIGHT: 75 IN

## 2022-11-28 DIAGNOSIS — C79.51 NON-SMALL CELL LUNG CANCER METASTATIC TO BONE (H): Primary | ICD-10-CM

## 2022-11-28 DIAGNOSIS — C34.90 NON-SMALL CELL LUNG CANCER METASTATIC TO BONE (H): Primary | ICD-10-CM

## 2022-11-28 DIAGNOSIS — T50.905A THROMBOCYTOPENIA DUE TO DRUGS: ICD-10-CM

## 2022-11-28 DIAGNOSIS — D64.81 ANEMIA DUE TO CHEMOTHERAPY: ICD-10-CM

## 2022-11-28 DIAGNOSIS — D69.59 THROMBOCYTOPENIA DUE TO DRUGS: ICD-10-CM

## 2022-11-28 DIAGNOSIS — T45.1X5A ANEMIA DUE TO CHEMOTHERAPY: ICD-10-CM

## 2022-11-28 PROCEDURE — 99214 OFFICE O/P EST MOD 30 MIN: CPT | Performed by: INTERNAL MEDICINE

## 2022-11-28 PROCEDURE — G0463 HOSPITAL OUTPT CLINIC VISIT: HCPCS

## 2022-11-28 ASSESSMENT — PAIN SCALES - GENERAL: PAINLEVEL: NO PAIN (0)

## 2022-11-28 NOTE — PROGRESS NOTES
"Oncology Rooming Note    November 28, 2022 2:55 PM   Kt Rasmussen is a 62 year old male who presents for:    Chief Complaint   Patient presents with     Oncology Clinic Visit     Initial Vitals: There were no vitals taken for this visit. Estimated body mass index is 21.21 kg/m  as calculated from the following:    Height as of 11/16/22: 1.918 m (6' 3.51\").    Weight as of 11/16/22: 78 kg (172 lb). There is no height or weight on file to calculate BSA.  Data Unavailable Comment: Data Unavailable   No LMP for male patient.  Allergies reviewed: Yes  Medications reviewed: Yes    Medications: Medication refills not needed today.  Pharmacy name entered into EPIC:    [facility] [place] [place] - [address] [facility] [place], [place] - [address] [id number] [place] [address] [facility]  [facility] - [address]    Clinical concerns:  doctor was notified.      Floridalma Nieves MA            "

## 2022-11-28 NOTE — PATIENT INSTRUCTIONS
Keep 12/7 lab and Taxotere infusion.  RTC MD in 2 months with lab draw and PET scan prior to visit.

## 2022-11-28 NOTE — LETTER
11/28/2022         RE: Kt Rasmussen  [id number]  [facility] [address]        Dear Colleague,    Thank you for referring your patient, Kt Rasmussen, to the [facility]. Please see a copy of my visit note below.    [facility]    Hematology/Oncology Established Patient Follow-up Note      Today's Date: 11/28/2022    Reason for Follow-up: Metastatic non-small cell lung carcinoma.    HISTORY OF PRESENT ILLNESS: Kt Rasmussen is a 62 year old male who presents with the following oncologic history:  1. 5/05/2016: Presented with near-obstructing large mass coming off the cheikh and likely the posterior wall, seen on bronchoscopy. Biopsy of the mass showed invasive squamous cell carcinoma, moderately differentiating and focally keratinizing.  Procedure was complicated by significant bleeding.  Tumor was completely debulked (via bronchoscopy) in both main stem bronchi and distal trachea. NGS showed no mutations in EGFR, KRAS, BRAF, NRAS, HRAS, PIK3CA, ERBB2, MET, or JAK2.  2. 5/23/2016: PET/CT scan showed evidence of residual tumor with decreased endobronchial mass. Indeterminate, mildly hypermetabolic mesentery with prominent lymph nodes and area of enhancement of the right hepatic lobe present. Felt to have T4-NX-M0 disease.  3. 6/09/2016: Started concurrent chemoradiation with weekly paclitaxel and carboplatin.  4. 7/30/2016: Completed radiation.  Subsequently received 2 cycles of consolidation paclitaxel and carboplatin.   5. 9/13/2019: Presented with 6-month history of dysphonia and left vocal exophytic lesion. Left true vocal fold biopsy showed at least squamous cell carcinoma in situ, cannot exclude superficial invasion.  6. 9/30/2019: Excision of left vocal cord mass showed fragments of invasive squamous cell carcinoma, well differentiated and keratinizing.  Margins negative for malignancy.  7. 2/16/2021: CT chest w/o contrast showed thin-walled cavity in left  lower lobe with 2 solid peripheral nodules measuring 9 mm each. No lymphadenopathy.  8. 3/01/2021: PET scan showed hypermetabolic nodules in left lower lobe and right lung, consistent with metastases; hypermetabolic adenopathy in chest, abdomen, pelvis; nonspecific uptake at tongue; hypermetabolic intraosseous lesions in spine and pelvis consistent with metastatic disease; left axillary hypermetabolic lymph nodes related to COVID-19 vaccination.  9. 3/12/2021: CT-guided lung biopsy showed non-small cell carcinoma, not otherwise specified -- with opinion by [facility] pathologist.  10. 3/18/2021: Lung NGS panel negative for mutations in BRAF, EGFR, ERBB2, IDH1, IDH2, KRAS, MET, NRAS, RET. PD-L1 expression negative (TPS <1%). Due to minimal amount of DNA obtained from specimen, other biomarkers could not be analyzed.  11. 4/7/2021: MRI brain negative for brain metastases.  12. 4/27/2021: Started 1st line metastatic therapy with carboplatin, paclitaxel, bevacizumab, and atezolizumab for metastatic non-small cell lung carcinoma, NOS.  13. 7/12/2021: PET/CT showed resolved mural nodules with increased cystic cavity in left lower lobe with no significant FDG uptake, less likely metastases; no enlarged hypermetabolic mediastinal, hilar, or axillary lymph nodes, decreased metabolic activity of intraosseous lesion in spine and pelvis; no new bone lesions.  14. 10/11/2021: PET/CT showed slight interval increase in intensity of metabolic activity of right pubic bone and left ischium with new focal uptake in L2 spinous process; resolved uptake at previous ground glass opacity along right medial lower lobe; unchanged cystic cavities/nodules in left lower lobe and right apical upper lobe; no pathologically enlarged hypermetabolic mediastinal, hilar, or axillary lymph nodes.  15. 1/10/2022: PET/CT showed new foci of abnormal skeletal FGD uptake in the thoracic and lumbar spine. Mild interval increase in intensity of previously  demonstrated hypermetabolic skeletal lesions involving the pelvis and lumbar spine. Findings are consistent with progressive osseous metastatic disease. Subsequently off chemo for 1 month due to poor performance status.  16. 1/25/2022: Patient fell and fractured his femur. This was surgically repaired and he was subsequently cared for at a rehab unit.  17. 2/14/2022: Brain MRI without contrast (contrast not given due to inability to obtain IV access) showed no brain metastases.  18. 4/21/2022: Started 2nd line metastatic therapy with pemetrexed and carboplatin. Omission of carboplatin 6/2 and forward due to worsening anemia despite dose reduction.  19. 7/18/2022: PET/CT showed enlarged and increased FDG avid skeletal metastases, increased left para-aortic retroperitoneal lymph node increased in size and FDG avidity, findings consistent with progressive disease.  20. 8/3/2022: Started 3rd line metastatic therapy with Taxotere 60 mg/m2 every 3 weeks.  21. 11/21/2022: PET scan showed partial response, left para-aortic retroperitoneal lymph node has decreased from 2.6 x 1.7 cm (SUVmax 7.9) to 2.3 x 1.4 cm (SUVmax 5.7), skeletal metastases no longer demonstrate FDG activity.     INTERIM HISTORY:  Kt reports slight peripheral neuropathy since starting Taxotere. No recent falls.    REVIEW OF SYSTEMS:   14 point ROS was reviewed and is negative other than as noted above in HPI.       HOME MEDICATIONS:  Current Outpatient Medications   Medication Sig Dispense Refill     atorvastatin (LIPITOR) 40 MG tablet Take 40 mg by mouth daily       folic acid (FOLVITE) 1 MG tablet Take 1 mg by mouth daily       dexamethasone (DECADRON) 4 MG tablet Take 2 tablets (8 mg) by mouth 2 times daily (with meals) for 3 doses Start evening of Docetaxel infusion and continue for a total of 3 doses. 6 tablet 7     prochlorperazine (COMPAZINE) 10 MG tablet Take 1 tablet (10 mg) by mouth every 6 hours as needed for nausea or vomiting (Patient not  taking: Reported on 2022) 30 tablet 2         ALLERGIES:  Allergies   Allergen Reactions     No Known Drug Allergies          PAST MEDICAL HISTORY:  Past Medical History:   Diagnosis Date     Alcohol abuse, unspecified      Cerebral infarction (H)     , right side residual and aphasia     Dyslipidemia      GERD (gastroesophageal reflux disease)      Lung cancer (H)      Unspecified essential hypertension          PAST SURGICAL HISTORY:  Past Surgical History:   Procedure Laterality Date     BRONCHOSCOPY FLEXIBLE AND RIGID N/A 2016    Procedure: BRONCHOSCOPY FLEXIBLE AND RIGID;  Surgeon: Tony Talbot MD;  Location: [facility] OR     ESOPHAGOSCOPY FLEXIBLE N/A 2019    Procedure: flexible esophagoscopy;  Surgeon: Lizzy Johnson MD;  Location: [facility] OR     INJECT STEROID (LOCATION) N/A 2019    Procedure: steroid injection;  Surgeon: Lizzy Johnson MD;  Location: [facility] OR     IR CHEST PORT PLACEMENT > 5 YRS OF AGE  2021     IR CHEST PORT PLACEMENT > 5 YRS OF AGE  2022     IR PORT CHECK RIGHT  2022     IR PORT REMOVAL RIGHT  2022     LASER CO2 LARYNGOSCOPY N/A 2019    Procedure: Microdirect laryngoscopy with excision of laryngeal mass, CO2 laser;  Surgeon: Lizzy Johnson MD;  Location: [facility] OR     ZZC NONSPECIFIC PROCEDURE      R tympanoplasty         SOCIAL HISTORY:  Social History     Socioeconomic History     Marital status: Single     Spouse name: Not on file     Number of children: Not on file     Years of education: Not on file     Highest education level: Not on file   Occupational History     Not on file   Tobacco Use     Smoking status: Former     Packs/day: 1.00     Types: Cigarettes     Quit date: 2009     Years since quittin.1     Smokeless tobacco: Never   Substance and Sexual Activity     Alcohol use: Not Currently     Drug use: No     Sexual activity: Not on file   Other Topics Concern     Parent/sibling w/ CABG, MI or angioplasty before 65F 55M?  "Not Asked   Social History Narrative     Not on file     Social Determinants of Health     Financial Resource Strain: Not on file   Food Insecurity: Not on file   Transportation Needs: Not on file   Physical Activity: Not on file   Stress: Not on file   Social Connections: Not on file   Intimate Partner Violence: Not At Risk     Fear of Current or Ex-Partner: No     Emotionally Abused: No     Physically Abused: No     Sexually Abused: No   Housing Stability: Not on file   Resides at assisted living.      FAMILY HISTORY:  Family History   Problem Relation Age of Onset     Cancer Father          PHYSICAL EXAM:  Vital signs:  /74   Pulse 89   Resp 16   Ht 1.905 m (6' 3\")   SpO2 96%   BMI 21.50 kg/m     ECO  GENERAL/CONSTITUTIONAL: No acute distress.  EYES: No erythema or scleral icterus.  LYMPH: No cervical, supraclavicular, axillary adenopathy.   RESPIRATORY: Clear to auscultation bilaterally. No crackles or wheezing.   CARDIOVASCULAR: Regular rate and rhythm without murmurs.  GASTROINTESTINAL: No hepatosplenomegaly, masses, or tenderness. No guarding.  No distention.  MUSCULOSKELETAL: Warm and well-perfused, no cyanosis, clubbing, or edema.  NEUROLOGIC: Residual right arm and right leg reduced strength due to prior stroke. Alert, oriented, answers questions appropriately but has occasional word finding difficulty from prior stroke. Dysphonia present and stable.  INTEGUMENTARY: No rashes or jaundice.  GAIT: Sitting in wheelchair.      LABS:  CBC RESULTS: Recent Labs   Lab Test 22  1033   WBC 7.9   RBC 3.64*   HGB 10.4*   HCT 33.5*   MCV 92   MCH 28.6   MCHC 31.0*   RDW 18.6*   *     Last Comprehensive Metabolic Panel:  Sodium   Date Value Ref Range Status   2022 139 133 - 144 mmol/L Final   2021 140 133 - 144 mmol/L Final     Potassium   Date Value Ref Range Status   2022 3.5 3.4 - 5.3 mmol/L Final   2021 4.3 3.4 - 5.3 mmol/L Final     Chloride   Date Value Ref Range " Status   08/11/2022 106 94 - 109 mmol/L Final   06/29/2021 111 (H) 94 - 109 mmol/L Final     Carbon Dioxide   Date Value Ref Range Status   06/29/2021 25 20 - 32 mmol/L Final     Carbon Dioxide (CO2)   Date Value Ref Range Status   08/11/2022 29 20 - 32 mmol/L Final     Anion Gap   Date Value Ref Range Status   08/11/2022 4 3 - 14 mmol/L Final   06/29/2021 4 3 - 14 mmol/L Final     Glucose   Date Value Ref Range Status   08/11/2022 130 (H) 70 - 99 mg/dL Final   06/29/2021 85 70 - 99 mg/dL Final     Urea Nitrogen   Date Value Ref Range Status   08/11/2022 16 7 - 30 mg/dL Final   06/29/2021 17 7 - 30 mg/dL Final     Creatinine   Date Value Ref Range Status   08/11/2022 0.78 0.66 - 1.25 mg/dL Final   06/29/2021 0.84 0.66 - 1.25 mg/dL Final     GFR Estimate   Date Value Ref Range Status   08/11/2022 >90 >60 mL/min/1.73m2 Final     Comment:     Effective December 21, 2021 eGFRcr in adults is calculated using the 2021 CKD-EPI creatinine equation which includes age and gender (Ceci et al., NE, DOI: 10.1056/LJKOqq4286413)   06/29/2021 >90 >60 mL/min/[1.73_m2] Final     Comment:     Non  GFR Calc  Starting 12/18/2018, serum creatinine based estimated GFR (eGFR) will be   calculated using the Chronic Kidney Disease Epidemiology Collaboration   (CKD-EPI) equation.       Calcium   Date Value Ref Range Status   08/11/2022 9.2 8.5 - 10.1 mg/dL Final   06/29/2021 8.7 8.5 - 10.1 mg/dL Final     Bilirubin Total   Date Value Ref Range Status   11/16/2022 0.5 0.2 - 1.3 mg/dL Final   06/29/2021 0.3 0.2 - 1.3 mg/dL Final     Alkaline Phosphatase   Date Value Ref Range Status   11/16/2022 95 40 - 150 U/L Final   06/29/2021 73 40 - 150 U/L Final     ALT   Date Value Ref Range Status   11/16/2022 35 0 - 70 U/L Final   06/29/2021 34 0 - 70 U/L Final     AST   Date Value Ref Range Status   11/16/2022 25 0 - 45 U/L Final   06/29/2021 26 0 - 45 U/L Final       PATHOLOGY:  None new.    IMAGING:  Reviewed as per  HPI.    ASSESSMENT/PLAN:  Kt Rasmussen is a 61 year old male with the following issues:  1. Metastatic non-small (non-squamous) cell lung carcinoma with metastases to lymph nodes, bones, and bilateral lungs  2. 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 Chemotherapy-induced pancytopenia  --Lung NGS panel negative for mutations in BRAF, EGFR, ERBB2, IDH1, IDH2, KRAS, MET, NRAS, RET. PD-L1 expression negative (TPS <1%). Guardant 360 negative for targetable mutations.  --Kt started 3rd line metastatic therapy with every 3-week Taxotere at 20% dose reduction (60 mg/m2) on 8/3/2022 due to progressive disease.  He has tolerated this very well with minimal to no paresthesias.  --I reviewed his 11/21/2022 PET scan which shows good partial response.  --Continue Taxotere at 20% dose reduction due to past repeated issues with chemo-induced cytopenias. Discussed continuation of Taxotere.  He is willing to do one more cycle of Taxotere but would like to take a chemo break thereafter.  Discussed possibility of progressive disease while off chemo.  He still wishes to take a chemo break and expresses understanding of the above.  --Reviewed 11/16 labs which showed Hgb 10.4, WBC 7.9, platelets 148,000.  --Will repeat PET scan in 2 months.    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 Dysphonia  6. Dysphagia  -Kt underwent excision of a left vocal cord SCC on 9/30/2019 and has persistent dysphonia as a result of the lesion and excision.  He also has dysphagia but this is stable and swallowing is manageable.  -Last scope by ENT 9/23/2021 showed no evidence for recurrence. He will see Dr. Wray again on 9/8.    7. Weight loss  --I had offered nutrition consult and he declined this.  --I recommended increasing his intake of higher caloric nutrient dense foods and beverages. His weight has now stabilized.    8. Dizziness and  "prior falls  --SW and guardian were previously notified of multiple falls.  --Prior brain MRI 2/14/2022 showed no brain metastases. However this was a suboptimal exam due to inability to administer contrast.  --He has not had recent falls and is using a wheelchair due to prior femoral fracture in 1/2022.    Return in 2 months.    Sangita John MD  Hematology/Oncology  [facility]    Total time spent: 30 minutes in patient evaluation, counseling, documentation, and coordination of care.    Oncology Rooming Note    November 28, 2022 2:55 PM   Kt Rasmussen is a 62 year old male who presents for:    Chief Complaint   Patient presents with     Oncology Clinic Visit     Initial Vitals: There were no vitals taken for this visit. Estimated body mass index is 21.21 kg/m  as calculated from the following:    Height as of 11/16/22: 1.918 m (6' 3.51\").    Weight as of 11/16/22: 78 kg (172 lb). There is no height or weight on file to calculate BSA.  Data Unavailable Comment: Data Unavailable   No LMP for male patient.  Allergies reviewed: Yes  Medications reviewed: Yes    Medications: Medication refills not needed today.  Pharmacy name entered into EPIC:    [facility] [address] [facility] [address] [place] [address] [id number] - [place] [address] AT [facility] [address]  [facility] - [address]    Clinical concerns:  doctor was notified.      Floridalma Nieves MA                Again, thank you for allowing me to participate in the care of your patient.        Sincerely,        Sangita John MD    "

## 2022-12-04 DIAGNOSIS — C79.51 NON-SMALL CELL LUNG CANCER METASTATIC TO BONE (H): ICD-10-CM

## 2022-12-04 DIAGNOSIS — Z51.11 ENCOUNTER FOR ANTINEOPLASTIC CHEMOTHERAPY: ICD-10-CM

## 2022-12-04 DIAGNOSIS — C34.90 NON-SMALL CELL LUNG CANCER METASTATIC TO BONE (H): ICD-10-CM

## 2022-12-04 DIAGNOSIS — T45.1X5A CHEMOTHERAPY-INDUCED NEUTROPENIA (H): Primary | ICD-10-CM

## 2022-12-04 DIAGNOSIS — D70.1 CHEMOTHERAPY-INDUCED NEUTROPENIA (H): Primary | ICD-10-CM

## 2022-12-04 RX ORDER — HEPARIN SODIUM (PORCINE) LOCK FLUSH IV SOLN 100 UNIT/ML 100 UNIT/ML
5 SOLUTION INTRAVENOUS
Status: CANCELLED | OUTPATIENT
Start: 2022-12-07

## 2022-12-04 RX ORDER — ALBUTEROL SULFATE 90 UG/1
1-2 AEROSOL, METERED RESPIRATORY (INHALATION)
Status: CANCELLED
Start: 2022-12-07

## 2022-12-04 RX ORDER — EPINEPHRINE 1 MG/ML
0.3 INJECTION, SOLUTION, CONCENTRATE INTRAVENOUS EVERY 5 MIN PRN
Status: CANCELLED | OUTPATIENT
Start: 2022-12-07

## 2022-12-04 RX ORDER — DIPHENHYDRAMINE HYDROCHLORIDE 50 MG/ML
50 INJECTION INTRAMUSCULAR; INTRAVENOUS
Status: CANCELLED
Start: 2022-12-07

## 2022-12-04 RX ORDER — ALBUTEROL SULFATE 0.83 MG/ML
2.5 SOLUTION RESPIRATORY (INHALATION)
Status: CANCELLED | OUTPATIENT
Start: 2022-12-07

## 2022-12-04 RX ORDER — HEPARIN SODIUM,PORCINE 10 UNIT/ML
5 VIAL (ML) INTRAVENOUS
Status: CANCELLED | OUTPATIENT
Start: 2022-12-07

## 2022-12-04 RX ORDER — MEPERIDINE HYDROCHLORIDE 25 MG/ML
25 INJECTION INTRAMUSCULAR; INTRAVENOUS; SUBCUTANEOUS EVERY 30 MIN PRN
Status: CANCELLED | OUTPATIENT
Start: 2022-12-07

## 2022-12-04 RX ORDER — LORAZEPAM 2 MG/ML
0.5 INJECTION INTRAMUSCULAR EVERY 4 HOURS PRN
Status: CANCELLED | OUTPATIENT
Start: 2022-12-07

## 2022-12-07 ENCOUNTER — LAB (OUTPATIENT)
Dept: INFUSION THERAPY | Facility: CLINIC | Age: 62
End: 2022-12-07
Attending: NURSE PRACTITIONER
Payer: MEDICARE

## 2022-12-07 DIAGNOSIS — T45.1X5A CHEMOTHERAPY-INDUCED NEUTROPENIA (H): Primary | ICD-10-CM

## 2022-12-07 DIAGNOSIS — C34.90 NON-SMALL CELL LUNG CANCER METASTATIC TO BONE (H): ICD-10-CM

## 2022-12-07 DIAGNOSIS — Z51.11 ENCOUNTER FOR ANTINEOPLASTIC CHEMOTHERAPY: ICD-10-CM

## 2022-12-07 DIAGNOSIS — D70.1 CHEMOTHERAPY-INDUCED NEUTROPENIA (H): Primary | ICD-10-CM

## 2022-12-07 DIAGNOSIS — C79.51 NON-SMALL CELL LUNG CANCER METASTATIC TO BONE (H): ICD-10-CM

## 2022-12-07 LAB
ALBUMIN SERPL-MCNC: 3.4 G/DL (ref 3.4–5)
ALP SERPL-CCNC: 89 U/L (ref 40–150)
ALT SERPL W P-5'-P-CCNC: 27 U/L (ref 0–70)
AST SERPL W P-5'-P-CCNC: 20 U/L (ref 0–45)
BASOPHILS # BLD AUTO: 0 10E3/UL (ref 0–0.2)
BASOPHILS NFR BLD AUTO: 0 %
BILIRUB DIRECT SERPL-MCNC: 0.2 MG/DL (ref 0–0.2)
BILIRUB SERPL-MCNC: 0.6 MG/DL (ref 0.2–1.3)
EOSINOPHIL # BLD AUTO: 0 10E3/UL (ref 0–0.7)
EOSINOPHIL NFR BLD AUTO: 0 %
ERYTHROCYTE [DISTWIDTH] IN BLOOD BY AUTOMATED COUNT: 18.5 % (ref 10–15)
HCT VFR BLD AUTO: 35.3 % (ref 40–53)
HGB BLD-MCNC: 10.8 G/DL (ref 13.3–17.7)
IMM GRANULOCYTES # BLD: 0 10E3/UL
IMM GRANULOCYTES NFR BLD: 0 %
LYMPHOCYTES # BLD AUTO: 1 10E3/UL (ref 0.8–5.3)
LYMPHOCYTES NFR BLD AUTO: 12 %
MCH RBC QN AUTO: 28.5 PG (ref 26.5–33)
MCHC RBC AUTO-ENTMCNC: 30.6 G/DL (ref 31.5–36.5)
MCV RBC AUTO: 93 FL (ref 78–100)
MONOCYTES # BLD AUTO: 0.7 10E3/UL (ref 0–1.3)
MONOCYTES NFR BLD AUTO: 8 %
NEUTROPHILS # BLD AUTO: 6.4 10E3/UL (ref 1.6–8.3)
NEUTROPHILS NFR BLD AUTO: 80 %
NRBC # BLD AUTO: 0 10E3/UL
NRBC BLD AUTO-RTO: 0 /100
PLATELET # BLD AUTO: 157 10E3/UL (ref 150–450)
PROT SERPL-MCNC: 6.6 G/DL (ref 6.8–8.8)
RBC # BLD AUTO: 3.79 10E6/UL (ref 4.4–5.9)
WBC # BLD AUTO: 8.2 10E3/UL (ref 4–11)

## 2022-12-07 PROCEDURE — 82248 BILIRUBIN DIRECT: CPT | Performed by: INTERNAL MEDICINE

## 2022-12-07 PROCEDURE — 96377 APPLICATON ON-BODY INJECTOR: CPT | Mod: XS

## 2022-12-07 PROCEDURE — 96413 CHEMO IV INFUSION 1 HR: CPT

## 2022-12-07 PROCEDURE — 85041 AUTOMATED RBC COUNT: CPT | Performed by: INTERNAL MEDICINE

## 2022-12-07 PROCEDURE — 258N000003 HC RX IP 258 OP 636: Performed by: INTERNAL MEDICINE

## 2022-12-07 PROCEDURE — 250N000011 HC RX IP 250 OP 636: Performed by: INTERNAL MEDICINE

## 2022-12-07 PROCEDURE — 36591 DRAW BLOOD OFF VENOUS DEVICE: CPT | Performed by: INTERNAL MEDICINE

## 2022-12-07 PROCEDURE — 96372 THER/PROPH/DIAG INJ SC/IM: CPT | Performed by: INTERNAL MEDICINE

## 2022-12-07 PROCEDURE — 96367 TX/PROPH/DG ADDL SEQ IV INF: CPT

## 2022-12-07 RX ORDER — HEPARIN SODIUM (PORCINE) LOCK FLUSH IV SOLN 100 UNIT/ML 100 UNIT/ML
5 SOLUTION INTRAVENOUS
Status: DISCONTINUED | OUTPATIENT
Start: 2022-12-07 | End: 2022-12-07 | Stop reason: HOSPADM

## 2022-12-07 RX ADMIN — Medication 5 ML: at 13:27

## 2022-12-07 RX ADMIN — DEXAMETHASONE SODIUM PHOSPHATE: 10 INJECTION, SOLUTION INTRAMUSCULAR; INTRAVENOUS at 12:00

## 2022-12-07 RX ADMIN — PEGFILGRASTIM 6 MG: KIT SUBCUTANEOUS at 12:35

## 2022-12-07 RX ADMIN — SODIUM CHLORIDE 122 MG: 9 INJECTION, SOLUTION INTRAVENOUS at 12:20

## 2022-12-07 RX ADMIN — SODIUM CHLORIDE 250 ML: 9 INJECTION, SOLUTION INTRAVENOUS at 12:00

## 2022-12-07 NOTE — PROGRESS NOTES
Infusion Nursing Note:  Kt Rasmussen presents today for Taxotere.    Patient seen by provider today: No   present during visit today: Not Applicable.    Note: Patient has decided to take a break from chemo after this cycle and will return in 2 months after PET/CT.    Intravenous Access:  Implanted Port.  Accessed in FT.    Treatment Conditions:  Lab Results   Component Value Date    HGB 10.8 (L) 12/07/2022    WBC 8.2 12/07/2022    ANEU 0.5 (L) 08/11/2022    ANEUTAUTO 6.4 12/07/2022     12/07/2022      Lab Results   Component Value Date     08/11/2022    POTASSIUM 3.5 08/11/2022    MAG 1.9 10/13/2016    CR 0.78 08/11/2022    BEVERLY 9.2 08/11/2022    BILITOTAL 0.6 12/07/2022    ALBUMIN 3.4 12/07/2022    ALT 27 12/07/2022    AST 20 12/07/2022     Results reviewed, labs MET treatment parameters, ok to proceed with treatment.    Post Infusion Assessment:  Patient tolerated infusion without incident.  Blood return noted pre and post infusion.  Site patent and intact, free from redness, edema or discomfort.  No evidence of extravasations.  Access discontinued per protocol.     ONPRO  Was placed on patient's: back of right arm.    Was placed at 1240 PM    Podpal used: Yes    ONPRO injector device Lot number: [id number]    Patient education included: what patient can expect after application, what colored lights mean on the device, when to remove device, when and where to call with questions or issues, all patients questions answered and that Neulasta administration will occur at 1540 on 12/8/22.    Patient tolerated administration well.      Discharge Plan:   Discharge instructions reviewed with: Patient.  Patient and/or family verbalized understanding of discharge instructions and all questions answered.  Copy of AVS reviewed with patient and/or family.  Patient will return 1/25/22 for next appointment.  Patient discharged in stable condition accompanied by: Transport.  Departure Mode:  Wheelchair.      Filipe Carr RN

## 2022-12-07 NOTE — PROGRESS NOTES
Nursing Note:  Kt Rasmussen presents today for labs.    Patient seen by provider today: No   present during visit today: Not Applicable.    Note: N/A.    Intravenous Access:  Implanted Port.    Discharge Plan:   Patient was sent to lobby for next appointment.    Arin Keith RN

## 2022-12-07 NOTE — PATIENT INSTRUCTIONS
Your On-body Neulasta Injector was applied to your right arm at 12:40pm.  At approximately 3:40pm on 12/8/22, your On-body Injector will beep to let you know your dose delivery will begin in 2 minutes.  Your medication will be delivered over the next 45 minutes.  You can remove your Injector at 5:15pm.  Please make sure your Injector has a solid green light or has turned off prior to removing the device.  Please contact your provider at [phone] with questions or concerns.

## 2023-01-20 NOTE — PROGRESS NOTES
[facility]    Hematology/Oncology Established Patient Follow-up Note      Today's Date: 1/25/2023    Reason for Follow-up: Metastatic non-small cell lung carcinoma.    HISTORY OF PRESENT ILLNESS: Kt Rasmussen is a 62 year old male who presents with the following oncologic history:  1. 5/05/2016: Presented with near-obstructing large mass coming off the cheikh and likely the posterior wall, seen on bronchoscopy. Biopsy of the mass showed invasive squamous cell carcinoma, moderately differentiating and focally keratinizing.  Procedure was complicated by significant bleeding.  Tumor was completely debulked (via bronchoscopy) in both main stem bronchi and distal trachea. NGS showed no mutations in EGFR, KRAS, BRAF, NRAS, HRAS, PIK3CA, ERBB2, MET, or JAK2.  2. 5/23/2016: PET/CT scan showed evidence of residual tumor with decreased endobronchial mass. Indeterminate, mildly hypermetabolic mesentery with prominent lymph nodes and area of enhancement of the right hepatic lobe present. Felt to have T4-NX-M0 disease.  3. 6/09/2016: Started concurrent chemoradiation with weekly paclitaxel and carboplatin.  4. 7/30/2016: Completed radiation.  Subsequently received 2 cycles of consolidation paclitaxel and carboplatin.   5. 9/13/2019: Presented with 6-month history of dysphonia and left vocal exophytic lesion. Left true vocal fold biopsy showed at least squamous cell carcinoma in situ, cannot exclude superficial invasion.  6. 9/30/2019: Excision of left vocal cord mass showed fragments of invasive squamous cell carcinoma, well differentiated and keratinizing.  Margins negative for malignancy.  7. 2/16/2021: CT chest w/o contrast showed thin-walled cavity in left lower lobe with 2 solid peripheral nodules measuring 9 mm each. No lymphadenopathy.  8. 3/01/2021: PET scan showed hypermetabolic nodules in left lower lobe and right lung, consistent with metastases; hypermetabolic adenopathy in chest, abdomen,  pelvis; nonspecific uptake at tongue; hypermetabolic intraosseous lesions in spine and pelvis consistent with metastatic disease; left axillary hypermetabolic lymph nodes related to COVID-19 vaccination.  9. 3/12/2021: CT-guided lung biopsy showed non-small cell carcinoma, not otherwise specified -- with opinion by [facility] pathologist.  10. 3/18/2021: Lung NGS panel negative for mutations in BRAF, EGFR, ERBB2, IDH1, IDH2, KRAS, MET, NRAS, RET. PD-L1 expression negative (TPS <1%). Due to minimal amount of DNA obtained from specimen, other biomarkers could not be analyzed.  11. 4/7/2021: MRI brain negative for brain metastases.  12. 4/27/2021: Started 1st line metastatic therapy with carboplatin, paclitaxel, bevacizumab, and atezolizumab for metastatic non-small cell lung carcinoma, NOS.  13. 7/12/2021: PET/CT showed resolved mural nodules with increased cystic cavity in left lower lobe with no significant FDG uptake, less likely metastases; no enlarged hypermetabolic mediastinal, hilar, or axillary lymph nodes, decreased metabolic activity of intraosseous lesion in spine and pelvis; no new bone lesions.  14. 10/11/2021: PET/CT showed slight interval increase in intensity of metabolic activity of right pubic bone and left ischium with new focal uptake in L2 spinous process; resolved uptake at previous ground glass opacity along right medial lower lobe; unchanged cystic cavities/nodules in left lower lobe and right apical upper lobe; no pathologically enlarged hypermetabolic mediastinal, hilar, or axillary lymph nodes.  15. 1/10/2022: PET/CT showed new foci of abnormal skeletal FGD uptake in the thoracic and lumbar spine. Mild interval increase in intensity of previously demonstrated hypermetabolic skeletal lesions involving the pelvis and lumbar spine. Findings are consistent with progressive osseous metastatic disease. Subsequently off chemo for 1 month due to poor performance status.  16. 1/25/2022: Patient fell  and fractured his femur. This was surgically repaired and he was subsequently cared for at a rehab unit.  17. 2/14/2022: Brain MRI without contrast (contrast not given due to inability to obtain IV access) showed no brain metastases.  18. 4/21/2022: Started 2nd line metastatic therapy with pemetrexed and carboplatin. Omission of carboplatin 6/2 and forward due to worsening anemia despite dose reduction.  19. 7/18/2022: PET/CT showed enlarged and increased FDG avid skeletal metastases, increased left para-aortic retroperitoneal lymph node increased in size and FDG avidity, findings consistent with progressive disease.  20. 8/3/2022: Started 3rd line metastatic therapy with Taxotere 60 mg/m2 every 3 weeks.  21. 11/21/2022: PET scan showed partial response, left para-aortic retroperitoneal lymph node has decreased from 2.6 x 1.7 cm (SUVmax 7.9) to 2.3 x 1.4 cm (SUVmax 5.7), skeletal metastases no longer demonstrate FDG activity. Kt elected to take a 2-month break from chemo.  22. 1/23/2023: PET scan showed increasing metabolic activity of the left periaortic (max SUV 8.1, previously 5.7) left external iliac (max SUV 7.0, previously 3.1), and right external iliac (max SUV 5.1, previously 3.7) lymph nodes with development/reactivation of multiple FDG avid osseous lesions    INTERIM HISTORY:  Kt reports no new pain.    REVIEW OF SYSTEMS:   14 point ROS was reviewed and is negative other than as noted above in HPI.       HOME MEDICATIONS:  Current Outpatient Medications   Medication Sig Dispense Refill     atorvastatin (LIPITOR) 40 MG tablet Take 40 mg by mouth daily       dexamethasone (DECADRON) 4 MG tablet Take 2 tablets (8 mg) by mouth 2 times daily (with meals) for 3 doses Start evening of Docetaxel infusion and continue for a total of 3 doses. 6 tablet 7     folic acid (FOLVITE) 1 MG tablet Take 1 mg by mouth daily       prochlorperazine (COMPAZINE) 10 MG tablet Take 1 tablet (10 mg) by mouth every 6 hours as  needed for nausea or vomiting (Patient not taking: Reported on 2022) 30 tablet 2         ALLERGIES:  Allergies   Allergen Reactions     No Known Drug Allergies          PAST MEDICAL HISTORY:  Past Medical History:   Diagnosis Date     Alcohol abuse, unspecified      Cerebral infarction (H)     , right side residual and aphasia     Dyslipidemia      GERD (gastroesophageal reflux disease)      Lung cancer (H)      Unspecified essential hypertension          PAST SURGICAL HISTORY:  Past Surgical History:   Procedure Laterality Date     BRONCHOSCOPY FLEXIBLE AND RIGID N/A 2016    Procedure: BRONCHOSCOPY FLEXIBLE AND RIGID;  Surgeon: Tony Talbot MD;  Location: [facility] OR     ESOPHAGOSCOPY FLEXIBLE N/A 2019    Procedure: flexible esophagoscopy;  Surgeon: Lizzy Johnson MD;  Location: [facility] OR     INJECT STEROID (LOCATION) N/A 2019    Procedure: steroid injection;  Surgeon: Lizzy Johnson MD;  Location: [facility] OR     IR CHEST PORT PLACEMENT > 5 YRS OF AGE  2021     IR CHEST PORT PLACEMENT > 5 YRS OF AGE  2022     IR PORT CHECK RIGHT  2022     IR PORT REMOVAL RIGHT  2022     LASER CO2 LARYNGOSCOPY N/A 2019    Procedure: Microdirect laryngoscopy with excision of laryngeal mass, CO2 laser;  Surgeon: Lizzy Johnson MD;  Location: [facility] OR     ZZC NONSPECIFIC PROCEDURE      R tympanoplasty         SOCIAL HISTORY:  Social History     Socioeconomic History     Marital status: Single     Spouse name: Not on file     Number of children: Not on file     Years of education: Not on file     Highest education level: Not on file   Occupational History     Not on file   Tobacco Use     Smoking status: Former     Packs/day: 1.00     Types: Cigarettes     Quit date: 2009     Years since quittin.3     Smokeless tobacco: Never   Substance and Sexual Activity     Alcohol use: Not Currently     Drug use: No     Sexual activity: Not on file   Other Topics Concern     Parent/sibling  w/ CABG, MI or angioplasty before 65F 55M? Not Asked   Social History Narrative     Not on file     Social Determinants of Health     Financial Resource Strain: Not on file   Food Insecurity: Not on file   Transportation Needs: Not on file   Physical Activity: Not on file   Stress: Not on file   Social Connections: Not on file   Intimate Partner Violence: Not At Risk     Fear of Current or Ex-Partner: No     Emotionally Abused: No     Physically Abused: No     Sexually Abused: No   Housing Stability: Not on file   Resides at assisted living.      FAMILY HISTORY:  Family History   Problem Relation Age of Onset     Cancer Father          PHYSICAL EXAM:  Vital signs:  /53   Pulse 69   Resp 16   SpO2 99%    ECO  GENERAL: No acute distress.  EYES: No scleral icterus. No overt erythema.  RESPIRATORY: No audible cough, wheezing, or labored breathing. Hoarseness present.  MUSCULOSKELETAL: Range of motion in the neck, shoulders, and arms appear normal.  SKIN: No overt rashes, discolorations, or lesions over the face and neck.  NEUROLOGIC: Alert.  No overt tremors.  PSYCHIATRIC: Normal affect and mood.  Does not appear anxious.  GAIT: Sitting in wheelchair.      LABS:  CBC RESULTS: Recent Labs   Lab Test 23  0919   WBC 4.4   RBC 4.18*   HGB 11.6*   HCT 38.2*   MCV 91   MCH 27.8   MCHC 30.4*   RDW 15.9*   *     Last Comprehensive Metabolic Panel:  Sodium   Date Value Ref Range Status   2022 141 133 - 144 mmol/L Final   2021 140 133 - 144 mmol/L Final     Potassium   Date Value Ref Range Status   2022 4.1 3.4 - 5.3 mmol/L Final   2021 4.3 3.4 - 5.3 mmol/L Final     Chloride   Date Value Ref Range Status   2022 109 94 - 109 mmol/L Final   2021 111 (H) 94 - 109 mmol/L Final     Carbon Dioxide   Date Value Ref Range Status   2021 25 20 - 32 mmol/L Final     Carbon Dioxide (CO2)   Date Value Ref Range Status   2022 25 20 - 32 mmol/L Final     Anion Gap   Date  Value Ref Range Status   12/07/2022 7 3 - 14 mmol/L Final   06/29/2021 4 3 - 14 mmol/L Final     Glucose   Date Value Ref Range Status   12/07/2022 91 70 - 99 mg/dL Final   06/29/2021 85 70 - 99 mg/dL Final     Urea Nitrogen   Date Value Ref Range Status   12/07/2022 17 7 - 30 mg/dL Final   06/29/2021 17 7 - 30 mg/dL Final     Creatinine   Date Value Ref Range Status   12/07/2022 0.73 0.66 - 1.25 mg/dL Final   06/29/2021 0.84 0.66 - 1.25 mg/dL Final     GFR Estimate   Date Value Ref Range Status   12/07/2022 >90 >60 mL/min/1.73m2 Final     Comment:     Effective December 21, 2021 eGFRcr in adults is calculated using the 2021 CKD-EPI creatinine equation which includes age and gender (Ceci et al., [place], DOI: 10.1056/SMNGge1084320)   06/29/2021 >90 >60 mL/min/[1.73_m2] Final     Comment:     Non  GFR Calc  Starting 12/18/2018, serum creatinine based estimated GFR (eGFR) will be   calculated using the Chronic Kidney Disease Epidemiology Collaboration   (CKD-EPI) equation.       Calcium   Date Value Ref Range Status   12/07/2022 8.8 8.5 - 10.1 mg/dL Final   06/29/2021 8.7 8.5 - 10.1 mg/dL Final     Bilirubin Total   Date Value Ref Range Status   12/07/2022 0.6 0.2 - 1.3 mg/dL Final   12/07/2022 0.6 0.2 - 1.3 mg/dL Final   06/29/2021 0.3 0.2 - 1.3 mg/dL Final     Alkaline Phosphatase   Date Value Ref Range Status   12/07/2022 89 40 - 150 U/L Final   12/07/2022 89 40 - 150 U/L Final   06/29/2021 73 40 - 150 U/L Final     ALT   Date Value Ref Range Status   12/07/2022 27 0 - 70 U/L Final   12/07/2022 27 0 - 70 U/L Final   06/29/2021 34 0 - 70 U/L Final     AST   Date Value Ref Range Status   12/07/2022 20 0 - 45 U/L Final   12/07/2022 20 0 - 45 U/L Final   06/29/2021 26 0 - 45 U/L Final       PATHOLOGY:  None new.    IMAGING:  Reviewed as per HPI.    ASSESSMENT/PLAN:  Kt Rasmussen is a 61 year old male with the following issues:  1. Metastatic non-small (non-squamous) cell lung carcinoma with metastases  to lymph nodes, bones, and bilateral lungs  2. 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 Chemotherapy-induced pancytopenia  --Lung NGS panel negative for mutations in BRAF, EGFR, ERBB2, IDH1, IDH2, KRAS, MET, NRAS, RET. PD-L1 expression negative (TPS <1%). Guardant 360 negative for targetable mutations.  --Kt started 3rd line metastatic therapy with every 3-week Taxotere at 20% dose reduction (60 mg/m2) on 8/3/2022 due to progressive disease.  He has tolerated this very well with minimal to no paresthesias.  --I reviewed his 1/23/2023 PET scan which showed progressive disease in multiple bones lymph nodes.  --Resume Taxotere at 20% dose reduction due to past repeated issues with chemo-induced cytopenias.  He consents to resumption of chemo. Reiterated that all treatment is with palliative intent, not curative.  --Reviewed 1/25 labs which showed Hgb 11.6, WBC 4.4, platelets 124,000; CMP pending.  --Will repeat PET scan in about 3 months.    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 Dysphonia  6. Dysphagia  -Kt underwent excision of a left vocal cord SCC on 9/30/2019 and has persistent dysphonia as a result of the lesion and excision.  He also has dysphagia but this is stable and swallowing is manageable.  -Last scope by ENT 9/23/2021 showed no evidence for recurrence. He will see Dr. Wray again on 9/8.    7. Weight loss  --I had offered nutrition consult and he declined this.  --I recommended increasing his intake of higher caloric nutrient dense foods and beverages. His weight has now stabilized.    8. Dizziness and prior falls  --SW and guardian were previously notified of multiple falls.  --Prior brain MRI 2/14/2022 showed no brain metastases. However this was a suboptimal exam due to inability to administer contrast.  --He has not had recent falls and is using a wheelchair due to prior femoral  fracture in 1/2022.    Return 3 weeks after next chemo.    Sangita John MD  Hematology/Oncology  [facility]    Total time spent: 40 minutes in patient evaluation, counseling, documentation, and coordination of care.

## 2023-01-23 ENCOUNTER — HOSPITAL ENCOUNTER (OUTPATIENT)
Dept: PET IMAGING | Facility: CLINIC | Age: 63
Discharge: HOME | End: 2023-01-23
Attending: INTERNAL MEDICINE
Payer: COMMERCIAL

## 2023-01-23 ENCOUNTER — HOSPITAL ENCOUNTER (OUTPATIENT)
Facility: CLINIC | Age: 63
Discharge: HOME | End: 2023-01-23
Admitting: RADIOLOGY
Payer: COMMERCIAL

## 2023-01-23 DIAGNOSIS — C34.90 NON-SMALL CELL LUNG CANCER METASTATIC TO BONE (H): ICD-10-CM

## 2023-01-23 DIAGNOSIS — C79.51 NON-SMALL CELL LUNG CANCER METASTATIC TO BONE (H): ICD-10-CM

## 2023-01-23 PROCEDURE — A9552 F18 FDG: HCPCS | Performed by: INTERNAL MEDICINE

## 2023-01-23 PROCEDURE — 250N000011 HC RX IP 250 OP 636: Performed by: INTERNAL MEDICINE

## 2023-01-23 PROCEDURE — 999N000130 PET ONCOLOGY WHOLE BODY: Mod: PS

## 2023-01-23 PROCEDURE — 74177 CT ABD & PELVIS W/CONTRAST: CPT

## 2023-01-23 PROCEDURE — 343N000001 HC RX 343: Performed by: INTERNAL MEDICINE

## 2023-01-23 RX ORDER — HEPARIN SODIUM (PORCINE) LOCK FLUSH IV SOLN 100 UNIT/ML 100 UNIT/ML
500 SOLUTION INTRAVENOUS ONCE
Status: COMPLETED | OUTPATIENT
Start: 2023-01-23 | End: 2023-01-23

## 2023-01-23 RX ORDER — IOPAMIDOL 755 MG/ML
0-135 INJECTION, SOLUTION INTRAVASCULAR ONCE
Status: COMPLETED | OUTPATIENT
Start: 2023-01-23 | End: 2023-01-23

## 2023-01-23 RX ADMIN — Medication 500 UNITS: at 10:35

## 2023-01-23 RX ADMIN — FLUDEOXYGLUCOSE F-18 13.49 MCI.: 500 INJECTION, SOLUTION INTRAVENOUS at 10:33

## 2023-01-23 RX ADMIN — IOPAMIDOL 105 ML: 755 INJECTION, SOLUTION INTRAVENOUS at 10:34

## 2023-01-23 ASSESSMENT — ACTIVITIES OF DAILY LIVING (ADL)
ADLS_ACUITY_SCORE: 35

## 2023-01-24 DIAGNOSIS — D70.1 CHEMOTHERAPY-INDUCED NEUTROPENIA (H): Primary | ICD-10-CM

## 2023-01-24 DIAGNOSIS — C34.90 NON-SMALL CELL LUNG CANCER METASTATIC TO BONE (H): ICD-10-CM

## 2023-01-24 DIAGNOSIS — C79.51 NON-SMALL CELL LUNG CANCER METASTATIC TO BONE (H): ICD-10-CM

## 2023-01-24 DIAGNOSIS — Z51.11 ENCOUNTER FOR ANTINEOPLASTIC CHEMOTHERAPY: ICD-10-CM

## 2023-01-24 DIAGNOSIS — T45.1X5A CHEMOTHERAPY-INDUCED NEUTROPENIA (H): Primary | ICD-10-CM

## 2023-01-24 RX ORDER — ALBUTEROL SULFATE 90 UG/1
1-2 AEROSOL, METERED RESPIRATORY (INHALATION)
Status: CANCELLED
Start: 2023-02-16

## 2023-01-24 RX ORDER — EPINEPHRINE 1 MG/ML
0.3 INJECTION, SOLUTION, CONCENTRATE INTRAVENOUS EVERY 5 MIN PRN
Status: CANCELLED | OUTPATIENT
Start: 2023-02-16

## 2023-01-24 RX ORDER — HEPARIN SODIUM (PORCINE) LOCK FLUSH IV SOLN 100 UNIT/ML 100 UNIT/ML
5 SOLUTION INTRAVENOUS
Status: CANCELLED | OUTPATIENT
Start: 2023-02-16

## 2023-01-24 RX ORDER — LORAZEPAM 2 MG/ML
0.5 INJECTION INTRAMUSCULAR EVERY 4 HOURS PRN
Status: CANCELLED | OUTPATIENT
Start: 2023-02-16

## 2023-01-24 RX ORDER — MEPERIDINE HYDROCHLORIDE 25 MG/ML
25 INJECTION INTRAMUSCULAR; INTRAVENOUS; SUBCUTANEOUS EVERY 30 MIN PRN
Status: CANCELLED | OUTPATIENT
Start: 2023-02-16

## 2023-01-24 RX ORDER — ALBUTEROL SULFATE 0.83 MG/ML
2.5 SOLUTION RESPIRATORY (INHALATION)
Status: CANCELLED | OUTPATIENT
Start: 2023-02-16

## 2023-01-24 RX ORDER — HEPARIN SODIUM,PORCINE 10 UNIT/ML
5 VIAL (ML) INTRAVENOUS
Status: CANCELLED | OUTPATIENT
Start: 2023-02-16

## 2023-01-24 RX ORDER — DIPHENHYDRAMINE HYDROCHLORIDE 50 MG/ML
50 INJECTION INTRAMUSCULAR; INTRAVENOUS
Status: CANCELLED
Start: 2023-02-16

## 2023-01-25 ENCOUNTER — LAB (OUTPATIENT)
Dept: INFUSION THERAPY | Facility: CLINIC | Age: 63
End: 2023-01-25
Attending: INTERNAL MEDICINE
Payer: COMMERCIAL

## 2023-01-25 ENCOUNTER — ONCOLOGY VISIT (OUTPATIENT)
Dept: ONCOLOGY | Facility: CLINIC | Age: 63
End: 2023-01-25
Attending: INTERNAL MEDICINE
Payer: COMMERCIAL

## 2023-01-25 VITALS
HEART RATE: 69 BPM | RESPIRATION RATE: 16 BRPM | DIASTOLIC BLOOD PRESSURE: 53 MMHG | SYSTOLIC BLOOD PRESSURE: 119 MMHG | OXYGEN SATURATION: 99 %

## 2023-01-25 DIAGNOSIS — C34.90 NON-SMALL CELL LUNG CANCER METASTATIC TO BONE (H): ICD-10-CM

## 2023-01-25 DIAGNOSIS — C34.90 NON-SMALL CELL LUNG CANCER METASTATIC TO BONE (H): Primary | ICD-10-CM

## 2023-01-25 DIAGNOSIS — D70.1 CHEMOTHERAPY-INDUCED NEUTROPENIA (H): Primary | ICD-10-CM

## 2023-01-25 DIAGNOSIS — C79.51 NON-SMALL CELL LUNG CANCER METASTATIC TO BONE (H): ICD-10-CM

## 2023-01-25 DIAGNOSIS — T45.1X5A CHEMOTHERAPY-INDUCED NEUTROPENIA (H): ICD-10-CM

## 2023-01-25 DIAGNOSIS — D64.81 ANEMIA DUE TO CHEMOTHERAPY: ICD-10-CM

## 2023-01-25 DIAGNOSIS — D70.1 CHEMOTHERAPY-INDUCED NEUTROPENIA (H): ICD-10-CM

## 2023-01-25 DIAGNOSIS — T45.1X5A CHEMOTHERAPY-INDUCED NEUTROPENIA (H): Primary | ICD-10-CM

## 2023-01-25 DIAGNOSIS — C34.90 NON-SMALL CELL CARCINOMA OF LUNG, STAGE 3, UNSPECIFIED LATERALITY (H): ICD-10-CM

## 2023-01-25 DIAGNOSIS — T45.1X5A ANEMIA DUE TO CHEMOTHERAPY: ICD-10-CM

## 2023-01-25 DIAGNOSIS — Z51.11 ENCOUNTER FOR ANTINEOPLASTIC CHEMOTHERAPY: ICD-10-CM

## 2023-01-25 DIAGNOSIS — C79.51 NON-SMALL CELL LUNG CANCER METASTATIC TO BONE (H): Primary | ICD-10-CM

## 2023-01-25 LAB
ALBUMIN SERPL BCG-MCNC: 4.3 G/DL (ref 3.5–5.2)
ALP SERPL-CCNC: 97 U/L (ref 40–129)
ALT SERPL W P-5'-P-CCNC: 15 U/L (ref 10–50)
ANION GAP SERPL CALCULATED.3IONS-SCNC: 10 MMOL/L (ref 7–15)
AST SERPL W P-5'-P-CCNC: 25 U/L (ref 10–50)
BASOPHILS # BLD AUTO: 0 10E3/UL (ref 0–0.2)
BASOPHILS NFR BLD AUTO: 1 %
BILIRUB SERPL-MCNC: 0.4 MG/DL
BUN SERPL-MCNC: 16 MG/DL (ref 8–23)
CALCIUM SERPL-MCNC: 9.3 MG/DL (ref 8.8–10.2)
CHLORIDE SERPL-SCNC: 102 MMOL/L (ref 98–107)
CREAT SERPL-MCNC: 0.71 MG/DL (ref 0.67–1.17)
DEPRECATED HCO3 PLAS-SCNC: 28 MMOL/L (ref 22–29)
EOSINOPHIL # BLD AUTO: 0.1 10E3/UL (ref 0–0.7)
EOSINOPHIL NFR BLD AUTO: 3 %
ERYTHROCYTE [DISTWIDTH] IN BLOOD BY AUTOMATED COUNT: 15.9 % (ref 10–15)
GFR SERPL CREATININE-BSD FRML MDRD: >90 ML/MIN/1.73M2
GLUCOSE SERPL-MCNC: 95 MG/DL (ref 70–99)
HCT VFR BLD AUTO: 38.2 % (ref 40–53)
HGB BLD-MCNC: 11.6 G/DL (ref 13.3–17.7)
IMM GRANULOCYTES # BLD: 0 10E3/UL
IMM GRANULOCYTES NFR BLD: 0 %
LYMPHOCYTES # BLD AUTO: 0.9 10E3/UL (ref 0.8–5.3)
LYMPHOCYTES NFR BLD AUTO: 20 %
MCH RBC QN AUTO: 27.8 PG (ref 26.5–33)
MCHC RBC AUTO-ENTMCNC: 30.4 G/DL (ref 31.5–36.5)
MCV RBC AUTO: 91 FL (ref 78–100)
MONOCYTES # BLD AUTO: 0.4 10E3/UL (ref 0–1.3)
MONOCYTES NFR BLD AUTO: 10 %
NEUTROPHILS # BLD AUTO: 2.9 10E3/UL (ref 1.6–8.3)
NEUTROPHILS NFR BLD AUTO: 66 %
NRBC # BLD AUTO: 0 10E3/UL
NRBC BLD AUTO-RTO: 0 /100
PLATELET # BLD AUTO: 124 10E3/UL (ref 150–450)
POTASSIUM SERPL-SCNC: 4 MMOL/L (ref 3.4–5.3)
PROT SERPL-MCNC: 6.5 G/DL (ref 6.4–8.3)
RBC # BLD AUTO: 4.18 10E6/UL (ref 4.4–5.9)
SODIUM SERPL-SCNC: 140 MMOL/L (ref 136–145)
WBC # BLD AUTO: 4.4 10E3/UL (ref 4–11)

## 2023-01-25 PROCEDURE — 250N000011 HC RX IP 250 OP 636: Performed by: INTERNAL MEDICINE

## 2023-01-25 PROCEDURE — 36591 DRAW BLOOD OFF VENOUS DEVICE: CPT

## 2023-01-25 PROCEDURE — 85025 COMPLETE CBC W/AUTO DIFF WBC: CPT | Performed by: INTERNAL MEDICINE

## 2023-01-25 PROCEDURE — G0463 HOSPITAL OUTPT CLINIC VISIT: HCPCS | Performed by: INTERNAL MEDICINE

## 2023-01-25 PROCEDURE — 99215 OFFICE O/P EST HI 40 MIN: CPT | Performed by: INTERNAL MEDICINE

## 2023-01-25 PROCEDURE — 80053 COMPREHEN METABOLIC PANEL: CPT | Performed by: INTERNAL MEDICINE

## 2023-01-25 RX ORDER — HEPARIN SODIUM (PORCINE) LOCK FLUSH IV SOLN 100 UNIT/ML 100 UNIT/ML
5 SOLUTION INTRAVENOUS
Status: DISCONTINUED | OUTPATIENT
Start: 2023-01-25 | End: 2023-01-25 | Stop reason: HOSPADM

## 2023-01-25 RX ORDER — HEPARIN SODIUM,PORCINE 10 UNIT/ML
5 VIAL (ML) INTRAVENOUS
Status: CANCELLED | OUTPATIENT
Start: 2023-01-25

## 2023-01-25 RX ORDER — ONDANSETRON 2 MG/ML
8 INJECTION INTRAMUSCULAR; INTRAVENOUS EVERY 6 HOURS PRN
Status: CANCELLED
Start: 2023-01-25

## 2023-01-25 RX ORDER — HEPARIN SODIUM (PORCINE) LOCK FLUSH IV SOLN 100 UNIT/ML 100 UNIT/ML
5 SOLUTION INTRAVENOUS
Status: CANCELLED | OUTPATIENT
Start: 2023-01-25

## 2023-01-25 RX ADMIN — Medication 5 ML: at 09:25

## 2023-01-25 ASSESSMENT — PAIN SCALES - GENERAL: PAINLEVEL: NO PAIN (0)

## 2023-01-25 NOTE — PATIENT INSTRUCTIONS
Arrange for Taxotere every 3 weeks with lab draw every 3 weeks.  RTC MD 3 weeks and 6 weeks after next chemo, on or prior to chemo dates.

## 2023-01-25 NOTE — PROGRESS NOTES
Nursing Note:  Kt Rasmussen presents today for port labs.    Patient seen by provider today: Yes: Alvaro   present during visit today: Not Applicable.    Note: N/A.    Intravenous Access:  Labs drawn without difficulty.  Implanted Port.    Discharge Plan:   Patient was sent to lobby for provider appointment.    Filipe Carr RN

## 2023-01-25 NOTE — LETTER
1/25/2023         RE: Kt Rasmussen  [id number]  [facility] [address]        Dear Colleague,    Thank you for referring your patient, Kt Rasmussen, to the [facility]. Please see a copy of my visit note below.    [facility]    Hematology/Oncology Established Patient Follow-up Note      Today's Date: 1/25/2023    Reason for Follow-up: Metastatic non-small cell lung carcinoma.    HISTORY OF PRESENT ILLNESS: Kt Rasmussen is a 62 year old male who presents with the following oncologic history:  1. 5/05/2016: Presented with near-obstructing large mass coming off the cheikh and likely the posterior wall, seen on bronchoscopy. Biopsy of the mass showed invasive squamous cell carcinoma, moderately differentiating and focally keratinizing.  Procedure was complicated by significant bleeding.  Tumor was completely debulked (via bronchoscopy) in both main stem bronchi and distal trachea. NGS showed no mutations in EGFR, KRAS, BRAF, NRAS, HRAS, PIK3CA, ERBB2, MET, or JAK2.  2. 5/23/2016: PET/CT scan showed evidence of residual tumor with decreased endobronchial mass. Indeterminate, mildly hypermetabolic mesentery with prominent lymph nodes and area of enhancement of the right hepatic lobe present. Felt to have T4-NX-M0 disease.  3. 6/09/2016: Started concurrent chemoradiation with weekly paclitaxel and carboplatin.  4. 7/30/2016: Completed radiation.  Subsequently received 2 cycles of consolidation paclitaxel and carboplatin.   5. 9/13/2019: Presented with 6-month history of dysphonia and left vocal exophytic lesion. Left true vocal fold biopsy showed at least squamous cell carcinoma in situ, cannot exclude superficial invasion.  6. 9/30/2019: Excision of left vocal cord mass showed fragments of invasive squamous cell carcinoma, well differentiated and keratinizing.  Margins negative for malignancy.  7. 2/16/2021: CT chest w/o contrast showed thin-walled cavity in left  lower lobe with 2 solid peripheral nodules measuring 9 mm each. No lymphadenopathy.  8. 3/01/2021: PET scan showed hypermetabolic nodules in left lower lobe and right lung, consistent with metastases; hypermetabolic adenopathy in chest, abdomen, pelvis; nonspecific uptake at tongue; hypermetabolic intraosseous lesions in spine and pelvis consistent with metastatic disease; left axillary hypermetabolic lymph nodes related to COVID-19 vaccination.  9. 3/12/2021: CT-guided lung biopsy showed non-small cell carcinoma, not otherwise specified -- with opinion by [facility] pathologist.  10. 3/18/2021: Lung NGS panel negative for mutations in BRAF, EGFR, ERBB2, IDH1, IDH2, KRAS, MET, NRAS, RET. PD-L1 expression negative (TPS <1%). Due to minimal amount of DNA obtained from specimen, other biomarkers could not be analyzed.  11. 4/7/2021: MRI brain negative for brain metastases.  12. 4/27/2021: Started 1st line metastatic therapy with carboplatin, paclitaxel, bevacizumab, and atezolizumab for metastatic non-small cell lung carcinoma, NOS.  13. 7/12/2021: PET/CT showed resolved mural nodules with increased cystic cavity in left lower lobe with no significant FDG uptake, less likely metastases; no enlarged hypermetabolic mediastinal, hilar, or axillary lymph nodes, decreased metabolic activity of intraosseous lesion in spine and pelvis; no new bone lesions.  14. 10/11/2021: PET/CT showed slight interval increase in intensity of metabolic activity of right pubic bone and left ischium with new focal uptake in L2 spinous process; resolved uptake at previous ground glass opacity along right medial lower lobe; unchanged cystic cavities/nodules in left lower lobe and right apical upper lobe; no pathologically enlarged hypermetabolic mediastinal, hilar, or axillary lymph nodes.  15. 1/10/2022: PET/CT showed new foci of abnormal skeletal FGD uptake in the thoracic and lumbar spine. Mild interval increase in intensity of previously  demonstrated hypermetabolic skeletal lesions involving the pelvis and lumbar spine. Findings are consistent with progressive osseous metastatic disease. Subsequently off chemo for 1 month due to poor performance status.  16. 1/25/2022: Patient fell and fractured his femur. This was surgically repaired and he was subsequently cared for at a rehab unit.  17. 2/14/2022: Brain MRI without contrast (contrast not given due to inability to obtain IV access) showed no brain metastases.  18. 4/21/2022: Started 2nd line metastatic therapy with pemetrexed and carboplatin. Omission of carboplatin 6/2 and forward due to worsening anemia despite dose reduction.  19. 7/18/2022: PET/CT showed enlarged and increased FDG avid skeletal metastases, increased left para-aortic retroperitoneal lymph node increased in size and FDG avidity, findings consistent with progressive disease.  20. 8/3/2022: Started 3rd line metastatic therapy with Taxotere 60 mg/m2 every 3 weeks.  21. 11/21/2022: PET scan showed partial response, left para-aortic retroperitoneal lymph node has decreased from 2.6 x 1.7 cm (SUVmax 7.9) to 2.3 x 1.4 cm (SUVmax 5.7), skeletal metastases no longer demonstrate FDG activity. Kt elected to take a 2-month break from chemo.  22. 1/23/2023: PET scan showed increasing metabolic activity of the left periaortic (max SUV 8.1, previously 5.7) left external iliac (max SUV 7.0, previously 3.1), and right external iliac (max SUV 5.1, previously 3.7) lymph nodes with development/reactivation of multiple FDG avid osseous lesions    INTERIM HISTORY:  Kt reports no new pain.    REVIEW OF SYSTEMS:   14 point ROS was reviewed and is negative other than as noted above in HPI.       HOME MEDICATIONS:  Current Outpatient Medications   Medication Sig Dispense Refill     atorvastatin (LIPITOR) 40 MG tablet Take 40 mg by mouth daily       dexamethasone (DECADRON) 4 MG tablet Take 2 tablets (8 mg) by mouth 2 times daily (with meals) for 3  doses Start evening of Docetaxel infusion and continue for a total of 3 doses. 6 tablet 7     folic acid (FOLVITE) 1 MG tablet Take 1 mg by mouth daily       prochlorperazine (COMPAZINE) 10 MG tablet Take 1 tablet (10 mg) by mouth every 6 hours as needed for nausea or vomiting (Patient not taking: Reported on 11/16/2022) 30 tablet 2         ALLERGIES:  Allergies   Allergen Reactions     No Known Drug Allergies          PAST MEDICAL HISTORY:  Past Medical History:   Diagnosis Date     Alcohol abuse, unspecified      Cerebral infarction (H)     2009, right side residual and aphasia     Dyslipidemia      GERD (gastroesophageal reflux disease)      Lung cancer (H)      Unspecified essential hypertension          PAST SURGICAL HISTORY:  Past Surgical History:   Procedure Laterality Date     BRONCHOSCOPY FLEXIBLE AND RIGID N/A 5/5/2016    Procedure: BRONCHOSCOPY FLEXIBLE AND RIGID;  Surgeon: Tony Talbot MD;  Location: [facility] OR     ESOPHAGOSCOPY FLEXIBLE N/A 9/30/2019    Procedure: flexible esophagoscopy;  Surgeon: Lizzy Johnson MD;  Location: [facility] OR     INJECT STEROID (LOCATION) N/A 9/30/2019    Procedure: steroid injection;  Surgeon: Lizzy Johnson MD;  Location: [facility] OR     IR CHEST PORT PLACEMENT > 5 YRS OF AGE  4/22/2021     IR CHEST PORT PLACEMENT > 5 YRS OF AGE  4/11/2022     IR PORT CHECK RIGHT  4/11/2022     IR PORT REMOVAL RIGHT  4/11/2022     LASER CO2 LARYNGOSCOPY N/A 9/30/2019    Procedure: Microdirect laryngoscopy with excision of laryngeal mass, CO2 laser;  Surgeon: Lizzy Johnson MD;  Location: [facility] OR     ZZC NONSPECIFIC PROCEDURE      R tympanoplasty         SOCIAL HISTORY:  Social History     Socioeconomic History     Marital status: Single     Spouse name: Not on file     Number of children: Not on file     Years of education: Not on file     Highest education level: Not on file   Occupational History     Not on file   Tobacco Use     Smoking status: Former     Packs/day: 1.00     Types:  Cigarettes     Quit date: 2009     Years since quittin.3     Smokeless tobacco: Never   Substance and Sexual Activity     Alcohol use: Not Currently     Drug use: No     Sexual activity: Not on file   Other Topics Concern     Parent/sibling w/ CABG, MI or angioplasty before 65F 55M? Not Asked   Social History Narrative     Not on file     Social Determinants of Health     Financial Resource Strain: Not on file   Food Insecurity: Not on file   Transportation Needs: Not on file   Physical Activity: Not on file   Stress: Not on file   Social Connections: Not on file   Intimate Partner Violence: Not At Risk     Fear of Current or Ex-Partner: No     Emotionally Abused: No     Physically Abused: No     Sexually Abused: No   Housing Stability: Not on file   Resides at assisted living.      FAMILY HISTORY:  Family History   Problem Relation Age of Onset     Cancer Father          PHYSICAL EXAM:  Vital signs:  /53   Pulse 69   Resp 16   SpO2 99%    ECO  GENERAL: No acute distress.  EYES: No scleral icterus. No overt erythema.  RESPIRATORY: No audible cough, wheezing, or labored breathing. Hoarseness present.  MUSCULOSKELETAL: Range of motion in the neck, shoulders, and arms appear normal.  SKIN: No overt rashes, discolorations, or lesions over the face and neck.  NEUROLOGIC: Alert.  No overt tremors.  PSYCHIATRIC: Normal affect and mood.  Does not appear anxious.  GAIT: Sitting in wheelchair.      LABS:  CBC RESULTS: Recent Labs   Lab Test 23  0919   WBC 4.4   RBC 4.18*   HGB 11.6*   HCT 38.2*   MCV 91   MCH 27.8   MCHC 30.4*   RDW 15.9*   *     Last Comprehensive Metabolic Panel:  Sodium   Date Value Ref Range Status   2022 141 133 - 144 mmol/L Final   2021 140 133 - 144 mmol/L Final     Potassium   Date Value Ref Range Status   2022 4.1 3.4 - 5.3 mmol/L Final   2021 4.3 3.4 - 5.3 mmol/L Final     Chloride   Date Value Ref Range Status   2022 109 94 - 109  mmol/L Final   06/29/2021 111 (H) 94 - 109 mmol/L Final     Carbon Dioxide   Date Value Ref Range Status   06/29/2021 25 20 - 32 mmol/L Final     Carbon Dioxide (CO2)   Date Value Ref Range Status   12/07/2022 25 20 - 32 mmol/L Final     Anion Gap   Date Value Ref Range Status   12/07/2022 7 3 - 14 mmol/L Final   06/29/2021 4 3 - 14 mmol/L Final     Glucose   Date Value Ref Range Status   12/07/2022 91 70 - 99 mg/dL Final   06/29/2021 85 70 - 99 mg/dL Final     Urea Nitrogen   Date Value Ref Range Status   12/07/2022 17 7 - 30 mg/dL Final   06/29/2021 17 7 - 30 mg/dL Final     Creatinine   Date Value Ref Range Status   12/07/2022 0.73 0.66 - 1.25 mg/dL Final   06/29/2021 0.84 0.66 - 1.25 mg/dL Final     GFR Estimate   Date Value Ref Range Status   12/07/2022 >90 >60 mL/min/1.73m2 Final     Comment:     Effective December 21, 2021 eGFRcr in adults is calculated using the 2021 CKD-EPI creatinine equation which includes age and gender (Ceci et al., NEJM, DOI: 10.1056/YXGSkb0064032)   06/29/2021 >90 >60 mL/min/[1.73_m2] Final     Comment:     Non  GFR Calc  Starting 12/18/2018, serum creatinine based estimated GFR (eGFR) will be   calculated using the Chronic Kidney Disease Epidemiology Collaboration   (CKD-EPI) equation.       Calcium   Date Value Ref Range Status   12/07/2022 8.8 8.5 - 10.1 mg/dL Final   06/29/2021 8.7 8.5 - 10.1 mg/dL Final     Bilirubin Total   Date Value Ref Range Status   12/07/2022 0.6 0.2 - 1.3 mg/dL Final   12/07/2022 0.6 0.2 - 1.3 mg/dL Final   06/29/2021 0.3 0.2 - 1.3 mg/dL Final     Alkaline Phosphatase   Date Value Ref Range Status   12/07/2022 89 40 - 150 U/L Final   12/07/2022 89 40 - 150 U/L Final   06/29/2021 73 40 - 150 U/L Final     ALT   Date Value Ref Range Status   12/07/2022 27 0 - 70 U/L Final   12/07/2022 27 0 - 70 U/L Final   06/29/2021 34 0 - 70 U/L Final     AST   Date Value Ref Range Status   12/07/2022 20 0 - 45 U/L Final   12/07/2022 20 0 - 45 U/L Final    06/29/2021 26 0 - 45 U/L Final       PATHOLOGY:  None new.    IMAGING:  Reviewed as per HPI.    ASSESSMENT/PLAN:  Kt Rasmussen is a 61 year old male with the following issues:  1. Metastatic non-small (non-squamous) cell lung carcinoma with metastases to lymph nodes, bones, and bilateral lungs  2. 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 Chemotherapy-induced pancytopenia  --Lung NGS panel negative for mutations in BRAF, EGFR, ERBB2, IDH1, IDH2, KRAS, MET, NRAS, RET. PD-L1 expression negative (TPS <1%). Guardant 360 negative for targetable mutations.  --Kt started 3rd line metastatic therapy with every 3-week Taxotere at 20% dose reduction (60 mg/m2) on 8/3/2022 due to progressive disease.  He has tolerated this very well with minimal to no paresthesias.  --I reviewed his 1/23/2023 PET scan which showed progressive disease in multiple bones lymph nodes.  --Resume Taxotere at 20% dose reduction due to past repeated issues with chemo-induced cytopenias.  He consents to resumption of chemo. Reiterated that all treatment is with palliative intent, not curative.  --Reviewed 1/25 labs which showed Hgb 11.6, WBC 4.4, platelets 124,000; CMP pending.  --Will repeat PET scan in about 3 months.    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 Dysphonia  6. Dysphagia  -Kt underwent excision of a left vocal cord SCC on 9/30/2019 and has persistent dysphonia as a result of the lesion and excision.  He also has dysphagia but this is stable and swallowing is manageable.  -Last scope by ENT 9/23/2021 showed no evidence for recurrence. He will see Dr. Wray again on 9/8.    7. Weight loss  --I had offered nutrition consult and he declined this.  --I recommended increasing his intake of higher caloric nutrient dense foods and beverages. His weight has now stabilized.    8. Dizziness and prior falls  --SW and guardian  were previously notified of multiple falls.  --Prior brain MRI 2/14/2022 showed no brain metastases. However this was a suboptimal exam due to inability to administer contrast.  --He has not had recent falls and is using a wheelchair due to prior femoral fracture in 1/2022.    Return 3 weeks after next chemo.    Sangita John MD  Hematology/Oncology  [facility]    Total time spent: 40 minutes in patient evaluation, counseling, documentation, and coordination of care.      Again, thank you for allowing me to participate in the care of your patient.        Sincerely,        Sangita John MD

## 2023-01-27 NOTE — PROGRESS NOTES
[facility]   at the [facility]   Otolaryngology Clinic     Patient: Kt Rasmussen    MRN: [id number]    : 1960    Age/Gender: 62 year old male  Date of Service: 2023  Rendering Provider:   Lizzy Wray MD     Chief Complaint   T1 left TVF SCC s/p resection 19  Interval History   HISTORY OF PRESENT ILLNESS: Mr. Rasmussen is a 62 year old male is being followed for history of left TVF SCC. he was initially seen on 19. Please refer to this note for full history.      Of note, he now has metastatic non-small cell lung carcinoma to lymph nodes bones in bilateral lungs undergoing palliative chemo.    Today, he presents for follow up. he reports:  - is doing therapy for his lung     PAST MEDICAL HISTORY:   Past Medical History:   Diagnosis Date     Alcohol abuse, unspecified      Cerebral infarction (H)     , right side residual and aphasia     Dyslipidemia      GERD (gastroesophageal reflux disease)      Lung cancer (H)      Unspecified essential hypertension        PAST SURGICAL HISTORY:   Past Surgical History:   Procedure Laterality Date     BRONCHOSCOPY FLEXIBLE AND RIGID N/A 2016    Procedure: BRONCHOSCOPY FLEXIBLE AND RIGID;  Surgeon: Tony Talbot MD;  Location: [facility]     ESOPHAGOSCOPY FLEXIBLE N/A 2019    Procedure: flexible esophagoscopy;  Surgeon: Lizzy Johnson MD;  Location: [facility] OR     INJECT STEROID (LOCATION) N/A 2019    Procedure: steroid injection;  Surgeon: Lizzy Johnson MD;  Location: [facility] OR     IR CHEST PORT PLACEMENT > 5 YRS OF AGE  2021     IR CHEST PORT PLACEMENT > 5 YRS OF AGE  2022     IR PORT CHECK RIGHT  2022     IR PORT REMOVAL RIGHT  2022     LASER CO2 LARYNGOSCOPY N/A 2019    Procedure: Microdirect laryngoscopy with excision of laryngeal mass, CO2 laser;  Surgeon: Lizzy Johnson MD;  Location: [facility] OR     ZC NONSPECIFIC PROCEDURE      R tympanoplasty       CURRENT MEDICATIONS:   Current  Outpatient Medications:      atorvastatin (LIPITOR) 40 MG tablet, Take 40 mg by mouth daily, Disp: , Rfl:      dexamethasone (DECADRON) 4 MG tablet, Take 2 tablets (8 mg) by mouth 2 times daily (with meals) for 3 doses Start evening of Docetaxel infusion and continue for a total of 3 doses., Disp: 6 tablet, Rfl: 7     folic acid (FOLVITE) 1 MG tablet, Take 1 mg by mouth daily (Patient not taking: Reported on 2023), Disp: , Rfl:      prochlorperazine (COMPAZINE) 10 MG tablet, Take 1 tablet (10 mg) by mouth every 6 hours as needed for nausea or vomiting (Patient not taking: Reported on 2022), Disp: 30 tablet, Rfl: 2  No current facility-administered medications for this visit.    Facility-Administered Medications Ordered in Other Visits:      heparin 100 UNIT/ML injection 5 mL, 5 mL, Intracatheter, Once PRN, Sangita John MD, 5 mL at 08/10/21 1346     heparin lock flush 10 UNIT/ML injection 5 mL, 5 mL, Intracatheter, Q1H PRN, Sangita John MD     sodium chloride (PF) 0.9% PF flush 3-20 mL, 3-20 mL, Intracatheter, Q1H PRN, Sangita John MD, 20 mL at 08/10/21 1346    ALLERGIES: No known drug allergies    SOCIAL HISTORY:    Social History     Socioeconomic History     Marital status: Single     Spouse name: Not on file     Number of children: Not on file     Years of education: Not on file     Highest education level: Not on file   Occupational History     Not on file   Tobacco Use     Smoking status: Former     Packs/day: 1.00     Types: Cigarettes     Quit date: 2009     Years since quittin.3     Smokeless tobacco: Never   Substance and Sexual Activity     Alcohol use: Not Currently     Drug use: No     Sexual activity: Not on file   Other Topics Concern     Parent/sibling w/ CABG, MI or angioplasty before 65F 55M? Not Asked   Social History Narrative     Not on file     Social Determinants of Health     Financial Resource Strain: Not on file   Food Insecurity: Not on file   Transportation  Needs: Not on file   Physical Activity: Not on file   Stress: Not on file   Social Connections: Not on file   Intimate Partner Violence: Not At Risk     Fear of Current or Ex-Partner: No     Emotionally Abused: No     Physically Abused: No     Sexually Abused: No   Housing Stability: Not on file         FAMILY HISTORY:   Family History   Problem Relation Age of Onset     Cancer Father       Non-contributory for problems with anesthesia    REVIEW OF SYSTEMS:   The patient was asked a 14 point review of systems regarding constitutional symptoms, eye symptoms, ears, nose, mouth, throat symptoms, cardiovascular symptoms, respiratory symptoms, gastrointestinal symptoms, genitourinary symptoms, musculoskeletal symptoms, integumentary symptoms, neurological symptoms, psychiatric symptoms, endocrine symptoms, hematologic/lymphatic symptoms, and allergic/ immunologic symptoms.   The pertinent factors have been included in the HPI and below.  Patient Supplied Answers to Review of Systems  No flowsheet data found.    Physical Examination   The patient underwent a physical examination as described below. The pertinent positive and negative findings are summarized after the description of the examination.  Constitutional: The patient's developmental and nutritional status was assessed. The patient's voice quality was assessed.  Head and Face: The head and face were inspected for deformities. The sinuses were palpated. The salivary glands were palpated. Facial muscle strength was assessed bilaterally.  Eyes: Extraocular movements and primary gaze alignment were assessed.  Ears, Nose, Mouth and Throat: The ears and nose were examined for deformities. The nasal septum, mucosa, and turbinates were inspected by anterior rhinoscopy. The lips, teeth, and gums were examined for abnormalities. The oral mucosa, tongue, palate, tonsils, lateral and posterior pharynx were inspected for the presence of asymmetry or mucosal lesions.    Neck:  The tracheal position was noted, and the neck mass palpated to determine if there were any asymmetries, abnormal neck masses, thyromegally, or thyroid nodules.  Respiratory: The nature of the breathing and chest expansion/symmetry was observed.  Cardiovascular: The patient was examined to determine the presence of any edema or jugular venous distension.  Abdomen: The contour of the abdomen was noted.  Lymphatic: The patient was examined for infraclavicular lymphadenopathy.  Musculoskeletal: The patient was inspected for the presence of skeletal deformities.  Extremities: The extremities were examined for any clubbing or cyanosis.  Skin: The skin was examined for inflammatory or neoplastic conditions.  Neurologic: The patient's orientation, mood, and affect were noted. The cranial nerve  functions were examined.  Other pertinent positive and negative findings on physical examination:   OC/OP: no lesions, uvula midline, soft palate elevates symmetrically   Neck: no lesions, no TH tenderness to palpation    All other physical examination findings were within normal limits and noncontributory.    Procedures   Flexible laryngoscopy (CPT 44782)      Pre-procedure diagnosis: dysphonia  Post-procedure diagnosis: same as above  Indication for procedure: Mr. Rasmussen is a 62 year old male with see above  Procedure(s): Fiberoptic Laryngoscopy    Details of Procedure: After informed consent was obtained, the patient was seated in the examination chair.  The areas of the nasopharynx as well as the hypopharynx were anesthetized with topical 4% lidocaine with 0.25% phenylephrine atomizer.  Examination of the base of tongue was performed first.  Attention was directed to any evidence of masses in the area or evidence of leukoplakia or candidal infection.  Attention was directed to the epiglottis where its size and position was determined and its movement on phonation of the vowel  e .  The piriform sinuses were then inspected for  any mass lesions or pooling of secretions.  Attention was then directed to the larynx. The vocal folds were inspected for infection or any areas of leukoplakia, for masses, polypoid degeneration, or hemorrhage.  Having done this, the arytenoids and vocal processes were inspected for erythema or evidence of granuloma formation.  The posterior commissure was then inspected for evidence of inflammatory changes in the mucosa and heaping up of mucosal tissue. The patient was then instructed to say the vowel  e .  Adduction of vocal folds to the midline was observed for any evidence of paresis or paralysis of the larynx or asymmetry in rotation of the larynx to the left or right. The patient was asked to breathe and the degree of abduction was noted bilaterally.  Subglottic view of the larynx was obtained for any additional mass lesions or mucosal changes.  Finally the post cricoid was examined for evidence of pooling of secretions, as well as the pharyngeal wall mucosa.   Anesthesia type: 0.25% phenylephrine    Findings:  Anatomic/physiological deviations: LNC, VIKRAM   Right vocal process: No restriction of mobility   Left vocal process: No restriction of mobility  Glottal gap: Glottal gap  Supraglottic structures: Normal  Hypopharynx: Normal     Estimated Blood Loss: minimal  Complications: None  Disposition: Patient tolerated the procedure well          Review of Relevant Clinical Data   I personally reviewed:  Notes:   Dr. John 1/25/23 Oncology   10. 3/18/2021: Lung NGS panel negative for mutations in BRAF, EGFR, ERBB2, IDH1, IDH2, KRAS, MET, NRAS, RET. PD-L1 expression negative (TPS <1%). Due to minimal amount of DNA obtained from specimen, other biomarkers could not be analyzed.  11. 4/7/2021: MRI brain negative for brain metastases.  12. 4/27/2021: Started 1st line metastatic therapy with carboplatin, paclitaxel, bevacizumab, and atezolizumab for metastatic non-small cell lung carcinoma, NOS.  13. 7/12/2021: PET/CT  showed resolved mural nodules with increased cystic cavity in left lower lobe with no significant FDG uptake, less likely metastases; no enlarged hypermetabolic mediastinal, hilar, or axillary lymph nodes, decreased metabolic activity of intraosseous lesion in spine and pelvis; no new bone lesions.  14. 10/11/2021: PET/CT showed slight interval increase in intensity of metabolic activity of right pubic bone and left ischium with new focal uptake in L2 spinous process; resolved uptake at previous ground glass opacity along right medial lower lobe; unchanged cystic cavities/nodules in left lower lobe and right apical upper lobe; no pathologically enlarged hypermetabolic mediastinal, hilar, or axillary lymph nodes.  15. 1/10/2022: PET/CT showed new foci of abnormal skeletal FGD uptake in the thoracic and lumbar spine. Mild interval increase in intensity of previously demonstrated hypermetabolic skeletal lesions involving the pelvis and lumbar spine. Findings are consistent with progressive osseous metastatic disease. Subsequently off chemo for 1 month due to poor performance status.  16. 1/25/2022: Patient fell and fractured his femur. This was surgically repaired and he was subsequently cared for at a rehab unit.  17. 2/14/2022: Brain MRI without contrast (contrast not given due to inability to obtain IV access) showed no brain metastases.  18. 4/21/2022: Started 2nd line metastatic therapy with pemetrexed and carboplatin. Omission of carboplatin 6/2 and forward due to worsening anemia despite dose reduction.  19. 7/18/2022: PET/CT showed enlarged and increased FDG avid skeletal metastases, increased left para-aortic retroperitoneal lymph node increased in size and FDG avidity, findings consistent with progressive disease.  20. 8/3/2022: Started 3rd line metastatic therapy with Taxotere 60 mg/m2 every 3 weeks.  21. 11/21/2022: PET scan showed partial response, left para-aortic retroperitoneal lymph node has decreased  from 2.6 x 1.7 cm (SUVmax 7.9) to 2.3 x 1.4 cm (SUVmax 5.7), skeletal metastases no longer demonstrate FDG activity. Kt elected to take a 2-month break from chemo.  22. 1/23/2023: PET scan showed increasing metabolic activity of the left periaortic (max SUV 8.1, previously 5.7) left external iliac (max SUV 7.0, previously 3.1), and right external iliac (max SUV 5.1, previously 3.7) lymph nodes with development/reactivation of multiple FDG avid osseous lesions     Radiology:   PET 1/23/23                                                                   IMPRESSION:     Increasing metabolic activity of left periaortic, left external iliac, right external iliac lymph nodes with development/reactivation of multiple osseous lesions suspicious for progression of disease.    Labs:  Lab Results   Component Value Date    TSH 1.79 02/24/2022     Lab Results   Component Value Date     01/25/2023    CO2 28 01/25/2023    BUN 16.0 01/25/2023    PHOS 2.0 (L) 05/04/2016     Lab Results   Component Value Date    WBC 4.4 01/25/2023    HGB 11.6 (L) 01/25/2023    HCT 38.2 (L) 01/25/2023    MCV 91 01/25/2023     (L) 01/25/2023     Lab Results   Component Value Date    PTT 35 07/26/2017    INR 1.02 04/22/2021     No results found for: ELIZABET  No components found for: RHEUMATOIDFACTOR,  RF  Lab Results   Component Value Date    CRP 0.16 07/24/2003     No components found for: CKTOT, URICACID  No components found for: C3, C4, DSDNAAB, NDNAABIFA  No results found for: MPOAB    Patient reported Quality of Life (QOL) Measures   Patient Supplied Answers To VHI Questionnaire  Voice Handicap Index (VHI-10) 12/5/2019   My voice makes it difficult for people to hear me 0   People have difficulty understanding me in a noisy room 0   My voice difficulties restrict my personal and social life.  0   I feel left out of conversations because of my voice 0   My voice problem causes me to lose income 0   I feel as though I have to strain to  "produce voice 0   The clarity of my voice is unpredictable 0   My voice problem upsets me 0   My voice makes me feel handicapped 0   People ask, \"What's wrong with your voice?\" 0   VHI-10 0         Patient Supplied Answers To EAT Questionnaire  Eating Assessment Tool (EAT-10) 2019   My swallowing problem has caused me to lose weight 0   My swallowing problem interferes with my ability to go out for meals 0   Swallowing liquids takes extra effort 0   Swallowing solids takes extra effort 0   Swallowing pills takes extra effort 0   Swallowing is painful 0   The pleasure of eating is affected by my swallowing 0   When I swallow food sticks in my throat 0   I cough when I eat 0   Swallowing is stressful 0   EAT-10 0         Patient Supplied Answers To CSI Questionnaire  No flowsheet data found.      Patient Supplied Answers to Dyspnea Index Questionnaire:  No flowsheet data found.    Impression & Plan     IMPRESSION: Mr. Rasmussen is a 62 year old male who is being seen for the followin. T1 Left vocal fold SCC  -  s/p endoscopic CO2 laser resection 19  - no evidence of recurrence   - now has metastatic nonsmall cell lung cancer undergoing treatment with Dr Sangita John  - stable laryngeal exam,  VIKRAM  - symptoms today 2022 are stable  - scope today 2022 shows well healed left vocal fold no evidence of recurrence  - VIKRAM and given active chemo, will stretch follow up to his 3rd year anniversary in September   - symptoms 22 are stable     - scope 22shows well healed left vocal fold no evidence of recurrence    - symptoms 2/3/2023 are stable  - scope shows VIKRAM  Plan  - 6 months  - consider FEES at the next follow up given lung cancer    RETURN VISIT: 6 months    Scribe Disclosure:  I, Sven Osorio, am serving as a scribe to document services personally performed by Lizzy Wray MD based on data collection and the provider's statements to me.     Lizzy Wray MD   "  Laryngology    [facility]  Department of  Otolaryngology - Head and Neck Surgery  Clinics & Surgery Center  [address] [place] [id number]  Appointment line: [phone]  Fax: [phone]  [email or web address]

## 2023-02-02 ENCOUNTER — OFFICE VISIT (OUTPATIENT)
Dept: OTOLARYNGOLOGY | Facility: CLINIC | Age: 63
End: 2023-02-02
Payer: COMMERCIAL

## 2023-02-02 VITALS
OXYGEN SATURATION: 94 % | SYSTOLIC BLOOD PRESSURE: 116 MMHG | DIASTOLIC BLOOD PRESSURE: 80 MMHG | BODY MASS INDEX: 21.5 KG/M2 | HEART RATE: 84 BPM | HEIGHT: 75 IN

## 2023-02-02 DIAGNOSIS — Z85.21 HX OF LARYNGEAL CANCER: ICD-10-CM

## 2023-02-02 DIAGNOSIS — R49.0 DYSPHONIA: Primary | ICD-10-CM

## 2023-02-02 PROCEDURE — 31575 DIAGNOSTIC LARYNGOSCOPY: CPT | Performed by: OTOLARYNGOLOGY

## 2023-02-02 PROCEDURE — 99207 PR NO CHARGE LOS: CPT | Performed by: OTOLARYNGOLOGY

## 2023-02-02 ASSESSMENT — PAIN SCALES - GENERAL: PAINLEVEL: NO PAIN (0)

## 2023-02-02 NOTE — LETTER
2023       RE: Kt Rasmussen  [id number]  [facility] [address]     Dear Colleague,    Thank you for referring your patient, Kt Rasmussen, to the [facility] EAR NOSE AND THROAT CLINIC [place] at [facility]. Please see a copy of my visit note below.        [facility]   at the [facility]   Otolaryngology Clinic     Patient: Kt Rasmussen    MRN: [id number]    : 1960    Age/Gender: 62 year old male  Date of Service: 2023  Rendering Provider:   Lizzy Wray MD     Chief Complaint   T1 left TVF SCC s/p resection 19  Interval History   HISTORY OF PRESENT ILLNESS: Mr. Rasmussen is a 62 year old male is being followed for history of left TVF SCC. he was initially seen on 19. Please refer to this note for full history.      Of note, he now has metastatic non-small cell lung carcinoma to lymph nodes bones in bilateral lungs undergoing palliative chemo.    Today, he presents for follow up. he reports:  - is doing therapy for his lung     PAST MEDICAL HISTORY:   Past Medical History:   Diagnosis Date     Alcohol abuse, unspecified      Cerebral infarction (H)     , right side residual and aphasia     Dyslipidemia      GERD (gastroesophageal reflux disease)      Lung cancer (H)      Unspecified essential hypertension        PAST SURGICAL HISTORY:   Past Surgical History:   Procedure Laterality Date     BRONCHOSCOPY FLEXIBLE AND RIGID N/A 2016    Procedure: BRONCHOSCOPY FLEXIBLE AND RIGID;  Surgeon: Tony Talbot MD;  Location: [facility] OR     ESOPHAGOSCOPY FLEXIBLE N/A 2019    Procedure: flexible esophagoscopy;  Surgeon: Lizzy Johnson MD;  Location: [facility] OR     INJECT STEROID (LOCATION) N/A 2019    Procedure: steroid injection;  Surgeon: Lizzy Johnson MD;  Location: [facility] OR     IR CHEST PORT PLACEMENT > 5 YRS OF AGE  2021     IR CHEST PORT PLACEMENT > 5 YRS OF AGE  2022     IR PORT  CHECK RIGHT  2022     IR PORT REMOVAL RIGHT  2022     LASER CO2 LARYNGOSCOPY N/A 2019    Procedure: Microdirect laryngoscopy with excision of laryngeal mass, CO2 laser;  Surgeon: Lizzy Johnson MD;  Location:  OR     [facility] NONSPECIFIC PROCEDURE      R tympanoplasty       CURRENT MEDICATIONS:   Current Outpatient Medications:      atorvastatin (LIPITOR) 40 MG tablet, Take 40 mg by mouth daily, Disp: , Rfl:      dexamethasone (DECADRON) 4 MG tablet, Take 2 tablets (8 mg) by mouth 2 times daily (with meals) for 3 doses Start evening of Docetaxel infusion and continue for a total of 3 doses., Disp: 6 tablet, Rfl: 7     folic acid (FOLVITE) 1 MG tablet, Take 1 mg by mouth daily (Patient not taking: Reported on 2023), Disp: , Rfl:      prochlorperazine (COMPAZINE) 10 MG tablet, Take 1 tablet (10 mg) by mouth every 6 hours as needed for nausea or vomiting (Patient not taking: Reported on 2022), Disp: 30 tablet, Rfl: 2  No current facility-administered medications for this visit.    Facility-Administered Medications Ordered in Other Visits:      heparin 100 UNIT/ML injection 5 mL, 5 mL, Intracatheter, Once PRN, Sangita John MD, 5 mL at 08/10/21 1346     heparin lock flush 10 UNIT/ML injection 5 mL, 5 mL, Intracatheter, Q1H PRN, Sangita John MD     sodium chloride (PF) 0.9% PF flush 3-20 mL, 3-20 mL, Intracatheter, Q1H PRN, Sangita John MD, 20 mL at 08/10/21 1346    ALLERGIES: No known drug allergies    SOCIAL HISTORY:    Social History     Socioeconomic History     Marital status: Single     Spouse name: Not on file     Number of children: Not on file     Years of education: Not on file     Highest education level: Not on file   Occupational History     Not on file   Tobacco Use     Smoking status: Former     Packs/day: 1.00     Types: Cigarettes     Quit date: 2009     Years since quittin.3     Smokeless tobacco: Never   Substance and Sexual Activity     Alcohol use: Not  Currently     Drug use: No     Sexual activity: Not on file   Other Topics Concern     Parent/sibling w/ CABG, MI or angioplasty before 65F 55M? Not Asked   Social History Narrative     Not on file     Social Determinants of Health     Financial Resource Strain: Not on file   Food Insecurity: Not on file   Transportation Needs: Not on file   Physical Activity: Not on file   Stress: Not on file   Social Connections: Not on file   Intimate Partner Violence: Not At Risk     Fear of Current or Ex-Partner: No     Emotionally Abused: No     Physically Abused: No     Sexually Abused: No   Housing Stability: Not on file         FAMILY HISTORY:   Family History   Problem Relation Age of Onset     Cancer Father       Non-contributory for problems with anesthesia    REVIEW OF SYSTEMS:   The patient was asked a 14 point review of systems regarding constitutional symptoms, eye symptoms, ears, nose, mouth, throat symptoms, cardiovascular symptoms, respiratory symptoms, gastrointestinal symptoms, genitourinary symptoms, musculoskeletal symptoms, integumentary symptoms, neurological symptoms, psychiatric symptoms, endocrine symptoms, hematologic/lymphatic symptoms, and allergic/ immunologic symptoms.   The pertinent factors have been included in the HPI and below.  Patient Supplied Answers to Review of Systems  No flowsheet data found.    Physical Examination   The patient underwent a physical examination as described below. The pertinent positive and negative findings are summarized after the description of the examination.  Constitutional: The patient's developmental and nutritional status was assessed. The patient's voice quality was assessed.  Head and Face: The head and face were inspected for deformities. The sinuses were palpated. The salivary glands were palpated. Facial muscle strength was assessed bilaterally.  Eyes: Extraocular movements and primary gaze alignment were assessed.  Ears, Nose, Mouth and Throat: The ears  and nose were examined for deformities. The nasal septum, mucosa, and turbinates were inspected by anterior rhinoscopy. The lips, teeth, and gums were examined for abnormalities. The oral mucosa, tongue, palate, tonsils, lateral and posterior pharynx were inspected for the presence of asymmetry or mucosal lesions.    Neck: The tracheal position was noted, and the neck mass palpated to determine if there were any asymmetries, abnormal neck masses, thyromegally, or thyroid nodules.  Respiratory: The nature of the breathing and chest expansion/symmetry was observed.  Cardiovascular: The patient was examined to determine the presence of any edema or jugular venous distension.  Abdomen: The contour of the abdomen was noted.  Lymphatic: The patient was examined for infraclavicular lymphadenopathy.  Musculoskeletal: The patient was inspected for the presence of skeletal deformities.  Extremities: The extremities were examined for any clubbing or cyanosis.  Skin: The skin was examined for inflammatory or neoplastic conditions.  Neurologic: The patient's orientation, mood, and affect were noted. The cranial nerve  functions were examined.  Other pertinent positive and negative findings on physical examination:   OC/OP: no lesions, uvula midline, soft palate elevates symmetrically   Neck: no lesions, no TH tenderness to palpation    All other physical examination findings were within normal limits and noncontributory.    Procedures   Flexible laryngoscopy (CPT 84193)      Pre-procedure diagnosis: dysphonia  Post-procedure diagnosis: same as above  Indication for procedure: Mr. Rasmussen is a 62 year old male with see above  Procedure(s): Fiberoptic Laryngoscopy    Details of Procedure: After informed consent was obtained, the patient was seated in the examination chair.  The areas of the nasopharynx as well as the hypopharynx were anesthetized with topical 4% lidocaine with 0.25% phenylephrine atomizer.  Examination of the base  of tongue was performed first.  Attention was directed to any evidence of masses in the area or evidence of leukoplakia or candidal infection.  Attention was directed to the epiglottis where its size and position was determined and its movement on phonation of the vowel  e .  The piriform sinuses were then inspected for any mass lesions or pooling of secretions.  Attention was then directed to the larynx. The vocal folds were inspected for infection or any areas of leukoplakia, for masses, polypoid degeneration, or hemorrhage.  Having done this, the arytenoids and vocal processes were inspected for erythema or evidence of granuloma formation.  The posterior commissure was then inspected for evidence of inflammatory changes in the mucosa and heaping up of mucosal tissue. The patient was then instructed to say the vowel  e .  Adduction of vocal folds to the midline was observed for any evidence of paresis or paralysis of the larynx or asymmetry in rotation of the larynx to the left or right. The patient was asked to breathe and the degree of abduction was noted bilaterally.  Subglottic view of the larynx was obtained for any additional mass lesions or mucosal changes.  Finally the post cricoid was examined for evidence of pooling of secretions, as well as the pharyngeal wall mucosa.   Anesthesia type: 0.25% phenylephrine    Findings:  Anatomic/physiological deviations: LNC, VIKRAM   Right vocal process: No restriction of mobility   Left vocal process: No restriction of mobility  Glottal gap: Glottal gap  Supraglottic structures: Normal  Hypopharynx: Normal     Estimated Blood Loss: minimal  Complications: None  Disposition: Patient tolerated the procedure well          Review of Relevant Clinical Data   I personally reviewed:  Notes:   Dr. John 1/25/23 Oncology   10. 3/18/2021: Lung NGS panel negative for mutations in BRAF, EGFR, ERBB2, IDH1, IDH2, KRAS, MET, NRAS, RET. PD-L1 expression negative (TPS <1%). Due to  minimal amount of DNA obtained from specimen, other biomarkers could not be analyzed.  11. 4/7/2021: MRI brain negative for brain metastases.  12. 4/27/2021: Started 1st line metastatic therapy with carboplatin, paclitaxel, bevacizumab, and atezolizumab for metastatic non-small cell lung carcinoma, NOS.  13. 7/12/2021: PET/CT showed resolved mural nodules with increased cystic cavity in left lower lobe with no significant FDG uptake, less likely metastases; no enlarged hypermetabolic mediastinal, hilar, or axillary lymph nodes, decreased metabolic activity of intraosseous lesion in spine and pelvis; no new bone lesions.  14. 10/11/2021: PET/CT showed slight interval increase in intensity of metabolic activity of right pubic bone and left ischium with new focal uptake in L2 spinous process; resolved uptake at previous ground glass opacity along right medial lower lobe; unchanged cystic cavities/nodules in left lower lobe and right apical upper lobe; no pathologically enlarged hypermetabolic mediastinal, hilar, or axillary lymph nodes.  15. 1/10/2022: PET/CT showed new foci of abnormal skeletal FGD uptake in the thoracic and lumbar spine. Mild interval increase in intensity of previously demonstrated hypermetabolic skeletal lesions involving the pelvis and lumbar spine. Findings are consistent with progressive osseous metastatic disease. Subsequently off chemo for 1 month due to poor performance status.  16. 1/25/2022: Patient fell and fractured his femur. This was surgically repaired and he was subsequently cared for at a rehab unit.  17. 2/14/2022: Brain MRI without contrast (contrast not given due to inability to obtain IV access) showed no brain metastases.  18. 4/21/2022: Started 2nd line metastatic therapy with pemetrexed and carboplatin. Omission of carboplatin 6/2 and forward due to worsening anemia despite dose reduction.  19. 7/18/2022: PET/CT showed enlarged and increased FDG avid skeletal metastases,  increased left para-aortic retroperitoneal lymph node increased in size and FDG avidity, findings consistent with progressive disease.  20. 8/3/2022: Started 3rd line metastatic therapy with Taxotere 60 mg/m2 every 3 weeks.  21. 11/21/2022: PET scan showed partial response, left para-aortic retroperitoneal lymph node has decreased from 2.6 x 1.7 cm (SUVmax 7.9) to 2.3 x 1.4 cm (SUVmax 5.7), skeletal metastases no longer demonstrate FDG activity. Kt elected to take a 2-month break from chemo.  22. 1/23/2023: PET scan showed increasing metabolic activity of the left periaortic (max SUV 8.1, previously 5.7) left external iliac (max SUV 7.0, previously 3.1), and right external iliac (max SUV 5.1, previously 3.7) lymph nodes with development/reactivation of multiple FDG avid osseous lesions     Radiology:   PET 1/23/23                                                                   IMPRESSION:     Increasing metabolic activity of left periaortic, left external iliac, right external iliac lymph nodes with development/reactivation of multiple osseous lesions suspicious for progression of disease.    Labs:  Lab Results   Component Value Date    TSH 1.79 02/24/2022     Lab Results   Component Value Date     01/25/2023    CO2 28 01/25/2023    BUN 16.0 01/25/2023    PHOS 2.0 (L) 05/04/2016     Lab Results   Component Value Date    WBC 4.4 01/25/2023    HGB 11.6 (L) 01/25/2023    HCT 38.2 (L) 01/25/2023    MCV 91 01/25/2023     (L) 01/25/2023     Lab Results   Component Value Date    PTT 35 07/26/2017    INR 1.02 04/22/2021     No results found for: ELIZABET  No components found for: RHEUMATOIDFACTOR,  RF  Lab Results   Component Value Date    CRP 0.16 07/24/2003     No components found for: CKTOT, URICACID  No components found for: C3, C4, DSDNAAB, NDNAABIFA  No results found for: MPOAB    Patient reported Quality of Life (QOL) Measures   Patient Supplied Answers To VHI Questionnaire  Voice Handicap Index (VHI-10)  "2019   My voice makes it difficult for people to hear me 0   People have difficulty understanding me in a noisy room 0   My voice difficulties restrict my personal and social life.  0   I feel left out of conversations because of my voice 0   My voice problem causes me to lose income 0   I feel as though I have to strain to produce voice 0   The clarity of my voice is unpredictable 0   My voice problem upsets me 0   My voice makes me feel handicapped 0   People ask, \"What's wrong with your voice?\" 0   VHI-10 0         Patient Supplied Answers To EAT Questionnaire  Eating Assessment Tool (EAT-10) 2019   My swallowing problem has caused me to lose weight 0   My swallowing problem interferes with my ability to go out for meals 0   Swallowing liquids takes extra effort 0   Swallowing solids takes extra effort 0   Swallowing pills takes extra effort 0   Swallowing is painful 0   The pleasure of eating is affected by my swallowing 0   When I swallow food sticks in my throat 0   I cough when I eat 0   Swallowing is stressful 0   EAT-10 0         Patient Supplied Answers To CSI Questionnaire  No flowsheet data found.      Patient Supplied Answers to Dyspnea Index Questionnaire:  No flowsheet data found.    Impression & Plan     IMPRESSION: Mr. Rasmussen is a 62 year old male who is being seen for the followin. T1 Left vocal fold SCC  -  s/p endoscopic CO2 laser resection 19  - no evidence of recurrence   - now has metastatic nonsmall cell lung cancer undergoing treatment with Dr Sangita John  - stable laryngeal exam,  VIKRAM  - symptoms today 2022 are stable  - scope today 2022 shows well healed left vocal fold no evidence of recurrence  - VIKRAM and given active chemo, will stretch follow up to his 3rd year anniversary in September   - symptoms 22 are stable     - scope 22shows well healed left vocal fold no evidence of recurrence    - symptoms 2/3/2023 are stable  - scope shows " VIKRAM  Plan  - 6 months  - consider FEES at the next follow up given lung cancer    RETURN VISIT: 6 months    Scribe Disclosure:  I, Sven Osorio, am serving as a scribe to document services personally performed by Lizzy Wray MD based on data collection and the provider's statements to me.     Lizzy Wray MD    Laryngology    [facility]  Department of  Otolaryngology - Head and Neck Surgery  [facility] [place] [address]  Appointment line: [phone]  Fax: [phone]  [email or web address]

## 2023-02-02 NOTE — PATIENT INSTRUCTIONS
1. Please follow-up in clinic in 6 months  2. Please call the ENT clinic with any questions,concerns, new or worsening symptoms.    -Clinic number is [phone]   - Shiela's direct line (Dr. Jackson's nurse) [phone]

## 2023-02-17 ENCOUNTER — PATIENT OUTREACH (OUTPATIENT)
Dept: CARE COORDINATION | Facility: CLINIC | Age: 63
End: 2023-02-17
Payer: COMMERCIAL

## 2023-02-17 NOTE — PROGRESS NOTES
Social Work Progress Note      Data/Intervention:  Patient Name:  Kt Rasmussen  /Age:  1960 (62 year old)    Reason for Follow-Up:  Kt is a 62-year-old gentleman with a diagnosis of metastatic non-small cell lung carcinoma who is followed by Dr. John at [facility].     Intervention:   This clinician called and spoke with guardian Sky and reviewed:   Medicare open enrollment is presently closed. But Medicare Advantage Open Enrollment begins . Those with Advantage plans on  can make a one-time change through . To compare plans, visit Health Care Choices or call the [facility] Line at [phone].     My recommendation would be to contact [facility] directly to assist with choosing a different Advantage Plan, as Sky would like Kt to remain at [facility]. Sky will plan to outreach to [facility] Line 23.     Sky reported that Kt transitioned to [employer] plan by [employer] rep outreaching to him on the phone. Sky looking into whether he can appeal initial decision to enroll in [employer] Advantage Plan, as Kt should not have made that decision in the first place without consent of guardian.     Resources Provided:  Onc SW contact information    Plan:  SW to outreach to guardian next Wednesday if no return call prior.     Please call or page if needs or concerns arise.     KEVIN Chartlon, [facility] HospitalSW  Direct Phone: [phone]  Pager: [phone]

## 2023-02-22 ENCOUNTER — PATIENT OUTREACH (OUTPATIENT)
Dept: CARE COORDINATION | Facility: CLINIC | Age: 63
End: 2023-02-22
Payer: COMMERCIAL

## 2023-02-22 NOTE — PROGRESS NOTES
Social Work Progress Note      Data/Intervention:  Patient Name:  Kt Rasmussen  /Age:  1960 (62 year old)    Reason for Follow-Up:  Kt is a 62-year-old gentleman with a diagnosis of metastatic non-small cell lung carcinoma who is followed by Dr. John at [facility]. This clinician called Sky today according to psychosocial plan of care.    Intervention:   This clinician left detailed voicemail for Sky, requesting return call at earliest convenience.     Plan:  SW to outreach to guardian in 2 business days if no return call prior.     Please call or page if needs or concerns arise.     KEVIN Charlton, [facility] HospitalSW  Direct Phone: [phone]  Pager: [phone]

## 2023-02-23 ENCOUNTER — PATIENT OUTREACH (OUTPATIENT)
Dept: CARE COORDINATION | Facility: CLINIC | Age: 63
End: 2023-02-23
Payer: COMMERCIAL

## 2023-02-23 NOTE — PROGRESS NOTES
"Social Work Progress Note      Data/Intervention:  Patient Name:  Kt Rasmussen  /Age:  1960 (62 year old)    Reason for Follow-Up:  Kt is a 62-year-old gentleman with a diagnosis of metastatic non-small cell lung carcinoma who is followed by Dr. John at [facility]. This clinician returning VM from di Swanson today.     Intervention:   CAROLE confirmed [employer] Medicare Advantage numbers with Sky who is able to log into Kt's online account. Sky is now looking at the plans that are available to Kt through Medicare to select a different plan that is in network with [employer]. Sky reports that the current health insurance plan Kt has is not in his best interest as the out-of-network costs are incredibly high in addition to his specialty care not being covered.     Sky requested updated medication list be faxed to him at [phone] to complete enrollment.     CAROLE updated Sky that our records show that Kt is in \"bad debt balance.\" Sky to contact billing office and forward bill to rep payee. Sky reports that Kt has a [employer] and will be able to help Kt get current on his medical bills.     Plan:  Sky to update this clinician with new health insurance plan and knows to outreach to our clinic when finalized to get Kt back on the schedule.     Please call or page if needs or concerns arise.     KEVIN Charlton, [facility] Hospital[facility]  Direct Phone: [phone]  Pager: [phone]  "

## 2023-02-23 NOTE — Clinical Note
Vito Marc! Can you please fax over an updated medication list to Sky? His fax is: [phone] Thanks! -Gabriela

## 2023-02-24 ENCOUNTER — DOCUMENTATION ONLY (OUTPATIENT)
Dept: ONCOLOGY | Facility: CLINIC | Age: 63
End: 2023-02-24
Payer: COMMERCIAL

## 2023-02-24 NOTE — NURSING NOTE
Per request from Gabriela paulino, , Pts medication lists were faxed to di Gunn at [phone] with receipt confirmation via Right Fax.    Tari Clarke RN

## 2023-02-28 ENCOUNTER — PATIENT OUTREACH (OUTPATIENT)
Dept: CARE COORDINATION | Facility: CLINIC | Age: 63
End: 2023-02-28
Payer: COMMERCIAL

## 2023-02-28 NOTE — PROGRESS NOTES
Social Work Progress Note      Data/Intervention:  Patient Name:  Kt Rasmussen  /Age:  1960 (62 year old)    Reason for Follow-Up:  Kt is a 62-year-old gentleman with a diagnosis of metastatic non-small cell lung carcinoma who is followed by Dr. John at [facility]. This clinician outreaching to Sky, Kt's guardian.      Intervention:   Sky confirms he received medication list from Tari.     Sky reports that he desires to find a Medicare Advantage plan that is in-network with [employer] that also provides transportation. Sky reports that plan he had found last week did not offer transportation but was in-network with [employer].     SW sent email to Sky today with public facing webpages from [facility] that detail which health insurance plans are in-network. The following websites sent to: [email or web address]    [email or web address]  [email or web address]    Sky reports that he plans to outreach to [employer] Line this afternoon and will update this clinician after 3pm today.     Plan:  This clinician will outreach in 1 week if no return call is made prior.     Please call or page if needs or concerns arise.     Gabriela Abbott, KEVIN, LICSW, OSW-C  Clinical - Adult Oncology  She/Her/Hers  Phone: [phone]  [facility]: M, Thu  *every other Tue, 8am-4:30pm  [facility]: W, F, *every other Tue, 8am-4:30pm

## 2023-03-03 ENCOUNTER — PATIENT OUTREACH (OUTPATIENT)
Dept: CARE COORDINATION | Facility: CLINIC | Age: 63
End: 2023-03-03
Payer: COMMERCIAL

## 2023-03-03 NOTE — PROGRESS NOTES
Social Work Progress Note      Data/Intervention:  Patient Name:  Kt Rasmussen  /Age:  1960 (62 year old)    Reason for Follow-Up:  Kt is a 62-year-old gentleman with a diagnosis of metastatic non-small cell lung carcinoma who is followed by Dr. John at [facility]. This clinician outreaching to Sky, Kt's guardian.      Intervention:   CAROLE called Sky today to verify that he had received email from this clinician with [facility] insurance plans that are in-network. SW left detailed voicemail with social work contact information and availability.     Plan:  This clinician will outreach next week if no return call is made prior.     Please call or page if needs or concerns arise.     Gabriela Abbott, KEVIN, LICSW, OSW-C  Clinical - Adult Oncology  She/Her/Hers  Phone: [phone]  [facility]: M, Thu  *every other Tue, 8am-4:30pm  [facility]: W, F, *every other Tue, 8am-4:30pm

## 2023-03-03 NOTE — PROGRESS NOTES
Received voicemail from guardian Sky who reports that he has enrolled Kt in a [employer] plan, and needs to complete enrollment next week. Sky reports that he will call clinic back to reschedule Kt next week once this is finalized.     KEVIN Charlton, LICSW, OSW-C  Clinical - Adult Oncology  She/Her/Hers  Phone: [phone]  [facility]: M, Thu  *every other Tue, 8am-4:30pm  [facility]: W, F, *every other Tue, 8am-4:30pm

## 2023-03-08 ENCOUNTER — PATIENT OUTREACH (OUTPATIENT)
Dept: CARE COORDINATION | Facility: CLINIC | Age: 63
End: 2023-03-08
Payer: COMMERCIAL

## 2023-03-08 NOTE — PROGRESS NOTES
Social Work Progress Note      Data/Intervention:  Patient Name:  Kt Rasmussen  /Age:  1960 (62 year old)    Reason for Follow-Up:  Kt is a 62-year-old gentleman with a diagnosis of metastatic non-small cell lung carcinoma who is followed by Dr. John at [facility]. This clinician outreaching to Kt Swanson's guardian with goal of gathering new amanda insurance information to get Kt on schedule.    Intervention:   Kt Swanson's guardian, has been in touch with [facility] and had planned to enroll in a UCare Connect plan, however, the guardian learned yesterday that Kt is not eligible for this plan as he has a spenddown. The guardian also learned that Kt is likely not even eligible for the [employer] plan that he had erroneously signed up for (I say erroneously signed up for as he legally does not have the capacity to enroll in a health plan), due to this spenddown as well.     The guardian explained to me that he understands that there are a few options to proceed, and the following would be his questions with the different options:     1) Can [facility] bill Medicaid for 3 weeks until Kt is able to enroll in a UCare Plan (a different plan than UCare Connect, one that would start 2023, one that would cover needed care)?  2) Can we push off the Advantage plan he is currently enrolled in, and instead elect a Part D plan specifically. The question in this would be: Can Medicaid be the payor of 1st resort?  3) Can [facility] negotiate with [employer] to get [employer] to pay for 3 weeks?    The guardian explained that prior to Kt erroneously selecting the Medicare [employer] Advantage plan he had been doing the second option. Kt was called by a [employer] representative as it had been deemed that he would be eligible for an Advantage Plan (as it theoretically would be easier for Kt), but Kt should not have been eligible for this plan due to his spenddown, and the Hum[employer] plan was  also out-of-network with [facility] where he receives his care.     This clinician recommended that the guardian outreach to [facility] to discuss legal advisement to the situation. This clinician also brought this issue to the following staff members, in hopes of bringing in the best person from [facility] to assist with recommendations    Message sent to: Antoinette Mccormack, Martha Conway, Angelique Rodriguez, Tari Clarke, Supriya Rodrigues, Jodie Richards, Val Thompson.     Plan:  This clinician will outreach to Sky with recommendations from Cancer Service Line and Case Management leadership, hopefully able to supply contact information for a [facility] financial staff member who is well-versed in navigating these billing issues.     Please call or page if needs or concerns arise.     KEVIN Charlton, LICSW, OSW-C  Clinical - Adult Oncology  She/Her/Hers  Phone: [phone]  [facility]: M, Thu  *every other Tue, 8am-4:30pm  [facility]: W, F, *every other Tue, 8am-4:30pm

## 2023-03-10 ENCOUNTER — PATIENT OUTREACH (OUTPATIENT)
Dept: CARE COORDINATION | Facility: CLINIC | Age: 63
End: 2023-03-10
Payer: COMMERCIAL

## 2023-03-10 NOTE — PROGRESS NOTES
Social Work Progress Note      Data/Intervention:  Patient Name:  Kt Rasmussen  /Age:  1960 (62 year old)    Reason for Follow-Up:  Kt is a 62-year-old gentleman with a diagnosis of metastatic non-small cell lung carcinoma who is followed by Dr. John at [facility]. This clinician outreaching to Sky, Kt's guardian.    This clinician collaborated today with Antoinette Mccormack, Director Clinical Operations    SW called Sky today with the goal of reviewing the following feedback from Oly Pringle , [facility] Patient Access & Clearance Services:  1.      Can [place] bill Medicaid for 3 weeks until Kt is able to enroll in a UCare Plan (a different plan than UCare Connect, one that would start 2023, one that would cover needed care)?   No, this is not feasible.  We would get a denial from Medicaid to bill another payer given the patient has [employer] has an active primary plan.     2.      Can we push off the Advantage plan he is currently enrolled in, and instead elect a Part D plan specifically. The question in this would be: Can Medicaid be the payor of 1st resort?  No, same reason as above.  Humana needs to be terminated first and no longer active.     3.      Can [facility] negotiate with [facility] to get [employer] to pay for 3 weeks?   Unlikely but I ve included Emilie Barone, from [employer] for her thoughts.  To clarify, [facility] still gets paid when [employer] patients are seen, we just get paid at a Medicare rates instead of a higher contracted rate.  There is also impact to the patient that they will pay out of network co-pay/deductible and co-insurance.     It sounds like from a clinical perspective the patient cannot be directed to another provider.  Given that, my suggestion is care proceeds and the patient/guardian understand they will pay a higher out of pocket until they get the patients  coverage changed to an in-network plan.      CAROLE also had goal of reviewing  message from Emilie Barone, Director, Network Relations:  If the patient carries Humana Medicare coverage, Oly s comment is accurate.  [facility] will receive payment for services provided and the patient will experience a higher out of pocket for the care.    [employer] will not create a one time agreement for the services - simply process and pay the claim as an out of network service.      Intervention:   This clinician called and left voicemail for Sky requesting return call at his earliest convenience.      Plan:  This clinician will outreach to [place] on Monday 3/13/23 if no return call is made prior.      Please call or page if needs or concerns arise.     Gabriela Abbott, KEVIN, LICSW, OSW-C  Clinical - Adult Oncology  She/Her/Hers  Phone: [phone]  [facility]: M, Thu  *every other Tue, 8am-4:30pm  [facility]: W, F, *every other Tue, 8am-4:30pm

## 2023-03-13 ENCOUNTER — PATIENT OUTREACH (OUTPATIENT)
Dept: CARE COORDINATION | Facility: CLINIC | Age: 63
End: 2023-03-13
Payer: COMMERCIAL

## 2023-03-13 NOTE — PROGRESS NOTES
Social Work Progress Note      Data/Intervention:  Patient Name:  Kt Rasmussen  /Age:  1960 (62 year old)    Reason for Follow-Up:  Kt is a 62-year-old gentleman with a diagnosis of metastatic non-small cell lung carcinoma who is followed by Dr. John at [facility].     Intervention:   This clinician attempted outreach to di Swanson today, received voicemail. Did not leave voicemail as asked in voicemail to allow 1-2 business days for return call, and to only leave one VM. SW had left voicemail at end of day 3/10/23.     SW will attempt outreach 3/15/23 if no call is made prior.     Plan:  Previously provided patient/family with writer's contact information and availability.     Please call or page if needs or concerns arise.     Gabriela Abbott, KEVIN, LICSW, OSW-C  Clinical - Adult Oncology  She/Her/Hers  Phone: [phone]  [facility]: M, Thu  *every other Tue, 8am-4:30pm  [facility]: W, F, *every other Tue, 8am-4:30pm

## 2023-03-15 ENCOUNTER — PATIENT OUTREACH (OUTPATIENT)
Dept: CARE COORDINATION | Facility: CLINIC | Age: 63
End: 2023-03-15
Payer: COMMERCIAL

## 2023-03-15 NOTE — PROGRESS NOTES
Social Work Progress Note      Data/Intervention:  Patient Name:  Kt Rasmussen  /Age:  1960 (62 year old)    Reason for Follow-Up:  Kt is a 62-year-old gentleman with a diagnosis of metastatic non-small cell lung carcinoma who is followed by Dr. John at [facility].     Intervention:   This clinician spoke with guardian Sky and reviewed Oly Pringle recommendations, acknowledging that there will be a larger out-of-pocket cost to receive care at [facility]. Sky in agreement with plan to schedule Kt's Hem/Onc visits, and reports that he will work to ensure that UCARE plan is active 23.     SW provided Sky with Best Estimate of Cost line at [employer]: [phone], so that Kt's financial worker can be informed about anticipated costs of care.     RNCC Tari collaborating with scheduling to get Kt booked.     CAROLE updated Antoinette Mccormack, Director Clinical Operations, who will connect with Supriya Rodrigues regarding scheduling a [employer] patient in the coming weeks for care.     Plan:  Previously provided guardian with writer's contact information and availability. Sky knows to outreach in case of psychosocial concern or need.     Please call or page if needs or concerns arise.     Gabriela Abbott, MSW, LICSW, OSW-C  Clinical - Adult Oncology  She/Her/Hers  Phone: [phone]  [facility]: M, Thu  *every other Tue, 8am-4:30pm  [facility]: W, F, *every other Tue, 8am-4:30pm

## 2023-03-23 ENCOUNTER — INFUSION THERAPY VISIT (OUTPATIENT)
Dept: INFUSION THERAPY | Facility: CLINIC | Age: 63
End: 2023-03-23
Attending: INTERNAL MEDICINE
Payer: COMMERCIAL

## 2023-03-23 ENCOUNTER — ONCOLOGY VISIT (OUTPATIENT)
Dept: ONCOLOGY | Facility: CLINIC | Age: 63
End: 2023-03-23
Attending: INTERNAL MEDICINE
Payer: COMMERCIAL

## 2023-03-23 VITALS
TEMPERATURE: 97.7 F | WEIGHT: 176 LBS | SYSTOLIC BLOOD PRESSURE: 108 MMHG | DIASTOLIC BLOOD PRESSURE: 67 MMHG | BODY MASS INDEX: 21.88 KG/M2 | HEART RATE: 66 BPM | RESPIRATION RATE: 16 BRPM | OXYGEN SATURATION: 96 % | HEIGHT: 75 IN

## 2023-03-23 DIAGNOSIS — C34.90 NON-SMALL CELL LUNG CANCER METASTATIC TO BONE (H): Primary | ICD-10-CM

## 2023-03-23 DIAGNOSIS — Z51.11 ENCOUNTER FOR ANTINEOPLASTIC CHEMOTHERAPY: ICD-10-CM

## 2023-03-23 DIAGNOSIS — T45.1X5A CHEMOTHERAPY-INDUCED NEUTROPENIA (H): Primary | ICD-10-CM

## 2023-03-23 DIAGNOSIS — D70.1 CHEMOTHERAPY-INDUCED NEUTROPENIA (H): ICD-10-CM

## 2023-03-23 DIAGNOSIS — C79.51 NON-SMALL CELL LUNG CANCER METASTATIC TO BONE (H): ICD-10-CM

## 2023-03-23 DIAGNOSIS — C79.51 NON-SMALL CELL LUNG CANCER METASTATIC TO BONE (H): Primary | ICD-10-CM

## 2023-03-23 DIAGNOSIS — C34.90 NON-SMALL CELL LUNG CANCER METASTATIC TO BONE (H): ICD-10-CM

## 2023-03-23 DIAGNOSIS — T45.1X5A CHEMOTHERAPY-INDUCED NEUTROPENIA (H): ICD-10-CM

## 2023-03-23 DIAGNOSIS — D70.1 CHEMOTHERAPY-INDUCED NEUTROPENIA (H): Primary | ICD-10-CM

## 2023-03-23 LAB
ALBUMIN SERPL BCG-MCNC: 4 G/DL (ref 3.5–5.2)
ALP SERPL-CCNC: 89 U/L (ref 40–129)
ALT SERPL W P-5'-P-CCNC: 22 U/L (ref 10–50)
AST SERPL W P-5'-P-CCNC: 25 U/L (ref 10–50)
BASOPHILS # BLD AUTO: 0 10E3/UL (ref 0–0.2)
BASOPHILS NFR BLD AUTO: 0 %
BILIRUB DIRECT SERPL-MCNC: <0.2 MG/DL (ref 0–0.3)
BILIRUB SERPL-MCNC: 0.6 MG/DL
EOSINOPHIL # BLD AUTO: 0.1 10E3/UL (ref 0–0.7)
EOSINOPHIL NFR BLD AUTO: 3 %
ERYTHROCYTE [DISTWIDTH] IN BLOOD BY AUTOMATED COUNT: 15.2 % (ref 10–15)
HCT VFR BLD AUTO: 39.3 % (ref 40–53)
HGB BLD-MCNC: 12.3 G/DL (ref 13.3–17.7)
IMM GRANULOCYTES # BLD: 0 10E3/UL
IMM GRANULOCYTES NFR BLD: 0 %
LYMPHOCYTES # BLD AUTO: 0.8 10E3/UL (ref 0.8–5.3)
LYMPHOCYTES NFR BLD AUTO: 17 %
MCH RBC QN AUTO: 27.8 PG (ref 26.5–33)
MCHC RBC AUTO-ENTMCNC: 31.3 G/DL (ref 31.5–36.5)
MCV RBC AUTO: 89 FL (ref 78–100)
MONOCYTES # BLD AUTO: 0.4 10E3/UL (ref 0–1.3)
MONOCYTES NFR BLD AUTO: 8 %
NEUTROPHILS # BLD AUTO: 3.3 10E3/UL (ref 1.6–8.3)
NEUTROPHILS NFR BLD AUTO: 72 %
NRBC # BLD AUTO: 0 10E3/UL
NRBC BLD AUTO-RTO: 0 /100
PLATELET # BLD AUTO: 112 10E3/UL (ref 150–450)
PROT SERPL-MCNC: 6.5 G/DL (ref 6.4–8.3)
RBC # BLD AUTO: 4.42 10E6/UL (ref 4.4–5.9)
WBC # BLD AUTO: 4.6 10E3/UL (ref 4–11)

## 2023-03-23 PROCEDURE — 85025 COMPLETE CBC W/AUTO DIFF WBC: CPT | Performed by: PHYSICIAN ASSISTANT

## 2023-03-23 PROCEDURE — 96367 TX/PROPH/DG ADDL SEQ IV INF: CPT

## 2023-03-23 PROCEDURE — 96372 THER/PROPH/DIAG INJ SC/IM: CPT | Performed by: PHYSICIAN ASSISTANT

## 2023-03-23 PROCEDURE — 96377 APPLICATON ON-BODY INJECTOR: CPT | Mod: XS | Performed by: PHYSICIAN ASSISTANT

## 2023-03-23 PROCEDURE — 96413 CHEMO IV INFUSION 1 HR: CPT

## 2023-03-23 PROCEDURE — 250N000011 HC RX IP 250 OP 636: Performed by: PHYSICIAN ASSISTANT

## 2023-03-23 PROCEDURE — 99214 OFFICE O/P EST MOD 30 MIN: CPT | Performed by: PHYSICIAN ASSISTANT

## 2023-03-23 PROCEDURE — 258N000003 HC RX IP 258 OP 636: Performed by: PHYSICIAN ASSISTANT

## 2023-03-23 PROCEDURE — 80076 HEPATIC FUNCTION PANEL: CPT | Performed by: PHYSICIAN ASSISTANT

## 2023-03-23 RX ORDER — ALBUTEROL SULFATE 90 UG/1
1-2 AEROSOL, METERED RESPIRATORY (INHALATION)
Status: CANCELLED
Start: 2023-03-23

## 2023-03-23 RX ORDER — DIPHENHYDRAMINE HYDROCHLORIDE 50 MG/ML
50 INJECTION INTRAMUSCULAR; INTRAVENOUS
Status: CANCELLED
Start: 2023-03-23

## 2023-03-23 RX ORDER — MEPERIDINE HYDROCHLORIDE 25 MG/ML
25 INJECTION INTRAMUSCULAR; INTRAVENOUS; SUBCUTANEOUS EVERY 30 MIN PRN
Status: CANCELLED | OUTPATIENT
Start: 2023-03-23

## 2023-03-23 RX ORDER — LORAZEPAM 2 MG/ML
0.5 INJECTION INTRAMUSCULAR EVERY 4 HOURS PRN
Status: CANCELLED | OUTPATIENT
Start: 2023-03-23

## 2023-03-23 RX ORDER — ALBUTEROL SULFATE 0.83 MG/ML
2.5 SOLUTION RESPIRATORY (INHALATION)
Status: CANCELLED | OUTPATIENT
Start: 2023-03-23

## 2023-03-23 RX ORDER — EPINEPHRINE 1 MG/ML
0.3 INJECTION, SOLUTION INTRAMUSCULAR; SUBCUTANEOUS EVERY 5 MIN PRN
Status: CANCELLED | OUTPATIENT
Start: 2023-03-23

## 2023-03-23 RX ORDER — METHYLPREDNISOLONE SODIUM SUCCINATE 125 MG/2ML
125 INJECTION, POWDER, LYOPHILIZED, FOR SOLUTION INTRAMUSCULAR; INTRAVENOUS
Status: CANCELLED
Start: 2023-03-23

## 2023-03-23 RX ORDER — HEPARIN SODIUM (PORCINE) LOCK FLUSH IV SOLN 100 UNIT/ML 100 UNIT/ML
5 SOLUTION INTRAVENOUS
Status: CANCELLED | OUTPATIENT
Start: 2023-03-23

## 2023-03-23 RX ORDER — HEPARIN SODIUM,PORCINE 10 UNIT/ML
5 VIAL (ML) INTRAVENOUS
Status: CANCELLED | OUTPATIENT
Start: 2023-03-23

## 2023-03-23 RX ORDER — HEPARIN SODIUM (PORCINE) LOCK FLUSH IV SOLN 100 UNIT/ML 100 UNIT/ML
5 SOLUTION INTRAVENOUS
Status: DISCONTINUED | OUTPATIENT
Start: 2023-03-23 | End: 2023-03-23 | Stop reason: HOSPADM

## 2023-03-23 RX ADMIN — SODIUM CHLORIDE 250 ML: 9 INJECTION, SOLUTION INTRAVENOUS at 10:00

## 2023-03-23 RX ADMIN — PEGFILGRASTIM 6 MG: KIT SUBCUTANEOUS at 10:51

## 2023-03-23 RX ADMIN — DEXAMETHASONE SODIUM PHOSPHATE: 10 INJECTION, SOLUTION INTRAMUSCULAR; INTRAVENOUS at 10:00

## 2023-03-23 RX ADMIN — SODIUM CHLORIDE 122 MG: 9 INJECTION, SOLUTION INTRAVENOUS at 10:24

## 2023-03-23 RX ADMIN — Medication 5 ML: at 11:23

## 2023-03-23 NOTE — LETTER
3/23/2023         RE: Kt Rasmussen  [id number]  [place] [address]        Dear Colleague,    Thank you for referring your patient, Kt Rasmussen, to the [facility]. Please see a copy of my visit note below.    Oncology/Hematology Visit Note  Mar 23, 2023    Reason for Visit: Follow up of lung cancer     Primary Oncologist: Dr. John    Oncologic History:  Kt Rasmussen is a 62 year old male who presents with the following oncologic history:  1. 5/05/2016: Presented with near-obstructing large mass coming off the cheikh and likely the posterior wall, seen on bronchoscopy. Biopsy of the mass showed invasive squamous cell carcinoma, moderately differentiating and focally keratinizing.  Procedure was complicated by significant bleeding.  Tumor was completely debulked (via bronchoscopy) in both main stem bronchi and distal trachea. NGS showed no mutations in EGFR, KRAS, BRAF, NRAS, HRAS, PIK3CA, ERBB2, MET, or JAK2.  2. 5/23/2016: PET/CT scan showed evidence of residual tumor with decreased endobronchial mass. Indeterminate, mildly hypermetabolic mesentery with prominent lymph nodes and area of enhancement of the right hepatic lobe present. Felt to have T4-NX-M0 disease.  3. 6/09/2016: Started concurrent chemoradiation with weekly paclitaxel and carboplatin.  4. 7/30/2016: Completed radiation.  Subsequently received 2 cycles of consolidation paclitaxel and carboplatin.   5. 9/13/2019: Presented with 6-month history of dysphonia and left vocal exophytic lesion. Left true vocal fold biopsy showed at least squamous cell carcinoma in situ, cannot exclude superficial invasion.  6. 9/30/2019: Excision of left vocal cord mass showed fragments of invasive squamous cell carcinoma, well differentiated and keratinizing.  Margins negative for malignancy.  7. 2/16/2021: CT chest w/o contrast showed thin-walled cavity in left lower lobe with 2 solid peripheral nodules measuring 9 mm each. No  lymphadenopathy.  8. 3/01/2021: PET scan showed hypermetabolic nodules in left lower lobe and right lung, consistent with metastases; hypermetabolic adenopathy in chest, abdomen, pelvis; nonspecific uptake at tongue; hypermetabolic intraosseous lesions in spine and pelvis consistent with metastatic disease; left axillary hypermetabolic lymph nodes related to COVID-19 vaccination.  9. 3/12/2021: CT-guided lung biopsy showed non-small cell carcinoma, not otherwise specified -- with opinion by [facility] pathologist.  10. 3/18/2021: Lung NGS panel negative for mutations in BRAF, EGFR, ERBB2, IDH1, IDH2, KRAS, MET, NRAS, RET. PD-L1 expression negative (TPS <1%). Due to minimal amount of DNA obtained from specimen, other biomarkers could not be analyzed.  11. 4/7/2021: MRI brain negative for brain metastases.  12. 4/27/2021: Started 1st line metastatic therapy with carboplatin, paclitaxel, bevacizumab, and atezolizumab for metastatic non-small cell lung carcinoma, NOS.  13. 7/12/2021: PET/CT showed resolved mural nodules with increased cystic cavity in left lower lobe with no significant FDG uptake, less likely metastases; no enlarged hypermetabolic mediastinal, hilar, or axillary lymph nodes, decreased metabolic activity of intraosseous lesion in spine and pelvis; no new bone lesions.  14. 10/11/2021: PET/CT showed slight interval increase in intensity of metabolic activity of right pubic bone and left ischium with new focal uptake in L2 spinous process; resolved uptake at previous ground glass opacity along right medial lower lobe; unchanged cystic cavities/nodules in left lower lobe and right apical upper lobe; no pathologically enlarged hypermetabolic mediastinal, hilar, or axillary lymph nodes.  15. 1/10/2022: PET/CT showed new foci of abnormal skeletal FGD uptake in the thoracic and lumbar spine. Mild interval increase in intensity of previously demonstrated hypermetabolic skeletal lesions involving the pelvis and  lumbar spine. Findings are consistent with progressive osseous metastatic disease. Subsequently off chemo for 1 month due to poor performance status.  16. 1/25/2022: Patient fell and fractured his femur. This was surgically repaired and he was subsequently cared for at a rehab unit.  17. 2/14/2022: Brain MRI without contrast (contrast not given due to inability to obtain IV access) showed no brain metastases.  18. 4/21/2022: Started 2nd line metastatic therapy with pemetrexed and carboplatin. Omission of carboplatin 6/2 and forward due to worsening anemia despite dose reduction.  19. 7/18/2022: PET/CT showed enlarged and increased FDG avid skeletal metastases, increased left para-aortic retroperitoneal lymph node increased in size and FDG avidity, findings consistent with progressive disease.  20. 8/3/2022: Started 3rd line metastatic therapy with Taxotere 60 mg/m2 every 3 weeks.  21. 11/21/2022: PET scan showed partial response, left para-aortic retroperitoneal lymph node has decreased from 2.6 x 1.7 cm (SUVmax 7.9) to 2.3 x 1.4 cm (SUVmax 5.7), skeletal metastases no longer demonstrate FDG activity. Kt elected to take a 2-month break from chemo.  22. 1/23/2023: PET scan showed increasing metabolic activity of the left periaortic (max SUV 8.1, previously 5.7) left external iliac (max SUV 7.0, previously 3.1), and right external iliac (max SUV 5.1, previously 3.7) lymph nodes with development/reactivation of multiple FDG avid osseous lesions  23. Resumed Taxol 60mg/m2 + Neulasta 3/23, delayed due to insurance issues.     Interval History:  Doing well. No recurrent falls. I see a scab on his arm and he is not sure what from. He notes rare left hand mild neuropathy, not bothersome.     Review of Systems:  A complete review of systems was negative except as noted in the HPI.     Past medical, Surgical, and social history:  Reviewed    Physical Examination:  There were no vitals taken for this visit.  Wt Readings from  Last 10 Encounters:   03/23/23 79.8 kg (176 lb)   11/16/22 78 kg (172 lb)   10/26/22 77.1 kg (170 lb)   10/12/22 76.7 kg (169 lb)   09/08/22 76.7 kg (169 lb)   08/24/22 77 kg (169 lb 12.1 oz)   08/11/22 77.1 kg (170 lb)   08/03/22 77.1 kg (169 lb 15.6 oz)   08/03/22 77.1 kg (170 lb)   05/12/22 77.4 kg (170 lb 10.2 oz)     General: Pleasant patient in no acute distress.  Skin: Warm and dry. No abnormal ecchymosis or rashes. Line clean, dry, intact.  Eyes: Anicteric. ENT: Slightly dysphonic voice.   Lungs: . Normal work of breathing.  Abdomen: Non-distended.  Extremities: No peripheral edema.  Mental: Calm, cooperative, appropriate. Mood euthymic.  Neuro: Alert and oriented x 3.    Laboratory Data:  CBC, CMP reviewed, pending at time of dictation.      Assessment and Plan:  Kt Rasmussen is a 62 year old male with:     # Metastatic non-small (non-squamous) cell lung carcinoma with metastases to lymph nodes, bones, and bilateral lungs  # History of locally advanced endobronchial invasive squamous cell carcinoma diagnosed 5/2016, status post debulking and chemoradiation   - A repeat 1/23/2023 PET scan which showed progressive disease in multiple bones lymph nodes.  - Resume Taxotere at 20% dose reduction due to past repeated issues with chemo-induced cytopenias.  Reiterated that all treatment is with palliative intent, not curative.   - Chemo start delayed from January due to lack of insurance coverage  - Repeat PET scan in 3 months  - Follow-up with MICHAEL alternating with MD every cycle      20 minutes spent on the date of the encounter doing chart review, review of test results, interpretation of tests, patient visit and documentation     Renee Rubalcava PA-C  [facility]   [phone]    Chart documentation with Dragon Voice recognition Software. Although reviewed after completion, some words and grammatical errors may remain.        Again, thank you for allowing me to participate in the  care of your patient.        Sincerely,        CELI LO PA-C

## 2023-03-23 NOTE — PROGRESS NOTES
Infusion Nursing Note:  Kt Rasmussen presents today for C8D1 Taxotere.    Patient seen by provider today: Yes: Renee   present during visit today: Not Applicable.    Note: Patient hasn't had chemo for a few months now d/t insurance issues.  Patient reports feeling well today, no new issues or concerns noted.    Intravenous Access:  Labs drawn without difficulty.  Implanted Port.    Treatment Conditions:  Lab Results   Component Value Date    HGB 12.3 (L) 03/23/2023    WBC 4.6 03/23/2023    ANEU 0.5 (L) 08/11/2022    ANEUTAUTO 3.3 03/23/2023     (L) 03/23/2023      Lab Results   Component Value Date     01/25/2023    POTASSIUM 4.0 01/25/2023    MAG 1.9 10/13/2016    CR 0.71 01/25/2023    BEVERLY 9.3 01/25/2023    BILITOTAL 0.6 03/23/2023    ALBUMIN 4.0 03/23/2023    ALT 22 03/23/2023    AST 25 03/23/2023     Results reviewed, labs MET treatment parameters, ok to proceed with treatment.    Post Infusion Assessment:  Patient tolerated infusion without incident.  Blood return noted pre and post infusion.  Site patent and intact, free from redness, edema or discomfort.  No evidence of extravasations.  Access discontinued per protocol.     ONPRO  Was placed on patient's: back of right arm.    Was placed at 1055 AM    Podpal used: Yes    ONPRO injector device Lot number: [id number]    Patient education included: what patient can expect after application, what colored lights mean on the device, when to remove device, when and where to call with questions or issues, all patients questions answered and that Neulasta administration will occur at 1:55pm on 3/24/23.    Patient tolerated administration well.      Discharge Plan:   Discharge instructions reviewed with: Patient.  Patient and/or family verbalized understanding of discharge instructions and all questions answered.  Copy of AVS reviewed with patient and/or family.  Patient will return in 3 weeks (not scheduled yet--msg sent to scheduling team) for  next appointment.  Patient discharged in stable condition accompanied by: self.  Patient has transportation services.  Departure Mode: Wheelchair.      Filipe Carr RN

## 2023-03-23 NOTE — PROGRESS NOTES
Oncology/Hematology Visit Note  Mar 23, 2023    Reason for Visit: Follow up of lung cancer     Primary Oncologist: Dr. John    Oncologic History:  Kt Rasmussen is a 62 year old male who presents with the following oncologic history:  1. 5/05/2016: Presented with near-obstructing large mass coming off the cheikh and likely the posterior wall, seen on bronchoscopy. Biopsy of the mass showed invasive squamous cell carcinoma, moderately differentiating and focally keratinizing.  Procedure was complicated by significant bleeding.  Tumor was completely debulked (via bronchoscopy) in both main stem bronchi and distal trachea. NGS showed no mutations in EGFR, KRAS, BRAF, NRAS, HRAS, PIK3CA, ERBB2, MET, or JAK2.  2. 5/23/2016: PET/CT scan showed evidence of residual tumor with decreased endobronchial mass. Indeterminate, mildly hypermetabolic mesentery with prominent lymph nodes and area of enhancement of the right hepatic lobe present. Felt to have T4-NX-M0 disease.  3. 6/09/2016: Started concurrent chemoradiation with weekly paclitaxel and carboplatin.  4. 7/30/2016: Completed radiation.  Subsequently received 2 cycles of consolidation paclitaxel and carboplatin.   5. 9/13/2019: Presented with 6-month history of dysphonia and left vocal exophytic lesion. Left true vocal fold biopsy showed at least squamous cell carcinoma in situ, cannot exclude superficial invasion.  6. 9/30/2019: Excision of left vocal cord mass showed fragments of invasive squamous cell carcinoma, well differentiated and keratinizing.  Margins negative for malignancy.  7. 2/16/2021: CT chest w/o contrast showed thin-walled cavity in left lower lobe with 2 solid peripheral nodules measuring 9 mm each. No lymphadenopathy.  8. 3/01/2021: PET scan showed hypermetabolic nodules in left lower lobe and right lung, consistent with metastases; hypermetabolic adenopathy in chest, abdomen, pelvis; nonspecific uptake at tongue; hypermetabolic intraosseous lesions  in spine and pelvis consistent with metastatic disease; left axillary hypermetabolic lymph nodes related to COVID-19 vaccination.  9. 3/12/2021: CT-guided lung biopsy showed non-small cell carcinoma, not otherwise specified -- with opinion by [facility] pathologist.  10. 3/18/2021: Lung NGS panel negative for mutations in BRAF, EGFR, ERBB2, IDH1, IDH2, KRAS, MET, NRAS, RET. PD-L1 expression negative (TPS <1%). Due to minimal amount of DNA obtained from specimen, other biomarkers could not be analyzed.  11. 4/7/2021: MRI brain negative for brain metastases.  12. 4/27/2021: Started 1st line metastatic therapy with carboplatin, paclitaxel, bevacizumab, and atezolizumab for metastatic non-small cell lung carcinoma, NOS.  13. 7/12/2021: PET/CT showed resolved mural nodules with increased cystic cavity in left lower lobe with no significant FDG uptake, less likely metastases; no enlarged hypermetabolic mediastinal, hilar, or axillary lymph nodes, decreased metabolic activity of intraosseous lesion in spine and pelvis; no new bone lesions.  14. 10/11/2021: PET/CT showed slight interval increase in intensity of metabolic activity of right pubic bone and left ischium with new focal uptake in L2 spinous process; resolved uptake at previous ground glass opacity along right medial lower lobe; unchanged cystic cavities/nodules in left lower lobe and right apical upper lobe; no pathologically enlarged hypermetabolic mediastinal, hilar, or axillary lymph nodes.  15. 1/10/2022: PET/CT showed new foci of abnormal skeletal FGD uptake in the thoracic and lumbar spine. Mild interval increase in intensity of previously demonstrated hypermetabolic skeletal lesions involving the pelvis and lumbar spine. Findings are consistent with progressive osseous metastatic disease. Subsequently off chemo for 1 month due to poor performance status.  16. 1/25/2022: Patient fell and fractured his femur. This was surgically repaired and he was  subsequently cared for at a rehab unit.  17. 2/14/2022: Brain MRI without contrast (contrast not given due to inability to obtain IV access) showed no brain metastases.  18. 4/21/2022: Started 2nd line metastatic therapy with pemetrexed and carboplatin. Omission of carboplatin 6/2 and forward due to worsening anemia despite dose reduction.  19. 7/18/2022: PET/CT showed enlarged and increased FDG avid skeletal metastases, increased left para-aortic retroperitoneal lymph node increased in size and FDG avidity, findings consistent with progressive disease.  20. 8/3/2022: Started 3rd line metastatic therapy with Taxotere 60 mg/m2 every 3 weeks.  21. 11/21/2022: PET scan showed partial response, left para-aortic retroperitoneal lymph node has decreased from 2.6 x 1.7 cm (SUVmax 7.9) to 2.3 x 1.4 cm (SUVmax 5.7), skeletal metastases no longer demonstrate FDG activity. Kt elected to take a 2-month break from chemo.  22. 1/23/2023: PET scan showed increasing metabolic activity of the left periaortic (max SUV 8.1, previously 5.7) left external iliac (max SUV 7.0, previously 3.1), and right external iliac (max SUV 5.1, previously 3.7) lymph nodes with development/reactivation of multiple FDG avid osseous lesions  23. Resumed Taxol 60mg/m2 + Neulasta 3/23, delayed due to insurance issues.     Interval History:  Doing well. No recurrent falls. I see a scab on his arm and he is not sure what from. He notes rare left hand mild neuropathy, not bothersome.     Review of Systems:  A complete review of systems was negative except as noted in the HPI.     Past medical, Surgical, and social history:  Reviewed    Physical Examination:  There were no vitals taken for this visit.  Wt Readings from Last 10 Encounters:   03/23/23 79.8 kg (176 lb)   11/16/22 78 kg (172 lb)   10/26/22 77.1 kg (170 lb)   10/12/22 76.7 kg (169 lb)   09/08/22 76.7 kg (169 lb)   08/24/22 77 kg (169 lb 12.1 oz)   08/11/22 77.1 kg (170 lb)   08/03/22 77.1 kg (169  lb 15.6 oz)   08/03/22 77.1 kg (170 lb)   05/12/22 77.4 kg (170 lb 10.2 oz)     General: Pleasant patient in no acute distress.  Skin: Warm and dry. No abnormal ecchymosis or rashes. Line clean, dry, intact.  Eyes: Anicteric. ENT: Slightly dysphonic voice.   Lungs: . Normal work of breathing.  Abdomen: Non-distended.  Extremities: No peripheral edema.  Mental: Calm, cooperative, appropriate. Mood euthymic.  Neuro: Alert and oriented x 3.    Laboratory Data:  CBC, CMP reviewed, pending at time of dictation.      Assessment and Plan:  Kt Rasmussen is a 62 year old male with:     # Metastatic non-small (non-squamous) cell lung carcinoma with metastases to lymph nodes, bones, and bilateral lungs  # History of locally advanced endobronchial invasive squamous cell carcinoma diagnosed 5/2016, status post debulking and chemoradiation   - A repeat 1/23/2023 PET scan which showed progressive disease in multiple bones lymph nodes.  - Resume Taxotere at 20% dose reduction due to past repeated issues with chemo-induced cytopenias.  Reiterated that all treatment is with palliative intent, not curative.   - Chemo start delayed from January due to lack of insurance coverage  - Repeat PET scan in 3 months  - Follow-up with MICHAEL alternating with MD every cycle      20 minutes spent on the date of the encounter doing chart review, review of test results, interpretation of tests, patient visit and documentation     Renee Rubalcava PA-C  [facility]   [phone]    Chart documentation with Dragon Voice recognition Software. Although reviewed after completion, some words and grammatical errors may remain.

## 2023-04-03 ENCOUNTER — TELEPHONE (OUTPATIENT)
Dept: OTOLARYNGOLOGY | Facility: CLINIC | Age: 63
End: 2023-04-03
Payer: COMMERCIAL

## 2023-04-07 NOTE — PROGRESS NOTES
[facility] [place]    Hematology/Oncology Established Patient Follow-up Note      Today's Date: 4/13/2023    Reason for Follow-up: Metastatic non-small cell lung carcinoma.    HISTORY OF PRESENT ILLNESS: Kt Rasmussen is a 62 year old male who presents with the following oncologic history:  1. 5/05/2016: Presented with near-obstructing large mass coming off the cheikh and likely the posterior wall, seen on bronchoscopy. Biopsy of the mass showed invasive squamous cell carcinoma, moderately differentiating and focally keratinizing.  Procedure was complicated by significant bleeding.  Tumor was completely debulked (via bronchoscopy) in both main stem bronchi and distal trachea. NGS showed no mutations in EGFR, KRAS, BRAF, NRAS, HRAS, PIK3CA, ERBB2, MET, or JAK2.  2. 5/23/2016: PET/CT scan showed evidence of residual tumor with decreased endobronchial mass. Indeterminate, mildly hypermetabolic mesentery with prominent lymph nodes and area of enhancement of the right hepatic lobe present. Felt to have T4-NX-M0 disease.  3. 6/09/2016: Started concurrent chemoradiation with weekly paclitaxel and carboplatin.  4. 7/30/2016: Completed radiation.  Subsequently received 2 cycles of consolidation paclitaxel and carboplatin.   5. 9/13/2019: Presented with 6-month history of dysphonia and left vocal exophytic lesion. Left true vocal fold biopsy showed at least squamous cell carcinoma in situ, cannot exclude superficial invasion.  6. 9/30/2019: Excision of left vocal cord mass showed fragments of invasive squamous cell carcinoma, well differentiated and keratinizing.  Margins negative for malignancy.  7. 2/16/2021: CT chest w/o contrast showed thin-walled cavity in left lower lobe with 2 solid peripheral nodules measuring 9 mm each. No lymphadenopathy.  8. 3/01/2021: PET scan showed hypermetabolic nodules in left lower lobe and right lung, consistent with metastases; hypermetabolic adenopathy in chest, abdomen,  pelvis; nonspecific uptake at tongue; hypermetabolic intraosseous lesions in spine and pelvis consistent with metastatic disease; left axillary hypermetabolic lymph nodes related to COVID-19 vaccination.  9. 3/12/2021: CT-guided lung biopsy showed non-small cell carcinoma, not otherwise specified -- with opinion by [facility] pathologist.  10. 3/18/2021: Lung NGS panel negative for mutations in BRAF, EGFR, ERBB2, IDH1, IDH2, KRAS, MET, NRAS, RET. PD-L1 expression negative (TPS <1%). Due to minimal amount of DNA obtained from specimen, other biomarkers could not be analyzed.  11. 4/7/2021: MRI brain negative for brain metastases.  12. 4/27/2021: Started 1st line metastatic therapy with carboplatin, paclitaxel, bevacizumab, and atezolizumab for metastatic non-small cell lung carcinoma, NOS.  13. 7/12/2021: PET/CT showed resolved mural nodules with increased cystic cavity in left lower lobe with no significant FDG uptake, less likely metastases; no enlarged hypermetabolic mediastinal, hilar, or axillary lymph nodes, decreased metabolic activity of intraosseous lesion in spine and pelvis; no new bone lesions.  14. 10/11/2021: PET/CT showed slight interval increase in intensity of metabolic activity of right pubic bone and left ischium with new focal uptake in L2 spinous process; resolved uptake at previous ground glass opacity along right medial lower lobe; unchanged cystic cavities/nodules in left lower lobe and right apical upper lobe; no pathologically enlarged hypermetabolic mediastinal, hilar, or axillary lymph nodes.  15. 1/10/2022: PET/CT showed new foci of abnormal skeletal FGD uptake in the thoracic and lumbar spine. Mild interval increase in intensity of previously demonstrated hypermetabolic skeletal lesions involving the pelvis and lumbar spine. Findings are consistent with progressive osseous metastatic disease. Subsequently off chemo for 1 month due to poor performance status.  16. 1/25/2022: Patient fell  and fractured his femur. This was surgically repaired and he was subsequently cared for at a rehab unit.  17. 2/14/2022: Brain MRI without contrast (contrast not given due to inability to obtain IV access) showed no brain metastases.  18. 4/21/2022: Started 2nd line metastatic therapy with pemetrexed and carboplatin. Omission of carboplatin 6/2 and forward due to worsening anemia despite dose reduction.  19. 7/18/2022: PET/CT showed enlarged and increased FDG avid skeletal metastases, increased left para-aortic retroperitoneal lymph node increased in size and FDG avidity, findings consistent with progressive disease.  20. 8/3/2022: Started 3rd line metastatic therapy with Taxotere 60 mg/m2 every 3 weeks.  21. 11/21/2022: PET scan showed partial response, left para-aortic retroperitoneal lymph node has decreased from 2.6 x 1.7 cm (SUVmax 7.9) to 2.3 x 1.4 cm (SUVmax 5.7), skeletal metastases no longer demonstrate FDG activity. Kt elected to take a 2-month break from chemo.  22. 1/23/2023: PET scan showed increasing metabolic activity of the left periaortic (max SUV 8.1, previously 5.7) left external iliac (max SUV 7.0, previously 3.1), and right external iliac (max SUV 5.1, previously 3.7) lymph nodes with development/reactivation of multiple FDG avid osseous lesions  23. 3/23/2023: Resumption of Taxotere every 3 weeks. Delayed due to insurance issues.    INTERIM HISTORY:  Kt reports no new pain, paresthesias, or fatigue.    REVIEW OF SYSTEMS:   14 point ROS was reviewed and is negative other than as noted above in HPI.       HOME MEDICATIONS:  Current Outpatient Medications   Medication Sig Dispense Refill     atorvastatin (LIPITOR) 40 MG tablet Take 40 mg by mouth daily       dexamethasone (DECADRON) 4 MG tablet Take 2 tablets (8 mg) by mouth 2 times daily (with meals) for 3 doses Start evening of Docetaxel infusion and continue for a total of 3 doses. 6 tablet 7     folic acid (FOLVITE) 1 MG tablet Take 1 mg  by mouth daily (Patient not taking: Reported on 2023)       prochlorperazine (COMPAZINE) 10 MG tablet Take 1 tablet (10 mg) by mouth every 6 hours as needed for nausea or vomiting (Patient not taking: Reported on 2022) 30 tablet 2         ALLERGIES:  Allergies   Allergen Reactions     No Known Drug Allergies          PAST MEDICAL HISTORY:  Past Medical History:   Diagnosis Date     Alcohol abuse, unspecified      Cerebral infarction (H)     , right side residual and aphasia     Dyslipidemia      GERD (gastroesophageal reflux disease)      Lung cancer (H)      Unspecified essential hypertension          PAST SURGICAL HISTORY:  Past Surgical History:   Procedure Laterality Date     BRONCHOSCOPY FLEXIBLE AND RIGID N/A 2016    Procedure: BRONCHOSCOPY FLEXIBLE AND RIGID;  Surgeon: Tony Talbot MD;  Location: [facility] OR     ESOPHAGOSCOPY FLEXIBLE N/A 2019    Procedure: flexible esophagoscopy;  Surgeon: Lizzy Johnson MD;  Location: [facility] OR     INJECT STEROID (LOCATION) N/A 2019    Procedure: steroid injection;  Surgeon: Lizzy Johnson MD;  Location: [facility] OR     IR CHEST PORT PLACEMENT > 5 YRS OF AGE  2021     IR CHEST PORT PLACEMENT > 5 YRS OF AGE  2022     IR PORT CHECK RIGHT  2022     IR PORT REMOVAL RIGHT  2022     LASER CO2 LARYNGOSCOPY N/A 2019    Procedure: Microdirect laryngoscopy with excision of laryngeal mass, CO2 laser;  Surgeon: Lizzy Johnson MD;  Location: [facility] OR     ZZC NONSPECIFIC PROCEDURE      R tympanoplasty         SOCIAL HISTORY:  Social History     Socioeconomic History     Marital status: Single     Spouse name: Not on file     Number of children: Not on file     Years of education: Not on file     Highest education level: Not on file   Occupational History     Not on file   Tobacco Use     Smoking status: Former     Packs/day: 1.00     Types: Cigarettes     Quit date: 2009     Years since quittin.5     Smokeless tobacco: Never    Vaping Use     Vaping status: Not on file   Substance and Sexual Activity     Alcohol use: Not Currently     Drug use: No     Sexual activity: Not on file   Other Topics Concern     Parent/sibling w/ CABG, MI or angioplasty before 65F 55M? Not Asked   Social History Narrative     Not on file     Social Determinants of Health     Financial Resource Strain: Not on file   Food Insecurity: Not on file   Transportation Needs: Not on file   Physical Activity: Not on file   Stress: Not on file   Social Connections: Not on file   Intimate Partner Violence: Not At Risk (2022)    Humiliation, Afraid, Rape, and Kick questionnaire      Fear of Current or Ex-Partner: No      Emotionally Abused: No      Physically Abused: No      Sexually Abused: No   Housing Stability: Not on file   Resides at assisted living.      FAMILY HISTORY:  Family History   Problem Relation Age of Onset     Cancer Father          PHYSICAL EXAM:  Vital signs:  /70   Pulse 79   Temp 98.1  F (36.7  C) (Oral)   Resp 16   SpO2 97%    ECO  GENERAL: No acute distress.  EYES: No scleral icterus. No overt erythema.  LYMPH: No cervical, supraclavicular lymphadenopathy.  CARDIAC: Regular rate and rhythm with no murmurs.  RESPIRATORY: Clear to auscultation bilaterally. Hoarseness present.  MUSCULOSKELETAL: Range of motion in the neck, shoulders, and arms appear normal.  SKIN: No overt rashes, discolorations, or lesions over the face and neck.  NEUROLOGIC: Alert.  No overt tremors.  PSYCHIATRIC: Normal affect and mood.  Does not appear anxious.  GAIT: Sitting in wheelchair.      LABS:  CBC RESULTS: Recent Labs   Lab Test 23  1049   WBC 10.5   RBC 4.05*   HGB 11.6*   HCT 36.3*   MCV 90   MCH 28.6   MCHC 32.0   RDW 16.7*            PATHOLOGY:  None new.    IMAGING:  Reviewed as per HPI.    ASSESSMENT/PLAN:  Kt Rasmussen is a 61 year old male with the following issues:  1. Metastatic non-small (non-squamous) cell lung carcinoma with  metastases to lymph nodes, bones, and bilateral lungs  2. 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 Chemotherapy-induced pancytopenia  --Lung NGS panel negative for mutations in BRAF, EGFR, ERBB2, IDH1, IDH2, KRAS, MET, NRAS, RET. PD-L1 expression negative (TPS <1%). Guardant 360 negative for targetable mutations.  --Kt started 3rd line metastatic therapy with every 3-week Taxotere at 20% dose reduction (60 mg/m2) on 8/3/2022 due to progressive disease.  He tolerated this very well with minimal to no paresthesias. He then took a 2-month chemo break that was further delayed to 3.5 months due to insurance issues. He resumed on 3/23/2023.  --His 1/23/2023 PET scan showed progressive disease in multiple bones lymph nodes.  --Will continue Taxotere at 20% dose reduction due to past repeated issues with chemo-induced cytopenias.  Reiterated that all treatment is with palliative intent, not curative.  --Reviewed today's labs which show Hgb 11.6, WBC 10.5, platelets 159,000; CMP pending. He may proceed with chemo today.  --Will repeat PET scan in early 7/2023.    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 Dysphonia  6. Dysphagia  -Kt underwent excision of a left vocal cord SCC on 9/30/2019 and has persistent dysphonia as a result of the lesion and excision.  He also has dysphagia but this is stable and swallowing is manageable.  -Continue follow-up with Dr. Wray.    7. Weight loss  --I had offered nutrition consult and he declined this.  --I recommended increasing his intake of higher caloric nutrient dense foods and beverages. His weight has now stabilized.    8. Dizziness and prior falls  --SW and guardian were previously notified of multiple falls.  --Prior brain MRI 2/14/2022 showed no brain metastases. However this was a suboptimal exam due to inability to administer contrast.  --He has not had recent  falls and is using a wheelchair due to prior femoral fracture in 1/2022.    Return in 3 weeks with Renee and in 6 weeks with me.    Sangita John MD  Hematology/Oncology  [facility]    Total time spent: 30 minutes in patient evaluation, counseling, documentation, and coordination of care.

## 2023-04-10 RX ORDER — ALBUTEROL SULFATE 0.83 MG/ML
2.5 SOLUTION RESPIRATORY (INHALATION)
Status: CANCELLED | OUTPATIENT
Start: 2023-04-13

## 2023-04-10 RX ORDER — LORAZEPAM 2 MG/ML
0.5 INJECTION INTRAMUSCULAR EVERY 4 HOURS PRN
Status: CANCELLED | OUTPATIENT
Start: 2023-04-13

## 2023-04-10 RX ORDER — MEPERIDINE HYDROCHLORIDE 25 MG/ML
25 INJECTION INTRAMUSCULAR; INTRAVENOUS; SUBCUTANEOUS EVERY 30 MIN PRN
Status: CANCELLED | OUTPATIENT
Start: 2023-04-13

## 2023-04-10 RX ORDER — HEPARIN SODIUM (PORCINE) LOCK FLUSH IV SOLN 100 UNIT/ML 100 UNIT/ML
5 SOLUTION INTRAVENOUS
Status: CANCELLED | OUTPATIENT
Start: 2023-04-13

## 2023-04-10 RX ORDER — HEPARIN SODIUM,PORCINE 10 UNIT/ML
5 VIAL (ML) INTRAVENOUS
Status: CANCELLED | OUTPATIENT
Start: 2023-04-13

## 2023-04-10 RX ORDER — DIPHENHYDRAMINE HYDROCHLORIDE 50 MG/ML
50 INJECTION INTRAMUSCULAR; INTRAVENOUS
Status: CANCELLED
Start: 2023-04-13

## 2023-04-10 RX ORDER — ALBUTEROL SULFATE 90 UG/1
1-2 AEROSOL, METERED RESPIRATORY (INHALATION)
Status: CANCELLED
Start: 2023-04-13

## 2023-04-10 RX ORDER — EPINEPHRINE 1 MG/ML
0.3 INJECTION, SOLUTION, CONCENTRATE INTRAVENOUS EVERY 5 MIN PRN
Status: CANCELLED | OUTPATIENT
Start: 2023-04-13

## 2023-04-13 ENCOUNTER — ONCOLOGY VISIT (OUTPATIENT)
Dept: ONCOLOGY | Facility: CLINIC | Age: 63
End: 2023-04-13
Attending: INTERNAL MEDICINE
Payer: COMMERCIAL

## 2023-04-13 ENCOUNTER — INFUSION THERAPY VISIT (OUTPATIENT)
Dept: INFUSION THERAPY | Facility: CLINIC | Age: 63
End: 2023-04-13
Attending: INTERNAL MEDICINE
Payer: COMMERCIAL

## 2023-04-13 VITALS
SYSTOLIC BLOOD PRESSURE: 113 MMHG | DIASTOLIC BLOOD PRESSURE: 70 MMHG | TEMPERATURE: 98.1 F | HEART RATE: 79 BPM | RESPIRATION RATE: 16 BRPM | OXYGEN SATURATION: 97 %

## 2023-04-13 DIAGNOSIS — T45.1X5A CHEMOTHERAPY-INDUCED NEUTROPENIA (H): ICD-10-CM

## 2023-04-13 DIAGNOSIS — T45.1X5A ANEMIA DUE TO CHEMOTHERAPY: ICD-10-CM

## 2023-04-13 DIAGNOSIS — C79.51 NON-SMALL CELL LUNG CANCER METASTATIC TO BONE (H): ICD-10-CM

## 2023-04-13 DIAGNOSIS — C78.02 MALIGNANT NEOPLASM METASTATIC TO BOTH LUNGS (H): ICD-10-CM

## 2023-04-13 DIAGNOSIS — Z51.11 ENCOUNTER FOR ANTINEOPLASTIC CHEMOTHERAPY: ICD-10-CM

## 2023-04-13 DIAGNOSIS — D70.1 CHEMOTHERAPY-INDUCED NEUTROPENIA (H): ICD-10-CM

## 2023-04-13 DIAGNOSIS — Z51.11 ENCOUNTER FOR ANTINEOPLASTIC CHEMOTHERAPY: Primary | ICD-10-CM

## 2023-04-13 DIAGNOSIS — C78.01 MALIGNANT NEOPLASM METASTATIC TO BOTH LUNGS (H): ICD-10-CM

## 2023-04-13 DIAGNOSIS — C34.90 NON-SMALL CELL LUNG CANCER METASTATIC TO BONE (H): Primary | ICD-10-CM

## 2023-04-13 DIAGNOSIS — D64.81 ANEMIA DUE TO CHEMOTHERAPY: ICD-10-CM

## 2023-04-13 DIAGNOSIS — C79.51 NON-SMALL CELL LUNG CANCER METASTATIC TO BONE (H): Primary | ICD-10-CM

## 2023-04-13 DIAGNOSIS — C34.90 NON-SMALL CELL LUNG CANCER METASTATIC TO BONE (H): ICD-10-CM

## 2023-04-13 DIAGNOSIS — D70.1 CHEMOTHERAPY-INDUCED NEUTROPENIA (H): Primary | ICD-10-CM

## 2023-04-13 DIAGNOSIS — T45.1X5A CHEMOTHERAPY-INDUCED NEUTROPENIA (H): Primary | ICD-10-CM

## 2023-04-13 DIAGNOSIS — D69.59 THROMBOCYTOPENIA DUE TO DRUGS: ICD-10-CM

## 2023-04-13 DIAGNOSIS — T50.905A THROMBOCYTOPENIA DUE TO DRUGS: ICD-10-CM

## 2023-04-13 LAB
ALBUMIN SERPL BCG-MCNC: 4.2 G/DL (ref 3.5–5.2)
ALP SERPL-CCNC: 113 U/L (ref 40–129)
ALT SERPL W P-5'-P-CCNC: 21 U/L (ref 10–50)
AST SERPL W P-5'-P-CCNC: 22 U/L (ref 10–50)
BASOPHILS # BLD AUTO: 0 10E3/UL (ref 0–0.2)
BASOPHILS NFR BLD AUTO: 0 %
BILIRUB DIRECT SERPL-MCNC: <0.2 MG/DL (ref 0–0.3)
BILIRUB SERPL-MCNC: 0.8 MG/DL
EOSINOPHIL # BLD AUTO: 0 10E3/UL (ref 0–0.7)
EOSINOPHIL NFR BLD AUTO: 0 %
ERYTHROCYTE [DISTWIDTH] IN BLOOD BY AUTOMATED COUNT: 16.7 % (ref 10–15)
HCT VFR BLD AUTO: 36.3 % (ref 40–53)
HGB BLD-MCNC: 11.6 G/DL (ref 13.3–17.7)
IMM GRANULOCYTES # BLD: 0 10E3/UL
IMM GRANULOCYTES NFR BLD: 0 %
LYMPHOCYTES # BLD AUTO: 0.9 10E3/UL (ref 0.8–5.3)
LYMPHOCYTES NFR BLD AUTO: 8 %
MCH RBC QN AUTO: 28.6 PG (ref 26.5–33)
MCHC RBC AUTO-ENTMCNC: 32 G/DL (ref 31.5–36.5)
MCV RBC AUTO: 90 FL (ref 78–100)
MONOCYTES # BLD AUTO: 1 10E3/UL (ref 0–1.3)
MONOCYTES NFR BLD AUTO: 9 %
NEUTROPHILS # BLD AUTO: 8.6 10E3/UL (ref 1.6–8.3)
NEUTROPHILS NFR BLD AUTO: 83 %
NRBC # BLD AUTO: 0 10E3/UL
NRBC BLD AUTO-RTO: 0 /100
PLATELET # BLD AUTO: 159 10E3/UL (ref 150–450)
PROT SERPL-MCNC: 6.5 G/DL (ref 6.4–8.3)
RBC # BLD AUTO: 4.05 10E6/UL (ref 4.4–5.9)
WBC # BLD AUTO: 10.5 10E3/UL (ref 4–11)

## 2023-04-13 PROCEDURE — 99214 OFFICE O/P EST MOD 30 MIN: CPT | Performed by: INTERNAL MEDICINE

## 2023-04-13 PROCEDURE — 80076 HEPATIC FUNCTION PANEL: CPT | Performed by: INTERNAL MEDICINE

## 2023-04-13 PROCEDURE — 96413 CHEMO IV INFUSION 1 HR: CPT

## 2023-04-13 PROCEDURE — 96375 TX/PRO/DX INJ NEW DRUG ADDON: CPT

## 2023-04-13 PROCEDURE — 250N000011 HC RX IP 250 OP 636: Performed by: INTERNAL MEDICINE

## 2023-04-13 PROCEDURE — G0463 HOSPITAL OUTPT CLINIC VISIT: HCPCS | Performed by: INTERNAL MEDICINE

## 2023-04-13 PROCEDURE — 96372 THER/PROPH/DIAG INJ SC/IM: CPT | Performed by: INTERNAL MEDICINE

## 2023-04-13 PROCEDURE — G0463 HOSPITAL OUTPT CLINIC VISIT: HCPCS | Mod: 25 | Performed by: INTERNAL MEDICINE

## 2023-04-13 PROCEDURE — 85025 COMPLETE CBC W/AUTO DIFF WBC: CPT | Performed by: INTERNAL MEDICINE

## 2023-04-13 PROCEDURE — 96377 APPLICATON ON-BODY INJECTOR: CPT | Performed by: INTERNAL MEDICINE

## 2023-04-13 PROCEDURE — 258N000003 HC RX IP 258 OP 636: Performed by: INTERNAL MEDICINE

## 2023-04-13 RX ORDER — HEPARIN SODIUM (PORCINE) LOCK FLUSH IV SOLN 100 UNIT/ML 100 UNIT/ML
5 SOLUTION INTRAVENOUS
Status: DISCONTINUED | OUTPATIENT
Start: 2023-04-13 | End: 2023-04-13 | Stop reason: HOSPADM

## 2023-04-13 RX ADMIN — DEXAMETHASONE SODIUM PHOSPHATE: 10 INJECTION, SOLUTION INTRAMUSCULAR; INTRAVENOUS at 11:25

## 2023-04-13 RX ADMIN — SODIUM CHLORIDE 250 ML: 9 INJECTION, SOLUTION INTRAVENOUS at 11:25

## 2023-04-13 RX ADMIN — Medication 5 ML: at 12:50

## 2023-04-13 RX ADMIN — PEGFILGRASTIM 6 MG: KIT SUBCUTANEOUS at 11:55

## 2023-04-13 RX ADMIN — SODIUM CHLORIDE 122 MG: 9 INJECTION, SOLUTION INTRAVENOUS at 11:50

## 2023-04-13 ASSESSMENT — PAIN SCALES - GENERAL: PAINLEVEL: NO PAIN (0)

## 2023-04-13 NOTE — PATIENT INSTRUCTIONS
Arrange for Taxotere every 3 weeks with lab draw every 3 weeks through 7/6/2023.  RTC MD 5/25 and 7/6 with PET scan prior to 7/6 visit.  RTC NP 6/15.

## 2023-04-13 NOTE — PROGRESS NOTES
"Oncology Rooming Note    April 13, 2023 11:00 AM   Kt Rasmussen is a 62 year old male who presents for:    Chief Complaint   Patient presents with     Oncology Clinic Visit     Initial Vitals: /70   Pulse 79   Temp 98.1  F (36.7  C) (Oral)   Resp 16   SpO2 97%  Estimated body mass index is 22 kg/m  as calculated from the following:    Height as of 3/23/23: 1.905 m (6' 3\").    Weight as of 3/23/23: 79.8 kg (176 lb). There is no height or weight on file to calculate BSA.  No Pain (0) Comment: Data Unavailable   No LMP for male patient.  Allergies reviewed: Yes  Medications reviewed: Yes    Medications: Medication refills not needed today.  Pharmacy name entered into EPIC:    [place] - [address] [facility] [place], [place] - [address] [id number] - [place] [id number] AT [facility] & [address] [facility] - [address]    Clinical concerns:  doctor was notified.      Julisa Whelan CMA            "

## 2023-04-13 NOTE — PROGRESS NOTES
Infusion Nursing Note:  Kt HERNANDEZ Rasmussen presents today for Cycle 9 Day 1 Taxotere and OnPro. Patient seen by provider today: Yes: Dr. John   present during visit today: Not Applicable.    Note: N/A.    Intravenous Access:  Implanted Port.    Treatment Conditions:  Lab Results   Component Value Date    HGB 11.6 (L) 04/13/2023    WBC 10.5 04/13/2023    ANEU 0.5 (L) 08/11/2022    ANEUTAUTO 8.6 (H) 04/13/2023     04/13/2023      Lab Results   Component Value Date     01/25/2023    POTASSIUM 4.0 01/25/2023    MAG 1.9 10/13/2016    CR 0.71 01/25/2023    BEVERLY 9.3 01/25/2023    BILITOTAL 0.8 04/13/2023    ALBUMIN 4.2 04/13/2023    ALT 21 04/13/2023    AST 22 04/13/2023     Results reviewed, labs MET treatment parameters, ok to proceed with treatment.      Post Infusion Assessment:  Patient tolerated infusion without incident.  Blood return noted pre and post infusion.  Site patent and intact, free from redness, edema or discomfort.  No evidence of extravasations.  Access discontinued per protocol.     ONPRO  Was placed on patient's: back of right arm.    Was placed at 12:00 PM    Podpal used: Yes    ONPRO injector device Lot number: [id number]    Patient education included: what patient can expect after application, what colored lights mean on the device, when to remove device, when and where to call with questions or issues, all patients questions answered and that Neulasta administration will occur at 3:00pm on 4/14/23.    Patient tolerated administration well.    Discharge Plan:   Patient declined prescription refills.  Discharge instructions reviewed with: Patient.  Patient verbalized understanding of discharge instructions and all questions answered.  AVS to patient via [employer].  Patient will return 5/4/23 for next appointment.   Patient discharged in stable condition accompanied by: self.  Departure Mode: Ambulatory.      Sandi Sanches RN

## 2023-04-13 NOTE — PATIENT INSTRUCTIONS
Your On-body Neulasta Injector was applied to your upper right arm at 12:00.  At approximately 3:00pm on 4/14, your On-body Injector will beep to let you know your dose delivery will begin in 2 minutes.  Your medication will be delivered over the next 45 minutes.  You can remove your Injector at 4:00pm.  Please make sure your Injector has a solid green light or has turned off prior to removing the device.  Please contact your provider at [phone] with questions or concerns.

## 2023-04-13 NOTE — LETTER
4/13/2023         RE: Kt Rasmussen  [id number]  [facility] [address]        Dear Colleague,    Thank you for referring your patient, Kt Rasmussen, to the [facility]. Please see a copy of my visit note below.    [facility]    Hematology/Oncology Established Patient Follow-up Note      Today's Date: 4/13/2023    Reason for Follow-up: Metastatic non-small cell lung carcinoma.    HISTORY OF PRESENT ILLNESS: Kt Rasmussen is a 62 year old male who presents with the following oncologic history:  1. 5/05/2016: Presented with near-obstructing large mass coming off the cheikh and likely the posterior wall, seen on bronchoscopy. Biopsy of the mass showed invasive squamous cell carcinoma, moderately differentiating and focally keratinizing.  Procedure was complicated by significant bleeding.  Tumor was completely debulked (via bronchoscopy) in both main stem bronchi and distal trachea. NGS showed no mutations in EGFR, KRAS, BRAF, NRAS, HRAS, PIK3CA, ERBB2, MET, or JAK2.  2. 5/23/2016: PET/CT scan showed evidence of residual tumor with decreased endobronchial mass. Indeterminate, mildly hypermetabolic mesentery with prominent lymph nodes and area of enhancement of the right hepatic lobe present. Felt to have T4-NX-M0 disease.  3. 6/09/2016: Started concurrent chemoradiation with weekly paclitaxel and carboplatin.  4. 7/30/2016: Completed radiation.  Subsequently received 2 cycles of consolidation paclitaxel and carboplatin.   5. 9/13/2019: Presented with 6-month history of dysphonia and left vocal exophytic lesion. Left true vocal fold biopsy showed at least squamous cell carcinoma in situ, cannot exclude superficial invasion.  6. 9/30/2019: Excision of left vocal cord mass showed fragments of invasive squamous cell carcinoma, well differentiated and keratinizing.  Margins negative for malignancy.  7. 2/16/2021: CT chest w/o contrast showed thin-walled cavity in left  lower lobe with 2 solid peripheral nodules measuring 9 mm each. No lymphadenopathy.  8. 3/01/2021: PET scan showed hypermetabolic nodules in left lower lobe and right lung, consistent with metastases; hypermetabolic adenopathy in chest, abdomen, pelvis; nonspecific uptake at tongue; hypermetabolic intraosseous lesions in spine and pelvis consistent with metastatic disease; left axillary hypermetabolic lymph nodes related to COVID-19 vaccination.  9. 3/12/2021: CT-guided lung biopsy showed non-small cell carcinoma, not otherwise specified -- with opinion by [facility] pathologist.  10. 3/18/2021: Lung NGS panel negative for mutations in BRAF, EGFR, ERBB2, IDH1, IDH2, KRAS, MET, NRAS, RET. PD-L1 expression negative (TPS <1%). Due to minimal amount of DNA obtained from specimen, other biomarkers could not be analyzed.  11. 4/7/2021: MRI brain negative for brain metastases.  12. 4/27/2021: Started 1st line metastatic therapy with carboplatin, paclitaxel, bevacizumab, and atezolizumab for metastatic non-small cell lung carcinoma, NOS.  13. 7/12/2021: PET/CT showed resolved mural nodules with increased cystic cavity in left lower lobe with no significant FDG uptake, less likely metastases; no enlarged hypermetabolic mediastinal, hilar, or axillary lymph nodes, decreased metabolic activity of intraosseous lesion in spine and pelvis; no new bone lesions.  14. 10/11/2021: PET/CT showed slight interval increase in intensity of metabolic activity of right pubic bone and left ischium with new focal uptake in L2 spinous process; resolved uptake at previous ground glass opacity along right medial lower lobe; unchanged cystic cavities/nodules in left lower lobe and right apical upper lobe; no pathologically enlarged hypermetabolic mediastinal, hilar, or axillary lymph nodes.  15. 1/10/2022: PET/CT showed new foci of abnormal skeletal FGD uptake in the thoracic and lumbar spine. Mild interval increase in intensity of previously  demonstrated hypermetabolic skeletal lesions involving the pelvis and lumbar spine. Findings are consistent with progressive osseous metastatic disease. Subsequently off chemo for 1 month due to poor performance status.  16. 1/25/2022: Patient fell and fractured his femur. This was surgically repaired and he was subsequently cared for at a rehab unit.  17. 2/14/2022: Brain MRI without contrast (contrast not given due to inability to obtain IV access) showed no brain metastases.  18. 4/21/2022: Started 2nd line metastatic therapy with pemetrexed and carboplatin. Omission of carboplatin 6/2 and forward due to worsening anemia despite dose reduction.  19. 7/18/2022: PET/CT showed enlarged and increased FDG avid skeletal metastases, increased left para-aortic retroperitoneal lymph node increased in size and FDG avidity, findings consistent with progressive disease.  20. 8/3/2022: Started 3rd line metastatic therapy with Taxotere 60 mg/m2 every 3 weeks.  21. 11/21/2022: PET scan showed partial response, left para-aortic retroperitoneal lymph node has decreased from 2.6 x 1.7 cm (SUVmax 7.9) to 2.3 x 1.4 cm (SUVmax 5.7), skeletal metastases no longer demonstrate FDG activity. Kt elected to take a 2-month break from chemo.  22. 1/23/2023: PET scan showed increasing metabolic activity of the left periaortic (max SUV 8.1, previously 5.7) left external iliac (max SUV 7.0, previously 3.1), and right external iliac (max SUV 5.1, previously 3.7) lymph nodes with development/reactivation of multiple FDG avid osseous lesions  23. 3/23/2023: Resumption of Taxotere every 3 weeks. Delayed due to insurance issues.    INTERIM HISTORY:  Kt reports no new pain, paresthesias, or fatigue.    REVIEW OF SYSTEMS:   14 point ROS was reviewed and is negative other than as noted above in HPI.       HOME MEDICATIONS:  Current Outpatient Medications   Medication Sig Dispense Refill     atorvastatin (LIPITOR) 40 MG tablet Take 40 mg by mouth  daily       dexamethasone (DECADRON) 4 MG tablet Take 2 tablets (8 mg) by mouth 2 times daily (with meals) for 3 doses Start evening of Docetaxel infusion and continue for a total of 3 doses. 6 tablet 7     folic acid (FOLVITE) 1 MG tablet Take 1 mg by mouth daily (Patient not taking: Reported on 1/25/2023)       prochlorperazine (COMPAZINE) 10 MG tablet Take 1 tablet (10 mg) by mouth every 6 hours as needed for nausea or vomiting (Patient not taking: Reported on 11/16/2022) 30 tablet 2         ALLERGIES:  Allergies   Allergen Reactions     No Known Drug Allergies          PAST MEDICAL HISTORY:  Past Medical History:   Diagnosis Date     Alcohol abuse, unspecified      Cerebral infarction (H)     2009, right side residual and aphasia     Dyslipidemia      GERD (gastroesophageal reflux disease)      Lung cancer (H)      Unspecified essential hypertension          PAST SURGICAL HISTORY:  Past Surgical History:   Procedure Laterality Date     BRONCHOSCOPY FLEXIBLE AND RIGID N/A 5/5/2016    Procedure: BRONCHOSCOPY FLEXIBLE AND RIGID;  Surgeon: Tony Talbot MD;  Location: [facility] OR     ESOPHAGOSCOPY FLEXIBLE N/A 9/30/2019    Procedure: flexible esophagoscopy;  Surgeon: Lizzy Johnson MD;  Location: [facility] OR     INJECT STEROID (LOCATION) N/A 9/30/2019    Procedure: steroid injection;  Surgeon: Lizzy Johnson MD;  Location: [facility] OR     IR CHEST PORT PLACEMENT > 5 YRS OF AGE  4/22/2021     IR CHEST PORT PLACEMENT > 5 YRS OF AGE  4/11/2022     IR PORT CHECK RIGHT  4/11/2022     IR PORT REMOVAL RIGHT  4/11/2022     LASER CO2 LARYNGOSCOPY N/A 9/30/2019    Procedure: Microdirect laryngoscopy with excision of laryngeal mass, CO2 laser;  Surgeon: Lizzy Johnson MD;  Location[facility]     [facility] NONSPECIFIC PROCEDURE      R tympanoplasty         SOCIAL HISTORY:  Social History     Socioeconomic History     Marital status: Single     Spouse name: Not on file     Number of children: Not on file     Years of education: Not on  file     Highest education level: Not on file   Occupational History     Not on file   Tobacco Use     Smoking status: Former     Packs/day: 1.00     Types: Cigarettes     Quit date: 2009     Years since quittin.5     Smokeless tobacco: Never   Vaping Use     Vaping status: Not on file   Substance and Sexual Activity     Alcohol use: Not Currently     Drug use: No     Sexual activity: Not on file   Other Topics Concern     Parent/sibling w/ CABG, MI or angioplasty before 65F 55M? Not Asked   Social History Narrative     Not on file     Social Determinants of Health     Financial Resource Strain: Not on file   Food Insecurity: Not on file   Transportation Needs: Not on file   Physical Activity: Not on file   Stress: Not on file   Social Connections: Not on file   Intimate Partner Violence: Not At Risk (2022)    Humiliation, Afraid, Rape, and Kick questionnaire      Fear of Current or Ex-Partner: No      Emotionally Abused: No      Physically Abused: No      Sexually Abused: No   Housing Stability: Not on file   Resides at assisted living.      FAMILY HISTORY:  Family History   Problem Relation Age of Onset     Cancer Father          PHYSICAL EXAM:  Vital signs:  /70   Pulse 79   Temp 98.1  F (36.7  C) (Oral)   Resp 16   SpO2 97%    ECO  GENERAL: No acute distress.  EYES: No scleral icterus. No overt erythema.  LYMPH: No cervical, supraclavicular lymphadenopathy.  CARDIAC: Regular rate and rhythm with no murmurs.  RESPIRATORY: Clear to auscultation bilaterally. Hoarseness present.  MUSCULOSKELETAL: Range of motion in the neck, shoulders, and arms appear normal.  SKIN: No overt rashes, discolorations, or lesions over the face and neck.  NEUROLOGIC: Alert.  No overt tremors.  PSYCHIATRIC: Normal affect and mood.  Does not appear anxious.  GAIT: Sitting in wheelchair.      LABS:  CBC RESULTS: Recent Labs   Lab Test 23  1049   WBC 10.5   RBC 4.05*   HGB 11.6*   HCT 36.3*   MCV 90   MCH  28.6   [facility] 32.0   RDW 16.7*            PATHOLOGY:  None new.    IMAGING:  Reviewed as per HPI.    ASSESSMENT/PLAN:  Kt Rasmussen is a 61 year old male with the following issues:  1. Metastatic non-small (non-squamous) cell lung carcinoma with metastases to lymph nodes, bones, and bilateral lungs  2. 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 Chemotherapy-induced pancytopenia  --Lung NGS panel negative for mutations in BRAF, EGFR, ERBB2, IDH1, IDH2, KRAS, MET, NRAS, RET. PD-L1 expression negative (TPS <1%). Guardant 360 negative for targetable mutations.  --Kt started 3rd line metastatic therapy with every 3-week Taxotere at 20% dose reduction (60 mg/m2) on 8/3/2022 due to progressive disease.  He tolerated this very well with minimal to no paresthesias. He then took a 2-month chemo break that was further delayed to 3.5 months due to insurance issues. He resumed on 3/23/2023.  --His 1/23/2023 PET scan showed progressive disease in multiple bones lymph nodes.  --Will continue Taxotere at 20% dose reduction due to past repeated issues with chemo-induced cytopenias.  Reiterated that all treatment is with palliative intent, not curative.  --Reviewed today's labs which show Hgb 11.6, WBC 10.5, platelets 159,000; CMP pending. He may proceed with chemo today.  --Will repeat PET scan in early 7/2023.    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 Dysphonia  6. Dysphagia  -Kt underwent excision of a left vocal cord SCC on 9/30/2019 and has persistent dysphonia as a result of the lesion and excision.  He also has dysphagia but this is stable and swallowing is manageable.  -Continue follow-up with Dr. Wray.    7. Weight loss  --I had offered nutrition consult and he declined this.  --I recommended increasing his intake of higher caloric nutrient dense foods and beverages. His weight has now  "stabilized.    8. Dizziness and prior falls  --SW and guardian were previously notified of multiple falls.  --Prior brain MRI 2/14/2022 showed no brain metastases. However this was a suboptimal exam due to inability to administer contrast.  --He has not had recent falls and is using a wheelchair due to prior femoral fracture in 1/2022.    Return in 3 weeks with Renee and in 6 weeks with me.    Sangtia John MD  Hematology/Oncology  [facility]    Total time spent: 30 minutes in patient evaluation, counseling, documentation, and coordination of care.    Oncology Rooming Note    April 13, 2023 11:00 AM   Kt Rasmussen is a 62 year old male who presents for:    Chief Complaint   Patient presents with     Oncology Clinic Visit     Initial Vitals: /70   Pulse 79   Temp 98.1  F (36.7  C) (Oral)   Resp 16   SpO2 97%  Estimated body mass index is 22 kg/m  as calculated from the following:    Height as of 3/23/23: 1.905 m (6' 3\").    Weight as of 3/23/23: 79.8 kg (176 lb). There is no height or weight on file to calculate BSA.  No Pain (0) Comment: Data Unavailable   No LMP for male patient.  Allergies reviewed: Yes  Medications reviewed: Yes    Medications: Medication refills not needed today.  Pharmacy name entered into EPIC:    [facility] [facility] [place] [address] [facility] [address] [id number] - [address] & [address] [facility] - [address]    Clinical concerns:  doctor was notified.      Julisa Whelan [facility]                Again, thank you for allowing me to participate in the care of your patient.        Sincerely,        Sangita John MD    "

## 2023-04-13 NOTE — PROGRESS NOTES
Nursing Note:  Kt Rasmussen presents today for Port labs.    Patient seen by provider today: No   present during visit today: Not Applicable.    Note: Infusion following.    Intravenous Access:  Implanted Port.    Discharge Plan:   Patient was sent to [facility] for next appointment.    Olga Rahman RN

## 2023-05-01 RX ORDER — HEPARIN SODIUM (PORCINE) LOCK FLUSH IV SOLN 100 UNIT/ML 100 UNIT/ML
5 SOLUTION INTRAVENOUS
Status: CANCELLED | OUTPATIENT
Start: 2023-05-04

## 2023-05-01 RX ORDER — ALBUTEROL SULFATE 0.83 MG/ML
2.5 SOLUTION RESPIRATORY (INHALATION)
Status: CANCELLED | OUTPATIENT
Start: 2023-05-04

## 2023-05-01 RX ORDER — ALBUTEROL SULFATE 90 UG/1
1-2 AEROSOL, METERED RESPIRATORY (INHALATION)
Status: CANCELLED
Start: 2023-05-04

## 2023-05-01 RX ORDER — LORAZEPAM 2 MG/ML
0.5 INJECTION INTRAMUSCULAR EVERY 4 HOURS PRN
Status: CANCELLED | OUTPATIENT
Start: 2023-05-04

## 2023-05-01 RX ORDER — HEPARIN SODIUM,PORCINE 10 UNIT/ML
5 VIAL (ML) INTRAVENOUS
Status: CANCELLED | OUTPATIENT
Start: 2023-05-04

## 2023-05-01 RX ORDER — EPINEPHRINE 1 MG/ML
0.3 INJECTION, SOLUTION, CONCENTRATE INTRAVENOUS EVERY 5 MIN PRN
Status: CANCELLED | OUTPATIENT
Start: 2023-05-04

## 2023-05-01 RX ORDER — MEPERIDINE HYDROCHLORIDE 25 MG/ML
25 INJECTION INTRAMUSCULAR; INTRAVENOUS; SUBCUTANEOUS EVERY 30 MIN PRN
Status: CANCELLED | OUTPATIENT
Start: 2023-05-04

## 2023-05-01 RX ORDER — DIPHENHYDRAMINE HYDROCHLORIDE 50 MG/ML
50 INJECTION INTRAMUSCULAR; INTRAVENOUS
Status: CANCELLED
Start: 2023-05-04

## 2023-05-04 ENCOUNTER — LAB (OUTPATIENT)
Dept: INFUSION THERAPY | Facility: CLINIC | Age: 63
End: 2023-05-04
Attending: INTERNAL MEDICINE
Payer: COMMERCIAL

## 2023-05-04 ENCOUNTER — ONCOLOGY VISIT (OUTPATIENT)
Dept: ONCOLOGY | Facility: CLINIC | Age: 63
End: 2023-05-04
Attending: INTERNAL MEDICINE
Payer: COMMERCIAL

## 2023-05-04 VITALS
HEART RATE: 65 BPM | OXYGEN SATURATION: 99 % | DIASTOLIC BLOOD PRESSURE: 76 MMHG | RESPIRATION RATE: 16 BRPM | TEMPERATURE: 97.7 F | SYSTOLIC BLOOD PRESSURE: 117 MMHG

## 2023-05-04 DIAGNOSIS — D70.1 CHEMOTHERAPY-INDUCED NEUTROPENIA (H): Primary | ICD-10-CM

## 2023-05-04 DIAGNOSIS — Z51.11 ENCOUNTER FOR ANTINEOPLASTIC CHEMOTHERAPY: ICD-10-CM

## 2023-05-04 DIAGNOSIS — C34.90 NON-SMALL CELL LUNG CANCER METASTATIC TO BONE (H): Primary | ICD-10-CM

## 2023-05-04 DIAGNOSIS — C79.51 NON-SMALL CELL LUNG CANCER METASTATIC TO BONE (H): ICD-10-CM

## 2023-05-04 DIAGNOSIS — T45.1X5A CHEMOTHERAPY-INDUCED NEUTROPENIA (H): Primary | ICD-10-CM

## 2023-05-04 DIAGNOSIS — C79.51 NON-SMALL CELL LUNG CANCER METASTATIC TO BONE (H): Primary | ICD-10-CM

## 2023-05-04 DIAGNOSIS — Z51.11 ENCOUNTER FOR ANTINEOPLASTIC CHEMOTHERAPY: Primary | ICD-10-CM

## 2023-05-04 DIAGNOSIS — T45.1X5A CHEMOTHERAPY-INDUCED NEUTROPENIA (H): ICD-10-CM

## 2023-05-04 DIAGNOSIS — D70.1 CHEMOTHERAPY-INDUCED NEUTROPENIA (H): ICD-10-CM

## 2023-05-04 DIAGNOSIS — C34.90 NON-SMALL CELL LUNG CANCER METASTATIC TO BONE (H): ICD-10-CM

## 2023-05-04 DIAGNOSIS — C78.01 MALIGNANT NEOPLASM METASTATIC TO BOTH LUNGS (H): ICD-10-CM

## 2023-05-04 DIAGNOSIS — C78.02 MALIGNANT NEOPLASM METASTATIC TO BOTH LUNGS (H): ICD-10-CM

## 2023-05-04 LAB
ALBUMIN SERPL BCG-MCNC: 3.8 G/DL (ref 3.5–5.2)
ALP SERPL-CCNC: 105 U/L (ref 40–129)
ALT SERPL W P-5'-P-CCNC: 26 U/L (ref 10–50)
AST SERPL W P-5'-P-CCNC: 26 U/L (ref 10–50)
BASOPHILS # BLD AUTO: 0 10E3/UL (ref 0–0.2)
BASOPHILS NFR BLD AUTO: 0 %
BILIRUB DIRECT SERPL-MCNC: <0.2 MG/DL (ref 0–0.3)
BILIRUB SERPL-MCNC: 0.5 MG/DL
EOSINOPHIL # BLD AUTO: 0.1 10E3/UL (ref 0–0.7)
EOSINOPHIL NFR BLD AUTO: 1 %
ERYTHROCYTE [DISTWIDTH] IN BLOOD BY AUTOMATED COUNT: 17.2 % (ref 10–15)
HCT VFR BLD AUTO: 35.1 % (ref 40–53)
HGB BLD-MCNC: 11.2 G/DL (ref 13.3–17.7)
IMM GRANULOCYTES # BLD: 0.1 10E3/UL
IMM GRANULOCYTES NFR BLD: 1 %
LYMPHOCYTES # BLD AUTO: 1 10E3/UL (ref 0.8–5.3)
LYMPHOCYTES NFR BLD AUTO: 11 %
MCH RBC QN AUTO: 28.9 PG (ref 26.5–33)
MCHC RBC AUTO-ENTMCNC: 31.9 G/DL (ref 31.5–36.5)
MCV RBC AUTO: 91 FL (ref 78–100)
MONOCYTES # BLD AUTO: 0.6 10E3/UL (ref 0–1.3)
MONOCYTES NFR BLD AUTO: 7 %
NEUTROPHILS # BLD AUTO: 7 10E3/UL (ref 1.6–8.3)
NEUTROPHILS NFR BLD AUTO: 80 %
NRBC # BLD AUTO: 0 10E3/UL
NRBC BLD AUTO-RTO: 0 /100
PLATELET # BLD AUTO: 155 10E3/UL (ref 150–450)
PROT SERPL-MCNC: 6.5 G/DL (ref 6.4–8.3)
RBC # BLD AUTO: 3.88 10E6/UL (ref 4.4–5.9)
WBC # BLD AUTO: 8.7 10E3/UL (ref 4–11)

## 2023-05-04 PROCEDURE — 85004 AUTOMATED DIFF WBC COUNT: CPT | Performed by: INTERNAL MEDICINE

## 2023-05-04 PROCEDURE — G0463 HOSPITAL OUTPT CLINIC VISIT: HCPCS | Performed by: PHYSICIAN ASSISTANT

## 2023-05-04 PROCEDURE — 250N000011 HC RX IP 250 OP 636: Performed by: INTERNAL MEDICINE

## 2023-05-04 PROCEDURE — G0463 HOSPITAL OUTPT CLINIC VISIT: HCPCS | Mod: 25 | Performed by: PHYSICIAN ASSISTANT

## 2023-05-04 PROCEDURE — 80076 HEPATIC FUNCTION PANEL: CPT | Performed by: INTERNAL MEDICINE

## 2023-05-04 PROCEDURE — 258N000003 HC RX IP 258 OP 636: Performed by: INTERNAL MEDICINE

## 2023-05-04 PROCEDURE — 96377 APPLICATON ON-BODY INJECTOR: CPT | Mod: XS | Performed by: INTERNAL MEDICINE

## 2023-05-04 PROCEDURE — 96372 THER/PROPH/DIAG INJ SC/IM: CPT | Performed by: INTERNAL MEDICINE

## 2023-05-04 PROCEDURE — 96367 TX/PROPH/DG ADDL SEQ IV INF: CPT

## 2023-05-04 PROCEDURE — 96413 CHEMO IV INFUSION 1 HR: CPT

## 2023-05-04 PROCEDURE — 99213 OFFICE O/P EST LOW 20 MIN: CPT | Performed by: PHYSICIAN ASSISTANT

## 2023-05-04 RX ORDER — HEPARIN SODIUM (PORCINE) LOCK FLUSH IV SOLN 100 UNIT/ML 100 UNIT/ML
5 SOLUTION INTRAVENOUS
Status: DISCONTINUED | OUTPATIENT
Start: 2023-05-04 | End: 2023-05-04 | Stop reason: HOSPADM

## 2023-05-04 RX ADMIN — PEGFILGRASTIM 6 MG: KIT SUBCUTANEOUS at 10:54

## 2023-05-04 RX ADMIN — Medication 5 ML: at 11:53

## 2023-05-04 RX ADMIN — DEXAMETHASONE SODIUM PHOSPHATE: 10 INJECTION, SOLUTION INTRAMUSCULAR; INTRAVENOUS at 10:31

## 2023-05-04 RX ADMIN — SODIUM CHLORIDE 250 ML: 9 INJECTION, SOLUTION INTRAVENOUS at 10:31

## 2023-05-04 RX ADMIN — SODIUM CHLORIDE 122 MG: 9 INJECTION, SOLUTION INTRAVENOUS at 10:50

## 2023-05-04 ASSESSMENT — PAIN SCALES - GENERAL: PAINLEVEL: NO PAIN (0)

## 2023-05-04 NOTE — PROGRESS NOTES
Oncology/Hematology Visit Note  May 4, 2023    Reason for Visit: Follow up of lung cancer     Primary Oncologist: Dr. John    Oncologic History:  Kt Rasmussen is a 62 year old male who presents with the following oncologic history:  1. 5/05/2016: Presented with near-obstructing large mass coming off the cheikh and likely the posterior wall, seen on bronchoscopy. Biopsy of the mass showed invasive squamous cell carcinoma, moderately differentiating and focally keratinizing.  Procedure was complicated by significant bleeding.  Tumor was completely debulked (via bronchoscopy) in both main stem bronchi and distal trachea. NGS showed no mutations in EGFR, KRAS, BRAF, NRAS, HRAS, PIK3CA, ERBB2, MET, or JAK2.  2. 5/23/2016: PET/CT scan showed evidence of residual tumor with decreased endobronchial mass. Indeterminate, mildly hypermetabolic mesentery with prominent lymph nodes and area of enhancement of the right hepatic lobe present. Felt to have T4-NX-M0 disease.  3. 6/09/2016: Started concurrent chemoradiation with weekly paclitaxel and carboplatin.  4. 7/30/2016: Completed radiation.  Subsequently received 2 cycles of consolidation paclitaxel and carboplatin.   5. 9/13/2019: Presented with 6-month history of dysphonia and left vocal exophytic lesion. Left true vocal fold biopsy showed at least squamous cell carcinoma in situ, cannot exclude superficial invasion.  6. 9/30/2019: Excision of left vocal cord mass showed fragments of invasive squamous cell carcinoma, well differentiated and keratinizing.  Margins negative for malignancy.  7. 2/16/2021: CT chest w/o contrast showed thin-walled cavity in left lower lobe with 2 solid peripheral nodules measuring 9 mm each. No lymphadenopathy.  8. 3/01/2021: PET scan showed hypermetabolic nodules in left lower lobe and right lung, consistent with metastases; hypermetabolic adenopathy in chest, abdomen, pelvis; nonspecific uptake at tongue; hypermetabolic intraosseous lesions  in spine and pelvis consistent with metastatic disease; left axillary hypermetabolic lymph nodes related to COVID-19 vaccination.  9. 3/12/2021: CT-guided lung biopsy showed non-small cell carcinoma, not otherwise specified -- with opinion by [facility] pathologist.  10. 3/18/2021: Lung NGS panel negative for mutations in BRAF, EGFR, ERBB2, IDH1, IDH2, KRAS, MET, NRAS, RET. PD-L1 expression negative (TPS <1%). Due to minimal amount of DNA obtained from specimen, other biomarkers could not be analyzed.  11. 4/7/2021: MRI brain negative for brain metastases.  12. 4/27/2021: Started 1st line metastatic therapy with carboplatin, paclitaxel, bevacizumab, and atezolizumab for metastatic non-small cell lung carcinoma, NOS.  13. 7/12/2021: PET/CT showed resolved mural nodules with increased cystic cavity in left lower lobe with no significant FDG uptake, less likely metastases; no enlarged hypermetabolic mediastinal, hilar, or axillary lymph nodes, decreased metabolic activity of intraosseous lesion in spine and pelvis; no new bone lesions.  14. 10/11/2021: PET/CT showed slight interval increase in intensity of metabolic activity of right pubic bone and left ischium with new focal uptake in L2 spinous process; resolved uptake at previous ground glass opacity along right medial lower lobe; unchanged cystic cavities/nodules in left lower lobe and right apical upper lobe; no pathologically enlarged hypermetabolic mediastinal, hilar, or axillary lymph nodes.  15. 1/10/2022: PET/CT showed new foci of abnormal skeletal FGD uptake in the thoracic and lumbar spine. Mild interval increase in intensity of previously demonstrated hypermetabolic skeletal lesions involving the pelvis and lumbar spine. Findings are consistent with progressive osseous metastatic disease. Subsequently off chemo for 1 month due to poor performance status.  16. 1/25/2022: Patient fell and fractured his femur. This was surgically repaired and he was  subsequently cared for at a rehab unit.  17. 2/14/2022: Brain MRI without contrast (contrast not given due to inability to obtain IV access) showed no brain metastases.  18. 4/21/2022: Started 2nd line metastatic therapy with pemetrexed and carboplatin. Omission of carboplatin 6/2 and forward due to worsening anemia despite dose reduction.  19. 7/18/2022: PET/CT showed enlarged and increased FDG avid skeletal metastases, increased left para-aortic retroperitoneal lymph node increased in size and FDG avidity, findings consistent with progressive disease.  20. 8/3/2022: Started 3rd line metastatic therapy with Taxotere 60 mg/m2 every 3 weeks.  21. 11/21/2022: PET scan showed partial response, left para-aortic retroperitoneal lymph node has decreased from 2.6 x 1.7 cm (SUVmax 7.9) to 2.3 x 1.4 cm (SUVmax 5.7), skeletal metastases no longer demonstrate FDG activity. Kt elected to take a 2-month break from chemo.  22. 1/23/2023: PET scan showed increasing metabolic activity of the left periaortic (max SUV 8.1, previously 5.7) left external iliac (max SUV 7.0, previously 3.1), and right external iliac (max SUV 5.1, previously 3.7) lymph nodes with development/reactivation of multiple FDG avid osseous lesions  23. Resumed Taxol 60mg/m2 + Neulasta 3/23, delayed due to insurance issues.     Interval History:  Doing well. No recurrent falls. He recently spent time outside and has sunburn. Otherwise, denies adverse effects from treatment. He notes rare left hand mild neuropathy, not bothersome.     Review of Systems:  A complete review of systems was negative except as noted in the HPI.     Past medical, Surgical, and social history:  Reviewed    Physical Examination:  /76   Pulse 65   Temp 97.7  F (36.5  C) (Oral)   Resp 16   SpO2 99%   Wt Readings from Last 10 Encounters:   03/23/23 79.8 kg (176 lb)   11/16/22 78 kg (172 lb)   10/26/22 77.1 kg (170 lb)   10/12/22 76.7 kg (169 lb)   09/08/22 76.7 kg (169 lb)    08/24/22 77 kg (169 lb 12.1 oz)   08/11/22 77.1 kg (170 lb)   08/03/22 77.1 kg (169 lb 15.6 oz)   08/03/22 77.1 kg (170 lb)   05/12/22 77.4 kg (170 lb 10.2 oz)     General: Pleasant patient in no acute distress.  Skin: Warm and dry. Bilateral ventral elbow erythema.   Eyes: Anicteric.   ENT: Slightly dysphonic voice.   Lungs: Normal work of breathing. Clear to auscultation.  Abdomen: Non-distended.  Extremities: No peripheral edema. Left hand flexed. Wheel chair.   Mental: Calm, cooperative, appropriate. Mood euthymic. Responds with minimal information, unclear reliability.   Neuro: Alert and oriented x 3.    Laboratory Data:  CBC, CMP reviewed, pending at time of dictation.      Assessment and Plan:  Kt Rasmussen is a 62 year old male with:     # Metastatic non-small (non-squamous) cell lung carcinoma with metastases to lymph nodes, bones, and bilateral lungs  # History of locally advanced endobronchial invasive squamous cell carcinoma diagnosed 5/2016, status post debulking and chemoradiation   - A repeat 1/23/2023 PET scan which showed progressive disease in multiple bones lymph nodes.  - Resumed palliative Taxotere 3/23 at 20% every 3 weeks, dose reduction due to past repeated issues with chemo-induced cytopenias, delayed resumption due to insurance   - PO steroids per treatment plan  - Anemia stable  - Repeat PET scan 6/26/23  - Follow-up with MICHAEL alternating with MD every cycle    20 minutes spent on the date of the encounter doing chart review, review of test results, interpretation of tests, patient visit and documentation     Renee Rubalcava PA-C  [facility]   [phone]    Chart documentation with Dragon Voice recognition Software. Although reviewed after completion, some words and grammatical errors may remain.

## 2023-05-04 NOTE — LETTER
"    5/4/2023         RE: Kt Rasmussen  [id number]  [facility] [address]        Dear Colleague,    Thank you for referring your patient, Kt Rasmussen, to the [facility]. Please see a copy of my visit note below.    Oncology Rooming Note    May 4, 2023 9:48 AM   Kt Rasmussen is a 62 year old male who presents for:    Chief Complaint   Patient presents with     Oncology Clinic Visit     Initial Vitals: There were no vitals taken for this visit. Estimated body mass index is 22 kg/m  as calculated from the following:    Height as of 3/23/23: 1.905 m (6' 3\").    Weight as of 3/23/23: 79.8 kg (176 lb). There is no height or weight on file to calculate BSA.  Data Unavailable Comment: Data Unavailable   No LMP for male patient.  Allergies reviewed: Yes  Medications reviewed: Yes    Medications: Medication refills not needed today.  Pharmacy name entered into EPIC:    [facility] [place] [facility] [place] [address] [facility] [address] [place] [address] DRUG STORE #[id number] [address] 7 AT [facility] & [address] [facility] - [address]    Clinical concerns:  pa  was notified.      Julisa Whelan CMA              Oncology/Hematology Visit Note  May 4, 2023    Reason for Visit: Follow up of lung cancer     Primary Oncologist: Dr. John    Oncologic History:  Kt Rasmussen is a 62 year old male who presents with the following oncologic history:  1. 5/05/2016: Presented with near-obstructing large mass coming off the cheikh and likely the posterior wall, seen on bronchoscopy. Biopsy of the mass showed invasive squamous cell carcinoma, moderately differentiating and focally keratinizing.  Procedure was complicated by significant bleeding.  Tumor was completely debulked (via bronchoscopy) in both main stem bronchi and distal trachea. NGS showed no " mutations in EGFR, KRAS, BRAF, NRAS, HRAS, PIK3CA, ERBB2, MET, or JAK2.  2. 5/23/2016: PET/CT scan showed evidence of residual tumor with decreased endobronchial mass. Indeterminate, mildly hypermetabolic mesentery with prominent lymph nodes and area of enhancement of the right hepatic lobe present. Felt to have T4-NX-M0 disease.  3. 6/09/2016: Started concurrent chemoradiation with weekly paclitaxel and carboplatin.  4. 7/30/2016: Completed radiation.  Subsequently received 2 cycles of consolidation paclitaxel and carboplatin.   5. 9/13/2019: Presented with 6-month history of dysphonia and left vocal exophytic lesion. Left true vocal fold biopsy showed at least squamous cell carcinoma in situ, cannot exclude superficial invasion.  6. 9/30/2019: Excision of left vocal cord mass showed fragments of invasive squamous cell carcinoma, well differentiated and keratinizing.  Margins negative for malignancy.  7. 2/16/2021: CT chest w/o contrast showed thin-walled cavity in left lower lobe with 2 solid peripheral nodules measuring 9 mm each. No lymphadenopathy.  8. 3/01/2021: PET scan showed hypermetabolic nodules in left lower lobe and right lung, consistent with metastases; hypermetabolic adenopathy in chest, abdomen, pelvis; nonspecific uptake at tongue; hypermetabolic intraosseous lesions in spine and pelvis consistent with metastatic disease; left axillary hypermetabolic lymph nodes related to COVID-19 vaccination.  9. 3/12/2021: CT-guided lung biopsy showed non-small cell carcinoma, not otherwise specified -- with opinion by [facility] pathologist.  10. 3/18/2021: Lung NGS panel negative for mutations in BRAF, EGFR, ERBB2, IDH1, IDH2, KRAS, MET, NRAS, RET. PD-L1 expression negative (TPS <1%). Due to minimal amount of DNA obtained from specimen, other biomarkers could not be analyzed.  11. 4/7/2021: MRI brain negative for brain metastases.  12. 4/27/2021: Started 1st line metastatic therapy with carboplatin,  paclitaxel, bevacizumab, and atezolizumab for metastatic non-small cell lung carcinoma, NOS.  13. 7/12/2021: PET/CT showed resolved mural nodules with increased cystic cavity in left lower lobe with no significant FDG uptake, less likely metastases; no enlarged hypermetabolic mediastinal, hilar, or axillary lymph nodes, decreased metabolic activity of intraosseous lesion in spine and pelvis; no new bone lesions.  14. 10/11/2021: PET/CT showed slight interval increase in intensity of metabolic activity of right pubic bone and left ischium with new focal uptake in L2 spinous process; resolved uptake at previous ground glass opacity along right medial lower lobe; unchanged cystic cavities/nodules in left lower lobe and right apical upper lobe; no pathologically enlarged hypermetabolic mediastinal, hilar, or axillary lymph nodes.  15. 1/10/2022: PET/CT showed new foci of abnormal skeletal FGD uptake in the thoracic and lumbar spine. Mild interval increase in intensity of previously demonstrated hypermetabolic skeletal lesions involving the pelvis and lumbar spine. Findings are consistent with progressive osseous metastatic disease. Subsequently off chemo for 1 month due to poor performance status.  16. 1/25/2022: Patient fell and fractured his femur. This was surgically repaired and he was subsequently cared for at a rehab unit.  17. 2/14/2022: Brain MRI without contrast (contrast not given due to inability to obtain IV access) showed no brain metastases.  18. 4/21/2022: Started 2nd line metastatic therapy with pemetrexed and carboplatin. Omission of carboplatin 6/2 and forward due to worsening anemia despite dose reduction.  19. 7/18/2022: PET/CT showed enlarged and increased FDG avid skeletal metastases, increased left para-aortic retroperitoneal lymph node increased in size and FDG avidity, findings consistent with progressive disease.  20. 8/3/2022: Started 3rd line metastatic therapy with Taxotere 60 mg/m2 every 3  weeks.  21. 11/21/2022: PET scan showed partial response, left para-aortic retroperitoneal lymph node has decreased from 2.6 x 1.7 cm (SUVmax 7.9) to 2.3 x 1.4 cm (SUVmax 5.7), skeletal metastases no longer demonstrate FDG activity. Kt elected to take a 2-month break from chemo.  22. 1/23/2023: PET scan showed increasing metabolic activity of the left periaortic (max SUV 8.1, previously 5.7) left external iliac (max SUV 7.0, previously 3.1), and right external iliac (max SUV 5.1, previously 3.7) lymph nodes with development/reactivation of multiple FDG avid osseous lesions  23. Resumed Taxol 60mg/m2 + Neulasta 3/23, delayed due to insurance issues.     Interval History:  Doing well. No recurrent falls. He recently spent time outside and has sunburn. Otherwise, denies adverse effects from treatment. He notes rare left hand mild neuropathy, not bothersome.     Review of Systems:  A complete review of systems was negative except as noted in the HPI.     Past medical, Surgical, and social history:  Reviewed    Physical Examination:  /76   Pulse 65   Temp 97.7  F (36.5  C) (Oral)   Resp 16   SpO2 99%   Wt Readings from Last 10 Encounters:   03/23/23 79.8 kg (176 lb)   11/16/22 78 kg (172 lb)   10/26/22 77.1 kg (170 lb)   10/12/22 76.7 kg (169 lb)   09/08/22 76.7 kg (169 lb)   08/24/22 77 kg (169 lb 12.1 oz)   08/11/22 77.1 kg (170 lb)   08/03/22 77.1 kg (169 lb 15.6 oz)   08/03/22 77.1 kg (170 lb)   05/12/22 77.4 kg (170 lb 10.2 oz)     General: Pleasant patient in no acute distress.  Skin: Warm and dry. Bilateral ventral elbow erythema.   Eyes: Anicteric.   ENT: Slightly dysphonic voice.   Lungs: Normal work of breathing. Clear to auscultation.  Abdomen: Non-distended.  Extremities: No peripheral edema. Left hand flexed. Wheel chair.   Mental: Calm, cooperative, appropriate. Mood euthymic. Responds with minimal information, unclear reliability.   Neuro: Alert and oriented x 3.    Laboratory Data:  CBC, CMP  reviewed, pending at time of dictation.      Assessment and Plan:  Kt Rasmussen is a 62 year old male with:     # Metastatic non-small (non-squamous) cell lung carcinoma with metastases to lymph nodes, bones, and bilateral lungs  # History of locally advanced endobronchial invasive squamous cell carcinoma diagnosed 5/2016, status post debulking and chemoradiation   - A repeat 1/23/2023 PET scan which showed progressive disease in multiple bones lymph nodes.  - Resumed palliative Taxotere 3/23 at 20% every 3 weeks, dose reduction due to past repeated issues with chemo-induced cytopenias, delayed resumption due to insurance   - PO steroids per treatment plan  - Anemia stable  - Repeat PET scan 6/26/23  - Follow-up with MICHAEL alternating with MD every cycle    20 minutes spent on the date of the encounter doing chart review, review of test results, interpretation of tests, patient visit and documentation     Renee Lo PA-C  [facility]   [phone]    Chart documentation with Dragon Voice recognition Software. Although reviewed after completion, some words and grammatical errors may remain.          Again, thank you for allowing me to participate in the care of your patient.        Sincerely,        RENEE LO PA-C

## 2023-05-04 NOTE — PROGRESS NOTES
Nursing Note:  Kt Rasmussen presents today for Port Labs.    Patient seen by provider today: Yes: JAKE Frost   present during visit today: Not Applicable.    Note: N/A.    Intravenous Access:  Labs drawn without difficulty.  Implanted Port.    Discharge Plan:   Patient was sent to [facility] for 10 AM appointment.    Keith Chavez RN

## 2023-05-04 NOTE — PATIENT INSTRUCTIONS
Your On-body Neulasta Injector was applied to your Right Arm at 11:00 AM.  At approximately 2:00 PM on 5/5/23, your On-body Injector will beep to let you know your dose delivery will begin in 2 minutes.  Your medication will be delivered over the next 45 minutes.  You can remove your Injector at 3:00 PM.  Please make sure your Injector has a solid green light or has turned off prior to removing the device.  Please contact your provider at [phone] with questions or concerns.

## 2023-05-04 NOTE — PROGRESS NOTES
"Oncology Rooming Note    May 4, 2023 9:48 AM   Kt Rasmussen is a 62 year old male who presents for:    Chief Complaint   Patient presents with     Oncology Clinic Visit     Initial Vitals: There were no vitals taken for this visit. Estimated body mass index is 22 kg/m  as calculated from the following:    Height as of 3/23/23: 1.905 m (6' 3\").    Weight as of 3/23/23: 79.8 kg (176 lb). There is no height or weight on file to calculate BSA.  Data Unavailable Comment: Data Unavailable   No LMP for male patient.  Allergies reviewed: Yes  Medications reviewed: Yes    Medications: Medication refills not needed today.  Pharmacy name entered into EPIC:    [facility] [place] [place] - [address] [facility] [address] [facility] #[id number] - [address] 7 AT [facility] & [facility] 7  [place] [facility] - [address]    Clinical concerns:  pa  was notified.      Julisa Whelan CMA            "

## 2023-05-04 NOTE — PROGRESS NOTES
Infusion Nursing Note:  Kt Rasmussen presents today for C10D1 Taxotere/On-Pro.    Patient seen by provider today: Yes: JAKE Frost   present during visit today: Not Applicable.    Note: N/A.      Intravenous Access:  Implanted Port.    Treatment Conditions:  Lab Results   Component Value Date    HGB 11.2 (L) 05/04/2023    WBC 8.7 05/04/2023    ANEU 0.5 (L) 08/11/2022    ANEUTAUTO 7.0 05/04/2023     05/04/2023      Lab Results   Component Value Date     01/25/2023    POTASSIUM 4.0 01/25/2023    MAG 1.9 10/13/2016    CR 0.71 01/25/2023    BEVERLY 9.3 01/25/2023    BILITOTAL 0.5 05/04/2023    ALBUMIN 3.8 05/04/2023    ALT 26 05/04/2023    AST 26 05/04/2023     Results reviewed, labs MET treatment parameters, ok to proceed with treatment.      Post Infusion Assessment:  Patient tolerated infusion without incident.  Blood return noted pre and post infusion.  Site patent and intact, free from redness, edema or discomfort.  No evidence of extravasations.   Port a cath was de-accessed per protocol.    ONPRO  Was placed on patient's: back of right arm.    Was placed at 11:00 AM    Podpal used: Yes    ONPRO injector device Lot number: [id number]    Patient education included: what patient can expect after application, what colored lights mean on the device, when to remove device, when and where to call with questions or issues, all patients questions answered and that Neulasta administration will occur at 2:00 pm on 5/5/23.    Patient tolerated administration well.        Discharge Plan:   Discharge instructions reviewed with: Patient.  Patient and/or family verbalized understanding of discharge instructions and all questions answered.  Copy of AVS reviewed with patient and/or family.  Patient will return 5/25/23 for next appointment.  Patient discharged in stable condition accompanied by: self.  Departure Mode: Wheelchair.      Magdalena Davila RN

## 2023-05-18 NOTE — PROGRESS NOTES
[facility]    Hematology/Oncology Established Patient Follow-up Note      Today's Date: 5/25/2023    Reason for Follow-up: Metastatic non-small cell lung carcinoma.    HISTORY OF PRESENT ILLNESS: Kt Rasmussen is a 62 year old male who presents with the following oncologic history:  1. 5/05/2016: Presented with near-obstructing large mass coming off the cheikh and likely the posterior wall, seen on bronchoscopy. Biopsy of the mass showed invasive squamous cell carcinoma, moderately differentiating and focally keratinizing.  Procedure was complicated by significant bleeding.  Tumor was completely debulked (via bronchoscopy) in both main stem bronchi and distal trachea. NGS showed no mutations in EGFR, KRAS, BRAF, NRAS, HRAS, PIK3CA, ERBB2, MET, or JAK2.  2. 5/23/2016: PET/CT scan showed evidence of residual tumor with decreased endobronchial mass. Indeterminate, mildly hypermetabolic mesentery with prominent lymph nodes and area of enhancement of the right hepatic lobe present. Felt to have T4-NX-M0 disease.  3. 6/09/2016: Started concurrent chemoradiation with weekly paclitaxel and carboplatin.  4. 7/30/2016: Completed radiation.  Subsequently received 2 cycles of consolidation paclitaxel and carboplatin.   5. 9/13/2019: Presented with 6-month history of dysphonia and left vocal exophytic lesion. Left true vocal fold biopsy showed at least squamous cell carcinoma in situ, cannot exclude superficial invasion.  6. 9/30/2019: Excision of left vocal cord mass showed fragments of invasive squamous cell carcinoma, well differentiated and keratinizing.  Margins negative for malignancy.  7. 2/16/2021: CT chest w/o contrast showed thin-walled cavity in left lower lobe with 2 solid peripheral nodules measuring 9 mm each. No lymphadenopathy.  8. 3/01/2021: PET scan showed hypermetabolic nodules in left lower lobe and right lung, consistent with metastases; hypermetabolic adenopathy in chest, abdomen,  pelvis; nonspecific uptake at tongue; hypermetabolic intraosseous lesions in spine and pelvis consistent with metastatic disease; left axillary hypermetabolic lymph nodes related to COVID-19 vaccination.  9. 3/12/2021: CT-guided lung biopsy showed non-small cell carcinoma, not otherwise specified -- with opinion by [facility]ologist.  10. 3/18/2021: Lung NGS panel negative for mutations in BRAF, EGFR, ERBB2, IDH1, IDH2, KRAS, MET, NRAS, RET. PD-L1 expression negative (TPS <1%). Due to minimal amount of DNA obtained from specimen, other biomarkers could not be analyzed.  11. 4/7/2021: MRI brain negative for brain metastases.  12. 4/27/2021: Started 1st line metastatic therapy with carboplatin, paclitaxel, bevacizumab, and atezolizumab for metastatic non-small cell lung carcinoma, NOS.  13. 7/12/2021: PET/CT showed resolved mural nodules with increased cystic cavity in left lower lobe with no significant FDG uptake, less likely metastases; no enlarged hypermetabolic mediastinal, hilar, or axillary lymph nodes, decreased metabolic activity of intraosseous lesion in spine and pelvis; no new bone lesions.  14. 10/11/2021: PET/CT showed slight interval increase in intensity of metabolic activity of right pubic bone and left ischium with new focal uptake in L2 spinous process; resolved uptake at previous ground glass opacity along right medial lower lobe; unchanged cystic cavities/nodules in left lower lobe and right apical upper lobe; no pathologically enlarged hypermetabolic mediastinal, hilar, or axillary lymph nodes.  15. 1/10/2022: PET/CT showed new foci of abnormal skeletal FGD uptake in the thoracic and lumbar spine. Mild interval increase in intensity of previously demonstrated hypermetabolic skeletal lesions involving the pelvis and lumbar spine. Findings are consistent with progressive osseous metastatic disease. Subsequently off chemo for 1 month due to poor performance status.  16. 1/25/2022: Patient fell  and fractured his femur. This was surgically repaired and he was subsequently cared for at a rehab unit.  17. 2/14/2022: Brain MRI without contrast (contrast not given due to inability to obtain IV access) showed no brain metastases.  18. 4/21/2022: Started 2nd line metastatic therapy with pemetrexed and carboplatin. Omission of carboplatin 6/2 and forward due to worsening anemia despite dose reduction.  19. 7/18/2022: PET/CT showed enlarged and increased FDG avid skeletal metastases, increased left para-aortic retroperitoneal lymph node increased in size and FDG avidity, findings consistent with progressive disease.  20. 8/3/2022: Started 3rd line metastatic therapy with Taxotere 60 mg/m2 every 3 weeks.  21. 11/21/2022: PET scan showed partial response, left para-aortic retroperitoneal lymph node has decreased from 2.6 x 1.7 cm (SUVmax 7.9) to 2.3 x 1.4 cm (SUVmax 5.7), skeletal metastases no longer demonstrate FDG activity. Kt elected to take a 2-month break from chemo.  22. 1/23/2023: PET scan showed increasing metabolic activity of the left periaortic (max SUV 8.1, previously 5.7) left external iliac (max SUV 7.0, previously 3.1), and right external iliac (max SUV 5.1, previously 3.7) lymph nodes with development/reactivation of multiple FDG avid osseous lesions.  23. 3/23/2023: Resumption of Taxotere every 3 weeks. Delayed due to insurance issues.    INTERIM HISTORY:  Kt reports feeling well with no new pain, paresthesias, or fatigue. He does have some dysgeusia.    REVIEW OF SYSTEMS:   14 point ROS was reviewed and is negative other than as noted above in HPI.       HOME MEDICATIONS:  Current Outpatient Medications   Medication Sig Dispense Refill     atorvastatin (LIPITOR) 40 MG tablet Take 40 mg by mouth daily       dexamethasone (DECADRON) 4 MG tablet Take 2 tablets (8 mg) by mouth 2 times daily (with meals) for 3 doses Start evening of Docetaxel infusion and continue for a total of 3 doses. 6 tablet 7      folic acid (FOLVITE) 1 MG tablet Take 1 mg by mouth daily       prochlorperazine (COMPAZINE) 10 MG tablet Take 1 tablet (10 mg) by mouth every 6 hours as needed for nausea or vomiting 30 tablet 2         ALLERGIES:  Allergies   Allergen Reactions     No Known Drug Allergy          PAST MEDICAL HISTORY:  Past Medical History:   Diagnosis Date     Alcohol abuse, unspecified      Cerebral infarction (H)     , right side residual and aphasia     Dyslipidemia      GERD (gastroesophageal reflux disease)      Lung cancer (H)      Unspecified essential hypertension          PAST SURGICAL HISTORY:  Past Surgical History:   Procedure Laterality Date     BRONCHOSCOPY FLEXIBLE AND RIGID N/A 2016    Procedure: BRONCHOSCOPY FLEXIBLE AND RIGID;  Surgeon: Tony Talbot MD;  Location: [facility] OR     ESOPHAGOSCOPY FLEXIBLE N/A 2019    Procedure: flexible esophagoscopy;  Surgeon: Lizzy Johnson MD;  Location: [facility] OR     INJECT STEROID (LOCATION) N/A 2019    Procedure: steroid injection;  Surgeon: Lizzy Johnson MD;  Location: [facility] OR     IR CHEST PORT PLACEMENT > 5 YRS OF AGE  2021     IR CHEST PORT PLACEMENT > 5 YRS OF AGE  2022     IR PORT CHECK RIGHT  2022     IR PORT REMOVAL RIGHT  2022     LASER CO2 LARYNGOSCOPY N/A 2019    Procedure: Microdirect laryngoscopy with excision of laryngeal mass, CO2 laser;  Surgeon: Lizzy Johnson MD;  Location: [facility] OR     [facility] NONSPECIFIC PROCEDURE      R tympanoplasty         SOCIAL HISTORY:  Social History     Socioeconomic History     Marital status: Single     Spouse name: Not on file     Number of children: Not on file     Years of education: Not on file     Highest education level: Not on file   Occupational History     Not on file   Tobacco Use     Smoking status: Former     Packs/day: 1.00     Types: Cigarettes     Quit date: 2009     Years since quittin.6     Smokeless tobacco: Never   Vaping Use     Vaping status: Not on file  "  Substance and Sexual Activity     Alcohol use: Not Currently     Drug use: No     Sexual activity: Not on file   Other Topics Concern     Parent/sibling w/ CABG, MI or angioplasty before 65F 55M? Not Asked   Social History Narrative     Not on file     Social Determinants of Health     Financial Resource Strain: Not on file   Food Insecurity: Not on file   Transportation Needs: Not on file   Physical Activity: Not on file   Stress: Not on file   Social Connections: Not on file   Intimate Partner Violence: Not At Risk (2023)    Humiliation, Afraid, Rape, and Kick questionnaire      Fear of Current or Ex-Partner: No      Emotionally Abused: No      Physically Abused: No      Sexually Abused: No   Housing Stability: Not on file   Resides at assisted living.      FAMILY HISTORY:  Family History   Problem Relation Age of Onset     Cancer Father          PHYSICAL EXAM:  Vital signs:  /73   Pulse 68   Temp 97.5  F (36.4  C)   Resp 16   Ht 1.905 m (6' 3\")   Wt 78 kg (172 lb)   SpO2 97%   BMI 21.50 kg/m     ECO  GENERAL: No acute distress.  EYES: No scleral icterus. No overt erythema.  LYMPH: No cervical, supraclavicular lymphadenopathy.  CARDIAC: Regular rate and rhythm with no murmurs.  RESPIRATORY: Clear to auscultation bilaterally. Hoarseness present.  EXTREMITIES: No clubbing, cyanosis, or edema.  SKIN: No overt rashes, discolorations, or lesions over the face and neck.  NEUROLOGIC: Alert.  No overt tremors. Stable right upper extremity weakness.  PSYCHIATRIC: Normal affect and mood.  Does not appear anxious.  GAIT: Sitting in wheelchair.      LABS:  CBC RESULTS: Recent Labs   Lab Test 23  0841   WBC 8.3   RBC 4.04*   HGB 11.7*   HCT 36.6*   MCV 91   MCH 29.0   MCHC 32.0   RDW 18.1*   *     Last Comprehensive Metabolic Panel:  Sodium   Date Value Ref Range Status   2023 140 136 - 145 mmol/L Final   2021 140 133 - 144 mmol/L Final     Potassium   Date Value Ref Range " Status   01/25/2023 4.0 3.4 - 5.3 mmol/L Final   12/07/2022 4.1 3.4 - 5.3 mmol/L Final   06/29/2021 4.3 3.4 - 5.3 mmol/L Final     Chloride   Date Value Ref Range Status   01/25/2023 102 98 - 107 mmol/L Final   12/07/2022 109 94 - 109 mmol/L Final   06/29/2021 111 (H) 94 - 109 mmol/L Final     Carbon Dioxide   Date Value Ref Range Status   06/29/2021 25 20 - 32 mmol/L Final     Carbon Dioxide (CO2)   Date Value Ref Range Status   01/25/2023 28 22 - 29 mmol/L Final   12/07/2022 25 20 - 32 mmol/L Final     Anion Gap   Date Value Ref Range Status   01/25/2023 10 7 - 15 mmol/L Final   12/07/2022 7 3 - 14 mmol/L Final   06/29/2021 4 3 - 14 mmol/L Final     Glucose   Date Value Ref Range Status   01/25/2023 95 70 - 99 mg/dL Final   12/07/2022 91 70 - 99 mg/dL Final   06/29/2021 85 70 - 99 mg/dL Final     Urea Nitrogen   Date Value Ref Range Status   01/25/2023 16.0 8.0 - 23.0 mg/dL Final   12/07/2022 17 7 - 30 mg/dL Final   06/29/2021 17 7 - 30 mg/dL Final     Creatinine   Date Value Ref Range Status   01/25/2023 0.71 0.67 - 1.17 mg/dL Final   06/29/2021 0.84 0.66 - 1.25 mg/dL Final     GFR Estimate   Date Value Ref Range Status   01/25/2023 >90 >60 mL/min/1.73m2 Final     Comment:     eGFR calculated using 2021 CKD-EPI equation.   06/29/2021 >90 >60 mL/min/[1.73_m2] Final     Comment:     Non  GFR Calc  Starting 12/18/2018, serum creatinine based estimated GFR (eGFR) will be   calculated using the Chronic Kidney Disease Epidemiology Collaboration   (CKD-EPI) equation.       Calcium   Date Value Ref Range Status   01/25/2023 9.3 8.8 - 10.2 mg/dL Final   06/29/2021 8.7 8.5 - 10.1 mg/dL Final     Bilirubin Total   Date Value Ref Range Status   05/25/2023 0.6 <=1.2 mg/dL Final   06/29/2021 0.3 0.2 - 1.3 mg/dL Final     Alkaline Phosphatase   Date Value Ref Range Status   05/25/2023 123 40 - 129 U/L Final   06/29/2021 73 40 - 150 U/L Final     ALT   Date Value Ref Range Status   05/25/2023 29 10 - 50 U/L Final    06/29/2021 34 0 - 70 U/L Final     AST   Date Value Ref Range Status   05/25/2023 26 10 - 50 U/L Final   06/29/2021 26 0 - 45 U/L Final       PATHOLOGY:  None new.    IMAGING:  Reviewed as per HPI.    ASSESSMENT/PLAN:  Kt Rasmussen is a 62 year old male with the following issues:  1. Metastatic non-small (non-squamous) cell lung carcinoma with metastases to lymph nodes, bones, and bilateral lungs  2. 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 Chemotherapy-induced anemia and thrombocytopenia  --Lung NGS panel negative for mutations in BRAF, EGFR, ERBB2, IDH1, IDH2, KRAS, MET, NRAS, RET. PD-L1 expression negative (TPS <1%). Guardant 360 negative for targetable mutations.  --Kt started 3rd line metastatic therapy with every 3-week Taxotere at 20% dose reduction (60 mg/m2) on 8/3/2022 due to progressive disease.  He tolerated this very well with minimal to no paresthesias. He then took a 2-month chemo break that was further delayed to 3.5 months due to insurance issues. He resumed on 3/23/2023.  --His 1/23/2023 PET scan had shown progressive disease in multiple bones and lymph nodes.  --Will continue Taxotere at 20% dose reduction due to past repeated issues with chemo-induced cytopenias.  Reiterated that all treatment is with palliative intent, not curative.  --Reviewed today's labs which show Hgb 11.7, WBC 8.3, platelets 141,000; ALT, AST, alk phos, and bilirubin are all normal. He may proceed with chemo today.  --Will repeat PET scan in early 7/2023 as scheduled.    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 Dysphonia  6. Dysphagia  -Kt underwent excision of a left vocal cord SCC on 9/30/2019 and has persistent dysphonia as a result of the lesion and excision.  He also has dysphagia but this is stable and swallowing is manageable.  -Continue follow-up with Dr. Wray.    7. History of dizziness and  prior falls  --SW and guardian were previously notified of multiple falls.  --Prior brain MRI 2/14/2022 showed no brain metastases. However this was a suboptimal exam due to inability to administer contrast.  --He has not had recent falls and is using a wheelchair due to prior femoral fracture in 1/2022.    Sangita John MD  Hematology/Oncology  [facility]    Total time spent: 30 minutes in chart review, patient evaluation, counseling, documentation, test orders and coordination of care.

## 2023-05-22 RX ORDER — ALBUTEROL SULFATE 90 UG/1
1-2 AEROSOL, METERED RESPIRATORY (INHALATION)
Status: CANCELLED
Start: 2023-05-25

## 2023-05-22 RX ORDER — DIPHENHYDRAMINE HYDROCHLORIDE 50 MG/ML
50 INJECTION INTRAMUSCULAR; INTRAVENOUS
Status: CANCELLED
Start: 2023-05-25

## 2023-05-22 RX ORDER — LORAZEPAM 2 MG/ML
0.5 INJECTION INTRAMUSCULAR EVERY 4 HOURS PRN
Status: CANCELLED | OUTPATIENT
Start: 2023-05-25

## 2023-05-22 RX ORDER — HEPARIN SODIUM,PORCINE 10 UNIT/ML
5 VIAL (ML) INTRAVENOUS
Status: CANCELLED | OUTPATIENT
Start: 2023-05-25

## 2023-05-22 RX ORDER — HEPARIN SODIUM (PORCINE) LOCK FLUSH IV SOLN 100 UNIT/ML 100 UNIT/ML
5 SOLUTION INTRAVENOUS
Status: CANCELLED | OUTPATIENT
Start: 2023-05-25

## 2023-05-22 RX ORDER — ALBUTEROL SULFATE 0.83 MG/ML
2.5 SOLUTION RESPIRATORY (INHALATION)
Status: CANCELLED | OUTPATIENT
Start: 2023-05-25

## 2023-05-22 RX ORDER — MEPERIDINE HYDROCHLORIDE 25 MG/ML
25 INJECTION INTRAMUSCULAR; INTRAVENOUS; SUBCUTANEOUS EVERY 30 MIN PRN
Status: CANCELLED | OUTPATIENT
Start: 2023-05-25

## 2023-05-22 RX ORDER — EPINEPHRINE 1 MG/ML
0.3 INJECTION, SOLUTION, CONCENTRATE INTRAVENOUS EVERY 5 MIN PRN
Status: CANCELLED | OUTPATIENT
Start: 2023-05-25

## 2023-05-25 ENCOUNTER — ONCOLOGY VISIT (OUTPATIENT)
Dept: ONCOLOGY | Facility: CLINIC | Age: 63
End: 2023-05-25
Attending: INTERNAL MEDICINE
Payer: COMMERCIAL

## 2023-05-25 ENCOUNTER — INFUSION THERAPY VISIT (OUTPATIENT)
Dept: INFUSION THERAPY | Facility: CLINIC | Age: 63
End: 2023-05-25
Attending: INTERNAL MEDICINE
Payer: COMMERCIAL

## 2023-05-25 VITALS
HEART RATE: 68 BPM | RESPIRATION RATE: 16 BRPM | WEIGHT: 172 LBS | DIASTOLIC BLOOD PRESSURE: 73 MMHG | BODY MASS INDEX: 21.39 KG/M2 | OXYGEN SATURATION: 97 % | HEIGHT: 75 IN | TEMPERATURE: 97.5 F | SYSTOLIC BLOOD PRESSURE: 114 MMHG

## 2023-05-25 DIAGNOSIS — C34.90 NON-SMALL CELL LUNG CANCER METASTATIC TO BONE (H): ICD-10-CM

## 2023-05-25 DIAGNOSIS — C78.01 MALIGNANT NEOPLASM METASTATIC TO BOTH LUNGS (H): ICD-10-CM

## 2023-05-25 DIAGNOSIS — D70.1 CHEMOTHERAPY-INDUCED NEUTROPENIA (H): Primary | ICD-10-CM

## 2023-05-25 DIAGNOSIS — Z51.11 ENCOUNTER FOR ANTINEOPLASTIC CHEMOTHERAPY: Primary | ICD-10-CM

## 2023-05-25 DIAGNOSIS — C34.90 NON-SMALL CELL LUNG CANCER METASTATIC TO BONE (H): Primary | ICD-10-CM

## 2023-05-25 DIAGNOSIS — T45.1X5A CHEMOTHERAPY-INDUCED NEUTROPENIA (H): Primary | ICD-10-CM

## 2023-05-25 DIAGNOSIS — D70.1 CHEMOTHERAPY-INDUCED NEUTROPENIA (H): ICD-10-CM

## 2023-05-25 DIAGNOSIS — D69.59 THROMBOCYTOPENIA DUE TO DRUGS: ICD-10-CM

## 2023-05-25 DIAGNOSIS — T50.905A THROMBOCYTOPENIA DUE TO DRUGS: ICD-10-CM

## 2023-05-25 DIAGNOSIS — C79.51 NON-SMALL CELL LUNG CANCER METASTATIC TO BONE (H): ICD-10-CM

## 2023-05-25 DIAGNOSIS — C79.51 NON-SMALL CELL LUNG CANCER METASTATIC TO BONE (H): Primary | ICD-10-CM

## 2023-05-25 DIAGNOSIS — D64.81 ANEMIA DUE TO CHEMOTHERAPY: ICD-10-CM

## 2023-05-25 DIAGNOSIS — C78.02 MALIGNANT NEOPLASM METASTATIC TO BOTH LUNGS (H): ICD-10-CM

## 2023-05-25 DIAGNOSIS — T45.1X5A ANEMIA DUE TO CHEMOTHERAPY: ICD-10-CM

## 2023-05-25 DIAGNOSIS — Z51.11 ENCOUNTER FOR ANTINEOPLASTIC CHEMOTHERAPY: ICD-10-CM

## 2023-05-25 DIAGNOSIS — T45.1X5A CHEMOTHERAPY-INDUCED NEUTROPENIA (H): ICD-10-CM

## 2023-05-25 LAB
ALBUMIN SERPL BCG-MCNC: 4 G/DL (ref 3.5–5.2)
ALP SERPL-CCNC: 123 U/L (ref 40–129)
ALT SERPL W P-5'-P-CCNC: 29 U/L (ref 10–50)
AST SERPL W P-5'-P-CCNC: 26 U/L (ref 10–50)
BASOPHILS # BLD AUTO: 0 10E3/UL (ref 0–0.2)
BASOPHILS NFR BLD AUTO: 0 %
BILIRUB DIRECT SERPL-MCNC: <0.2 MG/DL (ref 0–0.3)
BILIRUB SERPL-MCNC: 0.6 MG/DL
EOSINOPHIL # BLD AUTO: 0 10E3/UL (ref 0–0.7)
EOSINOPHIL NFR BLD AUTO: 0 %
ERYTHROCYTE [DISTWIDTH] IN BLOOD BY AUTOMATED COUNT: 18.1 % (ref 10–15)
HCT VFR BLD AUTO: 36.6 % (ref 40–53)
HGB BLD-MCNC: 11.7 G/DL (ref 13.3–17.7)
IMM GRANULOCYTES # BLD: 0 10E3/UL
IMM GRANULOCYTES NFR BLD: 0 %
LYMPHOCYTES # BLD AUTO: 0.9 10E3/UL (ref 0.8–5.3)
LYMPHOCYTES NFR BLD AUTO: 11 %
MCH RBC QN AUTO: 29 PG (ref 26.5–33)
MCHC RBC AUTO-ENTMCNC: 32 G/DL (ref 31.5–36.5)
MCV RBC AUTO: 91 FL (ref 78–100)
MONOCYTES # BLD AUTO: 0.6 10E3/UL (ref 0–1.3)
MONOCYTES NFR BLD AUTO: 8 %
NEUTROPHILS # BLD AUTO: 6.7 10E3/UL (ref 1.6–8.3)
NEUTROPHILS NFR BLD AUTO: 81 %
NRBC # BLD AUTO: 0 10E3/UL
NRBC BLD AUTO-RTO: 0 /100
PLATELET # BLD AUTO: 141 10E3/UL (ref 150–450)
PROT SERPL-MCNC: 6.4 G/DL (ref 6.4–8.3)
RBC # BLD AUTO: 4.04 10E6/UL (ref 4.4–5.9)
WBC # BLD AUTO: 8.3 10E3/UL (ref 4–11)

## 2023-05-25 PROCEDURE — 96413 CHEMO IV INFUSION 1 HR: CPT

## 2023-05-25 PROCEDURE — 99214 OFFICE O/P EST MOD 30 MIN: CPT | Performed by: INTERNAL MEDICINE

## 2023-05-25 PROCEDURE — 80076 HEPATIC FUNCTION PANEL: CPT | Performed by: INTERNAL MEDICINE

## 2023-05-25 PROCEDURE — G0463 HOSPITAL OUTPT CLINIC VISIT: HCPCS | Mod: 25 | Performed by: INTERNAL MEDICINE

## 2023-05-25 PROCEDURE — 85025 COMPLETE CBC W/AUTO DIFF WBC: CPT | Performed by: INTERNAL MEDICINE

## 2023-05-25 PROCEDURE — 96375 TX/PRO/DX INJ NEW DRUG ADDON: CPT

## 2023-05-25 PROCEDURE — 96372 THER/PROPH/DIAG INJ SC/IM: CPT | Performed by: INTERNAL MEDICINE

## 2023-05-25 PROCEDURE — 250N000011 HC RX IP 250 OP 636: Performed by: INTERNAL MEDICINE

## 2023-05-25 PROCEDURE — 96377 APPLICATON ON-BODY INJECTOR: CPT

## 2023-05-25 PROCEDURE — 258N000003 HC RX IP 258 OP 636: Performed by: INTERNAL MEDICINE

## 2023-05-25 RX ORDER — HEPARIN SODIUM (PORCINE) LOCK FLUSH IV SOLN 100 UNIT/ML 100 UNIT/ML
5 SOLUTION INTRAVENOUS
Status: DISCONTINUED | OUTPATIENT
Start: 2023-05-25 | End: 2023-05-25 | Stop reason: HOSPADM

## 2023-05-25 RX ADMIN — DEXAMETHASONE SODIUM PHOSPHATE: 10 INJECTION, SOLUTION INTRAMUSCULAR; INTRAVENOUS at 09:53

## 2023-05-25 RX ADMIN — DOCETAXEL 122 MG: 20 INJECTION, SOLUTION, CONCENTRATE INTRAVENOUS at 10:13

## 2023-05-25 RX ADMIN — SODIUM CHLORIDE 250 ML: 9 INJECTION, SOLUTION INTRAVENOUS at 09:53

## 2023-05-25 RX ADMIN — PEGFILGRASTIM 6 MG: KIT SUBCUTANEOUS at 10:22

## 2023-05-25 RX ADMIN — Medication 5 ML: at 11:12

## 2023-05-25 ASSESSMENT — PAIN SCALES - GENERAL: PAINLEVEL: NO PAIN (0)

## 2023-05-25 NOTE — PROGRESS NOTES
Nursing Note:  Kt Rasmussen presents today for port labs.    Patient seen by provider today: Yes: Alvaro   present during visit today: Not Applicable.    Note: N/A.    Intravenous Access:  Labs drawn without difficulty.  Implanted Port.    Discharge Plan:   Patient was sent to lobby for provider appointment.    Cass Shoemaker RN

## 2023-05-25 NOTE — PATIENT INSTRUCTIONS
Your On-body Neulasta Injector was applied to your right arm at 1030.  At approximately 1:30 pm on 5/26/23, your On-body Injector will beep to let you know your dose delivery will begin in 2 minutes.  Your medication will be delivered over the next 45 minutes.  You can remove your Injector at 2:30 pm on 5/26/23.  Please make sure your Injector has a solid green light or has turned off prior to removing the device.  Please contact your provider at [phone] with questions or concerns.

## 2023-05-25 NOTE — PROGRESS NOTES
Infusion Nursing Note:  Kt HERNANDEZ Rasmussen presents today for C11D1 Taxotere/OnPro Neulasta.    Patient seen by provider today: Yes: Dr. John   present during visit today: Not Applicable.    Note: Patient reports to feeling well with no new concerns since seeing Dr. John.    ONPRO  Was placed on patient's: back of right arm.    Was placed at 1030 AM    Podpal used: Yes    ONPRO injector device Lot number: [id number]    Patient education included: what patient can expect after application, what colored lights mean on the device, when to remove device, when and where to call with questions or issues, all patients questions answered and that Neulasta administration will occur at 1330 on 5/26/23.    Patient tolerated administration well.      Intravenous Access:  Implanted Port.    Treatment Conditions:  Lab Results   Component Value Date    HGB 11.7 (L) 05/25/2023    WBC 8.3 05/25/2023    ANEU 0.5 (L) 08/11/2022    ANEUTAUTO 6.7 05/25/2023     (L) 05/25/2023      Lab Results   Component Value Date     01/25/2023    POTASSIUM 4.0 01/25/2023    MAG 1.9 10/13/2016    CR 0.71 01/25/2023    BEVERLY 9.3 01/25/2023    BILITOTAL 0.6 05/25/2023    ALBUMIN 4.0 05/25/2023    ALT 29 05/25/2023    AST 26 05/25/2023     Results reviewed, labs MET treatment parameters, ok to proceed with treatment.      Post Infusion Assessment:  Patient tolerated infusion without incident.  Blood return noted pre and post infusion.  Site patent and intact, free from redness, edema or discomfort.  No evidence of extravasations.  Access discontinued per protocol.       Discharge Plan:   Patient declined prescription refills.  Discharge instructions reviewed with: Patient.  Patient and/or family verbalized understanding of discharge instructions and all questions answered.  Copy of AVS reviewed with patient and/or family.  Patient will return 6/15/23 for next appointment.  Patient discharged in stable condition accompanied by:  self.  Departure Mode: Wheelchair.      Devorah Stovall RN

## 2023-05-25 NOTE — PROGRESS NOTES
"Oncology Rooming Note    May 25, 2023 9:08 AM   Kt Rasmussen is a 62 year old male who presents for:    Chief Complaint   Patient presents with     Oncology Clinic Visit     Initial Vitals: There were no vitals taken for this visit. Estimated body mass index is 22 kg/m  as calculated from the following:    Height as of 3/23/23: 1.905 m (6' 3\").    Weight as of 3/23/23: 79.8 kg (176 lb). There is no height or weight on file to calculate BSA.  Data Unavailable Comment: Data Unavailable   No LMP for male patient.  Allergies reviewed: Yes  Medications reviewed: Yes    Medications: Medication refills not needed today.  Pharmacy name entered into EPIC:    [email or web address] - [place], [place] - [address] [facility] [address] [place] [address] [facility] #[id number] - [address] AT [facility] 7  [place] [facility] - [address]    Clinical concerns:  doctor was notified.      Floridalma Nieves MA            "

## 2023-05-25 NOTE — LETTER
5/25/2023         RE: Kt Rasmussen  [id number]  [facility] [address]        Dear Colleague,    Thank you for referring your patient, Kt Rasmussen, to the [facility]. Please see a copy of my visit note below.    [facility]    Hematology/Oncology Established Patient Follow-up Note      Today's Date: 5/25/2023    Reason for Follow-up: Metastatic non-small cell lung carcinoma.    HISTORY OF PRESENT ILLNESS: Kt Rasmussen is a 62 year old male who presents with the following oncologic history:  1. 5/05/2016: Presented with near-obstructing large mass coming off the cheikh and likely the posterior wall, seen on bronchoscopy. Biopsy of the mass showed invasive squamous cell carcinoma, moderately differentiating and focally keratinizing.  Procedure was complicated by significant bleeding.  Tumor was completely debulked (via bronchoscopy) in both main stem bronchi and distal trachea. NGS showed no mutations in EGFR, KRAS, BRAF, NRAS, HRAS, PIK3CA, ERBB2, MET, or JAK2.  2. 5/23/2016: PET/CT scan showed evidence of residual tumor with decreased endobronchial mass. Indeterminate, mildly hypermetabolic mesentery with prominent lymph nodes and area of enhancement of the right hepatic lobe present. Felt to have T4-NX-M0 disease.  3. 6/09/2016: Started concurrent chemoradiation with weekly paclitaxel and carboplatin.  4. 7/30/2016: Completed radiation.  Subsequently received 2 cycles of consolidation paclitaxel and carboplatin.   5. 9/13/2019: Presented with 6-month history of dysphonia and left vocal exophytic lesion. Left true vocal fold biopsy showed at least squamous cell carcinoma in situ, cannot exclude superficial invasion.  6. 9/30/2019: Excision of left vocal cord mass showed fragments of invasive squamous cell carcinoma, well differentiated and keratinizing.  Margins negative for malignancy.  7. 2/16/2021: CT chest w/o contrast showed thin-walled cavity in left  lower lobe with 2 solid peripheral nodules measuring 9 mm each. No lymphadenopathy.  8. 3/01/2021: PET scan showed hypermetabolic nodules in left lower lobe and right lung, consistent with metastases; hypermetabolic adenopathy in chest, abdomen, pelvis; nonspecific uptake at tongue; hypermetabolic intraosseous lesions in spine and pelvis consistent with metastatic disease; left axillary hypermetabolic lymph nodes related to COVID-19 vaccination.  9. 3/12/2021: CT-guided lung biopsy showed non-small cell carcinoma, not otherwise specified -- with opinion by [facility] pathologist.  10. 3/18/2021: Lung NGS panel negative for mutations in BRAF, EGFR, ERBB2, IDH1, IDH2, KRAS, MET, NRAS, RET. PD-L1 expression negative (TPS <1%). Due to minimal amount of DNA obtained from specimen, other biomarkers could not be analyzed.  11. 4/7/2021: MRI brain negative for brain metastases.  12. 4/27/2021: Started 1st line metastatic therapy with carboplatin, paclitaxel, bevacizumab, and atezolizumab for metastatic non-small cell lung carcinoma, NOS.  13. 7/12/2021: PET/CT showed resolved mural nodules with increased cystic cavity in left lower lobe with no significant FDG uptake, less likely metastases; no enlarged hypermetabolic mediastinal, hilar, or axillary lymph nodes, decreased metabolic activity of intraosseous lesion in spine and pelvis; no new bone lesions.  14. 10/11/2021: PET/CT showed slight interval increase in intensity of metabolic activity of right pubic bone and left ischium with new focal uptake in L2 spinous process; resolved uptake at previous ground glass opacity along right medial lower lobe; unchanged cystic cavities/nodules in left lower lobe and right apical upper lobe; no pathologically enlarged hypermetabolic mediastinal, hilar, or axillary lymph nodes.  15. 1/10/2022: PET/CT showed new foci of abnormal skeletal FGD uptake in the thoracic and lumbar spine. Mild interval increase in intensity of previously  demonstrated hypermetabolic skeletal lesions involving the pelvis and lumbar spine. Findings are consistent with progressive osseous metastatic disease. Subsequently off chemo for 1 month due to poor performance status.  16. 1/25/2022: Patient fell and fractured his femur. This was surgically repaired and he was subsequently cared for at a rehab unit.  17. 2/14/2022: Brain MRI without contrast (contrast not given due to inability to obtain IV access) showed no brain metastases.  18. 4/21/2022: Started 2nd line metastatic therapy with pemetrexed and carboplatin. Omission of carboplatin 6/2 and forward due to worsening anemia despite dose reduction.  19. 7/18/2022: PET/CT showed enlarged and increased FDG avid skeletal metastases, increased left para-aortic retroperitoneal lymph node increased in size and FDG avidity, findings consistent with progressive disease.  20. 8/3/2022: Started 3rd line metastatic therapy with Taxotere 60 mg/m2 every 3 weeks.  21. 11/21/2022: PET scan showed partial response, left para-aortic retroperitoneal lymph node has decreased from 2.6 x 1.7 cm (SUVmax 7.9) to 2.3 x 1.4 cm (SUVmax 5.7), skeletal metastases no longer demonstrate FDG activity. Kt elected to take a 2-month break from chemo.  22. 1/23/2023: PET scan showed increasing metabolic activity of the left periaortic (max SUV 8.1, previously 5.7) left external iliac (max SUV 7.0, previously 3.1), and right external iliac (max SUV 5.1, previously 3.7) lymph nodes with development/reactivation of multiple FDG avid osseous lesions.  23. 3/23/2023: Resumption of Taxotere every 3 weeks. Delayed due to insurance issues.    INTERIM HISTORY:  Kt reports feeling well with no new pain, paresthesias, or fatigue. He does have some dysgeusia.    REVIEW OF SYSTEMS:   14 point ROS was reviewed and is negative other than as noted above in HPI.       HOME MEDICATIONS:  Current Outpatient Medications   Medication Sig Dispense Refill      atorvastatin (LIPITOR) 40 MG tablet Take 40 mg by mouth daily       dexamethasone (DECADRON) 4 MG tablet Take 2 tablets (8 mg) by mouth 2 times daily (with meals) for 3 doses Start evening of Docetaxel infusion and continue for a total of 3 doses. 6 tablet 7     folic acid (FOLVITE) 1 MG tablet Take 1 mg by mouth daily       prochlorperazine (COMPAZINE) 10 MG tablet Take 1 tablet (10 mg) by mouth every 6 hours as needed for nausea or vomiting 30 tablet 2         ALLERGIES:  Allergies   Allergen Reactions     No Known Drug Allergy          PAST MEDICAL HISTORY:  Past Medical History:   Diagnosis Date     Alcohol abuse, unspecified      Cerebral infarction (H)     2009, right side residual and aphasia     Dyslipidemia      GERD (gastroesophageal reflux disease)      Lung cancer (H)      Unspecified essential hypertension          PAST SURGICAL HISTORY:  Past Surgical History:   Procedure Laterality Date     BRONCHOSCOPY FLEXIBLE AND RIGID N/A 5/5/2016    Procedure: BRONCHOSCOPY FLEXIBLE AND RIGID;  Surgeon: Tony Talbot MD;  Location: [facility]     ESOPHAGOSCOPY FLEXIBLE N/A 9/30/2019    Procedure: flexible esophagoscopy;  Surgeon: Lizzy Johnson MD;  Location: [facility] OR     INJECT STEROID (LOCATION) N/A 9/30/2019    Procedure: steroid injection;  Surgeon: Lizzy Johnson MD;  Location: [facility] OR     IR CHEST PORT PLACEMENT > 5 YRS OF AGE  4/22/2021     IR CHEST PORT PLACEMENT > 5 YRS OF AGE  4/11/2022     IR PORT CHECK RIGHT  4/11/2022     IR PORT REMOVAL RIGHT  4/11/2022     LASER CO2 LARYNGOSCOPY N/A 9/30/2019    Procedure: Microdirect laryngoscopy with excision of laryngeal mass, CO2 laser;  Surgeon: Lizzy Johnson MD;  Location: [facility] OR     Z NONSPECIFIC PROCEDURE      R tympanoplasty         SOCIAL HISTORY:  Social History     Socioeconomic History     Marital status: Single     Spouse name: Not on file     Number of children: Not on file     Years of education: Not on file     Highest education level: Not  "on file   Occupational History     Not on file   Tobacco Use     Smoking status: Former     Packs/day: 1.00     Types: Cigarettes     Quit date: 2009     Years since quittin.6     Smokeless tobacco: Never   Vaping Use     Vaping status: Not on file   Substance and Sexual Activity     Alcohol use: Not Currently     Drug use: No     Sexual activity: Not on file   Other Topics Concern     Parent/sibling w/ CABG, MI or angioplasty before 65F 55M? Not Asked   Social History Narrative     Not on file     Social Determinants of Health     Financial Resource Strain: Not on file   Food Insecurity: Not on file   Transportation Needs: Not on file   Physical Activity: Not on file   Stress: Not on file   Social Connections: Not on file   Intimate Partner Violence: Not At Risk (2023)    Humiliation, Afraid, Rape, and Kick questionnaire      Fear of Current or Ex-Partner: No      Emotionally Abused: No      Physically Abused: No      Sexually Abused: No   Housing Stability: Not on file   Resides at assisted living.      FAMILY HISTORY:  Family History   Problem Relation Age of Onset     Cancer Father          PHYSICAL EXAM:  Vital signs:  /73   Pulse 68   Temp 97.5  F (36.4  C)   Resp 16   Ht 1.905 m (6' 3\")   Wt 78 kg (172 lb)   SpO2 97%   BMI 21.50 kg/m     ECO  GENERAL: No acute distress.  EYES: No scleral icterus. No overt erythema.  LYMPH: No cervical, supraclavicular lymphadenopathy.  CARDIAC: Regular rate and rhythm with no murmurs.  RESPIRATORY: Clear to auscultation bilaterally. Hoarseness present.  EXTREMITIES: No clubbing, cyanosis, or edema.  SKIN: No overt rashes, discolorations, or lesions over the face and neck.  NEUROLOGIC: Alert.  No overt tremors. Stable right upper extremity weakness.  PSYCHIATRIC: Normal affect and mood.  Does not appear anxious.  GAIT: Sitting in wheelchair.      LABS:  CBC RESULTS: Recent Labs   Lab Test 23  0841   WBC 8.3   RBC 4.04*   HGB 11.7*   HCT " 36.6*   MCV 91   MCH 29.0   MCHC 32.0   RDW 18.1*   *     Last Comprehensive Metabolic Panel:  Sodium   Date Value Ref Range Status   01/25/2023 140 136 - 145 mmol/L Final   06/29/2021 140 133 - 144 mmol/L Final     Potassium   Date Value Ref Range Status   01/25/2023 4.0 3.4 - 5.3 mmol/L Final   12/07/2022 4.1 3.4 - 5.3 mmol/L Final   06/29/2021 4.3 3.4 - 5.3 mmol/L Final     Chloride   Date Value Ref Range Status   01/25/2023 102 98 - 107 mmol/L Final   12/07/2022 109 94 - 109 mmol/L Final   06/29/2021 111 (H) 94 - 109 mmol/L Final     Carbon Dioxide   Date Value Ref Range Status   06/29/2021 25 20 - 32 mmol/L Final     Carbon Dioxide (CO2)   Date Value Ref Range Status   01/25/2023 28 22 - 29 mmol/L Final   12/07/2022 25 20 - 32 mmol/L Final     Anion Gap   Date Value Ref Range Status   01/25/2023 10 7 - 15 mmol/L Final   12/07/2022 7 3 - 14 mmol/L Final   06/29/2021 4 3 - 14 mmol/L Final     Glucose   Date Value Ref Range Status   01/25/2023 95 70 - 99 mg/dL Final   12/07/2022 91 70 - 99 mg/dL Final   06/29/2021 85 70 - 99 mg/dL Final     Urea Nitrogen   Date Value Ref Range Status   01/25/2023 16.0 8.0 - 23.0 mg/dL Final   12/07/2022 17 7 - 30 mg/dL Final   06/29/2021 17 7 - 30 mg/dL Final     Creatinine   Date Value Ref Range Status   01/25/2023 0.71 0.67 - 1.17 mg/dL Final   06/29/2021 0.84 0.66 - 1.25 mg/dL Final     GFR Estimate   Date Value Ref Range Status   01/25/2023 >90 >60 mL/min/1.73m2 Final     Comment:     eGFR calculated using 2021 CKD-EPI equation.   06/29/2021 >90 >60 mL/min/[1.73_m2] Final     Comment:     Non  GFR Calc  Starting 12/18/2018, serum creatinine based estimated GFR (eGFR) will be   calculated using the Chronic Kidney Disease Epidemiology Collaboration   (CKD-EPI) equation.       Calcium   Date Value Ref Range Status   01/25/2023 9.3 8.8 - 10.2 mg/dL Final   06/29/2021 8.7 8.5 - 10.1 mg/dL Final     Bilirubin Total   Date Value Ref Range Status   05/25/2023  0.6 <=1.2 mg/dL Final   06/29/2021 0.3 0.2 - 1.3 mg/dL Final     Alkaline Phosphatase   Date Value Ref Range Status   05/25/2023 123 40 - 129 U/L Final   06/29/2021 73 40 - 150 U/L Final     ALT   Date Value Ref Range Status   05/25/2023 29 10 - 50 U/L Final   06/29/2021 34 0 - 70 U/L Final     AST   Date Value Ref Range Status   05/25/2023 26 10 - 50 U/L Final   06/29/2021 26 0 - 45 U/L Final       PATHOLOGY:  None new.    IMAGING:  Reviewed as per HPI.    ASSESSMENT/PLAN:  Kt Rasmussen is a 62 year old male with the following issues:  1. Metastatic non-small (non-squamous) cell lung carcinoma with metastases to lymph nodes, bones, and bilateral lungs  2. 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 Chemotherapy-induced anemia and thrombocytopenia  --Lung NGS panel negative for mutations in BRAF, EGFR, ERBB2, IDH1, IDH2, KRAS, MET, NRAS, RET. PD-L1 expression negative (TPS <1%). Guardant 360 negative for targetable mutations.  --Kt started 3rd line metastatic therapy with every 3-week Taxotere at 20% dose reduction (60 mg/m2) on 8/3/2022 due to progressive disease.  He tolerated this very well with minimal to no paresthesias. He then took a 2-month chemo break that was further delayed to 3.5 months due to insurance issues. He resumed on 3/23/2023.  --His 1/23/2023 PET scan had shown progressive disease in multiple bones and lymph nodes.  --Will continue Taxotere at 20% dose reduction due to past repeated issues with chemo-induced cytopenias.  Reiterated that all treatment is with palliative intent, not curative.  --Reviewed today's labs which show Hgb 11.7, WBC 8.3, platelets 141,000; ALT, AST, alk phos, and bilirubin are all normal. He may proceed with chemo today.  --Will repeat PET scan in early 7/2023 as scheduled.    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 Dysphonia  6. Dysphagia  -Kt underwent  "excision of a left vocal cord SCC on 9/30/2019 and has persistent dysphonia as a result of the lesion and excision.  He also has dysphagia but this is stable and swallowing is manageable.  -Continue follow-up with Dr. Wray.    7. History of dizziness and prior falls  --SW and guardian were previously notified of multiple falls.  --Prior brain MRI 2/14/2022 showed no brain metastases. However this was a suboptimal exam due to inability to administer contrast.  --He has not had recent falls and is using a wheelchair due to prior femoral fracture in 1/2022.    Sangita John MD  Hematology/Oncology  [facility]    Total time spent: 30 minutes in chart review, patient evaluation, counseling, documentation, test orders and coordination of care.    Oncology Rooming Note    May 25, 2023 9:08 AM   Kt Rasmussen is a 62 year old male who presents for:    Chief Complaint   Patient presents with     Oncology Clinic Visit     Initial Vitals: There were no vitals taken for this visit. Estimated body mass index is 22 kg/m  as calculated from the following:    Height as of 3/23/23: 1.905 m (6' 3\").    Weight as of 3/23/23: 79.8 kg (176 lb). There is no height or weight on file to calculate BSA.  Data Unavailable Comment: Data Unavailable   No LMP for male patient.  Allergies reviewed: Yes  Medications reviewed: Yes    Medications: Medication refills not needed today.  Pharmacy name entered into EPIC:    [facility] [place] [place] - [address] [facility] [address] [place] [address] DRUG STORE #[id number] - [address] & [address] [facility] - [address]    Clinical concerns:  doctor was notified.      Floridalma Nieves MA                Again, thank you for allowing me to participate in the care of your patient.        Sincerely,        Sangita" MD Alvaro

## 2023-06-01 ENCOUNTER — TELEPHONE (OUTPATIENT)
Dept: ONCOLOGY | Facility: CLINIC | Age: 63
End: 2023-06-01
Payer: COMMERCIAL

## 2023-06-01 DIAGNOSIS — C79.51 NON-SMALL CELL LUNG CANCER METASTATIC TO BONE (H): ICD-10-CM

## 2023-06-01 DIAGNOSIS — D70.1 CHEMOTHERAPY-INDUCED NEUTROPENIA (H): ICD-10-CM

## 2023-06-01 DIAGNOSIS — T45.1X5A CHEMOTHERAPY-INDUCED NEUTROPENIA (H): ICD-10-CM

## 2023-06-01 DIAGNOSIS — Z51.11 ENCOUNTER FOR ANTINEOPLASTIC CHEMOTHERAPY: ICD-10-CM

## 2023-06-01 DIAGNOSIS — C34.90 NON-SMALL CELL LUNG CANCER METASTATIC TO BONE (H): ICD-10-CM

## 2023-06-01 RX ORDER — PROCHLORPERAZINE MALEATE 10 MG
10 TABLET ORAL EVERY 6 HOURS PRN
Qty: 30 TABLET | Refills: 2 | Status: SHIPPED | OUTPATIENT
Start: 2023-06-01 | End: 2023-07-06

## 2023-06-01 NOTE — TELEPHONE ENCOUNTER
Received call from Serafin, clinician at [facility]. They are taking over dispensing Kt's medications, per his request, as he feels unable to manage them. Serafin states Kt is not willing to take dexamethasone or folic acid. He is willing to use compazine, prn. Serafin would like order to discontinue dex and folic acid and requests new rx for compazine.    Routed to Dr John to confirm ok to do taxotere without dex.    Tari Clarke RN

## 2023-06-01 NOTE — TELEPHONE ENCOUNTER
Spoke to Serafin. Will discontinue Folic Acid and Dexamethasone per Dr John. Will communicate this to [facility] in [place] and refill compazine.    Tari Clarke RN

## 2023-06-09 ENCOUNTER — TELEPHONE (OUTPATIENT)
Dept: PHARMACY | Facility: CLINIC | Age: 63
End: 2023-06-09
Payer: COMMERCIAL

## 2023-06-09 NOTE — TELEPHONE ENCOUNTER
Writer received another request to fax discontinue Folic acid and Dexamethasone orders to [phone]. Sujey Augustin RN

## 2023-06-09 NOTE — TELEPHONE ENCOUNTER
Provider from [facility] called-Karla. Requesting discontinue orders for folic acid and dexamethasone asap.   Spoke with RNCC Gifty Augustin covering for Tari about sending the discharge orders to fax [phone].    Relayed to Karla that I would do my best to work with RNCC to have those sent over shortly.    Stacy Parkinson PharmD  June 9, 2023

## 2023-06-12 RX ORDER — HEPARIN SODIUM,PORCINE 10 UNIT/ML
5 VIAL (ML) INTRAVENOUS
Status: CANCELLED | OUTPATIENT
Start: 2023-06-15

## 2023-06-12 RX ORDER — ALBUTEROL SULFATE 90 UG/1
1-2 AEROSOL, METERED RESPIRATORY (INHALATION)
Status: CANCELLED
Start: 2023-06-15

## 2023-06-12 RX ORDER — DIPHENHYDRAMINE HYDROCHLORIDE 50 MG/ML
50 INJECTION INTRAMUSCULAR; INTRAVENOUS
Status: CANCELLED
Start: 2023-06-15

## 2023-06-12 RX ORDER — EPINEPHRINE 1 MG/ML
0.3 INJECTION, SOLUTION, CONCENTRATE INTRAVENOUS EVERY 5 MIN PRN
Status: CANCELLED | OUTPATIENT
Start: 2023-06-15

## 2023-06-12 RX ORDER — MEPERIDINE HYDROCHLORIDE 25 MG/ML
25 INJECTION INTRAMUSCULAR; INTRAVENOUS; SUBCUTANEOUS EVERY 30 MIN PRN
Status: CANCELLED | OUTPATIENT
Start: 2023-06-15

## 2023-06-12 RX ORDER — ALBUTEROL SULFATE 0.83 MG/ML
2.5 SOLUTION RESPIRATORY (INHALATION)
Status: CANCELLED | OUTPATIENT
Start: 2023-06-15

## 2023-06-12 RX ORDER — HEPARIN SODIUM (PORCINE) LOCK FLUSH IV SOLN 100 UNIT/ML 100 UNIT/ML
5 SOLUTION INTRAVENOUS
Status: CANCELLED | OUTPATIENT
Start: 2023-06-15

## 2023-06-12 RX ORDER — LORAZEPAM 2 MG/ML
0.5 INJECTION INTRAMUSCULAR EVERY 4 HOURS PRN
Status: CANCELLED | OUTPATIENT
Start: 2023-06-15

## 2023-06-15 ENCOUNTER — ONCOLOGY VISIT (OUTPATIENT)
Dept: ONCOLOGY | Facility: CLINIC | Age: 63
End: 2023-06-15
Attending: INTERNAL MEDICINE
Payer: COMMERCIAL

## 2023-06-15 ENCOUNTER — LAB (OUTPATIENT)
Dept: INFUSION THERAPY | Facility: CLINIC | Age: 63
End: 2023-06-15
Attending: INTERNAL MEDICINE
Payer: COMMERCIAL

## 2023-06-15 VITALS
TEMPERATURE: 97.6 F | RESPIRATION RATE: 16 BRPM | DIASTOLIC BLOOD PRESSURE: 74 MMHG | OXYGEN SATURATION: 97 % | HEART RATE: 70 BPM | SYSTOLIC BLOOD PRESSURE: 115 MMHG

## 2023-06-15 DIAGNOSIS — D70.1 CHEMOTHERAPY-INDUCED NEUTROPENIA (H): Primary | ICD-10-CM

## 2023-06-15 DIAGNOSIS — Z51.11 ENCOUNTER FOR ANTINEOPLASTIC CHEMOTHERAPY: Primary | ICD-10-CM

## 2023-06-15 DIAGNOSIS — L08.9 TOE INFECTION: Primary | ICD-10-CM

## 2023-06-15 DIAGNOSIS — C34.90 NON-SMALL CELL LUNG CANCER METASTATIC TO BONE (H): ICD-10-CM

## 2023-06-15 DIAGNOSIS — C79.51 NON-SMALL CELL LUNG CANCER METASTATIC TO BONE (H): ICD-10-CM

## 2023-06-15 DIAGNOSIS — Z51.11 ENCOUNTER FOR ANTINEOPLASTIC CHEMOTHERAPY: ICD-10-CM

## 2023-06-15 DIAGNOSIS — T45.1X5A CHEMOTHERAPY-INDUCED NEUTROPENIA (H): ICD-10-CM

## 2023-06-15 DIAGNOSIS — T45.1X5A CHEMOTHERAPY-INDUCED NEUTROPENIA (H): Primary | ICD-10-CM

## 2023-06-15 DIAGNOSIS — D70.1 CHEMOTHERAPY-INDUCED NEUTROPENIA (H): ICD-10-CM

## 2023-06-15 LAB
ALBUMIN SERPL BCG-MCNC: 4 G/DL (ref 3.5–5.2)
ALP SERPL-CCNC: 117 U/L (ref 40–129)
ALT SERPL W P-5'-P-CCNC: 23 U/L (ref 0–70)
AST SERPL W P-5'-P-CCNC: 30 U/L (ref 0–45)
BASOPHILS # BLD AUTO: 0 10E3/UL (ref 0–0.2)
BASOPHILS NFR BLD AUTO: 0 %
BILIRUB DIRECT SERPL-MCNC: <0.2 MG/DL (ref 0–0.3)
BILIRUB SERPL-MCNC: 0.6 MG/DL
EOSINOPHIL # BLD AUTO: 0 10E3/UL (ref 0–0.7)
EOSINOPHIL NFR BLD AUTO: 0 %
ERYTHROCYTE [DISTWIDTH] IN BLOOD BY AUTOMATED COUNT: 17.6 % (ref 10–15)
HCT VFR BLD AUTO: 35.4 % (ref 40–53)
HGB BLD-MCNC: 11.2 G/DL (ref 13.3–17.7)
IMM GRANULOCYTES # BLD: 0 10E3/UL
IMM GRANULOCYTES NFR BLD: 0 %
LYMPHOCYTES # BLD AUTO: 1 10E3/UL (ref 0.8–5.3)
LYMPHOCYTES NFR BLD AUTO: 13 %
MCH RBC QN AUTO: 29.2 PG (ref 26.5–33)
MCHC RBC AUTO-ENTMCNC: 31.6 G/DL (ref 31.5–36.5)
MCV RBC AUTO: 92 FL (ref 78–100)
MONOCYTES # BLD AUTO: 0.7 10E3/UL (ref 0–1.3)
MONOCYTES NFR BLD AUTO: 9 %
NEUTROPHILS # BLD AUTO: 5.8 10E3/UL (ref 1.6–8.3)
NEUTROPHILS NFR BLD AUTO: 78 %
NRBC # BLD AUTO: 0 10E3/UL
NRBC BLD AUTO-RTO: 0 /100
PLATELET # BLD AUTO: 157 10E3/UL (ref 150–450)
PROT SERPL-MCNC: 6.2 G/DL (ref 6.4–8.3)
RBC # BLD AUTO: 3.84 10E6/UL (ref 4.4–5.9)
WBC # BLD AUTO: 7.5 10E3/UL (ref 4–11)

## 2023-06-15 PROCEDURE — 96413 CHEMO IV INFUSION 1 HR: CPT

## 2023-06-15 PROCEDURE — G0463 HOSPITAL OUTPT CLINIC VISIT: HCPCS | Mod: 25 | Performed by: NURSE PRACTITIONER

## 2023-06-15 PROCEDURE — 80076 HEPATIC FUNCTION PANEL: CPT | Performed by: INTERNAL MEDICINE

## 2023-06-15 PROCEDURE — 85025 COMPLETE CBC W/AUTO DIFF WBC: CPT | Performed by: INTERNAL MEDICINE

## 2023-06-15 PROCEDURE — 258N000003 HC RX IP 258 OP 636: Performed by: INTERNAL MEDICINE

## 2023-06-15 PROCEDURE — 96377 APPLICATON ON-BODY INJECTOR: CPT | Mod: XS

## 2023-06-15 PROCEDURE — 96367 TX/PROPH/DG ADDL SEQ IV INF: CPT

## 2023-06-15 PROCEDURE — 99214 OFFICE O/P EST MOD 30 MIN: CPT | Performed by: NURSE PRACTITIONER

## 2023-06-15 PROCEDURE — 250N000011 HC RX IP 250 OP 636: Performed by: INTERNAL MEDICINE

## 2023-06-15 PROCEDURE — 96372 THER/PROPH/DIAG INJ SC/IM: CPT | Performed by: INTERNAL MEDICINE

## 2023-06-15 RX ORDER — HEPARIN SODIUM (PORCINE) LOCK FLUSH IV SOLN 100 UNIT/ML 100 UNIT/ML
5 SOLUTION INTRAVENOUS
Status: DISCONTINUED | OUTPATIENT
Start: 2023-06-15 | End: 2023-06-15 | Stop reason: HOSPADM

## 2023-06-15 RX ORDER — CEPHALEXIN 500 MG/1
500 CAPSULE ORAL 4 TIMES DAILY
Qty: 28 CAPSULE | Refills: 0 | Status: SHIPPED | OUTPATIENT
Start: 2023-06-15 | End: 2023-06-22

## 2023-06-15 RX ADMIN — SODIUM CHLORIDE 122 MG: 9 INJECTION, SOLUTION INTRAVENOUS at 10:32

## 2023-06-15 RX ADMIN — Medication 5 ML: at 11:33

## 2023-06-15 RX ADMIN — SODIUM CHLORIDE 250 ML: 9 INJECTION, SOLUTION INTRAVENOUS at 10:12

## 2023-06-15 RX ADMIN — PEGFILGRASTIM 6 MG: KIT SUBCUTANEOUS at 10:36

## 2023-06-15 RX ADMIN — DEXAMETHASONE SODIUM PHOSPHATE: 10 INJECTION, SOLUTION INTRAMUSCULAR; INTRAVENOUS at 10:12

## 2023-06-15 ASSESSMENT — PAIN SCALES - GENERAL: PAINLEVEL: NO PAIN (0)

## 2023-06-15 NOTE — LETTER
"    6/15/2023         RE: Kt Rasmussen  [id number]  [facility] [address]        Dear Colleague,    Thank you for referring your patient, Kt Rasmussen, to the [facility]. Please see a copy of my visit note below.    Oncology Rooming Note    Sheri 15, 2023 9:22 AM   Kt Rasmussen is a 62 year old male who presents for:    Chief Complaint   Patient presents with     Oncology Clinic Visit     Initial Vitals: /74   Pulse 70   Temp 97.6  F (36.4  C) (Oral)   Resp 16   SpO2 97%  Estimated body mass index is 21.5 kg/m  as calculated from the following:    Height as of 5/25/23: 1.905 m (6' 3\").    Weight as of 5/25/23: 78 kg (172 lb). There is no height or weight on file to calculate BSA.  No Pain (0) Comment: Data Unavailable   No LMP for male patient.  Allergies reviewed: Yes  Medications reviewed: Yes    Medications: Medication refills not needed today.  Pharmacy name entered into EPIC:    [facility] [place] [place] - [address] [facility] [address] [facility] #[id number] - [place] [address] AT [facility] & [address] [facility] - [address]    Clinical concerns:  NP was notified.      Julisa Whelan CMA                Again, thank you for allowing me to participate in the care of your patient.        Sincerely,        MADHAVI Moran CNP    "

## 2023-06-15 NOTE — PROGRESS NOTES
Infusion Nursing Note:  Kt Rasmussen presents today for taxotere, onpro  Patient seen by provider today: Yes: RUSSEL Martinez   present during visit today: Not Applicable.    Note: N/A.      Intravenous Access:  Implanted Port.    Treatment Conditions:  Lab Results   Component Value Date    HGB 11.2 (L) 06/15/2023    WBC 7.5 06/15/2023    ANEU 0.5 (L) 08/11/2022    ANEUTAUTO 5.8 06/15/2023     06/15/2023      Lab Results   Component Value Date     01/25/2023    POTASSIUM 4.0 01/25/2023    MAG 1.9 10/13/2016    CR 0.71 01/25/2023    BEVERLY 9.3 01/25/2023    BILITOTAL 0.6 06/15/2023    ALBUMIN 4.0 06/15/2023    ALT 23 06/15/2023    AST 30 06/15/2023     Results reviewed, labs MET treatment parameters, ok to proceed with treatment.      Post Infusion Assessment:  Patient tolerated infusion without incident.  Blood return noted pre and post infusion.  Site patent and intact, free from redness, edema or discomfort.  No evidence of extravasations.  Access discontinued per protocol.     ONPRO  Was placed on patient's: back of right arm.    Was placed at 10:40 AM    Podpal used: Yes    ONPRO injector device Lot number: [id number]    Patient education included: all patients questions answered and that Neulasta administration will occur at 6/16 at 1:40pm.    Patient tolerated administration well.        Discharge Plan:   Copy of AVS reviewed with patient and/or family.  Patient will return as prev carlos for next appointment.  Patient discharged in stable condition accompanied by: self.  Departure Mode: Wheelchair.      Guzman Patel, RN

## 2023-06-15 NOTE — PATIENT INSTRUCTIONS
Your On-body Neulasta Injector was applied to your right arm at 10:40am.  At approximately 1:40pm on 6/16, your On-body Injector will beep to let you know your dose delivery will begin in 2 minutes.  Your medication will be delivered over the next 45 minutes.  You can remove your Injector at 2:30pm.  Please make sure your Injector has a solid green light or has turned off prior to removing the device.  Please contact your provider at [phone] with questions or concerns.

## 2023-06-15 NOTE — PROGRESS NOTES
Oncology/Hematology Visit Note  Ephraim 15, 2023    Reason for Visit: follow up of metastatic non-small cell lung carcinoma  Metastatic to lymph nodes bones in bilateral lungs  Brain MRI negative for mets    Patient met with Dr. John who recommended treatment with palliative intent with paclitaxel carboplatin Avastin and atezolizumab-treatment started on April 27, 2021  -Due to thrombocytopenia carboplatin AUC reduced to 4 with cycle 2  Due to pancytopenia carboplatin discontinued with cycle 5    7/12/2021: PET/CT showed resolved mural nodules with increased cystic cavity in left lower lobe with no significant FDG uptake, less likely metastases; no enlarged hypermetabolic mediastinal, hilar, or axillary lymph nodes, decreased metabolic activity of intraosseous lesion in spine and pelvis; no new bone lesions    Treated with paclitaxel, Avastin and atezolizumab.-Last treatment given in  12/22/2021-cycle 12    1/10/2022: PET/CT showed new foci of abnormal skeletal FGD uptake in the thoracic and lumbar spine. Mild interval increase in intensity of previously demonstrated hypermetabolic skeletal lesions involving the pelvis and lumbar spine. Findings are consistent with progressive osseous metastatic disease. Subsequently off chemo for 1 month due to poor performance status.  16. 1/25/2022: Patient fell and fractured his femur. This was surgically repaired and he was subsequently cared for at a rehab unit.  17. 2/14/2022: Brain MRI without contrast (contrast not given due to inability to obtain IV access) showed no brain metastases.    Met with Dr. Shoemaker-in Dr. John's absence  -Recommendation is to change therapy   04/21/2022 -palliative Carboplatin Alimta started   Due to cytopenia AUC reduced to 4 with cycle 2  Due to persistent pancytopenia and inability to tolerate treatment carboplatin discontinued with cycle 3-day 1  Patient was on monotherapy with Alimta    07/18/2022-PET CT scan shows progression of the  disease  08/03-started Taxotere 60 mg/m2 every 3 weeks    Interval History:  Patient reports he is having some redness in the right big toe.  He denies discharge from the toe nail/ denies pain denies fever chills sweats cough shortness of breath chest pain nausea vomiting diarrhea abdominal pain bleeding    Review of Systems:  14 point ROS of systems including Constitutional, Eyes, Respiratory, Cardiovascular, Gastroenterology, Genitourinary, Integumentary, Muscularskeletal, Psychiatric were all negative except for pertinent positives noted in my HPI.    Physical Examination:  Physical Exam  HENT:      Right Ear: Tympanic membrane normal.      Nose: Nose normal.      Mouth/Throat:      Mouth: Mucous membranes are moist.   Eyes:      Pupils: Pupils are equal, round, and reactive to light.   Cardiovascular:      Rate and Rhythm: Normal rate.      Pulses: Normal pulses.   Pulmonary:      Effort: Pulmonary effort is normal.   Abdominal:      General: Abdomen is flat.   Musculoskeletal:         General: Normal range of motion.      Cervical back: Normal range of motion.   Skin:     General: Skin is warm.   Neurological:      General: No focal deficit present.      Mental Status: He is alert.   Psychiatric:         Mood and Affect: Mood normal.           Laboratory Data:  CBC and CMP results reviewed    Assessment and Plan:      metastatic non-small cell lung cancer   Patient of Dr. John   Progressed on  different treatments as above recently was on monotherapy with Alimta  07/18/2022-PET CT scan reveals progression of disease  Patient with Dr. John who recommended changing treatment to Taxotere 60 mg/m2 every 3 weeks  -Patient reports he has been tolerating treatment well  -Labs reviewed unremarkable discussed okay to proceed with treatment  -Continue with Taxotere every 3 weeks  06/26-PET scan  07/0608-[id number] appointment with Dr. 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     Chart documentation with Dragon Voice recognition Software. Although reviewed after completion, some words and grammatical errors may remain.

## 2023-06-15 NOTE — PROGRESS NOTES
Nursing Note:  Kt Rasmussen presents today for Port labs.    Patient seen by provider today: Yes: Juan Rausch NP   present during visit today: Not Applicable.    Note: N/A.    Intravenous Access:  Implanted Port.    Discharge Plan:   Patient was sent to [facility] for provider appointment.    Devorah Stovall RN

## 2023-06-15 NOTE — PROGRESS NOTES
"Oncology Rooming Note    Sheri 15, 2023 9:22 AM   Kt Rasmussen is a 62 year old male who presents for:    Chief Complaint   Patient presents with     Oncology Clinic Visit     Initial Vitals: /74   Pulse 70   Temp 97.6  F (36.4  C) (Oral)   Resp 16   SpO2 97%  Estimated body mass index is 21.5 kg/m  as calculated from the following:    Height as of 5/25/23: 1.905 m (6' 3\").    Weight as of 5/25/23: 78 kg (172 lb). There is no height or weight on file to calculate BSA.  No Pain (0) Comment: Data Unavailable   No LMP for male patient.  Allergies reviewed: Yes  Medications reviewed: Yes    Medications: Medication refills not needed today.  Pharmacy name entered into EPIC:    [facility] [place] [place] [facility] [address] [facility] [place], [place] - [address] [id number] - [place] [id number] [facility] [address]  [facility] - [place], [place] - [address] [place] [address]  [facility] - [address] [place]    Clinical concerns:  NP was notified.      Julisa Whelan CMA            "

## 2023-06-26 ENCOUNTER — HOSPITAL ENCOUNTER (OUTPATIENT)
Dept: PET IMAGING | Facility: CLINIC | Age: 63
Discharge: HOME | End: 2023-06-26
Attending: INTERNAL MEDICINE | Admitting: INTERNAL MEDICINE
Payer: COMMERCIAL

## 2023-06-26 DIAGNOSIS — C34.90 NON-SMALL CELL LUNG CANCER METASTATIC TO BONE (H): ICD-10-CM

## 2023-06-26 DIAGNOSIS — C78.02 MALIGNANT NEOPLASM METASTATIC TO BOTH LUNGS (H): ICD-10-CM

## 2023-06-26 DIAGNOSIS — C79.51 NON-SMALL CELL LUNG CANCER METASTATIC TO BONE (H): ICD-10-CM

## 2023-06-26 DIAGNOSIS — C78.01 MALIGNANT NEOPLASM METASTATIC TO BOTH LUNGS (H): ICD-10-CM

## 2023-06-26 PROCEDURE — 78816 PET IMAGE W/CT FULL BODY: CPT | Mod: PS

## 2023-06-26 PROCEDURE — 250N000011 HC RX IP 250 OP 636: Mod: JZ | Performed by: INTERNAL MEDICINE

## 2023-06-26 PROCEDURE — 343N000001 HC RX 343: Performed by: INTERNAL MEDICINE

## 2023-06-26 PROCEDURE — A9552 F18 FDG: HCPCS | Performed by: INTERNAL MEDICINE

## 2023-06-26 RX ORDER — HEPARIN SODIUM (PORCINE) LOCK FLUSH IV SOLN 100 UNIT/ML 100 UNIT/ML
500 SOLUTION INTRAVENOUS ONCE
Status: COMPLETED | OUTPATIENT
Start: 2023-06-26 | End: 2023-06-26

## 2023-06-26 RX ADMIN — FLUDEOXYGLUCOSE F-18 13.9 MILLICURIE: 500 INJECTION, SOLUTION INTRAVENOUS at 09:35

## 2023-06-26 RX ADMIN — Medication 500 UNITS: at 09:40

## 2023-06-27 NOTE — PROGRESS NOTES
[facility] [place] [facility]    Hematology/Oncology Established Patient Follow-up Note      Today's Date: 7/6/2023    Reason for Follow-up: Metastatic non-small cell lung carcinoma.    HISTORY OF PRESENT ILLNESS: Kt Rasmussen is a 62 year old male who presents with the following oncologic history:  1. 5/05/2016: Presented with near-obstructing large mass coming off the cheikh and likely the posterior wall, seen on bronchoscopy. Biopsy of the mass showed invasive squamous cell carcinoma, moderately differentiating and focally keratinizing.  Procedure was complicated by significant bleeding.  Tumor was completely debulked (via bronchoscopy) in both main stem bronchi and distal trachea. NGS showed no mutations in EGFR, KRAS, BRAF, NRAS, HRAS, PIK3CA, ERBB2, MET, or JAK2.  2. 5/23/2016: PET/CT scan showed evidence of residual tumor with decreased endobronchial mass. Indeterminate, mildly hypermetabolic mesentery with prominent lymph nodes and area of enhancement of the right hepatic lobe present. Felt to have T4-NX-M0 disease.  3. 6/09/2016: Started concurrent chemoradiation with weekly paclitaxel and carboplatin.  4. 7/30/2016: Completed radiation.  Subsequently received 2 cycles of consolidation paclitaxel and carboplatin.   5. 9/13/2019: Presented with 6-month history of dysphonia and left vocal exophytic lesion. Left true vocal fold biopsy showed at least squamous cell carcinoma in situ, cannot exclude superficial invasion.  6. 9/30/2019: Excision of left vocal cord mass showed fragments of invasive squamous cell carcinoma, well differentiated and keratinizing.  Margins negative for malignancy.  7. 2/16/2021: CT chest w/o contrast showed thin-walled cavity in left lower lobe with 2 solid peripheral nodules measuring 9 mm each. No lymphadenopathy.  8. 3/01/2021: PET scan showed hypermetabolic nodules in left lower lobe and right lung, consistent with metastases; hypermetabolic adenopathy in chest, abdomen, pelvis;  nonspecific uptake at tongue; hypermetabolic intraosseous lesions in spine and pelvis consistent with metastatic disease; left axillary hypermetabolic lymph nodes related to COVID-19 vaccination.  9. 3/12/2021: CT-guided lung biopsy showed non-small cell carcinoma, not otherwise specified -- with opinion by [facility] pathologist.  10. 3/18/2021: Lung NGS panel negative for mutations in BRAF, EGFR, ERBB2, IDH1, IDH2, KRAS, MET, NRAS, RET. PD-L1 expression negative (TPS <1%). Due to minimal amount of DNA obtained from specimen, other biomarkers could not be analyzed.  11. 4/7/2021: MRI brain negative for brain metastases.  12. 4/27/2021: Started 1st line metastatic therapy with carboplatin, paclitaxel, bevacizumab, and atezolizumab for metastatic non-small cell lung carcinoma, NOS.  13. 7/12/2021: PET/CT showed resolved mural nodules with increased cystic cavity in left lower lobe with no significant FDG uptake, less likely metastases; no enlarged hypermetabolic mediastinal, hilar, or axillary lymph nodes, decreased metabolic activity of intraosseous lesion in spine and pelvis; no new bone lesions.  14. 10/11/2021: PET/CT showed slight interval increase in intensity of metabolic activity of right pubic bone and left ischium with new focal uptake in L2 spinous process; resolved uptake at previous ground glass opacity along right medial lower lobe; unchanged cystic cavities/nodules in left lower lobe and right apical upper lobe; no pathologically enlarged hypermetabolic mediastinal, hilar, or axillary lymph nodes.  15. 1/10/2022: PET/CT showed new foci of abnormal skeletal FGD uptake in the thoracic and lumbar spine. Mild interval increase in intensity of previously demonstrated hypermetabolic skeletal lesions involving the pelvis and lumbar spine. Findings are consistent with progressive osseous metastatic disease. Subsequently off chemo for 1 month due to poor performance status.  16. 1/25/2022: Patient fell and  fractured his femur. This was surgically repaired and he was subsequently cared for at a rehab unit.  17. 2/14/2022: Brain MRI without contrast (contrast not given due to inability to obtain IV access) showed no brain metastases.  18. 4/21/2022: Started 2nd line metastatic therapy with pemetrexed and carboplatin. Omission of carboplatin 6/2 and forward due to worsening anemia despite dose reduction.  19. 7/18/2022: PET/CT showed enlarged and increased FDG avid skeletal metastases, increased left para-aortic retroperitoneal lymph node increased in size and FDG avidity, findings consistent with progressive disease.  20. 8/3/2022: Started 3rd line metastatic therapy with Taxotere 60 mg/m2 every 3 weeks.  21. 11/21/2022: PET scan showed partial response, left para-aortic retroperitoneal lymph node has decreased from 2.6 x 1.7 cm (SUVmax 7.9) to 2.3 x 1.4 cm (SUVmax 5.7), skeletal metastases no longer demonstrate FDG activity. Kt elected to take a 2-month break from chemo.  22. 1/23/2023: PET scan showed increasing metabolic activity of the left periaortic (max SUV 8.1, previously 5.7) left external iliac (max SUV 7.0, previously 3.1), and right external iliac (max SUV 5.1, previously 3.7) lymph nodes with development/reactivation of multiple FDG avid osseous lesions.  23. 3/23/2023: Resumption of Taxotere every 3 weeks. Delayed due to insurance issues.  24. 6/26/2023: PET scan showed partial response with decreased uptake associated with the retroperitoneal and pelvic lymphadenopathy as well as bone metastases.    INTERIM HISTORY:  Kt reports mild paresthesias, no new pain, no dyspnea, no fatigue.    REVIEW OF SYSTEMS:   14 point ROS was reviewed and is negative other than as noted above in HPI.       HOME MEDICATIONS:  Current Outpatient Medications   Medication Sig Dispense Refill     atorvastatin (LIPITOR) 40 MG tablet Take 40 mg by mouth daily           ALLERGIES:  Allergies   Allergen Reactions     No Known  Drug Allergy          PAST MEDICAL HISTORY:  Past Medical History:   Diagnosis Date     Alcohol abuse, unspecified      Cerebral infarction (H)     , right side residual and aphasia     Dyslipidemia      GERD (gastroesophageal reflux disease)      Lung cancer (H)      Unspecified essential hypertension          PAST SURGICAL HISTORY:  Past Surgical History:   Procedure Laterality Date     BRONCHOSCOPY FLEXIBLE AND RIGID N/A 2016    Procedure: BRONCHOSCOPY FLEXIBLE AND RIGID;  Surgeon: Tony Talbot MD;  Location: [facility] OR     ESOPHAGOSCOPY FLEXIBLE N/A 2019    Procedure: flexible esophagoscopy;  Surgeon: Lizzy Johnson MD;  Location: [facility] OR     INJECT STEROID (LOCATION) N/A 2019    Procedure: steroid injection;  Surgeon: Lizzy Johnson MD;  Location: [facility] OR     IR CHEST PORT PLACEMENT > 5 YRS OF AGE  2021     IR CHEST PORT PLACEMENT > 5 YRS OF AGE  2022     IR PORT CHECK RIGHT  2022     IR PORT REMOVAL RIGHT  2022     LASER CO2 LARYNGOSCOPY N/A 2019    Procedure: Microdirect laryngoscopy with excision of laryngeal mass, CO2 laser;  Surgeon: Lizzy Johnson MD;  Location: [facility] OR     ZZC NONSPECIFIC PROCEDURE      R tympanoplasty         SOCIAL HISTORY:  Social History     Socioeconomic History     Marital status: Single     Spouse name: Not on file     Number of children: Not on file     Years of education: Not on file     Highest education level: Not on file   Occupational History     Not on file   Tobacco Use     Smoking status: Former     Packs/day: 1.00     Types: Cigarettes     Quit date: 2009     Years since quittin.7     Smokeless tobacco: Never   Substance and Sexual Activity     Alcohol use: Not Currently     Drug use: No     Sexual activity: Not on file   Other Topics Concern     Parent/sibling w/ CABG, MI or angioplasty before 65F 55M? Not Asked   Social History Narrative     Not on file     Social Determinants of Health     Financial Resource  Strain: Not on file   Food Insecurity: Not on file   Transportation Needs: Not on file   Physical Activity: Not on file   Stress: Not on file   Social Connections: Not on file   Intimate Partner Violence: Not At Risk (2023)    Humiliation, Afraid, Rape, and Kick questionnaire      Fear of Current or Ex-Partner: No      Emotionally Abused: No      Physically Abused: No      Sexually Abused: No   Housing Stability: Not on file   Resides at assisted living.      FAMILY HISTORY:  Family History   Problem Relation Age of Onset     Cancer Father          PHYSICAL EXAM:  Vital signs:  /73   Pulse 66   Temp 97.7  F (36.5  C) (Oral)   Resp 16   SpO2 100%    ECO  GENERAL: No acute distress.  EYES: No scleral icterus. No overt erythema.  LYMPH: No cervical, supraclavicular lymphadenopathy.  CARDIAC: Regular rate and rhythm with no murmurs.  RESPIRATORY: Clear to auscultation bilaterally. Hoarseness present.  EXTREMITIES: No clubbing, cyanosis, or edema.  SKIN: No overt rashes, discolorations, or lesions over the face and neck.  NEUROLOGIC: Alert.  No overt tremors. Stable right upper extremity weakness.  PSYCHIATRIC: Normal affect and mood.  Does not appear anxious.  GAIT: Sitting in wheelchair.      LABS:  CBC RESULTS: Recent Labs   Lab Test 23  0921   WBC 10.6   RBC 3.74*   HGB 10.9*   HCT 35.1*   MCV 94   MCH 29.1   MCHC 31.1*   RDW 16.8*   *     Last Comprehensive Metabolic Panel:  Sodium   Date Value Ref Range Status   2023 140 136 - 145 mmol/L Final   2021 140 133 - 144 mmol/L Final     Potassium   Date Value Ref Range Status   2023 4.0 3.4 - 5.3 mmol/L Final   2022 4.1 3.4 - 5.3 mmol/L Final   2021 4.3 3.4 - 5.3 mmol/L Final     Chloride   Date Value Ref Range Status   2023 102 98 - 107 mmol/L Final   2022 109 94 - 109 mmol/L Final   2021 111 (H) 94 - 109 mmol/L Final     Carbon Dioxide   Date Value Ref Range Status   2021 25 20 - 32  mmol/L Final     Carbon Dioxide (CO2)   Date Value Ref Range Status   01/25/2023 28 22 - 29 mmol/L Final   12/07/2022 25 20 - 32 mmol/L Final     Anion Gap   Date Value Ref Range Status   01/25/2023 10 7 - 15 mmol/L Final   12/07/2022 7 3 - 14 mmol/L Final   06/29/2021 4 3 - 14 mmol/L Final     Glucose   Date Value Ref Range Status   01/25/2023 95 70 - 99 mg/dL Final   12/07/2022 91 70 - 99 mg/dL Final   06/29/2021 85 70 - 99 mg/dL Final     Urea Nitrogen   Date Value Ref Range Status   01/25/2023 16.0 8.0 - 23.0 mg/dL Final   12/07/2022 17 7 - 30 mg/dL Final   06/29/2021 17 7 - 30 mg/dL Final     Creatinine   Date Value Ref Range Status   01/25/2023 0.71 0.67 - 1.17 mg/dL Final   06/29/2021 0.84 0.66 - 1.25 mg/dL Final     GFR Estimate   Date Value Ref Range Status   01/25/2023 >90 >60 mL/min/1.73m2 Final     Comment:     eGFR calculated using 2021 CKD-EPI equation.   06/29/2021 >90 >60 mL/min/[1.73_m2] Final     Comment:     Non  GFR Calc  Starting 12/18/2018, serum creatinine based estimated GFR (eGFR) will be   calculated using the Chronic Kidney Disease Epidemiology Collaboration   (CKD-EPI) equation.       Calcium   Date Value Ref Range Status   01/25/2023 9.3 8.8 - 10.2 mg/dL Final   06/29/2021 8.7 8.5 - 10.1 mg/dL Final     Bilirubin Total   Date Value Ref Range Status   07/06/2023 0.5 <=1.2 mg/dL Final   06/29/2021 0.3 0.2 - 1.3 mg/dL Final     Alkaline Phosphatase   Date Value Ref Range Status   07/06/2023 116 40 - 129 U/L Final   06/29/2021 73 40 - 150 U/L Final     ALT   Date Value Ref Range Status   07/06/2023 26 0 - 70 U/L Final     Comment:     Reference intervals for this test were updated on 6/12/2023 to more accurately reflect our healthy population. There may be differences in the flagging of prior results with similar values performed with this method. Interpretation of those prior results can be made in the context of the updated reference intervals.     06/29/2021 34 0 - 70 U/L  Final     AST   Date Value Ref Range Status   07/06/2023 27 0 - 45 U/L Final     Comment:     Reference intervals for this test were updated on 6/12/2023 to more accurately reflect our healthy population. There may be differences in the flagging of prior results with similar values performed with this method. Interpretation of those prior results can be made in the context of the updated reference intervals.   06/29/2021 26 0 - 45 U/L Final       PATHOLOGY:  None new.    IMAGING:  Reviewed as per HPI.    ASSESSMENT/PLAN:  Kt Rasmussen is a 62 year old male with the following issues:  1. Metastatic non-small (non-squamous) cell lung carcinoma with metastases to lymph nodes, bones, and bilateral lungs  2. 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 Chemotherapy-induced anemia and thrombocytopenia  --Lung NGS panel negative for mutations in BRAF, EGFR, ERBB2, IDH1, IDH2, KRAS, MET, NRAS, RET. PD-L1 expression negative (TPS <1%). Guardant 360 negative for targetable mutations.  --Kt started 3rd line metastatic therapy with every 3-week Taxotere at 20% dose reduction (60 mg/m2) on 8/3/2022 due to progressive disease.  He tolerated this very well with minimal to no paresthesias. He then took a 2-month chemo break that was further delayed to 3.5 months due to insurance issues. He resumed on 3/23/2023 and continues to tolerate Taxotere well with minimal paresthesias.  --I personally reviewed his 6/26/2023 PET scan which showed partial response and decreased uptake in the retroperitoneal and pelvic lymphadenopathy and bone metastases.  --I advised he continue Taxotere at 20% dose reduction due to past repeated issues with chemo-induced cytopenias.  All treatment is with palliative intent.  --Reviewed today's labs which show Hgb 10.9, WBC 10.6, platelets 149,000; ALT, AST, alk phos, and bilirubin are all normal. He may proceed  with chemo today.  --Will repeat PET scan end of 9/2023.    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 Dysphonia  6. Dysphagia  -Kt underwent excision of a left vocal cord SCC on 9/30/2019 and has persistent dysphonia as a result of the lesion and excision.  He also has dysphagia but this is stable and swallowing is manageable.  -Continue follow-up with Dr. Wray.    7. History of dizziness and prior falls  --SW and guardian were previously notified of multiple falls.  --Prior brain MRI 2/14/2022 showed no brain metastases. However this was a suboptimal exam due to inability to administer contrast.  --He has not had recent falls and is using a wheelchair due to prior femoral fracture in 1/2022.    Sangita John MD  Hematology/Oncology  [facility]    Total time spent: 30 minutes in chart review, patient evaluation, counseling, documentation, test orders and coordination of care.

## 2023-07-03 RX ORDER — DIPHENHYDRAMINE HYDROCHLORIDE 50 MG/ML
50 INJECTION INTRAMUSCULAR; INTRAVENOUS
Status: CANCELLED
Start: 2023-07-06

## 2023-07-03 RX ORDER — ALBUTEROL SULFATE 90 UG/1
1-2 AEROSOL, METERED RESPIRATORY (INHALATION)
Status: CANCELLED
Start: 2023-07-06

## 2023-07-03 RX ORDER — ALBUTEROL SULFATE 0.83 MG/ML
2.5 SOLUTION RESPIRATORY (INHALATION)
Status: CANCELLED | OUTPATIENT
Start: 2023-07-06

## 2023-07-03 RX ORDER — EPINEPHRINE 1 MG/ML
0.3 INJECTION, SOLUTION, CONCENTRATE INTRAVENOUS EVERY 5 MIN PRN
Status: CANCELLED | OUTPATIENT
Start: 2023-07-06

## 2023-07-03 RX ORDER — LORAZEPAM 2 MG/ML
0.5 INJECTION INTRAMUSCULAR EVERY 4 HOURS PRN
Status: CANCELLED | OUTPATIENT
Start: 2023-07-06

## 2023-07-03 RX ORDER — HEPARIN SODIUM,PORCINE 10 UNIT/ML
5 VIAL (ML) INTRAVENOUS
Status: CANCELLED | OUTPATIENT
Start: 2023-07-06

## 2023-07-03 RX ORDER — MEPERIDINE HYDROCHLORIDE 25 MG/ML
25 INJECTION INTRAMUSCULAR; INTRAVENOUS; SUBCUTANEOUS EVERY 30 MIN PRN
Status: CANCELLED | OUTPATIENT
Start: 2023-07-06

## 2023-07-03 RX ORDER — HEPARIN SODIUM (PORCINE) LOCK FLUSH IV SOLN 100 UNIT/ML 100 UNIT/ML
5 SOLUTION INTRAVENOUS
Status: CANCELLED | OUTPATIENT
Start: 2023-07-06

## 2023-07-06 ENCOUNTER — LAB (OUTPATIENT)
Dept: INFUSION THERAPY | Facility: CLINIC | Age: 63
End: 2023-07-06
Attending: INTERNAL MEDICINE
Payer: COMMERCIAL

## 2023-07-06 ENCOUNTER — ONCOLOGY VISIT (OUTPATIENT)
Dept: ONCOLOGY | Facility: CLINIC | Age: 63
End: 2023-07-06
Attending: INTERNAL MEDICINE
Payer: COMMERCIAL

## 2023-07-06 VITALS
SYSTOLIC BLOOD PRESSURE: 115 MMHG | OXYGEN SATURATION: 100 % | TEMPERATURE: 97.7 F | RESPIRATION RATE: 16 BRPM | DIASTOLIC BLOOD PRESSURE: 73 MMHG | HEART RATE: 66 BPM

## 2023-07-06 DIAGNOSIS — D70.1 CHEMOTHERAPY-INDUCED NEUTROPENIA (H): ICD-10-CM

## 2023-07-06 DIAGNOSIS — C79.51 NON-SMALL CELL LUNG CANCER METASTATIC TO BONE (H): ICD-10-CM

## 2023-07-06 DIAGNOSIS — C34.90 NON-SMALL CELL LUNG CANCER METASTATIC TO BONE (H): ICD-10-CM

## 2023-07-06 DIAGNOSIS — Z51.11 ENCOUNTER FOR ANTINEOPLASTIC CHEMOTHERAPY: Primary | ICD-10-CM

## 2023-07-06 DIAGNOSIS — D64.81 ANEMIA DUE TO CHEMOTHERAPY: ICD-10-CM

## 2023-07-06 DIAGNOSIS — T45.1X5A CHEMOTHERAPY-INDUCED NEUTROPENIA (H): ICD-10-CM

## 2023-07-06 DIAGNOSIS — Z51.11 ENCOUNTER FOR ANTINEOPLASTIC CHEMOTHERAPY: ICD-10-CM

## 2023-07-06 DIAGNOSIS — C79.51 NON-SMALL CELL LUNG CANCER METASTATIC TO BONE (H): Primary | ICD-10-CM

## 2023-07-06 DIAGNOSIS — C78.02 MALIGNANT NEOPLASM METASTATIC TO BOTH LUNGS (H): ICD-10-CM

## 2023-07-06 DIAGNOSIS — T45.1X5A ANEMIA DUE TO CHEMOTHERAPY: ICD-10-CM

## 2023-07-06 DIAGNOSIS — C78.01 MALIGNANT NEOPLASM METASTATIC TO BOTH LUNGS (H): ICD-10-CM

## 2023-07-06 DIAGNOSIS — D70.1 CHEMOTHERAPY-INDUCED NEUTROPENIA (H): Primary | ICD-10-CM

## 2023-07-06 DIAGNOSIS — C34.90 NON-SMALL CELL LUNG CANCER METASTATIC TO BONE (H): Primary | ICD-10-CM

## 2023-07-06 DIAGNOSIS — T45.1X5A CHEMOTHERAPY-INDUCED NEUTROPENIA (H): Primary | ICD-10-CM

## 2023-07-06 LAB
ALBUMIN SERPL BCG-MCNC: 3.9 G/DL (ref 3.5–5.2)
ALP SERPL-CCNC: 116 U/L (ref 40–129)
ALT SERPL W P-5'-P-CCNC: 26 U/L (ref 0–70)
AST SERPL W P-5'-P-CCNC: 27 U/L (ref 0–45)
BASOPHILS # BLD AUTO: 0 10E3/UL (ref 0–0.2)
BASOPHILS NFR BLD AUTO: 0 %
BILIRUB DIRECT SERPL-MCNC: <0.2 MG/DL (ref 0–0.3)
BILIRUB SERPL-MCNC: 0.5 MG/DL
EOSINOPHIL # BLD AUTO: 0 10E3/UL (ref 0–0.7)
EOSINOPHIL NFR BLD AUTO: 0 %
ERYTHROCYTE [DISTWIDTH] IN BLOOD BY AUTOMATED COUNT: 16.8 % (ref 10–15)
HCT VFR BLD AUTO: 35.1 % (ref 40–53)
HGB BLD-MCNC: 10.9 G/DL (ref 13.3–17.7)
IMM GRANULOCYTES # BLD: 0 10E3/UL
IMM GRANULOCYTES NFR BLD: 0 %
LYMPHOCYTES # BLD AUTO: 1.1 10E3/UL (ref 0.8–5.3)
LYMPHOCYTES NFR BLD AUTO: 10 %
MCH RBC QN AUTO: 29.1 PG (ref 26.5–33)
MCHC RBC AUTO-ENTMCNC: 31.1 G/DL (ref 31.5–36.5)
MCV RBC AUTO: 94 FL (ref 78–100)
MONOCYTES # BLD AUTO: 0.8 10E3/UL (ref 0–1.3)
MONOCYTES NFR BLD AUTO: 7 %
NEUTROPHILS # BLD AUTO: 8.7 10E3/UL (ref 1.6–8.3)
NEUTROPHILS NFR BLD AUTO: 83 %
NRBC # BLD AUTO: 0 10E3/UL
NRBC BLD AUTO-RTO: 0 /100
PLATELET # BLD AUTO: 149 10E3/UL (ref 150–450)
PROT SERPL-MCNC: 6.3 G/DL (ref 6.4–8.3)
RBC # BLD AUTO: 3.74 10E6/UL (ref 4.4–5.9)
WBC # BLD AUTO: 10.6 10E3/UL (ref 4–11)

## 2023-07-06 PROCEDURE — 96367 TX/PROPH/DG ADDL SEQ IV INF: CPT

## 2023-07-06 PROCEDURE — 99214 OFFICE O/P EST MOD 30 MIN: CPT | Performed by: INTERNAL MEDICINE

## 2023-07-06 PROCEDURE — 96413 CHEMO IV INFUSION 1 HR: CPT

## 2023-07-06 PROCEDURE — 250N000011 HC RX IP 250 OP 636: Mod: JZ | Performed by: INTERNAL MEDICINE

## 2023-07-06 PROCEDURE — 96372 THER/PROPH/DIAG INJ SC/IM: CPT | Performed by: INTERNAL MEDICINE

## 2023-07-06 PROCEDURE — 258N000003 HC RX IP 258 OP 636: Performed by: INTERNAL MEDICINE

## 2023-07-06 PROCEDURE — 96377 APPLICATON ON-BODY INJECTOR: CPT | Mod: XS | Performed by: INTERNAL MEDICINE

## 2023-07-06 PROCEDURE — 80076 HEPATIC FUNCTION PANEL: CPT | Performed by: INTERNAL MEDICINE

## 2023-07-06 PROCEDURE — G0463 HOSPITAL OUTPT CLINIC VISIT: HCPCS | Mod: 25 | Performed by: INTERNAL MEDICINE

## 2023-07-06 PROCEDURE — 85025 COMPLETE CBC W/AUTO DIFF WBC: CPT | Performed by: INTERNAL MEDICINE

## 2023-07-06 PROCEDURE — G0463 HOSPITAL OUTPT CLINIC VISIT: HCPCS | Performed by: INTERNAL MEDICINE

## 2023-07-06 RX ORDER — HEPARIN SODIUM (PORCINE) LOCK FLUSH IV SOLN 100 UNIT/ML 100 UNIT/ML
5 SOLUTION INTRAVENOUS
Status: DISCONTINUED | OUTPATIENT
Start: 2023-07-06 | End: 2023-07-06 | Stop reason: HOSPADM

## 2023-07-06 RX ADMIN — SODIUM CHLORIDE 122 MG: 9 INJECTION, SOLUTION INTRAVENOUS at 10:51

## 2023-07-06 RX ADMIN — PEGFILGRASTIM 6 MG: KIT SUBCUTANEOUS at 11:00

## 2023-07-06 RX ADMIN — DEXAMETHASONE SODIUM PHOSPHATE: 10 INJECTION, SOLUTION INTRAMUSCULAR; INTRAVENOUS at 10:32

## 2023-07-06 RX ADMIN — Medication 5 ML: at 11:52

## 2023-07-06 RX ADMIN — SODIUM CHLORIDE 250 ML: 9 INJECTION, SOLUTION INTRAVENOUS at 10:27

## 2023-07-06 ASSESSMENT — PAIN SCALES - GENERAL: PAINLEVEL: NO PAIN (0)

## 2023-07-06 NOTE — PROGRESS NOTES
Infusion Nursing Note:  Kt HERNANDEZ Rasmussen presents today for Cycle 13 Day 1 Taxotere, OnPro.    Patient seen by provider today: Yes: Dr. John   present during visit today: Not Applicable.    Note: N/A.      Intravenous Access:  Implanted Port.    Treatment Conditions:  Lab Results   Component Value Date    HGB 10.9 (L) 07/06/2023    WBC 10.6 07/06/2023    ANEU 0.5 (L) 08/11/2022    ANEUTAUTO 8.7 (H) 07/06/2023     (L) 07/06/2023      Lab Results   Component Value Date     01/25/2023    POTASSIUM 4.0 01/25/2023    MAG 1.9 10/13/2016    CR 0.71 01/25/2023    BEVERLY 9.3 01/25/2023    BILITOTAL 0.5 07/06/2023    ALBUMIN 3.9 07/06/2023    ALT 26 07/06/2023    AST 27 07/06/2023     Results reviewed, labs MET treatment parameters, ok to proceed with treatment.      Post Infusion Assessment:  Patient tolerated infusion without incident.  Blood return noted pre and post infusion.  Site patent and intact, free from redness, edema or discomfort.  No evidence of extravasations.  Access discontinued per protocol.     ONPRO  Was placed on patient's: back of right arm.    Was placed at 1100 AM    Podpal used: Yes    ONPRO injector device Lot number: [id number]    Patient education included: what patient can expect after application, what colored lights mean on the device, when to remove device, when and where to call with questions or issues, all patients questions answered and that Neulasta administration will occur at 2:00pm on 7/7/23.    Patient tolerated administration well.        Discharge Plan:   Patient declined prescription refills.  Discharge instructions reviewed with: Patient.  Patient verbalized understanding of discharge instructions and all questions answered.  AVS to patient via [employer].  Patient will return 7/27/23 for next appointment.   Patient discharged in stable condition accompanied by: self.  Departure Mode: Ambulatory.      Sandi Sanches RN

## 2023-07-06 NOTE — LETTER
7/6/2023         RE: Kt Rasmussen  [id number]  [facility] [address]        Dear Colleague,    Thank you for referring your patient, Kt Rasmussen, to the [facility]. Please see a copy of my visit note below.    [facility]    Hematology/Oncology Established Patient Follow-up Note      Today's Date: 7/6/2023    Reason for Follow-up: Metastatic non-small cell lung carcinoma.    HISTORY OF PRESENT ILLNESS: Kt Rasmussen is a 62 year old male who presents with the following oncologic history:  1. 5/05/2016: Presented with near-obstructing large mass coming off the cheikh and likely the posterior wall, seen on bronchoscopy. Biopsy of the mass showed invasive squamous cell carcinoma, moderately differentiating and focally keratinizing.  Procedure was complicated by significant bleeding.  Tumor was completely debulked (via bronchoscopy) in both main stem bronchi and distal trachea. NGS showed no mutations in EGFR, KRAS, BRAF, NRAS, HRAS, PIK3CA, ERBB2, MET, or JAK2.  2. 5/23/2016: PET/CT scan showed evidence of residual tumor with decreased endobronchial mass. Indeterminate, mildly hypermetabolic mesentery with prominent lymph nodes and area of enhancement of the right hepatic lobe present. Felt to have T4-NX-M0 disease.  3. 6/09/2016: Started concurrent chemoradiation with weekly paclitaxel and carboplatin.  4. 7/30/2016: Completed radiation.  Subsequently received 2 cycles of consolidation paclitaxel and carboplatin.   5. 9/13/2019: Presented with 6-month history of dysphonia and left vocal exophytic lesion. Left true vocal fold biopsy showed at least squamous cell carcinoma in situ, cannot exclude superficial invasion.  6. 9/30/2019: Excision of left vocal cord mass showed fragments of invasive squamous cell carcinoma, well differentiated and keratinizing.  Margins negative for malignancy.  7. 2/16/2021: CT chest w/o contrast showed thin-walled cavity in left  lower lobe with 2 solid peripheral nodules measuring 9 mm each. No lymphadenopathy.  8. 3/01/2021: PET scan showed hypermetabolic nodules in left lower lobe and right lung, consistent with metastases; hypermetabolic adenopathy in chest, abdomen, pelvis; nonspecific uptake at tongue; hypermetabolic intraosseous lesions in spine and pelvis consistent with metastatic disease; left axillary hypermetabolic lymph nodes related to COVID-19 vaccination.  9. 3/12/2021: CT-guided lung biopsy showed non-small cell carcinoma, not otherwise specified -- with opinion by [facility] pathologist.  10. 3/18/2021: Lung NGS panel negative for mutations in BRAF, EGFR, ERBB2, IDH1, IDH2, KRAS, MET, NRAS, RET. PD-L1 expression negative (TPS <1%). Due to minimal amount of DNA obtained from specimen, other biomarkers could not be analyzed.  11. 4/7/2021: MRI brain negative for brain metastases.  12. 4/27/2021: Started 1st line metastatic therapy with carboplatin, paclitaxel, bevacizumab, and atezolizumab for metastatic non-small cell lung carcinoma, NOS.  13. 7/12/2021: PET/CT showed resolved mural nodules with increased cystic cavity in left lower lobe with no significant FDG uptake, less likely metastases; no enlarged hypermetabolic mediastinal, hilar, or axillary lymph nodes, decreased metabolic activity of intraosseous lesion in spine and pelvis; no new bone lesions.  14. 10/11/2021: PET/CT showed slight interval increase in intensity of metabolic activity of right pubic bone and left ischium with new focal uptake in L2 spinous process; resolved uptake at previous ground glass opacity along right medial lower lobe; unchanged cystic cavities/nodules in left lower lobe and right apical upper lobe; no pathologically enlarged hypermetabolic mediastinal, hilar, or axillary lymph nodes.  15. 1/10/2022: PET/CT showed new foci of abnormal skeletal FGD uptake in the thoracic and lumbar spine. Mild interval increase in intensity of previously  demonstrated hypermetabolic skeletal lesions involving the pelvis and lumbar spine. Findings are consistent with progressive osseous metastatic disease. Subsequently off chemo for 1 month due to poor performance status.  16. 1/25/2022: Patient fell and fractured his femur. This was surgically repaired and he was subsequently cared for at a rehab unit.  17. 2/14/2022: Brain MRI without contrast (contrast not given due to inability to obtain IV access) showed no brain metastases.  18. 4/21/2022: Started 2nd line metastatic therapy with pemetrexed and carboplatin. Omission of carboplatin 6/2 and forward due to worsening anemia despite dose reduction.  19. 7/18/2022: PET/CT showed enlarged and increased FDG avid skeletal metastases, increased left para-aortic retroperitoneal lymph node increased in size and FDG avidity, findings consistent with progressive disease.  20. 8/3/2022: Started 3rd line metastatic therapy with Taxotere 60 mg/m2 every 3 weeks.  21. 11/21/2022: PET scan showed partial response, left para-aortic retroperitoneal lymph node has decreased from 2.6 x 1.7 cm (SUVmax 7.9) to 2.3 x 1.4 cm (SUVmax 5.7), skeletal metastases no longer demonstrate FDG activity. Kt elected to take a 2-month break from chemo.  22. 1/23/2023: PET scan showed increasing metabolic activity of the left periaortic (max SUV 8.1, previously 5.7) left external iliac (max SUV 7.0, previously 3.1), and right external iliac (max SUV 5.1, previously 3.7) lymph nodes with development/reactivation of multiple FDG avid osseous lesions.  23. 3/23/2023: Resumption of Taxotere every 3 weeks. Delayed due to insurance issues.  24. 6/26/2023: PET scan showed partial response with decreased uptake associated with the retroperitoneal and pelvic lymphadenopathy as well as bone metastases.    INTERIM HISTORY:  Kt reports mild paresthesias, no new pain, no dyspnea, no fatigue.    REVIEW OF SYSTEMS:   14 point ROS was reviewed and is negative other  than as noted above in HPI.       HOME MEDICATIONS:  Current Outpatient Medications   Medication Sig Dispense Refill     atorvastatin (LIPITOR) 40 MG tablet Take 40 mg by mouth daily           ALLERGIES:  Allergies   Allergen Reactions     No Known Drug Allergy          PAST MEDICAL HISTORY:  Past Medical History:   Diagnosis Date     Alcohol abuse, unspecified      Cerebral infarction (H)     , right side residual and aphasia     Dyslipidemia      GERD (gastroesophageal reflux disease)      Lung cancer (H)      Unspecified essential hypertension          PAST SURGICAL HISTORY:  Past Surgical History:   Procedure Laterality Date     BRONCHOSCOPY FLEXIBLE AND RIGID N/A 2016    Procedure: BRONCHOSCOPY FLEXIBLE AND RIGID;  Surgeon: Tony Talbot MD;  Location: [facility] OR     ESOPHAGOSCOPY FLEXIBLE N/A 2019    Procedure: flexible esophagoscopy;  Surgeon: Lizzy Johnson MD;  Location: [facility] OR     INJECT STEROID (LOCATION) N/A 2019    Procedure: steroid injection;  Surgeon: Lizzy Johnson MD;  Location: [facility] OR     IR CHEST PORT PLACEMENT > 5 YRS OF AGE  2021     IR CHEST PORT PLACEMENT > 5 YRS OF AGE  2022     IR PORT CHECK RIGHT  2022     IR PORT REMOVAL RIGHT  2022     LASER CO2 LARYNGOSCOPY N/A 2019    Procedure: Microdirect laryngoscopy with excision of laryngeal mass, CO2 laser;  Surgeon: Lizzy Johnson MD;  Location: [facility] OR     ZZC NONSPECIFIC PROCEDURE      R tympanoplasty         SOCIAL HISTORY:  Social History     Socioeconomic History     Marital status: Single     Spouse name: Not on file     Number of children: Not on file     Years of education: Not on file     Highest education level: Not on file   Occupational History     Not on file   Tobacco Use     Smoking status: Former     Packs/day: 1.00     Types: Cigarettes     Quit date: 2009     Years since quittin.7     Smokeless tobacco: Never   Substance and Sexual Activity     Alcohol use: Not  Currently     Drug use: No     Sexual activity: Not on file   Other Topics Concern     Parent/sibling w/ CABG, MI or angioplasty before 65F 55M? Not Asked   Social History Narrative     Not on file     Social Determinants of Health     Financial Resource Strain: Not on file   Food Insecurity: Not on file   Transportation Needs: Not on file   Physical Activity: Not on file   Stress: Not on file   Social Connections: Not on file   Intimate Partner Violence: Not At Risk (2023)    Humiliation, Afraid, Rape, and Kick questionnaire      Fear of Current or Ex-Partner: No      Emotionally Abused: No      Physically Abused: No      Sexually Abused: No   Housing Stability: Not on file   Resides at assisted living.      FAMILY HISTORY:  Family History   Problem Relation Age of Onset     Cancer Father          PHYSICAL EXAM:  Vital signs:  /73   Pulse 66   Temp 97.7  F (36.5  C) (Oral)   Resp 16   SpO2 100%    ECO  GENERAL: No acute distress.  EYES: No scleral icterus. No overt erythema.  LYMPH: No cervical, supraclavicular lymphadenopathy.  CARDIAC: Regular rate and rhythm with no murmurs.  RESPIRATORY: Clear to auscultation bilaterally. Hoarseness present.  EXTREMITIES: No clubbing, cyanosis, or edema.  SKIN: No overt rashes, discolorations, or lesions over the face and neck.  NEUROLOGIC: Alert.  No overt tremors. Stable right upper extremity weakness.  PSYCHIATRIC: Normal affect and mood.  Does not appear anxious.  GAIT: Sitting in wheelchair.      LABS:  CBC RESULTS: Recent Labs   Lab Test 23  0921   WBC 10.6   RBC 3.74*   HGB 10.9*   HCT 35.1*   MCV 94   MCH 29.1   MCHC 31.1*   RDW 16.8*   *     Last Comprehensive Metabolic Panel:  Sodium   Date Value Ref Range Status   2023 140 136 - 145 mmol/L Final   2021 140 133 - 144 mmol/L Final     Potassium   Date Value Ref Range Status   2023 4.0 3.4 - 5.3 mmol/L Final   2022 4.1 3.4 - 5.3 mmol/L Final   2021 4.3 3.4 -  5.3 mmol/L Final     Chloride   Date Value Ref Range Status   01/25/2023 102 98 - 107 mmol/L Final   12/07/2022 109 94 - 109 mmol/L Final   06/29/2021 111 (H) 94 - 109 mmol/L Final     Carbon Dioxide   Date Value Ref Range Status   06/29/2021 25 20 - 32 mmol/L Final     Carbon Dioxide (CO2)   Date Value Ref Range Status   01/25/2023 28 22 - 29 mmol/L Final   12/07/2022 25 20 - 32 mmol/L Final     Anion Gap   Date Value Ref Range Status   01/25/2023 10 7 - 15 mmol/L Final   12/07/2022 7 3 - 14 mmol/L Final   06/29/2021 4 3 - 14 mmol/L Final     Glucose   Date Value Ref Range Status   01/25/2023 95 70 - 99 mg/dL Final   12/07/2022 91 70 - 99 mg/dL Final   06/29/2021 85 70 - 99 mg/dL Final     Urea Nitrogen   Date Value Ref Range Status   01/25/2023 16.0 8.0 - 23.0 mg/dL Final   12/07/2022 17 7 - 30 mg/dL Final   06/29/2021 17 7 - 30 mg/dL Final     Creatinine   Date Value Ref Range Status   01/25/2023 0.71 0.67 - 1.17 mg/dL Final   06/29/2021 0.84 0.66 - 1.25 mg/dL Final     GFR Estimate   Date Value Ref Range Status   01/25/2023 >90 >60 mL/min/1.73m2 Final     Comment:     eGFR calculated using 2021 CKD-EPI equation.   06/29/2021 >90 >60 mL/min/[1.73_m2] Final     Comment:     Non  GFR Calc  Starting 12/18/2018, serum creatinine based estimated GFR (eGFR) will be   calculated using the Chronic Kidney Disease Epidemiology Collaboration   (CKD-EPI) equation.       Calcium   Date Value Ref Range Status   01/25/2023 9.3 8.8 - 10.2 mg/dL Final   06/29/2021 8.7 8.5 - 10.1 mg/dL Final     Bilirubin Total   Date Value Ref Range Status   07/06/2023 0.5 <=1.2 mg/dL Final   06/29/2021 0.3 0.2 - 1.3 mg/dL Final     Alkaline Phosphatase   Date Value Ref Range Status   07/06/2023 116 40 - 129 U/L Final   06/29/2021 73 40 - 150 U/L Final     ALT   Date Value Ref Range Status   07/06/2023 26 0 - 70 U/L Final     Comment:     Reference intervals for this test were updated on 6/12/2023 to more accurately reflect our  healthy population. There may be differences in the flagging of prior results with similar values performed with this method. Interpretation of those prior results can be made in the context of the updated reference intervals.     06/29/2021 34 0 - 70 U/L Final     AST   Date Value Ref Range Status   07/06/2023 27 0 - 45 U/L Final     Comment:     Reference intervals for this test were updated on 6/12/2023 to more accurately reflect our healthy population. There may be differences in the flagging of prior results with similar values performed with this method. Interpretation of those prior results can be made in the context of the updated reference intervals.   06/29/2021 26 0 - 45 U/L Final       PATHOLOGY:  None new.    IMAGING:  Reviewed as per HPI.    ASSESSMENT/PLAN:  Kt Rasmussen is a 62 year old male with the following issues:  1. Metastatic non-small (non-squamous) cell lung carcinoma with metastases to lymph nodes, bones, and bilateral lungs  2. 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 Chemotherapy-induced anemia and thrombocytopenia  --Lung NGS panel negative for mutations in BRAF, EGFR, ERBB2, IDH1, IDH2, KRAS, MET, NRAS, RET. PD-L1 expression negative (TPS <1%). Guardant 360 negative for targetable mutations.  --Kt started 3rd line metastatic therapy with every 3-week Taxotere at 20% dose reduction (60 mg/m2) on 8/3/2022 due to progressive disease.  He tolerated this very well with minimal to no paresthesias. He then took a 2-month chemo break that was further delayed to 3.5 months due to insurance issues. He resumed on 3/23/2023 and continues to tolerate Taxotere well with minimal paresthesias.  --I personally reviewed his 6/26/2023 PET scan which showed partial response and decreased uptake in the retroperitoneal and pelvic lymphadenopathy and bone metastases.  --I advised he continue Taxotere at 20%  "dose reduction due to past repeated issues with chemo-induced cytopenias.  All treatment is with palliative intent.  --Reviewed today's labs which show Hgb 10.9, WBC 10.6, platelets 149,000; ALT, AST, alk phos, and bilirubin are all normal. He may proceed with chemo today.  --Will repeat PET scan end of 9/2023.    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 Dysphonia  6. Dysphagia  -Kt underwent excision of a left vocal cord SCC on 9/30/2019 and has persistent dysphonia as a result of the lesion and excision.  He also has dysphagia but this is stable and swallowing is manageable.  -Continue follow-up with Dr. Wray.    7. History of dizziness and prior falls  --SW and guardian were previously notified of multiple falls.  --Prior brain MRI 2/14/2022 showed no brain metastases. However this was a suboptimal exam due to inability to administer contrast.  --He has not had recent falls and is using a wheelchair due to prior femoral fracture in 1/2022.    Sangita John MD  Hematology/Oncology  [facility]    Total time spent: 30 minutes in chart review, patient evaluation, counseling, documentation, test orders and coordination of care.    Oncology Rooming Note    July 6, 2023 10:10 AM   Kt Rasmussen is a 62 year old male who presents for:    Chief Complaint   Patient presents with     Oncology Clinic Visit     Initial Vitals: /73   Pulse 66   Temp 97.7  F (36.5  C) (Oral)   Resp 16   SpO2 100%  Estimated body mass index is 21.5 kg/m  as calculated from the following:    Height as of 5/25/23: 1.905 m (6' 3\").    Weight as of 5/25/23: 78 kg (172 lb). There is no height or weight on file to calculate BSA.  No Pain (0) Comment: Data Unavailable   No LMP for male patient.  Allergies reviewed: Yes  Medications reviewed: Yes    Medications: Medication refills not needed today.  Pharmacy name entered into EPIC:    [facility] [address] [address]  [facility] [place], [place] - [address] [facility]  [id number] - [place] [address] AT [facility]  [place] [facility] - [address], [facility]. - [facility] [address]    Clinical concerns:  doctor was notified.      Julisa Whelan CMA                Again, thank you for allowing me to participate in the care of your patient.        Sincerely,        Sangita John MD

## 2023-07-06 NOTE — PROGRESS NOTES
"Oncology Rooming Note    July 6, 2023 10:10 AM   Kt Rasmussen is a 62 year old male who presents for:    Chief Complaint   Patient presents with     Oncology Clinic Visit     Initial Vitals: /73   Pulse 66   Temp 97.7  F (36.5  C) (Oral)   Resp 16   SpO2 100%  Estimated body mass index is 21.5 kg/m  as calculated from the following:    Height as of 5/25/23: 1.905 m (6' 3\").    Weight as of 5/25/23: 78 kg (172 lb). There is no height or weight on file to calculate BSA.  No Pain (0) Comment: Data Unavailable   No LMP for male patient.  Allergies reviewed: Yes  Medications reviewed: Yes    Medications: Medication refills not needed today.  Pharmacy name entered into EPIC:    [place] [facility] [place] - [address] [facility] [place], [place] - [address] [id number] - [place] [address] [facility]  [place] [facility] - [place], [place] - [address] [place] [address]  [facility] - [place] [id number] [place]    Clinical concerns:  doctor was notified.      Julisa Whelan CMA            "

## 2023-07-06 NOTE — PROGRESS NOTES
Nursing Note:  Kt Rasmussen presents today for port labs.    Patient seen by provider today: Yes: Dr. John after labs   present during visit today: Not Applicable.    Note: N/A.    Intravenous Access:  Labs drawn without difficulty.  Implanted Port.    Discharge Plan:   Patient was sent to [facility] for provider appointment.    Cass Shoemaker RN

## 2023-07-06 NOTE — PATIENT INSTRUCTIONS
Arrange for Taxotere and lab draw every 3 weeks through 9/28/2023.  RTC NP alternating with MD visit every 3 weeks through 9/28.  Arrange for PET scan prior to MD visit end of 9/2023.

## 2023-07-10 ENCOUNTER — TELEPHONE (OUTPATIENT)
Dept: OTOLARYNGOLOGY | Facility: CLINIC | Age: 63
End: 2023-07-10
Payer: COMMERCIAL

## 2023-07-10 NOTE — TELEPHONE ENCOUNTER
Called patient,  mailbox not set up. Unable to LVM. If patient calls back appt with Dr. Wray in [facility] needs to be rescheduled due to provider not in clinic.

## 2023-07-27 ENCOUNTER — ONCOLOGY VISIT (OUTPATIENT)
Dept: ONCOLOGY | Facility: CLINIC | Age: 63
End: 2023-07-27
Attending: INTERNAL MEDICINE
Payer: COMMERCIAL

## 2023-07-27 ENCOUNTER — INFUSION THERAPY VISIT (OUTPATIENT)
Dept: INFUSION THERAPY | Facility: CLINIC | Age: 63
End: 2023-07-27
Attending: NURSE PRACTITIONER
Payer: COMMERCIAL

## 2023-07-27 VITALS
TEMPERATURE: 97.8 F | HEART RATE: 70 BPM | WEIGHT: 175 LBS | OXYGEN SATURATION: 100 % | RESPIRATION RATE: 20 BRPM | BODY MASS INDEX: 21.87 KG/M2 | SYSTOLIC BLOOD PRESSURE: 107 MMHG | DIASTOLIC BLOOD PRESSURE: 65 MMHG

## 2023-07-27 VITALS
RESPIRATION RATE: 20 BRPM | HEART RATE: 70 BPM | SYSTOLIC BLOOD PRESSURE: 107 MMHG | OXYGEN SATURATION: 100 % | BODY MASS INDEX: 21.87 KG/M2 | DIASTOLIC BLOOD PRESSURE: 65 MMHG | TEMPERATURE: 97.8 F | WEIGHT: 175 LBS

## 2023-07-27 DIAGNOSIS — D70.1 CHEMOTHERAPY-INDUCED NEUTROPENIA (H): ICD-10-CM

## 2023-07-27 DIAGNOSIS — C34.90 NON-SMALL CELL LUNG CANCER METASTATIC TO BONE (H): ICD-10-CM

## 2023-07-27 DIAGNOSIS — T45.1X5A CHEMOTHERAPY-INDUCED NEUTROPENIA (H): ICD-10-CM

## 2023-07-27 DIAGNOSIS — C79.51 NON-SMALL CELL LUNG CANCER METASTATIC TO BONE (H): ICD-10-CM

## 2023-07-27 DIAGNOSIS — Z51.11 ENCOUNTER FOR ANTINEOPLASTIC CHEMOTHERAPY: Primary | ICD-10-CM

## 2023-07-27 LAB
ALBUMIN SERPL BCG-MCNC: 3.9 G/DL (ref 3.5–5.2)
ALP SERPL-CCNC: 102 U/L (ref 40–129)
ALT SERPL W P-5'-P-CCNC: 30 U/L (ref 0–70)
AST SERPL W P-5'-P-CCNC: 26 U/L (ref 0–45)
BASOPHILS # BLD AUTO: 0 10E3/UL (ref 0–0.2)
BASOPHILS NFR BLD AUTO: 0 %
BILIRUB DIRECT SERPL-MCNC: <0.2 MG/DL (ref 0–0.3)
BILIRUB SERPL-MCNC: 0.4 MG/DL
EOSINOPHIL # BLD AUTO: 0 10E3/UL (ref 0–0.7)
EOSINOPHIL NFR BLD AUTO: 1 %
ERYTHROCYTE [DISTWIDTH] IN BLOOD BY AUTOMATED COUNT: 16.1 % (ref 10–15)
HCT VFR BLD AUTO: 34.2 % (ref 40–53)
HGB BLD-MCNC: 10.7 G/DL (ref 13.3–17.7)
IMM GRANULOCYTES # BLD: 0 10E3/UL
IMM GRANULOCYTES NFR BLD: 0 %
LYMPHOCYTES # BLD AUTO: 1 10E3/UL (ref 0.8–5.3)
LYMPHOCYTES NFR BLD AUTO: 11 %
MCH RBC QN AUTO: 29.2 PG (ref 26.5–33)
MCHC RBC AUTO-ENTMCNC: 31.3 G/DL (ref 31.5–36.5)
MCV RBC AUTO: 93 FL (ref 78–100)
MONOCYTES # BLD AUTO: 0.6 10E3/UL (ref 0–1.3)
MONOCYTES NFR BLD AUTO: 7 %
NEUTROPHILS # BLD AUTO: 6.9 10E3/UL (ref 1.6–8.3)
NEUTROPHILS NFR BLD AUTO: 81 %
NRBC # BLD AUTO: 0 10E3/UL
NRBC BLD AUTO-RTO: 0 /100
PLATELET # BLD AUTO: 182 10E3/UL (ref 150–450)
PROT SERPL-MCNC: 6 G/DL (ref 6.4–8.3)
RBC # BLD AUTO: 3.67 10E6/UL (ref 4.4–5.9)
WBC # BLD AUTO: 8.5 10E3/UL (ref 4–11)

## 2023-07-27 PROCEDURE — G0463 HOSPITAL OUTPT CLINIC VISIT: HCPCS | Mod: 25 | Performed by: PHYSICIAN ASSISTANT

## 2023-07-27 PROCEDURE — 85025 COMPLETE CBC W/AUTO DIFF WBC: CPT | Performed by: PHYSICIAN ASSISTANT

## 2023-07-27 PROCEDURE — 96372 THER/PROPH/DIAG INJ SC/IM: CPT | Performed by: PHYSICIAN ASSISTANT

## 2023-07-27 PROCEDURE — 96367 TX/PROPH/DG ADDL SEQ IV INF: CPT

## 2023-07-27 PROCEDURE — 99214 OFFICE O/P EST MOD 30 MIN: CPT | Performed by: PHYSICIAN ASSISTANT

## 2023-07-27 PROCEDURE — 96377 APPLICATON ON-BODY INJECTOR: CPT | Mod: XS | Performed by: PHYSICIAN ASSISTANT

## 2023-07-27 PROCEDURE — G0463 HOSPITAL OUTPT CLINIC VISIT: HCPCS | Performed by: PHYSICIAN ASSISTANT

## 2023-07-27 PROCEDURE — 250N000011 HC RX IP 250 OP 636: Performed by: PHYSICIAN ASSISTANT

## 2023-07-27 PROCEDURE — 82040 ASSAY OF SERUM ALBUMIN: CPT | Performed by: PHYSICIAN ASSISTANT

## 2023-07-27 PROCEDURE — 96413 CHEMO IV INFUSION 1 HR: CPT

## 2023-07-27 PROCEDURE — 258N000003 HC RX IP 258 OP 636: Performed by: PHYSICIAN ASSISTANT

## 2023-07-27 RX ORDER — METHYLPREDNISOLONE SODIUM SUCCINATE 125 MG/2ML
125 INJECTION, POWDER, LYOPHILIZED, FOR SOLUTION INTRAMUSCULAR; INTRAVENOUS
Status: CANCELLED
Start: 2023-07-27

## 2023-07-27 RX ORDER — ALBUTEROL SULFATE 90 UG/1
1-2 AEROSOL, METERED RESPIRATORY (INHALATION)
Status: CANCELLED
Start: 2023-07-27

## 2023-07-27 RX ORDER — EPINEPHRINE 1 MG/ML
0.3 INJECTION, SOLUTION INTRAMUSCULAR; SUBCUTANEOUS EVERY 5 MIN PRN
Status: CANCELLED | OUTPATIENT
Start: 2023-07-27

## 2023-07-27 RX ORDER — HEPARIN SODIUM,PORCINE 10 UNIT/ML
5 VIAL (ML) INTRAVENOUS
Status: CANCELLED | OUTPATIENT
Start: 2023-07-27

## 2023-07-27 RX ORDER — HEPARIN SODIUM (PORCINE) LOCK FLUSH IV SOLN 100 UNIT/ML 100 UNIT/ML
5 SOLUTION INTRAVENOUS
Status: DISCONTINUED | OUTPATIENT
Start: 2023-07-27 | End: 2023-07-27 | Stop reason: HOSPADM

## 2023-07-27 RX ORDER — HEPARIN SODIUM (PORCINE) LOCK FLUSH IV SOLN 100 UNIT/ML 100 UNIT/ML
5 SOLUTION INTRAVENOUS
Status: CANCELLED | OUTPATIENT
Start: 2023-07-27

## 2023-07-27 RX ORDER — ALBUTEROL SULFATE 0.83 MG/ML
2.5 SOLUTION RESPIRATORY (INHALATION)
Status: CANCELLED | OUTPATIENT
Start: 2023-07-27

## 2023-07-27 RX ORDER — DIPHENHYDRAMINE HYDROCHLORIDE 50 MG/ML
50 INJECTION INTRAMUSCULAR; INTRAVENOUS
Status: CANCELLED
Start: 2023-07-27

## 2023-07-27 RX ORDER — LORAZEPAM 2 MG/ML
0.5 INJECTION INTRAMUSCULAR EVERY 4 HOURS PRN
Status: CANCELLED | OUTPATIENT
Start: 2023-07-27

## 2023-07-27 RX ORDER — MEPERIDINE HYDROCHLORIDE 25 MG/ML
25 INJECTION INTRAMUSCULAR; INTRAVENOUS; SUBCUTANEOUS EVERY 30 MIN PRN
Status: CANCELLED | OUTPATIENT
Start: 2023-07-27

## 2023-07-27 RX ADMIN — PEGFILGRASTIM 6 MG: KIT SUBCUTANEOUS at 09:00

## 2023-07-27 RX ADMIN — Medication 5 ML: at 10:15

## 2023-07-27 RX ADMIN — SODIUM CHLORIDE 250 ML: 9 INJECTION, SOLUTION INTRAVENOUS at 08:56

## 2023-07-27 RX ADMIN — DEXAMETHASONE SODIUM PHOSPHATE: 10 INJECTION, SOLUTION INTRAMUSCULAR; INTRAVENOUS at 08:56

## 2023-07-27 RX ADMIN — SODIUM CHLORIDE 122 MG: 9 INJECTION, SOLUTION INTRAVENOUS at 09:14

## 2023-07-27 ASSESSMENT — PAIN SCALES - GENERAL
PAINLEVEL: NO PAIN (0)
PAINLEVEL: NO PAIN (0)

## 2023-07-27 NOTE — LETTER
7/27/2023         RE: Kt Rasmussen  [address] [address]  [facility] [address]        Dear Colleague,    Thank you for referring your patient, Kt Rasmussen, to the [facility]. Please see a copy of my visit note below.    Oncology/Hematology Visit Note  Jul 27, 2023    Reason for Visit: Follow up of metastatic lung cancer    Primary Oncologist: Dr. John    Oncologic History:  1. 5/05/2016: Presented with near-obstructing large mass coming off the cheikh and likely the posterior wall, seen on bronchoscopy. Biopsy of the mass showed invasive squamous cell carcinoma, moderately differentiating and focally keratinizing.  Procedure was complicated by significant bleeding.  Tumor was completely debulked (via bronchoscopy) in both main stem bronchi and distal trachea. NGS showed no mutations in EGFR, KRAS, BRAF, NRAS, HRAS, PIK3CA, ERBB2, MET, or JAK2.  2. 5/23/2016: PET/CT scan showed evidence of residual tumor with decreased endobronchial mass. Indeterminate, mildly hypermetabolic mesentery with prominent lymph nodes and area of enhancement of the right hepatic lobe present. Felt to have T4-NX-M0 disease.  3. 6/09/2016: Started concurrent chemoradiation with weekly paclitaxel and carboplatin.  4. 7/30/2016: Completed radiation.  Subsequently received 2 cycles of consolidation paclitaxel and carboplatin.   5. 9/13/2019: Presented with 6-month history of dysphonia and left vocal exophytic lesion. Left true vocal fold biopsy showed at least squamous cell carcinoma in situ, cannot exclude superficial invasion.  6. 9/30/2019: Excision of left vocal cord mass showed fragments of invasive squamous cell carcinoma, well differentiated and keratinizing.  Margins negative for malignancy.  7. 2/16/2021: CT chest w/o contrast showed thin-walled cavity in left lower lobe with 2 solid peripheral nodules measuring 9 mm each. No lymphadenopathy.  8. 3/01/2021: PET scan showed hypermetabolic nodules in left  lower lobe and right lung, consistent with metastases; hypermetabolic adenopathy in chest, abdomen, pelvis; nonspecific uptake at tongue; hypermetabolic intraosseous lesions in spine and pelvis consistent with metastatic disease; left axillary hypermetabolic lymph nodes related to COVID-19 vaccination.  9. 3/12/2021: CT-guided lung biopsy showed non-small cell carcinoma, not otherwise specified -- with opinion by [facility] pathologist.  10. 3/18/2021: Lung NGS panel negative for mutations in BRAF, EGFR, ERBB2, IDH1, IDH2, KRAS, MET, NRAS, RET. PD-L1 expression negative (TPS <1%). Due to minimal amount of DNA obtained from specimen, other biomarkers could not be analyzed.  11. 4/7/2021: MRI brain negative for brain metastases.  12. 4/27/2021: Started 1st line metastatic therapy with carboplatin, paclitaxel, bevacizumab, and atezolizumab for metastatic non-small cell lung carcinoma, NOS.  13. 7/12/2021: PET/CT showed resolved mural nodules with increased cystic cavity in left lower lobe with no significant FDG uptake, less likely metastases; no enlarged hypermetabolic mediastinal, hilar, or axillary lymph nodes, decreased metabolic activity of intraosseous lesion in spine and pelvis; no new bone lesions.  14. 10/11/2021: PET/CT showed slight interval increase in intensity of metabolic activity of right pubic bone and left ischium with new focal uptake in L2 spinous process; resolved uptake at previous ground glass opacity along right medial lower lobe; unchanged cystic cavities/nodules in left lower lobe and right apical upper lobe; no pathologically enlarged hypermetabolic mediastinal, hilar, or axillary lymph nodes.  15. 1/10/2022: PET/CT showed new foci of abnormal skeletal FGD uptake in the thoracic and lumbar spine. Mild interval increase in intensity of previously demonstrated hypermetabolic skeletal lesions involving the pelvis and lumbar spine. Findings are consistent with progressive osseous metastatic  disease. Subsequently off chemo for 1 month due to poor performance status.  16. 1/25/2022: Patient fell and fractured his femur. This was surgically repaired and he was subsequently cared for at a rehab unit.  17. 2/14/2022: Brain MRI without contrast (contrast not given due to inability to obtain IV access) showed no brain metastases.  18. 4/21/2022: Started 2nd line metastatic therapy with pemetrexed and carboplatin. Omission of carboplatin 6/2 and forward due to worsening anemia despite dose reduction.  19. 7/18/2022: PET/CT showed enlarged and increased FDG avid skeletal metastases, increased left para-aortic retroperitoneal lymph node increased in size and FDG avidity, findings consistent with progressive disease.  20. 8/3/2022: Started 3rd line metastatic therapy with Taxotere 60 mg/m2 every 3 weeks.  21. 11/21/2022: PET scan showed partial response, left para-aortic retroperitoneal lymph node has decreased from 2.6 x 1.7 cm (SUVmax 7.9) to 2.3 x 1.4 cm (SUVmax 5.7), skeletal metastases no longer demonstrate FDG activity. Kt elected to take a 2-month break from chemo.  22. 1/23/2023: PET scan showed increasing metabolic activity of the left periaortic (max SUV 8.1, previously 5.7) left external iliac (max SUV 7.0, previously 3.1), and right external iliac (max SUV 5.1, previously 3.7) lymph nodes with development/reactivation of multiple FDG avid osseous lesions.  23. 3/23/2023: Resumption of Taxotere every 3 weeks. Delayed due to insurance issues.  24. 6/26/2023: PET scan showed partial response with decreased uptake associated with the retroperitoneal and pelvic lymphadenopathy as well as bone metastases.    Interval History:  Doing well. No complaints or concerns. His left toe infection has improved. Has mild sensory neuropathy to fingertips. He reports he recently went to the ED for drug reaction, but on review that appears to have been many weeks ago. Denies rash, edema, dyspena/cough.     Review of  Systems:  A complete review of systems was negative except as noted in the HPI.   Past medical, Surgical, and social history:  Reviewed    Physical Examination:  There were no vitals taken for this visit.  Wt Readings from Last 10 Encounters:   07/27/23 79.4 kg (175 lb)   05/25/23 78 kg (172 lb)   03/23/23 79.8 kg (176 lb)   11/16/22 78 kg (172 lb)   10/26/22 77.1 kg (170 lb)   10/12/22 76.7 kg (169 lb)   09/08/22 76.7 kg (169 lb)   08/24/22 77 kg (169 lb 12.1 oz)   08/11/22 77.1 kg (170 lb)   08/03/22 77.1 kg (169 lb 15.6 oz)     General: Pleasant patient in no acute distress.  Skin: Warm and dry. Bilateral forearm erythematous patches.  Eyes: Anicteric.   ENT: Slightly dysphonic voice.   Lungs: Normal work of breathing.   Abdomen: Non-distended.  Extremities: No peripheral edema. Left hand flexed. Left foot boot.   Mental: Calm, cooperative, appropriate. Mood euthymic. Responds with minimal information, unclear reliability.   Neuro: Alert and oriented x 3. Right mild paresis.    Laboratory Data:  CBC, CMP reviewed.       Assessment and Plan:  Kt Rasmussen is a 62 year old male with:     # Metastatic non-small (non-squamous) cell lung carcinoma with metastases to lymph nodes, bones, and bilateral lungs  # History of locally advanced endobronchial invasive squamous cell carcinoma diagnosed 5/2016, status post debulking and chemoradiation   - A repeat 6/26/2023 PET scan showed partial response  - Continue Taxotere, 20% dose reduced for prior cytopenias, every 3 weeks              - PO steroids per treatment plan  - Repeat PET scan end of 9/23  - Follow-up with MICHAEL alternating with MD every cycle    20 minutes spent on the date of the encounter doing chart review, review of test results, interpretation of tests, patient visit, and documentation     Renee Rubalcava PA-C  [facility]   [phone]      Provider saw patient in Infusion      Again, thank you for allowing me to participate in  the care of your patient.        Sincerely,        CELI LO PA-C

## 2023-07-27 NOTE — PROGRESS NOTES
Oncology/Hematology Visit Note  Jul 27, 2023    Reason for Visit: Follow up of metastatic lung cancer    Primary Oncologist: Dr. John    Oncologic History:  1. 5/05/2016: Presented with near-obstructing large mass coming off the cheikh and likely the posterior wall, seen on bronchoscopy. Biopsy of the mass showed invasive squamous cell carcinoma, moderately differentiating and focally keratinizing.  Procedure was complicated by significant bleeding.  Tumor was completely debulked (via bronchoscopy) in both main stem bronchi and distal trachea. NGS showed no mutations in EGFR, KRAS, BRAF, NRAS, HRAS, PIK3CA, ERBB2, MET, or JAK2.  2. 5/23/2016: PET/CT scan showed evidence of residual tumor with decreased endobronchial mass. Indeterminate, mildly hypermetabolic mesentery with prominent lymph nodes and area of enhancement of the right hepatic lobe present. Felt to have T4-NX-M0 disease.  3. 6/09/2016: Started concurrent chemoradiation with weekly paclitaxel and carboplatin.  4. 7/30/2016: Completed radiation.  Subsequently received 2 cycles of consolidation paclitaxel and carboplatin.   5. 9/13/2019: Presented with 6-month history of dysphonia and left vocal exophytic lesion. Left true vocal fold biopsy showed at least squamous cell carcinoma in situ, cannot exclude superficial invasion.  6. 9/30/2019: Excision of left vocal cord mass showed fragments of invasive squamous cell carcinoma, well differentiated and keratinizing.  Margins negative for malignancy.  7. 2/16/2021: CT chest w/o contrast showed thin-walled cavity in left lower lobe with 2 solid peripheral nodules measuring 9 mm each. No lymphadenopathy.  8. 3/01/2021: PET scan showed hypermetabolic nodules in left lower lobe and right lung, consistent with metastases; hypermetabolic adenopathy in chest, abdomen, pelvis; nonspecific uptake at tongue; hypermetabolic intraosseous lesions in spine and pelvis consistent with metastatic disease; left axillary  hypermetabolic lymph nodes related to COVID-19 vaccination.  9. 3/12/2021: CT-guided lung biopsy showed non-small cell carcinoma, not otherwise specified -- with opinion by [facility] pathologist.  10. 3/18/2021: Lung NGS panel negative for mutations in BRAF, EGFR, ERBB2, IDH1, IDH2, KRAS, MET, NRAS, RET. PD-L1 expression negative (TPS <1%). Due to minimal amount of DNA obtained from specimen, other biomarkers could not be analyzed.  11. 4/7/2021: MRI brain negative for brain metastases.  12. 4/27/2021: Started 1st line metastatic therapy with carboplatin, paclitaxel, bevacizumab, and atezolizumab for metastatic non-small cell lung carcinoma, NOS.  13. 7/12/2021: PET/CT showed resolved mural nodules with increased cystic cavity in left lower lobe with no significant FDG uptake, less likely metastases; no enlarged hypermetabolic mediastinal, hilar, or axillary lymph nodes, decreased metabolic activity of intraosseous lesion in spine and pelvis; no new bone lesions.  14. 10/11/2021: PET/CT showed slight interval increase in intensity of metabolic activity of right pubic bone and left ischium with new focal uptake in L2 spinous process; resolved uptake at previous ground glass opacity along right medial lower lobe; unchanged cystic cavities/nodules in left lower lobe and right apical upper lobe; no pathologically enlarged hypermetabolic mediastinal, hilar, or axillary lymph nodes.  15. 1/10/2022: PET/CT showed new foci of abnormal skeletal FGD uptake in the thoracic and lumbar spine. Mild interval increase in intensity of previously demonstrated hypermetabolic skeletal lesions involving the pelvis and lumbar spine. Findings are consistent with progressive osseous metastatic disease. Subsequently off chemo for 1 month due to poor performance status.  16. 1/25/2022: Patient fell and fractured his femur. This was surgically repaired and he was subsequently cared for at a rehab unit.  17. 2/14/2022: Brain MRI without  contrast (contrast not given due to inability to obtain IV access) showed no brain metastases.  18. 4/21/2022: Started 2nd line metastatic therapy with pemetrexed and carboplatin. Omission of carboplatin 6/2 and forward due to worsening anemia despite dose reduction.  19. 7/18/2022: PET/CT showed enlarged and increased FDG avid skeletal metastases, increased left para-aortic retroperitoneal lymph node increased in size and FDG avidity, findings consistent with progressive disease.  20. 8/3/2022: Started 3rd line metastatic therapy with Taxotere 60 mg/m2 every 3 weeks.  21. 11/21/2022: PET scan showed partial response, left para-aortic retroperitoneal lymph node has decreased from 2.6 x 1.7 cm (SUVmax 7.9) to 2.3 x 1.4 cm (SUVmax 5.7), skeletal metastases no longer demonstrate FDG activity. Kt elected to take a 2-month break from chemo.  22. 1/23/2023: PET scan showed increasing metabolic activity of the left periaortic (max SUV 8.1, previously 5.7) left external iliac (max SUV 7.0, previously 3.1), and right external iliac (max SUV 5.1, previously 3.7) lymph nodes with development/reactivation of multiple FDG avid osseous lesions.  23. 3/23/2023: Resumption of Taxotere every 3 weeks. Delayed due to insurance issues.  24. 6/26/2023: PET scan showed partial response with decreased uptake associated with the retroperitoneal and pelvic lymphadenopathy as well as bone metastases.    Interval History:  Doing well. No complaints or concerns. His left toe infection has improved. Has mild sensory neuropathy to fingertips. He reports he recently went to the ED for drug reaction, but on review that appears to have been many weeks ago. Denies rash, edema, dyspena/cough.     Review of Systems:  A complete review of systems was negative except as noted in the HPI.   Past medical, Surgical, and social history:  Reviewed    Physical Examination:  There were no vitals taken for this visit.  Wt Readings from Last 10 Encounters:    07/27/23 79.4 kg (175 lb)   05/25/23 78 kg (172 lb)   03/23/23 79.8 kg (176 lb)   11/16/22 78 kg (172 lb)   10/26/22 77.1 kg (170 lb)   10/12/22 76.7 kg (169 lb)   09/08/22 76.7 kg (169 lb)   08/24/22 77 kg (169 lb 12.1 oz)   08/11/22 77.1 kg (170 lb)   08/03/22 77.1 kg (169 lb 15.6 oz)     General: Pleasant patient in no acute distress.  Skin: Warm and dry. Bilateral forearm erythematous patches.  Eyes: Anicteric.   ENT: Slightly dysphonic voice.   Lungs: Normal work of breathing.   Abdomen: Non-distended.  Extremities: No peripheral edema. Left hand flexed. Left foot boot.   Mental: Calm, cooperative, appropriate. Mood euthymic. Responds with minimal information, unclear reliability.   Neuro: Alert and oriented x 3. Right mild paresis.    Laboratory Data:  CBC, CMP reviewed.       Assessment and Plan:  Kt Rasmussen is a 62 year old male with:     # Metastatic non-small (non-squamous) cell lung carcinoma with metastases to lymph nodes, bones, and bilateral lungs  # History of locally advanced endobronchial invasive squamous cell carcinoma diagnosed 5/2016, status post debulking and chemoradiation   - A repeat 6/26/2023 PET scan showed partial response  - Continue Taxotere, 20% dose reduced for prior cytopenias, every 3 weeks              - PO steroids per treatment plan  - Repeat PET scan end of 9/23  - Follow-up with MICHAEL alternating with MD every cycle    20 minutes spent on the date of the encounter doing chart review, review of test results, interpretation of tests, patient visit, and documentation     Renee Rubalcava PA-C  [facility]   [phone]

## 2023-07-27 NOTE — PROGRESS NOTES
Infusion Nursing Note:  Kt Rasmussen presents today for Cycle 14 Day 1 Taxotere and OnPro    Patient seen by provider today: Yes: JAKE Frost   present during visit today: Not Applicable.    Note: N/A.      Intravenous Access:  Implanted Port.    Treatment Conditions:  Lab Results   Component Value Date    HGB 10.7 (L) 07/27/2023    WBC 8.5 07/27/2023    ANEU 0.5 (L) 08/11/2022    ANEUTAUTO 6.9 07/27/2023     07/27/2023        Lab Results   Component Value Date     01/25/2023    POTASSIUM 4.0 01/25/2023    MAG 1.9 10/13/2016    CR 0.71 01/25/2023    BEVERLY 9.3 01/25/2023    BILITOTAL 0.4 07/27/2023    ALBUMIN 3.9 07/27/2023    ALT 30 07/27/2023    AST 26 07/27/2023       Results reviewed, labs MET treatment parameters, ok to proceed with treatment.      Post Infusion Assessment:  Patient tolerated infusion without incident.  Blood return noted pre and post infusion.  Site patent and intact, free from redness, edema or discomfort.  No evidence of extravasations.  Access discontinued per protocol.     ONPRO  Was placed on patient's: back of right arm.    Was placed at 900 AM    Podpal used: Yes    ONPRO injector device Lot number: [id number]    Patient education included: what patient can expect after application, what colored lights mean on the device, when to remove device, when and where to call with questions or issues, all patients questions answered, and that Neulasta administration will occur at 100 on 7/28/23.    Patient tolerated administration well.      Discharge Plan:   Patient declined prescription refills.  Discharge instructions reviewed with: Patient.  Patient and/or family verbalized understanding of discharge instructions and all questions answered.  AVS to patient via [employer].  Patient will return as scheduled for next appointment.   Patient discharged in stable condition accompanied by: self.  Departure Mode: Wheelchair.      Sandi Sanches RN

## 2023-08-10 NOTE — PROGRESS NOTES
[facility]    Hematology/Oncology Established Patient Follow-up Note      Today's Date: 8/17/2023    Reason for Follow-up: Metastatic non-small cell lung carcinoma.    HISTORY OF PRESENT ILLNESS: Kt Rasmussen is a 63 year old male who presents with the following oncologic history:  1. 5/05/2016: Presented with near-obstructing large mass coming off the cheikh and likely the posterior wall, seen on bronchoscopy. Biopsy of the mass showed invasive squamous cell carcinoma, moderately differentiating and focally keratinizing.  Procedure was complicated by significant bleeding.  Tumor was completely debulked (via bronchoscopy) in both main stem bronchi and distal trachea. NGS showed no mutations in EGFR, KRAS, BRAF, NRAS, HRAS, PIK3CA, ERBB2, MET, or JAK2.  2. 5/23/2016: PET/CT scan showed evidence of residual tumor with decreased endobronchial mass. Indeterminate, mildly hypermetabolic mesentery with prominent lymph nodes and area of enhancement of the right hepatic lobe present. Felt to have T4-NX-M0 disease.  3. 6/09/2016: Started concurrent chemoradiation with weekly paclitaxel and carboplatin.  4. 7/30/2016: Completed radiation.  Subsequently received 2 cycles of consolidation paclitaxel and carboplatin.   5. 9/13/2019: Presented with 6-month history of dysphonia and left vocal exophytic lesion. Left true vocal fold biopsy showed at least squamous cell carcinoma in situ, cannot exclude superficial invasion.  6. 9/30/2019: Excision of left vocal cord mass showed fragments of invasive squamous cell carcinoma, well differentiated and keratinizing.  Margins negative for malignancy.  7. 2/16/2021: CT chest w/o contrast showed thin-walled cavity in left lower lobe with 2 solid peripheral nodules measuring 9 mm each. No lymphadenopathy.  8. 3/01/2021: PET scan showed hypermetabolic nodules in left lower lobe and right lung, consistent with metastases; hypermetabolic adenopathy in chest, abdomen,  pelvis; nonspecific uptake at tongue; hypermetabolic intraosseous lesions in spine and pelvis consistent with metastatic disease; left axillary hypermetabolic lymph nodes related to COVID-19 vaccination.  9. 3/12/2021: CT-guided lung biopsy showed non-small cell carcinoma, not otherwise specified -- with opinion by [facility] pathologist.  10. 3/18/2021: Lung NGS panel negative for mutations in BRAF, EGFR, ERBB2, IDH1, IDH2, KRAS, MET, NRAS, RET. PD-L1 expression negative (TPS <1%). Due to minimal amount of DNA obtained from specimen, other biomarkers could not be analyzed.  11. 4/7/2021: MRI brain negative for brain metastases.  12. 4/27/2021: Started 1st line metastatic therapy with carboplatin, paclitaxel, bevacizumab, and atezolizumab for metastatic non-small cell lung carcinoma, NOS.  13. 7/12/2021: PET/CT showed resolved mural nodules with increased cystic cavity in left lower lobe with no significant FDG uptake, less likely metastases; no enlarged hypermetabolic mediastinal, hilar, or axillary lymph nodes, decreased metabolic activity of intraosseous lesion in spine and pelvis; no new bone lesions.  14. 10/11/2021: PET/CT showed slight interval increase in intensity of metabolic activity of right pubic bone and left ischium with new focal uptake in L2 spinous process; resolved uptake at previous ground glass opacity along right medial lower lobe; unchanged cystic cavities/nodules in left lower lobe and right apical upper lobe; no pathologically enlarged hypermetabolic mediastinal, hilar, or axillary lymph nodes.  15. 1/10/2022: PET/CT showed new foci of abnormal skeletal FGD uptake in the thoracic and lumbar spine. Mild interval increase in intensity of previously demonstrated hypermetabolic skeletal lesions involving the pelvis and lumbar spine. Findings are consistent with progressive osseous metastatic disease. Subsequently off chemo for 1 month due to poor performance status.  16. 1/25/2022: Patient fell  and fractured his femur. This was surgically repaired and he was subsequently cared for at a rehab unit.  17. 2/14/2022: Brain MRI without contrast (contrast not given due to inability to obtain IV access) showed no brain metastases.  18. 4/21/2022: Started 2nd line metastatic therapy with pemetrexed and carboplatin. Omission of carboplatin 6/2 and forward due to worsening anemia despite dose reduction.  19. 7/18/2022: PET/CT showed enlarged and increased FDG avid skeletal metastases, increased left para-aortic retroperitoneal lymph node increased in size and FDG avidity, findings consistent with progressive disease.  20. 8/3/2022: Started 3rd line metastatic therapy with Taxotere 60 mg/m2 every 3 weeks.  21. 11/21/2022: PET scan showed partial response, left para-aortic retroperitoneal lymph node has decreased from 2.6 x 1.7 cm (SUVmax 7.9) to 2.3 x 1.4 cm (SUVmax 5.7), skeletal metastases no longer demonstrate FDG activity. Kt elected to take a 2-month break from chemo.  22. 1/23/2023: PET scan showed increasing metabolic activity of the left periaortic (max SUV 8.1, previously 5.7) left external iliac (max SUV 7.0, previously 3.1), and right external iliac (max SUV 5.1, previously 3.7) lymph nodes with development/reactivation of multiple FDG avid osseous lesions.  23. 3/23/2023: Resumption of Taxotere every 3 weeks. Delayed due to insurance issues.  24. 6/26/2023: PET scan showed partial response with decreased uptake associated with the retroperitoneal and pelvic lymphadenopathy as well as bone metastases.    INTERIM HISTORY:  Kt reports very mild tingling at his fingertips; some left toe pain due to toe nail issue; no dyspnea.    REVIEW OF SYSTEMS:   14 point ROS was reviewed and is negative other than as noted above in HPI.       HOME MEDICATIONS:  Current Outpatient Medications   Medication Sig Dispense Refill    atorvastatin (LIPITOR) 40 MG tablet Take 40 mg by mouth daily            ALLERGIES:  Allergies   Allergen Reactions    No Known Drug Allergy          PAST MEDICAL HISTORY:  Past Medical History:   Diagnosis Date    Alcohol abuse, unspecified     Cerebral infarction (H)     2009, right side residual and aphasia    Dyslipidemia     GERD (gastroesophageal reflux disease)     Lung cancer (H)     Unspecified essential hypertension          PAST SURGICAL HISTORY:  Past Surgical History:   Procedure Laterality Date    BRONCHOSCOPY FLEXIBLE AND RIGID N/A 2016    Procedure: BRONCHOSCOPY FLEXIBLE AND RIGID;  Surgeon: Tony Talbot MD;  Location: [facility] OR    ESOPHAGOSCOPY FLEXIBLE N/A 2019    Procedure: flexible esophagoscopy;  Surgeon: Lizzy Johnson MD;  Location: [facility] OR    INJECT STEROID (LOCATION) N/A 2019    Procedure: steroid injection;  Surgeon: Lizzy Johnson MD;  Location: [facility] OR    IR CHEST PORT PLACEMENT > 5 YRS OF AGE  2021    IR CHEST PORT PLACEMENT > 5 YRS OF AGE  2022    IR PORT CHECK RIGHT  2022    IR PORT REMOVAL RIGHT  2022    LASER CO2 LARYNGOSCOPY N/A 2019    Procedure: Microdirect laryngoscopy with excision of laryngeal mass, CO2 laser;  Surgeon: Lizzy Johnson MD;  Location: [facility] OR    ZZC NONSPECIFIC PROCEDURE      R tympanoplasty         SOCIAL HISTORY:  Social History     Socioeconomic History    Marital status: Single     Spouse name: Not on file    Number of children: Not on file    Years of education: Not on file    Highest education level: Not on file   Occupational History    Not on file   Tobacco Use    Smoking status: Former     Packs/day: 1.00     Types: Cigarettes     Quit date: 2009     Years since quittin.8    Smokeless tobacco: Never   Substance and Sexual Activity    Alcohol use: Not Currently    Drug use: No    Sexual activity: Not on file   Other Topics Concern    Parent/sibling w/ CABG, MI or angioplasty before 65F 55M? Not Asked   Social History Narrative    Not on file     Social  Determinants of Health     Financial Resource Strain: Not on file   Food Insecurity: Not on file   Transportation Needs: Not on file   Physical Activity: Not on file   Stress: Not on file   Social Connections: Not on file   Intimate Partner Violence: Not At Risk (2023)    Humiliation, Afraid, Rape, and Kick questionnaire     Fear of Current or Ex-Partner: No     Emotionally Abused: No     Physically Abused: No     Sexually Abused: No   Housing Stability: Not on file   Resides at assisted living.      FAMILY HISTORY:  Family History   Problem Relation Age of Onset    Cancer Father          PHYSICAL EXAM:  Vital signs:  /79   Pulse 70   Temp 97.7  F (36.5  C) (Oral)   Resp 14   Wt 79.4 kg (175 lb)   SpO2 99%   BMI 21.87 kg/m     ECO  GENERAL: No acute distress.  EYES: No scleral icterus. No overt erythema.  LYMPH: No cervical, supraclavicular lymphadenopathy.  CARDIAC: Regular rate and rhythm with no murmurs.  RESPIRATORY: Clear to auscultation bilaterally. Hoarseness present.  EXTREMITIES: No clubbing, cyanosis, or edema.  SKIN: No overt rashes, discolorations, or lesions over the face and neck.  NEUROLOGIC: Alert.  Dysphonia present. No overt tremors. Stable right upper extremity weakness and contracture.  PSYCHIATRIC: Normal affect and mood.  Does not appear anxious.  GAIT: Sitting in wheelchair.      LABS:  CBC RESULTS:   Recent Labs   Lab Test 23  0852   WBC 8.5   RBC 3.86*   HGB 11.1*   HCT 36.0*   MCV 93   MCH 28.8   MCHC 30.8*   RDW 16.5*        Last Comprehensive Metabolic Panel:  Sodium   Date Value Ref Range Status   2023 140 136 - 145 mmol/L Final   2021 140 133 - 144 mmol/L Final     Potassium   Date Value Ref Range Status   2023 4.0 3.4 - 5.3 mmol/L Final   2022 4.1 3.4 - 5.3 mmol/L Final   2021 4.3 3.4 - 5.3 mmol/L Final     Chloride   Date Value Ref Range Status   2023 102 98 - 107 mmol/L Final   2022 109 94 - 109 mmol/L Final    06/29/2021 111 (H) 94 - 109 mmol/L Final     Carbon Dioxide   Date Value Ref Range Status   06/29/2021 25 20 - 32 mmol/L Final     Carbon Dioxide (CO2)   Date Value Ref Range Status   01/25/2023 28 22 - 29 mmol/L Final   12/07/2022 25 20 - 32 mmol/L Final     Anion Gap   Date Value Ref Range Status   01/25/2023 10 7 - 15 mmol/L Final   12/07/2022 7 3 - 14 mmol/L Final   06/29/2021 4 3 - 14 mmol/L Final     Glucose   Date Value Ref Range Status   01/25/2023 95 70 - 99 mg/dL Final   12/07/2022 91 70 - 99 mg/dL Final   06/29/2021 85 70 - 99 mg/dL Final     Urea Nitrogen   Date Value Ref Range Status   01/25/2023 16.0 8.0 - 23.0 mg/dL Final   12/07/2022 17 7 - 30 mg/dL Final   06/29/2021 17 7 - 30 mg/dL Final     Creatinine   Date Value Ref Range Status   01/25/2023 0.71 0.67 - 1.17 mg/dL Final   06/29/2021 0.84 0.66 - 1.25 mg/dL Final     GFR Estimate   Date Value Ref Range Status   01/25/2023 >90 >60 mL/min/1.73m2 Final     Comment:     eGFR calculated using 2021 CKD-EPI equation.   06/29/2021 >90 >60 mL/min/[1.73_m2] Final     Comment:     Non  GFR Calc  Starting 12/18/2018, serum creatinine based estimated GFR (eGFR) will be   calculated using the Chronic Kidney Disease Epidemiology Collaboration   (CKD-EPI) equation.       Calcium   Date Value Ref Range Status   01/25/2023 9.3 8.8 - 10.2 mg/dL Final   06/29/2021 8.7 8.5 - 10.1 mg/dL Final     Bilirubin Total   Date Value Ref Range Status   08/17/2023 0.4 <=1.2 mg/dL Final   06/29/2021 0.3 0.2 - 1.3 mg/dL Final     Alkaline Phosphatase   Date Value Ref Range Status   08/17/2023 101 40 - 129 U/L Final   06/29/2021 73 40 - 150 U/L Final     ALT   Date Value Ref Range Status   08/17/2023 23 0 - 70 U/L Final     Comment:     Reference intervals for this test were updated on 6/12/2023 to more accurately reflect our healthy population. There may be differences in the flagging of prior results with similar values performed with this method. Interpretation  of those prior results can be made in the context of the updated reference intervals.     06/29/2021 34 0 - 70 U/L Final     AST   Date Value Ref Range Status   08/17/2023 24 0 - 45 U/L Final     Comment:     Reference intervals for this test were updated on 6/12/2023 to more accurately reflect our healthy population. There may be differences in the flagging of prior results with similar values performed with this method. Interpretation of those prior results can be made in the context of the updated reference intervals.   06/29/2021 26 0 - 45 U/L Final         PATHOLOGY:  None new.    IMAGING:  Reviewed as per HPI.    ASSESSMENT/PLAN:  Kt Rasmussen is a 62 year old male with the following issues:  1. Metastatic non-small (non-squamous) cell lung carcinoma with metastases to lymph nodes, bones, and bilateral lungs  2. 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 Chemotherapy-induced anemia and thrombocytopenia  --Lung NGS panel negative for mutations in BRAF, EGFR, ERBB2, IDH1, IDH2, KRAS, MET, NRAS, RET. PD-L1 expression negative (TPS <1%). Guardant 360 negative for targetable mutations.  --Kt started 3rd line metastatic therapy with every 3-week Taxotere at 20% dose reduction (60 mg/m2) on 8/3/2022 due to progressive disease.  He tolerated this very well with minimal to no paresthesias. He then took a 2-month chemo break that was further delayed to 3.5 months due to insurance issues. He resumed on 3/23/2023 and continues to tolerate Taxotere well with minimal paresthesias.  --I personally reviewed his 6/26/2023 PET scan which showed partial response and decreased uptake in the retroperitoneal and pelvic lymphadenopathy and bone metastases.  --I advised he continue Taxotere at 20% dose reduction due to past repeated issues with chemo-induced cytopenias.  All treatment is with palliative intent.  --Reviewed today's labs  which show Hgb 11.1, WBC 8.5, platelets 157,000; ALT, AST, alk phos, and bilirubin are all normal. He may proceed with chemo today.  --Will repeat PET scan end of 9/2023 as scheduled.    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 Dysphonia  6. Dysphagia  -Kt underwent excision of a left vocal cord SCC on 9/30/2019 and has persistent dysphonia as a result of the lesion and excision.  He also has dysphagia but this is stable and swallowing is manageable.  -Continue follow-up with Dr. Wray.    7. History of dizziness and prior falls  --SW and guardian were previously notified of multiple falls.  --Prior brain MRI 2/14/2022 showed no brain metastases. However this was a suboptimal exam due to inability to administer contrast.  --He has not had recent falls and is using a wheelchair due to prior femoral fracture in 1/2022.    Sangita John MD  Hematology/Oncology  [facility]    Total time spent today: 30 minutes in chart review, patient evaluation, counseling, documentation, test and/or medication/prescription orders, and coordination of care.

## 2023-08-17 ENCOUNTER — ONCOLOGY VISIT (OUTPATIENT)
Dept: ONCOLOGY | Facility: CLINIC | Age: 63
End: 2023-08-17
Attending: INTERNAL MEDICINE
Payer: COMMERCIAL

## 2023-08-17 ENCOUNTER — INFUSION THERAPY VISIT (OUTPATIENT)
Dept: INFUSION THERAPY | Facility: CLINIC | Age: 63
End: 2023-08-17
Attending: INTERNAL MEDICINE
Payer: COMMERCIAL

## 2023-08-17 VITALS
RESPIRATION RATE: 14 BRPM | DIASTOLIC BLOOD PRESSURE: 79 MMHG | OXYGEN SATURATION: 99 % | WEIGHT: 175 LBS | HEART RATE: 70 BPM | SYSTOLIC BLOOD PRESSURE: 127 MMHG | TEMPERATURE: 97.7 F | BODY MASS INDEX: 21.87 KG/M2

## 2023-08-17 DIAGNOSIS — D64.81 ANEMIA DUE TO CHEMOTHERAPY: ICD-10-CM

## 2023-08-17 DIAGNOSIS — C34.90 NON-SMALL CELL LUNG CANCER METASTATIC TO BONE (H): ICD-10-CM

## 2023-08-17 DIAGNOSIS — C79.51 NON-SMALL CELL LUNG CANCER METASTATIC TO BONE (H): Primary | ICD-10-CM

## 2023-08-17 DIAGNOSIS — D70.1 CHEMOTHERAPY-INDUCED NEUTROPENIA (H): ICD-10-CM

## 2023-08-17 DIAGNOSIS — T45.1X5A ANEMIA DUE TO CHEMOTHERAPY: ICD-10-CM

## 2023-08-17 DIAGNOSIS — C78.02 MALIGNANT NEOPLASM METASTATIC TO BOTH LUNGS (H): ICD-10-CM

## 2023-08-17 DIAGNOSIS — T45.1X5A CHEMOTHERAPY-INDUCED NEUTROPENIA (H): ICD-10-CM

## 2023-08-17 DIAGNOSIS — Z51.11 ENCOUNTER FOR ANTINEOPLASTIC CHEMOTHERAPY: Primary | ICD-10-CM

## 2023-08-17 DIAGNOSIS — C78.01 MALIGNANT NEOPLASM METASTATIC TO BOTH LUNGS (H): ICD-10-CM

## 2023-08-17 DIAGNOSIS — C34.90 NON-SMALL CELL LUNG CANCER METASTATIC TO BONE (H): Primary | ICD-10-CM

## 2023-08-17 DIAGNOSIS — Z51.11 ENCOUNTER FOR ANTINEOPLASTIC CHEMOTHERAPY: ICD-10-CM

## 2023-08-17 DIAGNOSIS — C79.51 NON-SMALL CELL LUNG CANCER METASTATIC TO BONE (H): ICD-10-CM

## 2023-08-17 LAB
ALBUMIN SERPL BCG-MCNC: 3.8 G/DL (ref 3.5–5.2)
ALP SERPL-CCNC: 101 U/L (ref 40–129)
ALT SERPL W P-5'-P-CCNC: 23 U/L (ref 0–70)
AST SERPL W P-5'-P-CCNC: 24 U/L (ref 0–45)
BASOPHILS # BLD AUTO: 0 10E3/UL (ref 0–0.2)
BASOPHILS NFR BLD AUTO: 0 %
BILIRUB DIRECT SERPL-MCNC: <0.2 MG/DL (ref 0–0.3)
BILIRUB SERPL-MCNC: 0.4 MG/DL
EOSINOPHIL # BLD AUTO: 0 10E3/UL (ref 0–0.7)
EOSINOPHIL NFR BLD AUTO: 0 %
ERYTHROCYTE [DISTWIDTH] IN BLOOD BY AUTOMATED COUNT: 16.5 % (ref 10–15)
HCT VFR BLD AUTO: 36 % (ref 40–53)
HGB BLD-MCNC: 11.1 G/DL (ref 13.3–17.7)
IMM GRANULOCYTES # BLD: 0 10E3/UL
IMM GRANULOCYTES NFR BLD: 0 %
LYMPHOCYTES # BLD AUTO: 0.9 10E3/UL (ref 0.8–5.3)
LYMPHOCYTES NFR BLD AUTO: 11 %
MCH RBC QN AUTO: 28.8 PG (ref 26.5–33)
MCHC RBC AUTO-ENTMCNC: 30.8 G/DL (ref 31.5–36.5)
MCV RBC AUTO: 93 FL (ref 78–100)
MONOCYTES # BLD AUTO: 0.6 10E3/UL (ref 0–1.3)
MONOCYTES NFR BLD AUTO: 8 %
NEUTROPHILS # BLD AUTO: 6.9 10E3/UL (ref 1.6–8.3)
NEUTROPHILS NFR BLD AUTO: 81 %
NRBC # BLD AUTO: 0 10E3/UL
NRBC BLD AUTO-RTO: 0 /100
PLATELET # BLD AUTO: 157 10E3/UL (ref 150–450)
PROT SERPL-MCNC: 6.1 G/DL (ref 6.4–8.3)
RBC # BLD AUTO: 3.86 10E6/UL (ref 4.4–5.9)
WBC # BLD AUTO: 8.5 10E3/UL (ref 4–11)

## 2023-08-17 PROCEDURE — G0463 HOSPITAL OUTPT CLINIC VISIT: HCPCS | Mod: 25 | Performed by: INTERNAL MEDICINE

## 2023-08-17 PROCEDURE — 80076 HEPATIC FUNCTION PANEL: CPT | Performed by: INTERNAL MEDICINE

## 2023-08-17 PROCEDURE — 99214 OFFICE O/P EST MOD 30 MIN: CPT | Performed by: INTERNAL MEDICINE

## 2023-08-17 PROCEDURE — 96368 THER/DIAG CONCURRENT INF: CPT

## 2023-08-17 PROCEDURE — 250N000011 HC RX IP 250 OP 636: Mod: JZ | Performed by: INTERNAL MEDICINE

## 2023-08-17 PROCEDURE — 258N000003 HC RX IP 258 OP 636: Performed by: INTERNAL MEDICINE

## 2023-08-17 PROCEDURE — 96413 CHEMO IV INFUSION 1 HR: CPT

## 2023-08-17 PROCEDURE — 85025 COMPLETE CBC W/AUTO DIFF WBC: CPT | Performed by: INTERNAL MEDICINE

## 2023-08-17 PROCEDURE — 96377 APPLICATON ON-BODY INJECTOR: CPT | Mod: XS | Performed by: INTERNAL MEDICINE

## 2023-08-17 PROCEDURE — 96372 THER/PROPH/DIAG INJ SC/IM: CPT | Performed by: INTERNAL MEDICINE

## 2023-08-17 PROCEDURE — G0463 HOSPITAL OUTPT CLINIC VISIT: HCPCS | Performed by: INTERNAL MEDICINE

## 2023-08-17 RX ORDER — HEPARIN SODIUM (PORCINE) LOCK FLUSH IV SOLN 100 UNIT/ML 100 UNIT/ML
5 SOLUTION INTRAVENOUS
Status: CANCELLED | OUTPATIENT
Start: 2023-08-17

## 2023-08-17 RX ORDER — DIPHENHYDRAMINE HYDROCHLORIDE 50 MG/ML
50 INJECTION INTRAMUSCULAR; INTRAVENOUS
Status: CANCELLED
Start: 2023-08-17

## 2023-08-17 RX ORDER — MEPERIDINE HYDROCHLORIDE 25 MG/ML
25 INJECTION INTRAMUSCULAR; INTRAVENOUS; SUBCUTANEOUS EVERY 30 MIN PRN
Status: CANCELLED | OUTPATIENT
Start: 2023-08-17

## 2023-08-17 RX ORDER — ALBUTEROL SULFATE 0.83 MG/ML
2.5 SOLUTION RESPIRATORY (INHALATION)
Status: CANCELLED | OUTPATIENT
Start: 2023-08-17

## 2023-08-17 RX ORDER — HEPARIN SODIUM (PORCINE) LOCK FLUSH IV SOLN 100 UNIT/ML 100 UNIT/ML
5 SOLUTION INTRAVENOUS
Status: DISCONTINUED | OUTPATIENT
Start: 2023-08-17 | End: 2023-08-17 | Stop reason: HOSPADM

## 2023-08-17 RX ORDER — METHYLPREDNISOLONE SODIUM SUCCINATE 125 MG/2ML
125 INJECTION, POWDER, LYOPHILIZED, FOR SOLUTION INTRAMUSCULAR; INTRAVENOUS
Status: CANCELLED
Start: 2023-08-17

## 2023-08-17 RX ORDER — EPINEPHRINE 1 MG/ML
0.3 INJECTION, SOLUTION INTRAMUSCULAR; SUBCUTANEOUS EVERY 5 MIN PRN
Status: CANCELLED | OUTPATIENT
Start: 2023-08-17

## 2023-08-17 RX ORDER — ALBUTEROL SULFATE 90 UG/1
1-2 AEROSOL, METERED RESPIRATORY (INHALATION)
Status: CANCELLED
Start: 2023-08-17

## 2023-08-17 RX ORDER — LORAZEPAM 2 MG/ML
0.5 INJECTION INTRAMUSCULAR EVERY 4 HOURS PRN
Status: CANCELLED | OUTPATIENT
Start: 2023-08-17

## 2023-08-17 RX ORDER — HEPARIN SODIUM,PORCINE 10 UNIT/ML
5 VIAL (ML) INTRAVENOUS
Status: CANCELLED | OUTPATIENT
Start: 2023-08-17

## 2023-08-17 RX ADMIN — SODIUM CHLORIDE 122 MG: 9 INJECTION, SOLUTION INTRAVENOUS at 10:55

## 2023-08-17 RX ADMIN — DEXAMETHASONE SODIUM PHOSPHATE: 10 INJECTION, SOLUTION INTRAMUSCULAR; INTRAVENOUS at 10:32

## 2023-08-17 RX ADMIN — PEGFILGRASTIM 6 MG: KIT SUBCUTANEOUS at 10:57

## 2023-08-17 RX ADMIN — Medication 5 ML: at 11:54

## 2023-08-17 RX ADMIN — SODIUM CHLORIDE 250 ML: 9 INJECTION, SOLUTION INTRAVENOUS at 10:32

## 2023-08-17 ASSESSMENT — PAIN SCALES - GENERAL: PAINLEVEL: NO PAIN (1)

## 2023-08-17 NOTE — PROGRESS NOTES
Infusion Nursing Note:  Kt Rasmussen presents today for C15D1 Taxotere/Onpro.    Patient seen by provider today: Yes: Dr. Samano   present during visit today: Not Applicable.    Note: N/A.      Intravenous Access:  Implanted Port.    Treatment Conditions:  Lab Results   Component Value Date    HGB 11.1 (L) 08/17/2023    WBC 8.5 08/17/2023    ANEU 0.5 (L) 08/11/2022    ANEUTAUTO 6.9 08/17/2023     08/17/2023        Results reviewed, labs MET treatment parameters, ok to proceed with treatment.      Post Infusion Assessment:  Patient tolerated infusion without incident.  Blood return noted pre and post infusion.  Site patent and intact, free from redness, edema or discomfort.  No evidence of extravasations.  Access discontinued per protocol.     ONPRO  Was placed on patient's: back of right arm.    Was placed at 1057 AM    Podpal used: Yes    ONPRO injector device Lot number: [id number].    Patient education included: what patient can expect after application, what colored lights mean on the device, when to remove device, when and where to call with questions or issues, all patients questions answered, and that Neulasta administration will occur at 2:00 PM on 8/18/23.    Patient tolerated administration well.       Discharge Plan:   Discharge instructions reviewed with: Patient.  Patient and/or family verbalized understanding of discharge instructions and all questions answered.  Copy of AVS reviewed with patient and/or family.  Patient will return 9/7/23 for next appointment.  Patient discharged in stable condition accompanied by: self.  Departure Mode: Wheelchair.      Patricia Lindsey RN

## 2023-08-17 NOTE — PATIENT INSTRUCTIONS
Your On-body Neulasta Injector was applied to your right arm at 11:00 AM.  At approximately 2:00 PM on 8/18/23, your On-body Injector will beep to let you know your dose delivery will begin in 2 minutes.  Your medication will be delivered over the next 45 minutes.  You can remove your Injector at 3:00 PM.  Please make sure your Injector has a solid green light or has turned off prior to removing the device.  Please contact your provider at [phone] with questions or concerns.

## 2023-08-17 NOTE — PROGRESS NOTES
"Oncology Rooming Note    August 17, 2023 9:23 AM   Kt Rasmussen is a 63 year old male who presents for:    Chief Complaint   Patient presents with    Oncology Clinic Visit     Initial Vitals: There were no vitals taken for this visit. Estimated body mass index is 21.87 kg/m  as calculated from the following:    Height as of 5/25/23: 1.905 m (6' 3\").    Weight as of 7/27/23: 79.4 kg (175 lb). There is no height or weight on file to calculate BSA.  Data Unavailable Comment: Data Unavailable   No LMP for male patient.  Allergies reviewed: Yes  Medications reviewed: Yes    Medications: Medication refills not needed today.  Pharmacy name entered into EPIC:    [facility] [place] [address] [facility] [address] [id number] - [place] [address] [facility]  [place] [facility] [address] [place] [address]  [facility], [facility]. - [facility] [address] [facility]    Clinical concerns: no       Shari J. Schoenberger, CMA              "

## 2023-08-17 NOTE — LETTER
8/17/2023         RE: Kt Rasmussen  [id number]  [facility] [address]        Dear Colleague,    Thank you for referring your patient, Kt Rasmussen, to the [facility]. Please see a copy of my visit note below.    [facility]    Hematology/Oncology Established Patient Follow-up Note      Today's Date: 8/17/2023    Reason for Follow-up: Metastatic non-small cell lung carcinoma.    HISTORY OF PRESENT ILLNESS: Kt Rasmussen is a 63 year old male who presents with the following oncologic history:  1. 5/05/2016: Presented with near-obstructing large mass coming off the cheikh and likely the posterior wall, seen on bronchoscopy. Biopsy of the mass showed invasive squamous cell carcinoma, moderately differentiating and focally keratinizing.  Procedure was complicated by significant bleeding.  Tumor was completely debulked (via bronchoscopy) in both main stem bronchi and distal trachea. NGS showed no mutations in EGFR, KRAS, BRAF, NRAS, HRAS, PIK3CA, ERBB2, MET, or JAK2.  2. 5/23/2016: PET/CT scan showed evidence of residual tumor with decreased endobronchial mass. Indeterminate, mildly hypermetabolic mesentery with prominent lymph nodes and area of enhancement of the right hepatic lobe present. Felt to have T4-NX-M0 disease.  3. 6/09/2016: Started concurrent chemoradiation with weekly paclitaxel and carboplatin.  4. 7/30/2016: Completed radiation.  Subsequently received 2 cycles of consolidation paclitaxel and carboplatin.   5. 9/13/2019: Presented with 6-month history of dysphonia and left vocal exophytic lesion. Left true vocal fold biopsy showed at least squamous cell carcinoma in situ, cannot exclude superficial invasion.  6. 9/30/2019: Excision of left vocal cord mass showed fragments of invasive squamous cell carcinoma, well differentiated and keratinizing.  Margins negative for malignancy.  7. 2/16/2021: CT chest w/o contrast showed thin-walled cavity in left  lower lobe with 2 solid peripheral nodules measuring 9 mm each. No lymphadenopathy.  8. 3/01/2021: PET scan showed hypermetabolic nodules in left lower lobe and right lung, consistent with metastases; hypermetabolic adenopathy in chest, abdomen, pelvis; nonspecific uptake at tongue; hypermetabolic intraosseous lesions in spine and pelvis consistent with metastatic disease; left axillary hypermetabolic lymph nodes related to COVID-19 vaccination.  9. 3/12/2021: CT-guided lung biopsy showed non-small cell carcinoma, not otherwise specified -- with opinion by [facility] pathologist.  10. 3/18/2021: Lung NGS panel negative for mutations in BRAF, EGFR, ERBB2, IDH1, IDH2, KRAS, MET, NRAS, RET. PD-L1 expression negative (TPS <1%). Due to minimal amount of DNA obtained from specimen, other biomarkers could not be analyzed.  11. 4/7/2021: MRI brain negative for brain metastases.  12. 4/27/2021: Started 1st line metastatic therapy with carboplatin, paclitaxel, bevacizumab, and atezolizumab for metastatic non-small cell lung carcinoma, NOS.  13. 7/12/2021: PET/CT showed resolved mural nodules with increased cystic cavity in left lower lobe with no significant FDG uptake, less likely metastases; no enlarged hypermetabolic mediastinal, hilar, or axillary lymph nodes, decreased metabolic activity of intraosseous lesion in spine and pelvis; no new bone lesions.  14. 10/11/2021: PET/CT showed slight interval increase in intensity of metabolic activity of right pubic bone and left ischium with new focal uptake in L2 spinous process; resolved uptake at previous ground glass opacity along right medial lower lobe; unchanged cystic cavities/nodules in left lower lobe and right apical upper lobe; no pathologically enlarged hypermetabolic mediastinal, hilar, or axillary lymph nodes.  15. 1/10/2022: PET/CT showed new foci of abnormal skeletal FGD uptake in the thoracic and lumbar spine. Mild interval increase in intensity of previously  demonstrated hypermetabolic skeletal lesions involving the pelvis and lumbar spine. Findings are consistent with progressive osseous metastatic disease. Subsequently off chemo for 1 month due to poor performance status.  16. 1/25/2022: Patient fell and fractured his femur. This was surgically repaired and he was subsequently cared for at a rehab unit.  17. 2/14/2022: Brain MRI without contrast (contrast not given due to inability to obtain IV access) showed no brain metastases.  18. 4/21/2022: Started 2nd line metastatic therapy with pemetrexed and carboplatin. Omission of carboplatin 6/2 and forward due to worsening anemia despite dose reduction.  19. 7/18/2022: PET/CT showed enlarged and increased FDG avid skeletal metastases, increased left para-aortic retroperitoneal lymph node increased in size and FDG avidity, findings consistent with progressive disease.  20. 8/3/2022: Started 3rd line metastatic therapy with Taxotere 60 mg/m2 every 3 weeks.  21. 11/21/2022: PET scan showed partial response, left para-aortic retroperitoneal lymph node has decreased from 2.6 x 1.7 cm (SUVmax 7.9) to 2.3 x 1.4 cm (SUVmax 5.7), skeletal metastases no longer demonstrate FDG activity. Kt elected to take a 2-month break from chemo.  22. 1/23/2023: PET scan showed increasing metabolic activity of the left periaortic (max SUV 8.1, previously 5.7) left external iliac (max SUV 7.0, previously 3.1), and right external iliac (max SUV 5.1, previously 3.7) lymph nodes with development/reactivation of multiple FDG avid osseous lesions.  23. 3/23/2023: Resumption of Taxotere every 3 weeks. Delayed due to insurance issues.  24. 6/26/2023: PET scan showed partial response with decreased uptake associated with the retroperitoneal and pelvic lymphadenopathy as well as bone metastases.    INTERIM HISTORY:  Kt reports very mild tingling at his fingertips; some left toe pain due to toe nail issue; no dyspnea.    REVIEW OF SYSTEMS:   14 point  ROS was reviewed and is negative other than as noted above in HPI.       HOME MEDICATIONS:  Current Outpatient Medications   Medication Sig Dispense Refill     atorvastatin (LIPITOR) 40 MG tablet Take 40 mg by mouth daily           ALLERGIES:  Allergies   Allergen Reactions     No Known Drug Allergy          PAST MEDICAL HISTORY:  Past Medical History:   Diagnosis Date     Alcohol abuse, unspecified      Cerebral infarction (H)     , right side residual and aphasia     Dyslipidemia      GERD (gastroesophageal reflux disease)      Lung cancer (H)      Unspecified essential hypertension          PAST SURGICAL HISTORY:  Past Surgical History:   Procedure Laterality Date     BRONCHOSCOPY FLEXIBLE AND RIGID N/A 2016    Procedure: BRONCHOSCOPY FLEXIBLE AND RIGID;  Surgeon: Tony Talbot MD;  Location: [facility] OR     ESOPHAGOSCOPY FLEXIBLE N/A 2019    Procedure: flexible esophagoscopy;  Surgeon: Lizzy Johnson MD;  Location: [facility] OR     INJECT STEROID (LOCATION) N/A 2019    Procedure: steroid injection;  Surgeon: Lizzy Johnson MD;  Location: [facility] OR     IR CHEST PORT PLACEMENT > 5 YRS OF AGE  2021     IR CHEST PORT PLACEMENT > 5 YRS OF AGE  2022     IR PORT CHECK RIGHT  2022     IR PORT REMOVAL RIGHT  2022     LASER CO2 LARYNGOSCOPY N/A 2019    Procedure: Microdirect laryngoscopy with excision of laryngeal mass, CO2 laser;  Surgeon: Lizzy Johnson MD;  Location:  OR     [facility] NONSPECIFIC PROCEDURE      R tympanoplasty         SOCIAL HISTORY:  Social History     Socioeconomic History     Marital status: Single     Spouse name: Not on file     Number of children: Not on file     Years of education: Not on file     Highest education level: Not on file   Occupational History     Not on file   Tobacco Use     Smoking status: Former     Packs/day: 1.00     Types: Cigarettes     Quit date: 2009     Years since quittin.8     Smokeless tobacco: Never   Substance and  Sexual Activity     Alcohol use: Not Currently     Drug use: No     Sexual activity: Not on file   Other Topics Concern     Parent/sibling w/ CABG, MI or angioplasty before 65F 55M? Not Asked   Social History Narrative     Not on file     Social Determinants of Health     Financial Resource Strain: Not on file   Food Insecurity: Not on file   Transportation Needs: Not on file   Physical Activity: Not on file   Stress: Not on file   Social Connections: Not on file   Intimate Partner Violence: Not At Risk (2023)    Humiliation, Afraid, Rape, and Kick questionnaire      Fear of Current or Ex-Partner: No      Emotionally Abused: No      Physically Abused: No      Sexually Abused: No   Housing Stability: Not on file   Resides at assisted living.      FAMILY HISTORY:  Family History   Problem Relation Age of Onset     Cancer Father          PHYSICAL EXAM:  Vital signs:  /79   Pulse 70   Temp 97.7  F (36.5  C) (Oral)   Resp 14   Wt 79.4 kg (175 lb)   SpO2 99%   BMI 21.87 kg/m     ECO  GENERAL: No acute distress.  EYES: No scleral icterus. No overt erythema.  LYMPH: No cervical, supraclavicular lymphadenopathy.  CARDIAC: Regular rate and rhythm with no murmurs.  RESPIRATORY: Clear to auscultation bilaterally. Hoarseness present.  EXTREMITIES: No clubbing, cyanosis, or edema.  SKIN: No overt rashes, discolorations, or lesions over the face and neck.  NEUROLOGIC: Alert.  Dysphonia present. No overt tremors. Stable right upper extremity weakness and contracture.  PSYCHIATRIC: Normal affect and mood.  Does not appear anxious.  GAIT: Sitting in wheelchair.      LABS:  CBC RESULTS:   Recent Labs   Lab Test 23  0852   WBC 8.5   RBC 3.86*   HGB 11.1*   HCT 36.0*   MCV 93   MCH 28.8   MCHC 30.8*   RDW 16.5*        Last Comprehensive Metabolic Panel:  Sodium   Date Value Ref Range Status   2023 140 136 - 145 mmol/L Final   2021 140 133 - 144 mmol/L Final     Potassium   Date Value Ref  Range Status   01/25/2023 4.0 3.4 - 5.3 mmol/L Final   12/07/2022 4.1 3.4 - 5.3 mmol/L Final   06/29/2021 4.3 3.4 - 5.3 mmol/L Final     Chloride   Date Value Ref Range Status   01/25/2023 102 98 - 107 mmol/L Final   12/07/2022 109 94 - 109 mmol/L Final   06/29/2021 111 (H) 94 - 109 mmol/L Final     Carbon Dioxide   Date Value Ref Range Status   06/29/2021 25 20 - 32 mmol/L Final     Carbon Dioxide (CO2)   Date Value Ref Range Status   01/25/2023 28 22 - 29 mmol/L Final   12/07/2022 25 20 - 32 mmol/L Final     Anion Gap   Date Value Ref Range Status   01/25/2023 10 7 - 15 mmol/L Final   12/07/2022 7 3 - 14 mmol/L Final   06/29/2021 4 3 - 14 mmol/L Final     Glucose   Date Value Ref Range Status   01/25/2023 95 70 - 99 mg/dL Final   12/07/2022 91 70 - 99 mg/dL Final   06/29/2021 85 70 - 99 mg/dL Final     Urea Nitrogen   Date Value Ref Range Status   01/25/2023 16.0 8.0 - 23.0 mg/dL Final   12/07/2022 17 7 - 30 mg/dL Final   06/29/2021 17 7 - 30 mg/dL Final     Creatinine   Date Value Ref Range Status   01/25/2023 0.71 0.67 - 1.17 mg/dL Final   06/29/2021 0.84 0.66 - 1.25 mg/dL Final     GFR Estimate   Date Value Ref Range Status   01/25/2023 >90 >60 mL/min/1.73m2 Final     Comment:     eGFR calculated using 2021 CKD-EPI equation.   06/29/2021 >90 >60 mL/min/[1.73_m2] Final     Comment:     Non  GFR Calc  Starting 12/18/2018, serum creatinine based estimated GFR (eGFR) will be   calculated using the Chronic Kidney Disease Epidemiology Collaboration   (CKD-EPI) equation.       Calcium   Date Value Ref Range Status   01/25/2023 9.3 8.8 - 10.2 mg/dL Final   06/29/2021 8.7 8.5 - 10.1 mg/dL Final     Bilirubin Total   Date Value Ref Range Status   08/17/2023 0.4 <=1.2 mg/dL Final   06/29/2021 0.3 0.2 - 1.3 mg/dL Final     Alkaline Phosphatase   Date Value Ref Range Status   08/17/2023 101 40 - 129 U/L Final   06/29/2021 73 40 - 150 U/L Final     ALT   Date Value Ref Range Status   08/17/2023 23 0 - 70 U/L  Final     Comment:     Reference intervals for this test were updated on 6/12/2023 to more accurately reflect our healthy population. There may be differences in the flagging of prior results with similar values performed with this method. Interpretation of those prior results can be made in the context of the updated reference intervals.     06/29/2021 34 0 - 70 U/L Final     AST   Date Value Ref Range Status   08/17/2023 24 0 - 45 U/L Final     Comment:     Reference intervals for this test were updated on 6/12/2023 to more accurately reflect our healthy population. There may be differences in the flagging of prior results with similar values performed with this method. Interpretation of those prior results can be made in the context of the updated reference intervals.   06/29/2021 26 0 - 45 U/L Final         PATHOLOGY:  None new.    IMAGING:  Reviewed as per HPI.    ASSESSMENT/PLAN:  Kt Rasmussen is a 62 year old male with the following issues:  1. Metastatic non-small (non-squamous) cell lung carcinoma with metastases to lymph nodes, bones, and bilateral lungs  2. 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 Chemotherapy-induced anemia and thrombocytopenia  --Lung NGS panel negative for mutations in BRAF, EGFR, ERBB2, IDH1, IDH2, KRAS, MET, NRAS, RET. PD-L1 expression negative (TPS <1%). Guardant 360 negative for targetable mutations.  --Kt started 3rd line metastatic therapy with every 3-week Taxotere at 20% dose reduction (60 mg/m2) on 8/3/2022 due to progressive disease.  He tolerated this very well with minimal to no paresthesias. He then took a 2-month chemo break that was further delayed to 3.5 months due to insurance issues. He resumed on 3/23/2023 and continues to tolerate Taxotere well with minimal paresthesias.  --I personally reviewed his 6/26/2023 PET scan which showed partial response and decreased  "uptake in the retroperitoneal and pelvic lymphadenopathy and bone metastases.  --I advised he continue Taxotere at 20% dose reduction due to past repeated issues with chemo-induced cytopenias.  All treatment is with palliative intent.  --Reviewed today's labs which show Hgb 11.1, WBC 8.5, platelets 157,000; ALT, AST, alk phos, and bilirubin are all normal. He may proceed with chemo today.  --Will repeat PET scan end of 9/2023 as scheduled.    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 Dysphonia  6. Dysphagia  -Kt underwent excision of a left vocal cord SCC on 9/30/2019 and has persistent dysphonia as a result of the lesion and excision.  He also has dysphagia but this is stable and swallowing is manageable.  -Continue follow-up with Dr. Wray.    7. History of dizziness and prior falls  --SW and guardian were previously notified of multiple falls.  --Prior brain MRI 2/14/2022 showed no brain metastases. However this was a suboptimal exam due to inability to administer contrast.  --He has not had recent falls and is using a wheelchair due to prior femoral fracture in 1/2022.    Sangita John MD  Hematology/Oncology  [facility]    Total time spent today: 30 minutes in chart review, patient evaluation, counseling, documentation, test and/or medication/prescription orders, and coordination of care.     Oncology Rooming Note    August 17, 2023 9:23 AM   Kt Rasmussen is a 63 year old male who presents for:    Chief Complaint   Patient presents with     Oncology Clinic Visit     Initial Vitals: There were no vitals taken for this visit. Estimated body mass index is 21.87 kg/m  as calculated from the following:    Height as of 5/25/23: 1.905 m (6' 3\").    Weight as of 7/27/23: 79.4 kg (175 lb). There is no height or weight on file to calculate BSA.  Data Unavailable Comment: Data Unavailable   No LMP for male patient.  Allergies reviewed: Yes  Medications reviewed: " Yes    Medications: Medication refills not needed today.  Pharmacy name entered into EPIC:    [facility] - [employer], [place] - [address] [facility] [address] [facility] [address]  [id number] - [facility] [address] [facility] [address] [facility] [address]  [facility] - [facility] [address] [place] [address], [facility]. - [facility] [id number] [place] [address] [address]    Clinical concerns: no       Shari J. Schoenberger, [facility]                Again, thank you for allowing me to participate in the care of your patient.        Sincerely,        Sangita John MD

## 2023-09-07 ENCOUNTER — ONCOLOGY VISIT (OUTPATIENT)
Dept: ONCOLOGY | Facility: CLINIC | Age: 63
End: 2023-09-07
Attending: INTERNAL MEDICINE
Payer: COMMERCIAL

## 2023-09-07 ENCOUNTER — INFUSION THERAPY VISIT (OUTPATIENT)
Dept: INFUSION THERAPY | Facility: CLINIC | Age: 63
End: 2023-09-07
Attending: INTERNAL MEDICINE
Payer: COMMERCIAL

## 2023-09-07 VITALS — HEIGHT: 75 IN | WEIGHT: 175 LBS | BODY MASS INDEX: 21.76 KG/M2

## 2023-09-07 VITALS
TEMPERATURE: 98.4 F | DIASTOLIC BLOOD PRESSURE: 69 MMHG | RESPIRATION RATE: 16 BRPM | SYSTOLIC BLOOD PRESSURE: 106 MMHG | HEART RATE: 74 BPM | OXYGEN SATURATION: 97 %

## 2023-09-07 DIAGNOSIS — C34.90 NON-SMALL CELL LUNG CANCER METASTATIC TO BONE (H): Primary | ICD-10-CM

## 2023-09-07 DIAGNOSIS — C79.51 NON-SMALL CELL LUNG CANCER METASTATIC TO BONE (H): ICD-10-CM

## 2023-09-07 DIAGNOSIS — T45.1X5A CHEMOTHERAPY-INDUCED NEUTROPENIA (H): ICD-10-CM

## 2023-09-07 DIAGNOSIS — C34.90 NON-SMALL CELL LUNG CANCER METASTATIC TO BONE (H): ICD-10-CM

## 2023-09-07 DIAGNOSIS — Z51.11 ENCOUNTER FOR ANTINEOPLASTIC CHEMOTHERAPY: Primary | ICD-10-CM

## 2023-09-07 DIAGNOSIS — C34.90 NON-SMALL CELL CARCINOMA OF LUNG, STAGE 3, UNSPECIFIED LATERALITY (H): ICD-10-CM

## 2023-09-07 DIAGNOSIS — D70.1 CHEMOTHERAPY-INDUCED NEUTROPENIA (H): ICD-10-CM

## 2023-09-07 DIAGNOSIS — Z51.11 ENCOUNTER FOR ANTINEOPLASTIC CHEMOTHERAPY: ICD-10-CM

## 2023-09-07 DIAGNOSIS — C79.51 NON-SMALL CELL LUNG CANCER METASTATIC TO BONE (H): Primary | ICD-10-CM

## 2023-09-07 LAB
ALBUMIN SERPL BCG-MCNC: 3.8 G/DL (ref 3.5–5.2)
ALP SERPL-CCNC: 109 U/L (ref 40–129)
ALT SERPL W P-5'-P-CCNC: 16 U/L (ref 0–70)
AST SERPL W P-5'-P-CCNC: 21 U/L (ref 0–45)
BASOPHILS # BLD AUTO: 0 10E3/UL (ref 0–0.2)
BASOPHILS NFR BLD AUTO: 0 %
BILIRUB DIRECT SERPL-MCNC: <0.2 MG/DL (ref 0–0.3)
BILIRUB SERPL-MCNC: 0.5 MG/DL
EOSINOPHIL # BLD AUTO: 0.1 10E3/UL (ref 0–0.7)
EOSINOPHIL NFR BLD AUTO: 1 %
ERYTHROCYTE [DISTWIDTH] IN BLOOD BY AUTOMATED COUNT: 16.7 % (ref 10–15)
HCT VFR BLD AUTO: 35.2 % (ref 40–53)
HGB BLD-MCNC: 10.7 G/DL (ref 13.3–17.7)
IMM GRANULOCYTES # BLD: 0 10E3/UL
IMM GRANULOCYTES NFR BLD: 1 %
LYMPHOCYTES # BLD AUTO: 0.9 10E3/UL (ref 0.8–5.3)
LYMPHOCYTES NFR BLD AUTO: 11 %
MCH RBC QN AUTO: 28.2 PG (ref 26.5–33)
MCHC RBC AUTO-ENTMCNC: 30.4 G/DL (ref 31.5–36.5)
MCV RBC AUTO: 93 FL (ref 78–100)
MONOCYTES # BLD AUTO: 0.7 10E3/UL (ref 0–1.3)
MONOCYTES NFR BLD AUTO: 9 %
NEUTROPHILS # BLD AUTO: 6.7 10E3/UL (ref 1.6–8.3)
NEUTROPHILS NFR BLD AUTO: 78 %
NRBC # BLD AUTO: 0 10E3/UL
NRBC BLD AUTO-RTO: 0 /100
PLATELET # BLD AUTO: 195 10E3/UL (ref 150–450)
PROT SERPL-MCNC: 6.3 G/DL (ref 6.4–8.3)
RBC # BLD AUTO: 3.8 10E6/UL (ref 4.4–5.9)
WBC # BLD AUTO: 8.5 10E3/UL (ref 4–11)

## 2023-09-07 PROCEDURE — 96413 CHEMO IV INFUSION 1 HR: CPT

## 2023-09-07 PROCEDURE — 85025 COMPLETE CBC W/AUTO DIFF WBC: CPT | Performed by: PHYSICIAN ASSISTANT

## 2023-09-07 PROCEDURE — 82040 ASSAY OF SERUM ALBUMIN: CPT | Performed by: PHYSICIAN ASSISTANT

## 2023-09-07 PROCEDURE — 96377 APPLICATON ON-BODY INJECTOR: CPT | Mod: XS

## 2023-09-07 PROCEDURE — 258N000003 HC RX IP 258 OP 636: Performed by: PHYSICIAN ASSISTANT

## 2023-09-07 PROCEDURE — G0463 HOSPITAL OUTPT CLINIC VISIT: HCPCS | Mod: 25 | Performed by: PHYSICIAN ASSISTANT

## 2023-09-07 PROCEDURE — 99214 OFFICE O/P EST MOD 30 MIN: CPT | Performed by: PHYSICIAN ASSISTANT

## 2023-09-07 PROCEDURE — 250N000011 HC RX IP 250 OP 636: Mod: JZ | Performed by: PHYSICIAN ASSISTANT

## 2023-09-07 PROCEDURE — 96367 TX/PROPH/DG ADDL SEQ IV INF: CPT

## 2023-09-07 PROCEDURE — 96372 THER/PROPH/DIAG INJ SC/IM: CPT | Performed by: PHYSICIAN ASSISTANT

## 2023-09-07 RX ORDER — LORAZEPAM 2 MG/ML
0.5 INJECTION INTRAMUSCULAR EVERY 4 HOURS PRN
Status: CANCELLED | OUTPATIENT
Start: 2023-09-07

## 2023-09-07 RX ORDER — HEPARIN SODIUM (PORCINE) LOCK FLUSH IV SOLN 100 UNIT/ML 100 UNIT/ML
5 SOLUTION INTRAVENOUS
Status: CANCELLED | OUTPATIENT
Start: 2023-09-07

## 2023-09-07 RX ORDER — MEPERIDINE HYDROCHLORIDE 25 MG/ML
25 INJECTION INTRAMUSCULAR; INTRAVENOUS; SUBCUTANEOUS EVERY 30 MIN PRN
Status: CANCELLED | OUTPATIENT
Start: 2023-09-07

## 2023-09-07 RX ORDER — DIPHENHYDRAMINE HYDROCHLORIDE 50 MG/ML
50 INJECTION INTRAMUSCULAR; INTRAVENOUS
Status: CANCELLED
Start: 2023-09-07

## 2023-09-07 RX ORDER — HEPARIN SODIUM,PORCINE 10 UNIT/ML
5 VIAL (ML) INTRAVENOUS
Status: CANCELLED | OUTPATIENT
Start: 2023-09-07

## 2023-09-07 RX ORDER — ALBUTEROL SULFATE 90 UG/1
1-2 AEROSOL, METERED RESPIRATORY (INHALATION)
Status: CANCELLED
Start: 2023-09-07

## 2023-09-07 RX ORDER — ALBUTEROL SULFATE 0.83 MG/ML
2.5 SOLUTION RESPIRATORY (INHALATION)
Status: CANCELLED | OUTPATIENT
Start: 2023-09-07

## 2023-09-07 RX ORDER — EPINEPHRINE 1 MG/ML
0.3 INJECTION, SOLUTION INTRAMUSCULAR; SUBCUTANEOUS EVERY 5 MIN PRN
Status: CANCELLED | OUTPATIENT
Start: 2023-09-07

## 2023-09-07 RX ORDER — METHYLPREDNISOLONE SODIUM SUCCINATE 125 MG/2ML
125 INJECTION, POWDER, LYOPHILIZED, FOR SOLUTION INTRAMUSCULAR; INTRAVENOUS
Status: CANCELLED
Start: 2023-09-07

## 2023-09-07 RX ORDER — HEPARIN SODIUM (PORCINE) LOCK FLUSH IV SOLN 100 UNIT/ML 100 UNIT/ML
5 SOLUTION INTRAVENOUS
Status: DISCONTINUED | OUTPATIENT
Start: 2023-09-07 | End: 2023-09-07 | Stop reason: HOSPADM

## 2023-09-07 RX ORDER — ONDANSETRON 2 MG/ML
8 INJECTION INTRAMUSCULAR; INTRAVENOUS EVERY 6 HOURS PRN
Status: CANCELLED
Start: 2023-09-07

## 2023-09-07 RX ADMIN — SODIUM CHLORIDE 250 ML: 9 INJECTION, SOLUTION INTRAVENOUS at 09:40

## 2023-09-07 RX ADMIN — DEXAMETHASONE SODIUM PHOSPHATE: 10 INJECTION, SOLUTION INTRAMUSCULAR; INTRAVENOUS at 09:40

## 2023-09-07 RX ADMIN — Medication 5 ML: at 10:58

## 2023-09-07 RX ADMIN — SODIUM CHLORIDE 122 MG: 9 INJECTION, SOLUTION INTRAVENOUS at 10:00

## 2023-09-07 RX ADMIN — PEGFILGRASTIM 6 MG: KIT SUBCUTANEOUS at 10:01

## 2023-09-07 ASSESSMENT — PAIN SCALES - GENERAL: PAINLEVEL: NO PAIN (0)

## 2023-09-07 NOTE — PROGRESS NOTES
Nursing Note:  Kt Rasmussen presents today for Port labs.    Patient seen by provider today: Yes: Renee JOSEPH   present during visit today: Not Applicable.    Note: N/A.    Intravenous Access:  Implanted Port.    Discharge Plan:   Patient was sent to [facility] for infusion appointment.    Devorah Stovall RN

## 2023-09-07 NOTE — PATIENT INSTRUCTIONS
Your On-body Neulasta Injector was applied to your right arm at 10:00am.  At approximately 1:00pm on 9/8, your On-body Injector will beep to let you know your dose delivery will begin in 2 minutes.  Your medication will be delivered over the next 45 minutes.  You can remove your Injector at 2:30pm.  Please make sure your Injector has a solid green light or has turned off prior to removing the device.  Please contact your provider at [phone] with questions or concerns.

## 2023-09-07 NOTE — LETTER
"    9/7/2023         RE: Kt Rasmussen  [id number]  [facility] [address]        Dear Colleague,    Thank you for referring your patient, Kt Rasmussen, to the [facility]. Please see a copy of my visit note below.    Oncology Rooming Note    September 7, 2023 8:19 AM   Kt Rasmussen is a 63 year old male who presents for:    Chief Complaint   Patient presents with     Oncology Clinic Visit     Initial Vitals: /69   Pulse 74   Temp 98.4  F (36.9  C) (Oral)   Resp 16   SpO2 97%  Estimated body mass index is 21.87 kg/m  as calculated from the following:    Height as of 5/25/23: 1.905 m (6' 3\").    Weight as of 8/17/23: 79.4 kg (175 lb). There is no height or weight on file to calculate BSA.  No Pain (0) Comment: Data Unavailable   No LMP for male patient.  Allergies reviewed: Yes  Medications reviewed: Yes    Medications: Medication refills not needed today.  Pharmacy name entered into EPIC:    [facility] - [place], [place] - [address] [facility] [address] [facility] #[id number] - [place] [address] AT [facility] & [address] [facility] - [facility] [address] [place] [address], [facility]. [address]    Clinical concerns:   pa was notified.      Julisa Whelan CMA              Oncology/Hematology Visit Note  Sep 7, 2023    Reason for Visit: Follow up of metastatic lung cancer    Primary Oncologist: Dr. John    Oncologic History:  1. 5/05/2016: Presented with near-obstructing large mass coming off the cheikh and likely the posterior wall, seen on bronchoscopy. Biopsy of the mass showed invasive squamous cell carcinoma, moderately differentiating and focally keratinizing.  Procedure was complicated by significant bleeding.  Tumor was completely debulked (via bronchoscopy) in both main stem bronchi and " distal trachea. NGS showed no mutations in EGFR, KRAS, BRAF, NRAS, HRAS, PIK3CA, ERBB2, MET, or JAK2.  2. 5/23/2016: PET/CT scan showed evidence of residual tumor with decreased endobronchial mass. Indeterminate, mildly hypermetabolic mesentery with prominent lymph nodes and area of enhancement of the right hepatic lobe present. Felt to have T4-NX-M0 disease.  3. 6/09/2016: Started concurrent chemoradiation with weekly paclitaxel and carboplatin.  4. 7/30/2016: Completed radiation.  Subsequently received 2 cycles of consolidation paclitaxel and carboplatin.   5. 9/13/2019: Presented with 6-month history of dysphonia and left vocal exophytic lesion. Left true vocal fold biopsy showed at least squamous cell carcinoma in situ, cannot exclude superficial invasion.  6. 9/30/2019: Excision of left vocal cord mass showed fragments of invasive squamous cell carcinoma, well differentiated and keratinizing.  Margins negative for malignancy.  7. 2/16/2021: CT chest w/o contrast showed thin-walled cavity in left lower lobe with 2 solid peripheral nodules measuring 9 mm each. No lymphadenopathy.  8. 3/01/2021: PET scan showed hypermetabolic nodules in left lower lobe and right lung, consistent with metastases; hypermetabolic adenopathy in chest, abdomen, pelvis; nonspecific uptake at tongue; hypermetabolic intraosseous lesions in spine and pelvis consistent with metastatic disease; left axillary hypermetabolic lymph nodes related to COVID-19 vaccination.  9. 3/12/2021: CT-guided lung biopsy showed non-small cell carcinoma, not otherwise specified -- with opinion by [facility] pathologist.  10. 3/18/2021: Lung NGS panel negative for mutations in BRAF, EGFR, ERBB2, IDH1, IDH2, KRAS, MET, NRAS, RET. PD-L1 expression negative (TPS <1%). Due to minimal amount of DNA obtained from specimen, other biomarkers could not be analyzed.  11. 4/7/2021: MRI brain negative for brain metastases.  12. 4/27/2021: Started 1st line metastatic  therapy with carboplatin, paclitaxel, bevacizumab, and atezolizumab for metastatic non-small cell lung carcinoma, NOS.  13. 7/12/2021: PET/CT showed resolved mural nodules with increased cystic cavity in left lower lobe with no significant FDG uptake, less likely metastases; no enlarged hypermetabolic mediastinal, hilar, or axillary lymph nodes, decreased metabolic activity of intraosseous lesion in spine and pelvis; no new bone lesions.  14. 10/11/2021: PET/CT showed slight interval increase in intensity of metabolic activity of right pubic bone and left ischium with new focal uptake in L2 spinous process; resolved uptake at previous ground glass opacity along right medial lower lobe; unchanged cystic cavities/nodules in left lower lobe and right apical upper lobe; no pathologically enlarged hypermetabolic mediastinal, hilar, or axillary lymph nodes.  15. 1/10/2022: PET/CT showed new foci of abnormal skeletal FGD uptake in the thoracic and lumbar spine. Mild interval increase in intensity of previously demonstrated hypermetabolic skeletal lesions involving the pelvis and lumbar spine. Findings are consistent with progressive osseous metastatic disease. Subsequently off chemo for 1 month due to poor performance status.  16. 1/25/2022: Patient fell and fractured his femur. This was surgically repaired and he was subsequently cared for at a rehab unit.  17. 2/14/2022: Brain MRI without contrast (contrast not given due to inability to obtain IV access) showed no brain metastases.  18. 4/21/2022: Started 2nd line metastatic therapy with pemetrexed and carboplatin. Omission of carboplatin 6/2 and forward due to worsening anemia despite dose reduction.  19. 7/18/2022: PET/CT showed enlarged and increased FDG avid skeletal metastases, increased left para-aortic retroperitoneal lymph node increased in size and FDG avidity, findings consistent with progressive disease.  20. 8/3/2022: Started 3rd line metastatic therapy with  Taxotere 60 mg/m2 every 3 weeks.  21. 11/21/2022: PET scan showed partial response, left para-aortic retroperitoneal lymph node has decreased from 2.6 x 1.7 cm (SUVmax 7.9) to 2.3 x 1.4 cm (SUVmax 5.7), skeletal metastases no longer demonstrate FDG activity. Kt elected to take a 2-month break from chemo.  22. 1/23/2023: PET scan showed increasing metabolic activity of the left periaortic (max SUV 8.1, previously 5.7) left external iliac (max SUV 7.0, previously 3.1), and right external iliac (max SUV 5.1, previously 3.7) lymph nodes with development/reactivation of multiple FDG avid osseous lesions.  23. 3/23/2023: Resumption of Taxotere every 3 weeks. Delayed due to insurance issues.  24. 6/26/2023: PET scan showed partial response with decreased uptake associated with the retroperitoneal and pelvic lymphadenopathy as well as bone metastases.    Interval History:  Doing well. No complaints or concerns. His left toe infection has improved. Has mild sensory neuropathy to fingertips. Denies rash, edema, dyspena/cough.     Review of Systems:  A complete review of systems was negative except as noted in the HPI.   Past medical, Surgical, and social history:  Reviewed    Physical Examination:  /69   Pulse 74   Temp 98.4  F (36.9  C) (Oral)   Resp 16   SpO2 97%   Wt Readings from Last 10 Encounters:   09/07/23 79.4 kg (175 lb)   08/17/23 79.4 kg (175 lb)   07/27/23 79.4 kg (175 lb)   07/27/23 79.4 kg (175 lb)   05/25/23 78 kg (172 lb)   03/23/23 79.8 kg (176 lb)   11/16/22 78 kg (172 lb)   10/26/22 77.1 kg (170 lb)   10/12/22 76.7 kg (169 lb)   09/08/22 76.7 kg (169 lb)     General: Pleasant patient in no acute distress.  Skin: Warm and dry.   Eyes: Anicteric.   ENT: Slightly dysphonic voice.   Lungs: Normal work of breathing.   Abdomen: Non-distended.  Extremities: No peripheral edema. Left hand flexed. Left foot boot.   Mental: Calm, cooperative, appropriate. Mood euthymic. Responds with minimal information,  unclear reliability.   Neuro: Alert and oriented x 3. Right paresis.    Laboratory Data:  CBC, CMP reviewed.       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   [phone]      Again, thank you for allowing me to participate in the care of your patient.        Sincerely,        RENEE LO PA-C

## 2023-09-07 NOTE — PROGRESS NOTES
"Oncology Rooming Note    September 7, 2023 8:19 AM   Kt Rasmussen is a 63 year old male who presents for:    Chief Complaint   Patient presents with    Oncology Clinic Visit     Initial Vitals: /69   Pulse 74   Temp 98.4  F (36.9  C) (Oral)   Resp 16   SpO2 97%  Estimated body mass index is 21.87 kg/m  as calculated from the following:    Height as of 5/25/23: 1.905 m (6' 3\").    Weight as of 8/17/23: 79.4 kg (175 lb). There is no height or weight on file to calculate BSA.  No Pain (0) Comment: Data Unavailable   No LMP for male patient.  Allergies reviewed: Yes  Medications reviewed: Yes    Medications: Medication refills not needed today.  Pharmacy name entered into EPIC:    [facility] [place] - [address] [facility] [place], [place] - [address] [id number] - [place] [address] [address] AT [facility] & [facility] 7  [place] [facility] - [place], [place] - [address] [facility], [facility]. - [address]    Clinical concerns:   pa was notified.      Julisa Whelan CMA            "

## 2023-09-07 NOTE — PROGRESS NOTES
Oncology/Hematology Visit Note  Sep 7, 2023    Reason for Visit: Follow up of metastatic lung cancer    Primary Oncologist: Dr. John    Oncologic History:  1. 5/05/2016: Presented with near-obstructing large mass coming off the cheikh and likely the posterior wall, seen on bronchoscopy. Biopsy of the mass showed invasive squamous cell carcinoma, moderately differentiating and focally keratinizing.  Procedure was complicated by significant bleeding.  Tumor was completely debulked (via bronchoscopy) in both main stem bronchi and distal trachea. NGS showed no mutations in EGFR, KRAS, BRAF, NRAS, HRAS, PIK3CA, ERBB2, MET, or JAK2.  2. 5/23/2016: PET/CT scan showed evidence of residual tumor with decreased endobronchial mass. Indeterminate, mildly hypermetabolic mesentery with prominent lymph nodes and area of enhancement of the right hepatic lobe present. Felt to have T4-NX-M0 disease.  3. 6/09/2016: Started concurrent chemoradiation with weekly paclitaxel and carboplatin.  4. 7/30/2016: Completed radiation.  Subsequently received 2 cycles of consolidation paclitaxel and carboplatin.   5. 9/13/2019: Presented with 6-month history of dysphonia and left vocal exophytic lesion. Left true vocal fold biopsy showed at least squamous cell carcinoma in situ, cannot exclude superficial invasion.  6. 9/30/2019: Excision of left vocal cord mass showed fragments of invasive squamous cell carcinoma, well differentiated and keratinizing.  Margins negative for malignancy.  7. 2/16/2021: CT chest w/o contrast showed thin-walled cavity in left lower lobe with 2 solid peripheral nodules measuring 9 mm each. No lymphadenopathy.  8. 3/01/2021: PET scan showed hypermetabolic nodules in left lower lobe and right lung, consistent with metastases; hypermetabolic adenopathy in chest, abdomen, pelvis; nonspecific uptake at tongue; hypermetabolic intraosseous lesions in spine and pelvis consistent with metastatic disease; left axillary  hypermetabolic lymph nodes related to COVID-19 vaccination.  9. 3/12/2021: CT-guided lung biopsy showed non-small cell carcinoma, not otherwise specified -- with opinion by [facility] pathologist.  10. 3/18/2021: Lung NGS panel negative for mutations in BRAF, EGFR, ERBB2, IDH1, IDH2, KRAS, MET, NRAS, RET. PD-L1 expression negative (TPS <1%). Due to minimal amount of DNA obtained from specimen, other biomarkers could not be analyzed.  11. 4/7/2021: MRI brain negative for brain metastases.  12. 4/27/2021: Started 1st line metastatic therapy with carboplatin, paclitaxel, bevacizumab, and atezolizumab for metastatic non-small cell lung carcinoma, NOS.  13. 7/12/2021: PET/CT showed resolved mural nodules with increased cystic cavity in left lower lobe with no significant FDG uptake, less likely metastases; no enlarged hypermetabolic mediastinal, hilar, or axillary lymph nodes, decreased metabolic activity of intraosseous lesion in spine and pelvis; no new bone lesions.  14. 10/11/2021: PET/CT showed slight interval increase in intensity of metabolic activity of right pubic bone and left ischium with new focal uptake in L2 spinous process; resolved uptake at previous ground glass opacity along right medial lower lobe; unchanged cystic cavities/nodules in left lower lobe and right apical upper lobe; no pathologically enlarged hypermetabolic mediastinal, hilar, or axillary lymph nodes.  15. 1/10/2022: PET/CT showed new foci of abnormal skeletal FGD uptake in the thoracic and lumbar spine. Mild interval increase in intensity of previously demonstrated hypermetabolic skeletal lesions involving the pelvis and lumbar spine. Findings are consistent with progressive osseous metastatic disease. Subsequently off chemo for 1 month due to poor performance status.  16. 1/25/2022: Patient fell and fractured his femur. This was surgically repaired and he was subsequently cared for at a rehab unit.  17. 2/14/2022: Brain MRI without  contrast (contrast not given due to inability to obtain IV access) showed no brain metastases.  18. 4/21/2022: Started 2nd line metastatic therapy with pemetrexed and carboplatin. Omission of carboplatin 6/2 and forward due to worsening anemia despite dose reduction.  19. 7/18/2022: PET/CT showed enlarged and increased FDG avid skeletal metastases, increased left para-aortic retroperitoneal lymph node increased in size and FDG avidity, findings consistent with progressive disease.  20. 8/3/2022: Started 3rd line metastatic therapy with Taxotere 60 mg/m2 every 3 weeks.  21. 11/21/2022: PET scan showed partial response, left para-aortic retroperitoneal lymph node has decreased from 2.6 x 1.7 cm (SUVmax 7.9) to 2.3 x 1.4 cm (SUVmax 5.7), skeletal metastases no longer demonstrate FDG activity. Kt elected to take a 2-month break from chemo.  22. 1/23/2023: PET scan showed increasing metabolic activity of the left periaortic (max SUV 8.1, previously 5.7) left external iliac (max SUV 7.0, previously 3.1), and right external iliac (max SUV 5.1, previously 3.7) lymph nodes with development/reactivation of multiple FDG avid osseous lesions.  23. 3/23/2023: Resumption of Taxotere every 3 weeks. Delayed due to insurance issues.  24. 6/26/2023: PET scan showed partial response with decreased uptake associated with the retroperitoneal and pelvic lymphadenopathy as well as bone metastases.    Interval History:  Doing well. No complaints or concerns. His left toe infection has improved. Has mild sensory neuropathy to fingertips. Denies rash, edema, dyspena/cough.     Review of Systems:  A complete review of systems was negative except as noted in the HPI.   Past medical, Surgical, and social history:  Reviewed    Physical Examination:  /69   Pulse 74   Temp 98.4  F (36.9  C) (Oral)   Resp 16   SpO2 97%   Wt Readings from Last 10 Encounters:   09/07/23 79.4 kg (175 lb)   08/17/23 79.4 kg (175 lb)   07/27/23 79.4 kg (175  lb)   07/27/23 79.4 kg (175 lb)   05/25/23 78 kg (172 lb)   03/23/23 79.8 kg (176 lb)   11/16/22 78 kg (172 lb)   10/26/22 77.1 kg (170 lb)   10/12/22 76.7 kg (169 lb)   09/08/22 76.7 kg (169 lb)     General: Pleasant patient in no acute distress.  Skin: Warm and dry.   Eyes: Anicteric.   ENT: Slightly dysphonic voice.   Lungs: Normal work of breathing.   Abdomen: Non-distended.  Extremities: No peripheral edema. Left hand flexed. Left foot boot.   Mental: Calm, cooperative, appropriate. Mood euthymic. Responds with minimal information, unclear reliability.   Neuro: Alert and oriented x 3. Right paresis.    Laboratory Data:  CBC, CMP reviewed.       Assessment and Plan:  Kt Rasmussen is a 62 year old male with:     # Metastatic non-small (non-squamous) cell lung carcinoma with metastases to lymph nodes, bones, and bilateral lungs  # History of locally advanced endobronchial invasive squamous cell carcinoma diagnosed 5/2016, status post debulking and chemoradiation   - A repeat 6/26/2023 PET scan showed partial response  - Continue Taxotere + Neulasta, 20% dose reduced for prior cytopenias, every 3 weeks  - Repeat PET scan end of 9/23  - Follow-up with MICHAEL alternating with MD every cycle    20 minutes spent on the date of the encounter doing chart review, review of test results, interpretation of tests, patient visit, and documentation     Renee Rubalcava PA-C  [facility]   [phone]

## 2023-09-07 NOTE — PROGRESS NOTES
Infusion Nursing Note:  Kt Rasmussen presents today for C16D1 Taxotere.    Patient seen by provider today: Yes: Renee   present during visit today: Not Applicable.    Note: N/A.      Intravenous Access:  Implanted Port.  Accessed in FT.    Treatment Conditions:  Lab Results   Component Value Date    HGB 10.7 (L) 09/07/2023    WBC 8.5 09/07/2023    ANEU 0.5 (L) 08/11/2022    ANEUTAUTO 6.7 09/07/2023     09/07/2023        Lab Results   Component Value Date     01/25/2023    POTASSIUM 4.0 01/25/2023    MAG 1.9 10/13/2016    CR 0.71 01/25/2023    BEVERLY 9.3 01/25/2023    BILITOTAL 0.5 09/07/2023    ALBUMIN 3.8 09/07/2023    ALT 16 09/07/2023    AST 21 09/07/2023       Results reviewed, labs MET treatment parameters, ok to proceed with treatment.      Post Infusion Assessment:  Patient tolerated infusion without incident.  Blood return noted pre and post infusion.  Site patent and intact, free from redness, edema or discomfort.  No evidence of extravasations.  Access discontinued per protocol.     ONPRO  Was placed on patient's: back of right arm.    Was placed at 1000 AM    Podpal used: Yes    ONPRO injector device Lot number: [id number]    Patient education included: what patient can expect after application, what colored lights mean on the device, when to remove device, when and where to call with questions or issues, all patients questions answered, and that Neulasta administration will occur at 1300 on 9/8/23.    Patient tolerated administration well.      Discharge Plan:   Discharge instructions reviewed with: Patient.  Patient and/or family verbalized understanding of discharge instructions and all questions answered.  AVS to patient via [employer].  Patient will return 9/28/23 for next appointment.   Patient discharged in stable condition accompanied by: self.  Departure Mode: Wheelchair.      Filipe Carr RN

## 2023-09-22 NOTE — PROGRESS NOTES
[facility]    Hematology/Oncology Established Patient Follow-up Note      Today's Date: 9/28/2023    Reason for Follow-up: Metastatic non-small cell lung carcinoma.    HISTORY OF PRESENT ILLNESS: Kt Rasmussen is a 63 year old male who presents with the following oncologic history:  1. 5/05/2016: Presented with near-obstructing large mass coming off the cheikh and likely the posterior wall, seen on bronchoscopy. Biopsy of the mass showed invasive squamous cell carcinoma, moderately differentiating and focally keratinizing.  Procedure was complicated by significant bleeding.  Tumor was completely debulked (via bronchoscopy) in both main stem bronchi and distal trachea. NGS showed no mutations in EGFR, KRAS, BRAF, NRAS, HRAS, PIK3CA, ERBB2, MET, or JAK2.  2. 5/23/2016: PET/CT scan showed evidence of residual tumor with decreased endobronchial mass. Indeterminate, mildly hypermetabolic mesentery with prominent lymph nodes and area of enhancement of the right hepatic lobe present. Felt to have T4-NX-M0 disease.  3. 6/09/2016: Started concurrent chemoradiation with weekly paclitaxel and carboplatin.  4. 7/30/2016: Completed radiation.  Subsequently received 2 cycles of consolidation paclitaxel and carboplatin.   5. 9/13/2019: Presented with 6-month history of dysphonia and left vocal exophytic lesion. Left true vocal fold biopsy showed at least squamous cell carcinoma in situ, cannot exclude superficial invasion.  6. 9/30/2019: Excision of left vocal cord mass showed fragments of invasive squamous cell carcinoma, well differentiated and keratinizing.  Margins negative for malignancy.  7. 2/16/2021: CT chest w/o contrast showed thin-walled cavity in left lower lobe with 2 solid peripheral nodules measuring 9 mm each. No lymphadenopathy.  8. 3/01/2021: PET scan showed hypermetabolic nodules in left lower lobe and right lung, consistent with metastases; hypermetabolic adenopathy in chest, abdomen,  pelvis; nonspecific uptake at tongue; hypermetabolic intraosseous lesions in spine and pelvis consistent with metastatic disease; left axillary hypermetabolic lymph nodes related to COVID-19 vaccination.  9. 3/12/2021: CT-guided lung biopsy showed non-small cell carcinoma, not otherwise specified -- with opinion by [facility] pathologist.  10. 3/18/2021: Lung NGS panel negative for mutations in BRAF, EGFR, ERBB2, IDH1, IDH2, KRAS, MET, NRAS, RET. PD-L1 expression negative (TPS <1%). Due to minimal amount of DNA obtained from specimen, other biomarkers could not be analyzed.  11. 4/7/2021: MRI brain negative for brain metastases.  12. 4/27/2021: Started 1st line metastatic therapy with carboplatin, paclitaxel, bevacizumab, and atezolizumab for metastatic non-small cell lung carcinoma, NOS.  13. 7/12/2021: PET/CT showed resolved mural nodules with increased cystic cavity in left lower lobe with no significant FDG uptake, less likely metastases; no enlarged hypermetabolic mediastinal, hilar, or axillary lymph nodes, decreased metabolic activity of intraosseous lesion in spine and pelvis; no new bone lesions.  14. 10/11/2021: PET/CT showed slight interval increase in intensity of metabolic activity of right pubic bone and left ischium with new focal uptake in L2 spinous process; resolved uptake at previous ground glass opacity along right medial lower lobe; unchanged cystic cavities/nodules in left lower lobe and right apical upper lobe; no pathologically enlarged hypermetabolic mediastinal, hilar, or axillary lymph nodes.  15. 1/10/2022: PET/CT showed new foci of abnormal skeletal FGD uptake in the thoracic and lumbar spine. Mild interval increase in intensity of previously demonstrated hypermetabolic skeletal lesions involving the pelvis and lumbar spine. Findings are consistent with progressive osseous metastatic disease. Subsequently off chemo for 1 month due to poor performance status.  16. 1/25/2022: Patient fell  and fractured his femur. This was surgically repaired and he was subsequently cared for at a rehab unit.  17. 2/14/2022: Brain MRI without contrast (contrast not given due to inability to obtain IV access) showed no brain metastases.  18. 4/21/2022: Started 2nd line metastatic therapy with pemetrexed and carboplatin. Omission of carboplatin 6/2 and forward due to worsening anemia despite dose reduction.  19. 7/18/2022: PET/CT showed enlarged and increased FDG avid skeletal metastases, increased left para-aortic retroperitoneal lymph node increased in size and FDG avidity, findings consistent with progressive disease.  20. 8/3/2022: Started 3rd line metastatic therapy with Taxotere 60 mg/m2 every 3 weeks.  21. 11/21/2022: PET scan showed partial response, left para-aortic retroperitoneal lymph node has decreased from 2.6 x 1.7 cm (SUVmax 7.9) to 2.3 x 1.4 cm (SUVmax 5.7), skeletal metastases no longer demonstrate FDG activity. Kt elected to take a 2-month break from chemo.  22. 1/23/2023: PET scan showed increasing metabolic activity of the left periaortic (max SUV 8.1, previously 5.7) left external iliac (max SUV 7.0, previously 3.1), and right external iliac (max SUV 5.1, previously 3.7) lymph nodes with development/reactivation of multiple FDG avid osseous lesions.  23. 3/23/2023: Resumption of Taxotere every 3 weeks. Delayed due to insurance issues.  24. 6/26/2023: PET scan showed partial response with decreased uptake associated with the retroperitoneal and pelvic lymphadenopathy as well as bone metastases.    INTERIM HISTORY:  Kt reports feeling well with only very mild tingling at his fingertips; no new pain or dyspnea.    REVIEW OF SYSTEMS:   14 point ROS was reviewed and is negative other than as noted above in HPI.       HOME MEDICATIONS:  Current Outpatient Medications   Medication Sig Dispense Refill    atorvastatin (LIPITOR) 40 MG tablet Take 40 mg by mouth daily      ELIQUIS ANTICOAGULANT 5 MG  tablet            ALLERGIES:  Allergies   Allergen Reactions    No Known Drug Allergy          PAST MEDICAL HISTORY:  Past Medical History:   Diagnosis Date    Alcohol abuse, unspecified     Cerebral infarction (H)     2009, right side residual and aphasia    Dyslipidemia     GERD (gastroesophageal reflux disease)     Lung cancer (H)     Unspecified essential hypertension          PAST SURGICAL HISTORY:  Past Surgical History:   Procedure Laterality Date    BRONCHOSCOPY FLEXIBLE AND RIGID N/A 2016    Procedure: BRONCHOSCOPY FLEXIBLE AND RIGID;  Surgeon: Tony Talbot MD;  Location: [facility] OR    ESOPHAGOSCOPY FLEXIBLE N/A 2019    Procedure: flexible esophagoscopy;  Surgeon: Lizzy Johnson MD;  Location: [facility] OR    INJECT STEROID (LOCATION) N/A 2019    Procedure: steroid injection;  Surgeon: Lizzy Johnson MD;  Location: [facility] OR    IR CHEST PORT PLACEMENT > 5 YRS OF AGE  2021    IR CHEST PORT PLACEMENT > 5 YRS OF AGE  2022    IR PORT CHECK RIGHT  2022    IR PORT REMOVAL RIGHT  2022    LASER CO2 LARYNGOSCOPY N/A 2019    Procedure: Microdirect laryngoscopy with excision of laryngeal mass, CO2 laser;  Surgeon: Lizzy Johnson MD;  Location: [facility] OR    ZZC NONSPECIFIC PROCEDURE      R tympanoplasty         SOCIAL HISTORY:  Social History     Socioeconomic History    Marital status: Single     Spouse name: Not on file    Number of children: Not on file    Years of education: Not on file    Highest education level: Not on file   Occupational History    Not on file   Tobacco Use    Smoking status: Former     Packs/day: 1.00     Types: Cigarettes     Quit date: 2009     Years since quittin.9    Smokeless tobacco: Never   Substance and Sexual Activity    Alcohol use: Not Currently    Drug use: No    Sexual activity: Not on file   Other Topics Concern    Parent/sibling w/ CABG, MI or angioplasty before 65F 55M? Not Asked   Social History Narrative    Not on file     Social  Determinants of Health     Financial Resource Strain: Not on file   Food Insecurity: Not on file   Transportation Needs: Not on file   Physical Activity: Not on file   Stress: Not on file   Social Connections: Not on file   Interpersonal Safety: Not At Risk (2023)    Humiliation, Afraid, Rape, and Kick questionnaire     Fear of Current or Ex-Partner: No     Emotionally Abused: No     Physically Abused: No     Sexually Abused: No   Housing Stability: Not on file   Resides at assisted living.      FAMILY HISTORY:  Family History   Problem Relation Age of Onset    Cancer Father          PHYSICAL EXAM:  Vital signs:  /74   Pulse 70   Temp 97.5  F (36.4  C) (Oral)   Resp 18   Wt 79.4 kg (175 lb)   SpO2 96%   BMI 21.87 kg/m     ECO  GENERAL: No acute distress.  EYES: No scleral icterus. No overt erythema.  LYMPH: No cervical, supraclavicular lymphadenopathy.  CARDIAC: Regular rate and rhythm with no murmurs.  RESPIRATORY: Clear to auscultation bilaterally. Hoarseness present.  EXTREMITIES: No clubbing, cyanosis, or edema.  SKIN: No overt rashes, discolorations, or lesions over the face and neck.  NEUROLOGIC: Alert.  Dysphonia present and stable. No overt tremors. Stable right upper extremity weakness and contracture.  PSYCHIATRIC: Normal affect and mood.  Does not appear anxious.  GAIT: Sitting in wheelchair.      LABS:  CBC RESULTS:   Recent Labs   Lab Test 23  0852   WBC 8.5   RBC 3.86*   HGB 11.1*   HCT 36.0*   MCV 93   MCH 28.8   MCHC 30.8*   RDW 16.5*        Last Comprehensive Metabolic Panel:  Sodium   Date Value Ref Range Status   2023 140 136 - 145 mmol/L Final   2021 140 133 - 144 mmol/L Final     Potassium   Date Value Ref Range Status   2023 4.0 3.4 - 5.3 mmol/L Final   2022 4.1 3.4 - 5.3 mmol/L Final   2021 4.3 3.4 - 5.3 mmol/L Final     Chloride   Date Value Ref Range Status   2023 102 98 - 107 mmol/L Final   2022 109 94 - 109 mmol/L  Final   06/29/2021 111 (H) 94 - 109 mmol/L Final     Carbon Dioxide   Date Value Ref Range Status   06/29/2021 25 20 - 32 mmol/L Final     Carbon Dioxide (CO2)   Date Value Ref Range Status   01/25/2023 28 22 - 29 mmol/L Final   12/07/2022 25 20 - 32 mmol/L Final     Anion Gap   Date Value Ref Range Status   01/25/2023 10 7 - 15 mmol/L Final   12/07/2022 7 3 - 14 mmol/L Final   06/29/2021 4 3 - 14 mmol/L Final     Glucose   Date Value Ref Range Status   01/25/2023 95 70 - 99 mg/dL Final   12/07/2022 91 70 - 99 mg/dL Final   06/29/2021 85 70 - 99 mg/dL Final     Urea Nitrogen   Date Value Ref Range Status   01/25/2023 16.0 8.0 - 23.0 mg/dL Final   12/07/2022 17 7 - 30 mg/dL Final   06/29/2021 17 7 - 30 mg/dL Final     Creatinine   Date Value Ref Range Status   01/25/2023 0.71 0.67 - 1.17 mg/dL Final   06/29/2021 0.84 0.66 - 1.25 mg/dL Final     GFR Estimate   Date Value Ref Range Status   01/25/2023 >90 >60 mL/min/1.73m2 Final     Comment:     eGFR calculated using 2021 CKD-EPI equation.   06/29/2021 >90 >60 mL/min/[1.73_m2] Final     Comment:     Non  GFR Calc  Starting 12/18/2018, serum creatinine based estimated GFR (eGFR) will be   calculated using the Chronic Kidney Disease Epidemiology Collaboration   (CKD-EPI) equation.       Calcium   Date Value Ref Range Status   01/25/2023 9.3 8.8 - 10.2 mg/dL Final   06/29/2021 8.7 8.5 - 10.1 mg/dL Final     Bilirubin Total   Date Value Ref Range Status   09/07/2023 0.5 <=1.2 mg/dL Final   06/29/2021 0.3 0.2 - 1.3 mg/dL Final     Alkaline Phosphatase   Date Value Ref Range Status   09/07/2023 109 40 - 129 U/L Final   06/29/2021 73 40 - 150 U/L Final     ALT   Date Value Ref Range Status   09/07/2023 16 0 - 70 U/L Final     Comment:     Reference intervals for this test were updated on 6/12/2023 to more accurately reflect our healthy population. There may be differences in the flagging of prior results with similar values performed with this method.  Interpretation of those prior results can be made in the context of the updated reference intervals.     06/29/2021 34 0 - 70 U/L Final     AST   Date Value Ref Range Status   09/07/2023 21 0 - 45 U/L Final     Comment:     Reference intervals for this test were updated on 6/12/2023 to more accurately reflect our healthy population. There may be differences in the flagging of prior results with similar values performed with this method. Interpretation of those prior results can be made in the context of the updated reference intervals.   06/29/2021 26 0 - 45 U/L Final         PATHOLOGY:  None new.    IMAGING:  Reviewed as per HPI.    ASSESSMENT/PLAN:  Kt Rasmussen is a 62 year old male with the following issues:  1. Metastatic non-small (non-squamous) cell lung carcinoma with metastases to lymph nodes, bones, and bilateral lungs  2. 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 Chemotherapy-induced anemia and thrombocytopenia  --Lung NGS panel negative for mutations in BRAF, EGFR, ERBB2, IDH1, IDH2, KRAS, MET, NRAS, RET. PD-L1 expression negative (TPS <1%). Guardant 360 negative for actionable mutations.  --Kt started 3rd line metastatic therapy with every 3-week Taxotere at 20% dose reduction (60 mg/m2) on 8/3/2022 due to progressive disease.  He tolerated this very well with minimal to no paresthesias. He then took a 2-month chemo break that was further delayed to 3.5 months due to insurance issues. He resumed on 3/23/2023 and continues to tolerate Taxotere well with minimal paresthesias.  --His PET scan has been delayed to 10/16/2023.   --I advised he continue Taxotere at 20% dose reduction due to past repeated issues with chemo-induced cytopenias.  All treatment is with palliative intent.  --Reviewed today's labs which show Hgb 10.7, WBC 7.1, platelets 167,000; ALT, AST, alk phos, and bilirubin are all normal. He may  proceed with chemo today.    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 Dysphonia  6. Dysphagia  -Kt underwent excision of a left vocal cord SCC on 9/30/2019 and has persistent dysphonia as a result of the lesion and excision.  He also has dysphagia but this is stable and swallowing is manageable.  -Continue follow-up with Dr. Wray.    7. History of dizziness and prior falls  --SW and guardian were previously notified of multiple falls.  --Prior brain MRI 2/14/2022 showed no brain metastases. However this was a suboptimal exam due to inability to administer contrast.  --He has not had recent falls and is using a wheelchair due to prior femoral fracture in 1/2022.    Sangita John MD  Hematology/Oncology  [facility]    Total time spent today: 30 minutes in chart review, patient evaluation, counseling, documentation, test and/or medication/prescription orders, and coordination of care.

## 2023-09-26 DIAGNOSIS — T45.1X5A CHEMOTHERAPY-INDUCED NEUTROPENIA (H): ICD-10-CM

## 2023-09-26 DIAGNOSIS — C79.51 NON-SMALL CELL LUNG CANCER METASTATIC TO BONE (H): ICD-10-CM

## 2023-09-26 DIAGNOSIS — C34.90 NON-SMALL CELL LUNG CANCER METASTATIC TO BONE (H): ICD-10-CM

## 2023-09-26 DIAGNOSIS — Z51.11 ENCOUNTER FOR ANTINEOPLASTIC CHEMOTHERAPY: Primary | ICD-10-CM

## 2023-09-26 DIAGNOSIS — D70.1 CHEMOTHERAPY-INDUCED NEUTROPENIA (H): ICD-10-CM

## 2023-09-26 RX ORDER — DIPHENHYDRAMINE HYDROCHLORIDE 50 MG/ML
50 INJECTION INTRAMUSCULAR; INTRAVENOUS
Status: CANCELLED
Start: 2023-09-28

## 2023-09-26 RX ORDER — HEPARIN SODIUM,PORCINE 10 UNIT/ML
5 VIAL (ML) INTRAVENOUS
Status: CANCELLED | OUTPATIENT
Start: 2023-09-28

## 2023-09-26 RX ORDER — ALBUTEROL SULFATE 90 UG/1
1-2 AEROSOL, METERED RESPIRATORY (INHALATION)
Status: CANCELLED
Start: 2023-09-28

## 2023-09-26 RX ORDER — HEPARIN SODIUM (PORCINE) LOCK FLUSH IV SOLN 100 UNIT/ML 100 UNIT/ML
5 SOLUTION INTRAVENOUS
Status: CANCELLED | OUTPATIENT
Start: 2023-09-28

## 2023-09-26 RX ORDER — ALBUTEROL SULFATE 0.83 MG/ML
2.5 SOLUTION RESPIRATORY (INHALATION)
Status: CANCELLED | OUTPATIENT
Start: 2023-09-28

## 2023-09-26 RX ORDER — EPINEPHRINE 1 MG/ML
0.3 INJECTION, SOLUTION, CONCENTRATE INTRAVENOUS EVERY 5 MIN PRN
Status: CANCELLED | OUTPATIENT
Start: 2023-09-28

## 2023-09-26 RX ORDER — MEPERIDINE HYDROCHLORIDE 25 MG/ML
25 INJECTION INTRAMUSCULAR; INTRAVENOUS; SUBCUTANEOUS EVERY 30 MIN PRN
Status: CANCELLED | OUTPATIENT
Start: 2023-09-28

## 2023-09-26 RX ORDER — LORAZEPAM 2 MG/ML
0.5 INJECTION INTRAMUSCULAR EVERY 4 HOURS PRN
Status: CANCELLED | OUTPATIENT
Start: 2023-09-28

## 2023-09-28 ENCOUNTER — LAB (OUTPATIENT)
Dept: INFUSION THERAPY | Facility: CLINIC | Age: 63
End: 2023-09-28
Attending: INTERNAL MEDICINE
Payer: COMMERCIAL

## 2023-09-28 ENCOUNTER — ONCOLOGY VISIT (OUTPATIENT)
Dept: ONCOLOGY | Facility: CLINIC | Age: 63
End: 2023-09-28
Attending: INTERNAL MEDICINE
Payer: COMMERCIAL

## 2023-09-28 VITALS
BODY MASS INDEX: 21.87 KG/M2 | SYSTOLIC BLOOD PRESSURE: 111 MMHG | DIASTOLIC BLOOD PRESSURE: 74 MMHG | HEART RATE: 70 BPM | TEMPERATURE: 97.5 F | RESPIRATION RATE: 18 BRPM | OXYGEN SATURATION: 96 % | WEIGHT: 175 LBS

## 2023-09-28 DIAGNOSIS — D64.81 ANEMIA DUE TO CHEMOTHERAPY: ICD-10-CM

## 2023-09-28 DIAGNOSIS — Z51.11 ENCOUNTER FOR ANTINEOPLASTIC CHEMOTHERAPY: Primary | ICD-10-CM

## 2023-09-28 DIAGNOSIS — T45.1X5A CHEMOTHERAPY-INDUCED NEUTROPENIA (H): ICD-10-CM

## 2023-09-28 DIAGNOSIS — C79.51 NON-SMALL CELL LUNG CANCER METASTATIC TO BONE (H): ICD-10-CM

## 2023-09-28 DIAGNOSIS — T45.1X5A ANEMIA DUE TO CHEMOTHERAPY: ICD-10-CM

## 2023-09-28 DIAGNOSIS — C34.90 NON-SMALL CELL LUNG CANCER METASTATIC TO BONE (H): ICD-10-CM

## 2023-09-28 DIAGNOSIS — D70.1 CHEMOTHERAPY-INDUCED NEUTROPENIA (H): ICD-10-CM

## 2023-09-28 DIAGNOSIS — C79.51 NON-SMALL CELL LUNG CANCER METASTATIC TO BONE (H): Primary | ICD-10-CM

## 2023-09-28 DIAGNOSIS — C78.02 MALIGNANT NEOPLASM METASTATIC TO BOTH LUNGS (H): ICD-10-CM

## 2023-09-28 DIAGNOSIS — C78.01 MALIGNANT NEOPLASM METASTATIC TO BOTH LUNGS (H): ICD-10-CM

## 2023-09-28 DIAGNOSIS — C34.90 NON-SMALL CELL CARCINOMA OF LUNG, STAGE 3, UNSPECIFIED LATERALITY (H): ICD-10-CM

## 2023-09-28 DIAGNOSIS — D69.59 THROMBOCYTOPENIA DUE TO DRUGS: ICD-10-CM

## 2023-09-28 DIAGNOSIS — C34.90 NON-SMALL CELL LUNG CANCER METASTATIC TO BONE (H): Primary | ICD-10-CM

## 2023-09-28 DIAGNOSIS — T50.905A THROMBOCYTOPENIA DUE TO DRUGS: ICD-10-CM

## 2023-09-28 LAB
ALBUMIN SERPL BCG-MCNC: 3.6 G/DL (ref 3.5–5.2)
ALP SERPL-CCNC: 98 U/L (ref 40–129)
ALT SERPL W P-5'-P-CCNC: 39 U/L (ref 0–70)
AST SERPL W P-5'-P-CCNC: 40 U/L (ref 0–45)
BASOPHILS # BLD AUTO: 0 10E3/UL (ref 0–0.2)
BASOPHILS NFR BLD AUTO: 0 %
BILIRUB DIRECT SERPL-MCNC: <0.2 MG/DL (ref 0–0.3)
BILIRUB SERPL-MCNC: 0.4 MG/DL
EOSINOPHIL # BLD AUTO: 0.1 10E3/UL (ref 0–0.7)
EOSINOPHIL NFR BLD AUTO: 1 %
ERYTHROCYTE [DISTWIDTH] IN BLOOD BY AUTOMATED COUNT: 17.2 % (ref 10–15)
HCT VFR BLD AUTO: 34.8 % (ref 40–53)
HGB BLD-MCNC: 10.7 G/DL (ref 13.3–17.7)
IMM GRANULOCYTES # BLD: 0 10E3/UL
IMM GRANULOCYTES NFR BLD: 0 %
LYMPHOCYTES # BLD AUTO: 0.8 10E3/UL (ref 0.8–5.3)
LYMPHOCYTES NFR BLD AUTO: 12 %
MCH RBC QN AUTO: 28.3 PG (ref 26.5–33)
MCHC RBC AUTO-ENTMCNC: 30.7 G/DL (ref 31.5–36.5)
MCV RBC AUTO: 92 FL (ref 78–100)
MONOCYTES # BLD AUTO: 0.6 10E3/UL (ref 0–1.3)
MONOCYTES NFR BLD AUTO: 9 %
NEUTROPHILS # BLD AUTO: 5.5 10E3/UL (ref 1.6–8.3)
NEUTROPHILS NFR BLD AUTO: 78 %
NRBC # BLD AUTO: 0 10E3/UL
NRBC BLD AUTO-RTO: 0 /100
PLATELET # BLD AUTO: 167 10E3/UL (ref 150–450)
PROT SERPL-MCNC: 5.8 G/DL (ref 6.4–8.3)
RBC # BLD AUTO: 3.78 10E6/UL (ref 4.4–5.9)
WBC # BLD AUTO: 7.1 10E3/UL (ref 4–11)

## 2023-09-28 PROCEDURE — 82040 ASSAY OF SERUM ALBUMIN: CPT | Performed by: INTERNAL MEDICINE

## 2023-09-28 PROCEDURE — G0463 HOSPITAL OUTPT CLINIC VISIT: HCPCS | Mod: 25 | Performed by: INTERNAL MEDICINE

## 2023-09-28 PROCEDURE — 96377 APPLICATON ON-BODY INJECTOR: CPT | Mod: XS | Performed by: INTERNAL MEDICINE

## 2023-09-28 PROCEDURE — 96375 TX/PRO/DX INJ NEW DRUG ADDON: CPT

## 2023-09-28 PROCEDURE — G0008 ADMIN INFLUENZA VIRUS VAC: HCPCS | Performed by: INTERNAL MEDICINE

## 2023-09-28 PROCEDURE — 99214 OFFICE O/P EST MOD 30 MIN: CPT | Performed by: INTERNAL MEDICINE

## 2023-09-28 PROCEDURE — 250N000011 HC RX IP 250 OP 636: Mod: JZ | Performed by: INTERNAL MEDICINE

## 2023-09-28 PROCEDURE — 96413 CHEMO IV INFUSION 1 HR: CPT

## 2023-09-28 PROCEDURE — 258N000003 HC RX IP 258 OP 636: Performed by: INTERNAL MEDICINE

## 2023-09-28 PROCEDURE — 85025 COMPLETE CBC W/AUTO DIFF WBC: CPT | Performed by: INTERNAL MEDICINE

## 2023-09-28 PROCEDURE — 90682 RIV4 VACC RECOMBINANT DNA IM: CPT | Performed by: INTERNAL MEDICINE

## 2023-09-28 PROCEDURE — 96372 THER/PROPH/DIAG INJ SC/IM: CPT | Performed by: INTERNAL MEDICINE

## 2023-09-28 RX ORDER — HEPARIN SODIUM (PORCINE) LOCK FLUSH IV SOLN 100 UNIT/ML 100 UNIT/ML
5 SOLUTION INTRAVENOUS
Status: DISCONTINUED | OUTPATIENT
Start: 2023-09-28 | End: 2023-09-28 | Stop reason: HOSPADM

## 2023-09-28 RX ORDER — APIXABAN 5 MG/1
TABLET, FILM COATED ORAL
COMMUNITY
Start: 2023-09-18 | End: 2024-07-03

## 2023-09-28 RX ORDER — ONDANSETRON 2 MG/ML
8 INJECTION INTRAMUSCULAR; INTRAVENOUS EVERY 6 HOURS PRN
Status: CANCELLED
Start: 2023-09-28

## 2023-09-28 RX ORDER — HEPARIN SODIUM,PORCINE 10 UNIT/ML
5 VIAL (ML) INTRAVENOUS
Status: CANCELLED | OUTPATIENT
Start: 2023-09-28

## 2023-09-28 RX ORDER — HEPARIN SODIUM (PORCINE) LOCK FLUSH IV SOLN 100 UNIT/ML 100 UNIT/ML
5 SOLUTION INTRAVENOUS
Status: CANCELLED | OUTPATIENT
Start: 2023-09-28

## 2023-09-28 RX ORDER — HEPARIN SODIUM,PORCINE 10 UNIT/ML
5 VIAL (ML) INTRAVENOUS
Status: DISCONTINUED | OUTPATIENT
Start: 2023-09-28 | End: 2023-09-28 | Stop reason: HOSPADM

## 2023-09-28 RX ADMIN — PEGFILGRASTIM 6 MG: KIT SUBCUTANEOUS at 10:44

## 2023-09-28 RX ADMIN — DEXAMETHASONE SODIUM PHOSPHATE: 10 INJECTION, SOLUTION INTRAMUSCULAR; INTRAVENOUS at 10:10

## 2023-09-28 RX ADMIN — Medication 5 ML: at 11:25

## 2023-09-28 RX ADMIN — INFLUENZA A VIRUS A/WEST VIRGINIA/30/2022 (H1N1) RECOMBINANT HEMAGGLUTININ ANTIGEN, INFLUENZA A VIRUS A/DARWIN/6/2021 (H3N2) RECOMBINANT HEMAGGLUTININ ANTIGEN, INFLUENZA B VIRUS B/AUSTRIA/1359417/2021 RECOMBINANT HEMAGGLUTININ ANTIGEN, AND INFLUENZA B VIRUS B/PHUKET/3073/2013 RECOMBINANT HEMAGGLUTININ ANTIGEN 0.5 ML: 45; 45; 45; 45 INJECTION INTRAMUSCULAR at 10:42

## 2023-09-28 RX ADMIN — SODIUM CHLORIDE 122 MG: 9 INJECTION, SOLUTION INTRAVENOUS at 10:25

## 2023-09-28 RX ADMIN — SODIUM CHLORIDE 250 ML: 9 INJECTION, SOLUTION INTRAVENOUS at 10:10

## 2023-09-28 ASSESSMENT — PAIN SCALES - GENERAL: PAINLEVEL: NO PAIN (0)

## 2023-09-28 NOTE — PROGRESS NOTES
"Oncology Rooming Note    September 28, 2023 9:14 AM   Kt Rasmussen is a 63 year old male who presents for:    Chief Complaint   Patient presents with    Oncology Clinic Visit     Initial Vitals: /74   Pulse 70   Temp 97.5  F (36.4  C) (Oral)   Resp 18   Wt 79.4 kg (175 lb)   SpO2 96%   BMI 21.87 kg/m   Estimated body mass index is 21.87 kg/m  as calculated from the following:    Height as of 9/7/23: 1.905 m (6' 3\").    Weight as of this encounter: 79.4 kg (175 lb). Body surface area is 2.05 meters squared.  No Pain (0) Comment: Data Unavailable   No LMP for male patient.  Allergies reviewed: Yes  Medications reviewed: Yes    Medications: Medication refills not needed today.  Pharmacy name entered into EPIC:    [facility] [place] [address] [facility] [place], [place] - [address] [id number] - [place] [id number] [facility] AT [facility]  [facility] - [place], [place] - [address] [place] [address], [facility]. - [facility] [address]    Clinical concerns: no       Shari J. Schoenberger, CMA              "

## 2023-09-28 NOTE — PROGRESS NOTES
Infusion Nursing Note:  Kt HERNANDEZ Valery presents today for taxotere.    Patient seen by provider today: Yes: Alvaro   present during visit today: Not Applicable.    Note: N/A.      Intravenous Access:  Implanted Port.    Treatment Conditions:  Lab Results   Component Value Date    HGB 10.7 (L) 09/28/2023    WBC 7.1 09/28/2023    ANEU 0.5 (L) 08/11/2022    ANEUTAUTO 5.5 09/28/2023     09/28/2023        Lab Results   Component Value Date     01/25/2023    POTASSIUM 4.0 01/25/2023    MAG 1.9 10/13/2016    CR 0.71 01/25/2023    BEVERLY 9.3 01/25/2023    BILITOTAL 0.4 09/28/2023    ALBUMIN 3.6 09/28/2023    ALT 39 09/28/2023    AST 40 09/28/2023       Results reviewed, labs MET treatment parameters, ok to proceed with treatment.      Post Infusion Assessment:  Patient tolerated infusion without incident.  Site patent and intact, free from redness, edema or discomfort.  No evidence of extravasations.  Access discontinued per protocol.       Discharge Plan:   Patient and/or family verbalized understanding of discharge instructions and all questions answered.  AVS to patient via [employer].  Patient will return 10/19 for next appointment.   Patient discharged in stable condition accompanied by: self.  Departure Mode: Wheelchair.      Arin Keith RN    ONPRO  Was placed on patient's: back of right arm.    Was placed at 1100 AM    Podpal used: Yes    ONPRO injector device Lot number: [id number]    Patient education included: what patient can expect after application, what colored lights mean on the device, when to remove device, when and where to call with questions or issues, all patients questions answered, and that Neulasta administration will occur at 1500.    Patient tolerated administration well.

## 2023-09-28 NOTE — PROGRESS NOTES
Nursing Note:  Kt Rasmussen presents today for port labs.    Patient seen by provider today: Yes: Dr. John   present during visit today: Not Applicable.    Note: N/A.    Intravenous Access:  Labs drawn without difficulty.  Implanted Port.    Discharge Plan:   Patient was sent to [facility] for Clinic appointment.    Magdalena Davila RN

## 2023-09-28 NOTE — LETTER
9/28/2023         RE: Kt Rasmussen  [id number] [address]  [facility] [address]        Dear Colleague,    Thank you for referring your patient, Kt Rasmussen, to the [facility]. Please see a copy of my visit note below.    [facility]    Hematology/Oncology Established Patient Follow-up Note      Today's Date: 9/28/2023    Reason for Follow-up: Metastatic non-small cell lung carcinoma.    HISTORY OF PRESENT ILLNESS: Kt Rasmussen is a 63 year old male who presents with the following oncologic history:  1. 5/05/2016: Presented with near-obstructing large mass coming off the cheikh and likely the posterior wall, seen on bronchoscopy. Biopsy of the mass showed invasive squamous cell carcinoma, moderately differentiating and focally keratinizing.  Procedure was complicated by significant bleeding.  Tumor was completely debulked (via bronchoscopy) in both main stem bronchi and distal trachea. NGS showed no mutations in EGFR, KRAS, BRAF, NRAS, HRAS, PIK3CA, ERBB2, MET, or JAK2.  2. 5/23/2016: PET/CT scan showed evidence of residual tumor with decreased endobronchial mass. Indeterminate, mildly hypermetabolic mesentery with prominent lymph nodes and area of enhancement of the right hepatic lobe present. Felt to have T4-NX-M0 disease.  3. 6/09/2016: Started concurrent chemoradiation with weekly paclitaxel and carboplatin.  4. 7/30/2016: Completed radiation.  Subsequently received 2 cycles of consolidation paclitaxel and carboplatin.   5. 9/13/2019: Presented with 6-month history of dysphonia and left vocal exophytic lesion. Left true vocal fold biopsy showed at least squamous cell carcinoma in situ, cannot exclude superficial invasion.  6. 9/30/2019: Excision of left vocal cord mass showed fragments of invasive squamous cell carcinoma, well differentiated and keratinizing.  Margins negative for malignancy.  7. 2/16/2021: CT chest w/o contrast showed thin-walled cavity in left  lower lobe with 2 solid peripheral nodules measuring 9 mm each. No lymphadenopathy.  8. 3/01/2021: PET scan showed hypermetabolic nodules in left lower lobe and right lung, consistent with metastases; hypermetabolic adenopathy in chest, abdomen, pelvis; nonspecific uptake at tongue; hypermetabolic intraosseous lesions in spine and pelvis consistent with metastatic disease; left axillary hypermetabolic lymph nodes related to COVID-19 vaccination.  9. 3/12/2021: CT-guided lung biopsy showed non-small cell carcinoma, not otherwise specified -- with opinion by [facility] pathologist.  10. 3/18/2021: Lung NGS panel negative for mutations in BRAF, EGFR, ERBB2, IDH1, IDH2, KRAS, MET, NRAS, RET. PD-L1 expression negative (TPS <1%). Due to minimal amount of DNA obtained from specimen, other biomarkers could not be analyzed.  11. 4/7/2021: MRI brain negative for brain metastases.  12. 4/27/2021: Started 1st line metastatic therapy with carboplatin, paclitaxel, bevacizumab, and atezolizumab for metastatic non-small cell lung carcinoma, NOS.  13. 7/12/2021: PET/CT showed resolved mural nodules with increased cystic cavity in left lower lobe with no significant FDG uptake, less likely metastases; no enlarged hypermetabolic mediastinal, hilar, or axillary lymph nodes, decreased metabolic activity of intraosseous lesion in spine and pelvis; no new bone lesions.  14. 10/11/2021: PET/CT showed slight interval increase in intensity of metabolic activity of right pubic bone and left ischium with new focal uptake in L2 spinous process; resolved uptake at previous ground glass opacity along right medial lower lobe; unchanged cystic cavities/nodules in left lower lobe and right apical upper lobe; no pathologically enlarged hypermetabolic mediastinal, hilar, or axillary lymph nodes.  15. 1/10/2022: PET/CT showed new foci of abnormal skeletal FGD uptake in the thoracic and lumbar spine. Mild interval increase in intensity of previously  demonstrated hypermetabolic skeletal lesions involving the pelvis and lumbar spine. Findings are consistent with progressive osseous metastatic disease. Subsequently off chemo for 1 month due to poor performance status.  16. 1/25/2022: Patient fell and fractured his femur. This was surgically repaired and he was subsequently cared for at a rehab unit.  17. 2/14/2022: Brain MRI without contrast (contrast not given due to inability to obtain IV access) showed no brain metastases.  18. 4/21/2022: Started 2nd line metastatic therapy with pemetrexed and carboplatin. Omission of carboplatin 6/2 and forward due to worsening anemia despite dose reduction.  19. 7/18/2022: PET/CT showed enlarged and increased FDG avid skeletal metastases, increased left para-aortic retroperitoneal lymph node increased in size and FDG avidity, findings consistent with progressive disease.  20. 8/3/2022: Started 3rd line metastatic therapy with Taxotere 60 mg/m2 every 3 weeks.  21. 11/21/2022: PET scan showed partial response, left para-aortic retroperitoneal lymph node has decreased from 2.6 x 1.7 cm (SUVmax 7.9) to 2.3 x 1.4 cm (SUVmax 5.7), skeletal metastases no longer demonstrate FDG activity. Kt elected to take a 2-month break from chemo.  22. 1/23/2023: PET scan showed increasing metabolic activity of the left periaortic (max SUV 8.1, previously 5.7) left external iliac (max SUV 7.0, previously 3.1), and right external iliac (max SUV 5.1, previously 3.7) lymph nodes with development/reactivation of multiple FDG avid osseous lesions.  23. 3/23/2023: Resumption of Taxotere every 3 weeks. Delayed due to insurance issues.  24. 6/26/2023: PET scan showed partial response with decreased uptake associated with the retroperitoneal and pelvic lymphadenopathy as well as bone metastases.    INTERIM HISTORY:  Kt reports feeling well with only very mild tingling at his fingertips; no new pain or dyspnea.    REVIEW OF SYSTEMS:   14 point ROS was  reviewed and is negative other than as noted above in HPI.       HOME MEDICATIONS:  Current Outpatient Medications   Medication Sig Dispense Refill     atorvastatin (LIPITOR) 40 MG tablet Take 40 mg by mouth daily       ELIQUIS ANTICOAGULANT 5 MG tablet            ALLERGIES:  Allergies   Allergen Reactions     No Known Drug Allergy          PAST MEDICAL HISTORY:  Past Medical History:   Diagnosis Date     Alcohol abuse, unspecified      Cerebral infarction (H)     , right side residual and aphasia     Dyslipidemia      GERD (gastroesophageal reflux disease)      Lung cancer (H)      Unspecified essential hypertension          PAST SURGICAL HISTORY:  Past Surgical History:   Procedure Laterality Date     BRONCHOSCOPY FLEXIBLE AND RIGID N/A 2016    Procedure: BRONCHOSCOPY FLEXIBLE AND RIGID;  Surgeon: Tony Talbot MD;  Location: [facility] OR     ESOPHAGOSCOPY FLEXIBLE N/A 2019    Procedure: flexible esophagoscopy;  Surgeon: Lizzy Johnson MD;  Location: [facility] OR     INJECT STEROID (LOCATION) N/A 2019    Procedure: steroid injection;  Surgeon: Lizzy Johnson MD;  Location: [facility] OR     IR CHEST PORT PLACEMENT > 5 YRS OF AGE  2021     IR CHEST PORT PLACEMENT > 5 YRS OF AGE  2022     IR PORT CHECK RIGHT  2022     IR PORT REMOVAL RIGHT  2022     LASER CO2 LARYNGOSCOPY N/A 2019    Procedure: Microdirect laryngoscopy with excision of laryngeal mass, CO2 laser;  Surgeon: Lizzy Johnson MD;  Location:  OR     [facility] NONSPECIFIC PROCEDURE      R tympanoplasty         SOCIAL HISTORY:  Social History     Socioeconomic History     Marital status: Single     Spouse name: Not on file     Number of children: Not on file     Years of education: Not on file     Highest education level: Not on file   Occupational History     Not on file   Tobacco Use     Smoking status: Former     Packs/day: 1.00     Types: Cigarettes     Quit date: 2009     Years since quittin.9     Smokeless  tobacco: Never   Substance and Sexual Activity     Alcohol use: Not Currently     Drug use: No     Sexual activity: Not on file   Other Topics Concern     Parent/sibling w/ CABG, MI or angioplasty before 65F 55M? Not Asked   Social History Narrative     Not on file     Social Determinants of Health     Financial Resource Strain: Not on file   Food Insecurity: Not on file   Transportation Needs: Not on file   Physical Activity: Not on file   Stress: Not on file   Social Connections: Not on file   Interpersonal Safety: Not At Risk (2023)    Humiliation, Afraid, Rape, and Kick questionnaire      Fear of Current or Ex-Partner: No      Emotionally Abused: No      Physically Abused: No      Sexually Abused: No   Housing Stability: Not on file   Resides at assisted living.      FAMILY HISTORY:  Family History   Problem Relation Age of Onset     Cancer Father          PHYSICAL EXAM:  Vital signs:  /74   Pulse 70   Temp 97.5  F (36.4  C) (Oral)   Resp 18   Wt 79.4 kg (175 lb)   SpO2 96%   BMI 21.87 kg/m     ECO  GENERAL: No acute distress.  EYES: No scleral icterus. No overt erythema.  LYMPH: No cervical, supraclavicular lymphadenopathy.  CARDIAC: Regular rate and rhythm with no murmurs.  RESPIRATORY: Clear to auscultation bilaterally. Hoarseness present.  EXTREMITIES: No clubbing, cyanosis, or edema.  SKIN: No overt rashes, discolorations, or lesions over the face and neck.  NEUROLOGIC: Alert.  Dysphonia present and stable. No overt tremors. Stable right upper extremity weakness and contracture.  PSYCHIATRIC: Normal affect and mood.  Does not appear anxious.  GAIT: Sitting in wheelchair.      LABS:  CBC RESULTS:   Recent Labs   Lab Test 23  0852   WBC 8.5   RBC 3.86*   HGB 11.1*   HCT 36.0*   MCV 93   MCH 28.8   MCHC 30.8*   RDW 16.5*        Last Comprehensive Metabolic Panel:  Sodium   Date Value Ref Range Status   2023 140 136 - 145 mmol/L Final   2021 140 133 - 144 mmol/L  Final     Potassium   Date Value Ref Range Status   01/25/2023 4.0 3.4 - 5.3 mmol/L Final   12/07/2022 4.1 3.4 - 5.3 mmol/L Final   06/29/2021 4.3 3.4 - 5.3 mmol/L Final     Chloride   Date Value Ref Range Status   01/25/2023 102 98 - 107 mmol/L Final   12/07/2022 109 94 - 109 mmol/L Final   06/29/2021 111 (H) 94 - 109 mmol/L Final     Carbon Dioxide   Date Value Ref Range Status   06/29/2021 25 20 - 32 mmol/L Final     Carbon Dioxide (CO2)   Date Value Ref Range Status   01/25/2023 28 22 - 29 mmol/L Final   12/07/2022 25 20 - 32 mmol/L Final     Anion Gap   Date Value Ref Range Status   01/25/2023 10 7 - 15 mmol/L Final   12/07/2022 7 3 - 14 mmol/L Final   06/29/2021 4 3 - 14 mmol/L Final     Glucose   Date Value Ref Range Status   01/25/2023 95 70 - 99 mg/dL Final   12/07/2022 91 70 - 99 mg/dL Final   06/29/2021 85 70 - 99 mg/dL Final     Urea Nitrogen   Date Value Ref Range Status   01/25/2023 16.0 8.0 - 23.0 mg/dL Final   12/07/2022 17 7 - 30 mg/dL Final   06/29/2021 17 7 - 30 mg/dL Final     Creatinine   Date Value Ref Range Status   01/25/2023 0.71 0.67 - 1.17 mg/dL Final   06/29/2021 0.84 0.66 - 1.25 mg/dL Final     GFR Estimate   Date Value Ref Range Status   01/25/2023 >90 >60 mL/min/1.73m2 Final     Comment:     eGFR calculated using 2021 CKD-EPI equation.   06/29/2021 >90 >60 mL/min/[1.73_m2] Final     Comment:     Non  GFR Calc  Starting 12/18/2018, serum creatinine based estimated GFR (eGFR) will be   calculated using the Chronic Kidney Disease Epidemiology Collaboration   (CKD-EPI) equation.       Calcium   Date Value Ref Range Status   01/25/2023 9.3 8.8 - 10.2 mg/dL Final   06/29/2021 8.7 8.5 - 10.1 mg/dL Final     Bilirubin Total   Date Value Ref Range Status   09/07/2023 0.5 <=1.2 mg/dL Final   06/29/2021 0.3 0.2 - 1.3 mg/dL Final     Alkaline Phosphatase   Date Value Ref Range Status   09/07/2023 109 40 - 129 U/L Final   06/29/2021 73 40 - 150 U/L Final     ALT   Date Value Ref  Range Status   09/07/2023 16 0 - 70 U/L Final     Comment:     Reference intervals for this test were updated on 6/12/2023 to more accurately reflect our healthy population. There may be differences in the flagging of prior results with similar values performed with this method. Interpretation of those prior results can be made in the context of the updated reference intervals.     06/29/2021 34 0 - 70 U/L Final     AST   Date Value Ref Range Status   09/07/2023 21 0 - 45 U/L Final     Comment:     Reference intervals for this test were updated on 6/12/2023 to more accurately reflect our healthy population. There may be differences in the flagging of prior results with similar values performed with this method. Interpretation of those prior results can be made in the context of the updated reference intervals.   06/29/2021 26 0 - 45 U/L Final         PATHOLOGY:  None new.    IMAGING:  Reviewed as per HPI.    ASSESSMENT/PLAN:  Kt Rasmussen is a 62 year old male with the following issues:  1. Metastatic non-small (non-squamous) cell lung carcinoma with metastases to lymph nodes, bones, and bilateral lungs  2. 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 Chemotherapy-induced anemia and thrombocytopenia  --Lung NGS panel negative for mutations in BRAF, EGFR, ERBB2, IDH1, IDH2, KRAS, MET, NRAS, RET. PD-L1 expression negative (TPS <1%). Guardant 360 negative for actionable mutations.  --Kt started 3rd line metastatic therapy with every 3-week Taxotere at 20% dose reduction (60 mg/m2) on 8/3/2022 due to progressive disease.  He tolerated this very well with minimal to no paresthesias. He then took a 2-month chemo break that was further delayed to 3.5 months due to insurance issues. He resumed on 3/23/2023 and continues to tolerate Taxotere well with minimal paresthesias.  --His PET scan has been delayed to 10/16/2023.   --I  "advised he continue Taxotere at 20% dose reduction due to past repeated issues with chemo-induced cytopenias.  All treatment is with palliative intent.  --Reviewed today's labs which show Hgb 10.7, WBC 7.1, platelets 167,000; ALT, AST, alk phos, and bilirubin are all normal. He may proceed with chemo today.    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 Dysphonia  6. Dysphagia  -Kt underwent excision of a left vocal cord SCC on 9/30/2019 and has persistent dysphonia as a result of the lesion and excision.  He also has dysphagia but this is stable and swallowing is manageable.  -Continue follow-up with Dr. Wray.    7. History of dizziness and prior falls  --SW and guardian were previously notified of multiple falls.  --Prior brain MRI 2/14/2022 showed no brain metastases. However this was a suboptimal exam due to inability to administer contrast.  --He has not had recent falls and is using a wheelchair due to prior femoral fracture in 1/2022.    Sangita John MD  Hematology/Oncology  [facility]    Total time spent today: 30 minutes in chart review, patient evaluation, counseling, documentation, test and/or medication/prescription orders, and coordination of care.     Oncology Rooming Note    September 28, 2023 9:14 AM   Kt Rasmussen is a 63 year old male who presents for:    Chief Complaint   Patient presents with     Oncology Clinic Visit     Initial Vitals: /74   Pulse 70   Temp 97.5  F (36.4  C) (Oral)   Resp 18   Wt 79.4 kg (175 lb)   SpO2 96%   BMI 21.87 kg/m   Estimated body mass index is 21.87 kg/m  as calculated from the following:    Height as of 9/7/23: 1.905 m (6' 3\").    Weight as of this encounter: 79.4 kg (175 lb). Body surface area is 2.05 meters squared.  No Pain (0) Comment: Data Unavailable   No LMP for male patient.  Allergies reviewed: Yes  Medications reviewed: Yes    Medications: Medication refills not needed today.  Pharmacy name entered " into EPIC:    [facility], [place] - [address] [facility] [place] [facility] [place], [place] - [address] [facility]  [id number] - [place] [address] [address] [address] [facility] [address]  [facility] - [place], [place] - [address] [facility]. - [facility] [address]    Clinical concerns: no Shari J. Schoenberger, CMA                Again, thank you for allowing me to participate in the care of your patient.        Sincerely,        Sangita John MD

## 2023-10-17 DIAGNOSIS — D70.1 CHEMOTHERAPY-INDUCED NEUTROPENIA (H): ICD-10-CM

## 2023-10-17 DIAGNOSIS — Z51.11 ENCOUNTER FOR ANTINEOPLASTIC CHEMOTHERAPY: Primary | ICD-10-CM

## 2023-10-17 DIAGNOSIS — C34.90 NON-SMALL CELL LUNG CANCER METASTATIC TO BONE (H): ICD-10-CM

## 2023-10-17 DIAGNOSIS — C79.51 NON-SMALL CELL LUNG CANCER METASTATIC TO BONE (H): ICD-10-CM

## 2023-10-17 DIAGNOSIS — T45.1X5A CHEMOTHERAPY-INDUCED NEUTROPENIA (H): ICD-10-CM

## 2023-10-17 RX ORDER — MEPERIDINE HYDROCHLORIDE 25 MG/ML
25 INJECTION INTRAMUSCULAR; INTRAVENOUS; SUBCUTANEOUS EVERY 30 MIN PRN
Status: CANCELLED | OUTPATIENT
Start: 2023-10-19

## 2023-10-17 RX ORDER — DIPHENHYDRAMINE HYDROCHLORIDE 50 MG/ML
50 INJECTION INTRAMUSCULAR; INTRAVENOUS
Status: CANCELLED
Start: 2023-10-19

## 2023-10-17 RX ORDER — ALBUTEROL SULFATE 0.83 MG/ML
2.5 SOLUTION RESPIRATORY (INHALATION)
Status: CANCELLED | OUTPATIENT
Start: 2023-10-19

## 2023-10-17 RX ORDER — LORAZEPAM 2 MG/ML
0.5 INJECTION INTRAMUSCULAR EVERY 4 HOURS PRN
Status: CANCELLED | OUTPATIENT
Start: 2023-10-19

## 2023-10-17 RX ORDER — HEPARIN SODIUM,PORCINE 10 UNIT/ML
5 VIAL (ML) INTRAVENOUS
Status: CANCELLED | OUTPATIENT
Start: 2023-10-19

## 2023-10-17 RX ORDER — HEPARIN SODIUM (PORCINE) LOCK FLUSH IV SOLN 100 UNIT/ML 100 UNIT/ML
5 SOLUTION INTRAVENOUS
Status: CANCELLED | OUTPATIENT
Start: 2023-10-19

## 2023-10-17 RX ORDER — ALBUTEROL SULFATE 90 UG/1
1-2 AEROSOL, METERED RESPIRATORY (INHALATION)
Status: CANCELLED
Start: 2023-10-19

## 2023-10-17 RX ORDER — EPINEPHRINE 1 MG/ML
0.3 INJECTION, SOLUTION, CONCENTRATE INTRAVENOUS EVERY 5 MIN PRN
Status: CANCELLED | OUTPATIENT
Start: 2023-10-19

## 2023-10-19 ENCOUNTER — INFUSION THERAPY VISIT (OUTPATIENT)
Dept: INFUSION THERAPY | Facility: CLINIC | Age: 63
End: 2023-10-19
Attending: PHYSICIAN ASSISTANT
Payer: COMMERCIAL

## 2023-10-19 ENCOUNTER — ONCOLOGY VISIT (OUTPATIENT)
Dept: ONCOLOGY | Facility: CLINIC | Age: 63
End: 2023-10-19
Attending: INTERNAL MEDICINE
Payer: COMMERCIAL

## 2023-10-19 VITALS
RESPIRATION RATE: 18 BRPM | SYSTOLIC BLOOD PRESSURE: 103 MMHG | BODY MASS INDEX: 22.5 KG/M2 | HEART RATE: 75 BPM | OXYGEN SATURATION: 98 % | DIASTOLIC BLOOD PRESSURE: 67 MMHG | WEIGHT: 180 LBS | TEMPERATURE: 97.7 F

## 2023-10-19 VITALS — RESPIRATION RATE: 16 BRPM

## 2023-10-19 DIAGNOSIS — Z51.11 ENCOUNTER FOR ANTINEOPLASTIC CHEMOTHERAPY: Primary | ICD-10-CM

## 2023-10-19 DIAGNOSIS — T45.1X5A CHEMOTHERAPY-INDUCED NEUTROPENIA (H): ICD-10-CM

## 2023-10-19 DIAGNOSIS — D70.1 CHEMOTHERAPY-INDUCED NEUTROPENIA (H): ICD-10-CM

## 2023-10-19 DIAGNOSIS — C79.51 NON-SMALL CELL LUNG CANCER METASTATIC TO BONE (H): ICD-10-CM

## 2023-10-19 DIAGNOSIS — C34.90 NON-SMALL CELL LUNG CANCER METASTATIC TO BONE (H): ICD-10-CM

## 2023-10-19 DIAGNOSIS — C79.51 NON-SMALL CELL LUNG CANCER METASTATIC TO BONE (H): Primary | ICD-10-CM

## 2023-10-19 DIAGNOSIS — C34.90 NON-SMALL CELL LUNG CANCER METASTATIC TO BONE (H): Primary | ICD-10-CM

## 2023-10-19 LAB
ALBUMIN SERPL BCG-MCNC: 3.7 G/DL (ref 3.5–5.2)
ALP SERPL-CCNC: 98 U/L (ref 40–129)
ALT SERPL W P-5'-P-CCNC: 24 U/L (ref 0–70)
AST SERPL W P-5'-P-CCNC: 26 U/L (ref 0–45)
BASO+EOS+MONOS # BLD AUTO: ABNORMAL 10*3/UL
BASO+EOS+MONOS NFR BLD AUTO: ABNORMAL %
BASOPHILS # BLD AUTO: 0 10E3/UL (ref 0–0.2)
BASOPHILS NFR BLD AUTO: 1 %
BILIRUB DIRECT SERPL-MCNC: <0.2 MG/DL (ref 0–0.3)
BILIRUB SERPL-MCNC: 0.4 MG/DL
EOSINOPHIL # BLD AUTO: 0.1 10E3/UL (ref 0–0.7)
EOSINOPHIL NFR BLD AUTO: 1 %
ERYTHROCYTE [DISTWIDTH] IN BLOOD BY AUTOMATED COUNT: 17.8 % (ref 10–15)
HCT VFR BLD AUTO: 34.8 % (ref 40–53)
HGB BLD-MCNC: 10.7 G/DL (ref 13.3–17.7)
IMM GRANULOCYTES # BLD: 0 10E3/UL
IMM GRANULOCYTES NFR BLD: 0 %
LYMPHOCYTES # BLD AUTO: 0.8 10E3/UL (ref 0.8–5.3)
LYMPHOCYTES NFR BLD AUTO: 13 %
MCH RBC QN AUTO: 28.3 PG (ref 26.5–33)
MCHC RBC AUTO-ENTMCNC: 30.7 G/DL (ref 31.5–36.5)
MCV RBC AUTO: 92 FL (ref 78–100)
MONOCYTES # BLD AUTO: 0.6 10E3/UL (ref 0–1.3)
MONOCYTES NFR BLD AUTO: 10 %
NEUTROPHILS # BLD AUTO: 4.6 10E3/UL (ref 1.6–8.3)
NEUTROPHILS NFR BLD AUTO: 75 %
NRBC # BLD AUTO: 0 10E3/UL
NRBC BLD AUTO-RTO: 0 /100
PLATELET # BLD AUTO: 180 10E3/UL (ref 150–450)
PROT SERPL-MCNC: 5.9 G/DL (ref 6.4–8.3)
RBC # BLD AUTO: 3.78 10E6/UL (ref 4.4–5.9)
WBC # BLD AUTO: 6 10E3/UL (ref 4–11)

## 2023-10-19 PROCEDURE — 258N000003 HC RX IP 258 OP 636: Performed by: INTERNAL MEDICINE

## 2023-10-19 PROCEDURE — 250N000011 HC RX IP 250 OP 636: Performed by: INTERNAL MEDICINE

## 2023-10-19 PROCEDURE — 96413 CHEMO IV INFUSION 1 HR: CPT

## 2023-10-19 PROCEDURE — 96372 THER/PROPH/DIAG INJ SC/IM: CPT | Performed by: INTERNAL MEDICINE

## 2023-10-19 PROCEDURE — 80076 HEPATIC FUNCTION PANEL: CPT | Performed by: INTERNAL MEDICINE

## 2023-10-19 PROCEDURE — 99214 OFFICE O/P EST MOD 30 MIN: CPT | Performed by: PHYSICIAN ASSISTANT

## 2023-10-19 PROCEDURE — 85025 COMPLETE CBC W/AUTO DIFF WBC: CPT | Performed by: INTERNAL MEDICINE

## 2023-10-19 PROCEDURE — G0463 HOSPITAL OUTPT CLINIC VISIT: HCPCS | Mod: 25 | Performed by: PHYSICIAN ASSISTANT

## 2023-10-19 PROCEDURE — 96377 APPLICATON ON-BODY INJECTOR: CPT | Mod: XS | Performed by: INTERNAL MEDICINE

## 2023-10-19 PROCEDURE — 96367 TX/PROPH/DG ADDL SEQ IV INF: CPT

## 2023-10-19 PROCEDURE — 36591 DRAW BLOOD OFF VENOUS DEVICE: CPT | Performed by: INTERNAL MEDICINE

## 2023-10-19 RX ORDER — HEPARIN SODIUM (PORCINE) LOCK FLUSH IV SOLN 100 UNIT/ML 100 UNIT/ML
5 SOLUTION INTRAVENOUS
Status: DISCONTINUED | OUTPATIENT
Start: 2023-10-19 | End: 2023-10-19 | Stop reason: HOSPADM

## 2023-10-19 RX ADMIN — Medication 5 ML: at 11:00

## 2023-10-19 RX ADMIN — PEGFILGRASTIM 6 MG: KIT SUBCUTANEOUS at 10:00

## 2023-10-19 RX ADMIN — SODIUM CHLORIDE 122 MG: 9 INJECTION, SOLUTION INTRAVENOUS at 09:58

## 2023-10-19 RX ADMIN — DEXAMETHASONE SODIUM PHOSPHATE: 10 INJECTION, SOLUTION INTRAMUSCULAR; INTRAVENOUS at 09:35

## 2023-10-19 RX ADMIN — SODIUM CHLORIDE 250 ML: 9 INJECTION, SOLUTION INTRAVENOUS at 09:35

## 2023-10-19 ASSESSMENT — PAIN SCALES - GENERAL
PAINLEVEL: NO PAIN (0)
PAINLEVEL: NO PAIN (0)

## 2023-10-19 NOTE — LETTER
"    10/19/2023         RE: Kt Rasmussen  [address]  [facility] [address]        Dear Colleague,    Thank you for referring your patient, Kt Rasmussen, to the [facility]. Please see a copy of my visit note below.    Oncology Rooming Note    October 19, 2023 8:01 AM   Kt Rasmussen is a 63 year old male who presents for:    Chief Complaint   Patient presents with     Oncology Clinic Visit     Initial Vitals: /67   Pulse 75   Temp 97.7  F (36.5  C) (Oral)   Resp 18   Wt 81.6 kg (180 lb)   SpO2 98%   BMI 22.50 kg/m   Estimated body mass index is 22.5 kg/m  as calculated from the following:    Height as of 9/7/23: 1.905 m (6' 3\").    Weight as of this encounter: 81.6 kg (180 lb). Body surface area is 2.08 meters squared.  No Pain (0) Comment: Data Unavailable   No LMP for male patient.  Allergies reviewed: Yes  Medications reviewed: Yes    Medications: Medication refills not needed today.  Pharmacy name entered into EPIC:    [facility] [place], [place] - [address] [facility] [address] [facility][id number] - [place] [address] [facility] AT [facility] 7  [facility] - [facility] [address] [place] [address], [facility]. - [address]    Clinical concerns: no       Shari J. Schoenberger, CMA              Oncology/Hematology Visit Note  Oct 19, 2023    Reason for Visit: Follow up of metastatic lung cancer    Primary Oncologist: Dr. John    Oncologic History:  1. 5/05/2016: Presented with near-obstructing large mass coming off the cheikh and likely the posterior wall, seen on bronchoscopy. Biopsy of the mass showed invasive squamous cell carcinoma, moderately differentiating and focally keratinizing.  Procedure was complicated by significant bleeding.  Tumor was completely debulked (via bronchoscopy) in both " main stem bronchi and distal trachea. NGS showed no mutations in EGFR, KRAS, BRAF, NRAS, HRAS, PIK3CA, ERBB2, MET, or JAK2.  2. 5/23/2016: PET/CT scan showed evidence of residual tumor with decreased endobronchial mass. Indeterminate, mildly hypermetabolic mesentery with prominent lymph nodes and area of enhancement of the right hepatic lobe present. Felt to have T4-NX-M0 disease.  3. 6/09/2016: Started concurrent chemoradiation with weekly paclitaxel and carboplatin.  4. 7/30/2016: Completed radiation.  Subsequently received 2 cycles of consolidation paclitaxel and carboplatin.   5. 9/13/2019: Presented with 6-month history of dysphonia and left vocal exophytic lesion. Left true vocal fold biopsy showed at least squamous cell carcinoma in situ, cannot exclude superficial invasion.  6. 9/30/2019: Excision of left vocal cord mass showed fragments of invasive squamous cell carcinoma, well differentiated and keratinizing.  Margins negative for malignancy.  7. 2/16/2021: CT chest w/o contrast showed thin-walled cavity in left lower lobe with 2 solid peripheral nodules measuring 9 mm each. No lymphadenopathy.  8. 3/01/2021: PET scan showed hypermetabolic nodules in left lower lobe and right lung, consistent with metastases; hypermetabolic adenopathy in chest, abdomen, pelvis; nonspecific uptake at tongue; hypermetabolic intraosseous lesions in spine and pelvis consistent with metastatic disease; left axillary hypermetabolic lymph nodes related to COVID-19 vaccination.  9. 3/12/2021: CT-guided lung biopsy showed non-small cell carcinoma, not otherwise specified -- with opinion by [facility] pathologist.  10. 3/18/2021: Lung NGS panel negative for mutations in BRAF, EGFR, ERBB2, IDH1, IDH2, KRAS, MET, NRAS, RET. PD-L1 expression negative (TPS <1%). Due to minimal amount of DNA obtained from specimen, other biomarkers could not be analyzed.  11. 4/7/2021: MRI brain negative for brain metastases.  12. 4/27/2021: Started 1st  line metastatic therapy with carboplatin, paclitaxel, bevacizumab, and atezolizumab for metastatic non-small cell lung carcinoma, NOS.  13. 7/12/2021: PET/CT showed resolved mural nodules with increased cystic cavity in left lower lobe with no significant FDG uptake, less likely metastases; no enlarged hypermetabolic mediastinal, hilar, or axillary lymph nodes, decreased metabolic activity of intraosseous lesion in spine and pelvis; no new bone lesions.  14. 10/11/2021: PET/CT showed slight interval increase in intensity of metabolic activity of right pubic bone and left ischium with new focal uptake in L2 spinous process; resolved uptake at previous ground glass opacity along right medial lower lobe; unchanged cystic cavities/nodules in left lower lobe and right apical upper lobe; no pathologically enlarged hypermetabolic mediastinal, hilar, or axillary lymph nodes.  15. 1/10/2022: PET/CT showed new foci of abnormal skeletal FGD uptake in the thoracic and lumbar spine. Mild interval increase in intensity of previously demonstrated hypermetabolic skeletal lesions involving the pelvis and lumbar spine. Findings are consistent with progressive osseous metastatic disease. Subsequently off chemo for 1 month due to poor performance status.  16. 1/25/2022: Patient fell and fractured his femur. This was surgically repaired and he was subsequently cared for at a rehab unit.  17. 2/14/2022: Brain MRI without contrast (contrast not given due to inability to obtain IV access) showed no brain metastases.  18. 4/21/2022: Started 2nd line metastatic therapy with pemetrexed and carboplatin. Omission of carboplatin 6/2 and forward due to worsening anemia despite dose reduction.  19. 7/18/2022: PET/CT showed enlarged and increased FDG avid skeletal metastases, increased left para-aortic retroperitoneal lymph node increased in size and FDG avidity, findings consistent with progressive disease.  20. 8/3/2022: Started 3rd line  metastatic therapy with Taxotere 60 mg/m2 every 3 weeks.  21. 11/21/2022: PET scan showed partial response, left para-aortic retroperitoneal lymph node has decreased from 2.6 x 1.7 cm (SUVmax 7.9) to 2.3 x 1.4 cm (SUVmax 5.7), skeletal metastases no longer demonstrate FDG activity. Kt elected to take a 2-month break from chemo.  22. 1/23/2023: PET scan showed increasing metabolic activity of the left periaortic (max SUV 8.1, previously 5.7) left external iliac (max SUV 7.0, previously 3.1), and right external iliac (max SUV 5.1, previously 3.7) lymph nodes with development/reactivation of multiple FDG avid osseous lesions.  23. 3/23/2023: Resumption of Taxotere every 3 weeks. Delayed due to insurance issues.  24. 6/26/2023: PET scan showed partial response with decreased uptake associated with the retroperitoneal and pelvic lymphadenopathy as well as bone metastases.    Interval History:  Doing well. No complaints or concerns. Continues to deal with left toe issue. Has mild sensory neuropathy to fingertips. Notes some nail changes. Denies rash, edema, dyspena/cough.     Review of Systems:  A complete review of systems was negative except as noted in the HPI.   Past medical, Surgical, and social history:  Reviewed    Physical Examination:  /67   Pulse 75   Temp 97.7  F (36.5  C) (Oral)   Resp 18   Wt 81.6 kg (180 lb)   SpO2 98%   BMI 22.50 kg/m    Wt Readings from Last 10 Encounters:   10/19/23 81.6 kg (180 lb)   09/28/23 79.4 kg (175 lb)   09/07/23 79.4 kg (175 lb)   08/17/23 79.4 kg (175 lb)   07/27/23 79.4 kg (175 lb)   07/27/23 79.4 kg (175 lb)   05/25/23 78 kg (172 lb)   03/23/23 79.8 kg (176 lb)   11/16/22 78 kg (172 lb)   10/26/22 77.1 kg (170 lb)     General: Pleasant patient in no acute distress.  Skin: Warm and dry. Taxane nail changes.   Eyes: Anicteric.   ENT: Slightly dysphonic voice.   Lungs: Normal work of breathing.   Abdomen: Non-distended.  Extremities: No peripheral edema. Left hand  flexed. Left foot boot.   Mental: Calm, cooperative, appropriate. Mood euthymic. Responds with minimal information, unclear reliability.   Neuro: Alert and oriented x 3. Right paresis.    Laboratory Data:  CBC, CMP reviewed.       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        Sincerely,        RENEE LO PA-C

## 2023-10-19 NOTE — PROGRESS NOTES
Infusion Nursing Note:  Kt Rasmussen presents today for C18D1 Docetaxel.    Patient seen by provider today: Yes: JAKE Frost.   present during visit today: Not Applicable.    Note: N/A.      Intravenous Access:  No Intravenous access/labs at this visit.  Implanted Port.    Treatment Conditions:  Lab Results   Component Value Date    HGB 10.7 (L) 10/19/2023    WBC 6.0 10/19/2023    ANEU 0.5 (L) 08/11/2022    ANEUTAUTO 4.6 10/19/2023     10/19/2023        Lab Results   Component Value Date     01/25/2023    POTASSIUM 4.0 01/25/2023    MAG 1.9 10/13/2016    CR 0.71 01/25/2023    BEVERLY 9.3 01/25/2023    BILITOTAL 0.4 10/19/2023    ALBUMIN 3.7 10/19/2023    ALT 24 10/19/2023    AST 26 10/19/2023       Results reviewed, labs MET treatment parameters, ok to proceed with treatment.      Post Infusion Assessment:  Patient tolerated infusion without incident.  Patient tolerated injection without incident.  Blood return noted pre and post infusion.  Site patent and intact, free from redness, edema or discomfort.  No evidence of extravasations.  Access discontinued per protocol.     ONPRO  Was placed on patient's: back of right arm.    Was placed at 10:00 AM    Podpal used: Yes    ONPRO injector device Lot number: [id number]    Patient education included: when to remove device, when and where to call with questions or issues, and that Neulasta administration will occur at 1:00 pm on 10/20/23.    Patient tolerated administration well.        Discharge Plan:   Copy of AVS reviewed with patient and/or family.  Patient will return 11/9/23 for next appointment.  Patient discharged in stable condition accompanied by: self.  Departure Mode: Wheelchair.      Magdalena Davila RN

## 2023-10-19 NOTE — PROGRESS NOTES
Nursing Note:  Kt Rasmussen presents today for Port labs.    Patient seen by provider today: Yes: Renee Rubalcava PA-C   present during visit today: Not Applicable.    Note: infusion following providerl.    Intravenous Access:  Implanted Port.    Discharge Plan:   Patient was sent to [facility] for next appointment.    Olga Rahman RN

## 2023-10-19 NOTE — PROGRESS NOTES
Oncology/Hematology Visit Note  Oct 19, 2023    Reason for Visit: Follow up of metastatic lung cancer    Primary Oncologist: Dr. John    Oncologic History:  1. 5/05/2016: Presented with near-obstructing large mass coming off the cheikh and likely the posterior wall, seen on bronchoscopy. Biopsy of the mass showed invasive squamous cell carcinoma, moderately differentiating and focally keratinizing.  Procedure was complicated by significant bleeding.  Tumor was completely debulked (via bronchoscopy) in both main stem bronchi and distal trachea. NGS showed no mutations in EGFR, KRAS, BRAF, NRAS, HRAS, PIK3CA, ERBB2, MET, or JAK2.  2. 5/23/2016: PET/CT scan showed evidence of residual tumor with decreased endobronchial mass. Indeterminate, mildly hypermetabolic mesentery with prominent lymph nodes and area of enhancement of the right hepatic lobe present. Felt to have T4-NX-M0 disease.  3. 6/09/2016: Started concurrent chemoradiation with weekly paclitaxel and carboplatin.  4. 7/30/2016: Completed radiation.  Subsequently received 2 cycles of consolidation paclitaxel and carboplatin.   5. 9/13/2019: Presented with 6-month history of dysphonia and left vocal exophytic lesion. Left true vocal fold biopsy showed at least squamous cell carcinoma in situ, cannot exclude superficial invasion.  6. 9/30/2019: Excision of left vocal cord mass showed fragments of invasive squamous cell carcinoma, well differentiated and keratinizing.  Margins negative for malignancy.  7. 2/16/2021: CT chest w/o contrast showed thin-walled cavity in left lower lobe with 2 solid peripheral nodules measuring 9 mm each. No lymphadenopathy.  8. 3/01/2021: PET scan showed hypermetabolic nodules in left lower lobe and right lung, consistent with metastases; hypermetabolic adenopathy in chest, abdomen, pelvis; nonspecific uptake at tongue; hypermetabolic intraosseous lesions in spine and pelvis consistent with metastatic disease; left axillary  hypermetabolic lymph nodes related to COVID-19 vaccination.  9. 3/12/2021: CT-guided lung biopsy showed non-small cell carcinoma, not otherwise specified -- with opinion by [facility] pathologist.  10. 3/18/2021: Lung NGS panel negative for mutations in BRAF, EGFR, ERBB2, IDH1, IDH2, KRAS, MET, NRAS, RET. PD-L1 expression negative (TPS <1%). Due to minimal amount of DNA obtained from specimen, other biomarkers could not be analyzed.  11. 4/7/2021: MRI brain negative for brain metastases.  12. 4/27/2021: Started 1st line metastatic therapy with carboplatin, paclitaxel, bevacizumab, and atezolizumab for metastatic non-small cell lung carcinoma, NOS.  13. 7/12/2021: PET/CT showed resolved mural nodules with increased cystic cavity in left lower lobe with no significant FDG uptake, less likely metastases; no enlarged hypermetabolic mediastinal, hilar, or axillary lymph nodes, decreased metabolic activity of intraosseous lesion in spine and pelvis; no new bone lesions.  14. 10/11/2021: PET/CT showed slight interval increase in intensity of metabolic activity of right pubic bone and left ischium with new focal uptake in L2 spinous process; resolved uptake at previous ground glass opacity along right medial lower lobe; unchanged cystic cavities/nodules in left lower lobe and right apical upper lobe; no pathologically enlarged hypermetabolic mediastinal, hilar, or axillary lymph nodes.  15. 1/10/2022: PET/CT showed new foci of abnormal skeletal FGD uptake in the thoracic and lumbar spine. Mild interval increase in intensity of previously demonstrated hypermetabolic skeletal lesions involving the pelvis and lumbar spine. Findings are consistent with progressive osseous metastatic disease. Subsequently off chemo for 1 month due to poor performance status.  16. 1/25/2022: Patient fell and fractured his femur. This was surgically repaired and he was subsequently cared for at a rehab unit.  17. 2/14/2022: Brain MRI without  contrast (contrast not given due to inability to obtain IV access) showed no brain metastases.  18. 4/21/2022: Started 2nd line metastatic therapy with pemetrexed and carboplatin. Omission of carboplatin 6/2 and forward due to worsening anemia despite dose reduction.  19. 7/18/2022: PET/CT showed enlarged and increased FDG avid skeletal metastases, increased left para-aortic retroperitoneal lymph node increased in size and FDG avidity, findings consistent with progressive disease.  20. 8/3/2022: Started 3rd line metastatic therapy with Taxotere 60 mg/m2 every 3 weeks.  21. 11/21/2022: PET scan showed partial response, left para-aortic retroperitoneal lymph node has decreased from 2.6 x 1.7 cm (SUVmax 7.9) to 2.3 x 1.4 cm (SUVmax 5.7), skeletal metastases no longer demonstrate FDG activity. Kt elected to take a 2-month break from chemo.  22. 1/23/2023: PET scan showed increasing metabolic activity of the left periaortic (max SUV 8.1, previously 5.7) left external iliac (max SUV 7.0, previously 3.1), and right external iliac (max SUV 5.1, previously 3.7) lymph nodes with development/reactivation of multiple FDG avid osseous lesions.  23. 3/23/2023: Resumption of Taxotere every 3 weeks. Delayed due to insurance issues.  24. 6/26/2023: PET scan showed partial response with decreased uptake associated with the retroperitoneal and pelvic lymphadenopathy as well as bone metastases.    Interval History:  Doing well. No complaints or concerns. Continues to deal with left toe issue. Has mild sensory neuropathy to fingertips. Notes some nail changes. Denies rash, edema, dyspena/cough.     Review of Systems:  A complete review of systems was negative except as noted in the HPI.   Past medical, Surgical, and social history:  Reviewed    Physical Examination:  /67   Pulse 75   Temp 97.7  F (36.5  C) (Oral)   Resp 18   Wt 81.6 kg (180 lb)   SpO2 98%   BMI 22.50 kg/m    Wt Readings from Last 10 Encounters:   10/19/23  81.6 kg (180 lb)   09/28/23 79.4 kg (175 lb)   09/07/23 79.4 kg (175 lb)   08/17/23 79.4 kg (175 lb)   07/27/23 79.4 kg (175 lb)   07/27/23 79.4 kg (175 lb)   05/25/23 78 kg (172 lb)   03/23/23 79.8 kg (176 lb)   11/16/22 78 kg (172 lb)   10/26/22 77.1 kg (170 lb)     General: Pleasant patient in no acute distress.  Skin: Warm and dry. Taxane nail changes.   Eyes: Anicteric.   ENT: Slightly dysphonic voice.   Lungs: Normal work of breathing.   Abdomen: Non-distended.  Extremities: No peripheral edema. Left hand flexed. Left foot boot.   Mental: Calm, cooperative, appropriate. Mood euthymic. Responds with minimal information, unclear reliability.   Neuro: Alert and oriented x 3. Right paresis.    Laboratory Data:  CBC, CMP reviewed.       Assessment and Plan:  Kt Rasmussen is a 62 year old male with:     # Metastatic non-small (non-squamous) cell lung carcinoma with metastases to lymph nodes, bones, and bilateral lungs  # History of locally advanced endobronchial invasive squamous cell carcinoma diagnosed 5/2016, status post debulking and chemoradiation   - A repeat 6/26/2023 PET scan showed partial response  - Continue Taxotere + Neulasta, 20% dose reduced for prior cytopenias, every 3 weeks  - Repeat PET scan 10/2023  - Follow-up with MICHAEL alternating with MD every cycle    20 minutes spent on the date of the encounter doing chart review, review of test results, interpretation of tests, patient visit, and documentation     Renee Rubalcava PA-C  [facility]   [phone]

## 2023-10-19 NOTE — PROGRESS NOTES
"Oncology Rooming Note    October 19, 2023 8:01 AM   Kt Rasmussen is a 63 year old male who presents for:    Chief Complaint   Patient presents with    Oncology Clinic Visit     Initial Vitals: /67   Pulse 75   Temp 97.7  F (36.5  C) (Oral)   Resp 18   Wt 81.6 kg (180 lb)   SpO2 98%   BMI 22.50 kg/m   Estimated body mass index is 22.5 kg/m  as calculated from the following:    Height as of 9/7/23: 1.905 m (6' 3\").    Weight as of this encounter: 81.6 kg (180 lb). Body surface area is 2.08 meters squared.  No Pain (0) Comment: Data Unavailable   No LMP for male patient.  Allergies reviewed: Yes  Medications reviewed: Yes    Medications: Medication refills not needed today.  Pharmacy name entered into EPIC:    [facility] [place] [address] [facility] [place], [place] - [address] [facility][id number] [place] [id number] [facility] AT [facility] 7  [place] - [place], [place] - [address] [place] [address], [facility]. - [facility] [address]    Clinical concerns: no       Shari J. Schoenberger, CMA            "

## 2023-10-19 NOTE — PATIENT INSTRUCTIONS
Your On-body Neulasta Injector was applied to your right arm at 10:00 am.  At approximately 1:00 pm on 10/20/23, your On-body Injector will beep to let you know your dose delivery will begin in 2 minutes.  Your medication will be delivered over the next 45 minutes.  You can remove your Injector at 2:00 pm.  Please make sure your Injector has a solid green light or has turned off prior to removing the device.  Please contact your provider at [phone] with questions or concerns.      EDUCATION POST BIOLOGICAL/CHEMOTHERAPY INFUSION  Call the triage nurse at your clinic or seek medical attention if you have chills and/or temperature greater than or equal to 100.5, uncontrolled nausea/vomiting, diarrhea, constipation, dizziness, shortness of breath, chest pain, heart palpitations, weakness or any other new or concerning symptoms, questions or concerns.  You can not have any live virus vaccines prior to or during treatment or up to 6 months post infusion.  If you have an upcoming surgery, medical procedure or dental procedure during treatment, this should be discussed with your ordering physician and your surgeon/dentist.  If you are having any concerning symptom, if you are unsure if you should get your next infusion or wish to speak to a provider before your next infusion, please call your care coordinator or triage nurse at your clinic to notify them so we can adequately serve you.

## 2023-10-25 ENCOUNTER — PATIENT OUTREACH (OUTPATIENT)
Dept: ONCOLOGY | Facility: CLINIC | Age: 63
End: 2023-10-25
Payer: COMMERCIAL

## 2023-10-25 NOTE — PROGRESS NOTES
[facility]                                                                                          Reviewed preparing for a PET instructions with patient. He verbalized understanding. May need reinforcement. He had to be reminded of the date/time of the PET several times.  printed out information and sent via mail.     Signature:  Ciara Garg RN

## 2023-11-06 DIAGNOSIS — C34.90 NON-SMALL CELL LUNG CANCER METASTATIC TO BONE (H): ICD-10-CM

## 2023-11-06 DIAGNOSIS — D70.1 CHEMOTHERAPY-INDUCED NEUTROPENIA (H): ICD-10-CM

## 2023-11-06 DIAGNOSIS — T45.1X5A CHEMOTHERAPY-INDUCED NEUTROPENIA (H): ICD-10-CM

## 2023-11-06 DIAGNOSIS — C79.51 NON-SMALL CELL LUNG CANCER METASTATIC TO BONE (H): ICD-10-CM

## 2023-11-06 DIAGNOSIS — Z51.11 ENCOUNTER FOR ANTINEOPLASTIC CHEMOTHERAPY: Primary | ICD-10-CM

## 2023-11-06 RX ORDER — DIPHENHYDRAMINE HYDROCHLORIDE 50 MG/ML
50 INJECTION INTRAMUSCULAR; INTRAVENOUS
Status: CANCELLED
Start: 2023-11-09

## 2023-11-06 RX ORDER — HEPARIN SODIUM (PORCINE) LOCK FLUSH IV SOLN 100 UNIT/ML 100 UNIT/ML
5 SOLUTION INTRAVENOUS
Status: CANCELLED | OUTPATIENT
Start: 2023-11-09

## 2023-11-06 RX ORDER — EPINEPHRINE 1 MG/ML
0.3 INJECTION, SOLUTION, CONCENTRATE INTRAVENOUS EVERY 5 MIN PRN
Status: CANCELLED | OUTPATIENT
Start: 2023-11-09

## 2023-11-06 RX ORDER — MEPERIDINE HYDROCHLORIDE 25 MG/ML
25 INJECTION INTRAMUSCULAR; INTRAVENOUS; SUBCUTANEOUS EVERY 30 MIN PRN
Status: CANCELLED | OUTPATIENT
Start: 2023-11-09

## 2023-11-06 RX ORDER — HEPARIN SODIUM,PORCINE 10 UNIT/ML
5 VIAL (ML) INTRAVENOUS
Status: CANCELLED | OUTPATIENT
Start: 2023-11-09

## 2023-11-06 RX ORDER — ALBUTEROL SULFATE 0.83 MG/ML
2.5 SOLUTION RESPIRATORY (INHALATION)
Status: CANCELLED | OUTPATIENT
Start: 2023-11-09

## 2023-11-06 RX ORDER — LORAZEPAM 2 MG/ML
0.5 INJECTION INTRAMUSCULAR EVERY 4 HOURS PRN
Status: CANCELLED | OUTPATIENT
Start: 2023-11-09

## 2023-11-06 RX ORDER — ALBUTEROL SULFATE 90 UG/1
1-2 AEROSOL, METERED RESPIRATORY (INHALATION)
Status: CANCELLED
Start: 2023-11-09

## 2023-11-09 ENCOUNTER — INFUSION THERAPY VISIT (OUTPATIENT)
Dept: INFUSION THERAPY | Facility: CLINIC | Age: 63
End: 2023-11-09
Attending: INTERNAL MEDICINE
Payer: COMMERCIAL

## 2023-11-09 VITALS
TEMPERATURE: 97.6 F | OXYGEN SATURATION: 98 % | RESPIRATION RATE: 20 BRPM | DIASTOLIC BLOOD PRESSURE: 64 MMHG | SYSTOLIC BLOOD PRESSURE: 114 MMHG | HEART RATE: 62 BPM | BODY MASS INDEX: 22.5 KG/M2 | WEIGHT: 180 LBS

## 2023-11-09 DIAGNOSIS — C34.90 NON-SMALL CELL LUNG CANCER METASTATIC TO BONE (H): ICD-10-CM

## 2023-11-09 DIAGNOSIS — T45.1X5A CHEMOTHERAPY-INDUCED NEUTROPENIA (H): ICD-10-CM

## 2023-11-09 DIAGNOSIS — C79.51 NON-SMALL CELL LUNG CANCER METASTATIC TO BONE (H): ICD-10-CM

## 2023-11-09 DIAGNOSIS — Z51.11 ENCOUNTER FOR ANTINEOPLASTIC CHEMOTHERAPY: Primary | ICD-10-CM

## 2023-11-09 DIAGNOSIS — D70.1 CHEMOTHERAPY-INDUCED NEUTROPENIA (H): ICD-10-CM

## 2023-11-09 LAB
ALBUMIN SERPL BCG-MCNC: 3.9 G/DL (ref 3.5–5.2)
ALP SERPL-CCNC: 94 U/L (ref 40–129)
ALT SERPL W P-5'-P-CCNC: 30 U/L (ref 0–70)
AST SERPL W P-5'-P-CCNC: 28 U/L (ref 0–45)
BASOPHILS # BLD AUTO: 0 10E3/UL (ref 0–0.2)
BASOPHILS NFR BLD AUTO: 0 %
BILIRUB DIRECT SERPL-MCNC: <0.2 MG/DL (ref 0–0.3)
BILIRUB SERPL-MCNC: 0.5 MG/DL
EOSINOPHIL # BLD AUTO: 0 10E3/UL (ref 0–0.7)
EOSINOPHIL NFR BLD AUTO: 0 %
ERYTHROCYTE [DISTWIDTH] IN BLOOD BY AUTOMATED COUNT: 17.7 % (ref 10–15)
HCT VFR BLD AUTO: 37.6 % (ref 40–53)
HGB BLD-MCNC: 11.6 G/DL (ref 13.3–17.7)
IMM GRANULOCYTES # BLD: 0 10E3/UL
IMM GRANULOCYTES NFR BLD: 0 %
LYMPHOCYTES # BLD AUTO: 1 10E3/UL (ref 0.8–5.3)
LYMPHOCYTES NFR BLD AUTO: 14 %
MCH RBC QN AUTO: 28.6 PG (ref 26.5–33)
MCHC RBC AUTO-ENTMCNC: 30.9 G/DL (ref 31.5–36.5)
MCV RBC AUTO: 93 FL (ref 78–100)
MONOCYTES # BLD AUTO: 0.6 10E3/UL (ref 0–1.3)
MONOCYTES NFR BLD AUTO: 9 %
NEUTROPHILS # BLD AUTO: 5.6 10E3/UL (ref 1.6–8.3)
NEUTROPHILS NFR BLD AUTO: 77 %
NRBC # BLD AUTO: 0 10E3/UL
NRBC BLD AUTO-RTO: 0 /100
PLATELET # BLD AUTO: 170 10E3/UL (ref 150–450)
PROT SERPL-MCNC: 6 G/DL (ref 6.4–8.3)
RBC # BLD AUTO: 4.06 10E6/UL (ref 4.4–5.9)
WBC # BLD AUTO: 7.4 10E3/UL (ref 4–11)

## 2023-11-09 PROCEDURE — 250N000011 HC RX IP 250 OP 636: Mod: JZ | Performed by: INTERNAL MEDICINE

## 2023-11-09 PROCEDURE — 258N000003 HC RX IP 258 OP 636: Performed by: INTERNAL MEDICINE

## 2023-11-09 PROCEDURE — 96372 THER/PROPH/DIAG INJ SC/IM: CPT | Performed by: INTERNAL MEDICINE

## 2023-11-09 PROCEDURE — 96377 APPLICATON ON-BODY INJECTOR: CPT | Mod: XS

## 2023-11-09 PROCEDURE — 85025 COMPLETE CBC W/AUTO DIFF WBC: CPT | Performed by: INTERNAL MEDICINE

## 2023-11-09 PROCEDURE — 96413 CHEMO IV INFUSION 1 HR: CPT

## 2023-11-09 PROCEDURE — 96367 TX/PROPH/DG ADDL SEQ IV INF: CPT

## 2023-11-09 PROCEDURE — 80076 HEPATIC FUNCTION PANEL: CPT | Performed by: INTERNAL MEDICINE

## 2023-11-09 PROCEDURE — 36591 DRAW BLOOD OFF VENOUS DEVICE: CPT | Performed by: INTERNAL MEDICINE

## 2023-11-09 RX ORDER — HEPARIN SODIUM (PORCINE) LOCK FLUSH IV SOLN 100 UNIT/ML 100 UNIT/ML
5 SOLUTION INTRAVENOUS
Status: DISCONTINUED | OUTPATIENT
Start: 2023-11-09 | End: 2023-11-09 | Stop reason: HOSPADM

## 2023-11-09 RX ADMIN — DEXAMETHASONE SODIUM PHOSPHATE: 10 INJECTION, SOLUTION INTRAMUSCULAR; INTRAVENOUS at 09:12

## 2023-11-09 RX ADMIN — PEGFILGRASTIM 6 MG: KIT SUBCUTANEOUS at 09:37

## 2023-11-09 RX ADMIN — SODIUM CHLORIDE 122 MG: 9 INJECTION, SOLUTION INTRAVENOUS at 09:35

## 2023-11-09 RX ADMIN — Medication 5 ML: at 10:40

## 2023-11-09 RX ADMIN — SODIUM CHLORIDE 250 ML: 9 INJECTION, SOLUTION INTRAVENOUS at 09:12

## 2023-11-09 ASSESSMENT — PAIN SCALES - GENERAL: PAINLEVEL: NO PAIN (0)

## 2023-11-09 NOTE — PATIENT INSTRUCTIONS
Your On-body Neulasta Injector was applied to your upper right arm at 9:40 am.  At approximately 12:40 pm on 11/10/23, your On-body Injector will beep to let you know your dose delivery will begin in 2 minutes.  Your medication will be delivered over the next 45 minutes.  You can remove your Injector at 1:40 pm on 11/10/23.  Please make sure your Injector has a solid green light or has turned off prior to removing the device.  Please contact your provider at [phone] with questions or concerns.     
Quality 226: Preventive Care And Screening: Tobacco Use: Screening And Cessation Intervention: Patient screened for tobacco use and is an ex/non-smoker
Detail Level: Detailed
Quality 431: Preventive Care And Screening: Unhealthy Alcohol Use - Screening: Patient screened for unhealthy alcohol use using a single question and scores less than 2 times per year
present

## 2023-11-09 NOTE — PROGRESS NOTES
Infusion Nursing Note:  Kt Rasmussen presents today for Cycle 19 Day 1 Taxotere and OnPro.    Patient seen by provider today: No   present during visit today: Not Applicable.    Note: N/A.      Intravenous Access:  Implanted Port.    Treatment Conditions:  Lab Results   Component Value Date    HGB 11.6 (L) 11/09/2023    WBC 7.4 11/09/2023    ANEU 0.5 (L) 08/11/2022    ANEUTAUTO 5.6 11/09/2023     11/09/2023        Lab Results   Component Value Date     01/25/2023    POTASSIUM 4.0 01/25/2023    MAG 1.9 10/13/2016    CR 0.71 01/25/2023    BEVERLY 9.3 01/25/2023    BILITOTAL 0.5 11/09/2023    ALBUMIN 3.9 11/09/2023    ALT 30 11/09/2023    AST 28 11/09/2023       Results reviewed, labs MET treatment parameters, ok to proceed with treatment.      Post Infusion Assessment:  Patient tolerated infusion without incident.  Blood return noted pre and post infusion.  Site patent and intact, free from redness, edema or discomfort.  No evidence of extravasations.  Access discontinued per protocol.     ONPRO  Was placed on patient's: back of right arm.    Was placed at 9:40 AM    Podpal used: Yes    ONPRO injector device Lot number: [id number]    Patient education included: what patient can expect after application, what colored lights mean on the device, when to remove device, when and where to call with questions or issues, all patients questions answered, and that Neulasta administration will occur at 12:40 pm on 11/9/23.    Patient tolerated administration well.       Discharge Plan:   Patient declined prescription refills.  Discharge instructions reviewed with: Patient.  Patient verbalized understanding of discharge instructions and all questions answered.  AVS to patient via [employer].  Patient will return 12/4/23 for next appointment.   Patient discharged in stable condition accompanied by: self.  Departure Mode: Wheelchair.      Sandi Kingston RN

## 2023-11-09 NOTE — PROGRESS NOTES
Nursing Note:  Kt Rasmussen presents today for port labs.    Patient seen by provider today: No   present during visit today: Not Applicable.    Note: N/A.    Intravenous Access:  Labs drawn without difficulty.  Implanted Port.    Discharge Plan:   Patient was sent to [facility] for infusion appointment.    Cass Shoemaker RN

## 2023-12-01 ENCOUNTER — DOCUMENTATION ONLY (OUTPATIENT)
Dept: ONCOLOGY | Facility: CLINIC | Age: 63
End: 2023-12-01
Payer: COMMERCIAL

## 2023-12-01 NOTE — NURSING NOTE
PET prep instructions faxed to Kt's assisted living facility at [phone]. Reviewed prep with nurse. She will make sure he preps accordingly. Gave date/time of 12/4 with a 1015am checkin    Tari Clarke RN

## 2023-12-01 NOTE — NURSING NOTE
Spoke with nurse at [facility] assisted living to give prep for Erics upcoming PET scan as he has missed the previous 2 due to not following prep. Arrival time is 1015 on  12/4. Printed prep instructions were faxed to [facility] with receipt confirmation via Right Fax.    Tari Clarke RN

## 2023-12-04 ENCOUNTER — HOSPITAL ENCOUNTER (OUTPATIENT)
Dept: PET IMAGING | Facility: CLINIC | Age: 63
Discharge: HOME | End: 2023-12-04
Attending: INTERNAL MEDICINE | Admitting: INTERNAL MEDICINE
Payer: COMMERCIAL

## 2023-12-04 PROCEDURE — 250N000011 HC RX IP 250 OP 636: Mod: JZ | Performed by: INTERNAL MEDICINE

## 2023-12-04 PROCEDURE — 343N000001 HC RX 343: Performed by: INTERNAL MEDICINE

## 2023-12-04 PROCEDURE — 999N000130 PET ONCOLOGY (EYES TO THIGHS): Mod: PS

## 2023-12-04 PROCEDURE — A9552 F18 FDG: HCPCS | Performed by: INTERNAL MEDICINE

## 2023-12-04 RX ORDER — HEPARIN SODIUM (PORCINE) LOCK FLUSH IV SOLN 100 UNIT/ML 100 UNIT/ML
500 SOLUTION INTRAVENOUS ONCE
Status: COMPLETED | OUTPATIENT
Start: 2023-12-04 | End: 2023-12-04

## 2023-12-04 RX ADMIN — FLUDEOXYGLUCOSE F-18 13.2 MILLICURIE: 500 INJECTION, SOLUTION INTRAVENOUS at 10:59

## 2023-12-04 RX ADMIN — Medication 500 UNITS: at 11:04

## 2023-12-08 NOTE — PROGRESS NOTES
[facility]    Hematology/Oncology Established Patient Follow-up Note      Today's Date: 12/12/2023    Reason for Follow-up: Metastatic non-small cell lung carcinoma.    HISTORY OF PRESENT ILLNESS: Kt Rasmussen is a 63 year old male who presents with the following oncologic history:  1. 5/05/2016: Presented with near-obstructing large mass coming off the cheikh and likely the posterior wall, seen on bronchoscopy. Biopsy of the mass showed invasive squamous cell carcinoma, moderately differentiating and focally keratinizing.  Procedure was complicated by significant bleeding.  Tumor was completely debulked (via bronchoscopy) in both main stem bronchi and distal trachea. NGS showed no mutations in EGFR, KRAS, BRAF, NRAS, HRAS, PIK3CA, ERBB2, MET, or JAK2.  2. 5/23/2016: PET/CT scan showed evidence of residual tumor with decreased endobronchial mass. Indeterminate, mildly hypermetabolic mesentery with prominent lymph nodes and area of enhancement of the right hepatic lobe present. Felt to have T4-NX-M0 disease.  3. 6/09/2016: Started concurrent chemoradiation with weekly paclitaxel and carboplatin.  4. 7/30/2016: Completed radiation.  Subsequently received 2 cycles of consolidation paclitaxel and carboplatin.   5. 9/13/2019: Presented with 6-month history of dysphonia and left vocal exophytic lesion. Left true vocal fold biopsy showed at least squamous cell carcinoma in situ, cannot exclude superficial invasion.  6. 9/30/2019: Excision of left vocal cord mass showed fragments of invasive squamous cell carcinoma, well differentiated and keratinizing.  Margins negative for malignancy.  7. 2/16/2021: CT chest w/o contrast showed thin-walled cavity in left lower lobe with 2 solid peripheral nodules measuring 9 mm each. No lymphadenopathy.  8. 3/01/2021: PET scan showed hypermetabolic nodules in left lower lobe and right lung, consistent with metastases; hypermetabolic adenopathy in chest, abdomen,  pelvis; nonspecific uptake at tongue; hypermetabolic intraosseous lesions in spine and pelvis consistent with metastatic disease; left axillary hypermetabolic lymph nodes related to COVID-19 vaccination.  9. 3/12/2021: CT-guided lung biopsy showed non-small cell carcinoma, not otherwise specified -- with opinion by [facility] pathologist.  10. 3/18/2021: Lung NGS panel negative for mutations in BRAF, EGFR, ERBB2, IDH1, IDH2, KRAS, MET, NRAS, RET. PD-L1 expression negative (TPS <1%). Due to minimal amount of DNA obtained from specimen, other biomarkers could not be analyzed.  11. 4/7/2021: MRI brain negative for brain metastases.  12. 4/27/2021: Started 1st line metastatic therapy with carboplatin, paclitaxel, bevacizumab, and atezolizumab for metastatic non-small cell lung carcinoma, NOS.  13. 7/12/2021: PET/CT showed resolved mural nodules with increased cystic cavity in left lower lobe with no significant FDG uptake, less likely metastases; no enlarged hypermetabolic mediastinal, hilar, or axillary lymph nodes, decreased metabolic activity of intraosseous lesion in spine and pelvis; no new bone lesions.  14. 10/11/2021: PET/CT showed slight interval increase in intensity of metabolic activity of right pubic bone and left ischium with new focal uptake in L2 spinous process; resolved uptake at previous ground glass opacity along right medial lower lobe; unchanged cystic cavities/nodules in left lower lobe and right apical upper lobe; no pathologically enlarged hypermetabolic mediastinal, hilar, or axillary lymph nodes.  15. 1/10/2022: PET/CT showed new foci of abnormal skeletal FGD uptake in the thoracic and lumbar spine. Mild interval increase in intensity of previously demonstrated hypermetabolic skeletal lesions involving the pelvis and lumbar spine. Findings are consistent with progressive osseous metastatic disease. Subsequently off chemo for 1 month due to poor performance status.  16. 1/25/2022: Patient fell  and fractured his femur. This was surgically repaired and he was subsequently cared for at a rehab unit.  17. 2/14/2022: Brain MRI without contrast (contrast not given due to inability to obtain IV access) showed no brain metastases.  18. 4/21/2022: Started 2nd line metastatic therapy with pemetrexed and carboplatin. Omission of carboplatin 6/2 and forward due to worsening anemia despite dose reduction.  19. 7/18/2022: PET/CT showed enlarged and increased FDG avid skeletal metastases, increased left para-aortic retroperitoneal lymph node increased in size and FDG avidity, findings consistent with progressive disease.  20. 8/3/2022: Started 3rd line metastatic therapy with Taxotere 60 mg/m2 every 3 weeks.  21. 11/21/2022: PET scan showed partial response, left para-aortic retroperitoneal lymph node has decreased from 2.6 x 1.7 cm (SUVmax 7.9) to 2.3 x 1.4 cm (SUVmax 5.7), skeletal metastases no longer demonstrate FDG activity. Kt elected to take a 2-month break from chemo.  22. 1/23/2023: PET scan showed increasing metabolic activity of the left periaortic (max SUV 8.1, previously 5.7) left external iliac (max SUV 7.0, previously 3.1), and right external iliac (max SUV 5.1, previously 3.7) lymph nodes with development/reactivation of multiple FDG avid osseous lesions.  23. 3/23/2023: Resumption of Taxotere every 3 weeks. Delayed due to insurance issues.  24. 6/26/2023: PET scan showed partial response with decreased uptake associated with the retroperitoneal and pelvic lymphadenopathy as well as bone metastases.  25. 12/4/2023: PET scan showed decreased FDG avid left periaortic (SUV max 5.4, previously 6.6) and left external iliac (SUV max 3.5, previously 4.9) lymphadenopathy and broadly stable FDG uptake within scattered pre-existing osseous metastases; new FDG avid lesion involving the right anterior first rib without clear fracture line visible (SUV max 4.7) indeterminate for a new osseous metastasis versus  posttraumatic in etiology.     INTERIM HISTORY:  Kt reports 2 weeks ago he had a fall sustaining mild injury to his right side.  He did not have any syncope. He reports very mild tingling at his fingertips; no  dyspnea. No recent fevers or chills.  He reports some minor back aching from his fall.    REVIEW OF SYSTEMS:   14 point ROS was reviewed and is negative other than as noted above in HPI.       HOME MEDICATIONS:  Current Outpatient Medications   Medication Sig Dispense Refill    atorvastatin (LIPITOR) 40 MG tablet Take 40 mg by mouth daily      ELIQUIS ANTICOAGULANT 5 MG tablet            ALLERGIES:  Allergies   Allergen Reactions    No Known Drug Allergy          PAST MEDICAL HISTORY:  Past Medical History:   Diagnosis Date    Alcohol abuse, unspecified     Cerebral infarction (H)     2009, right side residual and aphasia    Dyslipidemia     GERD (gastroesophageal reflux disease)     Lung cancer (H)     Unspecified essential hypertension          PAST SURGICAL HISTORY:  Past Surgical History:   Procedure Laterality Date    BRONCHOSCOPY FLEXIBLE AND RIGID N/A 5/5/2016    Procedure: BRONCHOSCOPY FLEXIBLE AND RIGID;  Surgeon: Tnoy Talbot MD;  Location: [facility] OR    ESOPHAGOSCOPY FLEXIBLE N/A 9/30/2019    Procedure: flexible esophagoscopy;  Surgeon: Lizzy Johnson MD;  Location: [facility] OR    INJECT STEROID (LOCATION) N/A 9/30/2019    Procedure: steroid injection;  Surgeon: Lizzy Johnson MD;  Location: [facility] OR    IR CHEST PORT PLACEMENT > 5 YRS OF AGE  4/22/2021    IR CHEST PORT PLACEMENT > 5 YRS OF AGE  4/11/2022    IR PORT CHECK RIGHT  4/11/2022    IR PORT REMOVAL RIGHT  4/11/2022    LASER CO2 LARYNGOSCOPY N/A 9/30/2019    Procedure: Microdirect laryngoscopy with excision of laryngeal mass, CO2 laser;  Surgeon: Lizzy Johnson MD;  Location: [facility] OR    [facility] NONSPECIFIC PROCEDURE      R tympanoplasty         SOCIAL HISTORY:  Social History     Socioeconomic History    Marital status: Single     Spouse  name: Not on file    Number of children: Not on file    Years of education: Not on file    Highest education level: Not on file   Occupational History    Not on file   Tobacco Use    Smoking status: Former     Packs/day: 1     Types: Cigarettes     Quit date: 2009     Years since quittin.2    Smokeless tobacco: Never   Substance and Sexual Activity    Alcohol use: Not Currently    Drug use: No    Sexual activity: Not on file   Other Topics Concern    Parent/sibling w/ CABG, MI or angioplasty before 65F 55M? Not Asked   Social History Narrative    Not on file     Social Determinants of Health     Financial Resource Strain: Not on file   Food Insecurity: Not on file   Transportation Needs: Not on file   Physical Activity: Not on file   Stress: Not on file   Social Connections: Not on file   Interpersonal Safety: Not At Risk (2023)    Humiliation, Afraid, Rape, and Kick questionnaire     Fear of Current or Ex-Partner: No     Emotionally Abused: No     Physically Abused: No     Sexually Abused: No   Housing Stability: Not on file   Resides at assisted living.      FAMILY HISTORY:  Family History   Problem Relation Age of Onset    Cancer Father          PHYSICAL EXAM:  Vital signs:  /83   Pulse 96   Temp 98.9  F (37.2  C) (Oral)   Resp 18   Wt 81.6 kg (180 lb)   SpO2 98%   BMI 22.50 kg/m     ECO  GENERAL: No acute distress. Sitting in wheelchair.  EYES: No scleral icterus. No overt erythema.  RESPIRATORY: No audible cough, wheezing, or labored breathing.  MUSCULOSKELETAL: Range of motion in the neck, shoulders, and arms appear normal.  SKIN: No overt rashes, discolorations, or lesions over the face and neck.  NEUROLOGIC: Alert.  No overt tremors.  PSYCHIATRIC: Normal affect and mood.  Does not appear anxious.       LABS:  CBC RESULTS:   Recent Labs   Lab Test 23  0820   WBC 7.4   RBC 4.06*   HGB 11.6*   HCT 37.6*   MCV 93   MCH 28.6   MCHC 30.9*   RDW 17.7*          Last  Comprehensive Metabolic Panel:  Sodium   Date Value Ref Range Status   01/25/2023 140 136 - 145 mmol/L Final   06/29/2021 140 133 - 144 mmol/L Final     Potassium   Date Value Ref Range Status   01/25/2023 4.0 3.4 - 5.3 mmol/L Final   12/07/2022 4.1 3.4 - 5.3 mmol/L Final   06/29/2021 4.3 3.4 - 5.3 mmol/L Final     Chloride   Date Value Ref Range Status   01/25/2023 102 98 - 107 mmol/L Final   12/07/2022 109 94 - 109 mmol/L Final   06/29/2021 111 (H) 94 - 109 mmol/L Final     Carbon Dioxide   Date Value Ref Range Status   06/29/2021 25 20 - 32 mmol/L Final     Carbon Dioxide (CO2)   Date Value Ref Range Status   01/25/2023 28 22 - 29 mmol/L Final   12/07/2022 25 20 - 32 mmol/L Final     Anion Gap   Date Value Ref Range Status   01/25/2023 10 7 - 15 mmol/L Final   12/07/2022 7 3 - 14 mmol/L Final   06/29/2021 4 3 - 14 mmol/L Final     Glucose   Date Value Ref Range Status   01/25/2023 95 70 - 99 mg/dL Final   12/07/2022 91 70 - 99 mg/dL Final   06/29/2021 85 70 - 99 mg/dL Final     Urea Nitrogen   Date Value Ref Range Status   01/25/2023 16.0 8.0 - 23.0 mg/dL Final   12/07/2022 17 7 - 30 mg/dL Final   06/29/2021 17 7 - 30 mg/dL Final     Creatinine   Date Value Ref Range Status   01/25/2023 0.71 0.67 - 1.17 mg/dL Final   06/29/2021 0.84 0.66 - 1.25 mg/dL Final     GFR Estimate   Date Value Ref Range Status   01/25/2023 >90 >60 mL/min/1.73m2 Final     Comment:     eGFR calculated using 2021 CKD-EPI equation.   06/29/2021 >90 >60 mL/min/[1.73_m2] Final     Comment:     Non  GFR Calc  Starting 12/18/2018, serum creatinine based estimated GFR (eGFR) will be   calculated using the Chronic Kidney Disease Epidemiology Collaboration   (CKD-EPI) equation.       Calcium   Date Value Ref Range Status   01/25/2023 9.3 8.8 - 10.2 mg/dL Final   06/29/2021 8.7 8.5 - 10.1 mg/dL Final     Bilirubin Total   Date Value Ref Range Status   11/09/2023 0.5 <=1.2 mg/dL Final   06/29/2021 0.3 0.2 - 1.3 mg/dL Final      Alkaline Phosphatase   Date Value Ref Range Status   11/09/2023 94 40 - 129 U/L Final   06/29/2021 73 40 - 150 U/L Final     ALT   Date Value Ref Range Status   11/09/2023 30 0 - 70 U/L Final     Comment:     Reference intervals for this test were updated on 6/12/2023 to more accurately reflect our healthy population. There may be differences in the flagging of prior results with similar values performed with this method. Interpretation of those prior results can be made in the context of the updated reference intervals.     06/29/2021 34 0 - 70 U/L Final     AST   Date Value Ref Range Status   11/09/2023 28 0 - 45 U/L Final     Comment:     Reference intervals for this test were updated on 6/12/2023 to more accurately reflect our healthy population. There may be differences in the flagging of prior results with similar values performed with this method. Interpretation of those prior results can be made in the context of the updated reference intervals.   06/29/2021 26 0 - 45 U/L Final       PATHOLOGY:  None new.    IMAGING:  Reviewed as per HPI.    ASSESSMENT/PLAN:  Kt Rasmussen is a 62 year old male with the following issues:  1. Metastatic non-small (non-squamous) cell lung carcinoma with metastases to lymph nodes, bones, and bilateral lungs  2. 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 Chemotherapy-induced anemia and thrombocytopenia  --Lung NGS panel negative for mutations in BRAF, EGFR, ERBB2, IDH1, IDH2, KRAS, MET, NRAS, RET. PD-L1 expression negative (TPS <1%). Guardant 360 negative for actionable mutations.  --Kt started 3rd line metastatic therapy with every 3-week Taxotere at 20% dose reduction (60 mg/m2) on 8/3/2022 due to progressive disease.  He tolerated this very well with minimal to no paresthesias. He then took a 2-month chemo break that was further delayed to 3.5 months due to insurance issues.  He resumed on 3/23/2023 and continues to tolerate Taxotere well with minimal paresthesias.  --Reviewed his 12/4/2023 PET scan which showed decreased FDG avid left periaortic (SUV max 5.4, previously 6.6) and left external iliac (SUV max 3.5, previously 4.9) lymphadenopathy and broadly stable FDG uptake within scattered pre-existing osseous metastases; new FDG avid lesion involving the right anterior first rib without clear fracture line visible (SUV max 4.7) indeterminate for a new osseous metastasis versus posttraumatic in etiology. However, since he had a fall about 2 weeks ago, this bone lesion is likely posttraumatic.  --I advised he continue Taxotere at 20% dose reduction due to past repeated issues with chemo-induced cytopenias.  All treatment is with palliative intent. He agrees to continue with Taxotere.  --Will repeat PET scan in 3 months, 3/2024.    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 Dysphonia  6. Dysphagia  -Kt underwent excision of a left vocal cord SCC on 9/30/2019 and has persistent dysphonia as a result of the lesion and excision.  He also has dysphagia but this is stable and swallowing is manageable.  -Continue follow-up with Dr. Wray.    7. History of dizziness and prior falls  --SW and guardian were previously notified of multiple falls.  --Prior brain MRI 2/14/2022 showed no brain metastases. However this was a suboptimal exam due to inability to administer contrast.  --He is using a wheelchair due to prior femoral fracture in 1/2022.    8. Bilateral lower extremity DVTS  --Diagnosed in 9/2023, likely hypercoagulability of malignancy.  --Continue apixaban indefinitely, provided no major bleeding issues arise in the future.    Sangita John MD  Hematology/Oncology  [facility]    Total time spent today: 40 minutes in chart review, patient evaluation, counseling, documentation, test and/or medication/prescription orders, and coordination of care.

## 2023-12-12 ENCOUNTER — VIRTUAL VISIT (OUTPATIENT)
Dept: ONCOLOGY | Facility: CLINIC | Age: 63
End: 2023-12-12
Attending: INTERNAL MEDICINE
Payer: COMMERCIAL

## 2023-12-12 VITALS
TEMPERATURE: 98.9 F | BODY MASS INDEX: 22.5 KG/M2 | OXYGEN SATURATION: 98 % | SYSTOLIC BLOOD PRESSURE: 111 MMHG | RESPIRATION RATE: 18 BRPM | DIASTOLIC BLOOD PRESSURE: 83 MMHG | HEART RATE: 96 BPM | WEIGHT: 180 LBS

## 2023-12-12 DIAGNOSIS — C78.01 MALIGNANT NEOPLASM METASTATIC TO BOTH LUNGS (H): ICD-10-CM

## 2023-12-12 DIAGNOSIS — D70.1 CHEMOTHERAPY-INDUCED NEUTROPENIA (H): ICD-10-CM

## 2023-12-12 DIAGNOSIS — C78.02 MALIGNANT NEOPLASM METASTATIC TO BOTH LUNGS (H): ICD-10-CM

## 2023-12-12 DIAGNOSIS — D64.81 ANEMIA DUE TO CHEMOTHERAPY: ICD-10-CM

## 2023-12-12 DIAGNOSIS — C79.51 NON-SMALL CELL LUNG CANCER METASTATIC TO BONE (H): Primary | ICD-10-CM

## 2023-12-12 DIAGNOSIS — Z51.11 ENCOUNTER FOR ANTINEOPLASTIC CHEMOTHERAPY: ICD-10-CM

## 2023-12-12 DIAGNOSIS — T45.1X5A ANEMIA DUE TO CHEMOTHERAPY: ICD-10-CM

## 2023-12-12 DIAGNOSIS — T45.1X5A CHEMOTHERAPY-INDUCED NEUTROPENIA (H): ICD-10-CM

## 2023-12-12 DIAGNOSIS — C34.90 NON-SMALL CELL LUNG CANCER METASTATIC TO BONE (H): Primary | ICD-10-CM

## 2023-12-12 PROCEDURE — 99215 OFFICE O/P EST HI 40 MIN: CPT | Performed by: INTERNAL MEDICINE

## 2023-12-12 ASSESSMENT — PAIN SCALES - GENERAL: PAINLEVEL: NO PAIN (0)

## 2023-12-12 NOTE — PROGRESS NOTES
"Oncology Rooming Note    December 12, 2023 11:05 AM   Kt Rasmussen is a 63 year old male who presents for:    Chief Complaint   Patient presents with    Oncology Clinic Visit     Initial Vitals: /83   Pulse 96   Temp 98.9  F (37.2  C) (Oral)   Resp 18   Wt 81.6 kg (180 lb)   SpO2 98%   BMI 22.50 kg/m   Estimated body mass index is 22.5 kg/m  as calculated from the following:    Height as of 9/7/23: 1.905 m (6' 3\").    Weight as of this encounter: 81.6 kg (180 lb). Body surface area is 2.08 meters squared.  No Pain (0) Comment: Data Unavailable   No LMP for male patient.  Allergies reviewed: Yes  Medications reviewed: Yes    Medications: Medication refills not needed today.  Pharmacy name entered into EPIC:    [facility] [place] [address] [facility] [place], [place] - [address] [id number] - [place] [id number] [facility]  [facility] - [place], [place] - [address] [place] [address], [facility]. [facility] [address]    Clinical concerns: no       Shari J. Schoenberger, CMA              " 5

## 2023-12-12 NOTE — LETTER
12/12/2023         RE: Kt Rasmussen  [address]  [facility] [address]        Dear Colleague,    Thank you for referring your patient, Kt Rasmussen, to the [facility]. Please see a copy of my visit note below.    [facility]    Hematology/Oncology Established Patient Follow-up Note      Today's Date: 12/12/2023    Reason for Follow-up: Metastatic non-small cell lung carcinoma.    HISTORY OF PRESENT ILLNESS: Kt Rasmussen is a 63 year old male who presents with the following oncologic history:  1. 5/05/2016: Presented with near-obstructing large mass coming off the cheikh and likely the posterior wall, seen on bronchoscopy. Biopsy of the mass showed invasive squamous cell carcinoma, moderately differentiating and focally keratinizing.  Procedure was complicated by significant bleeding.  Tumor was completely debulked (via bronchoscopy) in both main stem bronchi and distal trachea. NGS showed no mutations in EGFR, KRAS, BRAF, NRAS, HRAS, PIK3CA, ERBB2, MET, or JAK2.  2. 5/23/2016: PET/CT scan showed evidence of residual tumor with decreased endobronchial mass. Indeterminate, mildly hypermetabolic mesentery with prominent lymph nodes and area of enhancement of the right hepatic lobe present. Felt to have T4-NX-M0 disease.  3. 6/09/2016: Started concurrent chemoradiation with weekly paclitaxel and carboplatin.  4. 7/30/2016: Completed radiation.  Subsequently received 2 cycles of consolidation paclitaxel and carboplatin.   5. 9/13/2019: Presented with 6-month history of dysphonia and left vocal exophytic lesion. Left true vocal fold biopsy showed at least squamous cell carcinoma in situ, cannot exclude superficial invasion.  6. 9/30/2019: Excision of left vocal cord mass showed fragments of invasive squamous cell carcinoma, well differentiated and keratinizing.  Margins negative for malignancy.  7. 2/16/2021: CT chest w/o contrast showed thin-walled cavity in left  lower lobe with 2 solid peripheral nodules measuring 9 mm each. No lymphadenopathy.  8. 3/01/2021: PET scan showed hypermetabolic nodules in left lower lobe and right lung, consistent with metastases; hypermetabolic adenopathy in chest, abdomen, pelvis; nonspecific uptake at tongue; hypermetabolic intraosseous lesions in spine and pelvis consistent with metastatic disease; left axillary hypermetabolic lymph nodes related to COVID-19 vaccination.  9. 3/12/2021: CT-guided lung biopsy showed non-small cell carcinoma, not otherwise specified -- with opinion by [facility] pathologist.  10. 3/18/2021: Lung NGS panel negative for mutations in BRAF, EGFR, ERBB2, IDH1, IDH2, KRAS, MET, NRAS, RET. PD-L1 expression negative (TPS <1%). Due to minimal amount of DNA obtained from specimen, other biomarkers could not be analyzed.  11. 4/7/2021: MRI brain negative for brain metastases.  12. 4/27/2021: Started 1st line metastatic therapy with carboplatin, paclitaxel, bevacizumab, and atezolizumab for metastatic non-small cell lung carcinoma, NOS.  13. 7/12/2021: PET/CT showed resolved mural nodules with increased cystic cavity in left lower lobe with no significant FDG uptake, less likely metastases; no enlarged hypermetabolic mediastinal, hilar, or axillary lymph nodes, decreased metabolic activity of intraosseous lesion in spine and pelvis; no new bone lesions.  14. 10/11/2021: PET/CT showed slight interval increase in intensity of metabolic activity of right pubic bone and left ischium with new focal uptake in L2 spinous process; resolved uptake at previous ground glass opacity along right medial lower lobe; unchanged cystic cavities/nodules in left lower lobe and right apical upper lobe; no pathologically enlarged hypermetabolic mediastinal, hilar, or axillary lymph nodes.  15. 1/10/2022: PET/CT showed new foci of abnormal skeletal FGD uptake in the thoracic and lumbar spine. Mild interval increase in intensity of previously  demonstrated hypermetabolic skeletal lesions involving the pelvis and lumbar spine. Findings are consistent with progressive osseous metastatic disease. Subsequently off chemo for 1 month due to poor performance status.  16. 1/25/2022: Patient fell and fractured his femur. This was surgically repaired and he was subsequently cared for at a rehab unit.  17. 2/14/2022: Brain MRI without contrast (contrast not given due to inability to obtain IV access) showed no brain metastases.  18. 4/21/2022: Started 2nd line metastatic therapy with pemetrexed and carboplatin. Omission of carboplatin 6/2 and forward due to worsening anemia despite dose reduction.  19. 7/18/2022: PET/CT showed enlarged and increased FDG avid skeletal metastases, increased left para-aortic retroperitoneal lymph node increased in size and FDG avidity, findings consistent with progressive disease.  20. 8/3/2022: Started 3rd line metastatic therapy with Taxotere 60 mg/m2 every 3 weeks.  21. 11/21/2022: PET scan showed partial response, left para-aortic retroperitoneal lymph node has decreased from 2.6 x 1.7 cm (SUVmax 7.9) to 2.3 x 1.4 cm (SUVmax 5.7), skeletal metastases no longer demonstrate FDG activity. Kt elected to take a 2-month break from chemo.  22. 1/23/2023: PET scan showed increasing metabolic activity of the left periaortic (max SUV 8.1, previously 5.7) left external iliac (max SUV 7.0, previously 3.1), and right external iliac (max SUV 5.1, previously 3.7) lymph nodes with development/reactivation of multiple FDG avid osseous lesions.  23. 3/23/2023: Resumption of Taxotere every 3 weeks. Delayed due to insurance issues.  24. 6/26/2023: PET scan showed partial response with decreased uptake associated with the retroperitoneal and pelvic lymphadenopathy as well as bone metastases.  25. 12/4/2023: PET scan showed decreased FDG avid left periaortic (SUV max 5.4, previously 6.6) and left external iliac (SUV max 3.5, previously 4.9)  lymphadenopathy and broadly stable FDG uptake within scattered pre-existing osseous metastases; new FDG avid lesion involving the right anterior first rib without clear fracture line visible (SUV max 4.7) indeterminate for a new osseous metastasis versus posttraumatic in etiology.     INTERIM HISTORY:  Kt reports 2 weeks ago he had a fall sustaining mild injury to his right side.  He did not have any syncope. He reports very mild tingling at his fingertips; no  dyspnea. No recent fevers or chills.  He reports some minor back aching from his fall.    REVIEW OF SYSTEMS:   14 point ROS was reviewed and is negative other than as noted above in HPI.       HOME MEDICATIONS:  Current Outpatient Medications   Medication Sig Dispense Refill     atorvastatin (LIPITOR) 40 MG tablet Take 40 mg by mouth daily       ELIQUIS ANTICOAGULANT 5 MG tablet            ALLERGIES:  Allergies   Allergen Reactions     No Known Drug Allergy          PAST MEDICAL HISTORY:  Past Medical History:   Diagnosis Date     Alcohol abuse, unspecified      Cerebral infarction (H)     2009, right side residual and aphasia     Dyslipidemia      GERD (gastroesophageal reflux disease)      Lung cancer (H)      Unspecified essential hypertension          PAST SURGICAL HISTORY:  Past Surgical History:   Procedure Laterality Date     BRONCHOSCOPY FLEXIBLE AND RIGID N/A 5/5/2016    Procedure: BRONCHOSCOPY FLEXIBLE AND RIGID;  Surgeon: Tony Talbot MD;  Location: [facility] OR     ESOPHAGOSCOPY FLEXIBLE N/A 9/30/2019    Procedure: flexible esophagoscopy;  Surgeon: Lizzy Johnson MD;  Location: [facility] OR     INJECT STEROID (LOCATION) N/A 9/30/2019    Procedure: steroid injection;  Surgeon: Lizzy Johnson MD;  Location: [facility] OR     IR CHEST PORT PLACEMENT > 5 YRS OF AGE  4/22/2021     IR CHEST PORT PLACEMENT > 5 YRS OF AGE  4/11/2022     IR PORT CHECK RIGHT  4/11/2022     IR PORT REMOVAL RIGHT  4/11/2022     LASER CO2 LARYNGOSCOPY N/A 9/30/2019    Procedure:  Microdirect laryngoscopy with excision of laryngeal mass, CO2 laser;  Surgeon: Lizzy Johnson MD;  Location: [facility] OR     [facility] NONSPECIFIC PROCEDURE      R tympanoplasty         SOCIAL HISTORY:  Social History     Socioeconomic History     Marital status: Single     Spouse name: Not on file     Number of children: Not on file     Years of education: Not on file     Highest education level: Not on file   Occupational History     Not on file   Tobacco Use     Smoking status: Former     Packs/day: 1     Types: Cigarettes     Quit date: 2009     Years since quittin.2     Smokeless tobacco: Never   Substance and Sexual Activity     Alcohol use: Not Currently     Drug use: No     Sexual activity: Not on file   Other Topics Concern     Parent/sibling w/ CABG, MI or angioplasty before 65F 55M? Not Asked   Social History Narrative     Not on file     Social Determinants of Health     Financial Resource Strain: Not on file   Food Insecurity: Not on file   Transportation Needs: Not on file   Physical Activity: Not on file   Stress: Not on file   Social Connections: Not on file   Interpersonal Safety: Not At Risk (2023)    Humiliation, Afraid, Rape, and Kick questionnaire      Fear of Current or Ex-Partner: No      Emotionally Abused: No      Physically Abused: No      Sexually Abused: No   Housing Stability: Not on file   Resides at assisted living.      FAMILY HISTORY:  Family History   Problem Relation Age of Onset     Cancer Father          PHYSICAL EXAM:  Vital signs:  /83   Pulse 96   Temp 98.9  F (37.2  C) (Oral)   Resp 18   Wt 81.6 kg (180 lb)   SpO2 98%   BMI 22.50 kg/m     ECO  GENERAL: No acute distress. Sitting in wheelchair.  EYES: No scleral icterus. No overt erythema.  RESPIRATORY: No audible cough, wheezing, or labored breathing.  MUSCULOSKELETAL: Range of motion in the neck, shoulders, and arms appear normal.  SKIN: No overt rashes, discolorations, or lesions over the face and  neck.  NEUROLOGIC: Alert.  No overt tremors.  PSYCHIATRIC: Normal affect and mood.  Does not appear anxious.       LABS:  CBC RESULTS:   Recent Labs   Lab Test 11/09/23  0820   WBC 7.4   RBC 4.06*   HGB 11.6*   HCT 37.6*   MCV 93   MCH 28.6   MCHC 30.9*   RDW 17.7*          Last Comprehensive Metabolic Panel:  Sodium   Date Value Ref Range Status   01/25/2023 140 136 - 145 mmol/L Final   06/29/2021 140 133 - 144 mmol/L Final     Potassium   Date Value Ref Range Status   01/25/2023 4.0 3.4 - 5.3 mmol/L Final   12/07/2022 4.1 3.4 - 5.3 mmol/L Final   06/29/2021 4.3 3.4 - 5.3 mmol/L Final     Chloride   Date Value Ref Range Status   01/25/2023 102 98 - 107 mmol/L Final   12/07/2022 109 94 - 109 mmol/L Final   06/29/2021 111 (H) 94 - 109 mmol/L Final     Carbon Dioxide   Date Value Ref Range Status   06/29/2021 25 20 - 32 mmol/L Final     Carbon Dioxide (CO2)   Date Value Ref Range Status   01/25/2023 28 22 - 29 mmol/L Final   12/07/2022 25 20 - 32 mmol/L Final     Anion Gap   Date Value Ref Range Status   01/25/2023 10 7 - 15 mmol/L Final   12/07/2022 7 3 - 14 mmol/L Final   06/29/2021 4 3 - 14 mmol/L Final     Glucose   Date Value Ref Range Status   01/25/2023 95 70 - 99 mg/dL Final   12/07/2022 91 70 - 99 mg/dL Final   06/29/2021 85 70 - 99 mg/dL Final     Urea Nitrogen   Date Value Ref Range Status   01/25/2023 16.0 8.0 - 23.0 mg/dL Final   12/07/2022 17 7 - 30 mg/dL Final   06/29/2021 17 7 - 30 mg/dL Final     Creatinine   Date Value Ref Range Status   01/25/2023 0.71 0.67 - 1.17 mg/dL Final   06/29/2021 0.84 0.66 - 1.25 mg/dL Final     GFR Estimate   Date Value Ref Range Status   01/25/2023 >90 >60 mL/min/1.73m2 Final     Comment:     eGFR calculated using 2021 CKD-EPI equation.   06/29/2021 >90 >60 mL/min/[1.73_m2] Final     Comment:     Non  GFR Calc  Starting 12/18/2018, serum creatinine based estimated GFR (eGFR) will be   calculated using the Chronic Kidney Disease Epidemiology  Collaboration   (CKD-EPI) equation.       Calcium   Date Value Ref Range Status   01/25/2023 9.3 8.8 - 10.2 mg/dL Final   06/29/2021 8.7 8.5 - 10.1 mg/dL Final     Bilirubin Total   Date Value Ref Range Status   11/09/2023 0.5 <=1.2 mg/dL Final   06/29/2021 0.3 0.2 - 1.3 mg/dL Final     Alkaline Phosphatase   Date Value Ref Range Status   11/09/2023 94 40 - 129 U/L Final   06/29/2021 73 40 - 150 U/L Final     ALT   Date Value Ref Range Status   11/09/2023 30 0 - 70 U/L Final     Comment:     Reference intervals for this test were updated on 6/12/2023 to more accurately reflect our healthy population. There may be differences in the flagging of prior results with similar values performed with this method. Interpretation of those prior results can be made in the context of the updated reference intervals.     06/29/2021 34 0 - 70 U/L Final     AST   Date Value Ref Range Status   11/09/2023 28 0 - 45 U/L Final     Comment:     Reference intervals for this test were updated on 6/12/2023 to more accurately reflect our healthy population. There may be differences in the flagging of prior results with similar values performed with this method. Interpretation of those prior results can be made in the context of the updated reference intervals.   06/29/2021 26 0 - 45 U/L Final       PATHOLOGY:  None new.    IMAGING:  Reviewed as per HPI.    ASSESSMENT/PLAN:  Kt Rasmussen is a 62 year old male with the following issues:  1. Metastatic non-small (non-squamous) cell lung carcinoma with metastases to lymph nodes, bones, and bilateral lungs  2. 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 Chemotherapy-induced anemia and thrombocytopenia  --Lung NGS panel negative for mutations in BRAF, EGFR, ERBB2, IDH1, IDH2, KRAS, MET, NRAS, RET. PD-L1 expression negative (TPS <1%). Guardant 360 negative for actionable mutations.  --Kt started 3rd  line metastatic therapy with every 3-week Taxotere at 20% dose reduction (60 mg/m2) on 8/3/2022 due to progressive disease.  He tolerated this very well with minimal to no paresthesias. He then took a 2-month chemo break that was further delayed to 3.5 months due to insurance issues. He resumed on 3/23/2023 and continues to tolerate Taxotere well with minimal paresthesias.  --Reviewed his 12/4/2023 PET scan which showed decreased FDG avid left periaortic (SUV max 5.4, previously 6.6) and left external iliac (SUV max 3.5, previously 4.9) lymphadenopathy and broadly stable FDG uptake within scattered pre-existing osseous metastases; new FDG avid lesion involving the right anterior first rib without clear fracture line visible (SUV max 4.7) indeterminate for a new osseous metastasis versus posttraumatic in etiology. However, since he had a fall about 2 weeks ago, this bone lesion is likely posttraumatic.  --I advised he continue Taxotere at 20% dose reduction due to past repeated issues with chemo-induced cytopenias.  All treatment is with palliative intent. He agrees to continue with Taxotere.  --Will repeat PET scan in 3 months, 3/2024.    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 Dysphonia  6. Dysphagia  -Kt underwent excision of a left vocal cord SCC on 9/30/2019 and has persistent dysphonia as a result of the lesion and excision.  He also has dysphagia but this is stable and swallowing is manageable.  -Continue follow-up with Dr. Wray.    7. History of dizziness and prior falls  --SW and guardian were previously notified of multiple falls.  --Prior brain MRI 2/14/2022 showed no brain metastases. However this was a suboptimal exam due to inability to administer contrast.  --He is using a wheelchair due to prior femoral fracture in 1/2022.    8. Bilateral lower extremity DVTS  --Diagnosed in 9/2023, likely hypercoagulability of malignancy.  --Continue apixaban indefinitely, provided no major  "bleeding issues arise in the future.    Sangita John MD  Hematology/Oncology  [facility]    Total time spent today: 40 minutes in chart review, patient evaluation, counseling, documentation, test and/or medication/prescription orders, and coordination of care.     Oncology Rooming Note    December 12, 2023 11:05 AM   Kt Rasmussen is a 63 year old male who presents for:    Chief Complaint   Patient presents with     Oncology Clinic Visit     Initial Vitals: /83   Pulse 96   Temp 98.9  F (37.2  C) (Oral)   Resp 18   Wt 81.6 kg (180 lb)   SpO2 98%   BMI 22.50 kg/m   Estimated body mass index is 22.5 kg/m  as calculated from the following:    Height as of 9/7/23: 1.905 m (6' 3\").    Weight as of this encounter: 81.6 kg (180 lb). Body surface area is 2.08 meters squared.  No Pain (0) Comment: Data Unavailable   No LMP for male patient.  Allergies reviewed: Yes  Medications reviewed: Yes    Medications: Medication refills not needed today.  Pharmacy name entered into EPIC:    [facility] [place] [place] - [address] [facility] [address] [place] [address] DRUG STORE [facility][id number] - [place] [address] AT [facility] [address]  [facility] - [facility] [address] [place] [address], [facility]. - [address]    Clinical concerns: no       Shari J. Schoenberger, [facility]                Again, thank you for allowing me to participate in the care of your patient.        Sincerely,        Sangita John MD  "

## 2023-12-12 NOTE — PATIENT INSTRUCTIONS
Continue with lab draw and Taxotere chemo every 3 weeks.  Visit with either nurse practitioner or Dr. John every 3 weeks.  Plan to repeat PET scan in 3 months.

## 2023-12-12 NOTE — LETTER
12/12/2023         RE: Kt Rasmussen  [id number]  [facility] [address]        Dear Colleague,    Thank you for referring your patient, Kt Rasmussen, to the [facility]. Please see a copy of my visit note below.    [facility]    Hematology/Oncology Established Patient Follow-up Note      Today's Date: 12/12/2023    Reason for Follow-up: Metastatic non-small cell lung carcinoma.    HISTORY OF PRESENT ILLNESS: Kt Rasmussen is a 63 year old male who presents with the following oncologic history:  1. 5/05/2016: Presented with near-obstructing large mass coming off the cheikh and likely the posterior wall, seen on bronchoscopy. Biopsy of the mass showed invasive squamous cell carcinoma, moderately differentiating and focally keratinizing.  Procedure was complicated by significant bleeding.  Tumor was completely debulked (via bronchoscopy) in both main stem bronchi and distal trachea. NGS showed no mutations in EGFR, KRAS, BRAF, NRAS, HRAS, PIK3CA, ERBB2, MET, or JAK2.  2. 5/23/2016: PET/CT scan showed evidence of residual tumor with decreased endobronchial mass. Indeterminate, mildly hypermetabolic mesentery with prominent lymph nodes and area of enhancement of the right hepatic lobe present. Felt to have T4-NX-M0 disease.  3. 6/09/2016: Started concurrent chemoradiation with weekly paclitaxel and carboplatin.  4. 7/30/2016: Completed radiation.  Subsequently received 2 cycles of consolidation paclitaxel and carboplatin.   5. 9/13/2019: Presented with 6-month history of dysphonia and left vocal exophytic lesion. Left true vocal fold biopsy showed at least squamous cell carcinoma in situ, cannot exclude superficial invasion.  6. 9/30/2019: Excision of left vocal cord mass showed fragments of invasive squamous cell carcinoma, well differentiated and keratinizing.  Margins negative for malignancy.  7. 2/16/2021: CT chest w/o contrast showed thin-walled cavity in left  lower lobe with 2 solid peripheral nodules measuring 9 mm each. No lymphadenopathy.  8. 3/01/2021: PET scan showed hypermetabolic nodules in left lower lobe and right lung, consistent with metastases; hypermetabolic adenopathy in chest, abdomen, pelvis; nonspecific uptake at tongue; hypermetabolic intraosseous lesions in spine and pelvis consistent with metastatic disease; left axillary hypermetabolic lymph nodes related to COVID-19 vaccination.  9. 3/12/2021: CT-guided lung biopsy showed non-small cell carcinoma, not otherwise specified -- with opinion by [facility] pathologist.  10. 3/18/2021: Lung NGS panel negative for mutations in BRAF, EGFR, ERBB2, IDH1, IDH2, KRAS, MET, NRAS, RET. PD-L1 expression negative (TPS <1%). Due to minimal amount of DNA obtained from specimen, other biomarkers could not be analyzed.  11. 4/7/2021: MRI brain negative for brain metastases.  12. 4/27/2021: Started 1st line metastatic therapy with carboplatin, paclitaxel, bevacizumab, and atezolizumab for metastatic non-small cell lung carcinoma, NOS.  13. 7/12/2021: PET/CT showed resolved mural nodules with increased cystic cavity in left lower lobe with no significant FDG uptake, less likely metastases; no enlarged hypermetabolic mediastinal, hilar, or axillary lymph nodes, decreased metabolic activity of intraosseous lesion in spine and pelvis; no new bone lesions.  14. 10/11/2021: PET/CT showed slight interval increase in intensity of metabolic activity of right pubic bone and left ischium with new focal uptake in L2 spinous process; resolved uptake at previous ground glass opacity along right medial lower lobe; unchanged cystic cavities/nodules in left lower lobe and right apical upper lobe; no pathologically enlarged hypermetabolic mediastinal, hilar, or axillary lymph nodes.  15. 1/10/2022: PET/CT showed new foci of abnormal skeletal FGD uptake in the thoracic and lumbar spine. Mild interval increase in intensity of previously  demonstrated hypermetabolic skeletal lesions involving the pelvis and lumbar spine. Findings are consistent with progressive osseous metastatic disease. Subsequently off chemo for 1 month due to poor performance status.  16. 1/25/2022: Patient fell and fractured his femur. This was surgically repaired and he was subsequently cared for at a rehab unit.  17. 2/14/2022: Brain MRI without contrast (contrast not given due to inability to obtain IV access) showed no brain metastases.  18. 4/21/2022: Started 2nd line metastatic therapy with pemetrexed and carboplatin. Omission of carboplatin 6/2 and forward due to worsening anemia despite dose reduction.  19. 7/18/2022: PET/CT showed enlarged and increased FDG avid skeletal metastases, increased left para-aortic retroperitoneal lymph node increased in size and FDG avidity, findings consistent with progressive disease.  20. 8/3/2022: Started 3rd line metastatic therapy with Taxotere 60 mg/m2 every 3 weeks.  21. 11/21/2022: PET scan showed partial response, left para-aortic retroperitoneal lymph node has decreased from 2.6 x 1.7 cm (SUVmax 7.9) to 2.3 x 1.4 cm (SUVmax 5.7), skeletal metastases no longer demonstrate FDG activity. Kt elected to take a 2-month break from chemo.  22. 1/23/2023: PET scan showed increasing metabolic activity of the left periaortic (max SUV 8.1, previously 5.7) left external iliac (max SUV 7.0, previously 3.1), and right external iliac (max SUV 5.1, previously 3.7) lymph nodes with development/reactivation of multiple FDG avid osseous lesions.  23. 3/23/2023: Resumption of Taxotere every 3 weeks. Delayed due to insurance issues.  24. 6/26/2023: PET scan showed partial response with decreased uptake associated with the retroperitoneal and pelvic lymphadenopathy as well as bone metastases.  25. 12/4/2023: PET scan showed decreased FDG avid left periaortic (SUV max 5.4, previously 6.6) and left external iliac (SUV max 3.5, previously 4.9)  lymphadenopathy and broadly stable FDG uptake within scattered pre-existing osseous metastases; new FDG avid lesion involving the right anterior first rib without clear fracture line visible (SUV max 4.7) indeterminate for a new osseous metastasis versus posttraumatic in etiology.     INTERIM HISTORY:  Kt reports 2 weeks ago he had a fall sustaining mild injury to his right side.  He did not have any syncope. He reports very mild tingling at his fingertips; no  dyspnea. No recent fevers or chills.  He reports some minor back aching from his fall.    REVIEW OF SYSTEMS:   14 point ROS was reviewed and is negative other than as noted above in HPI.       HOME MEDICATIONS:  Current Outpatient Medications   Medication Sig Dispense Refill     atorvastatin (LIPITOR) 40 MG tablet Take 40 mg by mouth daily       ELIQUIS ANTICOAGULANT 5 MG tablet            ALLERGIES:  Allergies   Allergen Reactions     No Known Drug Allergy          PAST MEDICAL HISTORY:  Past Medical History:   Diagnosis Date     Alcohol abuse, unspecified      Cerebral infarction (H)     2009, right side residual and aphasia     Dyslipidemia      GERD (gastroesophageal reflux disease)      Lung cancer (H)      Unspecified essential hypertension          PAST SURGICAL HISTORY:  Past Surgical History:   Procedure Laterality Date     BRONCHOSCOPY FLEXIBLE AND RIGID N/A 5/5/2016    Procedure: BRONCHOSCOPY FLEXIBLE AND RIGID;  Surgeon: Tony Talbot MD;  Location: [facility] OR     ESOPHAGOSCOPY FLEXIBLE N/A 9/30/2019    Procedure: flexible esophagoscopy;  Surgeon: Lizzy Johnson MD;  Location: [facility] OR     INJECT STEROID (LOCATION) N/A 9/30/2019    Procedure: steroid injection;  Surgeon: Lizzy Johnson MD;  Location: [facility] OR     IR CHEST PORT PLACEMENT > 5 YRS OF AGE  4/22/2021     IR CHEST PORT PLACEMENT > 5 YRS OF AGE  4/11/2022     IR PORT CHECK RIGHT  4/11/2022     IR PORT REMOVAL RIGHT  4/11/2022     LASER CO2 LARYNGOSCOPY N/A 9/30/2019    Procedure:  Microdirect laryngoscopy with excision of laryngeal mass, CO2 laser;  Surgeon: Lizzy Johnson MD;  Location: [facility] OR     [facility] NONSPECIFIC PROCEDURE      R tympanoplasty         SOCIAL HISTORY:  Social History     Socioeconomic History     Marital status: Single     Spouse name: Not on file     Number of children: Not on file     Years of education: Not on file     Highest education level: Not on file   Occupational History     Not on file   Tobacco Use     Smoking status: Former     Packs/day: 1     Types: Cigarettes     Quit date: 2009     Years since quittin.2     Smokeless tobacco: Never   Substance and Sexual Activity     Alcohol use: Not Currently     Drug use: No     Sexual activity: Not on file   Other Topics Concern     Parent/sibling w/ CABG, MI or angioplasty before 65F 55M? Not Asked   Social History Narrative     Not on file     Social Determinants of Health     Financial Resource Strain: Not on file   Food Insecurity: Not on file   Transportation Needs: Not on file   Physical Activity: Not on file   Stress: Not on file   Social Connections: Not on file   Interpersonal Safety: Not At Risk (2023)    Humiliation, Afraid, Rape, and Kick questionnaire      Fear of Current or Ex-Partner: No      Emotionally Abused: No      Physically Abused: No      Sexually Abused: No   Housing Stability: Not on file   Resides at assisted living.      FAMILY HISTORY:  Family History   Problem Relation Age of Onset     Cancer Father          PHYSICAL EXAM:  Vital signs:  /83   Pulse 96   Temp 98.9  F (37.2  C) (Oral)   Resp 18   Wt 81.6 kg (180 lb)   SpO2 98%   BMI 22.50 kg/m     ECO  GENERAL: No acute distress. Sitting in wheelchair.  EYES: No scleral icterus. No overt erythema.  RESPIRATORY: No audible cough, wheezing, or labored breathing.  MUSCULOSKELETAL: Range of motion in the neck, shoulders, and arms appear normal.  SKIN: No overt rashes, discolorations, or lesions over the face and  neck.  NEUROLOGIC: Alert.  No overt tremors.  PSYCHIATRIC: Normal affect and mood.  Does not appear anxious.       LABS:  CBC RESULTS:   Recent Labs   Lab Test 11/09/23  0820   WBC 7.4   RBC 4.06*   HGB 11.6*   HCT 37.6*   MCV 93   MCH 28.6   MCHC 30.9*   RDW 17.7*          Last Comprehensive Metabolic Panel:  Sodium   Date Value Ref Range Status   01/25/2023 140 136 - 145 mmol/L Final   06/29/2021 140 133 - 144 mmol/L Final     Potassium   Date Value Ref Range Status   01/25/2023 4.0 3.4 - 5.3 mmol/L Final   12/07/2022 4.1 3.4 - 5.3 mmol/L Final   06/29/2021 4.3 3.4 - 5.3 mmol/L Final     Chloride   Date Value Ref Range Status   01/25/2023 102 98 - 107 mmol/L Final   12/07/2022 109 94 - 109 mmol/L Final   06/29/2021 111 (H) 94 - 109 mmol/L Final     Carbon Dioxide   Date Value Ref Range Status   06/29/2021 25 20 - 32 mmol/L Final     Carbon Dioxide (CO2)   Date Value Ref Range Status   01/25/2023 28 22 - 29 mmol/L Final   12/07/2022 25 20 - 32 mmol/L Final     Anion Gap   Date Value Ref Range Status   01/25/2023 10 7 - 15 mmol/L Final   12/07/2022 7 3 - 14 mmol/L Final   06/29/2021 4 3 - 14 mmol/L Final     Glucose   Date Value Ref Range Status   01/25/2023 95 70 - 99 mg/dL Final   12/07/2022 91 70 - 99 mg/dL Final   06/29/2021 85 70 - 99 mg/dL Final     Urea Nitrogen   Date Value Ref Range Status   01/25/2023 16.0 8.0 - 23.0 mg/dL Final   12/07/2022 17 7 - 30 mg/dL Final   06/29/2021 17 7 - 30 mg/dL Final     Creatinine   Date Value Ref Range Status   01/25/2023 0.71 0.67 - 1.17 mg/dL Final   06/29/2021 0.84 0.66 - 1.25 mg/dL Final     GFR Estimate   Date Value Ref Range Status   01/25/2023 >90 >60 mL/min/1.73m2 Final     Comment:     eGFR calculated using 2021 CKD-EPI equation.   06/29/2021 >90 >60 mL/min/[1.73_m2] Final     Comment:     Non  GFR Calc  Starting 12/18/2018, serum creatinine based estimated GFR (eGFR) will be   calculated using the Chronic Kidney Disease Epidemiology  Collaboration   (CKD-EPI) equation.       Calcium   Date Value Ref Range Status   01/25/2023 9.3 8.8 - 10.2 mg/dL Final   06/29/2021 8.7 8.5 - 10.1 mg/dL Final     Bilirubin Total   Date Value Ref Range Status   11/09/2023 0.5 <=1.2 mg/dL Final   06/29/2021 0.3 0.2 - 1.3 mg/dL Final     Alkaline Phosphatase   Date Value Ref Range Status   11/09/2023 94 40 - 129 U/L Final   06/29/2021 73 40 - 150 U/L Final     ALT   Date Value Ref Range Status   11/09/2023 30 0 - 70 U/L Final     Comment:     Reference intervals for this test were updated on 6/12/2023 to more accurately reflect our healthy population. There may be differences in the flagging of prior results with similar values performed with this method. Interpretation of those prior results can be made in the context of the updated reference intervals.     06/29/2021 34 0 - 70 U/L Final     AST   Date Value Ref Range Status   11/09/2023 28 0 - 45 U/L Final     Comment:     Reference intervals for this test were updated on 6/12/2023 to more accurately reflect our healthy population. There may be differences in the flagging of prior results with similar values performed with this method. Interpretation of those prior results can be made in the context of the updated reference intervals.   06/29/2021 26 0 - 45 U/L Final       PATHOLOGY:  None new.    IMAGING:  Reviewed as per HPI.    ASSESSMENT/PLAN:  Kt Rasmussen is a 62 year old male with the following issues:  1. Metastatic non-small (non-squamous) cell lung carcinoma with metastases to lymph nodes, bones, and bilateral lungs  2. 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 Chemotherapy-induced anemia and thrombocytopenia  --Lung NGS panel negative for mutations in BRAF, EGFR, ERBB2, IDH1, IDH2, KRAS, MET, NRAS, RET. PD-L1 expression negative (TPS <1%). Guardant 360 negative for actionable mutations.  --Kt started 3rd  line metastatic therapy with every 3-week Taxotere at 20% dose reduction (60 mg/m2) on 8/3/2022 due to progressive disease.  He tolerated this very well with minimal to no paresthesias. He then took a 2-month chemo break that was further delayed to 3.5 months due to insurance issues. He resumed on 3/23/2023 and continues to tolerate Taxotere well with minimal paresthesias.  --Reviewed his 12/4/2023 PET scan which showed decreased FDG avid left periaortic (SUV max 5.4, previously 6.6) and left external iliac (SUV max 3.5, previously 4.9) lymphadenopathy and broadly stable FDG uptake within scattered pre-existing osseous metastases; new FDG avid lesion involving the right anterior first rib without clear fracture line visible (SUV max 4.7) indeterminate for a new osseous metastasis versus posttraumatic in etiology. However, since he had a fall about 2 weeks ago, this bone lesion is likely posttraumatic.  --I advised he continue Taxotere at 20% dose reduction due to past repeated issues with chemo-induced cytopenias.  All treatment is with palliative intent. He agrees to continue with Taxotere.  --Will repeat PET scan in 3 months, 3/2024.    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 Dysphonia  6. Dysphagia  -Kt underwent excision of a left vocal cord SCC on 9/30/2019 and has persistent dysphonia as a result of the lesion and excision.  He also has dysphagia but this is stable and swallowing is manageable.  -Continue follow-up with Dr. Wray.    7. History of dizziness and prior falls  --SW and guardian were previously notified of multiple falls.  --Prior brain MRI 2/14/2022 showed no brain metastases. However this was a suboptimal exam due to inability to administer contrast.  --He is using a wheelchair due to prior femoral fracture in 1/2022.    8. Bilateral lower extremity DVTS  --Diagnosed in 9/2023, likely hypercoagulability of malignancy.  --Continue apixaban indefinitely, provided no major  "bleeding issues arise in the future.    Sangita John MD  Hematology/Oncology  [facility]    Total time spent today: 40 minutes in chart review, patient evaluation, counseling, documentation, test and/or medication/prescription orders, and coordination of care.     Oncology Rooming Note    December 12, 2023 11:05 AM   Kt Rasmussen is a 63 year old male who presents for:    Chief Complaint   Patient presents with     Oncology Clinic Visit     Initial Vitals: /83   Pulse 96   Temp 98.9  F (37.2  C) (Oral)   Resp 18   Wt 81.6 kg (180 lb)   SpO2 98%   BMI 22.50 kg/m   Estimated body mass index is 22.5 kg/m  as calculated from the following:    Height as of 9/7/23: 1.905 m (6' 3\").    Weight as of this encounter: 81.6 kg (180 lb). Body surface area is 2.08 meters squared.  No Pain (0) Comment: Data Unavailable   No LMP for male patient.  Allergies reviewed: Yes  Medications reviewed: Yes    Medications: Medication refills not needed today.  Pharmacy name entered into EPIC:    [facility] [place] [place] [facility] [address] [facility] [address] [place] [address] [facility] #[id number] - [place] [id number] AT [facility] & [facility] [address]  [place] [facility] - [facility] [address] [place] [address], [facility]. - [address]    Clinical concerns: no       Shari J. Schoenberger, [facility]                Again, thank you for allowing me to participate in the care of your patient.        Sincerely,        Sangita John MD  "

## 2024-01-02 RX ORDER — MEPERIDINE HYDROCHLORIDE 25 MG/ML
25 INJECTION INTRAMUSCULAR; INTRAVENOUS; SUBCUTANEOUS EVERY 30 MIN PRN
Status: CANCELLED | OUTPATIENT
Start: 2024-01-04

## 2024-01-02 RX ORDER — DIPHENHYDRAMINE HYDROCHLORIDE 50 MG/ML
50 INJECTION INTRAMUSCULAR; INTRAVENOUS
Status: CANCELLED
Start: 2024-01-04

## 2024-01-02 RX ORDER — EPINEPHRINE 1 MG/ML
0.3 INJECTION, SOLUTION, CONCENTRATE INTRAVENOUS EVERY 5 MIN PRN
Status: CANCELLED | OUTPATIENT
Start: 2024-01-04

## 2024-01-02 RX ORDER — ALBUTEROL SULFATE 0.83 MG/ML
2.5 SOLUTION RESPIRATORY (INHALATION)
Status: CANCELLED | OUTPATIENT
Start: 2024-01-04

## 2024-01-02 RX ORDER — ALBUTEROL SULFATE 90 UG/1
1-2 AEROSOL, METERED RESPIRATORY (INHALATION)
Status: CANCELLED
Start: 2024-01-04

## 2024-01-02 RX ORDER — HEPARIN SODIUM (PORCINE) LOCK FLUSH IV SOLN 100 UNIT/ML 100 UNIT/ML
5 SOLUTION INTRAVENOUS
Status: CANCELLED | OUTPATIENT
Start: 2024-01-04

## 2024-01-02 RX ORDER — HEPARIN SODIUM,PORCINE 10 UNIT/ML
5 VIAL (ML) INTRAVENOUS
Status: CANCELLED | OUTPATIENT
Start: 2024-01-04

## 2024-01-02 RX ORDER — LORAZEPAM 2 MG/ML
0.5 INJECTION INTRAMUSCULAR EVERY 4 HOURS PRN
Status: CANCELLED | OUTPATIENT
Start: 2024-01-04

## 2024-01-03 RX ORDER — ALBUTEROL SULFATE 90 UG/1
1-2 AEROSOL, METERED RESPIRATORY (INHALATION)
Status: CANCELLED
Start: 2024-01-25

## 2024-01-03 RX ORDER — EPINEPHRINE 1 MG/ML
0.3 INJECTION, SOLUTION, CONCENTRATE INTRAVENOUS EVERY 5 MIN PRN
Status: CANCELLED | OUTPATIENT
Start: 2024-02-15

## 2024-01-03 RX ORDER — HEPARIN SODIUM (PORCINE) LOCK FLUSH IV SOLN 100 UNIT/ML 100 UNIT/ML
5 SOLUTION INTRAVENOUS
Status: CANCELLED | OUTPATIENT
Start: 2024-02-15

## 2024-01-03 RX ORDER — EPINEPHRINE 1 MG/ML
0.3 INJECTION, SOLUTION, CONCENTRATE INTRAVENOUS EVERY 5 MIN PRN
Status: CANCELLED | OUTPATIENT
Start: 2024-01-25

## 2024-01-03 RX ORDER — LORAZEPAM 2 MG/ML
0.5 INJECTION INTRAMUSCULAR EVERY 4 HOURS PRN
Status: CANCELLED | OUTPATIENT
Start: 2024-01-25

## 2024-01-03 RX ORDER — DIPHENHYDRAMINE HYDROCHLORIDE 50 MG/ML
50 INJECTION INTRAMUSCULAR; INTRAVENOUS
Status: CANCELLED
Start: 2024-01-25

## 2024-01-03 RX ORDER — DIPHENHYDRAMINE HYDROCHLORIDE 50 MG/ML
50 INJECTION INTRAMUSCULAR; INTRAVENOUS
Status: CANCELLED
Start: 2024-02-15

## 2024-01-03 RX ORDER — HEPARIN SODIUM,PORCINE 10 UNIT/ML
5 VIAL (ML) INTRAVENOUS
Status: CANCELLED | OUTPATIENT
Start: 2024-01-25

## 2024-01-03 RX ORDER — MEPERIDINE HYDROCHLORIDE 25 MG/ML
25 INJECTION INTRAMUSCULAR; INTRAVENOUS; SUBCUTANEOUS EVERY 30 MIN PRN
Status: CANCELLED | OUTPATIENT
Start: 2024-01-25

## 2024-01-03 RX ORDER — ALBUTEROL SULFATE 0.83 MG/ML
2.5 SOLUTION RESPIRATORY (INHALATION)
Status: CANCELLED | OUTPATIENT
Start: 2024-01-25

## 2024-01-03 RX ORDER — HEPARIN SODIUM (PORCINE) LOCK FLUSH IV SOLN 100 UNIT/ML 100 UNIT/ML
5 SOLUTION INTRAVENOUS
Status: CANCELLED | OUTPATIENT
Start: 2024-01-25

## 2024-01-03 RX ORDER — MEPERIDINE HYDROCHLORIDE 25 MG/ML
25 INJECTION INTRAMUSCULAR; INTRAVENOUS; SUBCUTANEOUS EVERY 30 MIN PRN
Status: CANCELLED | OUTPATIENT
Start: 2024-02-15

## 2024-01-03 RX ORDER — HEPARIN SODIUM,PORCINE 10 UNIT/ML
5 VIAL (ML) INTRAVENOUS
Status: CANCELLED | OUTPATIENT
Start: 2024-02-15

## 2024-01-03 RX ORDER — ALBUTEROL SULFATE 0.83 MG/ML
2.5 SOLUTION RESPIRATORY (INHALATION)
Status: CANCELLED | OUTPATIENT
Start: 2024-02-15

## 2024-01-03 RX ORDER — LORAZEPAM 2 MG/ML
0.5 INJECTION INTRAMUSCULAR EVERY 4 HOURS PRN
Status: CANCELLED | OUTPATIENT
Start: 2024-02-15

## 2024-01-03 RX ORDER — ALBUTEROL SULFATE 90 UG/1
1-2 AEROSOL, METERED RESPIRATORY (INHALATION)
Status: CANCELLED
Start: 2024-02-15

## 2024-01-04 ENCOUNTER — INFUSION THERAPY VISIT (OUTPATIENT)
Dept: INFUSION THERAPY | Facility: CLINIC | Age: 64
End: 2024-01-04
Attending: INTERNAL MEDICINE
Payer: COMMERCIAL

## 2024-01-04 ENCOUNTER — ONCOLOGY VISIT (OUTPATIENT)
Dept: ONCOLOGY | Facility: CLINIC | Age: 64
End: 2024-01-04
Attending: INTERNAL MEDICINE
Payer: COMMERCIAL

## 2024-01-04 VITALS — WEIGHT: 183 LBS | BODY MASS INDEX: 22.87 KG/M2

## 2024-01-04 VITALS
SYSTOLIC BLOOD PRESSURE: 125 MMHG | TEMPERATURE: 97.5 F | HEART RATE: 70 BPM | DIASTOLIC BLOOD PRESSURE: 75 MMHG | OXYGEN SATURATION: 100 % | RESPIRATION RATE: 16 BRPM

## 2024-01-04 DIAGNOSIS — D70.1 CHEMOTHERAPY-INDUCED NEUTROPENIA (H): ICD-10-CM

## 2024-01-04 DIAGNOSIS — C34.90 NON-SMALL CELL LUNG CANCER METASTATIC TO BONE (H): ICD-10-CM

## 2024-01-04 DIAGNOSIS — T45.1X5A CHEMOTHERAPY-INDUCED NEUTROPENIA (H): ICD-10-CM

## 2024-01-04 DIAGNOSIS — C79.51 NON-SMALL CELL LUNG CANCER METASTATIC TO BONE (H): ICD-10-CM

## 2024-01-04 DIAGNOSIS — C78.01 MALIGNANT NEOPLASM METASTATIC TO BOTH LUNGS (H): ICD-10-CM

## 2024-01-04 DIAGNOSIS — Z51.11 ENCOUNTER FOR ANTINEOPLASTIC CHEMOTHERAPY: Primary | ICD-10-CM

## 2024-01-04 DIAGNOSIS — C78.02 MALIGNANT NEOPLASM METASTATIC TO BOTH LUNGS (H): ICD-10-CM

## 2024-01-04 LAB
ALBUMIN SERPL BCG-MCNC: 3.8 G/DL (ref 3.5–5.2)
ALP SERPL-CCNC: 97 U/L (ref 40–150)
ALT SERPL W P-5'-P-CCNC: 28 U/L (ref 0–70)
AST SERPL W P-5'-P-CCNC: 24 U/L (ref 0–45)
BASOPHILS # BLD AUTO: 0 10E3/UL (ref 0–0.2)
BASOPHILS NFR BLD AUTO: 0 %
BILIRUB DIRECT SERPL-MCNC: <0.2 MG/DL (ref 0–0.3)
BILIRUB SERPL-MCNC: 0.5 MG/DL
EOSINOPHIL # BLD AUTO: 0.2 10E3/UL (ref 0–0.7)
EOSINOPHIL NFR BLD AUTO: 5 %
ERYTHROCYTE [DISTWIDTH] IN BLOOD BY AUTOMATED COUNT: 15.8 % (ref 10–15)
HCT VFR BLD AUTO: 38.5 % (ref 40–53)
HGB BLD-MCNC: 12.1 G/DL (ref 13.3–17.7)
IMM GRANULOCYTES # BLD: 0 10E3/UL
IMM GRANULOCYTES NFR BLD: 1 %
LYMPHOCYTES # BLD AUTO: 0.9 10E3/UL (ref 0.8–5.3)
LYMPHOCYTES NFR BLD AUTO: 25 %
MCH RBC QN AUTO: 28.2 PG (ref 26.5–33)
MCHC RBC AUTO-ENTMCNC: 31.4 G/DL (ref 31.5–36.5)
MCV RBC AUTO: 90 FL (ref 78–100)
MONOCYTES # BLD AUTO: 0.4 10E3/UL (ref 0–1.3)
MONOCYTES NFR BLD AUTO: 9 %
NEUTROPHILS # BLD AUTO: 2.3 10E3/UL (ref 1.6–8.3)
NEUTROPHILS NFR BLD AUTO: 60 %
NRBC # BLD AUTO: 0 10E3/UL
NRBC BLD AUTO-RTO: 0 /100
PLATELET # BLD AUTO: 112 10E3/UL (ref 150–450)
PROT SERPL-MCNC: 6 G/DL (ref 6.4–8.3)
RBC # BLD AUTO: 4.29 10E6/UL (ref 4.4–5.9)
WBC # BLD AUTO: 3.8 10E3/UL (ref 4–11)

## 2024-01-04 PROCEDURE — 258N000003 HC RX IP 258 OP 636: Performed by: INTERNAL MEDICINE

## 2024-01-04 PROCEDURE — 96377 APPLICATON ON-BODY INJECTOR: CPT | Mod: XS

## 2024-01-04 PROCEDURE — 250N000011 HC RX IP 250 OP 636: Performed by: INTERNAL MEDICINE

## 2024-01-04 PROCEDURE — 36591 DRAW BLOOD OFF VENOUS DEVICE: CPT | Performed by: INTERNAL MEDICINE

## 2024-01-04 PROCEDURE — 96413 CHEMO IV INFUSION 1 HR: CPT

## 2024-01-04 PROCEDURE — 80076 HEPATIC FUNCTION PANEL: CPT | Performed by: INTERNAL MEDICINE

## 2024-01-04 PROCEDURE — 96372 THER/PROPH/DIAG INJ SC/IM: CPT | Performed by: INTERNAL MEDICINE

## 2024-01-04 PROCEDURE — 85025 COMPLETE CBC W/AUTO DIFF WBC: CPT | Performed by: INTERNAL MEDICINE

## 2024-01-04 PROCEDURE — G0463 HOSPITAL OUTPT CLINIC VISIT: HCPCS | Mod: 25 | Performed by: NURSE PRACTITIONER

## 2024-01-04 PROCEDURE — 99214 OFFICE O/P EST MOD 30 MIN: CPT | Performed by: NURSE PRACTITIONER

## 2024-01-04 PROCEDURE — 96375 TX/PRO/DX INJ NEW DRUG ADDON: CPT

## 2024-01-04 RX ORDER — HEPARIN SODIUM (PORCINE) LOCK FLUSH IV SOLN 100 UNIT/ML 100 UNIT/ML
5 SOLUTION INTRAVENOUS
Status: DISCONTINUED | OUTPATIENT
Start: 2024-01-04 | End: 2024-01-04 | Stop reason: HOSPADM

## 2024-01-04 RX ADMIN — SODIUM CHLORIDE 122 MG: 9 INJECTION, SOLUTION INTRAVENOUS at 09:47

## 2024-01-04 RX ADMIN — SODIUM CHLORIDE 250 ML: 9 INJECTION, SOLUTION INTRAVENOUS at 09:30

## 2024-01-04 RX ADMIN — DEXAMETHASONE SODIUM PHOSPHATE: 10 INJECTION, SOLUTION INTRAMUSCULAR; INTRAVENOUS at 09:30

## 2024-01-04 RX ADMIN — PEGFILGRASTIM 6 MG: KIT SUBCUTANEOUS at 09:59

## 2024-01-04 RX ADMIN — Medication 5 ML: at 10:51

## 2024-01-04 ASSESSMENT — PAIN SCALES - GENERAL: PAINLEVEL: NO PAIN (0)

## 2024-01-04 NOTE — PROGRESS NOTES
.gkOncology/Hematology Visit Note  Jan 4, 2024    Reason for Visit: follow up of metastatic non-small cell lung carcinoma  Metastatic to lymph nodes bones in bilateral lungs  Brain MRI negative for mets    Patient met with Dr. John who recommended treatment with palliative intent with paclitaxel carboplatin Avastin and atezolizumab-treatment started on April 27, 2021  -Due to thrombocytopenia carboplatin AUC reduced to 4 with cycle 2  Due to pancytopenia carboplatin discontinued with cycle 5    7/12/2021: PET/CT showed resolved mural nodules with increased cystic cavity in left lower lobe with no significant FDG uptake, less likely metastases; no enlarged hypermetabolic mediastinal, hilar, or axillary lymph nodes, decreased metabolic activity of intraosseous lesion in spine and pelvis; no new bone lesions    Treated with paclitaxel, Avastin and atezolizumab.-Last treatment given in  12/22/2021-cycle 12    1/10/2022: PET/CT showed new foci of abnormal skeletal FGD uptake in the thoracic and lumbar spine. Mild interval increase in intensity of previously demonstrated hypermetabolic skeletal lesions involving the pelvis and lumbar spine. Findings are consistent with progressive osseous metastatic disease. Subsequently off chemo for 1 month due to poor performance status.  16. 1/25/2022: Patient fell and fractured his femur. This was surgically repaired and he was subsequently cared for at a rehab unit.  17. 2/14/2022: Brain MRI without contrast (contrast not given due to inability to obtain IV access) showed no brain metastases.    Met with Dr. Shoemaker-in Dr. John's absence  -Recommendation is to change therapy   04/21/2022 -palliative Carboplatin Alimta started   Due to cytopenia AUC reduced to 4 with cycle 2  Due to persistent pancytopenia and inability to tolerate treatment carboplatin discontinued with cycle 3-day 1  Patient was on monotherapy with Alimta    07/18/2022-PET CT scan shows progression of the  disease  08/03-started Taxotere 60 mg/m2 every 3 weeks    Interval History:  Patient reports he is feeling well.  Denies fever chills sweats cough shortness of breath chest pain nausea vomiting diarrhea abdominal pain or bleeding      Review of Systems:  14 point ROS of systems including Constitutional, Eyes, Respiratory, Cardiovascular, Gastroenterology, Genitourinary, Integumentary, Muscularskeletal, Psychiatric were all negative except for pertinent positives noted in my HPI.    Physical Examination:  Physical Exam  HENT:      Right Ear: Tympanic membrane normal.      Nose: Nose normal.      Mouth/Throat:      Mouth: Mucous membranes are moist.   Eyes:      Pupils: Pupils are equal, round, and reactive to light.   Cardiovascular:      Rate and Rhythm: Normal rate.      Pulses: Normal pulses.   Pulmonary:      Effort: Pulmonary effort is normal.   Abdominal:      General: Abdomen is flat.   Musculoskeletal:         General: Normal range of motion.      Cervical back: Normal range of motion.   Skin:     General: Skin is warm.   Neurological:      General: No focal deficit present.      Mental Status: He is alert.   Psychiatric:         Mood and Affect: Mood normal.           Laboratory Data:  CBC and CMP results reviewed    Assessment and Plan:    metastatic non-small cell lung cancer   Patient of Dr. John   Progressed on  different treatments as above recently was on monotherapy with Alimta  07/18/2022-PET CT scan reveals progression of disease  Patient with Dr. John who recommended changing treatment to Taxotere 60 mg/m2 every 3 weeks  -Patient reports he has been tolerating treatment well  -Labs reviewed unremarkable discussed okay to proceed with treatment  -Continue with Taxotere every 3 weeks  Plan for PET scan in March and follow-up with Dr. 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  Patient is asymptomatic  Transfuse for hemoglobin less than 8 or symptomatic    Thrombocytopenia  Platelet count 112  -Denies  bleeding bruising  Patient meets parameters for treatment  Complications of low platelet count reviewed  I Advised patient to go to ER in the event of bleeding bruising fall injury or edema      Leukopenia  Normal ANC  Patient is afebrile and asymptomatic  Check temperature frequently in the event of fever chills sweats signs and symptoms of infection go to ER  Patient gets Neulasta on pro with chemo      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  01/2021-scope done by ENT no evidence of recurrence  Continue close follow-up by ENT  Patient has dysphonia and some dysphagia     History of CVA  -02/14/2022-MRI of the brain did not reveal brain metastasis        Patient is advised to call our clinic or go to ER in the event of fever chills sweats cough shortness of breath chest pain nausea vomiting diarrhea abdominal pain bleeding edema or any changes in health    MADHAVI Moran [facility]  [facility]     Chart documentation with Dragon Voice recognition Software. Although reviewed after completion, some words and grammatical errors may remain.

## 2024-01-04 NOTE — LETTER
"    1/4/2024         RE: Kt Rasmussen  [id number]  [facility] [address]        Dear Colleague,    Thank you for referring your patient, Kt Rasmussen, to the [facility]. Please see a copy of my visit note below.    Oncology Rooming Note    January 4, 2024 8:51 AM   Kt Rasmussen is a 63 year old male who presents for:    Chief Complaint   Patient presents with     Oncology Clinic Visit     Initial Vitals: /75   Pulse 70   Temp 97.5  F (36.4  C) (Oral)   Resp 16   SpO2 100%  Estimated body mass index is 22.5 kg/m  as calculated from the following:    Height as of 9/7/23: 1.905 m (6' 3\").    Weight as of 12/12/23: 81.6 kg (180 lb). There is no height or weight on file to calculate BSA.  No Pain (0) Comment: Data Unavailable   No LMP for male patient.  Allergies reviewed: Yes  Medications reviewed: Yes    Medications: Medication refills not needed today.  Pharmacy name entered into EPIC:    [facility] [place], [place] - [address] [facility] [address] [facility][id number] - [place] [address] [facility] 7 AT [facility] 7  [facility] TERM [facility] - [facility] [address] [place] [address], [facility]. - [address]    Frailty Screening:   Is the patient here for a new oncology consult visit in cancer care? 2. No      Clinical concerns:   NP was notified.      Julisa Whelan CMA              .gkOncology/Hematology Visit Note  Jan 4, 2024    Reason for Visit: follow up of metastatic non-small cell lung carcinoma  Metastatic to lymph nodes bones in bilateral lungs  Brain MRI negative for mets    Patient met with Dr. John who recommended treatment with palliative intent with paclitaxel carboplatin Avastin and atezolizumab-treatment started on April 27, 2021  -Due to thrombocytopenia carboplatin AUC reduced to 4 with cycle " 2  Due to pancytopenia carboplatin discontinued with cycle 5    7/12/2021: PET/CT showed resolved mural nodules with increased cystic cavity in left lower lobe with no significant FDG uptake, less likely metastases; no enlarged hypermetabolic mediastinal, hilar, or axillary lymph nodes, decreased metabolic activity of intraosseous lesion in spine and pelvis; no new bone lesions    Treated with paclitaxel, Avastin and atezolizumab.-Last treatment given in  12/22/2021-cycle 12    1/10/2022: PET/CT showed new foci of abnormal skeletal FGD uptake in the thoracic and lumbar spine. Mild interval increase in intensity of previously demonstrated hypermetabolic skeletal lesions involving the pelvis and lumbar spine. Findings are consistent with progressive osseous metastatic disease. Subsequently off chemo for 1 month due to poor performance status.  16. 1/25/2022: Patient fell and fractured his femur. This was surgically repaired and he was subsequently cared for at a rehab unit.  17. 2/14/2022: Brain MRI without contrast (contrast not given due to inability to obtain IV access) showed no brain metastases.    Met with Dr. Shoemaker-in Dr. John's absence  -Recommendation is to change therapy   04/21/2022 -palliative Carboplatin Alimta started   Due to cytopenia AUC reduced to 4 with cycle 2  Due to persistent pancytopenia and inability to tolerate treatment carboplatin discontinued with cycle 3-day 1  Patient was on monotherapy with Alimta    07/18/2022-PET CT scan shows progression of the disease  08/03-started Taxotere 60 mg/m2 every 3 weeks    Interval History:  Patient reports he is feeling well.  Denies fever chills sweats cough shortness of breath chest pain nausea vomiting diarrhea abdominal pain or bleeding      Review of Systems:  14 point ROS of systems including Constitutional, Eyes, Respiratory, Cardiovascular, Gastroenterology, Genitourinary, Integumentary, Muscularskeletal, Psychiatric were all negative except for  pertinent positives noted in my HPI.    Physical Examination:  Physical Exam  HENT:      Right Ear: Tympanic membrane normal.      Nose: Nose normal.      Mouth/Throat:      Mouth: Mucous membranes are moist.   Eyes:      Pupils: Pupils are equal, round, and reactive to light.   Cardiovascular:      Rate and Rhythm: Normal rate.      Pulses: Normal pulses.   Pulmonary:      Effort: Pulmonary effort is normal.   Abdominal:      General: Abdomen is flat.   Musculoskeletal:         General: Normal range of motion.      Cervical back: Normal range of motion.   Skin:     General: Skin is warm.   Neurological:      General: No focal deficit present.      Mental Status: He is alert.   Psychiatric:         Mood and Affect: Mood normal.           Laboratory Data:  CBC and CMP results reviewed    Assessment and Plan:    metastatic non-small cell lung cancer   Patient of Dr. John   Progressed on  different treatments as above recently was on monotherapy with Alimta  07/18/2022-PET CT scan reveals progression of disease  Patient with Dr. John who recommended changing treatment to Taxotere 60 mg/m2 every 3 weeks  -Patient reports he has been tolerating treatment well  -Labs reviewed unremarkable discussed okay to proceed with treatment  -Continue with Taxotere every 3 weeks  Plan for PET scan in March and follow-up with Dr. 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 Although reviewed after completion, some words and grammatical errors may remain.          Again, thank you for allowing me to participate in the care of your patient.        Sincerely,        MADHAVI Moran CNP

## 2024-01-04 NOTE — PROGRESS NOTES
"Oncology Rooming Note    January 4, 2024 8:51 AM   Kt Rasmussen is a 63 year old male who presents for:    Chief Complaint   Patient presents with    Oncology Clinic Visit     Initial Vitals: /75   Pulse 70   Temp 97.5  F (36.4  C) (Oral)   Resp 16   SpO2 100%  Estimated body mass index is 22.5 kg/m  as calculated from the following:    Height as of 9/7/23: 1.905 m (6' 3\").    Weight as of 12/12/23: 81.6 kg (180 lb). There is no height or weight on file to calculate BSA.  No Pain (0) Comment: Data Unavailable   No LMP for male patient.  Allergies reviewed: Yes  Medications reviewed: Yes    Medications: Medication refills not needed today.  Pharmacy name entered into EPIC:    [facility] [place] - [address] [facility] [address] [facility]ORE #[id number] - [address] 7 AT [facility] & [address] [facility] - [facility] [address] [place] [address] [employer], [facility]. - [place] [id number]    Frailty Screening:   Is the patient here for a new oncology consult visit in cancer care? 2. No      Clinical concerns:   NP was notified.      Julisa Whelan CMA            "

## 2024-01-04 NOTE — PROGRESS NOTES
Infusion Nursing Note:  Kt Rasmussen presents today for labs/taxotere/onpro.    Patient seen by provider today: Yes: Juan   present during visit today: Not Applicable.    Note: N/A.      Intravenous Access:  Labs drawn without difficulty.  Implanted Port.    Treatment Conditions:  Lab Results   Component Value Date    HGB 12.1 (L) 01/04/2024    WBC 3.8 (L) 01/04/2024    ANEU 0.5 (L) 08/11/2022    ANEUTAUTO 2.3 01/04/2024     (L) 01/04/2024        Lab Results   Component Value Date     01/25/2023    POTASSIUM 4.0 01/25/2023    MAG 1.9 10/13/2016    CR 0.71 01/25/2023    BEVERLY 9.3 01/25/2023    BILITOTAL 0.5 01/04/2024    ALBUMIN 3.8 01/04/2024    ALT 28 01/04/2024    AST 24 01/04/2024       Results reviewed, labs MET treatment parameters, ok to proceed with treatment.      Post Infusion Assessment:  Patient tolerated infusion without incident.  Blood return noted pre and post infusion.  Site patent and intact, free from redness, edema or discomfort.  No evidence of extravasations.  Access discontinued per protocol.       Discharge Plan:   Discharge instructions reviewed with: Patient.  Patient and/or family verbalized understanding of discharge instructions and all questions answered.  AVS to patient via [facility].  Patient will return 1/25 for next appointment.   Patient discharged in stable condition accompanied by: self.  Departure Mode: Wheelchair      Chelsi Doe RN    ONPRO  Was placed on patient's: back of right arm.    Was placed at 10 AM    Podpal used: Yes    ONPRO injector device Lot number: [id number]    Patient education included: what patient can expect after application, what colored lights mean on the device, when to remove device, when and where to call with questions or issues, all patients questions answered, and that Neulasta administration will occur at 1-2pm tomorrow.    Patient tolerated administration well.

## 2024-01-25 ENCOUNTER — ONCOLOGY VISIT (OUTPATIENT)
Dept: ONCOLOGY | Facility: CLINIC | Age: 64
End: 2024-01-25
Attending: INTERNAL MEDICINE
Payer: COMMERCIAL

## 2024-01-25 ENCOUNTER — LAB (OUTPATIENT)
Dept: INFUSION THERAPY | Facility: CLINIC | Age: 64
End: 2024-01-25
Attending: INTERNAL MEDICINE
Payer: COMMERCIAL

## 2024-01-25 VITALS
DIASTOLIC BLOOD PRESSURE: 67 MMHG | WEIGHT: 180 LBS | OXYGEN SATURATION: 95 % | RESPIRATION RATE: 18 BRPM | TEMPERATURE: 97.6 F | SYSTOLIC BLOOD PRESSURE: 109 MMHG | BODY MASS INDEX: 22.5 KG/M2 | HEART RATE: 65 BPM

## 2024-01-25 DIAGNOSIS — D70.1 CHEMOTHERAPY-INDUCED NEUTROPENIA (H): ICD-10-CM

## 2024-01-25 DIAGNOSIS — C79.51 NON-SMALL CELL LUNG CANCER METASTATIC TO BONE (H): Primary | ICD-10-CM

## 2024-01-25 DIAGNOSIS — T45.1X5A CHEMOTHERAPY-INDUCED NEUTROPENIA (H): ICD-10-CM

## 2024-01-25 DIAGNOSIS — C34.90 NON-SMALL CELL LUNG CANCER METASTATIC TO BONE (H): ICD-10-CM

## 2024-01-25 DIAGNOSIS — C34.90 NON-SMALL CELL LUNG CANCER METASTATIC TO BONE (H): Primary | ICD-10-CM

## 2024-01-25 DIAGNOSIS — C79.51 NON-SMALL CELL LUNG CANCER METASTATIC TO BONE (H): ICD-10-CM

## 2024-01-25 DIAGNOSIS — Z51.11 ENCOUNTER FOR ANTINEOPLASTIC CHEMOTHERAPY: Primary | ICD-10-CM

## 2024-01-25 DIAGNOSIS — Z51.11 ENCOUNTER FOR ANTINEOPLASTIC CHEMOTHERAPY: ICD-10-CM

## 2024-01-25 LAB
ALBUMIN SERPL BCG-MCNC: 3.9 G/DL (ref 3.5–5.2)
ALP SERPL-CCNC: 90 U/L (ref 40–150)
ALT SERPL W P-5'-P-CCNC: 26 U/L (ref 0–70)
AST SERPL W P-5'-P-CCNC: 24 U/L (ref 0–45)
BASOPHILS # BLD AUTO: 0 10E3/UL (ref 0–0.2)
BASOPHILS NFR BLD AUTO: 0 %
BILIRUB DIRECT SERPL-MCNC: <0.2 MG/DL (ref 0–0.3)
BILIRUB SERPL-MCNC: 0.5 MG/DL
EOSINOPHIL # BLD AUTO: 0 10E3/UL (ref 0–0.7)
EOSINOPHIL NFR BLD AUTO: 0 %
ERYTHROCYTE [DISTWIDTH] IN BLOOD BY AUTOMATED COUNT: 16.1 % (ref 10–15)
HCT VFR BLD AUTO: 38.4 % (ref 40–53)
HGB BLD-MCNC: 12.1 G/DL (ref 13.3–17.7)
IMM GRANULOCYTES # BLD: 0 10E3/UL
IMM GRANULOCYTES NFR BLD: 0 %
LYMPHOCYTES # BLD AUTO: 1 10E3/UL (ref 0.8–5.3)
LYMPHOCYTES NFR BLD AUTO: 17 %
MCH RBC QN AUTO: 28.5 PG (ref 26.5–33)
MCHC RBC AUTO-ENTMCNC: 31.5 G/DL (ref 31.5–36.5)
MCV RBC AUTO: 90 FL (ref 78–100)
MONOCYTES # BLD AUTO: 0.6 10E3/UL (ref 0–1.3)
MONOCYTES NFR BLD AUTO: 10 %
NEUTROPHILS # BLD AUTO: 4.5 10E3/UL (ref 1.6–8.3)
NEUTROPHILS NFR BLD AUTO: 73 %
NRBC # BLD AUTO: 0 10E3/UL
NRBC BLD AUTO-RTO: 0 /100
PLATELET # BLD AUTO: 185 10E3/UL (ref 150–450)
PROT SERPL-MCNC: 6.2 G/DL (ref 6.4–8.3)
RBC # BLD AUTO: 4.25 10E6/UL (ref 4.4–5.9)
WBC # BLD AUTO: 6.2 10E3/UL (ref 4–11)

## 2024-01-25 PROCEDURE — 96413 CHEMO IV INFUSION 1 HR: CPT

## 2024-01-25 PROCEDURE — 96367 TX/PROPH/DG ADDL SEQ IV INF: CPT

## 2024-01-25 PROCEDURE — 99213 OFFICE O/P EST LOW 20 MIN: CPT | Performed by: NURSE PRACTITIONER

## 2024-01-25 PROCEDURE — 96377 APPLICATON ON-BODY INJECTOR: CPT | Mod: XS

## 2024-01-25 PROCEDURE — 80076 HEPATIC FUNCTION PANEL: CPT | Performed by: INTERNAL MEDICINE

## 2024-01-25 PROCEDURE — 250N000011 HC RX IP 250 OP 636: Mod: JZ | Performed by: INTERNAL MEDICINE

## 2024-01-25 PROCEDURE — G0463 HOSPITAL OUTPT CLINIC VISIT: HCPCS | Mod: 25 | Performed by: NURSE PRACTITIONER

## 2024-01-25 PROCEDURE — 258N000003 HC RX IP 258 OP 636: Performed by: INTERNAL MEDICINE

## 2024-01-25 PROCEDURE — 85025 COMPLETE CBC W/AUTO DIFF WBC: CPT | Performed by: INTERNAL MEDICINE

## 2024-01-25 PROCEDURE — 96372 THER/PROPH/DIAG INJ SC/IM: CPT | Performed by: INTERNAL MEDICINE

## 2024-01-25 PROCEDURE — 36591 DRAW BLOOD OFF VENOUS DEVICE: CPT | Performed by: INTERNAL MEDICINE

## 2024-01-25 RX ORDER — HEPARIN SODIUM,PORCINE 10 UNIT/ML
5 VIAL (ML) INTRAVENOUS
Status: DISCONTINUED | OUTPATIENT
Start: 2024-01-25 | End: 2024-01-25 | Stop reason: HOSPADM

## 2024-01-25 RX ORDER — HEPARIN SODIUM (PORCINE) LOCK FLUSH IV SOLN 100 UNIT/ML 100 UNIT/ML
5 SOLUTION INTRAVENOUS
Status: DISCONTINUED | OUTPATIENT
Start: 2024-01-25 | End: 2024-01-25 | Stop reason: HOSPADM

## 2024-01-25 RX ADMIN — SODIUM CHLORIDE 250 ML: 9 INJECTION, SOLUTION INTRAVENOUS at 10:54

## 2024-01-25 RX ADMIN — DEXAMETHASONE SODIUM PHOSPHATE: 10 INJECTION, SOLUTION INTRAMUSCULAR; INTRAVENOUS at 10:55

## 2024-01-25 RX ADMIN — DOCETAXEL 122 MG: 20 INJECTION, SOLUTION, CONCENTRATE INTRAVENOUS at 11:16

## 2024-01-25 RX ADMIN — Medication 5 ML: at 12:20

## 2024-01-25 RX ADMIN — PEGFILGRASTIM 6 MG: KIT SUBCUTANEOUS at 11:18

## 2024-01-25 ASSESSMENT — PAIN SCALES - GENERAL: PAINLEVEL: NO PAIN (0)

## 2024-01-25 NOTE — PROGRESS NOTES
"Oncology Rooming Note    January 25, 2024 9:51 AM   Kt Rasmussen is a 63 year old male who presents for:    Chief Complaint   Patient presents with    Oncology Clinic Visit     Initial Vitals: /67   Pulse 65   Temp 97.6  F (36.4  C) (Oral)   Resp 18   Wt 81.6 kg (180 lb)   SpO2 95%   BMI 22.50 kg/m   Estimated body mass index is 22.5 kg/m  as calculated from the following:    Height as of 9/7/23: 1.905 m (6' 3\").    Weight as of this encounter: 81.6 kg (180 lb). Body surface area is 2.08 meters squared.  No Pain (0) Comment: Data Unavailable   No LMP for male patient.  Allergies reviewed: Yes  Medications reviewed: Yes    Medications: Medication refills not needed today.  Pharmacy name entered into EPIC:    [facility] [place] [place] - [address] [facility] [place], [place] - [address] [id number] - [place] [address] [facility] 7 AT [facility] 7  [place] [facility] - [place], [place] - [address] [place] [address], [facility]. - [facility] [address]    Clinical concerns: no       Shari J. Schoenberger, CMA              "

## 2024-01-25 NOTE — PROGRESS NOTES
.gkOncology/Hematology Visit Note  Jan 25, 2024    Reason for Visit: follow up of metastatic non-small cell lung carcinoma  Metastatic to lymph nodes bones in bilateral lungs  Brain MRI negative for mets    Patient met with Dr. John who recommended treatment with palliative intent with paclitaxel carboplatin Avastin and atezolizumab-treatment started on April 27, 2021  -Due to thrombocytopenia carboplatin AUC reduced to 4 with cycle 2  Due to pancytopenia carboplatin discontinued with cycle 5    7/12/2021: PET/CT showed resolved mural nodules with increased cystic cavity in left lower lobe with no significant FDG uptake, less likely metastases; no enlarged hypermetabolic mediastinal, hilar, or axillary lymph nodes, decreased metabolic activity of intraosseous lesion in spine and pelvis; no new bone lesions    Treated with paclitaxel, Avastin and atezolizumab.-Last treatment given in  12/22/2021-cycle 12    1/10/2022: PET/CT showed new foci of abnormal skeletal FGD uptake in the thoracic and lumbar spine. Mild interval increase in intensity of previously demonstrated hypermetabolic skeletal lesions involving the pelvis and lumbar spine. Findings are consistent with progressive osseous metastatic disease. Subsequently off chemo for 1 month due to poor performance status.  16. 1/25/2022: Patient fell and fractured his femur. This was surgically repaired and he was subsequently cared for at a rehab unit.  17. 2/14/2022: Brain MRI without contrast (contrast not given due to inability to obtain IV access) showed no brain metastases.    Met with Dr. Shoemaker-in Dr. John's absence  -Recommendation is to change therapy   04/21/2022 -palliative Carboplatin Alimta started   Due to cytopenia AUC reduced to 4 with cycle 2  Due to persistent pancytopenia and inability to tolerate treatment carboplatin discontinued with cycle 3-day 1  Patient was on monotherapy with Alimta    07/18/2022-PET CT scan shows progression of the  disease  08/03-started Taxotere 60 mg/m2 every 3 weeks    Interval History:  Patient reports she has been tolerating treatment well.  Denies fever chills sweats cough shortness of breath chest pain nausea vomiting diarrhea abdominal pain or bleeding          Review of Systems:  14 point ROS of systems including Constitutional, Eyes, Respiratory, Cardiovascular, Gastroenterology, Genitourinary, Integumentary, Muscularskeletal, Psychiatric were all negative except for pertinent positives noted in my HPI.    Physical Examination:  Physical Exam  HENT:      Right Ear: Tympanic membrane normal.      Nose: Nose normal.      Mouth/Throat:      Mouth: Mucous membranes are moist.   Eyes:      Pupils: Pupils are equal, round, and reactive to light.   Cardiovascular:      Rate and Rhythm: Normal rate.      Pulses: Normal pulses.   Pulmonary:      Effort: Pulmonary effort is normal.   Abdominal:      General: Abdomen is flat.   Musculoskeletal:         General: Normal range of motion.      Cervical back: Normal range of motion.   Skin:     General: Skin is warm.   Neurological:      General: No focal deficit present.      Mental Status: He is alert.   Psychiatric:         Mood and Affect: Mood normal.           Laboratory Data:  CBC and CMP results reviewed    Assessment and Plan:    metastatic non-small cell lung cancer   Patient of Dr. John   Progressed on  different treatments as above recently was on monotherapy with Alimta  07/18/2022-PET CT scan reveals progression of disease  Patient with Dr. John who recommended changing treatment to Taxotere 60 mg/m2 every 3 weeks  -Patient reports he has been tolerating treatment well  -Labs reviewed unremarkable discussed okay to proceed with treatment  -Continue with Taxotere every 3 weeks  Patient is scheduled in February with Dr. 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 Although reviewed after completion, some words and grammatical errors may remain.

## 2024-01-25 NOTE — PROGRESS NOTES
Nursing Note:  Kt Rasmussen presents today for port labs.    Patient seen by provider today: Yes: Juan Rausch CNP   present during visit today: Not Applicable.    Note: N/A.    Intravenous Access:  Labs drawn without difficulty.  Implanted Port.    Discharge Plan:   Patient was sent to [facility] for provider appointment.    Cass Shoemaker RN

## 2024-01-25 NOTE — PROGRESS NOTES
Infusion Nursing Note:  Kt Rasmussen presents today for C21D1 Taxotere.    Patient seen by provider today: Yes: Juan Rausch NP   present during visit today: Not Applicable.    Note: N/A.    ONPRO  Was placed on patient's: back of right arm.    Was placed at 11:20 AM    Podpal used: Yes    ONPRO injector device Lot number: [id number]    Patient education included: what patient can expect after application, what colored lights mean on the device, when to remove device, when and where to call with questions or issues, all patients questions answered, and that Neulasta administration will occur at 2:20pm on 1/26/24.    Patient tolerated administration well.   Intravenous Access:  Implanted Port.    Treatment Conditions:  Lab Results   Component Value Date    HGB 12.1 (L) 01/25/2024    WBC 6.2 01/25/2024    ANEU 0.5 (L) 08/11/2022    ANEUTAUTO 4.5 01/25/2024     01/25/2024        Lab Results   Component Value Date     01/25/2023    POTASSIUM 4.0 01/25/2023    MAG 1.9 10/13/2016    CR 0.71 01/25/2023    BEVERLY 9.3 01/25/2023    BILITOTAL 0.5 01/25/2024    ALBUMIN 3.9 01/25/2024    ALT 26 01/25/2024    AST 24 01/25/2024       Results reviewed, labs MET treatment parameters, ok to proceed with treatment.      Post Infusion Assessment:  Patient tolerated infusion without incident.  Blood return noted pre and post infusion.  Site patent and intact, free from redness, edema or discomfort.  No evidence of extravasations.  Access discontinued per protocol.       Discharge Plan:   Discharge instructions reviewed with: Patient.  Patient and/or family verbalized understanding of discharge instructions and all questions answered.  AVS to patient via [facility].  Patient will return 2/15/24 for next appointment.   Patient discharged in stable condition accompanied by: self.  Departure Mode: Wheelchair.      Stacy Minor RN

## 2024-01-25 NOTE — LETTER
"    1/25/2024         RE: Kt Rasmussen  [address]  [facility] [address]        Dear Colleague,    Thank you for referring your patient, Kt Rasmussen, to the [facility]. Please see a copy of my visit note below.    Oncology Rooming Note    January 25, 2024 9:51 AM   Kt Rasmussen is a 63 year old male who presents for:    Chief Complaint   Patient presents with     Oncology Clinic Visit     Initial Vitals: /67   Pulse 65   Temp 97.6  F (36.4  C) (Oral)   Resp 18   Wt 81.6 kg (180 lb)   SpO2 95%   BMI 22.50 kg/m   Estimated body mass index is 22.5 kg/m  as calculated from the following:    Height as of 9/7/23: 1.905 m (6' 3\").    Weight as of this encounter: 81.6 kg (180 lb). Body surface area is 2.08 meters squared.  No Pain (0) Comment: Data Unavailable   No LMP for male patient.  Allergies reviewed: Yes  Medications reviewed: Yes    Medications: Medication refills not needed today.  Pharmacy name entered into EPIC:    [facility] [place], [place] - [address] [facility] [address] [id number] - [place] [address] [address] AT [facility] 7  [place] [facility] - [facility] [address] [place] [address], [facility]. - [address]    Clinical concerns: no       Shari J. Schoenberger, CMA .gkOncology/Hematology Visit Note  Jan 25, 2024    Reason for Visit: follow up of metastatic non-small cell lung carcinoma  Metastatic to lymph nodes bones in bilateral lungs  Brain MRI negative for mets    Patient met with Dr. John who recommended treatment with palliative intent with paclitaxel carboplatin Avastin and atezolizumab-treatment started on April 27, 2021  -Due to thrombocytopenia carboplatin AUC reduced to 4 with cycle 2  Due to pancytopenia carboplatin discontinued with cycle 5    7/12/2021: " PET/CT showed resolved mural nodules with increased cystic cavity in left lower lobe with no significant FDG uptake, less likely metastases; no enlarged hypermetabolic mediastinal, hilar, or axillary lymph nodes, decreased metabolic activity of intraosseous lesion in spine and pelvis; no new bone lesions    Treated with paclitaxel, Avastin and atezolizumab.-Last treatment given in  12/22/2021-cycle 12    1/10/2022: PET/CT showed new foci of abnormal skeletal FGD uptake in the thoracic and lumbar spine. Mild interval increase in intensity of previously demonstrated hypermetabolic skeletal lesions involving the pelvis and lumbar spine. Findings are consistent with progressive osseous metastatic disease. Subsequently off chemo for 1 month due to poor performance status.  16. 1/25/2022: Patient fell and fractured his femur. This was surgically repaired and he was subsequently cared for at a rehab unit.  17. 2/14/2022: Brain MRI without contrast (contrast not given due to inability to obtain IV access) showed no brain metastases.    Met with Dr. Shoemaker-in Dr. John's absence  -Recommendation is to change therapy   04/21/2022 -palliative Carboplatin Alimta started   Due to cytopenia AUC reduced to 4 with cycle 2  Due to persistent pancytopenia and inability to tolerate treatment carboplatin discontinued with cycle 3-day 1  Patient was on monotherapy with Alimta    07/18/2022-PET CT scan shows progression of the disease  08/03-started Taxotere 60 mg/m2 every 3 weeks    Interval History:  Patient reports she has been tolerating treatment well.  Denies fever chills sweats cough shortness of breath chest pain nausea vomiting diarrhea abdominal pain or bleeding          Review of Systems:  14 point ROS of systems including Constitutional, Eyes, Respiratory, Cardiovascular, Gastroenterology, Genitourinary, Integumentary, Muscularskeletal, Psychiatric were all negative except for pertinent positives noted in my HPI.    Physical  Examination:  Physical Exam  HENT:      Right Ear: Tympanic membrane normal.      Nose: Nose normal.      Mouth/Throat:      Mouth: Mucous membranes are moist.   Eyes:      Pupils: Pupils are equal, round, and reactive to light.   Cardiovascular:      Rate and Rhythm: Normal rate.      Pulses: Normal pulses.   Pulmonary:      Effort: Pulmonary effort is normal.   Abdominal:      General: Abdomen is flat.   Musculoskeletal:         General: Normal range of motion.      Cervical back: Normal range of motion.   Skin:     General: Skin is warm.   Neurological:      General: No focal deficit present.      Mental Status: He is alert.   Psychiatric:         Mood and Affect: Mood normal.           Laboratory Data:  CBC and CMP results reviewed    Assessment and Plan:    metastatic non-small cell lung cancer   Patient of Dr. John   Progressed on  different treatments as above recently was on monotherapy with Alimta  07/18/2022-PET CT scan reveals progression of disease  Patient with Dr. John who recommended changing treatment to Taxotere 60 mg/m2 every 3 weeks  -Patient reports he has been tolerating treatment well  -Labs reviewed unremarkable discussed okay to proceed with treatment  -Continue with Taxotere every 3 weeks  Patient is scheduled in February with Dr. 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    [facility]     Chart documentation with Dragon Voice recognition Software. Although reviewed after completion, some words and grammatical errors may remain.          Again, thank you for allowing me to participate in the care of your patient.        Sincerely,        MADHAVI Moran CNP

## 2024-01-25 NOTE — PATIENT INSTRUCTIONS
Your On-body Neulasta Injector was applied to your right arm at 11:20am.  At approximately 2:20pm on 1/26/24, your On-body Injector will beep to let you know your dose delivery will begin in 2 minutes.  Your medication will be delivered over the next 45 minutes.  You can remove your Injector at 3:15pm.  Please make sure your Injector has a solid green light or has turned off prior to removing the device.  Please contact your provider at [phone] with questions or concerns.

## 2024-02-05 NOTE — PROGRESS NOTES
[facility]    Hematology/Oncology Established Patient Follow-up Note      Today's Date: 2/15/2024    Reason for Follow-up: Metastatic non-small cell lung carcinoma.    HISTORY OF PRESENT ILLNESS: Kt Rasmussen is a 63 year old male who presents with the following oncologic history:  1. 5/05/2016: Presented with near-obstructing large mass coming off the cheikh and likely the posterior wall, seen on bronchoscopy. Biopsy of the mass showed invasive squamous cell carcinoma, moderately differentiating and focally keratinizing.  Procedure was complicated by significant bleeding.  Tumor was completely debulked (via bronchoscopy) in both main stem bronchi and distal trachea. NGS showed no mutations in EGFR, KRAS, BRAF, NRAS, HRAS, PIK3CA, ERBB2, MET, or JAK2.  2. 5/23/2016: PET/CT scan showed evidence of residual tumor with decreased endobronchial mass. Indeterminate, mildly hypermetabolic mesentery with prominent lymph nodes and area of enhancement of the right hepatic lobe present. Felt to have T4-NX-M0 disease.  3. 6/09/2016: Started concurrent chemoradiation with weekly paclitaxel and carboplatin.  4. 7/30/2016: Completed radiation.  Subsequently received 2 cycles of consolidation paclitaxel and carboplatin.   5. 9/13/2019: Presented with 6-month history of dysphonia and left vocal exophytic lesion. Left true vocal fold biopsy showed at least squamous cell carcinoma in situ, cannot exclude superficial invasion.  6. 9/30/2019: Excision of left vocal cord mass showed fragments of invasive squamous cell carcinoma, well differentiated and keratinizing.  Margins negative for malignancy.  7. 2/16/2021: CT chest w/o contrast showed thin-walled cavity in left lower lobe with 2 solid peripheral nodules measuring 9 mm each. No lymphadenopathy.  8. 3/01/2021: PET scan showed hypermetabolic nodules in left lower lobe and right lung, consistent with metastases; hypermetabolic adenopathy in chest, abdomen,  pelvis; nonspecific uptake at tongue; hypermetabolic intraosseous lesions in spine and pelvis consistent with metastatic disease; left axillary hypermetabolic lymph nodes related to COVID-19 vaccination.  9. 3/12/2021: CT-guided lung biopsy showed non-small cell carcinoma, not otherwise specified -- with opinion by [facility] pathologist.  10. 3/18/2021: Lung NGS panel negative for mutations in BRAF, EGFR, ERBB2, IDH1, IDH2, KRAS, MET, NRAS, RET. PD-L1 expression negative (TPS <1%). Due to minimal amount of DNA obtained from specimen, other biomarkers could not be analyzed.  11. 4/7/2021: MRI brain negative for brain metastases.  12. 4/27/2021: Started 1st line metastatic therapy with carboplatin, paclitaxel, bevacizumab, and atezolizumab for metastatic non-small cell lung carcinoma, NOS.  13. 7/12/2021: PET/CT showed resolved mural nodules with increased cystic cavity in left lower lobe with no significant FDG uptake, less likely metastases; no enlarged hypermetabolic mediastinal, hilar, or axillary lymph nodes, decreased metabolic activity of intraosseous lesion in spine and pelvis; no new bone lesions.  14. 10/11/2021: PET/CT showed slight interval increase in intensity of metabolic activity of right pubic bone and left ischium with new focal uptake in L2 spinous process; resolved uptake at previous ground glass opacity along right medial lower lobe; unchanged cystic cavities/nodules in left lower lobe and right apical upper lobe; no pathologically enlarged hypermetabolic mediastinal, hilar, or axillary lymph nodes.  15. 1/10/2022: PET/CT showed new foci of abnormal skeletal FGD uptake in the thoracic and lumbar spine. Mild interval increase in intensity of previously demonstrated hypermetabolic skeletal lesions involving the pelvis and lumbar spine. Findings are consistent with progressive osseous metastatic disease. Subsequently off chemo for 1 month due to poor performance status.  16. 1/25/2022: Patient fell  and fractured his femur. This was surgically repaired and he was subsequently cared for at a rehab unit.  17. 2/14/2022: Brain MRI without contrast (contrast not given due to inability to obtain IV access) showed no brain metastases.  18. 4/21/2022: Started 2nd line metastatic therapy with pemetrexed and carboplatin. Omission of carboplatin 6/2 and forward due to worsening anemia despite dose reduction.  19. 7/18/2022: PET/CT showed enlarged and increased FDG avid skeletal metastases, increased left para-aortic retroperitoneal lymph node increased in size and FDG avidity, findings consistent with progressive disease.  20. 8/3/2022: Started 3rd line metastatic therapy with Taxotere 60 mg/m2 every 3 weeks.  21. 11/21/2022: PET scan showed partial response, left para-aortic retroperitoneal lymph node has decreased from 2.6 x 1.7 cm (SUVmax 7.9) to 2.3 x 1.4 cm (SUVmax 5.7), skeletal metastases no longer demonstrate FDG activity. Kt elected to take a 2-month break from chemo.  22. 1/23/2023: PET scan showed increasing metabolic activity of the left periaortic (max SUV 8.1, previously 5.7) left external iliac (max SUV 7.0, previously 3.1), and right external iliac (max SUV 5.1, previously 3.7) lymph nodes with development/reactivation of multiple FDG avid osseous lesions.  23. 3/23/2023: Resumption of Taxotere every 3 weeks. Delayed due to insurance issues.  24. 6/26/2023: PET scan showed partial response with decreased uptake associated with the retroperitoneal and pelvic lymphadenopathy as well as bone metastases.  25. 12/4/2023: PET scan showed decreased FDG avid left periaortic (SUV max 5.4, previously 6.6) and left external iliac (SUV max 3.5, previously 4.9) lymphadenopathy and broadly stable FDG uptake within scattered pre-existing osseous metastases; new FDG avid lesion involving the right anterior first rib without clear fracture line visible (SUV max 4.7) indeterminate for a new osseous metastasis versus  posttraumatic in etiology.     INTERIM HISTORY:  Kt reports no dyspnea, nausea, or new paresthesias.  He reports feeling great.  He denies any recent falls.    REVIEW OF SYSTEMS:   14 point ROS was reviewed and is negative other than as noted above in HPI.       HOME MEDICATIONS:  Current Outpatient Medications   Medication Sig Dispense Refill    atorvastatin (LIPITOR) 40 MG tablet Take 40 mg by mouth daily      ELIQUIS ANTICOAGULANT 5 MG tablet            ALLERGIES:  Allergies   Allergen Reactions    No Known Drug Allergy          PAST MEDICAL HISTORY:  Past Medical History:   Diagnosis Date    Alcohol abuse, unspecified     Cerebral infarction (H)     2009, right side residual and aphasia    Dyslipidemia     GERD (gastroesophageal reflux disease)     Lung cancer (H)     Unspecified essential hypertension          PAST SURGICAL HISTORY:  Past Surgical History:   Procedure Laterality Date    BRONCHOSCOPY FLEXIBLE AND RIGID N/A 5/5/2016    Procedure: BRONCHOSCOPY FLEXIBLE AND RIGID;  Surgeon: Tony Talbot MD;  Location: [facility] OR    ESOPHAGOSCOPY FLEXIBLE N/A 9/30/2019    Procedure: flexible esophagoscopy;  Surgeon: Lizzy Johnson MD;  Location: [facility] OR    INJECT STEROID (LOCATION) N/A 9/30/2019    Procedure: steroid injection;  Surgeon: Lizzy Johnson MD;  Location: [facility] OR    IR CHEST PORT PLACEMENT > 5 YRS OF AGE  4/22/2021    IR CHEST PORT PLACEMENT > 5 YRS OF AGE  4/11/2022    IR PORT CHECK RIGHT  4/11/2022    IR PORT REMOVAL RIGHT  4/11/2022    LASER CO2 LARYNGOSCOPY N/A 9/30/2019    Procedure: Microdirect laryngoscopy with excision of laryngeal mass, CO2 laser;  Surgeon: Lizzy Johnson MD;  Location: [facility] OR    ZZC NONSPECIFIC PROCEDURE      R tympanoplasty         SOCIAL HISTORY:  Social History     Socioeconomic History    Marital status: Single     Spouse name: Not on file    Number of children: Not on file    Years of education: Not on file    Highest education level: Not on file   Occupational  "History    Not on file   Tobacco Use    Smoking status: Former     Packs/day: 1     Types: Cigarettes     Quit date: 2009     Years since quittin.3    Smokeless tobacco: Never   Substance and Sexual Activity    Alcohol use: Not Currently    Drug use: No    Sexual activity: Not on file   Other Topics Concern    Parent/sibling w/ CABG, MI or angioplasty before 65F 55M? Not Asked   Social History Narrative    Not on file     Social Determinants of Health     Financial Resource Strain: Not on file   Food Insecurity: Not on file   Transportation Needs: Not on file   Physical Activity: Not on file   Stress: Not on file   Social Connections: Not on file   Interpersonal Safety: Not At Risk (2023)    Humiliation, Afraid, Rape, and Kick questionnaire     Fear of Current or Ex-Partner: No     Emotionally Abused: No     Physically Abused: No     Sexually Abused: No   Housing Stability: Not on file   Resides at assisted living.      FAMILY HISTORY:  Family History   Problem Relation Age of Onset    Cancer Father          PHYSICAL EXAM:  Vital signs:  /65   Pulse 76   Temp 98.1  F (36.7  C)   Resp 16   Ht 1.918 m (6' 3.5\")   SpO2 100%   BMI 22.20 kg/m     ECO  GENERAL: No acute distress. Sitting in wheelchair.  EYES: No scleral icterus. No overt erythema.  RESPIRATORY: Clear bilaterally.  CARDIAC: Regular rate and rhythm.  SKIN: No overt rashes, discolorations, or lesions over the face and neck.  NEUROLOGIC: Alert.  No overt tremors.  PSYCHIATRIC: Normal affect and mood.  Does not appear anxious.       LABS:  CBC RESULTS:   Recent Labs   Lab Test 02/15/24  0736   WBC 5.1   RBC 4.10*   HGB 11.7*   HCT 37.1*   MCV 91   MCH 28.5   MCHC 31.5   RDW 16.6*   *     Last Comprehensive Metabolic Panel:  Sodium   Date Value Ref Range Status   2023 140 136 - 145 mmol/L Final   2021 140 133 - 144 mmol/L Final     Potassium   Date Value Ref Range Status   2023 4.0 3.4 - 5.3 mmol/L Final "   12/07/2022 4.1 3.4 - 5.3 mmol/L Final   06/29/2021 4.3 3.4 - 5.3 mmol/L Final     Chloride   Date Value Ref Range Status   01/25/2023 102 98 - 107 mmol/L Final   12/07/2022 109 94 - 109 mmol/L Final   06/29/2021 111 (H) 94 - 109 mmol/L Final     Carbon Dioxide   Date Value Ref Range Status   06/29/2021 25 20 - 32 mmol/L Final     Carbon Dioxide (CO2)   Date Value Ref Range Status   01/25/2023 28 22 - 29 mmol/L Final   12/07/2022 25 20 - 32 mmol/L Final     Anion Gap   Date Value Ref Range Status   01/25/2023 10 7 - 15 mmol/L Final   12/07/2022 7 3 - 14 mmol/L Final   06/29/2021 4 3 - 14 mmol/L Final     Glucose   Date Value Ref Range Status   01/25/2023 95 70 - 99 mg/dL Final   12/07/2022 91 70 - 99 mg/dL Final   06/29/2021 85 70 - 99 mg/dL Final     Urea Nitrogen   Date Value Ref Range Status   01/25/2023 16.0 8.0 - 23.0 mg/dL Final   12/07/2022 17 7 - 30 mg/dL Final   06/29/2021 17 7 - 30 mg/dL Final     Creatinine   Date Value Ref Range Status   01/25/2023 0.71 0.67 - 1.17 mg/dL Final   06/29/2021 0.84 0.66 - 1.25 mg/dL Final     GFR Estimate   Date Value Ref Range Status   01/25/2023 >90 >60 mL/min/1.73m2 Final     Comment:     eGFR calculated using 2021 CKD-EPI equation.   06/29/2021 >90 >60 mL/min/[1.73_m2] Final     Comment:     Non  GFR Calc  Starting 12/18/2018, serum creatinine based estimated GFR (eGFR) will be   calculated using the Chronic Kidney Disease Epidemiology Collaboration   (CKD-EPI) equation.       Calcium   Date Value Ref Range Status   01/25/2023 9.3 8.8 - 10.2 mg/dL Final   06/29/2021 8.7 8.5 - 10.1 mg/dL Final     Bilirubin Total   Date Value Ref Range Status   02/15/2024 0.5 <=1.2 mg/dL Final   06/29/2021 0.3 0.2 - 1.3 mg/dL Final     Alkaline Phosphatase   Date Value Ref Range Status   02/15/2024 89 40 - 150 U/L Final     Comment:     Reference intervals for this test were updated on 11/14/2023 to more accurately reflect our healthy population. There may be differences  in the flagging of prior results with similar values performed with this method. Interpretation of those prior results can be made in the context of the updated reference intervals.   06/29/2021 73 40 - 150 U/L Final     ALT   Date Value Ref Range Status   02/15/2024 42 0 - 70 U/L Final     Comment:     Reference intervals for this test were updated on 6/12/2023 to more accurately reflect our healthy population. There may be differences in the flagging of prior results with similar values performed with this method. Interpretation of those prior results can be made in the context of the updated reference intervals.     06/29/2021 34 0 - 70 U/L Final     AST   Date Value Ref Range Status   02/15/2024 37 0 - 45 U/L Final     Comment:     Reference intervals for this test were updated on 6/12/2023 to more accurately reflect our healthy population. There may be differences in the flagging of prior results with similar values performed with this method. Interpretation of those prior results can be made in the context of the updated reference intervals.   06/29/2021 26 0 - 45 U/L Final       PATHOLOGY:  None new.    IMAGING:  Reviewed as per HPI.    ASSESSMENT/PLAN:  Kt Rasmussen is a 63 year old male with the following issues:  1. Metastatic non-small (non-squamous) cell lung carcinoma with metastases to lymph nodes, bones, and bilateral lungs  2. 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 Chemotherapy-induced anemia and thrombocytopenia  --Lung NGS panel negative for mutations in BRAF, EGFR, ERBB2, IDH1, IDH2, KRAS, MET, NRAS, RET. PD-L1 expression negative (TPS <1%). Guardant 360 negative for actionable mutations.  --Kt started 3rd line metastatic therapy with every 3-week Taxotere at 20% dose reduction (60 mg/m2) on 8/3/2022 due to progressive disease.  He tolerated this very well with minimal to no paresthesias. He  then took a 2-month chemo break that was further delayed to 3.5 months due to insurance issues. He resumed on 3/23/2023 and continues to tolerate Taxotere well with minimal paresthesias.  --2/4/2023 PET scan showed decreased FDG avid left periaortic (SUV max 5.4, previously 6.6) and left external iliac (SUV max 3.5, previously 4.9) lymphadenopathy and broadly stable FDG uptake within scattered pre-existing osseous metastases; new FDG avid lesion involving the right anterior first rib without clear fracture line visible (SUV max 4.7) indeterminate for a new osseous metastasis versus posttraumatic in etiology. However, since he had a fall 2 weeks prior to that scan, this bone lesion is likely posttraumatic.  --I advised he continue Taxotere at 20% dose reduction due to past repeated issues with chemo-induced cytopenias.  All treatment is with palliative intent. He agrees to continue with Taxotere.  --Will repeat PET scan in 3/2024 and arrange follow-up with me after that scan.    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 Dysphonia  6. Dysphagia  -Kt underwent excision of a left vocal cord SCC on 9/30/2019 and has persistent dysphonia as a result of the lesion and excision.  He also has dysphagia but this is stable and swallowing is manageable.  -Continue follow-up with Dr. Wray.    7. History of dizziness and prior falls  --SW and guardian were previously notified of multiple falls.  --Prior brain MRI 2/14/2022 showed no brain metastases. However this was a suboptimal exam due to inability to administer contrast.  --He is using a wheelchair due to prior femoral fracture in 1/2022.    8. Bilateral lower extremity DVT  --Diagnosed in 9/2023, likely hypercoagulability of malignancy.  --Continue apixaban indefinitely, provided no major bleeding issues arise in the future.    Sangita John MD  Hematology/Oncology  [facility]    Total time spent today: 30 minutes in chart review,  patient evaluation, counseling, documentation, test and/or medication/prescription orders, and coordination of care.

## 2024-02-15 ENCOUNTER — ONCOLOGY VISIT (OUTPATIENT)
Dept: ONCOLOGY | Facility: CLINIC | Age: 64
End: 2024-02-15
Payer: COMMERCIAL

## 2024-02-15 ENCOUNTER — INFUSION THERAPY VISIT (OUTPATIENT)
Dept: INFUSION THERAPY | Facility: CLINIC | Age: 64
End: 2024-02-15
Payer: COMMERCIAL

## 2024-02-15 VITALS
RESPIRATION RATE: 16 BRPM | DIASTOLIC BLOOD PRESSURE: 65 MMHG | TEMPERATURE: 98.1 F | HEART RATE: 76 BPM | OXYGEN SATURATION: 100 % | SYSTOLIC BLOOD PRESSURE: 103 MMHG | BODY MASS INDEX: 22.2 KG/M2 | HEIGHT: 76 IN

## 2024-02-15 VITALS
RESPIRATION RATE: 18 BRPM | SYSTOLIC BLOOD PRESSURE: 103 MMHG | DIASTOLIC BLOOD PRESSURE: 65 MMHG | OXYGEN SATURATION: 100 % | TEMPERATURE: 98.1 F | HEART RATE: 76 BPM

## 2024-02-15 DIAGNOSIS — C79.51 NON-SMALL CELL LUNG CANCER METASTATIC TO BONE (H): Primary | ICD-10-CM

## 2024-02-15 DIAGNOSIS — T45.1X5A CHEMOTHERAPY-INDUCED NEUTROPENIA (H): ICD-10-CM

## 2024-02-15 DIAGNOSIS — Z51.11 ENCOUNTER FOR ANTINEOPLASTIC CHEMOTHERAPY: ICD-10-CM

## 2024-02-15 DIAGNOSIS — C78.02 MALIGNANT NEOPLASM METASTATIC TO BOTH LUNGS (H): ICD-10-CM

## 2024-02-15 DIAGNOSIS — D64.81 ANEMIA DUE TO CHEMOTHERAPY: ICD-10-CM

## 2024-02-15 DIAGNOSIS — C34.90 NON-SMALL CELL LUNG CANCER METASTATIC TO BONE (H): Primary | ICD-10-CM

## 2024-02-15 DIAGNOSIS — C78.01 MALIGNANT NEOPLASM METASTATIC TO BOTH LUNGS (H): ICD-10-CM

## 2024-02-15 DIAGNOSIS — D70.1 CHEMOTHERAPY-INDUCED NEUTROPENIA (H): ICD-10-CM

## 2024-02-15 DIAGNOSIS — T45.1X5A ANEMIA DUE TO CHEMOTHERAPY: ICD-10-CM

## 2024-02-15 LAB
ALBUMIN SERPL BCG-MCNC: 3.9 G/DL (ref 3.5–5.2)
ALP SERPL-CCNC: 89 U/L (ref 40–150)
ALT SERPL W P-5'-P-CCNC: 42 U/L (ref 0–70)
AST SERPL W P-5'-P-CCNC: 37 U/L (ref 0–45)
BASOPHILS # BLD AUTO: 0 10E3/UL (ref 0–0.2)
BASOPHILS NFR BLD AUTO: 1 %
BILIRUB DIRECT SERPL-MCNC: <0.2 MG/DL (ref 0–0.3)
BILIRUB SERPL-MCNC: 0.5 MG/DL
EOSINOPHIL # BLD AUTO: 0 10E3/UL (ref 0–0.7)
EOSINOPHIL NFR BLD AUTO: 1 %
ERYTHROCYTE [DISTWIDTH] IN BLOOD BY AUTOMATED COUNT: 16.6 % (ref 10–15)
HCT VFR BLD AUTO: 37.1 % (ref 40–53)
HGB BLD-MCNC: 11.7 G/DL (ref 13.3–17.7)
IMM GRANULOCYTES # BLD: 0 10E3/UL
IMM GRANULOCYTES NFR BLD: 0 %
LYMPHOCYTES # BLD AUTO: 1 10E3/UL (ref 0.8–5.3)
LYMPHOCYTES NFR BLD AUTO: 19 %
MCH RBC QN AUTO: 28.5 PG (ref 26.5–33)
MCHC RBC AUTO-ENTMCNC: 31.5 G/DL (ref 31.5–36.5)
MCV RBC AUTO: 91 FL (ref 78–100)
MONOCYTES # BLD AUTO: 0.5 10E3/UL (ref 0–1.3)
MONOCYTES NFR BLD AUTO: 11 %
NEUTROPHILS # BLD AUTO: 3.5 10E3/UL (ref 1.6–8.3)
NEUTROPHILS NFR BLD AUTO: 68 %
NRBC # BLD AUTO: 0 10E3/UL
NRBC BLD AUTO-RTO: 0 /100
PLATELET # BLD AUTO: 143 10E3/UL (ref 150–450)
PROT SERPL-MCNC: 5.9 G/DL (ref 6.4–8.3)
RBC # BLD AUTO: 4.1 10E6/UL (ref 4.4–5.9)
WBC # BLD AUTO: 5.1 10E3/UL (ref 4–11)

## 2024-02-15 PROCEDURE — 258N000003 HC RX IP 258 OP 636: Performed by: INTERNAL MEDICINE

## 2024-02-15 PROCEDURE — 36591 DRAW BLOOD OFF VENOUS DEVICE: CPT | Performed by: INTERNAL MEDICINE

## 2024-02-15 PROCEDURE — 85004 AUTOMATED DIFF WBC COUNT: CPT | Performed by: INTERNAL MEDICINE

## 2024-02-15 PROCEDURE — 96372 THER/PROPH/DIAG INJ SC/IM: CPT | Performed by: INTERNAL MEDICINE

## 2024-02-15 PROCEDURE — 80076 HEPATIC FUNCTION PANEL: CPT | Performed by: INTERNAL MEDICINE

## 2024-02-15 PROCEDURE — 250N000011 HC RX IP 250 OP 636: Mod: JZ | Performed by: INTERNAL MEDICINE

## 2024-02-15 PROCEDURE — G0463 HOSPITAL OUTPT CLINIC VISIT: HCPCS | Performed by: INTERNAL MEDICINE

## 2024-02-15 PROCEDURE — 96375 TX/PRO/DX INJ NEW DRUG ADDON: CPT

## 2024-02-15 PROCEDURE — 99214 OFFICE O/P EST MOD 30 MIN: CPT | Performed by: INTERNAL MEDICINE

## 2024-02-15 PROCEDURE — 96377 APPLICATON ON-BODY INJECTOR: CPT | Mod: XS

## 2024-02-15 PROCEDURE — 96413 CHEMO IV INFUSION 1 HR: CPT

## 2024-02-15 RX ORDER — HEPARIN SODIUM (PORCINE) LOCK FLUSH IV SOLN 100 UNIT/ML 100 UNIT/ML
5 SOLUTION INTRAVENOUS
Status: DISCONTINUED | OUTPATIENT
Start: 2024-02-15 | End: 2024-02-15 | Stop reason: HOSPADM

## 2024-02-15 RX ORDER — HEPARIN SODIUM,PORCINE 10 UNIT/ML
5 VIAL (ML) INTRAVENOUS
Status: DISCONTINUED | OUTPATIENT
Start: 2024-02-15 | End: 2024-02-15 | Stop reason: HOSPADM

## 2024-02-15 RX ADMIN — DEXAMETHASONE SODIUM PHOSPHATE: 10 INJECTION, SOLUTION INTRAMUSCULAR; INTRAVENOUS at 09:30

## 2024-02-15 RX ADMIN — DOCETAXEL 122 MG: 20 INJECTION, SOLUTION, CONCENTRATE INTRAVENOUS at 09:49

## 2024-02-15 RX ADMIN — Medication 5 ML: at 10:54

## 2024-02-15 RX ADMIN — SODIUM CHLORIDE 250 ML: 9 INJECTION, SOLUTION INTRAVENOUS at 09:31

## 2024-02-15 RX ADMIN — PEGFILGRASTIM 6 MG: KIT SUBCUTANEOUS at 10:06

## 2024-02-15 ASSESSMENT — PAIN SCALES - GENERAL: PAINLEVEL: NO PAIN (0)

## 2024-02-15 NOTE — PROGRESS NOTES
"Oncology Rooming Note    February 15, 2024 8:39 AM   Kt Rasmussen is a 63 year old male who presents for:    Chief Complaint   Patient presents with    Oncology Clinic Visit     Initial Vitals: /65   Pulse 76   Temp 98.1  F (36.7  C)   Resp 16   Ht 1.918 m (6' 3.5\")   SpO2 100%   BMI 22.20 kg/m   Estimated body mass index is 22.2 kg/m  as calculated from the following:    Height as of this encounter: 1.918 m (6' 3.5\").    Weight as of 1/25/24: 81.6 kg (180 lb). Body surface area is 2.08 meters squared.  No Pain (0) Comment: Data Unavailable   No LMP for male patient.  Allergies reviewed: Yes  Medications reviewed: Yes    Medications: Medication refills not needed today.  Pharmacy name entered into EPIC:    [place] [address] [facility] [address] [id number] - [place] [address] [facility] 7  [place] [facility] - [place], [place] - [address] [place] [address] [employer] - [facility] [address] [place]    Frailty Screening:   Is the patient here for a new oncology consult visit in cancer care? 2. No    Floridalma Nieves MA            "

## 2024-02-15 NOTE — PATIENT INSTRUCTIONS
Your On-body Neulasta Injector was applied to your right arm at 1015.  At approximately 1:15pm on 2/16, your On-body Injector will beep to let you know your dose delivery will begin in 2 minutes.  Your medication will be delivered over the next 45 minutes.  You can remove your Injector at 2:15pm.  Please make sure your Injector has a solid green light or has turned off prior to removing the device.  Please contact your provider at [phone] with questions or concerns.

## 2024-02-15 NOTE — LETTER
2/15/2024         RE: Kt Rasmussen  [id number]  [facility] [address]        Dear Colleague,    Thank you for referring your patient, Kt Rasmussen, to the [facility]. Please see a copy of my visit note below.    [facility]    Hematology/Oncology Established Patient Follow-up Note      Today's Date: 2/15/2024    Reason for Follow-up: Metastatic non-small cell lung carcinoma.    HISTORY OF PRESENT ILLNESS: Kt Rasmussen is a 63 year old male who presents with the following oncologic history:  1. 5/05/2016: Presented with near-obstructing large mass coming off the cheikh and likely the posterior wall, seen on bronchoscopy. Biopsy of the mass showed invasive squamous cell carcinoma, moderately differentiating and focally keratinizing.  Procedure was complicated by significant bleeding.  Tumor was completely debulked (via bronchoscopy) in both main stem bronchi and distal trachea. NGS showed no mutations in EGFR, KRAS, BRAF, NRAS, HRAS, PIK3CA, ERBB2, MET, or JAK2.  2. 5/23/2016: PET/CT scan showed evidence of residual tumor with decreased endobronchial mass. Indeterminate, mildly hypermetabolic mesentery with prominent lymph nodes and area of enhancement of the right hepatic lobe present. Felt to have T4-NX-M0 disease.  3. 6/09/2016: Started concurrent chemoradiation with weekly paclitaxel and carboplatin.  4. 7/30/2016: Completed radiation.  Subsequently received 2 cycles of consolidation paclitaxel and carboplatin.   5. 9/13/2019: Presented with 6-month history of dysphonia and left vocal exophytic lesion. Left true vocal fold biopsy showed at least squamous cell carcinoma in situ, cannot exclude superficial invasion.  6. 9/30/2019: Excision of left vocal cord mass showed fragments of invasive squamous cell carcinoma, well differentiated and keratinizing.  Margins negative for malignancy.  7. 2/16/2021: CT chest w/o contrast showed thin-walled cavity in left  lower lobe with 2 solid peripheral nodules measuring 9 mm each. No lymphadenopathy.  8. 3/01/2021: PET scan showed hypermetabolic nodules in left lower lobe and right lung, consistent with metastases; hypermetabolic adenopathy in chest, abdomen, pelvis; nonspecific uptake at tongue; hypermetabolic intraosseous lesions in spine and pelvis consistent with metastatic disease; left axillary hypermetabolic lymph nodes related to COVID-19 vaccination.  9. 3/12/2021: CT-guided lung biopsy showed non-small cell carcinoma, not otherwise specified -- with opinion by [facility] pathologist.  10. 3/18/2021: Lung NGS panel negative for mutations in BRAF, EGFR, ERBB2, IDH1, IDH2, KRAS, MET, NRAS, RET. PD-L1 expression negative (TPS <1%). Due to minimal amount of DNA obtained from specimen, other biomarkers could not be analyzed.  11. 4/7/2021: MRI brain negative for brain metastases.  12. 4/27/2021: Started 1st line metastatic therapy with carboplatin, paclitaxel, bevacizumab, and atezolizumab for metastatic non-small cell lung carcinoma, NOS.  13. 7/12/2021: PET/CT showed resolved mural nodules with increased cystic cavity in left lower lobe with no significant FDG uptake, less likely metastases; no enlarged hypermetabolic mediastinal, hilar, or axillary lymph nodes, decreased metabolic activity of intraosseous lesion in spine and pelvis; no new bone lesions.  14. 10/11/2021: PET/CT showed slight interval increase in intensity of metabolic activity of right pubic bone and left ischium with new focal uptake in L2 spinous process; resolved uptake at previous ground glass opacity along right medial lower lobe; unchanged cystic cavities/nodules in left lower lobe and right apical upper lobe; no pathologically enlarged hypermetabolic mediastinal, hilar, or axillary lymph nodes.  15. 1/10/2022: PET/CT showed new foci of abnormal skeletal FGD uptake in the thoracic and lumbar spine. Mild interval increase in intensity of previously  demonstrated hypermetabolic skeletal lesions involving the pelvis and lumbar spine. Findings are consistent with progressive osseous metastatic disease. Subsequently off chemo for 1 month due to poor performance status.  16. 1/25/2022: Patient fell and fractured his femur. This was surgically repaired and he was subsequently cared for at a rehab unit.  17. 2/14/2022: Brain MRI without contrast (contrast not given due to inability to obtain IV access) showed no brain metastases.  18. 4/21/2022: Started 2nd line metastatic therapy with pemetrexed and carboplatin. Omission of carboplatin 6/2 and forward due to worsening anemia despite dose reduction.  19. 7/18/2022: PET/CT showed enlarged and increased FDG avid skeletal metastases, increased left para-aortic retroperitoneal lymph node increased in size and FDG avidity, findings consistent with progressive disease.  20. 8/3/2022: Started 3rd line metastatic therapy with Taxotere 60 mg/m2 every 3 weeks.  21. 11/21/2022: PET scan showed partial response, left para-aortic retroperitoneal lymph node has decreased from 2.6 x 1.7 cm (SUVmax 7.9) to 2.3 x 1.4 cm (SUVmax 5.7), skeletal metastases no longer demonstrate FDG activity. Kt elected to take a 2-month break from chemo.  22. 1/23/2023: PET scan showed increasing metabolic activity of the left periaortic (max SUV 8.1, previously 5.7) left external iliac (max SUV 7.0, previously 3.1), and right external iliac (max SUV 5.1, previously 3.7) lymph nodes with development/reactivation of multiple FDG avid osseous lesions.  23. 3/23/2023: Resumption of Taxotere every 3 weeks. Delayed due to insurance issues.  24. 6/26/2023: PET scan showed partial response with decreased uptake associated with the retroperitoneal and pelvic lymphadenopathy as well as bone metastases.  25. 12/4/2023: PET scan showed decreased FDG avid left periaortic (SUV max 5.4, previously 6.6) and left external iliac (SUV max 3.5, previously 4.9)  lymphadenopathy and broadly stable FDG uptake within scattered pre-existing osseous metastases; new FDG avid lesion involving the right anterior first rib without clear fracture line visible (SUV max 4.7) indeterminate for a new osseous metastasis versus posttraumatic in etiology.     INTERIM HISTORY:  Kt reports no dyspnea, nausea, or new paresthesias.  He reports feeling great.  He denies any recent falls.    REVIEW OF SYSTEMS:   14 point ROS was reviewed and is negative other than as noted above in HPI.       HOME MEDICATIONS:  Current Outpatient Medications   Medication Sig Dispense Refill     atorvastatin (LIPITOR) 40 MG tablet Take 40 mg by mouth daily       ELIQUIS ANTICOAGULANT 5 MG tablet            ALLERGIES:  Allergies   Allergen Reactions     No Known Drug Allergy          PAST MEDICAL HISTORY:  Past Medical History:   Diagnosis Date     Alcohol abuse, unspecified      Cerebral infarction (H)     2009, right side residual and aphasia     Dyslipidemia      GERD (gastroesophageal reflux disease)      Lung cancer (H)      Unspecified essential hypertension          PAST SURGICAL HISTORY:  Past Surgical History:   Procedure Laterality Date     BRONCHOSCOPY FLEXIBLE AND RIGID N/A 5/5/2016    Procedure: BRONCHOSCOPY FLEXIBLE AND RIGID;  Surgeon: Tony Talbot MD;  Location: [facility] OR     ESOPHAGOSCOPY FLEXIBLE N/A 9/30/2019    Procedure: flexible esophagoscopy;  Surgeon: Lizzy Johnson MD;  Location: [facility] OR     INJECT STEROID (LOCATION) N/A 9/30/2019    Procedure: steroid injection;  Surgeon: Lizzy Johnson MD;  Location: [facility] OR     IR CHEST PORT PLACEMENT > 5 YRS OF AGE  4/22/2021     IR CHEST PORT PLACEMENT > 5 YRS OF AGE  4/11/2022     IR PORT CHECK RIGHT  4/11/2022     IR PORT REMOVAL RIGHT  4/11/2022     LASER CO2 LARYNGOSCOPY N/A 9/30/2019    Procedure: Microdirect laryngoscopy with excision of laryngeal mass, CO2 laser;  Surgeon: Lizzy Johnson MD;  Location: [facility] OR     [facility] NONSPECIFIC  "PROCEDURE      R tympanoplasty         SOCIAL HISTORY:  Social History     Socioeconomic History     Marital status: Single     Spouse name: Not on file     Number of children: Not on file     Years of education: Not on file     Highest education level: Not on file   Occupational History     Not on file   Tobacco Use     Smoking status: Former     Packs/day: 1     Types: Cigarettes     Quit date: 2009     Years since quittin.3     Smokeless tobacco: Never   Substance and Sexual Activity     Alcohol use: Not Currently     Drug use: No     Sexual activity: Not on file   Other Topics Concern     Parent/sibling w/ CABG, MI or angioplasty before 65F 55M? Not Asked   Social History Narrative     Not on file     Social Determinants of Health     Financial Resource Strain: Not on file   Food Insecurity: Not on file   Transportation Needs: Not on file   Physical Activity: Not on file   Stress: Not on file   Social Connections: Not on file   Interpersonal Safety: Not At Risk (2023)    Humiliation, Afraid, Rape, and Kick questionnaire      Fear of Current or Ex-Partner: No      Emotionally Abused: No      Physically Abused: No      Sexually Abused: No   Housing Stability: Not on file   Resides at assisted living.      FAMILY HISTORY:  Family History   Problem Relation Age of Onset     Cancer Father          PHYSICAL EXAM:  Vital signs:  /65   Pulse 76   Temp 98.1  F (36.7  C)   Resp 16   Ht 1.918 m (6' 3.5\")   SpO2 100%   BMI 22.20 kg/m     ECO  GENERAL: No acute distress. Sitting in wheelchair.  EYES: No scleral icterus. No overt erythema.  RESPIRATORY: Clear bilaterally.  CARDIAC: Regular rate and rhythm.  SKIN: No overt rashes, discolorations, or lesions over the face and neck.  NEUROLOGIC: Alert.  No overt tremors.  PSYCHIATRIC: Normal affect and mood.  Does not appear anxious.       LABS:  CBC RESULTS:   Recent Labs   Lab Test 02/15/24  0736   WBC 5.1   RBC 4.10*   HGB 11.7*   HCT 37.1* "   MCV 91   MCH 28.5   MCHC 31.5   RDW 16.6*   *     Last Comprehensive Metabolic Panel:  Sodium   Date Value Ref Range Status   01/25/2023 140 136 - 145 mmol/L Final   06/29/2021 140 133 - 144 mmol/L Final     Potassium   Date Value Ref Range Status   01/25/2023 4.0 3.4 - 5.3 mmol/L Final   12/07/2022 4.1 3.4 - 5.3 mmol/L Final   06/29/2021 4.3 3.4 - 5.3 mmol/L Final     Chloride   Date Value Ref Range Status   01/25/2023 102 98 - 107 mmol/L Final   12/07/2022 109 94 - 109 mmol/L Final   06/29/2021 111 (H) 94 - 109 mmol/L Final     Carbon Dioxide   Date Value Ref Range Status   06/29/2021 25 20 - 32 mmol/L Final     Carbon Dioxide (CO2)   Date Value Ref Range Status   01/25/2023 28 22 - 29 mmol/L Final   12/07/2022 25 20 - 32 mmol/L Final     Anion Gap   Date Value Ref Range Status   01/25/2023 10 7 - 15 mmol/L Final   12/07/2022 7 3 - 14 mmol/L Final   06/29/2021 4 3 - 14 mmol/L Final     Glucose   Date Value Ref Range Status   01/25/2023 95 70 - 99 mg/dL Final   12/07/2022 91 70 - 99 mg/dL Final   06/29/2021 85 70 - 99 mg/dL Final     Urea Nitrogen   Date Value Ref Range Status   01/25/2023 16.0 8.0 - 23.0 mg/dL Final   12/07/2022 17 7 - 30 mg/dL Final   06/29/2021 17 7 - 30 mg/dL Final     Creatinine   Date Value Ref Range Status   01/25/2023 0.71 0.67 - 1.17 mg/dL Final   06/29/2021 0.84 0.66 - 1.25 mg/dL Final     GFR Estimate   Date Value Ref Range Status   01/25/2023 >90 >60 mL/min/1.73m2 Final     Comment:     eGFR calculated using 2021 CKD-EPI equation.   06/29/2021 >90 >60 mL/min/[1.73_m2] Final     Comment:     Non  GFR Calc  Starting 12/18/2018, serum creatinine based estimated GFR (eGFR) will be   calculated using the Chronic Kidney Disease Epidemiology Collaboration   (CKD-EPI) equation.       Calcium   Date Value Ref Range Status   01/25/2023 9.3 8.8 - 10.2 mg/dL Final   06/29/2021 8.7 8.5 - 10.1 mg/dL Final     Bilirubin Total   Date Value Ref Range Status   02/15/2024 0.5  <=1.2 mg/dL Final   06/29/2021 0.3 0.2 - 1.3 mg/dL Final     Alkaline Phosphatase   Date Value Ref Range Status   02/15/2024 89 40 - 150 U/L Final     Comment:     Reference intervals for this test were updated on 11/14/2023 to more accurately reflect our healthy population. There may be differences in the flagging of prior results with similar values performed with this method. Interpretation of those prior results can be made in the context of the updated reference intervals.   06/29/2021 73 40 - 150 U/L Final     ALT   Date Value Ref Range Status   02/15/2024 42 0 - 70 U/L Final     Comment:     Reference intervals for this test were updated on 6/12/2023 to more accurately reflect our healthy population. There may be differences in the flagging of prior results with similar values performed with this method. Interpretation of those prior results can be made in the context of the updated reference intervals.     06/29/2021 34 0 - 70 U/L Final     AST   Date Value Ref Range Status   02/15/2024 37 0 - 45 U/L Final     Comment:     Reference intervals for this test were updated on 6/12/2023 to more accurately reflect our healthy population. There may be differences in the flagging of prior results with similar values performed with this method. Interpretation of those prior results can be made in the context of the updated reference intervals.   06/29/2021 26 0 - 45 U/L Final       PATHOLOGY:  None new.    IMAGING:  Reviewed as per HPI.    ASSESSMENT/PLAN:  Kt Rasmussen is a 63 year old male with the following issues:  1. Metastatic non-small (non-squamous) cell lung carcinoma with metastases to lymph nodes, bones, and bilateral lungs  2. 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 Chemotherapy-induced anemia and thrombocytopenia  --Lung NGS panel negative for mutations in BRAF, EGFR, ERBB2, IDH1, IDH2, KRAS, MET, NRAS,  RET. PD-L1 expression negative (TPS <1%). Guardant 360 negative for actionable mutations.  --Kt started 3rd line metastatic therapy with every 3-week Taxotere at 20% dose reduction (60 mg/m2) on 8/3/2022 due to progressive disease.  He tolerated this very well with minimal to no paresthesias. He then took a 2-month chemo break that was further delayed to 3.5 months due to insurance issues. He resumed on 3/23/2023 and continues to tolerate Taxotere well with minimal paresthesias.  --2/4/2023 PET scan showed decreased FDG avid left periaortic (SUV max 5.4, previously 6.6) and left external iliac (SUV max 3.5, previously 4.9) lymphadenopathy and broadly stable FDG uptake within scattered pre-existing osseous metastases; new FDG avid lesion involving the right anterior first rib without clear fracture line visible (SUV max 4.7) indeterminate for a new osseous metastasis versus posttraumatic in etiology. However, since he had a fall 2 weeks prior to that scan, this bone lesion is likely posttraumatic.  --I advised he continue Taxotere at 20% dose reduction due to past repeated issues with chemo-induced cytopenias.  All treatment is with palliative intent. He agrees to continue with Taxotere.  --Will repeat PET scan in 3/2024 and arrange follow-up with me after that scan.    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 Dysphonia  6. Dysphagia  -Kt underwent excision of a left vocal cord SCC on 9/30/2019 and has persistent dysphonia as a result of the lesion and excision.  He also has dysphagia but this is stable and swallowing is manageable.  -Continue follow-up with Dr. Wray.    7. History of dizziness and prior falls  --SW and guardian were previously notified of multiple falls.  --Prior brain MRI 2/14/2022 showed no brain metastases. However this was a suboptimal exam due to inability to administer contrast.  --He is using a wheelchair due to prior femoral fracture in 1/2022.    8. Bilateral lower  "extremity DVT  --Diagnosed in 9/2023, likely hypercoagulability of malignancy.  --Continue apixaban indefinitely, provided no major bleeding issues arise in the future.    Sangita John MD  Hematology/Oncology  [facility]    Total time spent today: 30 minutes in chart review, patient evaluation, counseling, documentation, test and/or medication/prescription orders, and coordination of care.     Oncology Rooming Note    February 15, 2024 8:39 AM   Kt Rasmussen is a 63 year old male who presents for:    Chief Complaint   Patient presents with     Oncology Clinic Visit     Initial Vitals: /65   Pulse 76   Temp 98.1  F (36.7  C)   Resp 16   Ht 1.918 m (6' 3.5\")   SpO2 100%   BMI 22.20 kg/m   Estimated body mass index is 22.2 kg/m  as calculated from the following:    Height as of this encounter: 1.918 m (6' 3.5\").    Weight as of 1/25/24: 81.6 kg (180 lb). Body surface area is 2.08 meters squared.  No Pain (0) Comment: Data Unavailable   No LMP for male patient.  Allergies reviewed: Yes  Medications reviewed: Yes    Medications: Medication refills not needed today.  Pharmacy name entered into EPIC:    [facility] - [place], [place] - [address] [facility] [address] [place] [address] [facility] #[id number] - [place] [address] AT [facility] & [address] [facility] - [address] [employer] [address]    Frailty Screening:   Is the patient here for a new oncology consult visit in cancer care? 2. No    Floridalma Nieves MA              Again, thank you for allowing me to participate in the care of your patient.        Sincerely,        Sangita John MD  "

## 2024-02-15 NOTE — PROGRESS NOTES
Infusion Nursing Note:  Kt Rasmussen presents today for labs/taxotere.    Patient seen by provider today: Yes: Alvaro   present during visit today: Not Applicable.    Note: N/A.  ONPRO  Was placed on patient's: back of right arm.    Was placed at 1015 AM    Podpal used: Yes    ONPRO injector device Lot number: [id number]    Patient education included: what patient can expect after application, what colored lights mean on the device, when to remove device, when and where to call with questions or issues, all patients questions answered, and that Neulasta administration will occur at 1:15pm.    Patient tolerated administration well.        Intravenous Access:  Labs drawn without difficulty.  Implanted Port.    Treatment Conditions:  Lab Results   Component Value Date    HGB 11.7 (L) 02/15/2024    WBC 5.1 02/15/2024    ANEU 0.5 (L) 08/11/2022    ANEUTAUTO 3.5 02/15/2024     (L) 02/15/2024        Lab Results   Component Value Date     01/25/2023    POTASSIUM 4.0 01/25/2023    MAG 1.9 10/13/2016    CR 0.71 01/25/2023    BEVERLY 9.3 01/25/2023    BILITOTAL 0.5 02/15/2024    ALBUMIN 3.9 02/15/2024    ALT 42 02/15/2024    AST 37 02/15/2024       Results reviewed, labs MET treatment parameters, ok to proceed with treatment.      Post Infusion Assessment:  Patient tolerated infusion without incident.  Blood return noted pre and post infusion.  Site patent and intact, free from redness, edema or discomfort.  No evidence of extravasations.  Access discontinued per protocol.       Discharge Plan:   Discharge instructions reviewed with: Patient.  Patient and/or family verbalized understanding of discharge instructions and all questions answered.  Patient discharged in stable condition accompanied by: self.  Departure Mode: Ambulatory.      Chelsi Doe RN

## 2024-03-04 RX ORDER — HEPARIN SODIUM (PORCINE) LOCK FLUSH IV SOLN 100 UNIT/ML 100 UNIT/ML
5 SOLUTION INTRAVENOUS
Status: CANCELLED | OUTPATIENT
Start: 2024-03-07

## 2024-03-04 RX ORDER — EPINEPHRINE 1 MG/ML
0.3 INJECTION, SOLUTION, CONCENTRATE INTRAVENOUS EVERY 5 MIN PRN
Status: CANCELLED | OUTPATIENT
Start: 2024-03-07

## 2024-03-04 RX ORDER — ALBUTEROL SULFATE 0.83 MG/ML
2.5 SOLUTION RESPIRATORY (INHALATION)
Status: CANCELLED | OUTPATIENT
Start: 2024-03-07

## 2024-03-04 RX ORDER — MEPERIDINE HYDROCHLORIDE 25 MG/ML
25 INJECTION INTRAMUSCULAR; INTRAVENOUS; SUBCUTANEOUS EVERY 30 MIN PRN
Status: CANCELLED | OUTPATIENT
Start: 2024-03-07

## 2024-03-04 RX ORDER — LORAZEPAM 2 MG/ML
0.5 INJECTION INTRAMUSCULAR EVERY 4 HOURS PRN
Status: CANCELLED | OUTPATIENT
Start: 2024-03-07

## 2024-03-04 RX ORDER — DIPHENHYDRAMINE HYDROCHLORIDE 50 MG/ML
50 INJECTION INTRAMUSCULAR; INTRAVENOUS
Status: CANCELLED
Start: 2024-03-07

## 2024-03-04 RX ORDER — HEPARIN SODIUM,PORCINE 10 UNIT/ML
5 VIAL (ML) INTRAVENOUS
Status: CANCELLED | OUTPATIENT
Start: 2024-03-07

## 2024-03-04 RX ORDER — ALBUTEROL SULFATE 90 UG/1
1-2 AEROSOL, METERED RESPIRATORY (INHALATION)
Status: CANCELLED
Start: 2024-03-07

## 2024-03-07 ENCOUNTER — INFUSION THERAPY VISIT (OUTPATIENT)
Dept: INFUSION THERAPY | Facility: CLINIC | Age: 64
End: 2024-03-07
Attending: INTERNAL MEDICINE
Payer: COMMERCIAL

## 2024-03-07 ENCOUNTER — ONCOLOGY VISIT (OUTPATIENT)
Dept: ONCOLOGY | Facility: CLINIC | Age: 64
End: 2024-03-07
Attending: INTERNAL MEDICINE
Payer: COMMERCIAL

## 2024-03-07 VITALS
TEMPERATURE: 98.3 F | HEART RATE: 71 BPM | SYSTOLIC BLOOD PRESSURE: 109 MMHG | DIASTOLIC BLOOD PRESSURE: 72 MMHG | OXYGEN SATURATION: 100 % | RESPIRATION RATE: 16 BRPM

## 2024-03-07 DIAGNOSIS — C79.51 NON-SMALL CELL LUNG CANCER METASTATIC TO BONE (H): Primary | ICD-10-CM

## 2024-03-07 DIAGNOSIS — C79.51 NON-SMALL CELL LUNG CANCER METASTATIC TO BONE (H): ICD-10-CM

## 2024-03-07 DIAGNOSIS — Z51.11 ENCOUNTER FOR ANTINEOPLASTIC CHEMOTHERAPY: ICD-10-CM

## 2024-03-07 DIAGNOSIS — Z51.11 ENCOUNTER FOR ANTINEOPLASTIC CHEMOTHERAPY: Primary | ICD-10-CM

## 2024-03-07 DIAGNOSIS — T45.1X5A CHEMOTHERAPY-INDUCED NEUTROPENIA (H): ICD-10-CM

## 2024-03-07 DIAGNOSIS — C34.90 NON-SMALL CELL LUNG CANCER METASTATIC TO BONE (H): ICD-10-CM

## 2024-03-07 DIAGNOSIS — C34.90 NON-SMALL CELL LUNG CANCER METASTATIC TO BONE (H): Primary | ICD-10-CM

## 2024-03-07 DIAGNOSIS — D70.1 CHEMOTHERAPY-INDUCED NEUTROPENIA (H): ICD-10-CM

## 2024-03-07 LAB
ALBUMIN SERPL BCG-MCNC: 4 G/DL (ref 3.5–5.2)
ALP SERPL-CCNC: 90 U/L (ref 40–150)
ALT SERPL W P-5'-P-CCNC: 32 U/L (ref 0–70)
AST SERPL W P-5'-P-CCNC: 30 U/L (ref 0–45)
BASOPHILS # BLD AUTO: 0 10E3/UL (ref 0–0.2)
BASOPHILS NFR BLD AUTO: 1 %
BILIRUB DIRECT SERPL-MCNC: <0.2 MG/DL (ref 0–0.3)
BILIRUB SERPL-MCNC: 0.5 MG/DL
EOSINOPHIL # BLD AUTO: 0 10E3/UL (ref 0–0.7)
EOSINOPHIL NFR BLD AUTO: 1 %
ERYTHROCYTE [DISTWIDTH] IN BLOOD BY AUTOMATED COUNT: 17.3 % (ref 10–15)
HCT VFR BLD AUTO: 37.7 % (ref 40–53)
HGB BLD-MCNC: 11.9 G/DL (ref 13.3–17.7)
IMM GRANULOCYTES # BLD: 0 10E3/UL
IMM GRANULOCYTES NFR BLD: 0 %
LYMPHOCYTES # BLD AUTO: 1 10E3/UL (ref 0.8–5.3)
LYMPHOCYTES NFR BLD AUTO: 16 %
MCH RBC QN AUTO: 28.7 PG (ref 26.5–33)
MCHC RBC AUTO-ENTMCNC: 31.6 G/DL (ref 31.5–36.5)
MCV RBC AUTO: 91 FL (ref 78–100)
MONOCYTES # BLD AUTO: 0.5 10E3/UL (ref 0–1.3)
MONOCYTES NFR BLD AUTO: 9 %
NEUTROPHILS # BLD AUTO: 4.3 10E3/UL (ref 1.6–8.3)
NEUTROPHILS NFR BLD AUTO: 73 %
NRBC # BLD AUTO: 0 10E3/UL
NRBC BLD AUTO-RTO: 0 /100
PLATELET # BLD AUTO: 163 10E3/UL (ref 150–450)
PROT SERPL-MCNC: 6.2 G/DL (ref 6.4–8.3)
RBC # BLD AUTO: 4.14 10E6/UL (ref 4.4–5.9)
WBC # BLD AUTO: 5.9 10E3/UL (ref 4–11)

## 2024-03-07 PROCEDURE — 258N000003 HC RX IP 258 OP 636: Performed by: INTERNAL MEDICINE

## 2024-03-07 PROCEDURE — 99213 OFFICE O/P EST LOW 20 MIN: CPT | Performed by: NURSE PRACTITIONER

## 2024-03-07 PROCEDURE — 36591 DRAW BLOOD OFF VENOUS DEVICE: CPT | Performed by: INTERNAL MEDICINE

## 2024-03-07 PROCEDURE — 96372 THER/PROPH/DIAG INJ SC/IM: CPT | Performed by: INTERNAL MEDICINE

## 2024-03-07 PROCEDURE — 96413 CHEMO IV INFUSION 1 HR: CPT

## 2024-03-07 PROCEDURE — G0463 HOSPITAL OUTPT CLINIC VISIT: HCPCS | Mod: 25 | Performed by: NURSE PRACTITIONER

## 2024-03-07 PROCEDURE — 85004 AUTOMATED DIFF WBC COUNT: CPT | Performed by: INTERNAL MEDICINE

## 2024-03-07 PROCEDURE — 250N000011 HC RX IP 250 OP 636: Mod: JZ | Performed by: INTERNAL MEDICINE

## 2024-03-07 PROCEDURE — 96377 APPLICATON ON-BODY INJECTOR: CPT | Mod: XS

## 2024-03-07 PROCEDURE — 96367 TX/PROPH/DG ADDL SEQ IV INF: CPT

## 2024-03-07 PROCEDURE — 80076 HEPATIC FUNCTION PANEL: CPT | Performed by: INTERNAL MEDICINE

## 2024-03-07 RX ORDER — HEPARIN SODIUM (PORCINE) LOCK FLUSH IV SOLN 100 UNIT/ML 100 UNIT/ML
5 SOLUTION INTRAVENOUS
Status: DISCONTINUED | OUTPATIENT
Start: 2024-03-07 | End: 2024-03-07 | Stop reason: HOSPADM

## 2024-03-07 RX ADMIN — SODIUM CHLORIDE 250 ML: 9 INJECTION, SOLUTION INTRAVENOUS at 09:29

## 2024-03-07 RX ADMIN — DEXAMETHASONE SODIUM PHOSPHATE: 10 INJECTION, SOLUTION INTRAMUSCULAR; INTRAVENOUS at 09:31

## 2024-03-07 RX ADMIN — DOCETAXEL 122 MG: 20 INJECTION, SOLUTION, CONCENTRATE INTRAVENOUS at 09:51

## 2024-03-07 RX ADMIN — Medication 5 ML: at 10:55

## 2024-03-07 RX ADMIN — PEGFILGRASTIM 6 MG: KIT SUBCUTANEOUS at 09:51

## 2024-03-07 ASSESSMENT — PAIN SCALES - GENERAL: PAINLEVEL: NO PAIN (0)

## 2024-03-07 NOTE — PROGRESS NOTES
Infusion Nursing Note:  Kt Rasmussen presents today for C23D1 Docetaxel+Neulasta onPro.    Patient seen by provider today: Yes: Juan Rausch NP   present during visit today: Not Applicable.    Note: Pt seen and assessed by Juan Rausch NP: Okay to proceed with treatment.    ONPRO  Was placed on patient's: back of right arm.    Was placed at 10:00 AM    Podpal used: Yes    ONPRO injector device Lot number: [id number]    Patient education included: what patient can expect after application, what colored lights mean on the device, when to remove device, when and where to call with questions or issues, all patients questions answered, and that Neulasta administration will occur at 1300 on 3/8/24.    Patient tolerated administration well.          Intravenous Access:  Implanted Port.    Treatment Conditions:  Lab Results   Component Value Date    HGB 11.9 (L) 03/07/2024    WBC 5.9 03/07/2024    ANEU 0.5 (L) 08/11/2022    ANEUTAUTO 4.3 03/07/2024     03/07/2024        Lab Results   Component Value Date     01/25/2023    POTASSIUM 4.0 01/25/2023    MAG 1.9 10/13/2016    CR 0.71 01/25/2023    BEVERLY 9.3 01/25/2023    BILITOTAL 0.5 03/07/2024    ALBUMIN 4.0 03/07/2024    ALT 32 03/07/2024    AST 30 03/07/2024       Results reviewed, labs MET treatment parameters, ok to proceed with treatment.      Post Infusion Assessment:  Patient tolerated infusion without incident.  Blood return noted pre and post infusion.  Site patent and intact, free from redness, edema or discomfort.  No evidence of extravasations.  Access discontinued per protocol.       Discharge Plan:   Patient declined prescription refills.  Discharge instructions reviewed with: Patient.  Patient and/or family verbalized understanding of discharge instructions and all questions answered.  AVS to patient via [employer].  Patient will return 3/28/24 for next appointment.   Patient discharged in stable condition accompanied by: self.  Departure Mode:  Wheel chair.      Dayana Hedrick, RN

## 2024-03-07 NOTE — LETTER
"    3/7/2024         RE: Kt Rasmussen  [id number]  [facility] [address]        Dear Colleague,    Thank you for referring your patient, Kt Rasmussen, to the [facility]. Please see a copy of my visit note below.    Oncology Rooming Note    March 7, 2024 8:44 AM   Kt Rasmussen is a 63 year old male who presents for:    Chief Complaint   Patient presents with     Oncology Clinic Visit     Initial Vitals: /72   Pulse 71   Temp 98.3  F (36.8  C) (Oral)   Resp 16   SpO2 100%  Estimated body mass index is 22.2 kg/m  as calculated from the following:    Height as of 2/15/24: 1.918 m (6' 3.5\").    Weight as of 1/25/24: 81.6 kg (180 lb). There is no height or weight on file to calculate BSA.  No Pain (0) Comment: Data Unavailable   No LMP for male patient.  Allergies reviewed: Yes  Medications reviewed: Yes    Medications: Medication refills not needed today.  Pharmacy name entered into EPIC:    [facility] - [place], [place] - [address] [facility] [address] [place] [address] DRUG STORE #[id number] - [place] [address] AT [facility] & [facility] 7  [facility] TERM [facility] - [facility] [address] [place] [address], [facility]. - [address]    Frailty Screening:   Is the patient here for a new oncology consult visit in cancer care? 2. No      Clinical concerns:   np was notified.      Julisa Whelan CMA              .gkOncology/Hematology Visit Note  Mar 7, 2024    Reason for Visit: follow up of metastatic non-small cell lung carcinoma  Metastatic to lymph nodes bones in bilateral lungs  Brain MRI negative for mets    Patient met with Dr. John who recommended treatment with palliative intent with paclitaxel carboplatin Avastin and atezolizumab-treatment started on April 27, 2021  -Due to thrombocytopenia carboplatin AUC reduced to 4 with cycle " 2  Due to pancytopenia carboplatin discontinued with cycle 5    7/12/2021: PET/CT showed resolved mural nodules with increased cystic cavity in left lower lobe with no significant FDG uptake, less likely metastases; no enlarged hypermetabolic mediastinal, hilar, or axillary lymph nodes, decreased metabolic activity of intraosseous lesion in spine and pelvis; no new bone lesions    Treated with paclitaxel, Avastin and atezolizumab.-Last treatment given in  12/22/2021-cycle 12    1/10/2022: PET/CT showed new foci of abnormal skeletal FGD uptake in the thoracic and lumbar spine. Mild interval increase in intensity of previously demonstrated hypermetabolic skeletal lesions involving the pelvis and lumbar spine. Findings are consistent with progressive osseous metastatic disease. Subsequently off chemo for 1 month due to poor performance status.  16. 1/25/2022: Patient fell and fractured his femur. This was surgically repaired and he was subsequently cared for at a rehab unit.  17. 2/14/2022: Brain MRI without contrast (contrast not given due to inability to obtain IV access) showed no brain metastases.    Met with Dr. Shoemaker-in Dr. John's absence  -Recommendation is to change therapy   04/21/2022 -palliative Carboplatin Alimta started   Due to cytopenia AUC reduced to 4 with cycle 2  Due to persistent pancytopenia and inability to tolerate treatment carboplatin discontinued with cycle 3-day 1  Patient was on monotherapy with Alimta    07/18/2022-PET CT scan shows progression of the disease  08/03/22-started Taxotere 60 mg/m2 every 3 weeks    Interval History:  Patient reports he continues to feel well.  Denies fever chills sweats cough shortness of breath chest pain nausea vomiting diarrhea abdominal pain bleeding          Review of Systems:  14 point ROS of systems including Constitutional, Eyes, Respiratory, Cardiovascular, Gastroenterology, Genitourinary, Integumentary, Muscularskeletal, Psychiatric were all negative  except for pertinent positives noted in my HPI.    Physical Examination:  Physical Exam  HENT:      Right Ear: Tympanic membrane normal.      Nose: Nose normal.      Mouth/Throat:      Mouth: Mucous membranes are moist.   Eyes:      Pupils: Pupils are equal, round, and reactive to light.   Cardiovascular:      Rate and Rhythm: Normal rate.      Pulses: Normal pulses.   Pulmonary:      Effort: Pulmonary effort is normal.   Abdominal:      General: Abdomen is flat.   Musculoskeletal:         General: Normal range of motion.      Cervical back: Normal range of motion.   Skin:     General: Skin is warm.   Neurological:      General: No focal deficit present.      Mental Status: He is alert.   Psychiatric:         Mood and Affect: Mood normal.           Laboratory Data:  CBC and CMP results reviewed    Assessment and Plan:    metastatic non-small cell lung cancer   Patient follows with Dr John   Progressed on  different treatments as above recently was on monotherapy with Alimta  07/18/2022-PET CT scan reveals progression of disease  Patient with Dr. John who recommended changing treatment to Taxotere 60 mg/m2 every 3 weeks  -Patient reports he has been tolerating treatment well  -Labs reviewed unremarkable discussed okay to proceed with treatment  -Continue with Taxotere every 3 weeks  -03/18-PET scan  03/2515-dksuna-km appointment with Dr. 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     Chart documentation with Dragon Voice recognition Software. Although reviewed after completion, some words and grammatical errors may remain.          Again, thank you for allowing me to participate in the care of your patient.        Sincerely,        MADHAVI Moran CNP

## 2024-03-07 NOTE — PROGRESS NOTES
.gkOncology/Hematology Visit Note  Mar 7, 2024    Reason for Visit: follow up of metastatic non-small cell lung carcinoma  Metastatic to lymph nodes bones in bilateral lungs  Brain MRI negative for mets    Patient met with Dr. John who recommended treatment with palliative intent with paclitaxel carboplatin Avastin and atezolizumab-treatment started on April 27, 2021  -Due to thrombocytopenia carboplatin AUC reduced to 4 with cycle 2  Due to pancytopenia carboplatin discontinued with cycle 5    7/12/2021: PET/CT showed resolved mural nodules with increased cystic cavity in left lower lobe with no significant FDG uptake, less likely metastases; no enlarged hypermetabolic mediastinal, hilar, or axillary lymph nodes, decreased metabolic activity of intraosseous lesion in spine and pelvis; no new bone lesions    Treated with paclitaxel, Avastin and atezolizumab.-Last treatment given in  12/22/2021-cycle 12    1/10/2022: PET/CT showed new foci of abnormal skeletal FGD uptake in the thoracic and lumbar spine. Mild interval increase in intensity of previously demonstrated hypermetabolic skeletal lesions involving the pelvis and lumbar spine. Findings are consistent with progressive osseous metastatic disease. Subsequently off chemo for 1 month due to poor performance status.  16. 1/25/2022: Patient fell and fractured his femur. This was surgically repaired and he was subsequently cared for at a rehab unit.  17. 2/14/2022: Brain MRI without contrast (contrast not given due to inability to obtain IV access) showed no brain metastases.    Met with Dr. Shoemaker-in Dr. John's absence  -Recommendation is to change therapy   04/21/2022 -palliative Carboplatin Alimta started   Due to cytopenia AUC reduced to 4 with cycle 2  Due to persistent pancytopenia and inability to tolerate treatment carboplatin discontinued with cycle 3-day 1  Patient was on monotherapy with Alimta    07/18/2022-PET CT scan shows progression of the  disease  08/03/22-started Taxotere 60 mg/m2 every 3 weeks    Interval History:  Patient reports he continues to feel well.  Denies fever chills sweats cough shortness of breath chest pain nausea vomiting diarrhea abdominal pain bleeding          Review of Systems:  14 point ROS of systems including Constitutional, Eyes, Respiratory, Cardiovascular, Gastroenterology, Genitourinary, Integumentary, Muscularskeletal, Psychiatric were all negative except for pertinent positives noted in my HPI.    Physical Examination:  Physical Exam  HENT:      Right Ear: Tympanic membrane normal.      Nose: Nose normal.      Mouth/Throat:      Mouth: Mucous membranes are moist.   Eyes:      Pupils: Pupils are equal, round, and reactive to light.   Cardiovascular:      Rate and Rhythm: Normal rate.      Pulses: Normal pulses.   Pulmonary:      Effort: Pulmonary effort is normal.   Abdominal:      General: Abdomen is flat.   Musculoskeletal:         General: Normal range of motion.      Cervical back: Normal range of motion.   Skin:     General: Skin is warm.   Neurological:      General: No focal deficit present.      Mental Status: He is alert.   Psychiatric:         Mood and Affect: Mood normal.           Laboratory Data:  CBC and CMP results reviewed    Assessment and Plan:    metastatic non-small cell lung cancer   Patient follows with Dr John   Progressed on  different treatments as above recently was on monotherapy with Alimta  07/18/2022-PET CT scan reveals progression of disease  Patient with Dr. John who recommended changing treatment to Taxotere 60 mg/m2 every 3 weeks  -Patient reports he has been tolerating treatment well  -Labs reviewed unremarkable discussed okay to proceed with treatment  -Continue with Taxotere every 3 weeks  -03/18-PET scan  03/2592-[id number]-ix appointment with Dr. 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  Patient is asymptomatic  Transfuse for hemoglobin less than 8 or symptomatic      Left vocal cord  squamous cell carcinoma  T1 lesion  01/2021-scope done by ENT no evidence of recurrence  Continue close follow-up by ENT  Patient has dysphonia and some dysphagia     History of CVA  -02/14/2022-MRI of the brain did not reveal brain metastasis        Patient is advised to call our clinic or go to ER in the event of fever chills sweats cough shortness of breath chest pain nausea vomiting diarrhea abdominal pain bleeding edema or any changes in health    MADHAVI Moran CNP  [facility]     Chart documentation with Dragon Voice recognition Software. Although reviewed after completion, some words and grammatical errors may remain.

## 2024-03-07 NOTE — PROGRESS NOTES
"Oncology Rooming Note    March 7, 2024 8:44 AM   Kt Rasmussen is a 63 year old male who presents for:    Chief Complaint   Patient presents with    Oncology Clinic Visit     Initial Vitals: /72   Pulse 71   Temp 98.3  F (36.8  C) (Oral)   Resp 16   SpO2 100%  Estimated body mass index is 22.2 kg/m  as calculated from the following:    Height as of 2/15/24: 1.918 m (6' 3.5\").    Weight as of 1/25/24: 81.6 kg (180 lb). There is no height or weight on file to calculate BSA.  No Pain (0) Comment: Data Unavailable   No LMP for male patient.  Allergies reviewed: Yes  Medications reviewed: Yes    Medications: Medication refills not needed today.  Pharmacy name entered into EPIC:    [facility] [place] [place] - [address] [facility] [address] DR[facility] STORE #[id number] - [address] AT [facility] & [address] TERM [facility] - [facility] [address] [place] [address], [facility]. - [facility] [address]    Frailty Screening:   Is the patient here for a new oncology consult visit in cancer care? 2. No      Clinical concerns:   np was notified.      Julisa Whelan CMA            "

## 2024-03-07 NOTE — PROGRESS NOTES
Nursing Note:  Kt Rasmussen presents today for port labs.    Patient seen by provider today: Yes: Juan Rausch NP.   present during visit today: Not Applicable.    Note: N/A.    Intravenous Access:  Implanted Port.    Discharge Plan:   Patient was sent to [facility] for 9am provider appointment.    Sandi Kingston RN

## 2024-03-18 ENCOUNTER — HOSPITAL ENCOUNTER (OUTPATIENT)
Dept: PET IMAGING | Facility: CLINIC | Age: 64
Discharge: HOME | End: 2024-03-18
Attending: INTERNAL MEDICINE | Admitting: INTERNAL MEDICINE
Payer: COMMERCIAL

## 2024-03-18 DIAGNOSIS — C78.01 MALIGNANT NEOPLASM METASTATIC TO BOTH LUNGS (H): ICD-10-CM

## 2024-03-18 DIAGNOSIS — C34.90 NON-SMALL CELL LUNG CANCER METASTATIC TO BONE (H): ICD-10-CM

## 2024-03-18 DIAGNOSIS — C78.02 MALIGNANT NEOPLASM METASTATIC TO BOTH LUNGS (H): ICD-10-CM

## 2024-03-18 DIAGNOSIS — C79.51 NON-SMALL CELL LUNG CANCER METASTATIC TO BONE (H): ICD-10-CM

## 2024-03-18 LAB
CREAT BLD-MCNC: 0.7 MG/DL (ref 0.7–1.3)
EGFRCR SERPLBLD CKD-EPI 2021: >60 ML/MIN/1.73M2

## 2024-03-18 PROCEDURE — 71260 CT THORAX DX C+: CPT

## 2024-03-18 PROCEDURE — 82565 ASSAY OF CREATININE: CPT

## 2024-03-18 PROCEDURE — A9552 F18 FDG: HCPCS | Performed by: INTERNAL MEDICINE

## 2024-03-18 PROCEDURE — 78816 PET IMAGE W/CT FULL BODY: CPT | Mod: PS

## 2024-03-18 PROCEDURE — 250N000011 HC RX IP 250 OP 636: Performed by: INTERNAL MEDICINE

## 2024-03-18 PROCEDURE — 343N000001 HC RX 343: Performed by: INTERNAL MEDICINE

## 2024-03-18 RX ORDER — IOPAMIDOL 755 MG/ML
10-135 INJECTION, SOLUTION INTRAVASCULAR ONCE
Status: COMPLETED | OUTPATIENT
Start: 2024-03-18 | End: 2024-03-18

## 2024-03-18 RX ORDER — HEPARIN SODIUM (PORCINE) LOCK FLUSH IV SOLN 100 UNIT/ML 100 UNIT/ML
500 SOLUTION INTRAVENOUS ONCE
Status: COMPLETED | OUTPATIENT
Start: 2024-03-18 | End: 2024-03-18

## 2024-03-18 RX ADMIN — FLUDEOXYGLUCOSE F-18 12.3 MILLICURIE: 500 INJECTION, SOLUTION INTRAVENOUS at 09:51

## 2024-03-18 RX ADMIN — Medication 500 UNITS: at 09:52

## 2024-03-18 RX ADMIN — IOPAMIDOL 111 ML: 755 INJECTION, SOLUTION INTRAVENOUS at 09:52

## 2024-03-22 NOTE — PROGRESS NOTES
Virtual Visit Details    Type of service:  Telephone Visit   Phone call duration: 15 minutes   Originating Location (pt. Location): Home    Distant Location (provider location):  On-site    [facility]    Hematology/Oncology Established Patient Follow-up Note      Today's Date: 3/25/2024    Reason for Follow-up: Metastatic non-small cell lung carcinoma.    HISTORY OF PRESENT ILLNESS: tK Rasmussen is a 63 year old male who presents with the following oncologic history:  1. 5/05/2016: Presented with near-obstructing large mass coming off the cheikh and likely the posterior wall, seen on bronchoscopy. Biopsy of the mass showed invasive squamous cell carcinoma, moderately differentiating and focally keratinizing.  Procedure was complicated by significant bleeding.  Tumor was completely debulked (via bronchoscopy) in both main stem bronchi and distal trachea. NGS showed no mutations in EGFR, KRAS, BRAF, NRAS, HRAS, PIK3CA, ERBB2, MET, or JAK2.  2. 5/23/2016: PET/CT scan showed evidence of residual tumor with decreased endobronchial mass. Indeterminate, mildly hypermetabolic mesentery with prominent lymph nodes and area of enhancement of the right hepatic lobe present. Felt to have T4-NX-M0 disease.  3. 6/09/2016: Started concurrent chemoradiation with weekly paclitaxel and carboplatin.  4. 7/30/2016: Completed radiation.  Subsequently received 2 cycles of consolidation paclitaxel and carboplatin.   5. 9/13/2019: Presented with 6-month history of dysphonia and left vocal exophytic lesion. Left true vocal fold biopsy showed at least squamous cell carcinoma in situ, cannot exclude superficial invasion.  6. 9/30/2019: Excision of left vocal cord mass showed fragments of invasive squamous cell carcinoma, well differentiated and keratinizing.  Margins negative for malignancy.  7. 2/16/2021: CT chest w/o contrast showed thin-walled cavity in left lower lobe with 2 solid peripheral nodules measuring 9 mm  each. No lymphadenopathy.  8. 3/01/2021: PET scan showed hypermetabolic nodules in left lower lobe and right lung, consistent with metastases; hypermetabolic adenopathy in chest, abdomen, pelvis; nonspecific uptake at tongue; hypermetabolic intraosseous lesions in spine and pelvis consistent with metastatic disease; left axillary hypermetabolic lymph nodes related to COVID-19 vaccination.  9. 3/12/2021: CT-guided lung biopsy showed non-small cell carcinoma, not otherwise specified -- with opinion by [facility] pathologist.  10. 3/18/2021: Lung NGS panel negative for mutations in BRAF, EGFR, ERBB2, IDH1, IDH2, KRAS, MET, NRAS, RET. PD-L1 expression negative (TPS <1%). Due to minimal amount of DNA obtained from specimen, other biomarkers could not be analyzed.  11. 4/7/2021: MRI brain negative for brain metastases.  12. 4/27/2021: Started 1st line metastatic therapy with carboplatin, paclitaxel, bevacizumab, and atezolizumab for metastatic non-small cell lung carcinoma, NOS.  13. 7/12/2021: PET/CT showed resolved mural nodules with increased cystic cavity in left lower lobe with no significant FDG uptake, less likely metastases; no enlarged hypermetabolic mediastinal, hilar, or axillary lymph nodes, decreased metabolic activity of intraosseous lesion in spine and pelvis; no new bone lesions.  14. 10/11/2021: PET/CT showed slight interval increase in intensity of metabolic activity of right pubic bone and left ischium with new focal uptake in L2 spinous process; resolved uptake at previous ground glass opacity along right medial lower lobe; unchanged cystic cavities/nodules in left lower lobe and right apical upper lobe; no pathologically enlarged hypermetabolic mediastinal, hilar, or axillary lymph nodes.  15. 1/10/2022: PET/CT showed new foci of abnormal skeletal FGD uptake in the thoracic and lumbar spine. Mild interval increase in intensity of previously demonstrated hypermetabolic skeletal lesions involving the  pelvis and lumbar spine. Findings are consistent with progressive osseous metastatic disease. Subsequently off chemo for 1 month due to poor performance status.  16. 1/25/2022: Patient fell and fractured his femur. This was surgically repaired and he was subsequently cared for at a rehab unit.  17. 2/14/2022: Brain MRI without contrast (contrast not given due to inability to obtain IV access) showed no brain metastases.  18. 4/21/2022: Started 2nd line metastatic therapy with pemetrexed and carboplatin. Omission of carboplatin 6/2 and forward due to worsening anemia despite dose reduction.  19. 7/18/2022: PET/CT showed enlarged and increased FDG avid skeletal metastases, increased left para-aortic retroperitoneal lymph node increased in size and FDG avidity, findings consistent with progressive disease.  20. 8/3/2022: Started 3rd line metastatic therapy with Taxotere 60 mg/m2 every 3 weeks.  21. 11/21/2022: PET scan showed partial response, left para-aortic retroperitoneal lymph node has decreased from 2.6 x 1.7 cm (SUVmax 7.9) to 2.3 x 1.4 cm (SUVmax 5.7), skeletal metastases no longer demonstrate FDG activity. Kt elected to take a 2-month break from chemo.  22. 1/23/2023: PET scan showed increasing metabolic activity of the left periaortic (max SUV 8.1, previously 5.7) left external iliac (max SUV 7.0, previously 3.1), and right external iliac (max SUV 5.1, previously 3.7) lymph nodes with development/reactivation of multiple FDG avid osseous lesions.  23. 3/23/2023: Resumption of Taxotere every 3 weeks. Delayed due to insurance issues.  24. 6/26/2023: PET scan showed partial response with decreased uptake associated with the retroperitoneal and pelvic lymphadenopathy as well as bone metastases.  25. 12/4/2023: PET scan showed decreased FDG avid left periaortic (SUV max 5.4, previously 6.6) and left external iliac (SUV max 3.5, previously 4.9) lymphadenopathy and broadly stable FDG uptake within scattered  pre-existing osseous metastases; new FDG avid lesion involving the right anterior first rib without clear fracture line visible (SUV max 4.7) indeterminate for a new osseous metastasis versus posttraumatic in etiology.   26. 3/18/2024: PET scan showed overall stable disease.    INTERIM HISTORY:  Kt reports tolerating Taxotere chemo well.  He denies any dyspnea, nausea, or new paresthesias.  He has had no recent falls.    REVIEW OF SYSTEMS:   14 point ROS was reviewed and is negative other than as noted above in HPI.       HOME MEDICATIONS:  Current Outpatient Medications   Medication Sig Dispense Refill    atorvastatin (LIPITOR) 40 MG tablet Take 40 mg by mouth daily      ELIQUIS ANTICOAGULANT 5 MG tablet            ALLERGIES:  Allergies   Allergen Reactions    No Known Drug Allergy          PAST MEDICAL HISTORY:  Past Medical History:   Diagnosis Date    Alcohol abuse, unspecified     Cerebral infarction (H)     2009, right side residual and aphasia    Dyslipidemia     GERD (gastroesophageal reflux disease)     Lung cancer (H)     Unspecified essential hypertension          PAST SURGICAL HISTORY:  Past Surgical History:   Procedure Laterality Date    BRONCHOSCOPY FLEXIBLE AND RIGID N/A 5/5/2016    Procedure: BRONCHOSCOPY FLEXIBLE AND RIGID;  Surgeon: Tony Talbot MD;  Location: [facility] OR    ESOPHAGOSCOPY FLEXIBLE N/A 9/30/2019    Procedure: flexible esophagoscopy;  Surgeon: Lizzy Johnson MD;  Location: [facility] OR    INJECT STEROID (LOCATION) N/A 9/30/2019    Procedure: steroid injection;  Surgeon: Lizzy Johnson MD;  Location: [facility] OR    IR CHEST PORT PLACEMENT > 5 YRS OF AGE  4/22/2021    IR CHEST PORT PLACEMENT > 5 YRS OF AGE  4/11/2022    IR PORT CHECK RIGHT  4/11/2022    IR PORT REMOVAL RIGHT  4/11/2022    LASER CO2 LARYNGOSCOPY N/A 9/30/2019    Procedure: Microdirect laryngoscopy with excision of laryngeal mass, CO2 laser;  Surgeon: Lizzy Johnson MD;  Location: [facility] OR    ZZC NONSPECIFIC PROCEDURE       R tympanoplasty         SOCIAL HISTORY:  Social History     Socioeconomic History    Marital status: Single     Spouse name: Not on file    Number of children: Not on file    Years of education: Not on file    Highest education level: Not on file   Occupational History    Not on file   Tobacco Use    Smoking status: Former     Packs/day: 1     Types: Cigarettes     Quit date: 2009     Years since quittin.4    Smokeless tobacco: Never   Substance and Sexual Activity    Alcohol use: Not Currently    Drug use: No    Sexual activity: Not on file   Other Topics Concern    Parent/sibling w/ CABG, MI or angioplasty before 65F 55M? Not Asked   Social History Narrative    Not on file     Social Determinants of Health     Financial Resource Strain: Not on file   Food Insecurity: Not on file   Transportation Needs: Not on file   Physical Activity: Not on file   Stress: Not on file   Social Connections: Not on file   Interpersonal Safety: Not At Risk (2023)    Humiliation, Afraid, Rape, and Kick questionnaire     Fear of Current or Ex-Partner: No     Emotionally Abused: No     Physically Abused: No     Sexually Abused: No   Housing Stability: Not on file   Resides at assisted living.      FAMILY HISTORY:  Family History   Problem Relation Age of Onset    Cancer Father          PHYSICAL EXAM:  Vital signs:  There were no vitals taken for this visit.   Not performed as this was a telephone visit.      LABS:  CBC RESULTS:   Recent Labs   Lab Test 24  0820   WBC 5.9   RBC 4.14*   HGB 11.9*   HCT 37.7*   MCV 91   MCH 28.7   MCHC 31.6   RDW 17.3*          Last Comprehensive Metabolic Panel:  Sodium   Date Value Ref Range Status   2023 140 136 - 145 mmol/L Final   2021 140 133 - 144 mmol/L Final     Potassium   Date Value Ref Range Status   2023 4.0 3.4 - 5.3 mmol/L Final   2022 4.1 3.4 - 5.3 mmol/L Final   2021 4.3 3.4 - 5.3 mmol/L Final     Chloride   Date Value Ref Range  Status   01/25/2023 102 98 - 107 mmol/L Final   12/07/2022 109 94 - 109 mmol/L Final   06/29/2021 111 (H) 94 - 109 mmol/L Final     Carbon Dioxide   Date Value Ref Range Status   06/29/2021 25 20 - 32 mmol/L Final     Carbon Dioxide (CO2)   Date Value Ref Range Status   01/25/2023 28 22 - 29 mmol/L Final   12/07/2022 25 20 - 32 mmol/L Final     Anion Gap   Date Value Ref Range Status   01/25/2023 10 7 - 15 mmol/L Final   12/07/2022 7 3 - 14 mmol/L Final   06/29/2021 4 3 - 14 mmol/L Final     Glucose   Date Value Ref Range Status   01/25/2023 95 70 - 99 mg/dL Final   12/07/2022 91 70 - 99 mg/dL Final   06/29/2021 85 70 - 99 mg/dL Final     Urea Nitrogen   Date Value Ref Range Status   01/25/2023 16.0 8.0 - 23.0 mg/dL Final   12/07/2022 17 7 - 30 mg/dL Final   06/29/2021 17 7 - 30 mg/dL Final     Creatinine   Date Value Ref Range Status   01/25/2023 0.71 0.67 - 1.17 mg/dL Final   06/29/2021 0.84 0.66 - 1.25 mg/dL Final     Creatinine POCT   Date Value Ref Range Status   03/18/2024 0.7 0.7 - 1.3 mg/dL Final     GFR Estimate   Date Value Ref Range Status   06/29/2021 >90 >60 mL/min/[1.73_m2] Final     Comment:     Non  GFR Calc  Starting 12/18/2018, serum creatinine based estimated GFR (eGFR) will be   calculated using the Chronic Kidney Disease Epidemiology Collaboration   (CKD-EPI) equation.       GFR, ESTIMATED POCT   Date Value Ref Range Status   03/18/2024 >60 >60 mL/min/1.73m2 Final     Calcium   Date Value Ref Range Status   01/25/2023 9.3 8.8 - 10.2 mg/dL Final   06/29/2021 8.7 8.5 - 10.1 mg/dL Final     Bilirubin Total   Date Value Ref Range Status   03/07/2024 0.5 <=1.2 mg/dL Final   06/29/2021 0.3 0.2 - 1.3 mg/dL Final     Alkaline Phosphatase   Date Value Ref Range Status   03/07/2024 90 40 - 150 U/L Final     Comment:     Reference intervals for this test were updated on 11/14/2023 to more accurately reflect our healthy population. There may be differences in the flagging of prior results  with similar values performed with this method. Interpretation of those prior results can be made in the context of the updated reference intervals.   06/29/2021 73 40 - 150 U/L Final     ALT   Date Value Ref Range Status   03/07/2024 32 0 - 70 U/L Final     Comment:     Reference intervals for this test were updated on 6/12/2023 to more accurately reflect our healthy population. There may be differences in the flagging of prior results with similar values performed with this method. Interpretation of those prior results can be made in the context of the updated reference intervals.     06/29/2021 34 0 - 70 U/L Final     AST   Date Value Ref Range Status   03/07/2024 30 0 - 45 U/L Final     Comment:     Reference intervals for this test were updated on 6/12/2023 to more accurately reflect our healthy population. There may be differences in the flagging of prior results with similar values performed with this method. Interpretation of those prior results can be made in the context of the updated reference intervals.   06/29/2021 26 0 - 45 U/L Final       PATHOLOGY:  None new.    IMAGING:  Reviewed as per HPI.    ASSESSMENT/PLAN:  Kt Rasmussen is a 63 year old male with the following issues:  1. Metastatic non-small (non-squamous) cell lung carcinoma with metastases to lymph nodes, bones, and bilateral lungs  2. 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 Chemotherapy-induced anemia and thrombocytopenia  --Lung NGS panel negative for mutations in BRAF, EGFR, ERBB2, IDH1, IDH2, KRAS, MET, NRAS, RET. PD-L1 expression negative (TPS <1%). Guardant 360 negative for actionable mutations.  --Kt started 3rd line metastatic therapy with every 3-week Taxotere at 20% dose reduction (60 mg/m2) on 8/3/2022 due to progressive disease.  He tolerated this very well with minimal to no paresthesias. He then took a 2-month chemo break that  was further delayed to 3.5 months due to insurance issues. He resumed on 3/23/2023 and continues to tolerate Taxotere well with minimal paresthesias.  --I personally reviewed his 3/18/2024 PET scan which showed overall stable disease but will need to be mindful of the 2.7 x 2.2 cm  FDG avid (SUVmax 6.3) left para-aortic retroperitoneal lymph node which previously measured 2.6 x 2.0 cm (SUVmax 5.4). The 2.0 x 1.7 cm left external iliac lymph node previously measured 2.0 x 1.8 cm but with slightly more uptake on this exam.  --I advised he continue Taxotere at 20% dose reduction due to past repeated issues with chemo-induced cytopenias.  All treatment is with palliative intent. He agrees to continue with Taxotere.  --Will repeat PET scan in 6/2024.    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 Dysphonia  6. Dysphagia  -Kt underwent excision of a left vocal cord SCC on 9/30/2019 and has persistent dysphonia as a result of the lesion and excision.  He also has dysphagia but this is stable and swallowing is manageable.  -Continue follow-up with Dr. Wray.    7. History of dizziness and prior falls  --SW and guardian were previously notified of multiple falls.  --Prior brain MRI 2/14/2022 showed no brain metastases. However this was a suboptimal exam due to inability to administer contrast.  --He is using a wheelchair due to prior femoral fracture in 1/2022.    8. Bilateral lower extremity DVT  --Diagnosed in 9/2023, likely hypercoagulability of malignancy.  --Continue apixaban indefinitely, provided no major bleeding issues arise in the future.    Sangita John MD  [facility] Hematology/Oncology    Total time spent today: 30 minutes in chart review, patient evaluation, counseling, documentation, test and/or medication/prescription orders, and coordination of care.

## 2024-03-25 ENCOUNTER — VIRTUAL VISIT (OUTPATIENT)
Dept: ONCOLOGY | Facility: CLINIC | Age: 64
End: 2024-03-25
Attending: INTERNAL MEDICINE
Payer: COMMERCIAL

## 2024-03-25 DIAGNOSIS — C78.02 MALIGNANT NEOPLASM METASTATIC TO BOTH LUNGS (H): ICD-10-CM

## 2024-03-25 DIAGNOSIS — D70.1 CHEMOTHERAPY-INDUCED NEUTROPENIA (H): ICD-10-CM

## 2024-03-25 DIAGNOSIS — C78.01 MALIGNANT NEOPLASM METASTATIC TO BOTH LUNGS (H): ICD-10-CM

## 2024-03-25 DIAGNOSIS — C34.90 NON-SMALL CELL LUNG CANCER METASTATIC TO BONE (H): Primary | ICD-10-CM

## 2024-03-25 DIAGNOSIS — D64.81 ANEMIA DUE TO CHEMOTHERAPY: ICD-10-CM

## 2024-03-25 DIAGNOSIS — Z51.11 ENCOUNTER FOR ANTINEOPLASTIC CHEMOTHERAPY: ICD-10-CM

## 2024-03-25 DIAGNOSIS — C79.51 NON-SMALL CELL LUNG CANCER METASTATIC TO BONE (H): Primary | ICD-10-CM

## 2024-03-25 DIAGNOSIS — T45.1X5A ANEMIA DUE TO CHEMOTHERAPY: ICD-10-CM

## 2024-03-25 DIAGNOSIS — T45.1X5A CHEMOTHERAPY-INDUCED NEUTROPENIA (H): ICD-10-CM

## 2024-03-25 PROCEDURE — 99214 OFFICE O/P EST MOD 30 MIN: CPT | Mod: 93 | Performed by: INTERNAL MEDICINE

## 2024-03-25 RX ORDER — MEPERIDINE HYDROCHLORIDE 25 MG/ML
25 INJECTION INTRAMUSCULAR; INTRAVENOUS; SUBCUTANEOUS EVERY 30 MIN PRN
Status: CANCELLED | OUTPATIENT
Start: 2024-03-28

## 2024-03-25 RX ORDER — DIPHENHYDRAMINE HYDROCHLORIDE 50 MG/ML
50 INJECTION INTRAMUSCULAR; INTRAVENOUS
Status: CANCELLED
Start: 2024-03-28

## 2024-03-25 RX ORDER — HEPARIN SODIUM,PORCINE 10 UNIT/ML
5 VIAL (ML) INTRAVENOUS
Status: CANCELLED | OUTPATIENT
Start: 2024-03-28

## 2024-03-25 RX ORDER — ALBUTEROL SULFATE 0.83 MG/ML
2.5 SOLUTION RESPIRATORY (INHALATION)
Status: CANCELLED | OUTPATIENT
Start: 2024-03-28

## 2024-03-25 RX ORDER — ALBUTEROL SULFATE 90 UG/1
1-2 AEROSOL, METERED RESPIRATORY (INHALATION)
Status: CANCELLED
Start: 2024-03-28

## 2024-03-25 RX ORDER — HEPARIN SODIUM (PORCINE) LOCK FLUSH IV SOLN 100 UNIT/ML 100 UNIT/ML
5 SOLUTION INTRAVENOUS
Status: CANCELLED | OUTPATIENT
Start: 2024-03-28

## 2024-03-25 RX ORDER — METHYLPREDNISOLONE SODIUM SUCCINATE 125 MG/2ML
125 INJECTION, POWDER, LYOPHILIZED, FOR SOLUTION INTRAMUSCULAR; INTRAVENOUS
Status: CANCELLED
Start: 2024-03-28

## 2024-03-25 RX ORDER — LORAZEPAM 2 MG/ML
0.5 INJECTION INTRAMUSCULAR EVERY 4 HOURS PRN
Status: CANCELLED | OUTPATIENT
Start: 2024-03-28

## 2024-03-25 RX ORDER — EPINEPHRINE 1 MG/ML
0.3 INJECTION, SOLUTION INTRAMUSCULAR; SUBCUTANEOUS EVERY 5 MIN PRN
Status: CANCELLED | OUTPATIENT
Start: 2024-03-28

## 2024-03-25 ASSESSMENT — PAIN SCALES - GENERAL: PAINLEVEL: NO PAIN (0)

## 2024-03-25 NOTE — LETTER
3/25/2024         RE: Kt Rasmussen  [address]  [facility] [address]        Dear Colleague,    Thank you for referring your patient, Kt Rasmussen, to the [facility]. Please see a copy of my visit note below.      Virtual Visit Details    Type of service:  Telephone Visit   Phone call duration: 15 minutes   Originating Location (pt. Location): Home    Distant Location (provider location):  On-site    [facility]    Hematology/Oncology Established Patient Follow-up Note      Today's Date: 3/25/2024    Reason for Follow-up: Metastatic non-small cell lung carcinoma.    HISTORY OF PRESENT ILLNESS: Kt Rasmussen is a 63 year old male who presents with the following oncologic history:  1. 5/05/2016: Presented with near-obstructing large mass coming off the cheikh and likely the posterior wall, seen on bronchoscopy. Biopsy of the mass showed invasive squamous cell carcinoma, moderately differentiating and focally keratinizing.  Procedure was complicated by significant bleeding.  Tumor was completely debulked (via bronchoscopy) in both main stem bronchi and distal trachea. NGS showed no mutations in EGFR, KRAS, BRAF, NRAS, HRAS, PIK3CA, ERBB2, MET, or JAK2.  2. 5/23/2016: PET/CT scan showed evidence of residual tumor with decreased endobronchial mass. Indeterminate, mildly hypermetabolic mesentery with prominent lymph nodes and area of enhancement of the right hepatic lobe present. Felt to have T4-NX-M0 disease.  3. 6/09/2016: Started concurrent chemoradiation with weekly paclitaxel and carboplatin.  4. 7/30/2016: Completed radiation.  Subsequently received 2 cycles of consolidation paclitaxel and carboplatin.   5. 9/13/2019: Presented with 6-month history of dysphonia and left vocal exophytic lesion. Left true vocal fold biopsy showed at least squamous cell carcinoma in situ, cannot exclude superficial invasion.  6. 9/30/2019: Excision of left vocal cord  mass showed fragments of invasive squamous cell carcinoma, well differentiated and keratinizing.  Margins negative for malignancy.  7. 2/16/2021: CT chest w/o contrast showed thin-walled cavity in left lower lobe with 2 solid peripheral nodules measuring 9 mm each. No lymphadenopathy.  8. 3/01/2021: PET scan showed hypermetabolic nodules in left lower lobe and right lung, consistent with metastases; hypermetabolic adenopathy in chest, abdomen, pelvis; nonspecific uptake at tongue; hypermetabolic intraosseous lesions in spine and pelvis consistent with metastatic disease; left axillary hypermetabolic lymph nodes related to COVID-19 vaccination.  9. 3/12/2021: CT-guided lung biopsy showed non-small cell carcinoma, not otherwise specified -- with opinion by [facility] pathologist.  10. 3/18/2021: Lung NGS panel negative for mutations in BRAF, EGFR, ERBB2, IDH1, IDH2, KRAS, MET, NRAS, RET. PD-L1 expression negative (TPS <1%). Due to minimal amount of DNA obtained from specimen, other biomarkers could not be analyzed.  11. 4/7/2021: MRI brain negative for brain metastases.  12. 4/27/2021: Started 1st line metastatic therapy with carboplatin, paclitaxel, bevacizumab, and atezolizumab for metastatic non-small cell lung carcinoma, NOS.  13. 7/12/2021: PET/CT showed resolved mural nodules with increased cystic cavity in left lower lobe with no significant FDG uptake, less likely metastases; no enlarged hypermetabolic mediastinal, hilar, or axillary lymph nodes, decreased metabolic activity of intraosseous lesion in spine and pelvis; no new bone lesions.  14. 10/11/2021: PET/CT showed slight interval increase in intensity of metabolic activity of right pubic bone and left ischium with new focal uptake in L2 spinous process; resolved uptake at previous ground glass opacity along right medial lower lobe; unchanged cystic cavities/nodules in left lower lobe and right apical upper lobe; no pathologically enlarged hypermetabolic  mediastinal, hilar, or axillary lymph nodes.  15. 1/10/2022: PET/CT showed new foci of abnormal skeletal FGD uptake in the thoracic and lumbar spine. Mild interval increase in intensity of previously demonstrated hypermetabolic skeletal lesions involving the pelvis and lumbar spine. Findings are consistent with progressive osseous metastatic disease. Subsequently off chemo for 1 month due to poor performance status.  16. 1/25/2022: Patient fell and fractured his femur. This was surgically repaired and he was subsequently cared for at a rehab unit.  17. 2/14/2022: Brain MRI without contrast (contrast not given due to inability to obtain IV access) showed no brain metastases.  18. 4/21/2022: Started 2nd line metastatic therapy with pemetrexed and carboplatin. Omission of carboplatin 6/2 and forward due to worsening anemia despite dose reduction.  19. 7/18/2022: PET/CT showed enlarged and increased FDG avid skeletal metastases, increased left para-aortic retroperitoneal lymph node increased in size and FDG avidity, findings consistent with progressive disease.  20. 8/3/2022: Started 3rd line metastatic therapy with Taxotere 60 mg/m2 every 3 weeks.  21. 11/21/2022: PET scan showed partial response, left para-aortic retroperitoneal lymph node has decreased from 2.6 x 1.7 cm (SUVmax 7.9) to 2.3 x 1.4 cm (SUVmax 5.7), skeletal metastases no longer demonstrate FDG activity. Kt elected to take a 2-month break from chemo.  22. 1/23/2023: PET scan showed increasing metabolic activity of the left periaortic (max SUV 8.1, previously 5.7) left external iliac (max SUV 7.0, previously 3.1), and right external iliac (max SUV 5.1, previously 3.7) lymph nodes with development/reactivation of multiple FDG avid osseous lesions.  23. 3/23/2023: Resumption of Taxotere every 3 weeks. Delayed due to insurance issues.  24. 6/26/2023: PET scan showed partial response with decreased uptake associated with the retroperitoneal and pelvic  lymphadenopathy as well as bone metastases.  25. 12/4/2023: PET scan showed decreased FDG avid left periaortic (SUV max 5.4, previously 6.6) and left external iliac (SUV max 3.5, previously 4.9) lymphadenopathy and broadly stable FDG uptake within scattered pre-existing osseous metastases; new FDG avid lesion involving the right anterior first rib without clear fracture line visible (SUV max 4.7) indeterminate for a new osseous metastasis versus posttraumatic in etiology.   26. 3/18/2024: PET scan showed overall stable disease.    INTERIM HISTORY:  Kt reports tolerating Taxotere chemo well.  He denies any dyspnea, nausea, or new paresthesias.  He has had no recent falls.    REVIEW OF SYSTEMS:   14 point ROS was reviewed and is negative other than as noted above in HPI.       HOME MEDICATIONS:  Current Outpatient Medications   Medication Sig Dispense Refill     atorvastatin (LIPITOR) 40 MG tablet Take 40 mg by mouth daily       ELIQUIS ANTICOAGULANT 5 MG tablet            ALLERGIES:  Allergies   Allergen Reactions     No Known Drug Allergy          PAST MEDICAL HISTORY:  Past Medical History:   Diagnosis Date     Alcohol abuse, unspecified      Cerebral infarction (H)     2009, right side residual and aphasia     Dyslipidemia      GERD (gastroesophageal reflux disease)      Lung cancer (H)      Unspecified essential hypertension          PAST SURGICAL HISTORY:  Past Surgical History:   Procedure Laterality Date     BRONCHOSCOPY FLEXIBLE AND RIGID N/A 5/5/2016    Procedure: BRONCHOSCOPY FLEXIBLE AND RIGID;  Surgeon: Tony Talbot MD;  Location: [facility] OR     ESOPHAGOSCOPY FLEXIBLE N/A 9/30/2019    Procedure: flexible esophagoscopy;  Surgeon: Lizzy Johnson MD;  Location: [facility] OR     INJECT STEROID (LOCATION) N/A 9/30/2019    Procedure: steroid injection;  Surgeon: Lizzy Johnson MD;  Location: [facility] OR     IR CHEST PORT PLACEMENT > 5 YRS OF AGE  4/22/2021     IR CHEST PORT PLACEMENT > 5 YRS OF AGE  4/11/2022      IR PORT CHECK RIGHT  2022     IR PORT REMOVAL RIGHT  2022     LASER CO2 LARYNGOSCOPY N/A 2019    Procedure: Microdirect laryngoscopy with excision of laryngeal mass, CO2 laser;  Surgeon: Lizzy Johnson MD;  Location:  OR     [facility] NONSPECIFIC PROCEDURE      R tympanoplasty         SOCIAL HISTORY:  Social History     Socioeconomic History     Marital status: Single     Spouse name: Not on file     Number of children: Not on file     Years of education: Not on file     Highest education level: Not on file   Occupational History     Not on file   Tobacco Use     Smoking status: Former     Packs/day: 1     Types: Cigarettes     Quit date: 2009     Years since quittin.4     Smokeless tobacco: Never   Substance and Sexual Activity     Alcohol use: Not Currently     Drug use: No     Sexual activity: Not on file   Other Topics Concern     Parent/sibling w/ CABG, MI or angioplasty before 65F 55M? Not Asked   Social History Narrative     Not on file     Social Determinants of Health     Financial Resource Strain: Not on file   Food Insecurity: Not on file   Transportation Needs: Not on file   Physical Activity: Not on file   Stress: Not on file   Social Connections: Not on file   Interpersonal Safety: Not At Risk (2023)    Humiliation, Afraid, Rape, and Kick questionnaire      Fear of Current or Ex-Partner: No      Emotionally Abused: No      Physically Abused: No      Sexually Abused: No   Housing Stability: Not on file   Resides at assisted living.      FAMILY HISTORY:  Family History   Problem Relation Age of Onset     Cancer Father          PHYSICAL EXAM:  Vital signs:  There were no vitals taken for this visit.   Not performed as this was a telephone visit.      LABS:  CBC RESULTS:   Recent Labs   Lab Test 24  0820   WBC 5.9   RBC 4.14*   HGB 11.9*   HCT 37.7*   MCV 91   MCH 28.7   MCHC 31.6   RDW 17.3*          Last Comprehensive Metabolic Panel:  Sodium   Date Value Ref  Range Status   01/25/2023 140 136 - 145 mmol/L Final   06/29/2021 140 133 - 144 mmol/L Final     Potassium   Date Value Ref Range Status   01/25/2023 4.0 3.4 - 5.3 mmol/L Final   12/07/2022 4.1 3.4 - 5.3 mmol/L Final   06/29/2021 4.3 3.4 - 5.3 mmol/L Final     Chloride   Date Value Ref Range Status   01/25/2023 102 98 - 107 mmol/L Final   12/07/2022 109 94 - 109 mmol/L Final   06/29/2021 111 (H) 94 - 109 mmol/L Final     Carbon Dioxide   Date Value Ref Range Status   06/29/2021 25 20 - 32 mmol/L Final     Carbon Dioxide (CO2)   Date Value Ref Range Status   01/25/2023 28 22 - 29 mmol/L Final   12/07/2022 25 20 - 32 mmol/L Final     Anion Gap   Date Value Ref Range Status   01/25/2023 10 7 - 15 mmol/L Final   12/07/2022 7 3 - 14 mmol/L Final   06/29/2021 4 3 - 14 mmol/L Final     Glucose   Date Value Ref Range Status   01/25/2023 95 70 - 99 mg/dL Final   12/07/2022 91 70 - 99 mg/dL Final   06/29/2021 85 70 - 99 mg/dL Final     Urea Nitrogen   Date Value Ref Range Status   01/25/2023 16.0 8.0 - 23.0 mg/dL Final   12/07/2022 17 7 - 30 mg/dL Final   06/29/2021 17 7 - 30 mg/dL Final     Creatinine   Date Value Ref Range Status   01/25/2023 0.71 0.67 - 1.17 mg/dL Final   06/29/2021 0.84 0.66 - 1.25 mg/dL Final     Creatinine POCT   Date Value Ref Range Status   03/18/2024 0.7 0.7 - 1.3 mg/dL Final     GFR Estimate   Date Value Ref Range Status   06/29/2021 >90 >60 mL/min/[1.73_m2] Final     Comment:     Non  GFR Calc  Starting 12/18/2018, serum creatinine based estimated GFR (eGFR) will be   calculated using the Chronic Kidney Disease Epidemiology Collaboration   (CKD-EPI) equation.       GFR, ESTIMATED POCT   Date Value Ref Range Status   03/18/2024 >60 >60 mL/min/1.73m2 Final     Calcium   Date Value Ref Range Status   01/25/2023 9.3 8.8 - 10.2 mg/dL Final   06/29/2021 8.7 8.5 - 10.1 mg/dL Final     Bilirubin Total   Date Value Ref Range Status   03/07/2024 0.5 <=1.2 mg/dL Final   06/29/2021 0.3 0.2 -  1.3 mg/dL Final     Alkaline Phosphatase   Date Value Ref Range Status   03/07/2024 90 40 - 150 U/L Final     Comment:     Reference intervals for this test were updated on 11/14/2023 to more accurately reflect our healthy population. There may be differences in the flagging of prior results with similar values performed with this method. Interpretation of those prior results can be made in the context of the updated reference intervals.   06/29/2021 73 40 - 150 U/L Final     ALT   Date Value Ref Range Status   03/07/2024 32 0 - 70 U/L Final     Comment:     Reference intervals for this test were updated on 6/12/2023 to more accurately reflect our healthy population. There may be differences in the flagging of prior results with similar values performed with this method. Interpretation of those prior results can be made in the context of the updated reference intervals.     06/29/2021 34 0 - 70 U/L Final     AST   Date Value Ref Range Status   03/07/2024 30 0 - 45 U/L Final     Comment:     Reference intervals for this test were updated on 6/12/2023 to more accurately reflect our healthy population. There may be differences in the flagging of prior results with similar values performed with this method. Interpretation of those prior results can be made in the context of the updated reference intervals.   06/29/2021 26 0 - 45 U/L Final       PATHOLOGY:  None new.    IMAGING:  Reviewed as per HPI.    ASSESSMENT/PLAN:  Kt Rasmussen is a 63 year old male with the following issues:  1. Metastatic non-small (non-squamous) cell lung carcinoma with metastases to lymph nodes, bones, and bilateral lungs  2. 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 Chemotherapy-induced anemia and thrombocytopenia  --Lung NGS panel negative for mutations in BRAF, EGFR, ERBB2, IDH1, IDH2, KRAS, MET, NRAS, RET. PD-L1 expression negative (TPS <1%).  Guardant 360 negative for actionable mutations.  --Kt started 3rd line metastatic therapy with every 3-week Taxotere at 20% dose reduction (60 mg/m2) on 8/3/2022 due to progressive disease.  He tolerated this very well with minimal to no paresthesias. He then took a 2-month chemo break that was further delayed to 3.5 months due to insurance issues. He resumed on 3/23/2023 and continues to tolerate Taxotere well with minimal paresthesias.  --I personally reviewed his 3/18/2024 PET scan which showed overall stable disease but will need to be mindful of the 2.7 x 2.2 cm  FDG avid (SUVmax 6.3) left para-aortic retroperitoneal lymph node which previously measured 2.6 x 2.0 cm (SUVmax 5.4). The 2.0 x 1.7 cm left external iliac lymph node previously measured 2.0 x 1.8 cm but with slightly more uptake on this exam.  --I advised he continue Taxotere at 20% dose reduction due to past repeated issues with chemo-induced cytopenias.  All treatment is with palliative intent. He agrees to continue with Taxotere.  --Will repeat PET scan in 6/2024.    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 Dysphonia  6. Dysphagia  -Kt underwent excision of a left vocal cord SCC on 9/30/2019 and has persistent dysphonia as a result of the lesion and excision.  He also has dysphagia but this is stable and swallowing is manageable.  -Continue follow-up with Dr. Wray.    7. History of dizziness and prior falls  --SW and guardian were previously notified of multiple falls.  --Prior brain MRI 2/14/2022 showed no brain metastases. However this was a suboptimal exam due to inability to administer contrast.  --He is using a wheelchair due to prior femoral fracture in 1/2022.    8. Bilateral lower extremity DVT  --Diagnosed in 9/2023, likely hypercoagulability of malignancy.  --Continue apixaban indefinitely, provided no major bleeding issues arise in the future.    Sangita John MD  [facility] Hematology/Oncology    Total time  spent today: 30 minutes in chart review, patient evaluation, counseling, documentation, test and/or medication/prescription orders, and coordination of care.       Again, thank you for allowing me to participate in the care of your patient.        Sincerely,        Sangita John MD

## 2024-03-25 NOTE — LETTER
3/25/2024         RE: Kt Rasmussen  [address]  [facility] [address]        Dear Colleague,    Thank you for referring your patient, Kt Rasmussen, to the [facility]. Please see a copy of my visit note below.      Virtual Visit Details    Type of service:  Telephone Visit   Phone call duration: 15 minutes   Originating Location (pt. Location): Home    Distant Location (provider location):  On-site    [facility]    Hematology/Oncology Established Patient Follow-up Note      Today's Date: 3/25/2024    Reason for Follow-up: Metastatic non-small cell lung carcinoma.    HISTORY OF PRESENT ILLNESS: Kt Rasmussen is a 63 year old male who presents with the following oncologic history:  1. 5/05/2016: Presented with near-obstructing large mass coming off the cheikh and likely the posterior wall, seen on bronchoscopy. Biopsy of the mass showed invasive squamous cell carcinoma, moderately differentiating and focally keratinizing.  Procedure was complicated by significant bleeding.  Tumor was completely debulked (via bronchoscopy) in both main stem bronchi and distal trachea. NGS showed no mutations in EGFR, KRAS, BRAF, NRAS, HRAS, PIK3CA, ERBB2, MET, or JAK2.  2. 5/23/2016: PET/CT scan showed evidence of residual tumor with decreased endobronchial mass. Indeterminate, mildly hypermetabolic mesentery with prominent lymph nodes and area of enhancement of the right hepatic lobe present. Felt to have T4-NX-M0 disease.  3. 6/09/2016: Started concurrent chemoradiation with weekly paclitaxel and carboplatin.  4. 7/30/2016: Completed radiation.  Subsequently received 2 cycles of consolidation paclitaxel and carboplatin.   5. 9/13/2019: Presented with 6-month history of dysphonia and left vocal exophytic lesion. Left true vocal fold biopsy showed at least squamous cell carcinoma in situ, cannot exclude superficial invasion.  6. 9/30/2019: Excision of left vocal cord  mass showed fragments of invasive squamous cell carcinoma, well differentiated and keratinizing.  Margins negative for malignancy.  7. 2/16/2021: CT chest w/o contrast showed thin-walled cavity in left lower lobe with 2 solid peripheral nodules measuring 9 mm each. No lymphadenopathy.  8. 3/01/2021: PET scan showed hypermetabolic nodules in left lower lobe and right lung, consistent with metastases; hypermetabolic adenopathy in chest, abdomen, pelvis; nonspecific uptake at tongue; hypermetabolic intraosseous lesions in spine and pelvis consistent with metastatic disease; left axillary hypermetabolic lymph nodes related to COVID-19 vaccination.  9. 3/12/2021: CT-guided lung biopsy showed non-small cell carcinoma, not otherwise specified -- with opinion by [facility] ER pathologist.  10. 3/18/2021: Lung NGS panel negative for mutations in BRAF, EGFR, ERBB2, IDH1, IDH2, KRAS, MET, NRAS, RET. PD-L1 expression negative (TPS <1%). Due to minimal amount of DNA obtained from specimen, other biomarkers could not be analyzed.  11. 4/7/2021: MRI brain negative for brain metastases.  12. 4/27/2021: Started 1st line metastatic therapy with carboplatin, paclitaxel, bevacizumab, and atezolizumab for metastatic non-small cell lung carcinoma, NOS.  13. 7/12/2021: PET/CT showed resolved mural nodules with increased cystic cavity in left lower lobe with no significant FDG uptake, less likely metastases; no enlarged hypermetabolic mediastinal, hilar, or axillary lymph nodes, decreased metabolic activity of intraosseous lesion in spine and pelvis; no new bone lesions.  14. 10/11/2021: PET/CT showed slight interval increase in intensity of metabolic activity of right pubic bone and left ischium with new focal uptake in L2 spinous process; resolved uptake at previous ground glass opacity along right medial lower lobe; unchanged cystic cavities/nodules in left lower lobe and right apical upper lobe; no pathologically enlarged hypermetabolic  mediastinal, hilar, or axillary lymph nodes.  15. 1/10/2022: PET/CT showed new foci of abnormal skeletal FGD uptake in the thoracic and lumbar spine. Mild interval increase in intensity of previously demonstrated hypermetabolic skeletal lesions involving the pelvis and lumbar spine. Findings are consistent with progressive osseous metastatic disease. Subsequently off chemo for 1 month due to poor performance status.  16. 1/25/2022: Patient fell and fractured his femur. This was surgically repaired and he was subsequently cared for at a rehab unit.  17. 2/14/2022: Brain MRI without contrast (contrast not given due to inability to obtain IV access) showed no brain metastases.  18. 4/21/2022: Started 2nd line metastatic therapy with pemetrexed and carboplatin. Omission of carboplatin 6/2 and forward due to worsening anemia despite dose reduction.  19. 7/18/2022: PET/CT showed enlarged and increased FDG avid skeletal metastases, increased left para-aortic retroperitoneal lymph node increased in size and FDG avidity, findings consistent with progressive disease.  20. 8/3/2022: Started 3rd line metastatic therapy with Taxotere 60 mg/m2 every 3 weeks.  21. 11/21/2022: PET scan showed partial response, left para-aortic retroperitoneal lymph node has decreased from 2.6 x 1.7 cm (SUVmax 7.9) to 2.3 x 1.4 cm (SUVmax 5.7), skeletal metastases no longer demonstrate FDG activity. Kt elected to take a 2-month break from chemo.  22. 1/23/2023: PET scan showed increasing metabolic activity of the left periaortic (max SUV 8.1, previously 5.7) left external iliac (max SUV 7.0, previously 3.1), and right external iliac (max SUV 5.1, previously 3.7) lymph nodes with development/reactivation of multiple FDG avid osseous lesions.  23. 3/23/2023: Resumption of Taxotere every 3 weeks. Delayed due to insurance issues.  24. 6/26/2023: PET scan showed partial response with decreased uptake associated with the retroperitoneal and pelvic  lymphadenopathy as well as bone metastases.  25. 12/4/2023: PET scan showed decreased FDG avid left periaortic (SUV max 5.4, previously 6.6) and left external iliac (SUV max 3.5, previously 4.9) lymphadenopathy and broadly stable FDG uptake within scattered pre-existing osseous metastases; new FDG avid lesion involving the right anterior first rib without clear fracture line visible (SUV max 4.7) indeterminate for a new osseous metastasis versus posttraumatic in etiology.   26. 3/18/2024: PET scan showed overall stable disease.    INTERIM HISTORY:  Kt reports tolerating Taxotere chemo well.  He denies any dyspnea, nausea, or new paresthesias.  He has had no recent falls.    REVIEW OF SYSTEMS:   14 point ROS was reviewed and is negative other than as noted above in HPI.       HOME MEDICATIONS:  Current Outpatient Medications   Medication Sig Dispense Refill     atorvastatin (LIPITOR) 40 MG tablet Take 40 mg by mouth daily       ELIQUIS ANTICOAGULANT 5 MG tablet            ALLERGIES:  Allergies   Allergen Reactions     No Known Drug Allergy          PAST MEDICAL HISTORY:  Past Medical History:   Diagnosis Date     Alcohol abuse, unspecified      Cerebral infarction (H)     2009, right side residual and aphasia     Dyslipidemia      GERD (gastroesophageal reflux disease)      Lung cancer (H)      Unspecified essential hypertension          PAST SURGICAL HISTORY:  Past Surgical History:   Procedure Laterality Date     BRONCHOSCOPY FLEXIBLE AND RIGID N/A 5/5/2016    Procedure: BRONCHOSCOPY FLEXIBLE AND RIGID;  Surgeon: Tony Talbot MD;  Location: [facility] OR     ESOPHAGOSCOPY FLEXIBLE N/A 9/30/2019    Procedure: flexible esophagoscopy;  Surgeon: Lizzy Johnson MD;  Location: [facility] OR     INJECT STEROID (LOCATION) N/A 9/30/2019    Procedure: steroid injection;  Surgeon: Lizzy Johnson MD;  Location: [facility] OR     IR CHEST PORT PLACEMENT > 5 YRS OF AGE  4/22/2021     IR CHEST PORT PLACEMENT > 5 YRS OF AGE  4/11/2022      IR PORT CHECK RIGHT  2022     IR PORT REMOVAL RIGHT  2022     LASER CO2 LARYNGOSCOPY N/A 2019    Procedure: Microdirect laryngoscopy with excision of laryngeal mass, CO2 laser;  Surgeon: Lizzy Johnson MD;  Location:  OR     [facility] NONSPECIFIC PROCEDURE      R tympanoplasty         SOCIAL HISTORY:  Social History     Socioeconomic History     Marital status: Single     Spouse name: Not on file     Number of children: Not on file     Years of education: Not on file     Highest education level: Not on file   Occupational History     Not on file   Tobacco Use     Smoking status: Former     Packs/day: 1     Types: Cigarettes     Quit date: 2009     Years since quittin.4     Smokeless tobacco: Never   Substance and Sexual Activity     Alcohol use: Not Currently     Drug use: No     Sexual activity: Not on file   Other Topics Concern     Parent/sibling w/ CABG, MI or angioplasty before 65F 55M? Not Asked   Social History Narrative     Not on file     Social Determinants of Health     Financial Resource Strain: Not on file   Food Insecurity: Not on file   Transportation Needs: Not on file   Physical Activity: Not on file   Stress: Not on file   Social Connections: Not on file   Interpersonal Safety: Not At Risk (2023)    Humiliation, Afraid, Rape, and Kick questionnaire      Fear of Current or Ex-Partner: No      Emotionally Abused: No      Physically Abused: No      Sexually Abused: No   Housing Stability: Not on file   Resides at assisted living.      FAMILY HISTORY:  Family History   Problem Relation Age of Onset     Cancer Father          PHYSICAL EXAM:  Vital signs:  There were no vitals taken for this visit.   Not performed as this was a telephone visit.      LABS:  CBC RESULTS:   Recent Labs   Lab Test 24  0820   WBC 5.9   RBC 4.14*   HGB 11.9*   HCT 37.7*   MCV 91   MCH 28.7   MCHC 31.6   RDW 17.3*          Last Comprehensive Metabolic Panel:  Sodium   Date Value Ref  Range Status   01/25/2023 140 136 - 145 mmol/L Final   06/29/2021 140 133 - 144 mmol/L Final     Potassium   Date Value Ref Range Status   01/25/2023 4.0 3.4 - 5.3 mmol/L Final   12/07/2022 4.1 3.4 - 5.3 mmol/L Final   06/29/2021 4.3 3.4 - 5.3 mmol/L Final     Chloride   Date Value Ref Range Status   01/25/2023 102 98 - 107 mmol/L Final   12/07/2022 109 94 - 109 mmol/L Final   06/29/2021 111 (H) 94 - 109 mmol/L Final     Carbon Dioxide   Date Value Ref Range Status   06/29/2021 25 20 - 32 mmol/L Final     Carbon Dioxide (CO2)   Date Value Ref Range Status   01/25/2023 28 22 - 29 mmol/L Final   12/07/2022 25 20 - 32 mmol/L Final     Anion Gap   Date Value Ref Range Status   01/25/2023 10 7 - 15 mmol/L Final   12/07/2022 7 3 - 14 mmol/L Final   06/29/2021 4 3 - 14 mmol/L Final     Glucose   Date Value Ref Range Status   01/25/2023 95 70 - 99 mg/dL Final   12/07/2022 91 70 - 99 mg/dL Final   06/29/2021 85 70 - 99 mg/dL Final     Urea Nitrogen   Date Value Ref Range Status   01/25/2023 16.0 8.0 - 23.0 mg/dL Final   12/07/2022 17 7 - 30 mg/dL Final   06/29/2021 17 7 - 30 mg/dL Final     Creatinine   Date Value Ref Range Status   01/25/2023 0.71 0.67 - 1.17 mg/dL Final   06/29/2021 0.84 0.66 - 1.25 mg/dL Final     Creatinine POCT   Date Value Ref Range Status   03/18/2024 0.7 0.7 - 1.3 mg/dL Final     GFR Estimate   Date Value Ref Range Status   06/29/2021 >90 >60 mL/min/[1.73_m2] Final     Comment:     Non  GFR Calc  Starting 12/18/2018, serum creatinine based estimated GFR (eGFR) will be   calculated using the Chronic Kidney Disease Epidemiology Collaboration   (CKD-EPI) equation.       GFR, ESTIMATED POCT   Date Value Ref Range Status   03/18/2024 >60 >60 mL/min/1.73m2 Final     Calcium   Date Value Ref Range Status   01/25/2023 9.3 8.8 - 10.2 mg/dL Final   06/29/2021 8.7 8.5 - 10.1 mg/dL Final     Bilirubin Total   Date Value Ref Range Status   03/07/2024 0.5 <=1.2 mg/dL Final   06/29/2021 0.3 0.2 -  1.3 mg/dL Final     Alkaline Phosphatase   Date Value Ref Range Status   03/07/2024 90 40 - 150 U/L Final     Comment:     Reference intervals for this test were updated on 11/14/2023 to more accurately reflect our healthy population. There may be differences in the flagging of prior results with similar values performed with this method. Interpretation of those prior results can be made in the context of the updated reference intervals.   06/29/2021 73 40 - 150 U/L Final     ALT   Date Value Ref Range Status   03/07/2024 32 0 - 70 U/L Final     Comment:     Reference intervals for this test were updated on 6/12/2023 to more accurately reflect our healthy population. There may be differences in the flagging of prior results with similar values performed with this method. Interpretation of those prior results can be made in the context of the updated reference intervals.     06/29/2021 34 0 - 70 U/L Final     AST   Date Value Ref Range Status   03/07/2024 30 0 - 45 U/L Final     Comment:     Reference intervals for this test were updated on 6/12/2023 to more accurately reflect our healthy population. There may be differences in the flagging of prior results with similar values performed with this method. Interpretation of those prior results can be made in the context of the updated reference intervals.   06/29/2021 26 0 - 45 U/L Final       PATHOLOGY:  None new.    IMAGING:  Reviewed as per HPI.    ASSESSMENT/PLAN:  Kt Rasmussen is a 63 year old male with the following issues:  1. Metastatic non-small (non-squamous) cell lung carcinoma with metastases to lymph nodes, bones, and bilateral lungs  2. 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 Chemotherapy-induced anemia and thrombocytopenia  --Lung NGS panel negative for mutations in BRAF, EGFR, ERBB2, IDH1, IDH2, KRAS, MET, NRAS, RET. PD-L1 expression negative (TPS <1%).  Guardant 360 negative for actionable mutations.  --Kt started 3rd line metastatic therapy with every 3-week Taxotere at 20% dose reduction (60 mg/m2) on 8/3/2022 due to progressive disease.  He tolerated this very well with minimal to no paresthesias. He then took a 2-month chemo break that was further delayed to 3.5 months due to insurance issues. He resumed on 3/23/2023 and continues to tolerate Taxotere well with minimal paresthesias.  --I personally reviewed his 3/18/2024 PET scan which showed overall stable disease but will need to be mindful of the 2.7 x 2.2 cm  FDG avid (SUVmax 6.3) left para-aortic retroperitoneal lymph node which previously measured 2.6 x 2.0 cm (SUVmax 5.4). The 2.0 x 1.7 cm left external iliac lymph node previously measured 2.0 x 1.8 cm but with slightly more uptake on this exam.  --I advised he continue Taxotere at 20% dose reduction due to past repeated issues with chemo-induced cytopenias.  All treatment is with palliative intent. He agrees to continue with Taxotere.  --Will repeat PET scan in 6/2024.    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 Dysphonia  6. Dysphagia  -Kt underwent excision of a left vocal cord SCC on 9/30/2019 and has persistent dysphonia as a result of the lesion and excision.  He also has dysphagia but this is stable and swallowing is manageable.  -Continue follow-up with Dr. Wray.    7. History of dizziness and prior falls  --SW and guardian were previously notified of multiple falls.  --Prior brain MRI 2/14/2022 showed no brain metastases. However this was a suboptimal exam due to inability to administer contrast.  --He is using a wheelchair due to prior femoral fracture in 1/2022.    8. Bilateral lower extremity DVT  --Diagnosed in 9/2023, likely hypercoagulability of malignancy.  --Continue apixaban indefinitely, provided no major bleeding issues arise in the future.    Sangita John MD  [facility] Hematology/Oncology    Total time  spent today: 30 minutes in chart review, patient evaluation, counseling, documentation, test and/or medication/prescription orders, and coordination of care.       Again, thank you for allowing me to participate in the care of your patient.        Sincerely,        Sangita John MD

## 2024-03-25 NOTE — NURSING NOTE
Unable to complete Onc Distress Screening with pt.       Is the patient currently in the state of [place]? YES    Visit mode:TELEPHONE    If the visit is dropped, the patient can be reconnected by: TELEPHONE VISIT: Phone number:   Telephone Information:   Mobile [phone]       Will anyone else be joining the visit? NO  (If patient encounters technical issues they should call [phone]    How would you like to obtain your AVS? MyChart    Are changes needed to the allergy or medication list? No    Reason for visit: RECHECK    Julissa CONCEPCION

## 2024-03-25 NOTE — PATIENT INSTRUCTIONS
Arrange for lab draw and Taxotere every 3 weeks through 6/20/2024.  RTC NP in 6 weeks.  RTC MD in 3 months with PET scan prior to visit.

## 2024-03-28 ENCOUNTER — INFUSION THERAPY VISIT (OUTPATIENT)
Dept: INFUSION THERAPY | Facility: CLINIC | Age: 64
End: 2024-03-28
Attending: INTERNAL MEDICINE
Payer: COMMERCIAL

## 2024-03-28 VITALS
OXYGEN SATURATION: 97 % | DIASTOLIC BLOOD PRESSURE: 72 MMHG | WEIGHT: 181 LBS | SYSTOLIC BLOOD PRESSURE: 113 MMHG | TEMPERATURE: 97.2 F | RESPIRATION RATE: 18 BRPM | BODY MASS INDEX: 22.32 KG/M2 | HEART RATE: 85 BPM

## 2024-03-28 DIAGNOSIS — D70.1 CHEMOTHERAPY-INDUCED NEUTROPENIA (H): ICD-10-CM

## 2024-03-28 DIAGNOSIS — C34.90 NON-SMALL CELL LUNG CANCER METASTATIC TO BONE (H): ICD-10-CM

## 2024-03-28 DIAGNOSIS — C79.51 NON-SMALL CELL LUNG CANCER METASTATIC TO BONE (H): Primary | ICD-10-CM

## 2024-03-28 DIAGNOSIS — C34.90 NON-SMALL CELL LUNG CANCER METASTATIC TO BONE (H): Primary | ICD-10-CM

## 2024-03-28 DIAGNOSIS — C79.51 NON-SMALL CELL LUNG CANCER METASTATIC TO BONE (H): ICD-10-CM

## 2024-03-28 DIAGNOSIS — Z51.11 ENCOUNTER FOR ANTINEOPLASTIC CHEMOTHERAPY: ICD-10-CM

## 2024-03-28 DIAGNOSIS — Z51.11 ENCOUNTER FOR ANTINEOPLASTIC CHEMOTHERAPY: Primary | ICD-10-CM

## 2024-03-28 DIAGNOSIS — T45.1X5A CHEMOTHERAPY-INDUCED NEUTROPENIA (H): ICD-10-CM

## 2024-03-28 LAB
ALBUMIN SERPL BCG-MCNC: 4 G/DL (ref 3.5–5.2)
ALP SERPL-CCNC: 98 U/L (ref 40–150)
ALT SERPL W P-5'-P-CCNC: 28 U/L (ref 0–70)
AST SERPL W P-5'-P-CCNC: 30 U/L (ref 0–45)
BASOPHILS # BLD AUTO: 0 10E3/UL (ref 0–0.2)
BASOPHILS NFR BLD AUTO: 0 %
BILIRUB DIRECT SERPL-MCNC: <0.2 MG/DL (ref 0–0.3)
BILIRUB SERPL-MCNC: 0.6 MG/DL
EOSINOPHIL # BLD AUTO: 0 10E3/UL (ref 0–0.7)
EOSINOPHIL NFR BLD AUTO: 0 %
ERYTHROCYTE [DISTWIDTH] IN BLOOD BY AUTOMATED COUNT: 17.3 % (ref 10–15)
HCT VFR BLD AUTO: 36.9 % (ref 40–53)
HGB BLD-MCNC: 11.9 G/DL (ref 13.3–17.7)
IMM GRANULOCYTES # BLD: 0 10E3/UL
IMM GRANULOCYTES NFR BLD: 0 %
LYMPHOCYTES # BLD AUTO: 1 10E3/UL (ref 0.8–5.3)
LYMPHOCYTES NFR BLD AUTO: 13 %
MCH RBC QN AUTO: 29.6 PG (ref 26.5–33)
MCHC RBC AUTO-ENTMCNC: 32.2 G/DL (ref 31.5–36.5)
MCV RBC AUTO: 92 FL (ref 78–100)
MONOCYTES # BLD AUTO: 0.6 10E3/UL (ref 0–1.3)
MONOCYTES NFR BLD AUTO: 8 %
NEUTROPHILS # BLD AUTO: 6 10E3/UL (ref 1.6–8.3)
NEUTROPHILS NFR BLD AUTO: 79 %
NRBC # BLD AUTO: 0 10E3/UL
NRBC BLD AUTO-RTO: 0 /100
PLATELET # BLD AUTO: 182 10E3/UL (ref 150–450)
PROT SERPL-MCNC: 6.4 G/DL (ref 6.4–8.3)
RBC # BLD AUTO: 4.02 10E6/UL (ref 4.4–5.9)
WBC # BLD AUTO: 7.6 10E3/UL (ref 4–11)

## 2024-03-28 PROCEDURE — 250N000011 HC RX IP 250 OP 636: Performed by: INTERNAL MEDICINE

## 2024-03-28 PROCEDURE — 80076 HEPATIC FUNCTION PANEL: CPT | Performed by: INTERNAL MEDICINE

## 2024-03-28 PROCEDURE — 96413 CHEMO IV INFUSION 1 HR: CPT

## 2024-03-28 PROCEDURE — 96377 APPLICATON ON-BODY INJECTOR: CPT | Mod: XS

## 2024-03-28 PROCEDURE — 96367 TX/PROPH/DG ADDL SEQ IV INF: CPT

## 2024-03-28 PROCEDURE — 96372 THER/PROPH/DIAG INJ SC/IM: CPT | Performed by: INTERNAL MEDICINE

## 2024-03-28 PROCEDURE — 85025 COMPLETE CBC W/AUTO DIFF WBC: CPT | Performed by: INTERNAL MEDICINE

## 2024-03-28 PROCEDURE — 258N000003 HC RX IP 258 OP 636: Performed by: INTERNAL MEDICINE

## 2024-03-28 PROCEDURE — 36591 DRAW BLOOD OFF VENOUS DEVICE: CPT | Performed by: INTERNAL MEDICINE

## 2024-03-28 RX ORDER — HEPARIN SODIUM (PORCINE) LOCK FLUSH IV SOLN 100 UNIT/ML 100 UNIT/ML
5 SOLUTION INTRAVENOUS
Status: DISCONTINUED | OUTPATIENT
Start: 2024-03-28 | End: 2024-03-28 | Stop reason: HOSPADM

## 2024-03-28 RX ADMIN — Medication 5 ML: at 12:20

## 2024-03-28 RX ADMIN — SODIUM CHLORIDE 250 ML: 9 INJECTION, SOLUTION INTRAVENOUS at 10:52

## 2024-03-28 RX ADMIN — DEXAMETHASONE SODIUM PHOSPHATE: 10 INJECTION, SOLUTION INTRAMUSCULAR; INTRAVENOUS at 10:55

## 2024-03-28 RX ADMIN — DOCETAXEL 122 MG: 20 INJECTION, SOLUTION, CONCENTRATE INTRAVENOUS at 11:15

## 2024-03-28 RX ADMIN — PEGFILGRASTIM 6 MG: KIT SUBCUTANEOUS at 11:27

## 2024-03-28 NOTE — PATIENT INSTRUCTIONS
Your On-body Neulasta Injector was applied to your back of right arm at 11:30 AM on 3/28.  At approximately 2:30 PM on 3/29, your On-body Injector will beep to let you know your dose delivery will begin in 2 minutes.  Your medication will be delivered over the next 45 minutes.  You can remove your Injector at 3:30 PM on 3/29.  Please make sure your Injector has a solid green light or has turned off prior to removing the device.  Please contact your provider at [phone] with questions or concerns.

## 2024-03-28 NOTE — PROGRESS NOTES
Nursing Note:  Kt Rasmussen presents today for port labs prior to treatment.    Patient seen by provider today: No   present during visit today: Not Applicable.    Note: N/A.    Intravenous Access:  Labs drawn without difficulty.  Implanted Port.    Discharge Plan:   Patient was sent to [facility] for infusion appointment.    Guzman Patel RN

## 2024-03-28 NOTE — PROGRESS NOTES
Infusion Nursing Note:  Kt Rasmussen presents today for C24D1 Taxotere/OnPro.    Patient seen by provider today: No   present during visit today: Not Applicable.    Note:   ONPRO  Was placed on patient's: back of right arm.    Was placed at 11:30 AM    Podpal used: Yes    ONPRO injector device Lot number: [id number]    Patient education included: what patient can expect after application, what colored lights mean on the device, when to remove device, when and where to call with questions or issues, all patients questions answered, and that Neulasta administration will occur at 2:30 PM on 3/29.    Patient tolerated administration well.        Intravenous Access:  Implanted Port.    Treatment Conditions:  Lab Results   Component Value Date    HGB 11.9 (L) 03/28/2024    WBC 7.6 03/28/2024    ANEU 0.5 (L) 08/11/2022    ANEUTAUTO 6.0 03/28/2024     03/28/2024        Lab Results   Component Value Date     01/25/2023    POTASSIUM 4.0 01/25/2023    MAG 1.9 10/13/2016    CR 0.7 03/18/2024    BEVERLY 9.3 01/25/2023    BILITOTAL 0.6 03/28/2024    ALBUMIN 4.0 03/28/2024    ALT 28 03/28/2024    AST 30 03/28/2024       Results reviewed, labs MET treatment parameters, ok to proceed with treatment.      Post Infusion Assessment:  Patient tolerated infusion without incident.  Blood return noted pre and post infusion.  Site patent and intact, free from redness, edema or discomfort.  No evidence of extravasations.  Access discontinued per protocol.       Discharge Plan:   Patient declined prescription refills.  Discharge instructions reviewed with: Patient.  Patient and/or family verbalized understanding of discharge instructions and all questions answered.  AVS to patient via [employer].  Patient will return 4/18 for next appointment.   Patient discharged in stable condition accompanied by: self.  Departure Mode: Wheelchair.      Keith Chavez RN

## 2024-04-10 RX ORDER — ONDANSETRON 2 MG/ML
8 INJECTION INTRAMUSCULAR; INTRAVENOUS ONCE
Status: CANCELLED
Start: 2024-04-10 | End: 2024-04-10

## 2024-04-10 RX ORDER — CYANOCOBALAMIN 1000 UG/ML
1000 INJECTION, SOLUTION INTRAMUSCULAR; SUBCUTANEOUS ONCE
Status: CANCELLED
Start: 2024-04-10 | End: 2024-04-10

## 2024-04-15 RX ORDER — HEPARIN SODIUM,PORCINE 10 UNIT/ML
5 VIAL (ML) INTRAVENOUS
Status: CANCELLED | OUTPATIENT
Start: 2024-04-18

## 2024-04-15 RX ORDER — HEPARIN SODIUM (PORCINE) LOCK FLUSH IV SOLN 100 UNIT/ML 100 UNIT/ML
5 SOLUTION INTRAVENOUS
Status: CANCELLED | OUTPATIENT
Start: 2024-04-18

## 2024-04-15 RX ORDER — EPINEPHRINE 1 MG/ML
0.3 INJECTION, SOLUTION, CONCENTRATE INTRAVENOUS EVERY 5 MIN PRN
Status: CANCELLED | OUTPATIENT
Start: 2024-04-18

## 2024-04-15 RX ORDER — MEPERIDINE HYDROCHLORIDE 25 MG/ML
25 INJECTION INTRAMUSCULAR; INTRAVENOUS; SUBCUTANEOUS EVERY 30 MIN PRN
Status: CANCELLED | OUTPATIENT
Start: 2024-04-18

## 2024-04-15 RX ORDER — ALBUTEROL SULFATE 0.83 MG/ML
2.5 SOLUTION RESPIRATORY (INHALATION)
Status: CANCELLED | OUTPATIENT
Start: 2024-04-18

## 2024-04-15 RX ORDER — DIPHENHYDRAMINE HYDROCHLORIDE 50 MG/ML
50 INJECTION INTRAMUSCULAR; INTRAVENOUS
Status: CANCELLED
Start: 2024-04-18

## 2024-04-15 RX ORDER — ALBUTEROL SULFATE 90 UG/1
1-2 AEROSOL, METERED RESPIRATORY (INHALATION)
Status: CANCELLED
Start: 2024-04-18

## 2024-04-15 RX ORDER — LORAZEPAM 2 MG/ML
0.5 INJECTION INTRAMUSCULAR EVERY 4 HOURS PRN
Status: CANCELLED | OUTPATIENT
Start: 2024-04-18

## 2024-04-18 ENCOUNTER — LAB (OUTPATIENT)
Dept: INFUSION THERAPY | Facility: CLINIC | Age: 64
End: 2024-04-18
Payer: COMMERCIAL

## 2024-04-18 ENCOUNTER — PATIENT OUTREACH (OUTPATIENT)
Dept: CARE COORDINATION | Facility: CLINIC | Age: 64
End: 2024-04-18

## 2024-04-18 ENCOUNTER — INFUSION THERAPY VISIT (OUTPATIENT)
Dept: INFUSION THERAPY | Facility: CLINIC | Age: 64
End: 2024-04-18
Attending: INTERNAL MEDICINE
Payer: COMMERCIAL

## 2024-04-18 ENCOUNTER — ONCOLOGY VISIT (OUTPATIENT)
Dept: ONCOLOGY | Facility: CLINIC | Age: 64
End: 2024-04-18
Payer: COMMERCIAL

## 2024-04-18 VITALS
TEMPERATURE: 98.6 F | OXYGEN SATURATION: 98 % | HEART RATE: 74 BPM | RESPIRATION RATE: 16 BRPM | DIASTOLIC BLOOD PRESSURE: 70 MMHG | SYSTOLIC BLOOD PRESSURE: 123 MMHG

## 2024-04-18 DIAGNOSIS — C78.02 MALIGNANT NEOPLASM METASTATIC TO BOTH LUNGS (H): ICD-10-CM

## 2024-04-18 DIAGNOSIS — C79.51 NON-SMALL CELL LUNG CANCER METASTATIC TO BONE (H): ICD-10-CM

## 2024-04-18 DIAGNOSIS — Z51.11 ENCOUNTER FOR ANTINEOPLASTIC CHEMOTHERAPY: Primary | ICD-10-CM

## 2024-04-18 DIAGNOSIS — T45.1X5A CHEMOTHERAPY-INDUCED NEUTROPENIA (H): ICD-10-CM

## 2024-04-18 DIAGNOSIS — C34.90 NON-SMALL CELL LUNG CANCER METASTATIC TO BONE (H): ICD-10-CM

## 2024-04-18 DIAGNOSIS — C34.90 NON-SMALL CELL LUNG CANCER METASTATIC TO BONE (H): Primary | ICD-10-CM

## 2024-04-18 DIAGNOSIS — C79.51 NON-SMALL CELL LUNG CANCER METASTATIC TO BONE (H): Primary | ICD-10-CM

## 2024-04-18 DIAGNOSIS — Z71.89 COORDINATION OF COMPLEX CARE: Primary | ICD-10-CM

## 2024-04-18 DIAGNOSIS — D70.1 CHEMOTHERAPY-INDUCED NEUTROPENIA (H): ICD-10-CM

## 2024-04-18 DIAGNOSIS — Z51.11 ENCOUNTER FOR ANTINEOPLASTIC CHEMOTHERAPY: ICD-10-CM

## 2024-04-18 DIAGNOSIS — C78.01 MALIGNANT NEOPLASM METASTATIC TO BOTH LUNGS (H): ICD-10-CM

## 2024-04-18 LAB
ALBUMIN SERPL BCG-MCNC: 3.9 G/DL (ref 3.5–5.2)
ALP SERPL-CCNC: 95 U/L (ref 40–150)
ALT SERPL W P-5'-P-CCNC: 24 U/L (ref 0–70)
AST SERPL W P-5'-P-CCNC: 26 U/L (ref 0–45)
BASOPHILS # BLD AUTO: 0 10E3/UL (ref 0–0.2)
BASOPHILS NFR BLD AUTO: 1 %
BILIRUB DIRECT SERPL-MCNC: <0.2 MG/DL (ref 0–0.3)
BILIRUB SERPL-MCNC: 0.7 MG/DL
EOSINOPHIL # BLD AUTO: 0 10E3/UL (ref 0–0.7)
EOSINOPHIL NFR BLD AUTO: 1 %
ERYTHROCYTE [DISTWIDTH] IN BLOOD BY AUTOMATED COUNT: 17 % (ref 10–15)
HCT VFR BLD AUTO: 37.6 % (ref 40–53)
HGB BLD-MCNC: 11.8 G/DL (ref 13.3–17.7)
IMM GRANULOCYTES # BLD: 0 10E3/UL
IMM GRANULOCYTES NFR BLD: 0 %
LYMPHOCYTES # BLD AUTO: 1.1 10E3/UL (ref 0.8–5.3)
LYMPHOCYTES NFR BLD AUTO: 17 %
MCH RBC QN AUTO: 29.1 PG (ref 26.5–33)
MCHC RBC AUTO-ENTMCNC: 31.4 G/DL (ref 31.5–36.5)
MCV RBC AUTO: 93 FL (ref 78–100)
MONOCYTES # BLD AUTO: 0.5 10E3/UL (ref 0–1.3)
MONOCYTES NFR BLD AUTO: 8 %
NEUTROPHILS # BLD AUTO: 4.9 10E3/UL (ref 1.6–8.3)
NEUTROPHILS NFR BLD AUTO: 73 %
NRBC # BLD AUTO: 0 10E3/UL
NRBC BLD AUTO-RTO: 0 /100
PLATELET # BLD AUTO: 174 10E3/UL (ref 150–450)
PROT SERPL-MCNC: 6.1 G/DL (ref 6.4–8.3)
RBC # BLD AUTO: 4.05 10E6/UL (ref 4.4–5.9)
WBC # BLD AUTO: 6.6 10E3/UL (ref 4–11)

## 2024-04-18 PROCEDURE — 258N000003 HC RX IP 258 OP 636: Performed by: INTERNAL MEDICINE

## 2024-04-18 PROCEDURE — 96413 CHEMO IV INFUSION 1 HR: CPT

## 2024-04-18 PROCEDURE — 96377 APPLICATON ON-BODY INJECTOR: CPT | Mod: XS | Performed by: INTERNAL MEDICINE

## 2024-04-18 PROCEDURE — 99214 OFFICE O/P EST MOD 30 MIN: CPT | Performed by: NURSE PRACTITIONER

## 2024-04-18 PROCEDURE — 80076 HEPATIC FUNCTION PANEL: CPT | Performed by: INTERNAL MEDICINE

## 2024-04-18 PROCEDURE — 96367 TX/PROPH/DG ADDL SEQ IV INF: CPT

## 2024-04-18 PROCEDURE — 250N000011 HC RX IP 250 OP 636: Performed by: INTERNAL MEDICINE

## 2024-04-18 PROCEDURE — 85025 COMPLETE CBC W/AUTO DIFF WBC: CPT | Performed by: INTERNAL MEDICINE

## 2024-04-18 PROCEDURE — 36591 DRAW BLOOD OFF VENOUS DEVICE: CPT | Performed by: INTERNAL MEDICINE

## 2024-04-18 PROCEDURE — 96372 THER/PROPH/DIAG INJ SC/IM: CPT | Performed by: INTERNAL MEDICINE

## 2024-04-18 PROCEDURE — G0463 HOSPITAL OUTPT CLINIC VISIT: HCPCS | Performed by: NURSE PRACTITIONER

## 2024-04-18 RX ORDER — HEPARIN SODIUM (PORCINE) LOCK FLUSH IV SOLN 100 UNIT/ML 100 UNIT/ML
5 SOLUTION INTRAVENOUS
Status: DISCONTINUED | OUTPATIENT
Start: 2024-04-18 | End: 2024-04-18 | Stop reason: HOSPADM

## 2024-04-18 RX ADMIN — PEGFILGRASTIM 6 MG: KIT SUBCUTANEOUS at 11:24

## 2024-04-18 RX ADMIN — DOCETAXEL 122 MG: 20 INJECTION, SOLUTION, CONCENTRATE INTRAVENOUS at 11:06

## 2024-04-18 RX ADMIN — Medication 5 ML: at 12:08

## 2024-04-18 RX ADMIN — SODIUM CHLORIDE 250 ML: 9 INJECTION, SOLUTION INTRAVENOUS at 10:44

## 2024-04-18 RX ADMIN — DEXAMETHASONE SODIUM PHOSPHATE: 10 INJECTION, SOLUTION INTRAMUSCULAR; INTRAVENOUS at 10:44

## 2024-04-18 ASSESSMENT — PAIN SCALES - GENERAL: PAINLEVEL: NO PAIN (0)

## 2024-04-18 NOTE — PROGRESS NOTES
Social Work - Transportation  [facility]    Data/Intervention:  Patient Name: Kt Rasmussen Goes By: Kt    /Age: 1960 (63 year old)    Referral From: Tari RN  Reason for Referral:  support requested for patient transportation needs for return ride home after appointment 24.  Assessment:  called [employer] at [phone] to receive update of pre-arranged ride through patient's insurance. In duration of call with [employer], [facility] [employer] affirmed that they had picked Kt up to bring back to residence.   Plan: Patient is aware of the transportation plan.  available to assist with any other needs.  Gabriela Abbott, MSW, LICSW, OSW-C  Clinical - Adult Oncology  She/Her/Hers  Phone: [phone]  [facility]: M, Thu  *every other Tue, 8am-4:30pm  [facility]: W, F, *every other Tue, 8am-4:30pm

## 2024-04-18 NOTE — PROGRESS NOTES
Nursing Note:  Kt Rasmussen presents today for labs.    Patient seen by provider today: Yes: RUSSEL Martinez   present during visit today: Not Applicable.    Note: N/A.    Intravenous Access:  Labs drawn without difficulty.  Implanted Port.    Discharge Plan:   Patient was sent to lobby for next appointment.    Arin Keith RN

## 2024-04-18 NOTE — PROGRESS NOTES
Infusion Nursing Note:  Kt Rasmussen presents today for C25D1: Taxotere/OnPro.    Patient seen by provider today: Juan Rausch NP   present during visit today: Not Applicable.    Note:   Patient difficult to communicate with due to expressive aphasia which is baseline. Due to aphasia, can be difficult to assess the patient. Per patient reports today, he says sh is doing well and has no new issues.  Denies nausea, issues with appetite or peripheral neuropathy. Patient tolerated today's infusion well.     ONPRO  Was placed on patient's: back of right arm.    Was placed at 1125 AM    Podpal used: Yes    ONPRO injector device Lot number: [id number]    Patient education included: what patient can expect after application, what colored lights mean on the device, when to remove device, when and where to call with questions or issues, all patients questions answered, and that Neulasta administration will occur at 4/19/24 @ 14:30 and ok to remove anytime after 15:30.    Patient tolerated administration well. .      Intravenous Access:  Implanted Port.    Treatment Conditions:  Lab Results   Component Value Date    HGB 11.8 (L) 04/18/2024    WBC 6.6 04/18/2024    ANEU 0.5 (L) 08/11/2022    ANEUTAUTO 4.9 04/18/2024     04/18/2024        Lab Results   Component Value Date     01/25/2023    POTASSIUM 4.0 01/25/2023    MAG 1.9 10/13/2016    CR 0.7 03/18/2024    BEVERLY 9.3 01/25/2023    BILITOTAL 0.7 04/18/2024    ALBUMIN 3.9 04/18/2024    ALT 24 04/18/2024    AST 26 04/18/2024       Results reviewed, labs MET treatment parameters, ok to proceed with treatment.      Post Infusion Assessment:  Patient tolerated infusion without incident.  Blood return noted pre and post infusion.  Site patent and intact, free from redness, edema or discomfort.  No evidence of extravasations.  Access discontinued per protocol.       Discharge Plan:   Patient declined prescription refills.  Discharge instructions reviewed with:  Patient.  Patient and/or family verbalized understanding of discharge instructions and all questions answered.  AVS to patient via [employer].  Patient will return 5/10/24 for labs and Chemotherapy for next appointment.   Patient discharged in stable condition accompanied by: self.  Departure Mode: Wheelchair.      Devorah Stovall RN        After discharge, this RN attempted to contact patient's transportation, [employer].  Per  at [place], patient did not have a scheduled ride home through their company on this date. With the help of Tari Clarke RN and , Gabriela Abbott, patient's ride home figured out. Per Tari Clarke RN, Patient's address and transportation information updated in the patient's chart to help avoid further issues with transportation.

## 2024-04-18 NOTE — PROGRESS NOTES
.gkOncology/Hematology Visit Note  Apr 18, 2024    Reason for Visit: follow up of metastatic non-small cell lung carcinoma  Metastatic to lymph nodes bones in bilateral lungs  Brain MRI negative for mets    Patient met with Dr. John who recommended treatment with palliative intent with paclitaxel carboplatin Avastin and atezolizumab-treatment started on April 27, 2021  -Due to thrombocytopenia carboplatin AUC reduced to 4 with cycle 2  Due to pancytopenia carboplatin discontinued with cycle 5    7/12/2021: PET/CT showed resolved mural nodules with increased cystic cavity in left lower lobe with no significant FDG uptake, less likely metastases; no enlarged hypermetabolic mediastinal, hilar, or axillary lymph nodes, decreased metabolic activity of intraosseous lesion in spine and pelvis; no new bone lesions    Treated with paclitaxel, Avastin and atezolizumab.-Last treatment given in  12/22/2021-cycle 12    1/10/2022: PET/CT showed new foci of abnormal skeletal FGD uptake in the thoracic and lumbar spine. Mild interval increase in intensity of previously demonstrated hypermetabolic skeletal lesions involving the pelvis and lumbar spine. Findings are consistent with progressive osseous metastatic disease. Subsequently off chemo for 1 month due to poor performance status.  16. 1/25/2022: Patient fell and fractured his femur. This was surgically repaired and he was subsequently cared for at a rehab unit.  17. 2/14/2022: Brain MRI without contrast (contrast not given due to inability to obtain IV access) showed no brain metastases.    Met with Dr. Shoemaker-in Dr. John's absence  -Recommendation is to change therapy   04/21/2022 -palliative Carboplatin Alimta started   Due to cytopenia AUC reduced to 4 with cycle 2  Due to persistent pancytopenia and inability to tolerate treatment carboplatin discontinued with cycle 3-day 1  Patient was on monotherapy with Alimta    07/18/2022-PET CT scan shows progression of the  disease  08/03/22-started Taxotere 60 mg/m2 every 3 weeks    Interval History:    Patient reports no significant change in his health condition.  Denies fever chills sweats cough shortness of breath chest pain nausea vomiting diarrhea abdominal pain or bleeding        Review of Systems:  14 point ROS of systems including Constitutional, Eyes, Respiratory, Cardiovascular, Gastroenterology, Genitourinary, Integumentary, Muscularskeletal, Psychiatric were all negative except for pertinent positives noted in my HPI.    Physical Examination:  Physical Exam  HENT:      Right Ear: Tympanic membrane normal.      Nose: Nose normal.      Mouth/Throat:      Mouth: Mucous membranes are moist.   Eyes:      Pupils: Pupils are equal, round, and reactive to light.   Cardiovascular:      Rate and Rhythm: Normal rate.      Pulses: Normal pulses.   Pulmonary:      Effort: Pulmonary effort is normal.   Abdominal:      General: Abdomen is flat.   Musculoskeletal:         General: Normal range of motion.      Cervical back: Normal range of motion.   Skin:     General: Skin is warm.   Neurological:      General: No focal deficit present.      Mental Status: He is alert.   Psychiatric:         Mood and Affect: Mood normal.           Laboratory Data:  CBC and CMP results reviewed    Assessment and Plan:  metastatic non-small cell lung cancer   Patient follows with Dr John   Progressed on  different treatments as above recently was on monotherapy with Alimta  07/18/2022-PET CT scan reveals progression of disease  Patient with Dr. John who recommended changing treatment to Taxotere 60 mg/m2 every 3 weeks  -Patient reports he has been tolerating treatment well  -Labs reviewed unremarkable discussed okay to proceed with treatment  -Continue with Taxotere every 3 weeks  -03/18-PET scan reveals overall stable disease  -Plan for repeat the PET scan in June and follow-up with Dr. 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 Although reviewed after completion, some words and grammatical errors may remain.

## 2024-04-18 NOTE — PROGRESS NOTES
"Oncology Rooming Note    April 18, 2024 9:51 AM   Kt Rasmussen is a 63 year old male who presents for:    Chief Complaint   Patient presents with    Oncology Clinic Visit     Initial Vitals: /70   Pulse 74   Temp 98.6  F (37  C) (Oral)   Resp 16   SpO2 98%  Estimated body mass index is 22.32 kg/m  as calculated from the following:    Height as of 2/15/24: 1.918 m (6' 3.5\").    Weight as of 3/28/24: 82.1 kg (181 lb). There is no height or weight on file to calculate BSA.  No Pain (0) Comment: Data Unavailable   No LMP for male patient.  Allergies reviewed: Yes  Medications reviewed: Yes    Medications: Medication refills not needed today.  Pharmacy name entered into EPIC:    [place], [place] - [address] [facility] [address] [id number] - [address] [facility] & [address] [facility] - [facility] [address] [place] [address] [place]. - [place] [id number]    Frailty Screening:   Is the patient here for a new oncology consult visit in cancer care? 2. No      Clinical concerns:   np was notified.      Julisa Whelan CMA            "

## 2024-05-07 RX ORDER — HEPARIN SODIUM (PORCINE) LOCK FLUSH IV SOLN 100 UNIT/ML 100 UNIT/ML
5 SOLUTION INTRAVENOUS
Status: CANCELLED | OUTPATIENT
Start: 2024-05-09

## 2024-05-07 RX ORDER — LORAZEPAM 2 MG/ML
0.5 INJECTION INTRAMUSCULAR EVERY 4 HOURS PRN
Status: CANCELLED | OUTPATIENT
Start: 2024-05-09

## 2024-05-07 RX ORDER — MEPERIDINE HYDROCHLORIDE 25 MG/ML
25 INJECTION INTRAMUSCULAR; INTRAVENOUS; SUBCUTANEOUS EVERY 30 MIN PRN
Status: CANCELLED | OUTPATIENT
Start: 2024-05-09

## 2024-05-07 RX ORDER — ALBUTEROL SULFATE 0.83 MG/ML
2.5 SOLUTION RESPIRATORY (INHALATION)
Status: CANCELLED | OUTPATIENT
Start: 2024-05-09

## 2024-05-07 RX ORDER — ALBUTEROL SULFATE 90 UG/1
1-2 AEROSOL, METERED RESPIRATORY (INHALATION)
Status: CANCELLED
Start: 2024-05-09

## 2024-05-07 RX ORDER — HEPARIN SODIUM,PORCINE 10 UNIT/ML
5 VIAL (ML) INTRAVENOUS
Status: CANCELLED | OUTPATIENT
Start: 2024-05-09

## 2024-05-07 RX ORDER — EPINEPHRINE 1 MG/ML
0.3 INJECTION, SOLUTION, CONCENTRATE INTRAVENOUS EVERY 5 MIN PRN
Status: CANCELLED | OUTPATIENT
Start: 2024-05-09

## 2024-05-07 RX ORDER — DIPHENHYDRAMINE HYDROCHLORIDE 50 MG/ML
50 INJECTION INTRAMUSCULAR; INTRAVENOUS
Status: CANCELLED
Start: 2024-05-09

## 2024-05-10 ENCOUNTER — INFUSION THERAPY VISIT (OUTPATIENT)
Dept: INFUSION THERAPY | Facility: CLINIC | Age: 64
End: 2024-05-10
Payer: COMMERCIAL

## 2024-05-10 ENCOUNTER — LAB (OUTPATIENT)
Dept: INFUSION THERAPY | Facility: CLINIC | Age: 64
End: 2024-05-10
Payer: COMMERCIAL

## 2024-05-10 VITALS
HEART RATE: 69 BPM | WEIGHT: 182.4 LBS | DIASTOLIC BLOOD PRESSURE: 66 MMHG | OXYGEN SATURATION: 98 % | BODY MASS INDEX: 22.21 KG/M2 | TEMPERATURE: 97.4 F | RESPIRATION RATE: 16 BRPM | HEIGHT: 76 IN | SYSTOLIC BLOOD PRESSURE: 128 MMHG

## 2024-05-10 DIAGNOSIS — C34.90 NON-SMALL CELL LUNG CANCER METASTATIC TO BONE (H): Primary | ICD-10-CM

## 2024-05-10 DIAGNOSIS — D70.1 CHEMOTHERAPY-INDUCED NEUTROPENIA (H): ICD-10-CM

## 2024-05-10 DIAGNOSIS — C34.90 NON-SMALL CELL LUNG CANCER METASTATIC TO BONE (H): ICD-10-CM

## 2024-05-10 DIAGNOSIS — T45.1X5A CHEMOTHERAPY-INDUCED NEUTROPENIA (H): ICD-10-CM

## 2024-05-10 DIAGNOSIS — C79.51 NON-SMALL CELL LUNG CANCER METASTATIC TO BONE (H): Primary | ICD-10-CM

## 2024-05-10 DIAGNOSIS — Z51.11 ENCOUNTER FOR ANTINEOPLASTIC CHEMOTHERAPY: ICD-10-CM

## 2024-05-10 DIAGNOSIS — Z51.11 ENCOUNTER FOR ANTINEOPLASTIC CHEMOTHERAPY: Primary | ICD-10-CM

## 2024-05-10 DIAGNOSIS — C79.51 NON-SMALL CELL LUNG CANCER METASTATIC TO BONE (H): ICD-10-CM

## 2024-05-10 LAB
ALBUMIN SERPL BCG-MCNC: 3.8 G/DL (ref 3.5–5.2)
ALP SERPL-CCNC: 93 U/L (ref 40–150)
ALT SERPL W P-5'-P-CCNC: 25 U/L (ref 0–70)
AST SERPL W P-5'-P-CCNC: 22 U/L (ref 0–45)
BASOPHILS # BLD AUTO: 0 10E3/UL (ref 0–0.2)
BASOPHILS NFR BLD AUTO: 0 %
BILIRUB DIRECT SERPL-MCNC: <0.2 MG/DL (ref 0–0.3)
BILIRUB SERPL-MCNC: 0.6 MG/DL
EOSINOPHIL # BLD AUTO: 0 10E3/UL (ref 0–0.7)
EOSINOPHIL NFR BLD AUTO: 1 %
ERYTHROCYTE [DISTWIDTH] IN BLOOD BY AUTOMATED COUNT: 16.3 % (ref 10–15)
HCT VFR BLD AUTO: 35.5 % (ref 40–53)
HGB BLD-MCNC: 11.4 G/DL (ref 13.3–17.7)
IMM GRANULOCYTES # BLD: 0 10E3/UL
IMM GRANULOCYTES NFR BLD: 0 %
LYMPHOCYTES # BLD AUTO: 1.1 10E3/UL (ref 0.8–5.3)
LYMPHOCYTES NFR BLD AUTO: 19 %
MCH RBC QN AUTO: 30 PG (ref 26.5–33)
MCHC RBC AUTO-ENTMCNC: 32.1 G/DL (ref 31.5–36.5)
MCV RBC AUTO: 93 FL (ref 78–100)
MONOCYTES # BLD AUTO: 0.5 10E3/UL (ref 0–1.3)
MONOCYTES NFR BLD AUTO: 9 %
NEUTROPHILS # BLD AUTO: 4.2 10E3/UL (ref 1.6–8.3)
NEUTROPHILS NFR BLD AUTO: 71 %
NRBC # BLD AUTO: 0 10E3/UL
NRBC BLD AUTO-RTO: 0 /100
PLATELET # BLD AUTO: 145 10E3/UL (ref 150–450)
PROT SERPL-MCNC: 6 G/DL (ref 6.4–8.3)
RBC # BLD AUTO: 3.8 10E6/UL (ref 4.4–5.9)
WBC # BLD AUTO: 5.9 10E3/UL (ref 4–11)

## 2024-05-10 PROCEDURE — 96367 TX/PROPH/DG ADDL SEQ IV INF: CPT

## 2024-05-10 PROCEDURE — 96372 THER/PROPH/DIAG INJ SC/IM: CPT | Performed by: INTERNAL MEDICINE

## 2024-05-10 PROCEDURE — 258N000003 HC RX IP 258 OP 636: Performed by: INTERNAL MEDICINE

## 2024-05-10 PROCEDURE — 82040 ASSAY OF SERUM ALBUMIN: CPT | Performed by: INTERNAL MEDICINE

## 2024-05-10 PROCEDURE — 36591 DRAW BLOOD OFF VENOUS DEVICE: CPT | Performed by: INTERNAL MEDICINE

## 2024-05-10 PROCEDURE — 250N000011 HC RX IP 250 OP 636: Performed by: INTERNAL MEDICINE

## 2024-05-10 PROCEDURE — 96413 CHEMO IV INFUSION 1 HR: CPT

## 2024-05-10 PROCEDURE — 84155 ASSAY OF PROTEIN SERUM: CPT | Performed by: INTERNAL MEDICINE

## 2024-05-10 PROCEDURE — 85004 AUTOMATED DIFF WBC COUNT: CPT | Performed by: INTERNAL MEDICINE

## 2024-05-10 PROCEDURE — 96377 APPLICATON ON-BODY INJECTOR: CPT | Mod: XS

## 2024-05-10 RX ORDER — HEPARIN SODIUM (PORCINE) LOCK FLUSH IV SOLN 100 UNIT/ML 100 UNIT/ML
5 SOLUTION INTRAVENOUS
Status: DISCONTINUED | OUTPATIENT
Start: 2024-05-10 | End: 2024-05-10 | Stop reason: HOSPADM

## 2024-05-10 RX ADMIN — DOCETAXEL 122 MG: 20 INJECTION, SOLUTION, CONCENTRATE INTRAVENOUS at 14:01

## 2024-05-10 RX ADMIN — Medication 5 ML: at 15:05

## 2024-05-10 RX ADMIN — SODIUM CHLORIDE 250 ML: 9 INJECTION, SOLUTION INTRAVENOUS at 13:42

## 2024-05-10 RX ADMIN — PEGFILGRASTIM 6 MG: KIT SUBCUTANEOUS at 14:13

## 2024-05-10 RX ADMIN — DEXAMETHASONE SODIUM PHOSPHATE: 10 INJECTION, SOLUTION INTRAMUSCULAR; INTRAVENOUS at 13:42

## 2024-05-10 NOTE — PATIENT INSTRUCTIONS
Your On-body Neulasta Injector was applied to your right Arm at 2:10pm.  At approximately 5:10pm on 5/11/23, your On-body Injector will beep to let you know your dose delivery will begin in 2 minutes.  Your medication will be delivered over the next 45 minutes.  You can remove your Injector at 6:10pm.  Please make sure your Injector has a solid green light or has turned off prior to removing the device.  Please contact your provider at [phone] with questions or concerns.

## 2024-05-10 NOTE — PROGRESS NOTES
Nursing Note:  Kt Rasmussen presents today for labs.    Patient seen by provider today: No   present during visit today: Not Applicable.    Note: N/A.    Intravenous Access:  Labs drawn without difficulty.  Implanted Port.    Discharge Plan:   Patient was sent to lobby for next appointment.    Arin Keith RN

## 2024-05-10 NOTE — PROGRESS NOTES
Infusion Nursing Note:  Kt J Valery presents today for Taxotere/onPro.    Patient seen by provider today: No   present during visit today: Not Applicable.    Note: Pt presents today for Taxotere and OnPro which was placed on R arm.  Pt states no changes from previous visit.     ONPRO  Was placed on patient's: back of right arm.    Was placed at 2:10 PM    Podpal used: Yes    ONPRO injector device Lot number: [id number]    Patient education included: what patient can expect after application, what colored lights mean on the device, when to remove device, when and where to call with questions or issues, all patients questions answered, and that Neulasta administration will occur at 5/11/24 at 5:10pm.    Patient tolerated administration well.        Intravenous Access:  Implanted Port.    Treatment Conditions:  Lab Results   Component Value Date    HGB 11.4 (L) 05/10/2024    WBC 5.9 05/10/2024    ANEU 0.5 (L) 08/11/2022    ANEUTAUTO 4.2 05/10/2024     (L) 05/10/2024        Lab Results   Component Value Date     01/25/2023    POTASSIUM 4.0 01/25/2023    MAG 1.9 10/13/2016    CR 0.7 03/18/2024    BEVERLY 9.3 01/25/2023    BILITOTAL 0.6 05/10/2024    ALBUMIN 3.8 05/10/2024    ALT 25 05/10/2024    AST 22 05/10/2024       Results reviewed, labs MET treatment parameters, ok to proceed with treatment.      Post Infusion Assessment:  Patient tolerated infusion without incident.  Blood return noted pre and post infusion.  Site patent and intact, free from redness, edema or discomfort.  No evidence of extravasations.  Access discontinued per protocol.       Discharge Plan:   Patient discharged in stable condition accompanied by: self.  Departure Mode: Wheelchair.  Pt did not want a copy of visit today even though MyChart is pending.  Pt states he knows when all his next appointments are, next appt is 5/30/24.        Krista Hector RN

## 2024-05-27 RX ORDER — ALBUTEROL SULFATE 0.83 MG/ML
2.5 SOLUTION RESPIRATORY (INHALATION)
Status: CANCELLED | OUTPATIENT
Start: 2024-05-30

## 2024-05-27 RX ORDER — HEPARIN SODIUM,PORCINE 10 UNIT/ML
5 VIAL (ML) INTRAVENOUS
Status: CANCELLED | OUTPATIENT
Start: 2024-05-30

## 2024-05-27 RX ORDER — ALBUTEROL SULFATE 90 UG/1
1-2 AEROSOL, METERED RESPIRATORY (INHALATION)
Status: CANCELLED
Start: 2024-05-30

## 2024-05-27 RX ORDER — HEPARIN SODIUM (PORCINE) LOCK FLUSH IV SOLN 100 UNIT/ML 100 UNIT/ML
5 SOLUTION INTRAVENOUS
Status: CANCELLED | OUTPATIENT
Start: 2024-05-30

## 2024-05-27 RX ORDER — EPINEPHRINE 1 MG/ML
0.3 INJECTION, SOLUTION, CONCENTRATE INTRAVENOUS EVERY 5 MIN PRN
Status: CANCELLED | OUTPATIENT
Start: 2024-05-30

## 2024-05-27 RX ORDER — MEPERIDINE HYDROCHLORIDE 25 MG/ML
25 INJECTION INTRAMUSCULAR; INTRAVENOUS; SUBCUTANEOUS EVERY 30 MIN PRN
Status: CANCELLED | OUTPATIENT
Start: 2024-05-30

## 2024-05-27 RX ORDER — LORAZEPAM 2 MG/ML
0.5 INJECTION INTRAMUSCULAR EVERY 4 HOURS PRN
Status: CANCELLED | OUTPATIENT
Start: 2024-05-30

## 2024-05-27 RX ORDER — DIPHENHYDRAMINE HYDROCHLORIDE 50 MG/ML
50 INJECTION INTRAMUSCULAR; INTRAVENOUS
Status: CANCELLED
Start: 2024-05-30

## 2024-05-30 ENCOUNTER — LAB (OUTPATIENT)
Dept: INFUSION THERAPY | Facility: CLINIC | Age: 64
End: 2024-05-30
Payer: COMMERCIAL

## 2024-05-30 ENCOUNTER — INFUSION THERAPY VISIT (OUTPATIENT)
Dept: INFUSION THERAPY | Facility: CLINIC | Age: 64
End: 2024-05-30
Attending: INTERNAL MEDICINE
Payer: COMMERCIAL

## 2024-05-30 ENCOUNTER — ONCOLOGY VISIT (OUTPATIENT)
Dept: ONCOLOGY | Facility: CLINIC | Age: 64
End: 2024-05-30
Payer: COMMERCIAL

## 2024-05-30 VITALS
TEMPERATURE: 97.9 F | DIASTOLIC BLOOD PRESSURE: 72 MMHG | HEART RATE: 64 BPM | RESPIRATION RATE: 16 BRPM | SYSTOLIC BLOOD PRESSURE: 113 MMHG | OXYGEN SATURATION: 98 %

## 2024-05-30 DIAGNOSIS — C34.90 NON-SMALL CELL LUNG CANCER METASTATIC TO BONE (H): Primary | ICD-10-CM

## 2024-05-30 DIAGNOSIS — Z51.11 ENCOUNTER FOR ANTINEOPLASTIC CHEMOTHERAPY: Primary | ICD-10-CM

## 2024-05-30 DIAGNOSIS — C79.51 NON-SMALL CELL LUNG CANCER METASTATIC TO BONE (H): Primary | ICD-10-CM

## 2024-05-30 DIAGNOSIS — T45.1X5A CHEMOTHERAPY-INDUCED NEUTROPENIA (H): ICD-10-CM

## 2024-05-30 DIAGNOSIS — D70.1 CHEMOTHERAPY-INDUCED NEUTROPENIA (H): ICD-10-CM

## 2024-05-30 DIAGNOSIS — Z51.11 ENCOUNTER FOR ANTINEOPLASTIC CHEMOTHERAPY: ICD-10-CM

## 2024-05-30 DIAGNOSIS — C79.51 NON-SMALL CELL LUNG CANCER METASTATIC TO BONE (H): ICD-10-CM

## 2024-05-30 DIAGNOSIS — C34.90 NON-SMALL CELL LUNG CANCER METASTATIC TO BONE (H): ICD-10-CM

## 2024-05-30 LAB
ALBUMIN SERPL BCG-MCNC: 3.9 G/DL (ref 3.5–5.2)
ALP SERPL-CCNC: 90 U/L (ref 40–150)
ALT SERPL W P-5'-P-CCNC: 24 U/L (ref 0–70)
AST SERPL W P-5'-P-CCNC: 25 U/L (ref 0–45)
BASOPHILS # BLD AUTO: 0 10E3/UL (ref 0–0.2)
BASOPHILS NFR BLD AUTO: 0 %
BILIRUB DIRECT SERPL-MCNC: <0.2 MG/DL (ref 0–0.3)
BILIRUB SERPL-MCNC: 0.6 MG/DL
EOSINOPHIL # BLD AUTO: 0 10E3/UL (ref 0–0.7)
EOSINOPHIL NFR BLD AUTO: 0 %
ERYTHROCYTE [DISTWIDTH] IN BLOOD BY AUTOMATED COUNT: 15.4 % (ref 10–15)
HCT VFR BLD AUTO: 38.4 % (ref 40–53)
HGB BLD-MCNC: 12.1 G/DL (ref 13.3–17.7)
IMM GRANULOCYTES # BLD: 0 10E3/UL
IMM GRANULOCYTES NFR BLD: 0 %
LYMPHOCYTES # BLD AUTO: 1 10E3/UL (ref 0.8–5.3)
LYMPHOCYTES NFR BLD AUTO: 15 %
MCH RBC QN AUTO: 29.7 PG (ref 26.5–33)
MCHC RBC AUTO-ENTMCNC: 31.5 G/DL (ref 31.5–36.5)
MCV RBC AUTO: 94 FL (ref 78–100)
MONOCYTES # BLD AUTO: 0.5 10E3/UL (ref 0–1.3)
MONOCYTES NFR BLD AUTO: 8 %
NEUTROPHILS # BLD AUTO: 5.1 10E3/UL (ref 1.6–8.3)
NEUTROPHILS NFR BLD AUTO: 77 %
NRBC # BLD AUTO: 0 10E3/UL
NRBC BLD AUTO-RTO: 0 /100
PLATELET # BLD AUTO: 152 10E3/UL (ref 150–450)
PROT SERPL-MCNC: 6 G/DL (ref 6.4–8.3)
RBC # BLD AUTO: 4.07 10E6/UL (ref 4.4–5.9)
WBC # BLD AUTO: 6.7 10E3/UL (ref 4–11)

## 2024-05-30 PROCEDURE — 82040 ASSAY OF SERUM ALBUMIN: CPT | Performed by: INTERNAL MEDICINE

## 2024-05-30 PROCEDURE — G0463 HOSPITAL OUTPT CLINIC VISIT: HCPCS | Mod: 25 | Performed by: NURSE PRACTITIONER

## 2024-05-30 PROCEDURE — 99214 OFFICE O/P EST MOD 30 MIN: CPT | Performed by: NURSE PRACTITIONER

## 2024-05-30 PROCEDURE — 36591 DRAW BLOOD OFF VENOUS DEVICE: CPT | Performed by: INTERNAL MEDICINE

## 2024-05-30 PROCEDURE — 96372 THER/PROPH/DIAG INJ SC/IM: CPT | Performed by: INTERNAL MEDICINE

## 2024-05-30 PROCEDURE — 250N000011 HC RX IP 250 OP 636: Performed by: INTERNAL MEDICINE

## 2024-05-30 PROCEDURE — 258N000003 HC RX IP 258 OP 636: Performed by: INTERNAL MEDICINE

## 2024-05-30 PROCEDURE — 85025 COMPLETE CBC W/AUTO DIFF WBC: CPT | Performed by: INTERNAL MEDICINE

## 2024-05-30 PROCEDURE — 96367 TX/PROPH/DG ADDL SEQ IV INF: CPT

## 2024-05-30 PROCEDURE — 96377 APPLICATON ON-BODY INJECTOR: CPT | Mod: XS

## 2024-05-30 PROCEDURE — 96413 CHEMO IV INFUSION 1 HR: CPT

## 2024-05-30 RX ORDER — HEPARIN SODIUM (PORCINE) LOCK FLUSH IV SOLN 100 UNIT/ML 100 UNIT/ML
5 SOLUTION INTRAVENOUS
Status: DISCONTINUED | OUTPATIENT
Start: 2024-05-30 | End: 2024-05-30 | Stop reason: HOSPADM

## 2024-05-30 RX ADMIN — DOCETAXEL 122 MG: 20 INJECTION, SOLUTION, CONCENTRATE INTRAVENOUS at 10:04

## 2024-05-30 RX ADMIN — PEGFILGRASTIM 6 MG: KIT SUBCUTANEOUS at 10:07

## 2024-05-30 RX ADMIN — DEXAMETHASONE SODIUM PHOSPHATE: 10 INJECTION, SOLUTION INTRAMUSCULAR; INTRAVENOUS at 09:45

## 2024-05-30 RX ADMIN — Medication 5 ML: at 11:05

## 2024-05-30 RX ADMIN — SODIUM CHLORIDE 250 ML: 9 INJECTION, SOLUTION INTRAVENOUS at 09:45

## 2024-05-30 ASSESSMENT — PAIN SCALES - GENERAL: PAINLEVEL: NO PAIN (0)

## 2024-05-30 NOTE — PROGRESS NOTES
"Oncology Rooming Note    May 30, 2024 9:25 AM   Kt Rasmussen is a 63 year old male who presents for:    Chief Complaint   Patient presents with    Oncology Clinic Visit     Initial Vitals: /72   Pulse 64   Temp 97.9  F (36.6  C) (Oral)   Resp 16   SpO2 98%  Estimated body mass index is 22.5 kg/m  as calculated from the following:    Height as of 5/10/24: 1.918 m (6' 3.5\").    Weight as of 5/10/24: 82.7 kg (182 lb 6.4 oz). There is no height or weight on file to calculate BSA.  No Pain (0) Comment: Data Unavailable   No LMP for male patient.  Allergies reviewed: Yes  Medications reviewed: Yes    Medications: Medication refills not needed today.  Pharmacy name entered into EPIC:    [place] [facility] [place] [place] - [address] [facility] [address] [facility][id number] - [place] [address] [facility] & [facility] 7  [place] [facility] - [place], [place] - [address] [facility] [employer], [facility]. - [facility] [address] [place]    Frailty Screening:   Is the patient here for a new oncology consult visit in cancer care? 2. No      Clinical concerns:   np was notified.      Julisa Whelan CMA            "

## 2024-05-30 NOTE — PATIENT INSTRUCTIONS
Your On-body Neulasta Injector was applied to your Right arm at 10:10 AM.  At approximately 1:10 PM on 5/31, your On-body Injector will beep to let you know your dose delivery will begin in 2 minutes.  Your medication will be delivered over the next 45 minutes.  You can remove your Injector at 2:10 PM on 5/31.  Please make sure your Injector has a solid green light or has turned off prior to removing the device.  Please contact your provider at [phone] with questions or concerns.

## 2024-05-30 NOTE — PROGRESS NOTES
.gkOncology/Hematology Visit Note  May 30, 2024    Reason for Visit: follow up of metastatic non-small cell lung carcinoma  Metastatic to lymph nodes bones in bilateral lungs  Brain MRI negative for mets    Patient met with Dr. John who recommended treatment with palliative intent with paclitaxel carboplatin Avastin and atezolizumab-treatment started on April 27, 2021  -Due to thrombocytopenia carboplatin AUC reduced to 4 with cycle 2  Due to pancytopenia carboplatin discontinued with cycle 5    7/12/2021: PET/CT showed resolved mural nodules with increased cystic cavity in left lower lobe with no significant FDG uptake, less likely metastases; no enlarged hypermetabolic mediastinal, hilar, or axillary lymph nodes, decreased metabolic activity of intraosseous lesion in spine and pelvis; no new bone lesions    Treated with paclitaxel, Avastin and atezolizumab.-Last treatment given in  12/22/2021-cycle 12    1/10/2022: PET/CT showed new foci of abnormal skeletal FGD uptake in the thoracic and lumbar spine. Mild interval increase in intensity of previously demonstrated hypermetabolic skeletal lesions involving the pelvis and lumbar spine. Findings are consistent with progressive osseous metastatic disease. Subsequently off chemo for 1 month due to poor performance status.  16. 1/25/2022: Patient fell and fractured his femur. This was surgically repaired and he was subsequently cared for at a rehab unit.  17. 2/14/2022: Brain MRI without contrast (contrast not given due to inability to obtain IV access) showed no brain metastases.    Met with Dr. Shoemaker-in Dr. John's absence  -Recommendation is to change therapy   04/21/2022 -palliative Carboplatin Alimta started   Due to cytopenia AUC reduced to 4 with cycle 2  Due to persistent pancytopenia and inability to tolerate treatment carboplatin discontinued with cycle 3-day 1  Patient was on monotherapy with Alimta    07/18/2022-PET CT scan shows progression of the  disease  08/03/22-started Taxotere 60 mg/m2 every 3 weeks    Interval History:  Patient reports he continues to feel well reports he continues to tolerate treatment well.  Patient denies fever chills sweats cough shortness of breath chest pain nausea vomiting diarrhea abdominal pain or bleeding denies edema  Denies headache dizziness seizure      Review of Systems:  14 point ROS of systems including Constitutional, Eyes, Respiratory, Cardiovascular, Gastroenterology, Genitourinary, Integumentary, Muscularskeletal, Psychiatric were all negative except for pertinent positives noted in my HPI.    Physical Examination:  Physical Exam  HENT:      Right Ear: Tympanic membrane normal.      Nose: Nose normal.      Mouth/Throat:      Mouth: Mucous membranes are moist.   Eyes:      Pupils: Pupils are equal, round, and reactive to light.   Cardiovascular:      Rate and Rhythm: Normal rate.      Pulses: Normal pulses.   Pulmonary:      Effort: Pulmonary effort is normal.   Abdominal:      General: Abdomen is flat.   Musculoskeletal:         General: Normal range of motion.      Cervical back: Normal range of motion.   Skin:     General: Skin is warm.   Neurological:      General: No focal deficit present.      Mental Status: He is alert.   Psychiatric:         Mood and Affect: Mood normal.           Laboratory Data:  CBC and CMP results reviewed    Assessment and Plan:  metastatic non-small cell lung cancer   Patient follows with Dr John   Progressed on  different treatments as above recently was on monotherapy with Alimta  07/18/2022-PET CT scan reveals progression of disease  Patient with Dr. John who recommended changing treatment to Taxotere 60 mg/m2 every 3 weeks  -Patient reports he has been tolerating treatment well  -Labs reviewed unremarkable discussed okay to proceed with treatment  -Continue with Taxotere every 3 weeks  -03/18-PET scan reveals overall stable disease  -Plan for repeat the PET scan in June and  follow-up with Dr. 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 [place]     Chart documentation with Dragon Voice recognition Software. Although reviewed after completion, some words and grammatical errors may remain.

## 2024-05-30 NOTE — PROGRESS NOTES
Infusion Nursing Note:  Kt Rasmussen presents today for C27D1 Taxotere/Onpro.    Patient seen by provider today: Yes: Juan Rausch   present during visit today: Not Applicable.    Note:   ONPRO  Was placed on patient's: back of right arm.    Was placed at 10:10 AM    Podpal used: Yes    ONPRO injector device Lot number: [id number]    Patient education included: what patient can expect after application, what colored lights mean on the device, when to remove device, when and where to call with questions or issues, all patients questions answered, and that Neulasta administration will occur at 1:10 PM on 5/31.    Patient tolerated administration well.      Intravenous Access:  Implanted Port.    Treatment Conditions:  Lab Results   Component Value Date    HGB 12.1 (L) 05/30/2024    WBC 6.7 05/30/2024    ANEU 0.5 (L) 08/11/2022    ANEUTAUTO 5.1 05/30/2024     05/30/2024        Lab Results   Component Value Date     01/25/2023    POTASSIUM 4.0 01/25/2023    MAG 1.9 10/13/2016    CR 0.7 03/18/2024    BEVERLY 9.3 01/25/2023    BILITOTAL 0.6 05/30/2024    ALBUMIN 3.9 05/30/2024    ALT 24 05/30/2024    AST 25 05/30/2024       Results reviewed, labs MET treatment parameters, ok to proceed with treatment.      Post Infusion Assessment:  Patient tolerated infusion without incident.  Blood return noted pre and post infusion.  Site patent and intact, free from redness, edema or discomfort.  No evidence of extravasations.  Access discontinued per protocol.       Discharge Plan:   Patient declined prescription refills.  Discharge instructions reviewed with: Patient.  Patient and/or family verbalized understanding of discharge instructions and all questions answered.  AVS to patient via [facility].  Patient will return 6/20 for next appointment.   Patient discharged in stable condition accompanied by: self.  Departure Mode: Wheelchair.      Keith Chavez RN

## 2024-06-09 NOTE — PROGRESS NOTES
[facility]    Hematology/Oncology Established Patient Follow-up Note      Today's Date: 6/20/2024    Reason for Follow-up: Metastatic non-small cell lung carcinoma.    HISTORY OF PRESENT ILLNESS: Kt Rasmussen is a 63 year old male who presents with the following oncologic history:  1. 5/05/2016: Presented with near-obstructing large mass coming off the cheikh and likely the posterior wall, seen on bronchoscopy. Biopsy of the mass showed invasive squamous cell carcinoma, moderately differentiating and focally keratinizing.  Procedure was complicated by significant bleeding.  Tumor was completely debulked (via bronchoscopy) in both main stem bronchi and distal trachea. NGS showed no mutations in EGFR, KRAS, BRAF, NRAS, HRAS, PIK3CA, ERBB2, MET, or JAK2.  2. 5/23/2016: PET/CT scan showed evidence of residual tumor with decreased endobronchial mass. Indeterminate, mildly hypermetabolic mesentery with prominent lymph nodes and area of enhancement of the right hepatic lobe present. Felt to have T4-NX-M0 disease.  3. 6/09/2016: Started concurrent chemoradiation with weekly paclitaxel and carboplatin.  4. 7/30/2016: Completed radiation.  Subsequently received 2 cycles of consolidation paclitaxel and carboplatin.   5. 9/13/2019: Presented with 6-month history of dysphonia and left vocal exophytic lesion. Left true vocal fold biopsy showed at least squamous cell carcinoma in situ, cannot exclude superficial invasion.  6. 9/30/2019: Excision of left vocal cord mass showed fragments of invasive squamous cell carcinoma, well differentiated and keratinizing.  Margins negative for malignancy.  7. 2/16/2021: CT chest w/o contrast showed thin-walled cavity in left lower lobe with 2 solid peripheral nodules measuring 9 mm each. No lymphadenopathy.  8. 3/01/2021: PET scan showed hypermetabolic nodules in left lower lobe and right lung, consistent with metastases; hypermetabolic adenopathy in chest, abdomen,  pelvis; nonspecific uptake at tongue; hypermetabolic intraosseous lesions in spine and pelvis consistent with metastatic disease; left axillary hypermetabolic lymph nodes related to COVID-19 vaccination.  9. 3/12/2021: CT-guided lung biopsy showed non-small cell carcinoma, not otherwise specified -- with opinion by [facility] pathologist.  10. 3/18/2021: Lung NGS panel negative for mutations in BRAF, EGFR, ERBB2, IDH1, IDH2, KRAS, MET, NRAS, RET. PD-L1 expression negative (TPS <1%). Due to minimal amount of DNA obtained from specimen, other biomarkers could not be analyzed.  11. 4/7/2021: MRI brain negative for brain metastases.  12. 4/27/2021: Started 1st line metastatic therapy with carboplatin, paclitaxel, bevacizumab, and atezolizumab for metastatic non-small cell lung carcinoma, NOS.  13. 7/12/2021: PET/CT showed resolved mural nodules with increased cystic cavity in left lower lobe with no significant FDG uptake, less likely metastases; no enlarged hypermetabolic mediastinal, hilar, or axillary lymph nodes, decreased metabolic activity of intraosseous lesion in spine and pelvis; no new bone lesions.  14. 10/11/2021: PET/CT showed slight interval increase in intensity of metabolic activity of right pubic bone and left ischium with new focal uptake in L2 spinous process; resolved uptake at previous ground glass opacity along right medial lower lobe; unchanged cystic cavities/nodules in left lower lobe and right apical upper lobe; no pathologically enlarged hypermetabolic mediastinal, hilar, or axillary lymph nodes.  15. 1/10/2022: PET/CT showed new foci of abnormal skeletal FGD uptake in the thoracic and lumbar spine. Mild interval increase in intensity of previously demonstrated hypermetabolic skeletal lesions involving the pelvis and lumbar spine. Findings are consistent with progressive osseous metastatic disease. Subsequently off chemo for 1 month due to poor performance status.  16. 1/25/2022: Patient fell  and fractured his femur. This was surgically repaired and he was subsequently cared for at a rehab unit.  17. 2/14/2022: Brain MRI without contrast (contrast not given due to inability to obtain IV access) showed no brain metastases.  18. 4/21/2022: Started 2nd line metastatic therapy with pemetrexed and carboplatin. Omission of carboplatin 6/2 and forward due to worsening anemia despite dose reduction.  19. 7/18/2022: PET/CT showed enlarged and increased FDG avid skeletal metastases, increased left para-aortic retroperitoneal lymph node increased in size and FDG avidity, findings consistent with progressive disease.  20. 8/3/2022: Started 3rd line metastatic therapy with Taxotere 60 mg/m2 every 3 weeks.  21. 11/21/2022: PET scan showed partial response, left para-aortic retroperitoneal lymph node has decreased from 2.6 x 1.7 cm (SUVmax 7.9) to 2.3 x 1.4 cm (SUVmax 5.7), skeletal metastases no longer demonstrate FDG activity. Kt elected to take a 2-month break from chemo.  22. 1/23/2023: PET scan showed increasing metabolic activity of the left periaortic (max SUV 8.1, previously 5.7) left external iliac (max SUV 7.0, previously 3.1), and right external iliac (max SUV 5.1, previously 3.7) lymph nodes with development/reactivation of multiple FDG avid osseous lesions.  23. 3/23/2023: Resumption of Taxotere every 3 weeks. Delayed due to insurance issues.  24. 6/26/2023: PET scan showed partial response with decreased uptake associated with the retroperitoneal and pelvic lymphadenopathy as well as bone metastases.  25. 12/4/2023: PET scan showed decreased FDG avid left periaortic (SUV max 5.4, previously 6.6) and left external iliac (SUV max 3.5, previously 4.9) lymphadenopathy and broadly stable FDG uptake within scattered pre-existing osseous metastases; new FDG avid lesion involving the right anterior first rib without clear fracture line visible (SUV max 4.7) indeterminate for a new osseous metastasis versus  posttraumatic in etiology.   26. 3/18/2024: PET scan showed overall stable disease.  27. 6/17/2024: PET scan showed progressive disease with FDG avid left para-aortic retroperitoneal node measures 2.1 x 2.6 cm compared to 2.0 x 2.6 cm with SUV max of 8.4 previously 6.3.  Increased uptake with an a more inferior left periaortic retroperitoneal node with SUV max of 7.4, previously 4.3. Mildly increased uptake within a left external iliac node with SUV max of 7.0, previously 4.3. There is a new 1.1 x 1.3 cm retrocaval retroperitoneal node with SUV max of 13.4. New FDG uptake within a subcentimeter node between the right obturator muscles with SUV max of 4.1. Worsening FDG avid osseous disease involving the right occipital condyle, right aspect of the clivus, T12 and L1 vertebral bodies, and bony pelvis. FDG uptake within the right sacral lesion with SUV max of 14.0 compared to 6.9 and FDG uptake within the T12 vertebral body with SUV max of 15.2 compared to 8.2.    INTERIM HISTORY:  Kt reports continuing to tolerate Taxotere chemo well.  He denies any dyspnea, nausea, or new paresthesias.  No recent falls.    REVIEW OF SYSTEMS:   14 point ROS was reviewed and is negative other than as noted above in HPI.       HOME MEDICATIONS:  Current Outpatient Medications   Medication Sig Dispense Refill    atorvastatin (LIPITOR) 40 MG tablet Take 40 mg by mouth daily      ELIQUIS ANTICOAGULANT 5 MG tablet            ALLERGIES:  Allergies   Allergen Reactions    No Known Drug Allergy          PAST MEDICAL HISTORY:  Past Medical History:   Diagnosis Date    Alcohol abuse, unspecified     Cerebral infarction (H)     2009, right side residual and aphasia    Dyslipidemia     GERD (gastroesophageal reflux disease)     Lung cancer (H)     Unspecified essential hypertension          PAST SURGICAL HISTORY:  Past Surgical History:   Procedure Laterality Date    BRONCHOSCOPY FLEXIBLE AND RIGID N/A 5/5/2016    Procedure: BRONCHOSCOPY  FLEXIBLE AND RIGID;  Surgeon: Tony Talbot MD;  Location: [facility] OR    ESOPHAGOSCOPY FLEXIBLE N/A 2019    Procedure: flexible esophagoscopy;  Surgeon: Lizzy Johnson MD;  Location: [facility] OR    INJECT STEROID (LOCATION) N/A 2019    Procedure: steroid injection;  Surgeon: Lizzy Johnson MD;  Location: [facility] OR    IR CHEST PORT PLACEMENT > 5 YRS OF AGE  2021    IR CHEST PORT PLACEMENT > 5 YRS OF AGE  2022    IR PORT CHECK RIGHT  2022    IR PORT REMOVAL RIGHT  2022    LASER CO2 LARYNGOSCOPY N/A 2019    Procedure: Microdirect laryngoscopy with excision of laryngeal mass, CO2 laser;  Surgeon: Lizzy Johnson MD;  Location: [facility] OR    [facility] NONSPECIFIC PROCEDURE      R tympanoplasty         SOCIAL HISTORY:  Social History     Socioeconomic History    Marital status: Single     Spouse name: Not on file    Number of children: Not on file    Years of education: Not on file    Highest education level: Not on file   Occupational History    Not on file   Tobacco Use    Smoking status: Former     Current packs/day: 0.00     Types: Cigarettes     Quit date: 2009     Years since quittin.7    Smokeless tobacco: Never   Substance and Sexual Activity    Alcohol use: Not Currently    Drug use: No    Sexual activity: Not on file   Other Topics Concern    Parent/sibling w/ CABG, MI or angioplasty before 65F 55M? Not Asked   Social History Narrative    Not on file     Social Determinants of Health     Financial Resource Strain: Not on file   Food Insecurity: Not on file   Transportation Needs: Not on file   Physical Activity: Not on file   Stress: Not on file   Social Connections: Not on file   Interpersonal Safety: Not At Risk (2023)    Humiliation, Afraid, Rape, and Kick questionnaire     Fear of Current or Ex-Partner: No     Emotionally Abused: No     Physically Abused: No     Sexually Abused: No   Housing Stability: Not on file   Resides at assisted living.      FAMILY  HISTORY:  Family History   Problem Relation Age of Onset    Cancer Father          PHYSICAL EXAM:  Vital signs:  /75   Pulse 66   Temp 98  F (36.7  C) (Oral)   Resp 16   Wt 83.5 kg (184 lb)   SpO2 99%   BMI 22.69 kg/m     ECO  GENERAL: No acute distress. Sitting in wheelchair.  EYES: No scleral icterus. No overt erythema.  RESPIRATORY: No audible cough or wheezing.  SKIN: No overt rashes, discolorations, or lesions over the face and neck.  NEUROLOGIC: Alert.  No overt tremors.  PSYCHIATRIC: Normal affect and mood.  Does not appear anxious.       LABS:  CBC RESULTS:   Recent Labs   Lab Test 24  0920   WBC 6.5   RBC 4.03*   HGB 12.3*   HCT 38.0*   MCV 94   MCH 30.5   MCHC 32.4   RDW 15.9*         Last Comprehensive Metabolic Panel:  Sodium   Date Value Ref Range Status   2023 140 136 - 145 mmol/L Final   2021 140 133 - 144 mmol/L Final     Potassium   Date Value Ref Range Status   2023 4.0 3.4 - 5.3 mmol/L Final   2022 4.1 3.4 - 5.3 mmol/L Final   2021 4.3 3.4 - 5.3 mmol/L Final     Chloride   Date Value Ref Range Status   2023 102 98 - 107 mmol/L Final   2022 109 94 - 109 mmol/L Final   2021 111 (H) 94 - 109 mmol/L Final     Carbon Dioxide   Date Value Ref Range Status   2021 25 20 - 32 mmol/L Final     Carbon Dioxide (CO2)   Date Value Ref Range Status   2023 28 22 - 29 mmol/L Final   2022 25 20 - 32 mmol/L Final     Anion Gap   Date Value Ref Range Status   2023 10 7 - 15 mmol/L Final   2022 7 3 - 14 mmol/L Final   2021 4 3 - 14 mmol/L Final     Glucose   Date Value Ref Range Status   2023 95 70 - 99 mg/dL Final   2022 91 70 - 99 mg/dL Final   2021 85 70 - 99 mg/dL Final     Urea Nitrogen   Date Value Ref Range Status   2023 16.0 8.0 - 23.0 mg/dL Final   2022 17 7 - 30 mg/dL Final   2021 17 7 - 30 mg/dL Final     Creatinine   Date Value Ref Range Status   2023  0.71 0.67 - 1.17 mg/dL Final   06/29/2021 0.84 0.66 - 1.25 mg/dL Final     Creatinine POCT   Date Value Ref Range Status   06/17/2024 0.6 (L) 0.7 - 1.3 mg/dL Final     GFR Estimate   Date Value Ref Range Status   06/29/2021 >90 >60 mL/min/[1.73_m2] Final     Comment:     Non  GFR Calc  Starting 12/18/2018, serum creatinine based estimated GFR (eGFR) will be   calculated using the Chronic Kidney Disease Epidemiology Collaboration   (CKD-EPI) equation.       GFR, ESTIMATED POCT   Date Value Ref Range Status   06/17/2024 >60 >60 mL/min/1.73m2 Final     Calcium   Date Value Ref Range Status   01/25/2023 9.3 8.8 - 10.2 mg/dL Final   06/29/2021 8.7 8.5 - 10.1 mg/dL Final     Bilirubin Total   Date Value Ref Range Status   05/30/2024 0.6 <=1.2 mg/dL Final   06/29/2021 0.3 0.2 - 1.3 mg/dL Final     Alkaline Phosphatase   Date Value Ref Range Status   05/30/2024 90 40 - 150 U/L Final   06/29/2021 73 40 - 150 U/L Final     ALT   Date Value Ref Range Status   05/30/2024 24 0 - 70 U/L Final     Comment:     Reference intervals for this test were updated on 6/12/2023 to more accurately reflect our healthy population. There may be differences in the flagging of prior results with similar values performed with this method. Interpretation of those prior results can be made in the context of the updated reference intervals.     06/29/2021 34 0 - 70 U/L Final     AST   Date Value Ref Range Status   05/30/2024 25 0 - 45 U/L Final     Comment:     Reference intervals for this test were updated on 6/12/2023 to more accurately reflect our healthy population. There may be differences in the flagging of prior results with similar values performed with this method. Interpretation of those prior results can be made in the context of the updated reference intervals.   06/29/2021 26 0 - 45 U/L Final       PATHOLOGY:  None new.    IMAGING:  Reviewed as per HPI.    ASSESSMENT/PLAN:  Kt Rasmussen is a 63 year old male with the  following issues:  1. Metastatic non-small (non-squamous) cell lung carcinoma with metastases to lymph nodes, bones, and bilateral lungs  2. 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 Chemotherapy-induced anemia and thrombocytopenia  --Lung NGS panel negative for mutations in BRAF, EGFR, ERBB2, IDH1, IDH2, KRAS, MET, NRAS, RET. PD-L1 expression negative (TPS <1%). Guardant 360 negative for actionable mutations.  --Kt started 3rd line metastatic therapy with every 3-week Taxotere at 20% dose reduction (60 mg/m2) on 8/3/2022 due to progressive disease.  He tolerated this very well with minimal to no paresthesias. He then took a 2-month chemo break that was further delayed to 3.5 months due to insurance issues. He resumed on 3/23/2023 and continues to tolerate Taxotere well with minimal paresthesias.  --I personally reviewed his 6/17/2024 PET scan which showed progressive disease in the abdomen/pelvis nodes and bone metastases.  --I advised he discontinue Taxotere and whether he would like to pursue further palliative chemotherapy.  We talked about trying single agent pemetrexed (Alimta) every 3 weeks as a retrial but with the acknowledgement that all subsequent line therapy generally carries a less than 30% response rate.  He was initially thinking of not pursuing treatment, so we discussed hospice, but ultimately, he has elected to try pemetrexed.  --We discussed the schedule and dosing of chemotherapy regimen, as well as counseled on potential side effects, including fever, chills, nausea, vomiting, diarrhea, constipation, hair loss, pain, rash, infection, bleeding, fatigue, increased risk for infection.  Patient expressed understanding and agreed to proceed with chemotherapy.     --He will need vitamin B12 injection and oral folate.  --He declines to take the oral dexamethasone premedication.  --Will repeat PET scan in  9/2024.    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 Dysphonia  6. Dysphagia  -Kt underwent excision of a left vocal cord SCC on 9/30/2019 and has persistent dysphonia as a result of the lesion and excision.  He also has dysphagia but this is stable and swallowing is manageable.  -Continue follow-up with Dr. Wray.    7. History of dizziness and prior falls  --SW and guardian were previously notified of multiple falls.  --Prior brain MRI 2/14/2022 showed no brain metastases. However this was a suboptimal exam due to inability to administer contrast.  --He is using a wheelchair due to prior femoral fracture in 1/2022.  --He denies recent falls over past 3 months.    8. Bilateral lower extremity DVT  --Diagnosed in 9/2023, likely hypercoagulability of malignancy.  --Continue apixaban indefinitely, provided no major bleeding issues arise in the future and platelets remain >= 50,000.    Sangita John MD  [facility]emat[facility]/On[facility]    Total time spent today: 40 minutes in chart review, patient evaluation, counseling, documentation, test and/or medication/prescription orders, and coordination of care.     The longitudinal plan of care for the diagnosis(es)/condition(s) as documented were addressed during this visit. Due to the added complexity in care, I will continue to support Kt in the subsequent management and with ongoing continuity of care.

## 2024-06-17 ENCOUNTER — HOSPITAL ENCOUNTER (OUTPATIENT)
Dept: PET IMAGING | Facility: CLINIC | Age: 64
Discharge: HOME | End: 2024-06-17
Attending: INTERNAL MEDICINE | Admitting: INTERNAL MEDICINE
Payer: COMMERCIAL

## 2024-06-17 DIAGNOSIS — C78.01 MALIGNANT NEOPLASM METASTATIC TO BOTH LUNGS (H): ICD-10-CM

## 2024-06-17 DIAGNOSIS — C78.02 MALIGNANT NEOPLASM METASTATIC TO BOTH LUNGS (H): ICD-10-CM

## 2024-06-17 DIAGNOSIS — C34.90 NON-SMALL CELL LUNG CANCER METASTATIC TO BONE (H): ICD-10-CM

## 2024-06-17 DIAGNOSIS — C79.51 NON-SMALL CELL LUNG CANCER METASTATIC TO BONE (H): ICD-10-CM

## 2024-06-17 LAB
CREAT BLD-MCNC: 0.6 MG/DL (ref 0.7–1.3)
EGFRCR SERPLBLD CKD-EPI 2021: >60 ML/MIN/1.73M2

## 2024-06-17 PROCEDURE — 78816 PET IMAGE W/CT FULL BODY: CPT | Mod: PS

## 2024-06-17 PROCEDURE — 250N000011 HC RX IP 250 OP 636: Mod: JZ | Performed by: INTERNAL MEDICINE

## 2024-06-17 PROCEDURE — 71260 CT THORAX DX C+: CPT

## 2024-06-17 PROCEDURE — 82565 ASSAY OF CREATININE: CPT

## 2024-06-17 PROCEDURE — 343N000001 HC RX 343: Performed by: INTERNAL MEDICINE

## 2024-06-17 PROCEDURE — A9552 F18 FDG: HCPCS | Performed by: INTERNAL MEDICINE

## 2024-06-17 RX ORDER — HEPARIN SODIUM (PORCINE) LOCK FLUSH IV SOLN 100 UNIT/ML 100 UNIT/ML
5 SOLUTION INTRAVENOUS
Status: CANCELLED | OUTPATIENT
Start: 2024-06-20

## 2024-06-17 RX ORDER — LORAZEPAM 2 MG/ML
0.5 INJECTION INTRAMUSCULAR EVERY 4 HOURS PRN
Status: CANCELLED | OUTPATIENT
Start: 2024-06-20

## 2024-06-17 RX ORDER — HEPARIN SODIUM,PORCINE 10 UNIT/ML
5 VIAL (ML) INTRAVENOUS
Status: CANCELLED | OUTPATIENT
Start: 2024-06-20

## 2024-06-17 RX ORDER — FLUDEOXYGLUCOSE F 18 200 MCI/ML
10-18 INJECTION, SOLUTION INTRAVENOUS ONCE
Status: COMPLETED | OUTPATIENT
Start: 2024-06-17 | End: 2024-06-17

## 2024-06-17 RX ORDER — ALBUTEROL SULFATE 0.83 MG/ML
2.5 SOLUTION RESPIRATORY (INHALATION)
Status: CANCELLED | OUTPATIENT
Start: 2024-06-20

## 2024-06-17 RX ORDER — ALBUTEROL SULFATE 90 UG/1
1-2 AEROSOL, METERED RESPIRATORY (INHALATION)
Status: CANCELLED
Start: 2024-06-20

## 2024-06-17 RX ORDER — DIPHENHYDRAMINE HYDROCHLORIDE 50 MG/ML
50 INJECTION INTRAMUSCULAR; INTRAVENOUS
Status: CANCELLED
Start: 2024-06-20

## 2024-06-17 RX ORDER — IOPAMIDOL 755 MG/ML
10-135 INJECTION, SOLUTION INTRAVASCULAR ONCE
Status: COMPLETED | OUTPATIENT
Start: 2024-06-17 | End: 2024-06-17

## 2024-06-17 RX ORDER — HEPARIN SODIUM (PORCINE) LOCK FLUSH IV SOLN 100 UNIT/ML 100 UNIT/ML
500 SOLUTION INTRAVENOUS ONCE
Status: COMPLETED | OUTPATIENT
Start: 2024-06-17 | End: 2024-06-17

## 2024-06-17 RX ORDER — MEPERIDINE HYDROCHLORIDE 25 MG/ML
25 INJECTION INTRAMUSCULAR; INTRAVENOUS; SUBCUTANEOUS EVERY 30 MIN PRN
Status: CANCELLED | OUTPATIENT
Start: 2024-06-20

## 2024-06-17 RX ORDER — EPINEPHRINE 1 MG/ML
0.3 INJECTION, SOLUTION, CONCENTRATE INTRAVENOUS EVERY 5 MIN PRN
Status: CANCELLED | OUTPATIENT
Start: 2024-06-20

## 2024-06-17 RX ADMIN — Medication 500 UNITS: at 10:23

## 2024-06-17 RX ADMIN — FLUDEOXYGLUCOSE F 18 12.68 MILLICURIE: 200 INJECTION, SOLUTION INTRAVENOUS at 10:22

## 2024-06-17 RX ADMIN — IOPAMIDOL 112 ML: 755 INJECTION, SOLUTION INTRAVENOUS at 10:23

## 2024-06-20 ENCOUNTER — LAB (OUTPATIENT)
Dept: INFUSION THERAPY | Facility: CLINIC | Age: 64
End: 2024-06-20
Attending: INTERNAL MEDICINE
Payer: COMMERCIAL

## 2024-06-20 ENCOUNTER — ONCOLOGY VISIT (OUTPATIENT)
Dept: ONCOLOGY | Facility: CLINIC | Age: 64
End: 2024-06-20
Attending: INTERNAL MEDICINE
Payer: COMMERCIAL

## 2024-06-20 VITALS
SYSTOLIC BLOOD PRESSURE: 126 MMHG | OXYGEN SATURATION: 99 % | WEIGHT: 184 LBS | HEART RATE: 66 BPM | RESPIRATION RATE: 16 BRPM | BODY MASS INDEX: 22.69 KG/M2 | TEMPERATURE: 98 F | DIASTOLIC BLOOD PRESSURE: 75 MMHG

## 2024-06-20 DIAGNOSIS — C78.02 MALIGNANT NEOPLASM METASTATIC TO BOTH LUNGS (H): ICD-10-CM

## 2024-06-20 DIAGNOSIS — Z51.11 ENCOUNTER FOR ANTINEOPLASTIC CHEMOTHERAPY: Primary | ICD-10-CM

## 2024-06-20 DIAGNOSIS — D70.1 CHEMOTHERAPY-INDUCED NEUTROPENIA (H): ICD-10-CM

## 2024-06-20 DIAGNOSIS — C79.51 NON-SMALL CELL LUNG CANCER METASTATIC TO BONE (H): ICD-10-CM

## 2024-06-20 DIAGNOSIS — C78.01 MALIGNANT NEOPLASM METASTATIC TO BOTH LUNGS (H): ICD-10-CM

## 2024-06-20 DIAGNOSIS — Z51.11 ENCOUNTER FOR ANTINEOPLASTIC CHEMOTHERAPY: ICD-10-CM

## 2024-06-20 DIAGNOSIS — D64.81 ANEMIA DUE TO CHEMOTHERAPY: ICD-10-CM

## 2024-06-20 DIAGNOSIS — C79.51 NON-SMALL CELL LUNG CANCER METASTATIC TO BONE (H): Primary | ICD-10-CM

## 2024-06-20 DIAGNOSIS — T45.1X5A CHEMOTHERAPY-INDUCED NEUTROPENIA (H): ICD-10-CM

## 2024-06-20 DIAGNOSIS — Z86.718 PERSONAL HISTORY OF DVT (DEEP VEIN THROMBOSIS): ICD-10-CM

## 2024-06-20 DIAGNOSIS — C34.90 NON-SMALL CELL LUNG CANCER METASTATIC TO BONE (H): ICD-10-CM

## 2024-06-20 DIAGNOSIS — T45.1X5A ANEMIA DUE TO CHEMOTHERAPY: ICD-10-CM

## 2024-06-20 DIAGNOSIS — C34.90 NON-SMALL CELL LUNG CANCER METASTATIC TO BONE (H): Primary | ICD-10-CM

## 2024-06-20 LAB
ALBUMIN SERPL BCG-MCNC: 3.9 G/DL (ref 3.5–5.2)
ALP SERPL-CCNC: 92 U/L (ref 40–150)
ALT SERPL W P-5'-P-CCNC: 20 U/L (ref 0–70)
AST SERPL W P-5'-P-CCNC: 23 U/L (ref 0–45)
BASOPHILS # BLD AUTO: 0 10E3/UL (ref 0–0.2)
BASOPHILS NFR BLD AUTO: 1 %
BILIRUB DIRECT SERPL-MCNC: <0.2 MG/DL (ref 0–0.3)
BILIRUB SERPL-MCNC: 0.6 MG/DL
EOSINOPHIL # BLD AUTO: 0 10E3/UL (ref 0–0.7)
EOSINOPHIL NFR BLD AUTO: 1 %
ERYTHROCYTE [DISTWIDTH] IN BLOOD BY AUTOMATED COUNT: 15.9 % (ref 10–15)
HCT VFR BLD AUTO: 38 % (ref 40–53)
HGB BLD-MCNC: 12.3 G/DL (ref 13.3–17.7)
IMM GRANULOCYTES # BLD: 0 10E3/UL
IMM GRANULOCYTES NFR BLD: 0 %
LYMPHOCYTES # BLD AUTO: 0.9 10E3/UL (ref 0.8–5.3)
LYMPHOCYTES NFR BLD AUTO: 14 %
MCH RBC QN AUTO: 30.5 PG (ref 26.5–33)
MCHC RBC AUTO-ENTMCNC: 32.4 G/DL (ref 31.5–36.5)
MCV RBC AUTO: 94 FL (ref 78–100)
MONOCYTES # BLD AUTO: 0.6 10E3/UL (ref 0–1.3)
MONOCYTES NFR BLD AUTO: 9 %
NEUTROPHILS # BLD AUTO: 4.9 10E3/UL (ref 1.6–8.3)
NEUTROPHILS NFR BLD AUTO: 76 %
NRBC # BLD AUTO: 0 10E3/UL
NRBC BLD AUTO-RTO: 0 /100
PLATELET # BLD AUTO: 172 10E3/UL (ref 150–450)
PROT SERPL-MCNC: 6.2 G/DL (ref 6.4–8.3)
RBC # BLD AUTO: 4.03 10E6/UL (ref 4.4–5.9)
WBC # BLD AUTO: 6.5 10E3/UL (ref 4–11)

## 2024-06-20 PROCEDURE — 96372 THER/PROPH/DIAG INJ SC/IM: CPT | Performed by: INTERNAL MEDICINE

## 2024-06-20 PROCEDURE — 80076 HEPATIC FUNCTION PANEL: CPT | Performed by: INTERNAL MEDICINE

## 2024-06-20 PROCEDURE — 99215 OFFICE O/P EST HI 40 MIN: CPT | Performed by: INTERNAL MEDICINE

## 2024-06-20 PROCEDURE — G0463 HOSPITAL OUTPT CLINIC VISIT: HCPCS | Performed by: INTERNAL MEDICINE

## 2024-06-20 PROCEDURE — 85025 COMPLETE CBC W/AUTO DIFF WBC: CPT | Performed by: INTERNAL MEDICINE

## 2024-06-20 PROCEDURE — 36591 DRAW BLOOD OFF VENOUS DEVICE: CPT | Performed by: INTERNAL MEDICINE

## 2024-06-20 PROCEDURE — G2211 COMPLEX E/M VISIT ADD ON: HCPCS | Performed by: INTERNAL MEDICINE

## 2024-06-20 PROCEDURE — 250N000011 HC RX IP 250 OP 636: Performed by: INTERNAL MEDICINE

## 2024-06-20 RX ORDER — ONDANSETRON 2 MG/ML
8 INJECTION INTRAMUSCULAR; INTRAVENOUS ONCE
Status: CANCELLED | OUTPATIENT
Start: 2024-07-03

## 2024-06-20 RX ORDER — CYANOCOBALAMIN 1000 UG/ML
1000 INJECTION, SOLUTION INTRAMUSCULAR; SUBCUTANEOUS ONCE
Status: COMPLETED | OUTPATIENT
Start: 2024-06-20 | End: 2024-06-20

## 2024-06-20 RX ORDER — ALBUTEROL SULFATE 0.83 MG/ML
2.5 SOLUTION RESPIRATORY (INHALATION)
Status: CANCELLED | OUTPATIENT
Start: 2024-06-21

## 2024-06-20 RX ORDER — FOLIC ACID 1 MG/1
1 TABLET ORAL DAILY
Qty: 90 TABLET | Refills: 3 | Status: SHIPPED | OUTPATIENT
Start: 2024-06-20 | End: 2024-07-03

## 2024-06-20 RX ORDER — EPINEPHRINE 1 MG/ML
0.3 INJECTION, SOLUTION INTRAMUSCULAR; SUBCUTANEOUS EVERY 5 MIN PRN
Status: CANCELLED | OUTPATIENT
Start: 2024-06-21

## 2024-06-20 RX ORDER — HEPARIN SODIUM (PORCINE) LOCK FLUSH IV SOLN 100 UNIT/ML 100 UNIT/ML
5 SOLUTION INTRAVENOUS
Status: CANCELLED | OUTPATIENT
Start: 2024-06-21

## 2024-06-20 RX ORDER — HEPARIN SODIUM,PORCINE 10 UNIT/ML
5-20 VIAL (ML) INTRAVENOUS DAILY PRN
Status: CANCELLED | OUTPATIENT
Start: 2024-06-21

## 2024-06-20 RX ORDER — MEPERIDINE HYDROCHLORIDE 25 MG/ML
25 INJECTION INTRAMUSCULAR; INTRAVENOUS; SUBCUTANEOUS EVERY 30 MIN PRN
Status: CANCELLED | OUTPATIENT
Start: 2024-06-21

## 2024-06-20 RX ORDER — ALBUTEROL SULFATE 90 UG/1
1-2 AEROSOL, METERED RESPIRATORY (INHALATION)
Status: CANCELLED
Start: 2024-06-21

## 2024-06-20 RX ORDER — DIPHENHYDRAMINE HYDROCHLORIDE 50 MG/ML
50 INJECTION INTRAMUSCULAR; INTRAVENOUS
Status: CANCELLED
Start: 2024-06-21

## 2024-06-20 RX ORDER — METHYLPREDNISOLONE SODIUM SUCCINATE 125 MG/2ML
125 INJECTION, POWDER, LYOPHILIZED, FOR SOLUTION INTRAMUSCULAR; INTRAVENOUS
Status: CANCELLED
Start: 2024-06-21

## 2024-06-20 RX ORDER — PROCHLORPERAZINE MALEATE 10 MG
10 TABLET ORAL EVERY 6 HOURS PRN
Qty: 30 TABLET | Refills: 2 | Status: SHIPPED | OUTPATIENT
Start: 2024-06-20

## 2024-06-20 RX ORDER — LORAZEPAM 2 MG/ML
0.5 INJECTION INTRAMUSCULAR EVERY 4 HOURS PRN
Status: CANCELLED | OUTPATIENT
Start: 2024-06-21

## 2024-06-20 RX ADMIN — CYANOCOBALAMIN 1000 MCG: 1000 INJECTION, SOLUTION INTRAMUSCULAR at 10:32

## 2024-06-20 ASSESSMENT — PAIN SCALES - GENERAL: PAINLEVEL: NO PAIN (0)

## 2024-06-20 NOTE — LETTER
6/20/2024      Kt Rasmussen  [address]  [facility] [address]      Dear Colleague,    Thank you for referring your patient, Kt Rasmussen, to the [facility]. Please see a copy of my visit note below.    [facility]    Hematology/Oncology Established Patient Follow-up Note      Today's Date: 6/20/2024    Reason for Follow-up: Metastatic non-small cell lung carcinoma.    HISTORY OF PRESENT ILLNESS: Kt Rasmussen is a 63 year old male who presents with the following oncologic history:  1. 5/05/2016: Presented with near-obstructing large mass coming off the cheikh and likely the posterior wall, seen on bronchoscopy. Biopsy of the mass showed invasive squamous cell carcinoma, moderately differentiating and focally keratinizing.  Procedure was complicated by significant bleeding.  Tumor was completely debulked (via bronchoscopy) in both main stem bronchi and distal trachea. NGS showed no mutations in EGFR, KRAS, BRAF, NRAS, HRAS, PIK3CA, ERBB2, MET, or JAK2.  2. 5/23/2016: PET/CT scan showed evidence of residual tumor with decreased endobronchial mass. Indeterminate, mildly hypermetabolic mesentery with prominent lymph nodes and area of enhancement of the right hepatic lobe present. Felt to have T4-NX-M0 disease.  3. 6/09/2016: Started concurrent chemoradiation with weekly paclitaxel and carboplatin.  4. 7/30/2016: Completed radiation.  Subsequently received 2 cycles of consolidation paclitaxel and carboplatin.   5. 9/13/2019: Presented with 6-month history of dysphonia and left vocal exophytic lesion. Left true vocal fold biopsy showed at least squamous cell carcinoma in situ, cannot exclude superficial invasion.  6. 9/30/2019: Excision of left vocal cord mass showed fragments of invasive squamous cell carcinoma, well differentiated and keratinizing.  Margins negative for malignancy.  7. 2/16/2021: CT chest w/o contrast showed thin-walled cavity in left lower  lobe with 2 solid peripheral nodules measuring 9 mm each. No lymphadenopathy.  8. 3/01/2021: PET scan showed hypermetabolic nodules in left lower lobe and right lung, consistent with metastases; hypermetabolic adenopathy in chest, abdomen, pelvis; nonspecific uptake at tongue; hypermetabolic intraosseous lesions in spine and pelvis consistent with metastatic disease; left axillary hypermetabolic lymph nodes related to COVID-19 vaccination.  9. 3/12/2021: CT-guided lung biopsy showed non-small cell carcinoma, not otherwise specified -- with opinion by [facility] pathologist.  10. 3/18/2021: Lung NGS panel negative for mutations in BRAF, EGFR, ERBB2, IDH1, IDH2, KRAS, MET, NRAS, RET. PD-L1 expression negative (TPS <1%). Due to minimal amount of DNA obtained from specimen, other biomarkers could not be analyzed.  11. 4/7/2021: MRI brain negative for brain metastases.  12. 4/27/2021: Started 1st line metastatic therapy with carboplatin, paclitaxel, bevacizumab, and atezolizumab for metastatic non-small cell lung carcinoma, NOS.  13. 7/12/2021: PET/CT showed resolved mural nodules with increased cystic cavity in left lower lobe with no significant FDG uptake, less likely metastases; no enlarged hypermetabolic mediastinal, hilar, or axillary lymph nodes, decreased metabolic activity of intraosseous lesion in spine and pelvis; no new bone lesions.  14. 10/11/2021: PET/CT showed slight interval increase in intensity of metabolic activity of right pubic bone and left ischium with new focal uptake in L2 spinous process; resolved uptake at previous ground glass opacity along right medial lower lobe; unchanged cystic cavities/nodules in left lower lobe and right apical upper lobe; no pathologically enlarged hypermetabolic mediastinal, hilar, or axillary lymph nodes.  15. 1/10/2022: PET/CT showed new foci of abnormal skeletal FGD uptake in the thoracic and lumbar spine. Mild interval increase in intensity of previously  demonstrated hypermetabolic skeletal lesions involving the pelvis and lumbar spine. Findings are consistent with progressive osseous metastatic disease. Subsequently off chemo for 1 month due to poor performance status.  16. 1/25/2022: Patient fell and fractured his femur. This was surgically repaired and he was subsequently cared for at a rehab unit.  17. 2/14/2022: Brain MRI without contrast (contrast not given due to inability to obtain IV access) showed no brain metastases.  18. 4/21/2022: Started 2nd line metastatic therapy with pemetrexed and carboplatin. Omission of carboplatin 6/2 and forward due to worsening anemia despite dose reduction.  19. 7/18/2022: PET/CT showed enlarged and increased FDG avid skeletal metastases, increased left para-aortic retroperitoneal lymph node increased in size and FDG avidity, findings consistent with progressive disease.  20. 8/3/2022: Started 3rd line metastatic therapy with Taxotere 60 mg/m2 every 3 weeks.  21. 11/21/2022: PET scan showed partial response, left para-aortic retroperitoneal lymph node has decreased from 2.6 x 1.7 cm (SUVmax 7.9) to 2.3 x 1.4 cm (SUVmax 5.7), skeletal metastases no longer demonstrate FDG activity. Kt elected to take a 2-month break from chemo.  22. 1/23/2023: PET scan showed increasing metabolic activity of the left periaortic (max SUV 8.1, previously 5.7) left external iliac (max SUV 7.0, previously 3.1), and right external iliac (max SUV 5.1, previously 3.7) lymph nodes with development/reactivation of multiple FDG avid osseous lesions.  23. 3/23/2023: Resumption of Taxotere every 3 weeks. Delayed due to insurance issues.  24. 6/26/2023: PET scan showed partial response with decreased uptake associated with the retroperitoneal and pelvic lymphadenopathy as well as bone metastases.  25. 12/4/2023: PET scan showed decreased FDG avid left periaortic (SUV max 5.4, previously 6.6) and left external iliac (SUV max 3.5, previously 4.9)  lymphadenopathy and broadly stable FDG uptake within scattered pre-existing osseous metastases; new FDG avid lesion involving the right anterior first rib without clear fracture line visible (SUV max 4.7) indeterminate for a new osseous metastasis versus posttraumatic in etiology.   26. 3/18/2024: PET scan showed overall stable disease.  27. 6/17/2024: PET scan showed progressive disease with FDG avid left para-aortic retroperitoneal node measures 2.1 x 2.6 cm compared to 2.0 x 2.6 cm with SUV max of 8.4 previously 6.3.  Increased uptake with an a more inferior left periaortic retroperitoneal node with SUV max of 7.4, previously 4.3. Mildly increased uptake within a left external iliac node with SUV max of 7.0, previously 4.3. There is a new 1.1 x 1.3 cm retrocaval retroperitoneal node with SUV max of 13.4. New FDG uptake within a subcentimeter node between the right obturator muscles with SUV max of 4.1. Worsening FDG avid osseous disease involving the right occipital condyle, right aspect of the clivus, T12 and L1 vertebral bodies, and bony pelvis. FDG uptake within the right sacral lesion with SUV max of 14.0 compared to 6.9 and FDG uptake within the T12 vertebral body with SUV max of 15.2 compared to 8.2.    INTERIM HISTORY:  Kt reports continuing to tolerate Taxotere chemo well.  He denies any dyspnea, nausea, or new paresthesias.  No recent falls.    REVIEW OF SYSTEMS:   14 point ROS was reviewed and is negative other than as noted above in HPI.       HOME MEDICATIONS:  Current Outpatient Medications   Medication Sig Dispense Refill     atorvastatin (LIPITOR) 40 MG tablet Take 40 mg by mouth daily       ELIQUIS ANTICOAGULANT 5 MG tablet            ALLERGIES:  Allergies   Allergen Reactions     No Known Drug Allergy          PAST MEDICAL HISTORY:  Past Medical History:   Diagnosis Date     Alcohol abuse, unspecified      Cerebral infarction (H)     2009, right side residual and aphasia     Dyslipidemia       GERD (gastroesophageal reflux disease)      Lung cancer (H)      Unspecified essential hypertension          PAST SURGICAL HISTORY:  Past Surgical History:   Procedure Laterality Date     BRONCHOSCOPY FLEXIBLE AND RIGID N/A 2016    Procedure: BRONCHOSCOPY FLEXIBLE AND RIGID;  Surgeon: Tony Talbot MD;  Location: [facility] OR     ESOPHAGOSCOPY FLEXIBLE N/A 2019    Procedure: flexible esophagoscopy;  Surgeon: Lizzy Johnson MD;  Location: [facility] OR     INJECT STEROID (LOCATION) N/A 2019    Procedure: steroid injection;  Surgeon: Lizzy Johnson MD;  Location: [facility] OR     IR CHEST PORT PLACEMENT > 5 YRS OF AGE  2021     IR CHEST PORT PLACEMENT > 5 YRS OF AGE  2022     IR PORT CHECK RIGHT  2022     IR PORT REMOVAL RIGHT  2022     LASER CO2 LARYNGOSCOPY N/A 2019    Procedure: Microdirect laryngoscopy with excision of laryngeal mass, CO2 laser;  Surgeon: Lizzy Johnson MD;  Location: [facility] OR     ZZC NONSPECIFIC PROCEDURE      R tympanoplasty         SOCIAL HISTORY:  Social History     Socioeconomic History     Marital status: Single     Spouse name: Not on file     Number of children: Not on file     Years of education: Not on file     Highest education level: Not on file   Occupational History     Not on file   Tobacco Use     Smoking status: Former     Current packs/day: 0.00     Types: Cigarettes     Quit date: 2009     Years since quittin.7     Smokeless tobacco: Never   Substance and Sexual Activity     Alcohol use: Not Currently     Drug use: No     Sexual activity: Not on file   Other Topics Concern     Parent/sibling w/ CABG, MI or angioplasty before 65F 55M? Not Asked   Social History Narrative     Not on file     Social Determinants of Health     Financial Resource Strain: Not on file   Food Insecurity: Not on file   Transportation Needs: Not on file   Physical Activity: Not on file   Stress: Not on file   Social Connections: Not on file   Interpersonal Safety:  Not At Risk (2023)    Humiliation, Afraid, Rape, and Kick questionnaire      Fear of Current or Ex-Partner: No      Emotionally Abused: No      Physically Abused: No      Sexually Abused: No   Housing Stability: Not on file   Resides at assisted living.      FAMILY HISTORY:  Family History   Problem Relation Age of Onset     Cancer Father          PHYSICAL EXAM:  Vital signs:  /75   Pulse 66   Temp 98  F (36.7  C) (Oral)   Resp 16   Wt 83.5 kg (184 lb)   SpO2 99%   BMI 22.69 kg/m     ECO  GENERAL: No acute distress. Sitting in wheelchair.  EYES: No scleral icterus. No overt erythema.  RESPIRATORY: No audible cough or wheezing.  SKIN: No overt rashes, discolorations, or lesions over the face and neck.  NEUROLOGIC: Alert.  No overt tremors.  PSYCHIATRIC: Normal affect and mood.  Does not appear anxious.       LABS:  CBC RESULTS:   Recent Labs   Lab Test 24  0920   WBC 6.5   RBC 4.03*   HGB 12.3*   HCT 38.0*   MCV 94   MCH 30.5   MCHC 32.4   RDW 15.9*         Last Comprehensive Metabolic Panel:  Sodium   Date Value Ref Range Status   2023 140 136 - 145 mmol/L Final   2021 140 133 - 144 mmol/L Final     Potassium   Date Value Ref Range Status   2023 4.0 3.4 - 5.3 mmol/L Final   2022 4.1 3.4 - 5.3 mmol/L Final   2021 4.3 3.4 - 5.3 mmol/L Final     Chloride   Date Value Ref Range Status   2023 102 98 - 107 mmol/L Final   2022 109 94 - 109 mmol/L Final   2021 111 (H) 94 - 109 mmol/L Final     Carbon Dioxide   Date Value Ref Range Status   2021 25 20 - 32 mmol/L Final     Carbon Dioxide (CO2)   Date Value Ref Range Status   2023 28 22 - 29 mmol/L Final   2022 25 20 - 32 mmol/L Final     Anion Gap   Date Value Ref Range Status   2023 10 7 - 15 mmol/L Final   2022 7 3 - 14 mmol/L Final   2021 4 3 - 14 mmol/L Final     Glucose   Date Value Ref Range Status   2023 95 70 - 99 mg/dL Final   2022 91 70  - 99 mg/dL Final   06/29/2021 85 70 - 99 mg/dL Final     Urea Nitrogen   Date Value Ref Range Status   01/25/2023 16.0 8.0 - 23.0 mg/dL Final   12/07/2022 17 7 - 30 mg/dL Final   06/29/2021 17 7 - 30 mg/dL Final     Creatinine   Date Value Ref Range Status   01/25/2023 0.71 0.67 - 1.17 mg/dL Final   06/29/2021 0.84 0.66 - 1.25 mg/dL Final     Creatinine POCT   Date Value Ref Range Status   06/17/2024 0.6 (L) 0.7 - 1.3 mg/dL Final     GFR Estimate   Date Value Ref Range Status   06/29/2021 >90 >60 mL/min/[1.73_m2] Final     Comment:     Non  GFR Calc  Starting 12/18/2018, serum creatinine based estimated GFR (eGFR) will be   calculated using the Chronic Kidney Disease Epidemiology Collaboration   (CKD-EPI) equation.       GFR, ESTIMATED POCT   Date Value Ref Range Status   06/17/2024 >60 >60 mL/min/1.73m2 Final     Calcium   Date Value Ref Range Status   01/25/2023 9.3 8.8 - 10.2 mg/dL Final   06/29/2021 8.7 8.5 - 10.1 mg/dL Final     Bilirubin Total   Date Value Ref Range Status   05/30/2024 0.6 <=1.2 mg/dL Final   06/29/2021 0.3 0.2 - 1.3 mg/dL Final     Alkaline Phosphatase   Date Value Ref Range Status   05/30/2024 90 40 - 150 U/L Final   06/29/2021 73 40 - 150 U/L Final     ALT   Date Value Ref Range Status   05/30/2024 24 0 - 70 U/L Final     Comment:     Reference intervals for this test were updated on 6/12/2023 to more accurately reflect our healthy population. There may be differences in the flagging of prior results with similar values performed with this method. Interpretation of those prior results can be made in the context of the updated reference intervals.     06/29/2021 34 0 - 70 U/L Final     AST   Date Value Ref Range Status   05/30/2024 25 0 - 45 U/L Final     Comment:     Reference intervals for this test were updated on 6/12/2023 to more accurately reflect our healthy population. There may be differences in the flagging of prior results with similar values performed with this  method. Interpretation of those prior results can be made in the context of the updated reference intervals.   06/29/2021 26 0 - 45 U/L Final       PATHOLOGY:  None new.    IMAGING:  Reviewed as per HPI.    ASSESSMENT/PLAN:  Kt Rasmussen is a 63 year old male with the following issues:  1. Metastatic non-small (non-squamous) cell lung carcinoma with metastases to lymph nodes, bones, and bilateral lungs  2. 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 Chemotherapy-induced anemia and thrombocytopenia  --Lung NGS panel negative for mutations in BRAF, EGFR, ERBB2, IDH1, IDH2, KRAS, MET, NRAS, RET. PD-L1 expression negative (TPS <1%). Guardant 360 negative for actionable mutations.  --Kt started 3rd line metastatic therapy with every 3-week Taxotere at 20% dose reduction (60 mg/m2) on 8/3/2022 due to progressive disease.  He tolerated this very well with minimal to no paresthesias. He then took a 2-month chemo break that was further delayed to 3.5 months due to insurance issues. He resumed on 3/23/2023 and continues to tolerate Taxotere well with minimal paresthesias.  --I personally reviewed his 6/17/2024 PET scan which showed progressive disease in the abdomen/pelvis nodes and bone metastases.  --I advised he discontinue Taxotere and whether he would like to pursue further palliative chemotherapy.  We talked about trying single agent pemetrexed (Alimta) every 3 weeks as a retrial but with the acknowledgement that all subsequent line therapy generally carries a less than 30% response rate.  He was initially thinking of not pursuing treatment, so we discussed hospice, but ultimately, he has elected to try pemetrexed.  --We discussed the schedule and dosing of chemotherapy regimen, as well as counseled on potential side effects, including fever, chills, nausea, vomiting, diarrhea, constipation, hair loss, pain, rash,  infection, bleeding, fatigue, increased risk for infection.  Patient expressed understanding and agreed to proceed with chemotherapy.     --He will need vitamin B12 injection and oral folate.  --He declines to take the oral dexamethasone premedication.  --Will repeat PET scan in 9/2024.    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 Dysphonia  6. Dysphagia  -Kt underwent excision of a left vocal cord SCC on 9/30/2019 and has persistent dysphonia as a result of the lesion and excision.  He also has dysphagia but this is stable and swallowing is manageable.  -Continue follow-up with Dr. Wray.    7. History of dizziness and prior falls  --SW and guardian were previously notified of multiple falls.  --Prior brain MRI 2/14/2022 showed no brain metastases. However this was a suboptimal exam due to inability to administer contrast.  --He is using a wheelchair due to prior femoral fracture in 1/2022.  --He denies recent falls over past 3 months.    8. Bilateral lower extremity DVT  --Diagnosed in 9/2023, likely hypercoagulability of malignancy.  --Continue apixaban indefinitely, provided no major bleeding issues arise in the future and platelets remain >= 50,000.    Sangita John MD  [facility] Hematology/Oncology    Total time spent today: 40 minutes in chart review, patient evaluation, counseling, documentation, test and/or medication/prescription orders, and coordination of care.     The longitudinal plan of care for the diagnosis(es)/condition(s) as documented were addressed during this visit. Due to the added complexity in care, I will continue to support Kt in the subsequent management and with ongoing continuity of care.      Oncology Rooming Note    June 20, 2024 9:45 AM   Kt Rasmussen is a 63 year old male who presents for:    Chief Complaint   Patient presents with     Oncology Clinic Visit     Initial Vitals: /75   Pulse 66   Temp 98  F (36.7  C) (Oral)   Resp 16   Wt 83.5 kg  "(184 lb)   SpO2 99%   BMI 22.69 kg/m   Estimated body mass index is 22.69 kg/m  as calculated from the following:    Height as of 5/10/24: 1.918 m (6' 3.5\").    Weight as of this encounter: 83.5 kg (184 lb). Body surface area is 2.11 meters squared.  No Pain (0) Comment: Data Unavailable   No LMP for male patient.  Allergies reviewed: Yes  Medications reviewed: Yes    Medications: Medication refills not needed today.  Pharmacy name entered into EPIC:    [facility] - [place], [place] - [address] [facility] [address] [facility] STORE #[id number] - [place] [id number] [facility] 7 AT [facility] 7  [place] [facility] TER[facility] - [facility] [address] [place] [address], [facility]. [facility] [address]    Frailty Screening:   Is the patient here for a new oncology consult visit in cancer care? 2. No      Clinical concerns: no       Shari J. Schoenberger, CMA                Again, thank you for allowing me to participate in the care of your patient.        Sincerely,        Sangita John MD  "

## 2024-06-20 NOTE — PROGRESS NOTES
Nursing Note:  Kt Rasmussen presents today for port labs.    Patient seen by provider today: Yes, Alvaro   present during visit today: Not Applicable.    Note: N/A.    Intravenous Access:  Labs drawn without difficulty.  Implanted Port.    Discharge Plan:   Patient was sent to [facility] for provider appointment.    Chelsi Doe RN

## 2024-06-20 NOTE — PROGRESS NOTES
"Oncology Rooming Note    June 20, 2024 9:45 AM   Kt Rasmussen is a 63 year old male who presents for:    Chief Complaint   Patient presents with    Oncology Clinic Visit     Initial Vitals: /75   Pulse 66   Temp 98  F (36.7  C) (Oral)   Resp 16   Wt 83.5 kg (184 lb)   SpO2 99%   BMI 22.69 kg/m   Estimated body mass index is 22.69 kg/m  as calculated from the following:    Height as of 5/10/24: 1.918 m (6' 3.5\").    Weight as of this encounter: 83.5 kg (184 lb). Body surface area is 2.11 meters squared.  No Pain (0) Comment: Data Unavailable   No LMP for male patient.  Allergies reviewed: Yes  Medications reviewed: Yes    Medications: Medication refills not needed today.  Pharmacy name entered into EPIC:    [facility] [place] [address] [facility] [address] [facility] ST[facility][id number] - [place] [address] [facility] AT [facility] 7  [place] [facility] - [facility] [address] [place] [address], [facility]. - [facility] [address]    Frailty Screening:   Is the patient here for a new oncology consult visit in cancer care? 2. No      Clinical concerns: no       Shari J. Schoenberger, CMA              "

## 2024-06-20 NOTE — PROGRESS NOTES
Nursing Note:  Kt Rasmussen presents today for port de access.    Patient seen by provider today: Yes: Dr. John.   present during visit today: Not Applicable.    Note: Treatment canceled for today.    Intravenous Access:  Implanted Port.    Discharge Plan:   Patient stayed in clinic for B12 injection.    Patricia Lindsey RN

## 2024-06-20 NOTE — PATIENT INSTRUCTIONS
Give vitamin B12 injection and deaccess port today.  Discontinue Taxotere.  Arrange for lab draw and Alimta (pemetrexed) every 3 weeks.  Start folate orally daily today.  RTC NP alternating with MD visit every 3 weeks.  Arrange for PET/CT in 3 months.

## 2024-07-02 ENCOUNTER — DOCUMENTATION ONLY (OUTPATIENT)
Dept: ONCOLOGY | Facility: CLINIC | Age: 64
End: 2024-07-02
Payer: COMMERCIAL

## 2024-07-03 ENCOUNTER — INFUSION THERAPY VISIT (OUTPATIENT)
Dept: INFUSION THERAPY | Facility: CLINIC | Age: 64
End: 2024-07-03
Attending: INTERNAL MEDICINE
Payer: COMMERCIAL

## 2024-07-03 ENCOUNTER — ONCOLOGY VISIT (OUTPATIENT)
Dept: ONCOLOGY | Facility: CLINIC | Age: 64
End: 2024-07-03
Attending: INTERNAL MEDICINE
Payer: COMMERCIAL

## 2024-07-03 VITALS
OXYGEN SATURATION: 96 % | RESPIRATION RATE: 16 BRPM | DIASTOLIC BLOOD PRESSURE: 73 MMHG | TEMPERATURE: 97.8 F | SYSTOLIC BLOOD PRESSURE: 111 MMHG

## 2024-07-03 VITALS — BODY MASS INDEX: 22.45 KG/M2 | WEIGHT: 182 LBS

## 2024-07-03 DIAGNOSIS — C34.90 NON-SMALL CELL LUNG CANCER METASTATIC TO BONE (H): ICD-10-CM

## 2024-07-03 DIAGNOSIS — C78.01 MALIGNANT NEOPLASM METASTATIC TO BOTH LUNGS (H): ICD-10-CM

## 2024-07-03 DIAGNOSIS — Z51.11 ENCOUNTER FOR ANTINEOPLASTIC CHEMOTHERAPY: ICD-10-CM

## 2024-07-03 DIAGNOSIS — C79.51 NON-SMALL CELL LUNG CANCER METASTATIC TO BONE (H): Primary | ICD-10-CM

## 2024-07-03 DIAGNOSIS — Z86.718 PERSONAL HISTORY OF DVT (DEEP VEIN THROMBOSIS): Primary | ICD-10-CM

## 2024-07-03 DIAGNOSIS — C78.02 MALIGNANT NEOPLASM METASTATIC TO BOTH LUNGS (H): ICD-10-CM

## 2024-07-03 DIAGNOSIS — C34.90 NON-SMALL CELL LUNG CANCER METASTATIC TO BONE (H): Primary | ICD-10-CM

## 2024-07-03 DIAGNOSIS — C79.51 NON-SMALL CELL LUNG CANCER METASTATIC TO BONE (H): ICD-10-CM

## 2024-07-03 LAB
BASOPHILS # BLD AUTO: 0 10E3/UL (ref 0–0.2)
BASOPHILS NFR BLD AUTO: 0 %
CREAT SERPL-MCNC: 0.68 MG/DL (ref 0.67–1.17)
EGFRCR SERPLBLD CKD-EPI 2021: >90 ML/MIN/1.73M2
EOSINOPHIL # BLD AUTO: 0.2 10E3/UL (ref 0–0.7)
EOSINOPHIL NFR BLD AUTO: 3 %
ERYTHROCYTE [DISTWIDTH] IN BLOOD BY AUTOMATED COUNT: 15.5 % (ref 10–15)
HCT VFR BLD AUTO: 38.2 % (ref 40–53)
HGB BLD-MCNC: 12.2 G/DL (ref 13.3–17.7)
IMM GRANULOCYTES # BLD: 0 10E3/UL
IMM GRANULOCYTES NFR BLD: 0 %
LYMPHOCYTES # BLD AUTO: 1.1 10E3/UL (ref 0.8–5.3)
LYMPHOCYTES NFR BLD AUTO: 19 %
MCH RBC QN AUTO: 29.5 PG (ref 26.5–33)
MCHC RBC AUTO-ENTMCNC: 31.9 G/DL (ref 31.5–36.5)
MCV RBC AUTO: 93 FL (ref 78–100)
MONOCYTES # BLD AUTO: 0.5 10E3/UL (ref 0–1.3)
MONOCYTES NFR BLD AUTO: 9 %
NEUTROPHILS # BLD AUTO: 3.8 10E3/UL (ref 1.6–8.3)
NEUTROPHILS NFR BLD AUTO: 69 %
NRBC # BLD AUTO: 0 10E3/UL
NRBC BLD AUTO-RTO: 0 /100
PLATELET # BLD AUTO: 133 10E3/UL (ref 150–450)
RBC # BLD AUTO: 4.13 10E6/UL (ref 4.4–5.9)
WBC # BLD AUTO: 5.6 10E3/UL (ref 4–11)

## 2024-07-03 PROCEDURE — 96375 TX/PRO/DX INJ NEW DRUG ADDON: CPT

## 2024-07-03 PROCEDURE — 96413 CHEMO IV INFUSION 1 HR: CPT

## 2024-07-03 PROCEDURE — 258N000003 HC RX IP 258 OP 636: Performed by: INTERNAL MEDICINE

## 2024-07-03 PROCEDURE — 36591 DRAW BLOOD OFF VENOUS DEVICE: CPT | Performed by: INTERNAL MEDICINE

## 2024-07-03 PROCEDURE — 85025 COMPLETE CBC W/AUTO DIFF WBC: CPT | Performed by: INTERNAL MEDICINE

## 2024-07-03 PROCEDURE — 99214 OFFICE O/P EST MOD 30 MIN: CPT | Performed by: NURSE PRACTITIONER

## 2024-07-03 PROCEDURE — G0463 HOSPITAL OUTPT CLINIC VISIT: HCPCS | Performed by: NURSE PRACTITIONER

## 2024-07-03 PROCEDURE — 250N000011 HC RX IP 250 OP 636: Performed by: INTERNAL MEDICINE

## 2024-07-03 PROCEDURE — 82565 ASSAY OF CREATININE: CPT | Performed by: INTERNAL MEDICINE

## 2024-07-03 RX ORDER — FOLIC ACID 1 MG/1
1 TABLET ORAL DAILY
Qty: 90 TABLET | Refills: 3 | Status: SHIPPED | OUTPATIENT
Start: 2024-07-03

## 2024-07-03 RX ORDER — APIXABAN 5 MG/1
5 TABLET, FILM COATED ORAL 2 TIMES DAILY
Qty: 180 TABLET | Refills: 0 | Status: SHIPPED | OUTPATIENT
Start: 2024-07-03 | End: 2024-10-01

## 2024-07-03 RX ORDER — ONDANSETRON 2 MG/ML
8 INJECTION INTRAMUSCULAR; INTRAVENOUS ONCE
Status: COMPLETED | OUTPATIENT
Start: 2024-07-03 | End: 2024-07-03

## 2024-07-03 RX ORDER — HEPARIN SODIUM (PORCINE) LOCK FLUSH IV SOLN 100 UNIT/ML 100 UNIT/ML
5 SOLUTION INTRAVENOUS
Status: DISCONTINUED | OUTPATIENT
Start: 2024-07-03 | End: 2024-07-03 | Stop reason: HOSPADM

## 2024-07-03 RX ORDER — ATORVASTATIN CALCIUM 40 MG/1
40 TABLET, FILM COATED ORAL DAILY
Qty: 90 TABLET | Refills: 0 | Status: SHIPPED | OUTPATIENT
Start: 2024-07-03 | End: 2024-10-01

## 2024-07-03 RX ADMIN — PEMETREXED DISODIUM 1000 MG: 500 INJECTION, POWDER, LYOPHILIZED, FOR SOLUTION INTRAVENOUS at 11:25

## 2024-07-03 RX ADMIN — SODIUM CHLORIDE 250 ML: 9 INJECTION, SOLUTION INTRAVENOUS at 11:13

## 2024-07-03 RX ADMIN — ONDANSETRON 8 MG: 2 INJECTION INTRAMUSCULAR; INTRAVENOUS at 11:13

## 2024-07-03 RX ADMIN — Medication 5 ML: at 11:27

## 2024-07-03 ASSESSMENT — PAIN SCALES - GENERAL: PAINLEVEL: NO PAIN (0)

## 2024-07-03 NOTE — PROGRESS NOTES
Oncology/Hematology Visit Note  Jul 3, 2024    Reason for Visit: follow up of metastatic non-small cell lung carcinoma  Metastatic to lymph nodes bones in bilateral lungs  Brain MRI negative for mets    Patient met with Dr. John who recommended treatment with palliative intent with paclitaxel carboplatin Avastin and atezolizumab-treatment started on April 27, 2021  -Due to thrombocytopenia carboplatin AUC reduced to 4 with cycle 2  Due to pancytopenia carboplatin discontinued with cycle 5    7/12/2021: PET/CT showed resolved mural nodules with increased cystic cavity in left lower lobe with no significant FDG uptake, less likely metastases; no enlarged hypermetabolic mediastinal, hilar, or axillary lymph nodes, decreased metabolic activity of intraosseous lesion in spine and pelvis; no new bone lesions    Treated with paclitaxel, Avastin and atezolizumab.-Last treatment given in  12/22/2021-cycle 12    1/10/2022: PET/CT showed new foci of abnormal skeletal FGD uptake in the thoracic and lumbar spine. Mild interval increase in intensity of previously demonstrated hypermetabolic skeletal lesions involving the pelvis and lumbar spine. Findings are consistent with progressive osseous metastatic disease. Subsequently off chemo for 1 month due to poor performance status.  16. 1/25/2022: Patient fell and fractured his femur. This was surgically repaired and he was subsequently cared for at a rehab unit.  17. 2/14/2022: Brain MRI without contrast (contrast not given due to inability to obtain IV access) showed no brain metastases.    Met with Dr. Shoemaker-in Dr. John's absence  -Recommendation is to change therapy   04/21/2022 -palliative Carboplatin Alimta started   Due to cytopenia AUC reduced to 4 with cycle 2  Due to persistent pancytopenia and inability to tolerate treatment carboplatin discontinued with cycle 3-day 1  Patient was on monotherapy with Alimta    07/18/2022-PET CT scan shows progression of the  disease  08/03/22-started Taxotere 60 mg/m2 every 3 weeks    06/2024-scan reveals progression of the disease  Treatment changed to single agent Alimta     Interval History:  Reports feeling well.  Denies fever chills sweats cough shortness of breath chest pain nausea vomiting diarrhea abdominal pain or bleeding      Review of Systems:  14 point ROS of systems including Constitutional, Eyes, Respiratory, Cardiovascular, Gastroenterology, Genitourinary, Integumentary, Muscularskeletal, Psychiatric were all negative except for pertinent positives noted in my HPI.    Physical Examination:  Physical Exam  HENT:      Right Ear: Tympanic membrane normal.      Nose: Nose normal.      Mouth/Throat:      Mouth: Mucous membranes are moist.   Eyes:      Pupils: Pupils are equal, round, and reactive to light.   Cardiovascular:      Rate and Rhythm: Normal rate.      Pulses: Normal pulses.   Pulmonary:      Effort: Pulmonary effort is normal.   Abdominal:      General: Abdomen is flat.   Musculoskeletal:         General: Normal range of motion.      Cervical back: Normal range of motion.   Skin:     General: Skin is warm.   Neurological:      General: No focal deficit present.      Mental Status: He is alert.   Psychiatric:         Mood and Affect: Mood normal.           Laboratory Data:  CBC and CMP results reviewed    Assessment and Plan:  metastatic non-small cell lung cancer   Patient follows with Dr John   Progressed on  different treatments as above recently was on monotherapy with Alimta  07/18/2022-PET CT scan reveals progression of disease  Patient with Dr. John who recommended changing treatment to Taxotere 60 mg/m2 every 3 weeks  06/17/2024-PET scan revealed progressive disease  Taxotere discontinued patient.  Currently on single agent Alimta every 3 weeks to start cycle 1 day 1 today  Regimen potential side effects of Alimta reviewed with patient patient agrees to proceed  Labs reviewed okay to proceed with  Alimta  Continue with B12 injections and folic acid per protocol  Plan for PET scan in September 2024  Patient has follow-up appointment with Dr. 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  --Continue apixaban indefinitely, provided no major bleeding issues arise in the future and platelets remain >= 50,00    Patient is advised to call our clinic or go to ER in the event of fever chills sweats cough shortness of breath chest pain nausea vomiting diarrhea abdominal pain bleeding edema or any changes in health    MADHAVI Moran CNP  [facility]     Chart documentation with Dragon Voice recognition Software. Although reviewed after completion, some words and grammatical errors may remain.

## 2024-07-03 NOTE — PROGRESS NOTES
Infusion Nursing Note:  Kt Rasmussen presents today for C1D1 Alimta.    Patient seen by provider today: Yes: Juan GOODE   present during visit today: Not Applicable.    Note: Next B12 injection due 8/22/24.      Intravenous Access:  Implanted Port.    Treatment Conditions:  Lab Results   Component Value Date    HGB 12.2 (L) 07/03/2024    WBC 5.6 07/03/2024    ANEU 0.5 (L) 08/11/2022    ANEUTAUTO 3.8 07/03/2024     (L) 07/03/2024        Lab Results   Component Value Date     01/25/2023    POTASSIUM 4.0 01/25/2023    MAG 1.9 10/13/2016    CR 0.68 07/03/2024    BEVERLY 9.3 01/25/2023    BILITOTAL 0.6 06/20/2024    ALBUMIN 3.9 06/20/2024    ALT 20 06/20/2024    AST 23 06/20/2024       Results reviewed, labs MET treatment parameters, ok to proceed with treatment.      Post Infusion Assessment:  Patient tolerated infusion without incident.  Blood return noted pre and post infusion.  Site patent and intact, free from redness, edema or discomfort.  No evidence of extravasations.  Access discontinued per protocol.       Discharge Plan:   Discharge instructions reviewed with: Patient.  Patient and/or family verbalized understanding of discharge instructions and all questions answered.  AVS to patient via [facility].  Patient will return 7/24/24 for next appointment.   Patient discharged in stable condition accompanied by: self.  Departure Mode: Wheelchair.      Patricia Lindsey RN

## 2024-07-03 NOTE — PROGRESS NOTES
"Oncology Rooming Note    July 3, 2024 10:05 AM   Kt Rasmussen is a 63 year old male who presents for:    Chief Complaint   Patient presents with    Oncology Clinic Visit     Initial Vitals: /73   Temp 97.8  F (36.6  C)   Resp 16   SpO2 96%  Estimated body mass index is 22.69 kg/m  as calculated from the following:    Height as of 5/10/24: 1.918 m (6' 3.5\").    Weight as of 6/20/24: 83.5 kg (184 lb). There is no height or weight on file to calculate BSA.  No Pain (0) Comment: Data Unavailable   No LMP for male patient.  Allergies reviewed: Yes  Medications reviewed: Yes    Medications: Medication refills not needed today.  Pharmacy name entered into EPIC:    [facility] [place] [address] [facility] [place], [place] - [address] [id number] - [address] [facility] & [address] [facility] - [place], [place] - [address] [place] [address] [facility], [facility]. - [place] [id number]    Frailty Screening:   Is the patient here for a new oncology consult visit in cancer care? 2. No      Clinical concerns:   np was notified.    KT DOESN'T REMEMBER WHAT PHARMACY HE IS USING.      Julisa Whelan, [facility]            "

## 2024-07-03 NOTE — LETTER
"7/3/2024      Kt Rasmussen  [address] [place] [id number]      Dear Colleague,    Thank you for referring your patient, Kt Rasmussen, to the [facility]. Please see a copy of my visit note below.    Oncology Rooming Note    July 3, 2024 10:05 AM   Kt Rasmussen is a 63 year old male who presents for:    Chief Complaint   Patient presents with     Oncology Clinic Visit     Initial Vitals: /73   Temp 97.8  F (36.6  C)   Resp 16   SpO2 96%  Estimated body mass index is 22.69 kg/m  as calculated from the following:    Height as of 5/10/24: 1.918 m (6' 3.5\").    Weight as of 6/20/24: 83.5 kg (184 lb). There is no height or weight on file to calculate BSA.  No Pain (0) Comment: Data Unavailable   No LMP for male patient.  Allergies reviewed: Yes  Medications reviewed: Yes    Medications: Medication refills not needed today.  Pharmacy name entered into EPIC:    [facility] [place], [place] - [address] [facility] [address] [id number] - [place] [address] AT [facility] & [address] [facility] - [address], [facility]. [address]    Frailty Screening:   Is the patient here for a new oncology consult visit in cancer care? 2. No      Clinical concerns:   np was notified.    KT DOESN'T REMEMBER WHAT PHARMACY HE IS USING.      Julisa Whelan, [facility]              Oncology/Hematology Visit Note  Jul 3, 2024    Reason for Visit: follow up of metastatic non-small cell lung carcinoma  Metastatic to lymph nodes bones in bilateral lungs  Brain MRI negative for mets    Patient met with Dr. John who recommended treatment with palliative intent with paclitaxel carboplatin Avastin and atezolizumab-treatment started on April 27, 2021  -Due to thrombocytopenia carboplatin AUC " reduced to 4 with cycle 2  Due to pancytopenia carboplatin discontinued with cycle 5    7/12/2021: PET/CT showed resolved mural nodules with increased cystic cavity in left lower lobe with no significant FDG uptake, less likely metastases; no enlarged hypermetabolic mediastinal, hilar, or axillary lymph nodes, decreased metabolic activity of intraosseous lesion in spine and pelvis; no new bone lesions    Treated with paclitaxel, Avastin and atezolizumab.-Last treatment given in  12/22/2021-cycle 12    1/10/2022: PET/CT showed new foci of abnormal skeletal FGD uptake in the thoracic and lumbar spine. Mild interval increase in intensity of previously demonstrated hypermetabolic skeletal lesions involving the pelvis and lumbar spine. Findings are consistent with progressive osseous metastatic disease. Subsequently off chemo for 1 month due to poor performance status.  16. 1/25/2022: Patient fell and fractured his femur. This was surgically repaired and he was subsequently cared for at a rehab unit.  17. 2/14/2022: Brain MRI without contrast (contrast not given due to inability to obtain IV access) showed no brain metastases.    Met with Dr. Shoemaker-in Dr. John's absence  -Recommendation is to change therapy   04/21/2022 -palliative Carboplatin Alimta started   Due to cytopenia AUC reduced to 4 with cycle 2  Due to persistent pancytopenia and inability to tolerate treatment carboplatin discontinued with cycle 3-day 1  Patient was on monotherapy with Alimta    07/18/2022-PET CT scan shows progression of the disease  08/03/22-started Taxotere 60 mg/m2 every 3 weeks    06/2024-scan reveals progression of the disease  Treatment changed to single agent Alimta     Interval History:  Reports feeling well.  Denies fever chills sweats cough shortness of breath chest pain nausea vomiting diarrhea abdominal pain or bleeding      Review of Systems:  14 point ROS of systems including Constitutional, Eyes, Respiratory, Cardiovascular,  Gastroenterology, Genitourinary, Integumentary, Muscularskeletal, Psychiatric were all negative except for pertinent positives noted in my HPI.    Physical Examination:  Physical Exam  HENT:      Right Ear: Tympanic membrane normal.      Nose: Nose normal.      Mouth/Throat:      Mouth: Mucous membranes are moist.   Eyes:      Pupils: Pupils are equal, round, and reactive to light.   Cardiovascular:      Rate and Rhythm: Normal rate.      Pulses: Normal pulses.   Pulmonary:      Effort: Pulmonary effort is normal.   Abdominal:      General: Abdomen is flat.   Musculoskeletal:         General: Normal range of motion.      Cervical back: Normal range of motion.   Skin:     General: Skin is warm.   Neurological:      General: No focal deficit present.      Mental Status: He is alert.   Psychiatric:         Mood and Affect: Mood normal.           Laboratory Data:  CBC and CMP results reviewed    Assessment and Plan:  metastatic non-small cell lung cancer   Patient follows with Dr John   Progressed on  different treatments as above recently was on monotherapy with Alimta  07/18/2022-PET CT scan reveals progression of disease  Patient with Dr. John who recommended changing treatment to Taxotere 60 mg/m2 every 3 weeks  06/17/2024-PET scan revealed progressive disease  Taxotere discontinued patient.  Currently on single agent Alimta every 3 weeks to start cycle 1 day 1 today  Regimen potential side effects of Alimta reviewed with patient patient agrees to proceed  Labs reviewed okay to proceed with Alimta  Continue with B12 injections and folic acid per protocol  Plan for PET scan in September 2024  Patient has follow-up appointment with Dr. 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  --Continue apixaban indefinitely, provided no major bleeding issues arise in the future and platelets remain >= 50,00    Patient is advised to call our clinic or go to ER in the event of fever chills sweats cough shortness of breath chest pain nausea vomiting diarrhea abdominal pain bleeding edema or any changes in health    MADHAVI Moran CNP  [facility] [place]     Chart documentation with Dragon Voice recognition Software. Although reviewed after completion, some words and grammatical errors may remain.          Again, thank you for allowing me to participate in the care of your patient.        Sincerely,        MADHAVI Moran CNP

## 2024-07-19 NOTE — PROGRESS NOTES
[facility]    Hematology/Oncology Established Patient Follow-up Note      Today's Date: 7/24/2024    Reason for Follow-up: Metastatic non-small cell lung carcinoma.    HISTORY OF PRESENT ILLNESS: Kt Rasmussen is a 63 year old male who presents with the following oncologic history:  1. 5/05/2016: Presented with near-obstructing large mass coming off the cheikh and likely the posterior wall, seen on bronchoscopy. Biopsy of the mass showed invasive squamous cell carcinoma, moderately differentiating and focally keratinizing.  Procedure was complicated by significant bleeding.  Tumor was completely debulked (via bronchoscopy) in both main stem bronchi and distal trachea. NGS showed no mutations in EGFR, KRAS, BRAF, NRAS, HRAS, PIK3CA, ERBB2, MET, or JAK2.  2. 5/23/2016: PET/CT scan showed evidence of residual tumor with decreased endobronchial mass. Indeterminate, mildly hypermetabolic mesentery with prominent lymph nodes and area of enhancement of the right hepatic lobe present. Felt to have T4-NX-M0 disease.  3. 6/09/2016: Started concurrent chemoradiation with weekly paclitaxel and carboplatin.  4. 7/30/2016: Completed radiation.  Subsequently received 2 cycles of consolidation paclitaxel and carboplatin.   5. 9/13/2019: Presented with 6-month history of dysphonia and left vocal exophytic lesion. Left true vocal fold biopsy showed at least squamous cell carcinoma in situ, cannot exclude superficial invasion.  6. 9/30/2019: Excision of left vocal cord mass showed fragments of invasive squamous cell carcinoma, well differentiated and keratinizing.  Margins negative for malignancy.  7. 2/16/2021: CT chest w/o contrast showed thin-walled cavity in left lower lobe with 2 solid peripheral nodules measuring 9 mm each. No lymphadenopathy.  8. 3/01/2021: PET scan showed hypermetabolic nodules in left lower lobe and right lung, consistent with metastases; hypermetabolic adenopathy in chest, abdomen,  pelvis; nonspecific uptake at tongue; hypermetabolic intraosseous lesions in spine and pelvis consistent with metastatic disease; left axillary hypermetabolic lymph nodes related to COVID-19 vaccination.  9. 3/12/2021: CT-guided lung biopsy showed non-small cell carcinoma, not otherwise specified -- with opinion by [facility] pathologist.  10. 3/18/2021: Lung NGS panel negative for mutations in BRAF, EGFR, ERBB2, IDH1, IDH2, KRAS, MET, NRAS, RET. PD-L1 expression negative (TPS <1%). Due to minimal amount of DNA obtained from specimen, other biomarkers could not be analyzed.  11. 4/7/2021: MRI brain negative for brain metastases.  12. 4/27/2021: Started 1st line metastatic therapy with carboplatin, paclitaxel, bevacizumab, and atezolizumab for metastatic non-small cell lung carcinoma, NOS.  13. 7/12/2021: PET/CT showed resolved mural nodules with increased cystic cavity in left lower lobe with no significant FDG uptake, less likely metastases; no enlarged hypermetabolic mediastinal, hilar, or axillary lymph nodes, decreased metabolic activity of intraosseous lesion in spine and pelvis; no new bone lesions.  14. 10/11/2021: PET/CT showed slight interval increase in intensity of metabolic activity of right pubic bone and left ischium with new focal uptake in L2 spinous process; resolved uptake at previous ground glass opacity along right medial lower lobe; unchanged cystic cavities/nodules in left lower lobe and right apical upper lobe; no pathologically enlarged hypermetabolic mediastinal, hilar, or axillary lymph nodes.  15. 1/10/2022: PET/CT showed new foci of abnormal skeletal FGD uptake in the thoracic and lumbar spine. Mild interval increase in intensity of previously demonstrated hypermetabolic skeletal lesions involving the pelvis and lumbar spine. Findings are consistent with progressive osseous metastatic disease. Subsequently off chemo for 1 month due to poor performance status.  16. 1/25/2022: Patient fell  and fractured his femur. This was surgically repaired and he was subsequently cared for at a rehab unit.  17. 2/14/2022: Brain MRI without contrast (contrast not given due to inability to obtain IV access) showed no brain metastases.  18. 4/21/2022: Started 2nd line metastatic therapy with pemetrexed and carboplatin. Omission of carboplatin 6/2 and forward due to worsening anemia despite dose reduction.  19. 7/18/2022: PET/CT showed enlarged and increased FDG avid skeletal metastases, increased left para-aortic retroperitoneal lymph node increased in size and FDG avidity, findings consistent with progressive disease.  20. 8/3/2022: Started 3rd line metastatic therapy with Taxotere 60 mg/m2 every 3 weeks.  21. 11/21/2022: PET scan showed partial response, left para-aortic retroperitoneal lymph node has decreased from 2.6 x 1.7 cm (SUVmax 7.9) to 2.3 x 1.4 cm (SUVmax 5.7), skeletal metastases no longer demonstrate FDG activity. Kt elected to take a 2-month break from chemo.  22. 1/23/2023: PET scan showed increasing metabolic activity of the left periaortic (max SUV 8.1, previously 5.7) left external iliac (max SUV 7.0, previously 3.1), and right external iliac (max SUV 5.1, previously 3.7) lymph nodes with development/reactivation of multiple FDG avid osseous lesions.  23. 3/23/2023: Resumption of Taxotere every 3 weeks. Delayed due to insurance issues.  24. 6/26/2023: PET scan showed partial response with decreased uptake associated with the retroperitoneal and pelvic lymphadenopathy as well as bone metastases.  25. 12/4/2023: PET scan showed decreased FDG avid left periaortic (SUV max 5.4, previously 6.6) and left external iliac (SUV max 3.5, previously 4.9) lymphadenopathy and broadly stable FDG uptake within scattered pre-existing osseous metastases; new FDG avid lesion involving the right anterior first rib without clear fracture line visible (SUV max 4.7) indeterminate for a new osseous metastasis versus  posttraumatic in etiology.   26. 3/18/2024: PET scan showed overall stable disease.  27. 6/17/2024: PET scan showed progressive disease with FDG avid left para-aortic retroperitoneal node measures 2.1 x 2.6 cm compared to 2.0 x 2.6 cm with SUV max of 8.4 previously 6.3.  Increased uptake with an a more inferior left periaortic retroperitoneal node with SUV max of 7.4, previously 4.3. Mildly increased uptake within a left external iliac node with SUV max of 7.0, previously 4.3. There is a new 1.1 x 1.3 cm retrocaval retroperitoneal node with SUV max of 13.4. New FDG uptake within a subcentimeter node between the right obturator muscles with SUV max of 4.1. Worsening FDG avid osseous disease involving the right occipital condyle, right aspect of the clivus, T12 and L1 vertebral bodies, and bony pelvis. FDG uptake within the right sacral lesion with SUV max of 14.0 compared to 6.9 and FDG uptake within the T12 vertebral body with SUV max of 15.2 compared to 8.2.  28. 7/03/2024: Switched to pemetrexed.    INTERVAL HISTORY:  Kt reports feeling well. He denies any dyspnea, nausea.    REVIEW OF SYSTEMS:   14 point ROS was reviewed and is negative other than as noted above in HPI.       HOME MEDICATIONS:  Current Outpatient Medications   Medication Sig Dispense Refill    atorvastatin (LIPITOR) 40 MG tablet Take 1 tablet (40 mg) by mouth daily for 90 days 90 tablet 0    ELIQUIS ANTICOAGULANT 5 MG tablet Take 1 tablet (5 mg) by mouth 2 times daily for 90 days 180 tablet 0    folic acid (FOLVITE) 1 MG tablet Take 1 tablet (1 mg) by mouth daily 90 tablet 3    prochlorperazine (COMPAZINE) 10 MG tablet Take 1 tablet (10 mg) by mouth every 6 hours as needed for nausea or vomiting 30 tablet 2         ALLERGIES:  Allergies   Allergen Reactions    No Known Drug Allergy          PAST MEDICAL HISTORY:  Past Medical History:   Diagnosis Date    Alcohol abuse, unspecified     Cerebral infarction (H)     2009, right side residual and  aphasia    Dyslipidemia     GERD (gastroesophageal reflux disease)     Lung cancer (H)     Unspecified essential hypertension          PAST SURGICAL HISTORY:  Past Surgical History:   Procedure Laterality Date    BRONCHOSCOPY FLEXIBLE AND RIGID N/A 2016    Procedure: BRONCHOSCOPY FLEXIBLE AND RIGID;  Surgeon: Tony Talbot MD;  Location: [facility] OR    ESOPHAGOSCOPY FLEXIBLE N/A 2019    Procedure: flexible esophagoscopy;  Surgeon: Lizzy Johnson MD;  Location: [facility] OR    INJECT STEROID (LOCATION) N/A 2019    Procedure: steroid injection;  Surgeon: Lizzy Johnson MD;  Location: [facility] OR    IR CHEST PORT PLACEMENT > 5 YRS OF AGE  2021    IR CHEST PORT PLACEMENT > 5 YRS OF AGE  2022    IR PORT CHECK RIGHT  2022    IR PORT REMOVAL RIGHT  2022    LASER CO2 LARYNGOSCOPY N/A 2019    Procedure: Microdirect laryngoscopy with excision of laryngeal mass, CO2 laser;  Surgeon: Lizzy Johnson MD;  Location: [facility] OR    ZZC NONSPECIFIC PROCEDURE      R tympanoplasty         SOCIAL HISTORY:  Social History     Socioeconomic History    Marital status: Single     Spouse name: Not on file    Number of children: Not on file    Years of education: Not on file    Highest education level: Not on file   Occupational History    Not on file   Tobacco Use    Smoking status: Former     Current packs/day: 0.00     Types: Cigarettes     Quit date: 2009     Years since quittin.8    Smokeless tobacco: Never   Substance and Sexual Activity    Alcohol use: Not Currently    Drug use: No    Sexual activity: Not on file   Other Topics Concern    Parent/sibling w/ CABG, MI or angioplasty before 65F 55M? Not Asked   Social History Narrative    Not on file     Social Determinants of Health     Financial Resource Strain: Not on file   Food Insecurity: Not on file   Transportation Needs: Not on file   Physical Activity: Not on file   Stress: Not on file   Social Connections: Not on file   Interpersonal  "Safety: Not At Risk (2023)    Humiliation, Afraid, Rape, and Kick questionnaire     Fear of Current or Ex-Partner: No     Emotionally Abused: No     Physically Abused: No     Sexually Abused: No   Housing Stability: Not on file   Resides at assisted living.      FAMILY HISTORY:  Family History   Problem Relation Age of Onset    Cancer Father          PHYSICAL EXAM:  Vital signs:  /77   Pulse 86   Temp 97.4  F (36.3  C)   Resp 16   Ht 1.918 m (6' 3.5\")   Wt 82.1 kg (181 lb)   SpO2 98%   BMI 22.32 kg/m     ECO  GENERAL: No acute distress. Sitting in wheelchair.  EYES: No scleral icterus. No overt erythema.  RESPIRATORY: Clear bilaterally.  CARDIAC: Regular rate and rhythm with no murmurs.  SKIN: No overt rashes, discolorations, or lesions over the face and neck.  NEUROLOGIC: Alert.  No overt tremors.  PSYCHIATRIC: Normal affect and mood.  Does not appear anxious.       LABS:  CBC RESULTS:   Recent Labs   Lab Test 24  1002   WBC 5.9   RBC 3.90*   HGB 11.6*   HCT 35.9*   MCV 92   MCH 29.7   MCHC 32.3   RDW 14.5         Last Comprehensive Metabolic Panel:  Sodium   Date Value Ref Range Status   2023 140 136 - 145 mmol/L Final   2021 140 133 - 144 mmol/L Final     Potassium   Date Value Ref Range Status   2023 4.0 3.4 - 5.3 mmol/L Final   2022 4.1 3.4 - 5.3 mmol/L Final   2021 4.3 3.4 - 5.3 mmol/L Final     Chloride   Date Value Ref Range Status   2023 102 98 - 107 mmol/L Final   2022 109 94 - 109 mmol/L Final   2021 111 (H) 94 - 109 mmol/L Final     Carbon Dioxide   Date Value Ref Range Status   2021 25 20 - 32 mmol/L Final     Carbon Dioxide (CO2)   Date Value Ref Range Status   2023 28 22 - 29 mmol/L Final   2022 25 20 - 32 mmol/L Final     Anion Gap   Date Value Ref Range Status   2023 10 7 - 15 mmol/L Final   2022 7 3 - 14 mmol/L Final   2021 4 3 - 14 mmol/L Final     Glucose   Date Value Ref Range " Status   01/25/2023 95 70 - 99 mg/dL Final   12/07/2022 91 70 - 99 mg/dL Final   06/29/2021 85 70 - 99 mg/dL Final     Urea Nitrogen   Date Value Ref Range Status   01/25/2023 16.0 8.0 - 23.0 mg/dL Final   12/07/2022 17 7 - 30 mg/dL Final   06/29/2021 17 7 - 30 mg/dL Final     Creatinine   Date Value Ref Range Status   07/24/2024 0.67 0.67 - 1.17 mg/dL Final   06/29/2021 0.84 0.66 - 1.25 mg/dL Final     GFR Estimate   Date Value Ref Range Status   07/24/2024 >90 >60 mL/min/1.73m2 Final     Comment:     eGFR calculated using 2021 CKD-EPI equation.   06/29/2021 >90 >60 mL/min/[1.73_m2] Final     Comment:     Non  GFR Calc  Starting 12/18/2018, serum creatinine based estimated GFR (eGFR) will be   calculated using the Chronic Kidney Disease Epidemiology Collaboration   (CKD-EPI) equation.       GFR, ESTIMATED POCT   Date Value Ref Range Status   06/17/2024 >60 >60 mL/min/1.73m2 Final     Calcium   Date Value Ref Range Status   01/25/2023 9.3 8.8 - 10.2 mg/dL Final   06/29/2021 8.7 8.5 - 10.1 mg/dL Final     Bilirubin Total   Date Value Ref Range Status   06/20/2024 0.6 <=1.2 mg/dL Final   06/29/2021 0.3 0.2 - 1.3 mg/dL Final     Alkaline Phosphatase   Date Value Ref Range Status   06/20/2024 92 40 - 150 U/L Final   06/29/2021 73 40 - 150 U/L Final     ALT   Date Value Ref Range Status   06/20/2024 20 0 - 70 U/L Final     Comment:     Reference intervals for this test were updated on 6/12/2023 to more accurately reflect our healthy population. There may be differences in the flagging of prior results with similar values performed with this method. Interpretation of those prior results can be made in the context of the updated reference intervals.     06/29/2021 34 0 - 70 U/L Final     AST   Date Value Ref Range Status   06/20/2024 23 0 - 45 U/L Final     Comment:     Reference intervals for this test were updated on 6/12/2023 to more accurately reflect our healthy population. There may be differences in  the flagging of prior results with similar values performed with this method. Interpretation of those prior results can be made in the context of the updated reference intervals.   06/29/2021 26 0 - 45 U/L Final                  PATHOLOGY:  None new.    IMAGING:  Reviewed as per HPI.    ASSESSMENT/PLAN:  Kt Rasmussen is a 63 year old male with the following issues:  1. Metastatic non-small (non-squamous) cell lung carcinoma with metastases to lymph nodes, bones, and bilateral lungs  2. 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 Chemotherapy-induced anemia and thrombocytopenia  --Lung NGS panel negative for mutations in BRAF, EGFR, ERBB2, IDH1, IDH2, KRAS, MET, NRAS, RET. PD-L1 expression negative (TPS <1%). Guardant 360 negative for actionable mutations.  --Kt started 3rd line metastatic therapy with every 3-week Taxotere at 20% dose reduction (60 mg/m2) on 8/3/2022 due to progressive disease.  He tolerated this very well with minimal to no paresthesias. He then took a 2-month chemo break that was further delayed to 3.5 months due to insurance issues. He resumed Taxotere 3/23/2023.  --6/17/2024 PET scan showed progressive disease in the abdomen/pelvis nodes and bone metastases.  --Switched to pemetrexed 7/3/2024.  --He declines to take the oral dexamethasone premedication.  --Will repeat PET scan in 9/2024.    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 Dysphonia  6. Dysphagia  -Kt underwent excision of a left vocal cord SCC on 9/30/2019 and has persistent dysphonia as a result of the lesion and excision.  He also has dysphagia but this is stable and swallowing is manageable.  -Continue follow-up with Dr. Wray.    7. History of dizziness and prior falls  --SW and guardian were previously notified of multiple falls.  --Prior brain MRI 2/14/2022 showed no brain metastases. However this was a suboptimal  exam due to inability to administer contrast.  --He is using a wheelchair due to prior femoral fracture in 1/2022.  --He denies recent falls over past 3 months.    8. Bilateral lower extremity DVT  --Diagnosed in 9/2023, likely hypercoagulability of malignancy.  --Continue apixaban indefinitely, provided no major bleeding issues arise in the future and platelets remain >= 50,000.    Sangita John MD  [facility] Hematology/Oncology    Total time spent today: 30 minutes in chart review, patient evaluation, counseling, documentation, test and/or medication/prescription orders, and coordination of care.     The longitudinal plan of care for the diagnosis(es)/condition(s) as documented were addressed during this visit. Due to the added complexity in care, I will continue to support Kt in the subsequent management and with ongoing continuity of care.

## 2024-07-22 RX ORDER — HEPARIN SODIUM,PORCINE 10 UNIT/ML
5-20 VIAL (ML) INTRAVENOUS DAILY PRN
Status: CANCELLED | OUTPATIENT
Start: 2024-07-24

## 2024-07-22 RX ORDER — ALBUTEROL SULFATE 0.83 MG/ML
2.5 SOLUTION RESPIRATORY (INHALATION)
Status: CANCELLED | OUTPATIENT
Start: 2024-07-24

## 2024-07-22 RX ORDER — LORAZEPAM 2 MG/ML
0.5 INJECTION INTRAMUSCULAR EVERY 4 HOURS PRN
Status: CANCELLED | OUTPATIENT
Start: 2024-07-24

## 2024-07-22 RX ORDER — DIPHENHYDRAMINE HYDROCHLORIDE 50 MG/ML
50 INJECTION INTRAMUSCULAR; INTRAVENOUS
Status: CANCELLED
Start: 2024-07-24

## 2024-07-22 RX ORDER — EPINEPHRINE 1 MG/ML
0.3 INJECTION, SOLUTION, CONCENTRATE INTRAVENOUS EVERY 5 MIN PRN
Status: CANCELLED | OUTPATIENT
Start: 2024-07-24

## 2024-07-22 RX ORDER — MEPERIDINE HYDROCHLORIDE 25 MG/ML
25 INJECTION INTRAMUSCULAR; INTRAVENOUS; SUBCUTANEOUS EVERY 30 MIN PRN
Status: CANCELLED | OUTPATIENT
Start: 2024-07-24

## 2024-07-22 RX ORDER — ONDANSETRON 2 MG/ML
8 INJECTION INTRAMUSCULAR; INTRAVENOUS ONCE
Status: CANCELLED | OUTPATIENT
Start: 2024-07-24

## 2024-07-22 RX ORDER — ALBUTEROL SULFATE 90 UG/1
1-2 AEROSOL, METERED RESPIRATORY (INHALATION)
Status: CANCELLED
Start: 2024-07-24

## 2024-07-22 RX ORDER — CYANOCOBALAMIN 1000 UG/ML
1000 INJECTION, SOLUTION INTRAMUSCULAR; SUBCUTANEOUS
Status: CANCELLED | OUTPATIENT
Start: 2024-07-24

## 2024-07-22 RX ORDER — HEPARIN SODIUM (PORCINE) LOCK FLUSH IV SOLN 100 UNIT/ML 100 UNIT/ML
5 SOLUTION INTRAVENOUS
Status: CANCELLED | OUTPATIENT
Start: 2024-07-24

## 2024-07-24 ENCOUNTER — LAB (OUTPATIENT)
Dept: INFUSION THERAPY | Facility: CLINIC | Age: 64
End: 2024-07-24
Attending: INTERNAL MEDICINE
Payer: COMMERCIAL

## 2024-07-24 ENCOUNTER — ONCOLOGY VISIT (OUTPATIENT)
Dept: ONCOLOGY | Facility: CLINIC | Age: 64
End: 2024-07-24
Attending: INTERNAL MEDICINE
Payer: COMMERCIAL

## 2024-07-24 VITALS
TEMPERATURE: 97.4 F | DIASTOLIC BLOOD PRESSURE: 77 MMHG | RESPIRATION RATE: 16 BRPM | HEART RATE: 86 BPM | SYSTOLIC BLOOD PRESSURE: 114 MMHG | HEIGHT: 76 IN | BODY MASS INDEX: 22.04 KG/M2 | OXYGEN SATURATION: 98 % | WEIGHT: 181 LBS

## 2024-07-24 DIAGNOSIS — Z51.11 ENCOUNTER FOR ANTINEOPLASTIC CHEMOTHERAPY: ICD-10-CM

## 2024-07-24 DIAGNOSIS — C34.90 NON-SMALL CELL LUNG CANCER METASTATIC TO BONE (H): Primary | ICD-10-CM

## 2024-07-24 DIAGNOSIS — Z51.11 ENCOUNTER FOR ANTINEOPLASTIC CHEMOTHERAPY: Primary | ICD-10-CM

## 2024-07-24 DIAGNOSIS — C34.90 NON-SMALL CELL CARCINOMA OF LUNG, STAGE 3, UNSPECIFIED LATERALITY (H): ICD-10-CM

## 2024-07-24 DIAGNOSIS — C78.02 MALIGNANT NEOPLASM METASTATIC TO BOTH LUNGS (H): ICD-10-CM

## 2024-07-24 DIAGNOSIS — Z86.718 PERSONAL HISTORY OF DVT (DEEP VEIN THROMBOSIS): ICD-10-CM

## 2024-07-24 DIAGNOSIS — C79.51 NON-SMALL CELL LUNG CANCER METASTATIC TO BONE (H): Primary | ICD-10-CM

## 2024-07-24 DIAGNOSIS — C34.90 NON-SMALL CELL LUNG CANCER METASTATIC TO BONE (H): ICD-10-CM

## 2024-07-24 DIAGNOSIS — C78.01 MALIGNANT NEOPLASM METASTATIC TO BOTH LUNGS (H): ICD-10-CM

## 2024-07-24 DIAGNOSIS — T45.1X5A CHEMOTHERAPY-INDUCED NEUTROPENIA (H): ICD-10-CM

## 2024-07-24 DIAGNOSIS — C79.51 NON-SMALL CELL LUNG CANCER METASTATIC TO BONE (H): ICD-10-CM

## 2024-07-24 DIAGNOSIS — D70.1 CHEMOTHERAPY-INDUCED NEUTROPENIA (H): ICD-10-CM

## 2024-07-24 LAB
BASOPHILS # BLD AUTO: 0 10E3/UL (ref 0–0.2)
BASOPHILS NFR BLD AUTO: 0 %
CREAT SERPL-MCNC: 0.67 MG/DL (ref 0.67–1.17)
EGFRCR SERPLBLD CKD-EPI 2021: >90 ML/MIN/1.73M2
EOSINOPHIL # BLD AUTO: 0.2 10E3/UL (ref 0–0.7)
EOSINOPHIL NFR BLD AUTO: 3 %
ERYTHROCYTE [DISTWIDTH] IN BLOOD BY AUTOMATED COUNT: 14.5 % (ref 10–15)
HCT VFR BLD AUTO: 35.9 % (ref 40–53)
HGB BLD-MCNC: 11.6 G/DL (ref 13.3–17.7)
IMM GRANULOCYTES # BLD: 0 10E3/UL
IMM GRANULOCYTES NFR BLD: 0 %
LYMPHOCYTES # BLD AUTO: 0.9 10E3/UL (ref 0.8–5.3)
LYMPHOCYTES NFR BLD AUTO: 15 %
MCH RBC QN AUTO: 29.7 PG (ref 26.5–33)
MCHC RBC AUTO-ENTMCNC: 32.3 G/DL (ref 31.5–36.5)
MCV RBC AUTO: 92 FL (ref 78–100)
MONOCYTES # BLD AUTO: 0.6 10E3/UL (ref 0–1.3)
MONOCYTES NFR BLD AUTO: 10 %
NEUTROPHILS # BLD AUTO: 4.2 10E3/UL (ref 1.6–8.3)
NEUTROPHILS NFR BLD AUTO: 71 %
NRBC # BLD AUTO: 0 10E3/UL
NRBC BLD AUTO-RTO: 0 /100
PLATELET # BLD AUTO: 227 10E3/UL (ref 150–450)
RBC # BLD AUTO: 3.9 10E6/UL (ref 4.4–5.9)
WBC # BLD AUTO: 5.9 10E3/UL (ref 4–11)

## 2024-07-24 PROCEDURE — 96409 CHEMO IV PUSH SNGL DRUG: CPT

## 2024-07-24 PROCEDURE — 250N000011 HC RX IP 250 OP 636: Performed by: INTERNAL MEDICINE

## 2024-07-24 PROCEDURE — 96375 TX/PRO/DX INJ NEW DRUG ADDON: CPT

## 2024-07-24 PROCEDURE — 258N000003 HC RX IP 258 OP 636: Performed by: INTERNAL MEDICINE

## 2024-07-24 PROCEDURE — 99214 OFFICE O/P EST MOD 30 MIN: CPT | Performed by: INTERNAL MEDICINE

## 2024-07-24 PROCEDURE — 85025 COMPLETE CBC W/AUTO DIFF WBC: CPT | Performed by: INTERNAL MEDICINE

## 2024-07-24 PROCEDURE — G2211 COMPLEX E/M VISIT ADD ON: HCPCS | Performed by: INTERNAL MEDICINE

## 2024-07-24 PROCEDURE — G0463 HOSPITAL OUTPT CLINIC VISIT: HCPCS | Mod: 25 | Performed by: INTERNAL MEDICINE

## 2024-07-24 PROCEDURE — 36591 DRAW BLOOD OFF VENOUS DEVICE: CPT | Performed by: INTERNAL MEDICINE

## 2024-07-24 PROCEDURE — 82565 ASSAY OF CREATININE: CPT | Performed by: INTERNAL MEDICINE

## 2024-07-24 RX ORDER — ONDANSETRON 2 MG/ML
8 INJECTION INTRAMUSCULAR; INTRAVENOUS EVERY 6 HOURS PRN
Status: CANCELLED
Start: 2024-07-24

## 2024-07-24 RX ORDER — HEPARIN SODIUM,PORCINE 10 UNIT/ML
5 VIAL (ML) INTRAVENOUS
Status: CANCELLED | OUTPATIENT
Start: 2024-07-24

## 2024-07-24 RX ORDER — HEPARIN SODIUM (PORCINE) LOCK FLUSH IV SOLN 100 UNIT/ML 100 UNIT/ML
5 SOLUTION INTRAVENOUS
Status: DISCONTINUED | OUTPATIENT
Start: 2024-07-24 | End: 2024-07-24 | Stop reason: HOSPADM

## 2024-07-24 RX ORDER — CYANOCOBALAMIN 1000 UG/ML
1000 INJECTION, SOLUTION INTRAMUSCULAR; SUBCUTANEOUS ONCE
Status: CANCELLED
Start: 2024-07-24 | End: 2024-07-24

## 2024-07-24 RX ORDER — HEPARIN SODIUM (PORCINE) LOCK FLUSH IV SOLN 100 UNIT/ML 100 UNIT/ML
5 SOLUTION INTRAVENOUS
Status: CANCELLED | OUTPATIENT
Start: 2024-07-24

## 2024-07-24 RX ORDER — ONDANSETRON 2 MG/ML
8 INJECTION INTRAMUSCULAR; INTRAVENOUS ONCE
Status: CANCELLED
Start: 2024-07-24 | End: 2024-07-24

## 2024-07-24 RX ORDER — ONDANSETRON 2 MG/ML
8 INJECTION INTRAMUSCULAR; INTRAVENOUS ONCE
Status: COMPLETED | OUTPATIENT
Start: 2024-07-24 | End: 2024-07-24

## 2024-07-24 RX ADMIN — SODIUM CHLORIDE 250 ML: 9 INJECTION, SOLUTION INTRAVENOUS at 11:35

## 2024-07-24 RX ADMIN — PEMETREXED DISODIUM 1000 MG: 500 INJECTION, POWDER, LYOPHILIZED, FOR SOLUTION INTRAVENOUS at 11:45

## 2024-07-24 RX ADMIN — ONDANSETRON 8 MG: 2 INJECTION INTRAMUSCULAR; INTRAVENOUS at 11:37

## 2024-07-24 RX ADMIN — Medication 5 ML: at 12:00

## 2024-07-24 ASSESSMENT — PAIN SCALES - GENERAL: PAINLEVEL: NO PAIN (0)

## 2024-07-24 NOTE — LETTER
7/24/2024      Kt Rasmussen  [address]  [facility] [address]      Dear Colleague,    Thank you for referring your patient, Kt Rasmussen, to the [facility]. Please see a copy of my visit note below.    [facility]    Hematology/Oncology Established Patient Follow-up Note      Today's Date: 7/24/2024    Reason for Follow-up: Metastatic non-small cell lung carcinoma.    HISTORY OF PRESENT ILLNESS: Kt Rasmussen is a 63 year old male who presents with the following oncologic history:  1. 5/05/2016: Presented with near-obstructing large mass coming off the cheikh and likely the posterior wall, seen on bronchoscopy. Biopsy of the mass showed invasive squamous cell carcinoma, moderately differentiating and focally keratinizing.  Procedure was complicated by significant bleeding.  Tumor was completely debulked (via bronchoscopy) in both main stem bronchi and distal trachea. NGS showed no mutations in EGFR, KRAS, BRAF, NRAS, HRAS, PIK3CA, ERBB2, MET, or JAK2.  2. 5/23/2016: PET/CT scan showed evidence of residual tumor with decreased endobronchial mass. Indeterminate, mildly hypermetabolic mesentery with prominent lymph nodes and area of enhancement of the right hepatic lobe present. Felt to have T4-NX-M0 disease.  3. 6/09/2016: Started concurrent chemoradiation with weekly paclitaxel and carboplatin.  4. 7/30/2016: Completed radiation.  Subsequently received 2 cycles of consolidation paclitaxel and carboplatin.   5. 9/13/2019: Presented with 6-month history of dysphonia and left vocal exophytic lesion. Left true vocal fold biopsy showed at least squamous cell carcinoma in situ, cannot exclude superficial invasion.  6. 9/30/2019: Excision of left vocal cord mass showed fragments of invasive squamous cell carcinoma, well differentiated and keratinizing.  Margins negative for malignancy.  7. 2/16/2021: CT chest w/o contrast showed thin-walled cavity in left lower  lobe with 2 solid peripheral nodules measuring 9 mm each. No lymphadenopathy.  8. 3/01/2021: PET scan showed hypermetabolic nodules in left lower lobe and right lung, consistent with metastases; hypermetabolic adenopathy in chest, abdomen, pelvis; nonspecific uptake at tongue; hypermetabolic intraosseous lesions in spine and pelvis consistent with metastatic disease; left axillary hypermetabolic lymph nodes related to COVID-19 vaccination.  9. 3/12/2021: CT-guided lung biopsy showed non-small cell carcinoma, not otherwise specified -- with opinion by [facility] pathologist.  10. 3/18/2021: Lung NGS panel negative for mutations in BRAF, EGFR, ERBB2, IDH1, IDH2, KRAS, MET, NRAS, RET. PD-L1 expression negative (TPS <1%). Due to minimal amount of DNA obtained from specimen, other biomarkers could not be analyzed.  11. 4/7/2021: MRI brain negative for brain metastases.  12. 4/27/2021: Started 1st line metastatic therapy with carboplatin, paclitaxel, bevacizumab, and atezolizumab for metastatic non-small cell lung carcinoma, NOS.  13. 7/12/2021: PET/CT showed resolved mural nodules with increased cystic cavity in left lower lobe with no significant FDG uptake, less likely metastases; no enlarged hypermetabolic mediastinal, hilar, or axillary lymph nodes, decreased metabolic activity of intraosseous lesion in spine and pelvis; no new bone lesions.  14. 10/11/2021: PET/CT showed slight interval increase in intensity of metabolic activity of right pubic bone and left ischium with new focal uptake in L2 spinous process; resolved uptake at previous ground glass opacity along right medial lower lobe; unchanged cystic cavities/nodules in left lower lobe and right apical upper lobe; no pathologically enlarged hypermetabolic mediastinal, hilar, or axillary lymph nodes.  15. 1/10/2022: PET/CT showed new foci of abnormal skeletal FGD uptake in the thoracic and lumbar spine. Mild interval increase in intensity of previously  demonstrated hypermetabolic skeletal lesions involving the pelvis and lumbar spine. Findings are consistent with progressive osseous metastatic disease. Subsequently off chemo for 1 month due to poor performance status.  16. 1/25/2022: Patient fell and fractured his femur. This was surgically repaired and he was subsequently cared for at a rehab unit.  17. 2/14/2022: Brain MRI without contrast (contrast not given due to inability to obtain IV access) showed no brain metastases.  18. 4/21/2022: Started 2nd line metastatic therapy with pemetrexed and carboplatin. Omission of carboplatin 6/2 and forward due to worsening anemia despite dose reduction.  19. 7/18/2022: PET/CT showed enlarged and increased FDG avid skeletal metastases, increased left para-aortic retroperitoneal lymph node increased in size and FDG avidity, findings consistent with progressive disease.  20. 8/3/2022: Started 3rd line metastatic therapy with Taxotere 60 mg/m2 every 3 weeks.  21. 11/21/2022: PET scan showed partial response, left para-aortic retroperitoneal lymph node has decreased from 2.6 x 1.7 cm (SUVmax 7.9) to 2.3 x 1.4 cm (SUVmax 5.7), skeletal metastases no longer demonstrate FDG activity. Kt elected to take a 2-month break from chemo.  22. 1/23/2023: PET scan showed increasing metabolic activity of the left periaortic (max SUV 8.1, previously 5.7) left external iliac (max SUV 7.0, previously 3.1), and right external iliac (max SUV 5.1, previously 3.7) lymph nodes with development/reactivation of multiple FDG avid osseous lesions.  23. 3/23/2023: Resumption of Taxotere every 3 weeks. Delayed due to insurance issues.  24. 6/26/2023: PET scan showed partial response with decreased uptake associated with the retroperitoneal and pelvic lymphadenopathy as well as bone metastases.  25. 12/4/2023: PET scan showed decreased FDG avid left periaortic (SUV max 5.4, previously 6.6) and left external iliac (SUV max 3.5, previously 4.9)  lymphadenopathy and broadly stable FDG uptake within scattered pre-existing osseous metastases; new FDG avid lesion involving the right anterior first rib without clear fracture line visible (SUV max 4.7) indeterminate for a new osseous metastasis versus posttraumatic in etiology.   26. 3/18/2024: PET scan showed overall stable disease.  27. 6/17/2024: PET scan showed progressive disease with FDG avid left para-aortic retroperitoneal node measures 2.1 x 2.6 cm compared to 2.0 x 2.6 cm with SUV max of 8.4 previously 6.3.  Increased uptake with an a more inferior left periaortic retroperitoneal node with SUV max of 7.4, previously 4.3. Mildly increased uptake within a left external iliac node with SUV max of 7.0, previously 4.3. There is a new 1.1 x 1.3 cm retrocaval retroperitoneal node with SUV max of 13.4. New FDG uptake within a subcentimeter node between the right obturator muscles with SUV max of 4.1. Worsening FDG avid osseous disease involving the right occipital condyle, right aspect of the clivus, T12 and L1 vertebral bodies, and bony pelvis. FDG uptake within the right sacral lesion with SUV max of 14.0 compared to 6.9 and FDG uptake within the T12 vertebral body with SUV max of 15.2 compared to 8.2.  28. 7/03/2024: Switched to pemetrexed.    INTERVAL HISTORY:  Kt reports feeling well. He denies any dyspnea, nausea.    REVIEW OF SYSTEMS:   14 point ROS was reviewed and is negative other than as noted above in HPI.       HOME MEDICATIONS:  Current Outpatient Medications   Medication Sig Dispense Refill     atorvastatin (LIPITOR) 40 MG tablet Take 1 tablet (40 mg) by mouth daily for 90 days 90 tablet 0     ELIQUIS ANTICOAGULANT 5 MG tablet Take 1 tablet (5 mg) by mouth 2 times daily for 90 days 180 tablet 0     folic acid (FOLVITE) 1 MG tablet Take 1 tablet (1 mg) by mouth daily 90 tablet 3     prochlorperazine (COMPAZINE) 10 MG tablet Take 1 tablet (10 mg) by mouth every 6 hours as needed for nausea or  vomiting 30 tablet 2         ALLERGIES:  Allergies   Allergen Reactions     No Known Drug Allergy          PAST MEDICAL HISTORY:  Past Medical History:   Diagnosis Date     Alcohol abuse, unspecified      Cerebral infarction (H)     , right side residual and aphasia     Dyslipidemia      GERD (gastroesophageal reflux disease)      Lung cancer (H)      Unspecified essential hypertension          PAST SURGICAL HISTORY:  Past Surgical History:   Procedure Laterality Date     BRONCHOSCOPY FLEXIBLE AND RIGID N/A 2016    Procedure: BRONCHOSCOPY FLEXIBLE AND RIGID;  Surgeon: Tony Talbot MD;  Location: [facility] OR     ESOPHAGOSCOPY FLEXIBLE N/A 2019    Procedure: flexible esophagoscopy;  Surgeon: Lizzy Johnson MD;  Location: [facility] OR     INJECT STEROID (LOCATION) N/A 2019    Procedure: steroid injection;  Surgeon: Lizzy Johnson MD;  Location: [facility] OR     IR CHEST PORT PLACEMENT > 5 YRS OF AGE  2021     IR CHEST PORT PLACEMENT > 5 YRS OF AGE  2022     IR PORT CHECK RIGHT  2022     IR PORT REMOVAL RIGHT  2022     LASER CO2 LARYNGOSCOPY N/A 2019    Procedure: Microdirect laryngoscopy with excision of laryngeal mass, CO2 laser;  Surgeon: Lizzy Johnson MD;  Location: [facility] OR     [facility] NONSPECIFIC PROCEDURE      R tympanoplasty         SOCIAL HISTORY:  Social History     Socioeconomic History     Marital status: Single     Spouse name: Not on file     Number of children: Not on file     Years of education: Not on file     Highest education level: Not on file   Occupational History     Not on file   Tobacco Use     Smoking status: Former     Current packs/day: 0.00     Types: Cigarettes     Quit date: 2009     Years since quittin.8     Smokeless tobacco: Never   Substance and Sexual Activity     Alcohol use: Not Currently     Drug use: No     Sexual activity: Not on file   Other Topics Concern     Parent/sibling w/ CABG, MI or angioplasty before 65F 55M? Not Asked  "  Social History Narrative     Not on file     Social Determinants of Health     Financial Resource Strain: Not on file   Food Insecurity: Not on file   Transportation Needs: Not on file   Physical Activity: Not on file   Stress: Not on file   Social Connections: Not on file   Interpersonal Safety: Not At Risk (2023)    Humiliation, Afraid, Rape, and Kick questionnaire      Fear of Current or Ex-Partner: No      Emotionally Abused: No      Physically Abused: No      Sexually Abused: No   Housing Stability: Not on file   Resides at assisted living.      FAMILY HISTORY:  Family History   Problem Relation Age of Onset     Cancer Father          PHYSICAL EXAM:  Vital signs:  /77   Pulse 86   Temp 97.4  F (36.3  C)   Resp 16   Ht 1.918 m (6' 3.5\")   Wt 82.1 kg (181 lb)   SpO2 98%   BMI 22.32 kg/m     ECO  GENERAL: No acute distress. Sitting in wheelchair.  EYES: No scleral icterus. No overt erythema.  RESPIRATORY: Clear bilaterally.  CARDIAC: Regular rate and rhythm with no murmurs.  SKIN: No overt rashes, discolorations, or lesions over the face and neck.  NEUROLOGIC: Alert.  No overt tremors.  PSYCHIATRIC: Normal affect and mood.  Does not appear anxious.       LABS:  CBC RESULTS:   Recent Labs   Lab Test 24  1002   WBC 5.9   RBC 3.90*   HGB 11.6*   HCT 35.9*   MCV 92   MCH 29.7   MCHC 32.3   RDW 14.5         Last Comprehensive Metabolic Panel:  Sodium   Date Value Ref Range Status   2023 140 136 - 145 mmol/L Final   2021 140 133 - 144 mmol/L Final     Potassium   Date Value Ref Range Status   2023 4.0 3.4 - 5.3 mmol/L Final   2022 4.1 3.4 - 5.3 mmol/L Final   2021 4.3 3.4 - 5.3 mmol/L Final     Chloride   Date Value Ref Range Status   2023 102 98 - 107 mmol/L Final   2022 109 94 - 109 mmol/L Final   2021 111 (H) 94 - 109 mmol/L Final     Carbon Dioxide   Date Value Ref Range Status   2021 25 20 - 32 mmol/L Final     Carbon Dioxide " (CO2)   Date Value Ref Range Status   01/25/2023 28 22 - 29 mmol/L Final   12/07/2022 25 20 - 32 mmol/L Final     Anion Gap   Date Value Ref Range Status   01/25/2023 10 7 - 15 mmol/L Final   12/07/2022 7 3 - 14 mmol/L Final   06/29/2021 4 3 - 14 mmol/L Final     Glucose   Date Value Ref Range Status   01/25/2023 95 70 - 99 mg/dL Final   12/07/2022 91 70 - 99 mg/dL Final   06/29/2021 85 70 - 99 mg/dL Final     Urea Nitrogen   Date Value Ref Range Status   01/25/2023 16.0 8.0 - 23.0 mg/dL Final   12/07/2022 17 7 - 30 mg/dL Final   06/29/2021 17 7 - 30 mg/dL Final     Creatinine   Date Value Ref Range Status   07/24/2024 0.67 0.67 - 1.17 mg/dL Final   06/29/2021 0.84 0.66 - 1.25 mg/dL Final     GFR Estimate   Date Value Ref Range Status   07/24/2024 >90 >60 mL/min/1.73m2 Final     Comment:     eGFR calculated using 2021 CKD-EPI equation.   06/29/2021 >90 >60 mL/min/[1.73_m2] Final     Comment:     Non  GFR Calc  Starting 12/18/2018, serum creatinine based estimated GFR (eGFR) will be   calculated using the Chronic Kidney Disease Epidemiology Collaboration   (CKD-EPI) equation.       GFR, ESTIMATED POCT   Date Value Ref Range Status   06/17/2024 >60 >60 mL/min/1.73m2 Final     Calcium   Date Value Ref Range Status   01/25/2023 9.3 8.8 - 10.2 mg/dL Final   06/29/2021 8.7 8.5 - 10.1 mg/dL Final     Bilirubin Total   Date Value Ref Range Status   06/20/2024 0.6 <=1.2 mg/dL Final   06/29/2021 0.3 0.2 - 1.3 mg/dL Final     Alkaline Phosphatase   Date Value Ref Range Status   06/20/2024 92 40 - 150 U/L Final   06/29/2021 73 40 - 150 U/L Final     ALT   Date Value Ref Range Status   06/20/2024 20 0 - 70 U/L Final     Comment:     Reference intervals for this test were updated on 6/12/2023 to more accurately reflect our healthy population. There may be differences in the flagging of prior results with similar values performed with this method. Interpretation of those prior results can be made in the context of  the updated reference intervals.     06/29/2021 34 0 - 70 U/L Final     AST   Date Value Ref Range Status   06/20/2024 23 0 - 45 U/L Final     Comment:     Reference intervals for this test were updated on 6/12/2023 to more accurately reflect our healthy population. There may be differences in the flagging of prior results with similar values performed with this method. Interpretation of those prior results can be made in the context of the updated reference intervals.   06/29/2021 26 0 - 45 U/L Final                  PATHOLOGY:  None new.    IMAGING:  Reviewed as per HPI.    ASSESSMENT/PLAN:  Kt Rasmussen is a 63 year old male with the following issues:  1. Metastatic non-small (non-squamous) cell lung carcinoma with metastases to lymph nodes, bones, and bilateral lungs  2. 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 Chemotherapy-induced anemia and thrombocytopenia  --Lung NGS panel negative for mutations in BRAF, EGFR, ERBB2, IDH1, IDH2, KRAS, MET, NRAS, RET. PD-L1 expression negative (TPS <1%). Guardant 360 negative for actionable mutations.  --Kt started 3rd line metastatic therapy with every 3-week Taxotere at 20% dose reduction (60 mg/m2) on 8/3/2022 due to progressive disease.  He tolerated this very well with minimal to no paresthesias. He then took a 2-month chemo break that was further delayed to 3.5 months due to insurance issues. He resumed Taxotere 3/23/2023.  --6/17/2024 PET scan showed progressive disease in the abdomen/pelvis nodes and bone metastases.  --Switched to pemetrexed 7/3/2024.  --He declines to take the oral dexamethasone premedication.  --Will repeat PET scan in 9/2024.    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 Dysphonia  6. Dysphagia  -Kt underwent excision of a left vocal cord SCC on 9/30/2019 and has persistent dysphonia as a result of the lesion and excision.  He  "also has dysphagia but this is stable and swallowing is manageable.  -Continue follow-up with Dr. Wray.    7. History of dizziness and prior falls  --SW and guardian were previously notified of multiple falls.  --Prior brain MRI 2/14/2022 showed no brain metastases. However this was a suboptimal exam due to inability to administer contrast.  --He is using a wheelchair due to prior femoral fracture in 1/2022.  --He denies recent falls over past 3 months.    8. Bilateral lower extremity DVT  --Diagnosed in 9/2023, likely hypercoagulability of malignancy.  --Continue apixaban indefinitely, provided no major bleeding issues arise in the future and platelets remain >= 50,000.    Sangita John MD  [facility] Hematology/Oncology    Total time spent today: 30 minutes in chart review, patient evaluation, counseling, documentation, test and/or medication/prescription orders, and coordination of care.     The longitudinal plan of care for the diagnosis(es)/condition(s) as documented were addressed during this visit. Due to the added complexity in care, I will continue to support Kt in the subsequent management and with ongoing continuity of care.      Oncology Rooming Note    July 24, 2024 11:18 AM   Kt Rasmussen is a 63 year old male who presents for:    Chief Complaint   Patient presents with     Oncology Clinic Visit     Initial Vitals: There were no vitals taken for this visit. Estimated body mass index is 22.45 kg/m  as calculated from the following:    Height as of 5/10/24: 1.918 m (6' 3.5\").    Weight as of 7/3/24: 82.6 kg (182 lb). There is no height or weight on file to calculate BSA.  Data Unavailable Comment: Data Unavailable   No LMP for male patient.  Allergies reviewed: Yes  Medications reviewed: Yes    Medications: Medication refills not needed today.  Pharmacy name entered into EPIC:    [facility] - [place], [place] - [address] [facility] [place] - " [address] [facility]  [id number] - [place] [address] AT [facility]  [facility] - [address], [facility]. - [facility] [address]    Frailty Screening:   Is the patient here for a new oncology consult visit in cancer care? 2. No        Floridalma Nieves MA              Again, thank you for allowing me to participate in the care of your patient.        Sincerely,        Sangita John MD

## 2024-07-24 NOTE — PROGRESS NOTES
Nursing Note:  Kt Rasmussen presents today for port labs.    Patient seen by provider today: Yes: Dr John   present during visit today: Not Applicable.    Note: N/A.    Intravenous Access:  Labs drawn without difficulty.  Implanted Port.    Discharge Plan:   Patient was sent to [facility] for clinic appointment.    Magdalena Davila RN

## 2024-08-11 RX ORDER — MEPERIDINE HYDROCHLORIDE 25 MG/ML
25 INJECTION INTRAMUSCULAR; INTRAVENOUS; SUBCUTANEOUS EVERY 30 MIN PRN
Status: CANCELLED | OUTPATIENT
Start: 2024-08-14

## 2024-08-11 RX ORDER — ALBUTEROL SULFATE 90 UG/1
1-2 AEROSOL, METERED RESPIRATORY (INHALATION)
Status: CANCELLED
Start: 2024-08-14

## 2024-08-11 RX ORDER — LORAZEPAM 2 MG/ML
0.5 INJECTION INTRAMUSCULAR EVERY 4 HOURS PRN
Status: CANCELLED | OUTPATIENT
Start: 2024-08-14

## 2024-08-11 RX ORDER — HEPARIN SODIUM,PORCINE 10 UNIT/ML
5-20 VIAL (ML) INTRAVENOUS DAILY PRN
Status: CANCELLED | OUTPATIENT
Start: 2024-08-14

## 2024-08-11 RX ORDER — EPINEPHRINE 1 MG/ML
0.3 INJECTION, SOLUTION, CONCENTRATE INTRAVENOUS EVERY 5 MIN PRN
Status: CANCELLED | OUTPATIENT
Start: 2024-08-14

## 2024-08-11 RX ORDER — CYANOCOBALAMIN 1000 UG/ML
1000 INJECTION, SOLUTION INTRAMUSCULAR; SUBCUTANEOUS
Status: CANCELLED | OUTPATIENT
Start: 2024-08-14

## 2024-08-11 RX ORDER — HEPARIN SODIUM (PORCINE) LOCK FLUSH IV SOLN 100 UNIT/ML 100 UNIT/ML
5 SOLUTION INTRAVENOUS
Status: CANCELLED | OUTPATIENT
Start: 2024-08-14

## 2024-08-11 RX ORDER — DIPHENHYDRAMINE HYDROCHLORIDE 50 MG/ML
50 INJECTION INTRAMUSCULAR; INTRAVENOUS
Status: CANCELLED
Start: 2024-08-14

## 2024-08-11 RX ORDER — ONDANSETRON 2 MG/ML
8 INJECTION INTRAMUSCULAR; INTRAVENOUS ONCE
Status: CANCELLED | OUTPATIENT
Start: 2024-08-14

## 2024-08-11 RX ORDER — ALBUTEROL SULFATE 0.83 MG/ML
2.5 SOLUTION RESPIRATORY (INHALATION)
Status: CANCELLED | OUTPATIENT
Start: 2024-08-14

## 2024-08-14 ENCOUNTER — INFUSION THERAPY VISIT (OUTPATIENT)
Dept: INFUSION THERAPY | Facility: CLINIC | Age: 64
End: 2024-08-14
Attending: INTERNAL MEDICINE
Payer: COMMERCIAL

## 2024-08-14 VITALS
DIASTOLIC BLOOD PRESSURE: 71 MMHG | WEIGHT: 180 LBS | HEART RATE: 84 BPM | HEIGHT: 76 IN | BODY MASS INDEX: 21.92 KG/M2 | TEMPERATURE: 97.7 F | RESPIRATION RATE: 16 BRPM | SYSTOLIC BLOOD PRESSURE: 107 MMHG

## 2024-08-14 DIAGNOSIS — T45.1X5A CHEMOTHERAPY-INDUCED NEUTROPENIA (H): ICD-10-CM

## 2024-08-14 DIAGNOSIS — Z51.11 ENCOUNTER FOR ANTINEOPLASTIC CHEMOTHERAPY: ICD-10-CM

## 2024-08-14 DIAGNOSIS — C34.90 NON-SMALL CELL LUNG CANCER METASTATIC TO BONE (H): Primary | ICD-10-CM

## 2024-08-14 DIAGNOSIS — C34.90 NON-SMALL CELL LUNG CANCER METASTATIC TO BONE (H): ICD-10-CM

## 2024-08-14 DIAGNOSIS — C79.51 NON-SMALL CELL LUNG CANCER METASTATIC TO BONE (H): ICD-10-CM

## 2024-08-14 DIAGNOSIS — C34.90 NON-SMALL CELL CARCINOMA OF LUNG, STAGE 3, UNSPECIFIED LATERALITY (H): ICD-10-CM

## 2024-08-14 DIAGNOSIS — C79.51 NON-SMALL CELL LUNG CANCER METASTATIC TO BONE (H): Primary | ICD-10-CM

## 2024-08-14 DIAGNOSIS — D70.1 CHEMOTHERAPY-INDUCED NEUTROPENIA (H): ICD-10-CM

## 2024-08-14 DIAGNOSIS — Z51.11 ENCOUNTER FOR ANTINEOPLASTIC CHEMOTHERAPY: Primary | ICD-10-CM

## 2024-08-14 LAB
BASOPHILS # BLD AUTO: 0 10E3/UL (ref 0–0.2)
BASOPHILS NFR BLD AUTO: 0 %
CREAT SERPL-MCNC: 0.63 MG/DL (ref 0.67–1.17)
EGFRCR SERPLBLD CKD-EPI 2021: >90 ML/MIN/1.73M2
EOSINOPHIL # BLD AUTO: 0.1 10E3/UL (ref 0–0.7)
EOSINOPHIL NFR BLD AUTO: 2 %
ERYTHROCYTE [DISTWIDTH] IN BLOOD BY AUTOMATED COUNT: 15.9 % (ref 10–15)
HCT VFR BLD AUTO: 32.6 % (ref 40–53)
HGB BLD-MCNC: 10.4 G/DL (ref 13.3–17.7)
IMM GRANULOCYTES # BLD: 0 10E3/UL
IMM GRANULOCYTES NFR BLD: 0 %
LYMPHOCYTES # BLD AUTO: 0.8 10E3/UL (ref 0.8–5.3)
LYMPHOCYTES NFR BLD AUTO: 15 %
MCH RBC QN AUTO: 29.5 PG (ref 26.5–33)
MCHC RBC AUTO-ENTMCNC: 31.9 G/DL (ref 31.5–36.5)
MCV RBC AUTO: 93 FL (ref 78–100)
MONOCYTES # BLD AUTO: 0.6 10E3/UL (ref 0–1.3)
MONOCYTES NFR BLD AUTO: 13 %
NEUTROPHILS # BLD AUTO: 3.6 10E3/UL (ref 1.6–8.3)
NEUTROPHILS NFR BLD AUTO: 70 %
NRBC # BLD AUTO: 0 10E3/UL
NRBC BLD AUTO-RTO: 0 /100
PLATELET # BLD AUTO: 196 10E3/UL (ref 150–450)
RBC # BLD AUTO: 3.52 10E6/UL (ref 4.4–5.9)
WBC # BLD AUTO: 5.1 10E3/UL (ref 4–11)

## 2024-08-14 PROCEDURE — 82565 ASSAY OF CREATININE: CPT | Performed by: INTERNAL MEDICINE

## 2024-08-14 PROCEDURE — 85025 COMPLETE CBC W/AUTO DIFF WBC: CPT | Performed by: INTERNAL MEDICINE

## 2024-08-14 PROCEDURE — 36591 DRAW BLOOD OFF VENOUS DEVICE: CPT | Performed by: INTERNAL MEDICINE

## 2024-08-14 PROCEDURE — 250N000011 HC RX IP 250 OP 636: Performed by: INTERNAL MEDICINE

## 2024-08-14 PROCEDURE — 96375 TX/PRO/DX INJ NEW DRUG ADDON: CPT

## 2024-08-14 PROCEDURE — 258N000003 HC RX IP 258 OP 636: Performed by: INTERNAL MEDICINE

## 2024-08-14 PROCEDURE — 96409 CHEMO IV PUSH SNGL DRUG: CPT

## 2024-08-14 PROCEDURE — 96372 THER/PROPH/DIAG INJ SC/IM: CPT | Mod: XS | Performed by: INTERNAL MEDICINE

## 2024-08-14 RX ORDER — HEPARIN SODIUM,PORCINE 10 UNIT/ML
5 VIAL (ML) INTRAVENOUS
OUTPATIENT
Start: 2024-08-14

## 2024-08-14 RX ORDER — ONDANSETRON 2 MG/ML
8 INJECTION INTRAMUSCULAR; INTRAVENOUS ONCE
Status: COMPLETED | OUTPATIENT
Start: 2024-08-14 | End: 2024-08-14

## 2024-08-14 RX ORDER — HEPARIN SODIUM (PORCINE) LOCK FLUSH IV SOLN 100 UNIT/ML 100 UNIT/ML
5 SOLUTION INTRAVENOUS
OUTPATIENT
Start: 2024-08-14

## 2024-08-14 RX ORDER — ONDANSETRON 2 MG/ML
8 INJECTION INTRAMUSCULAR; INTRAVENOUS EVERY 6 HOURS PRN
Start: 2024-08-14

## 2024-08-14 RX ORDER — HEPARIN SODIUM (PORCINE) LOCK FLUSH IV SOLN 100 UNIT/ML 100 UNIT/ML
5 SOLUTION INTRAVENOUS
Status: DISCONTINUED | OUTPATIENT
Start: 2024-08-14 | End: 2024-08-14 | Stop reason: HOSPADM

## 2024-08-14 RX ORDER — CYANOCOBALAMIN 1000 UG/ML
1000 INJECTION, SOLUTION INTRAMUSCULAR; SUBCUTANEOUS
Status: DISCONTINUED | OUTPATIENT
Start: 2024-08-14 | End: 2024-08-14 | Stop reason: HOSPADM

## 2024-08-14 RX ORDER — CYANOCOBALAMIN 1000 UG/ML
1000 INJECTION, SOLUTION INTRAMUSCULAR; SUBCUTANEOUS ONCE
Start: 2024-08-14 | End: 2024-08-14

## 2024-08-14 RX ORDER — ONDANSETRON 2 MG/ML
8 INJECTION INTRAMUSCULAR; INTRAVENOUS ONCE
Start: 2024-08-14 | End: 2024-08-14

## 2024-08-14 RX ADMIN — Medication 5 ML: at 12:42

## 2024-08-14 RX ADMIN — PEMETREXED DISODIUM 1000 MG: 500 INJECTION, POWDER, LYOPHILIZED, FOR SOLUTION INTRAVENOUS at 12:30

## 2024-08-14 RX ADMIN — ONDANSETRON 8 MG: 2 INJECTION INTRAMUSCULAR; INTRAVENOUS at 12:23

## 2024-08-14 RX ADMIN — SODIUM CHLORIDE 250 ML: 9 INJECTION, SOLUTION INTRAVENOUS at 12:23

## 2024-08-14 RX ADMIN — CYANOCOBALAMIN 1000 MCG: 1000 INJECTION, SOLUTION INTRAMUSCULAR at 12:26

## 2024-08-14 NOTE — PROGRESS NOTES
Infusion Nursing Note:  Kt Rasmussen presents today for port labs.    Patient seen by provider today: No   present during visit today: Not Applicable.    Note: N/A.    Intravenous Access:  Labs drawn without difficulty.  Implanted Port.    Treatment Conditions:  Not Applicable. Labs pending at time of discharge.      Post Infusion Assessment:  Site patent and intact, free from redness, edema or discomfort.  No evidence of extravasations.  Port access left in place for infusion today.      Discharge Plan:   Patient discharged in stable condition accompanied by: self.  Departure Mode: Wheelchair.    Mackenzie Campa RN

## 2024-08-14 NOTE — PROGRESS NOTES
Infusion Nursing Note:  Kt Rasmussen presents today for C3D1 Alimta/B12.    Patient seen by provider today: No   present during visit today: Not Applicable.    Note: N/A.      Intravenous Access:  Implanted Port.  Accessed in FT.     Treatment Conditions:  Lab Results   Component Value Date    HGB 10.4 (L) 08/14/2024    WBC 5.1 08/14/2024    ANEU 0.5 (L) 08/11/2022    ANEUTAUTO 3.6 08/14/2024     08/14/2024        Lab Results   Component Value Date     01/25/2023    POTASSIUM 4.0 01/25/2023    MAG 1.9 10/13/2016    CR 0.63 (L) 08/14/2024    BEVERLY 9.3 01/25/2023    BILITOTAL 0.6 06/20/2024    ALBUMIN 3.9 06/20/2024    ALT 20 06/20/2024    AST 23 06/20/2024       Results reviewed, labs MET treatment parameters, ok to proceed with treatment.      Post Infusion Assessment:  Patient tolerated infusion without incident.  Blood return noted pre and post infusion.  Site patent and intact, free from redness, edema or discomfort.  No evidence of extravasations.  Access discontinued per protocol.       Discharge Plan:   Discharge instructions reviewed with: Patient.  Patient and/or family verbalized understanding of discharge instructions and all questions answered.  Copy of AVS reviewed with patient and/or family.  Patient will return 94/24 for next appointment.  Patient discharged in stable condition accompanied by: self.  Departure Mode: Ambulatory.      Filipe Carr RN

## 2024-09-04 ENCOUNTER — LAB (OUTPATIENT)
Dept: INFUSION THERAPY | Facility: CLINIC | Age: 64
End: 2024-09-04
Attending: INTERNAL MEDICINE
Payer: COMMERCIAL

## 2024-09-04 ENCOUNTER — ONCOLOGY VISIT (OUTPATIENT)
Dept: ONCOLOGY | Facility: CLINIC | Age: 64
End: 2024-09-04
Attending: INTERNAL MEDICINE
Payer: COMMERCIAL

## 2024-09-04 VITALS
DIASTOLIC BLOOD PRESSURE: 78 MMHG | SYSTOLIC BLOOD PRESSURE: 118 MMHG | TEMPERATURE: 98.2 F | HEART RATE: 90 BPM | RESPIRATION RATE: 16 BRPM | OXYGEN SATURATION: 98 %

## 2024-09-04 VITALS — RESPIRATION RATE: 20 BRPM

## 2024-09-04 DIAGNOSIS — C79.51 NON-SMALL CELL LUNG CANCER METASTATIC TO BONE (H): Primary | ICD-10-CM

## 2024-09-04 DIAGNOSIS — C79.51 NON-SMALL CELL LUNG CANCER METASTATIC TO BONE (H): ICD-10-CM

## 2024-09-04 DIAGNOSIS — Z51.11 ENCOUNTER FOR ANTINEOPLASTIC CHEMOTHERAPY: Primary | ICD-10-CM

## 2024-09-04 DIAGNOSIS — C34.90 NON-SMALL CELL LUNG CANCER METASTATIC TO BONE (H): Primary | ICD-10-CM

## 2024-09-04 DIAGNOSIS — Z51.11 ENCOUNTER FOR ANTINEOPLASTIC CHEMOTHERAPY: ICD-10-CM

## 2024-09-04 DIAGNOSIS — C34.90 NON-SMALL CELL LUNG CANCER METASTATIC TO BONE (H): ICD-10-CM

## 2024-09-04 LAB
BASOPHILS # BLD AUTO: 0 10E3/UL (ref 0–0.2)
BASOPHILS NFR BLD AUTO: 0 %
CREAT SERPL-MCNC: 0.68 MG/DL (ref 0.67–1.17)
EGFRCR SERPLBLD CKD-EPI 2021: >90 ML/MIN/1.73M2
EOSINOPHIL # BLD AUTO: 0.1 10E3/UL (ref 0–0.7)
EOSINOPHIL NFR BLD AUTO: 2 %
ERYTHROCYTE [DISTWIDTH] IN BLOOD BY AUTOMATED COUNT: 18 % (ref 10–15)
HCT VFR BLD AUTO: 33 % (ref 40–53)
HGB BLD-MCNC: 10.3 G/DL (ref 13.3–17.7)
IMM GRANULOCYTES # BLD: 0 10E3/UL
IMM GRANULOCYTES NFR BLD: 0 %
LYMPHOCYTES # BLD AUTO: 0.7 10E3/UL (ref 0.8–5.3)
LYMPHOCYTES NFR BLD AUTO: 15 %
MCH RBC QN AUTO: 29.7 PG (ref 26.5–33)
MCHC RBC AUTO-ENTMCNC: 31.2 G/DL (ref 31.5–36.5)
MCV RBC AUTO: 95 FL (ref 78–100)
MONOCYTES # BLD AUTO: 0.6 10E3/UL (ref 0–1.3)
MONOCYTES NFR BLD AUTO: 12 %
NEUTROPHILS # BLD AUTO: 3.4 10E3/UL (ref 1.6–8.3)
NEUTROPHILS NFR BLD AUTO: 70 %
NRBC # BLD AUTO: 0 10E3/UL
NRBC BLD AUTO-RTO: 0 /100
PLATELET # BLD AUTO: 163 10E3/UL (ref 150–450)
RBC # BLD AUTO: 3.47 10E6/UL (ref 4.4–5.9)
WBC # BLD AUTO: 4.9 10E3/UL (ref 4–11)

## 2024-09-04 PROCEDURE — 85004 AUTOMATED DIFF WBC COUNT: CPT | Performed by: INTERNAL MEDICINE

## 2024-09-04 PROCEDURE — 258N000003 HC RX IP 258 OP 636: Performed by: NURSE PRACTITIONER

## 2024-09-04 PROCEDURE — 96375 TX/PRO/DX INJ NEW DRUG ADDON: CPT

## 2024-09-04 PROCEDURE — G0463 HOSPITAL OUTPT CLINIC VISIT: HCPCS | Performed by: NURSE PRACTITIONER

## 2024-09-04 PROCEDURE — 82565 ASSAY OF CREATININE: CPT | Performed by: INTERNAL MEDICINE

## 2024-09-04 PROCEDURE — 36591 DRAW BLOOD OFF VENOUS DEVICE: CPT | Performed by: INTERNAL MEDICINE

## 2024-09-04 PROCEDURE — 250N000011 HC RX IP 250 OP 636: Performed by: NURSE PRACTITIONER

## 2024-09-04 PROCEDURE — 99214 OFFICE O/P EST MOD 30 MIN: CPT | Performed by: NURSE PRACTITIONER

## 2024-09-04 PROCEDURE — 96413 CHEMO IV INFUSION 1 HR: CPT

## 2024-09-04 RX ORDER — EPINEPHRINE 1 MG/ML
0.3 INJECTION, SOLUTION INTRAMUSCULAR; SUBCUTANEOUS EVERY 5 MIN PRN
Status: CANCELLED | OUTPATIENT
Start: 2024-09-04

## 2024-09-04 RX ORDER — ONDANSETRON 2 MG/ML
8 INJECTION INTRAMUSCULAR; INTRAVENOUS ONCE
Status: COMPLETED | OUTPATIENT
Start: 2024-09-04 | End: 2024-09-04

## 2024-09-04 RX ORDER — ALBUTEROL SULFATE 0.83 MG/ML
2.5 SOLUTION RESPIRATORY (INHALATION)
Status: CANCELLED | OUTPATIENT
Start: 2024-09-04

## 2024-09-04 RX ORDER — HEPARIN SODIUM (PORCINE) LOCK FLUSH IV SOLN 100 UNIT/ML 100 UNIT/ML
5 SOLUTION INTRAVENOUS
Status: DISCONTINUED | OUTPATIENT
Start: 2024-09-04 | End: 2024-09-04 | Stop reason: HOSPADM

## 2024-09-04 RX ORDER — HEPARIN SODIUM,PORCINE 10 UNIT/ML
5-20 VIAL (ML) INTRAVENOUS DAILY PRN
Status: CANCELLED | OUTPATIENT
Start: 2024-09-04

## 2024-09-04 RX ORDER — ALBUTEROL SULFATE 90 UG/1
1-2 AEROSOL, METERED RESPIRATORY (INHALATION)
Status: CANCELLED
Start: 2024-09-04

## 2024-09-04 RX ORDER — CYANOCOBALAMIN 1000 UG/ML
1000 INJECTION, SOLUTION INTRAMUSCULAR; SUBCUTANEOUS
Status: CANCELLED | OUTPATIENT
Start: 2024-09-04

## 2024-09-04 RX ORDER — METHYLPREDNISOLONE SODIUM SUCCINATE 125 MG/2ML
125 INJECTION, POWDER, LYOPHILIZED, FOR SOLUTION INTRAMUSCULAR; INTRAVENOUS
Status: CANCELLED
Start: 2024-09-04

## 2024-09-04 RX ORDER — HEPARIN SODIUM (PORCINE) LOCK FLUSH IV SOLN 100 UNIT/ML 100 UNIT/ML
5 SOLUTION INTRAVENOUS
Status: CANCELLED | OUTPATIENT
Start: 2024-09-04

## 2024-09-04 RX ORDER — DIPHENHYDRAMINE HYDROCHLORIDE 50 MG/ML
50 INJECTION INTRAMUSCULAR; INTRAVENOUS
Status: CANCELLED
Start: 2024-09-04

## 2024-09-04 RX ORDER — LORAZEPAM 2 MG/ML
0.5 INJECTION INTRAMUSCULAR EVERY 4 HOURS PRN
Status: CANCELLED | OUTPATIENT
Start: 2024-09-04

## 2024-09-04 RX ORDER — MEPERIDINE HYDROCHLORIDE 25 MG/ML
25 INJECTION INTRAMUSCULAR; INTRAVENOUS; SUBCUTANEOUS EVERY 30 MIN PRN
Status: CANCELLED | OUTPATIENT
Start: 2024-09-04

## 2024-09-04 RX ORDER — ONDANSETRON 2 MG/ML
8 INJECTION INTRAMUSCULAR; INTRAVENOUS ONCE
Status: CANCELLED | OUTPATIENT
Start: 2024-09-04

## 2024-09-04 RX ADMIN — ONDANSETRON 8 MG: 2 INJECTION INTRAMUSCULAR; INTRAVENOUS at 14:46

## 2024-09-04 RX ADMIN — PEMETREXED DISODIUM 1000 MG: 500 INJECTION, POWDER, LYOPHILIZED, FOR SOLUTION INTRAVENOUS at 14:59

## 2024-09-04 RX ADMIN — SODIUM CHLORIDE 250 ML: 9 INJECTION, SOLUTION INTRAVENOUS at 14:46

## 2024-09-04 RX ADMIN — Medication 5 ML: at 15:16

## 2024-09-04 ASSESSMENT — PAIN SCALES - GENERAL
PAINLEVEL: NO PAIN (0)
PAINLEVEL: NO PAIN (0)

## 2024-09-04 NOTE — PROGRESS NOTES
Infusion Nursing Note:  Kt DAVID Rasmussen presents today for Alimta.    Patient seen by provider today: Yes: Juan Rausch NP   present during visit today: Not Applicable.    Note: N/A.      Intravenous Access:  Implanted Port.    Treatment Conditions:  Lab Results   Component Value Date    HGB 10.3 (L) 09/04/2024    WBC 4.9 09/04/2024    ANEU 0.5 (L) 08/11/2022    ANEUTAUTO 3.4 09/04/2024     09/04/2024        Lab Results   Component Value Date     01/25/2023    POTASSIUM 4.0 01/25/2023    MAG 1.9 10/13/2016    CR 0.68 09/04/2024    BEVERLY 9.3 01/25/2023    BILITOTAL 0.6 06/20/2024    ALBUMIN 3.9 06/20/2024    ALT 20 06/20/2024    AST 23 06/20/2024       Results reviewed, labs MET treatment parameters, ok to proceed with treatment.      Post Infusion Assessment:  Patient tolerated infusion without incident.  Blood return noted pre and post infusion.  Site patent and intact, free from redness, edema or discomfort.  No evidence of extravasations.  Access discontinued per protocol.       Discharge Plan:   Discharge instructions reviewed with: Patient.  Patient and/or family verbalized understanding of discharge instructions and all questions answered.  Patient discharged in stable condition accompanied by: self.  Departure Mode: Wheelchair.      Magdalena Davila RN

## 2024-09-04 NOTE — PROGRESS NOTES
"Oncology Rooming Note    September 4, 2024 1:21 PM   Kt Rasmussen is a 64 year old male who presents for:    Chief Complaint   Patient presents with    Oncology Clinic Visit     Initial Vitals: /78   Pulse 90   Temp 98.2  F (36.8  C) (Oral)   Resp 16   SpO2 98%  Estimated body mass index is 22.19 kg/m  as calculated from the following:    Height as of 8/14/24: 1.918 m (6' 3.51\").    Weight as of 8/14/24: 81.6 kg (180 lb). There is no height or weight on file to calculate BSA.  No Pain (0) Comment: Data Unavailable   No LMP for male patient.  Allergies reviewed: Yes  Medications reviewed: Yes    Medications: Medication refills not needed today.  Pharmacy name entered into EPIC:    [facility] [place] [place] - [address] [facility] [address] [id number] - [address] AT [facility] & [address] TERM [facility] - [facility] [address] [place] [address], [facility]. - [facility] [address]    Frailty Screening:   Is the patient here for a new oncology consult visit in cancer care? 2. No      Clinical concerns:   np was notified.      Julisa Whelan CMA            "

## 2024-09-04 NOTE — PROGRESS NOTES
Nursing Note:  Kt Rasmussen presents today for port labs for tx today.    Patient seen by provider today: Yes: Juan   present during visit today: Not Applicable.    Note: N/A.    Intravenous Access:  Labs drawn without difficulty.  Implanted Port.    Discharge Plan:   Patient was sent to [facility] for provider appointment.    Filipe Carr RN

## 2024-09-04 NOTE — LETTER
"9/4/2024      Kt Rasmussen  [address] [place] [id number]      Dear Colleague,    Thank you for referring your patient, Kt Rasmussen, to the [facility]. Please see a copy of my visit note below.    Oncology Rooming Note    September 4, 2024 1:21 PM   Kt Rasmussen is a 64 year old male who presents for:    Chief Complaint   Patient presents with     Oncology Clinic Visit     Initial Vitals: /78   Pulse 90   Temp 98.2  F (36.8  C) (Oral)   Resp 16   SpO2 98%  Estimated body mass index is 22.19 kg/m  as calculated from the following:    Height as of 8/14/24: 1.918 m (6' 3.51\").    Weight as of 8/14/24: 81.6 kg (180 lb). There is no height or weight on file to calculate BSA.  No Pain (0) Comment: Data Unavailable   No LMP for male patient.  Allergies reviewed: Yes  Medications reviewed: Yes    Medications: Medication refills not needed today.  Pharmacy name entered into EPIC:    [facility] - [place], [place] - [address] [facility] [address] [id number] - [place] [address] [facility]  [facility] - [address], [facility]. - [address]    Frailty Screening:   Is the patient here for a new oncology consult visit in cancer care? 2. No      Clinical concerns:   np was notified.      Julisa Whelan, NASIMA              Oncology/Hematology Visit Note  Sep 4, 2024    Reason for Visit: follow up of metastatic non-small cell lung carcinoma  Metastatic to lymph nodes bones in bilateral lungs  Brain MRI negative for mets    Patient met with Dr. John who recommended treatment with palliative intent with paclitaxel carboplatin Avastin and atezolizumab-treatment started on April 27, 2021  -Due to thrombocytopenia carboplatin AUC reduced to 4 with cycle 2  Due " to pancytopenia carboplatin discontinued with cycle 5    7/12/2021: PET/CT showed resolved mural nodules with increased cystic cavity in left lower lobe with no significant FDG uptake, less likely metastases; no enlarged hypermetabolic mediastinal, hilar, or axillary lymph nodes, decreased metabolic activity of intraosseous lesion in spine and pelvis; no new bone lesions    Treated with paclitaxel, Avastin and atezolizumab.-Last treatment given in  12/22/2021-cycle 12    1/10/2022: PET/CT showed new foci of abnormal skeletal FGD uptake in the thoracic and lumbar spine. Mild interval increase in intensity of previously demonstrated hypermetabolic skeletal lesions involving the pelvis and lumbar spine. Findings are consistent with progressive osseous metastatic disease. Subsequently off chemo for 1 month due to poor performance status.  16. 1/25/2022: Patient fell and fractured his femur. This was surgically repaired and he was subsequently cared for at a rehab unit.  17. 2/14/2022: Brain MRI without contrast (contrast not given due to inability to obtain IV access) showed no brain metastases.    Met with Dr. Shoemaker-in Dr. John's absence  -Recommendation is to change therapy   04/21/2022 -palliative Carboplatin Alimta started   Due to cytopenia AUC reduced to 4 with cycle 2  Due to persistent pancytopenia and inability to tolerate treatment carboplatin discontinued with cycle 3-day 1  Patient was on monotherapy with Alimta    07/18/2022-PET CT scan shows progression of the disease  08/03/22-started Taxotere 60 mg/m2 every 3 weeks    06/2024-scan reveals progression of the disease  Treatment changed to single agent Alimta         Interval History:  Patient reports feeling well.  Reports he has been tolerating Alimta well.  Denies fever chills sweats cough shortness of breath chest pain nausea vomiting diarrhea abdominal pain bleeding  Denies lymphadenopathy    Review of Systems:  14 point ROS of systems including  Constitutional, Eyes, Respiratory, Cardiovascular, Gastroenterology, Genitourinary, Integumentary, Muscularskeletal, Psychiatric were all negative except for pertinent positives noted in my HPI.    Physical Examination:  Physical Exam  HENT:      Right Ear: Tympanic membrane normal.      Nose: Nose normal.      Mouth/Throat:      Mouth: Mucous membranes are moist.   Eyes:      Pupils: Pupils are equal, round, and reactive to light.   Cardiovascular:      Rate and Rhythm: Normal rate.      Pulses: Normal pulses.   Pulmonary:      Effort: Pulmonary effort is normal.   Abdominal:      General: Abdomen is flat.   Musculoskeletal:         General: Normal range of motion.      Cervical back: Normal range of motion.   Skin:     General: Skin is warm.   Neurological:      General: No focal deficit present.      Mental Status: He is alert.   Psychiatric:         Mood and Affect: Mood normal.           Laboratory Data:  CBC and CMP results reviewed    Assessment and Plan:  metastatic non-small cell lung cancer   Patient follows with Dr John   Progressed on  different treatments as above recently was on monotherapy with Alimta  07/18/2022-PET CT scan reveals progression of disease  Patient with Dr. oJhn who recommended changing treatment to Taxotere 60 mg/m2 every 3 weeks  06/17/2024-PET scan revealed progressive disease  Taxotere discontinued patient.  Currently on single agent Alimta every 3 weeks to start cycle 1 day 1 today  Regimen potential side effects of Alimta reviewed with patient patient agrees to proceed  Labs reviewed okay to proceed with Alimta  Continue with B12 injections and folic acid per protocol  0923- PET scan   09/2532-xuluex-ip with Dr. 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  --Continue apixaban indefinitely, provided no major bleeding issues arise in the future and platelets remain >= 50,00    Patient is advised to call our clinic or go to ER in the event of fever chills sweats cough shortness of breath chest pain nausea vomiting diarrhea abdominal pain bleeding edema or any changes in health    MADHAVI Moran CNP  [facility] [place]     Chart documentation with Dragon Voice recognition Software. Although reviewed after completion, some words and grammatical errors may remain.          Again, thank you for allowing me to participate in the care of your patient.        Sincerely,        MADHAVI Moran CNP

## 2024-09-10 NOTE — PROGRESS NOTES
[facility]    Hematology/Oncology Established Patient Follow-up Note      Today's Date: 9/25/2024    Reason for Follow-up: Metastatic non-small cell lung carcinoma.    HISTORY OF PRESENT ILLNESS: Kt Rasmussen is a 64 year old male who presents with the following oncologic history:  1. 5/05/2016: Presented with near-obstructing large mass coming off the cheikh and likely the posterior wall, seen on bronchoscopy. Biopsy of the mass showed invasive squamous cell carcinoma, moderately differentiating and focally keratinizing.  Procedure was complicated by significant bleeding.  Tumor was completely debulked (via bronchoscopy) in both main stem bronchi and distal trachea. NGS showed no mutations in EGFR, KRAS, BRAF, NRAS, HRAS, PIK3CA, ERBB2, MET, or JAK2.  2. 5/23/2016: PET/CT scan showed evidence of residual tumor with decreased endobronchial mass. Indeterminate, mildly hypermetabolic mesentery with prominent lymph nodes and area of enhancement of the right hepatic lobe present. Felt to have T4-NX-M0 disease.  3. 6/09/2016: Started concurrent chemoradiation with weekly paclitaxel and carboplatin.  4. 7/30/2016: Completed radiation.  Subsequently received 2 cycles of consolidation paclitaxel and carboplatin.   5. 9/13/2019: Presented with 6-month history of dysphonia and left vocal exophytic lesion. Left true vocal fold biopsy showed at least squamous cell carcinoma in situ, cannot exclude superficial invasion.  6. 9/30/2019: Excision of left vocal cord mass showed fragments of invasive squamous cell carcinoma, well differentiated and keratinizing.  Margins negative for malignancy.  7. 2/16/2021: CT chest w/o contrast showed thin-walled cavity in left lower lobe with 2 solid peripheral nodules measuring 9 mm each. No lymphadenopathy.  8. 3/01/2021: PET scan showed hypermetabolic nodules in left lower lobe and right lung, consistent with metastases; hypermetabolic adenopathy in chest, abdomen,  pelvis; nonspecific uptake at tongue; hypermetabolic intraosseous lesions in spine and pelvis consistent with metastatic disease; left axillary hypermetabolic lymph nodes related to COVID-19 vaccination.  9. 3/12/2021: CT-guided lung biopsy showed non-small cell carcinoma, not otherwise specified -- with opinion by [facility] pathologist.  10. 3/18/2021: Lung NGS panel negative for mutations in BRAF, EGFR, ERBB2, IDH1, IDH2, KRAS, MET, NRAS, RET. PD-L1 expression negative (TPS <1%). Due to minimal amount of DNA obtained from specimen, other biomarkers could not be analyzed.  11. 4/7/2021: MRI brain negative for brain metastases.  12. 4/27/2021: Started 1st line metastatic therapy with carboplatin, paclitaxel, bevacizumab, and atezolizumab for metastatic non-small cell lung carcinoma, NOS.  13. 7/12/2021: PET/CT showed resolved mural nodules with increased cystic cavity in left lower lobe with no significant FDG uptake, less likely metastases; no enlarged hypermetabolic mediastinal, hilar, or axillary lymph nodes, decreased metabolic activity of intraosseous lesion in spine and pelvis; no new bone lesions.  14. 10/11/2021: PET/CT showed slight interval increase in intensity of metabolic activity of right pubic bone and left ischium with new focal uptake in L2 spinous process; resolved uptake at previous ground glass opacity along right medial lower lobe; unchanged cystic cavities/nodules in left lower lobe and right apical upper lobe; no pathologically enlarged hypermetabolic mediastinal, hilar, or axillary lymph nodes.  15. 1/10/2022: PET/CT showed new foci of abnormal skeletal FGD uptake in the thoracic and lumbar spine. Mild interval increase in intensity of previously demonstrated hypermetabolic skeletal lesions involving the pelvis and lumbar spine. Findings are consistent with progressive osseous metastatic disease. Subsequently off chemo for 1 month due to poor performance status.  16. 1/25/2022: Patient fell  and fractured his femur. This was surgically repaired and he was subsequently cared for at a rehab unit.  17. 2/14/2022: Brain MRI without contrast (contrast not given due to inability to obtain IV access) showed no brain metastases.  18. 4/21/2022: Started 2nd line metastatic therapy with pemetrexed and carboplatin. Omission of carboplatin 6/2 and forward due to worsening anemia despite dose reduction.  19. 7/18/2022: PET/CT showed enlarged and increased FDG avid skeletal metastases, increased left para-aortic retroperitoneal lymph node increased in size and FDG avidity, findings consistent with progressive disease.  20. 8/3/2022: Started 3rd line metastatic therapy with Taxotere 60 mg/m2 every 3 weeks.  21. 11/21/2022: PET scan showed partial response, left para-aortic retroperitoneal lymph node has decreased from 2.6 x 1.7 cm (SUVmax 7.9) to 2.3 x 1.4 cm (SUVmax 5.7), skeletal metastases no longer demonstrate FDG activity. Kt elected to take a 2-month break from chemo.  22. 1/23/2023: PET scan showed increasing metabolic activity of the left periaortic (max SUV 8.1, previously 5.7) left external iliac (max SUV 7.0, previously 3.1), and right external iliac (max SUV 5.1, previously 3.7) lymph nodes with development/reactivation of multiple FDG avid osseous lesions.  23. 3/23/2023: Resumption of Taxotere every 3 weeks. Delayed due to insurance issues.  24. 6/26/2023: PET scan showed partial response with decreased uptake associated with the retroperitoneal and pelvic lymphadenopathy as well as bone metastases.  25. 12/4/2023: PET scan showed decreased FDG avid left periaortic (SUV max 5.4, previously 6.6) and left external iliac (SUV max 3.5, previously 4.9) lymphadenopathy and broadly stable FDG uptake within scattered pre-existing osseous metastases; new FDG avid lesion involving the right anterior first rib without clear fracture line visible (SUV max 4.7) indeterminate for a new osseous metastasis versus  posttraumatic in etiology.   26. 3/18/2024: PET scan showed overall stable disease.  27. 6/17/2024: PET scan showed progressive disease with FDG avid left para-aortic retroperitoneal node measures 2.1 x 2.6 cm compared to 2.0 x 2.6 cm with SUV max of 8.4 previously 6.3.  Increased uptake with an a more inferior left periaortic retroperitoneal node with SUV max of 7.4, previously 4.3. Mildly increased uptake within a left external iliac node with SUV max of 7.0, previously 4.3. There is a new 1.1 x 1.3 cm retrocaval retroperitoneal node with SUV max of 13.4. New FDG uptake within a subcentimeter node between the right obturator muscles with SUV max of 4.1. Worsening FDG avid osseous disease involving the right occipital condyle, right aspect of the clivus, T12 and L1 vertebral bodies, and bony pelvis. FDG uptake within the right sacral lesion with SUV max of 14.0 compared to 6.9 and FDG uptake within the T12 vertebral body with SUV max of 15.2 compared to 8.2.  28. 7/03/2024: Switched to pemetrexed.  29. 9/23/2024: PET/CT showed decreasing metabolic activity of the left periaortic and left greater than right external iliac/pelvic sidewall lymph nodes, osseous lesions involving the T12 vertebral body, L1 vertebral body, right sacral ala, right posterior iliac bone   and right anterior acetabular region; broadly stable FDG avid lesions in the left iliac wing but increasing metabolic activity of FDG avid soft tissue thickening in the right lower paratracheal/hilar region and retrocaval lymph nodes suspicious for mild progression of disease.    INTERVAL HISTORY:  Kt reports he had a fall after tripping 3 weeks ago.  Denies head or other trauma. He denies any dyspnea, nausea.  He reports feeling well.    REVIEW OF SYSTEMS:   14 point ROS was reviewed and is negative other than as noted above in HPI.       HOME MEDICATIONS:  Current Outpatient Medications   Medication Sig Dispense Refill    atorvastatin (LIPITOR) 40 MG  tablet Take 1 tablet (40 mg) by mouth daily for 90 days 90 tablet 0    ELIQUIS ANTICOAGULANT 5 MG tablet Take 1 tablet (5 mg) by mouth 2 times daily for 90 days 180 tablet 0    folic acid (FOLVITE) 1 MG tablet Take 1 tablet (1 mg) by mouth daily 90 tablet 3    prochlorperazine (COMPAZINE) 10 MG tablet Take 1 tablet (10 mg) by mouth every 6 hours as needed for nausea or vomiting 30 tablet 2         ALLERGIES:  Allergies   Allergen Reactions    No Known Drug Allergy          PAST MEDICAL HISTORY:  Past Medical History:   Diagnosis Date    Alcohol abuse, unspecified     Cerebral infarction (H)     2009, right side residual and aphasia    Dyslipidemia     GERD (gastroesophageal reflux disease)     Lung cancer (H)     Unspecified essential hypertension          PAST SURGICAL HISTORY:  Past Surgical History:   Procedure Laterality Date    BRONCHOSCOPY FLEXIBLE AND RIGID N/A 5/5/2016    Procedure: BRONCHOSCOPY FLEXIBLE AND RIGID;  Surgeon: Tony Talbot MD;  Location: [facility] OR    ESOPHAGOSCOPY FLEXIBLE N/A 9/30/2019    Procedure: flexible esophagoscopy;  Surgeon: Lizzy Johnson MD;  Location: [facility] OR    INJECT STEROID (LOCATION) N/A 9/30/2019    Procedure: steroid injection;  Surgeon: Lizzy Johnson MD;  Location: [facility] OR    IR CHEST PORT PLACEMENT > 5 YRS OF AGE  4/22/2021    IR CHEST PORT PLACEMENT > 5 YRS OF AGE  4/11/2022    IR PORT CHECK RIGHT  4/11/2022    IR PORT REMOVAL RIGHT  4/11/2022    LASER CO2 LARYNGOSCOPY N/A 9/30/2019    Procedure: Microdirect laryngoscopy with excision of laryngeal mass, CO2 laser;  Surgeon: Lizzy Johnson MD;  Location: [facility] OR    ZZC NONSPECIFIC PROCEDURE      R tympanoplasty         SOCIAL HISTORY:  Social History     Socioeconomic History    Marital status: Single     Spouse name: Not on file    Number of children: Not on file    Years of education: Not on file    Highest education level: Not on file   Occupational History    Not on file   Tobacco Use    Smoking status: Former  "    Current packs/day: 0.00     Types: Cigarettes     Quit date: 2009     Years since quittin.9    Smokeless tobacco: Never   Substance and Sexual Activity    Alcohol use: Not Currently    Drug use: No    Sexual activity: Not on file   Other Topics Concern    Parent/sibling w/ CABG, MI or angioplasty before 65F 55M? Not Asked   Social History Narrative    Not on file     Social Determinants of Health     Financial Resource Strain: Not on file   Food Insecurity: Not on file   Transportation Needs: Not on file   Physical Activity: Not on file   Stress: Not on file   Social Connections: Not on file   Interpersonal Safety: Not At Risk (2023)    Humiliation, Afraid, Rape, and Kick questionnaire     Fear of Current or Ex-Partner: No     Emotionally Abused: No     Physically Abused: No     Sexually Abused: No   Housing Stability: Not on file   Resides at assisted living.      FAMILY HISTORY:  Family History   Problem Relation Age of Onset    Cancer Father          PHYSICAL EXAM:  Vital signs:  /75   Pulse 89   Temp 97.6  F (36.4  C)   Resp 16   Ht 1.905 m (6' 3\")   Wt 81.6 kg (180 lb)   SpO2 96%   BMI 22.50 kg/m     ECO  GENERAL: No acute distress. Sitting in wheelchair.  EYES: No scleral icterus. No overt erythema.  LYMPH: No cervical or supraclavicularadenopathy.  RESPIRATORY: Clear bilaterally.  CARDIAC: Regular rate and rhythm with no murmurs.  SKIN: No overt rashes, discolorations, or lesions over the face and neck.  EXTREMITIES: 2+ BLE pitting edema.  NEUROLOGIC: Alert.  No overt tremors.  PSYCHIATRIC: Normal affect and mood.  Does not appear anxious.       LABS:  CBC RESULTS:   Recent Labs   Lab Test 24  0927   WBC 4.1   RBC 2.90*   HGB 8.8*   HCT 27.8*   MCV 96   MCH 30.3   MCHC 31.7   RDW 19.8*          PATHOLOGY:  None new.    IMAGING:  Reviewed as per HPI.    ASSESSMENT/PLAN:  Kt Rasmussen is a 64 year old male with the following issues:  1. Metastatic non-small " (non-squamous) cell lung carcinoma with metastases to lymph nodes, bones, and bilateral lungs  2. 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 Chemotherapy-induced anemia and thrombocytopenia  --Lung NGS panel negative for mutations in BRAF, EGFR, ERBB2, IDH1, IDH2, KRAS, MET, NRAS, RET. PD-L1 expression negative (TPS <1%). Guardant 360 negative for actionable mutations.  --Kt started 3rd line metastatic therapy with every 3-week Taxotere at 20% dose reduction (60 mg/m2) on 8/3/2022 due to progressive disease.  He tolerated this very well with minimal to no paresthesias. He then took a 2-month chemo break that was further delayed to 3.5 months due to insurance issues. He resumed Taxotere 3/23/2023.  --6/17/2024 PET scan showed progressive disease in the abdomen/pelvis nodes and bone metastases.  --Switched to pemetrexed 7/3/2024.  --He declines to take the oral dexamethasone premedication.  --Discussed his labs from today show Hgb decreased to 8.8, wBC 4.1, platelets 185,000.  --I personally reviewed his 9/23/2024 PET scan which showed mixed response but decreasing metabolic activity of the left periaortic and left greater than right external iliac/pelvic sidewall lymph nodes, osseous lesions involving the T12 vertebral body, L1 vertebral body, right sacral ala, right posterior iliac bone and right anterior acetabular region; broadly stable FDG avid lesions in the left iliac wing but increasing metabolic activity of FDG avid soft tissue thickening in the right lower paratracheal/hilar region and retrocaval lymph nodes suspicious for mild progression of disease.  --I advised continuing pemetrexed and repeating PET scan in 3 months.  If any further definitive disease progression on that scan, can consider switching to gemcitabine.  --Continue folate and B12 injections.    4. Left vocal cord squamous cell carcinoma, T1  lesion  5. Dysphonia  6. Dysphagia  -Kt underwent excision of a left vocal cord SCC on 9/30/2019 and has persistent dysphonia as a result of the lesion and excision.  He also has dysphagia but this is stable and swallowing is manageable.  -Continue follow-up with Dr. Wray.    7. History of dizziness and prior falls  --SW and guardian were previously notified of multiple falls.  --Prior brain MRI 2/14/2022 showed no brain metastases. However this was a suboptimal exam due to inability to administer contrast.  --He is using a wheelchair due to prior femoral fracture in 1/2022.    8. Bilateral lower extremity DVT  --Diagnosed in 9/2023, likely hypercoagulability of malignancy.  --Continue apixaban indefinitely, provided no major bleeding issues arise in the future and platelets remain >= 50,000.    Sangita John MD  [facility] Hematology/Oncology    Total time spent today: 30 minutes in chart review, patient evaluation, counseling, documentation, test and/or medication/prescription orders, and coordination of care.     The longitudinal plan of care for the diagnosis(es)/condition(s) as documented were addressed during this visit. Due to the added complexity in care, I will continue to support Kt in the subsequent management and with ongoing continuity of care.

## 2024-09-23 ENCOUNTER — HOSPITAL ENCOUNTER (OUTPATIENT)
Dept: PET IMAGING | Facility: CLINIC | Age: 64
Discharge: HOME | End: 2024-09-23
Attending: INTERNAL MEDICINE | Admitting: INTERNAL MEDICINE
Payer: COMMERCIAL

## 2024-09-23 DIAGNOSIS — C79.51 NON-SMALL CELL LUNG CANCER METASTATIC TO BONE (H): ICD-10-CM

## 2024-09-23 DIAGNOSIS — C78.01 MALIGNANT NEOPLASM METASTATIC TO BOTH LUNGS (H): ICD-10-CM

## 2024-09-23 DIAGNOSIS — C78.02 MALIGNANT NEOPLASM METASTATIC TO BOTH LUNGS (H): ICD-10-CM

## 2024-09-23 DIAGNOSIS — C34.90 NON-SMALL CELL LUNG CANCER METASTATIC TO BONE (H): ICD-10-CM

## 2024-09-23 PROCEDURE — 250N000011 HC RX IP 250 OP 636: Performed by: INTERNAL MEDICINE

## 2024-09-23 PROCEDURE — 71260 CT THORAX DX C+: CPT

## 2024-09-23 PROCEDURE — A9552 F18 FDG: HCPCS | Performed by: INTERNAL MEDICINE

## 2024-09-23 PROCEDURE — 78816 PET IMAGE W/CT FULL BODY: CPT | Mod: PS

## 2024-09-23 PROCEDURE — 343N000001 HC RX 343: Performed by: INTERNAL MEDICINE

## 2024-09-23 RX ORDER — HEPARIN SODIUM,PORCINE 10 UNIT/ML
5-20 VIAL (ML) INTRAVENOUS DAILY PRN
Status: CANCELLED | OUTPATIENT
Start: 2024-09-25

## 2024-09-23 RX ORDER — ONDANSETRON 2 MG/ML
8 INJECTION INTRAMUSCULAR; INTRAVENOUS ONCE
Status: CANCELLED | OUTPATIENT
Start: 2024-09-25

## 2024-09-23 RX ORDER — MEPERIDINE HYDROCHLORIDE 25 MG/ML
25 INJECTION INTRAMUSCULAR; INTRAVENOUS; SUBCUTANEOUS EVERY 30 MIN PRN
Status: CANCELLED | OUTPATIENT
Start: 2024-09-25

## 2024-09-23 RX ORDER — FLUDEOXYGLUCOSE F 18 200 MCI/ML
10-18 INJECTION, SOLUTION INTRAVENOUS ONCE
Status: COMPLETED | OUTPATIENT
Start: 2024-09-23 | End: 2024-09-23

## 2024-09-23 RX ORDER — ALBUTEROL SULFATE 90 UG/1
1-2 AEROSOL, METERED RESPIRATORY (INHALATION)
Status: CANCELLED
Start: 2024-09-25

## 2024-09-23 RX ORDER — HEPARIN SODIUM (PORCINE) LOCK FLUSH IV SOLN 100 UNIT/ML 100 UNIT/ML
500 SOLUTION INTRAVENOUS ONCE
Status: COMPLETED | OUTPATIENT
Start: 2024-09-23 | End: 2024-09-23

## 2024-09-23 RX ORDER — EPINEPHRINE 1 MG/ML
0.3 INJECTION, SOLUTION, CONCENTRATE INTRAVENOUS EVERY 5 MIN PRN
Status: CANCELLED | OUTPATIENT
Start: 2024-09-25

## 2024-09-23 RX ORDER — HEPARIN SODIUM (PORCINE) LOCK FLUSH IV SOLN 100 UNIT/ML 100 UNIT/ML
5 SOLUTION INTRAVENOUS
Status: CANCELLED | OUTPATIENT
Start: 2024-09-25

## 2024-09-23 RX ORDER — ALBUTEROL SULFATE 0.83 MG/ML
2.5 SOLUTION RESPIRATORY (INHALATION)
Status: CANCELLED | OUTPATIENT
Start: 2024-09-25

## 2024-09-23 RX ORDER — LORAZEPAM 2 MG/ML
0.5 INJECTION INTRAMUSCULAR EVERY 4 HOURS PRN
Status: CANCELLED | OUTPATIENT
Start: 2024-09-25

## 2024-09-23 RX ORDER — CYANOCOBALAMIN 1000 UG/ML
1000 INJECTION, SOLUTION INTRAMUSCULAR; SUBCUTANEOUS
Status: CANCELLED | OUTPATIENT
Start: 2024-09-25

## 2024-09-23 RX ORDER — DIPHENHYDRAMINE HYDROCHLORIDE 50 MG/ML
50 INJECTION INTRAMUSCULAR; INTRAVENOUS
Status: CANCELLED
Start: 2024-09-25

## 2024-09-23 RX ORDER — IOPAMIDOL 755 MG/ML
10-135 INJECTION, SOLUTION INTRAVASCULAR ONCE
Status: COMPLETED | OUTPATIENT
Start: 2024-09-23 | End: 2024-09-23

## 2024-09-23 RX ADMIN — IOPAMIDOL 111 ML: 755 INJECTION, SOLUTION INTRAVENOUS at 10:18

## 2024-09-23 RX ADMIN — Medication 500 UNITS: at 11:10

## 2024-09-23 RX ADMIN — FLUDEOXYGLUCOSE F 18 13.87 MILLICURIE: 200 INJECTION, SOLUTION INTRAVENOUS at 10:17

## 2024-09-25 ENCOUNTER — ONCOLOGY VISIT (OUTPATIENT)
Dept: ONCOLOGY | Facility: CLINIC | Age: 64
End: 2024-09-25
Attending: INTERNAL MEDICINE
Payer: COMMERCIAL

## 2024-09-25 ENCOUNTER — INFUSION THERAPY VISIT (OUTPATIENT)
Dept: INFUSION THERAPY | Facility: CLINIC | Age: 64
End: 2024-09-25
Attending: INTERNAL MEDICINE
Payer: COMMERCIAL

## 2024-09-25 VITALS
WEIGHT: 180 LBS | BODY MASS INDEX: 22.38 KG/M2 | SYSTOLIC BLOOD PRESSURE: 121 MMHG | DIASTOLIC BLOOD PRESSURE: 75 MMHG | RESPIRATION RATE: 16 BRPM | TEMPERATURE: 97.6 F | HEART RATE: 89 BPM | OXYGEN SATURATION: 96 % | HEIGHT: 75 IN

## 2024-09-25 DIAGNOSIS — Z86.718 PERSONAL HISTORY OF DVT (DEEP VEIN THROMBOSIS): ICD-10-CM

## 2024-09-25 DIAGNOSIS — D64.81 ANEMIA DUE TO CHEMOTHERAPY: ICD-10-CM

## 2024-09-25 DIAGNOSIS — C78.02 MALIGNANT NEOPLASM METASTATIC TO BOTH LUNGS (H): ICD-10-CM

## 2024-09-25 DIAGNOSIS — C79.51 NON-SMALL CELL LUNG CANCER METASTATIC TO BONE (H): ICD-10-CM

## 2024-09-25 DIAGNOSIS — C78.01 MALIGNANT NEOPLASM METASTATIC TO BOTH LUNGS (H): ICD-10-CM

## 2024-09-25 DIAGNOSIS — Z51.11 ENCOUNTER FOR ANTINEOPLASTIC CHEMOTHERAPY: ICD-10-CM

## 2024-09-25 DIAGNOSIS — Z51.11 ENCOUNTER FOR ANTINEOPLASTIC CHEMOTHERAPY: Primary | ICD-10-CM

## 2024-09-25 DIAGNOSIS — T45.1X5A ANEMIA DUE TO CHEMOTHERAPY: ICD-10-CM

## 2024-09-25 DIAGNOSIS — C79.51 NON-SMALL CELL LUNG CANCER METASTATIC TO BONE (H): Primary | ICD-10-CM

## 2024-09-25 DIAGNOSIS — C34.90 NON-SMALL CELL LUNG CANCER METASTATIC TO BONE (H): Primary | ICD-10-CM

## 2024-09-25 DIAGNOSIS — C34.90 NON-SMALL CELL LUNG CANCER METASTATIC TO BONE (H): ICD-10-CM

## 2024-09-25 LAB
BASOPHILS # BLD AUTO: 0 10E3/UL (ref 0–0.2)
BASOPHILS NFR BLD AUTO: 0 %
CREAT SERPL-MCNC: 0.62 MG/DL (ref 0.67–1.17)
EGFRCR SERPLBLD CKD-EPI 2021: >90 ML/MIN/1.73M2
EOSINOPHIL # BLD AUTO: 0.1 10E3/UL (ref 0–0.7)
EOSINOPHIL NFR BLD AUTO: 2 %
ERYTHROCYTE [DISTWIDTH] IN BLOOD BY AUTOMATED COUNT: 19.8 % (ref 10–15)
HCT VFR BLD AUTO: 27.8 % (ref 40–53)
HGB BLD-MCNC: 8.8 G/DL (ref 13.3–17.7)
IMM GRANULOCYTES # BLD: 0 10E3/UL
IMM GRANULOCYTES NFR BLD: 1 %
LYMPHOCYTES # BLD AUTO: 0.7 10E3/UL (ref 0.8–5.3)
LYMPHOCYTES NFR BLD AUTO: 18 %
MCH RBC QN AUTO: 30.3 PG (ref 26.5–33)
MCHC RBC AUTO-ENTMCNC: 31.7 G/DL (ref 31.5–36.5)
MCV RBC AUTO: 96 FL (ref 78–100)
MONOCYTES # BLD AUTO: 0.6 10E3/UL (ref 0–1.3)
MONOCYTES NFR BLD AUTO: 15 %
NEUTROPHILS # BLD AUTO: 2.7 10E3/UL (ref 1.6–8.3)
NEUTROPHILS NFR BLD AUTO: 65 %
NRBC # BLD AUTO: 0 10E3/UL
NRBC BLD AUTO-RTO: 0 /100
PLATELET # BLD AUTO: 185 10E3/UL (ref 150–450)
RBC # BLD AUTO: 2.9 10E6/UL (ref 4.4–5.9)
WBC # BLD AUTO: 4.1 10E3/UL (ref 4–11)

## 2024-09-25 PROCEDURE — 258N000003 HC RX IP 258 OP 636: Performed by: INTERNAL MEDICINE

## 2024-09-25 PROCEDURE — 96375 TX/PRO/DX INJ NEW DRUG ADDON: CPT

## 2024-09-25 PROCEDURE — 36591 DRAW BLOOD OFF VENOUS DEVICE: CPT | Performed by: INTERNAL MEDICINE

## 2024-09-25 PROCEDURE — G0463 HOSPITAL OUTPT CLINIC VISIT: HCPCS | Mod: 25 | Performed by: INTERNAL MEDICINE

## 2024-09-25 PROCEDURE — 96413 CHEMO IV INFUSION 1 HR: CPT

## 2024-09-25 PROCEDURE — 82565 ASSAY OF CREATININE: CPT | Performed by: INTERNAL MEDICINE

## 2024-09-25 PROCEDURE — G2211 COMPLEX E/M VISIT ADD ON: HCPCS | Performed by: INTERNAL MEDICINE

## 2024-09-25 PROCEDURE — 85049 AUTOMATED PLATELET COUNT: CPT | Performed by: INTERNAL MEDICINE

## 2024-09-25 PROCEDURE — 99214 OFFICE O/P EST MOD 30 MIN: CPT | Performed by: INTERNAL MEDICINE

## 2024-09-25 PROCEDURE — 250N000011 HC RX IP 250 OP 636: Performed by: INTERNAL MEDICINE

## 2024-09-25 RX ORDER — HEPARIN SODIUM (PORCINE) LOCK FLUSH IV SOLN 100 UNIT/ML 100 UNIT/ML
5 SOLUTION INTRAVENOUS
Status: DISCONTINUED | OUTPATIENT
Start: 2024-09-25 | End: 2024-09-25 | Stop reason: HOSPADM

## 2024-09-25 RX ORDER — ONDANSETRON 2 MG/ML
8 INJECTION INTRAMUSCULAR; INTRAVENOUS ONCE
Status: COMPLETED | OUTPATIENT
Start: 2024-09-25 | End: 2024-09-25

## 2024-09-25 RX ADMIN — SODIUM CHLORIDE 250 ML: 9 INJECTION, SOLUTION INTRAVENOUS at 10:46

## 2024-09-25 RX ADMIN — PEMETREXED DISODIUM 1000 MG: 500 INJECTION, POWDER, LYOPHILIZED, FOR SOLUTION INTRAVENOUS at 11:16

## 2024-09-25 RX ADMIN — ONDANSETRON 8 MG: 2 INJECTION INTRAMUSCULAR; INTRAVENOUS at 10:47

## 2024-09-25 RX ADMIN — Medication 5 ML: at 11:37

## 2024-09-25 ASSESSMENT — PAIN SCALES - GENERAL: PAINLEVEL: NO PAIN (0)

## 2024-09-25 NOTE — PROGRESS NOTES
Nursing Note:  Kt Rasmussen presents today for Port Labs.    Patient seen by provider today: Yes: Dr. John   present during visit today: Not Applicable.    Note: N/A.    Intravenous Access:  Labs drawn without difficulty.  Implanted Port.    Discharge Plan:   Patient was sent to [facility] for 10 AM appointment.    Keith Chavez RN

## 2024-09-25 NOTE — PROGRESS NOTES
Infusion Nursing Note:  Kt HERNANDEZ Valery presents today for C5D1 alimta.    Patient seen by provider today: Yes: Dr. John   present during visit today: Not Applicable.    Note: N/A.      Intravenous Access:  Implanted Port.    Treatment Conditions:  Lab Results   Component Value Date    HGB 8.8 (L) 09/25/2024    WBC 4.1 09/25/2024    ANEU 0.5 (L) 08/11/2022    ANEUTAUTO 2.7 09/25/2024     09/25/2024        Lab Results   Component Value Date     01/25/2023    POTASSIUM 4.0 01/25/2023    MAG 1.9 10/13/2016    CR 0.62 (L) 09/25/2024    BEVERLY 9.3 01/25/2023    BILITOTAL 0.6 06/20/2024    ALBUMIN 3.9 06/20/2024    ALT 20 06/20/2024    AST 23 06/20/2024       Results reviewed, labs MET treatment parameters, ok to proceed with treatment.      Post Infusion Assessment:  Patient tolerated infusion without incident.  Blood return noted pre and post infusion.  Site patent and intact, free from redness, edema or discomfort.  No evidence of extravasations.  Access discontinued per protocol.       Discharge Plan:   Copy of AVS reviewed with patient and/or family.  Patient will return as prev carlos for next appointment.  Patient discharged in stable condition accompanied by: self.  Departure Mode: Wheelchair.      Guzman Patel, RN

## 2024-09-25 NOTE — PROGRESS NOTES
"Oncology Rooming Note    September 25, 2024 10:16 AM   Kt Rasmussen is a 64 year old male who presents for:    Chief Complaint   Patient presents with    Oncology Clinic Visit     Initial Vitals: There were no vitals taken for this visit. Estimated body mass index is 22.19 kg/m  as calculated from the following:    Height as of 8/14/24: 1.918 m (6' 3.51\").    Weight as of 8/14/24: 81.6 kg (180 lb). There is no height or weight on file to calculate BSA.  Data Unavailable Comment: Data Unavailable   No LMP for male patient.  Allergies reviewed: Yes  Medications reviewed: Yes    Medications: Medication refills not needed today.  Pharmacy name entered into EPIC:    [place], [place] - [address] [facility] [address] [id number] - [address] AT [facility] 7  [place] [facility] - [facility] [address] [place] [address] [employer], [facility]. - [facility] [address] [facility]    Frailty Screening:   Is the patient here for a new oncology consult visit in cancer care? 2. No      Floridalma Nieves MA            "

## 2024-09-25 NOTE — LETTER
9/25/2024      Kt Rasmussen  [address]  [facility] [address]      Dear Colleague,    Thank you for referring your patient, Kt Rasmussen, to the [facility]. Please see a copy of my visit note below.    [facility]    Hematology/Oncology Established Patient Follow-up Note      Today's Date: 9/25/2024    Reason for Follow-up: Metastatic non-small cell lung carcinoma.    HISTORY OF PRESENT ILLNESS: Kt Rasmussen is a 64 year old male who presents with the following oncologic history:  1. 5/05/2016: Presented with near-obstructing large mass coming off the cheikh and likely the posterior wall, seen on bronchoscopy. Biopsy of the mass showed invasive squamous cell carcinoma, moderately differentiating and focally keratinizing.  Procedure was complicated by significant bleeding.  Tumor was completely debulked (via bronchoscopy) in both main stem bronchi and distal trachea. NGS showed no mutations in EGFR, KRAS, BRAF, NRAS, HRAS, PIK3CA, ERBB2, MET, or JAK2.  2. 5/23/2016: PET/CT scan showed evidence of residual tumor with decreased endobronchial mass. Indeterminate, mildly hypermetabolic mesentery with prominent lymph nodes and area of enhancement of the right hepatic lobe present. Felt to have T4-NX-M0 disease.  3. 6/09/2016: Started concurrent chemoradiation with weekly paclitaxel and carboplatin.  4. 7/30/2016: Completed radiation.  Subsequently received 2 cycles of consolidation paclitaxel and carboplatin.   5. 9/13/2019: Presented with 6-month history of dysphonia and left vocal exophytic lesion. Left true vocal fold biopsy showed at least squamous cell carcinoma in situ, cannot exclude superficial invasion.  6. 9/30/2019: Excision of left vocal cord mass showed fragments of invasive squamous cell carcinoma, well differentiated and keratinizing.  Margins negative for malignancy.  7. 2/16/2021: CT chest w/o contrast showed thin-walled cavity in left lower  lobe with 2 solid peripheral nodules measuring 9 mm each. No lymphadenopathy.  8. 3/01/2021: PET scan showed hypermetabolic nodules in left lower lobe and right lung, consistent with metastases; hypermetabolic adenopathy in chest, abdomen, pelvis; nonspecific uptake at tongue; hypermetabolic intraosseous lesions in spine and pelvis consistent with metastatic disease; left axillary hypermetabolic lymph nodes related to COVID-19 vaccination.  9. 3/12/2021: CT-guided lung biopsy showed non-small cell carcinoma, not otherwise specified -- with opinion by [facility] pathologist.  10. 3/18/2021: Lung NGS panel negative for mutations in BRAF, EGFR, ERBB2, IDH1, IDH2, KRAS, MET, NRAS, RET. PD-L1 expression negative (TPS <1%). Due to minimal amount of DNA obtained from specimen, other biomarkers could not be analyzed.  11. 4/7/2021: MRI brain negative for brain metastases.  12. 4/27/2021: Started 1st line metastatic therapy with carboplatin, paclitaxel, bevacizumab, and atezolizumab for metastatic non-small cell lung carcinoma, NOS.  13. 7/12/2021: PET/CT showed resolved mural nodules with increased cystic cavity in left lower lobe with no significant FDG uptake, less likely metastases; no enlarged hypermetabolic mediastinal, hilar, or axillary lymph nodes, decreased metabolic activity of intraosseous lesion in spine and pelvis; no new bone lesions.  14. 10/11/2021: PET/CT showed slight interval increase in intensity of metabolic activity of right pubic bone and left ischium with new focal uptake in L2 spinous process; resolved uptake at previous ground glass opacity along right medial lower lobe; unchanged cystic cavities/nodules in left lower lobe and right apical upper lobe; no pathologically enlarged hypermetabolic mediastinal, hilar, or axillary lymph nodes.  15. 1/10/2022: PET/CT showed new foci of abnormal skeletal FGD uptake in the thoracic and lumbar spine. Mild interval increase in intensity of previously  demonstrated hypermetabolic skeletal lesions involving the pelvis and lumbar spine. Findings are consistent with progressive osseous metastatic disease. Subsequently off chemo for 1 month due to poor performance status.  16. 1/25/2022: Patient fell and fractured his femur. This was surgically repaired and he was subsequently cared for at a rehab unit.  17. 2/14/2022: Brain MRI without contrast (contrast not given due to inability to obtain IV access) showed no brain metastases.  18. 4/21/2022: Started 2nd line metastatic therapy with pemetrexed and carboplatin. Omission of carboplatin 6/2 and forward due to worsening anemia despite dose reduction.  19. 7/18/2022: PET/CT showed enlarged and increased FDG avid skeletal metastases, increased left para-aortic retroperitoneal lymph node increased in size and FDG avidity, findings consistent with progressive disease.  20. 8/3/2022: Started 3rd line metastatic therapy with Taxotere 60 mg/m2 every 3 weeks.  21. 11/21/2022: PET scan showed partial response, left para-aortic retroperitoneal lymph node has decreased from 2.6 x 1.7 cm (SUVmax 7.9) to 2.3 x 1.4 cm (SUVmax 5.7), skeletal metastases no longer demonstrate FDG activity. Kt elected to take a 2-month break from chemo.  22. 1/23/2023: PET scan showed increasing metabolic activity of the left periaortic (max SUV 8.1, previously 5.7) left external iliac (max SUV 7.0, previously 3.1), and right external iliac (max SUV 5.1, previously 3.7) lymph nodes with development/reactivation of multiple FDG avid osseous lesions.  23. 3/23/2023: Resumption of Taxotere every 3 weeks. Delayed due to insurance issues.  24. 6/26/2023: PET scan showed partial response with decreased uptake associated with the retroperitoneal and pelvic lymphadenopathy as well as bone metastases.  25. 12/4/2023: PET scan showed decreased FDG avid left periaortic (SUV max 5.4, previously 6.6) and left external iliac (SUV max 3.5, previously 4.9)  lymphadenopathy and broadly stable FDG uptake within scattered pre-existing osseous metastases; new FDG avid lesion involving the right anterior first rib without clear fracture line visible (SUV max 4.7) indeterminate for a new osseous metastasis versus posttraumatic in etiology.   26. 3/18/2024: PET scan showed overall stable disease.  27. 6/17/2024: PET scan showed progressive disease with FDG avid left para-aortic retroperitoneal node measures 2.1 x 2.6 cm compared to 2.0 x 2.6 cm with SUV max of 8.4 previously 6.3.  Increased uptake with an a more inferior left periaortic retroperitoneal node with SUV max of 7.4, previously 4.3. Mildly increased uptake within a left external iliac node with SUV max of 7.0, previously 4.3. There is a new 1.1 x 1.3 cm retrocaval retroperitoneal node with SUV max of 13.4. New FDG uptake within a subcentimeter node between the right obturator muscles with SUV max of 4.1. Worsening FDG avid osseous disease involving the right occipital condyle, right aspect of the clivus, T12 and L1 vertebral bodies, and bony pelvis. FDG uptake within the right sacral lesion with SUV max of 14.0 compared to 6.9 and FDG uptake within the T12 vertebral body with SUV max of 15.2 compared to 8.2.  28. 7/03/2024: Switched to pemetrexed.  29. 9/23/2024: PET/CT showed decreasing metabolic activity of the left periaortic and left greater than right external iliac/pelvic sidewall lymph nodes, osseous lesions involving the T12 vertebral body, L1 vertebral body, right sacral ala, right posterior iliac bone   and right anterior acetabular region; broadly stable FDG avid lesions in the left iliac wing but increasing metabolic activity of FDG avid soft tissue thickening in the right lower paratracheal/hilar region and retrocaval lymph nodes suspicious for mild progression of disease.    INTERVAL HISTORY:  Kt reports he had a fall after tripping 3 weeks ago.  Denies head or other trauma. He denies any dyspnea,  nausea.  He reports feeling well.    REVIEW OF SYSTEMS:   14 point ROS was reviewed and is negative other than as noted above in HPI.       HOME MEDICATIONS:  Current Outpatient Medications   Medication Sig Dispense Refill     atorvastatin (LIPITOR) 40 MG tablet Take 1 tablet (40 mg) by mouth daily for 90 days 90 tablet 0     ELIQUIS ANTICOAGULANT 5 MG tablet Take 1 tablet (5 mg) by mouth 2 times daily for 90 days 180 tablet 0     folic acid (FOLVITE) 1 MG tablet Take 1 tablet (1 mg) by mouth daily 90 tablet 3     prochlorperazine (COMPAZINE) 10 MG tablet Take 1 tablet (10 mg) by mouth every 6 hours as needed for nausea or vomiting 30 tablet 2         ALLERGIES:  Allergies   Allergen Reactions     No Known Drug Allergy          PAST MEDICAL HISTORY:  Past Medical History:   Diagnosis Date     Alcohol abuse, unspecified      Cerebral infarction (H)     2009, right side residual and aphasia     Dyslipidemia      GERD (gastroesophageal reflux disease)      Lung cancer (H)      Unspecified essential hypertension          PAST SURGICAL HISTORY:  Past Surgical History:   Procedure Laterality Date     BRONCHOSCOPY FLEXIBLE AND RIGID N/A 5/5/2016    Procedure: BRONCHOSCOPY FLEXIBLE AND RIGID;  Surgeon: Tony Talbot MD;  Location: [facility] OR     ESOPHAGOSCOPY FLEXIBLE N/A 9/30/2019    Procedure: flexible esophagoscopy;  Surgeon: Lizzy Johnson MD;  Location: [facility] OR     INJECT STEROID (LOCATION) N/A 9/30/2019    Procedure: steroid injection;  Surgeon: Lizzy Johnson MD;  Location: [facility] OR     IR CHEST PORT PLACEMENT > 5 YRS OF AGE  4/22/2021     IR CHEST PORT PLACEMENT > 5 YRS OF AGE  4/11/2022     IR PORT CHECK RIGHT  4/11/2022     IR PORT REMOVAL RIGHT  4/11/2022     LASER CO2 LARYNGOSCOPY N/A 9/30/2019    Procedure: Microdirect laryngoscopy with excision of laryngeal mass, CO2 laser;  Surgeon: Lizzy Johnson MD;  Location: [facility] OR     ZZC NONSPECIFIC PROCEDURE      R tympanoplasty         SOCIAL HISTORY:  Social  "History     Socioeconomic History     Marital status: Single     Spouse name: Not on file     Number of children: Not on file     Years of education: Not on file     Highest education level: Not on file   Occupational History     Not on file   Tobacco Use     Smoking status: Former     Current packs/day: 0.00     Types: Cigarettes     Quit date: 2009     Years since quittin.9     Smokeless tobacco: Never   Substance and Sexual Activity     Alcohol use: Not Currently     Drug use: No     Sexual activity: Not on file   Other Topics Concern     Parent/sibling w/ CABG, MI or angioplasty before 65F 55M? Not Asked   Social History Narrative     Not on file     Social Determinants of Health     Financial Resource Strain: Not on file   Food Insecurity: Not on file   Transportation Needs: Not on file   Physical Activity: Not on file   Stress: Not on file   Social Connections: Not on file   Interpersonal Safety: Not At Risk (2023)    Humiliation, Afraid, Rape, and Kick questionnaire      Fear of Current or Ex-Partner: No      Emotionally Abused: No      Physically Abused: No      Sexually Abused: No   Housing Stability: Not on file   Resides at assisted living.      FAMILY HISTORY:  Family History   Problem Relation Age of Onset     Cancer Father          PHYSICAL EXAM:  Vital signs:  /75   Pulse 89   Temp 97.6  F (36.4  C)   Resp 16   Ht 1.905 m (6' 3\")   Wt 81.6 kg (180 lb)   SpO2 96%   BMI 22.50 kg/m     ECO  GENERAL: No acute distress. Sitting in wheelchair.  EYES: No scleral icterus. No overt erythema.  LYMPH: No cervical or supraclavicularadenopathy.  RESPIRATORY: Clear bilaterally.  CARDIAC: Regular rate and rhythm with no murmurs.  SKIN: No overt rashes, discolorations, or lesions over the face and neck.  EXTREMITIES: 2+ BLE pitting edema.  NEUROLOGIC: Alert.  No overt tremors.  PSYCHIATRIC: Normal affect and mood.  Does not appear anxious.       LABS:  CBC RESULTS:   Recent Labs   Lab " Test 09/25/24  0927   WBC 4.1   RBC 2.90*   HGB 8.8*   HCT 27.8*   MCV 96   MCH 30.3   MCHC 31.7   RDW 19.8*          PATHOLOGY:  None new.    IMAGING:  Reviewed as per HPI.    ASSESSMENT/PLAN:  Kt Rasmussen is a 64 year old male with the following issues:  1. Metastatic non-small (non-squamous) cell lung carcinoma with metastases to lymph nodes, bones, and bilateral lungs  2. 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 Chemotherapy-induced anemia and thrombocytopenia  --Lung NGS panel negative for mutations in BRAF, EGFR, ERBB2, IDH1, IDH2, KRAS, MET, NRAS, RET. PD-L1 expression negative (TPS <1%). Guardant 360 negative for actionable mutations.  --Kt started 3rd line metastatic therapy with every 3-week Taxotere at 20% dose reduction (60 mg/m2) on 8/3/2022 due to progressive disease.  He tolerated this very well with minimal to no paresthesias. He then took a 2-month chemo break that was further delayed to 3.5 months due to insurance issues. He resumed Taxotere 3/23/2023.  --6/17/2024 PET scan showed progressive disease in the abdomen/pelvis nodes and bone metastases.  --Switched to pemetrexed 7/3/2024.  --He declines to take the oral dexamethasone premedication.  --Discussed his labs from today show Hgb decreased to 8.8, wBC 4.1, platelets 185,000.  --I personally reviewed his 9/23/2024 PET scan which showed mixed response but decreasing metabolic activity of the left periaortic and left greater than right external iliac/pelvic sidewall lymph nodes, osseous lesions involving the T12 vertebral body, L1 vertebral body, right sacral ala, right posterior iliac bone and right anterior acetabular region; broadly stable FDG avid lesions in the left iliac wing but increasing metabolic activity of FDG avid soft tissue thickening in the right lower paratracheal/hilar region and retrocaval lymph nodes suspicious for  "mild progression of disease.  --I advised continuing pemetrexed and repeating PET scan in 3 months.  If any further definitive disease progression on that scan, can consider switching to gemcitabine.  --Continue folate and B12 injections.    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 Dysphonia  6. Dysphagia  -Kt underwent excision of a left vocal cord SCC on 9/30/2019 and has persistent dysphonia as a result of the lesion and excision.  He also has dysphagia but this is stable and swallowing is manageable.  -Continue follow-up with Dr. Wray.    7. History of dizziness and prior falls  --SW and guardian were previously notified of multiple falls.  --Prior brain MRI 2/14/2022 showed no brain metastases. However this was a suboptimal exam due to inability to administer contrast.  --He is using a wheelchair due to prior femoral fracture in 1/2022.    8. Bilateral lower extremity DVT  --Diagnosed in 9/2023, likely hypercoagulability of malignancy.  --Continue apixaban indefinitely, provided no major bleeding issues arise in the future and platelets remain >= 50,000.    Sangita John MD  [facility] Hematology/Oncology    Total time spent today: 30 minutes in chart review, patient evaluation, counseling, documentation, test and/or medication/prescription orders, and coordination of care.     The longitudinal plan of care for the diagnosis(es)/condition(s) as documented were addressed during this visit. Due to the added complexity in care, I will continue to support Kt in the subsequent management and with ongoing continuity of care.      Oncology Rooming Note    September 25, 2024 10:16 AM   Kt Rasmussen is a 64 year old male who presents for:    Chief Complaint   Patient presents with     Oncology Clinic Visit     Initial Vitals: There were no vitals taken for this visit. Estimated body mass index is 22.19 kg/m  as calculated from the following:    Height as of 8/14/24: 1.918 m (6' 3.51\").    " Weight as of 8/14/24: 81.6 kg (180 lb). There is no height or weight on file to calculate BSA.  Data Unavailable Comment: Data Unavailable   No LMP for male patient.  Allergies reviewed: Yes  Medications reviewed: Yes    Medications: Medication refills not needed today.  Pharmacy name entered into EPIC:    [place] [address] [facility] [address] [facility] #[id number] - [place] [address] AT [facility] 7  [place] [facility] - [address], [facility]. - [address]    Frailty Screening:   Is the patient here for a new oncology consult visit in cancer care? 2. No      Floridalma Nieves MA              Again, thank you for allowing me to participate in the care of your patient.        Sincerely,        Sangita John MD

## 2024-10-14 RX ORDER — DIPHENHYDRAMINE HYDROCHLORIDE 50 MG/ML
50 INJECTION INTRAMUSCULAR; INTRAVENOUS
Status: CANCELLED
Start: 2024-10-18

## 2024-10-14 RX ORDER — MEPERIDINE HYDROCHLORIDE 25 MG/ML
25 INJECTION INTRAMUSCULAR; INTRAVENOUS; SUBCUTANEOUS EVERY 30 MIN PRN
Status: CANCELLED | OUTPATIENT
Start: 2024-10-18

## 2024-10-14 RX ORDER — ALBUTEROL SULFATE 90 UG/1
1-2 INHALANT RESPIRATORY (INHALATION)
Status: CANCELLED
Start: 2024-10-18

## 2024-10-14 RX ORDER — HEPARIN SODIUM (PORCINE) LOCK FLUSH IV SOLN 100 UNIT/ML 100 UNIT/ML
5 SOLUTION INTRAVENOUS
Status: CANCELLED | OUTPATIENT
Start: 2024-10-18

## 2024-10-14 RX ORDER — ALBUTEROL SULFATE 0.83 MG/ML
2.5 SOLUTION RESPIRATORY (INHALATION)
Status: CANCELLED | OUTPATIENT
Start: 2024-10-18

## 2024-10-14 RX ORDER — EPINEPHRINE 1 MG/ML
0.3 INJECTION, SOLUTION, CONCENTRATE INTRAVENOUS EVERY 5 MIN PRN
Status: CANCELLED | OUTPATIENT
Start: 2024-10-18

## 2024-10-14 RX ORDER — CYANOCOBALAMIN 1000 UG/ML
1000 INJECTION, SOLUTION INTRAMUSCULAR; SUBCUTANEOUS
Status: CANCELLED | OUTPATIENT
Start: 2024-10-18

## 2024-10-14 RX ORDER — LORAZEPAM 2 MG/ML
0.5 INJECTION INTRAMUSCULAR EVERY 4 HOURS PRN
Status: CANCELLED | OUTPATIENT
Start: 2024-10-18

## 2024-10-14 RX ORDER — ONDANSETRON 2 MG/ML
8 INJECTION INTRAMUSCULAR; INTRAVENOUS ONCE
Status: CANCELLED | OUTPATIENT
Start: 2024-10-18

## 2024-10-14 RX ORDER — HEPARIN SODIUM,PORCINE 10 UNIT/ML
5-20 VIAL (ML) INTRAVENOUS DAILY PRN
Status: CANCELLED | OUTPATIENT
Start: 2024-10-18

## 2024-10-18 ENCOUNTER — ONCOLOGY VISIT (OUTPATIENT)
Dept: ONCOLOGY | Facility: CLINIC | Age: 64
End: 2024-10-18
Attending: INTERNAL MEDICINE
Payer: COMMERCIAL

## 2024-10-18 ENCOUNTER — LAB (OUTPATIENT)
Dept: INFUSION THERAPY | Facility: CLINIC | Age: 64
End: 2024-10-18
Attending: INTERNAL MEDICINE
Payer: COMMERCIAL

## 2024-10-18 VITALS
DIASTOLIC BLOOD PRESSURE: 80 MMHG | RESPIRATION RATE: 16 BRPM | HEART RATE: 91 BPM | TEMPERATURE: 98.4 F | SYSTOLIC BLOOD PRESSURE: 122 MMHG | OXYGEN SATURATION: 96 %

## 2024-10-18 VITALS — HEIGHT: 75 IN | WEIGHT: 168.6 LBS | BODY MASS INDEX: 20.96 KG/M2

## 2024-10-18 DIAGNOSIS — Z51.11 ENCOUNTER FOR ANTINEOPLASTIC CHEMOTHERAPY: ICD-10-CM

## 2024-10-18 DIAGNOSIS — C34.90 NON-SMALL CELL LUNG CANCER METASTATIC TO BONE (H): ICD-10-CM

## 2024-10-18 DIAGNOSIS — C79.51 NON-SMALL CELL LUNG CANCER METASTATIC TO BONE (H): ICD-10-CM

## 2024-10-18 DIAGNOSIS — C79.51 NON-SMALL CELL LUNG CANCER METASTATIC TO BONE (H): Primary | ICD-10-CM

## 2024-10-18 DIAGNOSIS — Z51.11 ENCOUNTER FOR ANTINEOPLASTIC CHEMOTHERAPY: Primary | ICD-10-CM

## 2024-10-18 DIAGNOSIS — C34.90 NON-SMALL CELL LUNG CANCER METASTATIC TO BONE (H): Primary | ICD-10-CM

## 2024-10-18 LAB
BASOPHILS # BLD AUTO: 0 10E3/UL (ref 0–0.2)
BASOPHILS NFR BLD AUTO: 0 %
CREAT SERPL-MCNC: 0.73 MG/DL (ref 0.67–1.17)
EGFRCR SERPLBLD CKD-EPI 2021: >90 ML/MIN/1.73M2
EOSINOPHIL # BLD AUTO: 0.1 10E3/UL (ref 0–0.7)
EOSINOPHIL NFR BLD AUTO: 2 %
ERYTHROCYTE [DISTWIDTH] IN BLOOD BY AUTOMATED COUNT: 20.7 % (ref 10–15)
HCT VFR BLD AUTO: 27.7 % (ref 40–53)
HGB BLD-MCNC: 8.7 G/DL (ref 13.3–17.7)
IMM GRANULOCYTES # BLD: 0 10E3/UL
IMM GRANULOCYTES NFR BLD: 1 %
LYMPHOCYTES # BLD AUTO: 0.7 10E3/UL (ref 0.8–5.3)
LYMPHOCYTES NFR BLD AUTO: 17 %
MCH RBC QN AUTO: 31.1 PG (ref 26.5–33)
MCHC RBC AUTO-ENTMCNC: 31.4 G/DL (ref 31.5–36.5)
MCV RBC AUTO: 99 FL (ref 78–100)
MONOCYTES # BLD AUTO: 0.5 10E3/UL (ref 0–1.3)
MONOCYTES NFR BLD AUTO: 11 %
NEUTROPHILS # BLD AUTO: 2.9 10E3/UL (ref 1.6–8.3)
NEUTROPHILS NFR BLD AUTO: 69 %
NRBC # BLD AUTO: 0 10E3/UL
NRBC BLD AUTO-RTO: 0 /100
PLATELET # BLD AUTO: 216 10E3/UL (ref 150–450)
RBC # BLD AUTO: 2.8 10E6/UL (ref 4.4–5.9)
WBC # BLD AUTO: 4.2 10E3/UL (ref 4–11)

## 2024-10-18 PROCEDURE — 36591 DRAW BLOOD OFF VENOUS DEVICE: CPT | Performed by: INTERNAL MEDICINE

## 2024-10-18 PROCEDURE — 99214 OFFICE O/P EST MOD 30 MIN: CPT | Performed by: NURSE PRACTITIONER

## 2024-10-18 PROCEDURE — 250N000011 HC RX IP 250 OP 636: Performed by: INTERNAL MEDICINE

## 2024-10-18 PROCEDURE — 258N000003 HC RX IP 258 OP 636: Performed by: INTERNAL MEDICINE

## 2024-10-18 PROCEDURE — 96372 THER/PROPH/DIAG INJ SC/IM: CPT | Performed by: INTERNAL MEDICINE

## 2024-10-18 PROCEDURE — G0463 HOSPITAL OUTPT CLINIC VISIT: HCPCS | Performed by: NURSE PRACTITIONER

## 2024-10-18 PROCEDURE — 82565 ASSAY OF CREATININE: CPT | Performed by: INTERNAL MEDICINE

## 2024-10-18 PROCEDURE — 85004 AUTOMATED DIFF WBC COUNT: CPT | Performed by: INTERNAL MEDICINE

## 2024-10-18 PROCEDURE — 96375 TX/PRO/DX INJ NEW DRUG ADDON: CPT

## 2024-10-18 PROCEDURE — 96409 CHEMO IV PUSH SNGL DRUG: CPT

## 2024-10-18 RX ORDER — CYANOCOBALAMIN 1000 UG/ML
1000 INJECTION, SOLUTION INTRAMUSCULAR; SUBCUTANEOUS
Status: DISCONTINUED | OUTPATIENT
Start: 2024-10-18 | End: 2024-10-18 | Stop reason: HOSPADM

## 2024-10-18 RX ORDER — ONDANSETRON 2 MG/ML
8 INJECTION INTRAMUSCULAR; INTRAVENOUS ONCE
Status: COMPLETED | OUTPATIENT
Start: 2024-10-18 | End: 2024-10-18

## 2024-10-18 RX ORDER — HEPARIN SODIUM (PORCINE) LOCK FLUSH IV SOLN 100 UNIT/ML 100 UNIT/ML
5 SOLUTION INTRAVENOUS
Status: DISCONTINUED | OUTPATIENT
Start: 2024-10-18 | End: 2024-10-18 | Stop reason: HOSPADM

## 2024-10-18 RX ADMIN — ONDANSETRON 8 MG: 2 INJECTION INTRAMUSCULAR; INTRAVENOUS at 14:28

## 2024-10-18 RX ADMIN — PEMETREXED DISODIUM 1000 MG: 500 INJECTION, POWDER, LYOPHILIZED, FOR SOLUTION INTRAVENOUS at 14:35

## 2024-10-18 RX ADMIN — SODIUM CHLORIDE 250 ML: 9 INJECTION, SOLUTION INTRAVENOUS at 14:28

## 2024-10-18 RX ADMIN — Medication 5 ML: at 14:48

## 2024-10-18 RX ADMIN — CYANOCOBALAMIN 1000 MCG: 1000 INJECTION, SOLUTION INTRAMUSCULAR at 14:47

## 2024-10-18 ASSESSMENT — PAIN SCALES - GENERAL: PAINLEVEL: MILD PAIN (2)

## 2024-10-18 NOTE — PROGRESS NOTES
Oncology/Hematology Visit Note  Oct 18, 2024    Reason for Visit: follow up of metastatic non-small cell lung carcinoma  Metastatic to lymph nodes bones in bilateral lungs  Brain MRI negative for mets    Patient met with Dr. John who recommended treatment with palliative intent with paclitaxel carboplatin Avastin and atezolizumab-treatment started on April 27, 2021  -Due to thrombocytopenia carboplatin AUC reduced to 4 with cycle 2  Due to pancytopenia carboplatin discontinued with cycle 5    7/12/2021: PET/CT showed resolved mural nodules with increased cystic cavity in left lower lobe with no significant FDG uptake, less likely metastases; no enlarged hypermetabolic mediastinal, hilar, or axillary lymph nodes, decreased metabolic activity of intraosseous lesion in spine and pelvis; no new bone lesions    Treated with paclitaxel, Avastin and atezolizumab.-Last treatment given in  12/22/2021-cycle 12    1/10/2022: PET/CT showed new foci of abnormal skeletal FGD uptake in the thoracic and lumbar spine. Mild interval increase in intensity of previously demonstrated hypermetabolic skeletal lesions involving the pelvis and lumbar spine. Findings are consistent with progressive osseous metastatic disease. Subsequently off chemo for 1 month due to poor performance status.  16. 1/25/2022: Patient fell and fractured his femur. This was surgically repaired and he was subsequently cared for at a rehab unit.  17. 2/14/2022: Brain MRI without contrast (contrast not given due to inability to obtain IV access) showed no brain metastases.    Met with Dr. Shoemaker-in Dr. John's absence  -Recommendation is to change therapy   04/21/2022 -palliative Carboplatin Alimta started   Due to cytopenia AUC reduced to 4 with cycle 2  Due to persistent pancytopenia and inability to tolerate treatment carboplatin discontinued with cycle 3-day 1  Patient was on monotherapy with Alimta    07/18/2022-PET CT scan shows progression of the  disease  08/03/22-started Taxotere 60 mg/m2 every 3 weeks    06/2024-scan reveals progression of the disease  Treatment changed to single agent Alimta         Interval History:  Patient reports feeling well.  Denies fever chills sweats cough shortness of breath chest pain nausea vomiting diarrhea abdominal pain or bleeding reports he finished antibiotic for leg cellulitis.  Denies any swelling of the leg rash or pain    Review of Systems:  14 point ROS of systems including Constitutional, Eyes, Respiratory, Cardiovascular, Gastroenterology, Genitourinary, Integumentary, Muscularskeletal, Psychiatric were all negative except for pertinent positives noted in my HPI.    Physical Examination:  Physical Exam  HENT:      Right Ear: Tympanic membrane normal.      Nose: Nose normal.      Mouth/Throat:      Mouth: Mucous membranes are moist.   Eyes:      Pupils: Pupils are equal, round, and reactive to light.   Cardiovascular:      Rate and Rhythm: Normal rate.      Pulses: Normal pulses.   Pulmonary:      Effort: Pulmonary effort is normal.   Abdominal:      General: Abdomen is flat.   Musculoskeletal:         General: Normal range of motion.      Cervical back: Normal range of motion.   Skin:     General: Skin is warm.   Neurological:      General: No focal deficit present.      Mental Status: He is alert.   Psychiatric:         Mood and Affect: Mood normal.           Laboratory Data:  CBC and CMP results reviewed            Assessment and Plan:    metastatic non-small cell lung cancer   Patient follows with Dr John   Progressed on  different treatments as above recently was on monotherapy with Alimta  Labs reviewed okay to proceed with Alimta  Continue with B12 and folic acid   Patient is scheduled with next Alimta and follow-up with [facility]      Anemia secondary to treatment  Patient is asymptomatic  Transfuse for hemoglobin less than 8 or symptomatic      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  01/2021-scope  done by ENT no evidence of recurrence  Continue close follow-up by ENT  Patient has dysphonia and some dysphagia     History of CVA  -02/14/2022-MRI of the brain did not reveal brain metastasis    Bilateral lower extremity DVT  --Diagnosed in 9/2023, likely hypercoagulability of malignancy.  --Continue apixaban indefinitely, provided no major bleeding issues arise in the future and platelets remain >= 50,00    Hx of Cellulitis of left lower exteremly  Completed antibiotics symptoms have resolved      Patient is advised to call our clinic or go to ER in the event of fever chills sweats cough shortness of breath chest pain nausea vomiting diarrhea abdominal pain bleeding edema or any changes in health    MADHAVI Moran CNP  [facility]     Chart documentation with Dragon Voice recognition Software. Although reviewed after completion, some words and grammatical errors may remain.

## 2024-10-18 NOTE — LETTER
"10/18/2024      Kt Rasmussen  [address] [place] [id number]      Dear Colleague,    Thank you for referring your patient, Kt Rasmussen, to the [facility]. Please see a copy of my visit note below.    Oncology Rooming Note    October 18, 2024 1:51 PM   Kt Rasmussen is a 64 year old male who presents for:    Chief Complaint   Patient presents with     Oncology Clinic Visit     Initial Vitals: /80   Pulse 91   Temp 98.4  F (36.9  C) (Oral)   Resp 16   SpO2 96%  Estimated body mass index is 22.5 kg/m  as calculated from the following:    Height as of 9/25/24: 1.905 m (6' 3\").    Weight as of 9/25/24: 81.6 kg (180 lb). There is no height or weight on file to calculate BSA.  Mild Pain (2) Comment: Data Unavailable   No LMP for male patient.  Allergies reviewed: Yes  Medications reviewed: Yes    Medications: Medication refills not needed today.  Pharmacy name entered into EPIC:    [facility] [place], [place] - [address] [facility] [address] [facility][id number] - [place] [address] [facility] 7  [facility] TERM [facility] - [facility] [address] [place] [address], [facility]. - [facility] [id number] [place], [place] [address]E. S.    Frailty Screening:   Is the patient here for a new oncology consult visit in cancer care? 2. No      Clinical concerns:   np was notified.      Julisa Whelan, NASIMA              Oncology/Hematology Visit Note  Oct 18, 2024    Reason for Visit: follow up of metastatic non-small cell lung carcinoma  Metastatic to lymph nodes bones in bilateral lungs  Brain MRI negative for mets    Patient met with Dr. John who recommended treatment with palliative intent with paclitaxel carboplatin Avastin and atezolizumab-treatment started on April 27, 2021  -Due to thrombocytopenia carboplatin AUC reduced to 4 with cycle 2  Due " to pancytopenia carboplatin discontinued with cycle 5    7/12/2021: PET/CT showed resolved mural nodules with increased cystic cavity in left lower lobe with no significant FDG uptake, less likely metastases; no enlarged hypermetabolic mediastinal, hilar, or axillary lymph nodes, decreased metabolic activity of intraosseous lesion in spine and pelvis; no new bone lesions    Treated with paclitaxel, Avastin and atezolizumab.-Last treatment given in  12/22/2021-cycle 12    1/10/2022: PET/CT showed new foci of abnormal skeletal FGD uptake in the thoracic and lumbar spine. Mild interval increase in intensity of previously demonstrated hypermetabolic skeletal lesions involving the pelvis and lumbar spine. Findings are consistent with progressive osseous metastatic disease. Subsequently off chemo for 1 month due to poor performance status.  16. 1/25/2022: Patient fell and fractured his femur. This was surgically repaired and he was subsequently cared for at a rehab unit.  17. 2/14/2022: Brain MRI without contrast (contrast not given due to inability to obtain IV access) showed no brain metastases.    Met with Dr. Shoemaker-in Dr. John's absence  -Recommendation is to change therapy   04/21/2022 -palliative Carboplatin Alimta started   Due to cytopenia AUC reduced to 4 with cycle 2  Due to persistent pancytopenia and inability to tolerate treatment carboplatin discontinued with cycle 3-day 1  Patient was on monotherapy with Alimta    07/18/2022-PET CT scan shows progression of the disease  08/03/22-started Taxotere 60 mg/m2 every 3 weeks    06/2024-scan reveals progression of the disease  Treatment changed to single agent Alimta         Interval History:  Patient reports feeling well.  Denies fever chills sweats cough shortness of breath chest pain nausea vomiting diarrhea abdominal pain or bleeding reports he finished antibiotic for leg cellulitis.  Denies any swelling of the leg rash or pain    Review of Systems:  14 point  ROS of systems including Constitutional, Eyes, Respiratory, Cardiovascular, Gastroenterology, Genitourinary, Integumentary, Muscularskeletal, Psychiatric were all negative except for pertinent positives noted in my HPI.    Physical Examination:  Physical Exam  HENT:      Right Ear: Tympanic membrane normal.      Nose: Nose normal.      Mouth/Throat:      Mouth: Mucous membranes are moist.   Eyes:      Pupils: Pupils are equal, round, and reactive to light.   Cardiovascular:      Rate and Rhythm: Normal rate.      Pulses: Normal pulses.   Pulmonary:      Effort: Pulmonary effort is normal.   Abdominal:      General: Abdomen is flat.   Musculoskeletal:         General: Normal range of motion.      Cervical back: Normal range of motion.   Skin:     General: Skin is warm.   Neurological:      General: No focal deficit present.      Mental Status: He is alert.   Psychiatric:         Mood and Affect: Mood normal.           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  --Continue apixaban indefinitely, provided no major bleeding issues arise in the future and platelets remain >= 50,00    Hx of Cellulitis of left lower  exteremly  Completed antibiotics symptoms have resolved      Patient is advised to call our clinic or go to ER in the event of fever chills sweats cough shortness of breath chest pain nausea vomiting diarrhea abdominal pain bleeding edema or any changes in health    MADHAVI Moran CNP  [facility]     Chart documentation with Dragon Voice recognition Software. Although reviewed after completion, some words and grammatical errors may remain.          Again, thank you for allowing me to participate in the care of your patient.        Sincerely,        MADHAVI Moran CNP

## 2024-10-18 NOTE — PROGRESS NOTES
Nursing Note:  Kt Rasmussen presents today for Port labs.    Patient seen by provider today: Yes: Juan Rausch CNP   present during visit today: Not Applicable.    Note: infusion following provider.    Intravenous Access:  Implanted Port.    Discharge Plan:   Patient was sent to lobby for next appointment.    Olga Rahman RN

## 2024-10-18 NOTE — PROGRESS NOTES
"Infusion Nursing Note:  Kt Rasmussen presents today for C6D1 Alimta, B12 injection.  Patient seen by provider today: Yes: Juan Rausch NP.   present during visit today: Not Applicable.    Note: Per Juan Rausch NP after clinic visit, ok to proceed with treatment today. Patient confirmed he is taking the folic acid daily as ordered. Per Dr. John's note from 9/25/24, \"He declines to take the oral dexamethasone premedication\" and this was deleted from original treatment plan by Dr. John.      Intravenous Access:  Implanted Port in place on arrival, excellent blood return noted.    Treatment Conditions:  Lab Results   Component Value Date    HGB 8.7 (L) 10/18/2024    WBC 4.2 10/18/2024    ANEU 0.5 (L) 08/11/2022    ANEUTAUTO 2.9 10/18/2024     10/18/2024        Lab Results   Component Value Date     01/25/2023    POTASSIUM 4.0 01/25/2023    MAG 1.9 10/13/2016    CR 0.73 10/18/2024    BEVERLY 9.3 01/25/2023    BILITOTAL 0.6 06/20/2024    ALBUMIN 3.9 06/20/2024    ALT 20 06/20/2024    AST 23 06/20/2024     Results reviewed, labs MET treatment parameters, ok to proceed with treatment.      Post Infusion Assessment:  Patient tolerated infusion without incident.  Patient tolerated injection without incident.  Blood return noted pre and post infusion.  Site patent and intact, free from redness, edema or discomfort.  No evidence of extravasations.  Access discontinued per protocol.       Discharge Plan:   Discharge instructions reviewed with: Patient.  Patient and/or family verbalized understanding of discharge instructions and all questions answered.  Copy of AVS reviewed with patient and/or family.  Patient will return 11/6/24 for next appointment.  Patient discharged in stable condition accompanied by: self.  Departure Mode: Wheelchair.      Mackenzie Campa RN    "

## 2024-10-18 NOTE — PROGRESS NOTES
"Oncology Rooming Note    October 18, 2024 1:51 PM   Kt Rasmussen is a 64 year old male who presents for:    Chief Complaint   Patient presents with    Oncology Clinic Visit     Initial Vitals: /80   Pulse 91   Temp 98.4  F (36.9  C) (Oral)   Resp 16   SpO2 96%  Estimated body mass index is 22.5 kg/m  as calculated from the following:    Height as of 9/25/24: 1.905 m (6' 3\").    Weight as of 9/25/24: 81.6 kg (180 lb). There is no height or weight on file to calculate BSA.  Mild Pain (2) Comment: Data Unavailable   No LMP for male patient.  Allergies reviewed: Yes  Medications reviewed: Yes    Medications: Medication refills not needed today.  Pharmacy name entered into EPIC:    [facility] [place] - [address] [facility] [address] [facility] STORE #[id number] - [address] 7 AT [facility] & [address] [facility] - [facility] [address] [place] [address] [employer], [facility]. - [place] [id number]    Frailty Screening:   Is the patient here for a new oncology consult visit in cancer care? 2. No      Clinical concerns:   np was notified.      Julisa Whelan CMA            "

## 2024-10-21 NOTE — LETTER
2020       RE: Kt Rasmussen  [address]  [facility] [address]     Dear Colleague,    Thank you for referring your patient, Kt Rasmussen, to the [facility] EAR NOSE AND THROAT at [facility]. Please see a copy of my visit note below.        [facility]   at the [facility]   Otolaryngology Clinic     Patient: Kt Rasmussen    MRN: [id number]    : 1960    Age/Gender: 60 year old male  Date of Service: 2020  Rendering Provider:   Lizzy Wray MD     Chief Complaint   T1 left VF SCC s/p resection 19  Dysphonia  Dysphagia  Interval History   HISTORY OF PRESENT ILLNESS: Mr. 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 The follow up visit was on 19 at which time he had no evidence of disease and continued dysphonia and dysphagia.  The next visit was on 20 and he reports persistent dysphonia. He is able to manage and it does not bother him. He had no evidence of recurrence. Recommended to continue thickened liquid diet.      Today, he presents for follow up. He reports no new complaints. Voice stable. Denies trouble swallowing.     Dysphonia: Patient reports dysphonia. This is stable      Dysphagia: Patient reports dysphagia. This is stable     Dyspnea: Patient denies dyspnea. This is stable       PAST MEDICAL HISTORY:   Past Medical History:   Diagnosis Date     Alcohol abuse, unspecified      Cerebral infarction (H)     , right side residual and aphasia     Dyslipidemia      GERD (gastroesophageal reflux disease)      Lung cancer (H)      Unspecified essential hypertension        PAST SURGICAL HISTORY:   Past Surgical History:   Procedure Laterality Date     BRONCHOSCOPY FLEXIBLE AND RIGID N/A 2016    Procedure: BRONCHOSCOPY FLEXIBLE AND RIGID;  Surgeon: Tony Talbot MD;  Location: [facility] OR     C NONSPECIFIC PROCEDURE      R tympanoplasty     ESOPHAGOSCOPY FLEXIBLE  N/A 9/30/2019    Procedure: flexible esophagoscopy;  Surgeon: Lizzy Johnson MD;  Location: [facility] OR     INJECT STEROID (LOCATION) N/A 9/30/2019    Procedure: steroid injection;  Surgeon: Lizzy Johnson MD;  Location: [facility] OR     LASER CO2 LARYNGOSCOPY N/A 9/30/2019    Procedure: Microdirect laryngoscopy with excision of laryngeal mass, CO2 laser;  Surgeon: Lizzy Johnson MD;  Location: [facility] OR       CURRENT MEDICATIONS:   Current Outpatient Medications:      atorvastatin (LIPITOR) 40 MG tablet, Take 40 mg by mouth daily, Disp: , Rfl:      folic acid (FOLVITE) 1 MG tablet, Take 1 mg by mouth daily, Disp: , Rfl:      LORazepam (ATIVAN) 0.5 MG tablet, Take 0.5 mg by mouth every 4 hours as needed for anxiety, Disp: , Rfl:      ondansetron (ZOFRAN) 8 MG tablet, Take by mouth every 8 hours as needed for nausea, Disp: , Rfl:      prochlorperazine (COMPAZINE) 10 MG tablet, Take 10 mg by mouth every 6 hours as needed for nausea or vomiting, Disp: , Rfl:     ALLERGIES: No known drug allergies    SOCIAL HISTORY:    Social History     Socioeconomic History     Marital status: Single     Spouse name: Not on file     Number of children: Not on file     Years of education: Not on file     Highest education level: Not on file   Occupational History     Not on file   Social Needs     Financial resource strain: Not on file     Food insecurity     Worry: Not on file     Inability: Not on file     Transportation needs     Medical: Not on file     Non-medical: Not on file   Tobacco Use     Smoking status: Former Smoker     Packs/day: 1.00     Types: Cigarettes     Last attempt to quit: 9/25/2009     Years since quitting: 10.8     Smokeless tobacco: Never Used   Substance and Sexual Activity     Alcohol use: Not Currently     Drug use: No     Sexual activity: Not on file   Lifestyle     Physical activity     Days per week: Not on file     Minutes per session: Not on file     Stress: Not on file   Relationships     Social connections      Talks on phone: Not on file     Gets together: Not on file     Attends Temple service: Not on file     Active member of club or organization: Not on file     Attends meetings of clubs or organizations: Not on file     Relationship status: Not on file     Intimate partner violence     Fear of current or ex partner: Not on file     Emotionally abused: Not on file     Physically abused: Not on file     Forced sexual activity: Not on file   Other Topics Concern     Parent/sibling w/ CABG, MI or angioplasty before 65F 55M? Not Asked   Social History Narrative     Not on file         FAMILY HISTORY:   Family History   Problem Relation Age of Onset     Cancer Father       Non-contributory for problems with anesthesia    REVIEW OF SYSTEMS:   The patient was asked a 14 point review of systems regarding constitutional symptoms, eye symptoms, ears, nose, mouth, throat symptoms, cardiovascular symptoms, respiratory symptoms, gastrointestinal symptoms, genitourinary symptoms, musculoskeletal symptoms, integumentary symptoms, neurological symptoms, psychiatric symptoms, endocrine symptoms, hematologic/lymphatic symptoms, and allergic/ immunologic symptoms.   The pertinent factors have been included in the HPI and below.  Patient Supplied Answers to Review of Systems  No flowsheet data found.    Physical Examination   The patient underwent a physical examination as described below. The pertinent positive and negative findings are summarized after the description of the examination.  Constitutional: The patient's developmental and nutritional status was assessed. The patient's voice quality was assessed.  Head and Face: The head and face were inspected for deformities. The sinuses were palpated. The salivary glands were palpated. Facial muscle strength was assessed bilaterally.  Eyes: Extraocular movements and primary gaze alignment were assessed.  Ears, Nose, Mouth and Throat: The ears and nose were examined for deformities.  The nasal septum, mucosa, and turbinates were inspected by anterior rhinoscopy. The lips, teeth, and gums were examined for abnormalities. The oral mucosa, tongue, palate, tonsils, lateral and posterior pharynx were inspected for the presence of asymmetry or mucosal lesions.    Neck: The tracheal position was noted, and the neck mass palpated to determine if there were any asymmetries, abnormal neck masses, thyromegally, or thyroid nodules.  Respiratory: The nature of the breathing and chest expansion/symmetry was observed.  Cardiovascular: The patient was examined to determine the presence of any edema or jugular venous distension.  Abdomen: The contour of the abdomen was noted.  Lymphatic: The patient was examined for infraclavicular lymphadenopathy.  Musculoskeletal: The patient was inspected for the presence of skeletal deformities.  Extremities: The extremities were examined for any clubbing or cyanosis.  Skin: The skin was examined for inflammatory or neoplastic conditions.  Neurologic: The patient's orientation, mood, and affect were noted. The cranial nerve  functions were examined.  Other pertinent positive and negative findings on physical examination:   OC/OP: no lesions, uvula midline, soft palate elevates symmetrically, FOM/BOT soft  Neck: no lesions, no TH tenderness to palpation  Uses a cane  Has right hand weakness  All other physical examination findings were within normal limits and noncontributory.    Procedures   Flexible laryngoscopy (CPT 25006)      Pre-procedure diagnosis: history of left vocal fold scc  Post-procedure diagnosis: same as above  Indication for procedure: Mr. Rasmussen is a 60 year old male with see above  Procedure(s): Fiberoptic Laryngoscopy    Details of Procedure: After informed consent was obtained, the patient was seated in the examination chair.  The areas of the nasopharynx as well as the hypopharynx were anesthetized with topical 4% lidocaine with 0.25% phenylephrine  atomizer.  Examination of the base of tongue was performed first.  Attention was directed to any evidence of masses in the area or evidence of leukoplakia or candidal infection.  Attention was directed to the epiglottis where its size and position was determined and its movement on phonation of the vowel  e .  The piriform sinuses were then inspected for any mass lesions or pooling of secretions.  Attention was then directed to the larynx. The vocal folds were inspected for infection or any areas of leukoplakia, for masses, polypoid degeneration, or hemorrhage.  Having done this, the arytenoids and vocal processes were inspected for erythema or evidence of granuloma formation.  The posterior commissure was then inspected for evidence of inflammatory changes in the mucosa and heaping up of mucosal tissue. The patient was then instructed to say the vowel  e .  Adduction of vocal folds to the midline was observed for any evidence of paresis or paralysis of the larynx or asymmetry in rotation of the larynx to the left or right. The patient was asked to breathe and the degree of abduction was noted bilaterally.  Subglottic view of the larynx was obtained for any additional mass lesions or mucosal changes.  Finally the post cricoid was examined for evidence of pooling of secretions, as well as the pharyngeal wall mucosa.   Anesthesia type: 0.25% phenylephrine    Findings:  Anatomic/physiological deviations: left vocal fold scar, no recurrence, filled in more inferiorly though still with glottic gap   Right vocal process: No restriction of mobility   Left vocal process: No restriction of mobility  Glottal gap: Glottal gap  Supraglottic structures: Normal  Hypopharynx: Normal     Estimated Blood Loss: minimal  Complications: None  Disposition: Patient tolerated the procedure well                    Review of Relevant Clinical Data     Labs:  Lab Results   Component Value Date    TSH 1.05 11/13/2007     Lab Results  "  Component Value Date     09/25/2019    CO2 29 09/25/2019    BUN 16 09/25/2019    PHOS 2.0 (L) 05/04/2016     Lab Results   Component Value Date    WBC 4.6 09/25/2019    HGB 12.9 (L) 09/25/2019    HCT 41.2 09/25/2019    MCV 95 09/25/2019     (L) 09/25/2019     Lab Results   Component Value Date    PTT 35 07/26/2017    INR 0.95 07/26/2017     No results found for: ELIZABET  No components found for: RHEUMATOIDFACTOR,  RF  Lab Results   Component Value Date    CRP 0.16 07/24/2003     No components found for: CKTOT, URICACID  No components found for: C3, C4, DSDNAAB, NDNAABIFA  No results found for: MPOAB    Patient reported Quality of Life (QOL) Measures   Patient Supplied Answers To VHI Questionnaire  Voice Handicap Index (VHI-10) 12/5/2019   My voice makes it difficult for people to hear me 0   People have difficulty understanding me in a noisy room 0   My voice difficulties restrict my personal and social life.  0   I feel left out of conversations because of my voice 0   My voice problem causes me to lose income 0   I feel as though I have to strain to produce voice 0   The clarity of my voice is unpredictable 0   My voice problem upsets me 0   My voice makes me feel handicapped 0   People ask, \"What's wrong with your voice?\" 0   VHI-10 0         Patient Supplied Answers To EAT Questionnaire  Eating Assessment Tool (EAT-10) 12/5/2019   My swallowing problem has caused me to lose weight 0   My swallowing problem interferes with my ability to go out for meals 0   Swallowing liquids takes extra effort 0   Swallowing solids takes extra effort 0   Swallowing pills takes extra effort 0   Swallowing is painful 0   The pleasure of eating is affected by my swallowing 0   When I swallow food sticks in my throat 0   I cough when I eat 0   Swallowing is stressful 0   EAT-10 0         Patient Supplied Answers To CSI Questionnaire  No flowsheet data found.      Patient Supplied Answers to Dyspnea Index " Questionnaire:  No flowsheet data found.    Impression & Plan     IMPRESSION: Mr. Rasmussen is a 60 year old male who is being seen for the followin. T1 Left vocal fold SCC  -  s/p endoscopic CO2 laser resection 19  - no evidence of recurrence   Plan  - observation  - return in 4-6 weeks for office exam and then can space out to 2 months given 1 year out from procedure on 20     2. 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 Dysphagia  - improved swallow today with less pharyngeal residue on evaluation of SLP performed FEES on 20 though continued recommendation of thickened liquids  - screening FEES with liquids shows again aspiration on 7/3/20  - he reports no PNAs, though he also states he is not thickening his liquids  - can consider IL for closure of glottic gap though unclear if much benefit with aspiration given he has no laryngeal sensation  - discussed risk of aspiration and pneumonia   Plan  - Xray Video Swallow Exam to assess safety    RETURN VISIT: office evaluation without SLP in in 4-6 weeks, or earlier as needed.     I spent a total of 15minutes face to face with Kt Rasmussen during today's office visit. Over 50% of this time was spent counseling the patient and/or coordinating care regarding the history of cancer.      Lizzy Wray MD    Laryngology    [facility]  Department of  Otolaryngology - Head and Neck Surgery    Clinics & Surgery Center  [address] [place] [address]  Appointment line: [phone]  Fax: [phone]  [place] [id number]  Appointment line: [phone]  Fax: [phone]      Again, thank you for allowing me to participate in the care of your patient.      Sincerely,    Lizzy  MD Nils       Spontaneous, unlabored and symmetrical

## 2024-11-03 RX ORDER — ONDANSETRON 2 MG/ML
8 INJECTION INTRAMUSCULAR; INTRAVENOUS ONCE
Status: CANCELLED | OUTPATIENT
Start: 2024-11-06

## 2024-11-03 RX ORDER — LORAZEPAM 2 MG/ML
0.5 INJECTION INTRAMUSCULAR EVERY 4 HOURS PRN
Status: CANCELLED | OUTPATIENT
Start: 2024-11-06

## 2024-11-03 RX ORDER — HEPARIN SODIUM,PORCINE 10 UNIT/ML
5-20 VIAL (ML) INTRAVENOUS DAILY PRN
Status: CANCELLED | OUTPATIENT
Start: 2024-11-06

## 2024-11-03 RX ORDER — CYANOCOBALAMIN 1000 UG/ML
1000 INJECTION, SOLUTION INTRAMUSCULAR; SUBCUTANEOUS
Status: CANCELLED | OUTPATIENT
Start: 2024-11-06

## 2024-11-03 RX ORDER — MEPERIDINE HYDROCHLORIDE 25 MG/ML
25 INJECTION INTRAMUSCULAR; INTRAVENOUS; SUBCUTANEOUS
Status: CANCELLED | OUTPATIENT
Start: 2024-11-06

## 2024-11-03 RX ORDER — ALBUTEROL SULFATE 90 UG/1
1-2 INHALANT RESPIRATORY (INHALATION)
Status: CANCELLED
Start: 2024-11-06

## 2024-11-03 RX ORDER — DIPHENHYDRAMINE HYDROCHLORIDE 50 MG/ML
50 INJECTION INTRAMUSCULAR; INTRAVENOUS
Status: CANCELLED
Start: 2024-11-06

## 2024-11-03 RX ORDER — DIPHENHYDRAMINE HYDROCHLORIDE 50 MG/ML
25 INJECTION INTRAMUSCULAR; INTRAVENOUS
Status: CANCELLED
Start: 2024-11-06

## 2024-11-03 RX ORDER — ALBUTEROL SULFATE 0.83 MG/ML
2.5 SOLUTION RESPIRATORY (INHALATION)
Status: CANCELLED | OUTPATIENT
Start: 2024-11-06

## 2024-11-03 RX ORDER — HEPARIN SODIUM (PORCINE) LOCK FLUSH IV SOLN 100 UNIT/ML 100 UNIT/ML
5 SOLUTION INTRAVENOUS
Status: CANCELLED | OUTPATIENT
Start: 2024-11-06

## 2024-11-03 RX ORDER — EPINEPHRINE 1 MG/ML
0.3 INJECTION, SOLUTION, CONCENTRATE INTRAVENOUS EVERY 5 MIN PRN
Status: CANCELLED | OUTPATIENT
Start: 2024-11-06

## 2024-11-06 ENCOUNTER — LAB (OUTPATIENT)
Dept: INFUSION THERAPY | Facility: CLINIC | Age: 64
End: 2024-11-06
Attending: INTERNAL MEDICINE
Payer: COMMERCIAL

## 2024-11-06 ENCOUNTER — ONCOLOGY VISIT (OUTPATIENT)
Dept: ONCOLOGY | Facility: CLINIC | Age: 64
End: 2024-11-06
Attending: INTERNAL MEDICINE
Payer: COMMERCIAL

## 2024-11-06 VITALS
HEART RATE: 93 BPM | BODY MASS INDEX: 22 KG/M2 | TEMPERATURE: 98.8 F | WEIGHT: 176 LBS | DIASTOLIC BLOOD PRESSURE: 83 MMHG | RESPIRATION RATE: 16 BRPM | SYSTOLIC BLOOD PRESSURE: 127 MMHG | OXYGEN SATURATION: 99 %

## 2024-11-06 VITALS — HEIGHT: 75 IN | BODY MASS INDEX: 21.88 KG/M2 | WEIGHT: 176 LBS

## 2024-11-06 DIAGNOSIS — D70.1 CHEMOTHERAPY-INDUCED NEUTROPENIA (H): ICD-10-CM

## 2024-11-06 DIAGNOSIS — C34.90 NON-SMALL CELL LUNG CANCER METASTATIC TO BONE (H): Primary | ICD-10-CM

## 2024-11-06 DIAGNOSIS — Z51.11 ENCOUNTER FOR ANTINEOPLASTIC CHEMOTHERAPY: ICD-10-CM

## 2024-11-06 DIAGNOSIS — T45.1X5A CHEMOTHERAPY-INDUCED NEUTROPENIA (H): ICD-10-CM

## 2024-11-06 DIAGNOSIS — D53.9 MACROCYTIC ANEMIA: Primary | ICD-10-CM

## 2024-11-06 DIAGNOSIS — C79.51 NON-SMALL CELL LUNG CANCER METASTATIC TO BONE (H): Primary | ICD-10-CM

## 2024-11-06 DIAGNOSIS — C79.51 NON-SMALL CELL LUNG CANCER METASTATIC TO BONE (H): ICD-10-CM

## 2024-11-06 DIAGNOSIS — C34.90 NON-SMALL CELL CARCINOMA OF LUNG, STAGE 3, UNSPECIFIED LATERALITY (H): ICD-10-CM

## 2024-11-06 DIAGNOSIS — Z51.11 ENCOUNTER FOR ANTINEOPLASTIC CHEMOTHERAPY: Primary | ICD-10-CM

## 2024-11-06 DIAGNOSIS — C34.90 NON-SMALL CELL LUNG CANCER METASTATIC TO BONE (H): ICD-10-CM

## 2024-11-06 LAB
BASOPHILS # BLD AUTO: 0 10E3/UL (ref 0–0.2)
BASOPHILS NFR BLD AUTO: 0 %
CREAT SERPL-MCNC: 0.69 MG/DL (ref 0.67–1.17)
EGFRCR SERPLBLD CKD-EPI 2021: >90 ML/MIN/1.73M2
EOSINOPHIL # BLD AUTO: 0.1 10E3/UL (ref 0–0.7)
EOSINOPHIL NFR BLD AUTO: 2 %
ERYTHROCYTE [DISTWIDTH] IN BLOOD BY AUTOMATED COUNT: 20 % (ref 10–15)
FERRITIN SERPL-MCNC: 992 NG/ML (ref 31–409)
HCT VFR BLD AUTO: 25.2 % (ref 40–53)
HGB BLD-MCNC: 8.1 G/DL (ref 13.3–17.7)
IMM GRANULOCYTES # BLD: 0 10E3/UL
IMM GRANULOCYTES NFR BLD: 0 %
IRON BINDING CAPACITY (ROCHE): 206 UG/DL (ref 240–430)
IRON SATN MFR SERPL: 20 % (ref 15–46)
IRON SERPL-MCNC: 41 UG/DL (ref 61–157)
LYMPHOCYTES # BLD AUTO: 0.7 10E3/UL (ref 0.8–5.3)
LYMPHOCYTES NFR BLD AUTO: 14 %
MCH RBC QN AUTO: 32.4 PG (ref 26.5–33)
MCHC RBC AUTO-ENTMCNC: 32.1 G/DL (ref 31.5–36.5)
MCV RBC AUTO: 101 FL (ref 78–100)
MONOCYTES # BLD AUTO: 0.5 10E3/UL (ref 0–1.3)
MONOCYTES NFR BLD AUTO: 10 %
NEUTROPHILS # BLD AUTO: 3.6 10E3/UL (ref 1.6–8.3)
NEUTROPHILS NFR BLD AUTO: 74 %
NRBC # BLD AUTO: 0 10E3/UL
NRBC BLD AUTO-RTO: 0 /100
PLATELET # BLD AUTO: 223 10E3/UL (ref 150–450)
RBC # BLD AUTO: 2.5 10E6/UL (ref 4.4–5.9)
WBC # BLD AUTO: 4.9 10E3/UL (ref 4–11)

## 2024-11-06 PROCEDURE — 96375 TX/PRO/DX INJ NEW DRUG ADDON: CPT

## 2024-11-06 PROCEDURE — 96409 CHEMO IV PUSH SNGL DRUG: CPT

## 2024-11-06 PROCEDURE — 250N000011 HC RX IP 250 OP 636: Performed by: INTERNAL MEDICINE

## 2024-11-06 PROCEDURE — 83550 IRON BINDING TEST: CPT | Performed by: INTERNAL MEDICINE

## 2024-11-06 PROCEDURE — 258N000003 HC RX IP 258 OP 636: Performed by: INTERNAL MEDICINE

## 2024-11-06 PROCEDURE — 82565 ASSAY OF CREATININE: CPT | Performed by: INTERNAL MEDICINE

## 2024-11-06 PROCEDURE — 99213 OFFICE O/P EST LOW 20 MIN: CPT

## 2024-11-06 PROCEDURE — G0463 HOSPITAL OUTPT CLINIC VISIT: HCPCS | Mod: 25

## 2024-11-06 PROCEDURE — 82728 ASSAY OF FERRITIN: CPT | Performed by: INTERNAL MEDICINE

## 2024-11-06 PROCEDURE — 85004 AUTOMATED DIFF WBC COUNT: CPT | Performed by: INTERNAL MEDICINE

## 2024-11-06 PROCEDURE — 36591 DRAW BLOOD OFF VENOUS DEVICE: CPT | Performed by: INTERNAL MEDICINE

## 2024-11-06 RX ORDER — CYANOCOBALAMIN 1000 UG/ML
1000 INJECTION, SOLUTION INTRAMUSCULAR; SUBCUTANEOUS ONCE
Start: 2024-11-06 | End: 2024-11-06

## 2024-11-06 RX ORDER — ONDANSETRON 2 MG/ML
8 INJECTION INTRAMUSCULAR; INTRAVENOUS ONCE
Status: COMPLETED | OUTPATIENT
Start: 2024-11-06 | End: 2024-11-06

## 2024-11-06 RX ORDER — HEPARIN SODIUM,PORCINE 10 UNIT/ML
5 VIAL (ML) INTRAVENOUS
OUTPATIENT
Start: 2024-11-06

## 2024-11-06 RX ORDER — HEPARIN SODIUM (PORCINE) LOCK FLUSH IV SOLN 100 UNIT/ML 100 UNIT/ML
5 SOLUTION INTRAVENOUS
OUTPATIENT
Start: 2024-11-06

## 2024-11-06 RX ORDER — HEPARIN SODIUM (PORCINE) LOCK FLUSH IV SOLN 100 UNIT/ML 100 UNIT/ML
5 SOLUTION INTRAVENOUS
Status: DISCONTINUED | OUTPATIENT
Start: 2024-11-06 | End: 2024-11-06 | Stop reason: HOSPADM

## 2024-11-06 RX ORDER — ONDANSETRON 2 MG/ML
8 INJECTION INTRAMUSCULAR; INTRAVENOUS EVERY 6 HOURS PRN
Start: 2024-11-06

## 2024-11-06 RX ORDER — ONDANSETRON 2 MG/ML
8 INJECTION INTRAMUSCULAR; INTRAVENOUS ONCE
Start: 2024-11-06 | End: 2024-11-06

## 2024-11-06 RX ADMIN — ONDANSETRON 8 MG: 2 INJECTION INTRAMUSCULAR; INTRAVENOUS at 08:58

## 2024-11-06 RX ADMIN — SODIUM CHLORIDE 250 ML: 9 INJECTION, SOLUTION INTRAVENOUS at 08:58

## 2024-11-06 RX ADMIN — PEMETREXED DISODIUM 1000 MG: 500 INJECTION, POWDER, LYOPHILIZED, FOR SOLUTION INTRAVENOUS at 09:04

## 2024-11-06 RX ADMIN — HEPARIN 5 ML: 100 SYRINGE at 09:16

## 2024-11-06 ASSESSMENT — PAIN SCALES - GENERAL: PAINLEVEL_OUTOF10: NO PAIN (0)

## 2024-11-06 NOTE — PROGRESS NOTES
Nursing Note:  Kt Rasmussen presents today for Port Labs.    Patient seen by provider today: Yes: Chantell Torres PA-C   present during visit today: Not Applicable.    Note: N/A.    Intravenous Access:  Labs drawn without difficulty.  Implanted Port.    Discharge Plan:   Patient was sent to [facility] for 08:00 appointment.    Dayaan Hedrick RN

## 2024-11-06 NOTE — PROGRESS NOTES
Oncology/Hematology Visit Note  11/6/2024     Reason for Visit: Follow up: Metastatic non-small cell lung carcinoma    Oncology HPI:   Kt Rasmussen is a 64 year old male who presents with the following oncologic history:  1. 5/05/2016: Presented with near-obstructing large mass coming off the cheikh and likely the posterior wall, seen on bronchoscopy. Biopsy of the mass showed invasive squamous cell carcinoma, moderately differentiating and focally keratinizing.  Procedure was complicated by significant bleeding.  Tumor was completely debulked (via bronchoscopy) in both main stem bronchi and distal trachea. NGS showed no mutations in EGFR, KRAS, BRAF, NRAS, HRAS, PIK3CA, ERBB2, MET, or JAK2.  2. 5/23/2016: PET/CT scan showed evidence of residual tumor with decreased endobronchial mass. Indeterminate, mildly hypermetabolic mesentery with prominent lymph nodes and area of enhancement of the right hepatic lobe present. Felt to have T4-NX-M0 disease.  3. 6/09/2016: Started concurrent chemoradiation with weekly paclitaxel and carboplatin.  4. 7/30/2016: Completed radiation.  Subsequently received 2 cycles of consolidation paclitaxel and carboplatin.   5. 9/13/2019: Presented with 6-month history of dysphonia and left vocal exophytic lesion. Left true vocal fold biopsy showed at least squamous cell carcinoma in situ, cannot exclude superficial invasion.  6. 9/30/2019: Excision of left vocal cord mass showed fragments of invasive squamous cell carcinoma, well differentiated and keratinizing.  Margins negative for malignancy.  7. 2/16/2021: CT chest w/o contrast showed thin-walled cavity in left lower lobe with 2 solid peripheral nodules measuring 9 mm each. No lymphadenopathy.  8. 3/01/2021: PET scan showed hypermetabolic nodules in left lower lobe and right lung, consistent with metastases; hypermetabolic adenopathy in chest, abdomen, pelvis; nonspecific uptake at tongue; hypermetabolic intraosseous lesions in spine and  pelvis consistent with metastatic disease; left axillary hypermetabolic lymph nodes related to COVID-19 vaccination.  9. 3/12/2021: CT-guided lung biopsy showed non-small cell carcinoma, not otherwise specified -- with opinion by [facility] pathologist.  10. 3/18/2021: Lung NGS panel negative for mutations in BRAF, EGFR, ERBB2, IDH1, IDH2, KRAS, MET, NRAS, RET. PD-L1 expression negative (TPS <1%). Due to minimal amount of DNA obtained from specimen, other biomarkers could not be analyzed.  11. 4/7/2021: MRI brain negative for brain metastases.  12. 4/27/2021: Started 1st line metastatic therapy with carboplatin, paclitaxel, bevacizumab, and atezolizumab for metastatic non-small cell lung carcinoma, NOS.  13. 7/12/2021: PET/CT showed resolved mural nodules with increased cystic cavity in left lower lobe with no significant FDG uptake, less likely metastases; no enlarged hypermetabolic mediastinal, hilar, or axillary lymph nodes, decreased metabolic activity of intraosseous lesion in spine and pelvis; no new bone lesions.  14. 10/11/2021: PET/CT showed slight interval increase in intensity of metabolic activity of right pubic bone and left ischium with new focal uptake in L2 spinous process; resolved uptake at previous ground glass opacity along right medial lower lobe; unchanged cystic cavities/nodules in left lower lobe and right apical upper lobe; no pathologically enlarged hypermetabolic mediastinal, hilar, or axillary lymph nodes.  15. 1/10/2022: PET/CT showed new foci of abnormal skeletal FGD uptake in the thoracic and lumbar spine. Mild interval increase in intensity of previously demonstrated hypermetabolic skeletal lesions involving the pelvis and lumbar spine. Findings are consistent with progressive osseous metastatic disease. Subsequently off chemo for 1 month due to poor performance status.  16. 1/25/2022: Patient fell and fractured his femur. This was surgically repaired and he was subsequently cared  for at a rehab unit.  17. 2/14/2022: Brain MRI without contrast (contrast not given due to inability to obtain IV access) showed no brain metastases.  18. 4/21/2022: Started 2nd line metastatic therapy with pemetrexed and carboplatin. Omission of carboplatin 6/2 and forward due to worsening anemia despite dose reduction.  19. 7/18/2022: PET/CT showed enlarged and increased FDG avid skeletal metastases, increased left para-aortic retroperitoneal lymph node increased in size and FDG avidity, findings consistent with progressive disease.  20. 8/3/2022: Started 3rd line metastatic therapy with Taxotere 60 mg/m2 every 3 weeks.  21. 11/21/2022: PET scan showed partial response, left para-aortic retroperitoneal lymph node has decreased from 2.6 x 1.7 cm (SUVmax 7.9) to 2.3 x 1.4 cm (SUVmax 5.7), skeletal metastases no longer demonstrate FDG activity. Kt elected to take a 2-month break from chemo.  22. 1/23/2023: PET scan showed increasing metabolic activity of the left periaortic (max SUV 8.1, previously 5.7) left external iliac (max SUV 7.0, previously 3.1), and right external iliac (max SUV 5.1, previously 3.7) lymph nodes with development/reactivation of multiple FDG avid osseous lesions.  23. 3/23/2023: Resumption of Taxotere every 3 weeks. Delayed due to insurance issues.  24. 6/26/2023: PET scan showed partial response with decreased uptake associated with the retroperitoneal and pelvic lymphadenopathy as well as bone metastases.  25. 12/4/2023: PET scan showed decreased FDG avid left periaortic (SUV max 5.4, previously 6.6) and left external iliac (SUV max 3.5, previously 4.9) lymphadenopathy and broadly stable FDG uptake within scattered pre-existing osseous metastases; new FDG avid lesion involving the right anterior first rib without clear fracture line visible (SUV max 4.7) indeterminate for a new osseous metastasis versus posttraumatic in etiology.   26. 3/18/2024: PET scan showed overall stable disease.  27.  6/17/2024: PET scan showed progressive disease with FDG avid left para-aortic retroperitoneal node measures 2.1 x 2.6 cm compared to 2.0 x 2.6 cm with SUV max of 8.4 previously 6.3.  Increased uptake with an a more inferior left periaortic retroperitoneal node with SUV max of 7.4, previously 4.3. Mildly increased uptake within a left external iliac node with SUV max of 7.0, previously 4.3. There is a new 1.1 x 1.3 cm retrocaval retroperitoneal node with SUV max of 13.4. New FDG uptake within a subcentimeter node between the right obturator muscles with SUV max of 4.1. Worsening FDG avid osseous disease involving the right occipital condyle, right aspect of the clivus, T12 and L1 vertebral bodies, and bony pelvis. FDG uptake within the right sacral lesion with SUV max of 14.0 compared to 6.9 and FDG uptake within the T12 vertebral body with SUV max of 15.2 compared to 8.2.  28. 7/03/2024: Switched to pemetrexed.  29. 9/23/2024: PET/CT showed decreasing metabolic activity of the left periaortic and left greater than right external iliac/pelvic sidewall lymph nodes, osseous lesions involving the T12 vertebral body, L1 vertebral body, right sacral ala, right posterior iliac bone   and right anterior acetabular region; broadly stable FDG avid lesions in the left iliac wing but increasing metabolic activity of FDG avid soft tissue thickening in the right lower paratracheal/hilar region and retrocaval lymph nodes suspicious for mild progression of disease.    Current treatment: Alimta    Interval history:   Kt presents to clinic for labs, follow up and D1C7 Alimta.  He has no new complaints.    Recently treated for cellulitis, legs are healing well. Follows with wound care. He is trying to ask a question about an orange pill, but is unable to articulate his question.    Review of Systems: See interval hx. Denies fevers, chills, dizziness,  changes in vision, abdominal pain, N/V, diarrhea, changes in urination, bleeding,  bruising, rash.      Current Outpatient Medications   Medication Sig Dispense Refill    apixaban ANTICOAGULANT (ELIQUIS) 2.5 MG tablet Take 2.5 mg by mouth.      folic acid (FOLVITE) 1 MG tablet Take 1 tablet (1 mg) by mouth daily 90 tablet 3    atorvastatin (LIPITOR) 40 MG tablet Take 1 tablet (40 mg) by mouth daily for 90 days 90 tablet 0    prochlorperazine (COMPAZINE) 10 MG tablet Take 1 tablet (10 mg) by mouth every 6 hours as needed for nausea or vomiting (Patient not taking: Reported on 11/6/2024) 30 tablet 2          Allergies   Allergen Reactions    No Known Drug Allergy          Exam:  Blood pressure 127/83, pulse 93, temperature 98.8  F (37.1  C), temperature source Oral, resp. rate 16, weight 79.8 kg (176 lb), SpO2 99%.  Wt Readings from Last 4 Encounters:   11/06/24 79.8 kg (176 lb)   10/18/24 76.5 kg (168 lb 9.6 oz)   09/25/24 81.6 kg (180 lb)   08/14/24 81.6 kg (180 lb)       Constitutional: Pleasant male in no acute distress.  Eyes: No scleral icterus. No conjunctival erythema or discharge    Cardiovascular: Regular rate and rhythm. No murmurs, gallops, or rubs. No peripheral edema.  Respiratory: Clear to auscultation bilaterally. No wheezes or crackles.  MSK: left arm with contracture  Neuro: Cranial nerves II-XII intact  Gait: w/c  Skin: No visible lesions, bruising or rashes; areas of hyper and hypopigmentation with dry skin  Psych: appropriate mood and affect     Labs:    Latest Reference Range & Units 11/06/24 07:52   Creatinine 0.67 - 1.17 mg/dL 0.69   GFR Estimate >60 mL/min/1.73m2 >90   WBC 4.0 - 11.0 10e3/uL 4.9   Hemoglobin 13.3 - 17.7 g/dL 8.1 (L)   Hematocrit 40.0 - 53.0 % 25.2 (L)   Platelet Count 150 - 450 10e3/uL 223   RBC Count 4.40 - 5.90 10e6/uL 2.50 (L)   MCV 78 - 100 fL 101 (H)   MCH 26.5 - 33.0 pg 32.4   MCHC 31.5 - 36.5 g/dL 32.1   RDW 10.0 - 15.0 % 20.0 (H)   % Neutrophils % 74   % Lymphocytes % 14   % Monocytes % 10   % Eosinophils % 2   % Basophils % 0   Absolute Basophils  0.0 - 0.2 10e3/uL 0.0   Absolute Eosinophils 0.0 - 0.7 10e3/uL 0.1   Absolute Immature Granulocytes <=0.4 10e3/uL 0.0   Absolute Lymphocytes 0.8 - 5.3 10e3/uL 0.7 (L)   Absolute Monocytes 0.0 - 1.3 10e3/uL 0.5   % Immature Granulocytes % 0   Absolute Neutrophils 1.6 - 8.3 10e3/uL 3.6   Absolute NRBCs 10e3/uL 0.0   NRBCs per 100 WBC <1 /100 0       Imaging: reviewed    Impression/plan: Kt Rasmussen is a 64 year old male with metastatic lung cancer.     Metastatic Lung Cancer (metastasis to bones, lymph nodes, and bilateral lungs)  Patient of Dr. John  - 5/20216: diagnosed with advanced endobronchial invasive squamous cell carcinoma , s/p debulking and chemoradiation  - Lung NGS panel negative for mutations in BRAF, EGFR, ERBB2, IDH1, IDH2, KRAS, MET, NRAS, RET. PD-L1 expression negative (TPS <1%). Guardant 360 negative for actionable mutations.   - 8/3/2022: started 3rd line metastatic therapy with every 3-week Taxotere at 20% dose reduction (60 mg/m2) secondary to progression of disease-  - Took a 2 month break, and was delayed another 3.5 mo due to insurance issues  - 3/23/23: resumed Taxotere  - 7/3/24: treatment changed to pemetrexed (not taking dexamethasone)  - 9/23/24: PET with decreasing/stable disease  - 11/27 Dr. John as scheduled  - 12/18 Chantell as scheduled  - 12/23 PET  - 1/8 Dr. John  - Continue folate and B12 injections. B12 11/6/2024     Bone Health  Per Dr. John- no Zometa, he is high risk for jaw osteonecrosis  -calcium and vitamin D twice daily     Left vocal cord squamous cell carcinoma, T1 lesion  - Dysphonia- Dysphagia  -Kt underwent excision of a left vocal cord SCC on 9/30/2019 and has persistent dysphonia as a result of the lesion and excision.  He also has dysphagia but this is stable and swallowing is manageable.  -Continue follow-up with Dr. Wray.    History of dizziness and prior falls  --SW and guardian were previously notified of multiple falls.  --Prior brain MRI 2/14/2022  showed no brain metastases. However this was a suboptimal exam due to inability to administer contrast.  --He is using a wheelchair due to prior femoral fracture in 1/2022.       Bilateral lower extremity DVT  --Diagnosed in 9/2023, likely hypercoagulability of malignancy.  --Continue apixaban indefinitely, provided no major bleeding issues arise in the future and platelets remain >= 50,00  - endorses that he is taking daily      Chantell Torres PA-C

## 2024-11-06 NOTE — PROGRESS NOTES
"Oncology Rooming Note    November 6, 2024 8:07 AM   Kt Rasmussen is a 64 year old male who presents for:    Chief Complaint   Patient presents with    Oncology Clinic Visit     Initial Vitals: /83   Pulse 93   Temp 98.8  F (37.1  C) (Oral)   Resp 16   Wt 79.8 kg (176 lb)   SpO2 99%   BMI 22.00 kg/m   Estimated body mass index is 22 kg/m  as calculated from the following:    Height as of 10/18/24: 1.905 m (6' 3\").    Weight as of this encounter: 79.8 kg (176 lb). Body surface area is 2.05 meters squared.  No Pain (0) Comment: Data Unavailable   No LMP for male patient.  Allergies reviewed: Yes  Medications reviewed: Yes    Medications: Medication refills not needed today.  Pharmacy name entered into EPIC:    [place] [address] [facility] [address] [id number] [place] [address] [facility] 7  [place] [facility] [address] [facility] - [facility] [address]    Frailty Screening:   Is the patient here for a new oncology consult visit in cancer care? 2. No      Clinical concerns:   pa was notified.      Julisa Whelan CMA              "

## 2024-11-06 NOTE — LETTER
11/6/2024      Kt Rasmussen  [address]  [facility] [address]      Dear Colleague,    Thank you for referring your patient, Kt Rasmussen, to the [facility]. Please see a copy of my visit note below.    Oncology/Hematology Visit Note  11/6/2024     Reason for Visit: Follow up: Metastatic non-small cell lung carcinoma    Oncology HPI:   Kt Rasmussen is a 64 year old male who presents with the following oncologic history:  1. 5/05/2016: Presented with near-obstructing large mass coming off the cheikh and likely the posterior wall, seen on bronchoscopy. Biopsy of the mass showed invasive squamous cell carcinoma, moderately differentiating and focally keratinizing.  Procedure was complicated by significant bleeding.  Tumor was completely debulked (via bronchoscopy) in both main stem bronchi and distal trachea. NGS showed no mutations in EGFR, KRAS, BRAF, NRAS, HRAS, PIK3CA, ERBB2, MET, or JAK2.  2. 5/23/2016: PET/CT scan showed evidence of residual tumor with decreased endobronchial mass. Indeterminate, mildly hypermetabolic mesentery with prominent lymph nodes and area of enhancement of the right hepatic lobe present. Felt to have T4-NX-M0 disease.  3. 6/09/2016: Started concurrent chemoradiation with weekly paclitaxel and carboplatin.  4. 7/30/2016: Completed radiation.  Subsequently received 2 cycles of consolidation paclitaxel and carboplatin.   5. 9/13/2019: Presented with 6-month history of dysphonia and left vocal exophytic lesion. Left true vocal fold biopsy showed at least squamous cell carcinoma in situ, cannot exclude superficial invasion.  6. 9/30/2019: Excision of left vocal cord mass showed fragments of invasive squamous cell carcinoma, well differentiated and keratinizing.  Margins negative for malignancy.  7. 2/16/2021: CT chest w/o contrast showed thin-walled cavity in left lower lobe with 2 solid peripheral nodules measuring 9 mm each. No  lymphadenopathy.  8. 3/01/2021: PET scan showed hypermetabolic nodules in left lower lobe and right lung, consistent with metastases; hypermetabolic adenopathy in chest, abdomen, pelvis; nonspecific uptake at tongue; hypermetabolic intraosseous lesions in spine and pelvis consistent with metastatic disease; left axillary hypermetabolic lymph nodes related to COVID-19 vaccination.  9. 3/12/2021: CT-guided lung biopsy showed non-small cell carcinoma, not otherwise specified -- with opinion by [facility] pathologist.  10. 3/18/2021: Lung NGS panel negative for mutations in BRAF, EGFR, ERBB2, IDH1, IDH2, KRAS, MET, NRAS, RET. PD-L1 expression negative (TPS <1%). Due to minimal amount of DNA obtained from specimen, other biomarkers could not be analyzed.  11. 4/7/2021: MRI brain negative for brain metastases.  12. 4/27/2021: Started 1st line metastatic therapy with carboplatin, paclitaxel, bevacizumab, and atezolizumab for metastatic non-small cell lung carcinoma, NOS.  13. 7/12/2021: PET/CT showed resolved mural nodules with increased cystic cavity in left lower lobe with no significant FDG uptake, less likely metastases; no enlarged hypermetabolic mediastinal, hilar, or axillary lymph nodes, decreased metabolic activity of intraosseous lesion in spine and pelvis; no new bone lesions.  14. 10/11/2021: PET/CT showed slight interval increase in intensity of metabolic activity of right pubic bone and left ischium with new focal uptake in L2 spinous process; resolved uptake at previous ground glass opacity along right medial lower lobe; unchanged cystic cavities/nodules in left lower lobe and right apical upper lobe; no pathologically enlarged hypermetabolic mediastinal, hilar, or axillary lymph nodes.  15. 1/10/2022: PET/CT showed new foci of abnormal skeletal FGD uptake in the thoracic and lumbar spine. Mild interval increase in intensity of previously demonstrated hypermetabolic skeletal lesions involving the pelvis and  lumbar spine. Findings are consistent with progressive osseous metastatic disease. Subsequently off chemo for 1 month due to poor performance status.  16. 1/25/2022: Patient fell and fractured his femur. This was surgically repaired and he was subsequently cared for at a rehab unit.  17. 2/14/2022: Brain MRI without contrast (contrast not given due to inability to obtain IV access) showed no brain metastases.  18. 4/21/2022: Started 2nd line metastatic therapy with pemetrexed and carboplatin. Omission of carboplatin 6/2 and forward due to worsening anemia despite dose reduction.  19. 7/18/2022: PET/CT showed enlarged and increased FDG avid skeletal metastases, increased left para-aortic retroperitoneal lymph node increased in size and FDG avidity, findings consistent with progressive disease.  20. 8/3/2022: Started 3rd line metastatic therapy with Taxotere 60 mg/m2 every 3 weeks.  21. 11/21/2022: PET scan showed partial response, left para-aortic retroperitoneal lymph node has decreased from 2.6 x 1.7 cm (SUVmax 7.9) to 2.3 x 1.4 cm (SUVmax 5.7), skeletal metastases no longer demonstrate FDG activity. Kt elected to take a 2-month break from chemo.  22. 1/23/2023: PET scan showed increasing metabolic activity of the left periaortic (max SUV 8.1, previously 5.7) left external iliac (max SUV 7.0, previously 3.1), and right external iliac (max SUV 5.1, previously 3.7) lymph nodes with development/reactivation of multiple FDG avid osseous lesions.  23. 3/23/2023: Resumption of Taxotere every 3 weeks. Delayed due to insurance issues.  24. 6/26/2023: PET scan showed partial response with decreased uptake associated with the retroperitoneal and pelvic lymphadenopathy as well as bone metastases.  25. 12/4/2023: PET scan showed decreased FDG avid left periaortic (SUV max 5.4, previously 6.6) and left external iliac (SUV max 3.5, previously 4.9) lymphadenopathy and broadly stable FDG uptake within scattered pre-existing  osseous metastases; new FDG avid lesion involving the right anterior first rib without clear fracture line visible (SUV max 4.7) indeterminate for a new osseous metastasis versus posttraumatic in etiology.   26. 3/18/2024: PET scan showed overall stable disease.  27. 6/17/2024: PET scan showed progressive disease with FDG avid left para-aortic retroperitoneal node measures 2.1 x 2.6 cm compared to 2.0 x 2.6 cm with SUV max of 8.4 previously 6.3.  Increased uptake with an a more inferior left periaortic retroperitoneal node with SUV max of 7.4, previously 4.3. Mildly increased uptake within a left external iliac node with SUV max of 7.0, previously 4.3. There is a new 1.1 x 1.3 cm retrocaval retroperitoneal node with SUV max of 13.4. New FDG uptake within a subcentimeter node between the right obturator muscles with SUV max of 4.1. Worsening FDG avid osseous disease involving the right occipital condyle, right aspect of the clivus, T12 and L1 vertebral bodies, and bony pelvis. FDG uptake within the right sacral lesion with SUV max of 14.0 compared to 6.9 and FDG uptake within the T12 vertebral body with SUV max of 15.2 compared to 8.2.  28. 7/03/2024: Switched to pemetrexed.  29. 9/23/2024: PET/CT showed decreasing metabolic activity of the left periaortic and left greater than right external iliac/pelvic sidewall lymph nodes, osseous lesions involving the T12 vertebral body, L1 vertebral body, right sacral ala, right posterior iliac bone   and right anterior acetabular region; broadly stable FDG avid lesions in the left iliac wing but increasing metabolic activity of FDG avid soft tissue thickening in the right lower paratracheal/hilar region and retrocaval lymph nodes suspicious for mild progression of disease.    Current treatment: Alimta    Interval history:   Kt presents to clinic for labs, follow up and D1C7 Alimta.  He has no new complaints.    Recently treated for cellulitis, legs are healing well. Follows  with wound care. He is trying to ask a question about an orange pill, but is unable to articulate his question.    Review of Systems: See interval hx. Denies fevers, chills, dizziness,  changes in vision, abdominal pain, N/V, diarrhea, changes in urination, bleeding, bruising, rash.      Current Outpatient Medications   Medication Sig Dispense Refill     apixaban ANTICOAGULANT (ELIQUIS) 2.5 MG tablet Take 2.5 mg by mouth.       folic acid (FOLVITE) 1 MG tablet Take 1 tablet (1 mg) by mouth daily 90 tablet 3     atorvastatin (LIPITOR) 40 MG tablet Take 1 tablet (40 mg) by mouth daily for 90 days 90 tablet 0     prochlorperazine (COMPAZINE) 10 MG tablet Take 1 tablet (10 mg) by mouth every 6 hours as needed for nausea or vomiting (Patient not taking: Reported on 11/6/2024) 30 tablet 2          Allergies   Allergen Reactions     No Known Drug Allergy          Exam:  Blood pressure 127/83, pulse 93, temperature 98.8  F (37.1  C), temperature source Oral, resp. rate 16, weight 79.8 kg (176 lb), SpO2 99%.  Wt Readings from Last 4 Encounters:   11/06/24 79.8 kg (176 lb)   10/18/24 76.5 kg (168 lb 9.6 oz)   09/25/24 81.6 kg (180 lb)   08/14/24 81.6 kg (180 lb)       Constitutional: Pleasant male in no acute distress.  Eyes: No scleral icterus. No conjunctival erythema or discharge    Cardiovascular: Regular rate and rhythm. No murmurs, gallops, or rubs. No peripheral edema.  Respiratory: Clear to auscultation bilaterally. No wheezes or crackles.  MSK: left arm with contracture  Neuro: Cranial nerves II-XII intact  Gait: w/c  Skin: No visible lesions, bruising or rashes; areas of hyper and hypopigmentation with dry skin  Psych: appropriate mood and affect     Labs:    Latest Reference Range & Units 11/06/24 07:52   Creatinine 0.67 - 1.17 mg/dL 0.69   GFR Estimate >60 mL/min/1.73m2 >90   WBC 4.0 - 11.0 10e3/uL 4.9   Hemoglobin 13.3 - 17.7 g/dL 8.1 (L)   Hematocrit 40.0 - 53.0 % 25.2 (L)   Platelet Count 150 - 450 10e3/uL 223    RBC Count 4.40 - 5.90 10e6/uL 2.50 (L)   MCV 78 - 100 fL 101 (H)   MCH 26.5 - 33.0 pg 32.4   MCHC 31.5 - 36.5 g/dL 32.1   RDW 10.0 - 15.0 % 20.0 (H)   % Neutrophils % 74   % Lymphocytes % 14   % Monocytes % 10   % Eosinophils % 2   % Basophils % 0   Absolute Basophils 0.0 - 0.2 10e3/uL 0.0   Absolute Eosinophils 0.0 - 0.7 10e3/uL 0.1   Absolute Immature Granulocytes <=0.4 10e3/uL 0.0   Absolute Lymphocytes 0.8 - 5.3 10e3/uL 0.7 (L)   Absolute Monocytes 0.0 - 1.3 10e3/uL 0.5   % Immature Granulocytes % 0   Absolute Neutrophils 1.6 - 8.3 10e3/uL 3.6   Absolute NRBCs 10e3/uL 0.0   NRBCs per 100 WBC <1 /100 0       Imaging: reviewed    Impression/plan: Kt Rasmussen is a 64 year old male with metastatic lung cancer.     Metastatic Lung Cancer (metastasis to bones, lymph nodes, and bilateral lungs)  Patient of Dr. John  - 5/20216: diagnosed with advanced endobronchial invasive squamous cell carcinoma , s/p debulking and chemoradiation  - Lung NGS panel negative for mutations in BRAF, EGFR, ERBB2, IDH1, IDH2, KRAS, MET, NRAS, RET. PD-L1 expression negative (TPS <1%). Guardant 360 negative for actionable mutations.   - 8/3/2022: started 3rd line metastatic therapy with every 3-week Taxotere at 20% dose reduction (60 mg/m2) secondary to progression of disease-  - Took a 2 month break, and was delayed another 3.5 mo due to insurance issues  - 3/23/23: resumed Taxotere  - 7/3/24: treatment changed to pemetrexed (not taking dexamethasone)  - 9/23/24: PET with decreasing/stable disease  - 11/27 Dr. John as scheduled  - 12/18 Chantell as scheduled  - 12/23 PET  - 1/8 Dr. John  - Continue folate and B12 injections. B12 11/6/2024     Bone Health  Per Dr. John- no Zometa, he is high risk for jaw osteonecrosis  -calcium and vitamin D twice daily     Left vocal cord squamous cell carcinoma, T1 lesion  - Dysphonia- Dysphagia  -Kt underwent excision of a left vocal cord SCC on 9/30/2019 and has persistent dysphonia as a result of  "the lesion and excision.  He also has dysphagia but this is stable and swallowing is manageable.  -Continue follow-up with Dr. Wray.    History of dizziness and prior falls  --SW and guardian were previously notified of multiple falls.  --Prior brain MRI 2/14/2022 showed no brain metastases. However this was a suboptimal exam due to inability to administer contrast.  --He is using a wheelchair due to prior femoral fracture in 1/2022.       Bilateral lower extremity DVT  --Diagnosed in 9/2023, likely hypercoagulability of malignancy.  --Continue apixaban indefinitely, provided no major bleeding issues arise in the future and platelets remain >= 50,00  - endorses that he is taking daily      Chantell Torres PA-C        Oncology Rooming Note    November 6, 2024 8:07 AM   Kt Rasmussen is a 64 year old male who presents for:    Chief Complaint   Patient presents with     Oncology Clinic Visit     Initial Vitals: /83   Pulse 93   Temp 98.8  F (37.1  C) (Oral)   Resp 16   Wt 79.8 kg (176 lb)   SpO2 99%   BMI 22.00 kg/m   Estimated body mass index is 22 kg/m  as calculated from the following:    Height as of 10/18/24: 1.905 m (6' 3\").    Weight as of this encounter: 79.8 kg (176 lb). Body surface area is 2.05 meters squared.  No Pain (0) Comment: Data Unavailable   No LMP for male patient.  Allergies reviewed: Yes  Medications reviewed: Yes    Medications: Medication refills not needed today.  Pharmacy name entered into EPIC:    [facility] - [place], [place] - [address] [facility] - [place], [place] - [address] [id number] - [place] [address] [facility] [address]  [facility] - [place], [place] - [address] [facility] - [facility] [id number]    Frailty Screening:   Is the patient here for a new oncology consult visit in cancer care? 2. No      Clinical " concerns:   pa was notified.      Julisa Whelan, NASIMA                Again, thank you for allowing me to participate in the care of your patient.        Sincerely,        Chantell Torres PA-C

## 2024-11-06 NOTE — PROGRESS NOTES
Infusion Nursing Note:  Kt HERNANDEZ Rasmussen presents today for C7D1 Alimta.    Patient seen by provider today: Yes: Chantell   present during visit today: Not Applicable.    Note: Last B12 given 10/18/24.      Intravenous Access:  Implanted Port.  Accessed in FT.     Treatment Conditions:  Lab Results   Component Value Date    HGB 8.1 (L) 11/06/2024    WBC 4.9 11/06/2024    ANEU 0.5 (L) 08/11/2022    ANEUTAUTO 3.6 11/06/2024     11/06/2024        Lab Results   Component Value Date     01/25/2023    POTASSIUM 4.0 01/25/2023    MAG 1.9 10/13/2016    CR 0.69 11/06/2024    BEVERLY 9.3 01/25/2023    BILITOTAL 0.6 06/20/2024    ALBUMIN 3.9 06/20/2024    ALT 20 06/20/2024    AST 23 06/20/2024       Results reviewed, labs MET treatment parameters, ok to proceed with treatment.      Post Infusion Assessment:  Patient tolerated infusion without incident.  Blood return noted pre and post infusion.  Site patent and intact, free from redness, edema or discomfort.  No evidence of extravasations.  Access discontinued per protocol.       Discharge Plan:   Discharge instructions reviewed with: Patient.  Patient and/or family verbalized understanding of discharge instructions and all questions answered.  Copy of AVS reviewed with patient and/or family.  Patient will return 11/27/24 for next appointment.  Patient discharged in stable condition accompanied by: self.  Departure Mode: Wheelchair.      Filipe Carr RN

## 2024-11-19 NOTE — PROGRESS NOTES
[facility]    Hematology/Oncology Established Patient Follow-up Note      Today's Date: 11/27/2024    Reason for Follow-up: Metastatic non-small cell lung carcinoma.    HISTORY OF PRESENT ILLNESS: Kt Rasmussen is a 64 year old male who presents with the following oncologic history:  1. 5/05/2016: Presented with near-obstructing large mass coming off the cheikh and likely the posterior wall, seen on bronchoscopy. Biopsy of the mass showed invasive squamous cell carcinoma, moderately differentiating and focally keratinizing.  Procedure was complicated by significant bleeding.  Tumor was completely debulked (via bronchoscopy) in both main stem bronchi and distal trachea. NGS showed no mutations in EGFR, KRAS, BRAF, NRAS, HRAS, PIK3CA, ERBB2, MET, or JAK2.  2. 5/23/2016: PET/CT scan showed evidence of residual tumor with decreased endobronchial mass. Indeterminate, mildly hypermetabolic mesentery with prominent lymph nodes and area of enhancement of the right hepatic lobe present. Felt to have T4-NX-M0 disease.  3. 6/09/2016: Started concurrent chemoradiation with weekly paclitaxel and carboplatin.  4. 7/30/2016: Completed radiation.  Subsequently received 2 cycles of consolidation paclitaxel and carboplatin.   5. 9/13/2019: Presented with 6-month history of dysphonia and left vocal exophytic lesion. Left true vocal fold biopsy showed at least squamous cell carcinoma in situ, cannot exclude superficial invasion.  6. 9/30/2019: Excision of left vocal cord mass showed fragments of invasive squamous cell carcinoma, well differentiated and keratinizing.  Margins negative for malignancy.  7. 2/16/2021: CT chest w/o contrast showed thin-walled cavity in left lower lobe with 2 solid peripheral nodules measuring 9 mm each. No lymphadenopathy.  8. 3/01/2021: PET scan showed hypermetabolic nodules in left lower lobe and right lung, consistent with metastases; hypermetabolic adenopathy in chest, abdomen,  pelvis; nonspecific uptake at tongue; hypermetabolic intraosseous lesions in spine and pelvis consistent with metastatic disease; left axillary hypermetabolic lymph nodes related to COVID-19 vaccination.  9. 3/12/2021: CT-guided lung biopsy showed non-small cell carcinoma, not otherwise specified -- with opinion by [facility] pathologist.  10. 3/18/2021: Lung NGS panel negative for mutations in BRAF, EGFR, ERBB2, IDH1, IDH2, KRAS, MET, NRAS, RET. PD-L1 expression negative (TPS <1%). Due to minimal amount of DNA obtained from specimen, other biomarkers could not be analyzed.  11. 4/7/2021: MRI brain negative for brain metastases.  12. 4/27/2021: Started 1st line metastatic therapy with carboplatin, paclitaxel, bevacizumab, and atezolizumab for metastatic non-small cell lung carcinoma, NOS.  13. 7/12/2021: PET/CT showed resolved mural nodules with increased cystic cavity in left lower lobe with no significant FDG uptake, less likely metastases; no enlarged hypermetabolic mediastinal, hilar, or axillary lymph nodes, decreased metabolic activity of intraosseous lesion in spine and pelvis; no new bone lesions.  14. 10/11/2021: PET/CT showed slight interval increase in intensity of metabolic activity of right pubic bone and left ischium with new focal uptake in L2 spinous process; resolved uptake at previous ground glass opacity along right medial lower lobe; unchanged cystic cavities/nodules in left lower lobe and right apical upper lobe; no pathologically enlarged hypermetabolic mediastinal, hilar, or axillary lymph nodes.  15. 1/10/2022: PET/CT showed new foci of abnormal skeletal FGD uptake in the thoracic and lumbar spine. Mild interval increase in intensity of previously demonstrated hypermetabolic skeletal lesions involving the pelvis and lumbar spine. Findings are consistent with progressive osseous metastatic disease. Subsequently off chemo for 1 month due to poor performance status.  16. 1/25/2022: Patient fell  and fractured his femur. This was surgically repaired and he was subsequently cared for at a rehab unit.  17. 2/14/2022: Brain MRI without contrast (contrast not given due to inability to obtain IV access) showed no brain metastases.  18. 4/21/2022: Started 2nd line metastatic therapy with pemetrexed and carboplatin. Omission of carboplatin 6/2 and forward due to worsening anemia despite dose reduction.  19. 7/18/2022: PET/CT showed enlarged and increased FDG avid skeletal metastases, increased left para-aortic retroperitoneal lymph node increased in size and FDG avidity, findings consistent with progressive disease.  20. 8/3/2022: Started 3rd line metastatic therapy with Taxotere 60 mg/m2 every 3 weeks.  21. 11/21/2022: PET scan showed partial response, left para-aortic retroperitoneal lymph node has decreased from 2.6 x 1.7 cm (SUVmax 7.9) to 2.3 x 1.4 cm (SUVmax 5.7), skeletal metastases no longer demonstrate FDG activity. Kt elected to take a 2-month break from chemo.  22. 1/23/2023: PET scan showed increasing metabolic activity of the left periaortic (max SUV 8.1, previously 5.7) left external iliac (max SUV 7.0, previously 3.1), and right external iliac (max SUV 5.1, previously 3.7) lymph nodes with development/reactivation of multiple FDG avid osseous lesions.  23. 3/23/2023: Resumption of Taxotere every 3 weeks. Delayed due to insurance issues.  24. 6/26/2023: PET scan showed partial response with decreased uptake associated with the retroperitoneal and pelvic lymphadenopathy as well as bone metastases.  25. 12/4/2023: PET scan showed decreased FDG avid left periaortic (SUV max 5.4, previously 6.6) and left external iliac (SUV max 3.5, previously 4.9) lymphadenopathy and broadly stable FDG uptake within scattered pre-existing osseous metastases; new FDG avid lesion involving the right anterior first rib without clear fracture line visible (SUV max 4.7) indeterminate for a new osseous metastasis versus  posttraumatic in etiology.   26. 3/18/2024: PET scan showed overall stable disease.  27. 6/17/2024: PET scan showed progressive disease with FDG avid left para-aortic retroperitoneal node measures 2.1 x 2.6 cm compared to 2.0 x 2.6 cm with SUV max of 8.4 previously 6.3.  Increased uptake with an a more inferior left periaortic retroperitoneal node with SUV max of 7.4, previously 4.3. Mildly increased uptake within a left external iliac node with SUV max of 7.0, previously 4.3. There is a new 1.1 x 1.3 cm retrocaval retroperitoneal node with SUV max of 13.4. New FDG uptake within a subcentimeter node between the right obturator muscles with SUV max of 4.1. Worsening FDG avid osseous disease involving the right occipital condyle, right aspect of the clivus, T12 and L1 vertebral bodies, and bony pelvis. FDG uptake within the right sacral lesion with SUV max of 14.0 compared to 6.9 and FDG uptake within the T12 vertebral body with SUV max of 15.2 compared to 8.2.  28. 7/03/2024: Switched to pemetrexed.  29. 9/23/2024: PET/CT showed decreasing metabolic activity of the left periaortic and left greater than right external iliac/pelvic sidewall lymph nodes, osseous lesions involving the T12 vertebral body, L1 vertebral body, right sacral ala, right posterior iliac bone   and right anterior acetabular region; broadly stable FDG avid lesions in the left iliac wing but increasing metabolic activity of FDG avid soft tissue thickening in the right lower paratracheal/hilar region and retrocaval lymph nodes suspicious for mild progression of disease.    INTERVAL HISTORY:  Kt reports feeling overall well with no decrease in energy. Denies dyspnea, cough, nausea.  He denies any recent falls although has chronic stable imbalances issues.    REVIEW OF SYSTEMS:   14 point ROS was reviewed and is negative other than as noted above in HPI.       HOME MEDICATIONS:  Current Outpatient Medications   Medication Sig Dispense Refill     apixaban ANTICOAGULANT (ELIQUIS) 2.5 MG tablet Take 2.5 mg by mouth.      atorvastatin (LIPITOR) 40 MG tablet Take 1 tablet (40 mg) by mouth daily for 90 days 90 tablet 0    folic acid (FOLVITE) 1 MG tablet Take 1 tablet (1 mg) by mouth daily 90 tablet 3    prochlorperazine (COMPAZINE) 10 MG tablet Take 1 tablet (10 mg) by mouth every 6 hours as needed for nausea or vomiting (Patient not taking: Reported on 11/6/2024) 30 tablet 2         ALLERGIES:  Allergies   Allergen Reactions    No Known Drug Allergy          PAST MEDICAL HISTORY:  Past Medical History:   Diagnosis Date    Alcohol abuse, unspecified     Cerebral infarction (H)     2009, right side residual and aphasia    Dyslipidemia     GERD (gastroesophageal reflux disease)     Lung cancer (H)     Unspecified essential hypertension          PAST SURGICAL HISTORY:  Past Surgical History:   Procedure Laterality Date    BRONCHOSCOPY FLEXIBLE AND RIGID N/A 5/5/2016    Procedure: BRONCHOSCOPY FLEXIBLE AND RIGID;  Surgeon: Tony Talbot MD;  Location: [facility] OR    ESOPHAGOSCOPY FLEXIBLE N/A 9/30/2019    Procedure: flexible esophagoscopy;  Surgeon: Lizzy Johnson MD;  Location: [facility] OR    INJECT STEROID (LOCATION) N/A 9/30/2019    Procedure: steroid injection;  Surgeon: Lizzy Johnson MD;  Location: [facility] OR    IR CHEST PORT PLACEMENT > 5 YRS OF AGE  4/22/2021    IR CHEST PORT PLACEMENT > 5 YRS OF AGE  4/11/2022    IR PORT CHECK RIGHT  4/11/2022    IR PORT REMOVAL RIGHT  4/11/2022    LASER CO2 LARYNGOSCOPY N/A 9/30/2019    Procedure: Microdirect laryngoscopy with excision of laryngeal mass, CO2 laser;  Surgeon: Lizzy Johnson MD;  Location: [facility] OR    C NONSPECIFIC PROCEDURE      R tympanoplasty         SOCIAL HISTORY:  Social History     Socioeconomic History    Marital status: Single     Spouse name: Not on file    Number of children: Not on file    Years of education: Not on file    Highest education level: Not on file   Occupational History    Not on  file   Tobacco Use    Smoking status: Former     Current packs/day: 0.00     Types: Cigarettes     Quit date: 2009     Years since quitting: 15.1    Smokeless tobacco: Never   Substance and Sexual Activity    Alcohol use: Not Currently    Drug use: No    Sexual activity: Not on file   Other Topics Concern    Parent/sibling w/ CABG, MI or angioplasty before 65F 55M? Not Asked   Social History Narrative    Not on file     Social Drivers of Health     Financial Resource Strain: Not on file   Food Insecurity: Not on file   Transportation Needs: Not on file   Physical Activity: Not on file   Stress: Not on file   Social Connections: Not on file   Interpersonal Safety: Not At Risk (10/7/2024)    Received from [facility]    Humiliation, Afraid, Rape, and Kick questionnaire     Fear of Current or Ex-Partner: No     Emotionally Abused: No     Physically Abused: No     Sexually Abused: No   Housing Stability: Not on file   Resides at assisted living.      FAMILY HISTORY:  Family History   Problem Relation Age of Onset    Cancer Father          PHYSICAL EXAM:  Vital signs:  /82   Pulse 86   Temp 97.8  F (36.6  C) (Oral)   Resp 16   Wt 81.2 kg (179 lb)   SpO2 97%   BMI 22.37 kg/m     ECO  GENERAL: No acute distress. Sitting in wheelchair.  EYES: No scleral icterus. No overt erythema.  LYMPH: No cervical or supraclavicular adenopathy.  RESPIRATORY: Clear bilaterally.  CARDIAC: Regular rate and rhythm with no murmurs.  SKIN: No rashes; dry skin over legs.  EXTREMITIES: No lower extremity edema; right arm contracture and right hand edema (stable).  NEUROLOGIC: Alert.  No overt tremors.  PSYCHIATRIC: Normal affect and mood.  Does not appear anxious.       LABS:  CBC RESULTS:   Recent Labs   Lab Test 24  0756   WBC 4.0   RBC 2.46*   HGB 7.9*   HCT 25.8*   *   MCH 32.1   MCHC 30.6*   RDW 19.3*         Last Comprehensive Metabolic Panel:  Sodium   Date Value Ref Range Status   2023  140 136 - 145 mmol/L Final   06/29/2021 140 133 - 144 mmol/L Final     Potassium   Date Value Ref Range Status   01/25/2023 4.0 3.4 - 5.3 mmol/L Final   12/07/2022 4.1 3.4 - 5.3 mmol/L Final   06/29/2021 4.3 3.4 - 5.3 mmol/L Final     Chloride   Date Value Ref Range Status   01/25/2023 102 98 - 107 mmol/L Final   12/07/2022 109 94 - 109 mmol/L Final   06/29/2021 111 (H) 94 - 109 mmol/L Final     Carbon Dioxide   Date Value Ref Range Status   06/29/2021 25 20 - 32 mmol/L Final     Carbon Dioxide (CO2)   Date Value Ref Range Status   01/25/2023 28 22 - 29 mmol/L Final   12/07/2022 25 20 - 32 mmol/L Final     Anion Gap   Date Value Ref Range Status   01/25/2023 10 7 - 15 mmol/L Final   12/07/2022 7 3 - 14 mmol/L Final   06/29/2021 4 3 - 14 mmol/L Final     Glucose   Date Value Ref Range Status   01/25/2023 95 70 - 99 mg/dL Final   12/07/2022 91 70 - 99 mg/dL Final   06/29/2021 85 70 - 99 mg/dL Final     Urea Nitrogen   Date Value Ref Range Status   01/25/2023 16.0 8.0 - 23.0 mg/dL Final   12/07/2022 17 7 - 30 mg/dL Final   06/29/2021 17 7 - 30 mg/dL Final     Creatinine   Date Value Ref Range Status   11/27/2024 0.69 0.67 - 1.17 mg/dL Final   06/29/2021 0.84 0.66 - 1.25 mg/dL Final     GFR Estimate   Date Value Ref Range Status   11/27/2024 >90 >60 mL/min/1.73m2 Final     Comment:     eGFR calculated using 2021 CKD-EPI equation.   06/29/2021 >90 >60 mL/min/[1.73_m2] Final     Comment:     Non  GFR Calc  Starting 12/18/2018, serum creatinine based estimated GFR (eGFR) will be   calculated using the Chronic Kidney Disease Epidemiology Collaboration   (CKD-EPI) equation.       GFR, ESTIMATED POCT   Date Value Ref Range Status   06/17/2024 >60 >60 mL/min/1.73m2 Final     Calcium   Date Value Ref Range Status   01/25/2023 9.3 8.8 - 10.2 mg/dL Final   06/29/2021 8.7 8.5 - 10.1 mg/dL Final     Bilirubin Total   Date Value Ref Range Status   06/20/2024 0.6 <=1.2 mg/dL Final   06/29/2021 0.3 0.2 - 1.3 mg/dL  Final     Alkaline Phosphatase   Date Value Ref Range Status   06/20/2024 92 40 - 150 U/L Final   06/29/2021 73 40 - 150 U/L Final     ALT   Date Value Ref Range Status   06/20/2024 20 0 - 70 U/L Final     Comment:     Reference intervals for this test were updated on 6/12/2023 to more accurately reflect our healthy population. There may be differences in the flagging of prior results with similar values performed with this method. Interpretation of those prior results can be made in the context of the updated reference intervals.     06/29/2021 34 0 - 70 U/L Final     AST   Date Value Ref Range Status   06/20/2024 23 0 - 45 U/L Final     Comment:     Reference intervals for this test were updated on 6/12/2023 to more accurately reflect our healthy population. There may be differences in the flagging of prior results with similar values performed with this method. Interpretation of those prior results can be made in the context of the updated reference intervals.   06/29/2021 26 0 - 45 U/L Final           PATHOLOGY:  None new.    IMAGING:  Reviewed as per HPI.    ASSESSMENT/PLAN:  Kt Rasmussen is a 64 year old male with the following issues:  1. Metastatic non-small (non-squamous) cell lung carcinoma with metastases to lymph nodes, bones, and bilateral lungs  2. 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 Chemotherapy-induced anemia and thrombocytopenia  --Lung NGS panel negative for mutations in BRAF, EGFR, ERBB2, IDH1, IDH2, KRAS, MET, NRAS, RET. PD-L1 expression negative (TPS <1%). Guardant 360 negative for actionable mutations.  --Kt started 3rd line metastatic therapy with every 3-week Taxotere at 20% dose reduction (60 mg/m2) on 8/3/2022 due to progressive disease.  He tolerated this very well with minimal to no paresthesias. He then took a 2-month chemo break that was further delayed to 3.5 months due to  insurance issues. He resumed Taxotere 3/23/2023.  --6/17/2024 PET scan showed progressive disease in the abdomen/pelvis nodes and bone metastases.  --Switched to pemetrexed 7/3/2024.  --He declines to take the oral dexamethasone premedication.  --Discussed his labs from today show Hgb decreased to 7.9, , WBC 4, platelets 170,000, creatinine normal.   --9/23/2024 PET scan which showed mixed response but decreasing metabolic activity of the left periaortic and left greater than right external iliac/pelvic sidewall lymph nodes, osseous lesions involving the T12 vertebral body, L1 vertebral body, right sacral ala, right posterior iliac bone and right anterior acetabular region; broadly stable FDG avid lesions in the left iliac wing but increasing metabolic activity of FDG avid soft tissue thickening in the right lower paratracheal/hilar region and retrocaval lymph nodes suspicious for mild progression of disease.  --He may proceed with pemetrexed today.  Discussed that we would offer blood transfusion if Hgb decreases to <= 7g/dL.  --Plan to repeat PET scan in 12/2024.  If any further definitive disease progression on that scan, can consider switching to gemcitabine.  --Continue folate and B12 injections.    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 Dysphonia  6. Dysphagia  -Kt underwent excision of a left vocal cord SCC on 9/30/2019 and has persistent dysphonia as a result of the lesion and excision.  He also has dysphagia but this is stable and swallowing is manageable.  -Continue follow-up with Dr. Wray.    7. History of dizziness and prior falls  --SW and guardian were previously notified of multiple falls.  --Prior brain MRI 2/14/2022 showed no brain metastases. However this was a suboptimal exam due to inability to administer contrast.  --He is using a wheelchair due to prior femoral fracture in 1/2022.    8. Bilateral lower extremity DVT  --Diagnosed in 9/2023, likely hypercoagulability  of malignancy.  --Continue apixaban indefinitely, provided no major bleeding issues arise in the future and platelets remain >= 50,000.    Sangita John MD  [facility] Hematology/Oncology    Total time spent today: 30 minutes in chart review, patient evaluation, counseling, documentation, test and/or medication/prescription orders, and coordination of care.     The longitudinal plan of care for the diagnosis(es)/condition(s) as documented were addressed during this visit. Due to the added complexity in care, I will continue to support Kt in the subsequent management and with ongoing continuity of care.

## 2024-11-24 RX ORDER — MEPERIDINE HYDROCHLORIDE 25 MG/ML
25 INJECTION INTRAMUSCULAR; INTRAVENOUS; SUBCUTANEOUS
Status: CANCELLED | OUTPATIENT
Start: 2024-11-27

## 2024-11-24 RX ORDER — EPINEPHRINE 1 MG/ML
0.3 INJECTION, SOLUTION, CONCENTRATE INTRAVENOUS EVERY 5 MIN PRN
Status: CANCELLED | OUTPATIENT
Start: 2024-11-27

## 2024-11-24 RX ORDER — HEPARIN SODIUM (PORCINE) LOCK FLUSH IV SOLN 100 UNIT/ML 100 UNIT/ML
5 SOLUTION INTRAVENOUS
Status: CANCELLED | OUTPATIENT
Start: 2024-11-27

## 2024-11-24 RX ORDER — ALBUTEROL SULFATE 0.83 MG/ML
2.5 SOLUTION RESPIRATORY (INHALATION)
Status: CANCELLED | OUTPATIENT
Start: 2024-11-27

## 2024-11-24 RX ORDER — DIPHENHYDRAMINE HYDROCHLORIDE 50 MG/ML
25 INJECTION INTRAMUSCULAR; INTRAVENOUS
Status: CANCELLED
Start: 2024-11-27

## 2024-11-24 RX ORDER — HEPARIN SODIUM,PORCINE 10 UNIT/ML
5-20 VIAL (ML) INTRAVENOUS DAILY PRN
Status: CANCELLED | OUTPATIENT
Start: 2024-11-27

## 2024-11-24 RX ORDER — LORAZEPAM 2 MG/ML
0.5 INJECTION INTRAMUSCULAR EVERY 4 HOURS PRN
Status: CANCELLED | OUTPATIENT
Start: 2024-11-27

## 2024-11-24 RX ORDER — ONDANSETRON 2 MG/ML
8 INJECTION INTRAMUSCULAR; INTRAVENOUS ONCE
Status: CANCELLED | OUTPATIENT
Start: 2024-11-27

## 2024-11-24 RX ORDER — ALBUTEROL SULFATE 90 UG/1
1-2 INHALANT RESPIRATORY (INHALATION)
Status: CANCELLED
Start: 2024-11-27

## 2024-11-24 RX ORDER — CYANOCOBALAMIN 1000 UG/ML
1000 INJECTION, SOLUTION INTRAMUSCULAR; SUBCUTANEOUS
Status: CANCELLED | OUTPATIENT
Start: 2024-11-27

## 2024-11-24 RX ORDER — DIPHENHYDRAMINE HYDROCHLORIDE 50 MG/ML
50 INJECTION INTRAMUSCULAR; INTRAVENOUS
Status: CANCELLED
Start: 2024-11-27

## 2024-11-27 ENCOUNTER — ONCOLOGY VISIT (OUTPATIENT)
Dept: ONCOLOGY | Facility: CLINIC | Age: 64
End: 2024-11-27
Attending: INTERNAL MEDICINE
Payer: COMMERCIAL

## 2024-11-27 ENCOUNTER — INFUSION THERAPY VISIT (OUTPATIENT)
Dept: INFUSION THERAPY | Facility: CLINIC | Age: 64
End: 2024-11-27
Attending: INTERNAL MEDICINE
Payer: COMMERCIAL

## 2024-11-27 VITALS
TEMPERATURE: 97.8 F | OXYGEN SATURATION: 97 % | HEART RATE: 86 BPM | WEIGHT: 179 LBS | DIASTOLIC BLOOD PRESSURE: 82 MMHG | RESPIRATION RATE: 16 BRPM | SYSTOLIC BLOOD PRESSURE: 133 MMHG | BODY MASS INDEX: 22.37 KG/M2

## 2024-11-27 VITALS — RESPIRATION RATE: 18 BRPM

## 2024-11-27 DIAGNOSIS — T45.1X5A CHEMOTHERAPY-INDUCED NEUTROPENIA (H): ICD-10-CM

## 2024-11-27 DIAGNOSIS — C79.51 NON-SMALL CELL LUNG CANCER METASTATIC TO BONE (H): Primary | ICD-10-CM

## 2024-11-27 DIAGNOSIS — C78.01 MALIGNANT NEOPLASM METASTATIC TO BOTH LUNGS (H): ICD-10-CM

## 2024-11-27 DIAGNOSIS — C79.51 NON-SMALL CELL LUNG CANCER METASTATIC TO BONE (H): ICD-10-CM

## 2024-11-27 DIAGNOSIS — D70.1 CHEMOTHERAPY-INDUCED NEUTROPENIA (H): ICD-10-CM

## 2024-11-27 DIAGNOSIS — T45.1X5A ANEMIA DUE TO CHEMOTHERAPY: ICD-10-CM

## 2024-11-27 DIAGNOSIS — Z51.11 ENCOUNTER FOR ANTINEOPLASTIC CHEMOTHERAPY: Primary | ICD-10-CM

## 2024-11-27 DIAGNOSIS — C78.02 MALIGNANT NEOPLASM METASTATIC TO BOTH LUNGS (H): ICD-10-CM

## 2024-11-27 DIAGNOSIS — C34.90 NON-SMALL CELL LUNG CANCER METASTATIC TO BONE (H): Primary | ICD-10-CM

## 2024-11-27 DIAGNOSIS — C34.90 NON-SMALL CELL LUNG CANCER METASTATIC TO BONE (H): ICD-10-CM

## 2024-11-27 DIAGNOSIS — Z51.11 ENCOUNTER FOR ANTINEOPLASTIC CHEMOTHERAPY: ICD-10-CM

## 2024-11-27 DIAGNOSIS — D64.81 ANEMIA DUE TO CHEMOTHERAPY: ICD-10-CM

## 2024-11-27 LAB
BASOPHILS # BLD AUTO: 0 10E3/UL (ref 0–0.2)
BASOPHILS NFR BLD AUTO: 0 %
CREAT SERPL-MCNC: 0.69 MG/DL (ref 0.67–1.17)
EGFRCR SERPLBLD CKD-EPI 2021: >90 ML/MIN/1.73M2
EOSINOPHIL # BLD AUTO: 0.1 10E3/UL (ref 0–0.7)
EOSINOPHIL NFR BLD AUTO: 2 %
ERYTHROCYTE [DISTWIDTH] IN BLOOD BY AUTOMATED COUNT: 19.3 % (ref 10–15)
HCT VFR BLD AUTO: 25.8 % (ref 40–53)
HGB BLD-MCNC: 7.9 G/DL (ref 13.3–17.7)
IMM GRANULOCYTES # BLD: 0 10E3/UL
IMM GRANULOCYTES NFR BLD: 1 %
LYMPHOCYTES # BLD AUTO: 0.7 10E3/UL (ref 0.8–5.3)
LYMPHOCYTES NFR BLD AUTO: 16 %
MCH RBC QN AUTO: 32.1 PG (ref 26.5–33)
MCHC RBC AUTO-ENTMCNC: 30.6 G/DL (ref 31.5–36.5)
MCV RBC AUTO: 105 FL (ref 78–100)
MONOCYTES # BLD AUTO: 0.4 10E3/UL (ref 0–1.3)
MONOCYTES NFR BLD AUTO: 10 %
NEUTROPHILS # BLD AUTO: 2.8 10E3/UL (ref 1.6–8.3)
NEUTROPHILS NFR BLD AUTO: 71 %
NRBC # BLD AUTO: 0 10E3/UL
NRBC BLD AUTO-RTO: 0 /100
PLATELET # BLD AUTO: 170 10E3/UL (ref 150–450)
RBC # BLD AUTO: 2.46 10E6/UL (ref 4.4–5.9)
WBC # BLD AUTO: 4 10E3/UL (ref 4–11)

## 2024-11-27 PROCEDURE — 96375 TX/PRO/DX INJ NEW DRUG ADDON: CPT

## 2024-11-27 PROCEDURE — 85004 AUTOMATED DIFF WBC COUNT: CPT | Performed by: INTERNAL MEDICINE

## 2024-11-27 PROCEDURE — 36591 DRAW BLOOD OFF VENOUS DEVICE: CPT | Performed by: INTERNAL MEDICINE

## 2024-11-27 PROCEDURE — 96409 CHEMO IV PUSH SNGL DRUG: CPT

## 2024-11-27 PROCEDURE — 85018 HEMOGLOBIN: CPT | Performed by: INTERNAL MEDICINE

## 2024-11-27 PROCEDURE — 82565 ASSAY OF CREATININE: CPT | Performed by: INTERNAL MEDICINE

## 2024-11-27 PROCEDURE — 258N000003 HC RX IP 258 OP 636: Performed by: INTERNAL MEDICINE

## 2024-11-27 PROCEDURE — 99214 OFFICE O/P EST MOD 30 MIN: CPT | Performed by: INTERNAL MEDICINE

## 2024-11-27 PROCEDURE — G0463 HOSPITAL OUTPT CLINIC VISIT: HCPCS | Performed by: INTERNAL MEDICINE

## 2024-11-27 PROCEDURE — 250N000011 HC RX IP 250 OP 636: Mod: JZ | Performed by: INTERNAL MEDICINE

## 2024-11-27 PROCEDURE — G2211 COMPLEX E/M VISIT ADD ON: HCPCS | Performed by: INTERNAL MEDICINE

## 2024-11-27 RX ORDER — METHOCARBAMOL 750 MG/1
TABLET, FILM COATED ORAL
COMMUNITY
Start: 2024-11-21

## 2024-11-27 RX ORDER — ONDANSETRON 2 MG/ML
8 INJECTION INTRAMUSCULAR; INTRAVENOUS ONCE
Status: COMPLETED | OUTPATIENT
Start: 2024-11-27 | End: 2024-11-27

## 2024-11-27 RX ORDER — HEPARIN SODIUM (PORCINE) LOCK FLUSH IV SOLN 100 UNIT/ML 100 UNIT/ML
5 SOLUTION INTRAVENOUS
Status: DISCONTINUED | OUTPATIENT
Start: 2024-11-27 | End: 2024-11-27 | Stop reason: HOSPADM

## 2024-11-27 RX ADMIN — SODIUM CHLORIDE 250 ML: 9 INJECTION, SOLUTION INTRAVENOUS at 09:23

## 2024-11-27 RX ADMIN — ONDANSETRON 8 MG: 2 INJECTION INTRAMUSCULAR; INTRAVENOUS at 09:02

## 2024-11-27 RX ADMIN — HEPARIN 5 ML: 100 SYRINGE at 09:35

## 2024-11-27 RX ADMIN — PEMETREXED DISODIUM 1000 MG: 500 INJECTION, POWDER, LYOPHILIZED, FOR SOLUTION INTRAVENOUS at 09:23

## 2024-11-27 ASSESSMENT — PAIN SCALES - GENERAL
PAINLEVEL_OUTOF10: NO PAIN (0)
PAINLEVEL_OUTOF10: NO PAIN (0)

## 2024-11-27 NOTE — PROGRESS NOTES
Infusion Nursing Note:  Kt Rasmussen presents today for C8D1 Alimta.    Patient seen by provider today: Yes: Dr. John   present during visit today: Not Applicable.    Note: N/A.      Intravenous Access:  Implanted Port.    Treatment Conditions:  Lab Results   Component Value Date    HGB 7.9 (L) 11/27/2024    WBC 4.0 11/27/2024    ANEU 0.5 (L) 08/11/2022    ANEUTAUTO 2.8 11/27/2024     11/27/2024      Results reviewed, labs MET treatment parameters, ok to proceed with treatment.      Post Infusion Assessment:  Patient tolerated infusion without incident.  Blood return noted pre and post infusion.  Site patent and intact, free from redness, edema or discomfort.  No evidence of extravasations.  Access discontinued per protocol.       Discharge Plan:   Discharge instructions reviewed with: Patient.  Patient and/or family verbalized understanding of discharge instructions and all questions answered.  Copy of AVS reviewed with patient and/or family.  Patient will return 12/18/24 for next appointment.  Patient discharged in stable condition accompanied by: self.  Departure Mode: Wheelchair.      Magdalena Davila RN

## 2024-11-27 NOTE — LETTER
11/27/2024      Kt Rasmussen  [address]  [facility] [address]      Dear Colleague,    Thank you for referring your patient, Kt Rasmussen, to the [facility]. Please see a copy of my visit note below.    [facility]    Hematology/Oncology Established Patient Follow-up Note      Today's Date: 11/27/2024    Reason for Follow-up: Metastatic non-small cell lung carcinoma.    HISTORY OF PRESENT ILLNESS: Kt Rasmussen is a 64 year old male who presents with the following oncologic history:  1. 5/05/2016: Presented with near-obstructing large mass coming off the cheikh and likely the posterior wall, seen on bronchoscopy. Biopsy of the mass showed invasive squamous cell carcinoma, moderately differentiating and focally keratinizing.  Procedure was complicated by significant bleeding.  Tumor was completely debulked (via bronchoscopy) in both main stem bronchi and distal trachea. NGS showed no mutations in EGFR, KRAS, BRAF, NRAS, HRAS, PIK3CA, ERBB2, MET, or JAK2.  2. 5/23/2016: PET/CT scan showed evidence of residual tumor with decreased endobronchial mass. Indeterminate, mildly hypermetabolic mesentery with prominent lymph nodes and area of enhancement of the right hepatic lobe present. Felt to have T4-NX-M0 disease.  3. 6/09/2016: Started concurrent chemoradiation with weekly paclitaxel and carboplatin.  4. 7/30/2016: Completed radiation.  Subsequently received 2 cycles of consolidation paclitaxel and carboplatin.   5. 9/13/2019: Presented with 6-month history of dysphonia and left vocal exophytic lesion. Left true vocal fold biopsy showed at least squamous cell carcinoma in situ, cannot exclude superficial invasion.  6. 9/30/2019: Excision of left vocal cord mass showed fragments of invasive squamous cell carcinoma, well differentiated and keratinizing.  Margins negative for malignancy.  7. 2/16/2021: CT chest w/o contrast showed thin-walled cavity in left  lower lobe with 2 solid peripheral nodules measuring 9 mm each. No lymphadenopathy.  8. 3/01/2021: PET scan showed hypermetabolic nodules in left lower lobe and right lung, consistent with metastases; hypermetabolic adenopathy in chest, abdomen, pelvis; nonspecific uptake at tongue; hypermetabolic intraosseous lesions in spine and pelvis consistent with metastatic disease; left axillary hypermetabolic lymph nodes related to COVID-19 vaccination.  9. 3/12/2021: CT-guided lung biopsy showed non-small cell carcinoma, not otherwise specified -- with opinion by [facility] pathologist.  10. 3/18/2021: Lung NGS panel negative for mutations in BRAF, EGFR, ERBB2, IDH1, IDH2, KRAS, MET, NRAS, RET. PD-L1 expression negative (TPS <1%). Due to minimal amount of DNA obtained from specimen, other biomarkers could not be analyzed.  11. 4/7/2021: MRI brain negative for brain metastases.  12. 4/27/2021: Started 1st line metastatic therapy with carboplatin, paclitaxel, bevacizumab, and atezolizumab for metastatic non-small cell lung carcinoma, NOS.  13. 7/12/2021: PET/CT showed resolved mural nodules with increased cystic cavity in left lower lobe with no significant FDG uptake, less likely metastases; no enlarged hypermetabolic mediastinal, hilar, or axillary lymph nodes, decreased metabolic activity of intraosseous lesion in spine and pelvis; no new bone lesions.  14. 10/11/2021: PET/CT showed slight interval increase in intensity of metabolic activity of right pubic bone and left ischium with new focal uptake in L2 spinous process; resolved uptake at previous ground glass opacity along right medial lower lobe; unchanged cystic cavities/nodules in left lower lobe and right apical upper lobe; no pathologically enlarged hypermetabolic mediastinal, hilar, or axillary lymph nodes.  15. 1/10/2022: PET/CT showed new foci of abnormal skeletal FGD uptake in the thoracic and lumbar spine. Mild interval increase in intensity of previously  demonstrated hypermetabolic skeletal lesions involving the pelvis and lumbar spine. Findings are consistent with progressive osseous metastatic disease. Subsequently off chemo for 1 month due to poor performance status.  16. 1/25/2022: Patient fell and fractured his femur. This was surgically repaired and he was subsequently cared for at a rehab unit.  17. 2/14/2022: Brain MRI without contrast (contrast not given due to inability to obtain IV access) showed no brain metastases.  18. 4/21/2022: Started 2nd line metastatic therapy with pemetrexed and carboplatin. Omission of carboplatin 6/2 and forward due to worsening anemia despite dose reduction.  19. 7/18/2022: PET/CT showed enlarged and increased FDG avid skeletal metastases, increased left para-aortic retroperitoneal lymph node increased in size and FDG avidity, findings consistent with progressive disease.  20. 8/3/2022: Started 3rd line metastatic therapy with Taxotere 60 mg/m2 every 3 weeks.  21. 11/21/2022: PET scan showed partial response, left para-aortic retroperitoneal lymph node has decreased from 2.6 x 1.7 cm (SUVmax 7.9) to 2.3 x 1.4 cm (SUVmax 5.7), skeletal metastases no longer demonstrate FDG activity. Kt elected to take a 2-month break from chemo.  22. 1/23/2023: PET scan showed increasing metabolic activity of the left periaortic (max SUV 8.1, previously 5.7) left external iliac (max SUV 7.0, previously 3.1), and right external iliac (max SUV 5.1, previously 3.7) lymph nodes with development/reactivation of multiple FDG avid osseous lesions.  23. 3/23/2023: Resumption of Taxotere every 3 weeks. Delayed due to insurance issues.  24. 6/26/2023: PET scan showed partial response with decreased uptake associated with the retroperitoneal and pelvic lymphadenopathy as well as bone metastases.  25. 12/4/2023: PET scan showed decreased FDG avid left periaortic (SUV max 5.4, previously 6.6) and left external iliac (SUV max 3.5, previously 4.9)  lymphadenopathy and broadly stable FDG uptake within scattered pre-existing osseous metastases; new FDG avid lesion involving the right anterior first rib without clear fracture line visible (SUV max 4.7) indeterminate for a new osseous metastasis versus posttraumatic in etiology.   26. 3/18/2024: PET scan showed overall stable disease.  27. 6/17/2024: PET scan showed progressive disease with FDG avid left para-aortic retroperitoneal node measures 2.1 x 2.6 cm compared to 2.0 x 2.6 cm with SUV max of 8.4 previously 6.3.  Increased uptake with an a more inferior left periaortic retroperitoneal node with SUV max of 7.4, previously 4.3. Mildly increased uptake within a left external iliac node with SUV max of 7.0, previously 4.3. There is a new 1.1 x 1.3 cm retrocaval retroperitoneal node with SUV max of 13.4. New FDG uptake within a subcentimeter node between the right obturator muscles with SUV max of 4.1. Worsening FDG avid osseous disease involving the right occipital condyle, right aspect of the clivus, T12 and L1 vertebral bodies, and bony pelvis. FDG uptake within the right sacral lesion with SUV max of 14.0 compared to 6.9 and FDG uptake within the T12 vertebral body with SUV max of 15.2 compared to 8.2.  28. 7/03/2024: Switched to pemetrexed.  29. 9/23/2024: PET/CT showed decreasing metabolic activity of the left periaortic and left greater than right external iliac/pelvic sidewall lymph nodes, osseous lesions involving the T12 vertebral body, L1 vertebral body, right sacral ala, right posterior iliac bone   and right anterior acetabular region; broadly stable FDG avid lesions in the left iliac wing but increasing metabolic activity of FDG avid soft tissue thickening in the right lower paratracheal/hilar region and retrocaval lymph nodes suspicious for mild progression of disease.    INTERVAL HISTORY:  Kt reports feeling overall well with no decrease in energy. Denies dyspnea, cough, nausea.  He denies any  recent falls although has chronic stable imbalances issues.    REVIEW OF SYSTEMS:   14 point ROS was reviewed and is negative other than as noted above in HPI.       HOME MEDICATIONS:  Current Outpatient Medications   Medication Sig Dispense Refill     apixaban ANTICOAGULANT (ELIQUIS) 2.5 MG tablet Take 2.5 mg by mouth.       atorvastatin (LIPITOR) 40 MG tablet Take 1 tablet (40 mg) by mouth daily for 90 days 90 tablet 0     folic acid (FOLVITE) 1 MG tablet Take 1 tablet (1 mg) by mouth daily 90 tablet 3     prochlorperazine (COMPAZINE) 10 MG tablet Take 1 tablet (10 mg) by mouth every 6 hours as needed for nausea or vomiting (Patient not taking: Reported on 11/6/2024) 30 tablet 2         ALLERGIES:  Allergies   Allergen Reactions     No Known Drug Allergy          PAST MEDICAL HISTORY:  Past Medical History:   Diagnosis Date     Alcohol abuse, unspecified      Cerebral infarction (H)     2009, right side residual and aphasia     Dyslipidemia      GERD (gastroesophageal reflux disease)      Lung cancer (H)      Unspecified essential hypertension          PAST SURGICAL HISTORY:  Past Surgical History:   Procedure Laterality Date     BRONCHOSCOPY FLEXIBLE AND RIGID N/A 5/5/2016    Procedure: BRONCHOSCOPY FLEXIBLE AND RIGID;  Surgeon: Tony Talbot MD;  Location: [facility] OR     ESOPHAGOSCOPY FLEXIBLE N/A 9/30/2019    Procedure: flexible esophagoscopy;  Surgeon: Lizzy Johnson MD;  Location: [facility] OR     INJECT STEROID (LOCATION) N/A 9/30/2019    Procedure: steroid injection;  Surgeon: Lizzy Johnson MD;  Location: [facility] OR     IR CHEST PORT PLACEMENT > 5 YRS OF AGE  4/22/2021     IR CHEST PORT PLACEMENT > 5 YRS OF AGE  4/11/2022     IR PORT CHECK RIGHT  4/11/2022     IR PORT REMOVAL RIGHT  4/11/2022     LASER CO2 LARYNGOSCOPY N/A 9/30/2019    Procedure: Microdirect laryngoscopy with excision of laryngeal mass, CO2 laser;  Surgeon: Lizzy Johnson MD;  Location:  OR     [facility] NONSPECIFIC PROCEDURE      R  tympanoplasty         SOCIAL HISTORY:  Social History     Socioeconomic History     Marital status: Single     Spouse name: Not on file     Number of children: Not on file     Years of education: Not on file     Highest education level: Not on file   Occupational History     Not on file   Tobacco Use     Smoking status: Former     Current packs/day: 0.00     Types: Cigarettes     Quit date: 2009     Years since quitting: 15.1     Smokeless tobacco: Never   Substance and Sexual Activity     Alcohol use: Not Currently     Drug use: No     Sexual activity: Not on file   Other Topics Concern     Parent/sibling w/ CABG, MI or angioplasty before 65F 55M? Not Asked   Social History Narrative     Not on file     Social Drivers of Health     Financial Resource Strain: Not on file   Food Insecurity: Not on file   Transportation Needs: Not on file   Physical Activity: Not on file   Stress: Not on file   Social Connections: Not on file   Interpersonal Safety: Not At Risk (10/7/2024)    Received from [facility]    Humiliation, Afraid, Rape, and Kick questionnaire      Fear of Current or Ex-Partner: No      Emotionally Abused: No      Physically Abused: No      Sexually Abused: No   Housing Stability: Not on file   Resides at assisted living.      FAMILY HISTORY:  Family History   Problem Relation Age of Onset     Cancer Father          PHYSICAL EXAM:  Vital signs:  /82   Pulse 86   Temp 97.8  F (36.6  C) (Oral)   Resp 16   Wt 81.2 kg (179 lb)   SpO2 97%   BMI 22.37 kg/m     ECO  GENERAL: No acute distress. Sitting in wheelchair.  EYES: No scleral icterus. No overt erythema.  LYMPH: No cervical or supraclavicular adenopathy.  RESPIRATORY: Clear bilaterally.  CARDIAC: Regular rate and rhythm with no murmurs.  SKIN: No rashes; dry skin over legs.  EXTREMITIES: No lower extremity edema; right arm contracture and right hand edema (stable).  NEUROLOGIC: Alert.  No overt tremors.  PSYCHIATRIC: Normal affect and  mood.  Does not appear anxious.       LABS:  CBC RESULTS:   Recent Labs   Lab Test 11/27/24  0756   WBC 4.0   RBC 2.46*   HGB 7.9*   HCT 25.8*   *   MCH 32.1   MCHC 30.6*   RDW 19.3*         Last Comprehensive Metabolic Panel:  Sodium   Date Value Ref Range Status   01/25/2023 140 136 - 145 mmol/L Final   06/29/2021 140 133 - 144 mmol/L Final     Potassium   Date Value Ref Range Status   01/25/2023 4.0 3.4 - 5.3 mmol/L Final   12/07/2022 4.1 3.4 - 5.3 mmol/L Final   06/29/2021 4.3 3.4 - 5.3 mmol/L Final     Chloride   Date Value Ref Range Status   01/25/2023 102 98 - 107 mmol/L Final   12/07/2022 109 94 - 109 mmol/L Final   06/29/2021 111 (H) 94 - 109 mmol/L Final     Carbon Dioxide   Date Value Ref Range Status   06/29/2021 25 20 - 32 mmol/L Final     Carbon Dioxide (CO2)   Date Value Ref Range Status   01/25/2023 28 22 - 29 mmol/L Final   12/07/2022 25 20 - 32 mmol/L Final     Anion Gap   Date Value Ref Range Status   01/25/2023 10 7 - 15 mmol/L Final   12/07/2022 7 3 - 14 mmol/L Final   06/29/2021 4 3 - 14 mmol/L Final     Glucose   Date Value Ref Range Status   01/25/2023 95 70 - 99 mg/dL Final   12/07/2022 91 70 - 99 mg/dL Final   06/29/2021 85 70 - 99 mg/dL Final     Urea Nitrogen   Date Value Ref Range Status   01/25/2023 16.0 8.0 - 23.0 mg/dL Final   12/07/2022 17 7 - 30 mg/dL Final   06/29/2021 17 7 - 30 mg/dL Final     Creatinine   Date Value Ref Range Status   11/27/2024 0.69 0.67 - 1.17 mg/dL Final   06/29/2021 0.84 0.66 - 1.25 mg/dL Final     GFR Estimate   Date Value Ref Range Status   11/27/2024 >90 >60 mL/min/1.73m2 Final     Comment:     eGFR calculated using 2021 CKD-EPI equation.   06/29/2021 >90 >60 mL/min/[1.73_m2] Final     Comment:     Non  GFR Calc  Starting 12/18/2018, serum creatinine based estimated GFR (eGFR) will be   calculated using the Chronic Kidney Disease Epidemiology Collaboration   (CKD-EPI) equation.       GFR, ESTIMATED POCT   Date Value Ref Range  Status   06/17/2024 >60 >60 mL/min/1.73m2 Final     Calcium   Date Value Ref Range Status   01/25/2023 9.3 8.8 - 10.2 mg/dL Final   06/29/2021 8.7 8.5 - 10.1 mg/dL Final     Bilirubin Total   Date Value Ref Range Status   06/20/2024 0.6 <=1.2 mg/dL Final   06/29/2021 0.3 0.2 - 1.3 mg/dL Final     Alkaline Phosphatase   Date Value Ref Range Status   06/20/2024 92 40 - 150 U/L Final   06/29/2021 73 40 - 150 U/L Final     ALT   Date Value Ref Range Status   06/20/2024 20 0 - 70 U/L Final     Comment:     Reference intervals for this test were updated on 6/12/2023 to more accurately reflect our healthy population. There may be differences in the flagging of prior results with similar values performed with this method. Interpretation of those prior results can be made in the context of the updated reference intervals.     06/29/2021 34 0 - 70 U/L Final     AST   Date Value Ref Range Status   06/20/2024 23 0 - 45 U/L Final     Comment:     Reference intervals for this test were updated on 6/12/2023 to more accurately reflect our healthy population. There may be differences in the flagging of prior results with similar values performed with this method. Interpretation of those prior results can be made in the context of the updated reference intervals.   06/29/2021 26 0 - 45 U/L Final           PATHOLOGY:  None new.    IMAGING:  Reviewed as per HPI.    ASSESSMENT/PLAN:  Kt Rasmussen is a 64 year old male with the following issues:  1. Metastatic non-small (non-squamous) cell lung carcinoma with metastases to lymph nodes, bones, and bilateral lungs  2. 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 Chemotherapy-induced anemia and thrombocytopenia  --Lung NGS panel negative for mutations in BRAF, EGFR, ERBB2, IDH1, IDH2, KRAS, MET, NRAS, RET. PD-L1 expression negative (TPS <1%). Guardant 360 negative for actionable  mutations.  --Kt started 3rd line metastatic therapy with every 3-week Taxotere at 20% dose reduction (60 mg/m2) on 8/3/2022 due to progressive disease.  He tolerated this very well with minimal to no paresthesias. He then took a 2-month chemo break that was further delayed to 3.5 months due to insurance issues. He resumed Taxotere 3/23/2023.  --6/17/2024 PET scan showed progressive disease in the abdomen/pelvis nodes and bone metastases.  --Switched to pemetrexed 7/3/2024.  --He declines to take the oral dexamethasone premedication.  --Discussed his labs from today show Hgb decreased to 7.9, , WBC 4, platelets 170,000, creatinine normal.   --9/23/2024 PET scan which showed mixed response but decreasing metabolic activity of the left periaortic and left greater than right external iliac/pelvic sidewall lymph nodes, osseous lesions involving the T12 vertebral body, L1 vertebral body, right sacral ala, right posterior iliac bone and right anterior acetabular region; broadly stable FDG avid lesions in the left iliac wing but increasing metabolic activity of FDG avid soft tissue thickening in the right lower paratracheal/hilar region and retrocaval lymph nodes suspicious for mild progression of disease.  --He may proceed with pemetrexed today.  Discussed that we would offer blood transfusion if Hgb decreases to <= 7g/dL.  --Plan to repeat PET scan in 12/2024.  If any further definitive disease progression on that scan, can consider switching to gemcitabine.  --Continue folate and B12 injections.    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 Dysphonia  6. Dysphagia  -Kt underwent excision of a left vocal cord SCC on 9/30/2019 and has persistent dysphonia as a result of the lesion and excision.  He also has dysphagia but this is stable and swallowing is manageable.  -Continue follow-up with Dr. Wray.    7. History of dizziness and prior falls  --SW and guardian were previously notified of  "multiple falls.  --Prior brain MRI 2/14/2022 showed no brain metastases. However this was a suboptimal exam due to inability to administer contrast.  --He is using a wheelchair due to prior femoral fracture in 1/2022.    8. Bilateral lower extremity DVT  --Diagnosed in 9/2023, likely hypercoagulability of malignancy.  --Continue apixaban indefinitely, provided no major bleeding issues arise in the future and platelets remain >= 50,000.    Sangita John MD  [facility] Hematology/Oncology    Total time spent today: 30 minutes in chart review, patient evaluation, counseling, documentation, test and/or medication/prescription orders, and coordination of care.     The longitudinal plan of care for the diagnosis(es)/condition(s) as documented were addressed during this visit. Due to the added complexity in care, I will continue to support Kt in the subsequent management and with ongoing continuity of care.      Oncology Rooming Note    November 27, 2024 8:21 AM   Kt Rasmussen is a 64 year old male who presents for:    Chief Complaint   Patient presents with     Oncology Clinic Visit     Initial Vitals: /82   Pulse 86   Temp 97.8  F (36.6  C) (Oral)   Resp 16   Wt 81.2 kg (179 lb)   SpO2 97%   BMI 22.37 kg/m   Estimated body mass index is 22.37 kg/m  as calculated from the following:    Height as of 11/6/24: 1.905 m (6' 3\").    Weight as of this encounter: 81.2 kg (179 lb). Body surface area is 2.07 meters squared.  No Pain (0) Comment: Data Unavailable   No LMP for male patient.  Allergies reviewed: Yes  Medications reviewed: Yes    Medications: Medication refills not needed today.  Pharmacy name entered into EPIC:    [facility] - [facility] [place] - [address] [facility] [place] [address] [facility]  [id number] - [place] [address] [facility] [address] [facility]  [facility] - " [place], [place] - [address] [facility], [facility]. [facility] [address] [place]    Frailty Screening:   Is the patient here for a new oncology consult visit in cancer care? 2. No      Clinical concerns: no       Shari J. Schoenberger, [facility]                Again, thank you for allowing me to participate in the care of your patient.        Sincerely,        Sangita John MD

## 2024-11-27 NOTE — PROGRESS NOTES
Nursing Note:  Kt Rasmussen presents today for port labs for tx today.    Patient seen by provider today: Yes: Alvaro   present during visit today: Not Applicable.    Note: N/A.    Intravenous Access:  Labs drawn without difficulty.  Implanted Port.    Discharge Plan:   Patient was sent to [facility] for provider appointment.    Filipe Carr RN

## 2024-11-27 NOTE — PROGRESS NOTES
"Oncology Rooming Note    November 27, 2024 8:21 AM   Kt Rasmussen is a 64 year old male who presents for:    Chief Complaint   Patient presents with    Oncology Clinic Visit     Initial Vitals: /82   Pulse 86   Temp 97.8  F (36.6  C) (Oral)   Resp 16   Wt 81.2 kg (179 lb)   SpO2 97%   BMI 22.37 kg/m   Estimated body mass index is 22.37 kg/m  as calculated from the following:    Height as of 11/6/24: 1.905 m (6' 3\").    Weight as of this encounter: 81.2 kg (179 lb). Body surface area is 2.07 meters squared.  No Pain (0) Comment: Data Unavailable   No LMP for male patient.  Allergies reviewed: Yes  Medications reviewed: Yes    Medications: Medication refills not needed today.  Pharmacy name entered into EPIC:    [facility] [place] - [address] [facility] [address] [id number] - [place] [address] [facility] [address] AT [facility] & [address] [facility] - [facility] [address] [place] [address], [facility]. - [facility] [address]    Frailty Screening:   Is the patient here for a new oncology consult visit in cancer care? 2. No      Clinical concerns: no       Shari J. Schoenberger, CMA              "

## 2024-12-15 RX ORDER — DIPHENHYDRAMINE HYDROCHLORIDE 50 MG/ML
50 INJECTION INTRAMUSCULAR; INTRAVENOUS
Start: 2024-12-18

## 2024-12-15 RX ORDER — CYANOCOBALAMIN 1000 UG/ML
1000 INJECTION, SOLUTION INTRAMUSCULAR; SUBCUTANEOUS
OUTPATIENT
Start: 2024-12-18

## 2024-12-15 RX ORDER — ONDANSETRON 2 MG/ML
8 INJECTION INTRAMUSCULAR; INTRAVENOUS ONCE
OUTPATIENT
Start: 2024-12-19

## 2024-12-15 RX ORDER — ALBUTEROL SULFATE 90 UG/1
1-2 INHALANT RESPIRATORY (INHALATION)
Start: 2024-12-18

## 2024-12-15 RX ORDER — EPINEPHRINE 1 MG/ML
0.3 INJECTION, SOLUTION, CONCENTRATE INTRAVENOUS EVERY 5 MIN PRN
OUTPATIENT
Start: 2024-12-18

## 2024-12-15 RX ORDER — HEPARIN SODIUM,PORCINE 10 UNIT/ML
5-20 VIAL (ML) INTRAVENOUS DAILY PRN
OUTPATIENT
Start: 2024-12-18

## 2024-12-15 RX ORDER — DIPHENHYDRAMINE HYDROCHLORIDE 50 MG/ML
25 INJECTION INTRAMUSCULAR; INTRAVENOUS
Start: 2024-12-18

## 2024-12-15 RX ORDER — MEPERIDINE HYDROCHLORIDE 25 MG/ML
25 INJECTION INTRAMUSCULAR; INTRAVENOUS; SUBCUTANEOUS
OUTPATIENT
Start: 2024-12-18

## 2024-12-15 RX ORDER — HEPARIN SODIUM (PORCINE) LOCK FLUSH IV SOLN 100 UNIT/ML 100 UNIT/ML
5 SOLUTION INTRAVENOUS
OUTPATIENT
Start: 2024-12-18

## 2024-12-15 RX ORDER — ALBUTEROL SULFATE 0.83 MG/ML
2.5 SOLUTION RESPIRATORY (INHALATION)
OUTPATIENT
Start: 2024-12-18

## 2024-12-15 RX ORDER — LORAZEPAM 2 MG/ML
0.5 INJECTION INTRAMUSCULAR EVERY 4 HOURS PRN
OUTPATIENT
Start: 2024-12-18

## 2024-12-23 ENCOUNTER — HOSPITAL ENCOUNTER (OUTPATIENT)
Dept: PET IMAGING | Facility: CLINIC | Age: 64
Discharge: HOME | End: 2024-12-23
Attending: INTERNAL MEDICINE | Admitting: INTERNAL MEDICINE
Payer: COMMERCIAL

## 2024-12-23 DIAGNOSIS — C79.51 NON-SMALL CELL LUNG CANCER METASTATIC TO BONE (H): ICD-10-CM

## 2024-12-23 DIAGNOSIS — C78.02 MALIGNANT NEOPLASM METASTATIC TO BOTH LUNGS (H): ICD-10-CM

## 2024-12-23 DIAGNOSIS — C78.01 MALIGNANT NEOPLASM METASTATIC TO BOTH LUNGS (H): ICD-10-CM

## 2024-12-23 DIAGNOSIS — C34.90 NON-SMALL CELL LUNG CANCER METASTATIC TO BONE (H): ICD-10-CM

## 2024-12-23 PROCEDURE — 71260 CT THORAX DX C+: CPT

## 2024-12-23 PROCEDURE — 250N000011 HC RX IP 250 OP 636: Performed by: INTERNAL MEDICINE

## 2024-12-23 PROCEDURE — 78816 PET IMAGE W/CT FULL BODY: CPT | Mod: PS

## 2024-12-23 PROCEDURE — A9552 F18 FDG: HCPCS | Performed by: INTERNAL MEDICINE

## 2024-12-23 PROCEDURE — 74177 CT ABD & PELVIS W/CONTRAST: CPT

## 2024-12-23 PROCEDURE — 343N000001 HC RX 343 MED OP 636: Performed by: INTERNAL MEDICINE

## 2024-12-23 RX ORDER — HEPARIN SODIUM (PORCINE) LOCK FLUSH IV SOLN 100 UNIT/ML 100 UNIT/ML
500 SOLUTION INTRAVENOUS ONCE
Status: COMPLETED | OUTPATIENT
Start: 2024-12-23 | End: 2024-12-23

## 2024-12-23 RX ORDER — FLUDEOXYGLUCOSE F 18 200 MCI/ML
10-18 INJECTION, SOLUTION INTRAVENOUS ONCE
Status: COMPLETED | OUTPATIENT
Start: 2024-12-23 | End: 2024-12-23

## 2024-12-23 RX ORDER — IOPAMIDOL 755 MG/ML
10-135 INJECTION, SOLUTION INTRAVASCULAR ONCE
Status: COMPLETED | OUTPATIENT
Start: 2024-12-23 | End: 2024-12-23

## 2024-12-23 RX ADMIN — FLUDEOXYGLUCOSE F 18 12.68 MILLICURIE: 200 INJECTION, SOLUTION INTRAVENOUS at 09:43

## 2024-12-23 RX ADMIN — IOPAMIDOL 109 ML: 755 INJECTION, SOLUTION INTRAVENOUS at 09:43

## 2024-12-23 RX ADMIN — HEPARIN SODIUM (PORCINE) LOCK FLUSH IV SOLN 100 UNIT/ML 500 UNITS: 100 SOLUTION at 09:44

## 2024-12-24 ENCOUNTER — TELEPHONE (OUTPATIENT)
Dept: ONCOLOGY | Facility: CLINIC | Age: 64
End: 2024-12-24
Payer: COMMERCIAL

## 2024-12-24 DIAGNOSIS — J90 PLEURAL EFFUSION: ICD-10-CM

## 2024-12-24 DIAGNOSIS — C34.90 NON-SMALL CELL LUNG CANCER METASTATIC TO BONE (H): ICD-10-CM

## 2024-12-24 DIAGNOSIS — C79.51 NON-SMALL CELL LUNG CANCER METASTATIC TO BONE (H): ICD-10-CM

## 2024-12-24 DIAGNOSIS — I31.39 PERICARDIAL EFFUSION: Primary | ICD-10-CM

## 2024-12-24 NOTE — TELEPHONE ENCOUNTER
"Spoke with Kt to assess symptoms. He denies any sob or other symptoms.    Relayed message from Dr John per below    \"Could you please find out if Kt is symptomatic with shortness of breath?  His PET scan is concerning for bilateral pleural effusions (R > L) and pericardial effusion.  I would like for him to undergo echocardiogram and right thoracentesis (will get cytology).  I will place orders. \"    Kt would like to sched these for 12/26 if possible. Relayed message to Sendy to contact Kt. Kt states he will secure his own ride.    Tari Clarke RN    "

## 2024-12-26 ENCOUNTER — APPOINTMENT (OUTPATIENT)
Dept: GENERAL RADIOLOGY | Facility: CLINIC | Age: 64
End: 2024-12-26
Attending: EMERGENCY MEDICINE
Payer: COMMERCIAL

## 2024-12-26 ENCOUNTER — HOSPITAL ENCOUNTER (EMERGENCY)
Facility: CLINIC | Age: 64
Discharge: HOME | End: 2024-12-26
Attending: EMERGENCY MEDICINE
Payer: COMMERCIAL

## 2024-12-26 ENCOUNTER — APPOINTMENT (OUTPATIENT)
Dept: ULTRASOUND IMAGING | Facility: CLINIC | Age: 64
End: 2024-12-26
Attending: EMERGENCY MEDICINE
Payer: COMMERCIAL

## 2024-12-26 VITALS
WEIGHT: 180 LBS | DIASTOLIC BLOOD PRESSURE: 69 MMHG | HEART RATE: 78 BPM | OXYGEN SATURATION: 97 % | HEIGHT: 76 IN | RESPIRATION RATE: 16 BRPM | TEMPERATURE: 97.4 F | SYSTOLIC BLOOD PRESSURE: 114 MMHG | BODY MASS INDEX: 21.92 KG/M2

## 2024-12-26 DIAGNOSIS — C34.90 NON-SMALL CELL LUNG CANCER, UNSPECIFIED LATERALITY (H): ICD-10-CM

## 2024-12-26 DIAGNOSIS — J90 BILATERAL PLEURAL EFFUSION: ICD-10-CM

## 2024-12-26 LAB
ALBUMIN SERPL BCG-MCNC: 3.4 G/DL (ref 3.5–5.2)
ALP SERPL-CCNC: 113 U/L (ref 40–150)
ALT SERPL W P-5'-P-CCNC: 19 U/L (ref 0–70)
ANION GAP SERPL CALCULATED.3IONS-SCNC: 10 MMOL/L (ref 7–15)
APPEARANCE FLD: CLEAR
AST SERPL W P-5'-P-CCNC: 41 U/L (ref 0–45)
BASOPHILS # BLD AUTO: 0 10E3/UL (ref 0–0.2)
BASOPHILS NFR BLD AUTO: 0 %
BILIRUB SERPL-MCNC: 0.3 MG/DL
BUN SERPL-MCNC: 9 MG/DL (ref 8–23)
CALCIUM SERPL-MCNC: 9 MG/DL (ref 8.8–10.4)
CELL COUNT BODY FLUID SOURCE: NORMAL
CHLORIDE SERPL-SCNC: 106 MMOL/L (ref 98–107)
COLOR FLD: YELLOW
CREAT SERPL-MCNC: 0.8 MG/DL (ref 0.67–1.17)
EGFRCR SERPLBLD CKD-EPI 2021: >90 ML/MIN/1.73M2
EOSINOPHIL # BLD AUTO: 0.1 10E3/UL (ref 0–0.7)
EOSINOPHIL NFR BLD AUTO: 4 %
ERYTHROCYTE [DISTWIDTH] IN BLOOD BY AUTOMATED COUNT: 16.7 % (ref 10–15)
GLUCOSE BODY FLUID SOURCE: NORMAL
GLUCOSE FLD-MCNC: 99 MG/DL
GLUCOSE SERPL-MCNC: 84 MG/DL (ref 70–99)
HCO3 SERPL-SCNC: 25 MMOL/L (ref 22–29)
HCT VFR BLD AUTO: 28 % (ref 40–53)
HGB BLD-MCNC: 8.9 G/DL (ref 13.3–17.7)
IMM GRANULOCYTES # BLD: 0 10E3/UL
IMM GRANULOCYTES NFR BLD: 0 %
INR PPP: 1.14 (ref 0.85–1.15)
LD BODY BODY FLUID SOURCE: NORMAL
LDH FLD L TO P-CCNC: 165 U/L
LYMPHOCYTES # BLD AUTO: 0.6 10E3/UL (ref 0.8–5.3)
LYMPHOCYTES NFR BLD AUTO: 16 %
LYMPHOCYTES NFR FLD MANUAL: 35 %
MCH RBC QN AUTO: 34 PG (ref 26.5–33)
MCHC RBC AUTO-ENTMCNC: 31.8 G/DL (ref 31.5–36.5)
MCV RBC AUTO: 107 FL (ref 78–100)
MONOCYTES # BLD AUTO: 0.3 10E3/UL (ref 0–1.3)
MONOCYTES NFR BLD AUTO: 9 %
MONOS+MACROS NFR FLD MANUAL: 53 %
NEUTROPHILS # BLD AUTO: 2.8 10E3/UL (ref 1.6–8.3)
NEUTROPHILS NFR BLD AUTO: 72 %
NEUTS BAND NFR FLD MANUAL: 12 %
NRBC # BLD AUTO: 0 10E3/UL
NRBC BLD AUTO-RTO: 0 /100
NT-PROBNP SERPL-MCNC: 508 PG/ML (ref 0–900)
PLATELET # BLD AUTO: 181 10E3/UL (ref 150–450)
POTASSIUM SERPL-SCNC: 4 MMOL/L (ref 3.4–5.3)
PROT FLD-MCNC: 3.5 G/DL
PROT SERPL-MCNC: 6.5 G/DL (ref 6.4–8.3)
PROTEIN BODY FLUID SOURCE: NORMAL
RBC # BLD AUTO: 2.62 10E6/UL (ref 4.4–5.9)
RBC # FLD: 0 /UL
SODIUM SERPL-SCNC: 141 MMOL/L (ref 135–145)
WBC # BLD AUTO: 3.9 10E3/UL (ref 4–11)
WBC # FLD AUTO: 307 /UL

## 2024-12-26 PROCEDURE — 80053 COMPREHEN METABOLIC PANEL: CPT | Performed by: EMERGENCY MEDICINE

## 2024-12-26 PROCEDURE — 89051 BODY FLUID CELL COUNT: CPT | Performed by: EMERGENCY MEDICINE

## 2024-12-26 PROCEDURE — 84157 ASSAY OF PROTEIN OTHER: CPT | Performed by: EMERGENCY MEDICINE

## 2024-12-26 PROCEDURE — 36415 COLL VENOUS BLD VENIPUNCTURE: CPT | Performed by: EMERGENCY MEDICINE

## 2024-12-26 PROCEDURE — 83880 ASSAY OF NATRIURETIC PEPTIDE: CPT | Performed by: EMERGENCY MEDICINE

## 2024-12-26 PROCEDURE — 87070 CULTURE OTHR SPECIMN AEROBIC: CPT | Performed by: EMERGENCY MEDICINE

## 2024-12-26 PROCEDURE — 85610 PROTHROMBIN TIME: CPT | Performed by: EMERGENCY MEDICINE

## 2024-12-26 PROCEDURE — 85004 AUTOMATED DIFF WBC COUNT: CPT | Performed by: EMERGENCY MEDICINE

## 2024-12-26 PROCEDURE — 96372 THER/PROPH/DIAG INJ SC/IM: CPT | Performed by: RADIOLOGY

## 2024-12-26 PROCEDURE — 272N000706 US THORACENTESIS

## 2024-12-26 PROCEDURE — 83615 LACTATE (LD) (LDH) ENZYME: CPT | Performed by: EMERGENCY MEDICINE

## 2024-12-26 PROCEDURE — 89050 BODY FLUID CELL COUNT: CPT | Performed by: EMERGENCY MEDICINE

## 2024-12-26 PROCEDURE — 82945 GLUCOSE OTHER FLUID: CPT | Performed by: EMERGENCY MEDICINE

## 2024-12-26 PROCEDURE — 250N000009 HC RX 250: Performed by: RADIOLOGY

## 2024-12-26 PROCEDURE — 85041 AUTOMATED RBC COUNT: CPT | Performed by: EMERGENCY MEDICINE

## 2024-12-26 PROCEDURE — 32555 ASPIRATE PLEURA W/ IMAGING: CPT

## 2024-12-26 PROCEDURE — 71046 X-RAY EXAM CHEST 2 VIEWS: CPT

## 2024-12-26 RX ORDER — LIDOCAINE HYDROCHLORIDE 10 MG/ML
10 INJECTION, SOLUTION EPIDURAL; INFILTRATION; INTRACAUDAL; PERINEURAL ONCE
Status: COMPLETED | OUTPATIENT
Start: 2024-12-26 | End: 2024-12-26

## 2024-12-26 RX ADMIN — LIDOCAINE HYDROCHLORIDE 10 ML: 10 INJECTION, SOLUTION EPIDURAL; INFILTRATION; INTRACAUDAL; PERINEURAL at 11:07

## 2024-12-26 ASSESSMENT — ACTIVITIES OF DAILY LIVING (ADL)
ADLS_ACUITY_SCORE: 48

## 2024-12-26 ASSESSMENT — COLUMBIA-SUICIDE SEVERITY RATING SCALE - C-SSRS
2. HAVE YOU ACTUALLY HAD ANY THOUGHTS OF KILLING YOURSELF IN THE PAST MONTH?: NO
1. IN THE PAST MONTH, HAVE YOU WISHED YOU WERE DEAD OR WISHED YOU COULD GO TO SLEEP AND NOT WAKE UP?: NO
6. HAVE YOU EVER DONE ANYTHING, STARTED TO DO ANYTHING, OR PREPARED TO DO ANYTHING TO END YOUR LIFE?: NO

## 2024-12-26 NOTE — ED NOTES
Pt presents to room reporting 1 month of productive cough. Pt denies fever with the cough. Pt reports worsening SOB 3 days ago and was seen at that time where there was fluid found to be on his lungs.

## 2024-12-26 NOTE — ED NOTES
Constitutional: Well developed, nontox appearance  Head: Atraumatic.   Mouth/Throat: Oropharynx is clear and moist.   Neck:  no stridor  Eyes: no scleral icterus  Cardiovascular: RRR, 2+ bilat radial pulses  Pulmonary/Chest: nml resp effort, Clear BS bilat  Abdominal: ND, soft, NT, no rebound or guarding   : no CVA tenderness bilat  Ext: Warm, well perfused, no edema  Neurological: A&O, symmetric facies, moves ext x4  Skin: Skin is warm and dry.   Psychiatric: Behavior is normal. Thought content normal.   Nursing note and vitals reviewed.    MDM    Social determinants affecting patient's health include: ***     I reviewed medical records from: ***    Independent interpretations: ***    64 year old male presenting w/ ***    ***    DDx includes ***.  Doubt *** given ***.  Labs significant for ***.  Imaging sig for ***.  Interventions as noted above with *** in symptoms.      ***At this time I feel the pt is safe for discharge.  Recommendations given regarding follow up with PCP*** and return to the emergency department as needed for new or worsening symptoms.  *** counseled on all results, disposition and diagnosis.  They are understanding and agreeable to plan. Patient {dispo:[id number]} in *** condition.

## 2024-12-26 NOTE — ED PROVIDER NOTES
"  Emergency Department Note      History of Present Illness     Chief Complaint   Shortness of Breath      HPI   Kt Rasmussen is a 64 year old male with a history of cerebral infarction on Eliquis, dyslipidemia and hypertension who presents for evaluation of shortness of breath. He states that he was having cough productive of sputum forever a past moth. On Monday(12/23) his shortness of breath worsened. He had CT done which showed fluid accumulation in lung. He has been having shortness of breath here in the ED. He states mild bilateral arm swelling, denies leg swelling. He denies recent alcohol use or smoking. He has taken his Eliquis today.     Independent Historian   None    Review of External Notes   Oncology office visit on 11/27/2024 for an overview of the patient's cancer history  CT chest abdomen pelvis from 12/23/2024    Past Medical History     Medical History and Problem List   Alcohol abuse  Cerebral infarction   Dyslipidemia  GERD   Lung cancer   Essential hypertension    Medications   Apixaban   atorvastatin  methocarbamol   prochlorperazine     Surgical History   Bronchoscopy   Esophagoscopy   Chest port placement   Laryngoscopy   Tympanoplasty   Physical Exam     Patient Vitals for the past 24 hrs:   BP Temp Temp src Pulse Resp SpO2 Height Weight   12/26/24 1358 114/69 -- -- 78 -- 97 % -- --   12/26/24 1330 123/70 -- -- 79 -- 99 % -- --   12/26/24 1300 131/79 -- -- 79 -- 98 % -- --   12/26/24 1230 131/80 -- -- 89 -- 99 % -- --   12/26/24 1200 116/74 -- -- 72 -- 97 % -- --   12/26/24 1138 (!) 142/78 -- -- 82 -- 98 % -- --   12/26/24 0943 127/80 97.4  F (36.3  C) Oral 92 16 98 % 1.93 m (6' 4\") 81.6 kg (180 lb)     Physical Exam  Constitutional: Well developed, nontox appearance  Head: Atraumatic.   Neck:  no stridor  Eyes: no scleral icterus  Cardiovascular: RRR, 2+ bilat radial pulses  Pulmonary/Chest: nml resp effort, diminished breath sounds in bilateral lower lung fields  Abdominal: ND, soft, " NT, no rebound or guarding   : no CVA tenderness bilat  Ext: Warm, well perfused, edema to bilateral upper and lower extremities  Neurological: A&O, symmetric facies, baseline right hemiparesis secondary to known CVA  Skin: Skin is warm and dry.   Psychiatric: Behavior is normal. Thought content normal.   Nursing note and vitals reviewed.      Diagnostics     Lab Results   Labs Ordered and Resulted from Time of ED Arrival to Time of ED Departure   COMPREHENSIVE METABOLIC PANEL - Abnormal       Result Value    Sodium 141      Potassium 4.0      Carbon Dioxide (CO2) 25      Anion Gap 10      Urea Nitrogen 9.0      Creatinine 0.80      GFR Estimate >90      Calcium 9.0      Chloride 106      Glucose 84      Alkaline Phosphatase 113      AST 41      ALT 19      Protein Total 6.5      Albumin 3.4 (*)     Bilirubin Total 0.3     CBC WITH PLATELETS AND DIFFERENTIAL - Abnormal    WBC Count 3.9 (*)     RBC Count 2.62 (*)     Hemoglobin 8.9 (*)     Hematocrit 28.0 (*)      (*)     MCH 34.0 (*)     MCHC 31.8      RDW 16.7 (*)     Platelet Count 181      % Neutrophils 72      % Lymphocytes 16      % Monocytes 9      % Eosinophils 4      % Basophils 0      % Immature Granulocytes 0      NRBCs per 100 WBC 0      Absolute Neutrophils 2.8      Absolute Lymphocytes 0.6 (*)     Absolute Monocytes 0.3      Absolute Eosinophils 0.1      Absolute Basophils 0.0      Absolute Immature Granulocytes 0.0      Absolute NRBCs 0.0     INR - Normal    INR 1.14     NT PROBNP INPATIENT - Normal    N terminal Pro BNP Inpatient 508     GLUCOSE FLUID    Glucose Fluid Source Pleural Cavity, Right      Glucose fluid 99     LACTATE DEHYDROGENASE FLUID    LD Fluid Source Pleural Cavity, Right      Lactate dehydrogenase fluid 165     PROTEIN FLUID    Protein Fluid Source Pleural Cavity, Right      Protein Total Fluid 3.5     CELL COUNT BODY FLUID    Color Yellow      Clarity Clear      RBC Count 0      Cell Count Fluid Source Pleural Cavity,  Right      Total Nucleated Cells 307     DIFERENTIAL BODY FLUID    % Neutrophils 12      % Lymphocytes 35      % Monocyte/Macrophages 53     LACTATE DEHYDROGENASE   AEROBIC BACTERIAL CULTURE ROUTINE   CELL COUNT WITH DIFFERENTIAL FLUID       Imaging   US Thoracentesis   Final Result   IMPRESSION:   Status post right ultrasound-guided thoracentesis.      Reference CPT Code: 69879      DREW ROD MD            SYSTEM ID:  [id number]      Chest XR,  PA & LAT    (Results Pending)       EKG   ECG results from 05/04/16   EKG 12-lead, tracing only     Value    Interpretation ECG Click View Image link to view waveform and result         Independent Interpretation   CXR: No pneumothorax.    ED Course      Medications Administered   Medications   lidocaine (PF) (XYLOCAINE) 1 % injection 10 mL (10 mLs Subcutaneous $Given by Other 12/26/24 1107)       Procedures   Procedures     Discussion of Management   None    ED Course   ED Course as of 12/26/24 1457   Thu Dec 26, 2024   1003 I obtained the history and examined the patient as above.        Additional Documentation  Social determinants include age increasing risk for presentation to the emergency department    Medical Decision Making / Diagnosis     CMS Diagnoses: None    MIPS       None    MDM   Kt Rasmussen is a 64 year old male shortness of breath, recent CT chest on pelvis demonstrating bilateral pleural effusions    Differential diagnosis includes malignant effusions, pleural effusion NOS, shortness of breath secondary to CHF.  Less likely PE, ACS given history, physical exam and recent CT.  Thoracentesis ordered from the emergency department and accomplished with improvement in the patient's shortness of breath.  His vital signs are within normal limits pre and post thoracentesis.  Given his large-volume thoracentesis, they were not able to drain the left pleural effusion today.  I do not feel the patient requires admission at this time but he should present to  the emergency department tomorrow for left-sided thoracentesis given no outpatient appointments available per discussion with ultrasound radiology.  The patient is comfortable with this plan.  Chest x-ray postthoracentesis demonstrated left pleural effusion and no pneumothorax.  At this time I feel the patient is safe for discharge with recommendations given regarding follow-up in the emergency department tomorrow and with his oncology care team.    Disposition   The patient was discharged.     Diagnosis   No diagnosis found.     Discharge Medications   New Prescriptions    No medications on file         Scribe Disclosure:  Reena CUELLAR, meche serving as a scribe at 10:13 AM on 12/26/2024 to document services personally performed by Eliseo Valladares MD based on my observations and the provider's statements to me.        Eliseo Valladares MD  12/26/24 2355

## 2024-12-26 NOTE — DISCHARGE INSTRUCTIONS
Please return to the emergency department tomorrow to have a thoracentesis on your left side.  Please bring this paperwork when you return and let the provider know you are here for a thoracentesis.    Please return to the emergency department earlier for new or worsening shortness of breath, fainting, vomiting unable to keep eating down, any other concerning symptoms.

## 2024-12-27 ENCOUNTER — APPOINTMENT (OUTPATIENT)
Dept: ULTRASOUND IMAGING | Facility: CLINIC | Age: 64
End: 2024-12-27
Attending: EMERGENCY MEDICINE
Payer: COMMERCIAL

## 2024-12-27 ENCOUNTER — HOSPITAL ENCOUNTER (EMERGENCY)
Facility: CLINIC | Age: 64
Discharge: HOME | End: 2024-12-27
Attending: EMERGENCY MEDICINE
Payer: COMMERCIAL

## 2024-12-27 VITALS
OXYGEN SATURATION: 100 % | DIASTOLIC BLOOD PRESSURE: 68 MMHG | HEART RATE: 77 BPM | TEMPERATURE: 97 F | SYSTOLIC BLOOD PRESSURE: 115 MMHG | RESPIRATION RATE: 19 BRPM

## 2024-12-27 DIAGNOSIS — J90 PLEURAL EFFUSION ON LEFT: ICD-10-CM

## 2024-12-27 PROCEDURE — 272N000706 US THORACENTESIS

## 2024-12-27 PROCEDURE — 32555 ASPIRATE PLEURA W/ IMAGING: CPT

## 2024-12-27 PROCEDURE — 96372 THER/PROPH/DIAG INJ SC/IM: CPT | Performed by: RADIOLOGY

## 2024-12-27 PROCEDURE — 99284 EMERGENCY DEPT VISIT MOD MDM: CPT | Mod: 25

## 2024-12-27 PROCEDURE — 250N000009 HC RX 250: Performed by: RADIOLOGY

## 2024-12-27 RX ORDER — LIDOCAINE HYDROCHLORIDE 10 MG/ML
10 INJECTION, SOLUTION EPIDURAL; INFILTRATION; INTRACAUDAL; PERINEURAL ONCE
Status: COMPLETED | OUTPATIENT
Start: 2024-12-27 | End: 2024-12-27

## 2024-12-27 RX ADMIN — LIDOCAINE HYDROCHLORIDE 10 ML: 10 INJECTION, SOLUTION EPIDURAL; INFILTRATION; INTRACAUDAL; PERINEURAL at 11:38

## 2024-12-27 ASSESSMENT — ACTIVITIES OF DAILY LIVING (ADL)
ADLS_ACUITY_SCORE: 48

## 2024-12-27 NOTE — DISCHARGE INSTRUCTIONS
If you develop increased shortness of breath or chest discomfort return to the emergency department for a recheck.

## 2024-12-27 NOTE — ED PROVIDER NOTES
Emergency Department Note      History of Present Illness     Chief Complaint   Shortness of Breath      HPI   Kt Rasmussen is a 64 year old male with history of hypertension, hyperlipidemia, COPD, CVA, stage 3 non-small cell lung cancer metastatic to bone, and DVT anticoagulated on Eliquis who presents to the ED for evaluation of shortness of breath. Patient seen here yesterday for shortness of breath and had his right lung drained. They were unable to do a thoracentesis on the other side and patient returns today for the left lung. Endorses persistent shortness of breath.           Independent Historian   None    Review of External Notes   I reviewed Dr. Valladares's ED Note from yesterday discussing shortness of breath and bilateral pleural effusion in the setting of non-small cell lung cancer. US thoracentesis of right side.     Past Medical History     Medical History and Problem List   Alcohol abuse, unspecified  CVA  Hyperlipidemia   GERD   Hypertension  Airway obstruction  Lesion of vocal fold  Larynx cancer   Stage 3 non-small cell lung cancer metastatic to bone   Chemotherapy-induced neutropenia   DVT  Anemia  Cellulitis  COPD  Major depression  GI bleed  Psychoactive substance use disorder  Cocaine abuse  Seizure disorder  Insomnia  Mediastinal mass  Pneumonia  Thrombocytopenia  Tobacco dependence  TBI  Tuberculosis     Medications   Eliquis  Lipitor  Folvite  Robaxin     Surgical History   Bronchoscopy flexible and rigid  Flexible esophagoscopy   Inject steroid   Chest port placement x2  Port check right   Port removal right   Laser CO2 laryngoscopy   Right tympanoplasty   Coronary artery stent placement   Craniotomy     Physical Exam     Patient Vitals for the past 24 hrs:   BP Temp Temp src Pulse Resp SpO2   12/27/24 1201 115/68 -- -- -- 19 100 %   12/27/24 1100 117/65 -- -- 77 23 98 %   12/27/24 1013 122/73 97  F (36.1  C) Temporal 99 (!) 33 99 %     Physical Exam  Gen: well appearing, in no acute  distress  Oral : Mucous membranes moist,   Nose: No rhinorhea  Ears: External near normal, without drainage  Eyes: periorbital tissues and sclera normal   Neck: supple, no abnormal swelling  Lungs: Clear bilaterally, somewhat decreased on left however  CV: Regular rate, regular rhythm  Ext: no lower extremity edema  Skin: warm, dry, well perfused, no rashes/bruising/lesions on exposed skin  Neuro: alert, no gross motor or sensory deficits,   Psych: pleasant mood, normal affect      Diagnostics     Lab Results   Labs Ordered and Resulted from Time of ED Arrival to Time of ED Departure - No data to display    Imaging   US Thoracentesis    (Results Pending)       Independent Interpretation   None    ED Course      Medications Administered   Medications   lidocaine (PF) (XYLOCAINE) 1 % injection 10 mL (10 mLs Subcutaneous $Given by Other 12/27/24 1138)       Procedures   Procedures     Discussion of Management   None    ED Course   ED Course as of 12/27/24 1231   Fri Dec 27, 2024   1109 I obtained history and examined the patient as noted above.    1218 I reassesed patient.        Additional Documentation  None    Medical Decision Making / Diagnosis     CMS Diagnoses: None    MIPS       None    MDM   Kt Rasmussen is a 64 year old male was seen yesterday by my partner with bilateral pleural effusions likely from history of small cell lung cancer.  Patient had a large-volume thoracentesis done yesterday returns today to have the left side done.  Reports he feels a bit better from yesterday, no fevers no worsening shortness of breath.  Left-sided thoracentesis performed by interventional radiology today and about 900 cc of fluid removed.  Patient observed for a time after and feels good, eager for discharge home.  Has follow-up already scheduled    Disposition   The patient was discharged.     Diagnosis     ICD-10-CM    1. Pleural effusion on left  J90            Discharge Medications   New Prescriptions    No  medications on file         Scribe Disclosure:  I, Lisa Moreno, am serving as a scribe at 10:34 AM on 12/27/2024 to document services personally performed by Chandra Giordano MD based on my observations and the provider's statements to me.        Chandra Giordano MD  12/27/24 1305

## 2024-12-31 LAB — BACTERIA PLR CULT: NO GROWTH

## 2025-01-03 NOTE — PROGRESS NOTES
[facility]    Hematology/Oncology Established Patient Follow-up Note      Today's Date: 1/8/2025    Reason for Follow-up: Metastatic non-small cell lung carcinoma.    HISTORY OF PRESENT ILLNESS: Kt Rasmussen is a 64 year old male who presents with the following oncologic history:  1. 5/05/2016: Presented with near-obstructing large mass coming off the cheikh and likely the posterior wall, seen on bronchoscopy. Biopsy of the mass showed invasive squamous cell carcinoma, moderately differentiating and focally keratinizing.  Procedure was complicated by significant bleeding.  Tumor was completely debulked (via bronchoscopy) in both main stem bronchi and distal trachea. NGS showed no mutations in EGFR, KRAS, BRAF, NRAS, HRAS, PIK3CA, ERBB2, MET, or JAK2.  2. 5/23/2016: PET/CT scan showed evidence of residual tumor with decreased endobronchial mass. Indeterminate, mildly hypermetabolic mesentery with prominent lymph nodes and area of enhancement of the right hepatic lobe present. Felt to have T4-NX-M0 disease.  3. 6/09/2016: Started concurrent chemoradiation with weekly paclitaxel and carboplatin.  4. 7/30/2016: Completed radiation.  Subsequently received 2 cycles of consolidation paclitaxel and carboplatin.   5. 9/13/2019: Presented with 6-month history of dysphonia and left vocal exophytic lesion. Left true vocal fold biopsy showed at least squamous cell carcinoma in situ, cannot exclude superficial invasion.  6. 9/30/2019: Excision of left vocal cord mass showed fragments of invasive squamous cell carcinoma, well differentiated and keratinizing.  Margins negative for malignancy.  7. 2/16/2021: CT chest w/o contrast showed thin-walled cavity in left lower lobe with 2 solid peripheral nodules measuring 9 mm each. No lymphadenopathy.  8. 3/01/2021: PET scan showed hypermetabolic nodules in left lower lobe and right lung, consistent with metastases; hypermetabolic adenopathy in chest, abdomen, pelvis;  nonspecific uptake at tongue; hypermetabolic intraosseous lesions in spine and pelvis consistent with metastatic disease; left axillary hypermetabolic lymph nodes related to COVID-19 vaccination.  9. 3/12/2021: CT-guided lung biopsy showed non-small cell carcinoma, not otherwise specified -- with opinion by [facility] pathologist.  10. 3/18/2021: Lung NGS panel negative for mutations in BRAF, EGFR, ERBB2, IDH1, IDH2, KRAS, MET, NRAS, RET. PD-L1 expression negative (TPS <1%). Due to minimal amount of DNA obtained from specimen, other biomarkers could not be analyzed.  11. 4/7/2021: MRI brain negative for brain metastases.  12. 4/27/2021: Started 1st line metastatic therapy with carboplatin, paclitaxel, bevacizumab, and atezolizumab for metastatic non-small cell lung carcinoma, NOS.  13. 7/12/2021: PET/CT showed resolved mural nodules with increased cystic cavity in left lower lobe with no significant FDG uptake, less likely metastases; no enlarged hypermetabolic mediastinal, hilar, or axillary lymph nodes, decreased metabolic activity of intraosseous lesion in spine and pelvis; no new bone lesions.  14. 10/11/2021: PET/CT showed slight interval increase in intensity of metabolic activity of right pubic bone and left ischium with new focal uptake in L2 spinous process; resolved uptake at previous ground glass opacity along right medial lower lobe; unchanged cystic cavities/nodules in left lower lobe and right apical upper lobe; no pathologically enlarged hypermetabolic mediastinal, hilar, or axillary lymph nodes.  15. 1/10/2022: PET/CT showed new foci of abnormal skeletal FGD uptake in the thoracic and lumbar spine. Mild interval increase in intensity of previously demonstrated hypermetabolic skeletal lesions involving the pelvis and lumbar spine. Findings are consistent with progressive osseous metastatic disease. Subsequently off chemo for 1 month due to poor performance status.  16. 1/25/2022: Patient fell and  fractured his femur. This was surgically repaired and he was subsequently cared for at a rehab unit.  17. 2/14/2022: Brain MRI without contrast (contrast not given due to inability to obtain IV access) showed no brain metastases.  18. 4/21/2022: Started 2nd line metastatic therapy with pemetrexed and carboplatin. Omission of carboplatin 6/2 and forward due to worsening anemia despite dose reduction.  19. 7/18/2022: PET/CT showed enlarged and increased FDG avid skeletal metastases, increased left para-aortic retroperitoneal lymph node increased in size and FDG avidity, findings consistent with progressive disease.  20. 8/3/2022: Started 3rd line metastatic therapy with Taxotere 60 mg/m2 every 3 weeks.  21. 11/21/2022: PET scan showed partial response, left para-aortic retroperitoneal lymph node has decreased from 2.6 x 1.7 cm (SUVmax 7.9) to 2.3 x 1.4 cm (SUVmax 5.7), skeletal metastases no longer demonstrate FDG activity. Kt elected to take a 2-month break from chemo.  22. 1/23/2023: PET scan showed increasing metabolic activity of the left periaortic (max SUV 8.1, previously 5.7) left external iliac (max SUV 7.0, previously 3.1), and right external iliac (max SUV 5.1, previously 3.7) lymph nodes with development/reactivation of multiple FDG avid osseous lesions.  23. 3/23/2023: Resumption of Taxotere every 3 weeks. Delayed due to insurance issues.  24. 6/26/2023: PET scan showed partial response with decreased uptake associated with the retroperitoneal and pelvic lymphadenopathy as well as bone metastases.  25. 12/4/2023: PET scan showed decreased FDG avid left periaortic (SUV max 5.4, previously 6.6) and left external iliac (SUV max 3.5, previously 4.9) lymphadenopathy and broadly stable FDG uptake within scattered pre-existing osseous metastases; new FDG avid lesion involving the right anterior first rib without clear fracture line visible (SUV max 4.7) indeterminate for a new osseous metastasis versus  posttraumatic in etiology.   26. 3/18/2024: PET scan showed overall stable disease.  27. 6/17/2024: PET scan showed progressive disease with FDG avid left para-aortic retroperitoneal node measures 2.1 x 2.6 cm compared to 2.0 x 2.6 cm with SUV max of 8.4 previously 6.3.  Increased uptake with an a more inferior left periaortic retroperitoneal node with SUV max of 7.4, previously 4.3. Mildly increased uptake within a left external iliac node with SUV max of 7.0, previously 4.3. There is a new 1.1 x 1.3 cm retrocaval retroperitoneal node with SUV max of 13.4. New FDG uptake within a subcentimeter node between the right obturator muscles with SUV max of 4.1. Worsening FDG avid osseous disease involving the right occipital condyle, right aspect of the clivus, T12 and L1 vertebral bodies, and bony pelvis. FDG uptake within the right sacral lesion with SUV max of 14.0 compared to 6.9 and FDG uptake within the T12 vertebral body with SUV max of 15.2 compared to 8.2.  28. 7/03/2024: Switched to pemetrexed.  29. 9/23/2024: PET/CT showed decreasing metabolic activity of the left periaortic and left greater than right external iliac/pelvic sidewall lymph nodes, osseous lesions involving the T12 vertebral body, L1 vertebral body, right sacral ala, right posterior iliac bone   and right anterior acetabular region; broadly stable FDG avid lesions in the left iliac wing but increasing metabolic activity of FDG avid soft tissue thickening in the right lower paratracheal/hilar region and retrocaval lymph nodes suspicious for mild progression of disease.  30. 12/23/2024: PET/CT scan showed overall stable size and FDG uptake within multiple lymph nodes; overall decreased FDG uptake within the pre-existing osseous lesions involving  the right skull base, 1spine and pelvis, for reference the T12 (SUV max 6.8, previously 8.9) and L1 (SUV max 6.9, previously 8.1) vertebral bodies, left iliac bone (2 max 7.5, previously 8.7), right lateral  sacrum (SUV max 10.2, previously 10.4), and left acetabulum (SUV max 7.5 on the left, unchanged from right pubic); large right and moderate left pleural effusions, moderate pericardial effusion, and diffuse anasarca.  31. 12/26/2024: U/S right thoracentesis - removal of 2.65L clear yellow fluid.  32. 12/27/2024: U/S left thoracentesis - removal of 0.9L of dark red fluid.    INTERVAL HISTORY:  Kt reports improvement in shortness of breath after thoracentesis.    REVIEW OF SYSTEMS:   14 point ROS was reviewed and is negative other than as noted above in HPI.       HOME MEDICATIONS:  Current Outpatient Medications   Medication Sig Dispense Refill    apixaban ANTICOAGULANT (ELIQUIS) 2.5 MG tablet Take 2.5 mg by mouth.      atorvastatin (LIPITOR) 40 MG tablet Take 1 tablet (40 mg) by mouth daily for 90 days 90 tablet 0    folic acid (FOLVITE) 1 MG tablet Take 1 tablet (1 mg) by mouth daily 90 tablet 3    methocarbamol (ROBAXIN) 750 MG tablet       prochlorperazine (COMPAZINE) 10 MG tablet Take 1 tablet (10 mg) by mouth every 6 hours as needed for nausea or vomiting 30 tablet 2         ALLERGIES:  Allergies   Allergen Reactions    No Known Drug Allergy          PAST MEDICAL HISTORY:  Past Medical History:   Diagnosis Date    Alcohol abuse, unspecified     Cerebral infarction (H)     2009, right side residual and aphasia    Dyslipidemia     GERD (gastroesophageal reflux disease)     Lung cancer (H)     Unspecified essential hypertension          PAST SURGICAL HISTORY:  Past Surgical History:   Procedure Laterality Date    BRONCHOSCOPY FLEXIBLE AND RIGID N/A 5/5/2016    Procedure: BRONCHOSCOPY FLEXIBLE AND RIGID;  Surgeon: Tony Talbot MD;  Location: [facility] OR    ESOPHAGOSCOPY FLEXIBLE N/A 9/30/2019    Procedure: flexible esophagoscopy;  Surgeon: Lizzy Johnson MD;  Location: [facility] OR    INJECT STEROID (LOCATION) N/A 9/30/2019    Procedure: steroid injection;  Surgeon: Lizzy Johnson MD;  Location: [facility] OR    IR  "CHEST PORT PLACEMENT > 5 YRS OF AGE  2021    IR CHEST PORT PLACEMENT > 5 YRS OF AGE  2022    IR PORT CHECK RIGHT  2022    IR PORT REMOVAL RIGHT  2022    LASER CO2 LARYNGOSCOPY N/A 2019    Procedure: Microdirect laryngoscopy with excision of laryngeal mass, CO2 laser;  Surgeon: Lizzy Johnson MD;  Location: [facility] OR    [facility] NONSPECIFIC PROCEDURE      R tympanoplasty         SOCIAL HISTORY:  Social History     Socioeconomic History    Marital status: Single     Spouse name: Not on file    Number of children: Not on file    Years of education: Not on file    Highest education level: Not on file   Occupational History    Not on file   Tobacco Use    Smoking status: Former     Current packs/day: 0.00     Types: Cigarettes     Quit date: 2009     Years since quitting: 15.2    Smokeless tobacco: Never   Substance and Sexual Activity    Alcohol use: Not Currently    Drug use: No    Sexual activity: Not on file   Other Topics Concern    Parent/sibling w/ CABG, MI or angioplasty before 65F 55M? Not Asked   Social History Narrative    Not on file     Social Drivers of Health     Financial Resource Strain: Not on file   Food Insecurity: Not on file   Transportation Needs: Not on file   Physical Activity: Not on file   Stress: Not on file   Social Connections: Not on file   Interpersonal Safety: Not At Risk (10/7/2024)    Received from [facility]    Humiliation, Afraid, Rape, and Kick questionnaire     Fear of Current or Ex-Partner: No     Emotionally Abused: No     Physically Abused: No     Sexually Abused: No   Housing Stability: Not on file   Resides at assisted living.      FAMILY HISTORY:  Family History   Problem Relation Age of Onset    Cancer Father          PHYSICAL EXAM:  Vital signs:  BP (!) 155/83   Pulse 87   Temp 97.9  F (36.6  C)   Resp 16   Ht 1.93 m (6' 4\")   Wt 81.6 kg (180 lb)   SpO2 96%   BMI 21.91 kg/m     ECO  GENERAL: No acute distress. Sitting in " wheelchair.  EYES: No scleral icterus. No overt erythema.  LYMPH: No cervical or supraclavicular adenopathy.  RESPIRATORY: Clear bilaterally.  CARDIAC: Regular rate and rhythm with no murmurs or rubs  SKIN: No rashes; dry skin over legs.  EXTREMITIES: No lower extremity edema; right arm contracture and right hand edema (stable).  NEUROLOGIC: Alert.  No overt tremors.  PSYCHIATRIC: Normal affect and mood.  Does not appear anxious.       LABS:  CBC RESULTS:   Recent Labs   Lab Test 11/27/24  0756   WBC 4.0   RBC 2.46*   HGB 7.9*   HCT 25.8*   *   MCH 32.1   MCHC 30.6*   RDW 19.3*         Last Comprehensive Metabolic Panel:  Sodium   Date Value Ref Range Status   12/26/2024 141 135 - 145 mmol/L Final   06/29/2021 140 133 - 144 mmol/L Final     Potassium   Date Value Ref Range Status   12/26/2024 4.0 3.4 - 5.3 mmol/L Final   12/07/2022 4.1 3.4 - 5.3 mmol/L Final   06/29/2021 4.3 3.4 - 5.3 mmol/L Final     Chloride   Date Value Ref Range Status   12/26/2024 106 98 - 107 mmol/L Final   12/07/2022 109 94 - 109 mmol/L Final   06/29/2021 111 (H) 94 - 109 mmol/L Final     Carbon Dioxide   Date Value Ref Range Status   06/29/2021 25 20 - 32 mmol/L Final     Carbon Dioxide (CO2)   Date Value Ref Range Status   12/26/2024 25 22 - 29 mmol/L Final   12/07/2022 25 20 - 32 mmol/L Final     Anion Gap   Date Value Ref Range Status   12/26/2024 10 7 - 15 mmol/L Final   12/07/2022 7 3 - 14 mmol/L Final   06/29/2021 4 3 - 14 mmol/L Final     Glucose   Date Value Ref Range Status   12/26/2024 84 70 - 99 mg/dL Final   12/07/2022 91 70 - 99 mg/dL Final   06/29/2021 85 70 - 99 mg/dL Final     Urea Nitrogen   Date Value Ref Range Status   12/26/2024 9.0 8.0 - 23.0 mg/dL Final   12/07/2022 17 7 - 30 mg/dL Final   06/29/2021 17 7 - 30 mg/dL Final     Creatinine   Date Value Ref Range Status   12/26/2024 0.80 0.67 - 1.17 mg/dL Final   06/29/2021 0.84 0.66 - 1.25 mg/dL Final     GFR Estimate   Date Value Ref Range Status    12/26/2024 >90 >60 mL/min/1.73m2 Final     Comment:     eGFR calculated using 2021 CKD-EPI equation.   06/29/2021 >90 >60 mL/min/[1.73_m2] Final     Comment:     Non  GFR Calc  Starting 12/18/2018, serum creatinine based estimated GFR (eGFR) will be   calculated using the Chronic Kidney Disease Epidemiology Collaboration   (CKD-EPI) equation.       GFR, ESTIMATED POCT   Date Value Ref Range Status   06/17/2024 >60 >60 mL/min/1.73m2 Final     Calcium   Date Value Ref Range Status   12/26/2024 9.0 8.8 - 10.4 mg/dL Final     Comment:     Reference intervals for this test were updated on 7/16/2024 to reflect our healthy population more accurately. There may be differences in the flagging of prior results with similar values performed with this method. Those prior results can be interpreted in the context of the updated reference intervals.   06/29/2021 8.7 8.5 - 10.1 mg/dL Final     Bilirubin Total   Date Value Ref Range Status   12/26/2024 0.3 <=1.2 mg/dL Final   06/29/2021 0.3 0.2 - 1.3 mg/dL Final     Alkaline Phosphatase   Date Value Ref Range Status   12/26/2024 113 40 - 150 U/L Final   06/29/2021 73 40 - 150 U/L Final     ALT   Date Value Ref Range Status   12/26/2024 19 0 - 70 U/L Final   06/29/2021 34 0 - 70 U/L Final     AST   Date Value Ref Range Status   12/26/2024 41 0 - 45 U/L Final   06/29/2021 26 0 - 45 U/L Final           PATHOLOGY:  None new.    IMAGING:  Reviewed as per HPI.    ASSESSMENT/PLAN:  Kt Rasmussen is a 64 year old male with the following issues:  1. Metastatic non-small (non-squamous) cell lung carcinoma with metastases to lymph nodes, bones, and bilateral lungs  2. 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 Chemotherapy-induced anemia and thrombocytopenia  --Lung NGS panel negative for mutations in BRAF, EGFR, ERBB2, IDH1, IDH2, KRAS, MET, NRAS, RET. PD-L1 expression negative  (TPS <1%). Guardant 360 negative for actionable mutations.  --Kt started 3rd line metastatic therapy with every 3-week Taxotere at 20% dose reduction (60 mg/m2) on 8/3/2022 due to progressive disease.  He tolerated this very well with minimal to no paresthesias. He then took a 2-month chemo break that was further delayed to 3.5 months due to insurance issues. He resumed Taxotere 3/23/2023.  --6/17/2024 PET scan showed progressive disease in the abdomen/pelvis nodes and bone metastases.  --Switched to pemetrexed 7/3/2024.  --He declines to take the oral dexamethasone premedication.  --I personally reviewed his 12/23/2024 PET/CT scan which showed overall stable disease in lymph node metastases, partial response in bone metastases but new large right and moderate left pleural effusions and moderate pericardial effusion as well as diffuse anasarca.    --He underwent thoracentesis on 12/26 and 12/27.  I had ordered cytology but do not see it pending.  --Echocardiogram scheduled tomorrow.  --Pemetrexed may be causing the pleural and pericardial effusions.  Therefore, will switch to gemcitabine.  --We discussed the schedule and dosing of chemotherapy regimen, as well as counseled on potential side effects, including fever, chills, nausea, vomiting, diarrhea, constipation, hair loss, pain, rash, infection, bleeding, fatigue.  Patient expressed understanding and agreed to proceed with gemcitabine chemotherapy on days 1 and 8 every 21 day schedule, to start on 1/29/2025.    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 Dysphonia  6. Dysphagia  -Kt underwent excision of a left vocal cord SCC on 9/30/2019 and has persistent dysphonia as a result of the lesion and excision.  He also has dysphagia but this is stable and swallowing is manageable.  -Continue follow-up with Dr. Wray.    7. History of dizziness and prior falls  --SW and guardian were previously notified of multiple falls.  --Prior brain MRI 2/14/2022  showed no brain metastases. However this was a suboptimal exam due to inability to administer contrast.  --He is using a wheelchair due to prior femoral fracture in 1/2022.    8. Bilateral lower extremity DVT  --Diagnosed in 9/2023, likely hypercoagulability of malignancy.  --Continue apixaban indefinitely, provided no major bleeding issues arise in the future and platelets remain >= 50,000.    Sangita John MD  [facility] Hematology/Oncology    Total time spent today: 40 minutes in chart review, patient evaluation, counseling, documentation, test and/or medication/prescription orders, and coordination of care.     The longitudinal plan of care for the diagnosis(es)/condition(s) as documented were addressed during this visit. Due to the added complexity in care, I will continue to support Kt in the subsequent management and with ongoing continuity of care.

## 2025-01-06 ENCOUNTER — HOSPITAL ENCOUNTER (OUTPATIENT)
Dept: ULTRASOUND IMAGING | Facility: CLINIC | Age: 65
Discharge: HOME | End: 2025-01-06
Attending: INTERNAL MEDICINE | Admitting: RADIOLOGY
Payer: COMMERCIAL

## 2025-01-06 ENCOUNTER — DOCUMENTATION ONLY (OUTPATIENT)
Dept: OTHER | Facility: CLINIC | Age: 65
End: 2025-01-06
Payer: COMMERCIAL

## 2025-01-06 DIAGNOSIS — C34.90 NON-SMALL CELL LUNG CANCER METASTATIC TO BONE (H): ICD-10-CM

## 2025-01-06 DIAGNOSIS — C79.51 NON-SMALL CELL LUNG CANCER METASTATIC TO BONE (H): ICD-10-CM

## 2025-01-06 DIAGNOSIS — J90 PLEURAL EFFUSION: ICD-10-CM

## 2025-01-06 PROCEDURE — 32555 ASPIRATE PLEURA W/ IMAGING: CPT

## 2025-01-06 PROCEDURE — 88342 IMHCHEM/IMCYTCHM 1ST ANTB: CPT | Mod: TC

## 2025-01-06 PROCEDURE — 88341 IMHCHEM/IMCYTCHM EA ADD ANTB: CPT | Mod: TC

## 2025-01-06 PROCEDURE — 272N000706 US THORACENTESIS

## 2025-01-06 PROCEDURE — 250N000009 HC RX 250: Performed by: RADIOLOGY

## 2025-01-06 PROCEDURE — 96372 THER/PROPH/DIAG INJ SC/IM: CPT | Performed by: RADIOLOGY

## 2025-01-06 RX ORDER — LIDOCAINE HYDROCHLORIDE 10 MG/ML
10 INJECTION, SOLUTION EPIDURAL; INFILTRATION; INTRACAUDAL; PERINEURAL ONCE
Status: COMPLETED | OUTPATIENT
Start: 2025-01-06 | End: 2025-01-06

## 2025-01-06 RX ADMIN — LIDOCAINE HYDROCHLORIDE ANHYDROUS 10 ML: 10 INJECTION, SOLUTION INFILTRATION at 09:44

## 2025-01-06 NOTE — PROGRESS NOTES
Due to legal guardian, phone consent obtained from legal guardian agent Sky Arce. Consent for serial thoracentesis procedures for 90 days.

## 2025-01-08 ENCOUNTER — INFUSION THERAPY VISIT (OUTPATIENT)
Dept: INFUSION THERAPY | Facility: CLINIC | Age: 65
End: 2025-01-08
Attending: INTERNAL MEDICINE
Payer: COMMERCIAL

## 2025-01-08 ENCOUNTER — DOCUMENTATION ONLY (OUTPATIENT)
Dept: PHARMACY | Facility: CLINIC | Age: 65
End: 2025-01-08

## 2025-01-08 ENCOUNTER — ONCOLOGY VISIT (OUTPATIENT)
Dept: ONCOLOGY | Facility: CLINIC | Age: 65
End: 2025-01-08
Attending: INTERNAL MEDICINE
Payer: COMMERCIAL

## 2025-01-08 ENCOUNTER — PATIENT OUTREACH (OUTPATIENT)
Dept: ONCOLOGY | Facility: CLINIC | Age: 65
End: 2025-01-08

## 2025-01-08 VITALS
SYSTOLIC BLOOD PRESSURE: 155 MMHG | HEART RATE: 87 BPM | HEIGHT: 76 IN | TEMPERATURE: 97.9 F | BODY MASS INDEX: 21.92 KG/M2 | OXYGEN SATURATION: 96 % | RESPIRATION RATE: 16 BRPM | DIASTOLIC BLOOD PRESSURE: 83 MMHG | WEIGHT: 180 LBS

## 2025-01-08 DIAGNOSIS — C34.90 NON-SMALL CELL CARCINOMA OF LUNG, STAGE 3, UNSPECIFIED LATERALITY (H): Primary | ICD-10-CM

## 2025-01-08 DIAGNOSIS — C79.51 NON-SMALL CELL LUNG CANCER METASTATIC TO BONE (H): Primary | ICD-10-CM

## 2025-01-08 DIAGNOSIS — Z86.718 PERSONAL HISTORY OF DVT (DEEP VEIN THROMBOSIS): ICD-10-CM

## 2025-01-08 DIAGNOSIS — D70.1 CHEMOTHERAPY-INDUCED NEUTROPENIA: ICD-10-CM

## 2025-01-08 DIAGNOSIS — C34.90 NON-SMALL CELL LUNG CANCER METASTATIC TO BONE (H): ICD-10-CM

## 2025-01-08 DIAGNOSIS — C78.01 MALIGNANT NEOPLASM METASTATIC TO BOTH LUNGS (H): ICD-10-CM

## 2025-01-08 DIAGNOSIS — C79.51 NON-SMALL CELL LUNG CANCER METASTATIC TO BONE (H): ICD-10-CM

## 2025-01-08 DIAGNOSIS — I31.39 PERICARDIAL EFFUSION: ICD-10-CM

## 2025-01-08 DIAGNOSIS — Z51.11 ENCOUNTER FOR ANTINEOPLASTIC CHEMOTHERAPY: ICD-10-CM

## 2025-01-08 DIAGNOSIS — C78.02 MALIGNANT NEOPLASM METASTATIC TO BOTH LUNGS (H): ICD-10-CM

## 2025-01-08 DIAGNOSIS — J90 PLEURAL EFFUSION: ICD-10-CM

## 2025-01-08 DIAGNOSIS — C34.90 NON-SMALL CELL LUNG CANCER METASTATIC TO BONE (H): Primary | ICD-10-CM

## 2025-01-08 DIAGNOSIS — T45.1X5A CHEMOTHERAPY-INDUCED NEUTROPENIA: ICD-10-CM

## 2025-01-08 DIAGNOSIS — T45.1X5A ANEMIA DUE TO CHEMOTHERAPY: ICD-10-CM

## 2025-01-08 DIAGNOSIS — D64.81 ANEMIA DUE TO CHEMOTHERAPY: ICD-10-CM

## 2025-01-08 LAB
BASOPHILS # BLD AUTO: 0 10E3/UL (ref 0–0.2)
BASOPHILS NFR BLD AUTO: 0 %
CREAT SERPL-MCNC: 0.8 MG/DL (ref 0.67–1.17)
EGFRCR SERPLBLD CKD-EPI 2021: >90 ML/MIN/1.73M2
EOSINOPHIL # BLD AUTO: 0.2 10E3/UL (ref 0–0.7)
EOSINOPHIL NFR BLD AUTO: 3 %
ERYTHROCYTE [DISTWIDTH] IN BLOOD BY AUTOMATED COUNT: 14.4 % (ref 10–15)
HCT VFR BLD AUTO: 30.6 % (ref 40–53)
HGB BLD-MCNC: 9.4 G/DL (ref 13.3–17.7)
IMM GRANULOCYTES # BLD: 0 10E3/UL
IMM GRANULOCYTES NFR BLD: 0 %
LYMPHOCYTES # BLD AUTO: 0.9 10E3/UL (ref 0.8–5.3)
LYMPHOCYTES NFR BLD AUTO: 18 %
MCH RBC QN AUTO: 31.8 PG (ref 26.5–33)
MCHC RBC AUTO-ENTMCNC: 30.7 G/DL (ref 31.5–36.5)
MCV RBC AUTO: 103 FL (ref 78–100)
MONOCYTES # BLD AUTO: 0.5 10E3/UL (ref 0–1.3)
MONOCYTES NFR BLD AUTO: 9 %
NEUTROPHILS # BLD AUTO: 3.6 10E3/UL (ref 1.6–8.3)
NEUTROPHILS NFR BLD AUTO: 69 %
NRBC # BLD AUTO: 0 10E3/UL
NRBC BLD AUTO-RTO: 0 /100
PLATELET # BLD AUTO: 165 10E3/UL (ref 150–450)
RBC # BLD AUTO: 2.96 10E6/UL (ref 4.4–5.9)
WBC # BLD AUTO: 5.2 10E3/UL (ref 4–11)

## 2025-01-08 PROCEDURE — 82565 ASSAY OF CREATININE: CPT | Performed by: INTERNAL MEDICINE

## 2025-01-08 PROCEDURE — 250N000011 HC RX IP 250 OP 636: Performed by: INTERNAL MEDICINE

## 2025-01-08 PROCEDURE — 36591 DRAW BLOOD OFF VENOUS DEVICE: CPT

## 2025-01-08 PROCEDURE — 85025 COMPLETE CBC W/AUTO DIFF WBC: CPT | Performed by: INTERNAL MEDICINE

## 2025-01-08 PROCEDURE — G0463 HOSPITAL OUTPT CLINIC VISIT: HCPCS | Performed by: INTERNAL MEDICINE

## 2025-01-08 RX ORDER — METHYLPREDNISOLONE SODIUM SUCCINATE 40 MG/ML
40 INJECTION INTRAMUSCULAR; INTRAVENOUS
Start: 2025-02-05

## 2025-01-08 RX ORDER — ALBUTEROL SULFATE 90 UG/1
1-2 INHALANT RESPIRATORY (INHALATION)
Start: 2025-02-05

## 2025-01-08 RX ORDER — HEPARIN SODIUM,PORCINE 10 UNIT/ML
5-20 VIAL (ML) INTRAVENOUS DAILY PRN
OUTPATIENT
Start: 2025-01-29

## 2025-01-08 RX ORDER — ALBUTEROL SULFATE 90 UG/1
1-2 INHALANT RESPIRATORY (INHALATION)
Start: 2025-01-29

## 2025-01-08 RX ORDER — ONDANSETRON 2 MG/ML
8 INJECTION INTRAMUSCULAR; INTRAVENOUS ONCE
OUTPATIENT
Start: 2025-01-29

## 2025-01-08 RX ORDER — EPINEPHRINE 1 MG/ML
0.3 INJECTION, SOLUTION INTRAMUSCULAR; SUBCUTANEOUS EVERY 5 MIN PRN
OUTPATIENT
Start: 2025-01-29

## 2025-01-08 RX ORDER — HEPARIN SODIUM (PORCINE) LOCK FLUSH IV SOLN 100 UNIT/ML 100 UNIT/ML
5 SOLUTION INTRAVENOUS
Status: DISCONTINUED | OUTPATIENT
Start: 2025-01-08 | End: 2025-01-08 | Stop reason: HOSPADM

## 2025-01-08 RX ORDER — CYANOCOBALAMIN 1000 UG/ML
1000 INJECTION, SOLUTION INTRAMUSCULAR; SUBCUTANEOUS ONCE
Start: 2025-01-08 | End: 2025-01-08

## 2025-01-08 RX ORDER — METHYLPREDNISOLONE SODIUM SUCCINATE 40 MG/ML
40 INJECTION INTRAMUSCULAR; INTRAVENOUS
Start: 2025-01-29

## 2025-01-08 RX ORDER — DIPHENHYDRAMINE HYDROCHLORIDE 50 MG/ML
25 INJECTION INTRAMUSCULAR; INTRAVENOUS
Start: 2025-01-29

## 2025-01-08 RX ORDER — EPINEPHRINE 1 MG/ML
0.3 INJECTION, SOLUTION INTRAMUSCULAR; SUBCUTANEOUS EVERY 5 MIN PRN
OUTPATIENT
Start: 2025-02-05

## 2025-01-08 RX ORDER — HEPARIN SODIUM (PORCINE) LOCK FLUSH IV SOLN 100 UNIT/ML 100 UNIT/ML
5 SOLUTION INTRAVENOUS
OUTPATIENT
Start: 2025-02-05

## 2025-01-08 RX ORDER — DIPHENHYDRAMINE HYDROCHLORIDE 50 MG/ML
50 INJECTION INTRAMUSCULAR; INTRAVENOUS
Start: 2025-01-29

## 2025-01-08 RX ORDER — DIPHENHYDRAMINE HYDROCHLORIDE 50 MG/ML
50 INJECTION INTRAMUSCULAR; INTRAVENOUS
Start: 2025-02-05

## 2025-01-08 RX ORDER — DIPHENHYDRAMINE HYDROCHLORIDE 50 MG/ML
25 INJECTION INTRAMUSCULAR; INTRAVENOUS
Start: 2025-02-05

## 2025-01-08 RX ORDER — HEPARIN SODIUM (PORCINE) LOCK FLUSH IV SOLN 100 UNIT/ML 100 UNIT/ML
5 SOLUTION INTRAVENOUS
OUTPATIENT
Start: 2025-01-08

## 2025-01-08 RX ORDER — HEPARIN SODIUM,PORCINE 10 UNIT/ML
5 VIAL (ML) INTRAVENOUS
OUTPATIENT
Start: 2025-01-08

## 2025-01-08 RX ORDER — ONDANSETRON 2 MG/ML
8 INJECTION INTRAMUSCULAR; INTRAVENOUS ONCE
Start: 2025-01-08 | End: 2025-01-08

## 2025-01-08 RX ORDER — MEPERIDINE HYDROCHLORIDE 25 MG/ML
25 INJECTION INTRAMUSCULAR; INTRAVENOUS; SUBCUTANEOUS
OUTPATIENT
Start: 2025-01-29

## 2025-01-08 RX ORDER — LORAZEPAM 2 MG/ML
0.5 INJECTION INTRAMUSCULAR EVERY 4 HOURS PRN
OUTPATIENT
Start: 2025-02-05

## 2025-01-08 RX ORDER — ALBUTEROL SULFATE 0.83 MG/ML
2.5 SOLUTION RESPIRATORY (INHALATION)
OUTPATIENT
Start: 2025-02-05

## 2025-01-08 RX ORDER — HEPARIN SODIUM (PORCINE) LOCK FLUSH IV SOLN 100 UNIT/ML 100 UNIT/ML
5 SOLUTION INTRAVENOUS
OUTPATIENT
Start: 2025-01-29

## 2025-01-08 RX ORDER — MEPERIDINE HYDROCHLORIDE 25 MG/ML
25 INJECTION INTRAMUSCULAR; INTRAVENOUS; SUBCUTANEOUS
OUTPATIENT
Start: 2025-02-05

## 2025-01-08 RX ORDER — HEPARIN SODIUM,PORCINE 10 UNIT/ML
5-20 VIAL (ML) INTRAVENOUS DAILY PRN
OUTPATIENT
Start: 2025-02-05

## 2025-01-08 RX ORDER — ONDANSETRON 2 MG/ML
8 INJECTION INTRAMUSCULAR; INTRAVENOUS ONCE
OUTPATIENT
Start: 2025-02-05

## 2025-01-08 RX ORDER — ONDANSETRON 2 MG/ML
8 INJECTION INTRAMUSCULAR; INTRAVENOUS EVERY 6 HOURS PRN
Start: 2025-01-08

## 2025-01-08 RX ORDER — ALBUTEROL SULFATE 0.83 MG/ML
2.5 SOLUTION RESPIRATORY (INHALATION)
OUTPATIENT
Start: 2025-01-29

## 2025-01-08 RX ORDER — LORAZEPAM 2 MG/ML
0.5 INJECTION INTRAMUSCULAR EVERY 4 HOURS PRN
OUTPATIENT
Start: 2025-01-29

## 2025-01-08 RX ADMIN — HEPARIN 5 ML: 100 SYRINGE at 15:52

## 2025-01-08 ASSESSMENT — PAIN SCALES - GENERAL: PAINLEVEL_OUTOF10: NO PAIN (0)

## 2025-01-08 NOTE — PROGRESS NOTES
Nursing Note:  Kt Rasmussen presents today for port labs for tx today.    Patient seen by provider today: Yes: Alvaro   present during visit today: Not Applicable.    Note: N/A.    Intravenous Access:  Labs drawn without difficulty.  Implanted Port.    Discharge Plan:   Patient was sent to [facility] for provider appointment.    Filipe Carr RN

## 2025-01-08 NOTE — PROGRESS NOTES
"Oncology Rooming Note    January 8, 2025 3:09 PM   Kt Rasmussen is a 64 year old male who presents for:    No chief complaint on file.    Initial Vitals: There were no vitals taken for this visit. Estimated body mass index is 21.91 kg/m  as calculated from the following:    Height as of 12/26/24: 1.93 m (6' 4\").    Weight as of 12/26/24: 81.6 kg (180 lb). There is no height or weight on file to calculate BSA.  Data Unavailable Comment: Data Unavailable   No LMP for male patient.  Allergies reviewed: Yes  Medications reviewed: Yes    Medications: Medication refills not needed today.  Pharmacy name entered into EPIC:    [place] [facility] [address] [facility] [address] [id number] - [address] AT [facility] & [address] [facility] - [address], [facility]. - [facility] [address]    Frailty Screening:   Is the patient here for a new oncology consult visit in cancer care? 2. No        Floridalma Nieves MA            "

## 2025-01-08 NOTE — LETTER
1/8/2025      Kt Rasmussen  [id number]  [facility] [address]      Dear Colleague,    Thank you for referring your patient, Kt Rasmussen, to the [facility]. Please see a copy of my visit note below.    [facility]    Hematology/Oncology Established Patient Follow-up Note      Today's Date: 1/8/2025    Reason for Follow-up: Metastatic non-small cell lung carcinoma.    HISTORY OF PRESENT ILLNESS: Kt Rasmussen is a 64 year old male who presents with the following oncologic history:  1. 5/05/2016: Presented with near-obstructing large mass coming off the cheikh and likely the posterior wall, seen on bronchoscopy. Biopsy of the mass showed invasive squamous cell carcinoma, moderately differentiating and focally keratinizing.  Procedure was complicated by significant bleeding.  Tumor was completely debulked (via bronchoscopy) in both main stem bronchi and distal trachea. NGS showed no mutations in EGFR, KRAS, BRAF, NRAS, HRAS, PIK3CA, ERBB2, MET, or JAK2.  2. 5/23/2016: PET/CT scan showed evidence of residual tumor with decreased endobronchial mass. Indeterminate, mildly hypermetabolic mesentery with prominent lymph nodes and area of enhancement of the right hepatic lobe present. Felt to have T4-NX-M0 disease.  3. 6/09/2016: Started concurrent chemoradiation with weekly paclitaxel and carboplatin.  4. 7/30/2016: Completed radiation.  Subsequently received 2 cycles of consolidation paclitaxel and carboplatin.   5. 9/13/2019: Presented with 6-month history of dysphonia and left vocal exophytic lesion. Left true vocal fold biopsy showed at least squamous cell carcinoma in situ, cannot exclude superficial invasion.  6. 9/30/2019: Excision of left vocal cord mass showed fragments of invasive squamous cell carcinoma, well differentiated and keratinizing.  Margins negative for malignancy.  7. 2/16/2021: CT chest w/o contrast showed thin-walled cavity in left lower  lobe with 2 solid peripheral nodules measuring 9 mm each. No lymphadenopathy.  8. 3/01/2021: PET scan showed hypermetabolic nodules in left lower lobe and right lung, consistent with metastases; hypermetabolic adenopathy in chest, abdomen, pelvis; nonspecific uptake at tongue; hypermetabolic intraosseous lesions in spine and pelvis consistent with metastatic disease; left axillary hypermetabolic lymph nodes related to COVID-19 vaccination.  9. 3/12/2021: CT-guided lung biopsy showed non-small cell carcinoma, not otherwise specified -- with opinion by [facility] pathologist.  10. 3/18/2021: Lung NGS panel negative for mutations in BRAF, EGFR, ERBB2, IDH1, IDH2, KRAS, MET, NRAS, RET. PD-L1 expression negative (TPS <1%). Due to minimal amount of DNA obtained from specimen, other biomarkers could not be analyzed.  11. 4/7/2021: MRI brain negative for brain metastases.  12. 4/27/2021: Started 1st line metastatic therapy with carboplatin, paclitaxel, bevacizumab, and atezolizumab for metastatic non-small cell lung carcinoma, NOS.  13. 7/12/2021: PET/CT showed resolved mural nodules with increased cystic cavity in left lower lobe with no significant FDG uptake, less likely metastases; no enlarged hypermetabolic mediastinal, hilar, or axillary lymph nodes, decreased metabolic activity of intraosseous lesion in spine and pelvis; no new bone lesions.  14. 10/11/2021: PET/CT showed slight interval increase in intensity of metabolic activity of right pubic bone and left ischium with new focal uptake in L2 spinous process; resolved uptake at previous ground glass opacity along right medial lower lobe; unchanged cystic cavities/nodules in left lower lobe and right apical upper lobe; no pathologically enlarged hypermetabolic mediastinal, hilar, or axillary lymph nodes.  15. 1/10/2022: PET/CT showed new foci of abnormal skeletal FGD uptake in the thoracic and lumbar spine. Mild interval increase in intensity of previously  demonstrated hypermetabolic skeletal lesions involving the pelvis and lumbar spine. Findings are consistent with progressive osseous metastatic disease. Subsequently off chemo for 1 month due to poor performance status.  16. 1/25/2022: Patient fell and fractured his femur. This was surgically repaired and he was subsequently cared for at a rehab unit.  17. 2/14/2022: Brain MRI without contrast (contrast not given due to inability to obtain IV access) showed no brain metastases.  18. 4/21/2022: Started 2nd line metastatic therapy with pemetrexed and carboplatin. Omission of carboplatin 6/2 and forward due to worsening anemia despite dose reduction.  19. 7/18/2022: PET/CT showed enlarged and increased FDG avid skeletal metastases, increased left para-aortic retroperitoneal lymph node increased in size and FDG avidity, findings consistent with progressive disease.  20. 8/3/2022: Started 3rd line metastatic therapy with Taxotere 60 mg/m2 every 3 weeks.  21. 11/21/2022: PET scan showed partial response, left para-aortic retroperitoneal lymph node has decreased from 2.6 x 1.7 cm (SUVmax 7.9) to 2.3 x 1.4 cm (SUVmax 5.7), skeletal metastases no longer demonstrate FDG activity. Kt elected to take a 2-month break from chemo.  22. 1/23/2023: PET scan showed increasing metabolic activity of the left periaortic (max SUV 8.1, previously 5.7) left external iliac (max SUV 7.0, previously 3.1), and right external iliac (max SUV 5.1, previously 3.7) lymph nodes with development/reactivation of multiple FDG avid osseous lesions.  23. 3/23/2023: Resumption of Taxotere every 3 weeks. Delayed due to insurance issues.  24. 6/26/2023: PET scan showed partial response with decreased uptake associated with the retroperitoneal and pelvic lymphadenopathy as well as bone metastases.  25. 12/4/2023: PET scan showed decreased FDG avid left periaortic (SUV max 5.4, previously 6.6) and left external iliac (SUV max 3.5, previously 4.9)  lymphadenopathy and broadly stable FDG uptake within scattered pre-existing osseous metastases; new FDG avid lesion involving the right anterior first rib without clear fracture line visible (SUV max 4.7) indeterminate for a new osseous metastasis versus posttraumatic in etiology.   26. 3/18/2024: PET scan showed overall stable disease.  27. 6/17/2024: PET scan showed progressive disease with FDG avid left para-aortic retroperitoneal node measures 2.1 x 2.6 cm compared to 2.0 x 2.6 cm with SUV max of 8.4 previously 6.3.  Increased uptake with an a more inferior left periaortic retroperitoneal node with SUV max of 7.4, previously 4.3. Mildly increased uptake within a left external iliac node with SUV max of 7.0, previously 4.3. There is a new 1.1 x 1.3 cm retrocaval retroperitoneal node with SUV max of 13.4. New FDG uptake within a subcentimeter node between the right obturator muscles with SUV max of 4.1. Worsening FDG avid osseous disease involving the right occipital condyle, right aspect of the clivus, T12 and L1 vertebral bodies, and bony pelvis. FDG uptake within the right sacral lesion with SUV max of 14.0 compared to 6.9 and FDG uptake within the T12 vertebral body with SUV max of 15.2 compared to 8.2.  28. 7/03/2024: Switched to pemetrexed.  29. 9/23/2024: PET/CT showed decreasing metabolic activity of the left periaortic and left greater than right external iliac/pelvic sidewall lymph nodes, osseous lesions involving the T12 vertebral body, L1 vertebral body, right sacral ala, right posterior iliac bone   and right anterior acetabular region; broadly stable FDG avid lesions in the left iliac wing but increasing metabolic activity of FDG avid soft tissue thickening in the right lower paratracheal/hilar region and retrocaval lymph nodes suspicious for mild progression of disease.  30. 12/23/2024: PET/CT scan showed overall stable size and FDG uptake within multiple lymph nodes; overall decreased FDG uptake  within the pre-existing osseous lesions involving  the right skull base, 1spine and pelvis, for reference the T12 (SUV max 6.8, previously 8.9) and L1 (SUV max 6.9, previously 8.1) vertebral bodies, left iliac bone (2 max 7.5, previously 8.7), right lateral sacrum (SUV max 10.2, previously 10.4), and left acetabulum (SUV max 7.5 on the left, unchanged from right pubic); large right and moderate left pleural effusions, moderate pericardial effusion, and diffuse anasarca.  31. 12/26/2024: U/S right thoracentesis - removal of 2.65L clear yellow fluid.  32. 12/27/2024: U/S left thoracentesis - removal of 0.9L of dark red fluid.    INTERVAL HISTORY:  Kt reports improvement in shortness of breath after thoracentesis.    REVIEW OF SYSTEMS:   14 point ROS was reviewed and is negative other than as noted above in HPI.       HOME MEDICATIONS:  Current Outpatient Medications   Medication Sig Dispense Refill     apixaban ANTICOAGULANT (ELIQUIS) 2.5 MG tablet Take 2.5 mg by mouth.       atorvastatin (LIPITOR) 40 MG tablet Take 1 tablet (40 mg) by mouth daily for 90 days 90 tablet 0     folic acid (FOLVITE) 1 MG tablet Take 1 tablet (1 mg) by mouth daily 90 tablet 3     methocarbamol (ROBAXIN) 750 MG tablet        prochlorperazine (COMPAZINE) 10 MG tablet Take 1 tablet (10 mg) by mouth every 6 hours as needed for nausea or vomiting 30 tablet 2         ALLERGIES:  Allergies   Allergen Reactions     No Known Drug Allergy          PAST MEDICAL HISTORY:  Past Medical History:   Diagnosis Date     Alcohol abuse, unspecified      Cerebral infarction (H)     2009, right side residual and aphasia     Dyslipidemia      GERD (gastroesophageal reflux disease)      Lung cancer (H)      Unspecified essential hypertension          PAST SURGICAL HISTORY:  Past Surgical History:   Procedure Laterality Date     BRONCHOSCOPY FLEXIBLE AND RIGID N/A 5/5/2016    Procedure: BRONCHOSCOPY FLEXIBLE AND RIGID;  Surgeon: Tony Talbot MD;   Location: [facility] OR     ESOPHAGOSCOPY FLEXIBLE N/A 9/30/2019    Procedure: flexible esophagoscopy;  Surgeon: Lizzy Johnson MD;  Location: [facility] OR     INJECT STEROID (LOCATION) N/A 9/30/2019    Procedure: steroid injection;  Surgeon: Lizzy Johnson MD;  Location: [facility] OR     IR CHEST PORT PLACEMENT > 5 YRS OF AGE  4/22/2021     IR CHEST PORT PLACEMENT > 5 YRS OF AGE  4/11/2022     IR PORT CHECK RIGHT  4/11/2022     IR PORT REMOVAL RIGHT  4/11/2022     LASER CO2 LARYNGOSCOPY N/A 9/30/2019    Procedure: Microdirect laryngoscopy with excision of laryngeal mass, CO2 laser;  Surgeon: Lizzy Johnson MD;  Location: [facility] OR     ZZC NONSPECIFIC PROCEDURE      R tympanoplasty         SOCIAL HISTORY:  Social History     Socioeconomic History     Marital status: Single     Spouse name: Not on file     Number of children: Not on file     Years of education: Not on file     Highest education level: Not on file   Occupational History     Not on file   Tobacco Use     Smoking status: Former     Current packs/day: 0.00     Types: Cigarettes     Quit date: 9/25/2009     Years since quitting: 15.2     Smokeless tobacco: Never   Substance and Sexual Activity     Alcohol use: Not Currently     Drug use: No     Sexual activity: Not on file   Other Topics Concern     Parent/sibling w/ CABG, MI or angioplasty before 65F 55M? Not Asked   Social History Narrative     Not on file     Social Drivers of Health     Financial Resource Strain: Not on file   Food Insecurity: Not on file   Transportation Needs: Not on file   Physical Activity: Not on file   Stress: Not on file   Social Connections: Not on file   Interpersonal Safety: Not At Risk (10/7/2024)    Received from [facility]    Humiliation, Afraid, Rape, and Kick questionnaire      Fear of Current or Ex-Partner: No      Emotionally Abused: No      Physically Abused: No      Sexually Abused: No   Housing Stability: Not on file   Resides at assisted living.      FAMILY HISTORY:  Family  "History   Problem Relation Age of Onset     Cancer Father          PHYSICAL EXAM:  Vital signs:  BP (!) 155/83   Pulse 87   Temp 97.9  F (36.6  C)   Resp 16   Ht 1.93 m (6' 4\")   Wt 81.6 kg (180 lb)   SpO2 96%   BMI 21.91 kg/m     ECO  GENERAL: No acute distress. Sitting in wheelchair.  EYES: No scleral icterus. No overt erythema.  LYMPH: No cervical or supraclavicular adenopathy.  RESPIRATORY: Clear bilaterally.  CARDIAC: Regular rate and rhythm with no murmurs or rubs  SKIN: No rashes; dry skin over legs.  EXTREMITIES: No lower extremity edema; right arm contracture and right hand edema (stable).  NEUROLOGIC: Alert.  No overt tremors.  PSYCHIATRIC: Normal affect and mood.  Does not appear anxious.       LABS:  CBC RESULTS:   Recent Labs   Lab Test 24  0756   WBC 4.0   RBC 2.46*   HGB 7.9*   HCT 25.8*   *   MCH 32.1   MCHC 30.6*   RDW 19.3*         Last Comprehensive Metabolic Panel:  Sodium   Date Value Ref Range Status   2024 141 135 - 145 mmol/L Final   2021 140 133 - 144 mmol/L Final     Potassium   Date Value Ref Range Status   2024 4.0 3.4 - 5.3 mmol/L Final   2022 4.1 3.4 - 5.3 mmol/L Final   2021 4.3 3.4 - 5.3 mmol/L Final     Chloride   Date Value Ref Range Status   2024 106 98 - 107 mmol/L Final   2022 109 94 - 109 mmol/L Final   2021 111 (H) 94 - 109 mmol/L Final     Carbon Dioxide   Date Value Ref Range Status   2021 25 20 - 32 mmol/L Final     Carbon Dioxide (CO2)   Date Value Ref Range Status   2024 25 22 - 29 mmol/L Final   2022 25 20 - 32 mmol/L Final     Anion Gap   Date Value Ref Range Status   2024 10 7 - 15 mmol/L Final   2022 7 3 - 14 mmol/L Final   2021 4 3 - 14 mmol/L Final     Glucose   Date Value Ref Range Status   2024 84 70 - 99 mg/dL Final   2022 91 70 - 99 mg/dL Final   2021 85 70 - 99 mg/dL Final     Urea Nitrogen   Date Value Ref Range Status "   12/26/2024 9.0 8.0 - 23.0 mg/dL Final   12/07/2022 17 7 - 30 mg/dL Final   06/29/2021 17 7 - 30 mg/dL Final     Creatinine   Date Value Ref Range Status   12/26/2024 0.80 0.67 - 1.17 mg/dL Final   06/29/2021 0.84 0.66 - 1.25 mg/dL Final     GFR Estimate   Date Value Ref Range Status   12/26/2024 >90 >60 mL/min/1.73m2 Final     Comment:     eGFR calculated using 2021 CKD-EPI equation.   06/29/2021 >90 >60 mL/min/[1.73_m2] Final     Comment:     Non  GFR Calc  Starting 12/18/2018, serum creatinine based estimated GFR (eGFR) will be   calculated using the Chronic Kidney Disease Epidemiology Collaboration   (CKD-EPI) equation.       GFR, ESTIMATED POCT   Date Value Ref Range Status   06/17/2024 >60 >60 mL/min/1.73m2 Final     Calcium   Date Value Ref Range Status   12/26/2024 9.0 8.8 - 10.4 mg/dL Final     Comment:     Reference intervals for this test were updated on 7/16/2024 to reflect our healthy population more accurately. There may be differences in the flagging of prior results with similar values performed with this method. Those prior results can be interpreted in the context of the updated reference intervals.   06/29/2021 8.7 8.5 - 10.1 mg/dL Final     Bilirubin Total   Date Value Ref Range Status   12/26/2024 0.3 <=1.2 mg/dL Final   06/29/2021 0.3 0.2 - 1.3 mg/dL Final     Alkaline Phosphatase   Date Value Ref Range Status   12/26/2024 113 40 - 150 U/L Final   06/29/2021 73 40 - 150 U/L Final     ALT   Date Value Ref Range Status   12/26/2024 19 0 - 70 U/L Final   06/29/2021 34 0 - 70 U/L Final     AST   Date Value Ref Range Status   12/26/2024 41 0 - 45 U/L Final   06/29/2021 26 0 - 45 U/L Final           PATHOLOGY:  None new.    IMAGING:  Reviewed as per HPI.    ASSESSMENT/PLAN:  Kt Rasmussen is a 64 year old male with the following issues:  1. Metastatic non-small (non-squamous) cell lung carcinoma with metastases to lymph nodes, bones, and bilateral lungs  2. History of locally  advanced endobronchial invasive squamous cell carcinoma diagnosed 5/2016, status post debulking and chemoradiation   3. Chemotherapy-induced anemia and thrombocytopenia  --Lung NGS panel negative for mutations in BRAF, EGFR, ERBB2, IDH1, IDH2, KRAS, MET, NRAS, RET. PD-L1 expression negative (TPS <1%). Guardant 360 negative for actionable mutations.  --Kt started 3rd line metastatic therapy with every 3-week Taxotere at 20% dose reduction (60 mg/m2) on 8/3/2022 due to progressive disease.  He tolerated this very well with minimal to no paresthesias. He then took a 2-month chemo break that was further delayed to 3.5 months due to insurance issues. He resumed Taxotere 3/23/2023.  --6/17/2024 PET scan showed progressive disease in the abdomen/pelvis nodes and bone metastases.  --Switched to pemetrexed 7/3/2024.  --He declines to take the oral dexamethasone premedication.  --I personally reviewed his 12/23/2024 PET/CT scan which showed overall stable disease in lymph node metastases, partial response in bone metastases but new large right and moderate left pleural effusions and moderate pericardial effusion as well as diffuse anasarca.    --He underwent thoracentesis on 12/26 and 12/27.  I had ordered cytology but do not see it pending.  --Echocardiogram scheduled tomorrow.  --Pemetrexed may be causing the pleural and pericardial effusions.  Therefore, will switch to gemcitabine.  --We discussed the schedule and dosing of chemotherapy regimen, as well as counseled on potential side effects, including fever, chills, nausea, vomiting, diarrhea, constipation, hair loss, pain, rash, infection, bleeding, fatigue.  Patient expressed understanding and agreed to proceed with gemcitabine chemotherapy on days 1 and 8 every 21 day schedule, to start on 1/29/2025.    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 Dysphonia  6. Dysphagia  -Kt underwent excision of a left vocal cord SCC on 9/30/2019 and has persistent  "dysphonia as a result of the lesion and excision.  He also has dysphagia but this is stable and swallowing is manageable.  -Continue follow-up with Dr. Wray.    7. History of dizziness and prior falls  --SW and guardian were previously notified of multiple falls.  --Prior brain MRI 2/14/2022 showed no brain metastases. However this was a suboptimal exam due to inability to administer contrast.  --He is using a wheelchair due to prior femoral fracture in 1/2022.    8. Bilateral lower extremity DVT  --Diagnosed in 9/2023, likely hypercoagulability of malignancy.  --Continue apixaban indefinitely, provided no major bleeding issues arise in the future and platelets remain >= 50,000.    Sangita John MD  [facility] Hematology/Oncology    Total time spent today: 40 minutes in chart review, patient evaluation, counseling, documentation, test and/or medication/prescription orders, and coordination of care.     The longitudinal plan of care for the diagnosis(es)/condition(s) as documented were addressed during this visit. Due to the added complexity in care, I will continue to support Kt in the subsequent management and with ongoing continuity of care.      Oncology Rooming Note    January 8, 2025 3:09 PM   Kt Rasmussen is a 64 year old male who presents for:    No chief complaint on file.    Initial Vitals: There were no vitals taken for this visit. Estimated body mass index is 21.91 kg/m  as calculated from the following:    Height as of 12/26/24: 1.93 m (6' 4\").    Weight as of 12/26/24: 81.6 kg (180 lb). There is no height or weight on file to calculate BSA.  Data Unavailable Comment: Data Unavailable   No LMP for male patient.  Allergies reviewed: Yes  Medications reviewed: Yes    Medications: Medication refills not needed today.  Pharmacy name entered into EPIC:    [facility] [place], [place] [facility] [address] [facility] [id number]  [id number] - [place] [address] [facility] [address]  [facility] - [place], [place] - [address] [place] [address], [facility]. - [place] [id number] [place]    Frailty Screening:   Is the patient here for a new oncology consult visit in cancer care? 2. No        Floridalma Nieves MA              Again, thank you for allowing me to participate in the care of your patient.        Sincerely,        Sangita John MD    Electronically signed

## 2025-01-08 NOTE — TELEPHONE ENCOUNTER
[facility]: Cancer Care Plan of Care Education Note                                    Discussion with Patient:                                                      Provided Kt with gemcitabine education materials from [facility]. Reviewed side effects. Kt has received previous chemotherapy and is familiar with similar side effects.     Antiemetics: Kt declined rx for Compazine as prescribed by Dr John. I have removed this from his orders and he knows to call if he changes his mind.        Assessment:                                                      Assessment completed with:: Patient    Plan of Care Education   Yearly learning assessment completed?: No  Diagnosis:: Progression of lung cancer  Does patient understand diagnosis?: Yes  Tx plan/regimen:: Gemcitabine D1 and D8 every 21 days  Does patient understand treatment plan/regimen?: Yes  Preparing for treatment:: Reviewed treatment preparation information with patient (vascular access, day of chemo, visitor policy, what to bring, etc.)    Evaluation of Learning       No assessment indicated    Intervention/Education provided during outreach:                                                       New regimen to start on 1/29/25 as already scheduled.  Patient to follow up as scheduled at next appt  Patient to call/[facility]tool[facility] message with updates  Confirmed patient has clinic and triage numbers    Signature:  Tari Clarke RN

## 2025-01-08 NOTE — PROGRESS NOTES
Per Dr. Isidro clinic note, patient will not be receiving Alimta today. Writer deaccessed patients port a cath per protocol.   Magdalena BAKER RN

## 2025-01-08 NOTE — PATIENT INSTRUCTIONS
1. Discontinue pemetrexed.  2. Arrange for lab draw and gemcitabine days 1 and 8 every 21 days starting 1/29/2025.  3. RTC NP alternating with MD visit every 3 weeks.  4. RTC MD in 3 months with PET/CT scan prior to visit.

## 2025-01-09 LAB
PATH REPORT.COMMENTS IMP SPEC: NORMAL
PATH REPORT.COMMENTS IMP SPEC: NORMAL
PATH REPORT.FINAL DX SPEC: NORMAL
PATH REPORT.GROSS SPEC: NORMAL
PATH REPORT.MICROSCOPIC SPEC OTHER STN: NORMAL
PATH REPORT.RELEVANT HX SPEC: NORMAL

## 2025-01-09 PROCEDURE — 88341 IMHCHEM/IMCYTCHM EA ADD ANTB: CPT | Mod: 26 | Performed by: PATHOLOGY

## 2025-01-09 PROCEDURE — 88305 TISSUE EXAM BY PATHOLOGIST: CPT | Mod: 26 | Performed by: PATHOLOGY

## 2025-01-09 PROCEDURE — 88342 IMHCHEM/IMCYTCHM 1ST ANTB: CPT | Mod: 26 | Performed by: PATHOLOGY

## 2025-01-09 PROCEDURE — 88112 CYTOPATH CELL ENHANCE TECH: CPT | Mod: 26 | Performed by: PATHOLOGY

## 2025-01-28 ENCOUNTER — DOCUMENTATION ONLY (OUTPATIENT)
Dept: ONCOLOGY | Facility: CLINIC | Age: 65
End: 2025-01-28
Payer: COMMERCIAL

## 2025-01-29 ENCOUNTER — INFUSION THERAPY VISIT (OUTPATIENT)
Dept: INFUSION THERAPY | Facility: CLINIC | Age: 65
End: 2025-01-29
Attending: INTERNAL MEDICINE
Payer: COMMERCIAL

## 2025-01-29 ENCOUNTER — ONCOLOGY VISIT (OUTPATIENT)
Dept: ONCOLOGY | Facility: CLINIC | Age: 65
End: 2025-01-29
Attending: INTERNAL MEDICINE
Payer: COMMERCIAL

## 2025-01-29 VITALS
OXYGEN SATURATION: 97 % | HEIGHT: 76 IN | WEIGHT: 180 LBS | HEART RATE: 85 BPM | SYSTOLIC BLOOD PRESSURE: 121 MMHG | BODY MASS INDEX: 21.92 KG/M2 | DIASTOLIC BLOOD PRESSURE: 79 MMHG | RESPIRATION RATE: 16 BRPM | TEMPERATURE: 97.9 F

## 2025-01-29 VITALS — RESPIRATION RATE: 18 BRPM

## 2025-01-29 DIAGNOSIS — C79.51 NON-SMALL CELL LUNG CANCER METASTATIC TO BONE (H): ICD-10-CM

## 2025-01-29 DIAGNOSIS — C34.90 NON-SMALL CELL LUNG CANCER METASTATIC TO BONE (H): Primary | ICD-10-CM

## 2025-01-29 DIAGNOSIS — C79.51 NON-SMALL CELL LUNG CANCER METASTATIC TO BONE (H): Primary | ICD-10-CM

## 2025-01-29 DIAGNOSIS — Z51.11 ENCOUNTER FOR ANTINEOPLASTIC CHEMOTHERAPY: ICD-10-CM

## 2025-01-29 DIAGNOSIS — Z51.11 ENCOUNTER FOR ANTINEOPLASTIC CHEMOTHERAPY: Primary | ICD-10-CM

## 2025-01-29 DIAGNOSIS — C34.90 NON-SMALL CELL LUNG CANCER METASTATIC TO BONE (H): ICD-10-CM

## 2025-01-29 LAB
ALBUMIN SERPL BCG-MCNC: 3.4 G/DL (ref 3.5–5.2)
ALP SERPL-CCNC: 111 U/L (ref 40–150)
ALT SERPL W P-5'-P-CCNC: 16 U/L (ref 0–70)
ANION GAP SERPL CALCULATED.3IONS-SCNC: 9 MMOL/L (ref 7–15)
AST SERPL W P-5'-P-CCNC: 34 U/L (ref 0–45)
BASOPHILS # BLD AUTO: 0 10E3/UL (ref 0–0.2)
BASOPHILS NFR BLD AUTO: 1 %
BILIRUB SERPL-MCNC: 0.4 MG/DL
BUN SERPL-MCNC: 10.2 MG/DL (ref 8–23)
CALCIUM SERPL-MCNC: 9 MG/DL (ref 8.8–10.4)
CHLORIDE SERPL-SCNC: 107 MMOL/L (ref 98–107)
CREAT SERPL-MCNC: 0.74 MG/DL (ref 0.67–1.17)
EGFRCR SERPLBLD CKD-EPI 2021: >90 ML/MIN/1.73M2
EOSINOPHIL # BLD AUTO: 0.2 10E3/UL (ref 0–0.7)
EOSINOPHIL NFR BLD AUTO: 5 %
ERYTHROCYTE [DISTWIDTH] IN BLOOD BY AUTOMATED COUNT: 13.8 % (ref 10–15)
GLUCOSE SERPL-MCNC: 86 MG/DL (ref 70–99)
HCO3 SERPL-SCNC: 24 MMOL/L (ref 22–29)
HCT VFR BLD AUTO: 32.7 % (ref 40–53)
HGB BLD-MCNC: 10.6 G/DL (ref 13.3–17.7)
IMM GRANULOCYTES # BLD: 0 10E3/UL
IMM GRANULOCYTES NFR BLD: 0 %
LYMPHOCYTES # BLD AUTO: 0.9 10E3/UL (ref 0.8–5.3)
LYMPHOCYTES NFR BLD AUTO: 22 %
MCH RBC QN AUTO: 31.4 PG (ref 26.5–33)
MCHC RBC AUTO-ENTMCNC: 32.4 G/DL (ref 31.5–36.5)
MCV RBC AUTO: 97 FL (ref 78–100)
MONOCYTES # BLD AUTO: 0.4 10E3/UL (ref 0–1.3)
MONOCYTES NFR BLD AUTO: 10 %
NEUTROPHILS # BLD AUTO: 2.7 10E3/UL (ref 1.6–8.3)
NEUTROPHILS NFR BLD AUTO: 63 %
NRBC # BLD AUTO: 0 10E3/UL
NRBC BLD AUTO-RTO: 0 /100
PLATELET # BLD AUTO: 131 10E3/UL (ref 150–450)
POTASSIUM SERPL-SCNC: 4.1 MMOL/L (ref 3.4–5.3)
PROT SERPL-MCNC: 6.1 G/DL (ref 6.4–8.3)
RBC # BLD AUTO: 3.38 10E6/UL (ref 4.4–5.9)
SODIUM SERPL-SCNC: 140 MMOL/L (ref 135–145)
WBC # BLD AUTO: 4.2 10E3/UL (ref 4–11)

## 2025-01-29 PROCEDURE — 99214 OFFICE O/P EST MOD 30 MIN: CPT | Performed by: NURSE PRACTITIONER

## 2025-01-29 PROCEDURE — 258N000003 HC RX IP 258 OP 636: Performed by: INTERNAL MEDICINE

## 2025-01-29 PROCEDURE — 82310 ASSAY OF CALCIUM: CPT | Performed by: INTERNAL MEDICINE

## 2025-01-29 PROCEDURE — 85048 AUTOMATED LEUKOCYTE COUNT: CPT | Performed by: INTERNAL MEDICINE

## 2025-01-29 PROCEDURE — 250N000011 HC RX IP 250 OP 636: Performed by: INTERNAL MEDICINE

## 2025-01-29 PROCEDURE — 96413 CHEMO IV INFUSION 1 HR: CPT

## 2025-01-29 PROCEDURE — 85004 AUTOMATED DIFF WBC COUNT: CPT | Performed by: INTERNAL MEDICINE

## 2025-01-29 PROCEDURE — G0463 HOSPITAL OUTPT CLINIC VISIT: HCPCS | Mod: 25 | Performed by: NURSE PRACTITIONER

## 2025-01-29 PROCEDURE — 85014 HEMATOCRIT: CPT | Performed by: INTERNAL MEDICINE

## 2025-01-29 PROCEDURE — 82040 ASSAY OF SERUM ALBUMIN: CPT | Performed by: INTERNAL MEDICINE

## 2025-01-29 PROCEDURE — 96375 TX/PRO/DX INJ NEW DRUG ADDON: CPT

## 2025-01-29 RX ORDER — ONDANSETRON 2 MG/ML
8 INJECTION INTRAMUSCULAR; INTRAVENOUS ONCE
Status: COMPLETED | OUTPATIENT
Start: 2025-01-29 | End: 2025-01-29

## 2025-01-29 RX ORDER — HEPARIN SODIUM (PORCINE) LOCK FLUSH IV SOLN 100 UNIT/ML 100 UNIT/ML
5 SOLUTION INTRAVENOUS
Status: DISCONTINUED | OUTPATIENT
Start: 2025-01-29 | End: 2025-01-29 | Stop reason: HOSPADM

## 2025-01-29 RX ADMIN — GEMCITABINE 2000 MG: 38 INJECTION, SOLUTION INTRAVENOUS at 10:27

## 2025-01-29 RX ADMIN — Medication 5 ML: at 10:59

## 2025-01-29 RX ADMIN — ONDANSETRON 8 MG: 2 INJECTION INTRAMUSCULAR; INTRAVENOUS at 09:59

## 2025-01-29 RX ADMIN — SODIUM CHLORIDE 250 ML: 9 INJECTION, SOLUTION INTRAVENOUS at 09:58

## 2025-01-29 ASSESSMENT — PAIN SCALES - GENERAL
PAINLEVEL_OUTOF10: NO PAIN (0)
PAINLEVEL_OUTOF10: NO PAIN (0)

## 2025-01-29 NOTE — PROGRESS NOTES
Oncology/Hematology Visit Note  Jan 29, 2025    Reason for Visit: follow up of metastatic non-small cell lung carcinoma  Metastatic to lymph nodes bones in bilateral lungs  Brain MRI negative for mets    Patient met with Dr. John who recommended treatment with palliative intent with paclitaxel carboplatin Avastin and atezolizumab-treatment started on April 27, 2021  -Due to thrombocytopenia carboplatin AUC reduced to 4 with cycle 2  Due to pancytopenia carboplatin discontinued with cycle 5    7/12/2021: PET/CT showed resolved mural nodules with increased cystic cavity in left lower lobe with no significant FDG uptake, less likely metastases; no enlarged hypermetabolic mediastinal, hilar, or axillary lymph nodes, decreased metabolic activity of intraosseous lesion in spine and pelvis; no new bone lesions    Treated with paclitaxel, Avastin and atezolizumab.-Last treatment given in  12/22/2021-cycle 12    1/10/2022: PET/CT showed new foci of abnormal skeletal FGD uptake in the thoracic and lumbar spine. Mild interval increase in intensity of previously demonstrated hypermetabolic skeletal lesions involving the pelvis and lumbar spine. Findings are consistent with progressive osseous metastatic disease. Subsequently off chemo for 1 month due to poor performance status.  16. 1/25/2022: Patient fell and fractured his femur. This was surgically repaired and he was subsequently cared for at a rehab unit.  17. 2/14/2022: Brain MRI without contrast (contrast not given due to inability to obtain IV access) showed no brain metastases.    Met with Dr. Shoemaker-in Dr. John's absence  -Recommendation is to change therapy   04/21/2022 -palliative Carboplatin Alimta started   Due to cytopenia AUC reduced to 4 with cycle 2  Due to persistent pancytopenia and inability to tolerate treatment carboplatin discontinued with cycle 3-day 1  Patient was on monotherapy with Alimta    07/18/2022-PET CT scan shows progression of the  disease  08/03/22-started Taxotere 60 mg/m2 every 3 weeks    06/2024-scan reveals progression of the disease  Treatment changed to single agent Alimta     01/29/2025-Due to pleural effusions and moderate pericardial effusion treatment changed to single agent  gemcitabine      Interval History:  Patient reports feeling well.  Denies fever chills sweats cough shortness of breath chest pain nausea vomiting diarrhea abdominal pain or bleeding      Review of Systems:  14 point ROS of systems including Constitutional, Eyes, Respiratory, Cardiovascular, Gastroenterology, Genitourinary, Integumentary, Muscularskeletal, Psychiatric were all negative except for pertinent positives noted in my HPI.    Physical Examination:  Physical Exam  HENT:      Right Ear: Tympanic membrane normal.      Nose: Nose normal.      Mouth/Throat:      Mouth: Mucous membranes are moist.   Eyes:      Pupils: Pupils are equal, round, and reactive to light.   Cardiovascular:      Rate and Rhythm: Normal rate.      Pulses: Normal pulses.   Pulmonary:      Effort: Pulmonary effort is normal.   Abdominal:      General: Abdomen is flat.   Musculoskeletal:         General: Normal range of motion.      Cervical back: Normal range of motion.   Skin:     General: Skin is warm.   Neurological:      General: No focal deficit present.      Mental Status: He is alert.   Psychiatric:         Mood and Affect: Mood normal.           Laboratory Data:  CBC and CMP results reviewed        Assessment and Plan:    metastatic non-small cell lung cancer   Patient follows with Dr John   Progressed on different treatments .  Developed pleural and pericardial effusions  Per Dr. John's note Alimta might be causing pleural and pericardial effusions therefore treatment changed to gemcitabine  Regimen potential sides effects of gemcitabine reviewed with patient patient verbalized understanding and agrees to proceed with treatment  -Labs reviewed abnormalities discussed ok to  proceed  Patient is scheduled for cycle 2 with me      Anemia secondary to treatment  Patient is asymptomatic  Transfuse for hemoglobin less than 8 or symptomatic    Thrombocytopenia  Mild  Patient denies bleeding or bruising.  Complications of low platelet count reviewed.  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  --Continue apixaban indefinitely, provided no major bleeding issues arise in the future and platelets remain >= 50,000        Patient is advised to call our clinic or go to ER in the event of fever chills sweats cough shortness of breath chest pain nausea vomiting diarrhea abdominal pain bleeding edema or any changes in health    MADHAVI Moran CNP  [facility] [place]     Chart documentation with Dragon Voice recognition Software. Although reviewed after completion, some words and grammatical errors may remain.

## 2025-01-29 NOTE — PROGRESS NOTES
Infusion Nursing Note:  Kt Rasmussen presents today for C1D1 Gemzar.    Patient seen by provider today: Yes: Juan Rausch NP   present during visit today: Not Applicable.    Note: N/A.      Intravenous Access:  Implanted Port.    Treatment Conditions:  Lab Results   Component Value Date    HGB 10.6 (L) 01/29/2025    WBC 4.2 01/29/2025    ANEU 0.5 (L) 08/11/2022    ANEUTAUTO 2.7 01/29/2025     (L) 01/29/2025        Lab Results   Component Value Date     01/29/2025    POTASSIUM 4.1 01/29/2025    MAG 1.9 10/13/2016    CR 0.74 01/29/2025    BEVERLY 9.0 01/29/2025    BILITOTAL 0.4 01/29/2025    ALBUMIN 3.4 (L) 01/29/2025    ALT 16 01/29/2025    AST 34 01/29/2025       Results reviewed, labs MET treatment parameters, ok to proceed with treatment.      Post Infusion Assessment:  Patient tolerated infusion without incident.  Blood return noted pre and post infusion.  Site patent and intact, free from redness, edema or discomfort.  No evidence of extravasations.  Access discontinued per protocol.       Discharge Plan:   Discharge instructions reviewed with: Patient.  Patient and/or family verbalized understanding of discharge instructions and all questions answered.  Copy of AVS reviewed with patient and/or family.  Patient will return 2/6/25 for next appointment.  Patient discharged in stable condition accompanied by: self.  Departure Mode: Wheelchair.      Magdalena Davila RN

## 2025-01-29 NOTE — LETTER
"1/29/2025      Kt Rasmussen  [address] [place], [place] [id number]      Dear Colleague,    Thank you for referring your patient, Kt Rasmussen, to the [facility]. Please see a copy of my visit note below.    Oncology Rooming Note    January 29, 2025 9:22 AM   Kt Rasmussen is a 64 year old male who presents for:    Chief Complaint   Patient presents with     Oncology Clinic Visit     Initial Vitals: There were no vitals taken for this visit. Estimated body mass index is 21.91 kg/m  as calculated from the following:    Height as of 1/8/25: 1.93 m (6' 4\").    Weight as of 1/8/25: 81.6 kg (180 lb). There is no height or weight on file to calculate BSA.  Data Unavailable Comment: Data Unavailable   No LMP for male patient.  Allergies reviewed: Yes  Medications reviewed: Yes    Medications: Medication refills not needed today.  Pharmacy name entered into EPIC:    [facility] [address] [facility] [address] [place] [address] DRUG STORE #[id number] - [address] 7 AT [facility] & [address] [facility] TERM [facility] - [address]  [facility] [place] [address] [facility]    Frailty Screening:   Is the patient here for a new oncology consult visit in cancer care? 2. No        Floridalma Nieves MA              Oncology/Hematology Visit Note  Jan 29, 2025    Reason for Visit: follow up of metastatic non-small cell lung carcinoma  Metastatic to lymph nodes bones in bilateral lungs  Brain MRI negative for mets    Patient met with Dr. John who recommended treatment with palliative intent with paclitaxel carboplatin Avastin and atezolizumab-treatment started on April 27, 2021  -Due to thrombocytopenia carboplatin AUC reduced to 4 with cycle 2  Due to pancytopenia carboplatin discontinued with cycle 5    7/12/2021: PET/CT " showed resolved mural nodules with increased cystic cavity in left lower lobe with no significant FDG uptake, less likely metastases; no enlarged hypermetabolic mediastinal, hilar, or axillary lymph nodes, decreased metabolic activity of intraosseous lesion in spine and pelvis; no new bone lesions    Treated with paclitaxel, Avastin and atezolizumab.-Last treatment given in  12/22/2021-cycle 12    1/10/2022: PET/CT showed new foci of abnormal skeletal FGD uptake in the thoracic and lumbar spine. Mild interval increase in intensity of previously demonstrated hypermetabolic skeletal lesions involving the pelvis and lumbar spine. Findings are consistent with progressive osseous metastatic disease. Subsequently off chemo for 1 month due to poor performance status.  16. 1/25/2022: Patient fell and fractured his femur. This was surgically repaired and he was subsequently cared for at a rehab unit.  17. 2/14/2022: Brain MRI without contrast (contrast not given due to inability to obtain IV access) showed no brain metastases.    Met with Dr. Shoemaker-in Dr. John's absence  -Recommendation is to change therapy   04/21/2022 -palliative Carboplatin Alimta started   Due to cytopenia AUC reduced to 4 with cycle 2  Due to persistent pancytopenia and inability to tolerate treatment carboplatin discontinued with cycle 3-day 1  Patient was on monotherapy with Alimta    07/18/2022-PET CT scan shows progression of the disease  08/03/22-started Taxotere 60 mg/m2 every 3 weeks    06/2024-scan reveals progression of the disease  Treatment changed to single agent Alimta     01/29/2025-Due to pleural effusions and moderate pericardial effusion treatment changed to single agent  gemcitabine      Interval History:  Patient reports feeling well.  Denies fever chills sweats cough shortness of breath chest pain nausea vomiting diarrhea abdominal pain or bleeding      Review of Systems:  14 point ROS of systems including Constitutional, Eyes,  Respiratory, Cardiovascular, Gastroenterology, Genitourinary, Integumentary, Muscularskeletal, Psychiatric were all negative except for pertinent positives noted in my HPI.    Physical Examination:  Physical Exam  HENT:      Right Ear: Tympanic membrane normal.      Nose: Nose normal.      Mouth/Throat:      Mouth: Mucous membranes are moist.   Eyes:      Pupils: Pupils are equal, round, and reactive to light.   Cardiovascular:      Rate and Rhythm: Normal rate.      Pulses: Normal pulses.   Pulmonary:      Effort: Pulmonary effort is normal.   Abdominal:      General: Abdomen is flat.   Musculoskeletal:         General: Normal range of motion.      Cervical back: Normal range of motion.   Skin:     General: Skin is warm.   Neurological:      General: No focal deficit present.      Mental Status: He is alert.   Psychiatric:         Mood and Affect: Mood normal.           Laboratory Data:  CBC and CMP results reviewed        Assessment and Plan:    metastatic non-small cell lung cancer   Patient follows with Dr John   Progressed on different treatments .  Developed pleural and pericardial effusions  Per Dr. John's note Alimta might be causing pleural and pericardial effusions therefore treatment changed to gemcitabine  Regimen potential sides effects of gemcitabine reviewed with patient patient verbalized understanding and agrees to proceed with treatment  -Labs reviewed abnormalities discussed ok to proceed  Patient is scheduled for cycle 2 with me      Anemia secondary to treatment  Patient is asymptomatic  Transfuse for hemoglobin less than 8 or symptomatic    Thrombocytopenia  Mild  Patient denies bleeding or bruising.  Complications of low platelet count reviewed.  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  --Continue apixaban indefinitely, provided no major bleeding issues arise in the future and platelets remain >= 50,000        Patient is advised to call our clinic or go to ER in the event of fever chills sweats cough shortness of breath chest pain nausea vomiting diarrhea abdominal pain bleeding edema or any changes in health    MADHAVI Moran CNP  [facility] [place]     Chart documentation with Dragon Voice recognition Software. Although reviewed after completion, some words and grammatical errors may remain.          Again, thank you for allowing me to participate in the care of your patient.        Sincerely,        MADHAVI Moran CNP    Electronically signed

## 2025-01-29 NOTE — PROGRESS NOTES
"Oncology Rooming Note    January 29, 2025 9:22 AM   Kt Rasmussen is a 64 year old male who presents for:    Chief Complaint   Patient presents with    Oncology Clinic Visit     Initial Vitals: There were no vitals taken for this visit. Estimated body mass index is 21.91 kg/m  as calculated from the following:    Height as of 1/8/25: 1.93 m (6' 4\").    Weight as of 1/8/25: 81.6 kg (180 lb). There is no height or weight on file to calculate BSA.  Data Unavailable Comment: Data Unavailable   No LMP for male patient.  Allergies reviewed: Yes  Medications reviewed: Yes    Medications: Medication refills not needed today.  Pharmacy name entered into EPIC:    [facility] - [place], [place] - [address] [facility] [address] [facility] STORE #[id number] - [address] [facility] 7  [facility] - [facility] [address] [place] [address] [employer]. - [facility] [address] [facility]    Frailty Screening:   Is the patient here for a new oncology consult visit in cancer care? 2. No        Floridalma Nieves MA            "

## 2025-01-29 NOTE — PROGRESS NOTES
Infusion Nursing Note:  Kt Rasmussen presents today for port labs.    Patient seen by provider today: Yes: Juan Rausch NP.   present during visit today: Not Applicable.    Note: N/A.    Intravenous Access:  Labs drawn without difficulty.  Implanted Port.    Treatment Conditions:  Not Applicable. Labs pending at time of discharge.      Post Infusion Assessment:  Site patent and intact, free from redness, edema or discomfort.  No evidence of extravasations.  Port access left in place for infusion today.      Discharge Plan:   Patient discharged in stable condition accompanied by: self.  Departure Mode: Wheelchair.    Mackenzie Campa RN

## 2025-02-06 ENCOUNTER — INFUSION THERAPY VISIT (OUTPATIENT)
Dept: INFUSION THERAPY | Facility: CLINIC | Age: 65
End: 2025-02-06
Attending: INTERNAL MEDICINE
Payer: COMMERCIAL

## 2025-02-06 VITALS
OXYGEN SATURATION: 98 % | HEART RATE: 89 BPM | SYSTOLIC BLOOD PRESSURE: 130 MMHG | RESPIRATION RATE: 16 BRPM | BODY MASS INDEX: 21.45 KG/M2 | TEMPERATURE: 97.2 F | DIASTOLIC BLOOD PRESSURE: 81 MMHG | WEIGHT: 176.2 LBS

## 2025-02-06 DIAGNOSIS — C34.90 NON-SMALL CELL LUNG CANCER METASTATIC TO BONE (H): Primary | ICD-10-CM

## 2025-02-06 DIAGNOSIS — C79.51 NON-SMALL CELL LUNG CANCER METASTATIC TO BONE (H): Primary | ICD-10-CM

## 2025-02-06 DIAGNOSIS — Z51.11 ENCOUNTER FOR ANTINEOPLASTIC CHEMOTHERAPY: Primary | ICD-10-CM

## 2025-02-06 DIAGNOSIS — C34.90 NON-SMALL CELL LUNG CANCER METASTATIC TO BONE (H): ICD-10-CM

## 2025-02-06 DIAGNOSIS — Z51.11 ENCOUNTER FOR ANTINEOPLASTIC CHEMOTHERAPY: ICD-10-CM

## 2025-02-06 DIAGNOSIS — C79.51 NON-SMALL CELL LUNG CANCER METASTATIC TO BONE (H): ICD-10-CM

## 2025-02-06 LAB
BASOPHILS # BLD AUTO: 0 10E3/UL (ref 0–0.2)
BASOPHILS NFR BLD AUTO: 0 %
EOSINOPHIL # BLD AUTO: 0.1 10E3/UL (ref 0–0.7)
EOSINOPHIL NFR BLD AUTO: 2 %
ERYTHROCYTE [DISTWIDTH] IN BLOOD BY AUTOMATED COUNT: 13.6 % (ref 10–15)
HCT VFR BLD AUTO: 28.8 % (ref 40–53)
HGB BLD-MCNC: 9.3 G/DL (ref 13.3–17.7)
IMM GRANULOCYTES # BLD: 0 10E3/UL
IMM GRANULOCYTES NFR BLD: 0 %
LYMPHOCYTES # BLD AUTO: 0.4 10E3/UL (ref 0.8–5.3)
LYMPHOCYTES NFR BLD AUTO: 14 %
MCH RBC QN AUTO: 30.6 PG (ref 26.5–33)
MCHC RBC AUTO-ENTMCNC: 32.3 G/DL (ref 31.5–36.5)
MCV RBC AUTO: 95 FL (ref 78–100)
MONOCYTES # BLD AUTO: 0.4 10E3/UL (ref 0–1.3)
MONOCYTES NFR BLD AUTO: 13 %
NEUTROPHILS # BLD AUTO: 2.2 10E3/UL (ref 1.6–8.3)
NEUTROPHILS NFR BLD AUTO: 71 %
NRBC # BLD AUTO: 0 10E3/UL
NRBC BLD AUTO-RTO: 0 /100
PLATELET # BLD AUTO: 76 10E3/UL (ref 150–450)
RBC # BLD AUTO: 3.04 10E6/UL (ref 4.4–5.9)
WBC # BLD AUTO: 3.2 10E3/UL (ref 4–11)

## 2025-02-06 PROCEDURE — 36591 DRAW BLOOD OFF VENOUS DEVICE: CPT | Performed by: INTERNAL MEDICINE

## 2025-02-06 PROCEDURE — 85004 AUTOMATED DIFF WBC COUNT: CPT | Performed by: INTERNAL MEDICINE

## 2025-02-06 PROCEDURE — 258N000003 HC RX IP 258 OP 636: Performed by: INTERNAL MEDICINE

## 2025-02-06 PROCEDURE — 250N000011 HC RX IP 250 OP 636: Performed by: INTERNAL MEDICINE

## 2025-02-06 RX ORDER — ONDANSETRON 2 MG/ML
8 INJECTION INTRAMUSCULAR; INTRAVENOUS ONCE
Status: COMPLETED | OUTPATIENT
Start: 2025-02-06 | End: 2025-02-06

## 2025-02-06 RX ORDER — HEPARIN SODIUM (PORCINE) LOCK FLUSH IV SOLN 100 UNIT/ML 100 UNIT/ML
5 SOLUTION INTRAVENOUS
Status: DISCONTINUED | OUTPATIENT
Start: 2025-02-06 | End: 2025-02-06 | Stop reason: HOSPADM

## 2025-02-06 RX ADMIN — GEMCITABINE 2000 MG: 38 INJECTION, SOLUTION INTRAVENOUS at 09:56

## 2025-02-06 RX ADMIN — SODIUM CHLORIDE 250 ML: 9 INJECTION, SOLUTION INTRAVENOUS at 09:44

## 2025-02-06 RX ADMIN — Medication 3 ML: at 10:39

## 2025-02-06 RX ADMIN — ONDANSETRON 8 MG: 2 INJECTION INTRAMUSCULAR; INTRAVENOUS at 09:44

## 2025-02-06 ASSESSMENT — PAIN SCALES - GENERAL: PAINLEVEL_OUTOF10: NO PAIN (0)

## 2025-02-06 NOTE — PROGRESS NOTES
Infusion Nursing Note:  Kt Rasmussen presents today for C1D8 Gemzar.    Patient seen by provider today: No   present during visit today: Not Applicable.    Note: pt continues to tolerate Gemzar well. No new concerns today.      Intravenous Access:  Implanted Port.    Treatment Conditions:  Lab Results   Component Value Date    HGB 9.3 (L) 02/06/2025    WBC 3.2 (L) 02/06/2025    ANEU 0.5 (L) 08/11/2022    ANEUTAUTO 2.2 02/06/2025    PLT 76 (L) 02/06/2025        Results reviewed, labs MET treatment parameters, ok to proceed with treatment.      Post Infusion Assessment:  Patient tolerated infusion without incident.  Blood return noted pre and post infusion.  Site patent and intact, free from redness, edema or discomfort.  No evidence of extravasations.  Access discontinued per protocol.       Discharge Plan:   Discharge instructions reviewed with: Patient.  Patient and/or family verbalized understanding of discharge instructions and all questions answered.  Patient discharged in stable condition accompanied by: self.  Departure Mode: Wheelchair-transportation co to meet pt in [facility].      Olga Rahman RN

## 2025-02-16 RX ORDER — ALBUTEROL SULFATE 90 UG/1
1-2 INHALANT RESPIRATORY (INHALATION)
Status: CANCELLED
Start: 2025-02-19

## 2025-02-16 RX ORDER — DIPHENHYDRAMINE HYDROCHLORIDE 50 MG/ML
50 INJECTION INTRAMUSCULAR; INTRAVENOUS
Status: CANCELLED
Start: 2025-02-19

## 2025-02-16 RX ORDER — ALBUTEROL SULFATE 90 UG/1
1-2 INHALANT RESPIRATORY (INHALATION)
Start: 2025-02-26

## 2025-02-16 RX ORDER — HEPARIN SODIUM,PORCINE 10 UNIT/ML
5-20 VIAL (ML) INTRAVENOUS DAILY PRN
Status: CANCELLED | OUTPATIENT
Start: 2025-02-19

## 2025-02-16 RX ORDER — HEPARIN SODIUM,PORCINE 10 UNIT/ML
5-20 VIAL (ML) INTRAVENOUS DAILY PRN
OUTPATIENT
Start: 2025-02-26

## 2025-02-16 RX ORDER — MEPERIDINE HYDROCHLORIDE 25 MG/ML
25 INJECTION INTRAMUSCULAR; INTRAVENOUS; SUBCUTANEOUS
Status: CANCELLED | OUTPATIENT
Start: 2025-02-19

## 2025-02-16 RX ORDER — HEPARIN SODIUM (PORCINE) LOCK FLUSH IV SOLN 100 UNIT/ML 100 UNIT/ML
5 SOLUTION INTRAVENOUS
Status: CANCELLED | OUTPATIENT
Start: 2025-02-19

## 2025-02-16 RX ORDER — ALBUTEROL SULFATE 0.83 MG/ML
2.5 SOLUTION RESPIRATORY (INHALATION)
OUTPATIENT
Start: 2025-02-26

## 2025-02-16 RX ORDER — LORAZEPAM 2 MG/ML
0.5 INJECTION INTRAMUSCULAR EVERY 4 HOURS PRN
OUTPATIENT
Start: 2025-02-26

## 2025-02-16 RX ORDER — MEPERIDINE HYDROCHLORIDE 25 MG/ML
25 INJECTION INTRAMUSCULAR; INTRAVENOUS; SUBCUTANEOUS
OUTPATIENT
Start: 2025-02-26

## 2025-02-16 RX ORDER — HEPARIN SODIUM (PORCINE) LOCK FLUSH IV SOLN 100 UNIT/ML 100 UNIT/ML
5 SOLUTION INTRAVENOUS
OUTPATIENT
Start: 2025-02-26

## 2025-02-16 RX ORDER — DIPHENHYDRAMINE HYDROCHLORIDE 50 MG/ML
25 INJECTION INTRAMUSCULAR; INTRAVENOUS
Status: CANCELLED
Start: 2025-02-19

## 2025-02-16 RX ORDER — ALBUTEROL SULFATE 0.83 MG/ML
2.5 SOLUTION RESPIRATORY (INHALATION)
Status: CANCELLED | OUTPATIENT
Start: 2025-02-19

## 2025-02-16 RX ORDER — ONDANSETRON 2 MG/ML
8 INJECTION INTRAMUSCULAR; INTRAVENOUS ONCE
OUTPATIENT
Start: 2025-02-26

## 2025-02-16 RX ORDER — EPINEPHRINE 1 MG/ML
0.3 INJECTION, SOLUTION, CONCENTRATE INTRAVENOUS EVERY 5 MIN PRN
Status: CANCELLED | OUTPATIENT
Start: 2025-02-19

## 2025-02-16 RX ORDER — ONDANSETRON 2 MG/ML
8 INJECTION INTRAMUSCULAR; INTRAVENOUS ONCE
Status: CANCELLED | OUTPATIENT
Start: 2025-02-19

## 2025-02-16 RX ORDER — DIPHENHYDRAMINE HYDROCHLORIDE 50 MG/ML
25 INJECTION INTRAMUSCULAR; INTRAVENOUS
Start: 2025-02-26

## 2025-02-16 RX ORDER — LORAZEPAM 2 MG/ML
0.5 INJECTION INTRAMUSCULAR EVERY 4 HOURS PRN
Status: CANCELLED | OUTPATIENT
Start: 2025-02-19

## 2025-02-16 RX ORDER — DIPHENHYDRAMINE HYDROCHLORIDE 50 MG/ML
50 INJECTION INTRAMUSCULAR; INTRAVENOUS
Start: 2025-02-26

## 2025-02-16 RX ORDER — EPINEPHRINE 1 MG/ML
0.3 INJECTION, SOLUTION, CONCENTRATE INTRAVENOUS EVERY 5 MIN PRN
OUTPATIENT
Start: 2025-02-26

## 2025-02-19 ENCOUNTER — INFUSION THERAPY VISIT (OUTPATIENT)
Dept: INFUSION THERAPY | Facility: CLINIC | Age: 65
End: 2025-02-19
Attending: INTERNAL MEDICINE
Payer: COMMERCIAL

## 2025-02-19 ENCOUNTER — ONCOLOGY VISIT (OUTPATIENT)
Dept: ONCOLOGY | Facility: CLINIC | Age: 65
End: 2025-02-19
Attending: INTERNAL MEDICINE
Payer: COMMERCIAL

## 2025-02-19 VITALS
WEIGHT: 176.15 LBS | OXYGEN SATURATION: 98 % | BODY MASS INDEX: 21.44 KG/M2 | SYSTOLIC BLOOD PRESSURE: 119 MMHG | DIASTOLIC BLOOD PRESSURE: 80 MMHG | HEART RATE: 88 BPM | RESPIRATION RATE: 16 BRPM | TEMPERATURE: 98 F

## 2025-02-19 DIAGNOSIS — C78.02 MALIGNANT NEOPLASM METASTATIC TO BOTH LUNGS (H): ICD-10-CM

## 2025-02-19 DIAGNOSIS — C34.90 NON-SMALL CELL LUNG CANCER METASTATIC TO BONE (H): ICD-10-CM

## 2025-02-19 DIAGNOSIS — C78.01 MALIGNANT NEOPLASM METASTATIC TO BOTH LUNGS (H): ICD-10-CM

## 2025-02-19 DIAGNOSIS — C79.51 NON-SMALL CELL LUNG CANCER METASTATIC TO BONE (H): Primary | ICD-10-CM

## 2025-02-19 DIAGNOSIS — Z51.11 ENCOUNTER FOR ANTINEOPLASTIC CHEMOTHERAPY: ICD-10-CM

## 2025-02-19 DIAGNOSIS — Z51.11 ENCOUNTER FOR ANTINEOPLASTIC CHEMOTHERAPY: Primary | ICD-10-CM

## 2025-02-19 DIAGNOSIS — C34.90 NON-SMALL CELL LUNG CANCER METASTATIC TO BONE (H): Primary | ICD-10-CM

## 2025-02-19 DIAGNOSIS — C79.51 NON-SMALL CELL LUNG CANCER METASTATIC TO BONE (H): ICD-10-CM

## 2025-02-19 LAB
ALBUMIN SERPL BCG-MCNC: 3 G/DL (ref 3.5–5.2)
ALP SERPL-CCNC: 97 U/L (ref 40–150)
ALT SERPL W P-5'-P-CCNC: 30 U/L (ref 0–70)
ANION GAP SERPL CALCULATED.3IONS-SCNC: 10 MMOL/L (ref 7–15)
AST SERPL W P-5'-P-CCNC: 54 U/L (ref 0–45)
BASOPHILS # BLD AUTO: 0 10E3/UL (ref 0–0.2)
BASOPHILS NFR BLD AUTO: 1 %
BILIRUB SERPL-MCNC: 0.3 MG/DL
BUN SERPL-MCNC: 11.6 MG/DL (ref 8–23)
CALCIUM SERPL-MCNC: 8.6 MG/DL (ref 8.8–10.4)
CHLORIDE SERPL-SCNC: 104 MMOL/L (ref 98–107)
CREAT SERPL-MCNC: 0.72 MG/DL (ref 0.67–1.17)
EGFRCR SERPLBLD CKD-EPI 2021: >90 ML/MIN/1.73M2
EOSINOPHIL # BLD AUTO: 0.1 10E3/UL (ref 0–0.7)
EOSINOPHIL NFR BLD AUTO: 3 %
ERYTHROCYTE [DISTWIDTH] IN BLOOD BY AUTOMATED COUNT: 14.9 % (ref 10–15)
GLUCOSE SERPL-MCNC: 91 MG/DL (ref 70–99)
HCO3 SERPL-SCNC: 25 MMOL/L (ref 22–29)
HCT VFR BLD AUTO: 28.1 % (ref 40–53)
HGB BLD-MCNC: 9.1 G/DL (ref 13.3–17.7)
IMM GRANULOCYTES # BLD: 0 10E3/UL
IMM GRANULOCYTES NFR BLD: 0 %
LYMPHOCYTES # BLD AUTO: 0.6 10E3/UL (ref 0.8–5.3)
LYMPHOCYTES NFR BLD AUTO: 20 %
MCH RBC QN AUTO: 30.7 PG (ref 26.5–33)
MCHC RBC AUTO-ENTMCNC: 32.4 G/DL (ref 31.5–36.5)
MCV RBC AUTO: 95 FL (ref 78–100)
MONOCYTES # BLD AUTO: 0.5 10E3/UL (ref 0–1.3)
MONOCYTES NFR BLD AUTO: 16 %
NEUTROPHILS # BLD AUTO: 1.8 10E3/UL (ref 1.6–8.3)
NEUTROPHILS NFR BLD AUTO: 60 %
NRBC # BLD AUTO: 0 10E3/UL
NRBC BLD AUTO-RTO: 0 /100
PLATELET # BLD AUTO: 157 10E3/UL (ref 150–450)
POTASSIUM SERPL-SCNC: 3.9 MMOL/L (ref 3.4–5.3)
PROT SERPL-MCNC: 5.7 G/DL (ref 6.4–8.3)
RBC # BLD AUTO: 2.96 10E6/UL (ref 4.4–5.9)
SODIUM SERPL-SCNC: 139 MMOL/L (ref 135–145)
WBC # BLD AUTO: 3 10E3/UL (ref 4–11)

## 2025-02-19 PROCEDURE — G0463 HOSPITAL OUTPT CLINIC VISIT: HCPCS | Performed by: NURSE PRACTITIONER

## 2025-02-19 PROCEDURE — 250N000011 HC RX IP 250 OP 636: Performed by: INTERNAL MEDICINE

## 2025-02-19 PROCEDURE — 82040 ASSAY OF SERUM ALBUMIN: CPT | Performed by: INTERNAL MEDICINE

## 2025-02-19 PROCEDURE — 96413 CHEMO IV INFUSION 1 HR: CPT

## 2025-02-19 PROCEDURE — 99214 OFFICE O/P EST MOD 30 MIN: CPT | Performed by: NURSE PRACTITIONER

## 2025-02-19 PROCEDURE — 36591 DRAW BLOOD OFF VENOUS DEVICE: CPT | Performed by: INTERNAL MEDICINE

## 2025-02-19 PROCEDURE — 96375 TX/PRO/DX INJ NEW DRUG ADDON: CPT

## 2025-02-19 PROCEDURE — 258N000003 HC RX IP 258 OP 636: Performed by: INTERNAL MEDICINE

## 2025-02-19 PROCEDURE — 85004 AUTOMATED DIFF WBC COUNT: CPT | Performed by: INTERNAL MEDICINE

## 2025-02-19 RX ORDER — ONDANSETRON 2 MG/ML
8 INJECTION INTRAMUSCULAR; INTRAVENOUS ONCE
Status: COMPLETED | OUTPATIENT
Start: 2025-02-19 | End: 2025-02-19

## 2025-02-19 RX ORDER — HEPARIN SODIUM (PORCINE) LOCK FLUSH IV SOLN 100 UNIT/ML 100 UNIT/ML
5 SOLUTION INTRAVENOUS
Status: DISCONTINUED | OUTPATIENT
Start: 2025-02-19 | End: 2025-02-19 | Stop reason: HOSPADM

## 2025-02-19 RX ADMIN — ONDANSETRON 8 MG: 2 INJECTION INTRAMUSCULAR; INTRAVENOUS at 09:36

## 2025-02-19 RX ADMIN — HEPARIN 5 ML: 100 SYRINGE at 10:17

## 2025-02-19 RX ADMIN — GEMCITABINE 2000 MG: 38 INJECTION, SOLUTION INTRAVENOUS at 09:48

## 2025-02-19 ASSESSMENT — PAIN SCALES - GENERAL: PAINLEVEL_OUTOF10: NO PAIN (0)

## 2025-02-19 NOTE — PROGRESS NOTES
Nursing Note:  Kt Rasmussen presents today for port labs for tx today.    Patient seen by provider today: Yes: Juan   present during visit today: Not Applicable.    Note: N/A.    Intravenous Access:  Labs drawn without difficulty.  Implanted Port.    Discharge Plan:   Patient was sent to [facility] for provider appointment.    Filipe Carr RN

## 2025-02-19 NOTE — PROGRESS NOTES
Infusion Nursing Note:  Kt Rasmussen presents today for C2D1 Gemzar.    Patient seen by provider today: Yes: Juan Rausch NP   present during visit today: Not Applicable.    Note: N/A.      Intravenous Access:  Implanted Port.    Treatment Conditions:  Lab Results   Component Value Date    HGB 9.1 (L) 02/19/2025    WBC 3.0 (L) 02/19/2025    ANEU 0.5 (L) 08/11/2022    ANEUTAUTO 1.8 02/19/2025     02/19/2025        Lab Results   Component Value Date     02/19/2025    POTASSIUM 3.9 02/19/2025    MAG 1.9 10/13/2016    CR 0.72 02/19/2025    BEVERLY 8.6 (L) 02/19/2025    BILITOTAL 0.3 02/19/2025    ALBUMIN 3.0 (L) 02/19/2025    ALT 30 02/19/2025    AST 54 (H) 02/19/2025       Results reviewed, labs MET treatment parameters, ok to proceed with treatment.      Post Infusion Assessment:  Patient tolerated infusion without incident.  Blood return noted pre and post infusion.  Site patent and intact, free from redness, edema or discomfort.  No evidence of extravasations.  Access discontinued per protocol.       Discharge Plan:   Discharge instructions reviewed with: Patient.  Patient and/or family verbalized understanding of discharge instructions and all questions answered.  AVS to patient via [employer].  Patient will return 2/26 for next appointment.   Patient discharged in stable condition accompanied by: self.  Departure Mode: Wheelchair.      Lashae Painter RN

## 2025-02-19 NOTE — LETTER
"2/19/2025      Kt Rasmussen  [address] [place] [id number]      Dear Colleague,    Thank you for referring your patient, Kt Rasmussen, to the [facility]. Please see a copy of my visit note below.    Oncology Rooming Note    February 19, 2025 8:53 AM   Kt Rasmussen is a 64 year old male who presents for:    Chief Complaint   Patient presents with     Oncology Clinic Visit     Initial Vitals: There were no vitals taken for this visit. Estimated body mass index is 21.45 kg/m  as calculated from the following:    Height as of 1/29/25: 1.93 m (6' 4\").    Weight as of 2/6/25: 79.9 kg (176 lb 3.2 oz). There is no height or weight on file to calculate BSA.  Data Unavailable Comment: Data Unavailable   No LMP for male patient.  Allergies reviewed: Yes  Medications reviewed: Yes    Medications: Medication refills not needed today.  Pharmacy name entered into EPIC:    [facility] - [place], [place] - [address] [facility] [address] DR[facility] STORE #[id number] - [address] 7 AT [facility] & [address] PHARMACY - [facility] [address] [place] [address] [place], [facility]. [facility] [address] [facility]    Frailty Screening:   Is the patient here for a new oncology consult visit in cancer care? 2. No    PHQ9:  Did this patient require a PHQ9?: No      Clinical concerns:   np was notified.      Julisa Whelan CMA              .gkOncology/Hematology Visit Note  Feb 19, 2025    Reason for Visit: follow up of metastatic non-small cell lung carcinoma  Metastatic to lymph nodes bones in bilateral lungs  Brain MRI negative for mets    Patient met with Dr. John who recommended treatment with palliative intent with paclitaxel carboplatin Avastin and atezolizumab-treatment started on April 27, 2021  -Due to thrombocytopenia carboplatin AUC reduced " to 4 with cycle 2  Due to pancytopenia carboplatin discontinued with cycle 5    7/12/2021: PET/CT showed resolved mural nodules with increased cystic cavity in left lower lobe with no significant FDG uptake, less likely metastases; no enlarged hypermetabolic mediastinal, hilar, or axillary lymph nodes, decreased metabolic activity of intraosseous lesion in spine and pelvis; no new bone lesions    Treated with paclitaxel, Avastin and atezolizumab.-Last treatment given in  12/22/2021-cycle 12    1/10/2022: PET/CT showed new foci of abnormal skeletal FGD uptake in the thoracic and lumbar spine. Mild interval increase in intensity of previously demonstrated hypermetabolic skeletal lesions involving the pelvis and lumbar spine. Findings are consistent with progressive osseous metastatic disease. Subsequently off chemo for 1 month due to poor performance status.  16. 1/25/2022: Patient fell and fractured his femur. This was surgically repaired and he was subsequently cared for at a rehab unit.  17. 2/14/2022: Brain MRI without contrast (contrast not given due to inability to obtain IV access) showed no brain metastases.    Met with Dr. Shoemaker-in Dr. John's absence  -Recommendation is to change therapy   04/21/2022 -palliative Carboplatin Alimta started   Due to cytopenia AUC reduced to 4 with cycle 2  Due to persistent pancytopenia and inability to tolerate treatment carboplatin discontinued with cycle 3-day 1  Patient was on monotherapy with Alimta    07/18/2022-PET CT scan shows progression of the disease  08/03/22-started Taxotere 60 mg/m2 every 3 weeks    06/2024-scan reveals progression of the disease  Treatment changed to single agent Alimta     01/29/2025-Due to pleural effusions and moderate pericardial effusion treatment changed to single agent  gemcitabine      Pt comes to clinic for cycle 2 day 8 gemcitabine       Interval History:  Pt reports feeling well denies fever chills or sweats, denies cough or shortness  of breath.  Denies chest pain, denies nausea vomiting or diarrhea, denies bleeding     Review of Systems:  14 point ROS of systems including Constitutional, Eyes, Respiratory, Cardiovascular, Gastroenterology, Genitourinary, Integumentary, Muscularskeletal, Psychiatric were all negative except for pertinent positives noted in my HPI.    Physical Examination:  Physical Exam  HENT:      Right Ear: Tympanic membrane normal.      Nose: Nose normal.      Mouth/Throat:      Mouth: Mucous membranes are moist.   Eyes:      Pupils: Pupils are equal, round, and reactive to light.   Cardiovascular:      Rate and Rhythm: Normal rate.      Pulses: Normal pulses.   Pulmonary:      Effort: Pulmonary effort is normal.   Abdominal:      General: Abdomen is flat.   Musculoskeletal:         General: Normal range of motion.      Cervical back: Normal range of motion.   Skin:     General: Skin is warm.   Neurological:      General: No focal deficit present.      Mental Status: He is alert.   Psychiatric:         Mood and Affect: Mood normal.           Laboratory Data:  CBC and CMP results reviewed        Assessment and Plan:    metastatic non-small cell lung cancer   Patient follows with Dr John   Progressed on different treatments .  Developed pleural and pericardial effusions  Per Dr. John's note Alimta might be causing pleural and pericardial effusions therefore treatment changed to gemcitabine  01/29/2025- gemcitabine started   Regimen and  potential sides effects of gemcitabine reviewed with patient   patient verbalized understanding and agrees to proceed with treatment.    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  --Continue apixaban indefinitely, provided no major bleeding issues arise in the future a  -side effects of eliquis reviewed     Please [facility] with changes in health condition.      MADHAVI Moran CNP  [facility] [place]     Chart documentation with Dragon Voice recognition Software. Although reviewed after completion, some words and grammatical errors may remain.          Again, thank you for allowing me to participate in the care of your patient.        Sincerely,        MADHAVI Moran CNP    Electronically signed

## 2025-02-19 NOTE — PROGRESS NOTES
.gkOncology/Hematology Visit Note  Feb 19, 2025    Reason for Visit: follow up of metastatic non-small cell lung carcinoma  Metastatic to lymph nodes bones in bilateral lungs  Brain MRI negative for mets    Patient met with Dr. John who recommended treatment with palliative intent with paclitaxel carboplatin Avastin and atezolizumab-treatment started on April 27, 2021  -Due to thrombocytopenia carboplatin AUC reduced to 4 with cycle 2  Due to pancytopenia carboplatin discontinued with cycle 5    7/12/2021: PET/CT showed resolved mural nodules with increased cystic cavity in left lower lobe with no significant FDG uptake, less likely metastases; no enlarged hypermetabolic mediastinal, hilar, or axillary lymph nodes, decreased metabolic activity of intraosseous lesion in spine and pelvis; no new bone lesions    Treated with paclitaxel, Avastin and atezolizumab.-Last treatment given in  12/22/2021-cycle 12    1/10/2022: PET/CT showed new foci of abnormal skeletal FGD uptake in the thoracic and lumbar spine. Mild interval increase in intensity of previously demonstrated hypermetabolic skeletal lesions involving the pelvis and lumbar spine. Findings are consistent with progressive osseous metastatic disease. Subsequently off chemo for 1 month due to poor performance status.  16. 1/25/2022: Patient fell and fractured his femur. This was surgically repaired and he was subsequently cared for at a rehab unit.  17. 2/14/2022: Brain MRI without contrast (contrast not given due to inability to obtain IV access) showed no brain metastases.    Met with Dr. Shoemaker-in Dr. John's absence  -Recommendation is to change therapy   04/21/2022 -palliative Carboplatin Alimta started   Due to cytopenia AUC reduced to 4 with cycle 2  Due to persistent pancytopenia and inability to tolerate treatment carboplatin discontinued with cycle 3-day 1  Patient was on monotherapy with Alimta    07/18/2022-PET CT scan shows progression of the  disease  08/03/22-started Taxotere 60 mg/m2 every 3 weeks    06/2024-scan reveals progression of the disease  Treatment changed to single agent Alimta     01/29/2025-Due to pleural effusions and moderate pericardial effusion treatment changed to single agent  gemcitabine      Pt comes to clinic for cycle 2 day 8 gemcitabine       Interval History:  Pt reports feeling well denies fever chills or sweats, denies cough or shortness of breath.  Denies chest pain, denies nausea vomiting or diarrhea, denies bleeding     Review of Systems:  14 point ROS of systems including Constitutional, Eyes, Respiratory, Cardiovascular, Gastroenterology, Genitourinary, Integumentary, Muscularskeletal, Psychiatric were all negative except for pertinent positives noted in my HPI.    Physical Examination:  Physical Exam  HENT:      Right Ear: Tympanic membrane normal.      Nose: Nose normal.      Mouth/Throat:      Mouth: Mucous membranes are moist.   Eyes:      Pupils: Pupils are equal, round, and reactive to light.   Cardiovascular:      Rate and Rhythm: Normal rate.      Pulses: Normal pulses.   Pulmonary:      Effort: Pulmonary effort is normal.   Abdominal:      General: Abdomen is flat.   Musculoskeletal:         General: Normal range of motion.      Cervical back: Normal range of motion.   Skin:     General: Skin is warm.   Neurological:      General: No focal deficit present.      Mental Status: He is alert.   Psychiatric:         Mood and Affect: Mood normal.           Laboratory Data:  CBC and CMP results reviewed        Assessment and Plan:    metastatic non-small cell lung cancer   Patient follows with Dr John   Progressed on different treatments .  Developed pleural and pericardial effusions  Per Dr. John's note Alimta might be causing pleural and pericardial effusions therefore treatment changed to gemcitabine  01/29/2025- gemcitabine started   Regimen and  potential sides effects of gemcitabine reviewed with patient    patient verbalized understanding and agrees to proceed with treatment.    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  --Continue apixaban indefinitely, provided no major bleeding issues arise in the future a  -side effects of eliquis reviewed     Please [facility] clinic with changes in health condition.      MADHAVI Moran [facility] [place]     Chart documentation with Dragon Voice recognition Software. Although reviewed after completion, some words and grammatical errors may remain.

## 2025-02-19 NOTE — PROGRESS NOTES
"Oncology Rooming Note    February 19, 2025 8:53 AM   Kt Rasmussen is a 64 year old male who presents for:    Chief Complaint   Patient presents with    Oncology Clinic Visit     Initial Vitals: There were no vitals taken for this visit. Estimated body mass index is 21.45 kg/m  as calculated from the following:    Height as of 1/29/25: 1.93 m (6' 4\").    Weight as of 2/6/25: 79.9 kg (176 lb 3.2 oz). There is no height or weight on file to calculate BSA.  Data Unavailable Comment: Data Unavailable   No LMP for male patient.  Allergies reviewed: Yes  Medications reviewed: Yes    Medications: Medication refills not needed today.  Pharmacy name entered into EPIC:    [facility] - [place], [place] - [address] [facility] [address] DR[facility] STORE #[id number] - [address] 7 AT [facility] & [address] [facility] PHARMACY - [address], [facility] [id number] [place], [place] [address]E. S    Frailty Screening:   Is the patient here for a new oncology consult visit in cancer care? 2. No    PHQ9:  Did this patient require a PHQ9?: No      Clinical concerns:   np was notified.      Julisa Whelan CMA            "

## 2025-02-26 ENCOUNTER — INFUSION THERAPY VISIT (OUTPATIENT)
Dept: INFUSION THERAPY | Facility: CLINIC | Age: 65
End: 2025-02-26
Attending: INTERNAL MEDICINE
Payer: COMMERCIAL

## 2025-02-26 ENCOUNTER — ONCOLOGY VISIT (OUTPATIENT)
Dept: ONCOLOGY | Facility: CLINIC | Age: 65
End: 2025-02-26
Attending: INTERNAL MEDICINE
Payer: COMMERCIAL

## 2025-02-26 VITALS
HEART RATE: 91 BPM | SYSTOLIC BLOOD PRESSURE: 130 MMHG | OXYGEN SATURATION: 95 % | TEMPERATURE: 97.7 F | RESPIRATION RATE: 16 BRPM | DIASTOLIC BLOOD PRESSURE: 83 MMHG

## 2025-02-26 DIAGNOSIS — C34.90 NON-SMALL CELL LUNG CANCER METASTATIC TO BONE (H): ICD-10-CM

## 2025-02-26 DIAGNOSIS — T45.1X5A CHEMOTHERAPY-INDUCED NEUTROPENIA: ICD-10-CM

## 2025-02-26 DIAGNOSIS — C34.90 NON-SMALL CELL CARCINOMA OF LUNG, STAGE 3, UNSPECIFIED LATERALITY (H): Primary | ICD-10-CM

## 2025-02-26 DIAGNOSIS — C79.51 NON-SMALL CELL LUNG CANCER METASTATIC TO BONE (H): ICD-10-CM

## 2025-02-26 DIAGNOSIS — Z51.11 ENCOUNTER FOR ANTINEOPLASTIC CHEMOTHERAPY: Primary | ICD-10-CM

## 2025-02-26 DIAGNOSIS — Z51.11 ENCOUNTER FOR ANTINEOPLASTIC CHEMOTHERAPY: ICD-10-CM

## 2025-02-26 DIAGNOSIS — D70.1 CHEMOTHERAPY-INDUCED NEUTROPENIA: ICD-10-CM

## 2025-02-26 LAB
BASOPHILS # BLD AUTO: 0 10E3/UL (ref 0–0.2)
BASOPHILS NFR BLD AUTO: 2 %
EOSINOPHIL # BLD AUTO: 0 10E3/UL (ref 0–0.7)
EOSINOPHIL NFR BLD AUTO: 2 %
ERYTHROCYTE [DISTWIDTH] IN BLOOD BY AUTOMATED COUNT: 15 % (ref 10–15)
HCT VFR BLD AUTO: 27.5 % (ref 40–53)
HGB BLD-MCNC: 8.7 G/DL (ref 13.3–17.7)
IMM GRANULOCYTES # BLD: 0 10E3/UL
IMM GRANULOCYTES NFR BLD: 1 %
LYMPHOCYTES # BLD AUTO: 0.4 10E3/UL (ref 0.8–5.3)
LYMPHOCYTES NFR BLD AUTO: 34 %
MCH RBC QN AUTO: 29.8 PG (ref 26.5–33)
MCHC RBC AUTO-ENTMCNC: 31.6 G/DL (ref 31.5–36.5)
MCV RBC AUTO: 94 FL (ref 78–100)
MONOCYTES # BLD AUTO: 0.2 10E3/UL (ref 0–1.3)
MONOCYTES NFR BLD AUTO: 19 %
NEUTROPHILS # BLD AUTO: 0.6 10E3/UL (ref 1.6–8.3)
NEUTROPHILS NFR BLD AUTO: 43 %
NRBC # BLD AUTO: 0 10E3/UL
NRBC BLD AUTO-RTO: 0 /100
PLATELET # BLD AUTO: 188 10E3/UL (ref 150–450)
RBC # BLD AUTO: 2.92 10E6/UL (ref 4.4–5.9)
WBC # BLD AUTO: 1.3 10E3/UL (ref 4–11)

## 2025-02-26 PROCEDURE — 36591 DRAW BLOOD OFF VENOUS DEVICE: CPT | Performed by: INTERNAL MEDICINE

## 2025-02-26 PROCEDURE — 85004 AUTOMATED DIFF WBC COUNT: CPT | Performed by: INTERNAL MEDICINE

## 2025-02-26 PROCEDURE — 85048 AUTOMATED LEUKOCYTE COUNT: CPT | Performed by: INTERNAL MEDICINE

## 2025-02-26 PROCEDURE — 250N000011 HC RX IP 250 OP 636: Performed by: INTERNAL MEDICINE

## 2025-02-26 PROCEDURE — G0463 HOSPITAL OUTPT CLINIC VISIT: HCPCS | Performed by: NURSE PRACTITIONER

## 2025-02-26 RX ORDER — CYANOCOBALAMIN 1000 UG/ML
1000 INJECTION, SOLUTION INTRAMUSCULAR; SUBCUTANEOUS ONCE
Status: CANCELLED
Start: 2025-02-27 | End: 2025-02-27

## 2025-02-26 RX ORDER — ONDANSETRON 2 MG/ML
8 INJECTION INTRAMUSCULAR; INTRAVENOUS EVERY 6 HOURS PRN
Status: CANCELLED
Start: 2025-02-27

## 2025-02-26 RX ORDER — HEPARIN SODIUM (PORCINE) LOCK FLUSH IV SOLN 100 UNIT/ML 100 UNIT/ML
5 SOLUTION INTRAVENOUS
Status: CANCELLED | OUTPATIENT
Start: 2025-02-27

## 2025-02-26 RX ORDER — ONDANSETRON 2 MG/ML
8 INJECTION INTRAMUSCULAR; INTRAVENOUS ONCE
Status: CANCELLED
Start: 2025-02-27 | End: 2025-02-27

## 2025-02-26 RX ORDER — HEPARIN SODIUM,PORCINE 10 UNIT/ML
5 VIAL (ML) INTRAVENOUS
Status: CANCELLED | OUTPATIENT
Start: 2025-02-27

## 2025-02-26 RX ORDER — HEPARIN SODIUM (PORCINE) LOCK FLUSH IV SOLN 100 UNIT/ML 100 UNIT/ML
5 SOLUTION INTRAVENOUS
Status: DISCONTINUED | OUTPATIENT
Start: 2025-02-26 | End: 2025-02-26 | Stop reason: HOSPADM

## 2025-02-26 RX ADMIN — Medication 5 ML: at 09:47

## 2025-02-26 ASSESSMENT — PAIN SCALES - GENERAL: PAINLEVEL_OUTOF10: NO PAIN (0)

## 2025-02-26 NOTE — PROGRESS NOTES
"Oncology Rooming Note    February 26, 2025 9:21 AM   Kt Rasmussen is a 64 year old male who presents for:    Chief Complaint   Patient presents with    Oncology Clinic Visit     Initial Vitals: /83   Pulse 91   Temp 97.7  F (36.5  C) (Oral)   Resp 16   SpO2 95%  Estimated body mass index is 21.44 kg/m  as calculated from the following:    Height as of 1/29/25: 1.93 m (6' 4\").    Weight as of 2/19/25: 79.9 kg (176 lb 2.4 oz). There is no height or weight on file to calculate BSA.  No Pain (0) Comment: Data Unavailable   No LMP for male patient.  Allergies reviewed: Yes  Medications reviewed: Yes    Medications: Medication refills not needed today.  Pharmacy name entered into EPIC:    [place] - [address] [facility] [place], [place] - [address] DRUG STORE #[id number] - [place] [address] AT [facility] & [address] LONG TERM [facility] PHARMACY - [facility] [address] [place] [address], [facility]. - [facility] [address]    Frailty Screening:   Is the patient here for a new oncology consult visit in cancer care? 2. No    PHQ9:  Did this patient require a PHQ9?: No      Clinical concerns:   np was notified.      Julisa Whelan CMA            "

## 2025-02-26 NOTE — PROGRESS NOTES
Infusion Nursing Note:  Kt HERNANDEZ Valery presents today for C2D8 Gemzar.    Patient seen by provider today: Yes: [place]   present during visit today: Not Applicable.    Note: HOLD CHEMO TODAY FOR ANC 0.6 PER [place].  GIVE NEUPOGEN X 2 DAYS AND DEFER CHEMO X 1 WEEK.    AUTH TEAM NOT ABLE TO SECURE NEUPOGEN TODAY.  PER [place], OK FOR PT TO DISCHARGE AND COME BACK TOMORROW.      Intravenous Access:  Implanted Port.  Accessed in FT.     Treatment Conditions:  Lab Results   Component Value Date    HGB 8.7 (L) 02/26/2025    WBC 1.3 (L) 02/26/2025    ANEU 0.5 (L) 08/11/2022    ANEUTAUTO 0.6 (L) 02/26/2025     02/26/2025        Results reviewed, labs did NOT meet treatment parameters: HOLD CHEMO TODAY per [place].      Post Infusion Assessment:  NA.       Discharge Plan:   Discharge instructions reviewed with: Patient.  Patient and/or family verbalized understanding of discharge instructions and all questions answered.  Copy of AVS reviewed with patient and/or family.  Patient will return 2/27/25 for next appointment.  Patient discharged in stable condition accompanied by: self.  Departure Mode: Wheelchair.      Filipe Carr RN

## 2025-02-26 NOTE — LETTER
"2/26/2025      Kt Rasmussen  [address] [place] [id number]      Dear Colleague,    Thank you for referring your patient, Kt Rasmussen, to the [facility]. Please see a copy of my visit note below.    Oncology Rooming Note    February 26, 2025 9:21 AM   Kt Rasmussen is a 64 year old male who presents for:    Chief Complaint   Patient presents with     Oncology Clinic Visit     Initial Vitals: /83   Pulse 91   Temp 97.7  F (36.5  C) (Oral)   Resp 16   SpO2 95%  Estimated body mass index is 21.44 kg/m  as calculated from the following:    Height as of 1/29/25: 1.93 m (6' 4\").    Weight as of 2/19/25: 79.9 kg (176 lb 2.4 oz). There is no height or weight on file to calculate BSA.  No Pain (0) Comment: Data Unavailable   No LMP for male patient.  Allergies reviewed: Yes  Medications reviewed: Yes    Medications: Medication refills not needed today.  Pharmacy name entered into EPIC:    [facility] - [place], [place] - [address] [facility] [address] DR[facility] STORE #[id number] - [place] [address] [facility] 7  [facility] TERM [facility]AC[facility] - [facility] [address] [place] [address], [facility] - [address]    Frailty Screening:   Is the patient here for a new oncology consult visit in cancer care? 2. No    PHQ9:  Did this patient require a PHQ9?: No      Clinical concerns:   np was notified.      Julisa Whelan CMA              .gkOncology/Hematology Visit Note  Feb 26, 2025    Reason for Visit: follow up of metastatic non-small cell lung carcinoma  Metastatic to lymph nodes bones in bilateral lungs  Brain MRI negative for mets    Patient met with Dr. John who recommended treatment with palliative intent with paclitaxel carboplatin Avastin and atezolizumab-treatment started on April 27, 2021  -Due to " thrombocytopenia carboplatin AUC reduced to 4 with cycle 2  Due to pancytopenia carboplatin discontinued with cycle 5    7/12/2021: PET/CT showed resolved mural nodules with increased cystic cavity in left lower lobe with no significant FDG uptake, less likely metastases; no enlarged hypermetabolic mediastinal, hilar, or axillary lymph nodes, decreased metabolic activity of intraosseous lesion in spine and pelvis; no new bone lesions    Treated with paclitaxel, Avastin and atezolizumab.-Last treatment given in  12/22/2021-cycle 12    1/10/2022: PET/CT showed new foci of abnormal skeletal FGD uptake in the thoracic and lumbar spine. Mild interval increase in intensity of previously demonstrated hypermetabolic skeletal lesions involving the pelvis and lumbar spine. Findings are consistent with progressive osseous metastatic disease. Subsequently off chemo for 1 month due to poor performance status.  16. 1/25/2022: Patient fell and fractured his femur. This was surgically repaired and he was subsequently cared for at a rehab unit.  17. 2/14/2022: Brain MRI without contrast (contrast not given due to inability to obtain IV access) showed no brain metastases.    Met with Dr. Shoemaker-in Dr. John's absence  -Recommendation is to change therapy   04/21/2022 -palliative Carboplatin Alimta started   Due to cytopenia AUC reduced to 4 with cycle 2  Due to persistent pancytopenia and inability to tolerate treatment carboplatin discontinued with cycle 3-day 1  Patient was on monotherapy with Alimta    07/18/2022-PET CT scan shows progression of the disease  08/03/22-started Taxotere 60 mg/m2 every 3 weeks    06/2024-scan reveals progression of the disease  Treatment changed to single agent Alimta     01/29/2025-Due to pleural effusions and moderate pericardial effusion treatment changed to single agent  gemcitabine      Pt comes to clinic for cycle 2 day 8 gemcitabine       Interval History:  Patient denies fever chills sweats cough  shortness of breath chest pain nausea vomiting diarrhea abdominal pain or bleeding    Review of Systems:  14 point ROS of systems including Constitutional, Eyes, Respiratory, Cardiovascular, Gastroenterology, Genitourinary, Integumentary, Muscularskeletal, Psychiatric were all negative except for pertinent positives noted in my HPI.    Physical Examination:  Physical Exam  HENT:      Right Ear: Tympanic membrane normal.      Nose: Nose normal.      Mouth/Throat:      Mouth: Mucous membranes are moist.   Eyes:      Pupils: Pupils are equal, round, and reactive to light.   Cardiovascular:      Rate and Rhythm: Normal rate.      Pulses: Normal pulses.   Pulmonary:      Effort: Pulmonary effort is normal.   Abdominal:      General: Abdomen is flat.   Musculoskeletal:         General: Normal range of motion.      Cervical back: Normal range of motion.   Skin:     General: Skin is warm.   Neurological:      General: No focal deficit present.      Mental Status: He is alert.   Psychiatric:         Mood and Affect: Mood normal.           Laboratory Data:  CBC and CMP results reviewed        Assessment and Plan:    metastatic non-small cell lung cancer   Patient follows with Dr John   Progressed on different treatments .  Developed pleural and pericardial effusions  Per Dr. John's note Alimta might be causing pleural and pericardial effusions therefore treatment changed to gemcitabine  01/29/2025- gemcitabine started   Regimen and  potential sides effects of gemcitabine reviewed with patient   patient verbalized understanding and agrees to proceed with treatment  Labs reviewed today ANC 0.6  Hold treatment today.  Reschedule chemo for next week.  I will check with Dr. 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 John if she would recommend reducing gemcitabine  Complications of neutropenia reviewed with patient.  I informed patient that he is at high risk for infection.  Signs and symptoms of infection reviewed  In the even of fever chills sweats or any signs symptoms of infection advised patient to call clinic or go to ER  As above patient lives in assisted living and is at high risk for complications from neutropenia.  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  --Continue apixaban indefinitely, provided no major bleeding issues arise in the future a  -side effects of eliquis reviewed     Please [facility] clinic with changes in health condition.      MADHAVI Mroan CNP  [facility] [place]     Chart documentation with Dragon Voice recognition Software. Although reviewed after completion, some words and grammatical errors may remain.          Again, thank you for allowing me to participate in the care of your patient.        Sincerely,        MADHAVI Moran CNP    Electronically signed

## 2025-02-26 NOTE — PROGRESS NOTES
.gkOncology/Hematology Visit Note  Feb 26, 2025    Reason for Visit: follow up of metastatic non-small cell lung carcinoma  Metastatic to lymph nodes bones in bilateral lungs  Brain MRI negative for mets    Patient met with Dr. John who recommended treatment with palliative intent with paclitaxel carboplatin Avastin and atezolizumab-treatment started on April 27, 2021  -Due to thrombocytopenia carboplatin AUC reduced to 4 with cycle 2  Due to pancytopenia carboplatin discontinued with cycle 5    7/12/2021: PET/CT showed resolved mural nodules with increased cystic cavity in left lower lobe with no significant FDG uptake, less likely metastases; no enlarged hypermetabolic mediastinal, hilar, or axillary lymph nodes, decreased metabolic activity of intraosseous lesion in spine and pelvis; no new bone lesions    Treated with paclitaxel, Avastin and atezolizumab.-Last treatment given in  12/22/2021-cycle 12    1/10/2022: PET/CT showed new foci of abnormal skeletal FGD uptake in the thoracic and lumbar spine. Mild interval increase in intensity of previously demonstrated hypermetabolic skeletal lesions involving the pelvis and lumbar spine. Findings are consistent with progressive osseous metastatic disease. Subsequently off chemo for 1 month due to poor performance status.  16. 1/25/2022: Patient fell and fractured his femur. This was surgically repaired and he was subsequently cared for at a rehab unit.  17. 2/14/2022: Brain MRI without contrast (contrast not given due to inability to obtain IV access) showed no brain metastases.    Met with Dr. Shoemaker-in Dr. John's absence  -Recommendation is to change therapy   04/21/2022 -palliative Carboplatin Alimta started   Due to cytopenia AUC reduced to 4 with cycle 2  Due to persistent pancytopenia and inability to tolerate treatment carboplatin discontinued with cycle 3-day 1  Patient was on monotherapy with Alimta    07/18/2022-PET CT scan shows progression of the  disease  08/03/22-started Taxotere 60 mg/m2 every 3 weeks    06/2024-scan reveals progression of the disease  Treatment changed to single agent Alimta     01/29/2025-Due to pleural effusions and moderate pericardial effusion treatment changed to single agent  gemcitabine      Pt comes to clinic for cycle 2 day 8 gemcitabine       Interval History:  Patient denies fever chills sweats cough shortness of breath chest pain nausea vomiting diarrhea abdominal pain or bleeding    Review of Systems:  14 point ROS of systems including Constitutional, Eyes, Respiratory, Cardiovascular, Gastroenterology, Genitourinary, Integumentary, Muscularskeletal, Psychiatric were all negative except for pertinent positives noted in my HPI.    Physical Examination:  Physical Exam  HENT:      Right Ear: Tympanic membrane normal.      Nose: Nose normal.      Mouth/Throat:      Mouth: Mucous membranes are moist.   Eyes:      Pupils: Pupils are equal, round, and reactive to light.   Cardiovascular:      Rate and Rhythm: Normal rate.      Pulses: Normal pulses.   Pulmonary:      Effort: Pulmonary effort is normal.   Abdominal:      General: Abdomen is flat.   Musculoskeletal:         General: Normal range of motion.      Cervical back: Normal range of motion.   Skin:     General: Skin is warm.   Neurological:      General: No focal deficit present.      Mental Status: He is alert.   Psychiatric:         Mood and Affect: Mood normal.           Laboratory Data:  CBC and CMP results reviewed        Assessment and Plan:    metastatic non-small cell lung cancer   Patient follows with Dr John   Progressed on different treatments .  Developed pleural and pericardial effusions  Per Dr. John's note Alimta might be causing pleural and pericardial effusions therefore treatment changed to gemcitabine  01/29/2025- gemcitabine started   Regimen and  potential sides effects of gemcitabine reviewed with patient   patient verbalized understanding and agrees  to proceed with treatment  Labs reviewed today ANC 0.6  Hold treatment today.  Reschedule chemo for next week.  I will check with Dr. 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 John if she would recommend reducing gemcitabine  Complications of neutropenia reviewed with patient.  I informed patient that he is at high risk for infection.  Signs and symptoms of infection reviewed  In the even of fever chills sweats or any signs symptoms of infection advised patient to call clinic or go to ER  As above patient lives in assisted living and is at high risk for complications from neutropenia.  Recommend Neupogen shot today and tomorrow  to quickly improve his ANC. 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  --Continue apixaban indefinitely, provided no major bleeding issues arise in the future a  -side effects of eliquis reviewed     ADDENDUM   Per Dr John- reduce gemcitabine to 800 mg/m2 going  forward   Neupogen needs prior auth and not given today> hopefully gets approved tomorrow -pt scheduled tomorrow for neupogen   Signs and symptoms of infection reviewed  In the even of fever chills sweats or any signs symptoms of infection advised patient to call clinic or go  to ER      Please [facility] clinic with changes in health condition.      MADHAVI Moran CNP  [facility]     Chart documentation with Dragon Voice recognition Software. Although reviewed after completion, some words and grammatical errors may remain.

## 2025-02-26 NOTE — PROGRESS NOTES
Nursing Note:  Kt Rasmussen presents today for Port Labs.    Patient seen by provider today: Yes: Juan Rausch NP   present during visit today: Not Applicable.    Note: N/A.    Intravenous Access:  Labs drawn without difficulty.  Implanted Port.    Discharge Plan:   Patient was sent to [facility] for 09:00 appointment.    Dayana Hedrick RN

## 2025-02-27 ENCOUNTER — ALLIED HEALTH/NURSE VISIT (OUTPATIENT)
Dept: INFUSION THERAPY | Facility: CLINIC | Age: 65
End: 2025-02-27
Attending: NURSE PRACTITIONER
Payer: COMMERCIAL

## 2025-02-27 VITALS
OXYGEN SATURATION: 95 % | DIASTOLIC BLOOD PRESSURE: 75 MMHG | SYSTOLIC BLOOD PRESSURE: 118 MMHG | RESPIRATION RATE: 18 BRPM | HEART RATE: 89 BPM

## 2025-02-27 DIAGNOSIS — D70.1 CHEMOTHERAPY-INDUCED NEUTROPENIA: ICD-10-CM

## 2025-02-27 DIAGNOSIS — C34.90 NON-SMALL CELL CARCINOMA OF LUNG, STAGE 3, UNSPECIFIED LATERALITY (H): Primary | ICD-10-CM

## 2025-02-27 DIAGNOSIS — T45.1X5A CHEMOTHERAPY-INDUCED NEUTROPENIA: ICD-10-CM

## 2025-02-27 PROCEDURE — 96372 THER/PROPH/DIAG INJ SC/IM: CPT | Performed by: NURSE PRACTITIONER

## 2025-02-27 PROCEDURE — 250N000011 HC RX IP 250 OP 636: Mod: JZ | Performed by: NURSE PRACTITIONER

## 2025-02-27 RX ORDER — CYANOCOBALAMIN 1000 UG/ML
1000 INJECTION, SOLUTION INTRAMUSCULAR; SUBCUTANEOUS ONCE
Start: 2025-02-27 | End: 2025-02-27

## 2025-02-27 RX ORDER — ONDANSETRON 2 MG/ML
8 INJECTION INTRAMUSCULAR; INTRAVENOUS EVERY 6 HOURS PRN
Start: 2025-02-27

## 2025-02-27 RX ORDER — HEPARIN SODIUM,PORCINE 10 UNIT/ML
5 VIAL (ML) INTRAVENOUS
OUTPATIENT
Start: 2025-02-27

## 2025-02-27 RX ORDER — ONDANSETRON 2 MG/ML
8 INJECTION INTRAMUSCULAR; INTRAVENOUS ONCE
Start: 2025-02-27 | End: 2025-02-27

## 2025-02-27 RX ORDER — HEPARIN SODIUM (PORCINE) LOCK FLUSH IV SOLN 100 UNIT/ML 100 UNIT/ML
5 SOLUTION INTRAVENOUS
OUTPATIENT
Start: 2025-02-27

## 2025-02-27 RX ADMIN — FILGRASTIM-AAFI 480 MCG: 480 INJECTION, SOLUTION SUBCUTANEOUS at 14:20

## 2025-02-27 ASSESSMENT — PAIN SCALES - GENERAL: PAINLEVEL_OUTOF10: NO PAIN (0)

## 2025-02-27 NOTE — PROGRESS NOTES
Infusion Nursing Note:  Kt Rasmussen presents today for Neupogen.    Patient seen by provider today: No   present during visit today: Not Applicable.    Note: N/A.      Intravenous Access:  No Intravenous access/labs at this visit.    Treatment Conditions:  Not Applicable.      Post Infusion Assessment:  Patient tolerated injection without incident.  Site patent and intact, free from redness, edema or discomfort.       Discharge Plan:   Discharge instructions reviewed with: Patient.  Patient and/or family verbalized understanding of discharge instructions and all questions answered.  Copy of AVS reviewed with patient and/or family.  Patient will return 3/3/25 for next appointment.  Patient discharged in stable condition accompanied by: self.  Departure Mode: Wheelchair.      Magdalena Davila RN

## 2025-03-04 ENCOUNTER — INFUSION THERAPY VISIT (OUTPATIENT)
Dept: INFUSION THERAPY | Facility: CLINIC | Age: 65
End: 2025-03-04
Attending: INTERNAL MEDICINE
Payer: COMMERCIAL

## 2025-03-04 VITALS
DIASTOLIC BLOOD PRESSURE: 76 MMHG | OXYGEN SATURATION: 95 % | RESPIRATION RATE: 20 BRPM | HEART RATE: 90 BPM | TEMPERATURE: 98.8 F | SYSTOLIC BLOOD PRESSURE: 133 MMHG

## 2025-03-04 DIAGNOSIS — C34.90 NON-SMALL CELL CARCINOMA OF LUNG, STAGE 3, UNSPECIFIED LATERALITY (H): ICD-10-CM

## 2025-03-04 DIAGNOSIS — T45.1X5A CHEMOTHERAPY-INDUCED NEUTROPENIA: ICD-10-CM

## 2025-03-04 DIAGNOSIS — D70.1 CHEMOTHERAPY-INDUCED NEUTROPENIA: ICD-10-CM

## 2025-03-04 DIAGNOSIS — C79.51 NON-SMALL CELL LUNG CANCER METASTATIC TO BONE (H): ICD-10-CM

## 2025-03-04 DIAGNOSIS — C34.90 NON-SMALL CELL CARCINOMA OF LUNG, STAGE 3, UNSPECIFIED LATERALITY (H): Primary | ICD-10-CM

## 2025-03-04 DIAGNOSIS — Z51.11 ENCOUNTER FOR ANTINEOPLASTIC CHEMOTHERAPY: ICD-10-CM

## 2025-03-04 DIAGNOSIS — C34.90 NON-SMALL CELL LUNG CANCER METASTATIC TO BONE (H): ICD-10-CM

## 2025-03-04 LAB
BASOPHILS # BLD AUTO: 0 10E3/UL (ref 0–0.2)
BASOPHILS NFR BLD AUTO: 0 %
EOSINOPHIL # BLD AUTO: 0.2 10E3/UL (ref 0–0.7)
EOSINOPHIL NFR BLD AUTO: 3 %
ERYTHROCYTE [DISTWIDTH] IN BLOOD BY AUTOMATED COUNT: 15.9 % (ref 10–15)
HCT VFR BLD AUTO: 31.6 % (ref 40–53)
HGB BLD-MCNC: 9.8 G/DL (ref 13.3–17.7)
IMM GRANULOCYTES # BLD: 0.2 10E3/UL
IMM GRANULOCYTES NFR BLD: 2 %
LYMPHOCYTES # BLD AUTO: 0.7 10E3/UL (ref 0.8–5.3)
LYMPHOCYTES NFR BLD AUTO: 11 %
MCH RBC QN AUTO: 29.3 PG (ref 26.5–33)
MCHC RBC AUTO-ENTMCNC: 31 G/DL (ref 31.5–36.5)
MCV RBC AUTO: 95 FL (ref 78–100)
MONOCYTES # BLD AUTO: 0.9 10E3/UL (ref 0–1.3)
MONOCYTES NFR BLD AUTO: 13 %
NEUTROPHILS # BLD AUTO: 4.8 10E3/UL (ref 1.6–8.3)
NEUTROPHILS NFR BLD AUTO: 70 %
NRBC # BLD AUTO: 0 10E3/UL
NRBC BLD AUTO-RTO: 0 /100
PLATELET # BLD AUTO: 147 10E3/UL (ref 150–450)
RBC # BLD AUTO: 3.34 10E6/UL (ref 4.4–5.9)
WBC # BLD AUTO: 6.9 10E3/UL (ref 4–11)

## 2025-03-04 PROCEDURE — 258N000003 HC RX IP 258 OP 636: Performed by: NURSE PRACTITIONER

## 2025-03-04 PROCEDURE — 85018 HEMOGLOBIN: CPT | Performed by: NURSE PRACTITIONER

## 2025-03-04 PROCEDURE — 250N000011 HC RX IP 250 OP 636: Performed by: INTERNAL MEDICINE

## 2025-03-04 PROCEDURE — 85004 AUTOMATED DIFF WBC COUNT: CPT | Performed by: NURSE PRACTITIONER

## 2025-03-04 PROCEDURE — 96375 TX/PRO/DX INJ NEW DRUG ADDON: CPT

## 2025-03-04 PROCEDURE — 36591 DRAW BLOOD OFF VENOUS DEVICE: CPT

## 2025-03-04 PROCEDURE — 96413 CHEMO IV INFUSION 1 HR: CPT

## 2025-03-04 PROCEDURE — 250N000011 HC RX IP 250 OP 636: Performed by: NURSE PRACTITIONER

## 2025-03-04 PROCEDURE — 258N000003 HC RX IP 258 OP 636: Performed by: INTERNAL MEDICINE

## 2025-03-04 RX ORDER — ONDANSETRON 2 MG/ML
8 INJECTION INTRAMUSCULAR; INTRAVENOUS ONCE
Start: 2025-03-04 | End: 2025-03-04

## 2025-03-04 RX ORDER — HEPARIN SODIUM (PORCINE) LOCK FLUSH IV SOLN 100 UNIT/ML 100 UNIT/ML
5 SOLUTION INTRAVENOUS
Status: DISCONTINUED | OUTPATIENT
Start: 2025-03-04 | End: 2025-03-04 | Stop reason: HOSPADM

## 2025-03-04 RX ORDER — ONDANSETRON 2 MG/ML
8 INJECTION INTRAMUSCULAR; INTRAVENOUS EVERY 6 HOURS PRN
Start: 2025-03-04

## 2025-03-04 RX ORDER — HEPARIN SODIUM,PORCINE 10 UNIT/ML
5 VIAL (ML) INTRAVENOUS
OUTPATIENT
Start: 2025-03-04

## 2025-03-04 RX ORDER — CYANOCOBALAMIN 1000 UG/ML
1000 INJECTION, SOLUTION INTRAMUSCULAR; SUBCUTANEOUS ONCE
Start: 2025-03-04 | End: 2025-03-04

## 2025-03-04 RX ORDER — HEPARIN SODIUM (PORCINE) LOCK FLUSH IV SOLN 100 UNIT/ML 100 UNIT/ML
5 SOLUTION INTRAVENOUS
OUTPATIENT
Start: 2025-03-04

## 2025-03-04 RX ORDER — ONDANSETRON 2 MG/ML
8 INJECTION INTRAMUSCULAR; INTRAVENOUS ONCE
Status: COMPLETED | OUTPATIENT
Start: 2025-03-04 | End: 2025-03-04

## 2025-03-04 RX ADMIN — ONDANSETRON 8 MG: 2 INJECTION INTRAMUSCULAR; INTRAVENOUS at 09:37

## 2025-03-04 RX ADMIN — Medication 5 ML: at 11:00

## 2025-03-04 RX ADMIN — GEMCITABINE 1600 MG: 38 INJECTION, SOLUTION INTRAVENOUS at 09:43

## 2025-03-04 RX ADMIN — SODIUM CHLORIDE 1000 ML: 0.9 INJECTION, SOLUTION INTRAVENOUS at 09:19

## 2025-03-04 ASSESSMENT — PAIN SCALES - GENERAL: PAINLEVEL_OUTOF10: NO PAIN (0)

## 2025-03-04 NOTE — PROGRESS NOTES
Infusion Nursing Note:  Kt Rasmussen presents today for GEMZAR.    Patient seen by provider today: Yes: RUSSEL Martinez   present during visit today: Not Applicable.    Note: pt reports increased weakness. Pt barely able to transfer to chair wit assist of 1.  Fatigue started this am. Also reports scratchy throat but no other symptoms.  Denies N/V/D. Good appetite.    Juan notified of patients condition. Ok's treatment for today. Pt to get 1L NS. Pt's gemzar dose was decreased at last visit.    Unable to get patients weight today due to the fact that our wheelchair scale is not working. Using weight from 2/19.    Advised patient to call us if he feels increased fatigue or other symptoms prior to his return on the 19th.      Intravenous Access:  Labs drawn without difficulty.  Implanted Port.    Treatment Conditions:  Lab Results   Component Value Date    HGB 9.8 (L) 03/04/2025    WBC 6.9 03/04/2025    ANEU 0.5 (L) 08/11/2022    ANEUTAUTO 4.8 03/04/2025     (L) 03/04/2025        Results reviewed, labs MET treatment parameters, ok to proceed with treatment.      Post Infusion Assessment:  Patient tolerated infusion without incident.  Site patent and intact, free from redness, edema or discomfort.  No evidence of extravasations.  Access discontinued per protocol.       Discharge Plan:   Patient and/or family verbalized understanding of discharge instructions and all questions answered.  AVS to patient via [employer].  Patient will return 3/19 for next appointment.   Patient discharged in stable condition accompanied by: self.  Departure Mode: Wheelchair.      Arin Keith RN

## 2025-03-17 RX ORDER — HEPARIN SODIUM,PORCINE 10 UNIT/ML
5-20 VIAL (ML) INTRAVENOUS DAILY PRN
OUTPATIENT
Start: 2025-04-09

## 2025-03-17 RX ORDER — ALBUTEROL SULFATE 0.83 MG/ML
2.5 SOLUTION RESPIRATORY (INHALATION)
OUTPATIENT
Start: 2025-04-16

## 2025-03-17 RX ORDER — MEPERIDINE HYDROCHLORIDE 25 MG/ML
25 INJECTION INTRAMUSCULAR; INTRAVENOUS; SUBCUTANEOUS
Status: CANCELLED | OUTPATIENT
Start: 2025-03-19

## 2025-03-17 RX ORDER — EPINEPHRINE 1 MG/ML
0.3 INJECTION, SOLUTION, CONCENTRATE INTRAVENOUS EVERY 5 MIN PRN
OUTPATIENT
Start: 2025-04-16

## 2025-03-17 RX ORDER — DIPHENHYDRAMINE HYDROCHLORIDE 50 MG/ML
25 INJECTION, SOLUTION INTRAMUSCULAR; INTRAVENOUS
Start: 2025-03-26

## 2025-03-17 RX ORDER — ONDANSETRON 2 MG/ML
8 INJECTION INTRAMUSCULAR; INTRAVENOUS ONCE
OUTPATIENT
Start: 2025-04-16

## 2025-03-17 RX ORDER — DIPHENHYDRAMINE HYDROCHLORIDE 50 MG/ML
50 INJECTION, SOLUTION INTRAMUSCULAR; INTRAVENOUS
Start: 2025-03-26

## 2025-03-17 RX ORDER — EPINEPHRINE 1 MG/ML
0.3 INJECTION, SOLUTION, CONCENTRATE INTRAVENOUS EVERY 5 MIN PRN
OUTPATIENT
Start: 2025-03-26

## 2025-03-17 RX ORDER — ONDANSETRON 2 MG/ML
8 INJECTION INTRAMUSCULAR; INTRAVENOUS ONCE
OUTPATIENT
Start: 2025-03-26

## 2025-03-17 RX ORDER — HEPARIN SODIUM,PORCINE 10 UNIT/ML
5-20 VIAL (ML) INTRAVENOUS DAILY PRN
OUTPATIENT
Start: 2025-03-26

## 2025-03-17 RX ORDER — ALBUTEROL SULFATE 0.83 MG/ML
2.5 SOLUTION RESPIRATORY (INHALATION)
OUTPATIENT
Start: 2025-04-09

## 2025-03-17 RX ORDER — HEPARIN SODIUM,PORCINE 10 UNIT/ML
5-20 VIAL (ML) INTRAVENOUS DAILY PRN
OUTPATIENT
Start: 2025-04-16

## 2025-03-17 RX ORDER — HEPARIN SODIUM,PORCINE 10 UNIT/ML
5-20 VIAL (ML) INTRAVENOUS DAILY PRN
Status: CANCELLED | OUTPATIENT
Start: 2025-03-19

## 2025-03-17 RX ORDER — LORAZEPAM 2 MG/ML
0.5 INJECTION INTRAMUSCULAR EVERY 4 HOURS PRN
OUTPATIENT
Start: 2025-04-09

## 2025-03-17 RX ORDER — DIPHENHYDRAMINE HYDROCHLORIDE 50 MG/ML
50 INJECTION, SOLUTION INTRAMUSCULAR; INTRAVENOUS
Status: CANCELLED
Start: 2025-03-19

## 2025-03-17 RX ORDER — DIPHENHYDRAMINE HYDROCHLORIDE 50 MG/ML
25 INJECTION, SOLUTION INTRAMUSCULAR; INTRAVENOUS
Status: CANCELLED
Start: 2025-03-19

## 2025-03-17 RX ORDER — LORAZEPAM 2 MG/ML
0.5 INJECTION INTRAMUSCULAR EVERY 4 HOURS PRN
OUTPATIENT
Start: 2025-04-16

## 2025-03-17 RX ORDER — HEPARIN SODIUM (PORCINE) LOCK FLUSH IV SOLN 100 UNIT/ML 100 UNIT/ML
5 SOLUTION INTRAVENOUS
OUTPATIENT
Start: 2025-03-26

## 2025-03-17 RX ORDER — LORAZEPAM 2 MG/ML
0.5 INJECTION INTRAMUSCULAR EVERY 4 HOURS PRN
Status: CANCELLED | OUTPATIENT
Start: 2025-03-19

## 2025-03-17 RX ORDER — DIPHENHYDRAMINE HYDROCHLORIDE 50 MG/ML
25 INJECTION, SOLUTION INTRAMUSCULAR; INTRAVENOUS
Start: 2025-04-16

## 2025-03-17 RX ORDER — ALBUTEROL SULFATE 0.83 MG/ML
2.5 SOLUTION RESPIRATORY (INHALATION)
Status: CANCELLED | OUTPATIENT
Start: 2025-03-19

## 2025-03-17 RX ORDER — DIPHENHYDRAMINE HYDROCHLORIDE 50 MG/ML
50 INJECTION, SOLUTION INTRAMUSCULAR; INTRAVENOUS
Start: 2025-04-16

## 2025-03-17 RX ORDER — ALBUTEROL SULFATE 90 UG/1
1-2 INHALANT RESPIRATORY (INHALATION)
Status: CANCELLED
Start: 2025-03-19

## 2025-03-17 RX ORDER — EPINEPHRINE 1 MG/ML
0.3 INJECTION, SOLUTION, CONCENTRATE INTRAVENOUS EVERY 5 MIN PRN
Status: CANCELLED | OUTPATIENT
Start: 2025-03-19

## 2025-03-17 RX ORDER — ALBUTEROL SULFATE 90 UG/1
1-2 INHALANT RESPIRATORY (INHALATION)
Start: 2025-03-26

## 2025-03-17 RX ORDER — ALBUTEROL SULFATE 90 UG/1
1-2 INHALANT RESPIRATORY (INHALATION)
Start: 2025-04-09

## 2025-03-17 RX ORDER — ONDANSETRON 2 MG/ML
8 INJECTION INTRAMUSCULAR; INTRAVENOUS ONCE
Status: CANCELLED | OUTPATIENT
Start: 2025-03-19

## 2025-03-17 RX ORDER — LORAZEPAM 2 MG/ML
0.5 INJECTION INTRAMUSCULAR EVERY 4 HOURS PRN
OUTPATIENT
Start: 2025-03-26

## 2025-03-17 RX ORDER — DIPHENHYDRAMINE HYDROCHLORIDE 50 MG/ML
25 INJECTION, SOLUTION INTRAMUSCULAR; INTRAVENOUS
Start: 2025-04-09

## 2025-03-17 RX ORDER — HEPARIN SODIUM (PORCINE) LOCK FLUSH IV SOLN 100 UNIT/ML 100 UNIT/ML
5 SOLUTION INTRAVENOUS
Status: CANCELLED | OUTPATIENT
Start: 2025-03-19

## 2025-03-17 RX ORDER — MEPERIDINE HYDROCHLORIDE 25 MG/ML
25 INJECTION INTRAMUSCULAR; INTRAVENOUS; SUBCUTANEOUS
OUTPATIENT
Start: 2025-03-26

## 2025-03-17 RX ORDER — MEPERIDINE HYDROCHLORIDE 25 MG/ML
25 INJECTION INTRAMUSCULAR; INTRAVENOUS; SUBCUTANEOUS
OUTPATIENT
Start: 2025-04-09

## 2025-03-17 RX ORDER — HEPARIN SODIUM (PORCINE) LOCK FLUSH IV SOLN 100 UNIT/ML 100 UNIT/ML
5 SOLUTION INTRAVENOUS
OUTPATIENT
Start: 2025-04-09

## 2025-03-17 RX ORDER — MEPERIDINE HYDROCHLORIDE 25 MG/ML
25 INJECTION INTRAMUSCULAR; INTRAVENOUS; SUBCUTANEOUS
OUTPATIENT
Start: 2025-04-16

## 2025-03-17 RX ORDER — HEPARIN SODIUM (PORCINE) LOCK FLUSH IV SOLN 100 UNIT/ML 100 UNIT/ML
5 SOLUTION INTRAVENOUS
OUTPATIENT
Start: 2025-04-16

## 2025-03-17 RX ORDER — ALBUTEROL SULFATE 90 UG/1
1-2 INHALANT RESPIRATORY (INHALATION)
Start: 2025-04-16

## 2025-03-17 RX ORDER — DIPHENHYDRAMINE HYDROCHLORIDE 50 MG/ML
50 INJECTION, SOLUTION INTRAMUSCULAR; INTRAVENOUS
Start: 2025-04-09

## 2025-03-17 RX ORDER — ALBUTEROL SULFATE 0.83 MG/ML
2.5 SOLUTION RESPIRATORY (INHALATION)
OUTPATIENT
Start: 2025-03-26

## 2025-03-17 RX ORDER — EPINEPHRINE 1 MG/ML
0.3 INJECTION, SOLUTION, CONCENTRATE INTRAVENOUS EVERY 5 MIN PRN
OUTPATIENT
Start: 2025-04-09

## 2025-03-17 RX ORDER — ONDANSETRON 2 MG/ML
8 INJECTION INTRAMUSCULAR; INTRAVENOUS ONCE
OUTPATIENT
Start: 2025-04-09

## 2025-03-19 ENCOUNTER — INFUSION THERAPY VISIT (OUTPATIENT)
Dept: INFUSION THERAPY | Facility: CLINIC | Age: 65
End: 2025-03-19
Attending: NURSE PRACTITIONER
Payer: COMMERCIAL

## 2025-03-19 VITALS
RESPIRATION RATE: 20 BRPM | DIASTOLIC BLOOD PRESSURE: 77 MMHG | WEIGHT: 178 LBS | HEART RATE: 92 BPM | BODY MASS INDEX: 21.67 KG/M2 | SYSTOLIC BLOOD PRESSURE: 146 MMHG | TEMPERATURE: 98.2 F

## 2025-03-19 DIAGNOSIS — C34.90 NON-SMALL CELL LUNG CANCER METASTATIC TO BONE (H): ICD-10-CM

## 2025-03-19 DIAGNOSIS — Z51.11 ENCOUNTER FOR ANTINEOPLASTIC CHEMOTHERAPY: ICD-10-CM

## 2025-03-19 DIAGNOSIS — Z51.11 ENCOUNTER FOR ANTINEOPLASTIC CHEMOTHERAPY: Primary | ICD-10-CM

## 2025-03-19 DIAGNOSIS — C79.51 NON-SMALL CELL LUNG CANCER METASTATIC TO BONE (H): Primary | ICD-10-CM

## 2025-03-19 DIAGNOSIS — C34.90 NON-SMALL CELL LUNG CANCER METASTATIC TO BONE (H): Primary | ICD-10-CM

## 2025-03-19 DIAGNOSIS — C79.51 NON-SMALL CELL LUNG CANCER METASTATIC TO BONE (H): ICD-10-CM

## 2025-03-19 LAB
ALBUMIN SERPL BCG-MCNC: 3.2 G/DL (ref 3.5–5.2)
ALP SERPL-CCNC: 120 U/L (ref 40–150)
ALT SERPL W P-5'-P-CCNC: 12 U/L (ref 0–70)
ANION GAP SERPL CALCULATED.3IONS-SCNC: 11 MMOL/L (ref 7–15)
AST SERPL W P-5'-P-CCNC: 31 U/L (ref 0–45)
BASOPHILS # BLD AUTO: 0 10E3/UL (ref 0–0.2)
BASOPHILS NFR BLD AUTO: 1 %
BILIRUB SERPL-MCNC: 0.5 MG/DL
BUN SERPL-MCNC: 12.1 MG/DL (ref 8–23)
CALCIUM SERPL-MCNC: 8.9 MG/DL (ref 8.8–10.4)
CHLORIDE SERPL-SCNC: 105 MMOL/L (ref 98–107)
CREAT SERPL-MCNC: 0.72 MG/DL (ref 0.67–1.17)
EGFRCR SERPLBLD CKD-EPI 2021: >90 ML/MIN/1.73M2
EOSINOPHIL # BLD AUTO: 0.2 10E3/UL (ref 0–0.7)
EOSINOPHIL NFR BLD AUTO: 4 %
ERYTHROCYTE [DISTWIDTH] IN BLOOD BY AUTOMATED COUNT: 15.9 % (ref 10–15)
GLUCOSE SERPL-MCNC: 93 MG/DL (ref 70–99)
HCO3 SERPL-SCNC: 23 MMOL/L (ref 22–29)
HCT VFR BLD AUTO: 28 % (ref 40–53)
HGB BLD-MCNC: 9 G/DL (ref 13.3–17.7)
IMM GRANULOCYTES # BLD: 0 10E3/UL
IMM GRANULOCYTES NFR BLD: 0 %
LYMPHOCYTES # BLD AUTO: 0.7 10E3/UL (ref 0.8–5.3)
LYMPHOCYTES NFR BLD AUTO: 13 %
MCH RBC QN AUTO: 28.9 PG (ref 26.5–33)
MCHC RBC AUTO-ENTMCNC: 32.1 G/DL (ref 31.5–36.5)
MCV RBC AUTO: 90 FL (ref 78–100)
MONOCYTES # BLD AUTO: 0.8 10E3/UL (ref 0–1.3)
MONOCYTES NFR BLD AUTO: 14 %
NEUTROPHILS # BLD AUTO: 3.8 10E3/UL (ref 1.6–8.3)
NEUTROPHILS NFR BLD AUTO: 69 %
NRBC # BLD AUTO: 0 10E3/UL
NRBC BLD AUTO-RTO: 0 /100
PLATELET # BLD AUTO: 217 10E3/UL (ref 150–450)
POTASSIUM SERPL-SCNC: 4.1 MMOL/L (ref 3.4–5.3)
PROT SERPL-MCNC: 6.3 G/DL (ref 6.4–8.3)
RBC # BLD AUTO: 3.11 10E6/UL (ref 4.4–5.9)
SODIUM SERPL-SCNC: 139 MMOL/L (ref 135–145)
WBC # BLD AUTO: 5.4 10E3/UL (ref 4–11)

## 2025-03-19 PROCEDURE — 250N000011 HC RX IP 250 OP 636: Performed by: INTERNAL MEDICINE

## 2025-03-19 PROCEDURE — 96375 TX/PRO/DX INJ NEW DRUG ADDON: CPT

## 2025-03-19 PROCEDURE — 96413 CHEMO IV INFUSION 1 HR: CPT

## 2025-03-19 PROCEDURE — 36591 DRAW BLOOD OFF VENOUS DEVICE: CPT | Performed by: INTERNAL MEDICINE

## 2025-03-19 PROCEDURE — 80053 COMPREHEN METABOLIC PANEL: CPT | Performed by: INTERNAL MEDICINE

## 2025-03-19 PROCEDURE — 258N000003 HC RX IP 258 OP 636: Performed by: INTERNAL MEDICINE

## 2025-03-19 PROCEDURE — 85025 COMPLETE CBC W/AUTO DIFF WBC: CPT | Performed by: INTERNAL MEDICINE

## 2025-03-19 RX ORDER — ONDANSETRON 2 MG/ML
8 INJECTION INTRAMUSCULAR; INTRAVENOUS ONCE
Status: COMPLETED | OUTPATIENT
Start: 2025-03-19 | End: 2025-03-19

## 2025-03-19 RX ORDER — HEPARIN SODIUM (PORCINE) LOCK FLUSH IV SOLN 100 UNIT/ML 100 UNIT/ML
5 SOLUTION INTRAVENOUS
Status: DISCONTINUED | OUTPATIENT
Start: 2025-03-19 | End: 2025-03-19 | Stop reason: HOSPADM

## 2025-03-19 RX ADMIN — HEPARIN 5 ML: 100 SYRINGE at 13:45

## 2025-03-19 RX ADMIN — GEMCITABINE 1600 MG: 38 INJECTION, SOLUTION INTRAVENOUS at 13:46

## 2025-03-19 RX ADMIN — ONDANSETRON 8 MG: 2 INJECTION INTRAMUSCULAR; INTRAVENOUS at 13:31

## 2025-03-19 NOTE — PROGRESS NOTES
Nursing Note:  Kt Rasmussen presents today for Port Labs.    Patient seen by provider today: No   present during visit today: Not Applicable.    Note: N/A.    Intravenous Access:  Labs drawn without difficulty.  Implanted Port.    Discharge Plan:   Patient was sent to [facility] for 2 PM appointment.    Keith Chavez RN

## 2025-03-19 NOTE — PROGRESS NOTES
Infusion Nursing Note:  Kt HERNANDEZ Valery presents today for C3D1 Gemcitabine.    Patient seen by provider today: No   present during visit today: Not Applicable.    Note: Pt still feeling fatigued but has improved some. No other changes or concerns today.      Intravenous Access:  Implanted Port.    Treatment Conditions:  Lab Results   Component Value Date    HGB 9.0 (L) 03/19/2025    WBC 5.4 03/19/2025    ANEU 0.5 (L) 08/11/2022    ANEUTAUTO 3.8 03/19/2025     03/19/2025        Results reviewed, labs MET treatment parameters, ok to proceed with treatment.      Post Infusion Assessment:  Patient tolerated infusion without incident.  Blood return noted pre and post infusion.  Site patent and intact, free from redness, edema or discomfort.  No evidence of extravasations.  Access discontinued per protocol.       Discharge Plan:   Discharge instructions reviewed with: Patient.  Patient and/or family verbalized understanding of discharge instructions and all questions answered.  Copy of AVS reviewed with patient and/or family.  Patient will return 3/26/25 for next appointment.  Patient discharged in stable condition accompanied by: self.  Departure Mode: Wheelchair.      Patricia Lindsey RN

## 2025-03-26 ENCOUNTER — PATIENT OUTREACH (OUTPATIENT)
Dept: CARE COORDINATION | Facility: CLINIC | Age: 65
End: 2025-03-26

## 2025-03-26 ENCOUNTER — ONCOLOGY VISIT (OUTPATIENT)
Dept: ONCOLOGY | Facility: CLINIC | Age: 65
End: 2025-03-26
Attending: INTERNAL MEDICINE
Payer: COMMERCIAL

## 2025-03-26 ENCOUNTER — TELEPHONE (OUTPATIENT)
Dept: PHARMACY | Facility: CLINIC | Age: 65
End: 2025-03-26

## 2025-03-26 ENCOUNTER — INFUSION THERAPY VISIT (OUTPATIENT)
Dept: INFUSION THERAPY | Facility: CLINIC | Age: 65
End: 2025-03-26
Attending: INTERNAL MEDICINE
Payer: COMMERCIAL

## 2025-03-26 VITALS
SYSTOLIC BLOOD PRESSURE: 128 MMHG | RESPIRATION RATE: 16 BRPM | OXYGEN SATURATION: 96 % | HEART RATE: 96 BPM | DIASTOLIC BLOOD PRESSURE: 82 MMHG | TEMPERATURE: 97.7 F

## 2025-03-26 DIAGNOSIS — Z51.11 ENCOUNTER FOR ANTINEOPLASTIC CHEMOTHERAPY: Primary | ICD-10-CM

## 2025-03-26 DIAGNOSIS — C79.51 NON-SMALL CELL LUNG CANCER METASTATIC TO BONE (H): ICD-10-CM

## 2025-03-26 DIAGNOSIS — C34.90 NON-SMALL CELL LUNG CANCER METASTATIC TO BONE (H): ICD-10-CM

## 2025-03-26 DIAGNOSIS — T45.1X5A CHEMOTHERAPY-INDUCED NEUTROPENIA: ICD-10-CM

## 2025-03-26 DIAGNOSIS — Z71.89 COORDINATION OF COMPLEX CARE: Primary | ICD-10-CM

## 2025-03-26 DIAGNOSIS — C34.90 NON-SMALL CELL LUNG CANCER METASTATIC TO BONE (H): Primary | ICD-10-CM

## 2025-03-26 DIAGNOSIS — Z51.11 ENCOUNTER FOR ANTINEOPLASTIC CHEMOTHERAPY: ICD-10-CM

## 2025-03-26 DIAGNOSIS — C34.90 NON-SMALL CELL CARCINOMA OF LUNG, STAGE 3, UNSPECIFIED LATERALITY (H): ICD-10-CM

## 2025-03-26 DIAGNOSIS — D70.1 CHEMOTHERAPY-INDUCED NEUTROPENIA: ICD-10-CM

## 2025-03-26 DIAGNOSIS — C79.51 NON-SMALL CELL LUNG CANCER METASTATIC TO BONE (H): Primary | ICD-10-CM

## 2025-03-26 LAB
BASOPHILS # BLD AUTO: 0 10E3/UL (ref 0–0.2)
BASOPHILS NFR BLD AUTO: 1 %
EOSINOPHIL # BLD AUTO: 0.1 10E3/UL (ref 0–0.7)
EOSINOPHIL NFR BLD AUTO: 5 %
ERYTHROCYTE [DISTWIDTH] IN BLOOD BY AUTOMATED COUNT: 15.8 % (ref 10–15)
HCT VFR BLD AUTO: 28 % (ref 40–53)
HGB BLD-MCNC: 9.1 G/DL (ref 13.3–17.7)
IMM GRANULOCYTES # BLD: 0 10E3/UL
IMM GRANULOCYTES NFR BLD: 1 %
LYMPHOCYTES # BLD AUTO: 0.5 10E3/UL (ref 0.8–5.3)
LYMPHOCYTES NFR BLD AUTO: 26 %
MCH RBC QN AUTO: 28.9 PG (ref 26.5–33)
MCHC RBC AUTO-ENTMCNC: 32.5 G/DL (ref 31.5–36.5)
MCV RBC AUTO: 89 FL (ref 78–100)
MONOCYTES # BLD AUTO: 0.4 10E3/UL (ref 0–1.3)
MONOCYTES NFR BLD AUTO: 18 %
NEUTROPHILS # BLD AUTO: 1 10E3/UL (ref 1.6–8.3)
NEUTROPHILS NFR BLD AUTO: 49 %
NRBC # BLD AUTO: 0 10E3/UL
NRBC BLD AUTO-RTO: 0 /100
PLATELET # BLD AUTO: 185 10E3/UL (ref 150–450)
RBC # BLD AUTO: 3.15 10E6/UL (ref 4.4–5.9)
WBC # BLD AUTO: 2 10E3/UL (ref 4–11)

## 2025-03-26 PROCEDURE — 250N000011 HC RX IP 250 OP 636: Performed by: INTERNAL MEDICINE

## 2025-03-26 PROCEDURE — 85014 HEMATOCRIT: CPT | Performed by: INTERNAL MEDICINE

## 2025-03-26 PROCEDURE — 96413 CHEMO IV INFUSION 1 HR: CPT

## 2025-03-26 PROCEDURE — 36591 DRAW BLOOD OFF VENOUS DEVICE: CPT | Performed by: INTERNAL MEDICINE

## 2025-03-26 PROCEDURE — 258N000003 HC RX IP 258 OP 636: Performed by: INTERNAL MEDICINE

## 2025-03-26 PROCEDURE — 96375 TX/PRO/DX INJ NEW DRUG ADDON: CPT

## 2025-03-26 PROCEDURE — 85004 AUTOMATED DIFF WBC COUNT: CPT | Performed by: INTERNAL MEDICINE

## 2025-03-26 RX ORDER — ONDANSETRON 2 MG/ML
8 INJECTION INTRAMUSCULAR; INTRAVENOUS ONCE
Status: CANCELLED
Start: 2025-03-26 | End: 2025-03-26

## 2025-03-26 RX ORDER — ONDANSETRON 2 MG/ML
8 INJECTION INTRAMUSCULAR; INTRAVENOUS ONCE
Status: DISCONTINUED | OUTPATIENT
Start: 2025-03-26 | End: 2025-03-26

## 2025-03-26 RX ORDER — ONDANSETRON 2 MG/ML
8 INJECTION INTRAMUSCULAR; INTRAVENOUS ONCE
Status: COMPLETED | OUTPATIENT
Start: 2025-03-26 | End: 2025-03-26

## 2025-03-26 RX ORDER — HEPARIN SODIUM,PORCINE 10 UNIT/ML
5 VIAL (ML) INTRAVENOUS
OUTPATIENT
Start: 2025-03-26

## 2025-03-26 RX ORDER — ONDANSETRON 2 MG/ML
8 INJECTION INTRAMUSCULAR; INTRAVENOUS EVERY 6 HOURS PRN
Start: 2025-03-26

## 2025-03-26 RX ORDER — HEPARIN SODIUM (PORCINE) LOCK FLUSH IV SOLN 100 UNIT/ML 100 UNIT/ML
5 SOLUTION INTRAVENOUS
Status: DISCONTINUED | OUTPATIENT
Start: 2025-03-26 | End: 2025-03-26 | Stop reason: HOSPADM

## 2025-03-26 RX ORDER — CYANOCOBALAMIN 1000 UG/ML
1000 INJECTION, SOLUTION INTRAMUSCULAR; SUBCUTANEOUS ONCE
Status: CANCELLED
Start: 2025-03-26 | End: 2025-03-26

## 2025-03-26 RX ORDER — HEPARIN SODIUM (PORCINE) LOCK FLUSH IV SOLN 100 UNIT/ML 100 UNIT/ML
5 SOLUTION INTRAVENOUS
OUTPATIENT
Start: 2025-03-26

## 2025-03-26 RX ORDER — CYANOCOBALAMIN 1000 UG/ML
1000 INJECTION, SOLUTION INTRAMUSCULAR; SUBCUTANEOUS ONCE
Status: DISCONTINUED | OUTPATIENT
Start: 2025-03-26 | End: 2025-03-26

## 2025-03-26 RX ADMIN — ONDANSETRON 8 MG: 2 INJECTION INTRAMUSCULAR; INTRAVENOUS at 15:13

## 2025-03-26 RX ADMIN — GEMCITABINE 1600 MG: 38 INJECTION, SOLUTION INTRAVENOUS at 15:16

## 2025-03-26 RX ADMIN — HEPARIN 3 ML: 100 SYRINGE at 15:52

## 2025-03-26 ASSESSMENT — PAIN SCALES - GENERAL: PAINLEVEL_OUTOF10: NO PAIN (0)

## 2025-03-26 NOTE — PROGRESS NOTES
Infusion Nursing Note:  Kt DAVID Rasmussen presents today for C3D8 gemzar.    Patient seen by provider today: Yes: CARLITOS Abdul   present during visit today: Not Applicable.    Note: Chantell approved Gemzar today with ANC 1.0; pt to get neupogen next three days; appts made and pt aware of dates and times and printed also for him.      Intravenous Access:  Implanted Port.    Treatment Conditions:  Lab Results   Component Value Date    HGB 9.1 (L) 03/26/2025    WBC 2.0 (L) 03/26/2025    ANEU 0.5 (L) 08/11/2022    ANEUTAUTO 1.0 (L) 03/26/2025     03/26/2025        Results reviewed, labs MET treatment parameters, ok to proceed with treatment.      Post Infusion Assessment:  Patient tolerated infusion without incident.  Blood return noted pre and post infusion.  Site patent and intact, free from redness, edema or discomfort.  No evidence of extravasations.  Access discontinued per protocol.       Discharge Plan:   Copy of AVS reviewed with patient and/or family.  Patient will return 3/27 for next appointment.  Patient discharged in stable condition accompanied by: self.  Departure Mode: Wheelchair.      Guzman Patel, RN

## 2025-03-26 NOTE — TELEPHONE ENCOUNTER
PA Initiation    Medication: NIVESTYM 480 MCG/0.8ML IJ [facility]  Insurance Company: [employer] - Phone [phone] Fax [phone]  Pharmacy Filling the Rx: [facility] - [place], [place] - [facility] [address]  Filling Pharmacy Phone:    Filling Pharmacy Fax:    Start Date: 3/26/2025

## 2025-03-26 NOTE — PROGRESS NOTES
"Oncology Rooming Note    March 26, 2025 1:14 PM   Kt Rasmussen is a 64 year old male who presents for:    Chief Complaint   Patient presents with    Oncology Clinic Visit     Initial Vitals: There were no vitals taken for this visit. Estimated body mass index is 21.67 kg/m  as calculated from the following:    Height as of 1/29/25: 1.93 m (6' 4\").    Weight as of 3/19/25: 80.7 kg (178 lb). There is no height or weight on file to calculate BSA.  Data Unavailable Comment: Data Unavailable   No LMP for male patient.  Allergies reviewed: Yes  Medications reviewed: Yes    Medications: Medication refills not needed today.  Pharmacy name entered into EPIC:    [facility] - [place], [place] - [address] [facility] [address] [id number] - [place] [address] AT [facility] & [address] [facility] [address]    Frailty Screening:   Is the patient here for a new oncology consult visit in cancer care? 2. No    PHQ9:  Did this patient require a PHQ9?: No      Clinical concerns:   pa was notified.      Julisa Whelan [facility]            "

## 2025-03-26 NOTE — PROGRESS NOTES
Nursing Note:  Kt Rasmussen presents today for Port Labs.    Patient seen by provider today: Yes: Chantell JOSEPH   present during visit today: Not Applicable.    Note: N/A.    Intravenous Access:  Implanted Port.    Discharge Plan:   Patient was sent to [facility] for provider appointment.    Devorah Stovall RN

## 2025-03-26 NOTE — PROGRESS NOTES
Social Work - Intervention  [facility]    Data/Intervention:  Patient Name: Kt Rasmussen Goes By: Kt    /Age: 1960 (64 year old)     Visit Type: in person  Referral Source: Tari Clarke RN  Reason for Referral: Transportation     Psychosocial Information/Concerns:  Kt reports that he resides at [facility] in [place] and manages his own transportation, having utilized [employer] to get to clinic today. Kt reports that he received a transportation card from a director at [facility].     CAROLE and Kt called [employer] and confirmed that he has transportation benefit through [employer], and has been approved through [employer] ([phone]) through 25 for specialty transport as he utilizes a wheelchair and needs assistance with transfers. Carole and Kt attempted [employer], and [facility] requesting return call.     CAROLE engaged with Josep Mathew at [employer] ph:[phone] (guardianship firm, he is covering today for Sky Arce who is out of office). Josep confirmed [facility] in [place] residence, and that Kt is living on assisted living facility side with minimal assistance.    Kt reports that he has scheduled ride home for today and declined ride assistance for next 3 days. CAROLE again reiterated to Kt that he has a transportation benefit through his insurance, and should not need to pay for rides out-of-pocket. Josep reports that Kt's  (Julio Abraham [phone]) will outreach to Kt in the next few days to review how to access [employer] transportation. Josep reports that if pt is needing need this week, that pt will likely need ambulance transportation.      Assessment/Plan:  This clinician will continue to be available as needed for ongoing psychosocial support.      Provided patient/family with contact information and availability.    KEVIN Charlton, [facility]  Clinical - Adult Oncology  Phone:  [phone]  She/Her/Hers  [facility]: Adrianna FLEMING  8am-4:30pm  [facility]: WILI Chapa F 8am-4:30pm   Support Groups at [facility]: Social Work Services for Cancer Patients ([email or web address])

## 2025-03-26 NOTE — TELEPHONE ENCOUNTER
Prior Authorization Not Needed per Insurance    Medication: Zarxio  Insurance Company: Blanca - Phone [phone] Fax [phone]  Expected CoPay: $    Pharmacy Filling the Rx: [facility] - [place], [place] - [address]   Pharmacy Notified: yes  Patient Notified: yes      Insurance prefers Zarxio, Udenyca or [employer]. Test claim for Zarxio is $0 at [facility].

## 2025-03-29 ENCOUNTER — INFUSION THERAPY VISIT (OUTPATIENT)
Dept: INFUSION THERAPY | Facility: CLINIC | Age: 65
End: 2025-03-29
Attending: NURSE PRACTITIONER
Payer: COMMERCIAL

## 2025-03-29 VITALS
HEART RATE: 104 BPM | DIASTOLIC BLOOD PRESSURE: 77 MMHG | SYSTOLIC BLOOD PRESSURE: 133 MMHG | RESPIRATION RATE: 18 BRPM | TEMPERATURE: 96.9 F

## 2025-03-29 DIAGNOSIS — C34.90 NON-SMALL CELL LUNG CANCER METASTATIC TO BONE (H): Primary | ICD-10-CM

## 2025-03-29 DIAGNOSIS — C79.51 NON-SMALL CELL LUNG CANCER METASTATIC TO BONE (H): Primary | ICD-10-CM

## 2025-03-29 DIAGNOSIS — Z51.11 ENCOUNTER FOR ANTINEOPLASTIC CHEMOTHERAPY: ICD-10-CM

## 2025-03-29 PROCEDURE — 96372 THER/PROPH/DIAG INJ SC/IM: CPT | Performed by: INTERNAL MEDICINE

## 2025-03-29 PROCEDURE — 250N000011 HC RX IP 250 OP 636: Mod: JZ | Performed by: INTERNAL MEDICINE

## 2025-03-29 RX ADMIN — FILGRASTIM-SNDZ 480 MCG: 480 INJECTION, SOLUTION INTRAVENOUS; SUBCUTANEOUS at 13:00

## 2025-03-29 NOTE — PROGRESS NOTES
Infusion Nursing Note:  Kt Rasmussen presents today for Zarxio.    Patient seen by provider today: No   present during visit today: Not Applicable.    Note: N/A.      Intravenous Access:  No Intravenous access/labs at this visit.    Treatment Conditions:  Not Applicable.      Post Infusion Assessment:  Patient tolerated injection without incident.  Site patent and intact, free from redness, edema or discomfort.  No evidence of extravasations.       Discharge Plan:   Patient declined prescription refills.  Discharge instructions reviewed with: Patient.  Patient and/or family verbalized understanding of discharge instructions and all questions answered.  AVS to patient via [employer].  Patient will return 3/30 for next appointment.   Patient discharged in stable condition accompanied by: self.  Departure Mode: Wheelchair.      Keith Chavez RN

## 2025-03-30 ENCOUNTER — INFUSION THERAPY VISIT (OUTPATIENT)
Dept: INFUSION THERAPY | Facility: CLINIC | Age: 65
End: 2025-03-30
Attending: INTERNAL MEDICINE
Payer: COMMERCIAL

## 2025-03-30 VITALS
DIASTOLIC BLOOD PRESSURE: 83 MMHG | SYSTOLIC BLOOD PRESSURE: 134 MMHG | HEART RATE: 95 BPM | RESPIRATION RATE: 18 BRPM | OXYGEN SATURATION: 96 % | TEMPERATURE: 97.3 F

## 2025-03-30 DIAGNOSIS — Z51.11 ENCOUNTER FOR ANTINEOPLASTIC CHEMOTHERAPY: Primary | ICD-10-CM

## 2025-03-30 DIAGNOSIS — C79.51 NON-SMALL CELL LUNG CANCER METASTATIC TO BONE (H): ICD-10-CM

## 2025-03-30 DIAGNOSIS — C34.90 NON-SMALL CELL LUNG CANCER METASTATIC TO BONE (H): ICD-10-CM

## 2025-03-30 PROCEDURE — 250N000011 HC RX IP 250 OP 636: Mod: JZ | Performed by: INTERNAL MEDICINE

## 2025-03-30 PROCEDURE — 96372 THER/PROPH/DIAG INJ SC/IM: CPT | Performed by: INTERNAL MEDICINE

## 2025-03-30 RX ADMIN — FILGRASTIM-SNDZ 480 MCG: 480 INJECTION, SOLUTION INTRAVENOUS; SUBCUTANEOUS at 10:33

## 2025-03-30 NOTE — PROGRESS NOTES
Infusion Nursing Note:  Kt Rasmussen presents today for zarxio.    Patient seen by provider today: No   present during visit today: Not Applicable.    Note: N/A.      Intravenous Access:  No Intravenous access/labs at this visit.    Treatment Conditions:  Not Applicable.      Post Infusion Assessment:  Patient tolerated injection without incident.       Discharge Plan:   Discharge instructions reviewed with: Patient.  Patient and/or family verbalized understanding of discharge instructions and all questions answered.  Patient discharged in stable condition accompanied by: self.  Departure Mode: Wheelchair.      Chelsi Doe RN

## 2025-03-31 ENCOUNTER — DOCUMENTATION ONLY (OUTPATIENT)
Dept: ONCOLOGY | Facility: CLINIC | Age: 65
End: 2025-03-31
Payer: COMMERCIAL

## 2025-03-31 NOTE — PROGRESS NOTES
Patient was in clinic over the weekend and smelled very strongly of urine during multiple visits. Contacted Josep, who is on call for Kt's guardian. Shared concern that Kt's hygiene seems to have declined recently and he may benefit from assistance with hygiene. Josep will relay message to Kt's guardian when he is back in office.  JAYASHREE Noel

## 2025-04-09 ENCOUNTER — APPOINTMENT (OUTPATIENT)
Dept: GENERAL RADIOLOGY | Facility: CLINIC | Age: 65
DRG: 180 | End: 2025-04-09
Attending: EMERGENCY MEDICINE
Payer: COMMERCIAL

## 2025-04-09 ENCOUNTER — HOSPITAL ENCOUNTER (INPATIENT)
Facility: CLINIC | Age: 65
DRG: 180 | End: 2025-04-09
Attending: EMERGENCY MEDICINE
Payer: COMMERCIAL

## 2025-04-09 ENCOUNTER — APPOINTMENT (OUTPATIENT)
Dept: ULTRASOUND IMAGING | Facility: CLINIC | Age: 65
DRG: 180 | End: 2025-04-09
Attending: EMERGENCY MEDICINE
Payer: COMMERCIAL

## 2025-04-09 ENCOUNTER — INFUSION THERAPY VISIT (OUTPATIENT)
Dept: INFUSION THERAPY | Facility: CLINIC | Age: 65
End: 2025-04-09
Attending: NURSE PRACTITIONER
Payer: COMMERCIAL

## 2025-04-09 ENCOUNTER — ONCOLOGY VISIT (OUTPATIENT)
Dept: ONCOLOGY | Facility: CLINIC | Age: 65
End: 2025-04-09
Attending: NURSE PRACTITIONER
Payer: COMMERCIAL

## 2025-04-09 VITALS
RESPIRATION RATE: 18 BRPM | OXYGEN SATURATION: 97 % | HEIGHT: 76 IN | WEIGHT: 175 LBS | DIASTOLIC BLOOD PRESSURE: 81 MMHG | HEART RATE: 94 BPM | BODY MASS INDEX: 21.31 KG/M2 | SYSTOLIC BLOOD PRESSURE: 135 MMHG | TEMPERATURE: 97.7 F

## 2025-04-09 VITALS
BODY MASS INDEX: 21.31 KG/M2 | HEART RATE: 94 BPM | HEIGHT: 76 IN | OXYGEN SATURATION: 90 % | WEIGHT: 175 LBS | RESPIRATION RATE: 18 BRPM | DIASTOLIC BLOOD PRESSURE: 81 MMHG | TEMPERATURE: 97.7 F | SYSTOLIC BLOOD PRESSURE: 135 MMHG

## 2025-04-09 DIAGNOSIS — D70.1 CHEMOTHERAPY-INDUCED NEUTROPENIA: ICD-10-CM

## 2025-04-09 DIAGNOSIS — C34.90 NON-SMALL CELL LUNG CANCER METASTATIC TO BONE (H): ICD-10-CM

## 2025-04-09 DIAGNOSIS — G47.09 OTHER INSOMNIA: ICD-10-CM

## 2025-04-09 DIAGNOSIS — J96.01 ACUTE RESPIRATORY FAILURE WITH HYPOXIA (H): ICD-10-CM

## 2025-04-09 DIAGNOSIS — Z51.11 ENCOUNTER FOR ANTINEOPLASTIC CHEMOTHERAPY: ICD-10-CM

## 2025-04-09 DIAGNOSIS — C79.51 NON-SMALL CELL LUNG CANCER METASTATIC TO BONE (H): ICD-10-CM

## 2025-04-09 DIAGNOSIS — T45.1X5A CHEMOTHERAPY-INDUCED NEUTROPENIA: ICD-10-CM

## 2025-04-09 DIAGNOSIS — C79.51 NON-SMALL CELL LUNG CANCER METASTATIC TO BONE (H): Primary | ICD-10-CM

## 2025-04-09 DIAGNOSIS — J90 BILATERAL PLEURAL EFFUSION: ICD-10-CM

## 2025-04-09 DIAGNOSIS — R35.89 DIURESIS: ICD-10-CM

## 2025-04-09 DIAGNOSIS — I31.39 PERICARDIAL EFFUSION: Primary | ICD-10-CM

## 2025-04-09 DIAGNOSIS — R09.02 HYPOXIA: ICD-10-CM

## 2025-04-09 DIAGNOSIS — Z85.118 HISTORY OF LUNG CANCER: ICD-10-CM

## 2025-04-09 DIAGNOSIS — C34.90 NON-SMALL CELL LUNG CANCER METASTATIC TO BONE (H): Primary | ICD-10-CM

## 2025-04-09 DIAGNOSIS — Z51.11 ENCOUNTER FOR ANTINEOPLASTIC CHEMOTHERAPY: Primary | ICD-10-CM

## 2025-04-09 DIAGNOSIS — C34.90 NON-SMALL CELL CARCINOMA OF LUNG, STAGE 3, UNSPECIFIED LATERALITY (H): Primary | ICD-10-CM

## 2025-04-09 LAB
ALBUMIN SERPL BCG-MCNC: 3.2 G/DL (ref 3.5–5.2)
ALP SERPL-CCNC: 117 U/L (ref 40–150)
ALT SERPL W P-5'-P-CCNC: 13 U/L (ref 0–70)
ANION GAP SERPL CALCULATED.3IONS-SCNC: 11 MMOL/L (ref 7–15)
AST SERPL W P-5'-P-CCNC: 24 U/L (ref 0–45)
ATRIAL RATE - MUSE: 99 BPM
BASOPHILS # BLD AUTO: 0 10E3/UL (ref 0–0.2)
BASOPHILS NFR BLD AUTO: 0 %
BILIRUB SERPL-MCNC: 0.4 MG/DL
BUN SERPL-MCNC: 18.3 MG/DL (ref 8–23)
CALCIUM SERPL-MCNC: 9 MG/DL (ref 8.8–10.4)
CHLORIDE SERPL-SCNC: 103 MMOL/L (ref 98–107)
CREAT SERPL-MCNC: 0.89 MG/DL (ref 0.67–1.17)
DIASTOLIC BLOOD PRESSURE - MUSE: NORMAL MMHG
EGFRCR SERPLBLD CKD-EPI 2021: >90 ML/MIN/1.73M2
EOSINOPHIL # BLD AUTO: 0.2 10E3/UL (ref 0–0.7)
EOSINOPHIL NFR BLD AUTO: 2 %
ERYTHROCYTE [DISTWIDTH] IN BLOOD BY AUTOMATED COUNT: 19.3 % (ref 10–15)
GLUCOSE SERPL-MCNC: 90 MG/DL (ref 70–99)
HCO3 SERPL-SCNC: 23 MMOL/L (ref 22–29)
HCT VFR BLD AUTO: 30 % (ref 40–53)
HGB BLD-MCNC: 9.5 G/DL (ref 13.3–17.7)
IMM GRANULOCYTES # BLD: 0.1 10E3/UL
IMM GRANULOCYTES NFR BLD: 1 %
INTERPRETATION ECG - MUSE: NORMAL
LYMPHOCYTES # BLD AUTO: 0.7 10E3/UL (ref 0.8–5.3)
LYMPHOCYTES NFR BLD AUTO: 10 %
MCH RBC QN AUTO: 28.8 PG (ref 26.5–33)
MCHC RBC AUTO-ENTMCNC: 31.7 G/DL (ref 31.5–36.5)
MCV RBC AUTO: 91 FL (ref 78–100)
MONOCYTES # BLD AUTO: 0.6 10E3/UL (ref 0–1.3)
MONOCYTES NFR BLD AUTO: 9 %
NEUTROPHILS # BLD AUTO: 5.2 10E3/UL (ref 1.6–8.3)
NEUTROPHILS NFR BLD AUTO: 77 %
NRBC # BLD AUTO: 0 10E3/UL
NRBC BLD AUTO-RTO: 0 /100
NT-PROBNP SERPL-MCNC: 495 PG/ML (ref 0–900)
P AXIS - MUSE: 52 DEGREES
PLATELET # BLD AUTO: 122 10E3/UL (ref 150–450)
POTASSIUM SERPL-SCNC: 4.1 MMOL/L (ref 3.4–5.3)
PR INTERVAL - MUSE: 120 MS
PROT SERPL-MCNC: 5.9 G/DL (ref 6.4–8.3)
QRS DURATION - MUSE: 80 MS
QT - MUSE: 326 MS
QTC - MUSE: 418 MS
R AXIS - MUSE: 81 DEGREES
RBC # BLD AUTO: 3.3 10E6/UL (ref 4.4–5.9)
SODIUM SERPL-SCNC: 137 MMOL/L (ref 135–145)
SYSTOLIC BLOOD PRESSURE - MUSE: NORMAL MMHG
T AXIS - MUSE: -60 DEGREES
TROPONIN T SERPL HS-MCNC: 22 NG/L
TROPONIN T SERPL HS-MCNC: 34 NG/L
VENTRICULAR RATE- MUSE: 99 BPM
WBC # BLD AUTO: 6.7 10E3/UL (ref 4–11)

## 2025-04-09 PROCEDURE — 93005 ELECTROCARDIOGRAM TRACING: CPT

## 2025-04-09 PROCEDURE — 36591 DRAW BLOOD OFF VENOUS DEVICE: CPT | Performed by: INTERNAL MEDICINE

## 2025-04-09 PROCEDURE — 36415 COLL VENOUS BLD VENIPUNCTURE: CPT | Performed by: EMERGENCY MEDICINE

## 2025-04-09 PROCEDURE — 83880 ASSAY OF NATRIURETIC PEPTIDE: CPT | Performed by: EMERGENCY MEDICINE

## 2025-04-09 PROCEDURE — 71046 X-RAY EXAM CHEST 2 VIEWS: CPT

## 2025-04-09 PROCEDURE — 82040 ASSAY OF SERUM ALBUMIN: CPT | Performed by: INTERNAL MEDICINE

## 2025-04-09 PROCEDURE — 96413 CHEMO IV INFUSION 1 HR: CPT

## 2025-04-09 PROCEDURE — 250N000009 HC RX 250: Performed by: STUDENT IN AN ORGANIZED HEALTH CARE EDUCATION/TRAINING PROGRAM

## 2025-04-09 PROCEDURE — 250N000011 HC RX IP 250 OP 636: Performed by: PHYSICIAN ASSISTANT

## 2025-04-09 PROCEDURE — 84484 ASSAY OF TROPONIN QUANT: CPT | Performed by: EMERGENCY MEDICINE

## 2025-04-09 PROCEDURE — 120N000001 HC R&B MED SURG/OB

## 2025-04-09 PROCEDURE — 96375 TX/PRO/DX INJ NEW DRUG ADDON: CPT

## 2025-04-09 PROCEDURE — 0W9B3ZZ DRAINAGE OF LEFT PLEURAL CAVITY, PERCUTANEOUS APPROACH: ICD-10-PCS | Performed by: STUDENT IN AN ORGANIZED HEALTH CARE EDUCATION/TRAINING PROGRAM

## 2025-04-09 PROCEDURE — 99223 1ST HOSP IP/OBS HIGH 75: CPT | Performed by: PHYSICIAN ASSISTANT

## 2025-04-09 PROCEDURE — 250N000013 HC RX MED GY IP 250 OP 250 PS 637: Performed by: PHYSICIAN ASSISTANT

## 2025-04-09 PROCEDURE — 85004 AUTOMATED DIFF WBC COUNT: CPT | Performed by: INTERNAL MEDICINE

## 2025-04-09 PROCEDURE — G0463 HOSPITAL OUTPT CLINIC VISIT: HCPCS | Performed by: NURSE PRACTITIONER

## 2025-04-09 PROCEDURE — 272N000021 US THORACENTESIS BILATERAL

## 2025-04-09 PROCEDURE — 0W993ZZ DRAINAGE OF RIGHT PLEURAL CAVITY, PERCUTANEOUS APPROACH: ICD-10-PCS | Performed by: STUDENT IN AN ORGANIZED HEALTH CARE EDUCATION/TRAINING PROGRAM

## 2025-04-09 PROCEDURE — 258N000003 HC RX IP 258 OP 636: Performed by: INTERNAL MEDICINE

## 2025-04-09 PROCEDURE — 250N000011 HC RX IP 250 OP 636: Performed by: INTERNAL MEDICINE

## 2025-04-09 PROCEDURE — 99285 EMERGENCY DEPT VISIT HI MDM: CPT | Mod: 25

## 2025-04-09 RX ORDER — ATORVASTATIN CALCIUM 40 MG/1
40 TABLET, FILM COATED ORAL DAILY
Status: ON HOLD | COMMUNITY

## 2025-04-09 RX ORDER — CALCIUM CARBONATE 500 MG/1
1000 TABLET, CHEWABLE ORAL 4 TIMES DAILY PRN
Status: DISCONTINUED | OUTPATIENT
Start: 2025-04-09 | End: 2025-04-16 | Stop reason: HOSPADM

## 2025-04-09 RX ORDER — LIDOCAINE HYDROCHLORIDE 10 MG/ML
10 INJECTION, SOLUTION EPIDURAL; INFILTRATION; INTRACAUDAL; PERINEURAL ONCE
Status: COMPLETED | OUTPATIENT
Start: 2025-04-09 | End: 2025-04-09

## 2025-04-09 RX ORDER — ACETAMINOPHEN 650 MG/1
650 SUPPOSITORY RECTAL EVERY 4 HOURS PRN
Status: DISCONTINUED | OUTPATIENT
Start: 2025-04-09 | End: 2025-04-16 | Stop reason: HOSPADM

## 2025-04-09 RX ORDER — ONDANSETRON 2 MG/ML
4 INJECTION INTRAMUSCULAR; INTRAVENOUS EVERY 6 HOURS PRN
Status: DISCONTINUED | OUTPATIENT
Start: 2025-04-09 | End: 2025-04-16 | Stop reason: HOSPADM

## 2025-04-09 RX ORDER — NALOXONE HYDROCHLORIDE 0.4 MG/ML
0.2 INJECTION, SOLUTION INTRAMUSCULAR; INTRAVENOUS; SUBCUTANEOUS
Status: DISCONTINUED | OUTPATIENT
Start: 2025-04-09 | End: 2025-04-16 | Stop reason: HOSPADM

## 2025-04-09 RX ORDER — AMOXICILLIN 250 MG
2 CAPSULE ORAL 2 TIMES DAILY PRN
Status: DISCONTINUED | OUTPATIENT
Start: 2025-04-09 | End: 2025-04-16 | Stop reason: HOSPADM

## 2025-04-09 RX ORDER — ONDANSETRON 2 MG/ML
8 INJECTION INTRAMUSCULAR; INTRAVENOUS ONCE
Status: COMPLETED | OUTPATIENT
Start: 2025-04-09 | End: 2025-04-09

## 2025-04-09 RX ORDER — HEPARIN SODIUM (PORCINE) LOCK FLUSH IV SOLN 100 UNIT/ML 100 UNIT/ML
5-10 SOLUTION INTRAVENOUS
Status: DISCONTINUED | OUTPATIENT
Start: 2025-04-09 | End: 2025-04-16 | Stop reason: HOSPADM

## 2025-04-09 RX ORDER — HYDROMORPHONE HCL IN WATER/PF 6 MG/30 ML
0.4 PATIENT CONTROLLED ANALGESIA SYRINGE INTRAVENOUS
Status: DISCONTINUED | OUTPATIENT
Start: 2025-04-09 | End: 2025-04-16 | Stop reason: HOSPADM

## 2025-04-09 RX ORDER — POLYETHYLENE GLYCOL 3350 17 G/17G
17 POWDER, FOR SOLUTION ORAL 2 TIMES DAILY PRN
Status: DISCONTINUED | OUTPATIENT
Start: 2025-04-09 | End: 2025-04-16 | Stop reason: HOSPADM

## 2025-04-09 RX ORDER — LIDOCAINE 40 MG/G
CREAM TOPICAL
Status: DISCONTINUED | OUTPATIENT
Start: 2025-04-09 | End: 2025-04-16 | Stop reason: HOSPADM

## 2025-04-09 RX ORDER — NALOXONE HYDROCHLORIDE 0.4 MG/ML
0.4 INJECTION, SOLUTION INTRAMUSCULAR; INTRAVENOUS; SUBCUTANEOUS
Status: DISCONTINUED | OUTPATIENT
Start: 2025-04-09 | End: 2025-04-16 | Stop reason: HOSPADM

## 2025-04-09 RX ORDER — HEPARIN SODIUM,PORCINE 10 UNIT/ML
5-20 VIAL (ML) INTRAVENOUS DAILY PRN
Status: DISCONTINUED | OUTPATIENT
Start: 2025-04-09 | End: 2025-04-09 | Stop reason: HOSPADM

## 2025-04-09 RX ORDER — ACETAMINOPHEN 325 MG/1
650 TABLET ORAL EVERY 4 HOURS PRN
Status: DISCONTINUED | OUTPATIENT
Start: 2025-04-09 | End: 2025-04-16 | Stop reason: HOSPADM

## 2025-04-09 RX ORDER — HEPARIN SODIUM,PORCINE 10 UNIT/ML
5-10 VIAL (ML) INTRAVENOUS
Status: DISCONTINUED | OUTPATIENT
Start: 2025-04-09 | End: 2025-04-16 | Stop reason: HOSPADM

## 2025-04-09 RX ORDER — HEPARIN SODIUM,PORCINE 10 UNIT/ML
5-10 VIAL (ML) INTRAVENOUS EVERY 24 HOURS
Status: DISCONTINUED | OUTPATIENT
Start: 2025-04-09 | End: 2025-04-16 | Stop reason: HOSPADM

## 2025-04-09 RX ORDER — AMOXICILLIN 250 MG
1 CAPSULE ORAL 2 TIMES DAILY PRN
Status: DISCONTINUED | OUTPATIENT
Start: 2025-04-09 | End: 2025-04-16 | Stop reason: HOSPADM

## 2025-04-09 RX ORDER — HYDROMORPHONE HCL IN WATER/PF 6 MG/30 ML
0.2 PATIENT CONTROLLED ANALGESIA SYRINGE INTRAVENOUS
Status: DISCONTINUED | OUTPATIENT
Start: 2025-04-09 | End: 2025-04-16 | Stop reason: HOSPADM

## 2025-04-09 RX ORDER — BISACODYL 10 MG
10 SUPPOSITORY, RECTAL RECTAL DAILY PRN
Status: DISCONTINUED | OUTPATIENT
Start: 2025-04-09 | End: 2025-04-16 | Stop reason: HOSPADM

## 2025-04-09 RX ORDER — ONDANSETRON 4 MG/1
4 TABLET, ORALLY DISINTEGRATING ORAL EVERY 6 HOURS PRN
Status: DISCONTINUED | OUTPATIENT
Start: 2025-04-09 | End: 2025-04-16 | Stop reason: HOSPADM

## 2025-04-09 RX ORDER — OXYCODONE HYDROCHLORIDE 5 MG/1
5 TABLET ORAL EVERY 4 HOURS PRN
Status: DISCONTINUED | OUTPATIENT
Start: 2025-04-09 | End: 2025-04-16 | Stop reason: HOSPADM

## 2025-04-09 RX ORDER — PROCHLORPERAZINE MALEATE 5 MG/1
5 TABLET ORAL EVERY 6 HOURS PRN
Status: DISCONTINUED | OUTPATIENT
Start: 2025-04-09 | End: 2025-04-16 | Stop reason: HOSPADM

## 2025-04-09 RX ADMIN — APIXABAN 2.5 MG: 2.5 TABLET, FILM COATED ORAL at 22:01

## 2025-04-09 RX ADMIN — LIDOCAINE HYDROCHLORIDE ANHYDROUS 10 ML: 10 INJECTION, SOLUTION INFILTRATION at 15:48

## 2025-04-09 RX ADMIN — Medication 5 ML: at 22:04

## 2025-04-09 RX ADMIN — ONDANSETRON 8 MG: 2 INJECTION INTRAMUSCULAR; INTRAVENOUS at 11:57

## 2025-04-09 RX ADMIN — HEPARIN, PORCINE (PF) 10 UNIT/ML INTRAVENOUS SYRINGE 5 ML: at 12:30

## 2025-04-09 RX ADMIN — LIDOCAINE HYDROCHLORIDE ANHYDROUS 10 ML: 10 INJECTION, SOLUTION INFILTRATION at 15:21

## 2025-04-09 RX ADMIN — SODIUM CHLORIDE 250 ML: 0.9 INJECTION, SOLUTION INTRAVENOUS at 11:57

## 2025-04-09 RX ADMIN — GEMCITABINE 1600 MG: 38 INJECTION, SOLUTION INTRAVENOUS at 12:04

## 2025-04-09 ASSESSMENT — ACTIVITIES OF DAILY LIVING (ADL)
ADLS_ACUITY_SCORE: 48
ADLS_ACUITY_SCORE: 48
ADLS_ACUITY_SCORE: 29
ADLS_ACUITY_SCORE: 48
ADLS_ACUITY_SCORE: 28
ADLS_ACUITY_SCORE: 48

## 2025-04-09 ASSESSMENT — PAIN SCALES - GENERAL: PAINLEVEL_OUTOF10: NO PAIN (0)

## 2025-04-09 NOTE — PROGRESS NOTES
Infusion Nursing Note:  Kt Rasmussen presents today for Labs+Gemzar.    Patient seen by provider today: Yes: Juan Rausch NP   present during visit today: Not Applicable.    Note:Pt noted to be hypoxic on arrival; O2 fluctuating from 85-90% on RA. Juan was notified. Pt was seen and assessed by Juan Rausch NP prior to infusion. Per Juan Rausch NP, writer received order to give chemo today, then send pt to the ER. Pt was informed, and agrees with treatment plan. Pt discharged via wheelchair in stable conditions to the ER.       Intravenous Access:  Labs drawn without difficulty.  Implanted Port.    Treatment Conditions:  Lab Results   Component Value Date    HGB 9.5 (L) 04/09/2025    WBC 6.7 04/09/2025    ANEU 0.5 (L) 08/11/2022    ANEUTAUTO 5.2 04/09/2025     (L) 04/09/2025        Lab Results   Component Value Date     04/09/2025    POTASSIUM 4.1 04/09/2025    MAG 1.9 10/13/2016    CR 0.89 04/09/2025    BEVERLY 9.0 04/09/2025    BILITOTAL 0.4 04/09/2025    ALBUMIN 3.2 (L) 04/09/2025    ALT 13 04/09/2025    AST 24 04/09/2025       Results reviewed, labs MET treatment parameters, ok to proceed with treatment.      Post Infusion Assessment:  Patient tolerated infusion without incident.  Blood return noted pre and post infusion.  Site patent and intact, free from redness, edema or discomfort.  No evidence of extravasations.  Access discontinued per protocol.       Discharge Plan:   Discharge instructions reviewed with: Patient.  Patient and/or family verbalized understanding of discharge instructions and all questions answered.  AVS to patient via [employer].  Patient will return 6/11/25 for next appointment.   Patient discharged in stable condition accompanied by: self.  Departure Mode: Wheelchair.      Dayana Hedrick RN

## 2025-04-09 NOTE — H&P
[facility]  History and Physical - Hospitalist Service       Date of Admission:  4/9/2025  PRIMARY CARE PROVIDER:    No Ref-Primary, Physician    Assessment & Plan   Kt Rasmussen is a 64 year old male admitted on 4/9/2025 due to acute hypoxic respiratory failure secondary to bilateral (right greater than left) moderate to large pleural effusions with presumed atelectasis.      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    Patient presented to the ED after being at the infusion center and seen by [facility].  While being assessed at the infusion center the patient reported a weeklong history of worsening shortness of breath.  He indicated that he has been compliant with his prescribed Eliquis.  Patient with known history of pleural effusion and pericardial effusion requiring thoracentesis with 1.9 L of clear yellow fluid removed on 1/6/2025.  Patient was found to be mildly hypoxic with O2 saturation of 88% and was placed on supplemental oxygen at 3 L/min with improvement of O2 saturation to 96%.  Due to his history and symptoms he was advised to seek further medical evaluation at [facility] ED.    Work-up in the ED included a CMP that revealed a albumin of 3.2 and total protein of 5.9 otherwise unremarkable.  CBC with differential revealed a hemoglobin of 9.5, hematocrit of 30, platelet count of 122, RBC count of 3.3, RDW of 19.3 and absolute lymphocytes of 0.7 otherwise unremarkable.  ProBNP was negative at 495.  Troponin T was negative at 22.  EKG  showed sinus rhythm.  Two-view chest x-ray revealed right greater than left moderate to large pleural effusion with adjacent presumed atelectasis, interstitial pulmonary edema is noted but no evidence of discernible pneumothorax, right chest port tip appears similar to prior studies and heart borders are mostly obscured.     Acute hypoxic respiratory failure  *Baseline patient does not require supplemental O2.  Currently requiring 3 L/m per nasal cannula.    - Supplemental O2 available; wean as able.    - Encourage use of IS/Flutter valve.    - Proceed with US thoracentesis.    - SW/CM consult requested for discharge planning.      Bilateral (right greater than left) moderate to large pleural effusions   Presumed atelectasis  - US guided thoracentesis ordered in the ED.    --Completed with reported 1.1 L removed from the left and 2 L removed from the right.    - ECHO ordered with reported history of pericardial effusion.      Metastatic non-small cell lung cancer (mets to lymph nodes, bones and bilateral lungs)  *Follows with [facility] and underwent infusion GEMZAR on day of admission.    - No interventions at this time.      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      Chronic anticoagulation therapy secondary to bilateral lower extremity DVT  - Resumed on PTA Eliquis 2.5 mg BID.      Chronic anemia secondary to chemo  Chronic thrombocytopenia secondary to chemo  - CBC ordered for 4/10.      CAD s/p cardiac stenting  HTN  HLP  GERD  MDD with anxiety  Seizure D/O  History of alcohol use D/O  History of substance use D/O  History of CVA with right sided hemiplegia  *Diagnoses noted on chart review for which the patient is no longer on any medications.        Clinically Significant Risk Factors Present on Admission               # Hypoalbuminemia: Lowest albumin = 3.2 g/dL at 4/9/2025 11:21 AM, will monitor as  appropriate  # Drug Induced Coagulation Defect: home medication list includes an anticoagulant medication  # Thrombocytopenia: Lowest platelets = 122 in last 2 days, will monitor for bleeding   # Hypertension: Noted on problem list      # Anemia: based on hgb <11    Diet: Regular Diet  DVT Prophylaxis: DOAC  Buckner Catheter: Not present  Lines: PRESENT         Cardiac Monitoring: ORDERED  Code Status: FULL CODE; confirmed with legal guardian (Sky)  Ambulation: Wheelchair bound       Disposition Plan   Inpatient status.  Anticipate greater than 2 evening hospitalization while undergoing continued work-up/management of acute hypoxic respiratory failure secondary to bilateral (right greater than left) moderate to large pleural effusions with presumed atelectasis.      Medically Ready for Discharge: Anticipated in 2-4 Days    The patient's care was discussed with the Bedside Nurse, Patient, and Dr. Cooper .  Reviewed ED notes and outpatient Oncology note.      The patient has been discussed with Dr. Bergman, who agrees with the assessment and plan at this time.    Jose Gillette PA-C  [facility]  Securely message with the Wireless Safety Web Console (learn more here)    ______________________________________________________________________    Chief Complaint   Shortness of breath and noted to be hypoxic at infusion center    History is obtained from Dr. Cooper, the patient and EMR.      History of Present Illness   Kt Rasmussen is a 64 year old male admitted on 4/9/2025 due to acute hypoxic respiratory failure secondary to bilateral (right greater than left) moderate to large pleural effusions with presumed atelectasis.      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    Patient presented to the ED after being at the infusion center and seen by [facility].  While being assessed at the infusion center the patient reported a weeklong history of worsening shortness of breath.  He indicated that he has been compliant with his prescribed Eliquis.  Patient with known history of pleural effusion and pericardial effusion requiring thoracentesis with 1.9 L of clear yellow fluid removed on 1/6/2025.  Patient was found to be mildly hypoxic with O2 saturation of 88% and was placed on supplemental oxygen at 3 L/min with improvement of O2 saturation to 96%.  Due to his history and symptoms he was advised to seek further medical evaluation at [facility] ED.    Work-up in the ED included a CMP that revealed a albumin of 3.2 and total protein of 5.9 otherwise unremarkable.  CBC with differential revealed a hemoglobin of 9.5, hematocrit of 30, platelet count of 122, RBC count of 3.3, RDW of 19.3 and absolute lymphocytes of 0.7 otherwise unremarkable.  ProBNP was negative at 495.  Troponin T was negative at 22.  EKG showed sinus rhythm.  Two-view chest x-ray revealed right greater than left moderate to large pleural effusion with adjacent presumed atelectasis, interstitial pulmonary edema is noted but no evidence of discernible pneumothorax, right chest port tip appears similar to prior studies and heart borders are mostly obscured.     Prior to seeing the patient spoke with legal guardian and reviewed that patient is currently full code.  Discussed overall plan regarding admission to the hospital.    Patient was seen in the ED where he was resting comfortably on the gurney upon arrival.  He had just returned from ultrasound and underwent bilateral thoracentesis.  He continues  to require supplemental oxygen at 3 L.  Reviewed plan regarding coming into the hospital.    Upon questioning, patient indicated that he has been feeling short of breath and reported having a slightly productive cough.  He indicated experiencing some degree of chest pressure due to his shortness of breath as well as chest palpitations.  He occasionally feels lightheaded.    Past Medical History    I have reviewed this patient's medical history and updated it with pertinent information if needed.   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 Taking?   apixaban ANTICOAGULANT (ELIQUIS) 2.5 MG tablet   Yes No   Sig: Take 2.5 mg by mouth.   atorvastatin (LIPITOR) 40 MG tablet   No No   Sig: Take 1 tablet (40 mg) by mouth daily for 90 days   folic acid (FOLVITE) 1 MG tablet   No No   Sig: Take 1 tablet (1 mg) by mouth daily   methocarbamol (ROBAXIN) 750 MG tablet   Yes No      Facility-Administered Medications: None     Allergies   Allergies   Allergen Reactions    No Known Drug Allergy        SOCIAL HISTORY:  Patient resides in [facility] living facility [facility] in [place], [place].  He is a former smoker who quit approximately 15 years ago.   He reported that he no longer consumes alcohol and quit this 15 years ago.  He also has a remote history of substance use with marijuana.  He is currently wheelchair-bound.    Physical Exam   Vital Signs: Temp: 97.8  F (36.6  C) Temp src: Temporal BP: 130/82 Pulse: 95   Resp: 20 SpO2: 94 % O2 Device: Nasal cannula Oxygen Delivery: 3 LPM  Weight: 170 lbs 0 oz    Constitutional: Awake, alert, cooperative, no apparent distress.    ENT: Normocephalic, without obvious abnormality, atraumatic, oral pharynx with moist mucus membranes.  Eyes extra occular movements intact.  Normal sclera.    Neck: Supple, symmetrical, trachea midline, no adenopathy.  Pulmonary: Currently on supplemental O2 at 3 L/m.  No increased work of breathing, diminished breath sounds at the bases.  No wheezes, rhonchi or crackles appreciated.    Cardiovascular: Regular rate and rhythm, normal S1 and S2, no S3 or S4, and no murmur noted.  GI: Normal bowel sounds, soft, non-distended, non-tender.    Skin/Integumen: Visualized skin appeared clear.  Neuro: CN II-XII grossly intact.  Chronic right sided deficits noted (contracted right upper extremity)  Extremities: No lower extremity edema noted, and calves are non-tender to palpation bilaterally.     Medical Decision Making       Please see A&P for additional details of medical decision making.  75 MINUTES SPENT BY ME on the date of service doing chart review, history, exam, documentation & further activities per the note.       Data   Data reviewed today: I reviewed all medications, new labs and imaging results over the last 24 hours. I personally reviewed the EKG tracing showing sinus rhythm .      I have personally reviewed the following data over the past 24 hrs:    6.7  \   9.5 (L)   / 122 (L)     137 103 18.3 /  90   4.1 23 0.89 \     ALT: 13 AST: 24 AP: 117 TBILI: 0.4   ALB: 3.2 (L) TOT PROTEIN: 5.9 (L) LIPASE: N/A     Trop: 22 BNP: 495       Imaging results reviewed over the past 24 hrs:   Recent  Results (from the past 24 hours)   XR Chest 2 Views    Narrative    EXAM: XR CHEST 2 VIEWS  LOCATION: [facility]  DATE: 4/9/2025    INDICATION: sob  COMPARISON: Chest radiograph 12/26/2024.      Impression    IMPRESSION: Right greater than left moderate to large pleural effusions with adjacent presumed atelectasis. Interstitial pulmonary edema. No discernible pneumothorax. Heart borders are mostly obscured. Right chest port tip appears similar to prior.

## 2025-04-09 NOTE — ED TRIAGE NOTES
Was at Cancer clinic for Chemo. States has been SOB x2 weeks. Was brought on 3L o2. Is not normally on oxygen at baseline.  has fluid in his lungs.       Triage Assessment (Adult)       Row Name 04/09/25 1251          Triage Assessment    Airway WDL WDL        Respiratory WDL    Respiratory WDL X  SOB        Skin Circulation/Temperature WDL    Skin Circulation/Temperature WDL WDL        Cardiac WDL    Cardiac WDL WDL        Peripheral/Neurovascular WDL    Peripheral Neurovascular WDL WDL        Cognitive/Neuro/Behavioral WDL    Cognitive/Neuro/Behavioral WDL X  Rt sided defecits from stroke, A&Ox4

## 2025-04-09 NOTE — ED NOTES
"[facility]  ED Nurse Handoff Report    ED Chief complaint: Shortness of Breath      ED Diagnosis:   Final diagnoses:   Bilateral pleural effusion   Hypoxia   History of lung cancer       Code Status: Full Code    Allergies:   Allergies   Allergen Reactions    No Known Drug Allergy        Patient Story: Pt is 64 year old male, who presents to the ED with shortness of breath. He states that over the past week he has suffered gradually worsening shortness of breath. Today while receiving an infusion at his oncology clinic he was hypoxic, requiring they place him on 3L O2 en route to the ED. He does not use home oxygen. In the ED he endorses chest pain he states is attributable to fluid over his lungs. Patient has a port in place.   Focused Assessment:  Pt is awake, alert to person, place and situation     Treatments and/or interventions provided: Labs, US      Patient's response to treatments and/or interventions: Pt tolerated well     To be done/followed up on inpatient unit:      Does this patient have any cognitive concerns?:     Activity level - Baseline/Home:  Unknown  Activity Level - Current:   Unknown    Patient's Preferred language: English   Needed?: No    Isolation: None  Infection: Not Applicable  Other   Patient tested for COVID 19 prior to admission: NO  Bariatric?: No    Vital Signs:   Vitals:    04/09/25 1250 04/09/25 1449 04/09/25 1451   BP: 130/82 (!) 151/80    Pulse: 95 87    Resp: 20     Temp: 97.8  F (36.6  C)     TempSrc: Temporal     SpO2: 94%  92%   Weight: 77.1 kg (170 lb)     Height: 1.918 m (6' 3.5\")         Cardiac Rhythm:     Was the PSS-3 completed:   Yes  What interventions are required if any?               Family Comments: No family at bedside   OBS brochure/video discussed/provided to patient/family: No              Name of person given brochure if not patient:               Relationship to patient:     For the majority of the shift this patient's behavior " was Green.     Behavioral interventions performed were: None   .    ED NURSE PHONE NUMBER: *[phone]

## 2025-04-09 NOTE — PHARMACY-ADMISSION MEDICATION HISTORY
Pharmacist Admission Medication History    Admission medication history is complete. The information provided in this note is only as accurate as the sources available at the time of the update.    Information Source(s): Patient and CareEverywhere/SureScripts via in-person    Pertinent Information: None    Changes made to PTA medication list:  Added: None  Deleted: None  Changed: None    Allergies reviewed with patient and updates made in EHR: yes    Medication History Completed By: Nuris Stewart RPH 4/9/2025 4:58 PM    PTA Med List   Medication Sig Last Dose/Taking    apixaban ANTICOAGULANT (ELIQUIS) 2.5 MG tablet Take 2.5 mg by mouth 2 times daily. 4/9/2025 Morning    atorvastatin (LIPITOR) 40 MG tablet Take 40 mg by mouth daily. 4/9/2025 Morning    folic acid (FOLVITE) 1 MG tablet Take 1 tablet (1 mg) by mouth daily 4/9/2025 Morning    methocarbamol (ROBAXIN) 750 MG tablet Take 750 mg by mouth 3 times daily as needed for muscle spasms. 4/9/2025 Morning

## 2025-04-09 NOTE — ED NOTES
US reported that patient had bilateral thoracentesis, removed 1.1 L from left side, 2 L from right side. Patient tolerated well, returning to ED shortly after.

## 2025-04-09 NOTE — PROGRESS NOTES
Oncology/Hematology Visit Note  Apr 9, 2025    Reason for Visit: follow up of metastatic non-small cell lung carcinoma  Metastatic to lymph nodes bones in bilateral lungs  Brain MRI negative for mets    Patient met with Dr. John who recommended treatment with palliative intent with paclitaxel carboplatin Avastin and atezolizumab-treatment started on April 27, 2021  -Due to thrombocytopenia carboplatin AUC reduced to 4 with cycle 2  Due to pancytopenia carboplatin discontinued with cycle 5    7/12/2021: PET/CT showed resolved mural nodules with increased cystic cavity in left lower lobe with no significant FDG uptake, less likely metastases; no enlarged hypermetabolic mediastinal, hilar, or axillary lymph nodes, decreased metabolic activity of intraosseous lesion in spine and pelvis; no new bone lesions    Treated with paclitaxel, Avastin and atezolizumab.-Last treatment given in  12/22/2021-cycle 12    1/10/2022: PET/CT showed new foci of abnormal skeletal FGD uptake in the thoracic and lumbar spine. Mild interval increase in intensity of previously demonstrated hypermetabolic skeletal lesions involving the pelvis and lumbar spine. Findings are consistent with progressive osseous metastatic disease. Subsequently off chemo for 1 month due to poor performance status.  16. 1/25/2022: Patient fell and fractured his femur. This was surgically repaired and he was subsequently cared for at a rehab unit.  17. 2/14/2022: Brain MRI without contrast (contrast not given due to inability to obtain IV access) showed no brain metastases.    Met with Dr. Shoemaker-in Dr. John's absence  -Recommendation is to change therapy   04/21/2022 -palliative Carboplatin Alimta started   Due to cytopenia AUC reduced to 4 with cycle 2  Due to persistent pancytopenia and inability to tolerate treatment carboplatin discontinued with cycle 3-day 1  Patient was on monotherapy with Alimta    07/18/2022-PET CT scan shows progression of the  disease  08/03/22-started Taxotere 60 mg/m2 every 3 weeks    06/2024-scan reveals progression of the disease  Treatment changed to single agent Alimta     01/29/2025-Due to pleural effusions and moderate pericardial effusion treatment changed to single agent  gemcitabine  Currently patient is getting treatment with single agent gemcitabine 800 mg/m  day 1 and day 8      Interval History:  Orts for about a week he noted the increasing shortness of breath.  Patient reports he is taking Eliquis as prescribed.  Pt  denies cough, chest pain, denies fever chills sweats.  Denies nausea vomiting diarrhea abdominal pain or bleeding    Review of Systems:  14 point ROS of systems including Constitutional, Eyes, Respiratory, Cardiovascular, Gastroenterology, Genitourinary, Integumentary, Muscularskeletal, Psychiatric were all negative except for pertinent positives noted in my HPI.    Physical Examination:  Physical Exam  HENT:      Right Ear: Tympanic membrane normal.      Nose: Nose normal.      Mouth/Throat:      Mouth: Mucous membranes are moist.   Eyes:      Pupils: Pupils are equal, round, and reactive to light.   Cardiovascular:      Rate and Rhythm: Normal rate.      Pulses: Normal pulses.   Pulmonary:      Effort: Pulmonary effort is normal.   Abdominal:      General: Abdomen is flat.   Musculoskeletal:         General: Normal range of motion.      Cervical back: Normal range of motion.   Skin:     General: Skin is warm.   Neurological:      General: No focal deficit present.      Mental Status: He is alert.   Psychiatric:         Mood and Affect: Mood normal.           Laboratory Data:  CBC and CMP results reviewed        Assessment and Plan:    metastatic non-small cell lung cancer   Patient follows with Dr John   Progressed on different treatments .  Developed pleural and pericardial effusions  Per Dr. John's note Alimta might be causing pleural and pericardial effusions therefore treatment changed to single agent  gemcitabine day 1 and day 8  01/29/2025- gemcitabine started   Due to neutropenia gemcitabine reduced to 800 mg/m  with cycle 2-day 8 on March 4, 2025  -Dr John Recommended adding Neupogen shots after each cycle for 3 days  -Labs reviewed today  Okay to proceed with cycle 4 day 1 gemcitabine 800 mg/m   -Schedule for Neupogen shot on Friday Saturday and Sundays  - 4/28 PET scan   - 5/5 Dr. John    Shortness of breath-patient reports worsening of symptom  Patient with history of pleural effusion and pericardial effusion  01/06/2025-status post thoracentesis-1.9 L of clear yellow fluid removed  Saturation 88% on room air-patient placed on oxygen Sao2 96%  Patient will go to the ER for evaluation he will most likely need another thoracentesis    Bilateral lower extremity DVT  --Diagnosed in 9/2023, likely hypercoagulability of malignancy.  --Continue apixaban indefinitely, provided no major bleeding issues arise in the future a  -side effects of eliquis reviewed     Anemia secondary to treatment  Patient is asymptomatic  Transfuse for hemoglobin less than 8 or symptomatic    Thrombocytopenia secondary to chemo  Patient denies bleeding or bruising  Monitor closely for now    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  01/2021-scope done by ENT no evidence of recurrence  Continue close follow-up by ENT  Patient has dysphonia and some dysphagia     History of CVA  -02/14/2022-MRI of the brain did not reveal brain metastasis      Please [facility] clinic with changes in health condition.      MADHAVI Moran [facility] [place]     Chart documentation with Dragon Voice recognition Software. Although reviewed after completion, some words and grammatical errors may remain.

## 2025-04-09 NOTE — ED PROVIDER NOTES
Emergency Department Note      History of Present Illness     Chief Complaint   Shortness of Breath      HPI   Kt Rasmussen is a 64 year old male, anticoagulated on Eliquis, with a history of cerebral infarction, DVT, non-small cell carcinoma of lung, larynx cancer, and hypertension amongst others presenting to the ED with shortness of breath. He states that over the past week he has suffered gradually worsening shortness of breath. Today while receiving an infusion at his oncology clinic he was found to be hypoxic at 88% on room air.  He was thus placed on 3L of oxygen by nasal cannula and referred here. He does not use home oxygen. In the ED he endorses chest pain he states is attributable to fluid in his lungs. He denies nausea, vomiting, diarrhea, cough, fever, or leg swelling. He denies any missed doses of his Eliquis. He has not had a meal or fluids today.      Independent Historian   None    Review of External Notes   I reviewed the oncology clinic note from today.     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  Chemotherapy induced neutropenia  DVT  Expressive aphasia  Depression  Aphasia as late effect of stroke  Lung nodule  Mediastinal mass  S/p coronary artery stent placement   Cocaine abuse  Psychoactive substance use disorder  Anemia  Seizure disorder  TBI  Aspiration pneumonia  Gastrointestinal bleeding    Medications   Eliquis  Lipitor  Folvite  Robaxin    Surgical History   Past Surgical History:   Procedure Laterality Date    BRONCHOSCOPY FLEXIBLE AND RIGID N/A 5/5/2016    Procedure: BRONCHOSCOPY FLEXIBLE AND RIGID;  Surgeon: Tony Talbot MD;  Location: [facility] OR    ESOPHAGOSCOPY FLEXIBLE N/A 9/30/2019    Procedure: flexible esophagoscopy;  Surgeon: Lizzy Johnson MD;  Location: [facility] OR    INJECT  "STEROID (LOCATION) N/A 9/30/2019    Procedure: steroid injection;  Surgeon: Lizzy Johnson MD;  Location: [facility] OR    IR CHEST PORT PLACEMENT > 5 YRS OF AGE  4/22/2021    IR CHEST PORT PLACEMENT > 5 YRS OF AGE  4/11/2022    IR PORT CHECK RIGHT  4/11/2022    IR PORT REMOVAL RIGHT  4/11/2022    LASER CO2 LARYNGOSCOPY N/A 9/30/2019    Procedure: Microdirect laryngoscopy with excision of laryngeal mass, CO2 laser;  Surgeon: Lizzy Johnson MD;  Location:  OR    [facility] NONSPECIFIC PROCEDURE      R tympanoplasty       Physical Exam     Patient Vitals for the past 24 hrs:   BP Temp Temp src Pulse Resp SpO2 Height Weight   04/09/25 1645 121/78 -- -- 86 -- -- -- --   04/09/25 1630 129/80 -- -- 89 -- -- -- --   04/09/25 1619 139/76 -- -- 91 -- -- -- --   04/09/25 1451 -- -- -- -- -- 92 % -- --   04/09/25 1449 (!) 151/80 -- -- 87 -- -- -- --   04/09/25 1250 130/82 97.8  F (36.6  C) Temporal 95 20 94 % 1.918 m (6' 3.5\") 77.1 kg (170 lb)     Physical Exam  Nursing note and vitals reviewed.  Constitutional:  Oriented to person, place, and time. Cooperative.  On oxygen by nasal cannula.  HENT:   Nose:    Nose normal.   Mouth/Throat:   Mucous membranes are normal.   Eyes:    Conjunctivae normal and EOM are normal.      Pupils are equal, round, and reactive to light.   Neck:    Trachea normal.   Cardiovascular:  Normal rate, regular rhythm, normal heart sounds and normal pulses. No murmur heard.  Pulmonary/Chest:  Diminished breath sounds in the bases bilaterally and slightly tachypneic.  Abdominal:   Soft. Normal appearance and bowel sounds are normal.      There is no tenderness.      There is no rebound and no CVA tenderness.   Musculoskeletal:  Extremities atraumatic x 4.   Lymphadenopathy:  No cervical adenopathy.   Neurological:   Alert and oriented to person, place, and time. Normal strength.      No cranial nerve deficit or sensory deficit. GCS eye subscore is 4. GCS verbal subscore is 5. GCS motor subscore is 6.   Skin: "    Skin is intact. No rash noted.   Psychiatric:   Normal mood and affect.    Diagnostics     Lab Results   Labs Ordered and Resulted from Time of ED Arrival to Time of ED Departure   TROPONIN T, HIGH SENSITIVITY - Abnormal       Result Value    Troponin T, High Sensitivity 34 (*)    TROPONIN T, HIGH SENSITIVITY - Normal    Troponin T, High Sensitivity 22     NT PROBNP INPATIENT - Normal    N terminal Pro BNP Inpatient 495         Imaging   US Thoracentesis Bilateral   Final Result   IMPRESSION:   Status post bilateral ultrasound-guided thoracentesis.            XR Chest 2 Views   Final Result   IMPRESSION: Right greater than left moderate to large pleural effusions with adjacent presumed atelectasis. Interstitial pulmonary edema. No discernible pneumothorax. Heart borders are mostly obscured. Right chest port tip appears similar to prior.        EKG   ECG taken at 1440, ECG read at 1444  Normal sinus rhythm  Low voltage QRS  Cannot rule out anterior infarct, age undetermined  Abnormal ECG   Rate 99 bpm. PA interval 120 ms. QRS duration 80 ms. QT/QTc 326/418 ms. P-R-T axes 52 81 -60.    Independent Interpretation   I independently interpreted the patient's chest x-ray and he has bilateral pleural effusions present.    ED Course      Medications Administered   Medications   lidocaine (PF) (XYLOCAINE) 1 % injection 10 mL (10 mLs Subcutaneous $Given 4/9/25 1521)   lidocaine (PF) (XYLOCAINE) 1 % injection 10 mL (10 mLs Subcutaneous $Given 4/9/25 1548)       Procedures   Procedures     Discussion of Management   See ED course.    ED Course   ED Course as of 04/09/25 1701   Wed Apr 09, 2025   1311 I obtained history and examined the patient as noted above.   1455 I rechecked the patient and explained findings.   1456 I consulted with Calixto Gillette PA-C, of the hospitalist service. They accepted care of the patient on behalf of Dr. ZAHIDA Bergman.     Additional Documentation  None    Medical Decision Making / Diagnosis     CMS  Diagnoses: None    MIPS       None    MDM   Kt aRsmussen is a 64 year old male with a history of lung cancer who presented for hypoxia and concern for recurrent pleural effusions.  He had some blood work obtained earlier in the clinic today, and therefore I did not repeat all of that.  I did obtain a chest x-ray, and that I ordered an ultrasound guided thoracentesis, as he was found to have bilateral pleural effusions again.  Given the history of having a pericardial effusion in January as well as the pleural effusions, as well as the hypoxia today, I felt it was best that he come into the hospital for further evaluation and management regardless.  I subsequently spoke with Calixto Gillette PA-C, who will be taking care of the patient.    Disposition   The patient was admitted to the hospital.     Diagnosis     ICD-10-CM    1. Bilateral pleural effusion  J90       2. Hypoxia  R09.02       3. History of lung cancer  Z85.118                Scribe Disclosure:  I, RAJAT CLAYTON, am serving as a scribe at 2:57 PM on 4/9/2025 to document services personally performed by Keith Cooper MD, based on my observations and the provider's statements to me.        Keith Cooper MD  04/09/25 7821

## 2025-04-09 NOTE — LETTER
4/9/2025      tK Rasmussen  [address]  [facility] [place] [facility] [address]      Dear Colleague,    Thank you for referring your patient, Kt Rasmussen, to the [facility]. Please see a copy of my visit note below.    Patient was seen in infusion    Oncology/Hematology Visit Note  Apr 9, 2025    Reason for Visit: follow up of metastatic non-small cell lung carcinoma  Metastatic to lymph nodes bones in bilateral lungs  Brain MRI negative for mets    Patient met with Dr. John who recommended treatment with palliative intent with paclitaxel carboplatin Avastin and atezolizumab-treatment started on April 27, 2021  -Due to thrombocytopenia carboplatin AUC reduced to 4 with cycle 2  Due to pancytopenia carboplatin discontinued with cycle 5    7/12/2021: PET/CT showed resolved mural nodules with increased cystic cavity in left lower lobe with no significant FDG uptake, less likely metastases; no enlarged hypermetabolic mediastinal, hilar, or axillary lymph nodes, decreased metabolic activity of intraosseous lesion in spine and pelvis; no new bone lesions    Treated with paclitaxel, Avastin and atezolizumab.-Last treatment given in  12/22/2021-cycle 12    1/10/2022: PET/CT showed new foci of abnormal skeletal FGD uptake in the thoracic and lumbar spine. Mild interval increase in intensity of previously demonstrated hypermetabolic skeletal lesions involving the pelvis and lumbar spine. Findings are consistent with progressive osseous metastatic disease. Subsequently off chemo for 1 month due to poor performance status.  16. 1/25/2022: Patient fell and fractured his femur. This was surgically repaired and he was subsequently cared for at a rehab unit.  17. 2/14/2022: Brain MRI without contrast (contrast not given due to inability to obtain IV access) showed no brain metastases.    Met with Dr. Shoemaker-in Dr. John's absence  -Recommendation is to change therapy   04/21/2022 -palliative Carboplatin  Alimta started   Due to cytopenia AUC reduced to 4 with cycle 2  Due to persistent pancytopenia and inability to tolerate treatment carboplatin discontinued with cycle 3-day 1  Patient was on monotherapy with Alimta    07/18/2022-PET CT scan shows progression of the disease  08/03/22-started Taxotere 60 mg/m2 every 3 weeks    06/2024-scan reveals progression of the disease  Treatment changed to single agent Alimta     01/29/2025-Due to pleural effusions and moderate pericardial effusion treatment changed to single agent  gemcitabine  Currently patient is getting treatment with single agent gemcitabine 800 mg/m  day 1 and day 8      Interval History:  Orts for about a week he noted the increasing shortness of breath.  Patient reports he is taking Eliquis as prescribed.  Pt  denies cough, chest pain, denies fever chills sweats.  Denies nausea vomiting diarrhea abdominal pain or bleeding    Review of Systems:  14 point ROS of systems including Constitutional, Eyes, Respiratory, Cardiovascular, Gastroenterology, Genitourinary, Integumentary, Muscularskeletal, Psychiatric were all negative except for pertinent positives noted in my HPI.    Physical Examination:  Physical Exam  HENT:      Right Ear: Tympanic membrane normal.      Nose: Nose normal.      Mouth/Throat:      Mouth: Mucous membranes are moist.   Eyes:      Pupils: Pupils are equal, round, and reactive to light.   Cardiovascular:      Rate and Rhythm: Normal rate.      Pulses: Normal pulses.   Pulmonary:      Effort: Pulmonary effort is normal.   Abdominal:      General: Abdomen is flat.   Musculoskeletal:         General: Normal range of motion.      Cervical back: Normal range of motion.   Skin:     General: Skin is warm.   Neurological:      General: No focal deficit present.      Mental Status: He is alert.   Psychiatric:         Mood and Affect: Mood normal.           Laboratory Data:  CBC and CMP results reviewed        Assessment and Plan:    metastatic  non-small cell lung cancer   Patient follows with Dr John   Progressed on different treatments .  Developed pleural and pericardial effusions  Per Dr. John's note Alimta might be causing pleural and pericardial effusions therefore treatment changed to single agent gemcitabine day 1 and day 8  01/29/2025- gemcitabine started   Due to neutropenia gemcitabine reduced to 800 mg/m  with cycle 2-day 8 on March 4, 2025  -Dr oJhn Recommended adding Neupogen shots after each cycle for 3 days  -Labs reviewed today  Okay to proceed with cycle 1 day 4 gemcitabine 800 mg/m   -Schedule for Neupogen shot on Friday Saturday and Sundays  - 4/28 PET scan   - 5/5 Dr. John    Shortness of breath-patient reports worsening of symptom  Patient with history of pleural effusion and pericardial effusion  01/06/2025-status post thoracentesis-1.9 L of clear yellow fluid removed  Saturation 88% on room air-patient placed on oxygen Sao2 96%  Patient will go to the ER for evaluation he will most likely need another thoracentesis    Bilateral lower extremity DVT  --Diagnosed in 9/2023, likely hypercoagulability of malignancy.  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      MADHAVI Moran CNP  [facility]     Chart documentation with Dragon Voice recognition Software. Although reviewed after completion,  some words and grammatical errors may remain.          Again, thank you for allowing me to participate in the care of your patient.        Sincerely,        MADHAVI Moran CNP    Electronically signed

## 2025-04-10 ENCOUNTER — APPOINTMENT (OUTPATIENT)
Dept: CARDIOLOGY | Facility: CLINIC | Age: 65
DRG: 180 | End: 2025-04-10
Attending: PHYSICIAN ASSISTANT
Payer: COMMERCIAL

## 2025-04-10 LAB
ANION GAP SERPL CALCULATED.3IONS-SCNC: 7 MMOL/L (ref 7–15)
BUN SERPL-MCNC: 23.9 MG/DL (ref 8–23)
CALCIUM SERPL-MCNC: 8.3 MG/DL (ref 8.8–10.4)
CHLORIDE SERPL-SCNC: 105 MMOL/L (ref 98–107)
CREAT SERPL-MCNC: 0.96 MG/DL (ref 0.67–1.17)
EGFRCR SERPLBLD CKD-EPI 2021: 88 ML/MIN/1.73M2
ERYTHROCYTE [DISTWIDTH] IN BLOOD BY AUTOMATED COUNT: 19.3 % (ref 10–15)
GLUCOSE SERPL-MCNC: 96 MG/DL (ref 70–99)
HCO3 SERPL-SCNC: 24 MMOL/L (ref 22–29)
HCT VFR BLD AUTO: 27.5 % (ref 40–53)
HGB BLD-MCNC: 8.7 G/DL (ref 13.3–17.7)
LVEF ECHO: NORMAL
MCH RBC QN AUTO: 28.6 PG (ref 26.5–33)
MCHC RBC AUTO-ENTMCNC: 31.6 G/DL (ref 31.5–36.5)
MCV RBC AUTO: 91 FL (ref 78–100)
PLATELET # BLD AUTO: 126 10E3/UL (ref 150–450)
POTASSIUM SERPL-SCNC: 4.3 MMOL/L (ref 3.4–5.3)
RBC # BLD AUTO: 3.04 10E6/UL (ref 4.4–5.9)
SODIUM SERPL-SCNC: 136 MMOL/L (ref 135–145)
WBC # BLD AUTO: 6.7 10E3/UL (ref 4–11)

## 2025-04-10 PROCEDURE — 83880 ASSAY OF NATRIURETIC PEPTIDE: CPT | Performed by: HOSPITALIST

## 2025-04-10 PROCEDURE — 99222 1ST HOSP IP/OBS MODERATE 55: CPT | Performed by: INTERNAL MEDICINE

## 2025-04-10 PROCEDURE — 250N000011 HC RX IP 250 OP 636: Performed by: PHYSICIAN ASSISTANT

## 2025-04-10 PROCEDURE — 99232 SBSQ HOSP IP/OBS MODERATE 35: CPT | Performed by: HOSPITALIST

## 2025-04-10 PROCEDURE — 250N000013 HC RX MED GY IP 250 OP 250 PS 637: Performed by: HOSPITALIST

## 2025-04-10 PROCEDURE — 99223 1ST HOSP IP/OBS HIGH 75: CPT | Mod: FS | Performed by: PHYSICIAN ASSISTANT

## 2025-04-10 PROCEDURE — 250N000013 HC RX MED GY IP 250 OP 250 PS 637: Performed by: PHYSICIAN ASSISTANT

## 2025-04-10 PROCEDURE — 85027 COMPLETE CBC AUTOMATED: CPT | Performed by: PHYSICIAN ASSISTANT

## 2025-04-10 PROCEDURE — 120N000001 HC R&B MED SURG/OB

## 2025-04-10 PROCEDURE — 93306 TTE W/DOPPLER COMPLETE: CPT | Mod: 26 | Performed by: INTERNAL MEDICINE

## 2025-04-10 PROCEDURE — 80048 BASIC METABOLIC PNL TOTAL CA: CPT | Performed by: PHYSICIAN ASSISTANT

## 2025-04-10 PROCEDURE — 93306 TTE W/DOPPLER COMPLETE: CPT

## 2025-04-10 PROCEDURE — 250N000011 HC RX IP 250 OP 636: Mod: JZ | Performed by: INTERNAL MEDICINE

## 2025-04-10 RX ORDER — ATORVASTATIN CALCIUM 40 MG/1
40 TABLET, FILM COATED ORAL DAILY
Status: DISCONTINUED | OUTPATIENT
Start: 2025-04-10 | End: 2025-04-16 | Stop reason: HOSPADM

## 2025-04-10 RX ADMIN — Medication 5 ML: at 06:11

## 2025-04-10 RX ADMIN — ATORVASTATIN CALCIUM 40 MG: 40 TABLET, FILM COATED ORAL at 10:54

## 2025-04-10 RX ADMIN — APIXABAN 2.5 MG: 2.5 TABLET, FILM COATED ORAL at 20:36

## 2025-04-10 RX ADMIN — APIXABAN 2.5 MG: 2.5 TABLET, FILM COATED ORAL at 09:27

## 2025-04-10 RX ADMIN — FILGRASTIM-AAFI 480 MCG: 480 INJECTION, SOLUTION SUBCUTANEOUS at 20:36

## 2025-04-10 ASSESSMENT — ACTIVITIES OF DAILY LIVING (ADL)
ADLS_ACUITY_SCORE: 33
ADLS_ACUITY_SCORE: 33
ADLS_ACUITY_SCORE: 42
ADLS_ACUITY_SCORE: 46
ADLS_ACUITY_SCORE: 38
ADLS_ACUITY_SCORE: 42
ADLS_ACUITY_SCORE: 39
ADLS_ACUITY_SCORE: 42
ADLS_ACUITY_SCORE: 39
ADLS_ACUITY_SCORE: 38
ADLS_ACUITY_SCORE: 33
ADLS_ACUITY_SCORE: 38
ADLS_ACUITY_SCORE: 38
ADLS_ACUITY_SCORE: 33
ADLS_ACUITY_SCORE: 38
ADLS_ACUITY_SCORE: 42
ADLS_ACUITY_SCORE: 33
ADLS_ACUITY_SCORE: 39
ADLS_ACUITY_SCORE: 38
ADLS_ACUITY_SCORE: 29
ADLS_ACUITY_SCORE: 38

## 2025-04-10 NOTE — PLAN OF CARE
0700-1930    Orientation: A/Ox4, hoarse voice  Aggression Stop Light: green  Mobility: A2 lift, T/R, refuses repositioning, independent with weight shifting in bed  Pain Management: denies  - Pain/ tension in RLE w/ movement or touch  Diet: Reg w/ RS  Bowel/Bladder: cont b/b, uses urinal at bedside. No BM this shift  Abnormal Lab/Assessments: Hgb 8.7  Borderline tachy at times, on 1-2L NC.   - Elevated troponin  Tele: SR  Drain/Device: R Port HL  Skin/Wound: Intact, brenda flaky BLE, preventative mepis in place as this patient prefers to be on R side d/t R sided contractions  Consults: Hem/onc, cardiology, SW  D/C Day/Goals/Place: TBD, Neupogen for 3 days per hospital protocol    Shift Note:   -Echo completed today, cardio signed off

## 2025-04-10 NOTE — PROGRESS NOTES
RECEIVING UNIT ED HANDOFF REVIEW    ED Nurse Handoff Report was reviewed by: Ousmane Yee RN on April 9, 2025 at 8:33 PM

## 2025-04-10 NOTE — PLAN OF CARE
Goal Outcome Evaluation:      Plan of Care Reviewed With: patient, caregiver (patient and DON at [facility])

## 2025-04-10 NOTE — CONSULTS
[facility]    Hematology/Oncology Consult Note      Date of Admission:  4/9/2025  Date of Consult:  04/10/25  Reason for Consult: metastatic lung cancer due for  neulasta  Primary Oncologist Dr John    ASSESSMENT/PLAN:  Kt Rasmussen is a 64 year old male with metastatic NSCLC on gemzar which can also excerbate lung toxicity and cause effusions    -echo noted, troponins cardiology consult pending   -s/p thoracentesis  -just received gemzar monitor counts , start neupogen for 3 days in hospital per protocol.     Will follow. Ordered  neupogen for 3 days   Total time spent on day of visit, including review of tests, obtaining/reviewing separately obtained history, ordering medications/tests/procedures, communicating with PCP/consultants, and documenting in electronic medical record: 60 minutes         HISTORY OF PRESENT ILLNESS: Kt is a  64 year old male with metastatic lung cancer mets to the lymph nodes, lungs brain MRI negative. Oncology history details reviewed from note 4/9/2025 by Juan Rausch most recently on gemzar s/p treatment April 9.  Dr John had recommended adding neupogen for 3 days. Cycle 4 day 1 given yesterday. Presenteed yesterday with SOB.  Cxr showed right greater than left moderate to large pleural effusion with edema thoracentesis 1.1 liter from the left and 2 liters from the right, last thoracentesis was 12.2024 with 1.2 liters on the right. Cytology not obtained this time blood counts are stable. Echo shows a moderate pericardial effusion    Other comorbidities include hx of cerebral infarction, DVT , NSCLC, layngeal cancer    His sob is improved, he is very tired.  Appetite low  .wants to rest , didn't sleep well last few nights       REVIEW OF SYSTEMS:   14 point ROS was reviewed and is negative other than as noted above in HPI.       MEDICATIONS:  Current Facility-Administered Medications   Medication Dose Route Frequency Provider Last Rate Last Admin     acetaminophen (TYLENOL) tablet 650 mg  650 mg Oral Q4H PRN Jose Gillette PA-C        Or    acetaminophen (TYLENOL) Suppository 650 mg  650 mg Rectal Q4H PRN Jose Gillette PA-C        apixaban ANTICOAGULANT (ELIQUIS) tablet 2.5 mg  2.5 mg Oral BID Jose Gillette PA-C   2.5 mg at 04/10/25 0927    atorvastatin (LIPITOR) tablet 40 mg  40 mg Oral Daily Julio Bang MD   40 mg at 04/10/25 1054    bisacodyl (DULCOLAX) suppository 10 mg  10 mg Rectal Daily PRN Jose Gillette PA-C        calcium carbonate (TUMS) chewable tablet 1,000 mg  1,000 mg Oral 4x Daily PRN Jose Gillette PA-C        heparin lock flush 10 unit/mL injection 5-10 mL  5-10 mL Intracatheter Q1H PRN Jose Gillette PA-C   5 mL at 04/09/25 2204    heparin lock flush 10 unit/mL injection 5-10 mL  5-10 mL Intracatheter Q24H Jose Gillette PA-C   5 mL at 04/10/25 0611    heparin lock flush 100 unit/mL injection 5-10 mL  5-10 mL Intracatheter Q28 Days Jose Gillette PA-C        HYDROmorphone (DILAUDID) injection 0.2 mg  0.2 mg Intravenous Q2H PRN Jose Gillette PA-C        HYDROmorphone (DILAUDID) injection 0.4 mg  0.4 mg Intravenous Q2H PRN Jose Gillette PA-C        lidocaine (LMX4) cream   Topical Q1H PRN Jose Gillette PA-C        lidocaine 1 % 0.1-1 mL  0.1-1 mL Other Q1H PRN Jose Gillette PA-C        melatonin tablet 1 mg  1 mg Oral At Bedtime PRN Jose Gillette PA-C        naloxone (NARCAN) injection 0.2 mg  0.2 mg Intravenous Q2 Min PRN Julita Bergman DO        Or    naloxone (NARCAN) injection 0.4 mg  0.4 mg Intravenous Q2 Min PRN Julita Bergman DO        Or    naloxone (NARCAN) injection 0.2 mg  0.2 mg Intramuscular Q2 Min PRN Julita Bergman DO        Or    naloxone (NARCAN) injection 0.4 mg  0.4 mg Intramuscular Q2 Min PRN Julita Bergman DO         ondansetron (ZOFRAN ODT) ODT tab 4 mg  4 mg Oral Q6H PRN Jose Gillette PA-C        Or    ondansetron (ZOFRAN) injection 4 mg  4 mg Intravenous Q6H PRN Jose Gillette PA-C        oxyCODONE (ROXICODONE) tablet 5 mg  5 mg Oral Q4H PRN Jose Gillette PA-C        oxyCODONE IR (ROXICODONE) half-tab 2.5 mg  2.5 mg Oral Q4H PRN Jose Gillette PA-C        Patient is already receiving anticoagulation with heparin, enoxaparin (LOVENOX), warfarin (COUMADIN)  or other anticoagulant medication   Does not apply Continuous PRN Jose Gillette PA-C        polyethylene glycol (MIRALAX) Packet 17 g  17 g Oral BID PRN Jose Gillette PA-C        prochlorperazine (COMPAZINE) injection 5 mg  5 mg Intravenous Q6H PRN Jose Gillette PA-C        Or    prochlorperazine (COMPAZINE) tablet 5 mg  5 mg Oral Q6H PRN Jose Gillette PA-C        senna-docusate (SENOKOT-S/PERICOLACE) 8.6-50 MG per tablet 1 tablet  1 tablet Oral BID PRN Jose Gillette PA-C        Or    senna-docusate (SENOKOT-S/PERICOLACE) 8.6-50 MG per tablet 2 tablet  2 tablet Oral BID PRN Jose Gillette PA-C        sodium chloride (PF) 0.9% PF flush 10-20 mL  10-20 mL Intracatheter q1 min prn Jose Gillette PA-C   10 mL at 04/09/25 2204    sodium chloride (PF) 0.9% PF flush 10-20 mL  10-20 mL Intracatheter Q1H PRN Jose Gillette PA-C        sodium chloride (PF) 0.9% PF flush 10-20 mL  10-20 mL Intracatheter Q28 Days Jose Gillette PA-C        sodium chloride (PF) 0.9% PF flush 3 mL  3 mL Intracatheter Q8H Nain, Jose Desi, PA-C        sodium chloride (PF) 0.9% PF flush 3 mL  3 mL Intracatheter q1 min prn Jose Gillette PA-C         Facility-Administered Medications Ordered in Other Encounters   Medication Dose Route Frequency Provider Last Rate Last Admin    heparin 100 UNIT/ML injection 5 mL  5 mL Intracatheter Once PRN  Sangita John MD   5 mL at 08/10/21 1346    heparin lock flush 10 UNIT/ML injection 5 mL  5 mL Intracatheter Q1H PRN Sangita John MD        sodium chloride (PF) 0.9% PF flush 3-20 mL  3-20 mL Intracatheter Q1H PRN Sangita John MD   20 mL at 08/10/21 1346         ALLERGIES:  Allergies   Allergen Reactions    No Known Drug Allergy          PAST MEDICAL HISTORY:  Past Medical History:   Diagnosis Date    Alcohol abuse, unspecified     Cerebral infarction (H)     2009, right side residual and aphasia    Dyslipidemia     GERD (gastroesophageal reflux disease)     Lung cancer (H)     Unspecified essential hypertension          PAST SURGICAL HISTORY:  Past Surgical History:   Procedure Laterality Date    BRONCHOSCOPY FLEXIBLE AND RIGID N/A 5/5/2016    Procedure: BRONCHOSCOPY FLEXIBLE AND RIGID;  Surgeon: Tony Talbot MD;  Location: [facility] OR    ESOPHAGOSCOPY FLEXIBLE N/A 9/30/2019    Procedure: flexible esophagoscopy;  Surgeon: Lizzy Johnson MD;  Location: [facility] OR    INJECT STEROID (LOCATION) N/A 9/30/2019    Procedure: steroid injection;  Surgeon: Lizzy Johnson MD;  Location: [facility] OR    IR CHEST PORT PLACEMENT > 5 YRS OF AGE  4/22/2021    IR CHEST PORT PLACEMENT > 5 YRS OF AGE  4/11/2022    IR PORT CHECK RIGHT  4/11/2022    IR PORT REMOVAL RIGHT  4/11/2022    LASER CO2 LARYNGOSCOPY N/A 9/30/2019    Procedure: Microdirect laryngoscopy with excision of laryngeal mass, CO2 laser;  Surgeon: Lizzy Johnson MD;  Location: [facility] OR    ZZC NONSPECIFIC PROCEDURE      R tympanoplasty         SOCIAL HISTORY:  Social History     Socioeconomic History    Marital status: Single     Spouse name: Not on file    Number of children: Not on file    Years of education: Not on file    Highest education level: Not on file   Occupational History    Not on file   Tobacco Use    Smoking status: Former     Current packs/day: 0.00     Types: Cigarettes     Quit date: 9/25/2009     Years since quitting: 15.5    Smokeless tobacco:  Never   Substance and Sexual Activity    Alcohol use: Not Currently    Drug use: No    Sexual activity: Not on file   Other Topics Concern    Parent/sibling w/ CABG, MI or angioplasty before 65F 55M? Not Asked   Social History Narrative    Not on file     Social Drivers of Health     Financial Resource Strain: Low Risk  (4/9/2025)    Financial Resource Strain     Within the past 12 months, have you or your family members you live with been unable to get utilities (heat, electricity) when it was really needed?: No   Food Insecurity: Low Risk  (4/9/2025)    Food Insecurity     Within the past 12 months, did you worry that your food would run out before you got money to buy more?: No     Within the past 12 months, did the food you bought just not last and you didn t have money to get more?: No   Transportation Needs: Low Risk  (4/9/2025)    Transportation Needs     Within the past 12 months, has lack of transportation kept you from medical appointments, getting your medicines, non-medical meetings or appointments, work, or from getting things that you need?: No   Physical Activity: Not on file   Stress: Not on file   Social Connections: Not on file   Interpersonal Safety: Low Risk  (4/9/2025)    Interpersonal Safety     Do you feel physically and emotionally safe where you currently live?: Yes     Within the past 12 months, have you been hit, slapped, kicked or otherwise physically hurt by someone?: No     Within the past 12 months, have you been humiliated or emotionally abused in other ways by your partner or ex-partner?: No   Housing Stability: Low Risk  (4/9/2025)    Housing Stability     Do you have housing? : Yes     Are you worried about losing your housing?: No         FAMILY HISTORY:  Family History   Problem Relation Age of Onset    Cancer Father          PHYSICAL EXAM:  Vital signs:  Temp: 98.4  F (36.9  C) Temp src: Oral BP: 108/62 Pulse: 92   Resp: 16 SpO2: 92 % O2 Device: Nasal cannula Oxygen Delivery: 1  "LPM Height: 191.8 cm (6' 3.5\") Weight: 75 kg (165 lb 5.5 oz)  Estimated body mass index is 20.39 kg/m  as calculated from the following:    Height as of this encounter: 1.918 m (6' 3.5\").    Weight as of this encounter: 75 kg (165 lb 5.5 oz).    ECO cachectic  GENERAL/CONSTITUTIONAL: No acute distress.  EYES: Pupils are equal, round, and react to light and accommodation. Extraocular movements intact.  No scleral icterus.  ENT/MOUTH: Neck supple. Oropharynx clear, no mucositis.  LYMPH: No anterior cervical, posterior cervical, supraclavicular, axillary or inguinal adenopathy.   RESPIRATORY: decrease breath sounds bilaterally   CARDIOVASCULAR: Regular rate and rhythm without murmurs, gallops, or rubs.  GASTROINTESTINAL: No hepatosplenomegaly, masses, or tenderness. The patient has normal bowel sounds. No guarding.  No distention.  MUSCULOSKELETAL: Warm and well-perfused, no cyanosis, clubbing, or edema.  NEUROLOGIC: Cranial nerves II-XII are intact. Alert, oriented, answers questions appropriately.  INTEGUMENTARY: No rashes or jaundice.  GAIT: Steady, does not use assistive device      LABS:  CBC RESULTS:   Recent Labs   Lab Test 04/10/25  0608   WBC 6.7   RBC 3.04*   HGB 8.7*   HCT 27.5*   MCV 91   MCH 28.6   MCHC 31.6   RDW 19.3*   *       Recent Labs   Lab Test 04/10/25  0608 25  1121    137   POTASSIUM 4.3 4.1   CHLORIDE 105 103   CO2 24 23   ANIONGAP 7 11   GLC 96 90   BUN 23.9* 18.3   CR 0.96 0.89   BEVERLY 8.3* 9.0         PATHOLOGY:  none    IMAGING:  Noted           Thank you for the opportunity to participate in this patient's care.  Please call with any questions.    Pako Cabral MD  Hematology/Oncology  [facility]   "

## 2025-04-10 NOTE — CONSULTS
Care Management Initial Consult    Resides at [facility]  [facility] [place]  Phone#[phone]  Fax# [phone]  RX: [facility]  [facility] is seeing patient at [facility]  Message to Guardian 4/10 (per EPIC)  Can come back at any time with call of notification.  If new oxygen needed patient needs to self manage  General Information  Assessment completed with: Patient, Patient message to Guardian line and [facility] Line  Type of CM/SW Visit: CM Role Introduction    Primary Care Provider verified and updated as needed: No (primarily with [facility] awaiting a call back from [facility] to confirm services)   Readmission within the last 30 days: no previous admission in last 30 days      Reason for Consult: discharge planning  Advance Care Planning: Advance Care Planning Reviewed: present on chart          Communication Assessment  Patient's communication style: spoken language (English or Bilingual)    Hearing Difficulty or Deaf: no   Wear Glasses or Blind: no    Cognitive  Cognitive/Neuro/Behavioral: .WDL except  Level of Consciousness: alert  Arousal Level: opens eyes spontaneously  Orientation: oriented x 4  Mood/Behavior: cooperative, calm  Best Language: 0 - No aphasia  Speech: hoarse    Living Environment:   People in home: facility resident     Current living Arrangements: assisted living  Name of Facility: [facility]   Able to return to prior arrangements: yes       Family/Social Support:  Care provided by: self, other (see comments) (facility staff minimal services per patient)  Provides care for: no one, unable/limited ability to care for self  Marital Status: Single  Support system: Sibling(s), Facility resident(s)/Staff, Guardian          Description of Support System: Supportive, Involved    Support Assessment: Adequate family and caregiver support, Adequate social supports    Current Resources:   Patient receiving home care services: No        Community  Resources: County Programs, County Worker, DME, Financial/Insurance, Transportation Services, Other (see comment) (housing)  Equipment currently used at home: wheelchair, manual  Supplies currently used at home: Incontinence Supplies    Employment/Financial:  Employment Status: disabled        Financial Concerns: none   Referral to Financial Worker: No       Does the patient's insurance plan have a 3 day qualifying hospital stay waiver?  No    Lifestyle & Psychosocial Needs:  Social Drivers of Health     Food Insecurity: Low Risk  (4/9/2025)    Food Insecurity     Within the past 12 months, did you worry that your food would run out before you got money to buy more?: No     Within the past 12 months, did the food you bought just not last and you didn t have money to get more?: No   Depression: Not at risk (2/19/2025)    PHQ-2     PHQ-2 Score: 0   Housing Stability: Low Risk  (4/9/2025)    Housing Stability     Do you have housing? : Yes     Are you worried about losing your housing?: No   Tobacco Use: Medium Risk (4/9/2025)    Patient History     Smoking Tobacco Use: Former     Smokeless Tobacco Use: Never     Passive Exposure: Not on file   Financial Resource Strain: Low Risk  (4/9/2025)    Financial Resource Strain     Within the past 12 months, have you or your family members you live with been unable to get utilities (heat, electricity) when it was really needed?: No   Alcohol Use: Not on file   Transportation Needs: Low Risk  (4/9/2025)    Transportation Needs     Within the past 12 months, has lack of transportation kept you from medical appointments, getting your medicines, non-medical meetings or appointments, work, or from getting things that you need?: No   Physical Activity: Not on file   Interpersonal Safety: Low Risk  (4/9/2025)    Interpersonal Safety     Do you feel physically and emotionally safe where you currently live?: Yes     Within the past 12 months, have you been hit, slapped, kicked or  "otherwise physically hurt by someone?: No     Within the past 12 months, have you been humiliated or emotionally abused in other ways by your partner or ex-partner?: No   Stress: Not on file   Social Connections: Not on file   Health Literacy: Not on file       Functional Status:  Prior to admission patient needed assistance:   Dependent ADLs:: Independent, Wheelchair-independent (per patient report awaiting a call back from [facility] to confirm services)  Dependent IADLs:: Cleaning, Cooking, Transportation, Incontinence       Mental Health Status:  Mental Health Status: No Current Concerns       Chemical Dependency Status:  Chemical Dependency Status: Past Concern             Values/Beliefs:  Spiritual, Cultural Beliefs, Jain Practices, Values that affect care: yes  Description of Beliefs that Will Affect Care: Christian            Discussed  Partnership in Safe Discharge Planning  document with patient/family: No    Additional Information:  Writer met with the patient at the bedside. Patient is A+Ox4 up with assist to his wheelchair.  Writer explained role and scope of safe discharge planning.  Prior to this admission patient resides at an assisted living facility. Per Director of Nursing Devorah at baseline patient is \"stubbornly independent\" with all cares.  Patient self transfers, manages his own meds (come in tripack a.m./p.m.), does his laundry, transportation, self propels with manual wheelchair down to the dining landon.  Patient has various activities and volunteers.  He is hopeful to discharge home soon.  Devorah noted that the patient is able to return at anytime, we would fax orders to their group home and marked his discharge [facility].  Patient has appointments with [facility] and he will follow up as scheduled.  Patient is aware that we will scheduled an [facility] wheelchair ride (he has his own wheelchair in the room) if needed.  Patient is still on 1LPM supplemental oxygen and is hopeful to wean " off.  Care Transitions will continue to follow for further discharge planning needs as identified.    Gila Blackwood RN, BSN, ACM   Care Transitions Specialist  [facility]  Care Transitions Specialist  [address] [place]. [address]  [email or web address]  Office: [phone] Fax: [phone]  [facility]

## 2025-04-10 NOTE — PLAN OF CARE
Goal Outcome Evaluation:    Summary:  SOB/  Primary Diagnosis:  Non Small Cell Lung Ca-Metastatic to lymph nodes bones in bilateral lungs, Larynx Cancer, recurrent pleural effusion,   Left vocal cord squamous cell carcinoma     Orientation: A/Ox4, hoarse voice  Aggression Stop Light: green  Mobility: A2 lift, T/R, refuses repositioning, ind with weight shifting in bed  Pain Management: denies  Diet: Reg with room service  Tele: NSR  Bowel/Bladder: cont b/b, uses urinal at bedside  Abnormal Lab/Assessments: VSS, o2 at 2NC  Drain/DeviceR chest Port HL, with good blood return  Skin/Wound: Redness blanchable bottom and right hip-preventive mepi in place, scattered scabs, brenda flaky ble  Consults: CAROLE  D/C Day/Goals/Place: pending improvement    Shift Note:   -arrived from the ED at 2100  -On chemo precaution  -R sided deficits r upper/lower ext- contracted)  -takes pills whole with water  -wheelchair bound per pt's report  -Pending Echo

## 2025-04-10 NOTE — PROGRESS NOTES
[facility]    Medicine Progress Note - Hospitalist Service    Date of Admission:  4/9/2025    Assessment & Plan   Kt Rasmussen is a 64 year old male admitted on 4/9/2025 due to acute hypoxic respiratory failure secondary to bilateral (right greater than left) moderate to large pleural effusions with presumed atelectasis.       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     Patient presented to the ED after being at the infusion center and seen by  Oncology.  While being assessed at the infusion center the patient reported a weeklong history of worsening shortness of breath.  He indicated that he has been compliant with his prescribed Eliquis.  Patient with known history of pleural effusion and pericardial effusion requiring thoracentesis with 1.9 L of clear yellow fluid removed on 1/6/2025.  Patient was found to be mildly hypoxic with O2 saturation of 88% and was placed on supplemental oxygen at 3 L/min with improvement of O2 saturation to 96%.  Due to his history and symptoms he was advised to seek further medical evaluation at [facility] ED.     Work-up in the ED included a CMP that revealed a albumin of 3.2 and total protein of 5.9 otherwise unremarkable.  CBC with differential revealed a hemoglobin of 9.5, hematocrit of 30, platelet count of 122, RBC count of 3.3, RDW of 19.3 and absolute lymphocytes of 0.7 otherwise unremarkable.  ProBNP was negative at 495.  Troponin T was negative at 22.  EKG showed sinus rhythm.  Two-view chest x-ray revealed  right greater than left moderate to large pleural effusion with adjacent presumed atelectasis, interstitial pulmonary edema is noted but no evidence of discernible pneumothorax, right chest port tip appears similar to prior studies and heart borders are mostly obscured.      Acute hypoxic respiratory failure  *Baseline patient does not require supplemental O2.  Initially requiring 3 L/m per nasal cannula.    *s/p thoracentesis as above, 2L R and 1.1L L removed  - 4/10 on 1L  - Supplemental O2 available; wean as able.    - Encourage use of IS/Flutter valve.    - SW/CM consult requested for discharge planning.       Bilateral (right greater than left) moderate to large pleural effusions   Moderate pericardial effusion (confirmed on Echo)  Presumed atelectasis  - US guided thoracentesis ordered in the ED.    --Completed with reported 1.1 L removed from the left and 2 L removed from the right.    - ECHO ordered with reported history of pericardial effusion.    - appears was previously noted on CT imaging somewhere around January 2025, seems an echo was ordered earlier this year but does not appear completed in chart review or [facility] care everywhere.  Echo today 4/10 does show a moderate pericardial effusion without-overt tamponade physiology.  - Given overall complexity and troponin elevation will request cardiology input-greatly appreciated.     Metastatic non-small cell lung cancer (mets to lymph nodes, bones and bilateral lungs)  *Follows with  Oncology and underwent infusion GEMZAR on day of admission.    -[place] oncology consultation requested and appreciated-seems patient will be due for filgrastim  4/10  - No interventions at this time.       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       Chronic anticoagulation therapy secondary to bilateral lower extremity DVT  - Resumed on PTA Eliquis 2.5 mg BID.       Chronic  anemia secondary to chemo  Chronic thrombocytopenia secondary to chemo  - 4/10 hgb 8.7, was 9.5 yday  - follow cbc     CAD s/p cardiac stenting  HTN  HLP  GERD  MDD with anxiety  Seizure D/O  History of alcohol use D/O  History of substance use D/O  History of CVA with right sided hemiplegia  *Diagnoses noted on chart review for which the patient is no longer on any medications.            Diet: Combination Diet Regular Diet Adult  Room Service    DVT Prophylaxis: DOAC  Buckner Catheter: Not present  Lines: PRESENT      Port A Cath Single 04/11/22 Right Chest wall-Site Assessment: WDL      Cardiac Monitoring: ACTIVE order. Indication: Acute hypoxic respiratory failure  Code Status: Full Code      Clinically Significant Risk Factors Present on Admission           # Hypocalcemia: Lowest Ca = 8.3 mg/dL in last 2 days, will monitor and replace as appropriate     # Hypoalbuminemia: Lowest albumin = 3.2 g/dL at 4/9/2025 11:21 AM, will monitor as appropriate  # Drug Induced Coagulation Defect: home medication list includes an anticoagulant medication  # Thrombocytopenia: Lowest platelets = 122 in last 2 days, will monitor for bleeding   # Hypertension: Noted on problem list      # Anemia: based on hgb <11                  Social Drivers of Health    Tobacco Use: Medium Risk (4/9/2025)    Patient History     Smoking Tobacco Use: Former     Smokeless Tobacco Use: Never          Disposition Plan     Medically Ready for Discharge: Anticipated in 2-4 Days             Julio Bang MD  Hospitalist Service  [facility]  Securely message with [employer] (more info)  Text page via [employer] Paging/Directory   ______________________________________________________________________    Interval History   Care assumed for today.  Patient seen and examined.  No new complaints or issues.  Notes considerable improvement with breathing after bilateral thoracentesis yesterday.  Denies fevers or chills.  Discussed  pericardial effusion.  Discussed with RN.    Physical Exam   Vital Signs: Temp: 97.9  F (36.6  C) Temp src: Oral BP: 108/61 Pulse: 95   Resp: 18 SpO2: 96 % O2 Device: Nasal cannula Oxygen Delivery: 1 LPM  Weight: 165 lbs 5.52 oz    Gen: NAD, pleasant  HEENT: EOMI, MMM, NC in place  Resp: no focal crackles, no wheezes, no increased work of resp  CV: S1S2 heard, reg rhythm, reg rate  Abdo: soft, nontender, nondistended, bowel sounds present  Ext: calves nontender, well perfused  Neuro: aa, conversing, R sided weakness baseline noted, CN grossly intact, no new facial asymmetry      Medical Decision Making       41 MINUTES SPENT BY ME on the date of service doing chart review, history, exam, documentation & further activities per the note.      Data     I have personally reviewed the following data over the past 24 hrs:    6.7  \   8.7 (L)   / 126 (L)     136 105 23.9 (H) /  96   4.3 24 0.96 \     ALT: 13 AST: 24 AP: 117 TBILI: 0.4   ALB: 3.2 (L) TOT PROTEIN: 5.9 (L) LIPASE: N/A     Trop: 34 (H) BNP: 495       Imaging results reviewed over the past 24 hrs:   Recent Results (from the past 24 hours)   XR Chest 2 Views    Narrative    EXAM: XR CHEST 2 VIEWS  LOCATION: [facility]  DATE: 4/9/2025    INDICATION: sob  COMPARISON: Chest radiograph 12/26/2024.      Impression    IMPRESSION: Right greater than left moderate to large pleural effusions with adjacent presumed atelectasis. Interstitial pulmonary edema. No discernible pneumothorax. Heart borders are mostly obscured. Right chest port tip appears similar to prior.   US Thoracentesis Bilateral    Narrative    EXAM:   1. BILATERAL THORACENTESIS  2. ULTRASOUND GUIDANCE  LOCATION: [facility]  DATE: 4/9/2025    INDICATION: Pleural effusion.    PROCEDURE: Informed consent obtained. Time out performed. The chest was prepped and draped in sterile fashion. 10 mL of 1 % lidocaine was infused into the local soft tissues. Under  direct ultrasound guidance, a 5 Uzbek catheter system was placed into   the pleural effusion.     2 liters of clear coty fluid were removed from the right chest. 1.1 L of clear coty fluid was taken from the left chest. Fluid was sent to lab, if requested.    Patient tolerated procedure well.    Ultrasound imaging was obtained and placed in the patient's permanent medical record.      Impression    IMPRESSION:  Status post bilateral ultrasound-guided thoracentesis.

## 2025-04-10 NOTE — CONSULTS
[facility] Consultation     Date of Admission:  4/9/2025  Date of Consult: 04/10/25  Primary care physician: Jessica Kingston  Primary cardiologist: Dr. Hayden  Staff Cardiologist:  Dr. Burks     Reason for consult: Pericardial effusion, elevated troponin    Assessment/Plan:   Pericardial effusion  - noted in the setting of malignancy  - moderate per echo this admission, no evidence of cardiac tamponade, comparable in size to CT from 12/2024  - no specific treatment required at this time  - repeat echo in 1 month, if effusion increases in size or evidence of cardiac tamponade can consider pericardial window     Elevated troponin - no ischemic EHG changes, no angina, suspect type II demand in the setting of hypoxia     3. Pleural effusions - s/p bilateral thoracentesis, mangement per hospitalist and oncology teams      Thank you very much for this consultation.     Liv Haider PA-C  [facility]  Vocera page  ________________________________________________________________________  History of Present Illness   Kt Rasmussen is a 64 year old male with a pertinent history of:  1.CAD status post coronary angiogram 2007 with PCI to proximal RCA with BMS x 2 (following abnormal stress test)  2. remote cardiomyopathy EF 48% in 2007  3. Hypertension  4. Hyperlipidemia  5. tobacco abuse  6. EtOH abuse  7. metastatic non-small cell lung carcinoma with mets to the lymph nodes, bones (on palliative chemotherapy, treated and followed by oncology team)  8. vocal cord squamous cell carcinoma (in remission)  9. bilateral lower extremity DVT on chronic Eliquis, CVA in 2022  10. Anemia  11. thrombocytopenia     He is admitted following chemotherapy infusion visit 4/9/25 at which time he was noted to be hypoxic with O2 saturation 85 to 90% on room air.     WBC normal, hemoglobin 9.5 (at baseline), platelets 122, normal renal function, troponin 22-34, BNP normal at 495.  Chest x-ray  demonstrates bilateral pleural effusions right greater than left with pulmonary edema. He underwent bilateral thoracentesis 4/9/2025 with 2 L removed on the R and 1 L removed on the L.     Cardiology consulted for pericardial effusion and elevated troponin . Echocardiogram 4/10/25 with LVEF 55 to 60%,  normal RV function, trace AR, mild MR, mild TR, and moderate pericardial effusion with no suggestive findings of tamponade.       Code Status    Full Code    Past Medical History   I have reviewed this patient's medical history and updated it with pertinent information if needed.   Past Medical History:   Diagnosis Date    Alcohol abuse, unspecified     Cerebral infarction (H)     2009, right side residual and aphasia    Dyslipidemia     GERD (gastroesophageal reflux disease)     Lung cancer (H)     Unspecified essential hypertension        Past Surgical History   I have reviewed this patient's surgical history and updated it with pertinent information if needed.  Past Surgical History:   Procedure Laterality Date    BRONCHOSCOPY FLEXIBLE AND RIGID N/A 5/5/2016    Procedure: BRONCHOSCOPY FLEXIBLE AND RIGID;  Surgeon: Tony Talbot MD;  Location: [facility] OR    ESOPHAGOSCOPY FLEXIBLE N/A 9/30/2019    Procedure: flexible esophagoscopy;  Surgeon: Lizzy Johnson MD;  Location: [facility] OR    INJECT STEROID (LOCATION) N/A 9/30/2019    Procedure: steroid injection;  Surgeon: Lizzy Johnson MD;  Location: [facility] OR    IR CHEST PORT PLACEMENT > 5 YRS OF AGE  4/22/2021    IR CHEST PORT PLACEMENT > 5 YRS OF AGE  4/11/2022    IR PORT CHECK RIGHT  4/11/2022    IR PORT REMOVAL RIGHT  4/11/2022    LASER CO2 LARYNGOSCOPY N/A 9/30/2019    Procedure: Microdirect laryngoscopy with excision of laryngeal mass, CO2 laser;  Surgeon: Lizzy Johnson MD;  Location: [facility] OR    [facility] NONSPECIFIC PROCEDURE      R tympanoplasty       Prior to Admission Medications   Prior to Admission Medications   Prescriptions Last Dose Informant Patient  Reported? Taking?   apixaban ANTICOAGULANT (ELIQUIS) 2.5 MG tablet 4/9/2025 Morning Self Yes Yes   Sig: Take 2.5 mg by mouth 2 times daily.   atorvastatin (LIPITOR) 40 MG tablet 4/9/2025 Morning Self Yes Yes   Sig: Take 40 mg by mouth daily.   folic acid (FOLVITE) 1 MG tablet 4/9/2025 Morning Self No Yes   Sig: Take 1 tablet (1 mg) by mouth daily   methocarbamol (ROBAXIN) 750 MG tablet 4/9/2025 Morning Self Yes Yes   Sig: Take 750 mg by mouth 3 times daily as needed for muscle spasms.      Facility-Administered Medications: None     Allergies   Allergies   Allergen Reactions    No Known Drug Allergy        Social History   I have reviewed this patient's social history and updated it with pertinent information if needed. Kt Rasmussen  reports that he quit smoking about 15 years ago. His smoking use included cigarettes. He has never used smokeless tobacco. He reports that he does not currently use alcohol. He reports that he does not use drugs.    Family History   I have reviewed this patient's family history and updated it with pertinent information if needed.   Family History   Problem Relation Age of Onset    Cancer Father        Review of Systems   A comprehensive review of systems is negative, except for as noted in the HPI.    Physical Exam   Temp: 98.4  F (36.9  C) Temp src: Oral BP: 108/62 Pulse: 92   Resp: 16 SpO2: 92 % O2 Device: Nasal cannula Oxygen Delivery: 1 LPM  Vital Signs with Ranges  Temp:  [97.9  F (36.6  C)-98.6  F (37  C)] 98.4  F (36.9  C)  Pulse:  [] 92  Resp:  [16-20] 16  BP: (104-151)/(52-86) 108/62  SpO2:  [90 %-96 %] 92 %  165 lbs 5.52 oz    General - Alert and oriented to time place and person in no acute distress  HEENT - Neck supple  Cardiovascular - RRR, no murmur, mild JVD  Extremities - Warm, well perfused. No edema  Respiratory - Diminished bilaterally      Data   Most Recent 3 CBC's:  Recent Labs   Lab Test 04/10/25  0608 04/09/25  1121 03/26/25  1300   WBC 6.7 6.7 2.0*   HGB  8.7* 9.5* 9.1*   MCV 91 91 89   * 122* 185     Most Recent 3 BMP's:  Recent Labs   Lab Test 04/10/25  0608 04/09/25  1121 03/19/25  1251    137 139   POTASSIUM 4.3 4.1 4.1   CHLORIDE 105 103 105   CO2 24 23 23   BUN 23.9* 18.3 12.1   CR 0.96 0.89 0.72   ANIONGAP 7 11 11   BEVERLY 8.3* 9.0 8.9   GLC 96 90 93     Most Recent 3 Troponin's:No lab results found.  Most Recent 3 BNP's:  Recent Labs   Lab Test 04/09/25  1322 12/26/24  1039   NTBNPI 495 508       EKG: NSR, low voltage,  HR 99 bpm      Echo: 4/9/25  There is a moderate pericardial effusion, primarily localized anteriorly along  the right ventricle and right atrium, trace at the apex. Measures 1.44 cm  anterior to the RV on short-axis, and 1.8 cm along the RVOT. Echocardiographic  findings are not suggestive of tamponade physiology, however clinical  correlation and close follow up recommended.  Moderate left pleural effusion.     Left ventricular systolic function is normal. The visual ejection fraction is  55-60%.  Septal motion is consistent with conduction abnormality.  The right ventricle is normal in size and function.  The aortic valve is trileaflet with aortic valve sclerosis. There is trace  aortic regurgitation.  There is mild (1+) mitral regurgitation.  There is mild (1+) tricuspid regurgitation.  The inferior vena cava was normal in size with preserved respiratory  variability.  Aortic root dilatation is present. Max diameter of the visualized portion 4.1  cm.  Ascending aorta dilatation is present. Max diameter of the visualized portion  4.1 cm.      Coronary Angiogram: 2007  CONCLUSION:   1. Successful stenting of the proximal and mid right coronary artery.    Initial obstruction up to 80% with diffuse disease.  Final   obstruction zero percent with no visible dissection, thrombus or   other complication.     2.  The patient had some arm discomfort with balloon inflation but   this resolved quickly when the balloon was taken down. The  pain was   only transient.   3. This procedure was done with heparin alone.  No 2b3a inhibitor was   used.         Clinically Significant Risk Factors Present on Admission           # Hypocalcemia: Lowest Ca = 8.3 mg/dL in last 2 days, will monitor and replace as appropriate     # Hypoalbuminemia: Lowest albumin = 3.2 g/dL at 4/9/2025 11:21 AM, will monitor as appropriate  # Drug Induced Coagulation Defect: home medication list includes an anticoagulant medication  # Thrombocytopenia: Lowest platelets = 122 in last 2 days, will monitor for bleeding   # Hypertension: Noted on problem list      # Anemia: based on hgb <11           # Financial/Environmental Concerns: none

## 2025-04-11 VITALS
BODY MASS INDEX: 20.13 KG/M2 | WEIGHT: 165.34 LBS | OXYGEN SATURATION: 92 % | TEMPERATURE: 97.6 F | HEART RATE: 89 BPM | SYSTOLIC BLOOD PRESSURE: 110 MMHG | RESPIRATION RATE: 18 BRPM | DIASTOLIC BLOOD PRESSURE: 57 MMHG | HEIGHT: 76 IN

## 2025-04-11 LAB
ERYTHROCYTE [DISTWIDTH] IN BLOOD BY AUTOMATED COUNT: 19.4 % (ref 10–15)
HCT VFR BLD AUTO: 26.2 % (ref 40–53)
HGB BLD-MCNC: 8.5 G/DL (ref 13.3–17.7)
MCH RBC QN AUTO: 29.3 PG (ref 26.5–33)
MCHC RBC AUTO-ENTMCNC: 32.4 G/DL (ref 31.5–36.5)
MCV RBC AUTO: 90 FL (ref 78–100)
PLATELET # BLD AUTO: 144 10E3/UL (ref 150–450)
RBC # BLD AUTO: 2.9 10E6/UL (ref 4.4–5.9)
WBC # BLD AUTO: 31.3 10E3/UL (ref 4–11)

## 2025-04-11 PROCEDURE — 99232 SBSQ HOSP IP/OBS MODERATE 35: CPT | Performed by: STUDENT IN AN ORGANIZED HEALTH CARE EDUCATION/TRAINING PROGRAM

## 2025-04-11 PROCEDURE — 250N000011 HC RX IP 250 OP 636: Performed by: PHYSICIAN ASSISTANT

## 2025-04-11 PROCEDURE — 99232 SBSQ HOSP IP/OBS MODERATE 35: CPT

## 2025-04-11 PROCEDURE — 250N000011 HC RX IP 250 OP 636: Mod: JZ | Performed by: INTERNAL MEDICINE

## 2025-04-11 PROCEDURE — 250N000013 HC RX MED GY IP 250 OP 250 PS 637: Performed by: PHYSICIAN ASSISTANT

## 2025-04-11 PROCEDURE — 120N000001 HC R&B MED SURG/OB

## 2025-04-11 PROCEDURE — 85027 COMPLETE CBC AUTOMATED: CPT | Performed by: HOSPITALIST

## 2025-04-11 PROCEDURE — 250N000013 HC RX MED GY IP 250 OP 250 PS 637: Performed by: HOSPITALIST

## 2025-04-11 RX ADMIN — ATORVASTATIN CALCIUM 40 MG: 40 TABLET, FILM COATED ORAL at 17:40

## 2025-04-11 RX ADMIN — APIXABAN 2.5 MG: 2.5 TABLET, FILM COATED ORAL at 21:52

## 2025-04-11 RX ADMIN — FILGRASTIM-AAFI 480 MCG: 480 INJECTION, SOLUTION SUBCUTANEOUS at 20:10

## 2025-04-11 RX ADMIN — HYDROMORPHONE HYDROCHLORIDE 0.2 MG: 0.2 INJECTION, SOLUTION INTRAMUSCULAR; INTRAVENOUS; SUBCUTANEOUS at 06:24

## 2025-04-11 RX ADMIN — Medication 5 ML: at 06:24

## 2025-04-11 RX ADMIN — OXYCODONE HYDROCHLORIDE 2.5 MG: 5 TABLET ORAL at 19:31

## 2025-04-11 RX ADMIN — Medication 5 ML: at 12:43

## 2025-04-11 RX ADMIN — SENNOSIDES AND DOCUSATE SODIUM 1 TABLET: 50; 8.6 TABLET ORAL at 10:35

## 2025-04-11 RX ADMIN — APIXABAN 2.5 MG: 2.5 TABLET, FILM COATED ORAL at 09:30

## 2025-04-11 ASSESSMENT — ACTIVITIES OF DAILY LIVING (ADL)
ADLS_ACUITY_SCORE: 46
ADLS_ACUITY_SCORE: 54
ADLS_ACUITY_SCORE: 46
ADLS_ACUITY_SCORE: 54
ADLS_ACUITY_SCORE: 46
ADLS_ACUITY_SCORE: 54
ADLS_ACUITY_SCORE: 54
ADLS_ACUITY_SCORE: 46
ADLS_ACUITY_SCORE: 54
ADLS_ACUITY_SCORE: 54
ADLS_ACUITY_SCORE: 46
ADLS_ACUITY_SCORE: 54
ADLS_ACUITY_SCORE: 54
ADLS_ACUITY_SCORE: 46
ADLS_ACUITY_SCORE: 54
ADLS_ACUITY_SCORE: 46

## 2025-04-11 NOTE — PLAN OF CARE
Goal Outcome Evaluation:    4/10/25  [id number]     Summary:  SOB/  Primary Diagnosis:  Non Small Cell Lung Ca-Metastatic to lymph nodes bones in bilateral lungs, Larynx Cancer, recurrent pleural effusion,   Left vocal cord squamous cell carcinoma      Orientation: A/Ox4, hoarse voice  Aggression Stop Light: green  Mobility: A2 lift, T/R, refuses repositioning, ind with weight shifting in bed  Pain Management: denies  Diet: Reg with room service  Tele: NSR  Bowel/Bladder: cont b/b, uses urinal at bedside  Abnormal Lab/Assessments: VSS, o2 at 2NC at night  Drain/DeviceR chest Port HL, with good blood return  Skin/Wound: Redness blanchable bottom and right hip-preventive mepi in place, scattered scabs, brenda flaky ble  Consults: CAROLE  D/C Day/Goals/Place: pending improvement     Shift Note:     -On chemo precaution  -R sided deficits r upper/lower ext- contracted)  -takes pills whole with water

## 2025-04-11 NOTE — PROGRESS NOTES
Care Management Follow Up    Length of Stay (days): 2    Expected Discharge Date: 04/12/2025     Concerns to be Addressed: discharge planning     Patient plan of care discussed at interdisciplinary rounds: Yes    Anticipated Discharge Disposition: Assisted Living, Home (pending continued recommendations)              Anticipated Discharge Services: County Worker, Transportation Services  Anticipated Discharge DME: Oxygen (watch for oxygen needs)    Patient/family educated on Medicare website which has current facility and service quality ratings:    Education Provided on the Discharge Plan: Yes  Patient/Family in Agreement with the Plan: yes    Referrals Placed by CM/SW: External Care Coordination, Communication hand-offs to next level of Care Providers (to penitentiary)  Private pay costs discussed: Not applicable    Discussed  Partnership in Safe Discharge Planning  document with patient/family: No     Handoff Completed: No, handoff not indicated or clinically appropriate    Additional Information:  Writer informed that the patient is still on oxygen and Oncology recommended three day course of neupogen while inpatient. Writer called penitentiary DON and updated on discharge. Writer noted that the patient may be medically cleared for the weekend (possible Sunday 4/13 vs next week). In anticipation of possible discharge 4/13 writer has scheduled for an [facility] Wheelchair ride for 4/13 between 14:36-15:21 patient has his own wheelchair in the room.  Per penitentiary should the patient require oxygen on discharge he will need to self manage it.   They do not have RN staff over the weekend, however if patient is back to baseline he can return and any new meds will be couriered as a Tri-katja for 4/13 administration and set up.  Care Transitions will continue to follow for further discharge planning needs as identified.  Addendum: 4/11 12:02  Writer also left a message for Kerry Arce of [facility] per his request at [phone]  as he had questions regarding ongoing plan of care for cancer therapy. Writer will send a message to Dr. John's CCRN to call [facility].      Gila Blackwood RN, BSN, ACM   Care Transitions Specialist  [facility]  Care Transitions Specialist  [address] [place] [address]  [email or web address]  Office: [phone] Fax: [phone]  [facility]

## 2025-04-11 NOTE — PROGRESS NOTES
[facility]    Medicine Progress Note - Hospitalist Service    Date of Admission:  4/9/2025    Assessment & Plan   Kt Rasmussen is a 64 year old male admitted on 4/9/2025 due to acute hypoxic respiratory failure secondary to bilateral (right greater than left) moderate to large pleural effusions with presumed atelectasis.       Patient presented to the ED after being at the infusion center and seen by [facility].  While being assessed at the infusion center the patient reported a weeklong history of worsening shortness of breath.  He indicated that he has been compliant with his prescribed Eliquis.  Patient with known history of pleural effusion and pericardial effusion requiring thoracentesis with 1.9 L of clear yellow fluid removed on 1/6/2025.  Patient was found to be mildly hypoxic with O2 saturation of 88% and was placed on supplemental oxygen at 3 L/min with improvement of O2 saturation to 96%.  Due to his history and symptoms he was advised to seek further medical evaluation at [facility] ED.       Acute hypoxic respiratory failure likely multifactorial due to chemo toxicity and effusion  *Baseline patient does not require supplemental O2.  Initially requiring 3 L/m per nasal cannula.    *s/p bilateral thoracentesis as above, 2L R and 1.1L L removed    - variable oxygen needs 1-3L  - Supplemental O2 available; wean as able.    - Encourage use of IS/Flutter valve     Bilateral (right greater than left) moderate to large pleural effusions   Moderate pericardial effusion (confirmed on Echo)  Presumed atelectasis  - US guided thoracentesis ordered in the ED.    --Completed with reported 1.1 L removed from the left and 2 L removed from the right.    - Echo today 4/10 does show a moderate pericardial effusion without-overt tamponade physiology.  - cardiology consulted and does not suspect symptomatic effusions. 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       Chronic anticoagulation therapy secondary to bilateral lower extremity DVT  - Resumed on PTA Eliquis 2.5 mg BID.       Chronic anemia secondary to chemo  Chronic thrombocytopenia secondary to chemo  - sounds stable     CAD s/p cardiac stenting  HTN  HLP  GERD  MDD with anxiety  Seizure D/O  History of alcohol use D/O  History of substance use D/O  History of CVA with right sided hemiplegia  *Diagnoses noted on chart review for which the patient is no longer on any medications.            Diet: Combination Diet Regular Diet Adult  Room Service    DVT Prophylaxis: DOAC  Buckner Catheter: Not present  Lines: PRESENT      Port A Cath Single 04/11/22 Right Chest wall-Site Assessment: WDL      Cardiac Monitoring: ACTIVE order. Indication: Acute hypoxic respiratory failure  Code Status: Full Code      Clinically Significant Risk Factors           # Hypocalcemia: Lowest Ca = 8.3 mg/dL in last 2 days, will monitor and replace as appropriate     # Hypoalbuminemia: Lowest albumin = 3.2 g/dL at 4/9/2025 11:21 AM, will monitor as appropriate     # Thrombocytopenia: Lowest platelets = 122 in last 2 days, will monitor for bleeding   # Hypertension: Noted on problem list                # Financial/Environmental Concerns: none         Social Drivers of Health    Tobacco Use: Medium Risk (4/9/2025)    Patient History     Smoking Tobacco Use: Former     Smokeless Tobacco Use: Never          Disposition Plan     Medically Ready for Discharge: Anticipated in 2-4 Days             Na Barker DO  Hospitalist Service  [facility]  Hospital  Securely message with [email or web address] (more info)  Text page via [facility] Paging/Directory   ______________________________________________________________________    Interval History   Patient lying in bed and looks weak but at baseline weak. He reports no issues with transfer however nursing unsure he has even gotten out of bed yet here. He denies breathing issues. No cough. He has baseline hoarseness to his throat which is unchanged. We talked about plan for the next few days and discussed with nurse    Physical Exam   Vital Signs: Temp: 97.8  F (36.6  C) Temp src: Oral BP: 95/59 Pulse: 100   Resp: 18 SpO2: 94 % O2 Device: Nasal cannula Oxygen Delivery: 3 LPM  Weight: 166 lbs 3.63 oz    Gen: NAD, pleasant  HEENT: EOMI, MMM, NC in place  Resp: no focal crackles, no wheezes, no increased work of resp  CV: S1S2 heard, reg rhythm, reg rate no edema  Abdo: soft, nontender, nondistended, bowel sounds present  Neuro: aa, conversing, R sided weakness baseline noted with contracted arm that does move on comman      Medical Decision Making       48 MINUTES SPENT BY ME on the date of service doing chart review, history, exam, documentation & further activities per the note.      Data     I have personally reviewed the following data over the past 24 hrs:    31.3 (H)  \   8.5 (L)   / 144 (L)     N/A N/A N/A /  N/A   N/A N/A N/A \       Imaging results reviewed over the past 24 hrs:   Recent Results (from the past 24 hours)   Echocardiogram Complete   Result Value    LVEF  55-60%    Narrative    [id number]  [id number]  [id number]  [id number]^DIALLO^CHINYERE^MARTINA     [facility]  Echocardiography Laboratory  [address] [place] [id number]     Name: WOO WOO  MRN: [id number]  : 1960  Study Date: 04/10/2025 07:49 AM  Age: 64 yrs  Gender: Male  Patient Location: [facility]  Reason For Study: SOB  Ordering Physician: CHINYERE LANDRUM  Performed By: Gisela Osborne     BSA: 2.0 m2  Height: 75  in  Weight: 170 lb  HR: 95  BP: 108/61 mmHg  ______________________________________________________________________________  Procedure  Echocardiogram with two-dimensional, color and spectral Doppler.  ______________________________________________________________________________  Interpretation Summary     There is a moderate pericardial effusion, primarily localized anteriorly along  the right ventricle and right atrium, trace at the apex. Measures 1.44 cm  anterior to the RV on short-axis, and 1.8 cm along the RVOT. Echocardiographic  findings are not suggestive of tamponade physiology, however clinical  correlation and close follow up recommended.  Moderate left pleural effusion.     Left ventricular systolic function is normal. The visual ejection fraction is  55-60%.  Septal motion is consistent with conduction abnormality.  The right ventricle is normal in size and function.  The aortic valve is trileaflet with aortic valve sclerosis. There is trace  aortic regurgitation.  There is mild (1+) mitral regurgitation.  There is mild (1+) tricuspid regurgitation.  The inferior vena cava was normal in size with preserved respiratory  variability.  Aortic root dilatation is present. Max diameter of the visualized portion 4.1  cm.  Ascending aorta dilatation is present. Max diameter of the visualized portion  4.1 cm.     Alerted Dr. Bang regarding these findings.  ______________________________________________________________________________  Left Ventricle  The left ventricle is normal in size. Proximal septal thickening is noted.  Left ventricular systolic function is normal. The visual ejection fraction is  55-60%. Septal motion is consistent with conduction abnormality.     Right Ventricle  The right ventricle is normal in size and function.     Atria  Normal left atrial size. Right atrial size is normal.     Mitral Valve  There is mild (1+) mitral regurgitation.     Tricuspid Valve  There is mild (1+) tricuspid  regurgitation. The right ventricular systolic  pressure is approximated at 22mmHg plus the right atrial pressure.     Aortic Valve  The aortic valve is trileaflet with aortic valve sclerosis. There is trace  aortic regurgitation.     Pulmonic Valve  The pulmonic valve is not well seen, but is grossly normal.     Vessels  Aortic root dilatation is present. Max diameter of the visualized portion 4.1  cm. Ascending aorta dilatation is present. Max diameter of the visualized  portion 4.1 cm. The inferior vena cava was normal in size with preserved  respiratory variability.     Pericardium  There is a moderate pericardial effusion, primarily localized anteriorly along  the right ventricle and right atrium, trace at the apex. Measures 1.44 cm  anterior to the RV on short-axis, and 1.8 cm along the RVOT. Echocardiographic  findings are not suggestive of tamponade physiology, however clinical  correlation recommended. Moderate left pleural effusion.     ______________________________________________________________________________  MMode/2D Measurements & Calculations  IVSd: 1.3 cm  LVIDd: 3.7 cm  LVIDs: 2.7 cm  LVPWd: 1.2 cm  FS: 26.3 %     LV mass(C)d: 156.7 grams  LV mass(C)dI: 76.5 grams/m2  Ao root diam: 4.1 cm  asc Aorta Diam: 4.1 cm  LVOT diam: 2.2 cm  LVOT area: 3.9 cm2  Ao root diam index Ht(cm/m): 2.1  Ao root diam index BSA (cm/m2): 2.0  Asc Ao diam index BSA (cm/m2): 2.0  Asc Ao diam index Ht(cm/m): 2.1  LA Volume (BP): 19.5 ml  LA Volume Index (BP): 9.5 ml/m2     RV Base: 2.9 cm  RWT: 0.65  TAPSE: 2.5 cm     Doppler Measurements & Calculations  Ao V2 max: 97.3 cm/sec  Ao max P.0 mmHg  Ao V2 mean: 66.2 cm/sec  Ao mean P.0 mmHg  Ao V2 VTI: 15.5 cm  SUPA(I,D): 4.0 cm2  SUPA(V,D): 3.2 cm2  LV V1 max P.7 mmHg  LV V1 max: 81.6 cm/sec  LV V1 VTI: 16.0 cm  SV(LVOT): 62.0 ml  SI(LVOT): 30.3 ml/m2  PA acc time: 0.07 sec  AV Jefferson Ratio (DI): 0.84  SUPA Index (cm2/m2): 2.0  RV S Jefferson: 9.0 cm/sec      ______________________________________________________________________________  Report approved by: Dickson Liz MD on 04/10/2025 10:15 AM

## 2025-04-11 NOTE — PROGRESS NOTES
Hematology/Oncology Follow-up Note  [facility]    Date of Admission:  4/9/2025   Reason for Consult: NSCLC with pleural effusions   Primary Oncologist: Dr. John     ASSESSMENT : Kt Rasmussen is a 64 year old year old male who presented this hospitalization with worsening shortness of breath. Investigations revealed pleural effusions. Patient is currently being treated for NSCLC extensive stage with Gemzar.   NSCLC  Pleural effusion  Pericardial effusion      PLAN:  Continue Neupogen today and tomorrow as per treatment plan- Leukocytosis related to Neupogen.   Continue to support respiratory status as per hospitalist   Has follow up scheduled in cancer clinic next week to assess readiness for treatment   Oncology will sign off     Non-small stephanie lung cancer  Pleural effusions  Diagnosed 05/05/2016  Progressed on several treatments both with radiation and chemo-immunotherapy  Most recent treatment plan: Gemcitabine   C1D1 01/29/2025  C2D1 02/19/2025  C3D1 03/19/2025  C4D1 04/09/2025 4/11, 4/12, 4/13 Neupogen planned   C4D8 gemzar due   04/09/2025 CXR showing Right greater than left moderate to large pleural effusions with adjacent presumed atelectasis. Interstitial pulmonary edema   04/09/2025 US thoracentesis 2 liters of clear coty fluid were removed from the right chest. 1.1 L of clear coty fluid was taken from the left chest   Patient has previously needed thoracentesis periodically       EXAM:  Subjective  Denies lightheaded or dizziness.  Shortness of breath improved. Mild cough- phlegm      Objective  GENERAL/CONSTITUTIONAL: No acute distress.  NEUROLOGIC: Alert, oriented, answers questions appropriately.   RESPIRATORY: Nasal Cannula       Labs Reviewed: CBC, CMP, labs as above   Imaging Reviewed: as above     35 minutes spent on the date of the encounter doing review of outside records, review of test results, interpretation of tests,  implementing/ creating plan, coordinating care, and  documentation     Anusha HENDRICKSON, CNP  Hematology/Oncology  [facility]   Securely message with [employer]

## 2025-04-11 NOTE — PLAN OF CARE
Goal Outcome Evaluation:      Orientation: A/Ox4, hoarse voice  Aggression Stop Light: green  Mobility: A2 lift, T/R, refuses repositioning, independent with weight shifting in bed  Pain Management: denies  Diet: Reg w/ Room Service  Bowel/Bladder: cont b/b, uses urinal at bedside. No BM this shift  Abnormal Lab/Assessments: Hgb 8.5  Borderline tachy at times, on 1L NC during the day, 2-3L at noc  Tele: NSR  Drain/Device: R Port HL  Skin/Wound: Intact, brenda flaky BLE, preventative mepis changed and in place on R Side   Consults: Hem/onc, cardiology, SW  D/C Day/Goals/Place: Pending for 4/13    Other-  Prn Senna given

## 2025-04-12 ENCOUNTER — APPOINTMENT (OUTPATIENT)
Dept: GENERAL RADIOLOGY | Facility: CLINIC | Age: 65
DRG: 180 | End: 2025-04-12
Attending: STUDENT IN AN ORGANIZED HEALTH CARE EDUCATION/TRAINING PROGRAM
Payer: COMMERCIAL

## 2025-04-12 LAB
ERYTHROCYTE [DISTWIDTH] IN BLOOD BY AUTOMATED COUNT: 19.5 % (ref 10–15)
HCT VFR BLD AUTO: 25.6 % (ref 40–53)
HGB BLD-MCNC: 8.4 G/DL (ref 13.3–17.7)
MCH RBC QN AUTO: 29.9 PG (ref 26.5–33)
MCHC RBC AUTO-ENTMCNC: 32.8 G/DL (ref 31.5–36.5)
MCV RBC AUTO: 91 FL (ref 78–100)
PLATELET # BLD AUTO: 156 10E3/UL (ref 150–450)
RBC # BLD AUTO: 2.81 10E6/UL (ref 4.4–5.9)
WBC # BLD AUTO: 33.9 10E3/UL (ref 4–11)

## 2025-04-12 PROCEDURE — 71046 X-RAY EXAM CHEST 2 VIEWS: CPT

## 2025-04-12 PROCEDURE — 250N000013 HC RX MED GY IP 250 OP 250 PS 637: Performed by: PHYSICIAN ASSISTANT

## 2025-04-12 PROCEDURE — 250N000009 HC RX 250: Performed by: STUDENT IN AN ORGANIZED HEALTH CARE EDUCATION/TRAINING PROGRAM

## 2025-04-12 PROCEDURE — 250N000011 HC RX IP 250 OP 636: Performed by: PHYSICIAN ASSISTANT

## 2025-04-12 PROCEDURE — 85014 HEMATOCRIT: CPT | Performed by: STUDENT IN AN ORGANIZED HEALTH CARE EDUCATION/TRAINING PROGRAM

## 2025-04-12 PROCEDURE — 250N000013 HC RX MED GY IP 250 OP 250 PS 637: Performed by: HOSPITALIST

## 2025-04-12 PROCEDURE — 99232 SBSQ HOSP IP/OBS MODERATE 35: CPT | Performed by: STUDENT IN AN ORGANIZED HEALTH CARE EDUCATION/TRAINING PROGRAM

## 2025-04-12 PROCEDURE — 258N000003 HC RX IP 258 OP 636: Performed by: STUDENT IN AN ORGANIZED HEALTH CARE EDUCATION/TRAINING PROGRAM

## 2025-04-12 PROCEDURE — 120N000001 HC R&B MED SURG/OB

## 2025-04-12 RX ORDER — IPRATROPIUM BROMIDE AND ALBUTEROL SULFATE 2.5; .5 MG/3ML; MG/3ML
3 SOLUTION RESPIRATORY (INHALATION) EVERY 4 HOURS PRN
Status: DISCONTINUED | OUTPATIENT
Start: 2025-04-12 | End: 2025-04-16 | Stop reason: HOSPADM

## 2025-04-12 RX ORDER — IPRATROPIUM BROMIDE AND ALBUTEROL SULFATE 2.5; .5 MG/3ML; MG/3ML
3 SOLUTION RESPIRATORY (INHALATION)
Status: DISCONTINUED | OUTPATIENT
Start: 2025-04-12 | End: 2025-04-12

## 2025-04-12 RX ADMIN — ATORVASTATIN CALCIUM 40 MG: 40 TABLET, FILM COATED ORAL at 17:53

## 2025-04-12 RX ADMIN — OXYCODONE HYDROCHLORIDE 2.5 MG: 5 TABLET ORAL at 07:27

## 2025-04-12 RX ADMIN — APIXABAN 2.5 MG: 2.5 TABLET, FILM COATED ORAL at 08:12

## 2025-04-12 RX ADMIN — Medication 5 ML: at 06:39

## 2025-04-12 RX ADMIN — OXYCODONE HYDROCHLORIDE 2.5 MG: 5 TABLET ORAL at 17:55

## 2025-04-12 RX ADMIN — SODIUM CHLORIDE 500 ML: 0.9 INJECTION, SOLUTION INTRAVENOUS at 08:13

## 2025-04-12 RX ADMIN — APIXABAN 2.5 MG: 2.5 TABLET, FILM COATED ORAL at 21:05

## 2025-04-12 RX ADMIN — Medication 5 ML: at 10:12

## 2025-04-12 RX ADMIN — IPRATROPIUM BROMIDE AND ALBUTEROL SULFATE 3 ML: .5; 3 SOLUTION RESPIRATORY (INHALATION) at 10:38

## 2025-04-12 ASSESSMENT — ACTIVITIES OF DAILY LIVING (ADL)
ADLS_ACUITY_SCORE: 51
ADLS_ACUITY_SCORE: 54
ADLS_ACUITY_SCORE: 51
ADLS_ACUITY_SCORE: 51
ADLS_ACUITY_SCORE: 55
ADLS_ACUITY_SCORE: 54
ADLS_ACUITY_SCORE: 51
ADLS_ACUITY_SCORE: 55
ADLS_ACUITY_SCORE: 51
ADLS_ACUITY_SCORE: 55
ADLS_ACUITY_SCORE: 51
ADLS_ACUITY_SCORE: 51
ADLS_ACUITY_SCORE: 55
ADLS_ACUITY_SCORE: 54
ADLS_ACUITY_SCORE: 51
ADLS_ACUITY_SCORE: 55
ADLS_ACUITY_SCORE: 51
ADLS_ACUITY_SCORE: 55
ADLS_ACUITY_SCORE: 51

## 2025-04-12 NOTE — PLAN OF CARE
Goal Outcome Evaluation:    Orientation: A/Ox4, hoarse voice  Aggression Stop Light: green  Mobility: A2 lift, shifts independently in bed, up in the WC once this shift  Pain Management: PRN oxy x2  Diet: Reg w/ Room Service  Bowel/Bladder: cont b/b, uses urinal at bedside. No BM this shift  Abnormal Lab/Assessments: Hgb 8.4, VSS ex tachy. Bouncing from 1-3.5 L NC  Tele: N/A  Drain/Device: R Port HL  Skin/Wound: Intact, brenda flaky BLE, preventative mepis in place on R Side   Consults: Hem/onc, cardiology, SW  D/C Day/Goals/Place: Pending O2 weaning    Other-  Chest Xray done today, see results  One time bolus of 500ml NS given  PRN nebulizer given for SOB and low O2 sats

## 2025-04-12 NOTE — PLAN OF CARE
[id number] - [id number]    Orientation: A&Ox4, hoarse voice  Aggression Stop Light: Green  Activity: A2 w/lift, able to turn independently in bed  Diet/BS Checks: Regular  Tele: Sinus tachy  IV Access/Drains: R port HL  Pain Management: PRN oxy x1 given for BLE pain  Abnormal VS/Results: VSS on 3L NC overnight ex tachy  Bowel/Bladder: Continent, uses urinal at bedside  Skin/Wounds: Blanchable redness to coccyx and R hip - preventative dressings in place, brenda BLE  Consults: Hem/Onc, Cardiology, SW  D/C Disposition: Pending improvement

## 2025-04-12 NOTE — PROGRESS NOTES
Counts noted.  Discontinued Neupogen.  Okay to discharge when medically stable.  He has a follow-up in the oncology clinic next week.  I will sign off    Pako Cabral MD

## 2025-04-12 NOTE — PROGRESS NOTES
[facility]    Medicine Progress Note - Hospitalist Service    Date of Admission:  4/9/2025    Assessment & Plan   Kt Rasmussen is a 64 year old male admitted on 4/9/2025 due to acute hypoxic respiratory failure secondary to bilateral (right greater than left) moderate to large pleural effusions with presumed atelectasis.       Patient presented to the ED after being at the infusion center and seen by  Oncology.  While being assessed at the infusion center the patient reported a weeklong history of worsening shortness of breath.  He indicated that he has been compliant with his prescribed Eliquis.  Patient with known history of pleural effusion and pericardial effusion requiring thoracentesis with 1.9 L of clear yellow fluid removed on 1/6/2025.  Patient was found to be mildly hypoxic with O2 saturation of 88% and was placed on supplemental oxygen at 3 L/min with improvement of O2 saturation to 96%.  Due to his history and symptoms he was advised to seek further medical evaluation at [facility] ED.     Acute hypoxic respiratory failure likely multifactorial due to chemo toxicity and effusion  Bilateral (right greater than left) moderate to large pleural effusions   Presumed atelectasis  *Baseline patient does not require supplemental O2.  Initially requiring 3 L/m per nasal cannula.    *s/p bilateral thoracentesis as above, 2L R and 1.1L L removed    - variable oxygen needs 1-3L  - will check repeat CXR today to evaluate the effusions  - Encourage use of IS/Flutter valve  - encourage patient to get out of bed today       Moderate pericardial effusion (confirmed on Echo)  - Echo 4/10 does show a moderate pericardial effusion without-overt tamponade physiology cardiology consulted and does not suspect symptomatic effusions. 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       Chronic anticoagulation therapy secondary to bilateral lower extremity DVT  - Resumed on PTA Eliquis 2.5 mg BID.       Chronic anemia secondary to chemo  Chronic thrombocytopenia secondary to chemo  - monitor daily cbc with recent chemo  - counts stable     CAD s/p cardiac stenting  HTN  HLP  GERD  MDD with anxiety  Seizure D/O  History of alcohol use D/O  History of substance use D/O  History of CVA with right sided hemiplegia  - noted appropriate home medications resumed as needed          Diet: Combination Diet Regular Diet Adult  Room Service    DVT Prophylaxis: DOAC  Buckner Catheter: Not present  Lines: PRESENT      Port A Cath Single 04/11/22 Right Chest wall-Site Assessment: WDL      Cardiac Monitoring: None  Code Status: Full Code      Clinically Significant Risk Factors               # Hypoalbuminemia: Lowest albumin = 3.2 g/dL at 4/9/2025 11:21 AM, will monitor as appropriate       # Hypertension: Noted on problem list                # Financial/Environmental Concerns: none         Social Drivers of Health    Tobacco Use: Medium Risk (4/9/2025)    Patient History     Smoking Tobacco Use: Former     Smokeless Tobacco Use: Never          Disposition Plan     Medically Ready for Discharge: Anticipated in 2-4 Days             Na Barker DO  Hospitalist Service  [facility]  Securely message with [employer] (more info)  Text page via [employer] Paging/Directory   ______________________________________________________________________    Interval History   Patient looks about the same as yesterday. Nurse tells me she couldn't get him up yesterday due to time issues and patient not wanting to. We will get  him to wheelchair today and monitor his tolerance. Reviewed CXR and effusions are small but still present R>L.     Physical Exam   Vital Signs: Temp: 98.2  F (36.8  C) Temp src: Oral BP: 115/57 Pulse: 91   Resp: 18 SpO2: 93 % O2 Device: Nasal cannula Oxygen Delivery: 2 LPM  Weight: 166 lbs .1 oz    Gen: NAD, pleasant  HEENT: EOMI, MMM, NC in place  Resp: no focal crackles, no wheezes, no increased work of resp  CV: S1S2 heard, reg rhythm, reg rate no edema  Abdo: soft, nontender, nondistended, bowel sounds present  Neuro: aa, conversing, moving all extremities more fluidly today      Medical Decision Making       45 MINUTES SPENT BY ME on the date of service doing chart review, history, exam, documentation & further activities per the note.      Data     I have personally reviewed the following data over the past 24 hrs:    33.9 (H)  \   8.4 (L)   / 156     N/A N/A N/A /  N/A   N/A N/A N/A \       Imaging results reviewed over the past 24 hrs:   No results found for this or any previous visit (from the past 24 hours).

## 2025-04-13 LAB
ERYTHROCYTE [DISTWIDTH] IN BLOOD BY AUTOMATED COUNT: 19.4 % (ref 10–15)
HCT VFR BLD AUTO: 25.5 % (ref 40–53)
HGB BLD-MCNC: 8 G/DL (ref 13.3–17.7)
MCH RBC QN AUTO: 28.7 PG (ref 26.5–33)
MCHC RBC AUTO-ENTMCNC: 31.4 G/DL (ref 31.5–36.5)
MCV RBC AUTO: 91 FL (ref 78–100)
PLATELET # BLD AUTO: 171 10E3/UL (ref 150–450)
RBC # BLD AUTO: 2.79 10E6/UL (ref 4.4–5.9)
WBC # BLD AUTO: 17.3 10E3/UL (ref 4–11)

## 2025-04-13 PROCEDURE — 250N000013 HC RX MED GY IP 250 OP 250 PS 637: Performed by: PHYSICIAN ASSISTANT

## 2025-04-13 PROCEDURE — 99233 SBSQ HOSP IP/OBS HIGH 50: CPT | Performed by: STUDENT IN AN ORGANIZED HEALTH CARE EDUCATION/TRAINING PROGRAM

## 2025-04-13 PROCEDURE — 250N000013 HC RX MED GY IP 250 OP 250 PS 637: Performed by: HOSPITALIST

## 2025-04-13 PROCEDURE — 250N000011 HC RX IP 250 OP 636: Performed by: PHYSICIAN ASSISTANT

## 2025-04-13 PROCEDURE — 250N000012 HC RX MED GY IP 250 OP 636 PS 637: Performed by: STUDENT IN AN ORGANIZED HEALTH CARE EDUCATION/TRAINING PROGRAM

## 2025-04-13 PROCEDURE — 120N000001 HC R&B MED SURG/OB

## 2025-04-13 PROCEDURE — 85014 HEMATOCRIT: CPT | Performed by: STUDENT IN AN ORGANIZED HEALTH CARE EDUCATION/TRAINING PROGRAM

## 2025-04-13 RX ORDER — PREDNISONE 20 MG/1
40 TABLET ORAL DAILY
Status: DISCONTINUED | OUTPATIENT
Start: 2025-04-13 | End: 2025-04-16 | Stop reason: HOSPADM

## 2025-04-13 RX ADMIN — ATORVASTATIN CALCIUM 40 MG: 40 TABLET, FILM COATED ORAL at 17:55

## 2025-04-13 RX ADMIN — APIXABAN 2.5 MG: 2.5 TABLET, FILM COATED ORAL at 21:08

## 2025-04-13 RX ADMIN — Medication 5 ML: at 06:28

## 2025-04-13 RX ADMIN — OXYCODONE HYDROCHLORIDE 2.5 MG: 5 TABLET ORAL at 17:55

## 2025-04-13 RX ADMIN — APIXABAN 2.5 MG: 2.5 TABLET, FILM COATED ORAL at 09:21

## 2025-04-13 RX ADMIN — PREDNISONE 40 MG: 20 TABLET ORAL at 11:03

## 2025-04-13 RX ADMIN — OXYCODONE HYDROCHLORIDE 2.5 MG: 5 TABLET ORAL at 09:25

## 2025-04-13 RX ADMIN — Medication 5 ML: at 10:26

## 2025-04-13 RX ADMIN — POLYETHYLENE GLYCOL 3350 17 G: 17 POWDER, FOR SOLUTION ORAL at 15:40

## 2025-04-13 ASSESSMENT — ACTIVITIES OF DAILY LIVING (ADL)
ADLS_ACUITY_SCORE: 47
ADLS_ACUITY_SCORE: 51
ADLS_ACUITY_SCORE: 51
ADLS_ACUITY_SCORE: 47
ADLS_ACUITY_SCORE: 51
ADLS_ACUITY_SCORE: 51
ADLS_ACUITY_SCORE: 47
ADLS_ACUITY_SCORE: 47
ADLS_ACUITY_SCORE: 51
ADLS_ACUITY_SCORE: 49
ADLS_ACUITY_SCORE: 49
ADLS_ACUITY_SCORE: 51
ADLS_ACUITY_SCORE: 47
ADLS_ACUITY_SCORE: 51
ADLS_ACUITY_SCORE: 47
ADLS_ACUITY_SCORE: 49
ADLS_ACUITY_SCORE: 47
ADLS_ACUITY_SCORE: 47
ADLS_ACUITY_SCORE: 51
ADLS_ACUITY_SCORE: 51
ADLS_ACUITY_SCORE: 47

## 2025-04-13 NOTE — PROGRESS NOTES
[facility]    Medicine Progress Note - Hospitalist Service    Date of Admission:  4/9/2025    Assessment & Plan   Kt Rasmussen is a 64 year old male admitted on 4/9/2025 due to acute hypoxic respiratory failure secondary to bilateral (right greater than left) moderate to large pleural effusions with presumed atelectasis.       Patient presented to the ED after being at the infusion center and seen by [facility].  While being assessed at the infusion center the patient reported a weeklong history of worsening shortness of breath.  He indicated that he has been compliant with his prescribed Eliquis.  Patient with known history of pleural effusion and pericardial effusion requiring thoracentesis with 1.9 L of clear yellow fluid removed on 1/6/2025.  Patient was found to be mildly hypoxic with O2 saturation of 88% and was placed on supplemental oxygen at 3 L/min with improvement of O2 saturation to 96%.  Due to his history and symptoms he was advised to seek further medical evaluation at [facility] ED.     Acute hypoxic respiratory failure likely multifactorial due to chemo toxicity and bilateral effusions  Bilateral (right greater than left) moderate to large pleural effusions s/p thoracentesis  Presumed atelectasis  *Baseline patient does not require supplemental O2.  Initially requiring 3 L/m per nasal cannula in the ED.   *CXR with moderate bilateral effusions s/p bilateral thoracentesis in the ED, 2L R and 1.1L L removed    - no signs of infection on admit, oncology consulted and suspects hypoxia related to effusions and chemo toxicity  - unable to fully wean off oxygen, will vary between 1-3L  - repeat CXR 04/12 reviewed with ongoing moderate R sided effusion, but overall improvement in size. Hold on repeat thoracentesis for now and encourage IS and pulmonary hygiene.   - discussed progress with oncology today 04/13, will start small steroid burst and follow clinically  - PT consult due  to inability to full transfer to wheelchair on his own which is baseline    Moderate pericardial effusion (confirmed on Echo)  - Echo 4/10 does show a moderate pericardial effusion without-overt tamponade physiology >cardiology consulted and does not suspect symptomatic effusions. 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       Chronic anticoagulation therapy secondary to bilateral lower extremity DVT  - Resumed on PTA Eliquis 2.5 mg BID.       Chronic anemia secondary to chemo  Chronic thrombocytopenia secondary to chemo  - monitor daily cbc with recent chemo  - counts stable with increase WBC due to neupogen     CAD s/p cardiac stenting  HTN  HLP  GERD  MDD with anxiety  Seizure D/O  History of alcohol use D/O  History of substance use D/O  History of CVA with right sided hemiplegia  - noted appropriate home medications resumed as needed          Diet: Combination Diet Regular Diet Adult  Room Service    DVT Prophylaxis: DOAC  Buckner Catheter: Not present  Lines: PRESENT      Port A Cath Single 04/11/22 Right Chest wall-Site Assessment: WDL      Cardiac Monitoring: None  Code Status: Full Code      Clinically Significant Risk Factors               # Hypoalbuminemia: Lowest albumin = 3.2 g/dL at 4/9/2025 11:21 AM, will monitor as appropriate       # Hypertension: Noted on problem list                # Financial/Environmental Concerns: none         Social Drivers of Health    Tobacco Use: Medium Risk (4/9/2025)    Patient  History     Smoking Tobacco Use: Former     Smokeless Tobacco Use: Never          Disposition Plan     Medically Ready for Discharge: Anticipated in 2-4 Days             Na Barker DO  Hospitalist Service  [facility]  Securely message with [employer] (more info)  Text page via [employer] Paging/Directory   ______________________________________________________________________    Interval History   Patient again mostly unchanged. He does report feeling really weak and he was not able to transfer yesterday on his own to wheelchair. His breathing was more rapid today and he dose feel a bit SOB. No fever or chills. No cough. Reviewed plan with nurse    Physical Exam   Vital Signs: Temp: 97.3  F (36.3  C) Temp src: Oral BP: 132/73 Pulse: 87   Resp: 18 SpO2: 92 % O2 Device: Nasal cannula Oxygen Delivery: 3 LPM  Weight: 168 lbs 3.38 oz    Gen: NAD, pleasant, lying on R sided due to chronic weakness  HEENT: EOMI, MMM, NC in place  Resp: trace expiratory wheezing  CV: S1S2 heard, reg rhythm, reg rate no edema  Abdo: soft, nontender, nondistended, bowel sounds present  Neuro: aa, conversing      Medical Decision Making       51 MINUTES SPENT BY ME on the date of service doing chart review, history, exam, documentation & further activities per the note.  Reviewed CXR with improved effusions, d/w oncology    Data         Imaging results reviewed over the past 24 hrs:   No results found for this or any previous visit (from the past 24 hours).

## 2025-04-13 NOTE — PROGRESS NOTES
Care Management Follow Up    Length of Stay (days): 4    Expected Discharge Date: 04/13/2025     Concerns to be Addressed: discharge planning     Patient plan of care discussed at interdisciplinary rounds: Yes    Anticipated Discharge Disposition: Assisted Living, Home (pending continued recommendations)              Anticipated Discharge Services: County Worker, Transportation Services  Anticipated Discharge DME: Oxygen (watch for oxygen needs)    Patient/family educated on Medicare website which has current facility and service quality ratings:    Education Provided on the Discharge Plan: Yes  Patient/Family in Agreement with the Plan: yes    Referrals Placed by CM/SW: External Care Coordination, Communication hand-offs to next level of Care Providers (to [facility])  Private pay costs discussed: Not applicable    Discussed  Partnership in Safe Discharge Planning  document with patient/family: No     Handoff Completed: No, handoff not indicated or clinically appropriate    Additional Information:  Writer reviewed chart, patient is not currently back to baseline therefore cannot return to [facility] today. Notes state at baseline patient is independent with transfers but currently being charted that patient is up with a lift. Writer rescheduled transport to tomorrow 4/14 with a service window of [phone].     Next Steps: follow up Monday for return to [facility]    MUNA SO  [facility]  INPATIENT CARE COORDINATION

## 2025-04-13 NOTE — PLAN OF CARE
Goal Outcome Evaluation:    Orientation: A/Ox4, hoarse voice  Aggression Stop Light: green  Mobility: A2 lift, shifts independently in bed, up in the WC once this shift  Pain Management: PRN oxy x2  Diet: Reg w/ Room Service  Bowel/Bladder: cont b/b, uses urinal at bedside. No BM this shift, miralax given suppository offered but refused  Abnormal Lab/Assessments: VSS 3 L NC  Tele: N/A  Drain/Device: R Port HL  Skin/Wound: Intact, brenda flaky BLE, preventative mepis in place on R Side   Consults: Hem/onc, cardiology, SW  D/C Day/Goals/Place: Pending 4/14

## 2025-04-13 NOTE — PLAN OF CARE
[id number] - [id number]    Orientation: A&Ox4, hoarse voice  Aggression Stop Light: Green  Activity: A2 w/lift, able to turn independently in bed  Diet/BS Checks: Regular  Tele: Sinus tachy  IV Access/Drains: R port HL  Pain Management: Denies pain  Abnormal VS/Results: VSS on 3L NC overnight ex tachy at times  Bowel/Bladder: Continent, uses urinal at bedside  Skin/Wounds: Blanchable redness to coccyx and R hip - preventative dressings in place, brenda BLE  Consults: Hem/Onc, Cardiology, SW  D/C Disposition: Possibly today back to long-term    Other:  - IS use encouraged

## 2025-04-14 ENCOUNTER — APPOINTMENT (OUTPATIENT)
Dept: PHYSICAL THERAPY | Facility: CLINIC | Age: 65
DRG: 180 | End: 2025-04-14
Attending: STUDENT IN AN ORGANIZED HEALTH CARE EDUCATION/TRAINING PROGRAM
Payer: COMMERCIAL

## 2025-04-14 LAB
ERYTHROCYTE [DISTWIDTH] IN BLOOD BY AUTOMATED COUNT: 19 % (ref 10–15)
HCT VFR BLD AUTO: 26.1 % (ref 40–53)
HGB BLD-MCNC: 8.1 G/DL (ref 13.3–17.7)
MCH RBC QN AUTO: 28.3 PG (ref 26.5–33)
MCHC RBC AUTO-ENTMCNC: 31 G/DL (ref 31.5–36.5)
MCV RBC AUTO: 91 FL (ref 78–100)
NT-PROBNP SERPL-MCNC: 501 PG/ML (ref 0–900)
PLATELET # BLD AUTO: 210 10E3/UL (ref 150–450)
RBC # BLD AUTO: 2.86 10E6/UL (ref 4.4–5.9)
WBC # BLD AUTO: 14.9 10E3/UL (ref 4–11)

## 2025-04-14 PROCEDURE — 97530 THERAPEUTIC ACTIVITIES: CPT | Mod: GP

## 2025-04-14 PROCEDURE — 250N000011 HC RX IP 250 OP 636: Performed by: PHYSICIAN ASSISTANT

## 2025-04-14 PROCEDURE — 250N000012 HC RX MED GY IP 250 OP 636 PS 637: Performed by: STUDENT IN AN ORGANIZED HEALTH CARE EDUCATION/TRAINING PROGRAM

## 2025-04-14 PROCEDURE — 99233 SBSQ HOSP IP/OBS HIGH 50: CPT | Performed by: HOSPITALIST

## 2025-04-14 PROCEDURE — 250N000013 HC RX MED GY IP 250 OP 250 PS 637: Performed by: HOSPITALIST

## 2025-04-14 PROCEDURE — 97161 PT EVAL LOW COMPLEX 20 MIN: CPT | Mod: GP

## 2025-04-14 PROCEDURE — 250N000013 HC RX MED GY IP 250 OP 250 PS 637: Performed by: PHYSICIAN ASSISTANT

## 2025-04-14 PROCEDURE — 120N000001 HC R&B MED SURG/OB

## 2025-04-14 PROCEDURE — 85014 HEMATOCRIT: CPT | Performed by: STUDENT IN AN ORGANIZED HEALTH CARE EDUCATION/TRAINING PROGRAM

## 2025-04-14 RX ADMIN — ATORVASTATIN CALCIUM 40 MG: 40 TABLET, FILM COATED ORAL at 18:35

## 2025-04-14 RX ADMIN — POLYETHYLENE GLYCOL 3350 17 G: 17 POWDER, FOR SOLUTION ORAL at 08:39

## 2025-04-14 RX ADMIN — OXYCODONE HYDROCHLORIDE 2.5 MG: 5 TABLET ORAL at 18:35

## 2025-04-14 RX ADMIN — APIXABAN 2.5 MG: 2.5 TABLET, FILM COATED ORAL at 21:04

## 2025-04-14 RX ADMIN — Medication 5 ML: at 06:17

## 2025-04-14 RX ADMIN — PREDNISONE 40 MG: 20 TABLET ORAL at 08:26

## 2025-04-14 RX ADMIN — OXYCODONE HYDROCHLORIDE 2.5 MG: 5 TABLET ORAL at 22:41

## 2025-04-14 RX ADMIN — OXYCODONE HYDROCHLORIDE 2.5 MG: 5 TABLET ORAL at 00:14

## 2025-04-14 RX ADMIN — OXYCODONE HYDROCHLORIDE 2.5 MG: 5 TABLET ORAL at 06:12

## 2025-04-14 RX ADMIN — APIXABAN 2.5 MG: 2.5 TABLET, FILM COATED ORAL at 08:26

## 2025-04-14 ASSESSMENT — ACTIVITIES OF DAILY LIVING (ADL)
ADLS_ACUITY_SCORE: 47
ADLS_ACUITY_SCORE: 46
ADLS_ACUITY_SCORE: 46
ADLS_ACUITY_SCORE: 47
ADLS_ACUITY_SCORE: 46
ADLS_ACUITY_SCORE: 46
ADLS_ACUITY_SCORE: 47
ADLS_ACUITY_SCORE: 46
ADLS_ACUITY_SCORE: 47
ADLS_ACUITY_SCORE: 47
ADLS_ACUITY_SCORE: 46
ADLS_ACUITY_SCORE: 47
ADLS_ACUITY_SCORE: 46
ADLS_ACUITY_SCORE: 47
ADLS_ACUITY_SCORE: 46

## 2025-04-14 NOTE — PLAN OF CARE
7123 - 1418    Orientation: A&Ox4, hoarse voice  Aggression Stop Light: Green  Activity: A2 w/lift, shifts independently in bed  Diet/BS Checks: Regular  Tele: N/A  IV Access/Drains: R port HL  Pain Management: PRN oxy x2 given for BLE pain  Abnormal VS/Results: VSS on 3L NC  Bowel/Bladder: Continent, uses urinal at bedside, LBM 4/8 - senna offered, refused  Skin/Wounds: Blanchable redness to coccyx and R hip - preventative dressings in place, brenda BLE  Consults: Hem/Onc, Cardiology, SW  D/C Disposition: Back to [facility] today between [id number] via [facility] wheelchair transport    Other:  - IS use encouraged

## 2025-04-14 NOTE — PROGRESS NOTES
[facility]  Hospitalist Progress Note   04/14/2025          Assessment and Plan:         Kt Rasmussen is a 64 year old male with history of metastatic lung cancer, pleural effusion, pericardial effusion, lower extremity DVT on anticoagulation, chronic anemia, thrombocytopenia, coronary artery disease status post stenting, hypertension, hyperlipidemia, GERD, seizure disorder, anxiety disorder, history of CVA with right hemiplegia admitted on 4/9/2025 due to shortness of breath.        Acute hypoxic respiratory failure likely multifactorial due to lung cancer,  bilateral pleural effusions, atelectasis, pericardial effusion, chemotoxicity, deconditioning  Bilateral (right greater than left) moderate to large pleural effusions s/p bilateral thoracentesis.  Moderate pericardial effusion.  Presumed atelectasis.  Elevated troponin likely in the setting of demand ischemia from hypoxic respiratory failure, type II MI.  --Patient sent to ED from [facility] with report of weeklong worsening shortness of breath.  Baseline not on supplemental oxygen, compliant with anticoagulation.  Known history of pleural effusion and pericardial effusion status post thoracentesis with 1.9 L of malignant fluid removed on 1/6/2025.  -In ED noted to be hypoxic with O2 sats of 88% and required 3 L oxygen.  -CXR showed right greater than left moderate to large pleural effusion with edema.   -Troponin high-sensitivity 34 > 22.  proBNP 495.  -EKG normal sinus rhythm.  -Echocardiogram with moderate pericardial effusion, not suggestive of tamponade physiology.   -Chest post bilateral thoracocentesis on 4/9, 1.1 liter from the left and 2 liters from the right.    -- Patient continues to have shortness of breath, requiring between 1 to 3 L supplemental nasal oxygen.  Chest x-ray from 4/12 Moderate right and small pleural effusions decreased from 4/9/2025. No pneumothorax. Right greater than left lung base atelectasis.   ---  Oncology followed, suspect hypoxia related to effusion and chemotoxicity.  --Cardiology followed, recommended follow-up echocardiogram in 1 month.  Appreciate multidisciplinary team input  --Hospitalist team discussed with oncologist on 4/13, started on prednisone burst for 5 days.  Continue prednisone 40 mg oral daily for 5 days.  Follow-up proBNP, will consider as needed diuresis  Aggressive incentive spirometry, encourage ambulation.  Wean off supplemental nasal oxygen as able to.  Close follow-up chest imaging for improvement.     Metastatic non-small cell lung cancer (mets to lymph nodes, bones and bilateral lungs)  *Follows with  Oncology and underwent infusion GEMZAR 04/09  -- s/p 2 doses of post chemotherapy neupogen, which is reason for elevated white count now improving  -[place] oncology now signed off, follow-up with primary oncology team in clinic per schedule.  - Receiving oxycodone as needed for bilateral lower extremity pain.    Chronic anemia secondary to chemo, underlying malignancy.  Chronic thrombocytopenia secondary to chemo.  Leukocytosis likely reactive  Monitor CBC periodically     Bilateral lower extremity DVT -9/2023 on anticoagulation.  Likely hypercoagulability of malignancy.    On chronic anticoagulation, continue PTA Eliquis 2.5 mg BID.       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      CAD s/p cardiac stenting  Ascending aortic dilatation.  Mild MR, mild TR.  Hypertension.  Hyperlipidemia.  --Denies any chest pain.  No palpitations.  SOB as above.  Ascending aortic dilatation of 4.1 cm noted on echocardiogram.  Mild MR.  Mild TR.  Already on anticoagulation as above.  PTA not on antihypertensives or diuretic.  Will follow proBNP.  Continue PTA Lipitor, liver enzymes within normal limits.  Close follow-up echocardiogram as outpatient in 1 month as above.    History of CVA with right sided  hemiplegia  Seizure D/O  Continue PTA Eliquis, Lipitor.  PTA not on antiepileptics.  Monitor     GERD.  PTA not on meds.    MDD with anxiety  PTA not on meds. No acute issues    History of alcohol use D/O  History of substance use D/O  Noted     Hypoalbuminemia likely due to acute illness.    Physical deconditioning from medical illness.  Patient resident of assisted living facility, now requiring lift for ambulation out of bed.  Rehab team following, recommend TCU.  Patient declining TCU, reports would like to transition back to care facility.  Social work assisting with transition plans    Clinically Significant Risk Factors               # Hypoalbuminemia: Lowest albumin = 3.2 g/dL at 4/9/2025 11:21 AM, will monitor as appropriate     # Hypertension: Noted on problem list                # Financial/Environmental Concerns: none        Orders Placed This Encounter      Combination Diet Regular Diet Adult      DVT Prophylaxis: SCDs, on anticoagulation  Code Status: Full Code  Disposition: Expected discharge likely in a.m. pending clinical improvement, safe discharge plan in place.    Medically Ready for Discharge: Anticipated Tomorrow       Discussed with patient, bedside RN,   Total time greater than 51 min. more than 70% of time spent in direct patient care, care coordination, patient counseling, and formalizing plan of care.        Sung Wood MD        Interval History:        Patient lying in bed.  Denies any chest pain or palpitations.  Hoarse voice.  Shortness of breath with minimal ambulation, requiring 3 L nasal oxygen this morning.  Complaining of generalized weakness.  On anticoagulation.  Receiving oxycodone for bilateral lower extremity pain  Per nursing report after the left.  Afebrile.         Physical Exam:        Physical Exam   Temp:  [97.5  F (36.4  C)-98.3  F (36.8  C)] 97.5  F (36.4  C)  Pulse:  [] 94  Resp:  [18] 18  BP: (112-132)/(65-79) 113/65  SpO2:  [91 %-96 %] 96  %    Intake/Output Summary (Last 24 hours) at 4/14/2025 1006  Last data filed at 4/14/2025 0807  Gross per 24 hour   Intake 720 ml   Output 650 ml   Net 70 ml       Admission Weight: 77.1 kg (170 lb)  Current Weight: 77.1 kg (169 lb 15.6 oz)    PHYSICAL EXAM  GENERAL: Patient is in no distress. Alert and oriented.  HEENT: Oropharynx dry, hoarse voice  HEART: Regular rate and rhythm. S1S2.   LUNGS: Bilateral decreased breath sounds.  Worse in lower lobes  Respirations unlabored  ABDOMEN: Soft, no abdominal tenderness, bowel sounds heard   NEURO: Moving extremities  EXTREMITIES: + edema.   SKIN: Warm, dry.   PSYCHIATRY Cooperative       Medications:        Current Facility-Administered Medications   Medication Dose Route Frequency Provider Last Rate Last Admin    apixaban ANTICOAGULANT (ELIQUIS) tablet 2.5 mg  2.5 mg Oral BID Jose Gillette PA-C   2.5 mg at 04/14/25 0826    atorvastatin (LIPITOR) tablet 40 mg  40 mg Oral Daily Julio Bang MD   40 mg at 04/13/25 1755    heparin lock flush 10 unit/mL injection 5-10 mL  5-10 mL Intracatheter Q24H Jose Gillette PA-C   5 mL at 04/14/25 0617    heparin lock flush 100 unit/mL injection 5-10 mL  5-10 mL Intracatheter Q28 Days Jose Gillette PA-C        predniSONE (DELTASONE) tablet 40 mg  40 mg Oral Daily Na Barker, DO   40 mg at 04/14/25 0826    sodium chloride (PF) 0.9% PF flush 10-20 mL  10-20 mL Intracatheter Q28 Days Jose Gillette PA-C        sodium chloride (PF) 0.9% PF flush 3 mL  3 mL Intracatheter Q8H Jose Gillette PA-C   3 mL at 04/14/25 0617     Current Facility-Administered Medications   Medication Dose Route Frequency Provider Last Rate Last Admin    acetaminophen (TYLENOL) tablet 650 mg  650 mg Oral Q4H PRN Jose Gillette PA-C        Or    acetaminophen (TYLENOL) Suppository 650 mg  650 mg Rectal Q4H PRN Jose Gillette PA-C        bisacodyl (DULCOLAX) suppository  10 mg  10 mg Rectal Daily PRN Jose Gillette PA-C        calcium carbonate (TUMS) chewable tablet 1,000 mg  1,000 mg Oral 4x Daily PRN Jose Gillette PA-C        heparin lock flush 10 unit/mL injection 5-10 mL  5-10 mL Intracatheter Q1H PRN Jose Gillette PA-C   5 mL at 04/13/25 1026    HYDROmorphone (DILAUDID) injection 0.2 mg  0.2 mg Intravenous Q2H PRN Jose Gillette PA-C   0.2 mg at 04/11/25 0624    HYDROmorphone (DILAUDID) injection 0.4 mg  0.4 mg Intravenous Q2H PRN Jose Gillette PA-C        ipratropium - albuterol 0.5 mg/2.5 mg/3 mL (DUONEB) neb solution 3 mL  3 mL Nebulization Q4H PRN Na Barker DO   3 mL at 04/12/25 1038    lidocaine (LMX4) cream   Topical Q1H PRN Jose Gillette PA-C        lidocaine 1 % 0.1-1 mL  0.1-1 mL Other Q1H PRN Jose Gillette PA-C        melatonin tablet 1 mg  1 mg Oral At Bedtime PRN Jose Gillette PA-C        naloxone (NARCAN) injection 0.2 mg  0.2 mg Intravenous Q2 Min PRN Julita Bergman DO        Or    naloxone (NARCAN) injection 0.4 mg  0.4 mg Intravenous Q2 Min PRN Julita Bergmanzabestephen DO        Or    naloxone (NARCAN) injection 0.2 mg  0.2 mg Intramuscular Q2 Min PRN Julita Bergmanzabestehpen DO        Or    naloxone (NARCAN) injection 0.4 mg  0.4 mg Intramuscular Q2 Min PRN Julita Bergmanbestephen DO        ondansetron (ZOFRAN ODT) ODT tab 4 mg  4 mg Oral Q6H PRN Jose Gillette PA-C        Or    ondansetron (ZOFRAN) injection 4 mg  4 mg Intravenous Q6H PRN Jose Gillette PA-C        oxyCODONE (ROXICODONE) tablet 5 mg  5 mg Oral Q4H PRN Jose Gillette PA-C        oxyCODONE IR (ROXICODONE) half-tab 2.5 mg  2.5 mg Oral Q4H PRN Joes Gillette PA-C   2.5 mg at 04/14/25 0612    Patient is already receiving anticoagulation with heparin, enoxaparin (LOVENOX), warfarin (COUMADIN)  or other anticoagulant medication   Does not  apply Continuous PRN Jose Gillette PA-C        polyethylene glycol (MIRALAX) Packet 17 g  17 g Oral BID PRN Jose Gillette PA-C   17 g at 04/14/25 0839    prochlorperazine (COMPAZINE) injection 5 mg  5 mg Intravenous Q6H PRN Jose Gillette PA-C        Or    prochlorperazine (COMPAZINE) tablet 5 mg  5 mg Oral Q6H PRN Jose Gillette PA-C        senna-docusate (SENOKOT-S/PERICOLACE) 8.6-50 MG per tablet 1 tablet  1 tablet Oral BID PRN Jose Gillette PA-C   1 tablet at 04/11/25 1035    Or    senna-docusate (SENOKOT-S/PERICOLACE) 8.6-50 MG per tablet 2 tablet  2 tablet Oral BID PRN Jose Gillette PA-C        sodium chloride (PF) 0.9% PF flush 10-20 mL  10-20 mL Intracatheter q1 min prn Jose Gillette PA-C   10 mL at 04/09/25 2204    sodium chloride (PF) 0.9% PF flush 10-20 mL  10-20 mL Intracatheter Q1H PRN Jose Gillette PA-C        sodium chloride (PF) 0.9% PF flush 3 mL  3 mL Intracatheter q1 min prn Jose Gillette PA-C                Data:      All new lab and imaging data was reviewed.

## 2025-04-14 NOTE — PROGRESS NOTES
Care Management Follow Up    Length of Stay (days): 5    Expected Discharge Date: 04/14/2025     Concerns to be Addressed: discharge planning     Patient plan of care discussed at interdisciplinary rounds: Yes    Anticipated Discharge Disposition: TCU              Anticipated Discharge Services: County Worker, Transportation Services  Anticipated Discharge DME: Oxygen (watch for oxygen needs)    Patient/family educated on Medicare website which has current facility and service quality ratings:    Education Provided on the Discharge Plan: Yes  Patient/Family in Agreement with the Plan: yes    Referrals Placed by CM/SW: External Care Coordination, Communication hand-offs to next level of Care Providers (to senior care)  Private pay costs discussed: Not applicable    Discussed  Partnership in Safe Discharge Planning  document with patient/family: No     Handoff Completed: No, handoff not indicated or clinically appropriate    Additional Information:  Per chart review, PT noted pt would need Ax2 (or more) for safe pivot transfers , would be dependent and would need assistance with ADLs. Spoke with eDvorah [phone] and informed her that pt is now requiring assistance of 2 with transfers & lift.  Per Devorah, they are not able to accept pt back with assist of 2 only can accept back with assistance of 1.  Discussed with Devorah and Cadence TORO TCU placement at discharge.    Addendum:  Called [facility] and cancelled scheduled ride for today.  Next Steps: Await TCU placement.     JAYASHREE Houser RN, BSN, OCN   Inpatient Care Coordination [address]  Office: [phone]

## 2025-04-14 NOTE — PROGRESS NOTES
Care Management Follow Up    Length of Stay (days): 5    Expected Discharge Date: 04/14/2025     Concerns to be Addressed: discharge planning     Patient plan of care discussed at interdisciplinary rounds: Yes    Anticipated Discharge Disposition: Assisted Living, Home (pending continued recommendations)              Anticipated Discharge Services: County Worker, Transportation Services  Anticipated Discharge DME: Oxygen (watch for oxygen needs)    Patient/family educated on Medicare website which has current facility and service quality ratings:    Education Provided on the Discharge Plan: Yes  Patient/Family in Agreement with the Plan: yes    Referrals Placed by CM/SW: External Care Coordination, Communication hand-offs to next level of Care Providers (to [facility])  Private pay costs discussed: Not applicable    Discussed  Partnership in Safe Discharge Planning  document with patient/family: No     Handoff Completed: No, handoff not indicated or clinically appropriate    Additional Information:  Writer spoke with Cristhian Swanson regarding placement for TCU. Sky is in agreement with TCU placement. Sky had many questions regarding Oncology Treatments and plan moving forward. Sky also wanted to talk code status with the oncology team and what is appropriate for patient. Sky was open to any TCU except [facility] [place]. CAROLE sent referrals.    PAS-RR    D: Per DHS regulation, CAROLE completed and submitted PAS-RR to [place] [employer] Direct Connect via the Senior LinkAge Line.  PAS-RR confirmation # is : [id number]    P: Further questions may be directed to Senior LinkAge Line at #[phone], option #4 for PAS-RR staff.     Addendum 1430: Patient was accepted at [facility] for admission tomorrow. CAROLE set up a [facility] Wheelchair Ride with Oxygen for tomorrow between 2325-6708. Writer updated Cristhian Swanson via voicemail on discharge plans for tomorrow     Next Steps: Care Management will continue to  follow     MUNA Ugarte

## 2025-04-14 NOTE — PROGRESS NOTES
"   04/14/25 0932   Appointment Info   Signing Clinician's Name / Credentials (PT) Lalo Carmona DPT   Living Environment   People in Home facility resident   Current Living Arrangements assisted living   Home Accessibility no concerns   Transportation Anticipated health plan transportation   Living Environment Comments Per SW/CC note: \"Prior to this admission patient resides at an assisted living facility. Per Director of Nursing Devorah at baseline patient is \"stubbornly independent\" with all cares. Patient self transfers, manages his own meds (come in tripack a.m./p.m.), does his laundry, transportation, self propels with manual wheelchair down to the dining landon. Patient has various activities and volunteers.\"   Self-Care   Usual Activity Tolerance moderate   Current Activity Tolerance poor   Equipment Currently Used at Home wheelchair, manual   Fall history within last six months no   Activity/Exercise/Self-Care Comment Pt is IND with functional mob, performs stand-pivot transfers IND, occasionally using walker for transfer (stated \"sometimes\").   General Information   Onset of Illness/Injury or Date of Surgery 04/09/25   Referring Physician Na Barker DO   Pertinent History of Current Problem (include personal factors and/or comorbidities that impact the POC) Pt is 65 yo male who, per chart, \" admitted on 4/9/2025 due to acute hypoxic respiratory failure secondary to bilateral (right greater than left) moderate to large pleural effusions with presumed atelectasis.       Patient presented to the ED after being at the infusion center and seen by [facility].  While being assessed at the infusion center the patient reported a weeklong history of worsening shortness of breath.  He indicated that he has been compliant with his prescribed Eliquis.  Patient with known history of pleural effusion and pericardial effusion requiring thoracentesis with 1.9 L of clear yellow fluid removed on 1/6/2025.  Patient was " "found to be mildly hypoxic with O2 saturation of 88% and was placed on supplemental oxygen at 3 L/min with improvement of O2 saturation to 96%.  Due to his history and symptoms he was advised to seek further medical evaluation at [facility].\"   Existing Precautions/Restrictions fall   General Observations pt on 2-2.5L of O2 via NC.   Cognition   Affect/Mental Status (Cognition) WFL   Orientation Status (Cognition) oriented x 4   Follows Commands (Cognition) WFL   Cognitive Status Comments hx of dysphagia with soft voice.   Pain Assessment   Patient Currently in Pain   (2-3/10 at rest in R lateral lower leg)   Integumentary/Edema   Integumentary/Edema Comments touch skin, non-pliable, dryness with growths noted on the B lower legs.   Posture    Posture Forward head position;Protracted shoulders   Range of Motion (ROM)   ROM Comment contractures to B knees, lacking ~30 degrees or more of extension, ankles stiff as well, able to get neurtal with PROM. R UE in flexed and internally rotated position with increased tone.   Strength (Manual Muscle Testing)   Strength (Manual Muscle Testing) Deficits observed during functional mobility   Strength Comments able to lift B LEs off bed (knees flexed due to contractures).   Bed Mobility   Comment, (Bed Mobility) supine > sit CGA.   Transfers   Comment, (Transfers) STS attempt, w/c placed on L side, only able to clear buttocks from with maxA.   Gait/Stairs (Locomotion)   Comment, (Gait/Stairs) unable to ambulate at baseline.   Balance   Balance Comments sitting balance EOB good.   Sensory Examination   Sensory Perception Comments light touch intact per screen to B LEs. and per pt report.   Clinical Impression   Criteria for Skilled Therapeutic Intervention Yes, treatment indicated   PT Diagnosis (PT) impaired functional mobility   Influenced by the following impairments pain, decreased ROM and strength, decreased activity tolerance.   Functional limitations due to impairments " "difficulty with bed mobility, transfers, and ambulation   Clinical Presentation (PT Evaluation Complexity) stable   Clinical Presentation Rationale clinical judgment   Clinical Decision Making (Complexity) low complexity   Planned Therapy Interventions (PT) balance training;bed mobility training;home exercise program;neuromuscular re-education;patient/family education;ROM (range of motion);strengthening;stretching;transfer training;progressive activity/exercise   Risk & Benefits of therapy have been explained evaluation/treatment results reviewed;care plan/treatment goals reviewed;risks/benefits reviewed;current/potential barriers reviewed;participants voiced agreement with care plan;participants included;patient   PT Total Evaluation Time   PT Eval, Low Complexity Minutes (11935) 10   Physical Therapy Goals   PT Predicted Duration/Target Date for Goal Attainment 04/21/25   PT Goals Bed Mobility;Transfers;Wheelchair Mobility   PT: Bed Mobility Independent;Supine to/from sit   PT: Transfers Modified independent;Sit to/from stand;Bed to/from chair;Assistive device   PT: Wheelchair Mobility 150 feet;Caregiver SBA;manual wheelchair   Interventions   Interventions Quick Adds Therapeutic Activity   Therapeutic Activity   Therapeutic Activities: dynamic activities to improve functional performance Minutes (51666) 26   Symptoms Noted During/After Treatment Fatigue   Treatment Detail/Skilled Intervention Pt supine in bed upon PT arrival, on 2L of O2 via NC. SpO2 in mid to upper-90s throughout session. Pt agreeable, soft voice with hx of dysphagia and vocal cord condition. Pt at EOB, cued for squaring hips at EOB, needing CGA. bed height low and pt repeated that it didn't need to be changed despite writers education on positioning for increased advantage for trailing transfers. After STS x2 attempts, maxA, barely clearing buttocks, pt stated \"I can't do it.\" Agreeable to raise bed, wheelchair positioned slightly closer, at L " side about 45 deg angle. Pt performed additional STS x2 attempts with maxA, blocking the L knee and cues on UE placement. Cleared buttocks and accomplished ~60% stand. Unable to pivot safely with posterior and L lateral trunk lean. Writer looked for Swetha Sotelo ,unable to find. Pt likely would not be able to stand tall enough to move SS paddles, but will attempt another day for LE strength and standing practice. Pt cued for lateral scooting at EOB, performed x2 with CGA. Pt performed sit > supine SBA. Boosted self in bed using bed rail. Pillows placed to facilitate extension of B knees, but pt moves in bed, difficulty to maintain placement. Pt supine, alarm on, all needs within reach.   PT Discharge Planning   PT Plan progress IND with bed mob, repeat STS (with or without walker), stand pivot transfers.   PT Discharge Recommendation (DC Rec) Transitional Care Facility   PT Rationale for DC Rec Pt at baseline reports being IND with functional mobility, performs squat-pivot transfers to manual w/c without assistance. Live in intermediate. Currently, pt is below baseline, attempted to perform transfers with writer on this date, unable to clear buttocks IND, needing maxA to clear buttocks but unsafe to pivot to wheelchair. Swetha Ashleigh or EZ stand likely wouldn't work for pt given significant contractures to GB knees (lacking 30-40 degrees of extension). Recommend TCF. Pt is insisting on returning home today, if he returns to [facility], he would need Ax2 (or more) for safe pivot transfers , would be dependent and would need assistance with ADLs.   PT Brief overview of current status Ax2 and lift rec at this time; Goals of therapy will be to address safe mobility and make recs for d/c to next level of care. Pt and RN will continue to follow all falls risk precautions as documented by RN staff while hospitalized.   PT Total Distance Amb During Session (feet) 0   Physical Therapy Time and Intention   Timed Code Treatment Minutes 26   Total  Session Time (sum of timed and untimed services) 36

## 2025-04-14 NOTE — PLAN OF CARE
Orientation: A&O x4, Hoarse voice  Aggression Stop Light: Green  Activity: Ax2 with lift, shifts independently in bed  Diet/BS Checks: Regular diet  Tele: N/A  IV Access/Drains: R port HL  Pain Management: C/o 3/10 pain in BLE, PRN oxycodone given x1  Abnormal VS/Results: VSS on 2.5L O2 via NC.   Bowel/Bladder: Continent,uses urinal at bedside. No BM this shift- PRN miralax given  Skin/Wounds: Scattered scabs, peeling/cracking to Bilateral shin/calf, blanchable redness to sacrum/coccyx and R hip-  preventative mepi in place  Consults: Hem/Onc, SW  D/C Disposition: Plan to discharge to TCU tomorrow. Wheelchair ride scheduled between [id number]

## 2025-04-15 ENCOUNTER — APPOINTMENT (OUTPATIENT)
Dept: GENERAL RADIOLOGY | Facility: CLINIC | Age: 65
DRG: 180 | End: 2025-04-15
Attending: HOSPITALIST
Payer: COMMERCIAL

## 2025-04-15 LAB
ALBUMIN SERPL BCG-MCNC: 2.7 G/DL (ref 3.5–5.2)
ALP SERPL-CCNC: 113 U/L (ref 40–150)
ALT SERPL W P-5'-P-CCNC: 31 U/L (ref 0–70)
ANION GAP SERPL CALCULATED.3IONS-SCNC: 6 MMOL/L (ref 7–15)
AST SERPL W P-5'-P-CCNC: 50 U/L (ref 0–45)
BILIRUB DIRECT SERPL-MCNC: 0.1 MG/DL (ref 0–0.3)
BILIRUB SERPL-MCNC: 0.2 MG/DL
BUN SERPL-MCNC: 26.7 MG/DL (ref 8–23)
CALCIUM SERPL-MCNC: 8.5 MG/DL (ref 8.8–10.4)
CHLORIDE SERPL-SCNC: 105 MMOL/L (ref 98–107)
CREAT SERPL-MCNC: 0.79 MG/DL (ref 0.67–1.17)
EGFRCR SERPLBLD CKD-EPI 2021: >90 ML/MIN/1.73M2
GLUCOSE SERPL-MCNC: 100 MG/DL (ref 70–99)
HCO3 SERPL-SCNC: 27 MMOL/L (ref 22–29)
NT-PROBNP SERPL-MCNC: 862 PG/ML (ref 0–900)
POTASSIUM SERPL-SCNC: 3.9 MMOL/L (ref 3.4–5.3)
PROT SERPL-MCNC: 5.2 G/DL (ref 6.4–8.3)
SODIUM SERPL-SCNC: 138 MMOL/L (ref 135–145)

## 2025-04-15 PROCEDURE — 250N000011 HC RX IP 250 OP 636: Performed by: PHYSICIAN ASSISTANT

## 2025-04-15 PROCEDURE — 83880 ASSAY OF NATRIURETIC PEPTIDE: CPT | Performed by: HOSPITALIST

## 2025-04-15 PROCEDURE — 250N000013 HC RX MED GY IP 250 OP 250 PS 637: Performed by: HOSPITALIST

## 2025-04-15 PROCEDURE — 71046 X-RAY EXAM CHEST 2 VIEWS: CPT

## 2025-04-15 PROCEDURE — 80048 BASIC METABOLIC PNL TOTAL CA: CPT | Performed by: HOSPITALIST

## 2025-04-15 PROCEDURE — 250N000013 HC RX MED GY IP 250 OP 250 PS 637: Performed by: PHYSICIAN ASSISTANT

## 2025-04-15 PROCEDURE — 120N000001 HC R&B MED SURG/OB

## 2025-04-15 PROCEDURE — 99233 SBSQ HOSP IP/OBS HIGH 50: CPT | Performed by: HOSPITALIST

## 2025-04-15 PROCEDURE — 250N000012 HC RX MED GY IP 250 OP 636 PS 637: Performed by: STUDENT IN AN ORGANIZED HEALTH CARE EDUCATION/TRAINING PROGRAM

## 2025-04-15 PROCEDURE — 82248 BILIRUBIN DIRECT: CPT | Performed by: HOSPITALIST

## 2025-04-15 PROCEDURE — 250N000011 HC RX IP 250 OP 636: Performed by: HOSPITALIST

## 2025-04-15 RX ORDER — FUROSEMIDE 10 MG/ML
20 INJECTION INTRAMUSCULAR; INTRAVENOUS EVERY 12 HOURS
Status: COMPLETED | OUTPATIENT
Start: 2025-04-15 | End: 2025-04-16

## 2025-04-15 RX ADMIN — Medication 5 ML: at 05:58

## 2025-04-15 RX ADMIN — OXYCODONE HYDROCHLORIDE 2.5 MG: 5 TABLET ORAL at 06:04

## 2025-04-15 RX ADMIN — Medication 5 ML: at 15:43

## 2025-04-15 RX ADMIN — PREDNISONE 40 MG: 20 TABLET ORAL at 08:43

## 2025-04-15 RX ADMIN — ATORVASTATIN CALCIUM 40 MG: 40 TABLET, FILM COATED ORAL at 20:17

## 2025-04-15 RX ADMIN — FUROSEMIDE 20 MG: 10 INJECTION, SOLUTION INTRAMUSCULAR; INTRAVENOUS at 15:42

## 2025-04-15 RX ADMIN — APIXABAN 2.5 MG: 2.5 TABLET, FILM COATED ORAL at 20:17

## 2025-04-15 RX ADMIN — APIXABAN 2.5 MG: 2.5 TABLET, FILM COATED ORAL at 08:43

## 2025-04-15 ASSESSMENT — ACTIVITIES OF DAILY LIVING (ADL)
ADLS_ACUITY_SCORE: 54
ADLS_ACUITY_SCORE: 54
ADLS_ACUITY_SCORE: 46
ADLS_ACUITY_SCORE: 46
ADLS_ACUITY_SCORE: 54
ADLS_ACUITY_SCORE: 46
ADLS_ACUITY_SCORE: 54
ADLS_ACUITY_SCORE: 46
ADLS_ACUITY_SCORE: 46
ADLS_ACUITY_SCORE: 54
ADLS_ACUITY_SCORE: 54
ADLS_ACUITY_SCORE: 46
ADLS_ACUITY_SCORE: 54
ADLS_ACUITY_SCORE: 46
ADLS_ACUITY_SCORE: 54
ADLS_ACUITY_SCORE: 46
ADLS_ACUITY_SCORE: 54
ADLS_ACUITY_SCORE: 46
ADLS_ACUITY_SCORE: 54
ADLS_ACUITY_SCORE: 46

## 2025-04-15 NOTE — PROGRESS NOTES
Care Management Follow Up    Length of Stay (days): 6    Expected Discharge Date: 04/15/2025     Concerns to be Addressed: discharge planning     Patient plan of care discussed at interdisciplinary rounds: Yes    Anticipated Discharge Disposition: Skilled Nursing Facility     Anticipated Discharge Services: County Worker  Anticipated Discharge DME: Oxygen    Patient/family educated on Medicare website which has current facility and service quality ratings: yes  Education Provided on the Discharge Plan: Yes  Patient/Family in Agreement with the Plan: yes    Referrals Placed by CM/SW: Senior Linkage Line, Post Acute Facilities  Private pay costs discussed: Not applicable    Discussed  Partnership in Safe Discharge Planning  document with patient/family: No     Handoff Completed: No, handoff not indicated or clinically appropriate    Additional Information:  Per MD patient is not medically ready for discharge. CAROLE called [facility] and spoke with Cristina. Ride changed to tomorrow 4/16 for [id number].    Next Steps: CAROLE will continue to follow     MUNA Ugarte

## 2025-04-15 NOTE — PLAN OF CARE
Goal Outcome Evaluation:       [id number]  Orientation: A&O x4; hoarse voice   Aggression Stop Light: green   Activity: A2 with lift; T/R q2h. Refused repositioning overnight, requesting to sleep. Shifts weight independently.   Diet/BS Checks: reg   Tele:  n/a   IV Access/Drains: R chest port hep locked- good blood return   Pain Management: PRN oxycodone given x2 for pain in bilat extremities ranging between 3-4/10. Somewhat effective per patient.   Abnormal VS/Results: VSS on  3L NC overnight. Had to increase oxygen from 2.5L. Refused VS overnight   Bowel/Bladder: continent. Urinal at bedside. Constipated per report- refused PRN senna.   Skin/Wounds: Scattered scabs, peeling/cracking to Bilateral shin/calf, blanchable redness to sacrum/coccyx and R hip-  preventative mepi in place  Consults: CAROLE   D/C Disposition: plan to discharge to [facility] tomorrow via wheelchair. Ride scheduled between [id number].   Other Info:

## 2025-04-15 NOTE — PROGRESS NOTES
[facility]  Hospitalist Progress Note   04/15/2025          Assessment and Plan:       Kt Rasmussen is a 64 year old male with history of metastatic lung cancer, pleural effusion, pericardial effusion, lower extremity DVT on anticoagulation, chronic anemia, thrombocytopenia, coronary artery disease status post stenting, hypertension, hyperlipidemia, GERD, seizure disorder, anxiety disorder, history of CVA with right hemiplegia admitted on 4/9/2025 due to shortness of breath.     Acute hypoxic respiratory failure likely multifactorial due to lung cancer,  bilateral pleural effusions, atelectasis, pericardial effusion, chemotoxicity, deconditioning  Bilateral (right greater than left) moderate to large pleural effusions s/p bilateral thoracentesis.  Moderate pericardial effusion.  Elevated troponin likely in the setting of demand ischemia from hypoxic respiratory failure, type II MI.  Atelectasis.  --Patient sent to ED from [facility] with report of weeklong worsening shortness of breath.  Baseline not on supplemental oxygen, compliant with anticoagulation.  Known history of pleural effusion and pericardial effusion status post thoracentesis with 1.9 L of malignant fluid removed on 1/6/2025.  -In ED noted to be hypoxic with O2 sats of 88% and required 3 L oxygen.  -CXR showed right greater than left moderate to large pleural effusion with edema.   -Troponin high-sensitivity 34 > 22.  proBNP 495.  -EKG normal sinus rhythm.  -Echocardiogram with moderate pericardial effusion, not suggestive of tamponade physiology.   -Chest post bilateral thoracocentesis on 4/9, 1.1 liter from the left and 2 liters from the right.    Chest x-ray from 4/12 Moderate right and small pleural effusions decreased from 4/9/2025. No pneumothorax. Right greater than left lung base atelectasis.   --Oncology followed, suspect hypoxia related to effusion and chemotoxicity.  --Cardiology followed, recommended follow-up  echocardiogram in 1 month.  Appreciate multidisciplinary team input  -Hospitalist team discussed with oncologist on 4/13, started on prednisone burst for 5 days with little improvement in symptoms.  --4/15 patient continues to have shortness of breath, this morning O2 sats 91% on 3 L nasal cannula.  - Will start on Lasix 20 mg IV twice daily with hold parameters for 2 doses.  Chest x-ray 2 views.  Fluid restriction to 2000 mL/day.  Aggressive incentive spirometry, encourage ambulation.  Wean off supplemental nasal oxygen as able to.  Close follow-up chest imaging for improvement.     Metastatic non-small cell lung cancer (mets to lymph nodes, bones and bilateral lungs)  *Follows with  Oncology and underwent infusion [facility] 04/09  -- s/p 2 doses of post chemotherapy neupogen, which is reason for elevated white count now improving  -[place] oncology now signed off, follow-up with primary oncology team in clinic per schedule.  -Receiving oxycodone as needed for bilateral lower extremity pain.    Chronic anemia secondary to chemo, underlying malignancy.  Chronic thrombocytopenia secondary to chemo.  Leukocytosis likely reactive.  - Received Neupogen, was on steroids.  Monitor CBC periodically     Bilateral lower extremity DVT -9/2023 on anticoagulation.  Likely hypercoagulability of malignancy.    On chronic anticoagulation, continue PTA Eliquis 2.5 mg BID.       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      CAD s/p cardiac stenting  Ascending aortic dilatation.  Mild MR, mild TR.  Hypertension.  Hyperlipidemia.  --Denies any chest pain.  No palpitations.  SOB as above.  Ascending aortic dilatation of 4.1 cm noted on echocardiogram.  Mild MR.  Mild TR.  Already on anticoagulation as above.  PTA not on antihypertensives or diuretic.  Initiated on IV Lasix for diuresis as above  Continue PTA Lipitor, liver enzymes within normal  limits.  Close follow-up echocardiogram as outpatient in 1 month as above.    History of CVA with right sided hemiplegia  Seizure D/O  Continue PTA Eliquis, Lipitor.  PTA not on antiepileptics.  Monitor     GERD.  PTA not on meds.    MDD with anxiety  PTA not on meds. No acute issues    History of alcohol use D/O  History of substance use D/O  Noted     Hypoalbuminemia likely due to acute illness.    Physical deconditioning from medical illness.  Patient resident of assisted living facility, now requiring lift for ambulation out of bed.  Rehab team following, recommend TCU.  Social work assisting with transition plans    Clinically Significant Risk Factors               # Hypoalbuminemia: Lowest albumin = 2.7 g/dL at 4/15/2025  5:54 AM, will monitor as appropriate     # Hypertension: Noted on problem list                # Financial/Environmental Concerns: none        Orders Placed This Encounter      Combination Diet Regular Diet Adult      DVT Prophylaxis: SCDs, on anticoagulation  Code Status: Full Code  Disposition: Expected discharge likely in a.m. pending clinical improvement, safe discharge plan in place.    Medically Ready for Discharge: Anticipated Tomorrow    Discussed with patient, bedside RN, .  Updated patient's legal guardian 4/15.  More than 70% of time spent in direct patient care, care coordination, patient counseling, and formalizing plan of care.        Sung Wood MD        Interval History:        Patient lying in bed.  Denies any chest pain or palpitations.  Hoarse voice.  Shortness of breath with minimal ambulation, requiring 3 L nasal oxygen this morning.  Complaining of generalized weakness.  On anticoagulation.  Receiving oxycodone for bilateral lower extremity pain  Per nursing report after the left.  Afebrile.         Physical Exam:        Physical Exam   Temp:  [96.9  F (36.1  C)-98.2  F (36.8  C)] 96.9  F (36.1  C)  Pulse:  [93-99] 97  Resp:  [18-20] 20  BP:  (106-152)/(66-99) 152/85  SpO2:  [89 %-95 %] 92 %    Intake/Output Summary (Last 24 hours) at 4/14/2025 1006  Last data filed at 4/14/2025 0807  Gross per 24 hour   Intake 720 ml   Output 650 ml   Net 70 ml       Admission Weight: 77.1 kg (170 lb)  Current Weight: 77.1 kg (169 lb 15.6 oz)    PHYSICAL EXAM  GENERAL: Patient is in no distress. Alert and oriented.  HEENT: Oropharynx dry, hoarse voice  HEART: Regular rate and rhythm. S1S2.   LUNGS: Bilateral decreased breath sounds. Worse in lower lobes  Respirations unlabored  ABDOMEN: Soft, no abdominal tenderness, bowel sounds heard   NEURO: Moving extremities  EXTREMITIES: + edema.   SKIN: Warm, dry.   PSYCHIATRY Cooperative.        Medications:        Current Facility-Administered Medications   Medication Dose Route Frequency Provider Last Rate Last Admin    apixaban ANTICOAGULANT (ELIQUIS) tablet 2.5 mg  2.5 mg Oral BID Jose Gillette PA-C   2.5 mg at 04/15/25 0843    atorvastatin (LIPITOR) tablet 40 mg  40 mg Oral Daily Julio Bang MD   40 mg at 04/14/25 1835    furosemide (LASIX) injection 20 mg  20 mg Intravenous Q12H Sung Wood MD        heparin lock flush 10 unit/mL injection 5-10 mL  5-10 mL Intracatheter Q24H Jose Gillette PA-C   5 mL at 04/15/25 0558    heparin lock flush 100 unit/mL injection 5-10 mL  5-10 mL Intracatheter Q28 Days Jose Gillette PA-C        predniSONE (DELTASONE) tablet 40 mg  40 mg Oral Daily Na Barker E, DO   40 mg at 04/15/25 0843    sodium chloride (PF) 0.9% PF flush 10-20 mL  10-20 mL Intracatheter Q28 Days Jose Gillette PA-C        sodium chloride (PF) 0.9% PF flush 3 mL  3 mL Intracatheter Q8H Jose Gillette PA-C   3 mL at 04/15/25 1422     Current Facility-Administered Medications   Medication Dose Route Frequency Provider Last Rate Last Admin    acetaminophen (TYLENOL) tablet 650 mg  650 mg Oral Q4H PRN Jose Gillette PA-C        Or     acetaminophen (TYLENOL) Suppository 650 mg  650 mg Rectal Q4H PRN Jose Gillette PA-C        bisacodyl (DULCOLAX) suppository 10 mg  10 mg Rectal Daily PRN Jose Gillette PA-C        calcium carbonate (TUMS) chewable tablet 1,000 mg  1,000 mg Oral 4x Daily PRN Jose Gillette PA-C        heparin lock flush 10 unit/mL injection 5-10 mL  5-10 mL Intracatheter Q1H PRN Jose Gillette PA-C   5 mL at 04/13/25 1026    HYDROmorphone (DILAUDID) injection 0.2 mg  0.2 mg Intravenous Q2H PRN Jose Gillette PA-C   0.2 mg at 04/11/25 0624    HYDROmorphone (DILAUDID) injection 0.4 mg  0.4 mg Intravenous Q2H PRN Jose Gillette PA-C        ipratropium - albuterol 0.5 mg/2.5 mg/3 mL (DUONEB) neb solution 3 mL  3 mL Nebulization Q4H PRN Na Barker DO   3 mL at 04/12/25 1038    lidocaine (LMX4) cream   Topical Q1H PRN Jose Gillette PA-C        lidocaine 1 % 0.1-1 mL  0.1-1 mL Other Q1H PRN Jose Gillette PA-C        melatonin tablet 1 mg  1 mg Oral At Bedtime PRN Jose Gillette PA-C        naloxone (NARCAN) injection 0.2 mg  0.2 mg Intravenous Q2 Min PRN Julita Bergmanzabestephen DO        Or    naloxone (NARCAN) injection 0.4 mg  0.4 mg Intravenous Q2 Min PRN WhiteJoseJulita Cristina, DO        Or    naloxone (NARCAN) injection 0.2 mg  0.2 mg Intramuscular Q2 Min PRN Julita Bergmanzabeth DO        Or    naloxone (NARCAN) injection 0.4 mg  0.4 mg Intramuscular Q2 Min PRN Julita Bergmanbestephen DO        ondansetron (ZOFRAN ODT) ODT tab 4 mg  4 mg Oral Q6H PRN Jose Gillette PA-C        Or    ondansetron (ZOFRAN) injection 4 mg  4 mg Intravenous Q6H PRN Jose Gillette PA-C        oxyCODONE (ROXICODONE) tablet 5 mg  5 mg Oral Q4H PRN Jose Gillette PA-C        oxyCODONE IR (ROXICODONE) half-tab 2.5 mg  2.5 mg Oral Q4H PRN Jose Gillette PA-C   2.5 mg at 04/15/25 0604    Patient is  already receiving anticoagulation with heparin, enoxaparin (LOVENOX), warfarin (COUMADIN)  or other anticoagulant medication   Does not apply Continuous PRN Jose Gillette PA-C        polyethylene glycol (MIRALAX) Packet 17 g  17 g Oral BID PRN Jose Gillette PA-C   17 g at 04/14/25 0839    prochlorperazine (COMPAZINE) injection 5 mg  5 mg Intravenous Q6H PRN Jose Gillette PA-C        Or    prochlorperazine (COMPAZINE) tablet 5 mg  5 mg Oral Q6H PRN Jose Gillette PA-C        senna-docusate (SENOKOT-S/PERICOLACE) 8.6-50 MG per tablet 1 tablet  1 tablet Oral BID PRN Jose Gillette PA-C   1 tablet at 04/11/25 1035    Or    senna-docusate (SENOKOT-S/PERICOLACE) 8.6-50 MG per tablet 2 tablet  2 tablet Oral BID PRN Jose Gillette PA-C        sodium chloride (PF) 0.9% PF flush 10-20 mL  10-20 mL Intracatheter q1 min prn Jose Gillette PA-C   10 mL at 04/09/25 2204    sodium chloride (PF) 0.9% PF flush 10-20 mL  10-20 mL Intracatheter Q1H PRN Jose Gillette PA-C        sodium chloride (PF) 0.9% PF flush 3 mL  3 mL Intracatheter q1 min prn Jose Gillette PA-C                Data:      All new lab and imaging data was reviewed.

## 2025-04-16 VITALS
DIASTOLIC BLOOD PRESSURE: 74 MMHG | TEMPERATURE: 98.5 F | WEIGHT: 164.24 LBS | HEART RATE: 94 BPM | OXYGEN SATURATION: 91 % | HEIGHT: 76 IN | BODY MASS INDEX: 20 KG/M2 | SYSTOLIC BLOOD PRESSURE: 142 MMHG | RESPIRATION RATE: 16 BRPM

## 2025-04-16 LAB
ANION GAP SERPL CALCULATED.3IONS-SCNC: 9 MMOL/L (ref 7–15)
AST SERPL W P-5'-P-CCNC: 63 U/L (ref 0–45)
BUN SERPL-MCNC: 26.2 MG/DL (ref 8–23)
CALCIUM SERPL-MCNC: 8.7 MG/DL (ref 8.8–10.4)
CHLORIDE SERPL-SCNC: 103 MMOL/L (ref 98–107)
CREAT SERPL-MCNC: 0.83 MG/DL (ref 0.67–1.17)
EGFRCR SERPLBLD CKD-EPI 2021: >90 ML/MIN/1.73M2
ERYTHROCYTE [DISTWIDTH] IN BLOOD BY AUTOMATED COUNT: 18.9 % (ref 10–15)
GLUCOSE SERPL-MCNC: 95 MG/DL (ref 70–99)
HCO3 SERPL-SCNC: 28 MMOL/L (ref 22–29)
HCT VFR BLD AUTO: 26.5 % (ref 40–53)
HGB BLD-MCNC: 8.6 G/DL (ref 13.3–17.7)
MCH RBC QN AUTO: 29.2 PG (ref 26.5–33)
MCHC RBC AUTO-ENTMCNC: 32.5 G/DL (ref 31.5–36.5)
MCV RBC AUTO: 90 FL (ref 78–100)
PLATELET # BLD AUTO: 170 10E3/UL (ref 150–450)
POTASSIUM SERPL-SCNC: 3.5 MMOL/L (ref 3.4–5.3)
RBC # BLD AUTO: 2.95 10E6/UL (ref 4.4–5.9)
SODIUM SERPL-SCNC: 140 MMOL/L (ref 135–145)
WBC # BLD AUTO: 11.2 10E3/UL (ref 4–11)

## 2025-04-16 PROCEDURE — 85018 HEMOGLOBIN: CPT | Performed by: HOSPITALIST

## 2025-04-16 PROCEDURE — 250N000013 HC RX MED GY IP 250 OP 250 PS 637: Performed by: HOSPITALIST

## 2025-04-16 PROCEDURE — 250N000011 HC RX IP 250 OP 636: Performed by: HOSPITALIST

## 2025-04-16 PROCEDURE — 250N000013 HC RX MED GY IP 250 OP 250 PS 637: Performed by: PHYSICIAN ASSISTANT

## 2025-04-16 PROCEDURE — 250N000011 HC RX IP 250 OP 636: Performed by: PHYSICIAN ASSISTANT

## 2025-04-16 PROCEDURE — 80048 BASIC METABOLIC PNL TOTAL CA: CPT | Performed by: HOSPITALIST

## 2025-04-16 PROCEDURE — 250N000012 HC RX MED GY IP 250 OP 636 PS 637: Performed by: STUDENT IN AN ORGANIZED HEALTH CARE EDUCATION/TRAINING PROGRAM

## 2025-04-16 PROCEDURE — 99239 HOSP IP/OBS DSCHRG MGMT >30: CPT | Performed by: HOSPITALIST

## 2025-04-16 PROCEDURE — 84450 TRANSFERASE (AST) (SGOT): CPT | Performed by: HOSPITALIST

## 2025-04-16 RX ORDER — FUROSEMIDE 20 MG/1
20 TABLET ORAL DAILY
Status: ON HOLD | DISCHARGE
Start: 2025-04-16

## 2025-04-16 RX ORDER — IPRATROPIUM BROMIDE AND ALBUTEROL SULFATE 2.5; .5 MG/3ML; MG/3ML
3 SOLUTION RESPIRATORY (INHALATION) EVERY 6 HOURS PRN
Status: ON HOLD | DISCHARGE
Start: 2025-04-16

## 2025-04-16 RX ORDER — ACETAMINOPHEN 325 MG/1
650 TABLET ORAL EVERY 4 HOURS PRN
Status: ON HOLD | DISCHARGE
Start: 2025-04-16

## 2025-04-16 RX ORDER — POTASSIUM CHLORIDE 750 MG/1
20 TABLET, EXTENDED RELEASE ORAL DAILY
Qty: 15 TABLET | Refills: 0 | Status: ON HOLD | OUTPATIENT
Start: 2025-04-16

## 2025-04-16 RX ORDER — HYDROXYZINE HYDROCHLORIDE 10 MG/1
10 TABLET, FILM COATED ORAL EVERY 8 HOURS PRN
Status: ON HOLD | DISCHARGE
Start: 2025-04-16

## 2025-04-16 RX ORDER — AMOXICILLIN 250 MG
2 CAPSULE ORAL 2 TIMES DAILY PRN
Status: ON HOLD | DISCHARGE
Start: 2025-04-16

## 2025-04-16 RX ORDER — POTASSIUM CHLORIDE 1500 MG/1
40 TABLET, EXTENDED RELEASE ORAL ONCE
Status: COMPLETED | OUTPATIENT
Start: 2025-04-16 | End: 2025-04-16

## 2025-04-16 RX ORDER — HEPARIN SODIUM (PORCINE) LOCK FLUSH IV SOLN 100 UNIT/ML 100 UNIT/ML
5-10 SOLUTION INTRAVENOUS
Status: DISCONTINUED | OUTPATIENT
Start: 2025-04-16 | End: 2025-04-16 | Stop reason: HOSPADM

## 2025-04-16 RX ADMIN — FUROSEMIDE 20 MG: 10 INJECTION, SOLUTION INTRAMUSCULAR; INTRAVENOUS at 03:21

## 2025-04-16 RX ADMIN — POTASSIUM CHLORIDE 40 MEQ: 1500 TABLET, EXTENDED RELEASE ORAL at 11:33

## 2025-04-16 RX ADMIN — Medication 5 ML: at 12:07

## 2025-04-16 RX ADMIN — APIXABAN 2.5 MG: 2.5 TABLET, FILM COATED ORAL at 08:58

## 2025-04-16 RX ADMIN — Medication 5 ML: at 06:06

## 2025-04-16 RX ADMIN — PREDNISONE 40 MG: 20 TABLET ORAL at 08:58

## 2025-04-16 RX ADMIN — Medication 5 ML: at 03:22

## 2025-04-16 ASSESSMENT — ACTIVITIES OF DAILY LIVING (ADL)
ADLS_ACUITY_SCORE: 46
ADLS_ACUITY_SCORE: 47
ADLS_ACUITY_SCORE: 47
ADLS_ACUITY_SCORE: 46
ADLS_ACUITY_SCORE: 47
ADLS_ACUITY_SCORE: 46
ADLS_ACUITY_SCORE: 47
ADLS_ACUITY_SCORE: 46
ADLS_ACUITY_SCORE: 47

## 2025-04-16 NOTE — PLAN OF CARE
"Physical Therapy Discharge Summary    Reason for therapy discharge:    Discharged to transitional care facility.    Progress towards therapy goal(s). See goals on Care Plan in [facility] electronic health record for goal details.  Goals partially met.  Barriers to achieving goals:   discharge from facility.    Therapy recommendation(s):    Continued therapy is recommended.  Rationale/Recommendations:  \"Pt at baseline reports being IND with functional mobility, performs squat-pivot transfers to manual w/c without assistance. Live in [facility]. Currently, pt is below baseline, attempted to perform transfers with writer on this date, unable to clear buttocks IND, needing maxA to clear buttocks but unsafe to pivot to wheelchair. Swetha Stedy or EZ stand likely wouldn't work for pt given significant contractures to GB knees (lacking 30-40 degrees of extension). Recommend TCF. Pt is insisting on returning home today, if he returns to longterm, he would need Ax2 (or more) for safe pivot transfers , would be dependent and would need assistance with ADLs.\".      "

## 2025-04-16 NOTE — PROGRESS NOTES
Care Management Discharge Note    Discharge Date: 04/16/2025       Discharge Disposition: Skilled Nursing Facility    Discharge Services: County Worker    Discharge DME: Oxygen    Discharge Transportation: health plan transportation    Private pay costs discussed: transportation costs    Does the patient's insurance plan have a 3 day qualifying hospital stay waiver?  No    PAS Confirmation Code: [id number]  Patient/family educated on Medicare website which has current facility and service quality ratings: yes    Education Provided on the Discharge Plan: Yes  Persons Notified of Discharge Plans: [place]  Patient/Family in Agreement with the Plan: yes    Handoff Referral Completed: No, handoff not indicated or clinically appropriate    Additional Information:  CAROLE received discharge orders and faxed to [facility]. CAROLE called guardian and left a vm requesting a call back to verify discharge plans. PAS previously completed. Patient has MH w/c transport at [phone]. Facility in agreement.     CAROLE faxed updated orders indicating chemo/oncology appointments will be held while at [facility].     CAROLE called [place] agency and informed of discharge plans for today.     Contreras Boyd, MUNA    [employer]

## 2025-04-16 NOTE — PLAN OF CARE
Goal Outcome Evaluation:            [id number]  Orientation: A&O x4; hoarse voice   Aggression Stop Light: green   Activity: A2 with lift; T/R q2h. Refused repositioning but shifts weight independently.   Diet/BS Checks: reg   Tele:  n/a   IV Access/Drains: R chest port hep locked- good blood return. Needs needle change tomorrow.  Pain Management: pain in bilat extremities ranging between 2-3/10. Declines interventions  Abnormal VS/Results: VSS on  3L NC.   Bowel/Bladder: continent. Urinal at bedside. 1 large BM this shift.   Skin/Wounds: Scattered scabs, peeling/cracking to Bilateral shin/calf, blanchable redness to sacrum/coccyx and R hip-  preventative mepi in place  Consults: CAROLE   D/C Disposition: plan to discharge to [facility] TCU tomorrow via wheelchair. Ride scheduled between [id number].   Other Info:

## 2025-04-16 NOTE — DISCHARGE SUMMARY
Discharge Summary  Hospitalist    Date of Admission:  4/9/2025  Date of Discharge:  4/16/2025  Discharging Provider: Sung Wood MD    Primary Care Physician   Jessica Kingston  Primary Care Provider Phone Number: [phone]  Primary Care Provider Fax Number: [phone]    PRINCIPAL DIAGNOSIS  Acute hypoxic respiratory failure likely multifactorial due to lung cancer,  bilateral pleural effusions, atelectasis, pericardial effusion, chemotoxicity, deconditioning  Bilateral (right greater than left) moderate to large pleural effusions s/p bilateral thoracentesis.  Moderate pericardial effusion.  Elevated troponin likely in the setting of demand ischemia from hypoxic respiratory failure, type II MI.  Atelectasis.  Leukocytosis likely reactive  Hypoalbuminemia likely due to acute illness.  Mild elevated AST.  Physical deconditioning from medical illness.      Past Medical History:   Diagnosis Date    Alcohol abuse, unspecified     Cerebral infarction (H)     2009, right side residual and aphasia    Dyslipidemia     GERD (gastroesophageal reflux disease)     Lung cancer (H)     Unspecified essential hypertension        History of Present Illness   Kt Rasmussen is an 64 year old male who presented with shortness of breath.     Hospital Course   Kt Rasmussen is a 64 year old male with history of metastatic lung cancer, pleural effusion, pericardial effusion, lower extremity DVT on anticoagulation, chronic anemia, thrombocytopenia, coronary artery disease status post stenting, hypertension, hyperlipidemia, GERD, seizure disorder, anxiety disorder, history of CVA with right hemiplegia admitted on 4/9/2025 due to shortness of breath.      Acute hypoxic respiratory failure likely multifactorial due to lung cancer,  bilateral pleural effusions, atelectasis, pericardial effusion, chemotoxicity, deconditioning  Bilateral (right greater than left) moderate to large pleural effusions s/p bilateral  thoracentesis.  Moderate pericardial effusion.  Elevated troponin likely in the setting of demand ischemia from hypoxic respiratory failure, type II MI.  Atelectasis.  --Patient sent to ED from [facility] with report of weeklong worsening shortness of breath.  Baseline not on supplemental oxygen, compliant with anticoagulation.  Known history of pleural effusion and pericardial effusion status post thoracentesis with 1.9 L of malignant fluid removed on 1/6/2025.  -In ED noted to be hypoxic with O2 sats of 88% and required 3 L oxygen.  -CXR showed right greater than left moderate to large pleural effusion with edema.   -Troponin high-sensitivity 34 > 22.  proBNP 495.  -EKG normal sinus rhythm.  -Echocardiogram with moderate pericardial effusion, not suggestive of tamponade physiology.   -Chest post bilateral thoracocentesis on 4/9, 1.1 liter from the left and 2 liters from the right.    Chest x-ray from 4/12 Moderate right and small pleural effusions decreased from 4/9/2025. No pneumothorax. Right greater than left lung base atelectasis.   --Oncology followed, suspect hypoxia related to effusion and chemotoxicity.  --Cardiology followed, recommended follow-up echocardiogram in 1 month.  --Patient continued to have SOB.  Repeat chest x-ray on 4/15 with Stable moderate right and small left pleural effusion and bibasilar atelectasis.  - Started on prednisone burst on 4/13, no improvement in symptoms.  Discontinue steroids on discharge.   --Was initiated on intravenous Lasix 20 mg twice daily for diuresis on 4/15, having some  improvement in shortness of breath. Plan for discharge with 20 mg oral Lasix daily.  Oral potassium supplementation while on Lasix.  Closely monitor blood pressures, volume status, renal function and accordingly optimize dose.  Plan to discharge with 2 L nasal oxygen continuous, titrate, goal O2 sats greater than 91%.  Fluid restriction to 2000 mL/day.  Aggressive incentive spirometry,  encourage ambulation.  Wean off supplemental nasal oxygen as able to.  Follow-up with care provider at TCU in 5 days  Follow-up with primary oncology team [[facility]] in clinic per schedule.  Follow-up with cardiology with repeat echocardiogram in 1 month.  Follow-up chest imaging in 4 to 6 weeks or earlier if symptomatic.    Metastatic non-small cell lung cancer (mets to lymph nodes, bones and bilateral lungs)  *Follows with  Oncology and underwent infusion GEMZAR 04/09  -- s/p 2 doses of post chemotherapy neupogen, which is reason for elevated white count now improving  -[facility] now signed off, follow-up with primary oncology team in clinic per schedule.  Tylenol as needed for pain.  Monitor liver function tests in 1 week.  Atarax as needed for adjuvant pain.  Muscle relaxants as needed.  Avoid narcotics as able to     Chronic anemia secondary to chemo, underlying malignancy.  Chronic thrombocytopenia secondary to chemo.  Leukocytosis likely reactive  Monitor CBC periodically     Bilateral lower extremity DVT -9/2023 on anticoagulation.  Likely hypercoagulability of malignancy.    On chronic anticoagulation, continue PTA Eliquis 2.5 mg BID.  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       CAD s/p cardiac stenting  Ascending aortic dilatation.  Mild MR, mild TR.  Hypertension.  Hyperlipidemia.  --Denies any chest pain.  No palpitations.  SOB as above.  Ascending aortic dilatation of 4.1 cm noted on echocardiogram.  Mild MR.  Mild TR.  Already on anticoagulation as above.  PTA not on antihypertensives or diuretic.  Initiated on Lasix for diuresis as above.  Continue PTA Lipitor, liver enzymes within normal limits.  Close follow-up echocardiogram as outpatient in 1 month as above.     History of CVA with right sided hemiplegia  Seizure D/O  Continue PTA Eliquis, Lipitor.  PTA not on  antiepileptics.  Fall precautions.     GERD.  PTA not on meds.     MDD with anxiety  PTA not on meds. No acute issues     History of alcohol use D/O  History of substance use D/O  Noted      Hypoalbuminemia likely due to acute illness.  Mild elevated AST.  Monitor in 1 week.     Physical deconditioning from medical illness.  Patient resident of assisted living facility, now requiring lift for ambulation out of bed.  Rehab team following, recommend TCU.    Patient/ guardian in agreement with TCU for rehabilitation, holding chemotherapy appointment while at TCU    Sung Wood MD.    Pending Results   Unresulted Labs Ordered in the Past 30 Days of this Admission       No orders found from 3/10/2025 to 4/10/2025.               Physical Exam   Vitals:    04/13/25 0647 04/14/25 0602 04/16/25 0700   Weight: 76.3 kg (168 lb 3.4 oz) 77.1 kg (169 lb 15.6 oz) 74.5 kg (164 lb 3.9 oz)     Vital Signs with Ranges  Temp:  [97.5  F (36.4  C)-98.5  F (36.9  C)] 98.5  F (36.9  C)  Pulse:  [80-95] 94  Resp:  [16-20] 16  BP: (113-142)/(63-77) 142/74  SpO2:  [88 %-97 %] 92 %  I/O last 3 completed shifts:  In: 600 [P.O.:600]  Out: 2175 [Urine:2175]  PHYSICAL EXAM  GENERAL: Patient is in no distress. Alert and oriented.  HEENT: Oropharynx dry, hoarse voice  HEART: Regular rate and rhythm. S1S2.   LUNGS: Bilateral decreased breath sounds. Worse in lower lobes  Respirations unlabored  ABDOMEN: Soft, no abdominal tenderness, bowel sounds heard   NEURO: Moving extremities  EXTREMITIES: + edema.   SKIN: Warm, dry.   PSYCHIATRY Cooperative.   )Consultations This Hospital Stay   CARE MANAGEMENT / SOCIAL WORK IP CONSULT  CARE MANAGEMENT / SOCIAL WORK IP CONSULT  HEMATOLOGY & ONCOLOGY IP CONSULT  CARDIOLOGY IP CONSULT  PHYSICAL THERAPY ADULT IP CONSULT  PHYSICAL THERAPY ADULT IP CONSULT  OCCUPATIONAL THERAPY ADULT IP CONSULT    Time Spent on this Encounter   Sung CUELLAR MD, personally saw the patient today and spent greater than 30  minutes discharging this patient.. Discussed with patient, bedside RN, care team.  Patient's legal guardian updated 4/15 on discharge plan.    Discharge Disposition   Discharged to short-term care facility  Condition at discharge: Stable    Discharge Orders      Follow-Up with Cardiology MICHAEL      Reason for your hospital stay    You were admitted to the hospital with shortness of breath multifactorial from lung cancer, bilateral pleural effusion, pericardial effusion, atelectasis, chemotoxicity and deconditioning.  Underwent thoracocentesis, diuresis.  Continues to require supplemental nasal oxygen.  Plan for discharge to rehab facility with nasal O2.     Discharge Instructions    Aggressive incentive spirometry.  Avoid narcotics as able to.     Monitor and record    Monitor daily blood pressure, heart rate, weights review on provider visit and optimize diuretic dose.     Mantoux instructions    Give two-step Mantoux (PPD) Per Facility Policy Yes     Tubes and Drains    Current Tubes and Drains:     CVC Line  Duration           Port A Cath Single 04/11/22 Right Chest wall 1101 days    Port a Cath 04/09/25 Single Lumen Right Chest wall 6 days              Activity - Up with nursing assistance     Follow Up and recommended labs and tests    Follow up with group home physician.  The following labs/tests are recommended: Follow-up CBC, BMP, hepatic function panel in 7 days or earlier if symptomatic.  Follow-up with primary oncology team [[facility]] in clinic per schedule.  Follow-up with cardiology with repeat echocardiogram in 1 month.  Follow-up chest imaging in 4 to 6 weeks or earlier if symptomatic.     General info for SNF    Length of Stay Estimate: Short Term Care: Estimated # of Days <30  Condition at Discharge: Stable  Level of care:skilled   Rehabilitation Potential: Fair  Admission H&P remains valid and up-to-date: Yes  Recent Chemotherapy: Patient/guardian in agreement with TCU for rehabilitation,  holding chemotherapy appointments while at rehab unit .  Use Nursing Home Standing Orders: Yes     Physical Therapy Adult Consult    Evaluate and treat as clinically indicated.    Reason:  Physical deconditioning.     Occupational Therapy Adult Consult    Evaluate and treat as clinically indicated.    Reason:  Physical deconditioning.     Oxygen (SNF/TCU) Discharge     Echocardiogram Complete    Administration of IV contrast will be tailored to this examination per the appropriate written protocol listed in the Echocardiography department Protocol Book, or by the supervising Cardiologist. This may result in an order change.    Use of contrast is at the discretion of the supervising Cardiologist.     Fall precautions     Diet    Follow this diet upon discharge: Current Diet:Orders Placed This Encounter      Room Service      Fluid restriction 2000 ML FLUID      Combination Diet Regular Diet Adult       Discharge Medications   Current Discharge Medication List        START taking these medications    Details   acetaminophen (TYLENOL) 325 MG tablet Take 2 tablets (650 mg) by mouth every 4 hours as needed for pain (and adjunct with moderate or severe pain or per patient request).    Associated Diagnoses: History of lung cancer      furosemide (LASIX) 20 MG tablet Take 1 tablet (20 mg) by mouth daily. Hold if systolic blood pressures less than 110 or poor oral intake.    Associated Diagnoses: Bilateral pleural effusion; Pericardial effusion      hydrOXYzine HCl (ATARAX) 10 MG tablet Take 1 tablet (10 mg) by mouth every 8 hours as needed for anxiety or other (adjuvant pain).    Associated Diagnoses: History of lung cancer      ipratropium - albuterol 0.5 mg/2.5 mg/3 mL (DUONEB) 0.5-2.5 (3) MG/3ML neb solution Take 1 vial (3 mLs) by nebulization every 6 hours as needed for wheezing or shortness of breath.    Associated Diagnoses: History of lung cancer      melatonin 1 MG TABS tablet Take 1 tablet (1 mg) by mouth nightly  as needed for sleep.    Associated Diagnoses: Other insomnia      potassium chloride luis a ER (KLOR-CON M10) 10 MEQ CR tablet Take 2 tablets (20 mEq) by mouth daily. Monitor BMP in 5 days, optimize diuresis/potassium replacement.  Qty: 15 tablet, Refills: 0    Associated Diagnoses: Diuresis      senna-docusate (SENOKOT-S/PERICOLACE) 8.6-50 MG tablet Take 2 tablets by mouth 2 times daily as needed for constipation.    Associated Diagnoses: Non-small cell lung cancer metastatic to bone (H)           CONTINUE these medications which have NOT CHANGED    Details   apixaban ANTICOAGULANT (ELIQUIS) 2.5 MG tablet Take 2.5 mg by mouth 2 times daily.      atorvastatin (LIPITOR) 40 MG tablet Take 40 mg by mouth daily.      folic acid (FOLVITE) 1 MG tablet Take 1 tablet (1 mg) by mouth daily  Qty: 90 tablet, Refills: 3    Associated Diagnoses: Non-small cell lung cancer metastatic to bone (H)      methocarbamol (ROBAXIN) 750 MG tablet Take 750 mg by mouth 3 times daily as needed for muscle spasms.           Allergies   Allergies   Allergen Reactions    No Known Drug Allergy        DATA  Most Recent 3 CBC's:  Recent Labs   Lab Test 04/16/25  0601 04/14/25  0614 04/13/25  1026   WBC 11.2* 14.9* 17.3*   HGB 8.6* 8.1* 8.0*   MCV 90 91 91    210 171      Most Recent 3 BMP's:  Recent Labs   Lab Test 04/16/25  0601 04/15/25  0554 04/10/25  0608    138 136   POTASSIUM 3.5 3.9 4.3   CHLORIDE 103 105 105   CO2 28 27 24   BUN 26.2* 26.7* 23.9*   CR 0.83 0.79 0.96   ANIONGAP 9 6* 7   BEVERLY 8.7* 8.5* 8.3*   GLC 95 100* 96     Most Recent 2 LFT's:  Recent Labs   Lab Test 04/16/25  0601 04/15/25  0554 04/09/25  1121   AST 63* 50* 24   ALT  --  31 13   ALKPHOS  --  113 117   BILITOTAL  --  0.2 0.4       Results for orders placed or performed during the hospital encounter of 04/09/25   XR Chest 2 Views    Narrative    EXAM: XR CHEST 2 VIEWS  LOCATION: [facility]  DATE: 4/9/2025    INDICATION: sob  COMPARISON:  Chest radiograph 12/26/2024.      Impression    IMPRESSION: Right greater than left moderate to large pleural effusions with adjacent presumed atelectasis. Interstitial pulmonary edema. No discernible pneumothorax. Heart borders are mostly obscured. Right chest port tip appears similar to prior.   US Thoracentesis Bilateral    Narrative    EXAM:   1. BILATERAL THORACENTESIS  2. ULTRASOUND GUIDANCE  LOCATION: [facility]  DATE: 4/9/2025    INDICATION: Pleural effusion.    PROCEDURE: Informed consent obtained. Time out performed. The chest was prepped and draped in sterile fashion. 10 mL of 1 % lidocaine was infused into the local soft tissues. Under direct ultrasound guidance, a 5 Latvian catheter system was placed into   the pleural effusion.     2 liters of clear coty fluid were removed from the right chest. 1.1 L of clear coty fluid was taken from the left chest. Fluid was sent to lab, if requested.    Patient tolerated procedure well.    Ultrasound imaging was obtained and placed in the patient's permanent medical record.      Impression    IMPRESSION:  Status post bilateral ultrasound-guided thoracentesis.       XR Chest 2 Views    Narrative    EXAM: XR CHEST 2 VIEWS  LOCATION: [facility]  DATE: 4/12/2025    INDICATION: eval effusions s p thora  COMPARISON: 4/9/2025      Impression    IMPRESSION: Right chest wall port catheter tip at the level of the right atrium. Moderate right and small pleural effusions decreased from 4/9/2025. No pneumothorax. Right greater than left lung base atelectasis. Normal heart size.   XR Chest 2 Views    Narrative    EXAM: XR CHEST 2 VIEWS  LOCATION: [facility]  DATE: 4/15/2025    INDICATION: Acute Hypoxic respiratory failure  COMPARISON: 4/12/2025      Impression    IMPRESSION: Stable moderate right and small left pleural effusion and bibasilar atelectasis. Stable mild pulmonary edema. Right IJ port  [Home] : at home, [unfilled] , at the time of the visit. catheter tip at the high right atrium, stable. No pneumothorax. Normal heart size. Stable tortuous thoracic aorta.   Echocardiogram Complete     Value    LVEF  55-60%    [facility]    [id number]  [id number]  [id number]  [id number]^DIALLO^CHINYERE^MARTINA     [facility]  Echocardiography Laboratory  [address] [place] [address]     Name: WOO WOO  MRN: [id number]  : 1960  Study Date: 04/10/2025 07:49 AM  Age: 64 yrs  Gender: Male  Patient Location: [facility]  Reason For Study: SOB  Ordering Physician: CHINYERE LANDRUM  Performed By: Gisela Osborne     BSA: 2.0 m2  Height: 75 in  Weight: 170 lb  HR: 95  BP: 108/61 mmHg  ______________________________________________________________________________  Procedure  Echocardiogram with two-dimensional, color and spectral Doppler.  ______________________________________________________________________________  Interpretation Summary     There is a moderate pericardial effusion, primarily localized anteriorly along  the right ventricle and right atrium, trace at the apex. Measures 1.44 cm  anterior to the RV on short-axis, and 1.8 cm along the RVOT. Echocardiographic  findings are not suggestive of tamponade physiology, however clinical  correlation and close follow up recommended.  Moderate left pleural effusion.     Left ventricular systolic function is normal. The visual ejection fraction is  55-60%.  Septal motion is consistent with conduction abnormality.  The right ventricle is normal in size and function.  The aortic valve is trileaflet with aortic valve sclerosis. There is trace  aortic regurgitation.  There is mild (1+) mitral regurgitation.  There is mild (1+) tricuspid regurgitation.  The inferior vena cava was normal in size with preserved respiratory  variability.  Aortic root dilatation is present. Max diameter of the visualized portion 4.1  cm.  Ascending aorta dilatation is present. Max diameter of the visualized  [Medical Office: ([facility])___] : at the medical office located in  portion  4.1 cm.     Alerted Dr. Bang regarding these findings.  ______________________________________________________________________________  Left Ventricle  The left ventricle is normal in size. Proximal septal thickening is noted.  Left ventricular systolic function is normal. The visual ejection fraction is  55-60%. Septal motion is consistent with conduction abnormality.     Right Ventricle  The right ventricle is normal in size and function.     Atria  Normal left atrial size. Right atrial size is normal.     Mitral Valve  There is mild (1+) mitral regurgitation.     Tricuspid Valve  There is mild (1+) tricuspid regurgitation. The right ventricular systolic  pressure is approximated at 22mmHg plus the right atrial pressure.     Aortic Valve  The aortic valve is trileaflet with aortic valve sclerosis. There is trace  aortic regurgitation.     Pulmonic Valve  The pulmonic valve is not well seen, but is grossly normal.     Vessels  Aortic root dilatation is present. Max diameter of the visualized portion 4.1  cm. Ascending aorta dilatation is present. Max diameter of the visualized  portion 4.1 cm. The inferior vena cava was normal in size with preserved  respiratory variability.     Pericardium  There is a moderate pericardial effusion, primarily localized anteriorly along  the right ventricle and right atrium, trace at the apex. Measures 1.44 cm  anterior to the RV on short-axis, and 1.8 cm along the RVOT. Echocardiographic  findings are not suggestive of tamponade physiology, however clinical  correlation recommended. Moderate left pleural effusion.     ______________________________________________________________________________  MMode/2D Measurements & Calculations  IVSd: 1.3 cm  LVIDd: 3.7 cm  LVIDs: 2.7 cm  LVPWd: 1.2 cm  FS: 26.3 %     LV mass(C)d: 156.7 grams  LV mass(C)dI: 76.5 grams/m2  Ao root diam: 4.1 cm  asc Aorta Diam: 4.1 cm  LVOT diam: 2.2 cm  LVOT area: 3.9 cm2  Ao root diam index  Ht(cm/m): 2.1  Ao root diam index BSA (cm/m2): 2.0  Asc Ao diam index BSA (cm/m2): 2.0  Asc Ao diam index Ht(cm/m): 2.1  LA Volume (BP): 19.5 ml  LA Volume Index (BP): 9.5 ml/m2     RV Base: 2.9 cm  RWT: 0.65  TAPSE: 2.5 cm     Doppler Measurements & Calculations  Ao V2 max: 97.3 cm/sec  Ao max P.0 mmHg  Ao V2 mean: 66.2 cm/sec  Ao mean P.0 mmHg  Ao V2 VTI: 15.5 cm  SUPA(I,D): 4.0 cm2  SUPA(V,D): 3.2 cm2  LV V1 max P.7 mmHg  LV V1 max: 81.6 cm/sec  LV V1 VTI: 16.0 cm  SV(LVOT): 62.0 ml  SI(LVOT): 30.3 ml/m2  PA acc time: 0.07 sec  AV Jefferson Ratio (DI): 0.84  SUPA Index (cm2/m2): 2.0  RV S Jefferson: 9.0 cm/sec     ______________________________________________________________________________  Report approved by: Dickson Liz MD on 04/10/2025 10:15 AM           *Note: Due to a large number of results and/or encounters for the requested time period, some results have not been displayed. A complete set of results can be found in Results Review.       [Patient] : the patient [Follow-Up Visit] : a follow-up visit for

## 2025-04-16 NOTE — PLAN OF CARE
4627 - 4350    Orientation: A&Ox4  Aggression Stop Light: Green  Activity: A2 w/lift, pt turns independently in bed  Diet/BS Checks: Regular, 2000 mL FR  Tele: N/A  IV Access/Drains: R port HL  Pain Management: Denies pain  Abnormal VS/Results: VSS on 3L NC  Bowel/Bladder: Continent, no BM overnight  Skin/Wounds: Blanchable redness to coccyx, brenda/peeling BLE, preventative mepilex to R hip  Consults: SW, PT  D/C Disposition: Today to [facility] between 1449-3374 via  [employer] transport

## 2025-04-16 NOTE — PLAN OF CARE
Goal Outcome Evaluation:       3792-0079  Orientation: A&O x4; hoarse voice   Aggression Stop Light: green   Activity: A2 with lift; T/R q2h. Refused repositioning. Shifts weight independently. Up in chair today  Diet/BS Checks: reg   Tele:  n/a   IV Access/Drains: R chest port hep locked- good blood return. CHG bath done  Pain Management: Pt denied pain meds for leg pain   Abnormal VS/Results: VSS on  3L NC overnight.  Bowel/Bladder: continent. Urinal at bedside. Constipated per report- refused PRN senna.   Skin/Wounds: Scattered scabs, peeling/cracking to Bilateral shin/calf, blanchable redness to sacrum/coccyx and R hip- preventative mepi in place  Consults: CAROLE   D/C Disposition: [facility]. Ride scheduled between 5392-5897.   Other Info: Lasix added

## 2025-04-17 ENCOUNTER — PATIENT OUTREACH (OUTPATIENT)
Dept: CARE COORDINATION | Facility: CLINIC | Age: 65
End: 2025-04-17
Payer: COMMERCIAL

## 2025-04-17 NOTE — PROGRESS NOTES
[facility]: [facility] [place]    Background: Transitional Care Management program identified per system criteria and reviewed by [facility] team for possible outreach.    Assessment: Upon chart review, CCRC Team member will not proceed with patient outreach related to this episode of Transitional Care Management program due to reason below:    Non-[facility] TCU: CCRC team member noted patient discharged to TCU/ARU/LTACH. Patient is not established with a [facility] currently supported by Primary Care-Care Coordination therefore handoff to Primary Care-Care Coordination is not appropriate at this time.    Plan: Transitional Care Management episode addressed appropriately per reason noted above.      Shelley Knutson  Community Health Worker  [facility], [facility]  [phone]     *Connected Care Resource Team does NOT follow patient ongoing. Referrals are identified based on internal discharge reports and the outreach is to ensure patient has an understanding of their discharge instructions.

## 2025-04-18 ENCOUNTER — TELEPHONE (OUTPATIENT)
Dept: CARDIOLOGY | Facility: CLINIC | Age: 65
End: 2025-04-18
Payer: COMMERCIAL

## 2025-04-18 NOTE — TELEPHONE ENCOUNTER
Patient was admitted to [facility] on 4/9/25 with due to shortness of breath.     PMH: metastatic lung cancer, pleural effusion, pericardial effusion, lower extremity DVT on anticoagulation, chronic anemia, thrombocytopenia, coronary artery disease status post stenting, hypertension, hyperlipidemia, GERD, seizure disorder, anxiety disorder, history of CVA with right hemiplegia.     4/9/25: bilateral thoracocentesis, 1.1 liter from the left and 2 liters from the right.     4/10/25: Echo showed EF of 55 to 60%, normal RV function, trace AR, mild MR, mild TR, and moderate pericardial effusion with no suggestive findings of tamponade.     IV Lasix diuresed.    Pt was started on Lasix and KCl at time of discharge.    Pt needs to be scheduled for an echo and cardiology OV as ordered. Will route a message to scheduling to call TCU to schedule. LEYDA Chaney RN.

## 2025-04-22 DIAGNOSIS — Z51.11 ENCOUNTER FOR ANTINEOPLASTIC CHEMOTHERAPY: Primary | ICD-10-CM

## 2025-04-22 DIAGNOSIS — C34.90 NON-SMALL CELL LUNG CANCER METASTATIC TO BONE (H): ICD-10-CM

## 2025-04-22 DIAGNOSIS — C79.51 NON-SMALL CELL LUNG CANCER METASTATIC TO BONE (H): ICD-10-CM

## 2025-04-22 RX ORDER — ALBUTEROL SULFATE 90 UG/1
1-2 INHALANT RESPIRATORY (INHALATION)
Start: 2025-04-25

## 2025-04-22 RX ORDER — LORAZEPAM 2 MG/ML
0.5 INJECTION INTRAMUSCULAR EVERY 4 HOURS PRN
OUTPATIENT
Start: 2025-04-25

## 2025-04-22 RX ORDER — DIPHENHYDRAMINE HYDROCHLORIDE 50 MG/ML
25 INJECTION, SOLUTION INTRAMUSCULAR; INTRAVENOUS
Start: 2025-04-25

## 2025-04-22 RX ORDER — DIPHENHYDRAMINE HYDROCHLORIDE 50 MG/ML
50 INJECTION, SOLUTION INTRAMUSCULAR; INTRAVENOUS
Start: 2025-04-25

## 2025-04-22 RX ORDER — HEPARIN SODIUM,PORCINE 10 UNIT/ML
5-20 VIAL (ML) INTRAVENOUS DAILY PRN
OUTPATIENT
Start: 2025-04-25

## 2025-04-22 RX ORDER — ONDANSETRON 2 MG/ML
8 INJECTION INTRAMUSCULAR; INTRAVENOUS ONCE
OUTPATIENT
Start: 2025-04-25

## 2025-04-22 RX ORDER — HEPARIN SODIUM (PORCINE) LOCK FLUSH IV SOLN 100 UNIT/ML 100 UNIT/ML
5 SOLUTION INTRAVENOUS
OUTPATIENT
Start: 2025-04-25

## 2025-04-22 RX ORDER — ALBUTEROL SULFATE 0.83 MG/ML
2.5 SOLUTION RESPIRATORY (INHALATION)
OUTPATIENT
Start: 2025-04-25

## 2025-04-22 RX ORDER — MEPERIDINE HYDROCHLORIDE 25 MG/ML
25 INJECTION INTRAMUSCULAR; INTRAVENOUS; SUBCUTANEOUS
OUTPATIENT
Start: 2025-04-25

## 2025-04-22 RX ORDER — EPINEPHRINE 1 MG/ML
0.3 INJECTION, SOLUTION, CONCENTRATE INTRAVENOUS EVERY 5 MIN PRN
OUTPATIENT
Start: 2025-04-25

## 2025-04-27 ENCOUNTER — HOSPITAL ENCOUNTER (INPATIENT)
Facility: CLINIC | Age: 65
End: 2025-04-27
Attending: EMERGENCY MEDICINE | Admitting: STUDENT IN AN ORGANIZED HEALTH CARE EDUCATION/TRAINING PROGRAM
Payer: COMMERCIAL

## 2025-04-27 ENCOUNTER — APPOINTMENT (OUTPATIENT)
Dept: ULTRASOUND IMAGING | Facility: CLINIC | Age: 65
End: 2025-04-27
Attending: EMERGENCY MEDICINE
Payer: COMMERCIAL

## 2025-04-27 ENCOUNTER — APPOINTMENT (OUTPATIENT)
Dept: GENERAL RADIOLOGY | Facility: CLINIC | Age: 65
End: 2025-04-27
Attending: EMERGENCY MEDICINE
Payer: COMMERCIAL

## 2025-04-27 DIAGNOSIS — C34.90 NON-SMALL CELL LUNG CANCER METASTATIC TO BONE (H): ICD-10-CM

## 2025-04-27 DIAGNOSIS — R06.02 SHORTNESS OF BREATH: ICD-10-CM

## 2025-04-27 DIAGNOSIS — Z85.118 HISTORY OF LUNG CANCER: ICD-10-CM

## 2025-04-27 DIAGNOSIS — J91.0 MALIGNANT PLEURAL EFFUSION (H): ICD-10-CM

## 2025-04-27 DIAGNOSIS — C79.51 NON-SMALL CELL LUNG CANCER METASTATIC TO BONE (H): ICD-10-CM

## 2025-04-27 DIAGNOSIS — J90 BILATERAL PLEURAL EFFUSION: Primary | ICD-10-CM

## 2025-04-27 LAB
ALBUMIN SERPL BCG-MCNC: 2.9 G/DL (ref 3.5–5.2)
ALP SERPL-CCNC: 101 U/L (ref 40–150)
ALT SERPL W P-5'-P-CCNC: 14 U/L (ref 0–70)
ANION GAP SERPL CALCULATED.3IONS-SCNC: 9 MMOL/L (ref 7–15)
AST SERPL W P-5'-P-CCNC: 25 U/L (ref 0–45)
BASE EXCESS BLDV CALC-SCNC: 1 MMOL/L (ref -3–3)
BASOPHILS # BLD AUTO: 0 10E3/UL (ref 0–0.2)
BASOPHILS NFR BLD AUTO: 1 %
BILIRUB DIRECT SERPL-MCNC: 0.13 MG/DL (ref 0–0.3)
BILIRUB SERPL-MCNC: 0.4 MG/DL
BUN SERPL-MCNC: 21.1 MG/DL (ref 8–23)
CALCIUM SERPL-MCNC: 8.5 MG/DL (ref 8.8–10.4)
CHLORIDE SERPL-SCNC: 103 MMOL/L (ref 98–107)
CREAT SERPL-MCNC: 0.77 MG/DL (ref 0.67–1.17)
EGFRCR SERPLBLD CKD-EPI 2021: >90 ML/MIN/1.73M2
EOSINOPHIL # BLD AUTO: 0.1 10E3/UL (ref 0–0.7)
EOSINOPHIL NFR BLD AUTO: 2 %
ERYTHROCYTE [DISTWIDTH] IN BLOOD BY AUTOMATED COUNT: 20.5 % (ref 10–15)
FLUAV RNA SPEC QL NAA+PROBE: NEGATIVE
FLUBV RNA RESP QL NAA+PROBE: NEGATIVE
GLUCOSE SERPL-MCNC: 93 MG/DL (ref 70–99)
HCO3 BLDV-SCNC: 26 MMOL/L (ref 21–28)
HCO3 SERPL-SCNC: 26 MMOL/L (ref 22–29)
HCT VFR BLD AUTO: 28.3 % (ref 40–53)
HGB BLD-MCNC: 8.7 G/DL (ref 13.3–17.7)
HOLD SPECIMEN: NORMAL
IMM GRANULOCYTES # BLD: 0 10E3/UL
IMM GRANULOCYTES NFR BLD: 1 %
INR PPP: 1.26 (ref 0.85–1.15)
LACTATE BLD-SCNC: 0.4 MMOL/L (ref 0.7–2)
LYMPHOCYTES # BLD AUTO: 0.7 10E3/UL (ref 0.8–5.3)
LYMPHOCYTES NFR BLD AUTO: 14 %
MCH RBC QN AUTO: 28.9 PG (ref 26.5–33)
MCHC RBC AUTO-ENTMCNC: 30.7 G/DL (ref 31.5–36.5)
MCV RBC AUTO: 94 FL (ref 78–100)
MONOCYTES # BLD AUTO: 0.8 10E3/UL (ref 0–1.3)
MONOCYTES NFR BLD AUTO: 16 %
NEUTROPHILS # BLD AUTO: 3.4 10E3/UL (ref 1.6–8.3)
NEUTROPHILS NFR BLD AUTO: 67 %
NRBC # BLD AUTO: 0 10E3/UL
NRBC BLD AUTO-RTO: 0 /100
NT-PROBNP SERPL-MCNC: 380 PG/ML (ref 0–900)
NT-PROBNP SERPL-MCNC: 482 PG/ML (ref 0–900)
PCO2 BLDV: 43 MM HG (ref 40–50)
PH BLDV: 7.38 [PH] (ref 7.32–7.43)
PLATELET # BLD AUTO: 237 10E3/UL (ref 150–450)
PO2 BLDV: 37 MM HG (ref 25–47)
POTASSIUM SERPL-SCNC: 4 MMOL/L (ref 3.4–5.3)
PROT SERPL-MCNC: 5.5 G/DL (ref 6.4–8.3)
RBC # BLD AUTO: 3.01 10E6/UL (ref 4.4–5.9)
RSV RNA SPEC NAA+PROBE: NEGATIVE
SAO2 % BLDV: 70 % (ref 70–75)
SARS-COV-2 RNA RESP QL NAA+PROBE: NEGATIVE
SODIUM SERPL-SCNC: 138 MMOL/L (ref 135–145)
TROPONIN T SERPL HS-MCNC: 30 NG/L
TROPONIN T SERPL HS-MCNC: 31 NG/L
WBC # BLD AUTO: 5 10E3/UL (ref 4–11)

## 2025-04-27 PROCEDURE — 87637 SARSCOV2&INF A&B&RSV AMP PRB: CPT | Performed by: EMERGENCY MEDICINE

## 2025-04-27 PROCEDURE — 85025 COMPLETE CBC W/AUTO DIFF WBC: CPT | Performed by: EMERGENCY MEDICINE

## 2025-04-27 PROCEDURE — 250N000009 HC RX 250: Performed by: EMERGENCY MEDICINE

## 2025-04-27 PROCEDURE — 250N000013 HC RX MED GY IP 250 OP 250 PS 637: Performed by: STUDENT IN AN ORGANIZED HEALTH CARE EDUCATION/TRAINING PROGRAM

## 2025-04-27 PROCEDURE — 94640 AIRWAY INHALATION TREATMENT: CPT

## 2025-04-27 PROCEDURE — 250N000011 HC RX IP 250 OP 636: Performed by: EMERGENCY MEDICINE

## 2025-04-27 PROCEDURE — 99223 1ST HOSP IP/OBS HIGH 75: CPT | Mod: AI | Performed by: STUDENT IN AN ORGANIZED HEALTH CARE EDUCATION/TRAINING PROGRAM

## 2025-04-27 PROCEDURE — 36415 COLL VENOUS BLD VENIPUNCTURE: CPT | Performed by: EMERGENCY MEDICINE

## 2025-04-27 PROCEDURE — 272N000706 US THORACENTESIS

## 2025-04-27 PROCEDURE — 71046 X-RAY EXAM CHEST 2 VIEWS: CPT

## 2025-04-27 PROCEDURE — 99291 CRITICAL CARE FIRST HOUR: CPT | Mod: 25

## 2025-04-27 PROCEDURE — 83880 ASSAY OF NATRIURETIC PEPTIDE: CPT | Performed by: STUDENT IN AN ORGANIZED HEALTH CARE EDUCATION/TRAINING PROGRAM

## 2025-04-27 PROCEDURE — 99207 PR NO BILLABLE SERVICE THIS VISIT: CPT | Performed by: HOSPITALIST

## 2025-04-27 PROCEDURE — 250N000009 HC RX 250: Performed by: RADIOLOGY

## 2025-04-27 PROCEDURE — 82803 BLOOD GASES ANY COMBINATION: CPT

## 2025-04-27 PROCEDURE — 84155 ASSAY OF PROTEIN SERUM: CPT | Performed by: STUDENT IN AN ORGANIZED HEALTH CARE EDUCATION/TRAINING PROGRAM

## 2025-04-27 PROCEDURE — G0378 HOSPITAL OBSERVATION PER HR: HCPCS

## 2025-04-27 PROCEDURE — 84484 ASSAY OF TROPONIN QUANT: CPT | Performed by: EMERGENCY MEDICINE

## 2025-04-27 PROCEDURE — 96372 THER/PROPH/DIAG INJ SC/IM: CPT | Performed by: RADIOLOGY

## 2025-04-27 PROCEDURE — 99418 PROLNG IP/OBS E/M EA 15 MIN: CPT | Performed by: STUDENT IN AN ORGANIZED HEALTH CARE EDUCATION/TRAINING PROGRAM

## 2025-04-27 PROCEDURE — 999N000157 HC STATISTIC RCP TIME EA 10 MIN

## 2025-04-27 PROCEDURE — 99292 CRITICAL CARE ADDL 30 MIN: CPT

## 2025-04-27 PROCEDURE — 250N000009 HC RX 250: Performed by: STUDENT IN AN ORGANIZED HEALTH CARE EDUCATION/TRAINING PROGRAM

## 2025-04-27 PROCEDURE — 120N000001 HC R&B MED SURG/OB

## 2025-04-27 PROCEDURE — 85610 PROTHROMBIN TIME: CPT | Performed by: STUDENT IN AN ORGANIZED HEALTH CARE EDUCATION/TRAINING PROGRAM

## 2025-04-27 PROCEDURE — 83880 ASSAY OF NATRIURETIC PEPTIDE: CPT | Performed by: EMERGENCY MEDICINE

## 2025-04-27 PROCEDURE — 80048 BASIC METABOLIC PNL TOTAL CA: CPT | Performed by: EMERGENCY MEDICINE

## 2025-04-27 RX ORDER — FUROSEMIDE 20 MG/1
20 TABLET ORAL DAILY
Status: DISCONTINUED | OUTPATIENT
Start: 2025-04-27 | End: 2025-05-12 | Stop reason: HOSPADM

## 2025-04-27 RX ORDER — HYDRALAZINE HYDROCHLORIDE 20 MG/ML
10 INJECTION INTRAMUSCULAR; INTRAVENOUS EVERY 4 HOURS PRN
Status: DISCONTINUED | OUTPATIENT
Start: 2025-04-27 | End: 2025-05-12 | Stop reason: HOSPADM

## 2025-04-27 RX ORDER — ACETAMINOPHEN 325 MG/1
650 TABLET ORAL EVERY 6 HOURS PRN
Status: DISCONTINUED | OUTPATIENT
Start: 2025-04-27 | End: 2025-05-12 | Stop reason: HOSPADM

## 2025-04-27 RX ORDER — FOLIC ACID 1 MG/1
1 TABLET ORAL DAILY
Status: DISCONTINUED | OUTPATIENT
Start: 2025-04-27 | End: 2025-05-12 | Stop reason: HOSPADM

## 2025-04-27 RX ORDER — HEPARIN SODIUM,PORCINE 10 UNIT/ML
5-10 VIAL (ML) INTRAVENOUS EVERY 24 HOURS
Status: DISCONTINUED | OUTPATIENT
Start: 2025-04-27 | End: 2025-05-12 | Stop reason: HOSPADM

## 2025-04-27 RX ORDER — IPRATROPIUM BROMIDE AND ALBUTEROL SULFATE 2.5; .5 MG/3ML; MG/3ML
3 SOLUTION RESPIRATORY (INHALATION) EVERY 4 HOURS PRN
Status: DISCONTINUED | OUTPATIENT
Start: 2025-04-27 | End: 2025-05-09

## 2025-04-27 RX ORDER — ONDANSETRON 4 MG/1
4 TABLET, FILM COATED ORAL EVERY 8 HOURS PRN
COMMUNITY

## 2025-04-27 RX ORDER — ONDANSETRON 2 MG/ML
4 INJECTION INTRAMUSCULAR; INTRAVENOUS EVERY 6 HOURS PRN
Status: DISCONTINUED | OUTPATIENT
Start: 2025-04-27 | End: 2025-05-12 | Stop reason: HOSPADM

## 2025-04-27 RX ORDER — IPRATROPIUM BROMIDE AND ALBUTEROL SULFATE 2.5; .5 MG/3ML; MG/3ML
3 SOLUTION RESPIRATORY (INHALATION) ONCE
Status: COMPLETED | OUTPATIENT
Start: 2025-04-27 | End: 2025-04-27

## 2025-04-27 RX ORDER — HEPARIN SODIUM,PORCINE 10 UNIT/ML
5-10 VIAL (ML) INTRAVENOUS
Status: DISCONTINUED | OUTPATIENT
Start: 2025-04-27 | End: 2025-05-12 | Stop reason: HOSPADM

## 2025-04-27 RX ORDER — HYDRALAZINE HYDROCHLORIDE 10 MG/1
10 TABLET, FILM COATED ORAL EVERY 4 HOURS PRN
Status: DISCONTINUED | OUTPATIENT
Start: 2025-04-27 | End: 2025-05-12 | Stop reason: HOSPADM

## 2025-04-27 RX ORDER — HYDROXYZINE HYDROCHLORIDE 25 MG/1
25 TABLET, FILM COATED ORAL 3 TIMES DAILY PRN
Status: DISCONTINUED | OUTPATIENT
Start: 2025-04-27 | End: 2025-05-12 | Stop reason: HOSPADM

## 2025-04-27 RX ORDER — LIDOCAINE HYDROCHLORIDE 10 MG/ML
10 INJECTION, SOLUTION EPIDURAL; INFILTRATION; INTRACAUDAL; PERINEURAL ONCE
Status: COMPLETED | OUTPATIENT
Start: 2025-04-27 | End: 2025-04-27

## 2025-04-27 RX ORDER — IPRATROPIUM BROMIDE AND ALBUTEROL SULFATE 2.5; .5 MG/3ML; MG/3ML
3 SOLUTION RESPIRATORY (INHALATION) EVERY 4 HOURS PRN
Status: DISCONTINUED | OUTPATIENT
Start: 2025-04-27 | End: 2025-04-27

## 2025-04-27 RX ORDER — IPRATROPIUM BROMIDE AND ALBUTEROL SULFATE 2.5; .5 MG/3ML; MG/3ML
3 SOLUTION RESPIRATORY (INHALATION)
Status: DISCONTINUED | OUTPATIENT
Start: 2025-04-27 | End: 2025-04-28

## 2025-04-27 RX ORDER — AMOXICILLIN 250 MG
2 CAPSULE ORAL 2 TIMES DAILY PRN
Status: DISCONTINUED | OUTPATIENT
Start: 2025-04-27 | End: 2025-05-12 | Stop reason: HOSPADM

## 2025-04-27 RX ORDER — METHOCARBAMOL 500 MG/1
500 TABLET, FILM COATED ORAL 4 TIMES DAILY PRN
Status: DISCONTINUED | OUTPATIENT
Start: 2025-04-27 | End: 2025-05-12 | Stop reason: HOSPADM

## 2025-04-27 RX ORDER — ONDANSETRON 4 MG/1
4 TABLET, ORALLY DISINTEGRATING ORAL EVERY 6 HOURS PRN
Status: DISCONTINUED | OUTPATIENT
Start: 2025-04-27 | End: 2025-05-12 | Stop reason: HOSPADM

## 2025-04-27 RX ORDER — ATORVASTATIN CALCIUM 40 MG/1
40 TABLET, FILM COATED ORAL DAILY
Status: DISCONTINUED | OUTPATIENT
Start: 2025-04-27 | End: 2025-04-29

## 2025-04-27 RX ORDER — PROCHLORPERAZINE MALEATE 5 MG/1
5 TABLET ORAL EVERY 6 HOURS PRN
Status: DISCONTINUED | OUTPATIENT
Start: 2025-04-27 | End: 2025-05-12 | Stop reason: HOSPADM

## 2025-04-27 RX ORDER — HEPARIN SODIUM (PORCINE) LOCK FLUSH IV SOLN 100 UNIT/ML 100 UNIT/ML
5-10 SOLUTION INTRAVENOUS
Status: DISCONTINUED | OUTPATIENT
Start: 2025-04-27 | End: 2025-05-12 | Stop reason: HOSPADM

## 2025-04-27 RX ORDER — AMOXICILLIN 250 MG
1 CAPSULE ORAL 2 TIMES DAILY PRN
Status: DISCONTINUED | OUTPATIENT
Start: 2025-04-27 | End: 2025-05-12 | Stop reason: HOSPADM

## 2025-04-27 RX ADMIN — LIDOCAINE HYDROCHLORIDE ANHYDROUS 10 ML: 10 INJECTION, SOLUTION INFILTRATION at 10:33

## 2025-04-27 RX ADMIN — IPRATROPIUM BROMIDE AND ALBUTEROL SULFATE 3 ML: .5; 3 SOLUTION RESPIRATORY (INHALATION) at 19:45

## 2025-04-27 RX ADMIN — FUROSEMIDE 20 MG: 20 TABLET ORAL at 14:19

## 2025-04-27 RX ADMIN — IPRATROPIUM BROMIDE AND ALBUTEROL SULFATE 3 ML: .5; 3 SOLUTION RESPIRATORY (INHALATION) at 08:16

## 2025-04-27 RX ADMIN — Medication 5 ML: at 08:15

## 2025-04-27 RX ADMIN — IPRATROPIUM BROMIDE AND ALBUTEROL SULFATE 3 ML: .5; 3 SOLUTION RESPIRATORY (INHALATION) at 13:58

## 2025-04-27 RX ADMIN — IPRATROPIUM BROMIDE AND ALBUTEROL SULFATE 3 ML: .5; 3 SOLUTION RESPIRATORY (INHALATION) at 05:34

## 2025-04-27 RX ADMIN — Medication 5 ML: at 14:14

## 2025-04-27 ASSESSMENT — ACTIVITIES OF DAILY LIVING (ADL)
ADLS_ACUITY_SCORE: 62
ADLS_ACUITY_SCORE: 68
ADLS_ACUITY_SCORE: 62
ADLS_ACUITY_SCORE: 59
ADLS_ACUITY_SCORE: 68
ADLS_ACUITY_SCORE: 62
ADLS_ACUITY_SCORE: 69
ADLS_ACUITY_SCORE: 59
ADLS_ACUITY_SCORE: 62
ADLS_ACUITY_SCORE: 59
ADLS_ACUITY_SCORE: 62
ADLS_ACUITY_SCORE: 59
ADLS_ACUITY_SCORE: 59
ADLS_ACUITY_SCORE: 68
ADLS_ACUITY_SCORE: 62

## 2025-04-27 NOTE — ED PROVIDER NOTES
Emergency Department Note      History of Present Illness     Chief Complaint   Breathing Problem (SOB yeasterday.  Today, Sats in 60's on RA per EMS.  Pt states he was on oxygen and wears it every day.  Placed on oxymask  after pt SaO2 83 % on Room Air in ED.)      HPI   Kt Rasmussen is a 64 year old male history of lung cancer, pleural effusions, stroke, alcohol abuse, pericardial effusion, DVT on Eliquis, left vocal cord dysfunction with persistent dysphonia and mild dysphagia, coronary artery disease status post cardiac stenting, ascending aortic dilation, mild mitral regurgitation, amongst others.    Patient had recent admission and discharged on 4/16/2025 and had bilateral thoracentesis for the first time.  He had 1.1 L on the left and 2 L on the right.  Patient also had moderate pericardial effusion without tamponade physiology and suggestive of repeat echo in the future.    Patient was found to be hypoxic in the 60s per EMS report to the nursing staff and was not on his oxygen.  Patient argues with his and says that he was.  Nonetheless patient was hypoxic and sent in and required 10 L mask which is gradually been weaned down before my visiting with him.  He is currently on 3 L.  Patient denies fevers or chills.  He is still undergoing therapy for lung cancer.    Independent Historian   None    Review of External Notes   Clinic notes    Past Medical History     Medical History and Problem List   Past Medical History:   Diagnosis Date    Alcohol abuse, unspecified     Cerebral infarction (H)     Dyslipidemia     GERD (gastroesophageal reflux disease)     Lung cancer (H)     Unspecified essential hypertension        Medications   acetaminophen (TYLENOL) 325 MG tablet  apixaban ANTICOAGULANT (ELIQUIS) 2.5 MG tablet  atorvastatin (LIPITOR) 40 MG tablet  folic acid (FOLVITE) 1 MG tablet  furosemide (LASIX) 20 MG tablet  hydrOXYzine HCl (ATARAX) 10 MG tablet  ipratropium - albuterol 0.5 mg/2.5 mg/3 mL (DUONEB)  0.5-2.5 (3) MG/3ML neb solution  melatonin 1 MG TABS tablet  methocarbamol (ROBAXIN) 750 MG tablet  ondansetron (ZOFRAN) 4 MG tablet  potassium chloride luis a ER (KLOR-CON M10) 10 MEQ CR tablet  senna-docusate (SENOKOT-S/PERICOLACE) 8.6-50 MG tablet        Surgical History   Past Surgical History:   Procedure Laterality Date    BRONCHOSCOPY FLEXIBLE AND RIGID N/A 5/5/2016    Procedure: BRONCHOSCOPY FLEXIBLE AND RIGID;  Surgeon: Tony Talbot MD;  Location: [facility] OR    ESOPHAGOSCOPY FLEXIBLE N/A 9/30/2019    Procedure: flexible esophagoscopy;  Surgeon: Lizzy Johnson MD;  Location: [facility] OR    INJECT STEROID (LOCATION) N/A 9/30/2019    Procedure: steroid injection;  Surgeon: Lizzy Johnson MD;  Location: [facility] OR    IR CHEST PORT PLACEMENT > 5 YRS OF AGE  4/22/2021    IR CHEST PORT PLACEMENT > 5 YRS OF AGE  4/11/2022    IR PORT CHECK RIGHT  4/11/2022    IR PORT REMOVAL RIGHT  4/11/2022    LASER CO2 LARYNGOSCOPY N/A 9/30/2019    Procedure: Microdirect laryngoscopy with excision of laryngeal mass, CO2 laser;  Surgeon: Lizzy Johnson MD;  Location: [facility] OR    [facility] NONSPECIFIC PROCEDURE      R tympanoplasty       Physical Exam     Patient Vitals for the past 24 hrs:   BP Temp Temp src Pulse Resp SpO2   04/27/25 0645 126/70 -- -- 89 19 95 %   04/27/25 0630 125/78 -- -- 90 21 96 %   04/27/25 0545 99/76 -- -- 92 13 100 %   04/27/25 0538 -- 97  F (36.1  C) Temporal -- -- --   04/27/25 0531 118/89 -- -- 97 22 93 %   04/27/25 0530 139/75 -- -- 97 19 (!) 81 %     Physical Exam  GENERAL: awake, appears chronically ill  HEAD: atraumatic  EYES: pupils reactive, extraocular muscles intact, conjunctivae normal  ENT:  mucus membranes moist  NECK:  trachea midline, normal range of motion  RESPIRATORY: diminished lung sound right more than left, occasional wheeze and coarse sounds  CVS: normal S1/S2, no murmurs, intact distal pulses, edema bilateral into arms and legs  ABDOMEN: soft, nontender, nondistention  MUSCULOSKELETAL: no  deformities  SKIN: warm and dry, no acute rashes or ulceration  NEURO: awake, horse voice, paralysis on the right arm and leg  PSYCH:  Mood/affect normal      Diagnostics     Lab Results   Labs Ordered and Resulted from Time of ED Arrival to Time of ED Departure   BASIC METABOLIC PANEL - Abnormal       Result Value    Sodium 138      Potassium 4.0      Chloride 103      Carbon Dioxide (CO2) 26      Anion Gap 9      Urea Nitrogen 21.1      Creatinine 0.77      GFR Estimate >90      Calcium 8.5 (*)     Glucose 93     TROPONIN T, HIGH SENSITIVITY - Abnormal    Troponin T, High Sensitivity 31 (*)    CBC WITH PLATELETS AND DIFFERENTIAL - Abnormal    WBC Count 5.0      RBC Count 3.01 (*)     Hemoglobin 8.7 (*)     Hematocrit 28.3 (*)     MCV 94      MCH 28.9      MCHC 30.7 (*)     RDW 20.5 (*)     Platelet Count 237      % Neutrophils 67      % Lymphocytes 14      % Monocytes 16      % Eosinophils 2      % Basophils 1      % Immature Granulocytes 1      NRBCs per 100 WBC 0      Absolute Neutrophils 3.4      Absolute Lymphocytes 0.7 (*)     Absolute Monocytes 0.8      Absolute Eosinophils 0.1      Absolute Basophils 0.0      Absolute Immature Granulocytes 0.0      Absolute NRBCs 0.0     ISTAT GASES LACTATE VENOUS POCT - Abnormal    Lactic Acid POCT 0.4 (*)     Bicarbonate Venous POCT 26      O2 Sat, Venous POCT 70      pCO2 Venous POCT 43      pH Venous POCT 7.38      pO2 Venous POCT 37      Base Excess/Deficit (+/-) POCT 1.0     HEPATIC FUNCTION PANEL - Abnormal    Protein Total 5.5 (*)     Albumin 2.9 (*)     Bilirubin Total 0.4      Alkaline Phosphatase 101      AST 25      ALT 14      Bilirubin Direct 0.13     INR - Abnormal    INR 1.26 (*)    INFLUENZA A/B, RSV AND SARS-COV2 PCR - Normal    Influenza A PCR Negative      Influenza B PCR Negative      RSV PCR Negative      SARS CoV2 PCR Negative     NT PROBNP INPATIENT - Normal    N terminal Pro BNP Inpatient 380     TROPONIN T, HIGH SENSITIVITY       Imaging   Chest  XR,  PA & LAT   Final Result   IMPRESSION: Stable right chest port. Moderate right and small left pleural effusions with adjacent atelectasis. Mild pulmonary edema. No pneumothorax. Stable cardiomediastinal silhouette.      US Thoracentesis    (Results Pending)   Echocardiogram Complete    (Results Pending)       EKG   ECG results from 04/09/25   EKG 12-lead, tracing only     Value    Systolic Blood Pressure     Diastolic Blood Pressure     Ventricular Rate 99    Atrial Rate 99    MN Interval 120    QRS Duration 80        QTc 418    P Axis 52    R AXIS 81    T Axis -60    Interpretation ECG      Sinus rhythm  Low voltage QRS  Cannot rule out Anterior infarct , age undetermined  Abnormal ECG  When compared with ECG of 04-May-2016 20:51,  Nonspecific T wave abnormality now evident in Inferior leads  Nonspecific T wave abnormality now evident in Anterolateral leads  Confirmed by GENERATED REPORT, COMPUTER ([id number]),  Carolina Mcdaniel ([id number]) on 4/9/2025 7:17:19 PM       *Note: Due to a large number of results and/or encounters for the requested time period, some results have not been displayed. A complete set of results can be found in Results Review.         Independent Interpretation   Cxr pleural effusion, right greater than left    ED Course      Medications Administered   Medications   sodium chloride (PF) 0.9% PF flush 10-20 mL (has no administration in time range)   sodium chloride (PF) 0.9% PF flush 10-20 mL (has no administration in time range)   sodium chloride (PF) 0.9% PF flush 10-20 mL (has no administration in time range)   heparin lock flush 10 unit/mL injection 5-10 mL (has no administration in time range)   heparin lock flush 10 unit/mL injection 5-10 mL (has no administration in time range)   heparin lock flush 100 unit/mL injection 5-10 mL (has no administration in time range)   acetaminophen (TYLENOL) tablet 650 mg (has no administration in time range)   methocarbamol (ROBAXIN) tablet 500  mg (has no administration in time range)   hydrOXYzine HCl (ATARAX) tablet 25 mg (has no administration in time range)   ipratropium - albuterol 0.5 mg/2.5 mg/3 mL (DUONEB) neb solution 3 mL (has no administration in time range)   ipratropium - albuterol 0.5 mg/2.5 mg/3 mL (DUONEB) neb solution 3 mL (has no administration in time range)   ipratropium - albuterol 0.5 mg/2.5 mg/3 mL (DUONEB) neb solution 3 mL (3 mLs Nebulization $Given 4/27/25 0534)       Procedures   Procedures     Discussion of Management   Admitting Hospitalist, Dr. Preston    ED Course        Additional Documentation  None    Medical Decision Making / Diagnosis     CMS Diagnoses: None    MIPS       None    MDM   Kt Rasmussen is a 64 year old male presents from care facility with hypoxia and shortness of breath.  He had recent admission with bilateral thoracentesis and his underlying malignancy undergoing treatment.  There is reported he was off of his oxygen but he disputes this but nonetheless possibly was off of oxygen as he was in the 60s.  He is now improved initially with 10 L and then weaning down to currently 3 L.  Chest x-ray looks like recurrent pleural effusions.  I have placed an order for an ultrasound thoracentesis and reached out to interventional radiology and it can be done today.  Certainly pneumonia malignancy or other etiologies for shortness of breath are considered.  I did call his sister as I see there is a guardianship set up and let her know that he is here she notes that he can make his own decisions but he has a hard time expressing himself given his prior stroke.  Spoke with the hospitalist regarding admission with thoracentesis possible repeat echo given the prior pericardial effusion.  There is been no infectious symptoms afebrile with normal white count.    Disposition   The patient was admitted to the hospital.     Diagnosis     ICD-10-CM    1. Shortness of breath  R06.02       2. Malignant pleural effusion (H)   J91.0       3. History of lung cancer  Z85.118            Discharge Medications   New Prescriptions    No medications on file         MD Ayo Mcguire Shaun L, MD  04/27/25 0861

## 2025-04-27 NOTE — PLAN OF CARE
Goal Outcome Evaluation:    Shift: 04/27/2025 [id number]   Summary: Shortness of breath   Status: OBSERVATION     Orientation: A&O x4. Stroke 15 years ago, aphasia.   Vitals/Tele: VSS on 1 L O2 NC   IV Access/drains: PIV SL   Diet: NPO   Mobility: Assist 2 t/r lift. PT not OOB. R paralysis from stroke.   GI/: Continent - urinal at bedside.   Wound/Skin: Scaly lower extremities, Scattered bruising and scabs, R bandage from thoracentesis site, L posterior calf wound.   Pain: denies pain and SOB   Procedures: thoracentesis today - removed 2.5 L     Discharge Plan: pending       See Flow sheets for assessment

## 2025-04-27 NOTE — ED NOTES
[facility]  ED Nurse Handoff Report    ED Chief complaint: Breathing Problem (SOB yeasterday.  Today, Sats in 60's on RA per EMS.  Pt states he was on oxygen and wears it every day.  Placed on oxymask  after pt SaO2 83 % on Room Air in ED.)      ED Diagnosis:   Final diagnoses:   Shortness of breath   Malignant pleural effusion (H)   History of lung cancer       Code Status: Full Code    Allergies:   Allergies   Allergen Reactions    No Known Drug Allergy        Patient Story:  64 year old male history of lung cancer, pleural effusions, stroke, alcohol abuse, pericardial effusion, DVT on Eliquis, left vocal cord dysfunction with persistent dysphonia and mild dysphagia, coronary artery disease status post cardiac stenting, ascending aortic dilation, mild mitral regurgitation, amongst others.  Patient had recent admission and discharged on 4/16/2025 and had bilateral thoracentesis for the first time.  He had 1.1 L on the left and 2 L on the right.  Patient also had moderate pericardial effusion without tamponade physiology and suggestive of repeat echo in the future.     Patient was found to be hypoxic in the 60s per EMS report to the nursing staff and was not on his oxygen.  Patient argues with his and says that he was.  Nonetheless patient was hypoxic and sent in and required 10 L mask which is gradually been weaned down before my visiting with him.  He is currently on 3 L.  Patient denies fevers or chills.  He is still undergoing therapy for lung cancer.  Focused Assessment:  SOB with Exp wz.      Treatments and/or interventions provided: Given Duo neb X1, Oxygen per oxymask starting at 10 and weaned down to 3L    Patient's response to treatments and/or interventions: Breathing improved  Results for orders placed or performed during the hospital encounter of 04/27/25   Chest XR,  PA & LAT     Status: None    Narrative    EXAM: XR CHEST 2 VIEWS  LOCATION: [facility]  DATE:  4/27/2025    INDICATION: SOB  COMPARISON: 4/15/2025      Impression    IMPRESSION: Stable right chest port. Moderate right and small left pleural effusions with adjacent atelectasis. Mild pulmonary edema. No pneumothorax. Stable cardiomediastinal silhouette.   Basic metabolic panel     Status: Abnormal   Result Value Ref Range    Sodium 138 135 - 145 mmol/L    Potassium 4.0 3.4 - 5.3 mmol/L    Chloride 103 98 - 107 mmol/L    Carbon Dioxide (CO2) 26 22 - 29 mmol/L    Anion Gap 9 7 - 15 mmol/L    Urea Nitrogen 21.1 8.0 - 23.0 mg/dL    Creatinine 0.77 0.67 - 1.17 mg/dL    GFR Estimate >90 >60 mL/min/1.73m2    Calcium 8.5 (L) 8.8 - 10.4 mg/dL    Glucose 93 70 - 99 mg/dL   Troponin T, High Sensitivity     Status: Abnormal   Result Value Ref Range    Troponin T, High Sensitivity 31 (H) <=22 ng/L   [place] Draw     Status: None    Narrative    The following orders were created for panel order [place] Draw.  Procedure                               Abnormality         Status                     ---------                               -----------         ------                     Extra Blue Top Tube[[id number]]                             Final result                 Please view results for these tests on the individual orders.   CBC with platelets and differential     Status: Abnormal   Result Value Ref Range    WBC Count 5.0 4.0 - 11.0 10e3/uL    RBC Count 3.01 (L) 4.40 - 5.90 10e6/uL    Hemoglobin 8.7 (L) 13.3 - 17.7 g/dL    Hematocrit 28.3 (L) 40.0 - 53.0 %    MCV 94 78 - 100 fL    MCH 28.9 26.5 - 33.0 pg    MCHC 30.7 (L) 31.5 - 36.5 g/dL    RDW 20.5 (H) 10.0 - 15.0 %    Platelet Count 237 150 - 450 10e3/uL    % Neutrophils 67 %    % Lymphocytes 14 %    % Monocytes 16 %    % Eosinophils 2 %    % Basophils 1 %    % Immature Granulocytes 1 %    NRBCs per 100 WBC 0 <1 /100    Absolute Neutrophils 3.4 1.6 - 8.3 10e3/uL    Absolute Lymphocytes 0.7 (L) 0.8 - 5.3 10e3/uL    Absolute Monocytes 0.8 0.0 - 1.3 10e3/uL    Absolute  Eosinophils 0.1 0.0 - 0.7 10e3/uL    Absolute Basophils 0.0 0.0 - 0.2 10e3/uL    Absolute Immature Granulocytes 0.0 <=0.4 10e3/uL    Absolute NRBCs 0.0 10e3/uL   Extra Blue Top Tube     Status: None   Result Value Ref Range    Hold Specimen x    Influenza A/B, RSV and SARS-CoV2 PCR (COVID-19) Nose     Status: Normal    Specimen: Nose; Swab   Result Value Ref Range    Influenza A PCR Negative Negative    Influenza B PCR Negative Negative    RSV PCR Negative Negative    SARS CoV2 PCR Negative Negative    Narrative    Testing was performed using the Xpert Xpress CoV2/Flu/RSV Assay on the [employer] GeneXpert Instrument. This test should be ordered for the detection of SARS-CoV2, influenza, and RSV viruses in individuals with signs and symptoms of respiratory tract infection. This test is for in vitro diagnostic use under the [place] FDA for laboratories certified under CLIA to perform high or moderate complexity testing. This test has been [place] FDA cleared. A negative result does not rule out the presence of PCR inhibitors in the specimen or target RNA in concentration below the limit of detection for the assay. If only one viral target is positive but coinfection with multiple targets is suspected, the sample should be re-tested with another FDA cleared, approved, or authorized test, if coninfection would change clinical management. This test was validated by the [facility]. These laboratories are certified under the Clinical Laboratory Improvement Amendments of 1988 (CLIA-88) as qualified to perfom high complexity laboratory testing.   iStat Gases (lactate) venous, POCT     Status: Abnormal   Result Value Ref Range    Lactic Acid POCT 0.4 (L) 0.7 - 2.0 mmol/L    Bicarbonate Venous POCT 26 21 - 28 mmol/L    O2 Sat, Venous POCT 70 70 - 75 %    pCO2 Venous POCT 43 40 - 50 mm Hg    pH Venous POCT 7.38 7.32 - 7.43    pO2 Venous POCT 37 25 - 47 mm Hg    Base Excess/Deficit (+/-) POCT 1.0 -3.0 - 3.0 mmol/L   CBC  with platelets + differential     Status: Abnormal    Narrative    The following orders were created for panel order CBC with platelets + differential.  Procedure                               Abnormality         Status                     ---------                               -----------         ------                     CBC with platelets and ...[[id number]]  Abnormal            Final result                 Please view results for these tests on the individual orders.       To be done/followed up on inpatient unit:  IP orders    Does this patient have any cognitive concerns?:  none    Activity level - Baseline/Home:  Stand with assist x2  Activity Level - Current:   Total Care    Patient's Preferred language: English   Needed?: No    Isolation: None  Infection: Not Applicable  Patient tested for COVID 19 prior to admission: YES  Bariatric?: No    Vital Signs:   Vitals:    04/27/25 0530 04/27/25 0531 04/27/25 0538   BP: 139/75 118/89    Pulse: 97 97    Resp: 19 22    Temp:   97  F (36.1  C)   TempSrc:   Temporal   SpO2: (!) 81% 93%        Cardiac Rhythm:     Was the PSS-3 completed:   Yes  What interventions are required if any?               Family Comments: none  OBS brochure/video discussed/provided to patient/family: N/A              Name of person given brochure if not patient:               Relationship to patient:     For the majority of the shift this patient's behavior was Green.   Behavioral interventions performed were .    ED NURSE PHONE NUMBER: *[phone]

## 2025-04-27 NOTE — PHARMACY-ADMISSION MEDICATION HISTORY
Pharmacy Intern Admission Medication History    Admission medication history is complete. The information provided in this note is only as accurate as the sources available at the time of the update.    Information Source(s): Facility ([facility]/) medication list/MAR via N/A    Pertinent Information:   All information collected from MAR from [facility]    Changes made to PTA medication list:  Added: Zofran  Deleted: None  Changed: None    Allergies reviewed with patient and updates made in EHR: no    Medication History Completed By: Ashleigh Dan 4/27/2025 7:32 AM    PTA Med List   Medication Sig Last Dose/Taking    acetaminophen (TYLENOL) 325 MG tablet Take 2 tablets (650 mg) by mouth every 4 hours as needed for pain (and adjunct with moderate or severe pain or per patient request). 4/21/2025    apixaban ANTICOAGULANT (ELIQUIS) 2.5 MG tablet Take 2.5 mg by mouth 2 times daily. 4/26/2025 Evening    atorvastatin (LIPITOR) 40 MG tablet Take 40 mg by mouth daily. 4/26/2025 Morning    folic acid (FOLVITE) 1 MG tablet Take 1 tablet (1 mg) by mouth daily 4/26/2025 Morning    furosemide (LASIX) 20 MG tablet Take 1 tablet (20 mg) by mouth daily. Hold if systolic blood pressures less than 110 or poor oral intake. 4/26/2025 Morning    hydrOXYzine HCl (ATARAX) 10 MG tablet Take 1 tablet (10 mg) by mouth every 8 hours as needed for anxiety or other (adjuvant pain). Taking As Needed    ipratropium - albuterol 0.5 mg/2.5 mg/3 mL (DUONEB) 0.5-2.5 (3) MG/3ML neb solution Take 1 vial (3 mLs) by nebulization every 6 hours as needed for wheezing or shortness of breath. 4/27/2025 Morning    melatonin 1 MG TABS tablet Take 1 tablet (1 mg) by mouth nightly as needed for sleep. Taking As Needed    methocarbamol (ROBAXIN) 750 MG tablet Take 750 mg by mouth 3 times daily as needed for muscle spasms. Taking As Needed    ondansetron (ZOFRAN) 4 MG tablet Take 4 mg by mouth every 8 hours as needed for nausea. Taking As Needed     potassium chloride luis a ER (KLOR-CON M10) 10 MEQ CR tablet Take 2 tablets (20 mEq) by mouth daily. Monitor BMP in 5 days, optimize diuresis/potassium replacement. 4/26/2025 Morning    senna-docusate (SENOKOT-S/PERICOLACE) 8.6-50 MG tablet Take 2 tablets by mouth 2 times daily as needed for constipation. Taking As Needed

## 2025-04-27 NOTE — PROGRESS NOTES
OBSERVATION GOALS:     -diagnostic tests and consults completed and resulted. Not Met   -vital signs normal or at patient baseline. Not Met   -dyspnea improved and O2 sats greater than 88% on room air or prior home oxygen levels. Not Met   -returns to baseline functional status. Not Met   -safe disposition plan has been identified. Met     Nurse to notify provider when observation goals have been met and patient is ready for discharge.

## 2025-04-27 NOTE — ED TRIAGE NOTES
SOB yeasterday. Today, Sats in 60's on RA per EMS. Pt states he was on oxygen and wears it every day. Placed on oxymask after pt SaO2 83 % on Room Air in ED.    Triage Assessment (Adult)       Row Name 04/27/25 0606          Triage Assessment    Airway WDL WDL     Additional Documentation Breath Sounds (Group)        Respiratory WDL    Respiratory WDL X         Breath Sounds    Breath Sounds All Fields     All Lung Fields Breath Sounds wheezes, expiratory        Skin Circulation/Temperature WDL    Skin Circulation/Temperature WDL WDL        Cardiac WDL    Cardiac WDL WDL        Peripheral/Neurovascular WDL    Peripheral Neurovascular WDL WDL        Cognitive/Neuro/Behavioral WDL    Cognitive/Neuro/Behavioral WDL WDL

## 2025-04-27 NOTE — ED NOTES
US called asking if the admitting doctor could call [place] radiology about his paracentesis

## 2025-04-27 NOTE — H&P
[facility]    History and Physical - Hospitalist Service       Date of Admission:  4/27/2025    Assessment & Plan      Kt Rasmussen is a 64 year old male admitted on 4/27/2025.     Acute on chronic respiratory failure likely recurrence of pleural effusions + in addition to his baseline lung cancer,  atelectasis, pericardial effusion, chemotoxicity, deconditioning  Hx Moderate pericardial effusion on TTE 4/2025  Hx Bilateral pleural effusions s/p bilateral thoracentesis 4/9/2025, 1/6/2025.  CXR showing Moderate right and small left pleural effusions with adjacent atelectasis. Mild pulmonary edema. No pneumothorax. Stable cardiomediastinal silhouette.   EKG SR, LAD, ?Electrical alterans, borderline low voltage  --Patient sent to ED from TCU with dyspnea, and hypoxia in the 60s on RA per EMS.    --In ED noted to be hypoxic with O2 sats of 83 on RA   --Fluid restriction to 2000 mL/day once NPO completed  --Aggressive incentive spirometry,   --Wean off supplemental nasal oxygen as able to. Oximetry  --repeat TTE  --Add on INR, BNP  --resuming lasix  --Duonebs q4 prn and q6 standing  --US thoracentesis ordered by the ER, followup w/ IR. If improvements in respiratory status, and TTE shows no acute worsening in pericardial effusion can consider discharge. May need regularly schedules thoracentesis protocol     Metastatic non-small cell lung cancer (mets to lymph nodes, bones and bilateral lungs)  *Follows with  Oncology and underwent infusion GEMZAR 04/09 and received post chemotherapy neupogen  Tylenol as needed for pain.  Monitor liver function tests in 1 week.  Atarax as needed for adjuvant pain.  Muscle relaxants as needed.  Avoid narcotics as able to  Follow-up with primary oncology team [[facility]] in clinic     Chronic anemia secondary to chemo, underlying malignancy.  Chronic thrombocytopenia secondary to chemo.  Leukocytosis likely reactive  Monitor CBC periodically      Bilateral lower extremity DVT -9/2023 on anticoagulation.  Likely hypercoagulability of malignancy.    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       Chronic stable trop elevation, likely demand ischemia  CAD s/p cardiac stenting  Ascending aortic dilatation.  Mild MR, mild TR.  Hypertension.  Hyperlipidemia.  Ascending aortic dilatation of 4.1 cm noted on echocardiogram.  Mild MR.  Mild TR.  Resume AC on discharge  Resuming lasix which was started on prior admission  Continue PTA Lipitor on discharge  Close follow-up echocardiogram as outpatient in 1 month as above.     History of CVA with right sided hemiplegia  Seizure D/O  Resume PTA Lipitor on discharge. Hold AC not while inpatient in case thoracentesis performed  PTA not on antiepileptics.  Fall precautions.     GERD.  PTA not on meds.     MDD with anxiety  PTA not on meds. No acute issues     History of alcohol use D/O  History of substance use D/O  Noted      Hx Hypoalbuminemia likely due to acute illness and Mild elevated AST.  --add on LFTs     Physical deconditioning from medical illness.  Patient resident of assisted living facility, now requiring lift for ambulation out of bed.  --Order PT if patient needs to stay more than 1d at the hospital    Diet:  NPO  DVT Prophylaxis: SCDs  Buckner Catheter: Not present  Lines: PRESENT      Port A Cath Single 04/11/22 Right Chest wall-Site Assessment: WDL      Cardiac Monitoring: None  Code Status:  Full    Clinically Significant Risk Factors Present on Admission                # Drug Induced Coagulation Defect: home medication list includes an anticoagulant medication    # Hypertension: Noted on problem list      # Anemia: based on hgb <11           # Financial/Environmental Concerns:           Disposition Plan     Medically Ready for  Discharge: Anticipated Tomorrow           Chris Preston MD  Hospitalist Service  [facility]  Securely message with [employer] (more info)  Text page via [employer] Paging/Directory     ______________________________________________________________________        History of Present Illness   Kt Rasmussen is a 64 year old male who is brought in from [facility] for 2d of exertional/orthopnic dyspnea w/o CP. Patient reports being on NC at all times, though EMS reported that when they arrived he was on RA saturating 60s. No fevers/chills, no worsening coughs. Somewhat difficult to understand due to dyspnea/fatigue when talking      Past Medical History    Past Medical History:   Diagnosis Date    Alcohol abuse, unspecified     Cerebral infarction (H)     2009, right side residual and aphasia    Dyslipidemia     GERD (gastroesophageal reflux disease)     Lung cancer (H)     Unspecified essential hypertension        Past Surgical History   Past Surgical History:   Procedure Laterality Date    BRONCHOSCOPY FLEXIBLE AND RIGID N/A 5/5/2016    Procedure: BRONCHOSCOPY FLEXIBLE AND RIGID;  Surgeon: Tony Talbot MD;  Location: [facility] OR    ESOPHAGOSCOPY FLEXIBLE N/A 9/30/2019    Procedure: flexible esophagoscopy;  Surgeon: Lizzy Johnson MD;  Location: [facility] OR    INJECT STEROID (LOCATION) N/A 9/30/2019    Procedure: steroid injection;  Surgeon: Lizzy Johnson MD;  Location: [facility] OR    IR CHEST PORT PLACEMENT > 5 YRS OF AGE  4/22/2021    IR CHEST PORT PLACEMENT > 5 YRS OF AGE  4/11/2022    IR PORT CHECK RIGHT  4/11/2022    IR PORT REMOVAL RIGHT  4/11/2022    LASER CO2 LARYNGOSCOPY N/A 9/30/2019    Procedure: Microdirect laryngoscopy with excision of laryngeal mass, CO2 laser;  Surgeon: Lizzy Johnson MD;  Location:  OR    [facility] NONSPECIFIC PROCEDURE      R tympanoplasty       Prior to Admission Medications   Prior to Admission Medications   Prescriptions Last Dose Informant Patient Reported? Taking?    acetaminophen (TYLENOL) 325 MG tablet   No No   Sig: Take 2 tablets (650 mg) by mouth every 4 hours as needed for pain (and adjunct with moderate or severe pain or per patient request).   apixaban ANTICOAGULANT (ELIQUIS) 2.5 MG tablet  Self Yes No   Sig: Take 2.5 mg by mouth 2 times daily.   atorvastatin (LIPITOR) 40 MG tablet  Self Yes No   Sig: Take 40 mg by mouth daily.   folic acid (FOLVITE) 1 MG tablet  Self No No   Sig: Take 1 tablet (1 mg) by mouth daily   furosemide (LASIX) 20 MG tablet   No No   Sig: Take 1 tablet (20 mg) by mouth daily. Hold if systolic blood pressures less than 110 or poor oral intake.   hydrOXYzine HCl (ATARAX) 10 MG tablet   No No   Sig: Take 1 tablet (10 mg) by mouth every 8 hours as needed for anxiety or other (adjuvant pain).   ipratropium - albuterol 0.5 mg/2.5 mg/3 mL (DUONEB) 0.5-2.5 (3) MG/3ML neb solution   No No   Sig: Take 1 vial (3 mLs) by nebulization every 6 hours as needed for wheezing or shortness of breath.   melatonin 1 MG TABS tablet   No No   Sig: Take 1 tablet (1 mg) by mouth nightly as needed for sleep.   methocarbamol (ROBAXIN) 750 MG tablet  Self Yes No   Sig: Take 750 mg by mouth 3 times daily as needed for muscle spasms.   potassium chloride luis a ER (KLOR-CON M10) 10 MEQ CR tablet   No No   Sig: Take 2 tablets (20 mEq) by mouth daily. Monitor BMP in 5 days, optimize diuresis/potassium replacement.   senna-docusate (SENOKOT-S/PERICOLACE) 8.6-50 MG tablet   No No   Sig: Take 2 tablets by mouth 2 times daily as needed for constipation.      Facility-Administered Medications: None           Physical Exam   Vital Signs: Temp: 97  F (36.1  C) Temp src: Temporal BP: 118/89 Pulse: 97   Resp: 22 SpO2: 93 % O2 Device: None (Room air)    Weight: 0 lbs 0 oz    Constitutional: Alert and oriented to person, place and time; no apparent distress.   HEENT: Normocephalic, no scleral icterus  Abdomen: soft, no distention/tenderness/guarding  Lungs:b/l  Wheezing anteriorly, b/l  basilar expiratory crackles though Decreased breath sounds R basilar area  Heart: Regular s1s2, no evidence of murmurs  Neuro:R sided weakness/contractions 2/2 CVA  Skin/Extremities: LE edema plus white scales on both legs  Psychiatric: appropriate affect, insight and judgment  Back: No midline tenderness, no CVA tenderness      Medical Decision Making       85 MINUTES SPENT BY ME on the date of service doing chart review, history, exam, documentation & further activities per the note.      Data

## 2025-04-27 NOTE — ED NOTES
Bed: ED26  Expected date:   Expected time:   Means of arrival:   Comments:  E2 64M SOB on 10L O2 ETA 0575

## 2025-04-27 NOTE — PROGRESS NOTES
[facility]  Hospitalist Progress Note   04/27/2025          Assessment and Plan:       Kt Rasmussen is a 64 year old male with history of metastatic lung cancer, pleural effusion, pericardial effusion, lower extremity DVT on anticoagulation, chronic anemia, thrombocytopenia, coronary artery disease status post stenting, hypertension, hyperlipidemia, GERD, seizure disorder, anxiety disorder, history of CVA with right hemiplegia admitted on 4/27/2025 for shortness of breath.    Patient admitted 4/9 to 4/16/2025 at [facility] for Acute hypoxic respiratory failure likely multifactorial  Bilateral (right greater than left) moderate to large pleural effusions, Moderate pericardial effusion, metastatic non-small cell lung cancer.  Patient underwent thoracocentesis, diuresis, weaned down to 2 L nasal oxygen and discharged back to care facility.      Acute on chronic respiratory failure likely recurrence of pleural effusions + in addition to his baseline lung cancer,  atelectasis, pericardial effusion, chemotoxicity, deconditioning  Hx Moderate pericardial effusion on TTE 4/2025  Hx Bilateral pleural effusions s/p bilateral thoracentesis 4/9/2025, 1/6/2025.  --Patient sent to ED from TCU with dyspnea, and hypoxia in the 60s on RA per EMS.    --In ED noted to be hypoxic with O2 sats of 83 on RA   CXR showing Moderate right and small left pleural effusions with adjacent atelectasis. Mild pulmonary edema. No pneumothorax. Stable cardiomediastinal silhouette.   EKG SR, LAD, ?Electrical alterans, borderline low voltage.  --4/27 patient frail-appearing, continues to have shortness of breath.  -- Ultrasound-guided thoracocentesis this afternoon, discussed with [facility] radiology.  -- TTE ordered for follow-up of pericardial effusion.  Continue PTA oral Lasix 20 mg daily, will hold off intravenous diuresis 4/27 as plan for thoracocentesis.  Duonebs q4 prn and q6 standing  Telemetry monitoring.  Monitor respiratory status  closely.  Fluid restriction to 2000 mL/day once NPO completed  Aggressive incentive spirometry, encourage ambulation.  Wean off supplemental nasal oxygen as able to. Oximetry.  -- Will consult oncology given recurrent pleural effusion,? Goals of care/  Prognosis.  Appreciate input  -- Will consider cardiology consultation if ongoing pericardial effusion.    Metastatic non-small cell lung cancer (mets to lymph nodes, bones and bilateral lungs)  *Follows with  Oncology and underwent infusion GEMZAR 04/09 and received post chemotherapy [facility] oncology consult requested.  Tylenol as needed for pain.    Atarax as needed for adjuvant pain.  Muscle relaxants as needed.     Chronic anemia secondary to chemo, underlying malignancy.  Chronic thrombocytopenia secondary to chemo.  Leukocytosis likely reactive  Monitor CBC in am      Bilateral lower extremity DVT -9/2023 on anticoagulation.  Likely hypercoagulability of malignancy.    PTA Eliquis 2.5 mg twice daily.  Hold Eliquis for thoracocentesis.     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       Chronic stable trop elevation, likely demand ischemia  CAD s/p cardiac stenting  Ascending aortic dilatation.  Mild MR, mild TR.  Hypertension.  Hyperlipidemia.  --Denies any chest pain. No palpitations. SOB as above.   Echocardiogram as above.  Coagulation on hold as above.  Initiated on Lasix 20 mg oral daily, will consider intravenous Lasix if needed.  Hold PTA Lipitor while under observation status.    History of CVA with right sided hemiplegia  Seizure D/O  Coagulation on hold for thoracocentesis.  Hold PTA Lipitor while under observation status.  PTA not on antiepileptics.  Fall precautions.     GERD.  PTA not on meds.     MDD with anxiety  PTA not on meds. No acute issues     History of alcohol use D/O  History of substance use D/O  Noted      Hx Hypoalbuminemia likely  due to acute illness and Mild elevated AST.  Follow LFTs.     Physical deconditioning from medical illness.  Resident of assisted living facility.  PT evaluation requested.  Fall precautions.    Clinically Significant Risk Factors Present on Admission     # Drug Induced Coagulation Defect: home medication list includes an anticoagulant medication    # Hypertension: Noted on problem list      # Anemia: based on hgb <11           # Financial/Environmental Concerns:         Orders Placed This Encounter      NPO for Medical/Clinical Reasons Except for: Meds, Ice Chips      DVT Prophylaxis: SCDs.  Anticoagulation on hold for thoracocentesis.  Code Status: Full Code  Disposition: Expected discharge in 1 to 2 days pending clinical improvement, oncology input.    Medically Ready for Discharge: Anticipated Tomorrow     Discussed with patient, ED RN, [facility] radiology team.  Attempted calling patient's sister/legal guardian over the telephone 4/27 - voicemail  Total time greater than 51 minutes more than 70% of time spent in direct patient care, care coordination, patient counseling, and formalizing plan of care.      Sung Wood MD        Interval History:      Patient lying in bed.  Denies any chest pain or palpitations.  Complaining of shortness of breath.  Hoarse voice.  Complaining of generalized weakness.  No nausea vomiting.  Denies any tingling or numbness.  Afebrile.  Plan for ultrasound-guided thoracocentesis today.           Physical Exam:        Physical Exam   Temp:  [97  F (36.1  C)] 97  F (36.1  C)  Pulse:  [80-97] 80  Resp:  [13-25] 17  BP: ()/(70-89) 119/75  SpO2:  [81 %-100 %] 94 %    PHYSICAL EXAM  GENERAL: Patient is in no distress. Alert and oriented.  HEENT: Oropharynx dry, hoarse voice  HEART: Regular rate and rhythm. S1S2.   LUNGS: Bilateral decreased breath sounds. Worse in lower lobes  Respirations unlabored  ABDOMEN: Soft, no abdominal tenderness, bowel sounds heard   NEURO: Moving  extremities  EXTREMITIES: + edema.   SKIN: Warm, dry.   PSYCHIATRY Cooperative.        Medications:        Current Facility-Administered Medications   Medication Dose Route Frequency Provider Last Rate Last Admin    heparin lock flush 10 unit/mL injection 5-10 mL  5-10 mL Intracatheter Q24H Ross Rollins MD   5 mL at 04/27/25 0815    heparin lock flush 100 unit/mL injection 5-10 mL  5-10 mL Intracatheter Q28 Days Ross Rollins MD        ipratropium - albuterol 0.5 mg/2.5 mg/3 mL (DUONEB) neb solution 3 mL  3 mL Nebulization Q6H Chris Preston MD   3 mL at 04/27/25 0816    sodium chloride (PF) 0.9% PF flush 10-20 mL  10-20 mL Intracatheter Q28 Days Ross Rollins MD   10 mL at 04/27/25 0816     Current Facility-Administered Medications   Medication Dose Route Frequency Provider Last Rate Last Admin    acetaminophen (TYLENOL) tablet 650 mg  650 mg Oral Q6H PRN Chris Preston MD        heparin lock flush 10 unit/mL injection 5-10 mL  5-10 mL Intracatheter Q1H PRN Ross Rollins MD        hydrOXYzine HCl (ATARAX) tablet 25 mg  25 mg Oral TID PRN Chris Preston MD        ipratropium - albuterol 0.5 mg/2.5 mg/3 mL (DUONEB) neb solution 3 mL  3 mL Nebulization Q4H PRN Chris Preston MD        methocarbamol (ROBAXIN) tablet 500 mg  500 mg Oral 4x Daily PRN Chris Preston MD        sodium chloride (PF) 0.9% PF flush 10-20 mL  10-20 mL Intracatheter q1 min prn Ross Rollins MD        sodium chloride (PF) 0.9% PF flush 10-20 mL  10-20 mL Intracatheter Q1H PRN Ross Rollins MD                Data:      All new lab and imaging data was reviewed.

## 2025-04-28 ENCOUNTER — APPOINTMENT (OUTPATIENT)
Dept: CT IMAGING | Facility: CLINIC | Age: 65
End: 2025-04-28
Attending: INTERNAL MEDICINE
Payer: COMMERCIAL

## 2025-04-28 ENCOUNTER — APPOINTMENT (OUTPATIENT)
Dept: SPEECH THERAPY | Facility: CLINIC | Age: 65
End: 2025-04-28
Payer: COMMERCIAL

## 2025-04-28 ENCOUNTER — APPOINTMENT (OUTPATIENT)
Dept: CARDIOLOGY | Facility: CLINIC | Age: 65
DRG: 180 | End: 2025-04-28
Attending: STUDENT IN AN ORGANIZED HEALTH CARE EDUCATION/TRAINING PROGRAM
Payer: COMMERCIAL

## 2025-04-28 ENCOUNTER — APPOINTMENT (OUTPATIENT)
Dept: PHYSICAL THERAPY | Facility: CLINIC | Age: 65
End: 2025-04-28
Attending: HOSPITALIST
Payer: COMMERCIAL

## 2025-04-28 LAB
ALBUMIN SERPL BCG-MCNC: 2.5 G/DL (ref 3.5–5.2)
ALP SERPL-CCNC: 86 U/L (ref 40–150)
ALT SERPL W P-5'-P-CCNC: 10 U/L (ref 0–70)
ANION GAP SERPL CALCULATED.3IONS-SCNC: 7 MMOL/L (ref 7–15)
AST SERPL W P-5'-P-CCNC: 18 U/L (ref 0–45)
BILIRUB SERPL-MCNC: 0.4 MG/DL
BUN SERPL-MCNC: 20.2 MG/DL (ref 8–23)
CALCIUM SERPL-MCNC: 8.2 MG/DL (ref 8.8–10.4)
CHLORIDE SERPL-SCNC: 102 MMOL/L (ref 98–107)
CREAT SERPL-MCNC: 0.81 MG/DL (ref 0.67–1.17)
EGFRCR SERPLBLD CKD-EPI 2021: >90 ML/MIN/1.73M2
ERYTHROCYTE [DISTWIDTH] IN BLOOD BY AUTOMATED COUNT: 20.3 % (ref 10–15)
GLUCOSE SERPL-MCNC: 87 MG/DL (ref 70–99)
HCO3 SERPL-SCNC: 26 MMOL/L (ref 22–29)
HCT VFR BLD AUTO: 26 % (ref 40–53)
HGB BLD-MCNC: 8 G/DL (ref 13.3–17.7)
LVEF ECHO: NORMAL
MCH RBC QN AUTO: 28.9 PG (ref 26.5–33)
MCHC RBC AUTO-ENTMCNC: 30.8 G/DL (ref 31.5–36.5)
MCV RBC AUTO: 94 FL (ref 78–100)
PLATELET # BLD AUTO: 227 10E3/UL (ref 150–450)
POTASSIUM SERPL-SCNC: 4 MMOL/L (ref 3.4–5.3)
PROT SERPL-MCNC: 4.8 G/DL (ref 6.4–8.3)
RBC # BLD AUTO: 2.77 10E6/UL (ref 4.4–5.9)
SODIUM SERPL-SCNC: 135 MMOL/L (ref 135–145)
WBC # BLD AUTO: 5.3 10E3/UL (ref 4–11)

## 2025-04-28 PROCEDURE — 250N000011 HC RX IP 250 OP 636: Performed by: EMERGENCY MEDICINE

## 2025-04-28 PROCEDURE — 92610 EVALUATE SWALLOWING FUNCTION: CPT | Mod: GN | Performed by: SPEECH-LANGUAGE PATHOLOGIST

## 2025-04-28 PROCEDURE — 93325 DOPPLER ECHO COLOR FLOW MAPG: CPT | Mod: 26 | Performed by: INTERNAL MEDICINE

## 2025-04-28 PROCEDURE — 250N000011 HC RX IP 250 OP 636: Performed by: INTERNAL MEDICINE

## 2025-04-28 PROCEDURE — 250N000009 HC RX 250: Performed by: STUDENT IN AN ORGANIZED HEALTH CARE EDUCATION/TRAINING PROGRAM

## 2025-04-28 PROCEDURE — 82310 ASSAY OF CALCIUM: CPT | Performed by: STUDENT IN AN ORGANIZED HEALTH CARE EDUCATION/TRAINING PROGRAM

## 2025-04-28 PROCEDURE — 120N000001 HC R&B MED SURG/OB

## 2025-04-28 PROCEDURE — 250N000009 HC RX 250: Performed by: HOSPITALIST

## 2025-04-28 PROCEDURE — G0378 HOSPITAL OBSERVATION PER HR: HCPCS

## 2025-04-28 PROCEDURE — 250N000009 HC RX 250: Performed by: INTERNAL MEDICINE

## 2025-04-28 PROCEDURE — 99223 1ST HOSP IP/OBS HIGH 75: CPT | Performed by: INTERNAL MEDICINE

## 2025-04-28 PROCEDURE — 999N000157 HC STATISTIC RCP TIME EA 10 MIN

## 2025-04-28 PROCEDURE — 93005 ELECTROCARDIOGRAM TRACING: CPT

## 2025-04-28 PROCEDURE — 93308 TTE F-UP OR LMTD: CPT | Mod: 26 | Performed by: INTERNAL MEDICINE

## 2025-04-28 PROCEDURE — 97530 THERAPEUTIC ACTIVITIES: CPT | Mod: GP

## 2025-04-28 PROCEDURE — 0W993ZZ DRAINAGE OF RIGHT PLEURAL CAVITY, PERCUTANEOUS APPROACH: ICD-10-PCS | Performed by: RADIOLOGY

## 2025-04-28 PROCEDURE — 99232 SBSQ HOSP IP/OBS MODERATE 35: CPT

## 2025-04-28 PROCEDURE — 85014 HEMATOCRIT: CPT | Performed by: STUDENT IN AN ORGANIZED HEALTH CARE EDUCATION/TRAINING PROGRAM

## 2025-04-28 PROCEDURE — 93325 DOPPLER ECHO COLOR FLOW MAPG: CPT

## 2025-04-28 PROCEDURE — 250N000013 HC RX MED GY IP 250 OP 250 PS 637: Performed by: STUDENT IN AN ORGANIZED HEALTH CARE EDUCATION/TRAINING PROGRAM

## 2025-04-28 PROCEDURE — 94640 AIRWAY INHALATION TREATMENT: CPT | Mod: 76

## 2025-04-28 PROCEDURE — 71260 CT THORAX DX C+: CPT

## 2025-04-28 PROCEDURE — 93321 DOPPLER ECHO F-UP/LMTD STD: CPT | Mod: 26 | Performed by: INTERNAL MEDICINE

## 2025-04-28 PROCEDURE — 97161 PT EVAL LOW COMPLEX 20 MIN: CPT | Mod: GP

## 2025-04-28 RX ORDER — VANCOMYCIN HYDROCHLORIDE 1 G/200ML
1000 INJECTION, SOLUTION INTRAVENOUS EVERY 12 HOURS
Status: DISCONTINUED | OUTPATIENT
Start: 2025-04-29 | End: 2025-04-29

## 2025-04-28 RX ORDER — IOPAMIDOL 755 MG/ML
82 INJECTION, SOLUTION INTRAVASCULAR ONCE
Status: COMPLETED | OUTPATIENT
Start: 2025-04-28 | End: 2025-04-28

## 2025-04-28 RX ORDER — IPRATROPIUM BROMIDE AND ALBUTEROL SULFATE 2.5; .5 MG/3ML; MG/3ML
3 SOLUTION RESPIRATORY (INHALATION) 2 TIMES DAILY
Status: DISCONTINUED | OUTPATIENT
Start: 2025-04-28 | End: 2025-05-09

## 2025-04-28 RX ORDER — CEFEPIME HYDROCHLORIDE 2 G/1
2 INJECTION, POWDER, FOR SOLUTION INTRAVENOUS EVERY 8 HOURS
Status: DISCONTINUED | OUTPATIENT
Start: 2025-04-28 | End: 2025-05-03

## 2025-04-28 RX ADMIN — IPRATROPIUM BROMIDE AND ALBUTEROL SULFATE 3 ML: .5; 3 SOLUTION RESPIRATORY (INHALATION) at 20:32

## 2025-04-28 RX ADMIN — SODIUM CHLORIDE 65 ML: 9 INJECTION, SOLUTION INTRAVENOUS at 16:19

## 2025-04-28 RX ADMIN — HYDROXYZINE HYDROCHLORIDE 25 MG: 25 TABLET, FILM COATED ORAL at 21:46

## 2025-04-28 RX ADMIN — IPRATROPIUM BROMIDE AND ALBUTEROL SULFATE 3 ML: .5; 3 SOLUTION RESPIRATORY (INHALATION) at 07:15

## 2025-04-28 RX ADMIN — IOPAMIDOL 82 ML: 755 INJECTION, SOLUTION INTRAVENOUS at 16:19

## 2025-04-28 RX ADMIN — FUROSEMIDE 20 MG: 20 TABLET ORAL at 09:33

## 2025-04-28 RX ADMIN — IPRATROPIUM BROMIDE AND ALBUTEROL SULFATE 3 ML: .5; 3 SOLUTION RESPIRATORY (INHALATION) at 01:03

## 2025-04-28 RX ADMIN — Medication 5 ML: at 06:21

## 2025-04-28 ASSESSMENT — ACTIVITIES OF DAILY LIVING (ADL)
ADLS_ACUITY_SCORE: 69
ADLS_ACUITY_SCORE: 79
ADLS_ACUITY_SCORE: 69
ADLS_ACUITY_SCORE: 65
ADLS_ACUITY_SCORE: 65
ADLS_ACUITY_SCORE: 69
ADLS_ACUITY_SCORE: 79
ADLS_ACUITY_SCORE: 65
ADLS_ACUITY_SCORE: 69
ADLS_ACUITY_SCORE: 79
ADLS_ACUITY_SCORE: 69
ADLS_ACUITY_SCORE: 69
ADLS_ACUITY_SCORE: 65
ADLS_ACUITY_SCORE: 69
ADLS_ACUITY_SCORE: 65
ADLS_ACUITY_SCORE: 79
ADLS_ACUITY_SCORE: 65
ADLS_ACUITY_SCORE: 65
ADLS_ACUITY_SCORE: 79

## 2025-04-28 NOTE — PROVIDER NOTIFICATION
MD Notification    Notified Person: MD    Notified Person Name: Thomas     Notification Date/Time: 4/28/2025, 0820    Notification Interaction: [employer] [facility]     Purpose of Notification:   Hi Dr. Guzman, I was unable to get ahold of Lisa Sandoval on [employer]. I just wanted to inform you that I received a call from imaging that they only do PET scans outpatient. They cancelled his appointment this morning because he is still inpatient.    Orders Received:  Ok I'll let her know thanks.    Comments:

## 2025-04-28 NOTE — PLAN OF CARE
Goal Outcome Evaluation:  Summary:  Pleural Effusion  DATE & TIME: 4/27/25  [id number]    Cognitive Concerns/ Orientation : A&OX4   BEHAVIOR & AGGRESSION TOOL COLOR: Green  CIWA SCORE: NA   ABNL VS/O2: VSS@1L NC, stats drops below 88 with activity.,   MOBILITY: Assist 2 lift, Able to weight shift independently,    PAIN MANAGMENT: D[place]  DIET: Reg with 2000ml Fluid restriction. Needs help ordering meals. Eats independently.  BOWEL/BLADDER: Continent, Urinal   ABNL LAB/BG: Trop 30 and stable  DRAIN/DEVICES: PIV SL, Right chest port. Unit collect lab  TELEMETRY RHYTHM:   SKIN: Redness on coccyx and dry flaky skin on bilateral shin    TESTS/PROCEDURES: Thor AM shift  D/C DAY/GOALS/PLACE: Pending   OTHER IMPORTANT INFO: rarely calls.

## 2025-04-28 NOTE — PROGRESS NOTES
Patient has been assessed for Home Oxygen needs. Oxygen readings:    *Pulse oximetry (SpO2) = 85% on room air at rest while awake.    *SpO2 improved to 93% on 4 liters/minute at rest.    *SpO2 = 81% on room air during activity/with exercise. (Moving side-to-side in bed)     *SpO2 improved to 94% on 4 liters/minute during activity/with exercise. (Moving side-to-side in bed)      Jeanna Contreras RN on 4/28/2025 at 2:52 PM

## 2025-04-28 NOTE — PROGRESS NOTES
"[facility]                                                                                   OUTPATIENT PHYSICAL THERAPY    PLAN OF TREATMENT FOR OUTPATIENT REHABILITATION   Patient's Last Name, First Name, Kt Deutsch YOB: 1960   Provider's Name   [facility]   Medical Record No.  [id number]     Onset Date: 04/27/25 Start of Care Date: 04/28/25     Medical Diagnosis:  shortness of breath               PT Diagnosis:  impaired IND with functional mobility Certification Dates:  From: 04/28/25  To: 05/05/25       See note for plan of treatment, functional goals, and certification details.    I CERTIFY THE NEED FOR THESE SERVICES FURNISHED UNDER        THIS PLAN OF TREATMENT AND WHILE UNDER MY CARE (Physician co-signature of this document indicates review and certification of the therapy plan).              04/28/25 1142   Appointment Info   Signing Clinician's Name / Credentials (PT) Lisa Rangel PT, DPT   Quick Adds   Quick Adds Certification   Living Environment   People in Home facility resident   Current Living Arrangements assisted living   Home Accessibility no concerns   Living Environment Comments pt lives at [facility] at baseline, admitted from TCU   Self-Care   Usual Activity Tolerance moderate   Current Activity Tolerance poor   Equipment Currently Used at Home wheelchair, manual   Fall history within last six months no   Activity/Exercise/Self-Care Comment Per case management noted from prior hospital stay: \"Per Director of Nursing Devorah at baseline patient is \"stubbornly independent\" with all cares.  Patient self transfers, manages his own meds (come in tripack a.m./p.m.), does his laundry, transportation, self propels with manual wheelchair down to the dining landon.  Patient has various activities and volunteers.\"   General Information   Onset of Illness/Injury or Date of Surgery 04/27/25   Referring Physician Nina, " "MD Sung   Patient/Family Therapy Goals Statement (PT) pt would like to return to [facility]   Pertinent History of Current Problem (include personal factors and/or comorbidities that impact the POC) \"Kt Rasmussen is a 64 year old male with history of metastatic lung cancer, pleural effusion, pericardial effusion, lower extremity DVT on anticoagulation, chronic anemia, thrombocytopenia, coronary artery disease status post stenting, hypertension, hyperlipidemia, GERD, seizure disorder, anxiety disorder, history of CVA with right hemiplegia admitted on 4/27/2025 for shortness of breath.     Patient admitted 4/9 to 4/16/2025 at [facility] for Acute hypoxic respiratory failure likely multifactorial  Bilateral (right greater than left) moderate to large pleural effusions, Moderate pericardial effusion, metastatic non-small cell lung cancer.  Patient underwent thoracocentesis, diuresis, weaned down to 2 L nasal oxygen and discharged back to care facility.    \"   Existing Precautions/Restrictions fall;oxygen therapy device and L/min  (4-5 L via NC throughout session)   Cognition   Orientation Status (Cognition) oriented to;person;place;situation   Follows Commands (Cognition) WFL   Safety Deficit (Cognition) judgment;insight into deficits/self-awareness   Cognitive Status Comments word-finding difficulty noted, speech is hoarse   Pain Assessment   Patient Currently in Pain No   Integumentary/Edema   Integumentary/Edema Comments scabs/dryness noted to BLES   Posture    Posture Forward head position;Protracted shoulders   Range of Motion (ROM)   ROM Comment B knee contractures, unable demo full extension. RUE contracted as well   Strength (Manual Muscle Testing)   Strength (Manual Muscle Testing) Deficits observed during functional mobility   Strength Comments impaired functional strength to BLES and RUE   Bed Mobility   Bed Mobility supine-sit   Comment, (Bed Mobility) modA   Transfers   Comment, (Transfers) mod-maxA of 2 sit<>stand " to SS, able to clear hips but unable to fully stand due to BLE contractures and weakness   Gait/Stairs (Locomotion)   Comment, (Gait/Stairs) non-ambulatory at baseline   Balance   Balance Comments falls risk, currently benefits from use of equipment and phys assist for safe upright mobility   Sensory Examination   Sensory Perception patient reports no sensory changes   Clinical Impression   Criteria for Skilled Therapeutic Intervention Yes, treatment indicated   PT Diagnosis (PT) impaired IND with functional mobility   Influenced by the following impairments impaired activity tolerance, functional strength, ROM, balance, pain   Functional limitations due to impairments impaired bed mobility, transfers, ambulation   Clinical Presentation (PT Evaluation Complexity) stable   Clinical Presentation Rationale Based on current presentation, PMH, social support   Clinical Decision Making (Complexity) low complexity   Planned Therapy Interventions (PT) balance training;bed mobility training;home exercise program;neuromuscular re-education;patient/family education;ROM (range of motion);strengthening;stretching;transfer training;progressive activity/exercise   Risk & Benefits of therapy have been explained evaluation/treatment results reviewed;care plan/treatment goals reviewed;risks/benefits reviewed;current/potential barriers reviewed;participants voiced agreement with care plan;participants included;patient   PT Total Evaluation Time   PT Eval, Low Complexity Minutes (21607) 10   Therapy Certification   Start of care date 04/28/25   Certification date from 04/28/25   Certification date to 05/05/25   Medical Diagnosis shortness of breath   Physical Therapy Goals   PT Frequency 3x/week   PT Predicted Duration/Target Date for Goal Attainment 05/05/25   PT Goals Bed Mobility;Transfers;Wheelchair Mobility   PT: Bed Mobility Independent;Supine to/from sit   PT: Transfers Modified independent;Sit to/from stand;Bed to/from  chair;Assistive device   PT: Wheelchair Mobility 150 feet;Caregiver SBA;manual wheelchair   Therapeutic Activity   Therapeutic Activities: dynamic activities to improve functional performance Minutes (14827) 10   Symptoms Noted During/After Treatment Fatigue;Shortness of breath   Treatment Detail/Skilled Intervention Pt needed increased encouragement to participate in upright mobility. SS brought into room and pt edu on safe mobility with use of SS. Attempted sit<>Stands x2 with SS and mod-maxA of 2. Unable to reach full stand each time. Pt tolerates only 10-15s of partial standing each rep. Declines further upright mobility. Sit>sup with CGA. MaxA of 2 to repo in supine. RN had bumped pt up to 5L O2 via NC for mobility. Desats to 87% with mobility on 5L. Recovers with supine rest, and able to wean back down to 4L with spO2 92-94%. Discussed safe discharge plan. Pt demos poor insight as he really wants to return home but is unable to state how he could safely  transfer to his WC. Pt remained supine with alarm armed and needs met.   PT Discharge Planning   PT Plan progress bed mob, repeat STS with SS   PT Discharge Recommendation (DC Rec) Transitional Care Facility   PT Rationale for DC Rec Pt re-admitted to hospital from TCU, continues to be below baseline for mobility. Currently Ax2 for sit<>Stand attempts in SS, unable to fully stand 2/2 weakness and BLE contractures. Lives at [facility] where he is usually IND for pivot transfers to and from . Would recommend TCU for continued therapy to progress IND at time of hospital discharge. Pt would really like to return to [facility], would currently recommend Ax2 and lift for transfers to , and assist for all cares if returning home.   PT Brief overview of current status Ax2 and lift rec at this time; Goals of therapy will be to address safe mobility and make recs for d/c to next level of care. Pt and RN will continue to follow all falls risk precautions as documented by RN staff  while hospitalized.   PT Total Distance Amb During Session (feet) 0   Physical Therapy Time and Intention   Timed Code Treatment Minutes 10   Total Session Time (sum of timed and untimed services) 20

## 2025-04-28 NOTE — PROGRESS NOTES
MD Notification    Notified Person: MD    Notified Person Name:  JAKE Sandoval     Notification Date/Time: 4/28/2025, 1239     Notification Interaction: [employer] messaging     Purpose of Notification:   Hello, just wanted to update you that the patient's O2 has been fluctuating with minimal activity and with eating. This morning he was mid 90s on 2 L, but has required 4 L this afternoon to stay above 92%. He does not report feeling any different or more SOB since this morning.    I can do the home O2 assessment with the patient at rest in bed if that would work? The patient is not able to walk so I would not be able to complete the activity part of the home O2 assessment. Our phone is [phone]. Is goal O2 92%? Thanks.    Orders Received:  Vito Meeks-- thanks. I trialed getting a different oximeter and he still had very low oxygenations. I was going to see if we could get a home oxygen assessment done?    Yeah that would be ok but additional o2 assessment as he self transfers and/or wheels himself down hallway would be helpful as well    Comments:

## 2025-04-28 NOTE — PROGRESS NOTES
04/28/25 1524   Appointment Info   Signing Clinician's Name / Credentials (SLP) Ibeth Edmonds MS CCC SLP   General Information   Onset of Illness/Injury or Date of Surgery 04/27/25   Referring Physician Sung Wood   Patient/Family Therapy Goal Statement (SLP) Patient did not state.   Pertinent History of Current Problem Kt Rasmussen is a 64 year old male with history of metastatic lung cancer, pleural effusion, pericardial effusion, lower extremity DVT on anticoagulation, chronic anemia, thrombocytopenia, coronary artery disease status post stenting, hypertension, hyperlipidemia, GERD, seizure disorder, anxiety disorder, history of CVA with right hemiplegia admitted on 4/27/2025 for shortness of breath.     Patient admitted 4/9 to 4/16/2025 at [facility] for Acute hypoxic respiratory failure likely multifactorial  Bilateral (right greater than left) moderate to large pleural effusions, Moderate pericardial effusion, metastatic non-small cell lung cancer.  Patient underwent thoracocentesis, diuresis, weaned down to 2 L nasal oxygen and discharged back to care facility.       Acute on chronic respiratory failure likely recurrence of pleural effusions + in addition to his baseline lung cancer,  atelectasis, pericardial effusion, chemotoxicity, deconditioning  Hx Moderate pericardial effusion on TTE 4/2025  Hx Bilateral pleural effusions s/p bilateral thoracentesis 4/9/2025, 1/6/2025.   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   Type of Evaluation   Type of Evaluation Swallow Evaluation   Oral Motor   Oral Musculature anomalies present   Structural Abnormalities none present   Mucosal Quality adequate   Dentition (Oral Motor)   Dentition (Oral Motor) natural dentition;adequate dentition   Facial Symmetry (Oral Motor)   Facial Symmetry (Oral Motor) right side impairment   Right Side Facial Asymmetry moderate impairment   Lip Function (Oral Motor)   Lip Range of Motion (Oral Motor)  retraction impairment   Retraction, Lip Range of Motion right side;minimal impairment   Tongue Function (Oral Motor)   Tongue Strength (Oral Motor) right side;moderate impairment   Tongue Coordination/Speed (Oral Motor) slows down progressively   Tongue ROM (Oral Motor) elevation is impaired;protrusion is impaired   Protrusion, Tongue ROM Impairment (Oral Motor) right side;moderate impairment   Elevation, Tongue ROM Impairment (Oral Motor) right side;minimal impairment   Jaw Function (Oral Motor)   Jaw Function (Oral Motor) WNL   Cough/Swallow/Gag Reflex (Oral Motor)   Volitional Throat Clear/Cough (Oral Motor) impaired;reduced strength   Volitional Swallow (Oral Motor) mildly delayed   Vocal Quality/Secretion Management (Oral Motor)   Vocal Quality (Oral Motor) dysphonic   Secretion Management (Oral Motor) WNL   Comment, Vocal Quality/Secretion Management (Oral Motor) Dysphonia due to cancer left vocal cord.   General Swallowing Observations   Past History of Dysphagia Patient seen for a video swallow study in 2025 with mild dysphagia and a regular diet and thin liquids rec. Flash penetration x1 with thin liquids.   Respiratory Support supplemental oxygen;nasal cannula   Current Diet/Method of Nutritional Intake (General Swallowing Observations, NIS) regular diet;thin liquids (level 0)   Swallowing Evaluation Clinical swallow evaluation   Clinical Swallow Evaluation   Feeding Assistance set up only required   Clinical Swallow Evaluation Textures Trialed thin liquids;pureed;solid foods   Clinical Swallow Eval: Thin Liquid Texture Trial   Mode of Presentation, Thin Liquids cup;self-fed   Volume of Liquid or Food Presented 3-4 oz of water   Oral Phase of Swallow premature pharyngeal entry   Pharyngeal Phase of Swallow impaired;reduction in laryngeal movement   Diagnostic Statement No immediate or delayed sx of aspiration but can not rule out silent aspiration given voice and poor cough.   Clinical Swallow Evaluation:  Puree Solid Texture Trial   Mode of Presentation, Puree spoon;self-fed   Volume of Puree Presented 3 oz of pudding   Oral Phase, Puree WFL   Pharyngeal Phase, Puree impaired;repeated swallows   Diagnostic Statement No sx of aspiration   Clinical Swallow Evaluation: Solid Food Texture Trial   Mode of Presentation self-fed   Volume Presented 1 rambo cracker   Oral Phase premature pharyngeal entry;residue in oral cavity   Oral Residue mid posterior tongue   Pharyngeal Phase impaired;repeated swallows   Diagnostic Statement Minimal oral residue  that cleared with a liquid rinse.   Esophageal Phase of Swallow   Patient reports or presents with symptoms of esophageal dysphagia Yes   Esophageal comments Burping with intake.   Swallowing Recommendations   Diet Consistency Recommendations regular diet;thin liquids (level 0)   Supervision Level for Intake close supervision needed   Mode of Delivery Recommendations bolus size, small;food moistened;no straws;slow rate of intake;place food on left side of mouth   Postural Recommendations none   Swallowing Maneuver Recommendations alternate food and liquid intake;extra swallow   Monitoring/Assistance Required (Eating/Swallowing) check mouth frequently for oral residue/pocketing;stop eating activities when fatigue is present;monitor for cough or change in vocal quality with intake   Recommended Feeding/Eating Techniques (Swallow Eval) maintain upright sitting position for eating;maintain upright posture during/after eating for 30 minutes;minimize distractions during oral intake;set-up and prepare tray   Medication Administration Recommendations, Swallowing (SLP) As tolerated.   Instrumental Assessment Recommendations VFSS (videofluoroscopic swallowing study)   Comment, Swallowing Recommendations Maybe indicated to rule out sieltn aspiration.   General Therapy Interventions   Planned Therapy Interventions Dysphagia Treatment   Dysphagia treatment Instruction of safe swallow  strategies   Clinical Impression   Criteria for Skilled Therapeutic Interventions Met (SLP Eval) Yes, treatment indicated   SLP Diagnosis Mild oral and pharyngeal dysphagia suspect some esophageal component   Risks & Benefits of therapy have been explained evaluation/treatment results reviewed;care plan/treatment goals reviewed;risks/benefits reviewed;current/potential barriers reviewed;participants included;participants voiced agreement with care plan;patient   Clinical Impression Comments Patient presents with mild oral and pharyngeal dysphagia at bedside. Patient with right sided weakness and tongue deviation to the right. Voice is dysphonic.and cough is weak increasing risk for silent aspiration. He demonstrated premature entry of thin liquids without immediate or delayed sx of aspiration. Minimal drop in O2 level via the cup. he was impulsive with the solids and placed the whole cracker in his mouth. He was able to sufficiently masticate and clear his oral cavity with only minimal oral residue. Patient increases risk for aspiration with large bolus sizes. Recommend: 1. Continue on the regular diet and thin liquids. 2. Upright, no straws, small bites/sips and alternat liquids/solids. 3. SLP will f/u at a meal and may need a video swallow study. Hold diet if changes in his respiratory status.   SLP Total Evaluation Time   Eval: oral/pharyngeal swallow function, clinical swallow Minutes (35191) 17   SLP Discharge Planning   SLP Plan Meal f/u VFSS as indicated.   SLP Discharge Recommendation home with assist   SLP Rationale for DC Rec Patient is likely near baseline swallow function.   SLP Brief overview of current status  Mild oral and pharyngeal dysphagia   SLP Time and Intention   Total Session Time (sum of timed and untimed services) 17

## 2025-04-28 NOTE — CONSULTS
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  Status post concurrent chemoradiation with weekly paclitaxel and carboplatin in .  Status post consolidative chemotherapy with carboplatin and paclitaxel x 2 cycles .  Recurrent disease ; pathology consistent with non-small cell carcinoma NOS.  Lung NGS testing negative for actionable mutation, PD-L1 less than 1%.  Status post carboplatin, paclitaxel, bevacizumab, atezolizumab with CR .  Recurrent disease  complicated by a femur fracture requiring surgical repair.  Status post carboplatin, Alimta with progressive disease .  Status post Taxotere with partial response followed by disease progression .  Status post Alimta with recurrent effusions .  Gemcitabine ongoing.    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  Status post excision with negative margins .    Given due for PET scan and unable to receive while inpatient, Kt and I agreed to perform a CT CAP while inpatient for restaging purposes.  Clinically, feeling much better after thoracentesis; appreciate ongoing cares by hospitalist and radiology teams.  Monitor blood counts given stable chemo anemia.  Protein stores are highly concerning and likely multifactorial (poor p.o. intake, inflammatory state, third spacing); nutrition consult placed.  Suspect effusions are multifactorial with malignancy on Taxotere (possibly other chemo) and nutritional status combining.  Will follow along; ECOG needs to improve for further treatment.    Hardy Wright MD    --------------------    Interval History:  Kt seen at the bedside for consultation regarding lung cancer.  Cancer history and treatment is summarized in the assessment and plan.  He was presented with worsening shortness of breath and  found to have worsening dyspnea secondary to recurrent malignant pleural effusion.  He is now feeling better status postthoracentesis.  He has stable chemo anemia.  He has low total protein and albumin.  He is having difficulty swallowing due to food getting stuck in the hospitalist disc evaluating last swallow study.    --------------------    Review of Systems:  10 point ROS negative except for that above.    Past Medical / Surgical History:  Past Medical History:   Diagnosis Date    Alcohol abuse, unspecified     Cerebral infarction (H)     2009, right side residual and aphasia    Dyslipidemia     GERD (gastroesophageal reflux disease)     Lung cancer (H)     Unspecified essential hypertension      Past Surgical History:   Procedure Laterality Date    BRONCHOSCOPY FLEXIBLE AND RIGID N/A 5/5/2016    Procedure: BRONCHOSCOPY FLEXIBLE AND RIGID;  Surgeon: Tony Talbot MD;  Location: [facility] OR    ESOPHAGOSCOPY FLEXIBLE N/A 9/30/2019    Procedure: flexible esophagoscopy;  Surgeon: Lizzy Johnson MD;  Location: [facility] OR    INJECT STEROID (LOCATION) N/A 9/30/2019    Procedure: steroid injection;  Surgeon: Lizzy Johnson MD;  Location: [facility] OR    IR CHEST PORT PLACEMENT > 5 YRS OF AGE  4/22/2021    IR CHEST PORT PLACEMENT > 5 YRS OF AGE  4/11/2022    IR PORT CHECK RIGHT  4/11/2022    IR PORT REMOVAL RIGHT  4/11/2022    LASER CO2 LARYNGOSCOPY N/A 9/30/2019    Procedure: Microdirect laryngoscopy with excision of laryngeal mass, CO2 laser;  Surgeon: Lizzy Johnson MD;  Location: [facility] OR    ZZC NONSPECIFIC PROCEDURE      R tympanoplasty     Patient Active Problem List   Diagnosis    Esophageal reflux    Essential hypertension    Cardiovascular disease    Hypercholesteremia    HYPERLIPIDEMIA LDL GOAL <100    Lung tumor    Airway obstruction    Non-small cell carcinoma of lung, stage 3, unspecified laterality (H)    Encounter for antineoplastic chemotherapy    Lesion of vocal fold    Larynx cancer (H)    Post-operative  state    Non-small cell lung cancer metastatic to bone (H)    Chemotherapy-induced neutropenia    Hypoxia    History of lung cancer    Bilateral pleural effusion    Shortness of breath    Malignant pleural effusion (H)       Family History:  Family History   Problem Relation Age of Onset    Cancer Father        Social History:  Social History     Tobacco Use    Smoking status: Former     Current packs/day: 0.00     Types: Cigarettes     Quit date: 9/25/2009     Years since quitting: 15.6    Smokeless tobacco: Never   Substance Use Topics    Alcohol use: Not Currently    Drug use: No       Medications / Allergies:  Reviewed in EMR.    --------------------    Physical Exam:  VS: /65 (BP Location: Left arm)   Pulse 100   Temp 98  F (36.7  C) (Oral)   Resp 18   SpO2 92%   GEN: Well appearing.    Labs / Imaging / Path:  Reviewed CBC, CMP.  Reviewed CXR w/ independent review.

## 2025-04-28 NOTE — PROVIDER NOTIFICATION
"MD Notification    Notified Person: MD    Notified Person Name: Lori     Notification Date/Time: 4/28/2025, 5565    Notification Interaction: [employer] messaging     Purpose of Notification:   Hi, I completed the home O2 and uploaded the results in the notes. He was able to only move side-to-side in bed for the \"activity\" portion, he started to feel SOB and get a headache with movement so we stopped he felt better.    Orders Received:      Comments:    "

## 2025-04-28 NOTE — PROGRESS NOTES
[facility]    Medicine Progress Note - Hospitalist Service    Date of Admission:  4/27/2025    Assessment & Plan   Kt Rasmussen is a 64 year old male with history of metastatic lung cancer, pleural effusion, pericardial effusion, lower extremity DVT on anticoagulation, chronic anemia, thrombocytopenia, coronary artery disease status post stenting, hypertension, hyperlipidemia, GERD, seizure disorder, anxiety disorder, history of CVA with right hemiplegia admitted on 4/27/2025 for shortness of breath.     Patient recently admitted 04/09/2025 - 04/16/2025 at [facility] for acute hypoxic respiratory failure likely multifactorial. Bilateral (right greater than left) moderate to large pleural effusions, moderate pericardial effusion, metastatic non-small cell lung cancer. Patient underwent thoracocentesis (1.1 L removed), diuresis, weaned down to 2 L nasal oxygen and discharged back to care facility.  Prior to 04/2025 hospitalization, patient underwent outpatient thoracentesis 01/06/2025, 12/27/2024 and 12/26/2024 where 1.9 L, 0.9 L and 2.65 L removed respectively.      Acute on chronic respiratory failure, multifactorial in etiology in setting of metastatic lung cancer, recurring pleural effusions, atelectasis, pericardial effusion, chemotoxicity and deconditioning  Hx moderate pericardial effusion on TTE 04/2025  Hx bilateral pleural effusions s/p bilateral thoracentesis 04/27/2025, 04/09/2025, 1/6/2025, 12/27/2024 and 12/26/2024   *Patient presented to [facility] ED from TCU with acute on chronic dyspnea. Patient discharged previously on 2 L NC although reportedly not requiring oxygen at baseline, per patient. He was found to be hypoxic in the 60s on RA per EMS.   *Upon arrival to [facility] ED, patient noted to be hypoxic with O2 saturations 83% ORA. CXR showing moderate right and small left pleural effusions with adjacent atelectasis. Mild pulmonary edema. No pneumothorax. Stable cardiomediastinal  silhouette. EKG without acute ischemic changes.   *Patient with continued worsening dyspnea. As of 04/28/2025, patient requiring 4 L NC to sustain oxygenations low 90% following 2.4 L fluid removal s/p thoracentesis 04/27/2025. Recurrent pleural effusions suspected secondary to malignancy and chemotherapy.   -- Telemetry.   -- Continuous pulse oximetry. Oxygen PRN.   -- Fluid restriction to 2000 mL/day. Strict I/Os. Daily weights.   -- Duonebs q 4 PRN and q 6 PRN.   -- RCAT. Aggressive incentive spirometry, encourage ambulation.  -- Home oxygen assessment requested.   -- TTE ordered for follow-up of pericardial effusion ordered and pending.   -- Continue PTA oral lasix 20 mg daily. Will consider IV diuresis 04/28/2025.   -- Oncology consultation, appreciate the cares. Plan for CT imaging given no recent PET since 12/2024. Agrees with discussion on goals of care with family.   -- Consider cardiology consultation pending ongoing pericardial effusion.    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 John) and underwent infusion GEMZAR 04/09 and received post chemotherapy neupogen. Last PET 12/2024 with stable disease involving the lymph node metastases, but a partial response involving the osseous metastases.   -- Oncology consultation, per above.   -- Pain management with Tylenol PRN, atarax PRN.     Chronic anemia suspected secondary to chemotherapy and underlying malignancy  Chronic thrombocytopenia in setting above   -- Continue to monitor with morning labs.      Bilateral lower extremity DVT -9/2023 on anticoagulation.  *Likely hypercoagulability of malignancy.    -- Hold PTA Eliquis 2.5 mg twice daily pending CT chest and possible need for repeat thoracentesis. Resume as able AM.      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 Flash penetration x1 with thin liquids. Here, patient exhibiting right-sided weakness and tongue deviation to the right. Voice is dysphonic and cough is weak increasing risk for silent aspiration.   -- SLP consultation and will assess for underlying pneumonia in setting of suspected worsening dysphagia. Possible need for video swallow study. OK for regular diet and thin liquids for now.   -- CT chest for further evaluation of possible concurrent aspiration pneumonia.      Chronic troponin elevation  CAD s/p cardiac stenting  Ascending aortic dilatation  Mild mitral regurgitation   Mild tricuspid regurgitation   Hypertension  Hyperlipidemia  *Denies any chest pain. No palpitations. SOB as above. Chronic troponin elevation suspected secondary to NSTEMI in setting of demand given recurrent malignant pleural effusions and pericardial effusion.   -- Telemetry.   -- Echocardiogram pending, per above.   -- Coagulation on hold as above.  -- Initiated on lasix 20 mg oral daily. Consider transitioning to IV lasix per above.   -- Continue PTA statin.      History of CVA with right sided hemiplegia  Seizure D/O  *Wheelchair bound at baseline.   -- Fall precautions.   -- Coagulation on hold as above.  -- Continue PTA statin.      GERD.  *Not on PTA medications. No acute issues.     MDD with anxiety  *Not on PTA medications. No acute issues.     History of alcohol use D/O  History of substance use D/O  -- Noted.      Hx of hypoalbuminemia in setting of metastatic lung cancer   -- Nutrition consultation per oncology.   -- Ensure between meals.     Mild elevated AST  -- Noted. Follow LFTs.      Physical deconditioning from medical illness.  *Resident of assisted living facility. Currently residing at [facility].   -- Fall precautions.  -- PT evaluation requested.     Goals of care   -- Attempted to call family to discuss code status and goals of care although unable to reach 04/28/2025. Will attempt  another call this afternoon.      Observation Goals: -diagnostic tests and consults completed and resulted, -vital signs normal or at patient baseline, -dyspnea improved and O2 sats greater than 88% on room air or prior home oxygen levels, -returns to baseline functional status, -safe disposition plan has been identified, Nurse to notify provider when observation goals have been met and patient is ready for discharge.  Diet: Regular Diet Adult  Fluid restriction 2000 ML FLUID    DVT Prophylaxis: Pneumatic Compression Devices  Buckner Catheter: Not present  Lines: PRESENT      Port A Cath Single 04/11/22 Right Chest wall-Site Assessment: WDL      Cardiac Monitoring: ACTIVE order. Indication: Syncope- low cardiac risk (24 hours)  Code Status: Full Code      Clinically Significant Risk Factors Present on Admission               # Hypoalbuminemia: Lowest albumin = 2.9 g/dL at 4/27/2025  5:44 AM, will monitor as appropriate  # Drug Induced Coagulation Defect: home medication list includes an anticoagulant medication    # Hypertension: Noted on problem list      # Anemia: based on hgb <11           # Financial/Environmental Concerns:           Social Drivers of Health    Tobacco Use: Medium Risk (4/9/2025)    Patient History     Smoking Tobacco Use: Former     Smokeless Tobacco Use: Never          Disposition Plan     Medically Ready for Discharge: Anticipated in 2-4 Days       The patient's care was discussed with the Attending Physician, Dr. Guzman .    Shelley Sandoval PA-C  Hospitalist Service  [facility]  Securely message with [employer] (more info)  Text page via [facility] Paging/Directory   ______________________________________________________________________    Interval History   No acute events overnight. Patient tachycardic (rate 100). Afebrile. Hypoxic requiring 4 L NC for oxygenations of 90%. Reports symptoms improved following thoracentesis. Denies chest pain/pressure, pleuritic chest  pain, feelings of SOB despite desaturations, palpitations, orthopnea, lightheadedness, dizziness, LOC, abdominal pain, urinary or bowel changes. Denies recent cough or URI symptoms. Tolerating diet. Initially endorses recent choking episodes, however, upon continued questioning denies. No additional concerns or complaints at this time.      Physical Exam   Vital Signs: Temp: 97.5  F (36.4  C) Temp src: Oral BP: 94/60 Pulse: 102   Resp: 18 SpO2: 94 % O2 Device: Nasal cannula Oxygen Delivery: 2 LPM  Weight: 0 lbs 0 oz    GENERAL:  Pleasant, cooperative, alert. Thin appearing, chronically ill appearing male in no acute distress with NC in place (on 2 L). Nontoxic appearing.   HEENT: Normocephalic, atraumatic.  Dysphonic voice noted.   PULMONOLOGY: Decreased breath sounds bilaterally. No increased work of breathing.   CARDIAC: Regular rhythm with tachycardic rate.   ABDOMEN: Soft, nontender non distended.   MUSCULOSKELETAL:  Moving x 4 spontaneously.  Normal bulk and tone. Bilateral LE edema. DP pulses intact.   NEURO: Nonfocal exam.    Medical Decision Making       49 MINUTES SPENT BY ME on the date of service doing chart review, history, exam, documentation & further activities per the note.      Data   ------------------------- PAST 24 HR DATA REVIEWED -----------------------------------------------    I have personally reviewed the following data over the past 24 hrs:    5.3  \   8.0 (L)   / 227     135 102 20.2 /  87   4.0 26 0.81 \     ALT: 10 AST: 18 AP: 86 TBILI: 0.4   ALB: 2.5 (L) TOT PROTEIN: 4.8 (L) LIPASE: N/A       Imaging results reviewed over the past 24 hrs:   Recent Results (from the past 24 hours)   Echocardiogram Limited   Result Value    LVEF  65-70%    Narrative    [id number]  [id number]  [id number]  [id number]^[email or web address]^ANAMIKA     [facility]  Echocardiography Laboratory  [address] [place] [address]     Name: WOO WOO  MRN: [id number]  : 1960  Study Date:  04/28/2025 01:41 PM  Age: 64 yrs  Gender: Male  Patient Location: [facility]  Reason For Study: Pericardial Effusion  Ordering Physician: ANAMIKA HUTCHINSON  Referring Physician: Jessica Kingston  Performed By: Trini Hogue     BSA: 2.0 m2  Height: 75 in  Weight: 164 lb  HR: 104  BP: 126/70 mmHg  ______________________________________________________________________________  Procedure  Limited Echocardiogram with two-dimensional, color and spectral Doppler.  Definity (NDC #97477-546) given intravenously.  ______________________________________________________________________________  Interpretation Summary     Hyperdynamic left ventricular function  The visual ejection fraction is 65-70%.  The right ventricle is normal in structure, function and size.  Valves were not interrogated given limited study.  Small anterior pericardial effusion most prominent adjacent to the right  ventricle (<1 cm). Mild respiratory variation demonstrated within the mitral  and tricuspid inflow velocity profiles consistent with constrictive  physiology. No right ventricular or right atrial collapse. IVC normal size.  Large pleural effusion.     Compared to study dated 4/10/2025, the pericardial effusion is smaller however  there is evidence of constrictive physiology without tamponade. There is a  large pleural effusion.  ______________________________________________________________________________  Left Ventricle  The left ventricle is normal in size. Hyperdynamic left ventricular function.  The visual ejection fraction is 65-70%. Left ventricular diastolic function is  indeterminate. Normal left ventricular wall motion.     Right Ventricle  The right ventricle is normal in structure, function and size.     Atria  The left atrium is not well visualized. Right atrium not well visualized.     Mitral Valve  The mitral valve is not well visualized.     Tricuspid Valve  The tricuspid valve is not well visualized.     Aortic Valve  The aortic  valve is not well visualized.     Pulmonic Valve  The pulmonic valve is not well visualized.     Vessels  The aortic root is not well visualized. The inferior vena cava was normal in  size with preserved respiratory variability.     Pericardium  Small anterior pericardial effusion most prominent adjacent to the right  ventricle (<1 cm). Mild respiratory variation demonstrated within the mitral  and tricuspid inflow velocity profiles consistent with constrictive  physiology. No right ventricular or right atrial collapse. IVC normal size.     ______________________________________________________________________________  Doppler Measurements & Calculations  PA acc time: 0.14 sec     ______________________________________________________________________________  Report approved by: Yaquelin Melgar MD on 04/28/2025 04:28 PM

## 2025-04-28 NOTE — PLAN OF CARE
Goal Outcome Evaluation:      Plan of Care Reviewed With: patient  Summary:  Admitted for SOB. Found to have Pleural Effusions. Hx. Lung cancer-see oncology notes for details.     DATE & TIME: 4/28/25, [id number]  Cognitive Concerns/ Orientation : A&OX4   BEHAVIOR & AGGRESSION TOOL COLOR: Green  CIWA SCORE: NA   ABNL VS/O2: VSS on 4 L NC, stats drops below 88 with activity and eating- MD notified. Goal O2 >92  MOBILITY: Assist 2 lift, Able to weight shift independently with encouragement. Weak to stand with aidan steady per PT-recommending lift at this time.   PAIN MANAGMENT: Denied  DIET: Reg with 2000ml Fluid restriction- has consumed around 720 mL. Needs help ordering meals. Eats independently.  BOWEL/BLADDER: Continent, Urinal at bedside. No BM today, declined stool softeners.   ABNL LAB/BG: calcium 8.2, albumin 2.5, hgb 8.0.   DRAIN/DEVICES: R. Chest port.   TELEMETRY RHYTHM: NSR  SKIN: Redness on coccyx intact. dry flaky skin on bilateral shin. Intact. Thoracentesis site to left side.   TESTS/PROCEDURES: CT chest, abdomin, pelvis w/contrast ordered. Echo completed.   D/C DAY/GOALS/PLACE: Pending   OTHER IMPORTANT INFO: Home O2 assessment completed-see notes. Hem/onc following. Speech, PT, consulted. Daily weight.      [[id number]] [[id number]]

## 2025-04-29 ENCOUNTER — APPOINTMENT (OUTPATIENT)
Dept: ULTRASOUND IMAGING | Facility: CLINIC | Age: 65
End: 2025-04-29
Payer: COMMERCIAL

## 2025-04-29 ENCOUNTER — APPOINTMENT (OUTPATIENT)
Dept: SPEECH THERAPY | Facility: CLINIC | Age: 65
End: 2025-04-29
Payer: COMMERCIAL

## 2025-04-29 LAB
ALBUMIN SERPL BCG-MCNC: 2.5 G/DL (ref 3.5–5.2)
ALP SERPL-CCNC: 85 U/L (ref 40–150)
ALT SERPL W P-5'-P-CCNC: 11 U/L (ref 0–70)
ANION GAP SERPL CALCULATED.3IONS-SCNC: 8 MMOL/L (ref 7–15)
APPEARANCE FLD: ABNORMAL
AST SERPL W P-5'-P-CCNC: 19 U/L (ref 0–45)
BASE EXCESS BLDV CALC-SCNC: 2.9 MMOL/L (ref -3–3)
BILIRUB SERPL-MCNC: 0.4 MG/DL
BUN SERPL-MCNC: 21.3 MG/DL (ref 8–23)
CALCIUM SERPL-MCNC: 8 MG/DL (ref 8.8–10.4)
CHLORIDE SERPL-SCNC: 106 MMOL/L (ref 98–107)
COLOR FLD: ABNORMAL
CREAT SERPL-MCNC: 0.82 MG/DL (ref 0.67–1.17)
EGFRCR SERPLBLD CKD-EPI 2021: >90 ML/MIN/1.73M2
ERYTHROCYTE [DISTWIDTH] IN BLOOD BY AUTOMATED COUNT: 20.1 % (ref 10–15)
GLUCOSE BODY FLUID SOURCE: NORMAL
GLUCOSE FLD-MCNC: 86 MG/DL
GLUCOSE SERPL-MCNC: 81 MG/DL (ref 70–99)
HCO3 BLDV-SCNC: 28 MMOL/L (ref 21–28)
HCO3 SERPL-SCNC: 25 MMOL/L (ref 22–29)
HCT VFR BLD AUTO: 26.1 % (ref 40–53)
HGB BLD-MCNC: 7.8 G/DL (ref 13.3–17.7)
LACTATE SERPL-SCNC: 0.4 MMOL/L (ref 0.7–2)
LYMPHOCYTES NFR FLD MANUAL: 17 %
MCH RBC QN AUTO: 28.1 PG (ref 26.5–33)
MCHC RBC AUTO-ENTMCNC: 29.9 G/DL (ref 31.5–36.5)
MCV RBC AUTO: 94 FL (ref 78–100)
MONOS+MACROS NFR FLD MANUAL: 76 %
MRSA DNA SPEC QL NAA+PROBE: NEGATIVE
NEUTS BAND NFR FLD MANUAL: 7 %
O2/TOTAL GAS SETTING VFR VENT: 5 %
OXYHGB MFR BLDV: 20 % (ref 70–75)
PCO2 BLDV: 48 MM HG (ref 40–50)
PH BLDV: 7.39 [PH] (ref 7.32–7.43)
PLATELET # BLD AUTO: 222 10E3/UL (ref 150–450)
PO2 BLDV: 19 MM HG (ref 25–47)
POTASSIUM SERPL-SCNC: 3.9 MMOL/L (ref 3.4–5.3)
PROT SERPL-MCNC: 4.9 G/DL (ref 6.4–8.3)
RBC # BLD AUTO: 2.78 10E6/UL (ref 4.4–5.9)
SA TARGET DNA: NEGATIVE
SAO2 % BLDV: 20.8 % (ref 70–75)
SODIUM SERPL-SCNC: 139 MMOL/L (ref 135–145)
SPECIMEN SOURCE FLD: ABNORMAL
WBC # BLD AUTO: 4.6 10E3/UL (ref 4–11)
WBC # FLD AUTO: 231 /UL

## 2025-04-29 PROCEDURE — 92526 ORAL FUNCTION THERAPY: CPT | Mod: GN

## 2025-04-29 PROCEDURE — 999N000157 HC STATISTIC RCP TIME EA 10 MIN

## 2025-04-29 PROCEDURE — 89050 BODY FLUID CELL COUNT: CPT

## 2025-04-29 PROCEDURE — 94640 AIRWAY INHALATION TREATMENT: CPT

## 2025-04-29 PROCEDURE — 82805 BLOOD GASES W/O2 SATURATION: CPT

## 2025-04-29 PROCEDURE — 82945 GLUCOSE OTHER FLUID: CPT

## 2025-04-29 PROCEDURE — 36415 COLL VENOUS BLD VENIPUNCTURE: CPT

## 2025-04-29 PROCEDURE — 250N000009 HC RX 250: Performed by: HOSPITALIST

## 2025-04-29 PROCEDURE — 250N000011 HC RX IP 250 OP 636: Performed by: EMERGENCY MEDICINE

## 2025-04-29 PROCEDURE — 250N000011 HC RX IP 250 OP 636: Performed by: STUDENT IN AN ORGANIZED HEALTH CARE EDUCATION/TRAINING PROGRAM

## 2025-04-29 PROCEDURE — 0W9B3ZZ DRAINAGE OF LEFT PLEURAL CAVITY, PERCUTANEOUS APPROACH: ICD-10-PCS | Performed by: RADIOLOGY

## 2025-04-29 PROCEDURE — 85014 HEMATOCRIT: CPT

## 2025-04-29 PROCEDURE — 80053 COMPREHEN METABOLIC PANEL: CPT

## 2025-04-29 PROCEDURE — 272N000706 US THORACENTESIS

## 2025-04-29 PROCEDURE — 94640 AIRWAY INHALATION TREATMENT: CPT | Mod: 76

## 2025-04-29 PROCEDURE — 87641 MR-STAPH DNA AMP PROBE: CPT | Performed by: STUDENT IN AN ORGANIZED HEALTH CARE EDUCATION/TRAINING PROGRAM

## 2025-04-29 PROCEDURE — 99222 1ST HOSP IP/OBS MODERATE 55: CPT | Performed by: INTERNAL MEDICINE

## 2025-04-29 PROCEDURE — 87070 CULTURE OTHR SPECIMN AEROBIC: CPT

## 2025-04-29 PROCEDURE — 250N000013 HC RX MED GY IP 250 OP 250 PS 637: Performed by: STUDENT IN AN ORGANIZED HEALTH CARE EDUCATION/TRAINING PROGRAM

## 2025-04-29 PROCEDURE — 250N000013 HC RX MED GY IP 250 OP 250 PS 637

## 2025-04-29 PROCEDURE — 120N000001 HC R&B MED SURG/OB

## 2025-04-29 PROCEDURE — 250N000011 HC RX IP 250 OP 636: Mod: JZ | Performed by: STUDENT IN AN ORGANIZED HEALTH CARE EDUCATION/TRAINING PROGRAM

## 2025-04-29 PROCEDURE — 83605 ASSAY OF LACTIC ACID: CPT

## 2025-04-29 PROCEDURE — 99233 SBSQ HOSP IP/OBS HIGH 50: CPT

## 2025-04-29 PROCEDURE — 258N000003 HC RX IP 258 OP 636: Performed by: STUDENT IN AN ORGANIZED HEALTH CARE EDUCATION/TRAINING PROGRAM

## 2025-04-29 RX ORDER — LIDOCAINE HYDROCHLORIDE 10 MG/ML
10 INJECTION, SOLUTION EPIDURAL; INFILTRATION; INTRACAUDAL; PERINEURAL ONCE
Status: COMPLETED | OUTPATIENT
Start: 2025-04-29 | End: 2025-04-29

## 2025-04-29 RX ADMIN — CEFEPIME 2 G: 2 INJECTION, POWDER, FOR SOLUTION INTRAVENOUS at 14:09

## 2025-04-29 RX ADMIN — Medication 5 ML: at 22:28

## 2025-04-29 RX ADMIN — IPRATROPIUM BROMIDE AND ALBUTEROL SULFATE 3 ML: .5; 3 SOLUTION RESPIRATORY (INHALATION) at 19:38

## 2025-04-29 RX ADMIN — CEFEPIME 2 G: 2 INJECTION, POWDER, FOR SOLUTION INTRAVENOUS at 00:15

## 2025-04-29 RX ADMIN — CEFEPIME 2 G: 2 INJECTION, POWDER, FOR SOLUTION INTRAVENOUS at 06:39

## 2025-04-29 RX ADMIN — Medication 5 ML: at 08:18

## 2025-04-29 RX ADMIN — CEFEPIME 2 G: 2 INJECTION, POWDER, FOR SOLUTION INTRAVENOUS at 21:48

## 2025-04-29 RX ADMIN — VANCOMYCIN HYDROCHLORIDE 1250 MG: 10 INJECTION, POWDER, LYOPHILIZED, FOR SOLUTION INTRAVENOUS at 00:53

## 2025-04-29 RX ADMIN — Medication 5 ML: at 06:39

## 2025-04-29 RX ADMIN — IPRATROPIUM BROMIDE AND ALBUTEROL SULFATE 3 ML: .5; 3 SOLUTION RESPIRATORY (INHALATION) at 07:20

## 2025-04-29 RX ADMIN — FUROSEMIDE 20 MG: 20 TABLET ORAL at 10:10

## 2025-04-29 RX ADMIN — LIDOCAINE HYDROCHLORIDE ANHYDROUS 10 ML: 10 INJECTION, SOLUTION INFILTRATION at 09:16

## 2025-04-29 RX ADMIN — HYDROXYZINE HYDROCHLORIDE 25 MG: 25 TABLET, FILM COATED ORAL at 21:48

## 2025-04-29 RX ADMIN — Medication 5 ML: at 14:43

## 2025-04-29 RX ADMIN — FOLIC ACID 1 MG: 1 TABLET ORAL at 10:10

## 2025-04-29 ASSESSMENT — ACTIVITIES OF DAILY LIVING (ADL)
ADLS_ACUITY_SCORE: 79
ADLS_ACUITY_SCORE: 75
ADLS_ACUITY_SCORE: 75
ADLS_ACUITY_SCORE: 79
ADLS_ACUITY_SCORE: 67
ADLS_ACUITY_SCORE: 66
ADLS_ACUITY_SCORE: 75
ADLS_ACUITY_SCORE: 79
ADLS_ACUITY_SCORE: 67
ADLS_ACUITY_SCORE: 66
ADLS_ACUITY_SCORE: 75
ADLS_ACUITY_SCORE: 66
ADLS_ACUITY_SCORE: 75
ADLS_ACUITY_SCORE: 66
ADLS_ACUITY_SCORE: 75
ADLS_ACUITY_SCORE: 79
ADLS_ACUITY_SCORE: 75
ADLS_ACUITY_SCORE: 67
ADLS_ACUITY_SCORE: 66
ADLS_ACUITY_SCORE: 66
ADLS_ACUITY_SCORE: 75
ADLS_ACUITY_SCORE: 67
ADLS_ACUITY_SCORE: 75

## 2025-04-29 NOTE — CONSULTS
[facility]    Cardiology Consultation     Date of Admission:  4/27/2025    Assessment & Plan   Kt Rasmussen is a 64 year old male who was admitted on 4/27/2025.    Pericardial effusion.  Appears small, smaller than previous echocardiogram, echo reader reported some constrictive physiology changes with mild increase transmitral respiratory variation.  On personal review of echocardiogram which is no clear-cut definite evidence of constriction, no septal bounce, lateral E prime is not decreased compared to medial E prime, IVC is normal with normal respiratory variation.  Patient denies any chest discomfort.  No EKG changes suggestive of pericarditis no pericardial rub on examination.  Large pleural effusion due to metastatic small cell lung cancer underwent more than 2 L of thoracentesis today with significant improvement in shortness of breath.  History of CAD with history of PCI in 2007.  No chest pain.    Recommendations  Overall pericardial effusion appears smaller than previously and clinically patient does not appear to have constrictive pericarditis.  No chest pain no pericardial rub no EKG changes.  No new cardiac recommendations at this time.    Thank you for involving the care of Mr. Rasmussen.  Cardiology will sign off.  Please feel free to call with any questions.        Aristeo Bruce MD, MD    Primary Care Physician   Jessica Kingston    Reason for Consult   Reason for consult: I was asked to evaluate this patient for pericardial effusion, consider physiology on echocardiogram.    History of Present Illness   Kt Rasmussen is a 64 year old male who presents with shortness of breath, in the setting of metastatic small cell lung cancer large pleural effusion and underwent thoracentesis today with significant improvement in shortness of breath.  Cardiology consulted because echocardiogram recently shows small anterior pericardial effusion with  slightly increased transmitral inflow  respiratory velocities.  In fact pericardial effusion appears smaller compared to previous echocardiogram.  No evidence of tamponade.  Normal LV function.  IVC normal with normal respiratory variation.  Lateral E prime does not appear to be lower than septal E prime.  No septal bounce.  Patient tells me that after thoracentesis feeling much better in terms of shortness of breath.  No chest pain.  EKG without any acute ST-T changes.  He has history of CAD with history of PCI in 2007.    Past Medical History   Past Medical History:   Diagnosis Date    Alcohol abuse, unspecified     Cerebral infarction (H)     2009, right side residual and aphasia    Dyslipidemia     GERD (gastroesophageal reflux disease)     Lung cancer (H)     Unspecified essential hypertension          Past Surgical History   Past Surgical History:   Procedure Laterality Date    BRONCHOSCOPY FLEXIBLE AND RIGID N/A 5/5/2016    Procedure: BRONCHOSCOPY FLEXIBLE AND RIGID;  Surgeon: Tony Talbot MD;  Location: [facility] OR    ESOPHAGOSCOPY FLEXIBLE N/A 9/30/2019    Procedure: flexible esophagoscopy;  Surgeon: Lizzy Johnson MD;  Location: [facility] OR    INJECT STEROID (LOCATION) N/A 9/30/2019    Procedure: steroid injection;  Surgeon: Lizzy Johnson MD;  Location: [facility] OR    IR CHEST PORT PLACEMENT > 5 YRS OF AGE  4/22/2021    IR CHEST PORT PLACEMENT > 5 YRS OF AGE  4/11/2022    IR PORT CHECK RIGHT  4/11/2022    IR PORT REMOVAL RIGHT  4/11/2022    LASER CO2 LARYNGOSCOPY N/A 9/30/2019    Procedure: Microdirect laryngoscopy with excision of laryngeal mass, CO2 laser;  Surgeon: Lizzy Johnson MD;  Location: [facility] OR    ZC NONSPECIFIC PROCEDURE      R tympanoplasty         Prior to Admission Medications   Prior to Admission Medications   Prescriptions Last Dose Informant Patient Reported? Taking?   acetaminophen (TYLENOL) 325 MG tablet 4/21/2025  No Yes   Sig: Take 2 tablets (650 mg) by mouth every 4 hours as needed for pain (and adjunct with moderate or  severe pain or per patient request).   apixaban ANTICOAGULANT (ELIQUIS) 2.5 MG tablet 4/26/2025 Evening Self Yes Yes   Sig: Take 2.5 mg by mouth 2 times daily.   atorvastatin (LIPITOR) 40 MG tablet 4/26/2025 Morning Self Yes Yes   Sig: Take 40 mg by mouth daily.   folic acid (FOLVITE) 1 MG tablet 4/26/2025 Morning Self No Yes   Sig: Take 1 tablet (1 mg) by mouth daily   furosemide (LASIX) 20 MG tablet 4/26/2025 Morning  No Yes   Sig: Take 1 tablet (20 mg) by mouth daily. Hold if systolic blood pressures less than 110 or poor oral intake.   hydrOXYzine HCl (ATARAX) 10 MG tablet   No Yes   Sig: Take 1 tablet (10 mg) by mouth every 8 hours as needed for anxiety or other (adjuvant pain).   ipratropium - albuterol 0.5 mg/2.5 mg/3 mL (DUONEB) 0.5-2.5 (3) MG/3ML neb solution 4/27/2025 Morning  No Yes   Sig: Take 1 vial (3 mLs) by nebulization every 6 hours as needed for wheezing or shortness of breath.   melatonin 1 MG TABS tablet   No Yes   Sig: Take 1 tablet (1 mg) by mouth nightly as needed for sleep.   methocarbamol (ROBAXIN) 750 MG tablet  Self Yes Yes   Sig: Take 750 mg by mouth 3 times daily as needed for muscle spasms.   ondansetron (ZOFRAN) 4 MG tablet   Yes Yes   Sig: Take 4 mg by mouth every 8 hours as needed for nausea.   potassium chloride luis a ER (KLOR-CON M10) 10 MEQ CR tablet 4/26/2025 Morning  No Yes   Sig: Take 2 tablets (20 mEq) by mouth daily. Monitor BMP in 5 days, optimize diuresis/potassium replacement.   senna-docusate (SENOKOT-S/PERICOLACE) 8.6-50 MG tablet   No Yes   Sig: Take 2 tablets by mouth 2 times daily as needed for constipation.      Facility-Administered Medications: None     Current Facility-Administered Medications   Medication Dose Route Frequency Provider Last Rate Last Admin    acetaminophen (TYLENOL) tablet 650 mg  650 mg Oral Q6H PRN Chris Preston MD        [Held by provider] apixaban ANTICOAGULANT (ELIQUIS) tablet 2.5 mg  2.5 mg Oral BID Neshangi, Srivani, MD        [Held by  provider] atorvastatin (LIPITOR) tablet 40 mg  40 mg Oral Daily Sung Wood MD        ceFEPIme (MAXIPIME) 2 g vial to attach to  mL bag for ADULTS or NS 50 mL bag for PEDS  2 g Intravenous Q8H Karen Guzman MD   2 g at 04/29/25 0639    folic acid (FOLVITE) tablet 1 mg  1 mg Oral Daily Shelley Sandoval PA-C   1 mg at 04/29/25 1010    furosemide (LASIX) tablet 20 mg  20 mg Oral Daily Chris Preston MD   20 mg at 04/29/25 1010    heparin lock flush 10 unit/mL injection 5-10 mL  5-10 mL Intracatheter Q1H PRN Ross Rollins MD   5 mL at 04/29/25 0818    heparin lock flush 10 unit/mL injection 5-10 mL  5-10 mL Intracatheter Q24H Ross Rollins MD   5 mL at 04/29/25 0639    heparin lock flush 100 unit/mL injection 5-10 mL  5-10 mL Intracatheter Q28 Days Ross Rollins MD        hydrALAZINE (APRESOLINE) tablet 10 mg  10 mg Oral Q4H PRN Chris Preston MD        Or    hydrALAZINE (APRESOLINE) injection 10 mg  10 mg Intravenous Q4H PRN Chris Preston MD        hydrOXYzine HCl (ATARAX) tablet 25 mg  25 mg Oral TID PRN Chris Preston MD   25 mg at 04/28/25 2146    ipratropium - albuterol 0.5 mg/2.5 mg/3 mL (DUONEB) neb solution 3 mL  3 mL Nebulization BID Sung Wood MD   3 mL at 04/29/25 0720    ipratropium - albuterol 0.5 mg/2.5 mg/3 mL (DUONEB) neb solution 3 mL  3 mL Nebulization Q4H PRN Chris Preston MD        methocarbamol (ROBAXIN) tablet 500 mg  500 mg Oral 4x Daily PRN Chris Preston MD        ondansetron (ZOFRAN ODT) ODT tab 4 mg  4 mg Oral Q6H PRN Chris Preston MD        Or    ondansetron (ZOFRAN) injection 4 mg  4 mg Intravenous Q6H PRN Chris Preston MD        prochlorperazine (COMPAZINE) injection 5 mg  5 mg Intravenous Q6H PRN Chris Preston MD        Or    prochlorperazine (COMPAZINE) tablet 5 mg  5 mg Oral Q6H PRN Chris Preston MD        senna-docusate (SENOKOT-S/PERICOLACE) 8.6-50 MG per tablet 1 tablet  1 tablet Oral BID PRN  Chris Preston MD        Or    senna-docusate (SENOKOT-S/PERICOLACE) 8.6-50 MG per tablet 2 tablet  2 tablet Oral BID PRN Chris Preston MD        sodium chloride (PF) 0.9% PF flush 10-20 mL  10-20 mL Intracatheter q1 min prn Ross Rollins MD        sodium chloride (PF) 0.9% PF flush 10-20 mL  10-20 mL Intracatheter Q1H PRN Ross Rollins MD   10 mL at 04/29/25 0818    sodium chloride (PF) 0.9% PF flush 10-20 mL  10-20 mL Intracatheter Q28 Days Ross Rollins MD   10 mL at 04/27/25 0816    vancomycin (VANCOCIN) 1,000 mg in 200 mL dextrose intermittent infusion  1,000 mg Intravenous Q12H Karen Guzman MD         Facility-Administered Medications Ordered in Other Encounters   Medication Dose Route Frequency Provider Last Rate Last Admin    heparin 100 UNIT/ML injection 5 mL  5 mL Intracatheter Once PRN Sangita John MD   5 mL at 08/10/21 1346    heparin lock flush 10 UNIT/ML injection 5 mL  5 mL Intracatheter Q1H PRN Sangita John MD        sodium chloride (PF) 0.9% PF flush 3-20 mL  3-20 mL Intracatheter Q1H PRN Sangita John MD   20 mL at 08/10/21 1346     Current Facility-Administered Medications   Medication Dose Route Frequency Provider Last Rate Last Admin    acetaminophen (TYLENOL) tablet 650 mg  650 mg Oral Q6H PRN Chris Preston MD        [Held by provider] apixaban ANTICOAGULANT (ELIQUIS) tablet 2.5 mg  2.5 mg Oral BID Sung Wood MD        [Held by provider] atorvastatin (LIPITOR) tablet 40 mg  40 mg Oral Daily Sung Wood MD        ceFEPIme (MAXIPIME) 2 g vial to attach to  mL bag for ADULTS or NS 50 mL bag for PEDS  2 g Intravenous Q8H Karen Guzman MD   2 g at 04/29/25 0639    folic acid (FOLVITE) tablet 1 mg  1 mg Oral Daily Shelley Sandoval PA-C   1 mg at 04/29/25 1010    furosemide (LASIX) tablet 20 mg  20 mg Oral Daily Chris Preston MD   20 mg at 04/29/25 1010    heparin lock flush 10 unit/mL injection 5-10 mL  5-10 mL Intracatheter  Q1H PRN Ross Rollins MD   5 mL at 04/29/25 0818    heparin lock flush 10 unit/mL injection 5-10 mL  5-10 mL Intracatheter Q24H Ross Rollins MD   5 mL at 04/29/25 0639    heparin lock flush 100 unit/mL injection 5-10 mL  5-10 mL Intracatheter Q28 Days Ross Rollins MD        hydrALAZINE (APRESOLINE) tablet 10 mg  10 mg Oral Q4H PRN Chris Preston MD        Or    hydrALAZINE (APRESOLINE) injection 10 mg  10 mg Intravenous Q4H PRN Chris Preston MD        hydrOXYzine HCl (ATARAX) tablet 25 mg  25 mg Oral TID PRN Chris Preston MD   25 mg at 04/28/25 2146    ipratropium - albuterol 0.5 mg/2.5 mg/3 mL (DUONEB) neb solution 3 mL  3 mL Nebulization BID Sung Wood MD   3 mL at 04/29/25 0720    ipratropium - albuterol 0.5 mg/2.5 mg/3 mL (DUONEB) neb solution 3 mL  3 mL Nebulization Q4H PRN Chris Preston MD        methocarbamol (ROBAXIN) tablet 500 mg  500 mg Oral 4x Daily PRN Chris Preston MD        ondansetron (ZOFRAN ODT) ODT tab 4 mg  4 mg Oral Q6H PRN Chris Preston MD        Or    ondansetron (ZOFRAN) injection 4 mg  4 mg Intravenous Q6H PRN Chris Preston MD        prochlorperazine (COMPAZINE) injection 5 mg  5 mg Intravenous Q6H PRN Chris Preston MD        Or    prochlorperazine (COMPAZINE) tablet 5 mg  5 mg Oral Q6H PRN Chris Preston MD        senna-docusate (SENOKOT-S/PERICOLACE) 8.6-50 MG per tablet 1 tablet  1 tablet Oral BID PRN Chris Preston MD        Or    senna-docusate (SENOKOT-S/PERICOLACE) 8.6-50 MG per tablet 2 tablet  2 tablet Oral BID PRN Chris Preston MD        sodium chloride (PF) 0.9% PF flush 10-20 mL  10-20 mL Intracatheter q1 min prn Ross Rollins MD        sodium chloride (PF) 0.9% PF flush 10-20 mL  10-20 mL Intracatheter Q1H PRN Ross Rollins MD   10 mL at 04/29/25 0818    sodium chloride (PF) 0.9% PF flush 10-20 mL  10-20 mL Intracatheter Q28 Days Ross Rollins MD   10 mL at 04/27/25 0816    vancomycin (VANCOCIN) 1,000 mg  in 200 mL dextrose intermittent infusion  1,000 mg Intravenous Q12H Karen Guzman MD         Facility-Administered Medications Ordered in Other Encounters   Medication Dose Route Frequency Provider Last Rate Last Admin    heparin 100 UNIT/ML injection 5 mL  5 mL Intracatheter Once PRSangita Kearns MD   5 mL at 08/10/21 1346    heparin lock flush 10 UNIT/ML injection 5 mL  5 mL Intracatheter Q1H Sangita Gibbs MD        sodium chloride (PF) 0.9% PF flush 3-20 mL  3-20 mL Intracatheter Q1H Sangita Gibbs MD   20 mL at 08/10/21 1346     Allergies   Allergies   Allergen Reactions    No Known Drug Allergy        Social History    reports that he quit smoking about 15 years ago. His smoking use included cigarettes. He has never used smokeless tobacco. He reports that he does not currently use alcohol. He reports that he does not use drugs.      Family History   I have reviewed this patient's family history and updated it with pertinent information if needed.  Family History   Problem Relation Age of Onset    Cancer Father           Review of Systems   A comprehensive review of system was performed and is negative other than that noted in the HPI or here.     Physical Exam   Vital Signs with Ranges  Temp:  [97.5  F (36.4  C)-98.4  F (36.9  C)] 97.5  F (36.4  C)  Pulse:  [] 92  Resp:  [16-18] 16  BP: (105-123)/(61-72) 106/61  SpO2:  [90 %-95 %] 93 %  Wt Readings from Last 4 Encounters:   04/29/25 73.6 kg (162 lb 4.1 oz)   04/16/25 74.5 kg (164 lb 3.9 oz)   04/09/25 79.4 kg (175 lb)   04/09/25 79.4 kg (175 lb)     I/O last 3 completed shifts:  In: 960 [P.O.:960]  Out: 1825 [Urine:1825]      Vitals: /61 (BP Location: Left arm)   Pulse 92   Temp 97.5  F (36.4  C) (Oral)   Resp 16   Wt 73.6 kg (162 lb 4.1 oz)   SpO2 93%   BMI 20.01 kg/m      Physical Exam:   General patient appears comfortable  Neck normal JVP  Cardiovascular system S1-S2 normal no murmur rub or gallop  Respiratory system  "slightly decreased air entry at the left lung base, no crackles  Extremities no edema    No lab results found in last 7 days.    Invalid input(s): \"TROPONINIES\"    Recent Labs   Lab 04/29/25  0627 04/28/25  0615 04/27/25  0544   WBC 4.6 5.3 5.0   HGB 7.8* 8.0* 8.7*   MCV 94 94 94    227 237   INR  --   --  1.26*    135 138   POTASSIUM 3.9 4.0 4.0   CHLORIDE 106 102 103   CO2 25 26 26   BUN 21.3 20.2 21.1   CR 0.82 0.81 0.77   GFRESTIMATED >90 >90 >90   ANIONGAP 8 7 9   BEVERLY 8.0* 8.2* 8.5*   GLC 81 87 93   ALBUMIN 2.5* 2.5* 2.9*   PROTTOTAL 4.9* 4.8* 5.5*   BILITOTAL 0.4 0.4 0.4   ALKPHOS 85 86 101   ALT 11 10 14   AST 19 18 25     No results for input(s): \"CHOL\", \"HDL\", \"LDL\", \"TRIG\", \"CHOLHDLRATIO\" in the last 26398 hours.  Recent Labs   Lab 04/29/25  0627 04/28/25  0615 04/27/25  0544   WBC 4.6 5.3 5.0   HGB 7.8* 8.0* 8.7*   HCT 26.1* 26.0* 28.3*   MCV 94 94 94    227 237     Recent Labs   Lab 04/27/25  0550   PHV 7.38   PO2V 37   PCO2V 43   HCO3V 26     Recent Labs   Lab 04/27/25  1415 04/27/25  0544   NTBNPI 482 380     No results for input(s): \"DD\" in the last 168 hours.  No results for input(s): \"SED\", \"CRP\" in the last 168 hours.  Recent Labs   Lab 04/29/25  0627 04/28/25  0615 04/27/25  0544    227 237     No results for input(s): \"TSH\" in the last 168 hours.  No results for input(s): \"COLOR\", \"APPEARANCE\", \"URINEGLC\", \"URINEBILI\", \"URINEKETONE\", \"SG\", \"UBLD\", \"URINEPH\", \"PROTEIN\", \"UROBILINOGEN\", \"NITRITE\", \"LEUKEST\", \"RBCU\", \"WBCU\" in the last 168 hours.    Imaging:  Recent Results (from the past 48 hours)   US Thoracentesis    Narrative    EXAM:   1. RIGHT THORACENTESIS  2. ULTRASOUND GUIDANCE  LOCATION: [facility]  DATE: 4/27/2025    INDICATION: Pleural effusion.    PROCEDURE: Informed consent obtained. Time out performed. The chest was prepped and draped in sterile fashion. 10 mL of 1 % lidocaine was infused into the local soft tissues. Under direct " ultrasound guidance, a 5 [place] catheter system was placed into   the pleural effusion.     2.5 liters of clear yellow fluid were removed and sent to lab, if requested.    Patient tolerated procedure well.    Ultrasound imaging was obtained and placed in the patient's permanent medical record.      Impression    IMPRESSION:  Status post right ultrasound-guided thoracentesis.    Reference CPT Code: 08924   Echocardiogram Limited   Result Value    LVEF  65-70%    Narrative    [id number]  [id number]  [id number]  [id number]^JASPER^ANAMIKA     [facility]  Echocardiography Laboratory  [address] [place] [address]     Name: WOO WOO  MRN: [id number]  : 1960  Study Date: 2025 01:41 PM  Age: 64 yrs  Gender: Male  Patient [facility]: [facility]  Reason For Study: Pericardial Effusion  Ordering Physician: ANAMIKA HUTCHINSON  Referring Physician: Jessica Kingston  Performed By: Trini Hogue     BSA: 2.0 m2  Height: 75 in  Weight: 164 lb  HR: 104  BP: 126/70 mmHg  ______________________________________________________________________________  Procedure  Limited Echocardiogram with two-dimensional, color and spectral Doppler.  Definity (NDC #[id number]) given intravenously.  ______________________________________________________________________________  Interpretation Summary     Hyperdynamic left ventricular function  The visual ejection fraction is 65-70%.  The right ventricle is normal in structure, function and size.  Valves were not interrogated given limited study.  Small anterior pericardial effusion most prominent adjacent to the right  ventricle (<1 cm). Mild respiratory variation demonstrated within the mitral  and tricuspid inflow velocity profiles consistent with constrictive  physiology. No right ventricular or right atrial collapse. IVC normal size.  Large pleural effusion.     Compared to study dated 4/10/2025, the pericardial effusion is smaller however  there is evidence of  constrictive physiology without tamponade. There is a  large pleural effusion.  ______________________________________________________________________________  Left Ventricle  The left ventricle is normal in size. Hyperdynamic left ventricular function.  The visual ejection fraction is 65-70%. Left ventricular diastolic function is  indeterminate. Normal left ventricular wall motion.     Right Ventricle  The right ventricle is normal in structure, function and size.     Atria  The left atrium is not well visualized. Right atrium not well visualized.     Mitral Valve  The mitral valve is not well visualized.     Tricuspid Valve  The tricuspid valve is not well visualized.     Aortic Valve  The aortic valve is not well visualized.     Pulmonic Valve  The pulmonic valve is not well visualized.     Vessels  The aortic root is not well visualized. The inferior vena cava was normal in  size with preserved respiratory variability.     Pericardium  Small anterior pericardial effusion most prominent adjacent to the right  ventricle (<1 cm). Mild respiratory variation demonstrated within the mitral  and tricuspid inflow velocity profiles consistent with constrictive  physiology. No right ventricular or right atrial collapse. IVC normal size.     ______________________________________________________________________________  Doppler Measurements & Calculations  PA acc time: 0.14 sec     ______________________________________________________________________________  Report approved by: Yaquelin Melgar MD on 04/28/2025 04:28 PM         CT Chest/Abdomen/Pelvis w Contrast    Narrative    EXAM: CT CHEST/ABDOMEN/PELVIS W CONTRAST  LOCATION: [facility]  DATE: 4/28/2025    INDICATION: Cancer response eval.  COMPARISON: 12/23/2024  TECHNIQUE: CT scan of the chest, abdomen, and pelvis was performed following injection of IV contrast. Multiplanar reformats were obtained. Dose reduction techniques were used.    CONTRAST: 82mL isovue 370    FINDINGS:   LUNGS AND PLEURA: Large left effusion and moderate partially loculated right effusion. Patchy increased airspace opacity within the right upper and left lower lungs. Mild emphysema. Bibasilar atelectasis. Bronchial wall thickening with areas of mucus   plugging at the lung bases. Posttreatment changes right perihilar region with areas of increased soft tissue density and architectural distortion with traction bronchiectasis, similar to prior examination.    MEDIASTINUM/AXILLAE: Heart is normal in size. Stable mildly enlarged right hilar lymph nodes. No mediastinal or axillary adenopathy.    CORONARY ARTERY CALCIFICATION: Severe.    HEPATOBILIARY: Too small to characterize low-attenuation lesion within segment 7 of the liver. Focal fat infiltration along the falciform ligament. Gallbladder decompressed.    PANCREAS: Normal.    SPLEEN: Normal.    ADRENAL GLANDS: Normal.    KIDNEYS/BLADDER: No significant mass, stone, or hydronephrosis.    BOWEL: Diverticulosis of the colon. No acute inflammatory change. No obstruction. Moderate stool noted throughout the colon.    LYMPH NODES: Large bulky adenopathy is noted in the retroperitoneum for example left periaortic lymph node measuring 5 x 3.1 cm which is increased in compared to prior where it measured approximately 4.5 x 2.7 cm. Interval enlargement of a retrocaval   lymph node measuring 3.1 x 1.3 cm, previous measurement 2.4 x 1 cm. Left. Lymph node near the aortoiliac bifurcation measuring 2.1 cm in short axis, previous measurement 1.3 cm. Aortocaval lymph node measuring 1.9 x 1.2 cm, previous measurement 1.4 x 1.3   cm. Left pelvic sidewall lymph node measuring approximately 3.5 x 1.6 cm, previous measurement 2 x 1.5 cm. Right pelvic sidewall adenopathy measuring 2 cm, previous measurement 1 cm.    VASCULATURE: Severe atherosclerotic disease of the abdominal aorta and its branches.    PELVIC ORGANS: Bladder within normal  limits.    MUSCULOSKELETAL: There are changes of spine and hips. Stable subtle osseous lesions involving the sacrum left iliac wing, bilateral acetabular region and T12/L1 vertebral bodies which are better appreciated on PET/CT.      Impression    IMPRESSION:  1.  Interval enlargement of the bulky adenopathy within the abdomen and pelvis.  2.  Large left effusion and moderate partially loculated right effusion.  3.  Patchy airspace opacity within the right upper and left lower lungs concerning for pneumonia.  4.  Stable posttreatment changes right perihilar region.  5.  Stable osseous lesions which are better appreciated on PET/CT.         Echo:  No results found for this or any previous visit (from the past 4320 hours).    Clinically Significant Risk Factors Present on Admission               # Hypoalbuminemia: Lowest albumin = 2.5 g/dL at 4/29/2025  6:27 AM, will monitor as appropriate  # Drug Induced Coagulation Defect: home medication list includes an anticoagulant medication    # Hypertension: Noted on problem list      # Anemia: based on hgb <11           # Financial/Environmental Concerns:

## 2025-04-29 NOTE — PLAN OF CARE
Goal Outcome Evaluation:      Plan of Care Reviewed With: patient      Summary:  Admitted for SOB. Found to have Pleural Effusions. Hx. Lung cancer- oncology notes for details.     DATE & TIME: 4/28/252610 [phone]  Cognitive Concerns/ Orientation : A&OX4. Calm and cooperative. Quiet, rarely calling, rounded on freq.   BEHAVIOR & AGGRESSION TOOL COLOR: Green  CIWA SCORE: NA   ABNL VS/O2: VSS on 4 L NC, MAZARIEGOS and low sats when eating. O2 assessment completed on days yesterday.   MOBILITY: 2PA. Lift or air mat for transfers, pt too weak to stand with PT yesterday. Baseline R sided weakness. Assisted with positioning.   PAIN MANAGMENT: Denied  DIET: NPO since midnight, otherwise Reg with 2000ml Fluid restriction. Needs help ordering meals. Eats independently.  BOWEL/BLADDER: Continent, Urinal at bedside. No BM today, declined stool softeners.   ABNL LAB/BG: calcium 8.2, albumin 2.5, hgb 8.0.   DRAIN/DEVICES: R. Chest port, SL   TELEMETRY RHYTHM: NSR  SKIN: Redness on coccyx intact. dry flaky skin on bilateral shin. Intact. Thoracentesis site to left side.   TESTS/PROCEDURES: CT chest, abdomin, pelvis w/contrast completed. Echo completed.   D/C DAY/GOALS/PLACE: Pending improvements. Pt has Legal Guardian.   OTHER IMPORTANT INFO: Home O2 assessment completed-see notes. Hem/onc following. Speech, PT, consulted. Daily weight.       CT abnormal. New orders placed late last night for abx, NPO at midnight, Nasal swab for MRSA (done), and Thoracentesis. MD had tried to reach out to Guardians to address Thoracentesis and Code status.

## 2025-04-29 NOTE — PROGRESS NOTES
[facility]    Medicine Progress Note - Hospitalist Service    Date of Admission:  4/27/2025    Assessment & Plan   Kt Rasmussen is a 64 year old male with history of metastatic lung cancer, pleural effusion, pericardial effusion, lower extremity DVT on anticoagulation, chronic anemia, thrombocytopenia, coronary artery disease status post stenting, hypertension, hyperlipidemia, GERD, seizure disorder, anxiety disorder, history of CVA with right hemiplegia admitted on 4/27/2025 for shortness of breath.     Patient recently admitted 04/09/2025 - 04/16/2025 at [facility] for acute hypoxic respiratory failure likely multifactorial. Bilateral (right greater than left) moderate to large pleural effusions, moderate pericardial effusion, metastatic non-small cell lung cancer. Patient underwent thoracocentesis (1.1 L removed), diuresis, weaned down to 2 L nasal oxygen and discharged back to care facility.  Prior to 04/2025 hospitalization, patient underwent outpatient thoracentesis 01/06/2025, 12/27/2024 and 12/26/2024 where 1.9 L, 0.9 L and 2.65 L removed respectively.      Acute on chronic respiratory failure, multifactorial in etiology in setting of pneumonia, metastatic lung cancer, recurring pleural effusions, atelectasis, pericardial effusion, chemotoxicity and deconditioning  Hx moderate pericardial effusion on TTE 04/2025  Hx bilateral pleural effusions s/p bilateral thoracentesis 04/27/2025, 04/09/2025, 1/6/2025, 12/27/2024 and 12/26/2024   *Patient presented to [facility] ED from TCU with acute on chronic dyspnea. Patient discharged previously on 2 L NC although reportedly not requiring oxygen at baseline, per patient. He was found to be hypoxic in the 60s on RA per EMS.   *Upon arrival to [facility] ED, patient noted to be hypoxic with O2 saturations 83% ORA. CXR showing moderate right and small left pleural effusions with adjacent atelectasis. Mild pulmonary edema. No pneumothorax. Stable  cardiomediastinal silhouette. EKG without acute ischemic changes.   *Patient with continued worsening dyspnea. As of 04/28/2025, patient requiring 4 L NC to sustain oxygenations low 90% following 2.4 L fluid removal s/p thoracentesis 04/27/2025. Recurrent pleural effusions suspected secondary to malignancy and chemotherapy.   *CT chest 04/28/2025 with right upper and left lower lobe opacities concerning for pneumonia. Notable also for large left effusion and moderate partially loculated right effusion. Interval enlargement of the bulky adenopathy within the abdomen and pelvis. Stable post treatment changes right perihilar region. Stable osseous lesions.   *Echocardiogram 04/28/2025 with hyperdynamic left ventricular function. LVEF 65-70%. Valves were not interrogated given limited study. Small anterior pericardial effusion most prominent adjacent to the right ventricle (<1 cm). Mild respiratory variation demonstrated within the mitral and tricuspid inflow velocity profiles consistent with constrictive  physiology. No right ventricular or right atrial collapse. IVC normal size. Large pleural effusion. Compared to study dated 04/10/2025, the pericardial effusion is smaller however, there is evidence of constrictive physiology without tamponade. There is a large pleural effusion.  -- Telemetry.   -- Continuous pulse oximetry. Oxygen PRN.   -- Fluid restriction to 2000 mL/day. Strict I/Os. Daily weights.   -- Duonebs q 4 PRN and q 6 PRN.   -- RCAT. Aggressive incentive spirometry, encourage ambulation.  -- Home oxygen assessment requested.   -- Continue PTA oral lasix 20 mg daily. Will consider IV diuresis 04/28/2025.   -- Oncology consultation, appreciate the cares. Noted patient's primary oncologist will be on service 04/30. Will await further information regarding prognosis and forward looking plans given patient's recent imaging at that time.   -- Started HAP/Aspiration pneumonia treatment with Cefepime and  Vancomycin in setting of CT findings. MRSA swab negative for MRSA and vancomycin discontinued. Continue cefepime.   -- TTE ordered for follow-up of pericardial effusion 04/28/2025 that demonstrated constrictive physiology without tamponade. Hyperdynamic left ventricular function. LVEF 65-70%.  -- Cardiology consultation in setting of TTE findings. Overall pericardial effusion appears smaller than previously and clinically patient does not appear to have constrictive pericarditis.  No chest pain no pericardial rub no EKG changes. No new cardiac recommendations at this time.  -- Repeat thoracentesis 04/29/2025 given CT findings of continued large left pleural effusion and moderate right. Additional 2.5 L removed from left pleural effusion.   -- Continue to monitor for new or worsening symptoms.      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 John) and underwent infusion GEMZAR 04/09 and received post chemotherapy neupogen. Last PET 12/2024 with stable disease involving the lymph node metastases, but a partial response involving the osseous metastases.   -- Oncology consultation, appreciate the cares. Noted patient's primary oncologist will be on service 04/30. Will await further information regarding prognosis and forward looking plans given patient's recent imaging at that time.   -- Pain management with Tylenol PRN, atarax PRN.     Chronic anemia suspected secondary to chemotherapy and underlying malignancy  Chronic thrombocytopenia in setting above   -- Continue to monitor with morning labs.      Bilateral lower extremity DVT -9/2023 on anticoagulation.  *Likely hypercoagulability of malignancy.    -- Hold PTA Eliquis 2.5 mg twice daily given thoracentesis. Resume in AM.      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 Flash penetration x1 with thin liquids. Here, patient exhibiting right-sided weakness and tongue deviation to the right. Voice is dysphonic and cough is weak increasing risk for silent aspiration.   -- SLP consultation and will assess for underlying pneumonia in setting of suspected worsening dysphagia. Possible need for video swallow study. OK for regular diet and thin liquids for now.   -- CT chest for further evaluation of possible concurrent aspiration pneumonia notable for bilateral pneumonia (RUL and LLL), per above.   -- Plan to address diet preferences with patient in presence of guardian 04/30/2025 given increased risk with apparent worsening dysphagia.      Chronic troponin elevation  CAD s/p cardiac stenting  Ascending aortic dilatation  Mild mitral regurgitation   Mild tricuspid regurgitation   Hypertension  Hyperlipidemia  *Denies any chest pain. No palpitations. SOB as above. Chronic troponin elevation suspected secondary to NSTEMI in setting of demand given recurrent malignant pleural effusions and pericardial effusion.   *Echocardiogram 04/28/2025 with hyperdynamic left ventricular function. LVEF 65-70%. Valves were not interrogated given limited study. Small anterior pericardial effusion most prominent adjacent to the right ventricle (<1 cm). Mild respiratory variation demonstrated within the mitral and tricuspid inflow velocity profiles consistent with constrictive  physiology. No right ventricular or right atrial collapse. IVC normal size.  -- Telemetry.   -- Coagulation on hold as above. Resume in AM.   -- Initiated on lasix 20 mg oral daily. Continue.   -- Continue PTA statin.      History of CVA with right sided hemiplegia  Seizure D/O  *Wheelchair bound at baseline.   -- Fall precautions.   -- Coagulation on hold as above.  -- Continue PTA statin.      GERD.  *Not on PTA medications. No acute issues.     MDD with anxiety  *Not on PTA medications. No acute issues.      History of alcohol use D/O  History of substance use D/O  -- Noted.      Hx of hypoalbuminemia in setting of metastatic lung cancer   -- Nutrition consultation per oncology.   -- Ensure between meals.      Mild elevated AST  -- Noted. Follow LFTs.      Physical deconditioning from medical illness.  *Resident of assisted living facility. Currently residing at [facility].   -- Fall precautions.  -- PT evaluation requested.      Goals of care   -- Discussed with guardian 04/29 AM. Plan for palliative care conference 04/30. Awaiting availability from palliative. Full code until palliative care conference.         Diet: Fluid restriction 2000 ML FLUID  Snacks/Supplements Adult: Ensure Enlive; Between Meals  Combination Diet Regular Diet; Thin Liquids (level 0)    DVT Prophylaxis: Pneumatic Compression Devices  Buckner Catheter: Not present  Lines: PRESENT      Port A Cath Single 04/11/22 Right Chest wall-Site Assessment: WDL      Cardiac Monitoring: ACTIVE order. Indication: Tachyarrhythmias, acute (48 hours)  Code Status: Full Code      Clinically Significant Risk Factors Present on Admission               # Hypoalbuminemia: Lowest albumin = 2.5 g/dL at 4/29/2025  6:27 AM, will monitor as appropriate  # Drug Induced Coagulation Defect: home medication list includes an anticoagulant medication    # Hypertension: Noted on problem list      # Anemia: based on hgb <11           # Financial/Environmental Concerns:         Social Drivers of Health    Tobacco Use: Medium Risk (4/9/2025)    Patient History     Smoking Tobacco Use: Former     Smokeless Tobacco Use: Never        Disposition Plan     Medically Ready for Discharge: Anticipated in 2-4 Days     The patient's care was discussed with the Attending Physician, Dr. Guzman .    Shelley Sandoval PA-C  Hospitalist Service  [facility]  Securely message with [employer] (more info)  Text page via [facility] Paging/Directory    ______________________________________________________________________    Interval History   No acute events overnight. VSS although requiring 4 L NC until post repeat thoracentesis. Feeling much improved following repeat thoracentesis and abx delivery. Denies chest pain other than pain surrounding chest tube site from thoracentesis. Also denies SOB, palpitations, lightheadedness, worsening confusion, dysphagia, pain associated with his malignancy, abdominal pain, urinary or bowel changes. Tolerating oral intake. Does not want to change diet. Discussed addition of palliative care and code status. Unable to voice understanding regarding current severity of illness although alert and oriented. Discussed with guardian 04/29 AM. Plan for palliative care conference 04/30. Awaiting availability from palliative.     Physical Exam   Vital Signs: Temp: 97.5  F (36.4  C) Temp src: Oral BP: 106/61 Pulse: 92   Resp: 16 SpO2: 93 % O2 Device: Nasal cannula Oxygen Delivery: 4 LPM  Weight: 162 lbs 4.14 oz    GENERAL:  Pleasant, cooperative, alert. Thin appearing, chronically ill appearing male in no acute distress. NC in place post-thoracentesis although patient not requiring oxygen. Nontoxic appearing.   HEENT: Normocephalic, atraumatic.  Dysphonic voice noted.   PULMONOLOGY: Decreased breath sounds bilaterally. No increased work of breathing.   CARDIAC: Regular rhythm with tachycardic rate.   ABDOMEN: Soft, nontender non distended.   MUSCULOSKELETAL:  Moving x 4 spontaneously.  Normal bulk and tone. Bilateral pitting LE edema although bilateral legs atrophic. DP pulses intact.   NEURO: Alert and oriented x4. Nonfocal exam.    Medical Decision Making       50 MINUTES SPENT BY ME on the date of service doing chart review, history, exam, documentation & further activities per the note.      Data   ------------------------- PAST 24 HR DATA REVIEWED -----------------------------------------------    I have personally reviewed the  following data over the past 24 hrs:    4.6  \   7.8 (L)   / 222     139 106 21.3 /  81   3.9 25 0.82 \     ALT: 11 AST: 19 AP: 85 TBILI: 0.4   ALB: 2.5 (L) TOT PROTEIN: 4.9 (L) LIPASE: N/A     Procal: N/A CRP: N/A Lactic Acid: 0.4 (L)         Imaging results reviewed over the past 24 hrs:   Recent Results (from the past 24 hours)   US Thoracentesis    Narrative    EXAM:   1. LEFT THORACENTESIS  2. ULTRASOUND GUIDANCE  LOCATION: [facility]  DATE: 4/29/2025    INDICATION: Pleural effusion.    PROCEDURE: Informed consent obtained. Time out performed. The chest was prepped and draped in sterile fashion. 10 mL of 1 % lidocaine was infused into the local soft tissues. Under direct ultrasound guidance, a 5 Gabonese catheter system was placed into   the pleural effusion.     2.5 liters of clear fluid were removed and sent to lab, if requested.    Patient tolerated procedure well.    Ultrasound imaging was obtained and placed in the patient's permanent medical record.      Impression    IMPRESSION:  Status post left ultrasound-guided thoracentesis.

## 2025-04-29 NOTE — PLAN OF CARE
Summary:  Admitted for SOB. Found to have Pleural Effusions. Hx. Lung cancer- oncology notes for details.     DATE & TIME: 4/28/25 Evenings   Cognitive Concerns/ Orientation : A&OX4. Calm and cooperative. Quiet, rarely calling, rounded on freq.   BEHAVIOR & AGGRESSION TOOL COLOR: Green  CIWA SCORE: NA   ABNL VS/O2: VSS on 4 L NC, MAZARIEGOS and low sats when eating. O2 assessment completed on days.   MOBILITY: 2PA. Lift or air mat for transfers, pt too weak to stand with PT today. Baseline R sided weakness. Assisted with positioning.   PAIN MANAGMENT: Denied  DIET: Reg with 2000ml Fluid restriction. Needs help ordering meals. Eats independently.  BOWEL/BLADDER: Continent, Urinal at bedside. No BM today, declined stool softeners.   ABNL LAB/BG: calcium 8.2, albumin 2.5, hgb 8.0.   DRAIN/DEVICES: R. Chest port, SL   TELEMETRY RHYTHM: NSR  SKIN: Redness on coccyx intact. dry flaky skin on bilateral shin. Intact. Thoracentesis site to left side.   TESTS/PROCEDURES: CT chest, abdomin, pelvis w/contrast completed. Echo completed.   D/C DAY/GOALS/PLACE: Pending improvements. Pt has Legal Guardian.   OTHER IMPORTANT INFO: Home O2 assessment completed-see notes. Hem/onc following. Speech, PT, consulted. Daily weight.       CT abnormal. New orders placed late this shift for abx, NPO at midnight, Nasal swab for MRSA, and Thoracentesis. MD had tried to reach out to Guardians to address Thoracentesis and Code status.

## 2025-04-29 NOTE — PHARMACY-VANCOMYCIN DOSING SERVICE
Pharmacy Vancomycin Initial Note  Date of Service 2025  Patient's  1960  64 year old, male    Indication: Healthcare-Associated Pneumonia    Current estimated CrCl = Estimated Creatinine Clearance: 89.2 mL/min (based on SCr of 0.81 mg/dL).    Creatinine for last 3 days  2025:  5:44 AM Creatinine 0.77 mg/dL  2025:  6:15 AM Creatinine 0.81 mg/dL    Recent Vancomycin Level(s) for last 3 days  No results found for requested labs within last 3 days.      Vancomycin IV Administrations (past 72 hours)        No vancomycin orders with administrations in past 72 hours.                    Nephrotoxins and other renal medications (From now, onward)      Start     Dose/Rate Route Frequency Ordered Stop    25 1100  vancomycin (VANCOCIN) 1,000 mg in 200 mL dextrose intermittent infusion         1,000 mg  200 mL/hr over 1 Hours Intravenous EVERY 12 HOURS 25 2230  vancomycin (VANCOCIN) 1,250 mg in 0.9% NaCl 262.5 mL intermittent infusion         1,250 mg  over 90 Minutes Intravenous ONCE 25 1330  furosemide (LASIX) tablet 20 mg        Note to Pharmacy: PTA Sig:Take 1 tablet (20 mg) by mouth daily. Hold if systolic blood pressures less than 110 or poor oral intake.      20 mg Oral DAILY 25 1243              Contrast Orders - past 72 hours (72h ago, onward)      Start     Dose/Rate Route Frequency Stop    25 1600  iopamidol (ISOVUE-370) solution 82 mL         82 mL Intravenous ONCE 25 1619            Real GravityRZanAqua Prediction of Planned Initial Vancomycin Regimen  Loading dose: 1250 mg at 00:00 2025.  Regimen: 1000 mg IV every 12 hours.  Start time: 12:00 on 2025  Exposure target: AUC24 (range)400-600 mg/L.hr   AUC24,ss: 555 mg/L.hr  Probability of AUC24 > 400: 82 %  Ctrough,ss: 17.5 mg/L  Probability of Ctrough,ss > 20: 38 %  Probability of nephrotoxicity (Lodise BHAVIK ): 13 %          Plan:  Start vancomycin  1000 mg IV  q12h after load of 1250 mg x1.  Vancomycin monitoring method: AUC  Vancomycin therapeutic monitoring goal: 400-600 mg*h/L  Pharmacy will check vancomycin levels as appropriate in 1-3 Days.    Serum creatinine levels will be ordered daily for the first week of therapy and at least twice weekly for subsequent weeks.      Akil Aliheyder, RPH

## 2025-04-30 LAB
ANION GAP SERPL CALCULATED.3IONS-SCNC: 9 MMOL/L (ref 7–15)
ATRIAL RATE - MUSE: 105 BPM
BUN SERPL-MCNC: 25.6 MG/DL (ref 8–23)
CALCIUM SERPL-MCNC: 8.1 MG/DL (ref 8.8–10.4)
CHLORIDE SERPL-SCNC: 103 MMOL/L (ref 98–107)
CREAT SERPL-MCNC: 0.85 MG/DL (ref 0.67–1.17)
DIASTOLIC BLOOD PRESSURE - MUSE: NORMAL MMHG
EGFRCR SERPLBLD CKD-EPI 2021: >90 ML/MIN/1.73M2
ERYTHROCYTE [DISTWIDTH] IN BLOOD BY AUTOMATED COUNT: 19.9 % (ref 10–15)
GLUCOSE SERPL-MCNC: 84 MG/DL (ref 70–99)
HCO3 SERPL-SCNC: 25 MMOL/L (ref 22–29)
HCT VFR BLD AUTO: 26.9 % (ref 40–53)
HGB BLD-MCNC: 8.2 G/DL (ref 13.3–17.7)
INTERPRETATION ECG - MUSE: NORMAL
MCH RBC QN AUTO: 28.7 PG (ref 26.5–33)
MCHC RBC AUTO-ENTMCNC: 30.5 G/DL (ref 31.5–36.5)
MCV RBC AUTO: 94 FL (ref 78–100)
P AXIS - MUSE: 78 DEGREES
PLATELET # BLD AUTO: 222 10E3/UL (ref 150–450)
POTASSIUM SERPL-SCNC: 3.7 MMOL/L (ref 3.4–5.3)
PR INTERVAL - MUSE: 156 MS
QRS DURATION - MUSE: 86 MS
QT - MUSE: 342 MS
QTC - MUSE: 452 MS
R AXIS - MUSE: 66 DEGREES
RBC # BLD AUTO: 2.86 10E6/UL (ref 4.4–5.9)
SODIUM SERPL-SCNC: 137 MMOL/L (ref 135–145)
SYSTOLIC BLOOD PRESSURE - MUSE: NORMAL MMHG
T AXIS - MUSE: 15 DEGREES
VENTRICULAR RATE- MUSE: 105 BPM
WBC # BLD AUTO: 5.2 10E3/UL (ref 4–11)

## 2025-04-30 PROCEDURE — 250N000013 HC RX MED GY IP 250 OP 250 PS 637: Performed by: STUDENT IN AN ORGANIZED HEALTH CARE EDUCATION/TRAINING PROGRAM

## 2025-04-30 PROCEDURE — 94640 AIRWAY INHALATION TREATMENT: CPT | Mod: 76

## 2025-04-30 PROCEDURE — 120N000001 HC R&B MED SURG/OB

## 2025-04-30 PROCEDURE — 250N000013 HC RX MED GY IP 250 OP 250 PS 637

## 2025-04-30 PROCEDURE — 99233 SBSQ HOSP IP/OBS HIGH 50: CPT | Performed by: INTERNAL MEDICINE

## 2025-04-30 PROCEDURE — 80048 BASIC METABOLIC PNL TOTAL CA: CPT

## 2025-04-30 PROCEDURE — 94640 AIRWAY INHALATION TREATMENT: CPT

## 2025-04-30 PROCEDURE — 250N000009 HC RX 250: Performed by: HOSPITALIST

## 2025-04-30 PROCEDURE — 99497 ADVNCD CARE PLAN 30 MIN: CPT | Mod: 25 | Performed by: NURSE PRACTITIONER

## 2025-04-30 PROCEDURE — 999N000157 HC STATISTIC RCP TIME EA 10 MIN

## 2025-04-30 PROCEDURE — 99222 1ST HOSP IP/OBS MODERATE 55: CPT | Mod: 25 | Performed by: NURSE PRACTITIONER

## 2025-04-30 PROCEDURE — 85018 HEMOGLOBIN: CPT

## 2025-04-30 PROCEDURE — 250N000011 HC RX IP 250 OP 636: Mod: JZ | Performed by: STUDENT IN AN ORGANIZED HEALTH CARE EDUCATION/TRAINING PROGRAM

## 2025-04-30 PROCEDURE — 250N000011 HC RX IP 250 OP 636: Performed by: EMERGENCY MEDICINE

## 2025-04-30 RX ADMIN — IPRATROPIUM BROMIDE AND ALBUTEROL SULFATE 3 ML: .5; 3 SOLUTION RESPIRATORY (INHALATION) at 19:38

## 2025-04-30 RX ADMIN — APIXABAN 2.5 MG: 2.5 TABLET, FILM COATED ORAL at 09:23

## 2025-04-30 RX ADMIN — FOLIC ACID 1 MG: 1 TABLET ORAL at 08:00

## 2025-04-30 RX ADMIN — CEFEPIME 2 G: 2 INJECTION, POWDER, FOR SOLUTION INTRAVENOUS at 21:40

## 2025-04-30 RX ADMIN — Medication 5 ML: at 13:02

## 2025-04-30 RX ADMIN — FUROSEMIDE 20 MG: 20 TABLET ORAL at 08:00

## 2025-04-30 RX ADMIN — CEFEPIME 2 G: 2 INJECTION, POWDER, FOR SOLUTION INTRAVENOUS at 13:01

## 2025-04-30 RX ADMIN — Medication 5 ML: at 07:03

## 2025-04-30 RX ADMIN — IPRATROPIUM BROMIDE AND ALBUTEROL SULFATE 3 ML: .5; 3 SOLUTION RESPIRATORY (INHALATION) at 07:41

## 2025-04-30 RX ADMIN — CEFEPIME 2 G: 2 INJECTION, POWDER, FOR SOLUTION INTRAVENOUS at 06:29

## 2025-04-30 ASSESSMENT — ACTIVITIES OF DAILY LIVING (ADL)
ADLS_ACUITY_SCORE: 71
ADLS_ACUITY_SCORE: 70
ADLS_ACUITY_SCORE: 71
ADLS_ACUITY_SCORE: 71
ADLS_ACUITY_SCORE: 70
ADLS_ACUITY_SCORE: 70
ADLS_ACUITY_SCORE: 66
ADLS_ACUITY_SCORE: 71
ADLS_ACUITY_SCORE: 66
ADLS_ACUITY_SCORE: 71

## 2025-04-30 NOTE — PROGRESS NOTES
Pt is followed by Dr. John of [facility].   Will direct consult to their team.      Amber HENDRICKSON, [facility]  [phone] (office) or [phone] (cell)

## 2025-04-30 NOTE — PLAN OF CARE
Goal Outcome Evaluation:      Plan of Care Reviewed With: patient      Summary:  Admitted for SOB. Found to have Pleural Effusions. Hx. Lung cancer- oncology notes for details.     DATE & TIME: 4/29/252, [id number]  Cognitive Concerns/ Orientation : A&OX4. Calm and cooperative. Speech hoarse.  BEHAVIOR & AGGRESSION TOOL COLOR: Green  CIWA SCORE: NA   ABNL VS/O2: VSS on 4 L NC, MAZARIEGOS.  MOBILITY: A x 2 with lift. Up in chair last shift.  Baseline R sided weakness. Assisted with positioning.   PAIN MANAGMENT: Denied  DIET:  Regular with 2000ml Fluid restriction. Needs help ordering meals. Eats independently.  BOWEL/BLADDER: Continent, Urinal at bedside. Had BM yesterday.  ABNL LAB/BG: Lactic acid 0.4,  Albumin 2.5, Hgb 7.8.   DRAIN/DEVICES: R. Chest port, heparin locked.   TELEMETRY RHYTHM: ST  SKIN: Redness on coccyx intact. dry flaky skin on bilateral shin. Dressings on right and left back Thoracentesis site CDI.  TESTS/PROCEDURES: Thoracentesis done yesterday, 2.5 L fluid removed.  D/C DAY/GOALS/PLACE: Pending improvements. PT recommending discharge to TCU. Pt has Legal Guardian.   OTHER IMPORTANT INFO: Cardiology signed off. Hem/onc following. Speech following. Daily weight. Palliative consult pending. On daily po Lasix. LS coarse. On schedule neb treatment. On Cefepime q 8 hrs.

## 2025-04-30 NOTE — PLAN OF CARE
Goal Outcome Evaluation:     DATE & TIME: 4/30/25, 0700-15;30  Cognitive Concerns/ Orientation : A&OX4. Calm and cooperative. Speech hoarse.  BEHAVIOR & AGGRESSION TOOL COLOR: Green  CIWA SCORE: NA   ABNL VS/O2: VSS on 4 L NC this AM, weaned to 2L sats 92-94%, MAZARIEGOS.  MOBILITY: A x 2 with lift. Up in chair.  Baseline R sided weakness.    PAIN MANAGMENT: Denied  DIET:  Regular with 2000ml Fluid restriction. Needs help ordering meals. Eats independently.  BOWEL/BLADDER: Continent, Urinal at bedside. No BM.  ABNL LAB/BG Hgb 8.2  DRAIN/DEVICES: R. Chest port, heparin locked.   TELEMETRY RHYTHM:NSR,tachy at times  SKIN: Redness on coccyx intact. dry flaky skin on bilateral shin. Dressings on right and left back Thoracentesis site CDI.  TESTS/PROCEDURES: none   D/C DAY/GOALS/PLACE: Pending improvements. PT recommending discharge to TCU. Pt has Legal Guardian.   OTHER IMPORTANT INFO:  Hem/onc, . Speech following. Had Palliative care conference  with patient and legal guardian. Changed him to DNR/DNI. On Cefepime q 8 hrs.

## 2025-04-30 NOTE — PROGRESS NOTES
[facility] Cancer Care Progress Note          Assessment and Plan:     64-year-old with stage IV metastatic lung cancer admitted for dyspnea and found to have recurrent pleural effusion and pericardial effusion.    1.  Metastatic lung cancer  2.  Anemia in neoplastic disease  3.  Malignant bilateral pleural effusions  4.  Pericardial effusion  5.  Dyspnea related to #3  6.  Dysphagia  - Patient is well-known to me and has progressed multiple therapies including paclitaxel and carboplatin, pemetrexed, bevacizumab, atezolizumab, Taxotere, and most recently gemcitabine since 1/29/2025.  - Review of 4/28 CT chest/abdomen/pelvis with contrast shows interval enlargement of bulky adenopathy within retroperitoneum with left periaortic lymph node measuring 5 x 3.1 cm, increased from 4.5 x 2.7 cm, retrocaval lymph node measuring 3.1 x 1.3 cm, previously 2.4 x 1 cm as examples.  Left pelvic sidewall lymph node measures 3.5 x 1.6 cm, previously 2 x 1.5 cm.  Stable osseous lesions.  Large left pleural effusion and moderate partially loculated right effusion.  -4/28 echocardiogram showed small anterior pericardial effusion, constrictive physiology, LVEF 65-70%.  No tamponade.  --Goals of care discussion held today with guardian via speaker phone, palliative care, and Shiela from hospitalist time.  Discussed with Kt that he has no further treatment options for his metastatic incurable lung cancer and that recent CT scans shows definite disease progression.  I advised hospice.  He is receptive to this.    --Recommend left sided PleurX catheter prior to discharge.    Will signoff but please call back if questions.    Sangita John MD  [facility] Hematology/Oncology    Total time spent today: 60 minutes in chart review, patient evaluation, counseling, documentation, test and/or medication/prescription orders, and coordination of care.                Interval History:                   Review of Systems:   As per  subjective, otherwise 5 systems reviewed and negative.           Physical Exam:   Blood pressure 105/58, pulse 94, temperature 97.7  F (36.5  C), temperature source Oral, resp. rate 16, weight 70.8 kg (156 lb 1.4 oz), SpO2 95%.      Vital Sign Ranges  Temperature Temp  Av  F (36.7  C)  Min: 97.6  F (36.4  C)  Max: 98.8  F (37.1  C)   Blood pressure Systolic (24hrs), Av , Min:96 , Max:116        Diastolic (24hrs), Av, Min:54, Max:64      Pulse Pulse  Av  Min: 90  Max: 94   Respirations Resp  Av.7  Min: 16  Max: 18   Pulse oximetry SpO2  Av.3 %  Min: 95 %  Max: 97 %         Intake/Output Summary (Last 24 hours) at 2025 1152  Last data filed at 2025 1108  Gross per 24 hour   Intake 1300 ml   Output 1825 ml   Net -525 ml       Constitutional:   No acute distress.          Medications:     No current outpatient medications on file.                Data:     Results for orders placed or performed during the hospital encounter of 25 (from the past 24 hours)   CBC with platelets   Result Value Ref Range    WBC Count 5.2 4.0 - 11.0 10e3/uL    RBC Count 2.86 (L) 4.40 - 5.90 10e6/uL    Hemoglobin 8.2 (L) 13.3 - 17.7 g/dL    Hematocrit 26.9 (L) 40.0 - 53.0 %    MCV 94 78 - 100 fL    MCH 28.7 26.5 - 33.0 pg    MCHC 30.5 (L) 31.5 - 36.5 g/dL    RDW 19.9 (H) 10.0 - 15.0 %    Platelet Count 222 150 - 450 10e3/uL   Basic metabolic panel   Result Value Ref Range    Sodium 137 135 - 145 mmol/L    Potassium 3.7 3.4 - 5.3 mmol/L    Chloride 103 98 - 107 mmol/L    Carbon Dioxide (CO2) 25 22 - 29 mmol/L    Anion Gap 9 7 - 15 mmol/L    Urea Nitrogen 25.6 (H) 8.0 - 23.0 mg/dL    Creatinine 0.85 0.67 - 1.17 mg/dL    GFR Estimate >90 >60 mL/min/1.73m2    Calcium 8.1 (L) 8.8 - 10.4 mg/dL    Glucose 84 70 - 99 mg/dL     *Note: Due to a large number of results and/or encounters for the requested time period, some results have not been displayed. A complete set of results can be found in Results Review.

## 2025-04-30 NOTE — CONSULTS
Palliative Care Consultation Note  [facility]      Patient: Kt Rasmussen  Date of Admission:  4/27/2025    Requesting Clinician / Team: Hospital Medicine   Reason for consult:   Goals of care     Recommendations & Counseling     GOALS OF CARE:   Life-prolonging with limits - Now DNR/DNI    Advance Care Planning Discussion 4/30/2025. Hira CUELLAR NP along with Dr. John with oncology and Lisa Ness PA-C met with Patient and their Legal Guardian today at the hospital to discuss Advance Care Planning. Kt Rasmussen does not have decisional capacity  and was present for this discussion.  Those present were informed of the voluntary nature of this discussion and wished to proceed.   Introductions were held with the medical team, Kt, and his guardian  Introduced the role of palliative care as an interdisciplinary team that cares for patients with serious illness to help support symptom management, communication, coping for patients and their families as well as support with medical decision making.  Dr. John reviewed Kt's cancer directed treatment.  She shared that he has surpassed all lines of therapy.  She shared with us that it is quite remarkable he has survived as long as he has.  Recent imaging showed disease progression despite ongoing treatment. Unfortunately, he is no longer a candidate for cancer directed treatment.   Discussed that when patients are no longer a candidate for cancer treatment focusing on quality of life and providing coping support would be the most appropriate.   Education provided regarding hospice philosophy, prognostic, and eligibility criteria.   We discussed potential risks and rationale of attempting cardiac resuscitation, intubation, and mechanical ventilation.  We also discussed probability of survival as well as quality of life implications.  Based on this discussion, patient or surrogate response/decision: Change to  DNR/DNI.  Kt's guardian plans to speak with Kt's siblings to discuss these next steps including enrollment in hospice.  Oncology is recommending a Pleurx catheter placement.  Would highly recommend placing prior to discharge with hospice support.  At this time, they would like to continue with ongoing disease treatment until medically stable to discharge back to his residence with hospice support.  This discussion began at 1300 and ended at 1337 for a total of 37 minutes.     ADVANCE CARE PLANNING:  Legal guardianship paperwork on file. see ACP tab for further details  Code status: No CPR- Do NOT Intubate    DECISION MAKING:  Patient's decision making preferences: not assessed  Patient has decision-making capacity today for complex decisions: Lacks decision making capacity    MEDICAL MANAGEMENT:   We are not actively managing symptoms at this time.    PSYCHOSOCIAL/SPIRITUAL SUPPORT:  Family guardianship.  Has 2 siblings Abimbola and Jb who have been providing support for Kt.  It appears that the medical team has attempted to reach out to family and they have been unavailable.  [facility]: [facility]     Palliative Care will continue to follow. Thank you for the consult and allowing us to aid in the care of Kt Rasmussen.    These recommendations have been discussed with bedside and primary team.    Hira Mahajan NP  [facility], Palliative Care  Securely message with the [employer] Web Console (learn more here) or  Text page via [employer] Paging/Directory     Chart documentation was completed, in part, with [employer] voice-recognition software. Even though reviewed, some grammatical, spelling, and word errors may remain.    Assessment      Kt Rasmussen is a 64 year old male with history of metastatic lung cancer, pleural effusion, pericardial effusion, lower extremity DVT on anticoagulation, chronic anemia, thrombocytopenia, coronary artery disease status post stenting, hypertension, hyperlipidemia, GERD,  seizure disorder, anxiety disorder, history of CVA with right hemiplegia admitted on 4/27/2025 from his TCU for shortness of breath.  He was found to have recurrent pleural effusions now status post thoracentesis, pericardial effusions as well as recurrent aspiration pneumonia.  He has been seen by oncology, cardiology as well as therapies during this hospitalization.  The palliative care service was consulted for goals of care     Patient recently admitted 04/09/2025 - 04/16/2025 at [facility] similar events.        History of Present Illness         Prognosis, Goals, & Planning:   Functional Status just prior to this current hospitalization:  Karnofsky Performance Scale (KPS): Unable to care for self; requires equivalent of institutional or hospital care; disease may be progressing rapidly: 40 - Disabled; requires special care and assistance.    Prognosis, Goals, and/or Advance Care Planning:  See above            Coping, Meaning, & Spirituality:   Mood, coping, and/or meaning in the context of serious illness were addressed today: Yes    Social:   Living situation: Most recently at TCU hopeful to return to his senior living    Medications:  Reviewed this patient's medication profile and medications from this hospitalization.   [place] Board of Pharmacy Data Base Reviewed: Yes:   reviewed - no record of controlled substances prescribed..    ROS:  Comprehensive ROS is reviewed and is negative except as here & per HPI:     Physical Exam   Vital Signs with Ranges  Temp:  [97.6  F (36.4  C)-98.8  F (37.1  C)] 97.7  F (36.5  C)  Pulse:  [90-94] 94  Resp:  [16-18] 16  BP: ()/(54-64) 105/58  SpO2:  [92 %-97 %] 93 %  Wt Readings from Last 10 Encounters:   04/30/25 70.8 kg (156 lb 1.4 oz)   04/16/25 74.5 kg (164 lb 3.9 oz)   04/09/25 79.4 kg (175 lb)   04/09/25 79.4 kg (175 lb)   03/19/25 80.7 kg (178 lb)   02/19/25 79.9 kg (176 lb 2.4 oz)   02/06/25 79.9 kg (176 lb 3.2 oz)   01/29/25 81.6 kg (180 lb)   01/08/25 81.6  kg (180 lb)   12/26/24 81.6 kg (180 lb)     156 lbs 1.37 oz    Physical Exam  Vitals and nursing note reviewed.   Constitutional:       General: He is not in acute distress.  HENT:      Head: Normocephalic.      Mouth/Throat:      Mouth: Mucous membranes are moist.      Pharynx: Oropharynx is clear.   Eyes:      Extraocular Movements: Extraocular movements intact.      Conjunctiva/sclera: Conjunctivae normal.      Pupils: Pupils are equal, round, and reactive to light.   Cardiovascular:      Rate and Rhythm: Normal rate and regular rhythm.      Pulses: Normal pulses.   Pulmonary:      Effort: Pulmonary effort is normal. No respiratory distress.      Breath sounds: No wheezing.   Abdominal:      General: Abdomen is flat. There is no distension.      Tenderness: There is no abdominal tenderness.   Musculoskeletal:         General: Normal range of motion.   Skin:     General: Skin is warm and dry.      Capillary Refill: Capillary refill takes less than 2 seconds.   Neurological:      General: No focal deficit present.      Mental Status: He is alert. Mental status is at baseline. He is disoriented.   Psychiatric:         Mood and Affect: Mood normal.           Data reviewed:  Results for orders placed or performed during the hospital encounter of 04/27/25 (from the past 24 hours)   CBC with platelets   Result Value Ref Range    WBC Count 5.2 4.0 - 11.0 10e3/uL    RBC Count 2.86 (L) 4.40 - 5.90 10e6/uL    Hemoglobin 8.2 (L) 13.3 - 17.7 g/dL    Hematocrit 26.9 (L) 40.0 - 53.0 %    MCV 94 78 - 100 fL    MCH 28.7 26.5 - 33.0 pg    MCHC 30.5 (L) 31.5 - 36.5 g/dL    RDW 19.9 (H) 10.0 - 15.0 %    Platelet Count 222 150 - 450 10e3/uL   Basic metabolic panel   Result Value Ref Range    Sodium 137 135 - 145 mmol/L    Potassium 3.7 3.4 - 5.3 mmol/L    Chloride 103 98 - 107 mmol/L    Carbon Dioxide (CO2) 25 22 - 29 mmol/L    Anion Gap 9 7 - 15 mmol/L    Urea Nitrogen 25.6 (H) 8.0 - 23.0 mg/dL    Creatinine 0.85 0.67 - 1.17 mg/dL     GFR Estimate >90 >60 mL/min/1.73m2    Calcium 8.1 (L) 8.8 - 10.4 mg/dL    Glucose 84 70 - 99 mg/dL     *Note: Due to a large number of results and/or encounters for the requested time period, some results have not been displayed. A complete set of results can be found in Results Review.     EXAM: CT CHEST/ABDOMEN/PELVIS W CONTRAST  LOCATION: [facility]  DATE: 4/28/2025     INDICATION: Cancer response eval.  COMPARISON: 12/23/2024  TECHNIQUE: CT scan of the chest, abdomen, and pelvis was performed following injection of IV contrast. Multiplanar reformats were obtained. Dose reduction techniques were used.   CONTRAST: 82mL isovue 370     FINDINGS:   LUNGS AND PLEURA: Large left effusion and moderate partially loculated right effusion. Patchy increased airspace opacity within the right upper and left lower lungs. Mild emphysema. Bibasilar atelectasis. Bronchial wall thickening with areas of mucus   plugging at the lung bases. Posttreatment changes right perihilar region with areas of increased soft tissue density and architectural distortion with traction bronchiectasis, similar to prior examination.     MEDIASTINUM/AXILLAE: Heart is normal in size. Stable mildly enlarged right hilar lymph nodes. No mediastinal or axillary adenopathy.     CORONARY ARTERY CALCIFICATION: Severe.     HEPATOBILIARY: Too small to characterize low-attenuation lesion within segment 7 of the liver. Focal fat infiltration along the falciform ligament. Gallbladder decompressed.     PANCREAS: Normal.     SPLEEN: Normal.     ADRENAL GLANDS: Normal.     KIDNEYS/BLADDER: No significant mass, stone, or hydronephrosis.     BOWEL: Diverticulosis of the colon. No acute inflammatory change. No obstruction. Moderate stool noted throughout the colon.     LYMPH NODES: Large bulky adenopathy is noted in the retroperitoneum for example left periaortic lymph node measuring 5 x 3.1 cm which is increased in compared to prior where it  measured approximately 4.5 x 2.7 cm. Interval enlargement of a retrocaval   lymph node measuring 3.1 x 1.3 cm, previous measurement 2.4 x 1 cm. Left. Lymph node near the aortoiliac bifurcation measuring 2.1 cm in short axis, previous measurement 1.3 cm. Aortocaval lymph node measuring 1.9 x 1.2 cm, previous measurement 1.4 x 1.3   cm. Left pelvic sidewall lymph node measuring approximately 3.5 x 1.6 cm, previous measurement 2 x 1.5 cm. Right pelvic sidewall adenopathy measuring 2 cm, previous measurement 1 cm.     VASCULATURE: Severe atherosclerotic disease of the abdominal aorta and its branches.     PELVIC ORGANS: Bladder within normal limits.     MUSCULOSKELETAL: There are changes of spine and hips. Stable subtle osseous lesions involving the sacrum left iliac wing, bilateral acetabular region and T12/L1 vertebral bodies which are better appreciated on PET/CT.                                                                      IMPRESSION:  1.  Interval enlargement of the bulky adenopathy within the abdomen and pelvis.  2.  Large left effusion and moderate partially loculated right effusion.  3.  Patchy airspace opacity within the right upper and left lower lungs concerning for pneumonia.  4.  Stable posttreatment changes right perihilar region.  5.  Stable osseous lesions which are better appreciated on PET/CT.    Medical Decision Making       MANAGEMENT DISCUSSED with the following over the past 24 hours: Hospital medicine, oncology, , nursing, guardian, patient   NOTE(S)/MEDICAL RECORDS REVIEWED over the past 24 hours: Guardianship paperwork, hospital history and physical and progress notes, therapy notes, oncology, cardiology  Tests REVIEWED in the past 24 hours:  - See lab/imaging results included in the data section of the note  SUPPLEMENTAL HISTORY, in addition to the patient's history, over the past 24 hours obtained from:   - Legal guardian  Medical complexity over the past 24 hours:  -  Parenteral (IV) CONTROLLED SUBSTANCES ordered  - Decision to DE-ESCALATE CARE based on prognosis  - Prescription DRUG MANAGEMENT performed

## 2025-04-30 NOTE — PROGRESS NOTES
[facility]    Medicine Progress Note - Hospitalist Service    Date of Admission:  4/27/2025    Assessment & Plan   Kt Rasmussen is a 64 year old male with history of metastatic lung cancer, pleural effusion, pericardial effusion, lower extremity DVT on anticoagulation, chronic anemia, thrombocytopenia, coronary artery disease status post stenting, hypertension, hyperlipidemia, GERD, seizure disorder, anxiety disorder, history of CVA with right hemiplegia admitted on 4/27/2025 for shortness of breath.     Patient recently admitted 04/09/2025 - 04/16/2025 at [facility] for acute hypoxic respiratory failure likely multifactorial. Bilateral (right greater than left) moderate to large pleural effusions, moderate pericardial effusion, metastatic non-small cell lung cancer. Patient underwent thoracocentesis (1.1 L removed), diuresis, weaned down to 2 L nasal oxygen and discharged back to care facility.  Prior to 04/2025 hospitalization, patient underwent outpatient thoracentesis 01/06/2025, 12/27/2024 and 12/26/2024 where 1.9 L, 0.9 L and 2.65 L removed respectively.      Acute on chronic respiratory failure, multifactorial in etiology in setting of pneumonia, metastatic lung cancer, recurring pleural effusions, atelectasis, pericardial effusion, chemotoxicity and deconditioning  Hx moderate pericardial effusion on TTE 04/2025  Hx bilateral pleural effusions s/p bilateral thoracentesis 04/27/2025, 04/09/2025, 1/6/2025, 12/27/2024 and 12/26/2024   *Patient presented to [facility] ED from TCU with acute on chronic dyspnea. Patient discharged previously on 2 L NC although reportedly not requiring oxygen at baseline, per patient. He was found to be hypoxic in the 60s on RA per EMS.   *Upon arrival to [facility] ED, patient noted to be hypoxic with O2 saturations 83% ORA. CXR showing moderate right and small left pleural effusions with adjacent atelectasis. Mild pulmonary edema. No pneumothorax. Stable  cardiomediastinal silhouette. EKG without acute ischemic changes.   *Patient with continued worsening dyspnea. As of 04/28/2025, patient requiring 4 L NC to sustain oxygenations low 90% following 2.4 L fluid removal s/p thoracentesis 04/27/2025. Recurrent pleural effusions suspected secondary to malignancy and chemotherapy.   *CT chest 04/28/2025 with right upper and left lower lobe opacities concerning for pneumonia. Notable also for large left effusion and moderate partially loculated right effusion. Interval enlargement of the bulky adenopathy within the abdomen and pelvis. Stable post treatment changes right perihilar region. Stable osseous lesions.   *Echocardiogram 04/28/2025 with hyperdynamic left ventricular function. LVEF 65-70%. Valves were not interrogated given limited study. Small anterior pericardial effusion most prominent adjacent to the right ventricle (<1 cm). Mild respiratory variation demonstrated within the mitral and tricuspid inflow velocity profiles consistent with constrictive  physiology. No right ventricular or right atrial collapse. IVC normal size. Large pleural effusion. Compared to study dated 04/10/2025, the pericardial effusion is smaller however, there is evidence of constrictive physiology without tamponade. There is a large pleural effusion.  -- Telemetry.   -- Continuous pulse oximetry. Oxygen PRN.   -- Fluid restriction to 2000 mL/day. Strict I/Os. Daily weights.   -- Duonebs q 4 PRN and q 6 PRN.   -- RCAT. Aggressive incentive spirometry, encourage ambulation.  -- Home oxygen assessment completed.   -- Continue PTA oral lasix 20 mg daily.   -- Oncology consultation, appreciate the cares. Dr. John noted recent disease progression. Patient is no longer a candidate for cancer directed treatment. Oncology would like to place PleurX prior to patient discharge given recurrent nature of effusions.    -- Started HAP/Aspiration pneumonia treatment with Cefepime and Vancomycin in  setting of CT findings. MRSA swab negative for MRSA and vancomycin discontinued. Continue cefepime for now.   -- Cardiology consultation in setting of TTE findings. Overall pericardial effusion appears smaller than previously and clinically patient does not appear to have constrictive pericarditis.  No chest pain no pericardial rub no EKG changes. No new cardiac recommendations at this time.  -- Repeat thoracentesis 04/29/2025 given CT findings of continued large left pleural effusion and moderate right. Additional 2.5 L removed from left pleural effusion.  -- Advanced care planning discussion with Dr. John (Oncology), Hira Mahajan NP (Palliative), writer, patient and guardian service. Appreciate the cares of all specialties involved. See palliative note for additional details. Given patient no longer candidate for cancer related treatment, code status changed to DNR-DNI. Kt's guardian plans to speak with Kt's siblings to discuss these next steps including enrollment in hospice. Additional details pending. Guardian unavaiable 05/01 and requests outreach to Ezekiel who works as staff with Guardian at [phone].   -- Continue to monitor for new or worsening symptoms.      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 John) and underwent infusion GEMZAR 04/09 and received post chemotherapy neupogen. Last PET 12/2024 with stable disease involving the lymph node metastases, but a partial response involving the osseous metastases.   -- Per above, patient is no longer a candidate for cancer directed treatment.   -- Oncology would like to place PleurX prior to patient discharge given recurrent nature of effusions.  -- Pain management with Tylenol PRN, atarax PRN.     Chronic anemia suspected secondary to chemotherapy and underlying malignancy  Chronic thrombocytopenia in setting above   -- Continue to monitor with morning labs.      Bilateral lower extremity DVT -9/2023  on anticoagulation.  *Likely hypercoagulability of malignancy.    -- Hold PTA Eliquis 2.5 mg twice daily given upcoming PleurX placement.      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 Flash penetration x1 with thin liquids. Here, patient exhibiting right-sided weakness and tongue deviation to the right. Voice is dysphonic and cough is weak increasing risk for silent aspiration.   -- SLP consultation and will assess for underlying pneumonia in setting of suspected worsening dysphagia. Possible need for video swallow study. OK for regular diet and thin liquids for now.   -- CT chest for further evaluation of possible concurrent aspiration pneumonia notable for bilateral pneumonia (RUL and LLL), per above.      Chronic troponin elevation  CAD s/p cardiac stenting  Ascending aortic dilatation  Mild mitral regurgitation   Mild tricuspid regurgitation   Hypertension  Hyperlipidemia  *Denies any chest pain. No palpitations. SOB as above. Chronic troponin elevation suspected secondary to NSTEMI in setting of demand given recurrent malignant pleural effusions and pericardial effusion.   *Echocardiogram 04/28/2025 with hyperdynamic left ventricular function. LVEF 65-70%. Valves were not interrogated given limited study. Small anterior pericardial effusion most prominent adjacent to the right ventricle (<1 cm). Mild respiratory variation demonstrated within the mitral and tricuspid inflow velocity profiles consistent with constrictive  physiology. No right ventricular or right atrial collapse. IVC normal size.  -- Telemetry.   -- Coagulation on hold as above. Resume in AM.   -- Initiated on lasix 20 mg oral daily. Continue.   -- Continue PTA statin.      History of CVA with right sided hemiplegia  Seizure D/O  *Wheelchair bound at baseline.   -- Fall  precautions.   -- Coagulation on hold as above.  -- Continue PTA statin.      GERD.  *Not on PTA medications. No acute issues.     MDD with anxiety  *Not on PTA medications. No acute issues.     History of alcohol use D/O  History of substance use D/O  -- Noted.      Hx of hypoalbuminemia in setting of metastatic lung cancer   -- Nutrition consultation per oncology.   -- Ensure between meals.      Mild elevated AST  -- Noted. Follow LFTs.      Physical deconditioning from medical illness.  *Resident of assisted living facility. Currently residing at [facility].   -- Fall precautions.  -- PT evaluation requested.      Goals of care   -- Discussed with guardian 04/29 AM. Plan for palliative care conference 04/30. Awaiting availability from palliative. Full code until palliative care conference.           Diet: Fluid restriction 2000 ML FLUID  Snacks/Supplements Adult: Ensure Enlive; Between Meals  Combination Diet Regular Diet; Thin Liquids (level 0)    DVT Prophylaxis: DOAC  Buckner Catheter: Not present  Lines: PRESENT      Port A Cath Single 04/11/22 Right Chest wall-Site Assessment: WDL      Cardiac Monitoring: ACTIVE order. Indication: Tachyarrhythmias, acute (48 hours)  Code Status: No CPR- Do NOT Intubate      Clinically Significant Risk Factors               # Hypoalbuminemia: Lowest albumin = 2.5 g/dL at 4/29/2025  6:27 AM, will monitor as appropriate     # Hypertension: Noted on problem list                # Financial/Environmental Concerns:           Social Drivers of Health    Tobacco Use: Medium Risk (4/9/2025)    Patient History     Smoking Tobacco Use: Former     Smokeless Tobacco Use: Never          Disposition Plan     Medically Ready for Discharge: Anticipated in 2-4 Days     The patient's care was discussed with the Attending Physician, Dr. Guzman .    Shelley Sandoval PA-C  Hospitalist Service  [facility]  Securely message with [employer] (more info)  Text page via [employer]  [employer]   ______________________________________________________________________    Interval History   No acute events overnight. See palliative note for further details. Patient denies pain or other needs at this time. No additional concerns or complaints at this time.     Physical Exam   Vital Signs: Temp: 98  F (36.7  C) Temp src: Oral BP: 100/70 Pulse: 97   Resp: 16 SpO2: 93 % O2 Device: Nasal cannula Oxygen Delivery: 2 LPM  Weight: 156 lbs 1.37 oz    GENERAL:  Pleasant, cooperative, alert. Thin appearing, chronically ill appearing male in no acute distress. NC in place post-thoracentesis although patient not requiring oxygen. Nontoxic appearing.   HEENT: Normocephalic, atraumatic.  Dysphonic voice noted.   PULMONOLOGY: Decreased breath sounds bilaterally. No increased work of breathing.   CARDIAC: Regular rhythm with tachycardic rate.   ABDOMEN: Soft, nontender non distended.   MUSCULOSKELETAL:  Moving x 4 spontaneously.  Normal bulk and tone. Bilateral pitting LE edema although bilateral legs atrophic. DP pulses intact.   NEURO: Alert and oriented x4. Nonfocal exam.    Medical Decision Making       50 MINUTES SPENT BY ME on the date of service doing chart review, history, exam, documentation & further activities per the note.      Data   ------------------------- PAST 24 HR DATA REVIEWED -----------------------------------------------    I have personally reviewed the following data over the past 24 hrs:    5.2  \   8.2 (L)   / 222     137 103 25.6 (H) /  84   3.7 25 0.85 \       Imaging results reviewed over the past 24 hrs:   No results found for this or any previous visit (from the past 24 hours).

## 2025-04-30 NOTE — PROGRESS NOTES
SPIRITUAL HEALTH SERVICES - Progress Note  [facility] Med Spec Unit    Referral Source/Reason for Visit: palliative/introduction to spiritual care    On this visit with Kt, introduced him to self/role and oriented him to Spiritual Health Services.    Kt shares that he identifies as Baptist. He declined outreach to his /Taoist at this time.    He voiced no support needs this afternoon.        Plan: Spiritual Health remains available. Please contact as needs arise.    Arin Arango  Associate     [facility] available 24/7 for emergent requests/referrals, either by paging the on-call  or by entering an ASAP/STAT consult in Epic (this will also page the on-call ).

## 2025-04-30 NOTE — PLAN OF CARE
Goal Outcome Evaluation:                      Summary:  Admitted for SOB. Found to have Pleural Effusions. Hx. Lung cancer- oncology notes for details.     DATE & TIME: 4/29/252, pm shift  Cognitive Concerns/ Orientation : A&OX4. Calm and cooperative. Speech hoarse.  BEHAVIOR & AGGRESSION TOOL COLOR: Green  CIWA SCORE: NA   ABNL VS/O2: VSS on 4 L NC, MAZARIEGOS.  MOBILITY: A x 2 with lift. Up in chair this shift.  Baseline R sided weakness. Assisted with positioning.   PAIN MANAGMENT: Denied  DIET:  Regular with 2000ml Fluid restriction. Needs help ordering meals. Eats independently.  BOWEL/BLADDER: Continent, Urinal at bedside. Had BM.  ABNL LAB/BG: Lactic acid 0.4,  Albumin 2.5, Hgb 7.8.   DRAIN/DEVICES: R. Chest port, heparin locked.   TELEMETRY RHYTHM: ST  SKIN: Redness on coccyx intact. dry flaky skin on bilateral shin. Dressings on right and left back Thoracentesis site CDI.  TESTS/PROCEDURES: Thoracentesis done today, 2.5 L fluid removed.  D/C DAY/GOALS/PLACE: Pending improvements. PT recommending discharge to TCU. Pt has Legal Guardian.   OTHER IMPORTANT INFO: Cardiology signed off. Hem/onc following. Speech following. Daily weight. Palliative consult pending. On daily po Lasix. LS coarse. On schedule neb treatment. On Cefepime q 8 hrs. Gave PRN Atarax per request for sleep.

## 2025-04-30 NOTE — CONSULTS
Care Management Initial Consult    General Information  Assessment completed with: chart review        Primary Care Provider verified and updated as needed:     Readmission within the last 30 days:           Advance Care Planning:            Communication Assessment  Patient's communication style: spoken language (English or Bilingual)             Cognitive  Cognitive/Neuro/Behavioral: .WDL except, speech  Level of Consciousness: alert  Arousal Level: opens eyes spontaneously  Orientation: oriented x 4  Mood/Behavior: calm, cooperative  Best Language: 0 - No aphasia  Speech: hoarse    Living Environment:   People in home:     came from [facility]  Current living Arrangements:        Able to return to prior arrangements:         Family/Social Support:  Care provided by:    Provides care for:       Support system:            Description of Support System:           Current Resources:   Patient receiving home care services:          Community Resources:    Equipment currently used at home: wheelchair, manual  Supplies currently used at home:      Employment/Financial:  Employment Status:          Financial Concerns:             Does the patient's insurance plan have a 3 day qualifying hospital stay waiver?  Yes     Which insurance plan 3 day waiver is available? Alternative insurance waiver    Will the waiver be used for post-acute placement? Undetermined at this time    Lifestyle & Psychosocial Needs:  Social Drivers of Health     Food Insecurity: Low Risk  (4/9/2025)    Food Insecurity     Within the past 12 months, did you worry that your food would run out before you got money to buy more?: No     Within the past 12 months, did the food you bought just not last and you didn t have money to get more?: No   Depression: Not at risk (2/19/2025)    PHQ-2     PHQ-2 Score: 0   Housing Stability: Low Risk  (4/9/2025)    Housing Stability     Do you have housing? : Yes     Are you worried about losing your housing?: No    Tobacco Use: Medium Risk (4/9/2025)    Patient History     Smoking Tobacco Use: Former     Smokeless Tobacco Use: Never     Passive Exposure: Not on file   Financial Resource Strain: Low Risk  (4/9/2025)    Financial Resource Strain     Within the past 12 months, have you or your family members you live with been unable to get utilities (heat, electricity) when it was really needed?: No   Alcohol Use: Not on file   Transportation Needs: Low Risk  (4/9/2025)    Transportation Needs     Within the past 12 months, has lack of transportation kept you from medical appointments, getting your medicines, non-medical meetings or appointments, work, or from getting things that you need?: No   Physical Activity: Not on file   Interpersonal Safety: Low Risk  (4/9/2025)    Interpersonal Safety     Do you feel physically and emotionally safe where you currently live?: Yes     Within the past 12 months, have you been hit, slapped, kicked or otherwise physically hurt by someone?: No     Within the past 12 months, have you been humiliated or emotionally abused in other ways by your partner or ex-partner?: No   Stress: Not on file   Social Connections: Not on file   Health Literacy: Not on file       Functional Status:  Prior to admission patient needed assistance:              Mental Health Status:          Chemical Dependency Status:                Values/Beliefs:  Spiritual, Cultural Beliefs, [employer] Practices, Values that affect care:                 Discussed  Partnership in Safe Discharge Planning  document with patient/family: not as of 4/30    Additional Information:   Patient admitted from [facility] TCU.  Spoke with their liaison, Sherrell, and she said his TCU bed is being held under his MA bed hold.   Palliative Care note reviewed.   Taken from the note     At this time, they would like to continue with ongoing disease treatment until medically stable to discharge back to his residence with hospice  support.        Next Steps:  Social Work needs to contact guardian to confirm hospice plan post discharge and to ask if the discharge is to [facility] or to his prior residence before the TCU.   If guardian desires patient to return to [facility] will need to determine if they can accept patient with [email or web address] and send a referral thru [employer] to them.   Discussed hospice agency and make referral.  Jessica Hassan, LICSW

## 2025-05-01 ENCOUNTER — APPOINTMENT (OUTPATIENT)
Dept: SPEECH THERAPY | Facility: CLINIC | Age: 65
End: 2025-05-01
Payer: COMMERCIAL

## 2025-05-01 ENCOUNTER — APPOINTMENT (OUTPATIENT)
Dept: GENERAL RADIOLOGY | Facility: CLINIC | Age: 65
End: 2025-05-01
Attending: STUDENT IN AN ORGANIZED HEALTH CARE EDUCATION/TRAINING PROGRAM
Payer: COMMERCIAL

## 2025-05-01 VITALS
BODY MASS INDEX: 19.31 KG/M2 | HEART RATE: 102 BPM | RESPIRATION RATE: 16 BRPM | WEIGHT: 156.53 LBS | SYSTOLIC BLOOD PRESSURE: 101 MMHG | TEMPERATURE: 97.1 F | OXYGEN SATURATION: 93 % | DIASTOLIC BLOOD PRESSURE: 57 MMHG

## 2025-05-01 LAB
BACTERIA PLR CULT: NORMAL
CREAT SERPL-MCNC: 0.77 MG/DL (ref 0.67–1.17)
EGFRCR SERPLBLD CKD-EPI 2021: >90 ML/MIN/1.73M2
GRAM STAIN RESULT: NORMAL
GRAM STAIN RESULT: NORMAL

## 2025-05-01 PROCEDURE — 250N000013 HC RX MED GY IP 250 OP 250 PS 637: Performed by: STUDENT IN AN ORGANIZED HEALTH CARE EDUCATION/TRAINING PROGRAM

## 2025-05-01 PROCEDURE — 82565 ASSAY OF CREATININE: CPT | Performed by: HOSPITALIST

## 2025-05-01 PROCEDURE — 120N000001 HC R&B MED SURG/OB

## 2025-05-01 PROCEDURE — 250N000011 HC RX IP 250 OP 636: Performed by: EMERGENCY MEDICINE

## 2025-05-01 PROCEDURE — 71045 X-RAY EXAM CHEST 1 VIEW: CPT

## 2025-05-01 PROCEDURE — 250N000009 HC RX 250: Performed by: HOSPITALIST

## 2025-05-01 PROCEDURE — 250N000013 HC RX MED GY IP 250 OP 250 PS 637

## 2025-05-01 PROCEDURE — 94640 AIRWAY INHALATION TREATMENT: CPT

## 2025-05-01 PROCEDURE — 999N000157 HC STATISTIC RCP TIME EA 10 MIN

## 2025-05-01 PROCEDURE — 92526 ORAL FUNCTION THERAPY: CPT | Mod: GN | Performed by: REHABILITATION PRACTITIONER

## 2025-05-01 PROCEDURE — 250N000011 HC RX IP 250 OP 636: Mod: JZ | Performed by: STUDENT IN AN ORGANIZED HEALTH CARE EDUCATION/TRAINING PROGRAM

## 2025-05-01 PROCEDURE — 99233 SBSQ HOSP IP/OBS HIGH 50: CPT | Performed by: STUDENT IN AN ORGANIZED HEALTH CARE EDUCATION/TRAINING PROGRAM

## 2025-05-01 RX ORDER — FUROSEMIDE 10 MG/ML
20 INJECTION INTRAMUSCULAR; INTRAVENOUS ONCE
Status: COMPLETED | OUTPATIENT
Start: 2025-05-01 | End: 2025-05-01

## 2025-05-01 RX ADMIN — Medication 5 ML: at 16:04

## 2025-05-01 RX ADMIN — SENNOSIDES AND DOCUSATE SODIUM 2 TABLET: 50; 8.6 TABLET ORAL at 21:00

## 2025-05-01 RX ADMIN — FOLIC ACID 1 MG: 1 TABLET ORAL at 08:50

## 2025-05-01 RX ADMIN — CEFEPIME 2 G: 2 INJECTION, POWDER, FOR SOLUTION INTRAVENOUS at 06:25

## 2025-05-01 RX ADMIN — IPRATROPIUM BROMIDE AND ALBUTEROL SULFATE 3 ML: .5; 3 SOLUTION RESPIRATORY (INHALATION) at 07:33

## 2025-05-01 RX ADMIN — CEFEPIME 2 G: 2 INJECTION, POWDER, FOR SOLUTION INTRAVENOUS at 15:30

## 2025-05-01 RX ADMIN — ACETAMINOPHEN 650 MG: 325 TABLET, FILM COATED ORAL at 00:40

## 2025-05-01 RX ADMIN — SENNOSIDES AND DOCUSATE SODIUM 1 TABLET: 50; 8.6 TABLET ORAL at 15:27

## 2025-05-01 RX ADMIN — Medication 5 ML: at 18:27

## 2025-05-01 RX ADMIN — Medication 5 ML: at 07:08

## 2025-05-01 RX ADMIN — FUROSEMIDE 20 MG: 10 INJECTION, SOLUTION INTRAMUSCULAR; INTRAVENOUS at 18:27

## 2025-05-01 RX ADMIN — Medication 5 ML: at 00:40

## 2025-05-01 RX ADMIN — FUROSEMIDE 20 MG: 20 TABLET ORAL at 08:50

## 2025-05-01 RX ADMIN — CEFEPIME 2 G: 2 INJECTION, POWDER, FOR SOLUTION INTRAVENOUS at 21:03

## 2025-05-01 ASSESSMENT — ACTIVITIES OF DAILY LIVING (ADL)
ADLS_ACUITY_SCORE: 71

## 2025-05-01 NOTE — PLAN OF CARE
Goal Outcome Evaluation:  Summary:  Admitted for SOB. Found to have Pleural Effusions. Hx. Lung cancer- oncology notes for details.     DATE & TIME: 4/30/25, [id number]  Cognitive Concerns/ Orientation : A&OX4. Calm and cooperative. Speech hoarse.  BEHAVIOR & AGGRESSION TOOL COLOR: Green  CIWA SCORE: NA   ABNL VS/O2: VSS on 2 L NC this AM, weaned to 2L sats 92-94%, MAZARIEGOS.  MOBILITY: A x 2 with lift. Up in chair.  Baseline R sided weakness.  Repositions independently   PAIN MANAGMENT: Denied  DIET:  Regular with 2000ml Fluid restriction. Needs help ordering meals. Eats independently.  BOWEL/BLADDER: Continent, Urinal at bedside. No BM.  ABNL LAB/BG Hgb 8.2  DRAIN/DEVICES: R. Chest port, heparin locked.   TELEMETRY RHYTHM:Discontinued   SKIN: Redness on coccyx intact. dry flaky skin on bilateral shin. Dressings on right and left back Thoracentesis site CDI.  TESTS/PROCEDURES: none   D/C DAY/GOALS/PLACE:  Palliative decision pending   OTHER IMPORTANT INFO:  Hem/onc, . Speech following. Had Palliative care conference  with patient and legal guardian. Changed him to DNR/DNI.

## 2025-05-01 NOTE — CONSULTS
Consult acknowledged, please see original consult filed on 4/30. Care management team is continuing to follow for discharge planning.

## 2025-05-01 NOTE — PROGRESS NOTES
Care Management Follow Up    Length of Stay (days): 3    Expected Discharge Date: 05/02/2025     Concerns to be Addressed:       Patient plan of care discussed at interdisciplinary rounds: Yes    Anticipated Discharge Disposition:                Anticipated Discharge Services:    Anticipated Discharge DME:      Patient/family educated on Medicare website which has current facility and service quality ratings:    Education Provided on the Discharge Plan:    Patient/Family in Agreement with the Plan:      Referrals Placed by CM/CAROLE:    Private pay costs discussed: Not applicable    Discussed  Partnership in Safe Discharge Planning  document with patient/family: No     Handoff Completed: No, handoff not indicated or clinically appropriate    Additional Information:  CAROLE noted patient is either set to return to TCU or transition to comfort cares and discharge with hospice pending conversation with pt's guardian. CAROLE placed call to guardian service ([phone]) and was sent to . CAROLE left call back number and requested call back.    Next Steps: plan for discharge    SAMARA Eller

## 2025-05-01 NOTE — PLAN OF CARE
Goal Outcome Evaluation:      Plan of Care Reviewed With: patient      Summary:  Admitted for SOB. Found to have Pleural Effusions. Hx. Lung cancer- oncology notes for details.     DATE & TIME: 4/30/25, [id number]  Cognitive Concerns/ Orientation : A&OX4. Calm and cooperative. Speech hoarse.  BEHAVIOR & AGGRESSION TOOL COLOR: Green  CIWA SCORE: NA   ABNL VS/O2: VSS on 4 L NC this night, weaned to 2L sats 92-94% yesterday but was desating when sleep, MAZARIEGOS.  MOBILITY: A x 2 with lift.Baseline R sided weakness.  Repositions independently   PAIN MANAGMENT: Denied  DIET:  Regular with 2000ml Fluid restriction. Needs help ordering meals. Eats independently.  BOWEL/BLADDER: Continent, Urinal at bedside. No BM.  ABNL LAB/BG Hgb 8.2  DRAIN/DEVICES: R. Chest port, heparin locked.   TELEMETRY RHYTHM:Discontinued   SKIN: Redness on coccyx intact. dry flaky skin on bilateral shin. Dressings on right and left back Thoracentesis site CDI.  TESTS/PROCEDURES: none   D/C DAY/GOALS/PLACE:  Palliative decision pending   OTHER IMPORTANT INFO:  Hem/onc, . Speech following. Had Palliative care conference  with patient and legal guardian. Changed him to DNR/DNI.

## 2025-05-01 NOTE — PROVIDER NOTIFICATION
MD Notification    Notified Person: MD    Notified Person Name: Thomas    Notification Date/Time: 5/1/25 5971    Notification Interaction: Ethel    Purpose of Notification: Pt increased from 4L NC to 6L NC. Per patient he is feeling more shortness of breath. He does not appear to be in distress. He stated that SOB is because he has not had a BM for 4 days. PRN Senna given. Pt is satting around 92% on 6L NC.    Orders Received: MD ordering chest xray

## 2025-05-01 NOTE — PROGRESS NOTES
[facility]    Medicine Progress Note - Hospitalist Service    Date of Admission:  4/27/2025    Assessment & Plan   Kt Rasmussen is a 64 year old male with history of metastatic lung cancer, pleural effusion, pericardial effusion, lower extremity DVT on anticoagulation, chronic anemia, thrombocytopenia, coronary artery disease status post stenting, hypertension, hyperlipidemia, GERD, seizure disorder, anxiety disorder, history of CVA with right hemiplegia admitted on 4/27/2025 for shortness of breath.     Patient recently admitted 04/09/2025 - 04/16/2025 at [facility] for acute hypoxic respiratory failure likely multifactorial. Bilateral (right greater than left) moderate to large pleural effusions, moderate pericardial effusion, metastatic non-small cell lung cancer. Patient underwent thoracocentesis (1.1 L removed), diuresis, weaned down to 2 L nasal oxygen and discharged back to care facility.  Prior to 04/2025 hospitalization, patient underwent outpatient thoracentesis 01/06/2025, 12/27/2024 and 12/26/2024 where 1.9 L, 0.9 L and 2.65 L removed respectively.      Acute on chronic respiratory failure, multifactorial in etiology in setting of pneumonia, metastatic lung cancer, recurring pleural effusions, atelectasis, pericardial effusion, chemotoxicity and deconditioning  Hx moderate pericardial effusion on TTE 04/2025  Hx bilateral pleural effusions s/p bilateral thoracentesis 04/27/2025, 04/09/2025, 1/6/2025, 12/27/2024 and 12/26/2024   *Patient presented to [facility] ED from TCU with acute on chronic dyspnea. Patient discharged previously on 2 L NC although reportedly not requiring oxygen at baseline, per patient. He was found to be hypoxic in the 60s on RA per EMS.   *Upon arrival to [facility] ED, patient noted to be hypoxic with O2 saturations 83% on RA. CXR showing moderate right and small left pleural effusions with adjacent atelectasis. Mild pulmonary edema. No pneumothorax. Stable  cardiomediastinal silhouette. EKG without acute ischemic changes.   *Patient with continued worsening dyspnea. As of 04/28/2025, patient requiring 4 L NC to sustain oxygenations low 90% following 2.4 L fluid removal s/p thoracentesis 04/27/2025. Recurrent pleural effusions suspected secondary to malignancy and chemotherapy.   *CT chest 04/28/2025 with right upper and left lower lobe opacities concerning for pneumonia. Notable also for large left effusion and moderate partially loculated right effusion. Interval enlargement of the bulky adenopathy within the abdomen and pelvis. Stable post treatment changes right perihilar region. Stable osseous lesions.   *Echocardiogram 04/28/2025 with hyperdynamic left ventricular function. LVEF 65-70%. Valves were not interrogated given limited study. Small anterior pericardial effusion most prominent adjacent to the right ventricle (<1 cm). Mild respiratory variation demonstrated within the mitral and tricuspid inflow velocity profiles consistent with constrictive physiology. No right ventricular or right atrial collapse. IVC normal size. Large pleural effusion. Compared to study dated 04/10/2025, the pericardial effusion is smaller however, there is evidence of constrictive physiology without tamponade. There is a large pleural effusion.  -- Telemetry.   -- Continuous pulse oximetry. Oxygen PRN.   -- Fluid restriction to 2000 mL/day. Strict I/Os. Daily weights.   -- Duonebs q 4 PRN and q 6 PRN.   -- RCAT. Aggressive incentive spirometry, encourage ambulation.  -- Home oxygen assessment completed.   -- Continue PTA oral lasix 20 mg daily.   -- Oncology consultation, appreciate the cares. Dr. John noted recent disease progression. Patient is no longer a candidate for cancer directed treatment. Oncology would like to place PleurX prior to patient discharge given recurrent nature of effusions.  IR consulted.   -- Started HAP/Aspiration pneumonia treatment with Cefepime and  Vancomycin in setting of CT findings. MRSA swab negative for MRSA and vancomycin discontinued. Continue cefepime for now.   -- Cardiology consultation in setting of TTE findings. Overall pericardial effusion appears smaller than previously and clinically patient does not appear to have constrictive pericarditis.  No chest pain no pericardial rub no EKG changes. No new cardiac recommendations at this time.  -- Repeat thoracentesis 04/29/2025 given CT findings of continued large left pleural effusion and moderate right. Additional 2.5 L removed from left pleural effusion.  -- Advanced care planning discussion with Dr. John (Oncology), Hira Mahajan NP (Palliative), writer, patient and guardian service. Appreciate the cares of all specialties involved. See palliative note for additional details. Given patient no longer candidate for cancer related treatment, code status changed to DNR-DNI. Kt's guardian plans to speak with Kt's siblings to discuss these next steps including enrollment in hospice. Additional details pending. Guardian unavaiable 05/01 and requests outreach to Ezekiel who works as staff with Guardian at [phone].   -- Continue to monitor for new or worsening symptoms.     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 John) and underwent infusion GEMZAR 04/09 and received post chemotherapy neupogen. Last PET 12/2024 with stable disease involving the lymph node metastases, but a partial response involving the osseous metastases.   -- Per above, patient is no longer a candidate for cancer directed treatment.   -- Oncology would like to place PleurX prior to patient discharge given recurrent nature of effusions.  -- Pain management with Tylenol PRN, atarax PRN.     Chronic anemia suspected secondary to chemotherapy and underlying malignancy  Chronic thrombocytopenia in setting above   -- Continue to monitor with morning labs.      Bilateral lower  extremity DVT -9/2023 on anticoagulation.  *Likely hypercoagulability of malignancy.    -- Hold PTA Eliquis 2.5 mg twice daily given upcoming PleurX placement.      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 Flash penetration x1 with thin liquids. Here, patient exhibiting right-sided weakness and tongue deviation to the right. Voice is dysphonic and cough is weak increasing risk for silent aspiration.   -- SLP consultation and will assess for underlying pneumonia in setting of suspected worsening dysphagia. Possible need for video swallow study. OK for regular diet and thin liquids for now.   -- CT chest for further evaluation of possible concurrent aspiration pneumonia notable for bilateral pneumonia (RUL and LLL), per above.      Chronic troponin elevation  CAD s/p cardiac stenting  Ascending aortic dilatation  Mild mitral regurgitation   Mild tricuspid regurgitation   Hypertension  Hyperlipidemia  *Denies any chest pain. No palpitations. SOB as above. Chronic troponin elevation suspected secondary to NSTEMI in setting of demand given recurrent malignant pleural effusions and pericardial effusion.   *Echocardiogram 04/28/2025 with hyperdynamic left ventricular function. LVEF 65-70%. Valves were not interrogated given limited study. Small anterior pericardial effusion most prominent adjacent to the right ventricle (<1 cm). Mild respiratory variation demonstrated within the mitral and tricuspid inflow velocity profiles consistent with constrictive  physiology. No right ventricular or right atrial collapse. IVC normal size.  -- Telemetry.   -- Coagulation on hold as above. Resume in AM.   -- Initiated on lasix 20 mg oral daily. Continue.   -- Continue PTA statin.      History of CVA with right sided hemiplegia  Seizure D/O  *Wheelchair bound at  baseline.   -- Fall precautions.   -- Coagulation on hold as above.  -- Continue PTA statin.      GERD.  *Not on PTA medications. No acute issues.     MDD with anxiety  *Not on PTA medications. No acute issues.     History of alcohol use D/O  History of substance use D/O  -- Noted.      Hx of hypoalbuminemia in setting of metastatic lung cancer   -- Nutrition consultation per oncology.   -- Ensure between meals.      Mild elevated AST  -- Noted. Follow LFTs.      Physical deconditioning from medical illness.  *Resident of assisted living facility. Currently residing at [facility].   -- Fall precautions.  -- PT evaluation requested.      Goals of care   -- DNR/DNI, will likely discharge on hospice, will need to discuss details with Guardian.            Diet: Fluid restriction 2000 ML FLUID  Snacks/Supplements Adult: Ensure Enlive; Between Meals  NPO for Procedure/Surgery per Anesthesia Guidelines Except for: Meds; Clear liquids before procedure/surgery: ADULT (Age GREATER than or Equal to 18 years) - Clear liquids 2 hours before procedure/surgery    DVT Prophylaxis: DOAC  Buckner Catheter: Not present  Lines: PRESENT      Port A Cath Single 04/11/22 Right Chest wall-Site Assessment: WDL      Cardiac Monitoring: None  Code Status: No CPR- Do NOT Intubate      Clinically Significant Risk Factors               # Hypoalbuminemia: Lowest albumin = 2.5 g/dL at 4/29/2025  6:27 AM, will monitor as appropriate     # Hypertension: Noted on problem list                # Financial/Environmental Concerns:           Social Drivers of Health    Tobacco Use: Medium Risk (4/9/2025)    Patient History     Smoking Tobacco Use: Former     Smokeless Tobacco Use: Never          Disposition Plan     Medically Ready for Discharge: Anticipated Tomorrow after PleurX placement       Karen Guzman MD  Hospitalist Service  [facility]  Securely message with [employer] (more info)  Text page via [employer] Paging/Directory    ______________________________________________________________________    Interval History   No acute events overnight. Pt is a poor historian. He denies any pain or shortness of breath.     Physical Exam   Vital Signs: Temp: 97.8  F (36.6  C) Temp src: Oral BP: 109/65 Pulse: 90   Resp: 14 SpO2: 93 % O2 Device: Nasal cannula Oxygen Delivery: 6 LPM  Weight: 156 lbs 8.43 oz    GENERAL:  Pleasant, cooperative, alert. Thin appearing, chronically ill appearing male in no acute distress.   HEENT: Normocephalic, atraumatic.  Dysphonic voice noted.   PULMONOLOGY: Decreased breath sounds bilaterally. No increased work of breathing.   CARDIAC: Regular rhythm with tachycardic rate.   ABDOMEN: Soft, nontender non distended.   MUSCULOSKELETAL:  Moving x 4 spontaneously. Bilateral pitting LE edema although bilateral legs atrophic. DP pulses intact.   NEURO: Alert and oriented x4. Nonfocal exam.    Medical Decision Making       50 MINUTES SPENT BY ME on the date of service doing chart review, history, exam, documentation & further activities per the note.      Data   ------------------------- PAST 24 HR DATA REVIEWED -----------------------------------------------    I have personally reviewed the following data over the past 24 hrs:    N/A  \   N/A   / N/A     N/A N/A N/A /  N/A   N/A N/A 0.77 \       Imaging results reviewed over the past 24 hrs:   No results found for this or any previous visit (from the past 24 hours).

## 2025-05-02 ENCOUNTER — APPOINTMENT (OUTPATIENT)
Dept: INTERVENTIONAL RADIOLOGY/VASCULAR | Facility: CLINIC | Age: 65
End: 2025-05-02
Attending: STUDENT IN AN ORGANIZED HEALTH CARE EDUCATION/TRAINING PROGRAM
Payer: COMMERCIAL

## 2025-05-02 PROCEDURE — 250N000009 HC RX 250: Performed by: PHYSICIAN ASSISTANT

## 2025-05-02 PROCEDURE — 99152 MOD SED SAME PHYS/QHP 5/>YRS: CPT

## 2025-05-02 PROCEDURE — C1769 GUIDE WIRE: HCPCS

## 2025-05-02 PROCEDURE — 94640 AIRWAY INHALATION TREATMENT: CPT | Mod: 76

## 2025-05-02 PROCEDURE — 120N000001 HC R&B MED SURG/OB

## 2025-05-02 PROCEDURE — 250N000013 HC RX MED GY IP 250 OP 250 PS 637

## 2025-05-02 PROCEDURE — 250N000011 HC RX IP 250 OP 636: Performed by: EMERGENCY MEDICINE

## 2025-05-02 PROCEDURE — 94640 AIRWAY INHALATION TREATMENT: CPT

## 2025-05-02 PROCEDURE — 250N000009 HC RX 250: Performed by: HOSPITALIST

## 2025-05-02 PROCEDURE — 99233 SBSQ HOSP IP/OBS HIGH 50: CPT | Performed by: STUDENT IN AN ORGANIZED HEALTH CARE EDUCATION/TRAINING PROGRAM

## 2025-05-02 PROCEDURE — C1729 CATH, DRAINAGE: HCPCS

## 2025-05-02 PROCEDURE — 250N000011 HC RX IP 250 OP 636: Performed by: PHYSICIAN ASSISTANT

## 2025-05-02 PROCEDURE — 0W9B30Z DRAINAGE OF LEFT PLEURAL CAVITY WITH DRAINAGE DEVICE, PERCUTANEOUS APPROACH: ICD-10-PCS | Performed by: RADIOLOGY

## 2025-05-02 PROCEDURE — 250N000013 HC RX MED GY IP 250 OP 250 PS 637: Performed by: STUDENT IN AN ORGANIZED HEALTH CARE EDUCATION/TRAINING PROGRAM

## 2025-05-02 PROCEDURE — 250N000011 HC RX IP 250 OP 636: Mod: JZ | Performed by: STUDENT IN AN ORGANIZED HEALTH CARE EDUCATION/TRAINING PROGRAM

## 2025-05-02 PROCEDURE — 999N000157 HC STATISTIC RCP TIME EA 10 MIN

## 2025-05-02 RX ORDER — NALOXONE HYDROCHLORIDE 0.4 MG/ML
0.2 INJECTION, SOLUTION INTRAMUSCULAR; INTRAVENOUS; SUBCUTANEOUS
Status: DISCONTINUED | OUTPATIENT
Start: 2025-05-02 | End: 2025-05-02 | Stop reason: HOSPADM

## 2025-05-02 RX ORDER — NALOXONE HYDROCHLORIDE 0.4 MG/ML
0.4 INJECTION, SOLUTION INTRAMUSCULAR; INTRAVENOUS; SUBCUTANEOUS
Status: DISCONTINUED | OUTPATIENT
Start: 2025-05-02 | End: 2025-05-02 | Stop reason: HOSPADM

## 2025-05-02 RX ORDER — FENTANYL CITRATE 50 UG/ML
25-50 INJECTION, SOLUTION INTRAMUSCULAR; INTRAVENOUS EVERY 5 MIN PRN
Status: DISCONTINUED | OUTPATIENT
Start: 2025-05-02 | End: 2025-05-02 | Stop reason: HOSPADM

## 2025-05-02 RX ORDER — FLUMAZENIL 0.1 MG/ML
0.2 INJECTION, SOLUTION INTRAVENOUS
Status: DISCONTINUED | OUTPATIENT
Start: 2025-05-02 | End: 2025-05-02 | Stop reason: HOSPADM

## 2025-05-02 RX ADMIN — CEFEPIME 2 G: 2 INJECTION, POWDER, FOR SOLUTION INTRAVENOUS at 21:00

## 2025-05-02 RX ADMIN — FENTANYL CITRATE 25 MCG: 50 INJECTION, SOLUTION INTRAMUSCULAR; INTRAVENOUS at 15:26

## 2025-05-02 RX ADMIN — MIDAZOLAM 0.5 MG: 1 INJECTION INTRAMUSCULAR; INTRAVENOUS at 15:26

## 2025-05-02 RX ADMIN — CEFEPIME 2 G: 2 INJECTION, POWDER, FOR SOLUTION INTRAVENOUS at 05:33

## 2025-05-02 RX ADMIN — METHOCARBAMOL 500 MG: 500 TABLET ORAL at 22:26

## 2025-05-02 RX ADMIN — Medication 5 ML: at 14:11

## 2025-05-02 RX ADMIN — FOLIC ACID 1 MG: 1 TABLET ORAL at 08:34

## 2025-05-02 RX ADMIN — IPRATROPIUM BROMIDE AND ALBUTEROL SULFATE 3 ML: .5; 3 SOLUTION RESPIRATORY (INHALATION) at 20:02

## 2025-05-02 RX ADMIN — Medication 5 ML: at 21:31

## 2025-05-02 RX ADMIN — IPRATROPIUM BROMIDE AND ALBUTEROL SULFATE 3 ML: .5; 3 SOLUTION RESPIRATORY (INHALATION) at 07:09

## 2025-05-02 RX ADMIN — CEFEPIME 2 G: 2 INJECTION, POWDER, FOR SOLUTION INTRAVENOUS at 13:36

## 2025-05-02 RX ADMIN — ACETAMINOPHEN 650 MG: 325 TABLET, FILM COATED ORAL at 22:26

## 2025-05-02 RX ADMIN — FUROSEMIDE 20 MG: 20 TABLET ORAL at 08:34

## 2025-05-02 RX ADMIN — Medication 5 ML: at 06:15

## 2025-05-02 RX ADMIN — LIDOCAINE HYDROCHLORIDE 15 ML: 10 INJECTION, SOLUTION INFILTRATION; PERINEURAL at 15:27

## 2025-05-02 ASSESSMENT — ACTIVITIES OF DAILY LIVING (ADL)
ADLS_ACUITY_SCORE: 70
ADLS_ACUITY_SCORE: 71
ADLS_ACUITY_SCORE: 70
ADLS_ACUITY_SCORE: 71
ADLS_ACUITY_SCORE: 72
ADLS_ACUITY_SCORE: 72
ADLS_ACUITY_SCORE: 66
ADLS_ACUITY_SCORE: 70
ADLS_ACUITY_SCORE: 71
ADLS_ACUITY_SCORE: 66
ADLS_ACUITY_SCORE: 72
ADLS_ACUITY_SCORE: 70
ADLS_ACUITY_SCORE: 70
ADLS_ACUITY_SCORE: 66
ADLS_ACUITY_SCORE: 70
ADLS_ACUITY_SCORE: 70
ADLS_ACUITY_SCORE: 72
ADLS_ACUITY_SCORE: 70
ADLS_ACUITY_SCORE: 70
ADLS_ACUITY_SCORE: 72
ADLS_ACUITY_SCORE: 66
ADLS_ACUITY_SCORE: 72
ADLS_ACUITY_SCORE: 66

## 2025-05-02 NOTE — PROGRESS NOTES
[facility]    Medicine Progress Note - Hospitalist Service    Date of Admission:  4/27/2025    Assessment & Plan   Kt Rasmussen is a 64 year old male with history of metastatic lung cancer, pleural effusion, pericardial effusion, lower extremity DVT on anticoagulation, chronic anemia, thrombocytopenia, coronary artery disease status post stenting, hypertension, hyperlipidemia, GERD, seizure disorder, anxiety disorder, history of CVA with right hemiplegia admitted on 4/27/2025 for shortness of breath.     Patient recently admitted 04/09/2025 - 04/16/2025 at [facility] for acute hypoxic respiratory failure likely multifactorial. Bilateral (right greater than left) moderate to large pleural effusions, moderate pericardial effusion, metastatic non-small cell lung cancer. Patient underwent thoracocentesis (1.1 L removed), diuresis, weaned down to 2 L nasal oxygen and discharged back to care facility.  Prior to 04/2025 hospitalization, patient underwent outpatient thoracentesis 01/06/2025, 12/27/2024 and 12/26/2024 where 1.9 L, 0.9 L and 2.65 L removed respectively.      Acute on chronic respiratory failure, multifactorial in etiology in setting of pneumonia, metastatic lung cancer, recurring pleural effusions, atelectasis, pericardial effusion, chemotoxicity and deconditioning  Hx moderate pericardial effusion on TTE 04/2025  Hx bilateral pleural effusions s/p bilateral thoracentesis 04/27/2025, 04/09/2025, 1/6/2025, 12/27/2024 and 12/26/2024   *Patient presented to [facility] ED from TCU with acute on chronic dyspnea. Patient discharged previously on 2 L NC although reportedly not requiring oxygen at baseline, per patient. He was found to be hypoxic in the 60s on RA per EMS.   *Upon arrival to [facility] ED, patient noted to be hypoxic with O2 saturations 83% on RA. CXR showing moderate right and small left pleural effusions with adjacent atelectasis. Mild pulmonary edema. No pneumothorax. Stable  cardiomediastinal silhouette. EKG without acute ischemic changes.   *Patient with continued worsening dyspnea. As of 04/28/2025, patient requiring 4 L NC to sustain oxygenations low 90% following 2.4 L fluid removal s/p thoracentesis 04/27/2025. Recurrent pleural effusions suspected secondary to malignancy and chemotherapy.   *CT chest 04/28/2025 with right upper and left lower lobe opacities concerning for pneumonia. Notable also for large left effusion and moderate partially loculated right effusion. Interval enlargement of the bulky adenopathy within the abdomen and pelvis. Stable post treatment changes right perihilar region. Stable osseous lesions.   *Echocardiogram 04/28/2025 with hyperdynamic left ventricular function. LVEF 65-70%. Valves were not interrogated given limited study. Small anterior pericardial effusion most prominent adjacent to the right ventricle (<1 cm). Mild respiratory variation demonstrated within the mitral and tricuspid inflow velocity profiles consistent with constrictive physiology. No right ventricular or right atrial collapse. IVC normal size. Large pleural effusion. Compared to study dated 04/10/2025, the pericardial effusion is smaller however, there is evidence of constrictive physiology without tamponade. There is a large pleural effusion.  -- Telemetry.   -- Continuous pulse oximetry. Oxygen PRN.   -- Fluid restriction to 2000 mL/day. Strict I/Os. Daily weights.   -- Duonebs q 4 PRN and q 6 PRN.   -- RCAT. Aggressive incentive spirometry, encourage ambulation.  -- Home oxygen assessment completed.   -- Continue PTA oral lasix 20 mg daily.   -- Oncology consultation. Dr. John noted recent disease progression. Patient is no longer a candidate for cancer directed treatment. Oncology would like to place L PleurX prior to patient discharge given recurrent nature of effusions.  IR consulted.   -- Started HAP/Aspiration pneumonia treatment with Cefepime and Vancomycin in setting of CT  findings. MRSA swab negative for MRSA and vancomycin discontinued. Continue cefepime for now.   -- Cardiology consultation in setting of TTE findings. Overall pericardial effusion appears smaller than previously and clinically patient does not appear to have constrictive pericarditis.  No chest pain no pericardial rub no EKG changes. No new cardiac recommendations at this time.  -- Repeat thoracentesis 04/29/2025 given CT findings of continued large left pleural effusion and moderate right. Additional 2.5 L removed from left pleural effusion.  -- Advanced care planning discussion with Dr. John (Oncology), Hira Mahajan NP (Palliative), writer, patient and guardian service. Appreciate the cares of all specialties involved. See palliative note for additional details. Given patient no longer candidate for cancer related treatment, code status changed to DNR-DNI. Kt's guardian plans to speak with Kt's siblings to discuss these next steps including enrollment in hospice.   - Appreciate social work and care coordinator assistance  -- Continue to monitor for new or worsening symptoms.     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 John) and underwent infusion GEMZAR 04/09 and received post chemotherapy neupogen. Last PET 12/2024 with stable disease involving the lymph node metastases, but a partial response involving the osseous metastases.   -- Per above, patient is no longer a candidate for cancer directed treatment.   -- Oncology would like to place L PleurX prior to patient discharge given recurrent nature of effusions.  -- Pain management with Tylenol PRN, atarax PRN.     Chronic anemia suspected secondary to chemotherapy and underlying malignancy  Chronic thrombocytopenia in setting above   -- Continue to monitor with morning labs.      Bilateral lower extremity DVT -9/2023 on anticoagulation.  *Likely hypercoagulability of malignancy.    -- Hold PTA Eliquis 2.5  mg twice daily given upcoming PleurX placement.      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 Flash penetration x1 with thin liquids. Here, patient exhibiting right-sided weakness and tongue deviation to the right. Voice is dysphonic and cough is weak increasing risk for silent aspiration.   -- SLP consultation. OK for regular diet and thin liquids for now.     Chronic troponin elevation  CAD s/p cardiac stenting  Ascending aortic dilatation  Mild mitral regurgitation   Mild tricuspid regurgitation   Hypertension  Hyperlipidemia  *Denies any chest pain. No palpitations. SOB as above. Chronic troponin elevation suspected secondary to NSTEMI in setting of demand given recurrent malignant pleural effusions and pericardial effusion.   *Echocardiogram 04/28/2025 with hyperdynamic left ventricular function. LVEF 65-70%. Valves were not interrogated given limited study. Small anterior pericardial effusion most prominent adjacent to the right ventricle (<1 cm). Mild respiratory variation demonstrated within the mitral and tricuspid inflow velocity profiles consistent with constrictive  physiology. No right ventricular or right atrial collapse. IVC normal size.  -- Telemetry.   -- Coagulation on hold as above. Resume in AM.   -- Initiated on lasix 20 mg oral daily. Continue.   -- Continue PTA statin.      History of CVA with right sided hemiplegia  Seizure D/O  *Wheelchair bound at baseline.   -- Fall precautions.   -- Coagulation on hold as above.     GERD.  *Not on PTA medications. No acute issues.     MDD with anxiety  *Not on PTA medications. No acute issues.     History of alcohol use D/O  History of substance use D/O  -- Noted.      Hx of hypoalbuminemia in setting of metastatic lung cancer   -- Nutrition consultation  -- Ensure between meals.       Mild elevated AST  -- Noted. Follow LFTs.      Physical deconditioning from medical illness.  *Resident of assisted living facility. Currently residing at [facility].   -- Fall precautions.  -- PT evaluation requested.      Goals of care   -- DNR/DNI, will likely discharge on hospice, will need to discuss details with Guardian.            Diet: Fluid restriction 2000 ML FLUID  Snacks/Supplements Adult: Ensure Enlive; Between Meals  NPO for Procedure/Surgery per Anesthesia Guidelines Except for: Meds; Clear liquids before procedure/surgery: ADULT (Age GREATER than or Equal to 18 years) - Clear liquids 2 hours before procedure/surgery    DVT Prophylaxis: DOAC  Buckner Catheter: Not present  Lines: PRESENT      Port A Cath Single 04/11/22 Right Chest wall-Site Assessment: WDL      Cardiac Monitoring: None  Code Status: No CPR- Do NOT Intubate      Clinically Significant Risk Factors               # Hypoalbuminemia: Lowest albumin = 2.5 g/dL at 4/29/2025  6:27 AM, will monitor as appropriate     # Hypertension: Noted on problem list                # Financial/Environmental Concerns:           Social Drivers of Health    Tobacco Use: Medium Risk (4/9/2025)    Patient History     Smoking Tobacco Use: Former     Smokeless Tobacco Use: Never          Disposition Plan     Medically Ready for Discharge: Anticipated Tomorrow after PleurX placement       Karen Guzman MD  Hospitalist Service  [facility]  Securely message with [employer] (more info)  Text page via [employer] Paging/Directory   ______________________________________________________________________    Interval History   No acute events overnight. Pt is a poor historian. He denies any pain or shortness of breath. His oxygen requirements did go up from 4 to 6L. Discussed with IR. Planning for pleurX today.     Physical Exam   Vital Signs: Temp: 97.7  F (36.5  C) Temp src: Oral BP: 97/58 Pulse: 94   Resp: 16 SpO2: (!) 91 % O2 Device: Nasal cannula  Oxygen Delivery: 6 LPM  Weight: 152 lbs 12.46 oz    GENERAL:  Pleasant, cooperative, alert. Thin appearing, chronically ill appearing male in no acute distress.   HEENT: Normocephalic, atraumatic.  Dysphonic voice noted.   PULMONOLOGY: Decreased breath sounds bilaterally. No increased work of breathing.   CARDIAC: Regular rhythm with tachycardic rate.   ABDOMEN: Soft, nontender non distended.   MUSCULOSKELETAL:  Moving x 4 spontaneously. Bilateral pitting LE edema although bilateral legs atrophic. DP pulses intact.   NEURO: Alert and oriented x4. Nonfocal exam.    Medical Decision Making       50 MINUTES SPENT BY ME on the date of service doing chart review, history, exam, documentation & further activities per the note.      Data   ------------------------- PAST 24 HR DATA REVIEWED -----------------------------------------------        Imaging results reviewed over the past 24 hrs:   Recent Results (from the past 24 hours)   XR Chest Port 1 View    Narrative    EXAM: XR CHEST PORT 1 VIEW  LOCATION: [facility]  DATE: 5/1/2025    INDICATION: increased O2 requirements  COMPARISON: Chest CT 4/28/2025, radiograph 4/27/2025      Impression    IMPRESSION: Stable size of cardiomediastinal silhouette with right chest port tip overlying the cavoatrial junction. Persistent interstitial opacities throughout both lungs, compatible with mild pulmonary edema. Interval decrease in volume of left   pleural effusion status post thoracentesis with mild atelectasis. No significant change in volume of moderate right pleural effusion. No measurable pneumothorax. Bones are unchanged.

## 2025-05-02 NOTE — PLAN OF CARE
DATE & TIME: 5/1/25 4176-1412    Cognitive Concerns/ Orientation : Pt A/Ox4. Speech hoarse   BEHAVIOR & AGGRESSION TOOL COLOR: Green   ABNL VS/O2: VSS on 6L NC, increased from 4L to 6L this evening. MD updated.  MOBILITY: Assist x2 with lift, fall risk. Repositions independently in bed. Pt up in chair x1 this shift.  PAIN MANAGMENT: Denies  DIET: Regular, 2000ml fluid restriction. NPO at midnight for PleurX placement  BOWEL/BLADDER: Continent. Urinal at bedside. Per patient no BM in several days. PRN Senna given x1.  DRAIN/DEVICES: Port heparin locked  SKIN: Redness to coccyx. Dry flaky skin to bilateral shins. Dressings on bilateral lower back from thoracentesis  TESTS/PROCEDURES: Chest XR this evening  D/C DATE: Discharge possibly tomorrow after PleurX drain placed and placement  OTHER IMPORTANT INFO: Lung sounds diminished. Infrequent, dry cough.

## 2025-05-02 NOTE — IR NOTE
Interventional Radiology Intra-procedural Nursing Note    Patient Name: Kt Rasmussen  Medical Record Number: [id number]  Today's Date: May 2, 2025    Procedure consented for : Tunneled pleural catheter with moderate sedation  Start time: 1526  End time: 1541    Report provided to: [place] RN  Patient depart time and location: 1600 to 608    Note: Patient entered Interventional Radiology Suite number 1 via cart. Patient awake, alert and oriented. Assisted onto procedural table in supine position. Prepped and draped.  Dr. Correa in room. Time out and procedure started. Vital signs stable. Telemetry reading ST.    Procedure well tolerated by patient without complications. Procedure end with debrief by Dr. Correa.  Pressure held until hemostasis achieved. Gauze and Tegaderm dressing applied to left chest wall       Administered medication totals:  Lidocaine 1% 15 mL Intradermal  Versed 0.5 mg IVP  Fentanyl 25 mcg IVP    Last dose of sedation administered at 1526.

## 2025-05-02 NOTE — PLAN OF CARE
Goal Outcome Evaluation:  Summary:  Admitted for SOB. Found to have Pleural Effusions. Hx. Lung cancer- oncology notes for details.     DATE & TIME: 5/1/25 3886-8468    Cognitive Concerns/ Orientation : Pt A/Ox4. Speech hoarse   BEHAVIOR & AGGRESSION TOOL COLOR: Green   ABNL VS/O2: VSS on 3L NC from 6L during day. Denied SOB  MOBILITY: Assist x2 with lift, fall risk. Repositions independently in bed.   PAIN MANAGMENT: Denies  DIET: Regular, 2000ml fluid restriction. NPO at midnight for PleurX placement  BOWEL/BLADDER: Continent. Urinal at bedside. Per patient no BM in several days. PRN Senna given x2 pills.  DRAIN/DEVICES: Port heparin locked  SKIN: Redness to coccyx. Dry flaky skin to bilateral shins. Dressings on bilateral lower back from thoracentesis  TESTS/PROCEDURES: Chest XR this evening  D/C DATE: Discharge possibly tomorrow after PleurX drain placed and placement  OTHER IMPORTANT INFO: Lung sounds diminished. Infrequent, dry cough.

## 2025-05-02 NOTE — PLAN OF CARE
Goal Outcome Evaluation:       Summary:  Admitted for SOB. Found to have Pleural Effusions. Hx. Lung cancer- oncology notes for details.     DATE & TIME: 5/1/25-5/2/25 1856-6875  Cognitive Concerns/ Orientation : Pt A/Ox4. Speech hoarse   BEHAVIOR & AGGRESSION TOOL COLOR: Green   ABNL VS/O2: VSS on 6L NC satting mid 90%.   MOBILITY: Assist x2 with lift, fall risk. Repositions independently in bed. R side weakness  PAIN MANAGMENT: Denies  DIET: NPO since midnight for PleurX placement  BOWEL/BLADDER: Continent. Urinal at bedside. Per patient no BM in several days, received senna on evening shift  DRAIN/DEVICES: Port heparin locked  SKIN: Redness to coccyx. Dry flaky skin to bilateral shins. Dressings on bilateral lower back from thoracentesis  TESTS/PROCEDURES: PleurX placement today  D/C DATE: Discharge possibly tomorrow after PleurX drain placed and placement but need to determine TCU vs home with hospice  OTHER IMPORTANT INFO: Lung sounds diminished. Infrequent, dry cough.

## 2025-05-02 NOTE — PROGRESS NOTES
Care Management Follow Up    Length of Stay (days): 4    Expected Discharge Date: 05/02/2025     Concerns to be Addressed:       Patient plan of care discussed at interdisciplinary rounds: Yes    Anticipated Discharge Disposition:                Anticipated Discharge Services:    Anticipated Discharge DME:      Patient/family educated on Medicare website which has current facility and service quality ratings:    Education Provided on the Discharge Plan:    Patient/Family in Agreement with the Plan:      Referrals Placed by CM/SW:    Private pay costs discussed: Not applicable    Discussed  Partnership in Safe Discharge Planning  document with patient/family: No     Handoff Completed: No, handoff not indicated or clinically appropriate    Additional Information:  CAROLE attempted to call guardian and was sent to . Requested call back.     Add: CAROLE received call from Sky, pt's guardian. CAROLE dicussed palliative meeting and Sky states pt would like to return home as opposed to TCU and the plan is to enroll in hospice. CAROLE asked if family is involved, Sky stated his sister is to an extent but lives far away. Sky stated pt has been unable to contact his sister as his phone is at [facility]. CAROLE called Abimb[employer] and introduced self. CAROLE discussed hospice plan and [facility] was unaware but agreeable and would like to be updated. Abimbola states she will drive from [place] to [place] to assist with move from snf if pt is unable to return. CAROLE provided Abimbola with pt bed phone number. Abimbola is grateful for update.     CAROLE attempted to call [facility] to discuss pt's return and was sent to . Admissions can be reached at [phone]. CAROLE notified by RNCC that snf's most always can accommodate pleurx catheter which pt is getting today. CAROLE called Sky back and relayed this info. Sky will contact pt's rep payee to get financial info regarding LTC. Pt does have MA. Sky states the following LTC's are okay to send referrals to- [facility]  [facility], and [facility]. CAROLE texted Britt at [facility] as pt was recently at their TCU. [facility] does have a LTC bed available. SW to get hospice consult and send referrals. [facility] needs to communicate with guardian that pt cannot return. SW emailed guardian bed phone number.        Next Steps: hospice consult /LTC referral     SAMARA Eller

## 2025-05-02 NOTE — PLAN OF CARE
Goal Outcome Evaluation:      Plan of Care Reviewed With: patient     Summary:  Here for SOB. Pleural Effusions +. Hx. Lung cancer.       DATE & TIME: 5/2/25- 1500-1930  Cognitive Concerns/ Orientation : AOx4. Hoarse speech  BEHAVIOR & AGGRESSION TOOL COLOR: Green                                                              ABNL VS/O2: VSS on 6L NC, satting 96%  MOBILITY: A2/Lift. Repositions independently in bed. R side weakness, Hx CVA  PAIN MANAGMENT: Denies  DIET: Regular, aspirations precautions   BOWEL/BLADDER: Continent. Using bedside urinal. 1 BM today  DRAIN/DEVICES: R chest port heparin locked  SKIN: Redness to sacrum. Pleurx dressings CDI  TESTS/PROCEDURES: PleurX placement today  D/C DATE: Possibly tomorrow to TCU vs home with hospice  OTHER IMPORTANT INFO: Lung sounds diminished. Infrequent, dry cough.

## 2025-05-02 NOTE — PROGRESS NOTES
CLINICAL NUTRITION SERVICES - ASSESSMENT NOTE      Registered Dietitian Interventions:  Continue Once no longer NPO, continue regular diet and supplements as ordered     REASON FOR ASSESSMENT  Positive admission nutrition risk screen    INFORMATION OBTAINED  Patient not available for interview due to being busy with cares and plan to enroll in hospice upon discharge.    NUTRITION HISTORY  - Unable to obtain nutrition history    CURRENT NUTRITION ORDERS  Diet:Orders Placed This Encounter      NPO for Procedure/Surgery per Anesthesia Guidelines Except for: Meds; Clear liquids before procedure/surgery: ADULT (Age GREATER than or Equal to 18 years) - Clear liquids 2 hours before procedure/surgery  Snacks/Supplements: Ensure Enlive BID      CURRENT INTAKE/TOLERANCE  - Flowsheets show a good appetite and 1 intake yesterday of 100%     LABS  Nutrition-relevant labs: Reviewed    MEDICATIONS  Nutrition-relevant medications: Lasix, Folic acid,     ANTHROPOMETRICS  Height: 0 cm (Data Unavailable)  Admission Weight: 69.4 kg (153 lb) (04/28/25 1700)   Most Recent Weight: 69.3 kg (152 lb 12.5 oz) (05/02/25 0537)  IBW: 85.7 kg (81%)  BMI: Body mass index is 18.84 kg/m .   Weight History: 13% wt loss in 3 months  Wt Readings from Last 25 Encounters:   05/02/25 69.3 kg (152 lb 12.5 oz)   04/16/25 74.5 kg (164 lb 3.9 oz)   04/09/25 79.4 kg (175 lb)   04/09/25 79.4 kg (175 lb)   03/19/25 80.7 kg (178 lb)   02/19/25 79.9 kg (176 lb 2.4 oz)   02/06/25 79.9 kg (176 lb 3.2 oz)   01/29/25 81.6 kg (180 lb)   01/08/25 81.6 kg (180 lb)   12/26/24 81.6 kg (180 lb)   11/27/24 81.2 kg (179 lb)   11/06/24 79.8 kg (176 lb)   11/06/24 79.8 kg (176 lb)   10/18/24 76.5 kg (168 lb 9.6 oz)   09/25/24 81.6 kg (180 lb)   08/14/24 81.6 kg (180 lb)   07/24/24 82.1 kg (181 lb)   07/03/24 82.6 kg (182 lb)   06/20/24 83.5 kg (184 lb)   05/10/24 82.7 kg (182 lb 6.4 oz)   03/28/24 82.1 kg (181 lb)   01/25/24 81.6 kg (180 lb)   01/04/24 83 kg (183 lb)   12/12/23  81.6 kg (180 lb)   11/09/23 81.6 kg (180 lb)     Dosing Weight: 69.3 kg, based on actual wt    ASSESSED NUTRITION NEEDS  Estimated Energy Needs: 0828-4153 kcals/day (30 - 35 kcals/kg)  Justification: Underweight  Estimated Protein Needs:  grams protein/day (1.2 - 1.8 grams of pro/kg)  Justification: Increased needs and Repletion  Estimated Fluid Needs: 2000 mL/day restriction  Justification: On a fluid restriction    SYSTEM FINDINGS  GI symptoms: Reviewed  Skin/wounds: Reviewed    MALNUTRITION  % Intake: Unable to assess  % Weight Loss: > 7.5% in 3 months (severe)  13% wt loss in 3 months  Subcutaneous Fat Loss: Unable to assess  Muscle Loss: Unable to assess  Fluid Accumulation/Edema: None noted  Malnutrition Diagnosis: Unable to determine due to lack of Hx and NFPE  Malnutrition Present on Admission: Unable to assess    NUTRITION DIAGNOSIS  Predicted inadequate nutrient intake protein-energy  related to risk for po intake to decline while hospitalized and need for oral nutrition supplements to help pt meet needs      INTERVENTIONS  See nutrition interventions above    GOALS  Patient to consume % of nutritionally adequate meal trays TID, or the equivalent with supplements/snacks.     MONITORING/EVALUATION  Progress toward goals will be monitored and evaluated per policy.

## 2025-05-02 NOTE — PLAN OF CARE
Goal Outcome Evaluation:  Cognitive Concerns/ Orientation : A&Ox4. Speech hoarse. Flat  BEHAVIOR & AGGRESSION TOOL COLOR: Green   ABNL VS/O2: VSS on 6L NC satting mid 90%.   MOBILITY: Assist x2 with lift, fall risk. Repositions independently in bed. R side weakness  PAIN MANAGMENT: Denies  DIET: NPO since midnight for PleurX placement  BOWEL/BLADDER: Continent. Urinal at bedside. Large formed BM  DRAIN/DEVICES: Port heparin locked  SKIN: Redness to coccyx. Dry flaky skin to bilateral shins. Dressings on bilateral lower back from thoracentesis  TESTS/PROCEDURES: PleurX placement today  D/C DATE: Discharge pending safe disposition. TCU vs home with hospice  OTHER IMPORTANT INFO: Lung sounds diminished. Infrequent, dry cough. Receiving IV abx.

## 2025-05-02 NOTE — PROCEDURES
Interventional Radiology Post-Procedure Note   ?   Brief Procedure Note:   Patient name: Kt Rasmussen Pt MRN:[id number]   Date of procedure: 5/2/2025     Procedure(s): Image guided tunneled left chest tube placement  Sedation method: Moderate sedation was employed. The patient was monitored by an interventional radiology nurse at all times throughout the procedure under my direct guidance.  Pre Procedure Diagnosis: Recurrent malignant pleural effusions  Post Procedure Diagnosis: Same  Indications: Need for long term drainage of pleural fluid, palliative care  ?   Attending: Julio Correa M.D.  Specimen(s) removed: None   Additional studies ordered: None  Drains: left Bard Aspira tunneled pleural drain  Estimated Blood Loss: Minimal  Complications: None  Vascular closure method: N/A    Findings/Notes/Comments:   Successful image guided placement of left tunneled chest tube. Ready for use.  ?   Please see dictation in PACS or under the Imaging tab in [employer] for detailed procedure note.     Julio Correa M.D.   Vascular and Interventional Radiology   Pager: [phone]   After Hours / Scheduling: [phone]     5/2/2025  4:00 PM

## 2025-05-02 NOTE — PROGRESS NOTES
"Palliative Care Sign Off Note  [facility]      Reviewed with Dr Guzman and patient will be enrolling in hospice. Our service will sign off at this time.     Thank you for the opportunity to be involved in the care of this patient.     Dionne HENDRICKSON, CNS  Palliative Medicine   [facility]  Securely message with [employer]  \"Palliative Care [facility]\"   "

## 2025-05-02 NOTE — CONSULTS
"  Interventional Radiology - Pre-Procedure Note:  5/2/2025    Procedure Requested: Left tunneled pleural catheter placement  Requested by: Dr Guzman    History and Physical Reviewed: H&P documented within 30 days (by Dr Preston on 4/27/25).  I have personally reviewed the patient's medical history and have updated the medical record as necessary.    Brief HPI: Kt Rasmussen is a 64 year old male with recurrent malignant bilateral pleural effusion s/p bilateral thoracentesis, most recently left side on 4/29 with 2.5L drained. Patient will be discharging w hospice care and IR consulted for left tunneled catheter placement as recommended by oncology team.      IMAGING:  CXR 5/1/25:  \"IMPRESSION: Stable size of cardiomediastinal silhouette with right chest port tip overlying the cavoatrial junction. Persistent interstitial opacities throughout both lungs, compatible with mild pulmonary edema. Interval decrease in volume of left   pleural effusion status post thoracentesis with mild atelectasis. No significant change in volume of moderate right pleural effusion. No measurable pneumothorax. Bones are unchanged.\"    NPO: YES since [place]  ANTICOAGULANTS: Eliquis 2.5mg PO BID, reviewed  ANTIBIOTICS: Cefepime q8h    ALLERGIES  Allergies   Allergen Reactions    No Known Drug Allergy          LABS:  INR   Date Value Ref Range Status   04/27/2025 1.26 (H) 0.85 - 1.15 Final   04/22/2021 1.02 0.86 - 1.14 Final      Hemoglobin   Date Value Ref Range Status   04/30/2025 8.2 (L) 13.3 - 17.7 g/dL Final   07/08/2021 10.5 (L) 13.3 - 17.7 g/dL Final   ]  Platelet Count   Date Value Ref Range Status   04/30/2025 222 150 - 450 10e3/uL Final   07/08/2021 65 (L) 150 - 450 10e9/L Final     Creatinine   Date Value Ref Range Status   05/01/2025 0.77 0.67 - 1.17 mg/dL Final   06/29/2021 0.84 0.66 - 1.25 mg/dL Final     Potassium   Date Value Ref Range Status   04/30/2025 3.7 3.4 - 5.3 mmol/L Final   12/07/2022 4.1 3.4 - 5.3 mmol/L Final "   06/29/2021 4.3 3.4 - 5.3 mmol/L Final         EXAM:  BP 97/58 (BP Location: Left arm)   Pulse 94   Temp 97.7  F (36.5  C) (Oral)   Resp 16   Wt 69.3 kg (152 lb 12.5 oz)   SpO2 (!) 91%   BMI 18.84 kg/m    General:  Stable.  In no acute distress.    Neuro:  A&O x 3. Moves all extremities equally.  Heart: RRR  Lungs: No increased work of breathing on supplemental O2 via NC      Pre-Sedation Code Status Assessment:  Code Status: DNR / DNI intra procedure, per discussion with patient.         ASSESSMENT/PLAN:   Bilateral recurrent malignant pleural effusion    -Left tunneled pleural catheter placement with moderate sedation, image guidance today    Procedure, risks/benefits, details, alternatives, and sedation reviewed with patient and patient verbalized understanding. All questions answered. OK to proceed with above radiology procedure.     Ge Garduno PA-C  Interventional Radiology  *[phone]  [phone]      Total Time: 35 minutes

## 2025-05-02 NOTE — IR NOTE
Procedure Note for IR Procedure Dictation  Procedure pleurx  Conscious sedation: 0.5 mg IVP Versed, 25 mcg IVP fentanyl  Sedation time: 15 minutes  Fluoro time: 0.3 minutes  Total Fluoro Dose: 1.71 mGy (Air Kerma)  Contrast: 0 ml 0  Local anesthetic: 15 ml 1% lidocaine

## 2025-05-03 LAB
ANION GAP SERPL CALCULATED.3IONS-SCNC: 9 MMOL/L (ref 7–15)
BUN SERPL-MCNC: 28.4 MG/DL (ref 8–23)
CALCIUM SERPL-MCNC: 8.4 MG/DL (ref 8.8–10.4)
CHLORIDE SERPL-SCNC: 99 MMOL/L (ref 98–107)
CREAT SERPL-MCNC: 0.8 MG/DL (ref 0.67–1.17)
EGFRCR SERPLBLD CKD-EPI 2021: >90 ML/MIN/1.73M2
ERYTHROCYTE [DISTWIDTH] IN BLOOD BY AUTOMATED COUNT: 18.8 % (ref 10–15)
GLUCOSE SERPL-MCNC: 80 MG/DL (ref 70–99)
HCO3 SERPL-SCNC: 27 MMOL/L (ref 22–29)
HCT VFR BLD AUTO: 27 % (ref 40–53)
HGB BLD-MCNC: 8.2 G/DL (ref 13.3–17.7)
MCH RBC QN AUTO: 28.4 PG (ref 26.5–33)
MCHC RBC AUTO-ENTMCNC: 30.4 G/DL (ref 31.5–36.5)
MCV RBC AUTO: 93 FL (ref 78–100)
PLATELET # BLD AUTO: 177 10E3/UL (ref 150–450)
POTASSIUM SERPL-SCNC: 3.7 MMOL/L (ref 3.4–5.3)
RBC # BLD AUTO: 2.89 10E6/UL (ref 4.4–5.9)
SODIUM SERPL-SCNC: 135 MMOL/L (ref 135–145)
WBC # BLD AUTO: 3.6 10E3/UL (ref 4–11)

## 2025-05-03 PROCEDURE — 999N000157 HC STATISTIC RCP TIME EA 10 MIN

## 2025-05-03 PROCEDURE — 250N000011 HC RX IP 250 OP 636: Performed by: EMERGENCY MEDICINE

## 2025-05-03 PROCEDURE — 85014 HEMATOCRIT: CPT | Performed by: STUDENT IN AN ORGANIZED HEALTH CARE EDUCATION/TRAINING PROGRAM

## 2025-05-03 PROCEDURE — 250N000013 HC RX MED GY IP 250 OP 250 PS 637: Performed by: STUDENT IN AN ORGANIZED HEALTH CARE EDUCATION/TRAINING PROGRAM

## 2025-05-03 PROCEDURE — 250N000013 HC RX MED GY IP 250 OP 250 PS 637

## 2025-05-03 PROCEDURE — 120N000001 HC R&B MED SURG/OB

## 2025-05-03 PROCEDURE — 250N000009 HC RX 250: Performed by: HOSPITALIST

## 2025-05-03 PROCEDURE — 250N000011 HC RX IP 250 OP 636: Mod: JZ | Performed by: STUDENT IN AN ORGANIZED HEALTH CARE EDUCATION/TRAINING PROGRAM

## 2025-05-03 PROCEDURE — 99233 SBSQ HOSP IP/OBS HIGH 50: CPT | Performed by: STUDENT IN AN ORGANIZED HEALTH CARE EDUCATION/TRAINING PROGRAM

## 2025-05-03 PROCEDURE — 94640 AIRWAY INHALATION TREATMENT: CPT

## 2025-05-03 PROCEDURE — 80048 BASIC METABOLIC PNL TOTAL CA: CPT | Performed by: STUDENT IN AN ORGANIZED HEALTH CARE EDUCATION/TRAINING PROGRAM

## 2025-05-03 RX ADMIN — Medication 5 ML: at 05:45

## 2025-05-03 RX ADMIN — AMOXICILLIN AND CLAVULANATE POTASSIUM 1 TABLET: 875; 125 TABLET, FILM COATED ORAL at 20:55

## 2025-05-03 RX ADMIN — FOLIC ACID 1 MG: 1 TABLET ORAL at 08:03

## 2025-05-03 RX ADMIN — CEFEPIME 2 G: 2 INJECTION, POWDER, FOR SOLUTION INTRAVENOUS at 05:45

## 2025-05-03 RX ADMIN — APIXABAN 2.5 MG: 2.5 TABLET, FILM COATED ORAL at 20:55

## 2025-05-03 RX ADMIN — FUROSEMIDE 20 MG: 20 TABLET ORAL at 08:03

## 2025-05-03 RX ADMIN — IPRATROPIUM BROMIDE AND ALBUTEROL SULFATE 3 ML: .5; 3 SOLUTION RESPIRATORY (INHALATION) at 07:50

## 2025-05-03 ASSESSMENT — ACTIVITIES OF DAILY LIVING (ADL)
ADLS_ACUITY_SCORE: 70
ADLS_ACUITY_SCORE: 70
ADLS_ACUITY_SCORE: 71
ADLS_ACUITY_SCORE: 72
ADLS_ACUITY_SCORE: 70
ADLS_ACUITY_SCORE: 71
ADLS_ACUITY_SCORE: 70
ADLS_ACUITY_SCORE: 70
ADLS_ACUITY_SCORE: 72
ADLS_ACUITY_SCORE: 70
ADLS_ACUITY_SCORE: 71
ADLS_ACUITY_SCORE: 72
ADLS_ACUITY_SCORE: 72
ADLS_ACUITY_SCORE: 70
ADLS_ACUITY_SCORE: 72
ADLS_ACUITY_SCORE: 70
ADLS_ACUITY_SCORE: 72
ADLS_ACUITY_SCORE: 70
ADLS_ACUITY_SCORE: 72
ADLS_ACUITY_SCORE: 72
ADLS_ACUITY_SCORE: 70
ADLS_ACUITY_SCORE: 71
ADLS_ACUITY_SCORE: 72

## 2025-05-03 NOTE — PROGRESS NOTES
Pain: Pain goal 0/10 Pain Rating 6/10 L back/lung pain, tylenol and robaxin given     Assessment  Resp: LS inspiratory wheezing, on 6 L NC    Neuro: A&Ox4, flat affect   GI/: BS + x0 BM, Pt having adequate urine output throughout shift   Skin/Wounds: scattered scabbing, L shin wound/scabbing open to air   Lines/Drains: R chest wall port heparin locked, L plurex drain dressing CDI   Activity: x2 lift, turns in bed   Sleep: pt sleeping between cares, pt refusing repositioning and cares, RN will continue to promote cares and educate patient     Aggression Stop Light: Green  Patient Care Plan: discharge with hospice, pending tomorrow

## 2025-05-03 NOTE — PROGRESS NOTES
Care Management Follow Up    Length of Stay (days): 5    Expected Discharge Date: 05/05/2025     Concerns to be Addressed:       Patient plan of care discussed at interdisciplinary rounds: Yes    Anticipated Discharge Disposition:                Anticipated Discharge Services:    Anticipated Discharge DME:      Patient/family educated on Medicare website which has current facility and service quality ratings:    Education Provided on the Discharge Plan:    Patient/Family in Agreement with the Plan:      Referrals Placed by CM/SW:    Private pay costs discussed: Not applicable    Discussed  Partnership in Safe Discharge Planning  document with patient/family: No     Handoff Completed: No, handoff not indicated or clinically appropriate    Additional Information:  Waiting until Monday for communications from senior care, pt's rep payee and guardian. SW sent LTC referral to [facility].     Next Steps: talk to senior care, talk to guardian and confirm LTC bed at [facility]    SAMARA Eller

## 2025-05-03 NOTE — PLAN OF CARE
Goal Outcome Evaluation:      Plan of Care Reviewed With: patient    Overall Patient Progress: no change    DATE & TIME: 5/2/25 6418-9912  Cognitive Concerns/ Orientation : A&Ox4. Speech hoarse. Flat  BEHAVIOR & AGGRESSION TOOL COLOR: Green   ABNL VS/O2: VSS on 6L NC  MOBILITY: Total cares. Lift for tx. Refused T&R assistance; educated on skin breakdown   PAIN MANAGMENT: Denies  DIET: Regular, 2L FR  BOWEL/BLADDER: Continent  ABNL LAB/BG: Hgb 8.2  DRAIN/DEVICES: R CP HL, L pleurX drain  SKIN: Redness to coccyx. Dry flaky skin to bilateral shins. Dressings on bilateral lower back from thoracentesis. L abd pleurX dressing CDI  TESTS/PROCEDURES: PleurX drain placed 5/2  D/C DATE: Discharge pending safe disposition. LTC with hospice   OTHER IMPORTANT INFO: Lung sounds diminished. Infrequent, dry cough.

## 2025-05-03 NOTE — PLAN OF CARE
Goal Outcome Evaluation:  Cognitive Concerns/ Orientation : A&Ox4. Forgetful. Speech hoarse. Flat. Irritable at times  BEHAVIOR & AGGRESSION TOOL COLOR: Green   ABNL VS/O2: VSS on 5-6L NC  MOBILITY: Total cares. Up with a ceiling lift. Up in the recliner this afternoon.  PAIN MANAGMENT: Denies  DIET: Regular, 2L FR-fair  BOWEL/BLADDER: Continent. Using urinal at the bedside. BMx1  ABNL LAB/BG:  hgb 8.2. WBC 3.6  DRAIN/DEVICES: R port-hep locked. L pleurX drain-dressing c/d/i  SKIN: Redness to coccyx. Dry flaky skin to bilateral shins. Dressings on bilateral lower back from thoracentesis.   TESTS/PROCEDURES: none  D/C DATE: Discharge pending safe disposition. LTC with hospice   OTHER IMPORTANT INFO: Lung sounds diminished. No SOB. No cough. Refused his oral abx. CHG bath, linens and gown change completed

## 2025-05-03 NOTE — CONSULTS
Hospice Consult acknowledged. Working on logistics of moving patient to LTC with hospice agency. Care management is continuing to follow for discharge planning.

## 2025-05-03 NOTE — PROGRESS NOTES
[facility]    Medicine Progress Note - Hospitalist Service    Date of Admission:  4/27/2025    Assessment & Plan   Kt Rasmussen is a 64 year old male with history of metastatic lung cancer, pleural effusion, pericardial effusion, lower extremity DVT on anticoagulation, chronic anemia, thrombocytopenia, coronary artery disease status post stenting, hypertension, hyperlipidemia, GERD, seizure disorder, anxiety disorder, history of CVA with right hemiplegia admitted on 4/27/2025 for shortness of breath.     Patient recently admitted 04/09/2025 - 04/16/2025 at [facility] for acute hypoxic respiratory failure likely multifactorial. Bilateral (right greater than left) moderate to large pleural effusions, moderate pericardial effusion, metastatic non-small cell lung cancer. Patient underwent thoracocentesis (1.1 L removed), diuresis, weaned down to 2 L nasal oxygen and discharged back to care facility.  Prior to 04/2025 hospitalization, patient underwent outpatient thoracentesis 01/06/2025, 12/27/2024 and 12/26/2024 where 1.9 L, 0.9 L and 2.65 L removed respectively.      Acute on chronic respiratory failure, multifactorial in etiology in setting of pneumonia, metastatic lung cancer, recurring pleural effusions, atelectasis, pericardial effusion, chemotoxicity and deconditioning  Hx moderate pericardial effusion on TTE 04/2025  Hx bilateral pleural effusions s/p bilateral thoracentesis 04/27/2025, 04/09/2025, 1/6/2025, 12/27/2024 and 12/26/2024   *Patient presented to [facility] ED from TCU with acute on chronic dyspnea. Patient discharged previously on 2 L NC although reportedly not requiring oxygen at baseline, per patient. He was found to be hypoxic in the 60s on RA per EMS.   *Upon arrival to [facility] ED, patient noted to be hypoxic with O2 saturations 83% on RA. CXR showing moderate right and small left pleural effusions with adjacent atelectasis. Mild pulmonary edema. No pneumothorax. Stable  cardiomediastinal silhouette. EKG without acute ischemic changes.   *Patient with continued worsening dyspnea. As of 04/28/2025, patient requiring 4 L NC to sustain oxygenations low 90% following 2.4 L fluid removal s/p thoracentesis 04/27/2025. Recurrent pleural effusions suspected secondary to malignancy and chemotherapy.   *CT chest 04/28/2025 with right upper and left lower lobe opacities concerning for pneumonia. Notable also for large left effusion and moderate partially loculated right effusion. Interval enlargement of the bulky adenopathy within the abdomen and pelvis. Stable post treatment changes right perihilar region. Stable osseous lesions.   *Echocardiogram 04/28/2025 with hyperdynamic left ventricular function. LVEF 65-70%. Valves were not interrogated given limited study. Small anterior pericardial effusion most prominent adjacent to the right ventricle (<1 cm). Mild respiratory variation demonstrated within the mitral and tricuspid inflow velocity profiles consistent with constrictive physiology. No right ventricular or right atrial collapse. IVC normal size. Large pleural effusion. Compared to study dated 04/10/2025, the pericardial effusion is smaller however, there is evidence of constrictive physiology without tamponade. There is a large pleural effusion.  -- Oxygen PRN.   -- Fluid restriction to 2000 mL/day. Strict I/Os. Daily weights.   -- Duonebs q 4 PRN and q 6 PRN.   -- RCAT. Aggressive incentive spirometry, encourage ambulation.  -- Home oxygen assessment completed.   -- Continue PTA oral lasix 20 mg daily.   -- Oncology consultation. Dr. John noted recent disease progression. Patient is no longer a candidate for cancer directed treatment.   -- Started HAP/Aspiration pneumonia treatment with Cefepime and Vancomycin in setting of CT findings. MRSA swab negative for MRSA and vancomycin discontinued. Completed 4 days of Cefepime. Will transition to Augmentin to continue 7 day course.   --  Cardiology consultation in setting of TTE findings. Overall pericardial effusion appears smaller than previously and clinically patient does not appear to have constrictive pericarditis.  No chest pain no pericardial rub no EKG changes. No new cardiac recommendations at this time.  -- Repeat thoracentesis 04/29/2025 given CT findings of continued large left pleural effusion and moderate right. Additional 2.5 L removed from left pleural effusion.  -- Advanced care planning discussion with Dr. John (Oncology), Hira Mahajan NP (Palliative), writer, patient and guardian service. Appreciate the cares of all specialties involved. See palliative note for additional details. Given patient no longer candidate for cancer related treatment, code status changed to DNR-DNI. Kt's Guardian is planning on enrolling Kt in Hospice once LTC placement has been established  -- L PleurX catheter placed on 5/2  -- Appreciate social work and care coordinator assistance  * As of 5/3 respiratory status stable of 5L of supplemental O2.      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 John) and underwent infusion GEMZAR 04/09 and received post chemotherapy neupogen. Last PET 12/2024 with stable disease involving the lymph node metastases, but a partial response involving the osseous metastases.   -- Per above, patient is no longer a candidate for cancer directed treatment.   -- s/p L PleurX catheter placement on 5/2  -- May consider R pleurX catheter in the near future if R sided pleural effusion recurs.   -- Pain management with Tylenol PRN, atarax PRN.     Chronic anemia suspected secondary to chemotherapy and underlying malignancy  Chronic thrombocytopenia in setting above   -- Continue to monitor with morning labs.      Bilateral lower extremity DVT -9/2023 on anticoagulation.  *Likely hypercoagulability of malignancy.    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 Flash penetration x1 with thin liquids. Here, patient exhibiting right-sided weakness and tongue deviation to the right. Voice is dysphonic and cough is weak increasing risk for silent aspiration.   -- SLP consultation. OK for regular diet and thin liquids for now.     Chronic troponin elevation  CAD s/p cardiac stenting  Ascending aortic dilatation  Mild mitral regurgitation   Mild tricuspid regurgitation   Hypertension  Hyperlipidemia  *Denies any chest pain. No palpitations. SOB as above. Chronic troponin elevation suspected secondary to NSTEMI in setting of demand given recurrent malignant pleural effusions and pericardial effusion.   *Echocardiogram 04/28/2025 with hyperdynamic left ventricular function. LVEF 65-70%. Valves were not interrogated given limited study. Small anterior pericardial effusion most prominent adjacent to the right ventricle (<1 cm). Mild respiratory variation demonstrated within the mitral and tricuspid inflow velocity profiles consistent with constrictive  physiology. No right ventricular or right atrial collapse. IVC normal size.  -- Telemetry.   -- Anticoagulation as above  -- Initiated on lasix 20 mg oral daily. Continue.      History of CVA with right sided hemiplegia  Seizure D/O  Cognitive impairment   *Wheelchair bound at baseline.   * pt has notable cognitive impairment. Seemingly answers yes/no questions appropriately but has significant word-finding difficulties. Very difficult to communicate with. Unclear baseline, but per chart review, he has had documented word finding issues as a result of his prior stroke.   -- ? If cognitive impairment and word finding issues are worse than baseline. Given pt will ultimately be enrolled in hospice, will not pursue any additional work up for this in  patient.   --Pt has legal guardian  -- Fall precautions.     GERD.  *Not on PTA medications. No acute issues.     MDD with anxiety  *Not on PTA medications. No acute issues.     History of alcohol use D/O  History of substance use D/O  -- Noted.      Hx of hypoalbuminemia in setting of metastatic lung cancer   -- Nutrition consultation  -- Ensure between meals.      Mild elevated AST  -- Noted. Follow LFTs.      Physical deconditioning from medical illness.  *Resident of assisted living facility. Currently residing at [facility].   -- Fall precautions.  -- PT evaluation requested.      Goals of care   -- DNR/DNI, will likely discharge on hospice, will need to discuss details with Guardian.            Diet: Fluid restriction 2000 ML FLUID  Snacks/Supplements Adult: Ensure Enlive; Between Meals  Regular Diet Adult    DVT Prophylaxis: DOAC  Buckner Catheter: Not present  Lines: PRESENT      Port A Cath Single 04/11/22 Right Chest wall-Site Assessment: WDL      Cardiac Monitoring: None  Code Status: No CPR- Do NOT Intubate      Clinically Significant Risk Factors               # Hypoalbuminemia: Lowest albumin = 2.5 g/dL at 4/29/2025  6:27 AM, will monitor as appropriate     # Hypertension: Noted on problem list                # Financial/Environmental Concerns:           Social Drivers of Health    Tobacco Use: Medium Risk (4/9/2025)    Patient History     Smoking Tobacco Use: Former     Smokeless Tobacco Use: Never          Disposition Plan     Medically Ready for Discharge: Medically ready for discharge   Awaiting LTC placement. Then pt will enroll in hospice per guardian.       Karen Guzman MD  Hospitalist Service  [facility]  Securely message with [employer] (more info)  Text page via [employer] Paging/Directory   ______________________________________________________________________    Interval History   No acute events overnight. Pt is a poor historian. Has significant word finding abilities.  forgetful  No pain or shortness of breath. Eating well.     Physical Exam   Vital Signs: Temp: 97.9  F (36.6  C) Temp src: Oral BP: 112/65 Pulse: 76   Resp: 18 SpO2: 97 % O2 Device: Nasal cannula Oxygen Delivery: 5 LPM  Weight: 152 lbs 12.46 oz    GENERAL:  Pleasant, cooperative, alert. Thin appearing, chronically ill appearing male in no acute distress.   HEENT: Normocephalic, atraumatic.  Dysphonic voice noted.   PULMONOLOGY: Decreased breath sounds bilaterally. No increased work of breathing.   CARDIAC: Regular rhythm with tachycardic rate.   ABDOMEN: Soft, nontender non distended.   MUSCULOSKELETAL:  Moving x 4 spontaneously. Bilateral pitting LE edema although bilateral legs atrophic. DP pulses intact.   NEURO: Alert and oriented x4. Nonfocal exam.    Medical Decision Making       50 MINUTES SPENT BY ME on the date of service doing chart review, history, exam, documentation & further activities per the note.      Data   ------------------------- PAST 24 HR DATA REVIEWED -----------------------------------------------    I have personally reviewed the following data over the past 24 hrs:    3.6 (L)  \   8.2 (L)   / 177     135 99 28.4 (H) /  80   3.7 27 0.80 \       Imaging results reviewed over the past 24 hrs:   Recent Results (from the past 24 hours)   IR Chest Tube Drain Tunneled Left    Narrative    [facility]  LOCATION: [facility]  DATE: 5/2/2025    PROCEDURE: ULTRASOUND AND FLUOROSCOPIC-GUIDED TUNNELED PLEURAL DRAINAGE CATHETER PLACEMENT  1. Ultrasound-guided access into the left pleural space. A permanent image was stored.  2. Placement of a tunneled left pleural drainage catheter.  3. Moderate sedation.    INTERVENTIONAL RADIOLOGIST: Julio Correa MD    INDICATION: Patient is a 64-year-old male with a history of malignant pleural effusions presents for left tunneled pleural drainage catheter.    CONSENT: The risks, benefits and alternatives of left tunneled pleural  drainage catheter placement were discussed with the patient  in detail. All questions were answered. Informed consent was given to proceed with the procedure.    MODERATE SEDATION: Versed 0.5 mg IV; Fentanyl 25 mcg IV. During the time out, immediately prior to the administration of medications, the patient was reassessed for adequacy to receive conscious sedation.  Under physician supervision, Versed and fentanyl   were administered for moderate sedation. Pulse oximetry, heart rate and blood pressure were continuously monitored by an independent trained observer. The physician spent 15 minutes of face-to-face sedation time with the patient.    CONTRAST: None.    FLUOROSCOPIC TIME: 0.3 minutes.  RADIATION DOSE: Air Kerma: 2 mGy.    COMPLICATIONS: No immediate complications.    STERILE BARRIER TECHNIQUE: Maximum sterile barrier technique was used. Cutaneous antisepsis was performed at the operative site with application of 2% chlorhexidine and large sterile drape. Prior to the procedure, the  and assistant performed   hand hygiene and wore hat, mask, sterile gown, and sterile gloves during the entire procedure.    PROCEDURE:    Under real-time sonographic guidance, an 18 gauge needle was inserted from a lateral intercostal approach into the left pleural space. A wire was coiled within the posterior pleural space. A subcutaneous tunnel was created along the interspace requiring   a second incision. Using this access, a Bard Aspira drainage catheter was placed under fluoroscopic guidance with its tip in the posterior pleural space. The catheter may be used immediately.    FINDINGS:  Ultrasound shows a small left pleural effusion. At the completion of the study, the catheter tubing lies in the posterior costophrenic pleural space.      Impression    IMPRESSION:    1. Successful left pleural drainage catheter placement, as discussed above.  2. The catheter is ready for use.

## 2025-05-04 LAB
BACTERIA PLR CULT: NO GROWTH
GRAM STAIN RESULT: NORMAL
GRAM STAIN RESULT: NORMAL

## 2025-05-04 PROCEDURE — 250N000013 HC RX MED GY IP 250 OP 250 PS 637

## 2025-05-04 PROCEDURE — 99232 SBSQ HOSP IP/OBS MODERATE 35: CPT | Performed by: STUDENT IN AN ORGANIZED HEALTH CARE EDUCATION/TRAINING PROGRAM

## 2025-05-04 PROCEDURE — 250N000013 HC RX MED GY IP 250 OP 250 PS 637: Performed by: STUDENT IN AN ORGANIZED HEALTH CARE EDUCATION/TRAINING PROGRAM

## 2025-05-04 PROCEDURE — 120N000001 HC R&B MED SURG/OB

## 2025-05-04 PROCEDURE — 250N000011 HC RX IP 250 OP 636: Performed by: EMERGENCY MEDICINE

## 2025-05-04 RX ADMIN — AMOXICILLIN AND CLAVULANATE POTASSIUM 1 TABLET: 875; 125 TABLET, FILM COATED ORAL at 20:38

## 2025-05-04 RX ADMIN — APIXABAN 2.5 MG: 2.5 TABLET, FILM COATED ORAL at 07:51

## 2025-05-04 RX ADMIN — APIXABAN 2.5 MG: 2.5 TABLET, FILM COATED ORAL at 20:38

## 2025-05-04 RX ADMIN — Medication 5 ML: at 05:58

## 2025-05-04 RX ADMIN — AMOXICILLIN AND CLAVULANATE POTASSIUM 1 TABLET: 875; 125 TABLET, FILM COATED ORAL at 07:51

## 2025-05-04 RX ADMIN — FUROSEMIDE 20 MG: 20 TABLET ORAL at 07:50

## 2025-05-04 RX ADMIN — FOLIC ACID 1 MG: 1 TABLET ORAL at 07:51

## 2025-05-04 ASSESSMENT — ACTIVITIES OF DAILY LIVING (ADL)
ADLS_ACUITY_SCORE: 72

## 2025-05-04 NOTE — PLAN OF CARE
Goal Outcome Evaluation:      Plan of Care Reviewed With: patient    Overall Patient Progress: no change     SUMMARY: SOB. Found to have Pleural Effusions    DATE & TIME: 05/03/2025 - 05/04/2025 [id number]      Cognitive Concerns/ Orientation: A&Ox4. Forgetful. Speech hoarse. Flat.    BEHAVIOR & AGGRESSION TOOL COLOR: Green  ABNL VS/O2: VSS on 5-6L NC  MOBILITY: A x2; Lift. T & R Q2  PAIN MANAGMENT: Denied pain this shift   DIET: Regular, 2000mL FR   BOWEL/BLADDER: Continent of bowel and bladder; Using urinal at bedside, No BM this shift   ABNL LAB/BG: Hgb 8.2  DRAIN/DEVICES: R Chest port - Hep locked, L pleurX drain-dressing c/d/i  TELEMETRY RHYTHM: NA  SKIN: Redness to coccyx. Dry flaky skin to bilateral shins. Dressings on bilateral lower back from thoracentesis.   TESTS/PROCEDURES: None this shift   D/C DATE: Discharge pending safe disposition. LTC with hospice     OTHER IMPORTANT INFO: Lung sounds diminished.

## 2025-05-04 NOTE — PROGRESS NOTES
[facility]    Medicine Progress Note - Hospitalist Service    Date of Admission:  4/27/2025    Assessment & Plan   Kt Rasmussen is a 64 year old male with history of metastatic lung cancer, pleural effusion, pericardial effusion, lower extremity DVT on anticoagulation, chronic anemia, thrombocytopenia, coronary artery disease status post stenting, hypertension, hyperlipidemia, GERD, seizure disorder, anxiety disorder, history of CVA with right hemiplegia admitted on 4/27/2025 for shortness of breath.     Patient recently admitted 04/09/2025 - 04/16/2025 at [facility] for acute hypoxic respiratory failure likely multifactorial. Bilateral (right greater than left) moderate to large pleural effusions, moderate pericardial effusion, metastatic non-small cell lung cancer. Patient underwent thoracocentesis (1.1 L removed), diuresis, weaned down to 2 L nasal oxygen and discharged back to care facility.  Prior to 04/2025 hospitalization, patient underwent outpatient thoracentesis 01/06/2025, 12/27/2024 and 12/26/2024 where 1.9 L, 0.9 L and 2.65 L removed respectively.      Acute on chronic respiratory failure, multifactorial in etiology in setting of pneumonia, metastatic lung cancer, recurring pleural effusions, atelectasis, pericardial effusion, chemotoxicity and deconditioning  Hx moderate pericardial effusion on TTE 04/2025  Hx bilateral pleural effusions s/p bilateral thoracentesis 04/27/2025, 04/09/2025, 1/6/2025, 12/27/2024 and 12/26/2024   *Patient presented to [facility] ED from TCU with acute on chronic dyspnea. Patient discharged previously on 2 L NC although reportedly not requiring oxygen at baseline, per patient. He was found to be hypoxic in the 60s on RA per EMS.   *Upon arrival to [facility] ED, patient noted to be hypoxic with O2 saturations 83% on RA. CXR showing moderate right and small left pleural effusions with adjacent atelectasis. Mild pulmonary edema. No pneumothorax. Stable  cardiomediastinal silhouette. EKG without acute ischemic changes.   *Patient with continued worsening dyspnea. As of 04/28/2025, patient requiring 4 L NC to sustain oxygenations low 90% following 2.4 L fluid removal s/p thoracentesis 04/27/2025. Recurrent pleural effusions suspected secondary to malignancy and chemotherapy.   *CT chest 04/28/2025 with right upper and left lower lobe opacities concerning for pneumonia. Notable also for large left effusion and moderate partially loculated right effusion. Interval enlargement of the bulky adenopathy within the abdomen and pelvis. Stable post treatment changes right perihilar region. Stable osseous lesions.   *Echocardiogram 04/28/2025 with hyperdynamic left ventricular function. LVEF 65-70%. Small pericardial effusion with some constriction.   *Oncology consultation. Dr. John noted recent disease progression. Patient is no longer a candidate for cancer directed treatment.   *Cardiology consultation in setting of TTE findings. Overall pericardial effusion appears smaller than previously and clinically patient does not appear to have constrictive pericarditis. No new cardiac recommendations at this time.  *Repeat thoracentesis 04/29/2025 given CT findings of continued large left pleural effusion and moderate right. Additional 2.5 L removed from left pleural effusion.  *Advanced care planning discussion with Dr. John (Oncology), Hira Mahajan NP (Palliative), writer, patient and guardian service. Appreciate the cares of all specialties involved. See palliative note for additional details. Given patient no longer candidate for cancer related treatment, code status changed to DNR-DNI. Kt's Guardian is planning on enrolling Kt in Hospice once LTC placement has been established  *L PleurX catheter placed on 5/2  * As of 5/4 respiratory status stable on 4-5L of supplemental O2.    -- Oxygen PRN.   -- Fluid restriction to 2000 mL/day. Strict I/Os. Daily weights.   -- Loretta  q 4 PRN and q 6 PRN.   -- RCAT. Aggressive incentive spirometry, encourage ambulation.  -- Continue PTA oral lasix 20 mg daily.   -- Started HAP/Aspiration pneumonia treatment with Cefepime and Vancomycin in setting of CT findings. MRSA swab negative for MRSA and vancomycin discontinued. Completed 4 days of Cefepime. Transitioned to Augmentin to continue 7 day course.  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 John) and underwent infusion GEMZAR 04/09 and received post chemotherapy neupogen. Last PET 12/2024 with stable disease involving the lymph node metastases, but a partial response involving the osseous metastases.   -- Per above, patient is no longer a candidate for cancer directed treatment.   -- s/p L PleurX catheter placement on 5/2  -- May consider R pleurX catheter in the near future if R sided pleural effusion recurs.   -- Pain management with Tylenol PRN, atarax PRN.     Chronic anemia suspected secondary to chemotherapy and underlying malignancy  Chronic thrombocytopenia in setting above   -- Continue to monitor with morning labs.      Bilateral lower extremity DVT -9/2023 on anticoagulation.  *Likely hypercoagulability of malignancy.    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 Flash penetration x1 with thin liquids. Here, patient exhibiting right-sided weakness and tongue deviation to the right. Voice is dysphonic and cough is weak increasing risk for silent aspiration.   -- SLP consultation. OK for regular diet and thin liquids for now.     Chronic troponin elevation  CAD s/p cardiac stenting  Ascending aortic dilatation  Mild mitral regurgitation   Mild tricuspid  regurgitation   Hypertension  Hyperlipidemia  *Denies any chest pain. No palpitations. SOB as above. Chronic troponin elevation suspected secondary to NSTEMI in setting of demand given recurrent malignant pleural effusions and pericardial effusion.   *Echocardiogram 04/28/2025 as noted above  -- Anticoagulation as above  -- Initiated on lasix 20 mg oral daily. Continue.      History of CVA with right sided hemiplegia  Seizure D/O  Cognitive impairment   *Wheelchair bound at baseline.   *Pt has notable cognitive impairment. Seemingly answers yes/no questions appropriately but has significant word-finding difficulties. Very difficult to communicate with. Unclear baseline, but per chart review, he has had documented word finding issues as a result of his prior stroke.   -- ? If cognitive impairment and word finding issues are worse than baseline. Given pt will ultimately be enrolled in hospice, will not pursue any additional work up for this in patient.   --Pt has legal guardian  -- Fall precautions.     GERD.  *Not on PTA medications. No acute issues.     MDD with anxiety  *Not on PTA medications. No acute issues.     History of alcohol use D/O  History of substance use D/O  -- Noted.      Hx of hypoalbuminemia in setting of metastatic lung cancer   -- Nutrition consultation  -- Ensure between meals.      Mild elevated AST  -- Noted. Follow LFTs.      Physical deconditioning from medical illness.  *Resident of assisted living facility. [facility] residing [facility]   -- Fall precautions.  -- PT evaluation requested.      Goals of care   -- DNR/DNI, will likely discharge on hospice, will need to discuss details with Guardian.            Diet: Fluid restriction 2000 ML FLUID  Snacks/Supplements Adult: Ensure Enlive; Between Meals  Regular Diet Adult  Room Service    DVT Prophylaxis: DOAC  Buckner Catheter: Not present  Lines: PRESENT      Port A Cath Single 04/11/22 Right Chest wall-Site Assessment: WDL      Cardiac  Monitoring: None  Code Status: No CPR- Do NOT Intubate      Clinically Significant Risk Factors               # Hypoalbuminemia: Lowest albumin = 2.5 g/dL at 4/29/2025  6:27 AM, will monitor as appropriate     # Hypertension: Noted on problem list                # Financial/Environmental Concerns:           Social Drivers of Health    Housing Stability: High Risk (5/3/2025)    Housing Stability     Do you have housing? : No     Are you worried about losing your housing?: No   Tobacco Use: Medium Risk (4/9/2025)    Patient History     Smoking Tobacco Use: Former     Smokeless Tobacco Use: Never          Disposition Plan     Medically Ready for Discharge: Medically ready for discharge   Awaiting LTC placement. Then pt will enroll in hospice per guardian.       Karen Guzman MD  Hospitalist Service  [facility]  Securely message with [employer] (more info)  Text page via [employer] Paging/Directory   ______________________________________________________________________    Interval History   No acute events overnight. Pt is a poor historian. Has significant word finding abilities. Forgetful. Denies any shortness of breath or pain. He is tolerating a regular diet. Discussed dispo plan again.     Physical Exam   Vital Signs: Temp: 97.9  F (36.6  C) Temp src: Oral BP: 107/56 Pulse: 79   Resp: 18 SpO2: 100 % O2 Device: Nasal cannula Oxygen Delivery: 5 LPM  Weight: 152 lbs 12.46 oz    GENERAL:  Pleasant, cooperative, alert. Thin appearing, chronically ill appearing male in no acute distress.   HEENT: Normocephalic, atraumatic.  Dysphonic voice noted.   PULMONOLOGY: Decreased breath sounds bilaterally. No increased work of breathing.   CARDIAC: Regular rhythm with tachycardic rate.   ABDOMEN: Soft, nontender non distended.   MUSCULOSKELETAL:  Moving x 4 spontaneously. Bilateral pitting LE edema although bilateral legs atrophic. DP pulses intact.   NEURO: Alert and oriented x4. Nonfocal exam.    Medical  Decision Making       45 MINUTES SPENT BY ME on the date of service doing chart review, history, exam, documentation & further activities per the note.      Data   ------------------------- PAST 24 HR DATA REVIEWED -----------------------------------------------        Imaging results reviewed over the past 24 hrs:   No results found for this or any previous visit (from the past 24 hours).

## 2025-05-04 NOTE — PLAN OF CARE
Goal Outcome Evaluation:  Cognitive Concerns/ Orientation: A&Ox4. Forgetful. Speech hoarse. Flat.    BEHAVIOR & AGGRESSION TOOL COLOR: Green  ABNL VS/O2: VSS on 5-6L NC  MOBILITY: A x2; Lift. T & R Q2, refused at times, education provided. Weight shifting encouraged. Not out of bed this shift  PAIN MANAGMENT: Denied pain   DIET: Regular, 2000mL FR -good appetite  BOWEL/BLADDER: Continent of bowel and bladder; Using urinal at bedside. No BM  ABNL LAB/BG: NA  DRAIN/DEVICES: R Chest port - dressing changed this am, hep locked, L pleurX drain-dressing c/d/i  TELEMETRY RHYTHM: NA  SKIN: Redness to coccyx. Dry flaky skin to bilateral shins. Dressings on bilateral lower back from thoracentesis.   TESTS/PROCEDURES: None   D/C DATE: Discharge pending safe disposition, possibly Monday or Tuesday. LTC with hospice   OTHER IMPORTANT INFO: Lung sounds diminished. MAZARIEGOS. No cough. Abd soft, nontender. Watching TV between cares

## 2025-05-05 LAB
ANION GAP SERPL CALCULATED.3IONS-SCNC: 7 MMOL/L (ref 7–15)
BUN SERPL-MCNC: 24.4 MG/DL (ref 8–23)
CALCIUM SERPL-MCNC: 7 MG/DL (ref 8.8–10.4)
CHLORIDE SERPL-SCNC: 108 MMOL/L (ref 98–107)
CREAT SERPL-MCNC: 0.59 MG/DL (ref 0.67–1.17)
EGFRCR SERPLBLD CKD-EPI 2021: >90 ML/MIN/1.73M2
ERYTHROCYTE [DISTWIDTH] IN BLOOD BY AUTOMATED COUNT: 18.5 % (ref 10–15)
GLUCOSE SERPL-MCNC: 73 MG/DL (ref 70–99)
HCO3 SERPL-SCNC: 24 MMOL/L (ref 22–29)
HCT VFR BLD AUTO: 24.4 % (ref 40–53)
HGB BLD-MCNC: 7.4 G/DL (ref 13.3–17.7)
MCH RBC QN AUTO: 27.9 PG (ref 26.5–33)
MCHC RBC AUTO-ENTMCNC: 30.3 G/DL (ref 31.5–36.5)
MCV RBC AUTO: 92 FL (ref 78–100)
PLATELET # BLD AUTO: 162 10E3/UL (ref 150–450)
POTASSIUM SERPL-SCNC: 3.1 MMOL/L (ref 3.4–5.3)
POTASSIUM SERPL-SCNC: 4.1 MMOL/L (ref 3.4–5.3)
RBC # BLD AUTO: 2.65 10E6/UL (ref 4.4–5.9)
SODIUM SERPL-SCNC: 139 MMOL/L (ref 135–145)
WBC # BLD AUTO: 3.5 10E3/UL (ref 4–11)

## 2025-05-05 PROCEDURE — 250N000013 HC RX MED GY IP 250 OP 250 PS 637: Performed by: STUDENT IN AN ORGANIZED HEALTH CARE EDUCATION/TRAINING PROGRAM

## 2025-05-05 PROCEDURE — 85014 HEMATOCRIT: CPT | Performed by: STUDENT IN AN ORGANIZED HEALTH CARE EDUCATION/TRAINING PROGRAM

## 2025-05-05 PROCEDURE — 80048 BASIC METABOLIC PNL TOTAL CA: CPT | Performed by: STUDENT IN AN ORGANIZED HEALTH CARE EDUCATION/TRAINING PROGRAM

## 2025-05-05 PROCEDURE — 99232 SBSQ HOSP IP/OBS MODERATE 35: CPT | Performed by: STUDENT IN AN ORGANIZED HEALTH CARE EDUCATION/TRAINING PROGRAM

## 2025-05-05 PROCEDURE — 250N000013 HC RX MED GY IP 250 OP 250 PS 637

## 2025-05-05 PROCEDURE — 250N000011 HC RX IP 250 OP 636: Performed by: EMERGENCY MEDICINE

## 2025-05-05 PROCEDURE — 120N000001 HC R&B MED SURG/OB

## 2025-05-05 PROCEDURE — 84132 ASSAY OF SERUM POTASSIUM: CPT | Performed by: STUDENT IN AN ORGANIZED HEALTH CARE EDUCATION/TRAINING PROGRAM

## 2025-05-05 RX ORDER — POTASSIUM CHLORIDE 1500 MG/1
40 TABLET, EXTENDED RELEASE ORAL ONCE
Status: COMPLETED | OUTPATIENT
Start: 2025-05-05 | End: 2025-05-05

## 2025-05-05 RX ADMIN — SENNOSIDES AND DOCUSATE SODIUM 1 TABLET: 50; 8.6 TABLET ORAL at 17:56

## 2025-05-05 RX ADMIN — FOLIC ACID 1 MG: 1 TABLET ORAL at 09:27

## 2025-05-05 RX ADMIN — FUROSEMIDE 20 MG: 20 TABLET ORAL at 09:26

## 2025-05-05 RX ADMIN — APIXABAN 2.5 MG: 2.5 TABLET, FILM COATED ORAL at 09:26

## 2025-05-05 RX ADMIN — APIXABAN 2.5 MG: 2.5 TABLET, FILM COATED ORAL at 21:13

## 2025-05-05 RX ADMIN — Medication 5 ML: at 19:08

## 2025-05-05 RX ADMIN — Medication 5 ML: at 06:20

## 2025-05-05 RX ADMIN — AMOXICILLIN AND CLAVULANATE POTASSIUM 1 TABLET: 875; 125 TABLET, FILM COATED ORAL at 09:26

## 2025-05-05 RX ADMIN — AMOXICILLIN AND CLAVULANATE POTASSIUM 1 TABLET: 875; 125 TABLET, FILM COATED ORAL at 21:13

## 2025-05-05 RX ADMIN — POTASSIUM CHLORIDE 40 MEQ: 1500 TABLET, EXTENDED RELEASE ORAL at 09:26

## 2025-05-05 ASSESSMENT — ACTIVITIES OF DAILY LIVING (ADL)
ADLS_ACUITY_SCORE: 72
ADLS_ACUITY_SCORE: 68
ADLS_ACUITY_SCORE: 72
ADLS_ACUITY_SCORE: 68
ADLS_ACUITY_SCORE: 72
ADLS_ACUITY_SCORE: 68
ADLS_ACUITY_SCORE: 72
ADLS_ACUITY_SCORE: 72
ADLS_ACUITY_SCORE: 68
ADLS_ACUITY_SCORE: 72
ADLS_ACUITY_SCORE: 72
ADLS_ACUITY_SCORE: 68
ADLS_ACUITY_SCORE: 72

## 2025-05-05 NOTE — PROGRESS NOTES
[facility]    Medicine Progress Note - Hospitalist Service    Date of Admission:  4/27/2025  Date of Service: 05/05/2025    Assessment & Plan   Kt Rasmussen is a 64 year old male with history of metastatic lung cancer, pleural effusion, pericardial effusion, lower extremity DVT on anticoagulation, chronic anemia, thrombocytopenia, coronary artery disease status post stenting, hypertension, hyperlipidemia, GERD, seizure disorder, anxiety disorder, history of CVA with right hemiplegia admitted on 4/27/2025 for shortness of breath.     Patient recently admitted 04/09/2025 - 04/16/2025 at [facility] for acute hypoxic respiratory failure likely multifactorial. Bilateral (right greater than left) moderate to large pleural effusions, moderate pericardial effusion, metastatic non-small cell lung cancer. Patient underwent thoracocentesis (1.1 L removed), diuresis, weaned down to 2 L nasal oxygen and discharged back to care facility.  Prior to 04/2025 hospitalization, patient underwent outpatient thoracentesis 01/06/2025, 12/27/2024 and 12/26/2024 where 1.9 L, 0.9 L and 2.65 L removed respectively.      Acute on chronic respiratory failure, multifactorial in etiology in setting of pneumonia, metastatic lung cancer, recurring pleural effusions, atelectasis, pericardial effusion, chemotoxicity and deconditioning  Hx moderate pericardial effusion on TTE 04/2025  Hx bilateral pleural effusions s/p bilateral thoracentesis 04/27/2025, 04/09/2025, 1/6/2025, 12/27/2024 and 12/26/2024   *Patient presented to [facility] ED from TCU with acute on chronic dyspnea. Patient discharged previously on 2 L NC although reportedly not requiring oxygen at baseline, per patient. He was found to be hypoxic in the 60s on RA per EMS.   *Upon arrival to [facility] ED, patient noted to be hypoxic with O2 saturations 83% on RA. CXR showing moderate right and small left pleural effusions with adjacent atelectasis. Mild pulmonary edema.  No pneumothorax. Stable cardiomediastinal silhouette. EKG without acute ischemic changes.   *Patient with continued worsening dyspnea. As of 04/28/2025, patient requiring 4 L NC to sustain oxygenations low 90% following 2.4 L fluid removal s/p thoracentesis 04/27/2025. Recurrent pleural effusions suspected secondary to malignancy and chemotherapy.   *CT chest 04/28/2025 with right upper and left lower lobe opacities concerning for pneumonia. Notable also for large left effusion and moderate partially loculated right effusion. Interval enlargement of the bulky adenopathy within the abdomen and pelvis. Stable post treatment changes right perihilar region. Stable osseous lesions.   *Echocardiogram 04/28/2025 with hyperdynamic left ventricular function. LVEF 65-70%. Small pericardial effusion with some constriction.   *Oncology consultation. Dr. John noted recent disease progression. Patient is no longer a candidate for cancer directed treatment.   *Cardiology consultation in setting of TTE findings. Overall pericardial effusion appears smaller than previously and clinically patient does not appear to have constrictive pericarditis. No new cardiac recommendations at this time.  *Repeat thoracentesis 04/29/2025 given CT findings of continued large left pleural effusion and moderate right. Additional 2.5 L removed from left pleural effusion.  *Advanced care planning discussion with Dr. John (Oncology), Hira Mahajan NP (Palliative), writer, patient and guardian service. Appreciate the cares of all specialties involved. See palliative note for additional details. Given patient no longer candidate for cancer related treatment, code status changed to DNR-DNI. Kt's Guardian is planning on enrolling Kt in Hospice once LTC placement has been established  *L PleurX catheter placed on 5/2  * As of 5/4 respiratory status stable on 4-5L of supplemental O2.   Plan:  -- Oxygen PRN.   -- Fluid restriction to 2000 mL/day. Strict  I/Os. Daily weights.   -- Duonebs q 4 PRN and q 6 PRN.   -- RCAT. Aggressive incentive spirometry, encourage ambulation.  -- Continue PTA oral lasix 20 mg daily.   -- Started HAP/Aspiration pneumonia treatment with Cefepime and Vancomycin in setting of CT findings. MRSA swab negative for MRSA and vancomycin discontinued. Completed 4 days of Cefepime. Transitioned to Augmentin to continue 7 day course.  -- Appreciate social work and care coordinator assistance -> Working on logistics of moving patient to LTC with hospice agency.      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 John) and underwent infusion GEMZAR 04/09 and received post chemotherapy neupogen. Last PET 12/2024 with stable disease involving the lymph node metastases, but a partial response involving the osseous metastases.   -- Per above, patient is no longer a candidate for cancer directed treatment.   -- s/p L PleurX catheter placement on 5/2  -- May consider R pleurX catheter in the near future if R sided pleural effusion recurs.   -- Pain management with Tylenol PRN, atarax PRN.     Chronic anemia suspected secondary to chemotherapy and underlying malignancy  Chronic thrombocytopenia in setting above   -- Continue to monitor with morning labs.      Bilateral lower extremity DVT -9/2023 on anticoagulation.  *Likely hypercoagulability of malignancy.    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 Flash penetration x1 with thin liquids. Here, patient exhibiting right-sided weakness and tongue deviation to the right. Voice is dysphonic and cough is weak increasing risk for silent aspiration.   -- SLP consultation. OK for regular diet and thin liquids for now.     Chronic troponin elevation  CAD  s/p cardiac stenting  Ascending aortic dilatation  Mild mitral regurgitation   Mild tricuspid regurgitation   Hypertension  Hyperlipidemia  *Denies any chest pain. No palpitations. SOB as above. Chronic troponin elevation suspected secondary to NSTEMI in setting of demand given recurrent malignant pleural effusions and pericardial effusion.   *Echocardiogram 04/28/2025 as noted above  -- Anticoagulation as above  -- Initiated on lasix 20 mg oral daily. Continue.      History of CVA with right sided hemiplegia  Seizure D/O  Cognitive impairment   *Wheelchair bound at baseline.   *Pt has notable cognitive impairment. Seemingly answers yes/no questions appropriately but has significant word-finding difficulties. Very difficult to communicate with. Unclear baseline, but per chart review, he has had documented word finding issues as a result of his prior stroke.   -- ? If cognitive impairment and word finding issues are worse than baseline. Given pt will ultimately be enrolled in hospice, will not pursue any additional work up for this in patient.   --Pt has legal guardian  -- Fall precautions.     GERD.  *Not on PTA medications. No acute issues.     MDD with anxiety  *Not on PTA medications. No acute issues.     History of alcohol use D/O  History of substance use D/O  -- Noted.      Hx of hypoalbuminemia in setting of metastatic lung cancer   -- Nutrition consultation  -- Ensure between meals.      Mild elevated AST  -- Noted. Follow LFTs.      Physical deconditioning from medical illness.  *Resident of assisted living facility. Currently residing at [facility].   -- Fall precautions.  -- PT evaluation requested.      Goals of care   -- DNR/DNI, will likely discharge on hospice, will need to discuss details with Guardian.            Diet: Fluid restriction 2000 ML FLUID  Snacks/Supplements Adult: Ensure Enlive; Between Meals  Regular Diet Adult  Room Service    DVT Prophylaxis: DOAC  Buckner Catheter: Not present  Lines:  PRESENT      Port A Cath Single 04/11/22 Right Chest wall-Site Assessment: WDL      Cardiac Monitoring: None  Code Status: No CPR- Do NOT Intubate      Clinically Significant Risk Factors        # Hypokalemia: Lowest K = 3.1 mmol/L in last 2 days, will replace as needed   # Hyperchloremia: Highest Cl = 108 mmol/L in last 2 days, will monitor as appropriate          # Hypoalbuminemia: Lowest albumin = 2.5 g/dL at 4/29/2025  6:27 AM, will monitor as appropriate     # Hypertension: Noted on problem list                # Financial/Environmental Concerns:           Social Drivers of Health    Housing Stability: High Risk (5/3/2025)    Housing Stability     Do you have housing? : No     Are you worried about losing your housing?: No   Tobacco Use: Medium Risk (4/9/2025)    Patient History     Smoking Tobacco Use: Former     Smokeless Tobacco Use: Never          Disposition Plan     Medically Ready for Discharge: Medically ready for discharge   Awaiting LTC placement. Then pt will enroll in hospice per guardian.       Michael Ha MD  Hospitalist Service  [facility]  Securely message with [employer] (more info)  Text page via [facility] Paging/Directory   ______________________________________________________________________    Interval History     No acute events overnight. Pt is a poor historian.Forgetful. Denies any shortness of breath or pain. He is tolerating a regular diet.   No fevers  No nausea / vomiting   No new abdominal pain  No new complaints, working on disposition    Physical Exam   Vital Signs: Temp: 97.6  F (36.4  C) Temp src: Oral BP: 112/65 Pulse: 84   Resp: 18 SpO2: 99 % O2 Device: Nasal cannula Oxygen Delivery: 5 LPM  Weight: 147 lbs 11.33 oz    GENERAL:  Pleasant, cooperative, alert. Chronically ill appearing male in no acute distress.   HEENT: Dysphonic voice noted.   PULMONOLOGY: diminished breath sounds bilaterally. No increased work of breathing.   CARDIAC: Regular rhythm with  tachycardic rate.   ABDOMEN: Soft, nontender non distended.   MUSCULOSKELETAL:  Moving x 4 spontaneously. Bilateral pitting LE edema although bilateral legs atrophic. DP pulses intact.   NEURO: Alert and oriented x4. Nonfocal exam.    Medical Decision Making       35 MINUTES SPENT BY ME on the date of service doing chart review, history, exam, documentation & further activities per the note.      Data   ------------------------- PAST 24 HR DATA REVIEWED -----------------------------------------------    I have personally reviewed the following data over the past 24 hrs:    3.5 (L)  \   7.4 (L)   / 162     139 108 (H) 24.4 (H) /  73   3.1 (L) 24 0.59 (L) \       Imaging results reviewed over the past 24 hrs:   No results found for this or any previous visit (from the past 24 hours).

## 2025-05-05 NOTE — PLAN OF CARE
Physical Therapy Discharge Summary    Reason for therapy discharge:    Change in goals of care.    Progress towards therapy goal(s). See goals on Care Plan in Epic electronic health record for goal details.  Goals not met.  Barriers to achieving goals:   Anticipated transfer to long term care on hospice.    Therapy recommendation(s):    No further therapy is recommended.

## 2025-05-05 NOTE — PLAN OF CARE
Goal Outcome Evaluation:      Plan of Care Reviewed With: patient    Overall Patient Progress: no change     SUMMARY: SOB. Found to have Pleural Effusions    DATE & TIME: 05/04/2025 -05/05/2025 [id number]      Cognitive Concerns/ Orientation:  A&Ox4. Forgetful. Speech hoarse. Flat.    BEHAVIOR & AGGRESSION TOOL COLOR: Green  ABNL VS/O2: VSS on 5L NC  MOBILITY: A x2; Lift. T & R Q2  PAIN MANAGMENT: Denied pain this shift   DIET: Regular, 2000mL FR  BOWEL/BLADDER: Continent of bowel and bladder; Using urinal at bedside. No BM this shift   ABNL LAB/BG: No new labs this shit  DRAIN/DEVICES: R Chest port - hep locked, L pleurX drain-dressing c/d/i  TELEMETRY RHYTHM: NA  SKIN: Redness to coccyx. Dry flaky skin to bilateral shins. Dressings on bilateral lower back from thoracentesis.  TESTS/PROCEDURES: None this shift   D/C DATE: Discharge pending safe disposition. LTC with hospice     OTHER IMPORTANT INFO:

## 2025-05-06 LAB
GLUCOSE BLDC GLUCOMTR-MCNC: 100 MG/DL (ref 70–99)
POTASSIUM SERPL-SCNC: 4 MMOL/L (ref 3.4–5.3)

## 2025-05-06 PROCEDURE — 250N000013 HC RX MED GY IP 250 OP 250 PS 637: Performed by: STUDENT IN AN ORGANIZED HEALTH CARE EDUCATION/TRAINING PROGRAM

## 2025-05-06 PROCEDURE — 84132 ASSAY OF SERUM POTASSIUM: CPT | Performed by: HOSPITALIST

## 2025-05-06 PROCEDURE — 250N000011 HC RX IP 250 OP 636: Performed by: EMERGENCY MEDICINE

## 2025-05-06 PROCEDURE — 99232 SBSQ HOSP IP/OBS MODERATE 35: CPT | Performed by: INTERNAL MEDICINE

## 2025-05-06 PROCEDURE — 120N000001 HC R&B MED SURG/OB

## 2025-05-06 PROCEDURE — 250N000013 HC RX MED GY IP 250 OP 250 PS 637

## 2025-05-06 RX ADMIN — Medication 5 ML: at 06:31

## 2025-05-06 RX ADMIN — APIXABAN 2.5 MG: 2.5 TABLET, FILM COATED ORAL at 10:44

## 2025-05-06 RX ADMIN — FOLIC ACID 1 MG: 1 TABLET ORAL at 10:44

## 2025-05-06 RX ADMIN — FUROSEMIDE 20 MG: 20 TABLET ORAL at 10:44

## 2025-05-06 RX ADMIN — APIXABAN 2.5 MG: 2.5 TABLET, FILM COATED ORAL at 20:47

## 2025-05-06 ASSESSMENT — ACTIVITIES OF DAILY LIVING (ADL)
ADLS_ACUITY_SCORE: 72
ADLS_ACUITY_SCORE: 76
ADLS_ACUITY_SCORE: 72
ADLS_ACUITY_SCORE: 76
ADLS_ACUITY_SCORE: 76
ADLS_ACUITY_SCORE: 69
ADLS_ACUITY_SCORE: 72
ADLS_ACUITY_SCORE: 76
ADLS_ACUITY_SCORE: 72
ADLS_ACUITY_SCORE: 76
ADLS_ACUITY_SCORE: 69
ADLS_ACUITY_SCORE: 72
ADLS_ACUITY_SCORE: 72
ADLS_ACUITY_SCORE: 76
ADLS_ACUITY_SCORE: 76
ADLS_ACUITY_SCORE: 72
ADLS_ACUITY_SCORE: 72
ADLS_ACUITY_SCORE: 76

## 2025-05-06 NOTE — PLAN OF CARE
SUMMARY: SOB. Found to have Pleural Effusions.    DATE & TIME: 05/05/2025 [id number]      Cognitive Concerns/ Orientation: A & O x 3. Forgetful. Speech hoarse. Some aphasia present. Flat.    BEHAVIOR & AGGRESSION TOOL COLOR: Green  ABNL VS/O2: VSS on 5L NC  MOBILITY: A x2; Lift. T & R, refused at times, education provided. Pt turning self pretty well in bed. Not out of bed this shift.   PAIN MANAGMENT: Denied pain   DIET: Regular, 2000mL FR -good appetite  BOWEL/BLADDER: Continent of bladder; can be incontinent of bowels at times. Using urinal at bedside. 2 BMs.   ABNL LAB/BG: K+ 3.1, replaced orally. Recheck 4.1. Chloride 108, Cr 0.59, Calcium 7.0, WBC 3.5, and Hgb 7.4.   DRAIN/DEVICES: R Chest port - hep locked, L pleurX drain-dressing c/d/I.  SKIN: Redness to coccyx. Dry flaky skin to bilateral shins. Dressings on L lower back, CDI.   TESTS/PROCEDURES: None scheduled.   D/C DATE: Discharge pending safe disposition, possibly Monday or Tuesday. LTC with hospice.   OTHER IMPORTANT INFO: MAZARIEGOS. Watching TV between cares. Stable, no changes.

## 2025-05-06 NOTE — PLAN OF CARE
SUMMARY: SOB. Found to have Pleural Effusions.    DATE & TIME: 05/06/25 [id number]      Cognitive Concerns/ Orientation: A & O x 3. Forgetful. Speech hoarse. Some aphasia present. Flat.    BEHAVIOR & AGGRESSION TOOL COLOR: Green  ABNL VS/O2: VSS on RA. Applied 2 L after activity.   MOBILITY: A x2; Lift. T & R, refused at times, education provided. Pt turning self pretty well in bed. Up in recliner x 1.   PAIN MANAGMENT: Denies  DIET: Regular, 2000mL FR -good appetite  BOWEL/BLADDER: Continent of bladder; incontinent of bowel x1 this shift. Using urinal at bedside.   ABNL LAB/BG: No new abnormal labs drawn today.   DRAIN/DEVICES: R Chest port - hep locked, L pleurX drain-dressing c/d/I.  SKIN: Redness to R side of foot, blanchable. Dry flaky skin to bilateral shins, pt allowed writer to do foot care this evening.. Dressings on L lower back, CDI.   TESTS/PROCEDURES: None scheduled.   D/C DATE: Discharge pending safe disposition, see SW note.   OTHER IMPORTANT INFO: MAZARIEGOS. Watching TV between cares. Stable, no changes. Friends visited. Someone brought pt his cell phone. Pt happy about this.

## 2025-05-06 NOTE — PROGRESS NOTES
Care Management Follow Up    Length of Stay (days): 8    Expected Discharge Date: 05/06/2025     Concerns to be Addressed:       Patient plan of care discussed at interdisciplinary rounds: Yes    Anticipated Discharge Disposition:                Anticipated Discharge Services:    Anticipated Discharge DME:      Patient/family educated on Medicare website which has current facility and service quality ratings:    Education Provided on the Discharge Plan:    Patient/Family in Agreement with the Plan:      Referrals Placed by CM/SW:    Private pay costs discussed: Not applicable    Discussed  Partnership in Safe Discharge Planning  document with patient/family: No     Handoff Completed: No, handoff not indicated or clinically appropriate    Additional Information:  Just placed a call to di [facility] @ [phone] to determine the disposition. Requested a call back tomorrow morning.   Also have a call out to liaison at [facility] to ask if the facility can accept patient with pleurx catheter.     Next Steps:     RUCHI MckeonSW

## 2025-05-06 NOTE — PLAN OF CARE
Goal Outcome Evaluation:         SUMMARY: SOB. Found to have Pleural Effusions.  DATE & TIME: 05/05/2025-5/6/25 7003-3040  Cognitive Concerns/ Orientation: A & O x 3. Forgetful. Speech hoarse. Some aphasia present. Flat.    BEHAVIOR & AGGRESSION TOOL COLOR: Green  ABNL VS/O2: VSS on 5L NC  MOBILITY: A x2; Lift. T & R, refused at times, education provided. Pt turning self pretty well in bed. Not out of bed this shift.   PAIN MANAGMENT: Denies  DIET: Regular, 2000mL FR -good appetite  BOWEL/BLADDER: Continent of bladder; incontinent of bowel x1 this shift. Using urinal at bedside.   ABNL LAB/BG: K+ 4.1, recheck I'm AM, Chloride 108, Cr 0.59, Calcium 7.0, WBC 3.5, and Hgb 7.4.   DRAIN/DEVICES: R Chest port - hep locked, L pleurX drain-dressing c/d/I.  SKIN: Redness to coccyx. Dry flaky skin to bilateral shins. Dressings on L lower back, CDI.   TESTS/PROCEDURES: None scheduled.   D/C DATE: Discharge pending safe disposition, possibly Monday or Tuesday. LTC with hospice.   OTHER IMPORTANT INFO: MAZARIEGOS.

## 2025-05-06 NOTE — PROGRESS NOTES
[facility]    Hospitalist Progress Note    Date of Admission: 4/27/2025    Assessment & Plan   Kt Rasmussen is a 64 year old male with PMHx of metastatic lung cancer, pleural effusions requiring thoracentesis (in 12/2024 x2 and 1/2025), pericardial effusion, lower extremity DVT on anticoagulation, chronic anemia, thrombocytopenia, coronary artery disease status post stenting, hypertension, hyperlipidemia, GERD, seizure disorder, anxiety disorder, history of CVA with right hemiplegia.    He was recently admitted to [facility] from 4/9/25-4/16/25 acute hypoxic respiratory failure, multifactorial, dt moderate to large bilateral pleural effusion (R>L), moderate pericardial effusion and metastatic non-small cell lung cancer. During that stay, patient underwent a thoracentesis with 1.1L fluid removed and diuresis. O2 needs weaned to 2L NC. Discharged back to care facility.     He was readmitted on  admitted on 4/27/2025 for shortness of breath and recurrent acute on chronic hypoxic respiratory failure again felt to be multifactorial.  Ongoing conversations held this day with care team and guardian, now considering transition to hospice on discharge back to care facility.     Acute on chronic respiratory failure, multifactorial, dt HCAP, metastatic lung cancer, recurring pleural effusions, atelectasis, pericardial effusion, chemotoxicity and deconditioning  Hx moderate pericardial effusion on TTE 4/2025  Hx bilateral pleural effusions s/p bilateral thoracentesis 4/27/25, 4/9/25, 1/6/25, 12/27/24 and 12/26/24   *Patient presented to [facility] ED from TCU with acute on chronic dyspnea. Patient discharged previously on 2 L NC although reportedly not requiring oxygen at baseline, per patient. He was found to be hypoxic in the 60s on RA per EMS.   *In ED, he was hypoxic with O2 sats 83% on RA. CXR showing moderate right and small left pleural effusions with adjacent atelectasis, mild pulmonary edema, no  pneumothorax, stable cardiomediastinal silhouette. EKG without acute ischemic changes. Started on IV Cefepime.   *Underwent R thoracentesis on 4/27 with 2.4L removed. Recurrent pleural effusions suspected secondary to malignancy and chemotherapy.   *Respiratory status worsened during the course of his stay, with O2 needs increasing to 4L NC to maintain sats in low 90s.   *CT chest done 4/28 showed RUL and LLL opacities concerning for pneumonia, also had a large left effusion and moderate partially loculated right effusion. Interval enlargement of the bulky adenopathy within the abdomen and pelvis. Stable post treatment changes right perihilar region. Stable osseous lesions.   *Echo 4/28 with hyperdynamic left ventricular function. LVEF 65-70%. Small pericardial effusion with some constriction.  Cardiology consulted. Overall pericardial effusion appears smaller than previously and clinically patient does not appear to have constrictive pericarditis. No new cardiac recommendations at this time.  *[facility] consulted this admission. Dr. John noted recent disease progression. Patient is no longer a candidate for cancer directed treatment.   *Repeat L thoracentesis 4/29 (given CT findings of continued large left pleural effusion and moderate right), with 2.4L fluid removed.   *Advanced care planning discussion had with oncology, palliative care, hospitalist, patient and guardian this stay.  Given patient no longer candidate for cancer related treatment, code status changed to DNR-DNI. Kt's Guardian is planning on enrolling Kt in Hospice once LTC placement has been established.  *Ultimately had a L PleurX catheter placed on 5/2  *Cefepime transitioned to Augmentin on 5/3, with plan to complete 7d course of Augmentin  -- cont Augmentin (5/3-5/10)  -- cont sched/prn nebs  -- cont Lasix 20mg po daily  -- drain PleurX prn  -- O2 sats remain stable on 4-5L NC  -- RCAT, pulmonary toilet with IS/OOB  -- fluid restriction  to 2000 mL/day.      Non-small cell lung cancer metastatic to lymph nodes and bones  *Follows with  Oncology (Dr. John) and underwent infusion GEMZAR 04/09 and received post chemotherapy neupogen. Last PET 12/2024 with stable disease involving the lymph node metastases, but a partial response involving the osseous metastases.   *As above, patient is no longer a candidate for cancer directed treatment.   *s/p L PleurX catheter placement on 5/2  -- may consider R pleurX catheter in the near future if R sided pleural effusion recurs.   -- pain management with Tylenol PRN, atarax PRN.     Chronic anemia suspected secondary to chemotherapy and underlying malignancy  Chronic thrombocytopenia in setting above   *Monitor labs periodically while hospitalized, hgb stable at 7-8, platelets stable     Bilateral lower extremity DVT -9/2023 on anticoagulation.  *Likely hypercoagulability of malignancy.  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 Flash penetration x1 with thin liquids. Here, patient exhibiting right-sided weakness and tongue deviation to the right. Voice is dysphonic and cough is weak increasing risk for silent aspiration.   *Seen by SLP this stay, okay'd for regular diet and thin liquids    Chronic troponin elevation  CAD s/p cardiac stenting  Ascending aortic dilatation  Mild mitral regurgitation   Mild tricuspid regurgitation   Hypertension  Hyperlipidemia  *Denies any chest pain. No palpitations. SOB as above. Chronic troponin elevation suspected secondary to NSTEMI in setting of demand given recurrent malignant pleural effusions and pericardial effusion.   *Echocardiogram 4/28 as noted above.  -- conts on DOAC, Lasix as above    History of CVA with right sided hemiplegia  Seizure D/O  Cognitive  impairment   *Wheelchair bound at baseline. Has a legal guardian.   *Pt has notable cognitive impairment. Seemingly answers yes/no questions appropriately but has significant word-finding difficulties. Very difficult to communicate with. Unclear baseline, but per chart review, he has had documented word finding issues as a result of his prior stroke.   *Unclear if cognitive impairment and word finding issues are worse than baseline. Given pt will ultimately be enrolled in hospice, will not pursue any additional work up for this in patient.     GERD.  *Not on PTA medications. No acute issues.     MDD with anxiety  *Not on PTA medications. No acute issues.     History of alcohol use D/O  History of substance use D/O  *Noted.      Hx of hypoalbuminemia in setting of metastatic lung cancer   *Nutrition consultation. Cont Ensure between meals.      Mild elevated AST  *Noted. Follow LFTs.      Physical deconditioning from medical illness.  *Resident of assisted living facility. [facility]      Goals of care   *As per discussions above, patient is now DNR/DNI, will likely discharge on hospice, will need to discuss details with Guardian.       FEN: no IVFs, lytes stable, regular diet  DVT Prophylaxis: DOAC  Code Status: No CPR- Do NOT Intubate    Disposition: Discharged to long-term care facility with hospice enrollment once arrangements are made.  Medical issues otherwise remained stable at this point    Medically Ready for Discharge: Anticipated in 2-4 Days      Julita Bergman, DO    Clinically Significant Risk Factors        # Hypokalemia: Lowest K = 3.1 mmol/L in last 2 days, will replace as needed   # Hyperchloremia: Highest Cl = 108 mmol/L in last 2 days, will monitor as appropriate          # Hypoalbuminemia: Lowest albumin = 2.5 g/dL at 4/29/2025  6:27 AM, will monitor as appropriate     # Hypertension: Noted on problem list                # Financial/Environmental Concerns:              Medical Decision Making       Please see A&P for additional details of medical decision making.         Interval History   Reviewed.  Seen this morning.  Resting comfortably. No acute complaints. Denies cp/sob/cough, abd pain/n/v. O2 sats remain stable on 5L NC. Hasn't required PleurX drainage since placement on 5/2.     -Data reviewed today: I reviewed all new labs and imaging results over the last 24 hours. I personally reviewed no images or EKG's today.    Physical Exam   Temp: 97.7  F (36.5  C) Temp src: Oral BP: 108/67 Pulse: 80   Resp: 18 SpO2: 95 % O2 Device: Nasal cannula Oxygen Delivery: 5 LPM  Vitals:    05/01/25 0500 05/02/25 0537 05/05/25 0610   Weight: 71 kg (156 lb 8.4 oz) 69.3 kg (152 lb 12.5 oz) 67 kg (147 lb 11.3 oz)     Vital Signs with Ranges  Temp:  [97.6  F (36.4  C)-98.2  F (36.8  C)] 97.7  F (36.5  C)  Pulse:  [80-87] 80  Resp:  [18] 18  BP: ()/(58-68) 108/67  SpO2:  [95 %-99 %] 95 %  I/O last 3 completed shifts:  In: 1090 [P.O.:1090]  Out: 1100 [Urine:1100]    Constitutional: Chronically ill appearing male, resting quietly in bed, answering basic questions appropriately, NAD  Respiratory: Decreased bibasilar breath sounds, no wheeze, no increased work of breathing, +L PleurX  Cardiovascular: HRRR, no MGR, +bilateral LE edema  GI: S, NT, ND, +BS  Skin/Integumen: warm/dry  Other:      Medications   Current Facility-Administered Medications   Medication Dose Route Frequency Provider Last Rate Last Admin     Current Facility-Administered Medications   Medication Dose Route Frequency Provider Last Rate Last Admin    apixaban ANTICOAGULANT (ELIQUIS) tablet 2.5 mg  2.5 mg Oral BID Karen Guzman MD   2.5 mg at 05/05/25 2113    folic acid (FOLVITE) tablet 1 mg  1 mg Oral Daily Shelley Sandoval PA-C   1 mg at 05/05/25 0927    furosemide (LASIX) tablet 20 mg  20 mg Oral Daily Chris Preston MD   20 mg at 05/05/25 0926    heparin lock flush 10 unit/mL injection 5-10 mL   5-10 mL Intracatheter Q24H Julita Bergman, DO   5 mL at 05/06/25 0631    heparin lock flush 100 unit/mL injection 5-10 mL  5-10 mL Intracatheter Q28 Days Julita Bergman, DO        ipratropium - albuterol 0.5 mg/2.5 mg/3 mL (DUONEB) neb solution 3 mL  3 mL Nebulization BID Sung Wood MD   3 mL at 05/03/25 0750    sodium chloride (PF) 0.9% PF flush 10-20 mL  10-20 mL Intracatheter Q28 Days Julita Bergman, DO   10 mL at 04/27/25 0816       Data   Recent Labs   Lab 05/06/25  0625 05/05/25  1908 05/05/25  0611 05/03/25  0546 05/01/25  0625 04/30/25  0625   WBC  --   --  3.5* 3.6*  --  5.2   HGB  --   --  7.4* 8.2*  --  8.2*   MCV  --   --  92 93  --  94   PLT  --   --  162 177  --  222   NA  --   --  139 135  --  137   POTASSIUM 4.0 4.1 3.1* 3.7  --  3.7   CHLORIDE  --   --  108* 99  --  103   CO2  --   --  24 27  --  25   BUN  --   --  24.4* 28.4*  --  25.6*   CR  --   --  0.59* 0.80 0.77 0.85   ANIONGAP  --   --  7 9  --  9   BEVERLY  --   --  7.0* 8.4*  --  8.1*   GLC  --   --  73 80  --  84       No results found for this or any previous visit (from the past 24 hours).

## 2025-05-07 ENCOUNTER — APPOINTMENT (OUTPATIENT)
Dept: SPEECH THERAPY | Facility: CLINIC | Age: 65
End: 2025-05-07
Payer: COMMERCIAL

## 2025-05-07 LAB
POTASSIUM SERPL-SCNC: 3.4 MMOL/L (ref 3.4–5.3)
POTASSIUM SERPL-SCNC: 3.7 MMOL/L (ref 3.4–5.3)

## 2025-05-07 PROCEDURE — 84132 ASSAY OF SERUM POTASSIUM: CPT | Performed by: HOSPITALIST

## 2025-05-07 PROCEDURE — 84132 ASSAY OF SERUM POTASSIUM: CPT | Performed by: INTERNAL MEDICINE

## 2025-05-07 PROCEDURE — 250N000013 HC RX MED GY IP 250 OP 250 PS 637: Performed by: STUDENT IN AN ORGANIZED HEALTH CARE EDUCATION/TRAINING PROGRAM

## 2025-05-07 PROCEDURE — 92526 ORAL FUNCTION THERAPY: CPT | Mod: GN

## 2025-05-07 PROCEDURE — 99232 SBSQ HOSP IP/OBS MODERATE 35: CPT | Performed by: INTERNAL MEDICINE

## 2025-05-07 PROCEDURE — 250N000013 HC RX MED GY IP 250 OP 250 PS 637

## 2025-05-07 PROCEDURE — 250N000011 HC RX IP 250 OP 636: Performed by: INTERNAL MEDICINE

## 2025-05-07 PROCEDURE — 250N000013 HC RX MED GY IP 250 OP 250 PS 637: Performed by: INTERNAL MEDICINE

## 2025-05-07 PROCEDURE — 120N000001 HC R&B MED SURG/OB

## 2025-05-07 RX ORDER — POTASSIUM CHLORIDE 1500 MG/1
40 TABLET, EXTENDED RELEASE ORAL ONCE
Status: COMPLETED | OUTPATIENT
Start: 2025-05-07 | End: 2025-05-07

## 2025-05-07 RX ADMIN — POTASSIUM CHLORIDE 40 MEQ: 1500 TABLET, EXTENDED RELEASE ORAL at 09:15

## 2025-05-07 RX ADMIN — APIXABAN 2.5 MG: 2.5 TABLET, FILM COATED ORAL at 09:15

## 2025-05-07 RX ADMIN — FOLIC ACID 1 MG: 1 TABLET ORAL at 09:15

## 2025-05-07 RX ADMIN — FUROSEMIDE 20 MG: 20 TABLET ORAL at 09:15

## 2025-05-07 RX ADMIN — APIXABAN 2.5 MG: 2.5 TABLET, FILM COATED ORAL at 20:21

## 2025-05-07 RX ADMIN — Medication 5 ML: at 06:00

## 2025-05-07 RX ADMIN — Medication 5 ML: at 15:25

## 2025-05-07 ASSESSMENT — ACTIVITIES OF DAILY LIVING (ADL)
ADLS_ACUITY_SCORE: 72
ADLS_ACUITY_SCORE: 76
ADLS_ACUITY_SCORE: 72
ADLS_ACUITY_SCORE: 76
ADLS_ACUITY_SCORE: 72
ADLS_ACUITY_SCORE: 72
ADLS_ACUITY_SCORE: 76

## 2025-05-07 NOTE — PROGRESS NOTES
[facility]    Hospitalist Progress Note    Date of Admission: 4/27/2025    Assessment & Plan   Kt Rasmussen is a 64 year old male with PMHx of metastatic lung cancer, pleural effusions requiring thoracentesis (in 12/2024 x2 and 1/2025), pericardial effusion, lower extremity DVT on anticoagulation, chronic anemia, thrombocytopenia, coronary artery disease status post stenting, hypertension, hyperlipidemia, GERD, seizure disorder, anxiety disorder, history of CVA with right hemiplegia.    He was recently admitted to [facility] from 4/9/25-4/16/25 acute hypoxic respiratory failure, multifactorial, dt moderate to large bilateral pleural effusion (R>L), moderate pericardial effusion and metastatic non-small cell lung cancer. During that stay, patient underwent a thoracentesis with 1.1L fluid removed and diuresis. O2 needs weaned to 2L NC. Discharged back to care facility.     He was readmitted on  admitted on 4/27/2025 for shortness of breath and recurrent acute on chronic hypoxic respiratory failure again felt to be multifactorial.  Ongoing conversations held this day with care team and guardian, now considering transition to hospice on discharge back to care facility.     Acute on chronic respiratory failure, multifactorial, dt HCAP, metastatic lung cancer, recurring pleural effusions, atelectasis, pericardial effusion, chemotoxicity and deconditioning  Hx moderate pericardial effusion on TTE 4/2025  Hx bilateral pleural effusions s/p bilateral thoracentesis 4/27/25, 4/9/25, 1/6/25, 12/27/24 and 12/26/24   *Patient presented to [facility] ED from TCU with acute on chronic dyspnea. Patient discharged previously on 2 L NC although reportedly not requiring oxygen at baseline, per patient. He was found to be hypoxic in the 60s on RA per EMS.   *In ED, he was hypoxic with O2 sats 83% on RA. CXR showing moderate right and small left pleural effusions with adjacent atelectasis, mild pulmonary edema, no  pneumothorax, stable cardiomediastinal silhouette. EKG without acute ischemic changes. Started on IV Cefepime.   *Underwent R thoracentesis on 4/27 with 2.4L removed. Recurrent pleural effusions suspected secondary to malignancy and chemotherapy.   *Respiratory status worsened during the course of his stay, with O2 needs increasing to 4L NC to maintain sats in low 90s.   *CT chest done 4/28 showed RUL and LLL opacities concerning for pneumonia, also had a large left effusion and moderate partially loculated right effusion. Interval enlargement of the bulky adenopathy within the abdomen and pelvis. Stable post treatment changes right perihilar region. Stable osseous lesions.   *Echo 4/28 with hyperdynamic left ventricular function. LVEF 65-70%. Small pericardial effusion with some constriction.  Cardiology consulted. Overall pericardial effusion appears smaller than previously and clinically patient does not appear to have constrictive pericarditis. No new cardiac recommendations at this time.  *[facility] consulted this admission. Dr. John noted recent disease progression. Patient is no longer a candidate for cancer directed treatment.   *Repeat L thoracentesis 4/29 (given CT findings of continued large left pleural effusion and moderate right), with 2.4L fluid removed.   *Advanced care planning discussion had with oncology, palliative care, hospitalist, patient and guardian this stay.  Given patient no longer candidate for cancer related treatment, code status changed to DNR-DNI. Kt's Guardian is planning on enrolling Kt in Hospice once LTC placement has been established.  *Ultimately had a L PleurX catheter placed on 5/2  *Cefepime transitioned to Augmentin on 5/3, with plan to complete 7d course of Augmentin  -- cont Augmentin (5/3-5/10)  -- cont sched/prn nebs  -- cont Lasix 20mg po daily  -- drain PleurX prn  -- O2 sats remain stable on 2L NC  -- RCAT, pulmonary toilet with IS/OOB  -- fluid restriction to  2000 mL/day.      Non-small cell lung cancer metastatic to lymph nodes and bones  *Follows with  Oncology (Dr. John) and underwent infusion GEMZAR 04/09 and received post chemotherapy neupogen. Last PET 12/2024 with stable disease involving the lymph node metastases, but a partial response involving the osseous metastases.   *As above, patient is no longer a candidate for cancer directed treatment.   *s/p L PleurX catheter placement on 5/2  -- drain L PleurX prn for comfort/symptomatic shortness of breath  -- may consider R pleurX catheter in the near future if R sided pleural effusion recurs.   -- pain management with Tylenol PRN, atarax PRN.     Chronic anemia suspected secondary to chemotherapy and underlying malignancy  Chronic thrombocytopenia in setting above   *Monitor labs periodically while hospitalized, hgb stable at 7-8, platelets stable     Bilateral lower extremity DVT -9/2023 on anticoagulation.  *Likely hypercoagulability of malignancy.  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 Flash penetration x1 with thin liquids. Here, patient exhibiting right-sided weakness and tongue deviation to the right. Voice is dysphonic and cough is weak increasing risk for silent aspiration.   *Seen by SLP this stay, okay'd for regular diet and thin liquids    Chronic troponin elevation  CAD s/p cardiac stenting  Ascending aortic dilatation  Mild mitral regurgitation   Mild tricuspid regurgitation   Hypertension  Hyperlipidemia  *Denies any chest pain. No palpitations. SOB as above. Chronic troponin elevation suspected secondary to NSTEMI in setting of demand given recurrent malignant pleural effusions and pericardial effusion.   *Echocardiogram 4/28 as noted above.  -- conts on DOAC, Lasix as  above    History of CVA with right sided hemiplegia  Seizure D/O  Cognitive impairment   *Wheelchair bound at baseline. Has a legal guardian.   *Pt has notable cognitive impairment. Seemingly answers yes/no questions appropriately but has significant word-finding difficulties. Very difficult to communicate with. Unclear baseline, but per chart review, he has had documented word finding issues as a result of his prior stroke.   *Unclear if cognitive impairment and word finding issues are worse than baseline. Given pt will ultimately be enrolled in hospice, will not pursue any additional work up for this in patient.     GERD.  *Not on PTA medications. No acute issues.     MDD with anxiety  *Not on PTA medications. No acute issues.     History of alcohol use D/O  History of substance use D/O  *Noted.      Hx of hypoalbuminemia in setting of metastatic lung cancer   *Nutrition consultation. Cont Ensure between meals.      Mild elevated AST  *Noted. Follow LFTs.      Physical deconditioning from medical illness.  *Resident of assisted living facility. [facility]      Goals of care   *As per discussions above, subsequently changed to DNR/DNI this day.  Plan is now for discharge to LTC on hospice care. SW following to help coordinate.    FEN: no IVFs, lytes stable, regular diet  DVT Prophylaxis: DOAC  Code Status: No CPR- Do NOT Intubate    Disposition: Discharge to long-term care facility with hospice enrollment once arrangements are made. SW following.  Medical issues otherwise remained stable at this point    Medically Ready for Discharge: Anticipated in 2-4 Days      Julita Bergman, DO    Clinically Significant Risk Factors               # Hypoalbuminemia: Lowest albumin = 2.5 g/dL at 4/29/2025  6:27 AM, will monitor as appropriate     # Hypertension: Noted on problem list                # Financial/Environmental Concerns:             Medical Decision Making       Please see A&P for additional  details of medical decision making.         Interval History   Chart reviewed.  Remained on supplemental O2 overnight for comfort.  Seen this morning.  Resting comfortably, eating breakfast.  Denies shortness of breath. No chest pain, abdominal pain, nausea or vomiting. SW working on placement.     -Data reviewed today: I reviewed all new labs and imaging results over the last 24 hours. I personally reviewed no images or EKG's today.    Physical Exam   Temp: 97.4  F (36.3  C) Temp src: Oral BP: 108/64 Pulse: 76   Resp: 18 SpO2: 97 % O2 Device: Nasal cannula Oxygen Delivery: 1 LPM  Vitals:    05/02/25 0537 05/05/25 0610 05/07/25 0600   Weight: 69.3 kg (152 lb 12.5 oz) 67 kg (147 lb 11.3 oz) 68.7 kg (151 lb 7.3 oz)     Vital Signs with Ranges  Temp:  [97.4  F (36.3  C)-98.1  F (36.7  C)] 97.4  F (36.3  C)  Pulse:  [76-94] 76  Resp:  [18] 18  BP: ()/(56-76) 108/64  SpO2:  [93 %-97 %] 97 %  I/O last 3 completed shifts:  In: 600 [P.O.:600]  Out: 1400 [Urine:1400]    Constitutional: Chronically ill appearing male, resting quietly in bed, answering basic questions appropriately, NAD  Respiratory: Decreased bibasilar breath sounds, no wheeze, no increased work of breathing, +L PleurX  Cardiovascular: HRRR, no MGR, +bilateral LE edema  GI: S, NT, ND, +BS  Skin/Integumen: warm/dry  Other:      Medications   Current Facility-Administered Medications   Medication Dose Route Frequency Provider Last Rate Last Admin     Current Facility-Administered Medications   Medication Dose Route Frequency Provider Last Rate Last Admin    apixaban ANTICOAGULANT (ELIQUIS) tablet 2.5 mg  2.5 mg Oral BID Karen Guzman MD   2.5 mg at 05/06/25 2047    folic acid (FOLVITE) tablet 1 mg  1 mg Oral Daily Shelley Sandoval PA-C   1 mg at 05/06/25 1044    furosemide (LASIX) tablet 20 mg  20 mg Oral Daily Chris Preston MD   20 mg at 05/06/25 1044    heparin lock flush 10 unit/mL injection 5-10 mL  5-10 mL Intracatheter Q24H  Julita Bergman, DO   5 mL at 05/07/25 0600    heparin lock flush 100 unit/mL injection 5-10 mL  5-10 mL Intracatheter Q28 Days Julita Bergman DO        ipratropium - albuterol 0.5 mg/2.5 mg/3 mL (DUONEB) neb solution 3 mL  3 mL Nebulization BID Sung Wood MD   3 mL at 05/03/25 0750    sodium chloride (PF) 0.9% PF flush 10-20 mL  10-20 mL Intracatheter Q28 Days Julita Bergman, DO   10 mL at 04/27/25 0816       Data   Recent Labs   Lab 05/07/25  0601 05/06/25  1042 05/06/25  0625 05/05/25  1908 05/05/25  0611 05/03/25  0546 05/03/25  0546 05/01/25  0625   WBC  --   --   --   --  3.5*  --  3.6*  --    HGB  --   --   --   --  7.4*  --  8.2*  --    MCV  --   --   --   --  92  --  93  --    PLT  --   --   --   --  162  --  177  --    NA  --   --   --   --  139  --  135  --    POTASSIUM 3.4  --  4.0 4.1 3.1*   < > 3.7  --    CHLORIDE  --   --   --   --  108*  --  99  --    CO2  --   --   --   --  24  --  27  --    BUN  --   --   --   --  24.4*  --  28.4*  --    CR  --   --   --   --  0.59*  --  0.80 0.77   ANIONGAP  --   --   --   --  7  --  9  --    BEVERLY  --   --   --   --  7.0*  --  8.4*  --    GLC  --  100*  --   --  73  --  80  --     < > = values in this interval not displayed.       No results found for this or any previous visit (from the past 24 hours).

## 2025-05-07 NOTE — PLAN OF CARE
Goal Outcome Evaluation:      Plan of Care Reviewed With: patient    Overall Patient Progress: no change     SUMMARY: SOB. Found to have Pleural Effusions    DATE & TIME: 05/06/2025 -05/07/2025 [id number]      Cognitive Concerns/ Orientation: A & O x3- disoriented to situation; Forgetful. Speech hoarse. Some aphasia present. Flat.    BEHAVIOR & AGGRESSION TOOL COLOR: Green  ABNL VS/O2: VSS on 1L NC for comfort overnight per pt. Request   MOBILITY: A x2; Lift.; Turns self well in the bed   PAIN MANAGMENT: Denied pain this shift   DIET: Regular, 2000mL FR  BOWEL/BLADDER: Continent of bladder; incontinent of bowel at times. Using urinal at bedside  ABNL LAB/BG: K 4.0-recheck placed for AM draw   DRAIN/DEVICES: R Chest port - hep locked, L pleurX drain-dressing c/d/I.  TELEMETRY RHYTHM: NA  SKIN: Redness to R side of foot, blanchable. Dry flaky skin to bilateral shins. Dressings on L lower back, CDI.   TESTS/PROCEDURES: None this shift   D/C DATE: Discharge pending safe disposition, see SW note.     OTHER IMPORTANT INFO:

## 2025-05-07 NOTE — PLAN OF CARE
Speech Language Therapy Discharge Summary    Reason for therapy discharge:    All goals and outcomes met, no further needs identified.    Progress towards therapy goal(s). See goals on Care Plan in Epic electronic health record for goal details.  Goals met    Therapy recommendation(s):    No further therapy is recommended. Pt is likely to discharge and initiate hospice. He and RN deny any swallowing concerns. Continue a regular diet and thin liquids with general safe swallow strategies

## 2025-05-08 VITALS
SYSTOLIC BLOOD PRESSURE: 95 MMHG | TEMPERATURE: 98.1 F | RESPIRATION RATE: 18 BRPM | WEIGHT: 151.46 LBS | BODY MASS INDEX: 18.68 KG/M2 | DIASTOLIC BLOOD PRESSURE: 56 MMHG | HEART RATE: 91 BPM | OXYGEN SATURATION: 93 %

## 2025-05-08 LAB — POTASSIUM SERPL-SCNC: 4 MMOL/L (ref 3.4–5.3)

## 2025-05-08 PROCEDURE — 250N000011 HC RX IP 250 OP 636: Performed by: INTERNAL MEDICINE

## 2025-05-08 PROCEDURE — 84132 ASSAY OF SERUM POTASSIUM: CPT | Performed by: STUDENT IN AN ORGANIZED HEALTH CARE EDUCATION/TRAINING PROGRAM

## 2025-05-08 PROCEDURE — 250N000013 HC RX MED GY IP 250 OP 250 PS 637: Performed by: STUDENT IN AN ORGANIZED HEALTH CARE EDUCATION/TRAINING PROGRAM

## 2025-05-08 PROCEDURE — 99232 SBSQ HOSP IP/OBS MODERATE 35: CPT | Performed by: STUDENT IN AN ORGANIZED HEALTH CARE EDUCATION/TRAINING PROGRAM

## 2025-05-08 PROCEDURE — 250N000013 HC RX MED GY IP 250 OP 250 PS 637

## 2025-05-08 PROCEDURE — 120N000001 HC R&B MED SURG/OB

## 2025-05-08 RX ADMIN — APIXABAN 2.5 MG: 2.5 TABLET, FILM COATED ORAL at 08:00

## 2025-05-08 RX ADMIN — APIXABAN 2.5 MG: 2.5 TABLET, FILM COATED ORAL at 20:14

## 2025-05-08 RX ADMIN — FOLIC ACID 1 MG: 1 TABLET ORAL at 08:00

## 2025-05-08 RX ADMIN — FUROSEMIDE 20 MG: 20 TABLET ORAL at 08:00

## 2025-05-08 RX ADMIN — Medication 5 ML: at 06:02

## 2025-05-08 ASSESSMENT — ACTIVITIES OF DAILY LIVING (ADL)
ADLS_ACUITY_SCORE: 79
ADLS_ACUITY_SCORE: 75
ADLS_ACUITY_SCORE: 80
ADLS_ACUITY_SCORE: 75
ADLS_ACUITY_SCORE: 76
ADLS_ACUITY_SCORE: 75
ADLS_ACUITY_SCORE: 76
ADLS_ACUITY_SCORE: 76
ADLS_ACUITY_SCORE: 79
ADLS_ACUITY_SCORE: 76
ADLS_ACUITY_SCORE: 76
ADLS_ACUITY_SCORE: 80
ADLS_ACUITY_SCORE: 76
ADLS_ACUITY_SCORE: 75
ADLS_ACUITY_SCORE: 80
ADLS_ACUITY_SCORE: 80
ADLS_ACUITY_SCORE: 76
ADLS_ACUITY_SCORE: 80

## 2025-05-08 NOTE — PLAN OF CARE
SUMMARY: SOB. Found to have Pleural Effusions    DATE & TIME: 05/07/2025 [id number]       Cognitive Concerns/ Orientation: A & O x3- disoriented to situation; Forgetful. Speech hoarse. Some aphasia present.   BEHAVIOR & AGGRESSION TOOL COLOR: Green  ABNL VS/O2: VSS on 2L NC   MOBILITY: A x2; Lift.; Turns self well in the bed. Refused to get in recliner today.   PAIN MANAGMENT: Denied pain this shift   DIET: Regular, 2000mL FR  BOWEL/BLADDER: Continent of bladder; incontinent of bowel at times. 2 BMs. Using urinal at bedside.  ABNL LAB/BG: K+ 3.4, replaced recheck 3.7.   DRAIN/DEVICES: R Chest port - hep locked, L pleurX drain-dressing c/d/I.  SKIN: Redness to R side of foot, blanchable. Dry flaky skin to bilateral shins. Dressings on L lower back, CDI.   TESTS/PROCEDURES: None this shift   D/C DATE: Discharge pending safe disposition, see SW note.   OTHER IMPORTANT INFO: Pt refused CHG wipes today, refused foot care, and did not want to get up in recliner. He he would tomorrow. Infrequent cough. No other changes.

## 2025-05-08 NOTE — PROGRESS NOTES
Care Management Follow Up    Length of Stay (days): 10    Expected Discharge Date: 05/09/2025     Concerns to be Addressed:       Patient plan of care discussed at interdisciplinary rounds: Yes    Anticipated Discharge Disposition: Hospice  Anticipated Discharge Services:    Anticipated Discharge DME:      Patient/family educated on Medicare website which has current facility and service quality ratings:    Education Provided on the Discharge Plan:    Patient/Family in Agreement with the Plan:      Referrals Placed by CM/SW: Post Acute Facilities  Private pay costs discussed: Not applicable    Discussed  Partnership in Safe Discharge Planning  document with patient/family: No     Handoff Completed: No, handoff not indicated or clinically appropriate    Additional Information:  SW reached out to pt's guardian again at [phone] left voicemail requesting a call back today as pt is ready to discharge and we cannot move forward with the discharge plan of [place] [facility] until this has been confirmed by them.     Next Steps: Confirm discharge plan with Guardian.     NAOMI SalinasW

## 2025-05-08 NOTE — PLAN OF CARE
Goal Outcome Evaluation:      Plan of Care Reviewed With: patient    Overall Patient Progress: no changeOverall Patient Progress: no change    SUMMARY: SOB. Found to have Pleural Effusions    DATE & TIME: 05/08/2025 [id number]      Cognitive Concerns/ Orientation: A & O x3- disoriented to situation; Forgetful. Speech hoarse.   BEHAVIOR & AGGRESSION TOOL COLOR: Green  ABNL VS/O2: VSS on 1L NC for comfort  MOBILITY: A x2; Lift.; Up to chair today.  PAIN MANAGMENT: Denied pain this shift   DIET: Regular, 2000mL FR  BOWEL/BLADDER: Continent of bladder; incontinent of bowel at times. Using urinal at bedside. 1 BM today.  ABNL LAB/BG: K 4.0- No replacement needed.  DRAIN/DEVICES: R Chest port - hep locked (unit collect), L pleurX drain-dressing c/d/I.  TELEMETRY RHYTHM: NA  SKIN: Redness to R side of foot, blanchable. Dry flaky skin to bilateral shins. PleurX dressing on L lower flank, CDI.   TESTS/PROCEDURES: None this shift   D/C DATE: Discharge pending safe disposition. Return to [facility] vs. Another place. Awaiting to hear from guardian.  Other: R side hemiplegia from past CVA.

## 2025-05-08 NOTE — PROGRESS NOTES
Care Management Follow Up    Length of Stay (days): 9    Expected Discharge Date: 05/08/2025     Concerns to be Addressed:       Patient plan of care discussed at interdisciplinary rounds: Yes    Anticipated Discharge Disposition:    A care facility with hospice support        Anticipated Discharge Services:    Anticipated Discharge DME:      Patient/family educated on Medicare website which has current facility and service quality ratings:    Education Provided on the Discharge Plan:    Patient/Family in Agreement with the Plan:  /guardian in agreement with comfort care    Referrals Placed by CM/SW:    Private pay costs discussed:     Discussed  Partnership in Safe Discharge Planning  document with patient/family:      Handoff Completed:     Additional Information:  Placed a call late yesterday to patient's guardian Sky @     [phone]   asking if he wants patient to return to [facility] or another disposition.    Also spoke with Sherrell, the [facility] liaison and she confirms the facility can accept patient's whom have a pleux-catheter.    Placed another call to guardian this evening and asked that he please call back on Thursday so we can finalize patient's discharge plan.  Explained patient is ready for discharge.    Next Steps: Wait to hear from guardian on Thursday    LISA Mckeon

## 2025-05-08 NOTE — PROGRESS NOTES
[facility]    Hospitalist Progress Note    Date of Admission: 4/27/2025    Assessment & Plan   Kt Rasmussen is a 64 year old male with PMHx of metastatic lung cancer, pleural effusions requiring thoracentesis (in 12/2024 x2 and 1/2025), pericardial effusion, lower extremity DVT on anticoagulation, chronic anemia, thrombocytopenia, coronary artery disease status post stenting, hypertension, hyperlipidemia, GERD, seizure disorder, anxiety disorder, history of CVA with right hemiplegia.    He was recently admitted to [facility] from 4/9/25-4/16/25 acute hypoxic respiratory failure, multifactorial, dt moderate to large bilateral pleural effusion (R>L), moderate pericardial effusion and metastatic non-small cell lung cancer. During that stay, patient underwent a thoracentesis with 1.1L fluid removed and diuresis. O2 needs weaned to 2L NC. Discharged back to care facility.     He was readmitted on  admitted on 4/27/2025 for shortness of breath and recurrent acute on chronic hypoxic respiratory failure again felt to be multifactorial.  Ongoing conversations held this day with care team and guardian, now considering transition to hospice on discharge back to care facility.     Acute on chronic respiratory failure, multifactorial, dt HCAP, metastatic lung cancer, recurring pleural effusions, atelectasis, pericardial effusion, chemotoxicity and deconditioning  Hx moderate pericardial effusion on TTE 4/2025  Hx bilateral pleural effusions s/p bilateral thoracentesis 4/27/25, 4/9/25, 1/6/25, 12/27/24 and 12/26/24   *Patient presented to [facility] ED from TCU with acute on chronic dyspnea. Patient discharged previously on 2 L NC although reportedly not requiring oxygen at baseline, per patient. He was found to be hypoxic in the 60s on RA per EMS.   *In ED, he was hypoxic with O2 sats 83% on RA. CXR showing moderate right and small left pleural effusions with adjacent atelectasis, mild pulmonary edema, no  pneumothorax, stable cardiomediastinal silhouette. EKG without acute ischemic changes. Started on IV Cefepime.   *Underwent R thoracentesis on 4/27 with 2.4L removed. Recurrent pleural effusions suspected secondary to malignancy and chemotherapy.   *Respiratory status worsened during the course of his stay, with O2 needs increasing to 4L NC to maintain sats in low 90s.   *CT chest done 4/28 showed RUL and LLL opacities concerning for pneumonia, also had a large left effusion and moderate partially loculated right effusion. Interval enlargement of the bulky adenopathy within the abdomen and pelvis. Stable post treatment changes right perihilar region. Stable osseous lesions.   *Echo 4/28 with hyperdynamic left ventricular function. LVEF 65-70%. Small pericardial effusion with some constriction.  Cardiology consulted. Overall pericardial effusion appears smaller than previously and clinically patient does not appear to have constrictive pericarditis. No new cardiac recommendations at this time.  *[facility] consulted this admission. Dr. John noted recent disease progression. Patient is no longer a candidate for cancer directed treatment.   *Repeat L thoracentesis 4/29 (given CT findings of continued large left pleural effusion and moderate right), with 2.4L fluid removed.   *Advanced care planning discussion had with oncology, palliative care, hospitalist, patient and guardian this stay.  Given patient no longer candidate for cancer related treatment, code status changed to DNR-DNI. Kt's Guardian is planning on enrolling Kt in Hospice once LTC placement has been established.  *Ultimately had a L PleurX catheter placed on 5/2  *Cefepime transitioned to Augmentin on 5/3, with plan to complete 7d course of Augmentin  -- cont Augmentin (5/3-5/10)  -- cont sched/prn nebs  -- cont Lasix 20mg po daily  -- drain PleurX prn  -- O2 sats remain stable on 2L NC  -- RCAT, pulmonary toilet with IS/OOB  -- fluid restriction to  2000 mL/day.      Non-small cell lung cancer metastatic to lymph nodes and bones  *Follows with  Oncology (Dr. John) and underwent infusion GEMZAR 04/09 and received post chemotherapy neupogen. Last PET 12/2024 with stable disease involving the lymph node metastases, but a partial response involving the osseous metastases.   *As above, patient is no longer a candidate for cancer directed treatment.   *s/p L PleurX catheter placement on 5/2  -- drain L PleurX prn for comfort/symptomatic shortness of breath  -- may consider R pleurX catheter in the near future if R sided pleural effusion recurs.   -- pain management with Tylenol PRN, atarax PRN.     Chronic anemia suspected secondary to chemotherapy and underlying malignancy  Chronic thrombocytopenia in setting above   *Monitor labs periodically while hospitalized, hgb stable at 7-8, platelets stable     Bilateral lower extremity DVT -9/2023 on anticoagulation.  *Likely hypercoagulability of malignancy.  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 Flash penetration x1 with thin liquids. Here, patient exhibiting right-sided weakness and tongue deviation to the right. Voice is dysphonic and cough is weak increasing risk for silent aspiration.   *Seen by SLP this stay, okay'd for regular diet and thin liquids    Chronic troponin elevation  CAD s/p cardiac stenting  Ascending aortic dilatation  Mild mitral regurgitation   Mild tricuspid regurgitation   Hypertension  Hyperlipidemia  *Denies any chest pain. No palpitations. SOB as above. Chronic troponin elevation suspected secondary to NSTEMI in setting of demand given recurrent malignant pleural effusions and pericardial effusion.   *Echocardiogram 4/28 as noted above.  -- conts on DOAC, Lasix as  above    History of CVA with right sided hemiplegia  Seizure D/O  Cognitive impairment   *Wheelchair bound at baseline. Has a legal guardian.   *Pt has notable cognitive impairment. Seemingly answers yes/no questions appropriately but has significant word-finding difficulties. Very difficult to communicate with. Unclear baseline, but per chart review, he has had documented word finding issues as a result of his prior stroke.   *Unclear if cognitive impairment and word finding issues are worse than baseline. Given pt will ultimately be enrolled in hospice, will not pursue any additional work up for this in patient.     GERD.  *Not on PTA medications. No acute issues.     MDD with anxiety  *Not on PTA medications. No acute issues.     History of alcohol use D/O  History of substance use D/O  *Noted.      Hx of hypoalbuminemia in setting of metastatic lung cancer   *Nutrition consultation. Cont Ensure between meals.      Mild elevated AST  *Noted. Follow LFTs.      Physical deconditioning from medical illness.  *Resident of assisted living facility. [facility]      Goals of care   *As per discussions above, subsequently changed to DNR/DNI this day.  Plan is now for discharge to LTC on hospice care. SW following to help coordinate.    FEN: no IVFs, lytes stable, regular diet  DVT Prophylaxis: DOAC  Code Status: No CPR- Do NOT Intubate    Disposition: Discharge to long-term care facility with hospice enrollment once arrangements are made. SW following.  Medical issues otherwise remained stable at this point    Medically Ready for Discharge: Ready Now      Na Barker, DO    Clinically Significant Risk Factors               # Hypoalbuminemia: Lowest albumin = 2.5 g/dL at 4/29/2025  6:27 AM, will monitor as appropriate     # Hypertension: Noted on problem list                # Financial/Environmental Concerns:             Medical Decision Making       41 MINUTES SPENT BY ME on the date of service doing  chart review, history, exam, documentation & further activities per the note.         Interval History   Assumed care today. Reviewed hospitalization thus far. Patient lying in bed in the sun. He looks comfortable and at peace. He denied any needs. Breathing is stable. No cough. Updated on plan for discharge at anytime.    -Data reviewed today: I reviewed all new labs and imaging results over the last 24 hours. I personally reviewed no images or EKG's today.    Physical Exam   Temp: 96.8  F (36  C) Temp src: Oral BP: 106/59 Pulse: 81   Resp: 18 SpO2: 95 % O2 Device: Nasal cannula Oxygen Delivery: 1 LPM  Vitals:    05/02/25 0537 05/05/25 0610 05/07/25 0600   Weight: 69.3 kg (152 lb 12.5 oz) 67 kg (147 lb 11.3 oz) 68.7 kg (151 lb 7.3 oz)     Vital Signs with Ranges  Temp:  [96.8  F (36  C)-98.2  F (36.8  C)] 96.8  F (36  C)  Pulse:  [81-93] 81  Resp:  [18] 18  BP: (105-122)/(53-70) 106/59  SpO2:  [94 %-96 %] 95 %  I/O last 3 completed shifts:  In: 1180 [P.O.:1180]  Out: 1515 [Urine:1515]    Constitutional: Chronically ill appearing male, resting quietly in bed, answering basic questions appropriately, NAD  Respiratory: Decreased bibasilar breath sounds, no wheeze, no increased work of breathing, +L PleurX  Cardiovascular: HRRR, no MGR, no major edema present today  GI: S, NT, ND, +BS  Skin/Integumen: warm/dry  Other:      Medications   Current Facility-Administered Medications   Medication Dose Route Frequency Provider Last Rate Last Admin     Current Facility-Administered Medications   Medication Dose Route Frequency Provider Last Rate Last Admin    apixaban ANTICOAGULANT (ELIQUIS) tablet 2.5 mg  2.5 mg Oral BID Karen Guzman MD   2.5 mg at 05/08/25 0800    folic acid (FOLVITE) tablet 1 mg  1 mg Oral Daily Shelley Sandoval PA-C   1 mg at 05/08/25 0800    furosemide (LASIX) tablet 20 mg  20 mg Oral Daily Chris Preston MD   20 mg at 05/08/25 0800    heparin lock flush 10 unit/mL injection 5-10 mL   5-10 mL Intracatheter Q24H Julita Bergman, DO   5 mL at 05/08/25 0602    heparin lock flush 100 unit/mL injection 5-10 mL  5-10 mL Intracatheter Q28 Days Julita Bergman, DO        ipratropium - albuterol 0.5 mg/2.5 mg/3 mL (DUONEB) neb solution 3 mL  3 mL Nebulization BID Sung Wood MD   3 mL at 05/03/25 0750    sodium chloride (PF) 0.9% PF flush 10-20 mL  10-20 mL Intracatheter Q28 Days Julita Bergman, DO   10 mL at 04/27/25 0816       Data   Recent Labs   Lab 05/08/25  0558 05/07/25  1518 05/07/25  0601 05/06/25  1042 05/05/25  1908 05/05/25  0611 05/03/25  0546   WBC  --   --   --   --   --  3.5* 3.6*   HGB  --   --   --   --   --  7.4* 8.2*   MCV  --   --   --   --   --  92 93   PLT  --   --   --   --   --  162 177   NA  --   --   --   --   --  139 135   POTASSIUM 4.0 3.7 3.4  --    < > 3.1* 3.7   CHLORIDE  --   --   --   --   --  108* 99   CO2  --   --   --   --   --  24 27   BUN  --   --   --   --   --  24.4* 28.4*   CR  --   --   --   --   --  0.59* 0.80   ANIONGAP  --   --   --   --   --  7 9   BEVERLY  --   --   --   --   --  7.0* 8.4*   GLC  --   --   --  100*  --  73 80    < > = values in this interval not displayed.       No results found for this or any previous visit (from the past 24 hours).

## 2025-05-08 NOTE — PLAN OF CARE
Goal Outcome Evaluation:       SUMMARY: SOB. Found to have Pleural Effusions    DATE & TIME: 05/07/2025 -05/08/2025 [id number]      Cognitive Concerns/ Orientation: A & O x3- disoriented to situation; Forgetful. Speech hoarse. Some aphasia present.   BEHAVIOR & AGGRESSION TOOL COLOR: Green  ABNL VS/O2: VSS on 1L NC - wean as able   MOBILITY: A x2; Lift.; Turns self well in the bed  PAIN MANAGMENT: Denied pain this shift   DIET: Regular, 2000mL FR  BOWEL/BLADDER: Continent of bladder; incontinent of bowel at times. Using urinal at bedside.  ABNL LAB/BG: K 3.7- recheck placed for AM draw   DRAIN/DEVICES: R Chest port - hep locked, L pleurX drain-dressing c/d/I.  TELEMETRY RHYTHM: NA  SKIN: Redness to R side of foot, blanchable. Dry flaky skin to bilateral shins. Dressings on L lower back, CDI.   TESTS/PROCEDURES: None this shift   D/C DATE: Discharge pending safe disposition    OTHER IMPORTANT INFO:                       Francis

## 2025-05-09 LAB
ANION GAP SERPL CALCULATED.3IONS-SCNC: 12 MMOL/L (ref 7–15)
BUN SERPL-MCNC: 26.8 MG/DL (ref 8–23)
CALCIUM SERPL-MCNC: 8.2 MG/DL (ref 8.8–10.4)
CHLORIDE SERPL-SCNC: 102 MMOL/L (ref 98–107)
CREAT SERPL-MCNC: 0.74 MG/DL (ref 0.67–1.17)
EGFRCR SERPLBLD CKD-EPI 2021: >90 ML/MIN/1.73M2
GLUCOSE SERPL-MCNC: 81 MG/DL (ref 70–99)
HCO3 SERPL-SCNC: 25 MMOL/L (ref 22–29)
POTASSIUM SERPL-SCNC: 4 MMOL/L (ref 3.4–5.3)
POTASSIUM SERPL-SCNC: 4 MMOL/L (ref 3.4–5.3)
SODIUM SERPL-SCNC: 139 MMOL/L (ref 135–145)

## 2025-05-09 PROCEDURE — 99232 SBSQ HOSP IP/OBS MODERATE 35: CPT | Performed by: STUDENT IN AN ORGANIZED HEALTH CARE EDUCATION/TRAINING PROGRAM

## 2025-05-09 PROCEDURE — 250N000013 HC RX MED GY IP 250 OP 250 PS 637: Performed by: STUDENT IN AN ORGANIZED HEALTH CARE EDUCATION/TRAINING PROGRAM

## 2025-05-09 PROCEDURE — 120N000001 HC R&B MED SURG/OB

## 2025-05-09 PROCEDURE — 80048 BASIC METABOLIC PNL TOTAL CA: CPT | Performed by: STUDENT IN AN ORGANIZED HEALTH CARE EDUCATION/TRAINING PROGRAM

## 2025-05-09 PROCEDURE — 84132 ASSAY OF SERUM POTASSIUM: CPT | Performed by: STUDENT IN AN ORGANIZED HEALTH CARE EDUCATION/TRAINING PROGRAM

## 2025-05-09 PROCEDURE — 250N000011 HC RX IP 250 OP 636: Performed by: INTERNAL MEDICINE

## 2025-05-09 PROCEDURE — 250N000013 HC RX MED GY IP 250 OP 250 PS 637

## 2025-05-09 RX ORDER — IPRATROPIUM BROMIDE AND ALBUTEROL SULFATE 2.5; .5 MG/3ML; MG/3ML
3 SOLUTION RESPIRATORY (INHALATION) EVERY 4 HOURS PRN
Status: DISCONTINUED | OUTPATIENT
Start: 2025-05-09 | End: 2025-05-12 | Stop reason: HOSPADM

## 2025-05-09 RX ADMIN — APIXABAN 2.5 MG: 2.5 TABLET, FILM COATED ORAL at 20:27

## 2025-05-09 RX ADMIN — Medication 5 ML: at 05:53

## 2025-05-09 RX ADMIN — FUROSEMIDE 20 MG: 20 TABLET ORAL at 08:14

## 2025-05-09 RX ADMIN — FOLIC ACID 1 MG: 1 TABLET ORAL at 08:14

## 2025-05-09 RX ADMIN — APIXABAN 2.5 MG: 2.5 TABLET, FILM COATED ORAL at 08:14

## 2025-05-09 ASSESSMENT — ACTIVITIES OF DAILY LIVING (ADL)
ADLS_ACUITY_SCORE: 75
ADLS_ACUITY_SCORE: 80
ADLS_ACUITY_SCORE: 75
ADLS_ACUITY_SCORE: 80
ADLS_ACUITY_SCORE: 75
ADLS_ACUITY_SCORE: 80
ADLS_ACUITY_SCORE: 80
ADLS_ACUITY_SCORE: 75
ADLS_ACUITY_SCORE: 80

## 2025-05-09 NOTE — PROGRESS NOTES
[facility]    Hospitalist Progress Note    Date of Admission: 4/27/2025    Assessment & Plan   Kt Rasmussen is a 64 year old male with PMHx of metastatic lung cancer, pleural effusions requiring thoracentesis (in 12/2024 x2 and 1/2025), pericardial effusion, lower extremity DVT on anticoagulation, chronic anemia, thrombocytopenia, coronary artery disease status post stenting, hypertension, hyperlipidemia, GERD, seizure disorder, anxiety disorder, history of CVA with right hemiplegia.    He was recently admitted to [facility] from 4/9/25-4/16/25 acute hypoxic respiratory failure, multifactorial, dt moderate to large bilateral pleural effusion (R>L), moderate pericardial effusion and metastatic non-small cell lung cancer. During that stay, patient underwent a thoracentesis with 1.1L fluid removed and diuresis. O2 needs weaned to 2L NC. Discharged back to care facility.     He was readmitted on  admitted on 4/27/2025 for shortness of breath and recurrent acute on chronic hypoxic respiratory failure again felt to be multifactorial.  Ongoing conversations held this day with care team and guardian, now considering transition to hospice on discharge back to care facility.     Acute on chronic respiratory failure, multifactorial, dt HCAP, metastatic lung cancer, recurring pleural effusions, atelectasis, pericardial effusion, chemotoxicity and deconditioning  Hx moderate pericardial effusion on TTE 4/2025  Hx bilateral pleural effusions s/p bilateral thoracentesis 4/27/25, 4/9/25, 1/6/25, 12/27/24 and 12/26/24   *Patient presented to [facility] ED from TCU with acute on chronic dyspnea. Patient discharged previously on 2 L NC although reportedly not requiring oxygen at baseline, per patient. He was found to be hypoxic in the 60s on RA per EMS.   *In ED, he was hypoxic with O2 sats 83% on RA. CXR showing moderate right and small left pleural effusions with adjacent atelectasis, mild pulmonary edema, no  pneumothorax, stable cardiomediastinal silhouette. EKG without acute ischemic changes. Started on IV Cefepime.   *Underwent R thoracentesis on 4/27 with 2.4L removed. Recurrent pleural effusions suspected secondary to malignancy and chemotherapy.   *Respiratory status worsened during the course of his stay, with O2 needs increasing to 4L NC to maintain sats in low 90s.   *CT chest done 4/28 showed RUL and LLL opacities concerning for pneumonia, also had a large left effusion and moderate partially loculated right effusion. Interval enlargement of the bulky adenopathy within the abdomen and pelvis. Stable post treatment changes right perihilar region. Stable osseous lesions.   *Echo 4/28 with hyperdynamic left ventricular function. LVEF 65-70%. Small pericardial effusion with some constriction.  Cardiology consulted. Overall pericardial effusion appears smaller than previously and clinically patient does not appear to have constrictive pericarditis. No new cardiac recommendations at this time.  *[facility] consulted this admission. Dr. John noted recent disease progression. Patient is no longer a candidate for cancer directed treatment.   *Repeat L thoracentesis 4/29 (given CT findings of continued large left pleural effusion and moderate right), with 2.4L fluid removed.   *Advanced care planning discussion had with oncology, palliative care, hospitalist, patient and guardian this stay.  Given patient no longer candidate for cancer related treatment, code status changed to DNR-DNI. Kt's Guardian is planning on enrolling Kt in Hospice once LTC placement has been established.  *Ultimately had a L PleurX catheter placed on 5/2  *Cefepime transitioned to Augmentin on 5/3, with plan to complete 7d course of Augmentin    -- cont Augmentin (5/3-5/10)  -- cont prn nebs  -- cont Lasix 20mg po daily  -- drain PleurX prn  -- O2 sats remain stable on 2L NC  -- RCAT, pulmonary toilet with IS/OOB  -- fluid restriction to  2000 mL/day.      Non-small cell lung cancer metastatic to lymph nodes and bones  *Follows with  Oncology (Dr. John) and underwent infusion GEMZAR 04/09 and received post chemotherapy neupogen. Last PET 12/2024 with stable disease involving the lymph node metastases, but a partial response involving the osseous metastases.   *As above, patient is no longer a candidate for cancer directed treatment.   *s/p L PleurX catheter placement on 5/2  -- drain L PleurX prn for comfort/symptomatic shortness of breath  -- may consider R pleurX catheter in the near future if R sided pleural effusion recurs.   -- pain management with Tylenol PRN, atarax PRN.     Chronic anemia suspected secondary to chemotherapy and underlying malignancy  Chronic thrombocytopenia in setting above   *Monitor labs periodically while hospitalized, hgb stable at 7-8, platelets stable     Bilateral lower extremity DVT -9/2023 on anticoagulation.  *Likely hypercoagulability of malignancy.  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 Flash penetration x1 with thin liquids. Here, patient exhibiting right-sided weakness and tongue deviation to the right. Voice is dysphonic and cough is weak increasing risk for silent aspiration.   *Seen by SLP this stay, okay'd for regular diet and thin liquids    Chronic troponin elevation  CAD s/p cardiac stenting  Ascending aortic dilatation  Mild mitral regurgitation   Mild tricuspid regurgitation   Hypertension  Hyperlipidemia  *Denies any chest pain. No palpitations. SOB as above. Chronic troponin elevation suspected secondary to NSTEMI in setting of demand given recurrent malignant pleural effusions and pericardial effusion.   *Echocardiogram 4/28 as noted above.  -- conts on DOAC, Lasix as  above    History of CVA with right sided hemiplegia  Seizure D/O  Cognitive impairment   *Wheelchair bound at baseline. Has a legal guardian.   *Pt has notable cognitive impairment. Seemingly answers yes/no questions appropriately but has significant word-finding difficulties. Very difficult to communicate with. Unclear baseline, but per chart review, he has had documented word finding issues as a result of his prior stroke.   *Unclear if cognitive impairment and word finding issues are worse than baseline. Given pt will ultimately be enrolled in hospice, will not pursue any additional work up for this in patient.     GERD.  *Not on PTA medications. No acute issues.     MDD with anxiety  *Not on PTA medications. No acute issues.     History of alcohol use D/O  History of substance use D/O  *Noted.      Hx of hypoalbuminemia in setting of metastatic lung cancer   *Nutrition consultation. Cont Ensure between meals.      Mild elevated AST  *Noted. Follow LFTs.      Physical deconditioning from medical illness.  *Resident of assisted living facility. [facility] residing [facility].      Goals of care   *As per discussions above, subsequently changed to DNR/DNI this day.  Plan is now for discharge to LTC on hospice care. SW following to help coordinate.    FEN: no IVFs, lytes stable, regular diet  DVT Prophylaxis: DOAC  Code Status: No CPR- Do NOT Intubate    Disposition: Discharge to long-term care facility with hospice enrollment once arrangements are made. SW following.  Medical issues otherwise remained stable at this point    Medically Ready for Discharge: Ready Now      Na Barker, DO    Clinically Significant Risk Factors               # Hypoalbuminemia: Lowest albumin = 2.5 g/dL at 4/29/2025  6:27 AM, will monitor as appropriate     # Hypertension: Noted on problem list                # Financial/Environmental Concerns:             Medical Decision Making       35 MINUTES SPENT BY ME on the date of service doing  chart review, history, exam, documentation & further activities per the note.         Interval History   Patient lying in bed in the sun again. He looks peaceful and comfortable. No complaints. He is not excited about going to [facility] but just shurgs his shoulders. Nurse in the room with me and we talked about getting up to chair later in the day    -Data reviewed today: I reviewed all new labs and imaging results over the last 24 hours. I personally reviewed no images or EKG's today.    Physical Exam   Temp: 97.1  F (36.2  C) Temp src: Oral BP: 100/52 Pulse: 80   Resp: 18 SpO2: 95 % O2 Device: Nasal cannula Oxygen Delivery: 1 LPM  Vitals:    05/05/25 0610 05/07/25 0600 05/09/25 0540   Weight: 67 kg (147 lb 11.3 oz) 68.7 kg (151 lb 7.3 oz) 68.7 kg (151 lb 7.3 oz)     Vital Signs with Ranges  Temp:  [97.1  F (36.2  C)-98.2  F (36.8  C)] 97.1  F (36.2  C)  Pulse:  [80-92] 80  Resp:  [18] 18  BP: ()/(52-68) 100/52  SpO2:  [93 %-95 %] 95 %  I/O last 3 completed shifts:  In: 600 [P.O.:600]  Out: 700 [Urine:700]    Constitutional: Chronically ill appearing male, resting quietly in bed, answering basic questions appropriately, NAD  Respiratory: Decreased bibasilar breath sounds, no wheeze, no increased work of breathing, +L PleurX  Cardiovascular: HRRR, no MGR, no major edema present today  GI: S, NT, ND, +BS  Skin/Integumen: warm/dry  Other:      Medications   Current Facility-Administered Medications   Medication Dose Route Frequency Provider Last Rate Last Admin     Current Facility-Administered Medications   Medication Dose Route Frequency Provider Last Rate Last Admin    apixaban ANTICOAGULANT (ELIQUIS) tablet 2.5 mg  2.5 mg Oral BID Karen Guzman MD   2.5 mg at 05/09/25 0814    folic acid (FOLVITE) tablet 1 mg  1 mg Oral Daily Shelley Sandoval PA-C   1 mg at 05/09/25 0814    furosemide (LASIX) tablet 20 mg  20 mg Oral Daily Chris Preston MD   20 mg at 05/09/25 0814    heparin lock flush 10  unit/mL injection 5-10 mL  5-10 mL Intracatheter Q24H Julita Bergman, DO   5 mL at 05/09/25 0553    heparin lock flush 100 unit/mL injection 5-10 mL  5-10 mL Intracatheter Q28 Days Julita Bergmanbeth, DO        sodium chloride (PF) 0.9% PF flush 10-20 mL  10-20 mL Intracatheter Q28 Days Julita Bergman, DO   10 mL at 04/27/25 0816       Data   Recent Labs   Lab 05/09/25  0549 05/08/25  0558 05/07/25  1518 05/07/25  0601 05/06/25  1042 05/05/25  1908 05/05/25  0611 05/03/25  0546   WBC  --   --   --   --   --   --  3.5* 3.6*   HGB  --   --   --   --   --   --  7.4* 8.2*   MCV  --   --   --   --   --   --  92 93   PLT  --   --   --   --   --   --  162 177     --   --   --   --   --  139 135   POTASSIUM 4.0  4.0 4.0 3.7   < >  --    < > 3.1* 3.7   CHLORIDE 102  --   --   --   --   --  108* 99   CO2 25  --   --   --   --   --  24 27   BUN 26.8*  --   --   --   --   --  24.4* 28.4*   CR 0.74  --   --   --   --   --  0.59* 0.80   ANIONGAP 12  --   --   --   --   --  7 9   BEVERLY 8.2*  --   --   --   --   --  7.0* 8.4*   GLC 81  --   --   --  100*  --  73 80    < > = values in this interval not displayed.       No results found for this or any previous visit (from the past 24 hours).

## 2025-05-09 NOTE — PLAN OF CARE
Goal Outcome Evaluation:    SUMMARY: Increased SOB, pleural Effusions, hx. of metastatic lung cancer    DATE & TIME: 05/09/25 [id number]  Cognitive Concerns/ Orientation: A&O x4. Speech hoarse.    BEHAVIOR & AGGRESSION TOOL COLOR: Green  ABNL VS/O2: VSS on 1L NC for comfort  MOBILITY: Total/lift. Up in chair x1 this shift. Weight shifts self in bed.  PAIN MANAGMENT: Denies  DIET: Regular, 2000mL FR.  BOWEL/BLADDER: Cont. of bladder. Urinal at bedside. Inc. of BM x2 this shift.   ABNL LAB/BG: none new  DRAIN/DEVICES: R Chest port - hep locked. L PleurX drain- emptied and dressing changed x1 this shift  TELEMETRY RHYTHM: NA  SKIN: Redness to R side of foot, blanchable. Dry flaky skin to bilateral shins. New dressing on L flank for PleurX drain  TESTS/PROCEDURES: none new  D/C DATE: pending safe disposition plan and pending confirmation of discharge with guardian  OTHER IMPORTANT INFO: SOB improved after emptying pleurx drain.

## 2025-05-09 NOTE — PLAN OF CARE
Goal Outcome Evaluation:       SUMMARY: SOB. Found to have Pleural Effusions    DATE & TIME: 05/08/2025 -05/09/2025 [id number]      Cognitive Concerns/ Orientation: A & O x3- disoriented to situation; Forgetful. Speech hoarse.    BEHAVIOR & AGGRESSION TOOL COLOR: Green  ABNL VS/O2: VSS on 1L NC  MOBILITY: A x2; Lift; Turns well in bed   PAIN MANAGMENT: Denied pain this shift   DIET: Regular, 2000mL FR  BOWEL/BLADDER: Continent of bladder; incontinent of bowel at times. Using urinal at bedside.  ABNL LAB/BG: K 4.0- recheck placed for AM draw   DRAIN/DEVICES: R Chest port - hep locked, L pleurX drain-dressing c/d/I.  TELEMETRY RHYTHM: NA  SKIN: Redness to R side of foot, blanchable. Dry flaky skin to bilateral shins. PleurX dressing on L lower flank, CDI.   TESTS/PROCEDURES: None this shift   D/C DATE: Discharge pending safe disposition. Return to [facility] vs. Another place. Awaiting to hear from guardian.    OTHER IMPORTANT INFO: R side hemiplegia from past CVA.

## 2025-05-10 LAB — POTASSIUM SERPL-SCNC: 3.8 MMOL/L (ref 3.4–5.3)

## 2025-05-10 PROCEDURE — 250N000011 HC RX IP 250 OP 636: Performed by: INTERNAL MEDICINE

## 2025-05-10 PROCEDURE — 84132 ASSAY OF SERUM POTASSIUM: CPT | Performed by: STUDENT IN AN ORGANIZED HEALTH CARE EDUCATION/TRAINING PROGRAM

## 2025-05-10 PROCEDURE — 250N000013 HC RX MED GY IP 250 OP 250 PS 637

## 2025-05-10 PROCEDURE — 250N000013 HC RX MED GY IP 250 OP 250 PS 637: Performed by: STUDENT IN AN ORGANIZED HEALTH CARE EDUCATION/TRAINING PROGRAM

## 2025-05-10 PROCEDURE — 120N000001 HC R&B MED SURG/OB

## 2025-05-10 PROCEDURE — 99232 SBSQ HOSP IP/OBS MODERATE 35: CPT | Performed by: STUDENT IN AN ORGANIZED HEALTH CARE EDUCATION/TRAINING PROGRAM

## 2025-05-10 RX ADMIN — APIXABAN 2.5 MG: 2.5 TABLET, FILM COATED ORAL at 20:16

## 2025-05-10 RX ADMIN — APIXABAN 2.5 MG: 2.5 TABLET, FILM COATED ORAL at 08:01

## 2025-05-10 RX ADMIN — FUROSEMIDE 20 MG: 20 TABLET ORAL at 08:01

## 2025-05-10 RX ADMIN — FOLIC ACID 1 MG: 1 TABLET ORAL at 08:01

## 2025-05-10 RX ADMIN — Medication 5 ML: at 05:38

## 2025-05-10 ASSESSMENT — ACTIVITIES OF DAILY LIVING (ADL)
ADLS_ACUITY_SCORE: 76
ADLS_ACUITY_SCORE: 76
ADLS_ACUITY_SCORE: 80
ADLS_ACUITY_SCORE: 80
ADLS_ACUITY_SCORE: 76
ADLS_ACUITY_SCORE: 80
ADLS_ACUITY_SCORE: 76
ADLS_ACUITY_SCORE: 80
ADLS_ACUITY_SCORE: 76
ADLS_ACUITY_SCORE: 80
ADLS_ACUITY_SCORE: 76
ADLS_ACUITY_SCORE: 80
ADLS_ACUITY_SCORE: 80

## 2025-05-10 NOTE — PLAN OF CARE
SUMMARY: Increased SOB, pleural Effusions, hx. of metastatic lung cancer    DATE & TIME: 05/09/25 [id number]  Cognitive Concerns/ Orientation: A&O x4. Speech hoarse.    BEHAVIOR & AGGRESSION TOOL COLOR: Green  ABNL VS/O2: VSS on 1L NC for comfort  MOBILITY: Total/lift. Weight shifts self in bed.  PAIN MANAGMENT: Denies  DIET: Regular, 2000mL FR.  BOWEL/BLADDER: Cont. of bladder. Urinal at bedside.   ABNL LAB/BG: none new  DRAIN/DEVICES: R Chest port - hep locked. L PleurX drain CDI   TELEMETRY RHYTHM: NA  SKIN: Redness to R side of foot, blanchable. Dry flaky skin to bilateral shins.   TESTS/PROCEDURES: none new  D/C DATE: pending safe disposition plan and pending confirmation of discharge with guardian  OTHER IMPORTANT INFO:

## 2025-05-10 NOTE — PROGRESS NOTES
Care Management Follow Up    Length of Stay (days): 11    Expected Discharge Date: 05/12/2025     Concerns to be Addressed:       Patient plan of care discussed at interdisciplinary rounds: Yes    Anticipated Discharge Disposition:   Return to [facility] with hospice support from [facility].        Anticipated Discharge Services:    Anticipated Discharge DME:      Patient/family educated on Medicare website which has current facility and service quality ratings:    Education Provided on the Discharge Plan:    Patient/Family in Agreement with the Plan:      Referrals Placed by CM/SW: Post Acute Facilities and Hospice  Private pay costs discussed:     Discussed  Partnership in Safe Discharge Planning  document with patient/family: No     Handoff Completed:     Additional Information:  Spoke with Cristhian Swanson at [phone].    The following discharge plan is established; patient will return to [facility], likely on Monday afternoon with enrollment into [facility]. This is one of the hospice agency Sky favors and the SNF also works frequently with this hospice.   Referral has been made to [facility] and they have already been in contact with Sky this afternoon.    Writeelliott has spoken with Sherrell at [facility] and she is requesting discharge on Monday and not this weekend.     Sky explains due to some changes in [place] regulations, patient will revert back to private pay status for a short time.  Writer explain [facility] will require a payment for care at time of admission.  He said patient's Rep Payee will require a letter or statement regarding this prior to making a payment.  Sherrell is aware of this and will ask their business office on Monday morning to complete a statement.  She will send writer the statement and writer will send this on to guardian.      This afternoon the completed POLST was emailed to guardian and also to nimo inman so the document  will be scanned into EPIC. [facility] Arell[facility] for [facility] also called to review POLST with guardian.    Guardian is asking for medical update and expected life expectancy.  Writer has spoken with Dr Barker who will call guardian tomorrow.  He is aware she will call on Saturday to his cell phone [phone]    [facility] may call hospital  staff on Saturday.  If they do, we can determine the time they can enroll patient on Monday.  If they do not call on Saturday, this writer is working on Sunday and will follow up so transport can be arranged.     A PAS is not needed as he came from a SNF.    Next Steps: Make final discharge arrangements with [facility] for discharge on Monday.   Will coordinate time of discharge with [facility] and [facility].       RUCHI MckeonSW

## 2025-05-10 NOTE — PLAN OF CARE
Goal Outcome Evaluation:    SUMMARY: Increased SOB, pleural Effusions, hx. of metastatic lung cancer    DATE & TIME: 05/10/25 [id number]  Cognitive Concerns/ Orientation: A&O x4. Speech hoarse.    BEHAVIOR & AGGRESSION TOOL COLOR: Green  ABNL VS/O2: VSS on 1L NC for comfort  MOBILITY: Total/lift. Up in chair x2 this shift. Weight shifts self in bed.  PAIN MANAGMENT: Denies  DIET: Regular, 2000mL FR.  BOWEL/BLADDER: Cont. of bladder. Urinal at bedside. BM x1 this shift  ABNL LAB/BG: none new  DRAIN/DEVICES: R Chest port - hep locked. L PleurX drain CDI  TELEMETRY RHYTHM: NA  SKIN: Redness to R side of foot, blanchable. Dry flaky skin to bilateral shins. New dressing on L flank for PleurX drain (dressing changed 5/9)  TESTS/PROCEDURES: none new  D/C DATE: medically stable and will return to [facility] on Monday with enrollment into [facility]  OTHER IMPORTANT INFO: Baseline R side hemiplegia from past CVA. Friends took patient on a wheelchair ride outside this afternoon.

## 2025-05-10 NOTE — PROGRESS NOTES
[facility]    Hospitalist Progress Note    Date of Admission: 4/27/2025    Assessment & Plan   Kt Rasmussen is a 64 year old male with PMHx of metastatic lung cancer, pleural effusions requiring thoracentesis (in 12/2024 x2 and 1/2025), pericardial effusion, lower extremity DVT on anticoagulation, chronic anemia, thrombocytopenia, coronary artery disease status post stenting, hypertension, hyperlipidemia, GERD, seizure disorder, anxiety disorder, history of CVA with right hemiplegia.    He was recently admitted to [facility] from 4/9/25-4/16/25 acute hypoxic respiratory failure, multifactorial, dt moderate to large bilateral pleural effusion (R>L), moderate pericardial effusion and metastatic non-small cell lung cancer. During that stay, patient underwent a thoracentesis with 1.1L fluid removed and diuresis. O2 needs weaned to 2L NC. Discharged back to care facility.     He was readmitted on  admitted on 4/27/2025 for shortness of breath and recurrent acute on chronic hypoxic respiratory failure again felt to be multifactorial.  Ongoing conversations held this day with care team and guardian, now considering transition to hospice on discharge back to care facility.     Acute on chronic respiratory failure, multifactorial, dt HCAP, metastatic lung cancer, recurring pleural effusions, atelectasis, pericardial effusion, chemotoxicity and deconditioning  Hx moderate pericardial effusion on TTE 4/2025  Hx bilateral pleural effusions s/p bilateral thoracentesis 4/27/25, 4/9/25, 1/6/25, 12/27/24 and 12/26/24   *Patient presented to [facility] from TCU with acute on chronic dyspnea. Patient discharged previously on 2 L NC although reportedly not requiring oxygen at baseline, per patient. He was found to be hypoxic in the 60s on RA per EMS.   *In ED, he was hypoxic with O2 sats 83% on RA. CXR showing moderate right and small left pleural effusions with adjacent atelectasis, mild pulmonary edema, no  pneumothorax, stable cardiomediastinal silhouette. EKG without acute ischemic changes. Started on IV Cefepime.   *Underwent R thoracentesis on 4/27 with 2.4L removed. Recurrent pleural effusions suspected secondary to malignancy and chemotherapy.   *Respiratory status worsened during the course of his stay, with O2 needs increasing to 4L NC to maintain sats in low 90s.   *CT chest done 4/28 showed RUL and LLL opacities concerning for pneumonia, also had a large left effusion and moderate partially loculated right effusion. Interval enlargement of the bulky adenopathy within the abdomen and pelvis. Stable post treatment changes right perihilar region. Stable osseous lesions.   *Echo 4/28 with hyperdynamic left ventricular function. LVEF 65-70%. Small pericardial effusion with some constriction.  Cardiology consulted. Overall pericardial effusion appears smaller than previously and clinically patient does not appear to have constrictive pericarditis. No new cardiac recommendations at this time.  *[facility] consulted this admission. Dr. John noted recent disease progression. Patient is no longer a candidate for cancer directed treatment.   *Repeat L thoracentesis 4/29 (given CT findings of continued large left pleural effusion and moderate right), with 2.4L fluid removed.   *Advanced care planning discussion had with oncology, palliative care, hospitalist, patient and guardian this stay.  Given patient no longer candidate for cancer related treatment, code status changed to DNR-DNI. Kt's Guardian is planning on enrolling Kt in Hospice once LTC placement has been established.  *Ultimately had a L PleurX catheter placed on 5/2  *Cefepime transitioned to Augmentin on 5/3, with plan to complete 7d course of Augmentin    -- cont Augmentin (5/3-5/10)  -- cont prn nebs  -- cont Lasix 20mg po daily  -- drain PleurX prn for symptoms  -- O2 sats remain stable on 1-2L NC  -- RCAT, pulmonary toilet with IS/OOB  -- fluid  restriction to 2000 mL/day     Non-small cell lung cancer metastatic to lymph nodes and bones  *Follows with  Oncology (Dr. John) and underwent infusion GEMZAR 04/09 and received post chemotherapy neupogen. Last PET 12/2024 with stable disease involving the lymph node metastases, but a partial response involving the osseous metastases.   *As above, patient is no longer a candidate for cancer directed treatment.   *s/p L PleurX catheter placement on 5/2  -- drain L PleurX prn for comfort/symptomatic shortness of breath  -- may consider R pleurX catheter in the near future if R sided pleural effusion recurs.   -- pain management with Tylenol PRN, atarax PRN  -- discharge Monday to LTC on hospice     Chronic anemia suspected secondary to chemotherapy and underlying malignancy  Chronic thrombocytopenia in setting above   *Monitor labs periodically while hospitalized, hgb stable at 7-8, platelets stable     Bilateral lower extremity DVT -9/2023 on anticoagulation.  *Likely hypercoagulability of malignancy.  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 Flash penetration x1 with thin liquids. Here, patient exhibiting right-sided weakness and tongue deviation to the right. Voice is dysphonic and cough is weak increasing risk for silent aspiration.   *Seen by SLP this stay, okay'd for regular diet and thin liquids    Chronic troponin elevation  CAD s/p cardiac stenting  Ascending aortic dilatation  Mild mitral regurgitation   Mild tricuspid regurgitation   Hypertension  Hyperlipidemia  *Denies any chest pain. No palpitations. SOB as above. Chronic troponin elevation suspected secondary to NSTEMI in setting of demand given recurrent malignant pleural effusions and pericardial effusion.   *Echocardiogram 4/28 as  noted above.  -- conts on DOAC, Lasix as above    History of CVA with right sided hemiplegia  Seizure D/O  Cognitive impairment   *Wheelchair bound at baseline. Has a legal guardian.   *Pt has notable cognitive impairment. Seemingly answers yes/no questions appropriately but has significant word-finding difficulties. Very difficult to communicate with. Unclear baseline, but per chart review, he has had documented word finding issues as a result of his prior stroke.   *Unclear if cognitive impairment and word finding issues are worse than baseline. Given pt will ultimately be enrolled in hospice, will not pursue any additional work up for this in patient.     GERD.  *Not on PTA medications. No acute issues.     MDD with anxiety  *Not on PTA medications. No acute issues.     History of alcohol use D/O  History of substance use D/O  *Noted.      Hx of hypoalbuminemia in setting of metastatic lung cancer   *Nutrition consultation. Cont Ensure between meals.      Mild elevated AST  *Noted. Follow LFTs.      Physical deconditioning from medical illness.  *Resident of assisted living facility. Currently residing at TCU.      Goals of care   *As per discussions above, subsequently changed to DNR/DNI this day.  Plan is now for discharge to LTC on hospice care. CAROLE following to help coordinate.    FEN: no IVFs, lytes stable, regular diet  DVT Prophylaxis: DOAC  Code Status: No CPR- Do NOT Intubate    Disposition: Discharge to long-term care facility with hospice enrollment once arrangements are made. SW following.  Medical issues otherwise remained stable at this point    Medically Ready for Discharge: Ready Now      Na Barker, DO    Clinically Significant Risk Factors               # Hypoalbuminemia: Lowest albumin = 2.5 g/dL at 4/29/2025  6:27 AM, will monitor as appropriate     # Hypertension: Noted on problem list                # Financial/Environmental Concerns:             Medical Decision Making       38 MINUTES  SPENT BY ME on the date of service doing chart review, history, exam, documentation & further activities per the note.         Interval History   Patient did feel good after draining his pleurx catheter yesterday. It was not painful. He denied any needs. He will try to get up to chair twice today. Nurse bedside for discussion. He is eating well    Call to guardian x2 went to  today 05/10    -Data reviewed today: I reviewed all new labs and imaging results over the last 24 hours. I personally reviewed no images or EKG's today.    Physical Exam   Temp: 98.3  F (36.8  C) Temp src: Oral BP: 99/58 Pulse: 86   Resp: 16 SpO2: 95 % O2 Device: Nasal cannula Oxygen Delivery: 1 LPM  Vitals:    05/05/25 0610 05/07/25 0600 05/09/25 0540   Weight: 67 kg (147 lb 11.3 oz) 68.7 kg (151 lb 7.3 oz) 68.7 kg (151 lb 7.3 oz)     Vital Signs with Ranges  Temp:  [98.3  F (36.8  C)] 98.3  F (36.8  C)  Pulse:  [76-86] 86  Resp:  [15-16] 16  BP: ()/(55-69) 99/58  SpO2:  [95 %-96 %] 95 %  I/O last 3 completed shifts:  In: 1200 [P.O.:1200]  Out: 2125 [Urine:1700; Chest Tube:425]    Constitutional: Chronically ill appearing male, resting quietly in bed, answering basic questions appropriately, NAD  Cardiovascular: no major edema  GI: S, NT, ND, +BS  Skin/Integumen: warm/dry  Other:      Medications   Current Facility-Administered Medications   Medication Dose Route Frequency Provider Last Rate Last Admin     Current Facility-Administered Medications   Medication Dose Route Frequency Provider Last Rate Last Admin    apixaban ANTICOAGULANT (ELIQUIS) tablet 2.5 mg  2.5 mg Oral BID Karen Guzman MD   2.5 mg at 05/10/25 0801    folic acid (FOLVITE) tablet 1 mg  1 mg Oral Daily Shelley Sandoval PA-C   1 mg at 05/10/25 0801    furosemide (LASIX) tablet 20 mg  20 mg Oral Daily Chris Preston MD   20 mg at 05/10/25 0801    heparin lock flush 10 unit/mL injection 5-10 mL  5-10 mL Intracatheter Q24H Jultia Bergman, DO    5 mL at 05/10/25 0538    heparin lock flush 100 unit/mL injection 5-10 mL  5-10 mL Intracatheter Q28 Days Julita Bergman, DO        sodium chloride (PF) 0.9% PF flush 10-20 mL  10-20 mL Intracatheter Q28 Days Julita Bergman, DO   10 mL at 04/27/25 0816       Data   Recent Labs   Lab 05/10/25  0658 05/09/25  0549 05/08/25  0558 05/07/25  0601 05/06/25  1042 05/05/25  1908 05/05/25  0611   WBC  --   --   --   --   --   --  3.5*   HGB  --   --   --   --   --   --  7.4*   MCV  --   --   --   --   --   --  92   PLT  --   --   --   --   --   --  162   NA  --  139  --   --   --   --  139   POTASSIUM 3.8 4.0  4.0 4.0   < >  --    < > 3.1*   CHLORIDE  --  102  --   --   --   --  108*   CO2  --  25  --   --   --   --  24   BUN  --  26.8*  --   --   --   --  24.4*   CR  --  0.74  --   --   --   --  0.59*   ANIONGAP  --  12  --   --   --   --  7   BEVERLY  --  8.2*  --   --   --   --  7.0*   GLC  --  81  --   --  100*  --  73    < > = values in this interval not displayed.       No results found for this or any previous visit (from the past 24 hours).

## 2025-05-11 LAB — POTASSIUM SERPL-SCNC: 3.7 MMOL/L (ref 3.4–5.3)

## 2025-05-11 PROCEDURE — 99232 SBSQ HOSP IP/OBS MODERATE 35: CPT | Performed by: STUDENT IN AN ORGANIZED HEALTH CARE EDUCATION/TRAINING PROGRAM

## 2025-05-11 PROCEDURE — 120N000001 HC R&B MED SURG/OB

## 2025-05-11 PROCEDURE — 250N000013 HC RX MED GY IP 250 OP 250 PS 637: Performed by: STUDENT IN AN ORGANIZED HEALTH CARE EDUCATION/TRAINING PROGRAM

## 2025-05-11 PROCEDURE — 250N000013 HC RX MED GY IP 250 OP 250 PS 637

## 2025-05-11 PROCEDURE — 84132 ASSAY OF SERUM POTASSIUM: CPT | Performed by: STUDENT IN AN ORGANIZED HEALTH CARE EDUCATION/TRAINING PROGRAM

## 2025-05-11 PROCEDURE — 250N000011 HC RX IP 250 OP 636: Performed by: INTERNAL MEDICINE

## 2025-05-11 RX ADMIN — FUROSEMIDE 20 MG: 20 TABLET ORAL at 08:26

## 2025-05-11 RX ADMIN — APIXABAN 2.5 MG: 2.5 TABLET, FILM COATED ORAL at 08:26

## 2025-05-11 RX ADMIN — APIXABAN 2.5 MG: 2.5 TABLET, FILM COATED ORAL at 20:02

## 2025-05-11 RX ADMIN — FOLIC ACID 1 MG: 1 TABLET ORAL at 08:26

## 2025-05-11 RX ADMIN — Medication 5 ML: at 06:21

## 2025-05-11 ASSESSMENT — ACTIVITIES OF DAILY LIVING (ADL)
ADLS_ACUITY_SCORE: 76
ADLS_ACUITY_SCORE: 76
ADLS_ACUITY_SCORE: 80
ADLS_ACUITY_SCORE: 80
ADLS_ACUITY_SCORE: 76
ADLS_ACUITY_SCORE: 80
ADLS_ACUITY_SCORE: 80
ADLS_ACUITY_SCORE: 76
ADLS_ACUITY_SCORE: 80
ADLS_ACUITY_SCORE: 76
ADLS_ACUITY_SCORE: 80
ADLS_ACUITY_SCORE: 80
ADLS_ACUITY_SCORE: 76
ADLS_ACUITY_SCORE: 80
ADLS_ACUITY_SCORE: 80
ADLS_ACUITY_SCORE: 76
ADLS_ACUITY_SCORE: 80
ADLS_ACUITY_SCORE: 76
ADLS_ACUITY_SCORE: 76

## 2025-05-11 NOTE — PROGRESS NOTES
Care Management Follow Up    Length of Stay (days): 13    Expected Discharge Date: 05/12/2025     Concerns to be Addressed:       Patient plan of care discussed at interdisciplinary rounds: Yes    Anticipated Discharge Disposition:  (Return to [facility] with hospice support from [facility].)       Anticipated Discharge Services:    Anticipated Discharge DME:      Patient/family educated on Medicare website which has current facility and service quality ratings:    Education Provided on the Discharge Plan: Yes  Patient/Family in Agreement with the Plan: yes    Referrals Placed by CM/SW: Hospice  Private pay costs discussed: transportation costs    Discussed  Partnership in Safe Discharge Planning  document with patient/family:      Handoff Completed:     Additional Information:    Writer spoke with JAYASHREE Hidalgo w/e staff. At [facility] [phone].  Explained we have transport arranged for 1310 to 1350 tomorrow.  She will staff [facility] hospice staff to arrive at [facility] for a 1500 Enrollment   If patient's discharge orders include any comfort medications, she asked that our hospital pharmacy fill a two day supply to go with patient.  [employer] message sent to Dr Barker to update her.   Writer also left a voice mail to Sky, patient's guardian at [phone] regarding the arrangements for tomorrow. [email or web address].    Updated patient that he is returning to [facility] and that writer is working with his guardian Sky.   He did not have any question.  His only concern is that his belongings, including his quilt to with him.     Next Steps:   Follow up with [facility]. Their liaison needs to obtain a statement for amount of payment due at time of admission.  Once writer receives this she will email to the guardian at [email or web address].  No PAS is needed as patient came from a SNF.  Send orders thru [email or web address].    LISA Mckeon

## 2025-05-11 NOTE — PLAN OF CARE
Goal Outcome Evaluation:    SUMMARY: Increased SOB, pleural Effusions, hx. of metastatic lung cancer     DATE & TIME: 05/11/25 [id number]  Cognitive Concerns/ Orientation: A&O x4. Speech hoarse.    BEHAVIOR & AGGRESSION TOOL COLOR: Green  ABNL VS/O2: VSS on 1-2L NC for comfort  MOBILITY: Total/lift. Up in chair x2 this shift. Weight shifts self in bed.  PAIN MANAGMENT: Denies  DIET: Regular  BOWEL/BLADDER: Cont. of bladder. Urinal at bedside. No BM this shift  ABNL LAB/BG: none new  DRAIN/DEVICES: R Chest port - hep locked. L PleurX drain CDI  TELEMETRY RHYTHM: NA  SKIN: Redness to R side of foot, blanchable. Dry flaky skin to bilateral shins. Dressing on L flank for PleurX drain (dressing changed 5/9)  TESTS/PROCEDURES: none new  D/C DATE: Will return to [facility] on Monday 5/12. Ride is set up for 1310 to 1350 with enrollment into [facility] at 1500  OTHER IMPORTANT INFO: Baseline R side hemiplegia from past CVA.

## 2025-05-11 NOTE — PROGRESS NOTES
[facility]    Hospitalist Progress Note    Date of Admission: 4/27/2025    Assessment & Plan   Kt Rasmussen is a 64 year old male with PMHx of metastatic lung cancer, pleural effusions requiring thoracentesis (in 12/2024 x2 and 1/2025), pericardial effusion, lower extremity DVT on anticoagulation, chronic anemia, thrombocytopenia, coronary artery disease status post stenting, hypertension, hyperlipidemia, GERD, seizure disorder, anxiety disorder, history of CVA with right hemiplegia.    He was recently admitted to [facility] from 4/9/25-4/16/25 acute hypoxic respiratory failure, multifactorial, dt moderate to large bilateral pleural effusion (R>L), moderate pericardial effusion and metastatic non-small cell lung cancer. During that stay, patient underwent a thoracentesis with 1.1L fluid removed and diuresis. O2 needs weaned to 2L NC. Discharged back to care facility.     He was readmitted on  admitted on 4/27/2025 for shortness of breath and recurrent acute on chronic hypoxic respiratory failure again felt to be multifactorial.  After discussion with oncology, palliative and his guardian decision made to pursue hospice on discharge 04/30. Pending placement    Acute on chronic respiratory failure, multifactorial, dt HCAP, metastatic lung cancer, recurring pleural effusions, atelectasis, pericardial effusion, chemotoxicity and deconditioning  Hx moderate pericardial effusion on TTE 4/2025  Hx bilateral pleural effusions s/p bilateral thoracentesis 4/27/25, 4/9/25, 1/6/25, 12/27/24 and 12/26/24   *Patient presented to [facility] ED from TCU with acute on chronic dyspnea. Patient discharged previously on 2 L NC although reportedly not requiring oxygen at baseline, per patient. He was found to be hypoxic in the 60s on RA per EMS.   *In ED, he was hypoxic with O2 sats 83% on RA. CXR showing moderate right and small left pleural effusions with adjacent atelectasis, mild pulmonary edema, no pneumothorax,  stable cardiomediastinal silhouette. EKG without acute ischemic changes. Started on IV Cefepime.   *Underwent R thoracentesis on 4/27 with 2.4L removed. Recurrent pleural effusions suspected secondary to malignancy and chemotherapy.   *Respiratory status worsened during the course of his stay, with O2 needs increasing to 4L NC to maintain sats in low 90s.   *CT chest done 4/28 showed RUL and LLL opacities concerning for pneumonia, also had a large left effusion and moderate partially loculated right effusion. Interval enlargement of the bulky adenopathy within the abdomen and pelvis. Stable post treatment changes right perihilar region. Stable osseous lesions.   *Echo 4/28 with hyperdynamic left ventricular function. LVEF 65-70%. Small pericardial effusion with some constriction.  Cardiology consulted. Overall pericardial effusion appears smaller than previously and clinically patient does not appear to have constrictive pericarditis. No new cardiac recommendations at this time.  *[facility] consulted this admission. Dr. John noted recent disease progression. Patient is no longer a candidate for cancer directed treatment.   *Repeat L thoracentesis 4/29 (given CT findings of continued large left pleural effusion and moderate right), with 2.4L fluid removed.   *Advanced care planning discussion had with oncology, palliative care, hospitalist, patient and guardian this stay.  Given patient no longer candidate for cancer related treatment, code status changed to DNR-DNI. Kt's Guardian is planning on enrolling Kt in Hospice once LTC placement has been established.  *Ultimately had a L PleurX catheter placed on 5/2  *Cefepime transitioned to Augmentin on 5/3, with plan to complete 7d course of Augmentin    -- completed Augmentin (5/3-5/10)  -- cont prn nebs  -- cont Lasix 20mg po daily  -- drain PleurX prn for symptoms  -- O2 sats remain stable on 1-2L NC mostly for comfort  -- RCAT, pulmonary toilet with IS/OOB      Non-small cell lung cancer metastatic to lymph nodes and bones  *Follows with [facility] (Dr. John) and underwent infusion GEMZAR 04/09 and received post chemotherapy neupogen. Last PET 12/2024 with stable disease involving the lymph node metastases, but a partial response involving the osseous metastases.   *As above, patient is no longer a candidate for cancer directed treatment.   *s/p L PleurX catheter placement on 5/2  -- drain L PleurX prn for comfort/symptomatic shortness of breath  -- may consider R pleurX catheter in the near future if R sided pleural effusion recurs.   -- pain management with Tylenol PRN, atarax PRN  -- discharge Monday to LTC on hospice     Chronic anemia suspected secondary to chemotherapy and underlying malignancy  Chronic thrombocytopenia in setting above   *Monitor labs periodically while hospitalized, hgb stable at 7-8, platelets stable     Bilateral lower extremity DVT -9/2023 on anticoagulation.  *Likely hypercoagulability of malignancy.  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 Flash penetration x1 with thin liquids. Here, patient exhibiting right-sided weakness and tongue deviation to the right. Voice is dysphonic and cough is weak increasing risk for silent aspiration.   *Seen by SLP this stay, okay'd for regular diet and thin liquids    Chronic troponin elevation  CAD s/p cardiac stenting  Ascending aortic dilatation  Mild mitral regurgitation   Mild tricuspid regurgitation   Hypertension  Hyperlipidemia  *Denies any chest pain. No palpitations. SOB as above. Chronic troponin elevation suspected secondary to NSTEMI in setting of demand given recurrent malignant pleural effusions and pericardial effusion.   *Echocardiogram 4/28 as noted above.  -- conts on DOAC,  Lasix as above    History of CVA with right sided hemiplegia  Seizure D/O  Cognitive impairment   *Wheelchair bound at baseline. Has a legal guardian.   *Pt has notable cognitive impairment. Seemingly answers yes/no questions appropriately but has significant word-finding difficulties. Very difficult to communicate with. Unclear baseline, but per chart review, he has had documented word finding issues as a result of his prior stroke.   *Unclear if cognitive impairment and word finding issues are worse than baseline. Given pt will ultimately be enrolled in hospice, will not pursue any additional work up for this in patient.     GERD.  *Not on PTA medications. No acute issues.     MDD with anxiety  *Not on PTA medications. No acute issues.     History of alcohol use D/O  History of substance use D/O  *Noted.      Hx of hypoalbuminemia in setting of metastatic lung cancer   *Nutrition consultation. Cont Ensure between meals.      Mild elevated AST  *Noted. Follow LFTs.      Physical deconditioning from medical illness.  *Resident of assisted living facility. [facility]      Goals of care   *As per discussions above, subsequently changed to DNR/DNI this day.  Plan is now for discharge to LTC on hospice care. CAROLE following to help coordinate.    FEN: no IVFs, lytes stable, regular diet  DVT Prophylaxis: DOAC  Code Status: No CPR- Do NOT Intubate    Disposition: Discharge to long-term care facility with hospice enrollment once arrangements are made. SW following.  Medical issues otherwise remained stable at this point    Medically Ready for Discharge: Ready Now      Na Barker, DO    Clinically Significant Risk Factors               # Hypoalbuminemia: Lowest albumin = 2.5 g/dL at 4/29/2025  6:27 AM, will monitor as appropriate     # Hypertension: Noted on problem list                # Financial/Environmental Concerns:             Medical Decision Making       35 MINUTES SPENT BY ME on the date of  service doing chart review, history, exam, documentation & further activities per the note.         Interval History   Patient denies any needs as always. We talked about speaking up when he his SOB and needs the fluid drained. He remains very pleasant. WE chatted about the sun and afew other things before I left    -Data reviewed today: I reviewed all new labs and imaging results over the last 24 hours. I personally reviewed no images or EKG's today.    Physical Exam   Temp: 97.5  F (36.4  C) Temp src: Oral BP: 101/58 Pulse: 82   Resp: 16 SpO2: 94 % O2 Device: Nasal cannula Oxygen Delivery: 1 LPM  Vitals:    05/05/25 0610 05/07/25 0600 05/09/25 0540   Weight: 67 kg (147 lb 11.3 oz) 68.7 kg (151 lb 7.3 oz) 68.7 kg (151 lb 7.3 oz)     Vital Signs with Ranges  Temp:  [97.4  F (36.3  C)-98.3  F (36.8  C)] 97.5  F (36.4  C)  Pulse:  [74-86] 82  Resp:  [16-18] 16  BP: (101-124)/(58-66) 101/58  SpO2:  [93 %-97 %] 94 %  I/O last 3 completed shifts:  In: 840 [P.O.:840]  Out: 1500 [Urine:1500]    Constitutional: Chronically ill appearing male, resting quietly in bed, answering basic questions appropriately, NAD  Lungs: CTAB  Cardiovascular: no major edema  GI: S, NT, ND, +BS  Skin/Integumen: warm/dry  Other:      Medications   Current Facility-Administered Medications   Medication Dose Route Frequency Provider Last Rate Last Admin     Current Facility-Administered Medications   Medication Dose Route Frequency Provider Last Rate Last Admin    apixaban ANTICOAGULANT (ELIQUIS) tablet 2.5 mg  2.5 mg Oral BID Karen Guzman MD   2.5 mg at 05/11/25 0826    folic acid (FOLVITE) tablet 1 mg  1 mg Oral Daily Shelley Sandoval PA-C   1 mg at 05/11/25 0826    furosemide (LASIX) tablet 20 mg  20 mg Oral Daily Chris Preston MD   20 mg at 05/11/25 0826    heparin lock flush 10 unit/mL injection 5-10 mL  5-10 mL Intracatheter Q24H Julita Bergman, DO   5 mL at 05/11/25 0621    heparin lock flush 100 unit/mL  injection 5-10 mL  5-10 mL Intracatheter Q28 Days Julita Bergman DO        sodium chloride (PF) 0.9% PF flush 10-20 mL  10-20 mL Intracatheter Q28 Days Julita Bergman DO   10 mL at 04/27/25 0816       Data   Recent Labs   Lab 05/11/25  0622 05/10/25  0658 05/09/25  0549 05/07/25  0601 05/06/25  1042 05/05/25  1908 05/05/25  0611   WBC  --   --   --   --   --   --  3.5*   HGB  --   --   --   --   --   --  7.4*   MCV  --   --   --   --   --   --  92   PLT  --   --   --   --   --   --  162   NA  --   --  139  --   --   --  139   POTASSIUM 3.7 3.8 4.0  4.0   < >  --    < > 3.1*   CHLORIDE  --   --  102  --   --   --  108*   CO2  --   --  25  --   --   --  24   BUN  --   --  26.8*  --   --   --  24.4*   CR  --   --  0.74  --   --   --  0.59*   ANIONGAP  --   --  12  --   --   --  7   BEVERLY  --   --  8.2*  --   --   --  7.0*   GLC  --   --  81  --  100*  --  73    < > = values in this interval not displayed.       No results found for this or any previous visit (from the past 24 hours).

## 2025-05-11 NOTE — PLAN OF CARE
Goal Outcome Evaluation:               05/10/25  [id number]  Cognitive Concerns/ Orientation: A&O x4. Speech hoarse.    BEHAVIOR & AGGRESSION TOOL COLOR: Green  ABNL VS/O2: VSS on 1L NC for comfort  MOBILITY: Total/lift. Weight shifts self in bed.  PAIN MANAGMENT: Denies  DIET: Regular, 2000mL FR.  BOWEL/BLADDER: Continent of bladder. Urinal at bedside. No BM this shift.   ABNL LAB/BG: none new  DRAIN/DEVICES: R Chest port - hep locked. L PleurX drain CDI  TELEMETRY RHYTHM: NA  SKIN: Redness to R side of foot, blanchable. Dry flaky skin to bilateral shins. New dressing on L flank for PleurX drain (dressing changed 5/9)  TESTS/PROCEDURES: none new  D/C DATE: medically stable and will return to [facility] on Monday with enrollment into [facility]  OTHER IMPORTANT INFO: Baseline R side hemiplegia from past CVA.

## 2025-05-12 VITALS
DIASTOLIC BLOOD PRESSURE: 67 MMHG | OXYGEN SATURATION: 96 % | BODY MASS INDEX: 18.87 KG/M2 | SYSTOLIC BLOOD PRESSURE: 115 MMHG | HEART RATE: 77 BPM | TEMPERATURE: 98.5 F | WEIGHT: 153 LBS | RESPIRATION RATE: 18 BRPM

## 2025-05-12 LAB — POTASSIUM SERPL-SCNC: 4 MMOL/L (ref 3.4–5.3)

## 2025-05-12 PROCEDURE — 99239 HOSP IP/OBS DSCHRG MGMT >30: CPT | Performed by: STUDENT IN AN ORGANIZED HEALTH CARE EDUCATION/TRAINING PROGRAM

## 2025-05-12 PROCEDURE — 250N000011 HC RX IP 250 OP 636: Performed by: STUDENT IN AN ORGANIZED HEALTH CARE EDUCATION/TRAINING PROGRAM

## 2025-05-12 PROCEDURE — 84132 ASSAY OF SERUM POTASSIUM: CPT | Performed by: STUDENT IN AN ORGANIZED HEALTH CARE EDUCATION/TRAINING PROGRAM

## 2025-05-12 PROCEDURE — 250N000013 HC RX MED GY IP 250 OP 250 PS 637

## 2025-05-12 PROCEDURE — 250N000013 HC RX MED GY IP 250 OP 250 PS 637: Performed by: STUDENT IN AN ORGANIZED HEALTH CARE EDUCATION/TRAINING PROGRAM

## 2025-05-12 PROCEDURE — 250N000011 HC RX IP 250 OP 636: Performed by: INTERNAL MEDICINE

## 2025-05-12 RX ORDER — LORAZEPAM 0.5 MG/1
1 TABLET ORAL EVERY 6 HOURS PRN
Qty: 10 TABLET | Refills: 0 | Status: SHIPPED | OUTPATIENT
Start: 2025-05-12

## 2025-05-12 RX ORDER — HEPARIN SODIUM (PORCINE) LOCK FLUSH IV SOLN 100 UNIT/ML 100 UNIT/ML
5 SOLUTION INTRAVENOUS ONCE
Status: COMPLETED | OUTPATIENT
Start: 2025-05-12 | End: 2025-05-12

## 2025-05-12 RX ORDER — HYDROMORPHONE HYDROCHLORIDE 2 MG/1
2 TABLET ORAL EVERY 6 HOURS PRN
Qty: 10 TABLET | Refills: 0 | Status: SHIPPED | OUTPATIENT
Start: 2025-05-12 | End: 2025-05-15

## 2025-05-12 RX ADMIN — FOLIC ACID 1 MG: 1 TABLET ORAL at 07:43

## 2025-05-12 RX ADMIN — Medication 5 ML: at 06:59

## 2025-05-12 RX ADMIN — FUROSEMIDE 20 MG: 20 TABLET ORAL at 07:43

## 2025-05-12 RX ADMIN — APIXABAN 2.5 MG: 2.5 TABLET, FILM COATED ORAL at 07:43

## 2025-05-12 RX ADMIN — Medication 5 ML: at 08:11

## 2025-05-12 ASSESSMENT — ACTIVITIES OF DAILY LIVING (ADL)
ADLS_ACUITY_SCORE: 80
ADLS_ACUITY_SCORE: 78
ADLS_ACUITY_SCORE: 80
ADLS_ACUITY_SCORE: 78
ADLS_ACUITY_SCORE: 80
ADLS_ACUITY_SCORE: 78
ADLS_ACUITY_SCORE: 80
ADLS_ACUITY_SCORE: 78

## 2025-05-12 NOTE — PLAN OF CARE
Goal Outcome Evaluation:    SUMMARY: SOB, recurrent acute on chronic hypoxic respiratory failure    DATE & TIME: 5/11/25-5/12/25; [id number]      Cognitive Concerns/ Orientation : A/O x4     BEHAVIOR & AGGRESSION TOOL COLOR: Green    CIWA SCORE: NA     ABNL VS/O2: soft BP on 2L O2     MOBILITY: A2 Lift, baseline hemiplegia of the R side from CVA    PAIN MANAGMENT: denies    DIET: Regular    BOWEL/BLADDER: incont BB, no BM this shift    ABNL LAB/BG: see chart    DRAIN/DEVICES: R port hep locked    TELEMETRY RHYTHM: NA    SKIN: dry flaking skin, scattered bruising and scabs, cracked peeling feet and legs, redness blancheable on coccyx    TESTS/PROCEDURES: none    D/C DAY/GOALS/PLACE: discharge today (5/12) between 0046-6173 back to [facility], with support from [facility]    OTHER IMPORTANT INFO:

## 2025-05-12 NOTE — PROGRESS NOTES
Care Management Discharge Note    Discharge Date: 05/12/2025       Discharge Disposition:  (Return to [facility] with hospice support from [facility].)    Discharge Services:      Discharge DME:      Discharge Transportation: agency ([facility] medivan with oxygen)    Private pay costs discussed: Not applicable    Does the patient's insurance plan have a 3 day qualifying hospital stay waiver?  Yes     Which insurance plan 3 day waiver is available? Alternative insurance waiver    Will the waiver be used for post-acute placement? No    PAS Confirmation Code:    Patient/family educated on Medicare website which has current facility and service quality ratings:      Education Provided on the Discharge Plan: Yes  Persons Notified of Discharge Plans: patient and guardian  Patient/Family in Agreement with the Plan: yes    Handoff Referral Completed:     Additional Information:  Patient is discharging as planned.   Orders have been sent to both [facility] and [facility].  Writer emailed the guardian the letter from [facility] which communicates the need for a $5,000 payment upon admission.  Writer has sent guardian an email and voice mail today and yesterday reviewing the time of discharge, the time of 1500 that [facility] hospice will be at [facility]  to enroll patient into hospice and that medivan  transportation has been arranged with the associated private cost.  Writer has not heard back from the guardian, however when we did last speak on Friday he knew we expected patient would discharge today.   The hospice liaison Donny Castillo was here today and confirmed he spoke with guardian on Friday.    Two days of comfort medications are going with patient.      No further involvement anticipated.        RUCHI MckeonSW

## 2025-05-12 NOTE — PLAN OF CARE
Goal Outcome Evaluation:                      SUMMARY: SOB, recurrent acute on chronic hypoxic respiratory failure    DATE & TIME: 5/12/25         Cognitive Concerns/ Orientation : A/O x4, exp aphasia at times, calm and coop,      BEHAVIOR & AGGRESSION TOOL COLOR: Green    ABNL VS/O2: VSS x HR tachy on 2L O2     MOBILITY: A2 Lift, baseline hemiplegia of the R side from CVA.    PAIN MANAGMENT: Denies    DIET: Regular, good appetite.     BOWEL/BLADDER: incont BB, large BM this shift.    ABNL LAB: None    DRAIN/DEVICES: R port hep locked, de-accessed this AM.     SKIN: dry flaking skin, scattered bruising and scabs, cracked peeling feet and legs, redness blancheable on coccyx.    TESTS/PROCEDURES: none    D/C DAY/GOALS/PLACE: Today (between [id number]) back to [facility], with support from [facility].    OTHER IMPORTANT INFO: PleurX cath drained, 350cc, clear yellow output. Content watching tv during cares.     Discharge    Listed belongings gathered and given to patient (including from security/pharmacy). Yes  Care Plan and Patient education resolved: Yes  Prescriptions if needed, hard copies sent with patient  NA  Medication Bin checked and emptied on discharge Yes  SW/care coordinator/charge RN aware of discharge: Yes

## 2025-05-12 NOTE — DISCHARGE SUMMARY
[facility]  Hospitalist Discharge Summary      Date of Admission:  4/27/2025  Date of Discharge:  5/12/2025  Discharging Provider: Na Barker DO  Discharge Service: Hospitalist Service    Discharge Diagnoses   See below    Clinically Significant Risk Factors          Follow-ups Needed After Discharge   Follow-up Appointments       Follow Up and recommended labs and tests      Follow up with Nursing home physician and hospice as needed                Discharge Disposition   Discharged to home  Condition at discharge: Stable    Hospital Course   Kt Rasmussen is a 64 year old male with PMHx of metastatic lung cancer, pleural effusions requiring thoracentesis (in 12/2024 x2 and 1/2025), pericardial effusion, lower extremity DVT on anticoagulation, chronic anemia, thrombocytopenia, coronary artery disease status post stenting, hypertension, hyperlipidemia, GERD, seizure disorder, anxiety disorder, history of CVA with right hemiplegia.     He was recently admitted to [facility] from 4/9/25-4/16/25 acute hypoxic respiratory failure, multifactorial, dt moderate to large bilateral pleural effusion (R>L), moderate pericardial effusion and metastatic non-small cell lung cancer. During that stay, patient underwent a thoracentesis with 1.1L fluid removed and diuresis. O2 needs weaned to 2L NC. Discharged back to care facility.      He was readmitted on  admitted on 4/27/2025 for shortness of breath and recurrent acute on chronic hypoxic respiratory failure again felt to be multifactorial.  After discussion with oncology, palliative and his guardian decision made to pursue hospice on discharge 04/30. Pending placement     Acute on chronic respiratory failure, multifactorial, dt HCAP, metastatic lung cancer, recurring pleural effusions, atelectasis, pericardial effusion, chemotoxicity and deconditioning  Hx moderate pericardial effusion on TTE 4/2025  Hx bilateral pleural effusions s/p bilateral  thoracentesis 4/27/25, 4/9/25, 1/6/25, 12/27/24 and 12/26/24   *Patient presented to [facility] ED from TCU with acute on chronic dyspnea. Patient discharged previously on 2 L NC although reportedly not requiring oxygen at baseline, per patient. He was found to be hypoxic in the 60s on RA per EMS.   *In ED, he was hypoxic with O2 sats 83% on RA. CXR showing moderate right and small left pleural effusions with adjacent atelectasis, mild pulmonary edema, no pneumothorax, stable cardiomediastinal silhouette. EKG without acute ischemic changes. Started on IV Cefepime.   *Underwent R thoracentesis on 4/27 with 2.4L removed. Recurrent pleural effusions suspected secondary to malignancy and chemotherapy.   *Respiratory status worsened during the course of his stay, with O2 needs increasing to 4L NC to maintain sats in low 90s.   *CT chest done 4/28 showed RUL and LLL opacities concerning for pneumonia, also had a large left effusion and moderate partially loculated right effusion. Interval enlargement of the bulky adenopathy within the abdomen and pelvis. Stable post treatment changes right perihilar region. Stable osseous lesions.   *Echo 4/28 with hyperdynamic left ventricular function. LVEF 65-70%. Small pericardial effusion with some constriction.  Cardiology consulted. Overall pericardial effusion appears smaller than previously and clinically patient does not appear to have constrictive pericarditis. No new cardiac recommendations at this time.  *[facility] consulted this admission. Dr. Jhon noted recent disease progression. Patient is no longer a candidate for cancer directed treatment.   *Repeat L thoracentesis 4/29 (given CT findings of continued large left pleural effusion and moderate right), with 2.4L fluid removed.   *Advanced care planning discussion had with oncology, palliative care, hospitalist, patient and guardian this stay.  Given patient no longer candidate for cancer related treatment, code status  changed to DNR-DNI. Kt's Guardian is planning on enrolling Kt in Hospice once LTC placement has been established.  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 John) and underwent infusion GEMZAR 04/09 and received post chemotherapy neupogen. Last PET 12/2024 with stable disease involving the lymph node metastases, but a partial response involving the osseous metastases.   *As above, patient is no longer a candidate for cancer directed treatment.   *s/p L PleurX catheter placement on 5/2  -- drain L PleurX prn for comfort/symptomatic shortness of breath  -- may consider R pleurX catheter in the near future if R sided pleural effusion recurs.   -- pain management with Tylenol PRN, atarax PRN  -- discharge to LTC on hospice     Chronic anemia suspected secondary to chemotherapy and underlying malignancy  Chronic thrombocytopenia in setting above   *Monitor labs periodically while hospitalized, hgb stable at 7-8, platelets stable     Bilateral lower extremity DVT -9/2023 on anticoagulation.  *Likely hypercoagulability of malignancy.  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 Flash penetration x1 with thin liquids. Here, patient exhibiting right-sided weakness and tongue deviation to the right. Voice is dysphonic and cough is weak increasing risk for silent aspiration.   *Seen by SLP this stay, okay'd  for regular diet and thin liquids     Chronic troponin elevation  CAD s/p cardiac stenting  Ascending aortic dilatation  Mild mitral regurgitation   Mild tricuspid regurgitation   Hypertension  Hyperlipidemia  *Denies any chest pain. No palpitations. SOB as above. Chronic troponin elevation suspected secondary to NSTEMI in setting of demand given recurrent malignant pleural effusions and pericardial effusion.   *Echocardiogram 4/28 as noted above.  -- conts on DOAC, Lasix as above     History of CVA with right sided hemiplegia  Seizure D/O  Cognitive impairment   *Wheelchair bound at baseline. Has a legal guardian.   *Pt has notable cognitive impairment. Seemingly answers yes/no questions appropriately but has significant word-finding difficulties. Very difficult to communicate with. Unclear baseline, but per chart review, he has had documented word finding issues as a result of his prior stroke.   *Unclear if cognitive impairment and word finding issues are worse than baseline. Given pt will ultimately be enrolled in hospice, will not pursue any additional work up for this in patient.      GERD.  *Not on PTA medications. No acute issues.     MDD with anxiety  *Not on PTA medications. No acute issues.     History of alcohol use D/O  History of substance use D/O  *Noted.      Hx of hypoalbuminemia in setting of metastatic lung cancer   *Nutrition consultation. Cont Ensure between meals.      Mild elevated AST  *Noted. Follow LFTs.      Physical deconditioning from medical illness.  *Resident of assisted living facility. [facility]      Goals of care   *As per discussions above, subsequently changed to DNR/DNI this day.  Plan is now for discharge to LTC on hospice care. SW following to help coordinate.    Consultations This Hospital Stay   PHYSICAL THERAPY ADULT IP CONSULT  CARE MANAGEMENT / SOCIAL WORK IP CONSULT  HEMATOLOGY & ONCOLOGY IP CONSULT  CARE MANAGEMENT / SOCIAL WORK IP CONSULT  SPEECH  LANGUAGE PATH ADULT IP CONSULT  PHARMACY TO DOSE VANCO  CARDIOLOGY IP CONSULT  CARDIOLOGY IP CONSULT  PALLIATIVE CARE ADULT IP CONSULT  HEMATOLOGY & ONCOLOGY IP CONSULT  INTERVENTIONAL RADIOLOGY ADULT/PEDS IP CONSULT  SOCIAL WORK IP CONSULT    Code Status   No CPR- Do NOT Intubate    Time Spent on this Encounter   INa DO, personally saw the patient today and spent greater than 30 minutes discharging this patient.       Na Barker DO  [place] [address] [address] [address] [address]  [facility] [place] [address]  Phone: [phone]  ______________________________________________________________________    Physical Exam   Vital Signs: Temp: 98.5  F (36.9  C) Temp src: Oral BP: 115/67 Pulse: 77   Resp: 18 SpO2: 96 % O2 Device: Nasal cannula Oxygen Delivery: 2 LPM  Weight: 152 lbs 15.99 oz         Primary Care Physician   Jessica Kingston    Discharge Orders      General info for SNF    Length of Stay Estimate: Long Term Care  Condition at Discharge: Terminal  Level of care:board and care  Rehabilitation Potential: Poor  Admission H&P remains valid and up-to-date: Yes  Recent Chemotherapy: 01/2025  Use Nursing Home Standing Orders: Yes     Mantoux instructions    Give two-step Mantoux (PPD) Per Facility Policy Yes     Follow Up and recommended labs and tests    Follow up with Nursing home physician and hospice as needed     Tubes and Drains    Current Tubes and Drains:     CVC Line  Duration           Port A Cath Single 04/11/22 Right Chest wall 1127 days        Drain  Duration           Chest Tube 1 Left Other (Comment) Tunneled Pleurx catheter lot    [id number] exp 12- 9 days     Drain fluid as needed for symptom relief, per hospice team     Reason for your hospital stay    You continue to have fluid in your lung from your cancer and there isnt any further treatment options. You will discharge to enroll in hospice     Activity - Up with assistive device     Activity - Up  with nursing assistance     Oxygen (SNF/TCU) Discharge     Fall precautions     Diet    Follow this diet upon discharge: Current Diet:Orders Placed This Encounter      Room Service      Regular Diet Adult       Significant Results and Procedures   Results for orders placed or performed during the hospital encounter of 04/27/25   Chest XR,  PA & LAT    Narrative    EXAM: XR CHEST 2 VIEWS  LOCATION: [facility]  DATE: 4/27/2025    INDICATION: SOB  COMPARISON: 4/15/2025      Impression    IMPRESSION: Stable right chest port. Moderate right and small left pleural effusions with adjacent atelectasis. Mild pulmonary edema. No pneumothorax. Stable cardiomediastinal silhouette.   US Thoracentesis    Narrative    EXAM:   1. RIGHT THORACENTESIS  2. ULTRASOUND GUIDANCE  LOCATION: [facility]  DATE: 4/27/2025    INDICATION: Pleural effusion.    PROCEDURE: Informed consent obtained. Time out performed. The chest was prepped and draped in sterile fashion. 10 mL of 1 % lidocaine was infused into the local soft tissues. Under direct ultrasound guidance, a 5 Syriac catheter system was placed into   the pleural effusion.     2.5 liters of clear yellow fluid were removed and sent to lab, if requested.    Patient tolerated procedure well.    Ultrasound imaging was obtained and placed in the patient's permanent medical record.      Impression    IMPRESSION:  Status post right ultrasound-guided thoracentesis.    Reference CPT Code: 17969   CT Chest/Abdomen/Pelvis w Contrast    Narrative    EXAM: CT CHEST/ABDOMEN/PELVIS W CONTRAST  LOCATION: [facility]  DATE: 4/28/2025    INDICATION: Cancer response eval.  COMPARISON: 12/23/2024  TECHNIQUE: CT scan of the chest, abdomen, and pelvis was performed following injection of IV contrast. Multiplanar reformats were obtained. Dose reduction techniques were used.   CONTRAST: 82mL isovue 370    FINDINGS:   LUNGS AND PLEURA:  Large left effusion and moderate partially loculated right effusion. Patchy increased airspace opacity within the right upper and left lower lungs. Mild emphysema. Bibasilar atelectasis. Bronchial wall thickening with areas of mucus   plugging at the lung bases. Posttreatment changes right perihilar region with areas of increased soft tissue density and architectural distortion with traction bronchiectasis, similar to prior examination.    MEDIASTINUM/AXILLAE: Heart is normal in size. Stable mildly enlarged right hilar lymph nodes. No mediastinal or axillary adenopathy.    CORONARY ARTERY CALCIFICATION: Severe.    HEPATOBILIARY: Too small to characterize low-attenuation lesion within segment 7 of the liver. Focal fat infiltration along the falciform ligament. Gallbladder decompressed.    PANCREAS: Normal.    SPLEEN: Normal.    ADRENAL GLANDS: Normal.    KIDNEYS/BLADDER: No significant mass, stone, or hydronephrosis.    BOWEL: Diverticulosis of the colon. No acute inflammatory change. No obstruction. Moderate stool noted throughout the colon.    LYMPH NODES: Large bulky adenopathy is noted in the retroperitoneum for example left periaortic lymph node measuring 5 x 3.1 cm which is increased in compared to prior where it measured approximately 4.5 x 2.7 cm. Interval enlargement of a retrocaval   lymph node measuring 3.1 x 1.3 cm, previous measurement 2.4 x 1 cm. Left. Lymph node near the aortoiliac bifurcation measuring 2.1 cm in short axis, previous measurement 1.3 cm. Aortocaval lymph node measuring 1.9 x 1.2 cm, previous measurement 1.4 x 1.3   cm. Left pelvic sidewall lymph node measuring approximately 3.5 x 1.6 cm, previous measurement 2 x 1.5 cm. Right pelvic sidewall adenopathy measuring 2 cm, previous measurement 1 cm.    VASCULATURE: Severe atherosclerotic disease of the abdominal aorta and its branches.    PELVIC ORGANS: Bladder within normal limits.    MUSCULOSKELETAL: There are changes of spine and hips.  Stable subtle osseous lesions involving the sacrum left iliac wing, bilateral acetabular region and T12/L1 vertebral bodies which are better appreciated on PET/CT.      Impression    IMPRESSION:  1.  Interval enlargement of the bulky adenopathy within the abdomen and pelvis.  2.  Large left effusion and moderate partially loculated right effusion.  3.  Patchy airspace opacity within the right upper and left lower lungs concerning for pneumonia.  4.  Stable posttreatment changes right perihilar region.  5.  Stable osseous lesions which are better appreciated on PET/CT.     US Thoracentesis    Narrative    EXAM:   1. LEFT THORACENTESIS  2. ULTRASOUND GUIDANCE  LOCATION: [facility]  DATE: 4/29/2025    INDICATION: Pleural effusion.    PROCEDURE: Informed consent obtained. Time out performed. The chest was prepped and draped in sterile fashion. 10 mL of 1 % lidocaine was infused into the local soft tissues. Under direct ultrasound guidance, a 5 Belarusian catheter system was placed into   the pleural effusion.     2.5 liters of clear fluid were removed and sent to lab, if requested.    Patient tolerated procedure well.    Ultrasound imaging was obtained and placed in the patient's permanent medical record.      Impression    IMPRESSION:  Status post left ultrasound-guided thoracentesis.   XR Chest Port 1 View    Narrative    EXAM: XR CHEST PORT 1 VIEW  LOCATION: [facility]  DATE: 5/1/2025    INDICATION: increased O2 requirements  COMPARISON: Chest CT 4/28/2025, radiograph 4/27/2025      Impression    IMPRESSION: Stable size of cardiomediastinal silhouette with right chest port tip overlying the cavoatrial junction. Persistent interstitial opacities throughout both lungs, compatible with mild pulmonary edema. Interval decrease in volume of left   pleural effusion status post thoracentesis with mild atelectasis. No significant change in volume of moderate right pleural  effusion. No measurable pneumothorax. Bones are unchanged.   IR Chest Tube Drain Tunneled Left    Narrative    [facility]  LOCATION: [facility]  DATE: 5/2/2025    PROCEDURE: ULTRASOUND AND FLUOROSCOPIC-GUIDED TUNNELED PLEURAL DRAINAGE CATHETER PLACEMENT  1. Ultrasound-guided access into the left pleural space. A permanent image was stored.  2. Placement of a tunneled left pleural drainage catheter.  3. Moderate sedation.    INTERVENTIONAL RADIOLOGIST: Julio Correa MD    INDICATION: Patient is a 64-year-old male with a history of malignant pleural effusions presents for left tunneled pleural drainage catheter.    CONSENT: The risks, benefits and alternatives of left tunneled pleural drainage catheter placement were discussed with the patient  in detail. All questions were answered. Informed consent was given to proceed with the procedure.    MODERATE SEDATION: Versed 0.5 mg IV; Fentanyl 25 mcg IV. During the time out, immediately prior to the administration of medications, the patient was reassessed for adequacy to receive conscious sedation.  Under physician supervision, Versed and fentanyl   were administered for moderate sedation. Pulse oximetry, heart rate and blood pressure were continuously monitored by an independent trained observer. The physician spent 15 minutes of face-to-face sedation time with the patient.    CONTRAST: None.    FLUOROSCOPIC TIME: 0.3 minutes.  RADIATION DOSE: Air Kerma: 2 mGy.    COMPLICATIONS: No immediate complications.    STERILE BARRIER TECHNIQUE: Maximum sterile barrier technique was used. Cutaneous antisepsis was performed at the operative site with application of 2% chlorhexidine and large sterile drape. Prior to the procedure, the  and assistant performed   hand hygiene and wore hat, mask, sterile gown, and sterile gloves during the entire procedure.    PROCEDURE:    Under real-time sonographic guidance, an 18 gauge needle was inserted  from a lateral intercostal approach into the left pleural space. A wire was coiled within the posterior pleural space. A subcutaneous tunnel was created along the interspace requiring   a second incision. Using this access, a Bard Aspira drainage catheter was placed under fluoroscopic guidance with its tip in the posterior pleural space. The catheter may be used immediately.    FINDINGS:  Ultrasound shows a small left pleural effusion. At the completion of the study, the catheter tubing lies in the posterior costophrenic pleural space.      Impression    IMPRESSION:    1. Successful left pleural drainage catheter placement, as discussed above.  2. The catheter is ready for use.     Echocardiogram Limited     Value    LVEF  65-70%    [facility]    [id number]  [id number]  [id number]  [id number]^JASPER^[id number]     [facility]  Echocardiography Laboratory  [address] [place] [address]     Name: WOO WOO  MRN: [id number]  : 1960  Study Date: 2025 01:41 PM  Age: 64 yrs  Gender: Male  Patient Location: [facility]  Reason For Study: Pericardial Effusion  Ordering Physician: ANAMIKA HUTCHINSON  Referring Physician: Jessica Kingston  Performed By: Trini Hogue     BSA: 2.0 m2  Height: 75 in  Weight: 164 lb  HR: 104  BP: 126/70 mmHg  ______________________________________________________________________________  Procedure  Limited Echocardiogram with two-dimensional, color and spectral Doppler.  Definity (NDC #76084-487) given intravenously.  ______________________________________________________________________________  Interpretation Summary     Hyperdynamic left ventricular function  The visual ejection fraction is 65-70%.  The right ventricle is normal in structure, function and size.  Valves were not interrogated given limited study.  Small anterior pericardial effusion most prominent adjacent to the right  ventricle (<1 cm). Mild respiratory variation demonstrated within the  mitral  and tricuspid inflow velocity profiles consistent with constrictive  physiology. No right ventricular or right atrial collapse. IVC normal size.  Large pleural effusion.     Compared to study dated 4/10/2025, the pericardial effusion is smaller however  there is evidence of constrictive physiology without tamponade. There is a  large pleural effusion.  ______________________________________________________________________________  Left Ventricle  The left ventricle is normal in size. Hyperdynamic left ventricular function.  The visual ejection fraction is 65-70%. Left ventricular diastolic function is  indeterminate. Normal left ventricular wall motion.     Right Ventricle  The right ventricle is normal in structure, function and size.     Atria  The left atrium is not well visualized. Right atrium not well visualized.     Mitral Valve  The mitral valve is not well visualized.     Tricuspid Valve  The tricuspid valve is not well visualized.     Aortic Valve  The aortic valve is not well visualized.     Pulmonic Valve  The pulmonic valve is not well visualized.     Vessels  The aortic root is not well visualized. The inferior vena cava was normal in  size with preserved respiratory variability.     Pericardium  Small anterior pericardial effusion most prominent adjacent to the right  ventricle (<1 cm). Mild respiratory variation demonstrated within the mitral  and tricuspid inflow velocity profiles consistent with constrictive  physiology. No right ventricular or right atrial collapse. IVC normal size.     ______________________________________________________________________________  Doppler Measurements & Calculations  PA acc time: 0.14 sec     ______________________________________________________________________________  Report approved by: Yaquelin Melgar MD on 04/28/2025 04:28 PM           *Note: Due to a large number of results and/or encounters for the requested time period, some results have not  been displayed. A complete set of results can be found in Results Review.       Discharge Medications   Current Discharge Medication List        START taking these medications    Details   HYDROmorphone (DILAUDID) 2 MG tablet Take 1 tablet (2 mg) by mouth every 6 hours as needed for pain.  Qty: 10 tablet, Refills: 0    Associated Diagnoses: Non-small cell lung cancer metastatic to bone (H)      LORazepam (ATIVAN) 0.5 MG tablet Take 2 tablets (1 mg) by mouth every 6 hours as needed for anxiety.  Qty: 10 tablet, Refills: 0    Associated Diagnoses: Non-small cell lung cancer metastatic to bone (H)           CONTINUE these medications which have NOT CHANGED    Details   acetaminophen (TYLENOL) 325 MG tablet Take 2 tablets (650 mg) by mouth every 4 hours as needed for pain (and adjunct with moderate or severe pain or per patient request).    Associated Diagnoses: History of lung cancer      apixaban ANTICOAGULANT (ELIQUIS) 2.5 MG tablet Take 2.5 mg by mouth 2 times daily.      folic acid (FOLVITE) 1 MG tablet Take 1 tablet (1 mg) by mouth daily  Qty: 90 tablet, Refills: 3    Associated Diagnoses: Non-small cell lung cancer metastatic to bone (H)      furosemide (LASIX) 20 MG tablet Take 1 tablet (20 mg) by mouth daily. Hold if systolic blood pressures less than 110 or poor oral intake.    Associated Diagnoses: Bilateral pleural effusion; Pericardial effusion      hydrOXYzine HCl (ATARAX) 10 MG tablet Take 1 tablet (10 mg) by mouth every 8 hours as needed for anxiety or other (adjuvant pain).    Associated Diagnoses: History of lung cancer      ipratropium - albuterol 0.5 mg/2.5 mg/3 mL (DUONEB) 0.5-2.5 (3) MG/3ML neb solution Take 1 vial (3 mLs) by nebulization every 6 hours as needed for wheezing or shortness of breath.    Associated Diagnoses: History of lung cancer      melatonin 1 MG TABS tablet Take 1 tablet (1 mg) by mouth nightly as needed for sleep.    Associated Diagnoses: Other insomnia      methocarbamol  (ROBAXIN) 750 MG tablet Take 750 mg by mouth 3 times daily as needed for muscle spasms.      ondansetron (ZOFRAN) 4 MG tablet Take 4 mg by mouth every 8 hours as needed for nausea.      senna-docusate (SENOKOT-S/PERICOLACE) 8.6-50 MG tablet Take 2 tablets by mouth 2 times daily as needed for constipation.    Associated Diagnoses: Non-small cell lung cancer metastatic to bone (H)           STOP taking these medications       atorvastatin (LIPITOR) 40 MG tablet Comments:   Reason for Stopping:         potassium chloride luis a ER (KLOR-CON M10) 10 MEQ CR tablet Comments:   Reason for Stopping:             Allergies   Allergies   Allergen Reactions    No Known Drug Allergy

## 2025-05-12 NOTE — PLAN OF CARE
Goal Outcome Evaluation:                        05/11/25  [id number]  Cognitive Concerns/ Orientation: A&O x4. Speech hoarse.    BEHAVIOR & AGGRESSION TOOL COLOR: Green  ABNL VS/O2: VSS on 1-2L NC for comfort  MOBILITY: Total/lift. Weight shifts self in bed.  PAIN MANAGMENT: Denies  DIET: Regular  BOWEL/BLADDER: Continent of bladder. Urinal at bedside. No BM this shift  ABNL LAB/BG: K+ 3.7  DRAIN/DEVICES: R Chest port - hep locked. L PleurX drain CDI  TELEMETRY RHYTHM: NA  SKIN: Redness to R side of foot, blanchable. Dry flaky skin to bilateral shins. Dressing on L flank for PleurX drain (dressing changed 5/9)  TESTS/PROCEDURES: none new  D/C DATE: Will return to [facility] on Monday 5/12. Ride is set up for 1310 to 1350 with enrollment into [facility] at 1500  OTHER IMPORTANT INFO: Baseline R side hemiplegia from past CVA.

## 2025-05-13 ENCOUNTER — PATIENT OUTREACH (OUTPATIENT)
Dept: CARE COORDINATION | Facility: CLINIC | Age: 65
End: 2025-05-13
Payer: COMMERCIAL

## 2025-05-13 NOTE — PROGRESS NOTES
[facility]: [facility]    Background: Transitional Care Management program identified per system criteria and reviewed by [facility] [place] team for possible outreach.    Assessment: Upon chart review, CCR Team member will not proceed with patient outreach related to this episode of Transitional Care Management program due to reason below:    Patient has been discharged with Hospice Care    Plan: Transitional Care Management episode addressed appropriately per reason noted above.      Shelley Knutson  Community Health Worker  [facility]  [phone]      *Connected Care Resource Team does NOT follow patient ongoing. Referrals are identified based on internal discharge reports and the outreach is to ensure patient has an understanding of their discharge instructions.

## 2025-06-23 ENCOUNTER — TELEPHONE (OUTPATIENT)
Dept: INTERVENTIONAL RADIOLOGY/VASCULAR | Facility: CLINIC | Age: 65
End: 2025-06-23
Payer: COMMERCIAL

## 2025-06-23 NOTE — TELEPHONE ENCOUNTER
I contacted [facility] [place] regarding Kt's port removal. They will fax the last MD note to IR. NPO instructions given, no med holds necessarg.

## 2025-06-24 ENCOUNTER — HOSPITAL ENCOUNTER (OUTPATIENT)
Facility: CLINIC | Age: 65
Discharge: NURSING, REHAB OR LONG-TERM CARE | End: 2025-06-24
Admitting: RADIOLOGY
Payer: COMMERCIAL

## 2025-06-24 ENCOUNTER — APPOINTMENT (OUTPATIENT)
Dept: INTERVENTIONAL RADIOLOGY/VASCULAR | Facility: CLINIC | Age: 65
End: 2025-06-24
Attending: INTERNAL MEDICINE
Payer: COMMERCIAL

## 2025-06-24 VITALS
SYSTOLIC BLOOD PRESSURE: 128 MMHG | RESPIRATION RATE: 16 BRPM | DIASTOLIC BLOOD PRESSURE: 67 MMHG | TEMPERATURE: 97.6 F | BODY MASS INDEX: 19.48 KG/M2 | HEART RATE: 70 BPM | HEIGHT: 76 IN | OXYGEN SATURATION: 92 % | WEIGHT: 160 LBS

## 2025-06-24 DIAGNOSIS — C34.90 NON-SMALL CELL CARCINOMA OF LUNG, STAGE 3, UNSPECIFIED LATERALITY (H): ICD-10-CM

## 2025-06-24 LAB
ERYTHROCYTE [DISTWIDTH] IN BLOOD BY AUTOMATED COUNT: 17.3 % (ref 10–15)
HCT VFR BLD AUTO: 31 % (ref 40–53)
HGB BLD-MCNC: 9.4 G/DL (ref 13.3–17.7)
MCH RBC QN AUTO: 24.2 PG (ref 26.5–33)
MCHC RBC AUTO-ENTMCNC: 30.3 G/DL (ref 31.5–36.5)
MCV RBC AUTO: 80 FL (ref 78–100)
PLATELET # BLD AUTO: 197 10E3/UL (ref 150–450)
RBC # BLD AUTO: 3.88 10E6/UL (ref 4.4–5.9)
WBC # BLD AUTO: 3.9 10E3/UL (ref 4–11)

## 2025-06-24 PROCEDURE — 36591 DRAW BLOOD OFF VENOUS DEVICE: CPT | Performed by: NURSE PRACTITIONER

## 2025-06-24 PROCEDURE — 999N000163 HC STATISTIC SIMPLE TUBE INSERTION/CHARGE, PORT, CATH, FISTULOGRAM

## 2025-06-24 PROCEDURE — 85018 HEMOGLOBIN: CPT | Performed by: NURSE PRACTITIONER

## 2025-06-24 PROCEDURE — 250N000011 HC RX IP 250 OP 636: Performed by: NURSE PRACTITIONER

## 2025-06-24 PROCEDURE — 99152 MOD SED SAME PHYS/QHP 5/>YRS: CPT

## 2025-06-24 PROCEDURE — 36590 REMOVAL TUNNELED CV CATH: CPT

## 2025-06-24 RX ORDER — FLUMAZENIL 0.1 MG/ML
0.2 INJECTION, SOLUTION INTRAVENOUS
Status: DISCONTINUED | OUTPATIENT
Start: 2025-06-24 | End: 2025-06-24 | Stop reason: HOSPADM

## 2025-06-24 RX ORDER — NALOXONE HYDROCHLORIDE 0.4 MG/ML
0.2 INJECTION, SOLUTION INTRAMUSCULAR; INTRAVENOUS; SUBCUTANEOUS
Status: DISCONTINUED | OUTPATIENT
Start: 2025-06-24 | End: 2025-06-24 | Stop reason: HOSPADM

## 2025-06-24 RX ORDER — FENTANYL CITRATE 50 UG/ML
25-50 INJECTION, SOLUTION INTRAMUSCULAR; INTRAVENOUS EVERY 5 MIN PRN
Status: DISCONTINUED | OUTPATIENT
Start: 2025-06-24 | End: 2025-06-24 | Stop reason: HOSPADM

## 2025-06-24 RX ORDER — ACETAMINOPHEN 325 MG/1
650 TABLET ORAL
Status: DISCONTINUED | OUTPATIENT
Start: 2025-06-24 | End: 2025-06-24 | Stop reason: HOSPADM

## 2025-06-24 RX ORDER — NALOXONE HYDROCHLORIDE 0.4 MG/ML
0.4 INJECTION, SOLUTION INTRAMUSCULAR; INTRAVENOUS; SUBCUTANEOUS
Status: DISCONTINUED | OUTPATIENT
Start: 2025-06-24 | End: 2025-06-24 | Stop reason: HOSPADM

## 2025-06-24 RX ADMIN — FENTANYL CITRATE 25 MCG: 50 INJECTION, SOLUTION INTRAMUSCULAR; INTRAVENOUS at 11:24

## 2025-06-24 RX ADMIN — MIDAZOLAM 0.5 MG: 1 INJECTION INTRAMUSCULAR; INTRAVENOUS at 11:21

## 2025-06-24 ASSESSMENT — ACTIVITIES OF DAILY LIVING (ADL)
ADLS_ACUITY_SCORE: 65
ADLS_ACUITY_SCORE: 61
ADLS_ACUITY_SCORE: 65
ADLS_ACUITY_SCORE: 65
ADLS_ACUITY_SCORE: 61
ADLS_ACUITY_SCORE: 63

## 2025-06-24 NOTE — PROGRESS NOTES
Left message with the guardian to call back for consent at ~ 855.     Thanks, Kendra [facility] Interventional Radiology CNP ([phone]) (phone [phone])

## 2025-06-24 NOTE — PROGRESS NOTES
Care Suites Post Procedure Note    Patient Information  Name: Kt Rasmussen  Age: 64 year old    Post Procedure  Time patient returned to Care Suites: 1148  Port removal site CDI with glue and steristrips  Pt alert and oriented  Concerns/abnormal assessment: none noted at this time  If abnormal assessment, provider notified: N/A  Plan/Other: discharge home    Virginia Lao RN

## 2025-06-24 NOTE — DISCHARGE INSTRUCTIONS
Port Removal Discharge Instructions     After you go home:    Have an adult stay with you for the first 6 hours  You may resume your normal diet       For 24 hours - due to the sedation you received:  Relax and take it easy  Do NOT make any important or legal decisions  Do NOT drive or operate machines at home or at work  Do NOT drink alcohol    Care of Puncture Site:    Keep the dressings on your sites clean & dry for 24-48 hours & then remove dressings. Change it only if it gets wet or dirty.  You may take a shower 24-48 hours after your procedure. Do not scrub site.  No submerging site for 2 weeks or until the incision is completely healed.  Do not remove the small white strips of tape, if present. Allow them to fall off on their own.   You may cover the wound with a bandaid if needed for comfort.      Activity     Avoid heavy lifting (greater than 10 pounds) or the overuse of your shoulder for 48-72 hours.    Bleeding:    If you start bleeding from the incision site in your chest or have swelling in your neck, sit down and press on the site for 5-10 minutes.   If bleeding has not stopped after 10 minutes, call your provider.        Call 911 right away if you have heavy bleeding or bleeding that does not stop.      Medicines:    You may resume all medications  Resume your Platelet Inhibitors and Aspirin tomorrow at your regular dose  For minor pain, you may take Acetaminophen (Tylenol) or Ibuprofen (Advil)          Call the provider who ordered this procedure if:    You have swelling in your neck or over your port site  The incision area is red, swollen, hot or tender  You have chills or a fever greater than 101 F (38 C)  Any questions or concerns    Call  911 or go to the Emergency Room if you have:    Severe chest pain or trouble breathing  Bleeding that you cannot control    If you have questions call:        Interventional Radiology Intake Center @ [phone]    Mon - Fri  7:30 am - 4 pm          Calls will  be returned on the next business day  If you need immediate assistance - please be seen   in an Urgent Care or Emergency Department    You may also contact your provider via My Chart

## 2025-06-24 NOTE — PROGRESS NOTES
Kt is here today for a port a catheter removal. He has entered hospice and it isn't needed anymore.     Legal guardian Sky Arce called to get consent. He called back and stated the procedure was not supposed to happen as he hadn't approved it and it was the patient who called and got it scheduled. But since the patient was already here and exited hospice for the procedure he could get the port removed.     No one called Sky to discuss the decision making over the port removal. Prior to Kt being on Hospice it wasn't as big of a deal but now there is another layer to navigate and Sky the legal guardian is supposed to be the point of contact.     Thanks, [facility] Interventional Radiology CNP ([phone]) (phone [phone])

## 2025-06-24 NOTE — IR NOTE
Procedure Note for IR Procedure Dictation  Procedure PORT REMOVAL  Conscious sedation: 0.5 mg IVP Versed, 25 mcg IVP fentanyl  Sedation time: 12 minutes  Fluoro time: 0 minutes  Total Fluoro Dose: 0 mGy (Air Kerma)  Contrast: 0 ml   Local anesthetic: 7 ml 1% lidocaine w epi

## 2025-06-24 NOTE — PROGRESS NOTES
I called and spoke with Brayan BLANC at [facility]. I went over the discharge instructions with both the RN and Kt. Pt is eating a normal diet. He states he will call for his ride when he is done eating. Pt did not want me to call for the ride.

## 2025-06-24 NOTE — IR NOTE
Interventional Radiology Intra-procedural Nursing Note    Patient Name: Kt Rasmussen  Medical Record Number: [id number]  Today's Date: June 24, 2025    Procedure:  Right port a catheter removal with minimal sedation     Start time: 1124  End time: 1136  Report provided to: ELVIA-Tommie RN      Note: Patient entered Interventional Radiology Suite number 1 via cart. Patient awake, alert and oriented. Assisted onto procedural table in Supine position. Prepped and draped.  Dr. Chavez in room. Time out and procedure started. Vital signs stable. Telemetry reading NSR.    Procedure well tolerated by patient without complications. Procedure end with debrief by Dr. Chavez.  Sutures,  hemostasis achieved. Dermabond, SteriStrips dressing applied to Right port removal site.    Administered medication totals:    Lidocaine with Epinephrine 7 mL Intradermal    Versed 0.5mg IVP  Fentanyl 25 mcg IVP    Last dose of sedation administered at 1121.

## 2025-06-24 NOTE — PROGRESS NOTES
Care Suites Admission Nursing Note    Patient Information  Name: Kt Rasmussen  Age: 64 year old  Reason for admission: port removal  Care Suites arrival time: 0900    Visitor Information  Name: N/A     Patient Admission/Assessment   Pre-procedure assessment complete: Yes  If abnormal assessment/labs, provider notified: N/A  NPO: Yes  Medications held per instructions/orders: N/A  Consent: awaiting call from guardian  If applicable, pregnancy test status: N/A  Patient oriented to room: Yes  Education/questions answered: Yes  Plan/other: proceed as scheduled when consent obtained    Discharge Planning  Discharge name/phone number:   Overnight post sedation caregiver: pt reports lives in assisted living   Discharge location: home    Virginia Lao RN

## 2025-06-24 NOTE — PROGRESS NOTES
Care Suites Discharge Nursing Note    Patient Information  Name: Kt Rasmussen  Age: 64 year old    Discharge Education:  Discharge instructions reviewed: Yes  Additional education/resources provided: n/a  Patient/patient representative verbalizes understanding: Yes  Patient discharging on new medications: No  Medication education completed: Yes    Discharge Plans:   Discharge location: [facility]  Discharge ride contacted: Yes  Approximate discharge time: 1345    Discharge Criteria:  Discharge criteria met and vital signs stable: Yes    Patient Belongs:  Patient belongings returned to patient: Yes    Virginia Lao RN

## 2025-06-24 NOTE — PRE-PROCEDURE
GENERAL PRE-PROCEDURE:   Procedure:  Right port a catheter removal with minimal sedation  Date/Time:  6/24/2025 10:57 AM    Written consent obtained?: Yes    Risks and benefits: Risks, benefits and alternatives were discussed    Consent given by:  Patient  Patient states understanding of procedure being performed: Yes    Patient's understanding of procedure matches consent: Yes    Procedure consent matches procedure scheduled: Yes    Expected level of sedation:  Moderate  Appropriately NPO:  Yes  ASA Class:  3  Mallampati  :  Grade 2- soft palate, base of uvula, tonsillar pillars, and portion of posterior pharyngeal wall visible  Lungs:  Other (comment)  Lung exam comment:  Right side decreased (pleurX in place) Left clear  Heart:  Normal heart sounds and rate  History & Physical reviewed:  History and physical reviewed and no updates needed  Statement of review:  I have reviewed the lab findings, diagnostic data, medications, and the plan for sedation    Total time: 15 minutes    Thanks [facility] Interventional Radiology CNP ([phone]) (phone [phone])

## (undated) DEVICE — NDL BLUNT 18GA 1" W/O FILTER 305181

## (undated) DEVICE — KIT ENDO FIRST STEP DISINFECTANT 200ML W/POUCH EP-4

## (undated) DEVICE — PACK ENT ENDOSCOPY UMMC

## (undated) DEVICE — TUBING SUCTION 10'X3/16" N510

## (undated) DEVICE — GOWN LG DISP 9515

## (undated) DEVICE — BLADE KNIFE BEAVER SICKLE EDGE 5.0MM 377300

## (undated) DEVICE — SYR 03ML LL W/O NDL 309657

## (undated) DEVICE — TUBING SMOKE EVAC .635CMX3M SEA3705

## (undated) DEVICE — SOL WATER IRRIG 1000ML BOTTLE 2F7114

## (undated) DEVICE — ESU GROUND PAD ADULT W/CORD E7507

## (undated) DEVICE — SPONGE COTTONOID 1/2X3" 80-1407

## (undated) DEVICE — KIT CONNECTOR FOR OLYMPUS ENDOSCOPES DEFENDO 100310

## (undated) DEVICE — GOWN SM/MED DISP 9505

## (undated) DEVICE — SYR 01ML 27GA 0.5" NDL TBC 309623

## (undated) DEVICE — EYE DRSG PAD OVAL

## (undated) DEVICE — SUCTION MANIFOLD DORNOCH ULTRA CART UL-CL500

## (undated) DEVICE — NDL BUTTERFLY 25GA .75" 367298

## (undated) DEVICE — SOL NACL 0.9% IRRIG 1000ML BOTTLE 2F7124

## (undated) DEVICE — LINEN TOWEL PACK X5 5464

## (undated) DEVICE — SPONGE COTTONOID 1/2X1/2" 20-04S

## (undated) DEVICE — GLOVE PROTEXIS POWDER FREE SMT 6.5  2D72PT65X

## (undated) RX ORDER — PROPOFOL 10 MG/ML
INJECTION, EMULSION INTRAVENOUS
Status: DISPENSED
Start: 2019-10-01

## (undated) RX ORDER — KETOROLAC TROMETHAMINE 30 MG/ML
INJECTION, SOLUTION INTRAMUSCULAR; INTRAVENOUS
Status: DISPENSED
Start: 2019-10-01

## (undated) RX ORDER — PROPOFOL 10 MG/ML
INJECTION, EMULSION INTRAVENOUS
Status: DISPENSED
Start: 2019-09-30

## (undated) RX ORDER — GLYCOPYRROLATE 0.2 MG/ML
INJECTION, SOLUTION INTRAMUSCULAR; INTRAVENOUS
Status: DISPENSED
Start: 2019-10-01

## (undated) RX ORDER — LIDOCAINE HYDROCHLORIDE 20 MG/ML
INJECTION, SOLUTION EPIDURAL; INFILTRATION; INTRACAUDAL; PERINEURAL
Status: DISPENSED
Start: 2019-10-01

## (undated) RX ORDER — LIDOCAINE HYDROCHLORIDE 20 MG/ML
SOLUTION OROPHARYNGEAL
Status: DISPENSED
Start: 2021-01-14

## (undated) RX ORDER — CEFAZOLIN SODIUM 2 G/100ML
INJECTION, SOLUTION INTRAVENOUS
Status: DISPENSED
Start: 2022-04-11

## (undated) RX ORDER — HEPARIN SODIUM (PORCINE) LOCK FLUSH IV SOLN 100 UNIT/ML 100 UNIT/ML
SOLUTION INTRAVENOUS
Status: DISPENSED
Start: 2022-04-11

## (undated) RX ORDER — ALBUTEROL SULFATE 90 UG/1
AEROSOL, METERED RESPIRATORY (INHALATION)
Status: DISPENSED
Start: 2019-10-01

## (undated) RX ORDER — LIDOCAINE HYDROCHLORIDE 20 MG/ML
SOLUTION OROPHARYNGEAL
Status: DISPENSED
Start: 2020-07-03

## (undated) RX ORDER — ONDANSETRON 2 MG/ML
INJECTION INTRAMUSCULAR; INTRAVENOUS
Status: DISPENSED
Start: 2019-10-01

## (undated) RX ORDER — LIDOCAINE HYDROCHLORIDE 10 MG/ML
INJECTION, SOLUTION INFILTRATION; PERINEURAL
Status: DISPENSED
Start: 2025-05-02

## (undated) RX ORDER — TRIAMCINOLONE ACETONIDE 40 MG/ML
INJECTION, SUSPENSION INTRA-ARTICULAR; INTRAMUSCULAR
Status: DISPENSED
Start: 2019-09-30

## (undated) RX ORDER — FENTANYL CITRATE 50 UG/ML
INJECTION, SOLUTION INTRAMUSCULAR; INTRAVENOUS
Status: DISPENSED
Start: 2021-04-22

## (undated) RX ORDER — CEFAZOLIN SODIUM 1 G/3ML
INJECTION, POWDER, FOR SOLUTION INTRAMUSCULAR; INTRAVENOUS
Status: DISPENSED
Start: 2019-09-30

## (undated) RX ORDER — LIDOCAINE HYDROCHLORIDE 40 MG/ML
SOLUTION TOPICAL
Status: DISPENSED
Start: 2019-09-13

## (undated) RX ORDER — LIDOCAINE HYDROCHLORIDE AND EPINEPHRINE 10; 10 MG/ML; UG/ML
INJECTION, SOLUTION INFILTRATION; PERINEURAL
Status: DISPENSED
Start: 2022-04-11

## (undated) RX ORDER — LIDOCAINE HYDROCHLORIDE 20 MG/ML
SOLUTION OROPHARYNGEAL
Status: DISPENSED
Start: 2021-05-20

## (undated) RX ORDER — LIDOCAINE HYDROCHLORIDE 20 MG/ML
SOLUTION OROPHARYNGEAL
Status: DISPENSED
Start: 2019-09-13

## (undated) RX ORDER — HYDROMORPHONE HYDROCHLORIDE 1 MG/ML
INJECTION, SOLUTION INTRAMUSCULAR; INTRAVENOUS; SUBCUTANEOUS
Status: DISPENSED
Start: 2019-09-30

## (undated) RX ORDER — LIDOCAINE HYDROCHLORIDE 20 MG/ML
SOLUTION OROPHARYNGEAL
Status: DISPENSED
Start: 2019-10-10

## (undated) RX ORDER — ONDANSETRON 2 MG/ML
INJECTION INTRAMUSCULAR; INTRAVENOUS
Status: DISPENSED
Start: 2019-09-30

## (undated) RX ORDER — LIDOCAINE HYDROCHLORIDE 20 MG/ML
SOLUTION OROPHARYNGEAL
Status: DISPENSED
Start: 2021-09-23

## (undated) RX ORDER — LIDOCAINE HYDROCHLORIDE 20 MG/ML
SOLUTION OROPHARYNGEAL
Status: DISPENSED
Start: 2020-09-17

## (undated) RX ORDER — LIDOCAINE HYDROCHLORIDE AND EPINEPHRINE 10; 10 MG/ML; UG/ML
INJECTION, SOLUTION INFILTRATION; PERINEURAL
Status: DISPENSED
Start: 2019-09-30

## (undated) RX ORDER — OXYMETAZOLINE HYDROCHLORIDE 0.05 G/100ML
SPRAY NASAL
Status: DISPENSED
Start: 2019-09-30

## (undated) RX ORDER — HEPARIN SODIUM (PORCINE) LOCK FLUSH IV SOLN 100 UNIT/ML 100 UNIT/ML
SOLUTION INTRAVENOUS
Status: DISPENSED
Start: 2021-04-22

## (undated) RX ORDER — FLUMAZENIL 0.1 MG/ML
INJECTION, SOLUTION INTRAVENOUS
Status: DISPENSED
Start: 2021-03-12

## (undated) RX ORDER — CEFAZOLIN SODIUM 1 G/3ML
INJECTION, POWDER, FOR SOLUTION INTRAMUSCULAR; INTRAVENOUS
Status: DISPENSED
Start: 2019-10-01

## (undated) RX ORDER — LIDOCAINE HYDROCHLORIDE 20 MG/ML
SOLUTION OROPHARYNGEAL
Status: DISPENSED
Start: 2021-07-22

## (undated) RX ORDER — FENTANYL CITRATE 50 UG/ML
INJECTION, SOLUTION INTRAMUSCULAR; INTRAVENOUS
Status: DISPENSED
Start: 2019-09-30

## (undated) RX ORDER — PHENYLEPHRINE HCL IN 0.9% NACL 1 MG/10 ML
SYRINGE (ML) INTRAVENOUS
Status: DISPENSED
Start: 2019-10-01

## (undated) RX ORDER — LABETALOL HYDROCHLORIDE 5 MG/ML
INJECTION, SOLUTION INTRAVENOUS
Status: DISPENSED
Start: 2019-09-30

## (undated) RX ORDER — CEFAZOLIN SODIUM 2 G/100ML
INJECTION, SOLUTION INTRAVENOUS
Status: DISPENSED
Start: 2021-04-22

## (undated) RX ORDER — ALBUMIN, HUMAN INJ 5% 5 %
SOLUTION INTRAVENOUS
Status: DISPENSED
Start: 2019-10-01

## (undated) RX ORDER — NALOXONE HYDROCHLORIDE 0.4 MG/ML
INJECTION, SOLUTION INTRAMUSCULAR; INTRAVENOUS; SUBCUTANEOUS
Status: DISPENSED
Start: 2021-03-12

## (undated) RX ORDER — CEFAZOLIN SODIUM 2 G/100ML
INJECTION, SOLUTION INTRAVENOUS
Status: DISPENSED
Start: 2017-07-26

## (undated) RX ORDER — LIDOCAINE HYDROCHLORIDE 10 MG/ML
INJECTION, SOLUTION INFILTRATION; PERINEURAL
Status: DISPENSED
Start: 2022-04-11

## (undated) RX ORDER — FENTANYL CITRATE 50 UG/ML
INJECTION, SOLUTION INTRAMUSCULAR; INTRAVENOUS
Status: DISPENSED
Start: 2021-03-12

## (undated) RX ORDER — LIDOCAINE HYDROCHLORIDE 20 MG/ML
INJECTION, SOLUTION EPIDURAL; INFILTRATION; INTRACAUDAL; PERINEURAL
Status: DISPENSED
Start: 2019-09-30

## (undated) RX ORDER — LIDOCAINE HYDROCHLORIDE 20 MG/ML
SOLUTION OROPHARYNGEAL
Status: DISPENSED
Start: 2020-08-13

## (undated) RX ORDER — HEPARIN SODIUM (PORCINE) LOCK FLUSH IV SOLN 100 UNIT/ML 100 UNIT/ML
SOLUTION INTRAVENOUS
Status: DISPENSED
Start: 2017-04-04

## (undated) RX ORDER — LIDOCAINE HYDROCHLORIDE 20 MG/ML
SOLUTION OROPHARYNGEAL
Status: DISPENSED
Start: 2020-11-19

## (undated) RX ORDER — LIDOCAINE HYDROCHLORIDE 20 MG/ML
SOLUTION OROPHARYNGEAL
Status: DISPENSED
Start: 2019-12-05

## (undated) RX ORDER — LIDOCAINE HYDROCHLORIDE 10 MG/ML
INJECTION, SOLUTION INFILTRATION; PERINEURAL
Status: DISPENSED
Start: 2021-04-22

## (undated) RX ORDER — ATROPINE SULFATE 0.4 MG/ML
AMPUL (ML) INJECTION
Status: DISPENSED
Start: 2019-10-01

## (undated) RX ORDER — PHENYLEPHRINE HCL IN 0.9% NACL 1 MG/10 ML
SYRINGE (ML) INTRAVENOUS
Status: DISPENSED
Start: 2019-09-30

## (undated) RX ORDER — FENTANYL CITRATE 50 UG/ML
INJECTION, SOLUTION INTRAMUSCULAR; INTRAVENOUS
Status: DISPENSED
Start: 2022-04-11